# Patient Record
Sex: MALE | Race: WHITE | NOT HISPANIC OR LATINO | Employment: OTHER | URBAN - METROPOLITAN AREA
[De-identification: names, ages, dates, MRNs, and addresses within clinical notes are randomized per-mention and may not be internally consistent; named-entity substitution may affect disease eponyms.]

---

## 2019-01-15 ENCOUNTER — HISTORICAL (OUTPATIENT)
Dept: ADMINISTRATIVE | Facility: HOSPITAL | Age: 59
End: 2019-01-15

## 2019-01-15 LAB — AMYLASE SERPL-CCNC: 115 U/L (ref 28–100)

## 2019-01-19 ENCOUNTER — OUTSIDE PLACE OF SERVICE (OUTPATIENT)
Dept: PULMONOLOGY | Facility: CLINIC | Age: 59
End: 2019-01-19
Payer: MEDICARE

## 2019-01-19 LAB
ALBUMIN SERPL-MCNC: 3.2 G/DL (ref 3.1–4.7)
ALP SERPL-CCNC: 39 IU/L (ref 40–104)
ALT (SGPT): 15 IU/L (ref 3–33)
AST SERPL-CCNC: 23 IU/L (ref 10–40)
BILIRUB SERPL-MCNC: 2.5 MG/DL (ref 0.3–1)
BNP SERPL-MCNC: 1273 PG/ML (ref 0–100)
BUN SERPL-MCNC: 39 MG/DL (ref 8–20)
CALCIUM SERPL-MCNC: 8.7 MG/DL (ref 7.7–10.4)
CHLORIDE: 101 MMOL/L (ref 98–110)
CO2 SERPL-SCNC: 26.2 MMOL/L (ref 22.8–31.6)
CREATININE: 1.13 MG/DL (ref 0.6–1.4)
GLUCOSE: 177 MG/DL (ref 70–99)
HCT VFR BLD AUTO: 27.9 % (ref 39–55)
HGB BLD-MCNC: 8.6 G/DL (ref 14–16)
INR PPP: 1.3
MAGNESIUM SERPL-MCNC: 2.1 MG/DL (ref 1.5–2.6)
MCH RBC QN AUTO: 27.5 PG (ref 25–35)
MCHC RBC AUTO-ENTMCNC: 30.8 G/DL (ref 31–36)
MCV RBC AUTO: 89.1 FL (ref 80–100)
NUCLEATED RBCS: 0 %
PLATELET # BLD AUTO: 69 K/UL (ref 140–440)
POTASSIUM SERPL-SCNC: 4 MMOL/L (ref 3.5–5)
PROT SERPL-MCNC: 7.8 G/DL (ref 6–8.2)
PROTHROMBIN TIME: 15.8 SEC (ref 11.7–14)
RBC # BLD AUTO: 3.13 M/UL (ref 4.3–5.9)
SODIUM: 136 MMOL/L (ref 134–144)
WBC # BLD AUTO: 5.4 K/UL (ref 5–10)

## 2019-01-19 PROCEDURE — 99232 SBSQ HOSP IP/OBS MODERATE 35: CPT | Mod: ,,, | Performed by: INTERNAL MEDICINE

## 2019-01-19 PROCEDURE — 99232 PR SUBSEQUENT HOSPITAL CARE,LEVL II: ICD-10-PCS | Mod: ,,, | Performed by: INTERNAL MEDICINE

## 2019-01-21 LAB
ALBUMIN SERPL-MCNC: 3.1 G/DL (ref 3.1–4.7)
ALP SERPL-CCNC: 42 IU/L (ref 40–104)
ALT (SGPT): 28 IU/L (ref 3–33)
AST SERPL-CCNC: 28 IU/L (ref 10–40)
BILIRUB SERPL-MCNC: 1.6 MG/DL (ref 0.3–1)
BNP SERPL-MCNC: 622 PG/ML (ref 0–100)
BUN SERPL-MCNC: 42 MG/DL (ref 8–20)
CALCIUM SERPL-MCNC: 8.6 MG/DL (ref 7.7–10.4)
CHLORIDE: 104 MMOL/L (ref 98–110)
CO2 SERPL-SCNC: 27 MMOL/L (ref 22.8–31.6)
CREATININE: 1.12 MG/DL (ref 0.6–1.4)
GLUCOSE: 126 MG/DL (ref 70–99)
HCT VFR BLD AUTO: 29.6 % (ref 39–55)
HGB BLD-MCNC: 8.9 G/DL (ref 14–16)
INR PPP: 1.3
MAGNESIUM SERPL-MCNC: 2 MG/DL (ref 1.5–2.6)
MCH RBC QN AUTO: 27.1 PG (ref 25–35)
MCHC RBC AUTO-ENTMCNC: 30.1 G/DL (ref 31–36)
MCV RBC AUTO: 90 FL (ref 80–100)
MISCELLANEOUS LAB TEST ORDER: NORMAL
NUCLEATED RBCS: 0 %
PLATELET # BLD AUTO: 86 K/UL (ref 140–440)
POTASSIUM SERPL-SCNC: 3.9 MMOL/L (ref 3.5–5)
PROT SERPL-MCNC: 7.1 G/DL (ref 6–8.2)
PROTHROMBIN TIME: 16 SEC (ref 11.7–14)
RBC # BLD AUTO: 3.29 M/UL (ref 4.3–5.9)
SODIUM: 140 MMOL/L (ref 134–144)
WBC # BLD AUTO: 6.7 K/UL (ref 5–10)

## 2019-01-22 LAB
ALBUMIN SERPL-MCNC: 3.2 G/DL (ref 3.1–4.7)
ALP SERPL-CCNC: 44 IU/L (ref 40–104)
ALT (SGPT): 28 IU/L (ref 3–33)
AST SERPL-CCNC: 26 IU/L (ref 10–40)
BILIRUB SERPL-MCNC: 1.6 MG/DL (ref 0.3–1)
BNP SERPL-MCNC: 917 PG/ML (ref 0–100)
BUN SERPL-MCNC: 35 MG/DL (ref 8–20)
CALCIUM SERPL-MCNC: 8.4 MG/DL (ref 7.7–10.4)
CHLORIDE: 105 MMOL/L (ref 98–110)
CO2 SERPL-SCNC: 26.2 MMOL/L (ref 22.8–31.6)
CREATININE: 1.01 MG/DL (ref 0.6–1.4)
GLUCOSE: 99 MG/DL (ref 70–99)
HCT VFR BLD AUTO: 27.9 % (ref 39–55)
HGB BLD-MCNC: 8.5 G/DL (ref 14–16)
INR PPP: 1.4
MAGNESIUM SERPL-MCNC: 2 MG/DL (ref 1.5–2.6)
MCH RBC QN AUTO: 27.2 PG (ref 25–35)
MCHC RBC AUTO-ENTMCNC: 30.5 G/DL (ref 31–36)
MCV RBC AUTO: 89.4 FL (ref 80–100)
NUCLEATED RBCS: 0 %
PLATELET # BLD AUTO: 81 K/UL (ref 140–440)
POTASSIUM SERPL-SCNC: 3.8 MMOL/L (ref 3.5–5)
PROT SERPL-MCNC: 7.2 G/DL (ref 6–8.2)
PROTHROMBIN TIME: 16.2 SEC (ref 11.7–14)
RBC # BLD AUTO: 3.12 M/UL (ref 4.3–5.9)
SODIUM: 137 MMOL/L (ref 134–144)
WBC # BLD AUTO: 5.6 K/UL (ref 5–10)

## 2019-01-23 ENCOUNTER — TELEPHONE (OUTPATIENT)
Dept: PODIATRY | Facility: CLINIC | Age: 59
End: 2019-01-23

## 2019-01-23 LAB
ALBUMIN SERPL-MCNC: 3 G/DL (ref 3.1–4.7)
ALP SERPL-CCNC: 45 IU/L (ref 40–104)
ALT (SGPT): 28 IU/L (ref 3–33)
AST SERPL-CCNC: 25 IU/L (ref 10–40)
BILIRUB SERPL-MCNC: 1.6 MG/DL (ref 0.3–1)
BNP SERPL-MCNC: 772 PG/ML (ref 0–100)
BUN SERPL-MCNC: 29 MG/DL (ref 8–20)
CALCIUM SERPL-MCNC: 8.6 MG/DL (ref 7.7–10.4)
CHLORIDE: 102 MMOL/L (ref 98–110)
CO2 SERPL-SCNC: 26.5 MMOL/L (ref 22.8–31.6)
CREATININE: 0.98 MG/DL (ref 0.6–1.4)
GLUCOSE: 99 MG/DL (ref 70–99)
HCT VFR BLD AUTO: 27 % (ref 39–55)
HGB BLD-MCNC: 8.5 G/DL (ref 14–16)
INR PPP: 1.4
MAGNESIUM SERPL-MCNC: 2 MG/DL (ref 1.5–2.6)
MCH RBC QN AUTO: 28.5 PG (ref 25–35)
MCHC RBC AUTO-ENTMCNC: 31.5 G/DL (ref 31–36)
MCV RBC AUTO: 90.6 FL (ref 80–100)
NUCLEATED RBCS: 0 %
PLATELET # BLD AUTO: 84 K/UL (ref 140–440)
POTASSIUM SERPL-SCNC: 3.8 MMOL/L (ref 3.5–5)
PROT SERPL-MCNC: 7.1 G/DL (ref 6–8.2)
PROTHROMBIN TIME: 16.3 SEC (ref 11.7–14)
RBC # BLD AUTO: 2.98 M/UL (ref 4.3–5.9)
SODIUM: 136 MMOL/L (ref 134–144)
WBC # BLD AUTO: 6.2 K/UL (ref 5–10)

## 2019-01-23 RX ORDER — DULOXETIN HYDROCHLORIDE 60 MG/1
1 CAPSULE, DELAYED RELEASE ORAL 2 TIMES DAILY
Refills: 5 | COMMUNITY
Start: 2018-12-12 | End: 2019-02-28

## 2019-01-23 RX ORDER — PANTOPRAZOLE SODIUM 40 MG/1
1 TABLET, DELAYED RELEASE ORAL DAILY
Refills: 0 | COMMUNITY
Start: 2018-11-02

## 2019-01-23 RX ORDER — NAPROXEN SODIUM 220 MG/1
81 TABLET, FILM COATED ORAL DAILY
Status: ON HOLD | COMMUNITY
End: 2023-10-19 | Stop reason: SDUPTHER

## 2019-01-23 RX ORDER — ATORVASTATIN CALCIUM 10 MG/1
1 TABLET, FILM COATED ORAL DAILY
Refills: 1 | COMMUNITY
Start: 2018-12-12 | End: 2023-10-08 | Stop reason: DRUGHIGH

## 2019-01-23 RX ORDER — TRAMADOL HYDROCHLORIDE 50 MG/1
1 TABLET ORAL EVERY 8 HOURS PRN
Refills: 1 | COMMUNITY
Start: 2019-01-09 | End: 2019-04-16

## 2019-01-23 RX ORDER — METFORMIN HYDROCHLORIDE 500 MG/1
2 TABLET ORAL 2 TIMES DAILY
Refills: 2 | COMMUNITY
Start: 2018-12-03 | End: 2020-09-14 | Stop reason: ALTCHOICE

## 2019-01-23 RX ORDER — INSULIN GLARGINE 100 [IU]/ML
INJECTION, SOLUTION SUBCUTANEOUS
Refills: 1 | COMMUNITY
Start: 2019-01-02 | End: 2019-08-01

## 2019-01-23 RX ORDER — SPIRONOLACTONE 25 MG/1
1 TABLET ORAL DAILY
Refills: 1 | Status: ON HOLD | COMMUNITY
Start: 2018-12-03 | End: 2022-09-08 | Stop reason: HOSPADM

## 2019-01-23 RX ORDER — CARVEDILOL 25 MG/1
1 TABLET ORAL 2 TIMES DAILY
Refills: 1 | Status: ON HOLD | COMMUNITY
Start: 2018-11-29 | End: 2020-01-22 | Stop reason: SDUPTHER

## 2019-01-23 RX ORDER — GABAPENTIN 600 MG/1
1 TABLET ORAL 3 TIMES DAILY
Refills: 0 | Status: ON HOLD | COMMUNITY
Start: 2018-10-29 | End: 2023-06-18 | Stop reason: HOSPADM

## 2019-01-23 RX ORDER — FUROSEMIDE 40 MG/1
1 TABLET ORAL 2 TIMES DAILY
Refills: 1 | Status: ON HOLD | COMMUNITY
Start: 2018-12-03 | End: 2020-01-22 | Stop reason: SDUPTHER

## 2019-01-23 NOTE — TELEPHONE ENCOUNTER
FYI Patient had us done 1-21-19 in hospital.Sending to you wasn't sure if you saw this.  You were consulted on this patient.

## 2019-01-24 ENCOUNTER — TELEPHONE (OUTPATIENT)
Dept: INFECTIOUS DISEASES | Facility: CLINIC | Age: 59
End: 2019-01-24

## 2019-01-24 RX ORDER — NYSTATIN 100000 [USP'U]/ML
4 SUSPENSION ORAL 4 TIMES DAILY
Qty: 160 ML | Refills: 3 | Status: SHIPPED | OUTPATIENT
Start: 2019-01-24 | End: 2019-02-03

## 2019-01-24 NOTE — TELEPHONE ENCOUNTER
----- Message from Gayla Bridges sent at 1/24/2019  9:53 AM CST -----  Contact: Wife, Katelynn [phone]   The patients wife called stating the patient was seen in the [facility], and was given medication for Thrush.  She states the patient still has thrush and would like a RX for medication.  Please assist and thanks in advance.      Cha

## 2019-01-25 RX ORDER — CLOTRIMAZOLE 10 MG/1
10 LOZENGE ORAL; TOPICAL 3 TIMES DAILY
Qty: 90 TABLET | Refills: 1 | Status: SHIPPED | OUTPATIENT
Start: 2019-01-25 | End: 2019-02-24

## 2019-01-25 NOTE — TELEPHONE ENCOUNTER
Sent in nystatin suspension yesterday  Fax from pharmacy indicates they do not have the drug, therefore sending in mycelex leta , dissolve one in mouth TID as needed for thrush. I spoke with pharmacist who did have some on the shelf and she will dispense and I did send it electronically as well.

## 2019-01-29 ENCOUNTER — TELEPHONE (OUTPATIENT)
Dept: INFECTIOUS DISEASES | Facility: CLINIC | Age: 59
End: 2019-01-29

## 2019-01-29 NOTE — TELEPHONE ENCOUNTER
Says patient is on Vancomycin 2 grams q36hrs and they have lab orders for every Monday or Tuesday and Thursday or Friday, but this does not fall in line with when he is dosing.  So she needs clarification orders.  So that labs can be done on the proper days.

## 2019-01-30 ENCOUNTER — TELEPHONE (OUTPATIENT)
Dept: INFECTIOUS DISEASES | Facility: CLINIC | Age: 59
End: 2019-01-30

## 2019-01-30 NOTE — TELEPHONE ENCOUNTER
Says patient has an ongoing rash on his thighs, both hips and back.  Says the rash started while he was in the hospital and has worsened.  Says when she administered his vancomycin he started itching really bad.  What would you like for them to do?

## 2019-01-31 ENCOUNTER — OFFICE VISIT (OUTPATIENT)
Dept: INFECTIOUS DISEASES | Facility: CLINIC | Age: 59
End: 2019-01-31
Payer: MEDICARE

## 2019-01-31 VITALS
DIASTOLIC BLOOD PRESSURE: 76 MMHG | SYSTOLIC BLOOD PRESSURE: 126 MMHG | OXYGEN SATURATION: 97 % | WEIGHT: 285 LBS | TEMPERATURE: 98 F | BODY MASS INDEX: 38.6 KG/M2 | HEART RATE: 79 BPM | HEIGHT: 72 IN

## 2019-01-31 DIAGNOSIS — D69.1 THROMBOCYTOPATHY: ICD-10-CM

## 2019-01-31 DIAGNOSIS — M86.171 OTHER ACUTE OSTEOMYELITIS OF RIGHT FOOT: Primary | ICD-10-CM

## 2019-01-31 DIAGNOSIS — A40.0 SEPSIS DUE TO GROUP A STREPTOCOCCUS: ICD-10-CM

## 2019-01-31 DIAGNOSIS — A49.02 MRSA (METHICILLIN RESISTANT STAPHYLOCOCCUS AUREUS) INFECTION: ICD-10-CM

## 2019-01-31 DIAGNOSIS — R23.3 PETECHIAL RASH: ICD-10-CM

## 2019-01-31 DIAGNOSIS — Z79.2 ENCOUNTER FOR LONG-TERM (CURRENT) USE OF ANTIBIOTICS: ICD-10-CM

## 2019-01-31 DIAGNOSIS — I87.2 VENOUS STASIS DERMATITIS OF BOTH LOWER EXTREMITIES: ICD-10-CM

## 2019-01-31 PROBLEM — A41.9 SEPSIS: Status: ACTIVE | Noted: 2019-01-01

## 2019-01-31 PROBLEM — M86.9 OSTEOMYELITIS OF RIGHT FOOT: Status: ACTIVE | Noted: 2019-01-01

## 2019-01-31 PROCEDURE — 99214 OFFICE O/P EST MOD 30 MIN: CPT | Mod: ,,, | Performed by: INTERNAL MEDICINE

## 2019-01-31 PROCEDURE — 99214 PR OFFICE/OUTPT VISIT, EST, LEVL IV, 30-39 MIN: ICD-10-PCS | Mod: ,,, | Performed by: INTERNAL MEDICINE

## 2019-01-31 RX ORDER — DOXYCYCLINE 100 MG/1
100 CAPSULE ORAL 2 TIMES DAILY
Qty: 60 CAPSULE | Refills: 2 | Status: SHIPPED | OUTPATIENT
Start: 2019-01-31 | End: 2019-06-24

## 2019-01-31 NOTE — PROGRESS NOTES
"Subjective:       Patient ID: Ana Bullard is a 57 y.o. male.    Chief Complaint:: Follow-up (Possible allergic reaction)    HPI seen in [facility] for osteomyelitis of right 5th metatarsal head (MRSA and GAS and Proteus) with intense cellulitis of the right leg with sepsis, septic shock, respiratory failure, acute kidney injury. At the same time he was also found have otitis media on the left. Dr. izquierdo debrided the foot wound and obtained a bone biopsy. He was discharged on IV vancomycin and oral ciprofloxacin. First set of laboratory studies were not performed by home health. Patient and wife called yesterday about a rash that began in the hospital on his left hip and thigh which has progressed. Yesterday after infusing vancomycin he had itching. I asked them to stop vancomycin and come in this morning. He has had no fever or chills or sweats. No eye or mouth involvement. Pertinent from laboratory studies drawn yesterday is an elevation of his creatinine from 1-1.71. He is taking his usual amount of diuretics, furosemide 40 twice a day and spironolactone. He feels that he is drinking plenty. His urine is bright yellow which he blames on his vitamins. His wound care has been provided by home health and the ulceration is improving.    Review of patient's allergies indicates:   Allergen Reactions    Heparin analogues Other (See Comments)     Depleted WBC count    Morphine Hallucinations    Vasopressin analogues Other (See Comments)     "felt like eyes going to pop out of my head"     Past Medical History:   Diagnosis Date    AICD (automatic cardioverter/defibrillator) present     Anxiety and depression 2012    CAD (coronary artery disease) 2012    Cardiomegaly 2016    CHF (congestive heart failure) 2012    Cholecystitis 2017    Complete heart block 2012    Diabetic foot ulcer     DVT (deep venous thrombosis) 2012    And pulmonary embolus    GERD (gastroesophageal reflux disease) 2010    Heparin " induced thrombocytopenia     Hyperlipemia     IDDM (insulin dependent diabetes mellitus) 1983    With neuropathy    Ischemic cardiomyopathy     MI (myocardial infarction)     Morbid obesity     MRSA (methicillin resistant Staphylococcus aureus) infection 2019    Noncompliance with therapeutic plan     Osteomyelitis of right foot 2019    Pulmonary edema 2019    Respiratory failure 2019    Sepsis 2019    Due to cellulitis right leg    Sleep apnea 2013    Thrombocytopathy     Venous stasis dermatitis of both lower extremities      Past Surgical History:   Procedure Laterality Date    CARDIAC PACEMAKER PLACEMENT  2012    CARDIAC PACEMAKER PLACEMENT  10/04/2018    battery replacement    CORONARY ARTERY BYPASS GRAFT  2012    ESOPHAGOGASTRODUODENOSCOPY  2017    SAMARA FILTER PLACEMENT  2012    vena cava     Social History     Tobacco Use    Smoking status: Former Smoker     Packs/day: 2.00     Years: 38.00     Pack years: 76.00     Types: Cigarettes     Last attempt to quit:      Years since quittin.0    Smokeless tobacco: Never Used   Substance Use Topics    Alcohol use: No     Frequency: Never     Social History     Occupational History    Not on file     History reviewed. No pertinent family history.      Review of Systems    Constitutional: No fever, chills, sweats, fatigue, weakness, weight loss    Eyes: No change in vision,    ENT: No sore throat, mouth pains, or lesions    Cardiovascular: No chest pain, BAPTISTE and, has chronic pedal edema    Respiratory: No shortness of breath, cough,   Gastrointestinal: No abdominal pain, nausea, vomiting, diarrhea, or focal abdominal pain    Genitourinary: No dysuria    Musculoskeletal: No new pain, joint swelling, or injuries    Integumentary: See history present illness   Neurological: No dizziness, vertigo, unusual headaches, falls    Psychiatric: No anxiety, depression    Endocrine: Blood sugars are  well controlled and he is paying much more attention to his diabetes now    Lymphatic: No lymphadenopathy, blood loss,     VAD: Right arm PICC line has no redness, tenderness, phlebitis, drainage     Objective:      Blood pressure 126/76, pulse 79, temperature 97.5 °F (36.4 °C), temperature source Temporal, height 6' (1.829 m), weight 129.3 kg (285 lb), SpO2 97 %. Body mass index is 38.65 kg/m².  Physical Exam      General: Alert and attentive, cooperative and in no distress    Eyes: Pupils equal, round, reactive to light, anicteric, EOMI    Neck: Supple, non-tender, no thyromegaly or masses    ENT: EAC patent, TM normal and otitis is resolved, nares patent, no oral lesions, , no thrush    Cardiovascular: Regular rate and rhythm,     Respiratory: Lungs clear without wheezes, rales, rubs or rhonci    Gastrointestinal:  Soft, bowel sounds normal,  no mass or organomegaly, no tenderness or distention    Genitourinary:    Integumentary:#1 he has venous stasis erythema on his abdominal pannus when it is dependent  #2 he has severe venous stasis congestion of his lower extremities right greater than left without ulcerations or breakdown, and has generalized dry skin  #3 he has a petechial rash that begins in his lower back, over the left buttock the lateral left thigh and the right anterior thigh. Some of these have been excoriated but this does not look like a drug rash, it looks like trauma with a platelet count of 69,000.  #4 severely dystrophic nails with mild tinea pedis of the right foot     Vascular:Severe peripheral edema, venous stasis right greater than left,  warm and well perfused    Musculoskeletal: Ambulates without difficulty, no acute arthritis, synovitis or myositis. Normal muscle bulk and strength    Lymphatic: No cervical, axillary, or inguinal LAD    Neurological: Normal LOC, cranial nerves grossly intact, speech, gait    Psychiatric: Normal mood, speech,  demeanor     Wound: Right foot fifth  metatarsal head plantar surface, the ulceration is clean and pink with granulation and is approximately 1 cm in diameter. when it is probed I can still get up to the bone I cannot see the bone. There is minimal drainage on the bandage and no cellulitis. The bandages dirty and he was counseled about walking without the boot    VAD: PICC line has no redness, tenderness, drainage, phlebitis       Recent Diagnostics:Labs from [place] 1/30: Bielen 37 creatinine 1.71. Hemoglobin is low but stable. He has 5% eosinophils          Assessment and Plan:           Other acute osteomyelitis of right foot  -     SUBSEQUENT HOME HEALTH ORDERS    MRSA (methicillin resistant Staphylococcus aureus) infection    Sepsis due to group A Streptococcus    Encounter for long-term (current) use of antibiotics  -     SUBSEQUENT HOME HEALTH ORDERS    Thrombocytopathy    IDDM (insulin dependent diabetes mellitus)    Venous stasis dermatitis of both lower extremities    Petechial rash    Other orders  -     doxycycline (VIBRAMYCIN) 100 MG Cap; Take 1 capsule (100 mg total) by mouth 2 (two) times daily.  Dispense: 60 capsule; Refill: 2    Hold vancomycin. I believe this is more than likely the reason his creatinine is higher but I do not believe the rash is from vancomycin. I do not believe this is a drug rash.  Continue to flush the picc line twice a day. No heparin.    Starting doxycycline 100 mg twice a day to take the place of vancomycin.  Continue the ciprofloxacin 500 mg twice a day  Please take a probiotic (good bacteria pill) once a day    I have changed your  Home health lab orders  I would like for you to skip the evening furosemide for 3 days.     For the athlete's foot, clean and dry between the toes and apply over the counter Lotrimin.      This note was created using Dragon voice recognition software that occasionally misinterpreted phrases or words.

## 2019-01-31 NOTE — PATIENT INSTRUCTIONS
Hold vancomycin  Continue to flush the picc line twice a day. No heparin.    Starting doxycycline 100 mg twice a day  Continue the ciprofloxacin 500 mg twice a day  Please take a probiotic (good bacteria pill) once a day    I have changed your  Home health lab orders  I would like for you to skip the evening furosemide for 3 days.     For the athlete's foot, clean and dry between the toes and apply over the counter Lotrimin.

## 2019-02-06 ENCOUNTER — TELEPHONE (OUTPATIENT)
Dept: INFECTIOUS DISEASES | Facility: CLINIC | Age: 59
End: 2019-02-06

## 2019-02-06 NOTE — TELEPHONE ENCOUNTER
----- Message from Otilia Pineda MD sent at 2/5/2019  4:46 PM CST -----  Please find out if his rash is better. If it is, good. If it is not, tell him to stop the cipro  ----- Message -----  From: Sandra Rodriguez MA  Sent: 2/5/2019   1:43 PM  To: Otilia Pineda MD    [facility] 2/5/19

## 2019-02-06 NOTE — TELEPHONE ENCOUNTER
Said the rash is better, but has not gone away completely and does not itch as much.  Do you still want him to stop the cipro?

## 2019-02-12 ENCOUNTER — OFFICE VISIT (OUTPATIENT)
Dept: PODIATRY | Facility: CLINIC | Age: 59
End: 2019-02-12
Payer: MEDICARE

## 2019-02-12 VITALS
HEIGHT: 72 IN | HEART RATE: 60 BPM | SYSTOLIC BLOOD PRESSURE: 104 MMHG | TEMPERATURE: 98 F | WEIGHT: 256 LBS | DIASTOLIC BLOOD PRESSURE: 66 MMHG | BODY MASS INDEX: 34.67 KG/M2

## 2019-02-12 DIAGNOSIS — L97.511 SKIN ULCER OF RIGHT FOOT, LIMITED TO BREAKDOWN OF SKIN: ICD-10-CM

## 2019-02-12 DIAGNOSIS — M86.271 SUBACUTE OSTEOMYELITIS OF RIGHT FOOT: Primary | ICD-10-CM

## 2019-02-12 DIAGNOSIS — E11.49 TYPE II DIABETES MELLITUS WITH NEUROLOGICAL MANIFESTATIONS: ICD-10-CM

## 2019-02-12 PROCEDURE — 99213 PR OFFICE/OUTPT VISIT, EST, LEVL III, 20-29 MIN: ICD-10-PCS | Mod: ,,, | Performed by: PODIATRIST

## 2019-02-12 PROCEDURE — 99213 OFFICE O/P EST LOW 20 MIN: CPT | Mod: ,,, | Performed by: PODIATRIST

## 2019-02-12 NOTE — PROGRESS NOTES
[address] [address]  [place] [address]  Phone: [phone]   [phone]    Patient's PCP:YRN Landry  Referring Provider: No ref. provider found    Subjective:      Chief Complaint:: Follow-up (I&D right 5th mpj and bone biopsy)    CAROLIN Bullard is a 57 y.o. male who presents with a complaint of  Ulcer right fifth mpj lasting for one month. Had bone biopsy done and was on IV antibiotics.Onset of the symptoms was none.  Current symptoms include pain.  Symptoms have improved.Treatment to date have included bone biopsy, home health . Patients rates pain 4/10 on pain scale.    Systemic Doctor: YRN Cardona  Date Last Seen: 2/2019  Blood Sugar: 150  Hemoglobin A1c: 6    Vitals:    02/12/19 1445   BP: 104/66   Pulse: 60   Temp: 98.4 °F (36.9 °C)     Shoe Size: 12    Past Surgical History:   Procedure Laterality Date    CARDIAC PACEMAKER PLACEMENT  12/2012    CARDIAC PACEMAKER PLACEMENT  10/04/2018    battery replacement    CORONARY ARTERY BYPASS GRAFT  06/2012    ESOPHAGOGASTRODUODENOSCOPY  01/31/2017    SAMARA FILTER PLACEMENT  2012    vena cava     Past Medical History:   Diagnosis Date    AICD (automatic cardioverter/defibrillator) present     Anxiety and depression 2012    CAD (coronary artery disease) 2012    Cardiomegaly 2016    CHF (congestive heart failure) 2012    Cholecystitis 2017    Complete heart block 2012    Diabetic foot ulcer     DVT (deep venous thrombosis) 2012    And pulmonary embolus    GERD (gastroesophageal reflux disease) 2010    Heparin induced thrombocytopenia     Hyperlipemia 2011    IDDM (insulin dependent diabetes mellitus) 1983    With neuropathy    Ischemic cardiomyopathy 2012    MI (myocardial infarction) 2012    Morbid obesity     MRSA (methicillin resistant Staphylococcus aureus) infection 01/19/2019    Noncompliance with therapeutic plan 2019    Osteomyelitis of right foot 01/2019    Pulmonary edema 2019    Respiratory  "failure 2019    Sepsis 01/2019    Due to cellulitis right leg    Sleep apnea 2013    Thrombocytopathy 2012    Venous stasis dermatitis of both lower extremities      History reviewed. No pertinent family history.     Social History:   Marital Status:   Alcohol History:  reports that he does not drink alcohol.  Tobacco History:  reports that he quit smoking about 7 years ago. His smoking use included cigarettes. He has a 76.00 pack-year smoking history. he has never used smokeless tobacco.  Drug History:  has no drug history on file.    Review of patient's allergies indicates:   Allergen Reactions    Heparin analogues Other (See Comments)     Depleted WBC count    Morphine Hallucinations    Vasopressin analogues Other (See Comments)     "felt like eyes going to pop out of my head"       Current Outpatient Medications   Medication Sig Dispense Refill    aspirin 81 MG Chew Take 81 mg by mouth once daily.      atorvastatin (LIPITOR) 10 MG tablet Take 1 tablet by mouth once daily.  1    carvedilol (COREG) 25 MG tablet Take 1 tablet by mouth 2 (two) times daily.  1    clotrimazole (MYCELEX) 10 mg leta Take 1 tablet (10 mg total) by mouth 3 (three) times daily. As needed for thrush 90 tablet 1    doxycycline (VIBRAMYCIN) 100 MG Cap Take 1 capsule (100 mg total) by mouth 2 (two) times daily. 60 capsule 2    DULoxetine (CYMBALTA) 60 MG capsule Take 1 capsule by mouth 2 (two) times daily.  5    furosemide (LASIX) 40 MG tablet Take 1 tablet by mouth 2 (two) times daily.  1    gabapentin (NEURONTIN) 600 MG tablet Take 1 tablet by mouth 3 (three) times daily.  0    LANTUS U-100 INSULIN 100 unit/mL injection   1    metFORMIN (GLUCOPHAGE) 500 MG tablet Take 2 tablets by mouth 2 (two) times daily.  2    pantoprazole (PROTONIX) 40 MG tablet Take 1 tablet by mouth once daily.  0    spironolactone (ALDACTONE) 25 MG tablet Take 1 tablet by mouth once daily.  1    traMADol (ULTRAM) 50 mg tablet Take 1 tablet " by mouth every 8 (eight) hours as needed for Pain.  1     No current facility-administered medications for this visit.        Review of Systems   Constitutional: Positive for chills. Negative for fatigue, fever and unexpected weight change.   HENT: Negative for hearing loss and trouble swallowing.    Eyes: Negative for photophobia and visual disturbance.   Respiratory: Negative for cough, shortness of breath and wheezing.    Cardiovascular: Positive for leg swelling. Negative for chest pain and palpitations.   Gastrointestinal: Negative for abdominal pain and nausea.   Genitourinary: Negative for dysuria and frequency.   Musculoskeletal: Negative for arthralgias, back pain and joint swelling.   Skin: Negative for rash.   Neurological: Positive for numbness. Negative for tremors, seizures, weakness and headaches.   Hematological: Does not bruise/bleed easily.         Objective:        Physical Exam:   Foot Exam    General  General Appearance: appears stated age and healthy   Orientation: alert and oriented to person, place, and time   Affect: appropriate       Right Foot/Ankle     Inspection and Palpation  Ecchymosis: none  Tenderness: none   Swelling: none   Arch: normal  Hammertoes: absent  Claw Toes: absent  Hallux valgus: no  Hallux limitus: no  Skin Exam: skin intact;     Neurovascular  Dorsalis pedis: 2+  Posterior tibial: 2+  Saphenous nerve sensation: diminished  Tibial nerve sensation: diminished  Superficial peroneal nerve sensation: diminished  Deep peroneal nerve sensation: diminished  Sural nerve sensation: diminished      Left Foot/Ankle      Inspection and Palpation  Ecchymosis: none  Tenderness: none   Swelling: none   Arch: normal  Hammertoes: absent  Claw toes: absent  Hallux valgus: no  Hallux limitus: no  Skin Exam: ulcer; (1 cm x 1.1 cm x 1.5 mm deep grade 2 ulcer plantar fifth MPJ right foot. No purulent drainage perimeter is clear of any infection pink granulation  base.)    Neurovascular  Dorsalis pedis: 2+  Posterior tibial: 2+  Saphenous nerve sensation: diminished  Tibial nerve sensation: diminished  Superficial peroneal nerve sensation: diminished  Deep peroneal nerve sensation: diminished  Sural nerve sensation: diminished          Physical Exam   Cardiovascular:   Pulses:       Dorsalis pedis pulses are 2+ on the right side, and 2+ on the left side.        Posterior tibial pulses are 2+ on the right side, and 2+ on the left side.   Feet:   Left Foot:   Skin Integrity: Positive for ulcer.       Imaging:            Assessment:       1. Subacute osteomyelitis of right foot    2. Type II diabetes mellitus with neurological manifestations    3. Skin ulcer of right foot, limited to breakdown of skin      Plan:   Subacute osteomyelitis of right foot    Type II diabetes mellitus with neurological manifestations    Skin ulcer of right foot, limited to breakdown of skin    Continue local wound care at home with cleaning and applying Bactroban and redressing. Today we were providing him with a surgical shoe a Spenco insert was felt with a cut out to reduce pressure on the ulcer on the right foot. There return to see me in 4 weeks unless there is a problem.  Follow-up in about 4 weeks (around 3/12/2019).    Procedures - None    Counseling:     I provided patient education verbally regarding:   Patient diagnosis, treatment options, as well as alternatives, risks, and benefits.   proper ulcer care and the possible need for serial debridements, topical medications, specific dressings and biological engineered skin substitutes if indicated.  This note was created using Dragon voice recognition software that occasionally misinterpreted phrases or words.

## 2019-02-28 ENCOUNTER — OFFICE VISIT (OUTPATIENT)
Dept: INFECTIOUS DISEASES | Facility: CLINIC | Age: 59
End: 2019-02-28
Payer: MEDICARE

## 2019-02-28 VITALS
DIASTOLIC BLOOD PRESSURE: 72 MMHG | SYSTOLIC BLOOD PRESSURE: 108 MMHG | HEART RATE: 74 BPM | BODY MASS INDEX: 33.32 KG/M2 | WEIGHT: 246 LBS | OXYGEN SATURATION: 96 % | HEIGHT: 72 IN

## 2019-02-28 DIAGNOSIS — A49.02 MRSA (METHICILLIN RESISTANT STAPHYLOCOCCUS AUREUS) INFECTION: ICD-10-CM

## 2019-02-28 DIAGNOSIS — D69.1 THROMBOCYTOPATHY: ICD-10-CM

## 2019-02-28 DIAGNOSIS — I87.2 VENOUS STASIS DERMATITIS OF BOTH LOWER EXTREMITIES: ICD-10-CM

## 2019-02-28 DIAGNOSIS — Z79.2 ENCOUNTER FOR LONG-TERM (CURRENT) USE OF ANTIBIOTICS: ICD-10-CM

## 2019-02-28 DIAGNOSIS — M86.171 OTHER ACUTE OSTEOMYELITIS OF RIGHT FOOT: Primary | ICD-10-CM

## 2019-02-28 PROCEDURE — 99213 OFFICE O/P EST LOW 20 MIN: CPT | Mod: ,,, | Performed by: INTERNAL MEDICINE

## 2019-02-28 PROCEDURE — 99213 PR OFFICE/OUTPT VISIT, EST, LEVL III, 20-29 MIN: ICD-10-PCS | Mod: ,,, | Performed by: INTERNAL MEDICINE

## 2019-02-28 NOTE — PROGRESS NOTES
Subjective:       Patient ID: Ana Bullard is a 57 y.o. male.    Chief Complaint:: Follow-up (Osteomyelitis right foot)    HPI seen in [facility] for osteomyelitis of right 5th metatarsal head (MRSA and GAS and Proteus) with intense cellulitis of the right leg with sepsis, septic shock, respiratory failure, acute kidney injury. At the same time he was also found have otitis media on the left. Dr. izquierdo debrided the foot wound and obtained a bone biopsy. He was discharged on IV vancomycin and oral ciprofloxacin. First set of laboratory studies were not performed by home health. Patient and wife called yesterday about a rash that began in the hospital on his left hip and thigh which has progressed. Yesterday after infusing vancomycin he had itching. I asked them to stop vancomycin and come in this morning. He has had no fever or chills or sweats. No eye or mouth involvement. Pertinent from laboratory studies drawn yesterday is an elevation of his creatinine from 1-1.71. He is taking his usual amount of diuretics, furosemide 40 twice a day and spironolactone. He feels that he is drinking plenty. His urine is bright yellow which he blames on his vitamins. His wound care has been provided by home health and the ulceration is improving.    2/28/19: lost 40#. He believes he is following his diet more carefully, he is taking his diuretics regularly and give some of the credit to the probiotic he is on. Still on cipro and doxycycline without any side effects. Last lab work was obtained February 14 in his view and was 48 creatinine 1.8. His baseline is normal. His right foot ulceration has been improving and he has to remind himself that he has a sore on the foot at times because he is more and more active. The rash resolved quickly. I explained that I felt it was more likely that his rash was due to trauma and thrombocytopenia than due to vancomycin. I explained the difference between an allergy and a red man reaction. Last  "platelets were 10 8000    Review of patient's allergies indicates:   Allergen Reactions    Heparin analogues Other (See Comments)     Depleted WBC count    Morphine Hallucinations    Vasopressin analogues Other (See Comments)     "felt like eyes going to pop out of my head"     Past Medical History:   Diagnosis Date    AICD (automatic cardioverter/defibrillator) present     Anxiety and depression     CAD (coronary artery disease)     Cardiomegaly 2016    CHF (congestive heart failure)     Cholecystitis 2017    Complete heart block     Diabetic foot ulcer     DVT (deep venous thrombosis)     And pulmonary embolus    GERD (gastroesophageal reflux disease)     Heparin induced thrombocytopenia     Hyperlipemia     IDDM (insulin dependent diabetes mellitus) 1983    With neuropathy    Ischemic cardiomyopathy     MI (myocardial infarction)     Morbid obesity     MRSA (methicillin resistant Staphylococcus aureus) infection 2019    Noncompliance with therapeutic plan     Osteomyelitis of right foot 2019    Pulmonary edema     Respiratory failure     Sepsis 2019    Due to cellulitis right leg    Sleep apnea 2013    Thrombocytopathy 2012    Venous stasis dermatitis of both lower extremities      Past Surgical History:   Procedure Laterality Date    CARDIAC PACEMAKER PLACEMENT  2012    CARDIAC PACEMAKER PLACEMENT  10/04/2018    battery replacement    CORONARY ARTERY BYPASS GRAFT  2012    ESOPHAGOGASTRODUODENOSCOPY  2017    SAMARA FILTER PLACEMENT  2012    vena cava     Social History     Tobacco Use    Smoking status: Former Smoker     Packs/day: 2.00     Years: 38.00     Pack years: 76.00     Types: Cigarettes     Last attempt to quit: 2012     Years since quittin.1    Smokeless tobacco: Never Used   Substance Use Topics    Alcohol use: No     Frequency: Never     Social History     Occupational History    Not " "on file     No family history on file.      Review of Systems    Constitutional: No fever, chills, sweats, fatigue, weakness, and has had a 40 pound weight loss  Eyes: No change in vision,  ENT: No sore throat, mouth pains, or lesions  Cardiovascular: No chest pain, BAPTISTE and, has improved pedal edema  Respiratory: No shortness of breath, ,   Gastrointestinal: No abdominal pain, nausea, vomiting, diarrhea,   Genitourinary:   Musculoskeletal: No new pain, joint swelling, or injuries  Integumentary: See history present illness   Neurological: No dizziness, vertigo, unusual headaches, falls  Psychiatric: No anxiety, depression, but did "pitch a fit "one day when he was frustrated with his glucometer and accidentally took 5 Coreg tablets at one time. He was observed in the [place] emergency room for 5 hours without adverse event  Endocrine: Blood sugars are well controlled and he is paying much more attention to his diabetes now  Lymphatic: No lymphadenopathy, blood loss,   VAD:     Objective:      Blood pressure 108/72, pulse 74, height 6' (1.829 m), weight 111.6 kg (246 lb), SpO2 96 %. Body mass index is 33.36 kg/m².  Physical Exam      General: Alert and attentive, cooperative and in no distress  Eyes: Pupils equal, round, reactive to light, anicteric, EOMI  Neck: Supple,   ENT: EAC patent,nares patent, no oral lesions, , no thrush  Cardiovascular:   Respiratory:   Gastrointestinal:    Genitourinary:    Integumentary:#1  venous stasis erythema on his abdominal pannusis resolved  #2 he has severe venous stasis congestion of his lower extremities right greater than left without ulcerations or breakdown, and has generalized dry skin, less edematous  #3 the petechial rash that he had is resolved  #4 severely dystrophic nails with mild tinea pedis of the right foot     Vascular:improved but chronic peripheral edema, venous stasis right greater than left,  warm and well perfused  Musculoskeletal: Ambulates without " difficulty, no acute arthritis, synovitis or myositis. Normal muscle bulk and strength  Lymphatic:   Neurological: Normal LOC, cranial nerves grossly intact, speech, gait  Psychiatric: Normal mood, speech,  demeanor  Wound: Right foot fifth metatarsal head plantar surface, the ulceration is clean and pink with granulation and is approximately 1 cm x 0.8 cm in diameter. It no longer probes to bone and is very shallow. No surrounding cellulitis.     Recent Diagnostics:Labs from [place] 2/14 :BUN 48 and C r 1.8, platelets 108K, 10% eosinophils remain       Assessment and Plan:           Other acute osteomyelitis of right foot    MRSA (methicillin resistant Staphylococcus aureus) infection    Encounter for long-term (current) use of antibiotics    Thrombocytopathy    IDDM (insulin dependent diabetes mellitus)    Venous stasis dermatitis of both lower extremities    Finish the doxycycline that you have  Take 2 more weeks of cipro then stop    I will send the lab report to Dr. Roth. Please discuss with him about reducing fluid medication at your next visit.     Continue the probiotic    Please call me if you need me    For the athlete's foot, clean and dry between the toes and apply over the counter Lotrimin.      This note was created using Dragon voice recognition software that occasionally misinterpreted phrases or words.

## 2019-02-28 NOTE — PATIENT INSTRUCTIONS
Finish the doxycycline that you have  Take 2 more weeks of cipro then stop    I will send the lab report to Dr. Roth. Please discuss with him about reducing fluid medication at your next visit.     Continue the probiotic    Please call me if you need me

## 2019-03-12 ENCOUNTER — OFFICE VISIT (OUTPATIENT)
Dept: PODIATRY | Facility: CLINIC | Age: 59
End: 2019-03-12
Payer: MEDICARE

## 2019-03-12 VITALS
HEIGHT: 72 IN | SYSTOLIC BLOOD PRESSURE: 97 MMHG | WEIGHT: 244.63 LBS | HEART RATE: 66 BPM | BODY MASS INDEX: 33.13 KG/M2 | DIASTOLIC BLOOD PRESSURE: 66 MMHG

## 2019-03-12 DIAGNOSIS — E11.49 TYPE II DIABETES MELLITUS WITH NEUROLOGICAL MANIFESTATIONS: ICD-10-CM

## 2019-03-12 DIAGNOSIS — L97.511 SKIN ULCER OF RIGHT FOOT, LIMITED TO BREAKDOWN OF SKIN: Primary | ICD-10-CM

## 2019-03-12 PROCEDURE — 11042 DBRDMT SUBQ TIS 1ST 20SQCM/<: CPT | Mod: ,,, | Performed by: PODIATRIST

## 2019-03-12 PROCEDURE — 99213 PR OFFICE/OUTPT VISIT, EST, LEVL III, 20-29 MIN: ICD-10-PCS | Mod: 25,,, | Performed by: PODIATRIST

## 2019-03-12 PROCEDURE — 11042 WOUND DEBRIDEMENT: ICD-10-PCS | Mod: ,,, | Performed by: PODIATRIST

## 2019-03-12 PROCEDURE — 99213 OFFICE O/P EST LOW 20 MIN: CPT | Mod: 25,,, | Performed by: PODIATRIST

## 2019-03-12 NOTE — PROGRESS NOTES
[address]  [place] [address]  Phone: [phone]   [phone]    Patient's PCP:YRN Landry  Referring Provider: No ref. provider found    Subjective:      Chief Complaint:: Follow-up (ulcer right fifth mpj/ bone biopsy)    CAROLIN Bullard is a 57 y.o. male who presents with a complaint of  Ulcer right fifth mpj/bone biopsy . Current symptoms include none.  Aggravating factors are none. Symptoms have improved.Treatment to date have included home health and debridement. Patients rates pain 0/10 on pain scale.    Systemic Doctor: Renita Duron NP  Date Last Seen: 2/19  Blood Sugar: 72  Hemoglobin A1c:     Vitals:    03/12/19 1504   BP: 97/66   Pulse: 66     Shoe Size:     Past Surgical History:   Procedure Laterality Date    CARDIAC PACEMAKER PLACEMENT  12/2012    CARDIAC PACEMAKER PLACEMENT  10/04/2018    battery replacement    CORONARY ARTERY BYPASS GRAFT  06/2012    ESOPHAGOGASTRODUODENOSCOPY  01/31/2017    SAMARA FILTER PLACEMENT  2012    vena cava     Past Medical History:   Diagnosis Date    AICD (automatic cardioverter/defibrillator) present     Anxiety and depression 2012    CAD (coronary artery disease) 2012    Cardiomegaly 2016    CHF (congestive heart failure) 2012    Cholecystitis 2017    Complete heart block 2012    Diabetic foot ulcer     DVT (deep venous thrombosis) 2012    And pulmonary embolus    GERD (gastroesophageal reflux disease) 2010    Heparin induced thrombocytopenia     Hyperlipemia 2011    IDDM (insulin dependent diabetes mellitus) 1983    With neuropathy    Ischemic cardiomyopathy 2012    MI (myocardial infarction) 2012    Morbid obesity     MRSA (methicillin resistant Staphylococcus aureus) infection 01/19/2019    Noncompliance with therapeutic plan 2019    Osteomyelitis of right foot 01/2019    Pulmonary edema 2019    Respiratory failure 2019    Sepsis 01/2019    Due to cellulitis right leg    Sleep apnea 2013     "Thrombocytopathy 2012    Venous stasis dermatitis of both lower extremities      History reviewed. No pertinent family history.     Social History:   Marital Status:   Alcohol History:  reports that he does not drink alcohol.  Tobacco History:  reports that he quit smoking about 7 years ago. His smoking use included cigarettes. He has a 76.00 pack-year smoking history. he has never used smokeless tobacco.  Drug History:  has no drug history on file.    Review of patient's allergies indicates:   Allergen Reactions    Heparin analogues Other (See Comments)     Depleted WBC count    Morphine Hallucinations    Vasopressin analogues Other (See Comments)     "felt like eyes going to pop out of my head"       Current Outpatient Medications   Medication Sig Dispense Refill    aspirin 81 MG Chew Take 81 mg by mouth once daily.      atorvastatin (LIPITOR) 10 MG tablet Take 1 tablet by mouth once daily.  1    carvedilol (COREG) 25 MG tablet Take 1 tablet by mouth 2 (two) times daily.  1    doxycycline (VIBRAMYCIN) 100 MG Cap Take 1 capsule (100 mg total) by mouth 2 (two) times daily. 60 capsule 2    furosemide (LASIX) 40 MG tablet Take 1 tablet by mouth 2 (two) times daily.  1    gabapentin (NEURONTIN) 600 MG tablet Take 1 tablet by mouth 3 (three) times daily.  0    LANTUS U-100 INSULIN 100 unit/mL injection   1    metFORMIN (GLUCOPHAGE) 500 MG tablet Take 2 tablets by mouth 2 (two) times daily.  2    pantoprazole (PROTONIX) 40 MG tablet Take 1 tablet by mouth once daily.  0    spironolactone (ALDACTONE) 25 MG tablet Take 1 tablet by mouth once daily.  1    traMADol (ULTRAM) 50 mg tablet Take 1 tablet by mouth every 8 (eight) hours as needed for Pain.  1     No current facility-administered medications for this visit.        Review of Systems   Constitutional: Negative for chills, fatigue, fever and unexpected weight change.   HENT: Negative for hearing loss and trouble swallowing.    Eyes: Negative for " "photophobia and visual disturbance.   Respiratory: Negative for cough, shortness of breath and wheezing.    Cardiovascular: Negative for chest pain, palpitations and leg swelling.   Gastrointestinal: Negative for abdominal pain and nausea.   Genitourinary: Negative for dysuria and frequency.   Musculoskeletal: Negative for arthralgias, back pain and joint swelling.   Skin: Negative for rash.   Neurological: Negative for tremors, seizures, weakness, numbness and headaches.   Hematological: Does not bruise/bleed easily.         Objective:        Physical Exam:   Foot Exam  Physical Exam   Musculoskeletal:        Feet:        Imaging:            Assessment:       1. Skin ulcer of right foot, limited to breakdown of skin    2. Type II diabetes mellitus with neurological manifestations      Plan:   Skin ulcer of right foot, limited to breakdown of skin    Type II diabetes mellitus with neurological manifestations      No Follow-up on file.    Wound Debridement  Date/Time: 3/12/2019 3:37 PM  Performed by: Tong Heaton DPM  Authorized by: Tong Heaton DPM     Time out: Immediately prior to procedure a "time out" was called to verify the correct patient, procedure, equipment, support staff and site/side marked as required.    Consent Done?:  Yes (Verbal)    Preparation: Patient was prepped and draped in usual sterile fashion    Local anesthesia used?: No      Wound Details:    Location:  Left foot    Location:  Left 1st Metatarsal Head    Type of Debridement:  Excisional       Length (cm):  2       Area (sq cm):  3       Width (cm):  1.5       Percent Debrided (%):  50       Depth (cm):  0.2       Total Area Debrided (sq cm):  1.5    Depth of debridement:  Subcutaneous tissue    Tissue debrided:  Epidermis, Dermis and Subcutaneous    Instruments:  Nippers    Bleeding:  Minimal  Method Used:  Pressure  Patient tolerance:  Patient tolerated the procedure well with no immediate complications     - " None    Counseling:     I provided patient education verbally regarding:   Patient diagnosis, treatment options, as well as alternatives, risks, and benefits.     This note was created using Dragon voice recognition software that occasionally misinterpreted phrases or words.

## 2019-04-08 ENCOUNTER — TELEPHONE (OUTPATIENT)
Dept: ADMINISTRATIVE | Facility: CLINIC | Age: 59
End: 2019-04-08

## 2019-04-08 NOTE — TELEPHONE ENCOUNTER
Home Health SOC 02/05/2019 - 04/05/2019 with [facility] - Dr. Otilia Pineda.  services.

## 2019-04-09 ENCOUNTER — OFFICE VISIT (OUTPATIENT)
Dept: PODIATRY | Facility: CLINIC | Age: 59
End: 2019-04-09
Payer: MEDICARE

## 2019-04-09 VITALS
SYSTOLIC BLOOD PRESSURE: 105 MMHG | HEART RATE: 63 BPM | DIASTOLIC BLOOD PRESSURE: 63 MMHG | WEIGHT: 244 LBS | HEIGHT: 72 IN | BODY MASS INDEX: 33.05 KG/M2 | OXYGEN SATURATION: 99 % | RESPIRATION RATE: 19 BRPM

## 2019-04-09 DIAGNOSIS — L97.511 SKIN ULCER OF RIGHT FOOT, LIMITED TO BREAKDOWN OF SKIN: Primary | ICD-10-CM

## 2019-04-09 DIAGNOSIS — E11.49 TYPE II DIABETES MELLITUS WITH NEUROLOGICAL MANIFESTATIONS: ICD-10-CM

## 2019-04-09 PROCEDURE — 29581 APPL MULTLAYER CMPRN SYS LEG: CPT | Mod: 59,LT,, | Performed by: PODIATRIST

## 2019-04-09 PROCEDURE — 99213 PR OFFICE/OUTPT VISIT, EST, LEVL III, 20-29 MIN: ICD-10-PCS | Mod: 25,,, | Performed by: PODIATRIST

## 2019-04-09 PROCEDURE — 97597 WOUND DEBRIDEMENT: ICD-10-PCS | Mod: ,,, | Performed by: PODIATRIST

## 2019-04-09 PROCEDURE — 29581 PR APPL MLT-LAYER VENOUS WOUND COMPRESS BELOW KNEE: ICD-10-PCS | Mod: 59,LT,, | Performed by: PODIATRIST

## 2019-04-09 PROCEDURE — 97597 DBRDMT OPN WND 1ST 20 CM/<: CPT | Mod: ,,, | Performed by: PODIATRIST

## 2019-04-09 PROCEDURE — 99213 OFFICE O/P EST LOW 20 MIN: CPT | Mod: 25,,, | Performed by: PODIATRIST

## 2019-04-09 NOTE — PROGRESS NOTES
[address] [facility] [facility] [address]  [place] [address]  Phone: [phone]   [phone]    Patient's PCP:YRN Landry  Referring Provider: No ref. provider found    Subjective:      Chief Complaint:: Follow-up (ulcer, right foot)    CAROLIN Bullard is a 59 y.o. male who presents for a follow up of ulcers on right foot. Current symptoms include pain and drainage.  Aggravating factors are prolonged standing or walking. Symptoms have stayed the same since last visit.Treatment to date have included dressing changes every other day with bactroban. Patients rates pain 9/10 on pain scale.    Systemic Doctor: Renita Messina NP  Date Last Seen: 04/2019  Blood Sugar: 140  Hemoglobin A1c: 6    Vitals:    04/09/19 1341   BP: 105/63   Pulse: 63   Resp: 19     Shoe Size: 12    Past Surgical History:   Procedure Laterality Date    CARDIAC PACEMAKER PLACEMENT  12/2012    CARDIAC PACEMAKER PLACEMENT  10/04/2018    battery replacement    CORONARY ARTERY BYPASS GRAFT  06/2012    ESOPHAGOGASTRODUODENOSCOPY  01/31/2017    SAMARA FILTER PLACEMENT  2012    vena cava     Past Medical History:   Diagnosis Date    AICD (automatic cardioverter/defibrillator) present     Anxiety and depression 2012    CAD (coronary artery disease) 2012    Cardiomegaly 2016    CHF (congestive heart failure) 2012    Cholecystitis 2017    Complete heart block 2012    Diabetic foot ulcer     DVT (deep venous thrombosis) 2012    And pulmonary embolus    GERD (gastroesophageal reflux disease) 2010    Heparin induced thrombocytopenia     Hyperlipemia 2011    IDDM (insulin dependent diabetes mellitus) 1983    With neuropathy    Ischemic cardiomyopathy 2012    MI (myocardial infarction) 2012    Morbid obesity     MRSA (methicillin resistant Staphylococcus aureus) infection 01/19/2019    Noncompliance with therapeutic plan 2019    Osteomyelitis of right foot 01/2019    Pulmonary edema 2019    Respiratory failure 2019     "Sepsis 01/2019    Due to cellulitis right leg    Sleep apnea 2013    Thrombocytopathy 2012    Venous stasis dermatitis of both lower extremities      History reviewed. No pertinent family history.     Social History:   Marital Status:   Alcohol History:  reports that he does not drink alcohol.  Tobacco History:  reports that he quit smoking about 7 years ago. His smoking use included cigarettes. He has a 76.00 pack-year smoking history. He has never used smokeless tobacco.  Drug History:  has no drug history on file.    Review of patient's allergies indicates:   Allergen Reactions    Vasopressin Anaphylaxis    Heparin Other (See Comments)     Other reaction(s): "depleted my blood platets"      Heparin analogues Other (See Comments)     Depleted WBC count    Morphine Hallucinations and Other (See Comments)     Other reaction(s): Hallucinations  Paranoid, hallucinations      Vasopressin analogues Other (See Comments)     "felt like eyes going to pop out of my head"       Current Outpatient Medications   Medication Sig Dispense Refill    aspirin 81 MG Chew Take 81 mg by mouth once daily.      atorvastatin (LIPITOR) 10 MG tablet Take 1 tablet by mouth once daily.  1    carvedilol (COREG) 25 MG tablet Take 1 tablet by mouth 2 (two) times daily.  1    doxycycline (VIBRAMYCIN) 100 MG Cap Take 1 capsule (100 mg total) by mouth 2 (two) times daily. 60 capsule 2    furosemide (LASIX) 40 MG tablet Take 1 tablet by mouth 2 (two) times daily.  1    gabapentin (NEURONTIN) 600 MG tablet Take 1 tablet by mouth 3 (three) times daily.  0    LANTUS U-100 INSULIN 100 unit/mL injection   1    metFORMIN (GLUCOPHAGE) 500 MG tablet Take 2 tablets by mouth 2 (two) times daily.  2    pantoprazole (PROTONIX) 40 MG tablet Take 1 tablet by mouth once daily.  0    spironolactone (ALDACTONE) 25 MG tablet Take 1 tablet by mouth once daily.  1    traMADol (ULTRAM) 50 mg tablet Take 1 tablet by mouth every 8 (eight) hours " "as needed for Pain.  1     No current facility-administered medications for this visit.        Review of Systems   Constitutional: Negative for chills, fatigue, fever and unexpected weight change.   HENT: Negative for hearing loss and trouble swallowing.    Eyes: Negative for photophobia and visual disturbance.   Respiratory: Negative for cough, shortness of breath and wheezing.    Cardiovascular: Negative for chest pain, palpitations and leg swelling.   Gastrointestinal: Positive for abdominal pain. Negative for nausea.   Genitourinary: Negative for dysuria and frequency.   Musculoskeletal: Positive for arthralgias and back pain. Negative for joint swelling.   Skin: Negative for rash.   Neurological: Positive for numbness. Negative for tremors, seizures, weakness and headaches.   Hematological: Bruises/bleeds easily.         Objective:        Physical Exam:   Foot Exam  Physical Exam   Musculoskeletal:        Feet:        Imaging:            Assessment:       1. Type II diabetes mellitus with neurological manifestations    2. Skin ulcer of right foot, limited to breakdown of skin      Plan:   Type II diabetes mellitus with neurological manifestations    Skin ulcer of right foot, limited to breakdown of skin      No follow-ups on file.    Wound Debridement  Date/Time: 4/9/2019 2:09 PM  Performed by: Tong Heaton DPM  Authorized by: Tong Heaton DPM     Time out: Immediately prior to procedure a "time out" was called to verify the correct patient, procedure, equipment, support staff and site/side marked as required.    Consent Done?:  Yes (Verbal)    Preparation: Patient was prepped and draped in usual sterile fashion    Local anesthesia used?: No      Wound Details:    Location:  Right foot    Location:  Right 5th Metatarsal Head    Type of Debridement:  Excisional       Length (cm):  2.5       Area (sq cm):  2.5       Width (cm):  1       Percent Debrided (%):  100       Depth (cm):  0.2       Total " Area Debrided (sq cm):  2.5    Depth of debridement:  Epidermis/Dermis    Tissue debrided:  Epidermis and Dermis    Devitalized tissue debrided:  Biofilm and Necrotic/Eschar    Instruments:  Forceps and Blade    Additional wounds:  1    2nd Wound Details:     Location:  Right foot    Location:  Right 1st Toe    Location:  Right 1st Toe    Type of Debridement:  Excisional       Length (cm):  0.5       Area (sq cm):  0.15       Width (cm):  0.3       Percent Debrided (%):  100       Depth (cm):  0.1       Total Area Debrided (sq cm):  0.15    Depth of debridement:  Epidermis/Dermis    Tissue debrided:  Epidermis and Dermis    Devitalized tissue debrided:  Necrotic/Eschar    Instruments:  Blade    Bleeding:  Minimal  Hemostasis Achieved: Yes    Method Used:  Pressure  Patient tolerance:  Patient tolerated the procedure well with no immediate complications     Football wrap applied to right forefoot.     - None  Continue daily dressing changes and football wrap at home prescription given for diabetic shoes today. Return to see me in 4 weeks.  Counseling:   proper ulcer care and the possible need for serial debridements, topical medications, specific dressings and biological engineered skin substitutes if indicated.  I provided patient education verbally regarding:   Patient diagnosis, treatment options, as well as alternatives, risks, and benefits.     This note was created using Dragon voice recognition software that occasionally misinterpreted phrases or words.

## 2019-04-16 ENCOUNTER — OFFICE VISIT (OUTPATIENT)
Dept: PAIN MEDICINE | Facility: CLINIC | Age: 59
End: 2019-04-16
Payer: MEDICARE

## 2019-04-16 VITALS
HEIGHT: 72 IN | SYSTOLIC BLOOD PRESSURE: 106 MMHG | BODY MASS INDEX: 33.05 KG/M2 | HEART RATE: 70 BPM | WEIGHT: 244 LBS | DIASTOLIC BLOOD PRESSURE: 62 MMHG

## 2019-04-16 DIAGNOSIS — E11.49 TYPE II DIABETES MELLITUS WITH NEUROLOGICAL MANIFESTATIONS: ICD-10-CM

## 2019-04-16 DIAGNOSIS — E11.42 DIABETIC POLYNEUROPATHY ASSOCIATED WITH TYPE 2 DIABETES MELLITUS: Primary | ICD-10-CM

## 2019-04-16 PROCEDURE — 99204 OFFICE O/P NEW MOD 45 MIN: CPT | Mod: S$GLB,,, | Performed by: ANESTHESIOLOGY

## 2019-04-16 PROCEDURE — 99999 PR PBB SHADOW E&M-EST. PATIENT-LVL III: ICD-10-PCS | Mod: PBBFAC,,, | Performed by: ANESTHESIOLOGY

## 2019-04-16 PROCEDURE — 99999 PR PBB SHADOW E&M-EST. PATIENT-LVL III: CPT | Mod: PBBFAC,,, | Performed by: ANESTHESIOLOGY

## 2019-04-16 PROCEDURE — 99204 PR OFFICE/OUTPT VISIT, NEW, LEVL IV, 45-59 MIN: ICD-10-PCS | Mod: S$GLB,,, | Performed by: ANESTHESIOLOGY

## 2019-04-16 RX ORDER — TRAMADOL HYDROCHLORIDE 50 MG/1
50 TABLET ORAL EVERY 8 HOURS PRN
Qty: 90 TABLET | Refills: 0 | Status: SHIPPED | OUTPATIENT
Start: 2019-04-16 | End: 2019-05-14 | Stop reason: SDUPTHER

## 2019-04-16 NOTE — PROGRESS NOTES
This note was completed with dictation software and grammatical errors may exist.    Referring Physician: Manan Campos    PCP: YRN Landry      CC:  Bilateral foot pain    HPI:   Ana Bullard is a 59 y.o. male referred to us for bilateral foot pain. Patient with long history of diabetes.  He states neuropathy worsen after his CABG.  Bilateral foot pain has worsened over past 7 years.  He presents to us constant burning, sharp pain in his bilateral feet.  He also has some similar issues in his bilateral hands, but foot pain is worse.  Pain worsens prolonged sitting and standing.  He currently takes gabapentin 600 mg t.i.d. with mild benefits.  Increasing doses causes sedation.  He also takes tramadol q.8 hours as needed with mild-to-moderate benefit.  He denies any worsening weakness.  No bowel bladder changes.    ROS:  CONSTITUTIONAL: No fevers, chills, night sweats, wt. loss, appetite changes  SKIN: no rashes or itching  ENT: No headaches, head trauma, vision changes, or eye pain  LYMPH NODES: None noticed   CV: No chest pain, palpitations.   RESP: No shortness of breath, dyspnea on exertion, cough, wheezing, or hemoptysis  GI: No nausea, emesis, diarrhea, constipation, melena, hematochezia, pain.    : No dysuria, hematuria, urgency, or frequency   HEME: No easy bruising, bleeding problems  PSYCHIATRIC: No depression, anxiety, psychosis, hallucinations.  NEURO: No seizures, memory loss, dizziness or difficulty sleeping  MSK:  Positive HPI      Past Medical History:   Diagnosis Date    AICD (automatic cardioverter/defibrillator) present     Anxiety and depression 2012    CAD (coronary artery disease) 2012    Cardiomegaly 2016    CHF (congestive heart failure) 2012    Cholecystitis 2017    Complete heart block 2012    Diabetic foot ulcer     DVT (deep venous thrombosis) 2012    And pulmonary embolus    GERD (gastroesophageal reflux disease) 2010    Heparin induced thrombocytopenia      Hyperlipemia     IDDM (insulin dependent diabetes mellitus) 1983    With neuropathy    Ischemic cardiomyopathy     MI (myocardial infarction)     Morbid obesity     MRSA (methicillin resistant Staphylococcus aureus) infection 2019    Noncompliance with therapeutic plan     Osteomyelitis of right foot 2019    Pulmonary edema 2019    Respiratory failure 2019    Sepsis 2019    Due to cellulitis right leg    Sleep apnea 2013    Thrombocytopathy     Venous stasis dermatitis of both lower extremities      Past Surgical History:   Procedure Laterality Date    CARDIAC PACEMAKER PLACEMENT  2012    CARDIAC PACEMAKER PLACEMENT  10/04/2018    battery replacement    CORONARY ARTERY BYPASS GRAFT  2012    ESOPHAGOGASTRODUODENOSCOPY  2017    SAMARA FILTER PLACEMENT  2012    vena cava     History reviewed. No pertinent family history.  Social History     Socioeconomic History    Marital status:      Spouse name: Not on file    Number of children: Not on file    Years of education: Not on file    Highest education level: Not on file   Occupational History    Not on file   Social Needs    Financial resource strain: Not on file    Food insecurity:     Worry: Not on file     Inability: Not on file    Transportation needs:     Medical: Not on file     Non-medical: Not on file   Tobacco Use    Smoking status: Former Smoker     Packs/day: 2.00     Years: 38.00     Pack years: 76.00     Types: Cigarettes     Last attempt to quit: 2012     Years since quittin.2    Smokeless tobacco: Never Used   Substance and Sexual Activity    Alcohol use: No     Frequency: Never    Drug use: Not on file    Sexual activity: Never   Lifestyle    Physical activity:     Days per week: Not on file     Minutes per session: Not on file    Stress: Not on file   Relationships    Social connections:     Talks on phone: Not on file     Gets together: Not on file     Attends  Anabaptist service: Not on file     Active member of club or organization: Not on file     Attends meetings of clubs or organizations: Not on file     Relationship status: Not on file   Other Topics Concern    Not on file   Social History Narrative    Not on file         Medications/Allergies: See med card    Vitals:    04/16/19 0821   BP: 106/62   Pulse: 70   Weight: 110.7 kg (244 lb)   Height: 6' (1.829 m)   PainSc: 10-Worst pain ever   PainLoc: Back         Physical exam:    GENERAL: A and O x3, the patient appears well groomed and is in no acute distress.  Skin: No rashes or obvious lesions  HEENT: normocephalic, atraumatic  CARDIOVASCULAR:  Palpable peripheral pulses  LUNGS: easy work of breathing  ABDOMEN: soft, nontender   UPPER EXTREMITIES: Normal alignment, normal range of motion, no atrophy, no skin changes,  hair growth and nail growth normal and equal bilaterally. No swelling, no tenderness.    LOWER EXTREMITIES:  Normal alignment, normal range of motion, no atrophy, no skin changes,  hair growth and nail growth normal and equal bilaterally. No swelling, no tenderness.  LUMBAR SPINE  Lumbar spine: ROM is limited with flexion extension and oblique extension with moderate increased pain.    Samuel's test causes no increased pain on either side.    Supine straight leg raise is negative bilaterally.    Internal and external rotation of the hip causes no increased pain on either side.  Myofascial exam: No tenderness to palpation across lumbar paraspinous muscles.      MENTAL STATUS: normal orientation, speech, language, and fund of knowledge for social situation.  Emotional state appropriate.    CRANIAL NERVES:  II:  PERRL bilaterally,   III,IV,VI: EOMI.    V:  Facial sensation equal bilaterally  VII:  Facial motor function normal.  VIII:  Hearing equal to finger rub bilaterally  IX/X: Gag normal, palate symmetric  XI:  Shoulder shrug equal, head turn equal  XII:  Tongue midline without  fasciculations      MOTOR: Tone and bulk: normal bilateral upper and lower Strength: normal   Delt Bi Tri WE WF     R 5 5 5 5 5 5   L 5 5 5 5 5 5     IP ADD ABD Quad TA Gas HAM  R 5 5 5 5 5 5 5  L 5 5 5 5 5 5 5    SENSATION:  Decreased globally of bilateral feet  REFLEXES: normal, symmetric, nonbrisk.  Toes down, no clonus. No hoffmans.  GAIT: normal rise, base, steps, and arm swing.        Imaging:  None    Assessment:  Patient referred for bilateral foot pain  1. Diabetic polyneuropathy associated with type 2 diabetes mellitus    2. Type II diabetes mellitus with neurological manifestations          Plan:  1. I have stressed the importance of physical activity and exercise to improve overall health  2.  Discussed disease progression of peripheral neuropathy.  Continue gabapentin prescribed.  3.  He does request continue tramadol with us.  I believe this is very reasonable.   reviewed.  He takes tramadol 50 mg q.8 hours as needed.  UDS next visit.  4.  Discussed performing a lumbar sympathetic nerve block to see if his pain is sympathetically maintained.  He will consider this option in the future.  5.  Follow-up in 1 month      Thank you for referring this interesting patient, and I look forward to continuing to collaborate in his care.

## 2019-05-07 ENCOUNTER — OFFICE VISIT (OUTPATIENT)
Dept: PODIATRY | Facility: CLINIC | Age: 59
End: 2019-05-07
Payer: MEDICARE

## 2019-05-07 VITALS
RESPIRATION RATE: 19 BRPM | HEART RATE: 60 BPM | SYSTOLIC BLOOD PRESSURE: 105 MMHG | DIASTOLIC BLOOD PRESSURE: 69 MMHG | HEIGHT: 72 IN | BODY MASS INDEX: 32.64 KG/M2 | WEIGHT: 241 LBS

## 2019-05-07 DIAGNOSIS — E11.49 TYPE II DIABETES MELLITUS WITH NEUROLOGICAL MANIFESTATIONS: ICD-10-CM

## 2019-05-07 DIAGNOSIS — L97.511 SKIN ULCER OF RIGHT FOOT, LIMITED TO BREAKDOWN OF SKIN: Primary | ICD-10-CM

## 2019-05-07 PROCEDURE — 11042 WOUND DEBRIDEMENT: ICD-10-PCS | Mod: ,,, | Performed by: PODIATRIST

## 2019-05-07 PROCEDURE — 99213 OFFICE O/P EST LOW 20 MIN: CPT | Mod: 25,,, | Performed by: PODIATRIST

## 2019-05-07 PROCEDURE — 11042 DBRDMT SUBQ TIS 1ST 20SQCM/<: CPT | Mod: ,,, | Performed by: PODIATRIST

## 2019-05-07 PROCEDURE — 99213 PR OFFICE/OUTPT VISIT, EST, LEVL III, 20-29 MIN: ICD-10-PCS | Mod: 25,,, | Performed by: PODIATRIST

## 2019-05-07 NOTE — PROGRESS NOTES
[address] [facility] [address]  [place] [address]  Phone: [phone]   [phone]    Patient's PCP:YRN Lnadry  Referring Provider: No ref. provider found    Subjective:      Chief Complaint:: Follow-up (ulcer right foot)    CAROLIN Bullard is a 59 y.o. male who presents with a complaint of  Ulcer right foot plantar surface lasting for about three years. Onset of the symptoms was a cut on the foot from glass bottle.  Current symptoms include pain and drainage.  Aggravating factors are pressure in the area. Symptoms have improved since last visit decreased drainage.Treatment to date have included periodic debridement and changing dressing every other day with bactroban and gauze. Patients rates pain 6/10 on pain scale.    Systemic Doctor: Renita Messina NP  Date Last Seen: 04/2019  Blood Sugar: 130  Hemoglobin A1c: 6    Vitals:    05/07/19 1334   BP: 105/69   Pulse: 60   Resp: 19       Past Surgical History:   Procedure Laterality Date    CARDIAC PACEMAKER PLACEMENT  12/2012    CARDIAC PACEMAKER PLACEMENT  10/04/2018    battery replacement    CORONARY ARTERY BYPASS GRAFT  06/2012    ESOPHAGOGASTRODUODENOSCOPY  01/31/2017    SAMARA FILTER PLACEMENT  2012    vena cava     Past Medical History:   Diagnosis Date    AICD (automatic cardioverter/defibrillator) present     Anxiety and depression 2012    CAD (coronary artery disease) 2012    Cardiomegaly 2016    CHF (congestive heart failure) 2012    Cholecystitis 2017    Complete heart block 2012    Diabetic foot ulcer     DVT (deep venous thrombosis) 2012    And pulmonary embolus    GERD (gastroesophageal reflux disease) 2010    Heparin induced thrombocytopenia     Hyperlipemia 2011    IDDM (insulin dependent diabetes mellitus) 1983    With neuropathy    Ischemic cardiomyopathy 2012    MI (myocardial infarction) 2012    Morbid obesity     MRSA (methicillin resistant Staphylococcus aureus) infection 01/19/2019     "Noncompliance with therapeutic plan 2019    Osteomyelitis of right foot 01/2019    Pulmonary edema 2019    Respiratory failure 2019    Sepsis 01/2019    Due to cellulitis right leg    Sleep apnea 2013    Thrombocytopathy 2012    Venous stasis dermatitis of both lower extremities      History reviewed. No pertinent family history.     Social History:   Marital Status:   Alcohol History:  reports that he does not drink alcohol.  Tobacco History:  reports that he quit smoking about 7 years ago. His smoking use included cigarettes. He has a 76.00 pack-year smoking history. He has never used smokeless tobacco.  Drug History:  has no drug history on file.    Review of patient's allergies indicates:   Allergen Reactions    Vasopressin Anaphylaxis    Heparin Other (See Comments)     Other reaction(s): "depleted my blood platets"      Heparin analogues Other (See Comments)     Depleted WBC count    Morphine Hallucinations and Other (See Comments)     Other reaction(s): Hallucinations  Paranoid, hallucinations      Vasopressin analogues Other (See Comments)     "felt like eyes going to pop out of my head"       Current Outpatient Medications   Medication Sig Dispense Refill    aspirin 81 MG Chew Take 81 mg by mouth once daily.      atorvastatin (LIPITOR) 10 MG tablet Take 1 tablet by mouth once daily.  1    carvedilol (COREG) 25 MG tablet Take 1 tablet by mouth 2 (two) times daily.  1    doxycycline (VIBRAMYCIN) 100 MG Cap Take 1 capsule (100 mg total) by mouth 2 (two) times daily. 60 capsule 2    furosemide (LASIX) 40 MG tablet Take 1 tablet by mouth 2 (two) times daily.  1    gabapentin (NEURONTIN) 600 MG tablet Take 1 tablet by mouth 3 (three) times daily.  0    LANTUS U-100 INSULIN 100 unit/mL injection   1    metFORMIN (GLUCOPHAGE) 500 MG tablet Take 2 tablets by mouth 2 (two) times daily.  2    pantoprazole (PROTONIX) 40 MG tablet Take 1 tablet by mouth once daily.  0    spironolactone " "(ALDACTONE) 25 MG tablet Take 1 tablet by mouth once daily.  1    traMADol (ULTRAM) 50 mg tablet Take 1 tablet (50 mg total) by mouth every 8 (eight) hours as needed for Pain. 90 tablet 0     No current facility-administered medications for this visit.        Review of Systems   Constitutional: Negative for chills, fatigue, fever and unexpected weight change.   HENT: Negative for hearing loss and trouble swallowing.    Eyes: Negative for photophobia and visual disturbance.   Respiratory: Positive for shortness of breath. Negative for cough and wheezing.    Cardiovascular: Negative for chest pain, palpitations and leg swelling.   Gastrointestinal: Negative for abdominal pain and nausea.   Genitourinary: Negative for dysuria and frequency.   Musculoskeletal: Positive for arthralgias and back pain. Negative for joint swelling.   Skin: Positive for wound. Negative for rash.   Neurological: Positive for weakness and numbness. Negative for tremors, seizures and headaches.   Hematological: Bruises/bleeds easily.         Objective:        Physical Exam:   Foot Exam  Physical Exam   Musculoskeletal:        Feet:        Imaging:            Assessment:       1. Skin ulcer of right foot, limited to breakdown of skin    2. Type II diabetes mellitus with neurological manifestations      Plan:   Skin ulcer of right foot, limited to breakdown of skin  -     Wound Debridement    Type II diabetes mellitus with neurological manifestations  -     Wound Debridement      No follow-ups on file.    Wound Debridement  Date/Time: 5/7/2019 1:49 PM  Performed by: Tong Heaton DPM  Authorized by: Tong Heaton DPM     Time out: Immediately prior to procedure a "time out" was called to verify the correct patient, procedure, equipment, support staff and site/side marked as required.    Consent Done?:  Yes (Verbal)    Preparation: Patient was prepped and draped in usual sterile fashion    Local anesthesia used?: No      Wound " Details:    Location:  Right foot    Location:  Right 5th Metatarsal Head    Type of Debridement:  Excisional       Length (cm):  1.8       Area (sq cm):  3.42       Width (cm):  1.9       Percent Debrided (%):  75       Depth (cm):  0.4       Total Area Debrided (sq cm):  2.57    Depth of debridement:  Subcutaneous tissue    Tissue debrided:  Epidermis, Dermis and Subcutaneous    Devitalized tissue debrided:  Biofilm, Callus, Necrotic/Eschar and Slough    Instruments:  Forceps and Blade    Bleeding:  Minimal  Hemostasis Achieved: Yes    Method Used:  Pressure  Patient tolerance:  Patient tolerated the procedure well with no immediate complications     - None    Counseling:     I provided patient education verbally regarding:   Patient diagnosis, treatment options, as well as alternatives, risks, and benefits.     This note was created using Dragon voice recognition software that occasionally misinterpreted phrases or words.

## 2019-05-14 ENCOUNTER — OFFICE VISIT (OUTPATIENT)
Dept: PAIN MEDICINE | Facility: CLINIC | Age: 59
End: 2019-05-14
Payer: MEDICARE

## 2019-05-14 VITALS
DIASTOLIC BLOOD PRESSURE: 66 MMHG | WEIGHT: 241 LBS | BODY MASS INDEX: 31.94 KG/M2 | SYSTOLIC BLOOD PRESSURE: 108 MMHG | HEIGHT: 73 IN | HEART RATE: 84 BPM

## 2019-05-14 DIAGNOSIS — M79.671 BILATERAL FOOT PAIN: ICD-10-CM

## 2019-05-14 DIAGNOSIS — E11.49 TYPE II DIABETES MELLITUS WITH NEUROLOGICAL MANIFESTATIONS: ICD-10-CM

## 2019-05-14 DIAGNOSIS — M79.672 BILATERAL FOOT PAIN: ICD-10-CM

## 2019-05-14 DIAGNOSIS — E11.42 DIABETIC POLYNEUROPATHY ASSOCIATED WITH TYPE 2 DIABETES MELLITUS: ICD-10-CM

## 2019-05-14 DIAGNOSIS — Z79.891 CHRONIC USE OF OPIATE FOR THERAPEUTIC PURPOSE: Primary | ICD-10-CM

## 2019-05-14 PROCEDURE — 99999 PR PBB SHADOW E&M-EST. PATIENT-LVL IV: ICD-10-PCS | Mod: PBBFAC,,, | Performed by: ANESTHESIOLOGY

## 2019-05-14 PROCEDURE — 99213 PR OFFICE/OUTPT VISIT, EST, LEVL III, 20-29 MIN: ICD-10-PCS | Mod: S$GLB,,, | Performed by: ANESTHESIOLOGY

## 2019-05-14 PROCEDURE — 99213 OFFICE O/P EST LOW 20 MIN: CPT | Mod: S$GLB,,, | Performed by: ANESTHESIOLOGY

## 2019-05-14 PROCEDURE — 99999 PR PBB SHADOW E&M-EST. PATIENT-LVL IV: CPT | Mod: PBBFAC,,, | Performed by: ANESTHESIOLOGY

## 2019-05-14 PROCEDURE — 80307 DRUG TEST PRSMV CHEM ANLYZR: CPT

## 2019-05-14 RX ORDER — TRAMADOL HYDROCHLORIDE 50 MG/1
50 TABLET ORAL EVERY 8 HOURS PRN
Qty: 90 TABLET | Refills: 2 | Status: SHIPPED | OUTPATIENT
Start: 2019-05-14 | End: 2019-08-12 | Stop reason: SDUPTHER

## 2019-05-14 NOTE — PROGRESS NOTES
This note was completed with dictation software and grammatical errors may exist.    Referring Physician: No ref. provider found    PCP: YRN Landry      CC:  Bilateral foot pain    Interval history:  Patient returns to our clinic.  He has history of bilateral foot pain due to peripheral neuropathy diabetic neuropathy.  He currently has an ulcer being followed by Podiatry.  He continues to take 600 mg t.i.d. with mild benefits.  He has increased his nighttime dose is states that has provided additional benefit.  He continues to take tramadol 50 mg q.8 hours as needed with mild-to-moderate benefit.  Denies any side effects with the medication.  Able perform his ADLs with the medication. He denies any worsening weakness.  No bowel bladder changes.  Prior HPI:   Ana Bullard is a 59 y.o. male referred to us for bilateral foot pain. Patient with long history of diabetes.  He states neuropathy worsen after his CABG.  Bilateral foot pain has worsened over past 7 years.  He presents to us constant burning, sharp pain in his bilateral feet.  He also has some similar issues in his bilateral hands, but foot pain is worse.  Pain worsens prolonged sitting and standing.  He currently takes gabapentin 600 mg t.i.d. with mild benefits.  Increasing doses causes sedation.  He also takes tramadol q.8 hours as needed with mild-to-moderate benefit.  He denies any worsening weakness.  No bowel bladder changes.    ROS:  CONSTITUTIONAL: No fevers, chills, night sweats, wt. loss, appetite changes  SKIN: no rashes or itching  ENT: No headaches, head trauma, vision changes, or eye pain  LYMPH NODES: None noticed   CV: No chest pain, palpitations.   RESP: No shortness of breath, dyspnea on exertion, cough, wheezing, or hemoptysis  GI: No nausea, emesis, diarrhea, constipation, melena, hematochezia, pain.    : No dysuria, hematuria, urgency, or frequency   HEME: No easy bruising, bleeding problems  PSYCHIATRIC: No depression,  anxiety, psychosis, hallucinations.  NEURO: No seizures, memory loss, dizziness or difficulty sleeping  MSK:  Positive HPI      Past Medical History:   Diagnosis Date    AICD (automatic cardioverter/defibrillator) present     Anxiety and depression 2012    CAD (coronary artery disease) 2012    Cardiomegaly 2016    CHF (congestive heart failure) 2012    Cholecystitis 2017    Complete heart block 2012    Diabetic foot ulcer     DVT (deep venous thrombosis) 2012    And pulmonary embolus    GERD (gastroesophageal reflux disease) 2010    Heparin induced thrombocytopenia     Hyperlipemia 2011    IDDM (insulin dependent diabetes mellitus) 1983    With neuropathy    Ischemic cardiomyopathy 2012    MI (myocardial infarction) 2012    Morbid obesity     MRSA (methicillin resistant Staphylococcus aureus) infection 01/19/2019    Noncompliance with therapeutic plan 2019    Osteomyelitis of right foot 01/2019    Pulmonary edema 2019    Respiratory failure 2019    Sepsis 01/2019    Due to cellulitis right leg    Sleep apnea 2013    Thrombocytopathy 2012    Venous stasis dermatitis of both lower extremities      Past Surgical History:   Procedure Laterality Date    CARDIAC PACEMAKER PLACEMENT  12/2012    CARDIAC PACEMAKER PLACEMENT  10/04/2018    battery replacement    CORONARY ARTERY BYPASS GRAFT  06/2012    ESOPHAGOGASTRODUODENOSCOPY  01/31/2017    SAMARA FILTER PLACEMENT  2012    vena cava     History reviewed. No pertinent family history.  Social History     Socioeconomic History    Marital status:      Spouse name: Not on file    Number of children: Not on file    Years of education: Not on file    Highest education level: Not on file   Occupational History    Not on file   Social Needs    Financial resource strain: Not on file    Food insecurity:     Worry: Not on file     Inability: Not on file    Transportation needs:     Medical: Not on file     Non-medical: Not on file  "  Tobacco Use    Smoking status: Former Smoker     Packs/day: 2.00     Years: 38.00     Pack years: 76.00     Types: Cigarettes     Last attempt to quit: 2012     Years since quittin.3    Smokeless tobacco: Never Used   Substance and Sexual Activity    Alcohol use: No     Frequency: Never    Drug use: Not on file    Sexual activity: Never   Lifestyle    Physical activity:     Days per week: Not on file     Minutes per session: Not on file    Stress: Not on file   Relationships    Social connections:     Talks on phone: Not on file     Gets together: Not on file     Attends [employer] service: Not on file     Active member of club or organization: Not on file     Attends meetings of clubs or organizations: Not on file     Relationship status: Not on file   Other Topics Concern    Not on file   Social History Narrative    Not on file         Medications/Allergies: See med card    Vitals:    19 1046   BP: 108/66   Pulse: 84   Weight: 109.3 kg (241 lb)   Height: 6' 1" (1.854 m)   PainSc:   5   PainLoc: Foot         Physical exam:    GENERAL: A and O x3, the patient appears well groomed and is in no acute distress.  Skin: No rashes or obvious lesions  HEENT: normocephalic, atraumatic  CARDIOVASCULAR:  Palpable peripheral pulses  LUNGS: easy work of breathing  ABDOMEN: soft, nontender   UPPER EXTREMITIES: Normal alignment, normal range of motion, no atrophy, no skin changes,  hair growth and nail growth normal and equal bilaterally. No swelling, no tenderness.    LOWER EXTREMITIES:  Normal alignment, normal range of motion, no atrophy, no skin changes,  hair growth and nail growth normal and equal bilaterally. No swelling, no tenderness.  LUMBAR SPINE  Lumbar spine: ROM is limited with flexion extension and oblique extension with moderate increased pain.    Samuel's test causes no increased pain on either side.    Supine straight leg raise is negative bilaterally.    Internal and external rotation of " the hip causes no increased pain on either side.  Myofascial exam: No tenderness to palpation across lumbar paraspinous muscles.      MENTAL STATUS: normal orientation, speech, language, and fund of knowledge for social situation.  Emotional state appropriate.    CRANIAL NERVES:  II:  PERRL bilaterally,   III,IV,VI: EOMI.    V:  Facial sensation equal bilaterally  VII:  Facial motor function normal.  VIII:  Hearing equal to finger rub bilaterally  IX/X: Gag normal, palate symmetric  XI:  Shoulder shrug equal, head turn equal  XII:  Tongue midline without fasciculations      MOTOR: Tone and bulk: normal bilateral upper and lower Strength: normal   Delt Bi Tri WE WF     R 5 5 5 5 5 5   L 5 5 5 5 5 5     IP ADD ABD Quad TA Gas HAM  R 5 5 5 5 5 5 5  L 5 5 5 5 5 5 5    SENSATION:  Decreased globally of bilateral feet  REFLEXES: normal, symmetric, nonbrisk.  Toes down, no clonus. No hoffmans.  GAIT: normal rise, base, steps, and arm swing.        Imaging:  None    Assessment:  Patient referred for bilateral foot pain  1. Chronic use of opiate for therapeutic purpose    2. Diabetic polyneuropathy associated with type 2 diabetes mellitus    3. Type II diabetes mellitus with neurological manifestations    4. Bilateral foot pain          Plan:  1. I have stressed the importance of physical activity and exercise to improve overall health  2.  Discussed disease progression of peripheral neuropathy.  Continue gabapentin prescribed.  3.  He does request continue tramadol with us.  I believe this is very reasonable.   reviewed.  He takes tramadol 50 mg q.8 hours as needed.  UDS today.  Script given for 3 months.  4.  Discussed performing a lumbar sympathetic nerve block to see if his pain is sympathetically maintained.  He will consider this option in the future.  He will contact if he wishes to proceed  5.  Follow-up in 3 months

## 2019-05-18 LAB
6MAM UR QL: NOT DETECTED
7AMINOCLONAZEPAM UR QL: NOT DETECTED
A-OH ALPRAZ UR QL: NOT DETECTED
ALPRAZ UR QL: NOT DETECTED
AMPHET UR QL SCN: NOT DETECTED
ANNOTATION COMMENT IMP: NORMAL
ANNOTATION COMMENT IMP: NORMAL
BARBITURATES UR QL: NOT DETECTED
BUPRENORPHINE UR QL: NOT DETECTED
BZE UR QL: NOT DETECTED
CARBOXYTHC UR QL: NOT DETECTED
CARISOPRODOL UR QL: NOT DETECTED
CLONAZEPAM UR QL: NOT DETECTED
CODEINE UR QL: NOT DETECTED
CREAT UR-MCNC: 83.3 MG/DL (ref 20–400)
DIAZEPAM UR QL: NOT DETECTED
ETHYL GLUCURONIDE UR QL: NOT DETECTED
FENTANYL UR QL: NOT DETECTED
HYDROCODONE UR QL: NOT DETECTED
HYDROMORPHONE UR QL: NOT DETECTED
LORAZEPAM UR QL: NOT DETECTED
MDA UR QL: NOT DETECTED
MDEA UR QL: NOT DETECTED
MDMA UR QL: NOT DETECTED
ME-PHENIDATE UR QL: NOT DETECTED
MEPERIDINE UR QL: NOT DETECTED
METHADONE UR QL: NOT DETECTED
METHAMPHET UR QL: NOT DETECTED
MIDAZOLAM UR QL SCN: NOT DETECTED
MORPHINE UR QL: NOT DETECTED
NORBUPRENORPHINE UR QL CFM: NOT DETECTED
NORDIAZEPAM UR QL: NOT DETECTED
NORFENTANYL UR QL: NOT DETECTED
NORHYDROCODONE UR QL CFM: NOT DETECTED
NOROXYCODONE UR QL CFM: NOT DETECTED
NOROXYMORPHONE: NOT DETECTED
OXAZEPAM UR QL: NOT DETECTED
OXYCODONE UR QL: NOT DETECTED
OXYMORPHONE UR QL: NOT DETECTED
PATHOLOGY STUDY: NORMAL
PCP UR QL: NOT DETECTED
PHENTERMINE UR QL: NOT DETECTED
PROPOXYPH UR QL: NOT DETECTED
SERVICE CMNT-IMP: NORMAL
TAPENTADOL UR QL SCN: NOT DETECTED
TAPENTADOL-O-SULF: NOT DETECTED
TEMAZEPAM UR QL: NOT DETECTED
TRAMADOL UR QL: PRESENT
ZOLPIDEM UR QL: NOT DETECTED

## 2019-06-24 ENCOUNTER — OFFICE VISIT (OUTPATIENT)
Dept: PODIATRY | Facility: CLINIC | Age: 59
End: 2019-06-24
Payer: MEDICARE

## 2019-06-24 VITALS
HEART RATE: 80 BPM | SYSTOLIC BLOOD PRESSURE: 114 MMHG | HEIGHT: 73 IN | WEIGHT: 241 LBS | BODY MASS INDEX: 31.94 KG/M2 | DIASTOLIC BLOOD PRESSURE: 73 MMHG

## 2019-06-24 DIAGNOSIS — E11.49 TYPE II DIABETES MELLITUS WITH NEUROLOGICAL MANIFESTATIONS: ICD-10-CM

## 2019-06-24 DIAGNOSIS — E11.42 DIABETIC POLYNEUROPATHY ASSOCIATED WITH TYPE 2 DIABETES MELLITUS: ICD-10-CM

## 2019-06-24 DIAGNOSIS — L97.512 SKIN ULCER OF RIGHT FOOT WITH FAT LAYER EXPOSED: Primary | ICD-10-CM

## 2019-06-24 DIAGNOSIS — I73.9 PERIPHERAL VASCULAR DISEASE: ICD-10-CM

## 2019-06-24 PROCEDURE — 11042 WOUND DEBRIDEMENT: ICD-10-PCS | Mod: ,,, | Performed by: PODIATRIST

## 2019-06-24 PROCEDURE — 11042 DBRDMT SUBQ TIS 1ST 20SQCM/<: CPT | Mod: ,,, | Performed by: PODIATRIST

## 2019-06-24 PROCEDURE — 99213 OFFICE O/P EST LOW 20 MIN: CPT | Mod: 25,,, | Performed by: PODIATRIST

## 2019-06-24 PROCEDURE — 99213 PR OFFICE/OUTPT VISIT, EST, LEVL III, 20-29 MIN: ICD-10-PCS | Mod: 25,,, | Performed by: PODIATRIST

## 2019-06-24 NOTE — PROGRESS NOTES
[address] [facility] [address] [place] [address]  Phone: [phone]   [phone]    Patient's PCP:YRN Landry  Referring Provider: No ref. provider found    Subjective:      Chief Complaint:: Follow-up (grade o ulcer right 5th mpj)    CAROLIN Bullard is a 59 y.o. male who presents with a follow up ulcer right fifth metatarsal phalangeal joint.Daily dressing.    Systemic Doctor: Renita Duron MD  Date Last Seen: 5-19  Blood Sugar: 130  Hemoglobin A1c: 6    Vitals:    06/24/19 1524   BP: 114/73   Pulse: 80     Shoe Size:     Past Surgical History:   Procedure Laterality Date    CARDIAC PACEMAKER PLACEMENT  12/2012    CARDIAC PACEMAKER PLACEMENT  10/04/2018    battery replacement    CORONARY ARTERY BYPASS GRAFT  06/2012    ESOPHAGOGASTRODUODENOSCOPY  01/31/2017    SAMARA FILTER PLACEMENT  2012    vena cava     Past Medical History:   Diagnosis Date    AICD (automatic cardioverter/defibrillator) present     Anxiety and depression 2012    CAD (coronary artery disease) 2012    Cardiomegaly 2016    CHF (congestive heart failure) 2012    Cholecystitis 2017    Complete heart block 2012    Diabetic foot ulcer     DVT (deep venous thrombosis) 2012    And pulmonary embolus    GERD (gastroesophageal reflux disease) 2010    Heparin induced thrombocytopenia     Hyperlipemia 2011    IDDM (insulin dependent diabetes mellitus) 1983    With neuropathy    Ischemic cardiomyopathy 2012    MI (myocardial infarction) 2012    Morbid obesity     MRSA (methicillin resistant Staphylococcus aureus) infection 01/19/2019    Noncompliance with therapeutic plan 2019    Osteomyelitis of right foot 01/2019    Pulmonary edema 2019    Respiratory failure 2019    Sepsis 01/2019    Due to cellulitis right leg    Sleep apnea 2013    Thrombocytopathy 2012    Venous stasis dermatitis of both lower extremities      History reviewed. No pertinent family history.     Social History:   Marital Status:  "  Alcohol History:  reports that he does not drink alcohol.  Tobacco History:  reports that he quit smoking about 7 years ago. His smoking use included cigarettes. He has a 76.00 pack-year smoking history. He has never used smokeless tobacco.  Drug History:  has no drug history on file.    Review of patient's allergies indicates:   Allergen Reactions    Vasopressin Anaphylaxis    Heparin Other (See Comments)     Other reaction(s): "depleted my blood platets"      Heparin analogues Other (See Comments)     Depleted WBC count    Morphine Hallucinations and Other (See Comments)     Other reaction(s): Hallucinations  Paranoid, hallucinations      Vasopressin analogues Other (See Comments)     "felt like eyes going to pop out of my head"       Current Outpatient Medications   Medication Sig Dispense Refill    aspirin 81 MG Chew Take 81 mg by mouth once daily.      atorvastatin (LIPITOR) 10 MG tablet Take 1 tablet by mouth once daily.  1    carvedilol (COREG) 25 MG tablet Take 1 tablet by mouth 2 (two) times daily.  1    furosemide (LASIX) 40 MG tablet Take 1 tablet by mouth 2 (two) times daily.  1    gabapentin (NEURONTIN) 600 MG tablet Take 1 tablet by mouth 3 (three) times daily.  0    LANTUS U-100 INSULIN 100 unit/mL injection   1    metFORMIN (GLUCOPHAGE) 500 MG tablet Take 2 tablets by mouth 2 (two) times daily.  2    pantoprazole (PROTONIX) 40 MG tablet Take 1 tablet by mouth once daily.  0    spironolactone (ALDACTONE) 25 MG tablet Take 1 tablet by mouth once daily.  1    traMADol (ULTRAM) 50 mg tablet Take 1 tablet (50 mg total) by mouth every 8 (eight) hours as needed for Pain. 90 tablet 2     No current facility-administered medications for this visit.        Review of Systems      Objective:        Physical Exam:   Foot Exam  Physical Exam   Musculoskeletal:        Feet:        Imaging:            Assessment:       1. Skin ulcer of right foot with fat layer exposed    2. Type II diabetes " "mellitus with neurological manifestations      Plan:   Skin ulcer of right foot with fat layer exposed  -     Wound Debridement    Type II diabetes mellitus with neurological manifestations      Follow up in about 2 months (around 8/24/2019) for f/u ulcer right foot.    Wound Debridement  Date/Time: 6/24/2019 3:40 PM  Performed by: Tong Heaton DPM  Authorized by: Tong Heaton DPM     Time out: Immediately prior to procedure a "time out" was called to verify the correct patient, procedure, equipment, support staff and site/side marked as required.    Consent Done?:  Yes (Verbal)    Preparation: Patient was prepped and draped in usual sterile fashion    Local anesthesia used?: No      Wound Details:    Location:  Right foot    Location:  Right 5th Metatarsal Head    Type of Debridement:  Excisional       Length (cm):  1       Area (sq cm):  0.8       Width (cm):  0.8       Percent Debrided (%):  100       Depth (cm):  0.3       Total Area Debrided (sq cm):  0.8    Depth of debridement:  Subcutaneous tissue    Tissue debrided:  Dermis, Epidermis and Subcutaneous    Devitalized tissue debrided:  Biofilm, Callus, Necrotic/Eschar and Slough    Instruments:  Blade, Curette and Nippers    Bleeding:  Minimal  Hemostasis Achieved: Yes    Method Used:  Pressure and Alginate  Patient tolerance:  Patient tolerated the procedure well with no immediate complications     - None  Continue local wound care and he will go get fitted for his diabetic shoes with accommodative inserts. Apply more felt to offload the ulcer into his normal shoe. Return to see me in 2 months.  Counseling:     I provided patient education verbally regarding:   Patient diagnosis, treatment options, as well as alternatives, risks, and benefits.     This note was created using Dragon voice recognition software that occasionally misinterpreted phrases or words.               "

## 2019-07-15 RX ORDER — TRAMADOL HYDROCHLORIDE 50 MG/1
TABLET ORAL
Qty: 90 TABLET | Refills: 2 | OUTPATIENT
Start: 2019-07-15

## 2019-07-16 ENCOUNTER — TELEPHONE (OUTPATIENT)
Dept: PODIATRY | Facility: CLINIC | Age: 59
End: 2019-07-16

## 2019-07-16 NOTE — TELEPHONE ENCOUNTER
----- Message from Farzaneh Mata sent at 7/15/2019 10:21 AM CDT -----  Contact: self  Please call patient regarding diabetic shoes.  Thank you,  Farzaneh

## 2019-07-31 PROBLEM — I73.9 PERIPHERAL VASCULAR DISEASE: Status: ACTIVE | Noted: 2019-07-31

## 2019-07-31 PROBLEM — E11.42 DIABETIC POLYNEUROPATHY ASSOCIATED WITH TYPE 2 DIABETES MELLITUS: Status: ACTIVE | Noted: 2019-07-31

## 2019-08-01 ENCOUNTER — OFFICE VISIT (OUTPATIENT)
Dept: GASTROENTEROLOGY | Facility: CLINIC | Age: 59
End: 2019-08-01
Payer: MEDICARE

## 2019-08-01 VITALS
WEIGHT: 257.19 LBS | SYSTOLIC BLOOD PRESSURE: 118 MMHG | HEART RATE: 86 BPM | DIASTOLIC BLOOD PRESSURE: 66 MMHG | RESPIRATION RATE: 18 BRPM | HEIGHT: 73 IN | TEMPERATURE: 97 F | BODY MASS INDEX: 34.09 KG/M2

## 2019-08-01 DIAGNOSIS — E11.42 DIABETIC POLYNEUROPATHY ASSOCIATED WITH TYPE 2 DIABETES MELLITUS: ICD-10-CM

## 2019-08-01 DIAGNOSIS — R16.2 HEPATOSPLENOMEGALY: Primary | ICD-10-CM

## 2019-08-01 DIAGNOSIS — E11.49 TYPE II DIABETES MELLITUS WITH NEUROLOGICAL MANIFESTATIONS: ICD-10-CM

## 2019-08-01 DIAGNOSIS — A49.02 MRSA (METHICILLIN RESISTANT STAPHYLOCOCCUS AUREUS) INFECTION: ICD-10-CM

## 2019-08-01 DIAGNOSIS — K21.9 GASTROESOPHAGEAL REFLUX DISEASE, ESOPHAGITIS PRESENCE NOT SPECIFIED: ICD-10-CM

## 2019-08-01 DIAGNOSIS — M86.271 SUBACUTE OSTEOMYELITIS OF RIGHT FOOT: ICD-10-CM

## 2019-08-01 DIAGNOSIS — D69.1 THROMBOCYTOPATHY: ICD-10-CM

## 2019-08-01 DIAGNOSIS — K81.9 CHOLECYSTITIS: ICD-10-CM

## 2019-08-01 PROCEDURE — 99205 PR OFFICE/OUTPT VISIT, NEW, LEVL V, 60-74 MIN: ICD-10-PCS | Mod: S$GLB,,, | Performed by: INTERNAL MEDICINE

## 2019-08-01 PROCEDURE — 99205 OFFICE O/P NEW HI 60 MIN: CPT | Mod: S$GLB,,, | Performed by: INTERNAL MEDICINE

## 2019-08-01 RX ORDER — INSULIN GLARGINE 100 [IU]/ML
25 INJECTION, SOLUTION SUBCUTANEOUS DAILY
Refills: 3 | COMMUNITY
Start: 2019-07-11

## 2019-08-01 NOTE — PROGRESS NOTES
[facility] New Patient Visit    Subjective:           PCP: Ailin Sommer    Referring Provider: No ref. provider found    Chief Complaint: Colonoscopy       HPI:  Ana Bullard is a 59 y.o. male here for probable colonoscopy. He denies any history of rectal bleeding or change in color of stools or any other abnormality. He has no definite family history of colon cancer. There is significant family history of coronary artery disease and cardiac problems. Reviewed old lab work in detail and will consider benefits and risks for colonoscopy. Old endoscopy reports were reviewed in detail. Patient's weight fluctuates quite a bit and will consult with Dr. Roth regarding suitability for colonoscopy.     ROS:   Constitutional: No fevers, chills, weight loss  ENT: No allergies, sore throat, rhinorrhea  CV: No chest pain, palpitations, edema  Pulm: No cough, shortness of breath, wheezing  Ophtho: No vision changes  GI: No blood in stools, change in bowel habits, nausea/vomiting  Denies alternating diarrhea/constipation.   Derm: No rash  Heme: No easy bruising or lymphadenopathy  MSK: No arthralgias or myalgias  : No dysuria, hematuria, frequency, polyuria, or flank tenderness  Endo: No hot or cold intolerance  Neuro: No syncope or seizure, or dizziness  Psych: No hallucination, depression or suicidal ideation      Medical History:  has a past medical history of AICD (automatic cardioverter/defibrillator) present, Anxiety and depression (2012), CAD (coronary artery disease) (2012), Cardiomegaly (2016), CHF (congestive heart failure) (2012), Cholecystitis (2017), Complete heart block (2012), Diabetic foot ulcer, DVT (deep venous thrombosis) (2012), GERD (gastroesophageal reflux disease) (2010), Heparin induced thrombocytopenia, Hyperlipemia (2011), IDDM (insulin dependent diabetes mellitus) (1983), Ischemic cardiomyopathy (2012), MI (myocardial infarction) (2012), Morbid obesity, MRSA (methicillin resistant  "Staphylococcus aureus) infection (2019), Noncompliance with therapeutic plan (), Osteomyelitis of right foot (2019), Pulmonary edema (), Respiratory failure (), Sepsis (2019), Sleep apnea (), Thrombocytopathy (), and Venous stasis dermatitis of both lower extremities.      Surgical History:  has a past surgical history that includes Esophagogastroduodenoscopy (2017); paul filter placement (); Coronary artery bypass graft (2012); Cardiac pacemaker placement (2012); and Cardiac pacemaker placement (10/04/2018).    Family History:   Family History   Problem Relation Age of Onset    Arthritis Mother     Diabetes Mother     Cancer Father     Heart disease Father     Stroke Father        Social History:   Social History     Tobacco Use    Smoking status: Former Smoker     Packs/day: 2.00     Years: 38.00     Pack years: 76.00     Types: Cigarettes     Last attempt to quit:      Years since quittin.5    Smokeless tobacco: Never Used   Substance Use Topics    Alcohol use: No     Frequency: Never    Drug use: Not on file       The patient's social and family histories were reviewed by me and updated in the appropriate section of the electronic medical record.    Allergies:   Review of patient's allergies indicates:   Allergen Reactions    Vasopressin Anaphylaxis    Heparin Other (See Comments)     Other reaction(s): "depleted my blood platets"      Heparin analogues Other (See Comments)     Depleted WBC count    Morphine Hallucinations and Other (See Comments)     Other reaction(s): Hallucinations  Paranoid, hallucinations      Vasopressin analogues Other (See Comments)     "felt like eyes going to pop out of my head"       Medications:   Current Outpatient Medications   Medication Sig Dispense Refill    aspirin 81 MG Chew Take 81 mg by mouth once daily.      atorvastatin (LIPITOR) 10 MG tablet Take 1 tablet by mouth once daily.  1    BASAGLAR " "KWIKPEN U-100 INSULIN glargine 100 units/mL (3mL) SubQ pen 25 Units once daily.   3    carvedilol (COREG) 25 MG tablet Take 1 tablet by mouth 2 (two) times daily.  1    furosemide (LASIX) 40 MG tablet Take 1 tablet by mouth 2 (two) times daily.  1    gabapentin (NEURONTIN) 600 MG tablet Take 1 tablet by mouth 3 (three) times daily.  0    metFORMIN (GLUCOPHAGE) 500 MG tablet Take 2 tablets by mouth 2 (two) times daily.  2    pantoprazole (PROTONIX) 40 MG tablet Take 1 tablet by mouth once daily.  0    spironolactone (ALDACTONE) 25 MG tablet Take 1 tablet by mouth once daily.  1    traMADol (ULTRAM) 50 mg tablet Take 1 tablet (50 mg total) by mouth every 8 (eight) hours as needed for Pain. 90 tablet 2     No current facility-administered medications for this visit.      All medications and past history have been reviewed.        Objective:        Vital Signs:  Blood pressure 118/66, pulse 86, temperature 97 °F (36.1 °C), resp. rate 18, height 6' 1" (1.854 m), weight 116.7 kg (257 lb 3.2 oz). Body mass index is 33.93 kg/m².    Physical Exam:   General Appearance: Well appearing in no acute distress, well developed, well                 nourished  Head: Normocephalic, without obvious abnormality  Eyes:  Pupils equal, round and reactive to light  Throat: Lips, mucosa, and tongue normal; teeth and gums normal  Lungs: CTA bilaterally in anterior and posterior fields, no wheezes, no crackles  Heart:  Regular rate and rhythm, no murmurs heard  Abdomen: Soft, non tender, non distended with positive bowel sounds in all four quadrants. No hepatosplenomegaly, ascites, or mass. Negative for succusion splash  : male   Extremities: No cyanosis, edema or deformity  Skin: No rash  Neurologic: Normal exam      Labs/Imaging:  All lab results and imaging results have been reviewed and discussed with the patient.    Assessment/Plan:    Assessment:       1. Hepatosplenomegaly    2. Cholecystitis    3. Gastroesophageal reflux " disease, esophagitis presence not specified    4. Type II diabetes mellitus with neurological manifestations    5. Diabetic polyneuropathy associated with type 2 diabetes mellitus    6. MRSA (methicillin resistant Staphylococcus aureus) infection    7. Subacute osteomyelitis of right foot    8. Thrombocytopathy        Plan:       Ana was seen today for colonoscopy.    Diagnoses and all orders for this visit:    Hepatosplenomegaly  -     US Abdomen Complete; Future    Cholecystitis    Gastroesophageal reflux disease, esophagitis presence not specified    Type II diabetes mellitus with neurological manifestations    Diabetic polyneuropathy associated with type 2 diabetes mellitus    MRSA (methicillin resistant Staphylococcus aureus) infection    Subacute osteomyelitis of right foot    Thrombocytopathy        See HPI    No follow-ups on file.    The plan was discussed with the patient and all questions/concerns have been answered to the patient's satisfaction.    CC: No ref. provider found    Electronically signed by Debby Lanza MD    This note was dictated using voice recognition software and may contain grammatical errors.

## 2019-08-05 ENCOUNTER — HOSPITAL ENCOUNTER (OUTPATIENT)
Dept: RADIOLOGY | Facility: CLINIC | Age: 59
Discharge: HOME | End: 2019-08-05
Attending: PODIATRIST
Payer: MEDICARE

## 2019-08-05 ENCOUNTER — OFFICE VISIT (OUTPATIENT)
Dept: PODIATRY | Facility: CLINIC | Age: 59
End: 2019-08-05
Payer: MEDICARE

## 2019-08-05 VITALS
HEART RATE: 83 BPM | WEIGHT: 257 LBS | HEIGHT: 73 IN | DIASTOLIC BLOOD PRESSURE: 68 MMHG | BODY MASS INDEX: 34.06 KG/M2 | SYSTOLIC BLOOD PRESSURE: 101 MMHG

## 2019-08-05 DIAGNOSIS — E11.42 DIABETIC POLYNEUROPATHY ASSOCIATED WITH TYPE 2 DIABETES MELLITUS: ICD-10-CM

## 2019-08-05 DIAGNOSIS — E11.49 TYPE II DIABETES MELLITUS WITH NEUROLOGICAL MANIFESTATIONS: ICD-10-CM

## 2019-08-05 DIAGNOSIS — M89.8X7 EXOSTOSIS OF RIGHT FOOT: ICD-10-CM

## 2019-08-05 DIAGNOSIS — M79.671 FOOT PAIN, RIGHT: ICD-10-CM

## 2019-08-05 DIAGNOSIS — L97.512 SKIN ULCER OF RIGHT FOOT WITH FAT LAYER EXPOSED: Primary | ICD-10-CM

## 2019-08-05 DIAGNOSIS — I73.9 PERIPHERAL VASCULAR DISEASE: ICD-10-CM

## 2019-08-05 PROCEDURE — 99999 PR PBB SHADOW E&M-EST. PATIENT-LVL III: ICD-10-PCS | Mod: PBBFAC,,, | Performed by: PODIATRIST

## 2019-08-05 PROCEDURE — 99999 PR PBB SHADOW E&M-EST. PATIENT-LVL III: CPT | Mod: PBBFAC,,, | Performed by: PODIATRIST

## 2019-08-05 PROCEDURE — 97597 WOUND DEBRIDEMENT: ICD-10-PCS | Mod: S$GLB,,, | Performed by: PODIATRIST

## 2019-08-05 PROCEDURE — 97597 DBRDMT OPN WND 1ST 20 CM/<: CPT | Mod: S$GLB,,, | Performed by: PODIATRIST

## 2019-08-05 PROCEDURE — 73630 X-RAY EXAM OF FOOT: CPT | Mod: RT,S$GLB,, | Performed by: PODIATRIST

## 2019-08-05 PROCEDURE — 99213 PR OFFICE/OUTPT VISIT, EST, LEVL III, 20-29 MIN: ICD-10-PCS | Mod: 25,S$GLB,, | Performed by: PODIATRIST

## 2019-08-05 PROCEDURE — 73630 PR  X-RAY FOOT 3+ VW: ICD-10-PCS | Mod: RT,S$GLB,, | Performed by: PODIATRIST

## 2019-08-05 PROCEDURE — 99213 OFFICE O/P EST LOW 20 MIN: CPT | Mod: 25,S$GLB,, | Performed by: PODIATRIST

## 2019-08-05 NOTE — PROGRESS NOTES
[address] [facility] [address]  [place] [address]  Phone: [phone]   [phone]    Patient's PCP:YRN Landry  Referring Provider: No ref. provider found    Subjective:      Chief Complaint:: Follow-up (ulcer right fifth metatarsal) and Foot Pain (top of foot)    CAROLIN Bullard is a 59 y.o. male who presents with a follow up ulcer right fifth metatarsal. Doing daily dressing.I have been having some pain top of my right foot for about three weeks,no trauma.Stabbing pain for about a minute.    Systemic Doctor:Renita Duron MD  Date Last Seen: 6-19  Blood Sugar: 130  Hemoglobin A1c:  5    Vitals:    08/05/19 1351   BP: 101/68   Pulse: 83     Shoe Size:     Past Surgical History:   Procedure Laterality Date    CARDIAC PACEMAKER PLACEMENT  12/2012    CARDIAC PACEMAKER PLACEMENT  10/04/2018    battery replacement    CORONARY ARTERY BYPASS GRAFT  06/2012    ESOPHAGOGASTRODUODENOSCOPY  01/31/2017    SAMARA FILTER PLACEMENT  2012    vena cava     Past Medical History:   Diagnosis Date    AICD (automatic cardioverter/defibrillator) present     Anxiety and depression 2012    CAD (coronary artery disease) 2012    Cardiomegaly 2016    CHF (congestive heart failure) 2012    Cholecystitis 2017    Complete heart block 2012    Diabetic foot ulcer     DVT (deep venous thrombosis) 2012    And pulmonary embolus    GERD (gastroesophageal reflux disease) 2010    Heparin induced thrombocytopenia     Hyperlipemia 2011    IDDM (insulin dependent diabetes mellitus) 1983    With neuropathy    Ischemic cardiomyopathy 2012    MI (myocardial infarction) 2012    Morbid obesity     MRSA (methicillin resistant Staphylococcus aureus) infection 01/19/2019    Noncompliance with therapeutic plan 2019    Osteomyelitis of right foot 01/2019    Pulmonary edema 2019    Respiratory failure 2019    Sepsis 01/2019    Due to cellulitis right leg    Sleep apnea 2013    Thrombocytopathy 2012    Venous  "stasis dermatitis of both lower extremities      Family History   Problem Relation Age of Onset    Arthritis Mother     Diabetes Mother     Cancer Father     Heart disease Father     Stroke Father         Social History:   Marital Status:   Alcohol History:  reports that he does not drink alcohol.  Tobacco History:  reports that he quit smoking about 7 years ago. His smoking use included cigarettes. He has a 76.00 pack-year smoking history. He has never used smokeless tobacco.  Drug History:  has no drug history on file.    Review of patient's allergies indicates:   Allergen Reactions    Vasopressin Anaphylaxis    Heparin Other (See Comments)     Other reaction(s): "depleted my blood platets"      Heparin analogues Other (See Comments)     Depleted WBC count    Morphine Hallucinations and Other (See Comments)     Other reaction(s): Hallucinations  Paranoid, hallucinations      Vasopressin analogues Other (See Comments)     "felt like eyes going to pop out of my head"       Current Outpatient Medications   Medication Sig Dispense Refill    aspirin 81 MG Chew Take 81 mg by mouth once daily.      atorvastatin (LIPITOR) 10 MG tablet Take 1 tablet by mouth once daily.  1    BASAGLAR KWIKPEN U-100 INSULIN glargine 100 units/mL (3mL) SubQ pen 25 Units once daily.   3    carvedilol (COREG) 25 MG tablet Take 1 tablet by mouth 2 (two) times daily.  1    furosemide (LASIX) 40 MG tablet Take 1 tablet by mouth 2 (two) times daily.  1    gabapentin (NEURONTIN) 600 MG tablet Take 1 tablet by mouth 3 (three) times daily.  0    metFORMIN (GLUCOPHAGE) 500 MG tablet Take 2 tablets by mouth 2 (two) times daily.  2    pantoprazole (PROTONIX) 40 MG tablet Take 1 tablet by mouth once daily.  0    spironolactone (ALDACTONE) 25 MG tablet Take 1 tablet by mouth once daily.  1    traMADol (ULTRAM) 50 mg tablet Take 1 tablet (50 mg total) by mouth every 8 (eight) hours as needed for Pain. 90 tablet 2     No current " facility-administered medications for this visit.        Review of Systems      Objective:        Physical Exam:   Foot Exam    General  General Appearance: appears stated age and healthy   Orientation: alert and oriented to person, place, and time   Affect: appropriate   Gait: unimpaired       Right Foot/Ankle     Neurovascular  Dorsalis pedis: 2+  Posterior tibial: 2+  Saphenous nerve sensation: diminished  Tibial nerve sensation: diminished  Superficial peroneal nerve sensation: diminished  Deep peroneal nerve sensation: diminished  Sural nerve sensation: diminished      Left Foot/Ankle      Neurovascular  Dorsalis pedis: 2+  Posterior tibial: 2+  Saphenous nerve sensation: diminished  Tibial nerve sensation: diminished  Superficial peroneal nerve sensation: diminished  Deep peroneal nerve sensation: diminished  Sural nerve sensation: diminished          Physical Exam   Cardiovascular:   Pulses:       Dorsalis pedis pulses are 2+ on the right side, and 2+ on the left side.        Posterior tibial pulses are 2+ on the right side, and 2+ on the left side.   Musculoskeletal:        Feet:        Imaging:   AP, lateral, lateral oblique weight-bearing x-rays right foot:  No acute fracture, no bone tumors, no soft tissue masses. Dorsal bossing of 1st metatarsal cuneiform joint with mild exostosis.         Assessment:       1. Skin ulcer of right foot with fat layer exposed    2. Type II diabetes mellitus with neurological manifestations    3. Peripheral vascular disease    4. Diabetic polyneuropathy associated with type 2 diabetes mellitus    5. Foot pain, right    6. Exostosis of right foot      Plan:   Skin ulcer of right foot with fat layer exposed    Type II diabetes mellitus with neurological manifestations    Peripheral vascular disease    Diabetic polyneuropathy associated with type 2 diabetes mellitus    Foot pain, right  -     X-Ray Foot Complete Right    Exostosis of right foot      No follow-ups on  "file.    Wound Debridement  Date/Time: 8/5/2019 3:56 PM  Performed by: Tong Heaton DPM  Authorized by: Tong Heaton DPM     Time out: Immediately prior to procedure a "time out" was called to verify the correct patient, procedure, equipment, support staff and site/side marked as required.    Consent Done?:  Yes (Verbal)    Preparation: Patient was prepped and draped in usual sterile fashion    Local anesthesia used?: No      Wound Details:    Location:  Right foot    Location:  Right 5th Metatarsal Head    Type of Debridement:  Excisional       Length (cm):  1       Area (sq cm):  1       Width (cm):  1       Percent Debrided (%):  100       Depth (cm):  0.1       Total Area Debrided (sq cm):  1    Depth of debridement:  Epidermis/Dermis    Tissue debrided:  Epidermis and Dermis    Devitalized tissue debrided:  Callus, Fibrin, Necrotic/Eschar and Slough    Instruments:  Forceps, Blade and Nippers    Bleeding:  None  Patient tolerance:  Patient tolerated the procedure well with no immediate complications     Patient will continue offloading the ulcer with felt accommodation of the inserts in his running shoes and accommodative padding around the ulcer until he gets his diabetic shoes made.  He is to monitor the area for any in increased size of ulceration or infection.  He is return to see me in 2 months or as needed.     - None    Counseling:   I discussed exostosis of bone and conservative treatment of padding, NSAID, shoe modification, cortisone shot if part of DJDof a joint, topical NSAIDs.    I provided patient education verbally regarding:   Patient diagnosis, treatment options, as well as alternatives, risks, and benefits.     This note was created using Dragon voice recognition software that occasionally misinterpreted phrases or words.               "

## 2019-08-06 ENCOUNTER — OFFICE VISIT (OUTPATIENT)
Dept: PAIN MEDICINE | Facility: CLINIC | Age: 59
End: 2019-08-06
Payer: MEDICARE

## 2019-08-06 VITALS
HEIGHT: 73 IN | WEIGHT: 257 LBS | SYSTOLIC BLOOD PRESSURE: 121 MMHG | DIASTOLIC BLOOD PRESSURE: 70 MMHG | BODY MASS INDEX: 34.06 KG/M2 | HEART RATE: 85 BPM

## 2019-08-06 DIAGNOSIS — M79.672 BILATERAL FOOT PAIN: ICD-10-CM

## 2019-08-06 DIAGNOSIS — M79.671 BILATERAL FOOT PAIN: ICD-10-CM

## 2019-08-06 DIAGNOSIS — E11.49 TYPE II DIABETES MELLITUS WITH NEUROLOGICAL MANIFESTATIONS: ICD-10-CM

## 2019-08-06 DIAGNOSIS — E11.42 DIABETIC POLYNEUROPATHY ASSOCIATED WITH TYPE 2 DIABETES MELLITUS: Primary | ICD-10-CM

## 2019-08-06 PROCEDURE — 99214 PR OFFICE/OUTPT VISIT, EST, LEVL IV, 30-39 MIN: ICD-10-PCS | Mod: S$GLB,,, | Performed by: ANESTHESIOLOGY

## 2019-08-06 PROCEDURE — 99999 PR PBB SHADOW E&M-EST. PATIENT-LVL III: CPT | Mod: PBBFAC,,, | Performed by: ANESTHESIOLOGY

## 2019-08-06 PROCEDURE — 99999 PR PBB SHADOW E&M-EST. PATIENT-LVL III: ICD-10-PCS | Mod: PBBFAC,,, | Performed by: ANESTHESIOLOGY

## 2019-08-06 PROCEDURE — 99214 OFFICE O/P EST MOD 30 MIN: CPT | Mod: S$GLB,,, | Performed by: ANESTHESIOLOGY

## 2019-08-06 NOTE — H&P (VIEW-ONLY)
This note was completed with dictation software and grammatical errors may exist.    Referring Physician: No ref. provider found    PCP: YRN Landry      CC:  Bilateral foot pain    Interval history:  Patient returns to our clinic.  He has history of bilateral foot pain due to peripheral neuropathy diabetic neuropathy.  He currently has an ulcer being followed by Podiatry.  He continues to take 600 mg t.i.d. with mild benefits.  He has increased his nighttime dose is states that has provided additional benefit.  He continues to take tramadol 50 mg q.8 hours as needed with mild-to-moderate benefit.  Denies any side effects with the medication.  Able perform his ADLs with the medication.  He is interested in trying a lumbar sympathetic nerve block to help his worsening right foot pain. He denies any worsening weakness.  No bowel bladder changes.  Prior HPI:   Ana Bullard is a 59 y.o. male referred to us for bilateral foot pain. Patient with long history of diabetes.  He states neuropathy worsen after his CABG.  Bilateral foot pain has worsened over past 7 years.  He presents to us constant burning, sharp pain in his bilateral feet.  He also has some similar issues in his bilateral hands, but foot pain is worse.  Pain worsens prolonged sitting and standing.  He currently takes gabapentin 600 mg t.i.d. with mild benefits.  Increasing doses causes sedation.  He also takes tramadol q.8 hours as needed with mild-to-moderate benefit.  He denies any worsening weakness.  No bowel bladder changes.    ROS:  CONSTITUTIONAL: No fevers, chills, night sweats, wt. loss, appetite changes  SKIN: no rashes or itching  ENT: No headaches, head trauma, vision changes, or eye pain  LYMPH NODES: None noticed   CV: No chest pain, palpitations.   RESP: No shortness of breath, dyspnea on exertion, cough, wheezing, or hemoptysis  GI: No nausea, emesis, diarrhea, constipation, melena, hematochezia, pain.    : No dysuria,  hematuria, urgency, or frequency   HEME: No easy bruising, bleeding problems  PSYCHIATRIC: No depression, anxiety, psychosis, hallucinations.  NEURO: No seizures, memory loss, dizziness or difficulty sleeping  MSK:  Positive HPI      Past Medical History:   Diagnosis Date    AICD (automatic cardioverter/defibrillator) present     Anxiety and depression 2012    CAD (coronary artery disease) 2012    Cardiomegaly 2016    CHF (congestive heart failure) 2012    Cholecystitis 2017    Complete heart block 2012    Diabetic foot ulcer     DVT (deep venous thrombosis) 2012    And pulmonary embolus    GERD (gastroesophageal reflux disease) 2010    Heparin induced thrombocytopenia     Hyperlipemia 2011    IDDM (insulin dependent diabetes mellitus) 1983    With neuropathy    Ischemic cardiomyopathy 2012    MI (myocardial infarction) 2012    Morbid obesity     MRSA (methicillin resistant Staphylococcus aureus) infection 01/19/2019    Noncompliance with therapeutic plan 2019    Osteomyelitis of right foot 01/2019    Pulmonary edema 2019    Respiratory failure 2019    Sepsis 01/2019    Due to cellulitis right leg    Sleep apnea 2013    Thrombocytopathy 2012    Venous stasis dermatitis of both lower extremities      Past Surgical History:   Procedure Laterality Date    CARDIAC PACEMAKER PLACEMENT  12/2012    CARDIAC PACEMAKER PLACEMENT  10/04/2018    battery replacement    CORONARY ARTERY BYPASS GRAFT  06/2012    ESOPHAGOGASTRODUODENOSCOPY  01/31/2017    SAMARA FILTER PLACEMENT  2012    vena cava     Family History   Problem Relation Age of Onset    Arthritis Mother     Diabetes Mother     Cancer Father     Heart disease Father     Stroke Father      Social History     Socioeconomic History    Marital status:      Spouse name: Not on file    Number of children: Not on file    Years of education: Not on file    Highest education level: Not on file   Occupational History    Not on  "file   Social Needs    Financial resource strain: Not on file    Food insecurity:     Worry: Not on file     Inability: Not on file    Transportation needs:     Medical: Not on file     Non-medical: Not on file   Tobacco Use    Smoking status: Former Smoker     Packs/day: 2.00     Years: 38.00     Pack years: 76.00     Types: Cigarettes     Last attempt to quit:      Years since quittin.6    Smokeless tobacco: Never Used   Substance and Sexual Activity    Alcohol use: No     Frequency: Never    Drug use: Not on file    Sexual activity: Never   Lifestyle    Physical activity:     Days per week: Not on file     Minutes per session: Not on file    Stress: Not on file   Relationships    Social connections:     Talks on phone: Not on file     Gets together: Not on file     Attends Taoist service: Not on file     Active member of club or organization: Not on file     Attends meetings of clubs or organizations: Not on file     Relationship status: Not on file   Other Topics Concern    Not on file   Social History Narrative    Not on file         Medications/Allergies: See med card    Vitals:    19 1027   BP: 121/70   Pulse: 85   Weight: 116.6 kg (257 lb)   Height: 6' 1" (1.854 m)   PainSc:   6   PainLoc: Foot         Physical exam:    GENERAL: A and O x3, the patient appears well groomed and is in no acute distress.  Skin: No rashes or obvious lesions  HEENT: normocephalic, atraumatic  CARDIOVASCULAR:  Palpable peripheral pulses  LUNGS: easy work of breathing  ABDOMEN: soft, nontender   UPPER EXTREMITIES: Normal alignment, normal range of motion, no atrophy, no skin changes,  hair growth and nail growth normal and equal bilaterally. No swelling, no tenderness.    LOWER EXTREMITIES:  Normal alignment, normal range of motion, no atrophy, no skin changes,  hair growth and nail growth normal and equal bilaterally. No swelling, no tenderness.  LUMBAR SPINE  Lumbar spine: ROM is limited with flexion " extension and oblique extension with moderate increased pain.    Samuel's test causes no increased pain on either side.    Supine straight leg raise is negative bilaterally.    Internal and external rotation of the hip causes no increased pain on either side.  Myofascial exam: No tenderness to palpation across lumbar paraspinous muscles.      MENTAL STATUS: normal orientation, speech, language, and fund of knowledge for social situation.  Emotional state appropriate.    CRANIAL NERVES:  II:  PERRL bilaterally,   III,IV,VI: EOMI.    V:  Facial sensation equal bilaterally  VII:  Facial motor function normal.  VIII:  Hearing equal to finger rub bilaterally  IX/X: Gag normal, palate symmetric  XI:  Shoulder shrug equal, head turn equal  XII:  Tongue midline without fasciculations      MOTOR: Tone and bulk: normal bilateral upper and lower Strength: normal   Delt Bi Tri WE WF     R 5 5 5 5 5 5   L 5 5 5 5 5 5     IP ADD ABD Quad TA Gas HAM  R 5 5 5 5 5 5 5  L 5 5 5 5 5 5 5    SENSATION:  Decreased globally of bilateral feet  REFLEXES: normal, symmetric, nonbrisk.  Toes down, no clonus. No hoffmans.  GAIT: normal rise, base, steps, and arm swing.        Imaging:  None    Assessment:  Patient referred for bilateral foot pain  1. Diabetic polyneuropathy associated with type 2 diabetes mellitus    2. Type II diabetes mellitus with neurological manifestations    3. Bilateral foot pain          Plan:  1. I have stressed the importance of physical activity and exercise to improve overall health  2.  Discussed disease progression of peripheral neuropathy.  Continue gabapentin prescribed.  3.  He does request continue tramadol with us.  I believe this is very reasonable.   reviewed.  He takes tramadol 50 mg q.8 hours as needed.  UDS last visit consistent with use.  Script given for 3 months.  4.  Discussed performing a lumbar sympathetic nerve block to see if his pain is sympathetically maintained. He wishes to proceed  with this procedure. We will schedule a right-sided lumbar sympathetic nerve  5.  Follow-up after the procedure

## 2019-08-06 NOTE — PROGRESS NOTES
This note was completed with dictation software and grammatical errors may exist.    Referring Physician: No ref. provider found    PCP: YRN Landry      CC:  Bilateral foot pain    Interval history:  Patient returns to our clinic.  He has history of bilateral foot pain due to peripheral neuropathy diabetic neuropathy.  He currently has an ulcer being followed by Podiatry.  He continues to take 600 mg t.i.d. with mild benefits.  He has increased his nighttime dose is states that has provided additional benefit.  He continues to take tramadol 50 mg q.8 hours as needed with mild-to-moderate benefit.  Denies any side effects with the medication.  Able perform his ADLs with the medication.  He is interested in trying a lumbar sympathetic nerve block to help his worsening right foot pain. He denies any worsening weakness.  No bowel bladder changes.  Prior HPI:   Ana Bullard is a 59 y.o. male referred to us for bilateral foot pain. Patient with long history of diabetes.  He states neuropathy worsen after his CABG.  Bilateral foot pain has worsened over past 7 years.  He presents to us constant burning, sharp pain in his bilateral feet.  He also has some similar issues in his bilateral hands, but foot pain is worse.  Pain worsens prolonged sitting and standing.  He currently takes gabapentin 600 mg t.i.d. with mild benefits.  Increasing doses causes sedation.  He also takes tramadol q.8 hours as needed with mild-to-moderate benefit.  He denies any worsening weakness.  No bowel bladder changes.    ROS:  CONSTITUTIONAL: No fevers, chills, night sweats, wt. loss, appetite changes  SKIN: no rashes or itching  ENT: No headaches, head trauma, vision changes, or eye pain  LYMPH NODES: None noticed   CV: No chest pain, palpitations.   RESP: No shortness of breath, dyspnea on exertion, cough, wheezing, or hemoptysis  GI: No nausea, emesis, diarrhea, constipation, melena, hematochezia, pain.    : No dysuria,  hematuria, urgency, or frequency   HEME: No easy bruising, bleeding problems  PSYCHIATRIC: No depression, anxiety, psychosis, hallucinations.  NEURO: No seizures, memory loss, dizziness or difficulty sleeping  MSK:  Positive HPI      Past Medical History:   Diagnosis Date    AICD (automatic cardioverter/defibrillator) present     Anxiety and depression 2012    CAD (coronary artery disease) 2012    Cardiomegaly 2016    CHF (congestive heart failure) 2012    Cholecystitis 2017    Complete heart block 2012    Diabetic foot ulcer     DVT (deep venous thrombosis) 2012    And pulmonary embolus    GERD (gastroesophageal reflux disease) 2010    Heparin induced thrombocytopenia     Hyperlipemia 2011    IDDM (insulin dependent diabetes mellitus) 1983    With neuropathy    Ischemic cardiomyopathy 2012    MI (myocardial infarction) 2012    Morbid obesity     MRSA (methicillin resistant Staphylococcus aureus) infection 01/19/2019    Noncompliance with therapeutic plan 2019    Osteomyelitis of right foot 01/2019    Pulmonary edema 2019    Respiratory failure 2019    Sepsis 01/2019    Due to cellulitis right leg    Sleep apnea 2013    Thrombocytopathy 2012    Venous stasis dermatitis of both lower extremities      Past Surgical History:   Procedure Laterality Date    CARDIAC PACEMAKER PLACEMENT  12/2012    CARDIAC PACEMAKER PLACEMENT  10/04/2018    battery replacement    CORONARY ARTERY BYPASS GRAFT  06/2012    ESOPHAGOGASTRODUODENOSCOPY  01/31/2017    SAMARA FILTER PLACEMENT  2012    vena cava     Family History   Problem Relation Age of Onset    Arthritis Mother     Diabetes Mother     Cancer Father     Heart disease Father     Stroke Father      Social History     Socioeconomic History    Marital status:      Spouse name: Not on file    Number of children: Not on file    Years of education: Not on file    Highest education level: Not on file   Occupational History    Not on  "file   Social Needs    Financial resource strain: Not on file    Food insecurity:     Worry: Not on file     Inability: Not on file    Transportation needs:     Medical: Not on file     Non-medical: Not on file   Tobacco Use    Smoking status: Former Smoker     Packs/day: 2.00     Years: 38.00     Pack years: 76.00     Types: Cigarettes     Last attempt to quit:      Years since quittin.6    Smokeless tobacco: Never Used   Substance and Sexual Activity    Alcohol use: No     Frequency: Never    Drug use: Not on file    Sexual activity: Never   Lifestyle    Physical activity:     Days per week: Not on file     Minutes per session: Not on file    Stress: Not on file   Relationships    Social connections:     Talks on phone: Not on file     Gets together: Not on file     Attends Mu-ism service: Not on file     Active member of club or organization: Not on file     Attends meetings of clubs or organizations: Not on file     Relationship status: Not on file   Other Topics Concern    Not on file   Social History Narrative    Not on file         Medications/Allergies: See med card    Vitals:    19 1027   BP: 121/70   Pulse: 85   Weight: 116.6 kg (257 lb)   Height: 6' 1" (1.854 m)   PainSc:   6   PainLoc: Foot         Physical exam:    GENERAL: A and O x3, the patient appears well groomed and is in no acute distress.  Skin: No rashes or obvious lesions  HEENT: normocephalic, atraumatic  CARDIOVASCULAR:  Palpable peripheral pulses  LUNGS: easy work of breathing  ABDOMEN: soft, nontender   UPPER EXTREMITIES: Normal alignment, normal range of motion, no atrophy, no skin changes,  hair growth and nail growth normal and equal bilaterally. No swelling, no tenderness.    LOWER EXTREMITIES:  Normal alignment, normal range of motion, no atrophy, no skin changes,  hair growth and nail growth normal and equal bilaterally. No swelling, no tenderness.  LUMBAR SPINE  Lumbar spine: ROM is limited with flexion " extension and oblique extension with moderate increased pain.    Samuel's test causes no increased pain on either side.    Supine straight leg raise is negative bilaterally.    Internal and external rotation of the hip causes no increased pain on either side.  Myofascial exam: No tenderness to palpation across lumbar paraspinous muscles.      MENTAL STATUS: normal orientation, speech, language, and fund of knowledge for social situation.  Emotional state appropriate.    CRANIAL NERVES:  II:  PERRL bilaterally,   III,IV,VI: EOMI.    V:  Facial sensation equal bilaterally  VII:  Facial motor function normal.  VIII:  Hearing equal to finger rub bilaterally  IX/X: Gag normal, palate symmetric  XI:  Shoulder shrug equal, head turn equal  XII:  Tongue midline without fasciculations      MOTOR: Tone and bulk: normal bilateral upper and lower Strength: normal   Delt Bi Tri WE WF     R 5 5 5 5 5 5   L 5 5 5 5 5 5     IP ADD ABD Quad TA Gas HAM  R 5 5 5 5 5 5 5  L 5 5 5 5 5 5 5    SENSATION:  Decreased globally of bilateral feet  REFLEXES: normal, symmetric, nonbrisk.  Toes down, no clonus. No hoffmans.  GAIT: normal rise, base, steps, and arm swing.        Imaging:  None    Assessment:  Patient referred for bilateral foot pain  1. Diabetic polyneuropathy associated with type 2 diabetes mellitus    2. Type II diabetes mellitus with neurological manifestations    3. Bilateral foot pain          Plan:  1. I have stressed the importance of physical activity and exercise to improve overall health  2.  Discussed disease progression of peripheral neuropathy.  Continue gabapentin prescribed.  3.  He does request continue tramadol with us.  I believe this is very reasonable.   reviewed.  He takes tramadol 50 mg q.8 hours as needed.  UDS last visit consistent with use.  Script given for 3 months.  4.  Discussed performing a lumbar sympathetic nerve block to see if his pain is sympathetically maintained. He wishes to proceed  with this procedure. We will schedule a right-sided lumbar sympathetic nerve  5.  Follow-up after the procedure

## 2019-08-07 DIAGNOSIS — M79.604 RIGHT LEG PAIN: Primary | ICD-10-CM

## 2019-08-12 RX ORDER — TRAMADOL HYDROCHLORIDE 50 MG/1
50 TABLET ORAL EVERY 8 HOURS PRN
Qty: 90 TABLET | Refills: 0 | Status: SHIPPED | OUTPATIENT
Start: 2019-08-12 | End: 2019-09-11 | Stop reason: SDUPTHER

## 2019-08-12 NOTE — TELEPHONE ENCOUNTER
----- Message from Vladimir Blandon sent at 8/12/2019 12:40 PM CDT -----  Contact: Patient  Type:  RX Refill Request    Who Called:  Patient  Refill or New Rx:  Refill  RX Name and Strength:  traMADol (ULTRAM) 50 mg tablet  How is the patient currently taking it? (ex. 1XDay):  x3 daily  Is this a 30 day or 90 day RX:  30  Preferred Pharmacy with phone number:    [facility] - [place], [place] - [address]  [address] [address]  Phone: [phone] Fax: [phone]  Local or Mail Order:  Local  Ordering Provider:  Florentino Head Call Back Number:  [phone]  Additional Information:  Patient was seen last on 8/6/19 and requested refill but pharmacy has not received medication. Please advise when refill is complete because patient is out of medication

## 2019-08-22 ENCOUNTER — HOSPITAL ENCOUNTER (OUTPATIENT)
Facility: AMBULARY SURGERY CENTER | Age: 59
Discharge: HOME | End: 2019-08-22
Attending: ANESTHESIOLOGY | Admitting: ANESTHESIOLOGY
Payer: MEDICARE

## 2019-08-22 DIAGNOSIS — M79.606 LEG PAIN: ICD-10-CM

## 2019-08-22 DIAGNOSIS — M79.606 PAIN OF LOWER EXTREMITY, UNSPECIFIED LATERALITY: Primary | ICD-10-CM

## 2019-08-22 LAB — POCT GLUCOSE: 147 MG/DL (ref 70–110)

## 2019-08-22 PROCEDURE — 64520 N BLOCK LUMBAR/THORACIC: CPT | Mod: LT,,, | Performed by: ANESTHESIOLOGY

## 2019-08-22 PROCEDURE — 64520 PR INJECT NERV BLCK,PARAVERT SYMPATH: ICD-10-PCS | Mod: LT,,, | Performed by: ANESTHESIOLOGY

## 2019-08-22 PROCEDURE — 99152 PR MOD CONSCIOUS SEDATION, SAME PHYS, 5+ YRS, FIRST 15 MIN: ICD-10-PCS | Mod: ,,, | Performed by: ANESTHESIOLOGY

## 2019-08-22 PROCEDURE — 99152 MOD SED SAME PHYS/QHP 5/>YRS: CPT | Mod: ,,, | Performed by: ANESTHESIOLOGY

## 2019-08-22 PROCEDURE — 77003 FLUOROGUIDE FOR SPINE INJECT: CPT | Performed by: ANESTHESIOLOGY

## 2019-08-22 PROCEDURE — 64520 N BLOCK LUMBAR/THORACIC: CPT | Performed by: ANESTHESIOLOGY

## 2019-08-22 RX ORDER — SODIUM CHLORIDE, SODIUM LACTATE, POTASSIUM CHLORIDE, CALCIUM CHLORIDE 600; 310; 30; 20 MG/100ML; MG/100ML; MG/100ML; MG/100ML
INJECTION, SOLUTION INTRAVENOUS ONCE AS NEEDED
Status: COMPLETED | OUTPATIENT
Start: 2019-08-22 | End: 2019-08-22

## 2019-08-22 RX ORDER — DEXAMETHASONE SODIUM PHOSPHATE 10 MG/ML
INJECTION INTRAMUSCULAR; INTRAVENOUS
Status: DISCONTINUED | OUTPATIENT
Start: 2019-08-22 | End: 2019-08-22 | Stop reason: HOSPADM

## 2019-08-22 RX ORDER — MIDAZOLAM HYDROCHLORIDE 1 MG/ML
INJECTION, SOLUTION INTRAMUSCULAR; INTRAVENOUS
Status: DISCONTINUED | OUTPATIENT
Start: 2019-08-22 | End: 2019-08-22 | Stop reason: HOSPADM

## 2019-08-22 RX ORDER — LIDOCAINE HYDROCHLORIDE 10 MG/ML
INJECTION, SOLUTION EPIDURAL; INFILTRATION; INTRACAUDAL; PERINEURAL
Status: DISCONTINUED | OUTPATIENT
Start: 2019-08-22 | End: 2019-08-22 | Stop reason: HOSPADM

## 2019-08-22 RX ORDER — BUPIVACAINE HYDROCHLORIDE AND EPINEPHRINE 2.5; 5 MG/ML; UG/ML
INJECTION, SOLUTION EPIDURAL; INFILTRATION; INTRACAUDAL; PERINEURAL
Status: DISCONTINUED | OUTPATIENT
Start: 2019-08-22 | End: 2019-08-22 | Stop reason: HOSPADM

## 2019-08-22 RX ORDER — FENTANYL CITRATE 50 UG/ML
INJECTION, SOLUTION INTRAMUSCULAR; INTRAVENOUS
Status: DISCONTINUED | OUTPATIENT
Start: 2019-08-22 | End: 2019-08-22 | Stop reason: HOSPADM

## 2019-08-22 RX ADMIN — SODIUM CHLORIDE, SODIUM LACTATE, POTASSIUM CHLORIDE, CALCIUM CHLORIDE: 600; 310; 30; 20 INJECTION, SOLUTION INTRAVENOUS at 02:08

## 2019-08-22 NOTE — PLAN OF CARE
Patient standing up at bedside and able to ambulate; he denies dizziness, weakness or nausea. He denies pain at this time. Vital signs are stable and injection site is without any s/s of infection , drainage or swelling. Patient states that he is ready to go home. All belongings listed on pre op check list have been returned to patient. Patient's spouse present and statesshe is ready to take patient home and that she is driving.

## 2019-08-22 NOTE — DISCHARGE SUMMARY
[facility]  Discharge Note  Short Stay    Admit Date: 8/22/2019    Discharge Date and Time: 8/22/2019    Attending Physician: Tashi Good MD     Discharge Provider: Tashi Good    Diagnoses:  Active Hospital Problems    Diagnosis  POA    *Leg pain [M79.606]  Yes      Resolved Hospital Problems   No resolved problems to display.       Hospital Course: LSB  Discharged Condition: Good    Final Diagnoses:   Active Hospital Problems    Diagnosis  POA    *Leg pain [M79.606]  Yes      Resolved Hospital Problems   No resolved problems to display.       Disposition: Home or Self Care    Follow up/Patient Instructions:    Medications:  Reconciled Home Medications:      Medication List      CONTINUE taking these medications    aspirin 81 MG Chew  Take 81 mg by mouth once daily.     atorvastatin 10 MG tablet  Commonly known as:  LIPITOR  Take 1 tablet by mouth once daily.     BASAGLAR KWIKPEN U-100 INSULIN glargine 100 units/mL (3mL) SubQ pen  Generic drug:  insulin  25 Units once daily.     carvedilol 25 MG tablet  Commonly known as:  COREG  Take 1 tablet by mouth 2 (two) times daily.     furosemide 40 MG tablet  Commonly known as:  LASIX  Take 1 tablet by mouth 2 (two) times daily.     gabapentin 600 MG tablet  Commonly known as:  NEURONTIN  Take 1 tablet by mouth 3 (three) times daily.     metFORMIN 500 MG tablet  Commonly known as:  GLUCOPHAGE  Take 2 tablets by mouth 2 (two) times daily.     pantoprazole 40 MG tablet  Commonly known as:  PROTONIX  Take 1 tablet by mouth once daily.     spironolactone 25 MG tablet  Commonly known as:  ALDACTONE  Take 1 tablet by mouth once daily.     traMADol 50 mg tablet  Commonly known as:  ULTRAM  Take 1 tablet (50 mg total) by mouth every 8 (eight) hours as needed for Pain.          Discharge Procedure Orders   Call MD for:  temperature >100.4     Call MD for:  persistent nausea and vomiting or diarrhea     Call MD for:  severe uncontrolled pain     Call MD for:  redness,  tenderness, or signs of infection (pain, swelling, redness, odor or green/yellow discharge around incision site)     Call MD for:  difficulty breathing or increased cough     Call MD for:  severe persistent headache        Follow up with MD in 2-3 weeks    Discharge Procedure Orders (must include Diet, Follow-up, Activity):   Discharge Procedure Orders (must include Diet, Follow-up, Activity)   Call MD for:  temperature >100.4     Call MD for:  persistent nausea and vomiting or diarrhea     Call MD for:  severe uncontrolled pain     Call MD for:  redness, tenderness, or signs of infection (pain, swelling, redness, odor or green/yellow discharge around incision site)     Call MD for:  difficulty breathing or increased cough     Call MD for:  severe persistent headache

## 2019-08-22 NOTE — DISCHARGE INSTRUCTIONS
Before leaving, please make sure you have all your personal belongings such as glasses, purses, wallets, keys, cell phones, jewelry, jackets etc      Anesthesia information    Anesthesia Safety      You have been given medicine  to sedate you during your procedure today. This may have included both a pain medicine and sleeping medicine. Most of the effects have worn off; however, you may continue to have some drowsiness for the next  24 hours. Anesthesia and pain medicines can cause nausea, sleepiness, dizziness and  constipation.    HOME CARE:  1) For the next EIGHT HOURS, you should be watched by a responsible adult to look for any worsening of your condition.  2) DO NOT DRINK any ALCOHOL for the next 24 HOURS.  3) DO NOT DRIVE or operate dangerous machinery during the next 24 HOURS.  FOLLOW UP with your doctor or this facility if you are not alert and back to your usual level of activity within 24 hrs.  GET PROMPT MEDICAL ATTENTION if any of the following occur:  -- Increased drowsiness  -- Increased weakness or dizziness  -- Repeated vomiting  -- If you cannot be awakened    Pain injection instructions:     This procedure may take a couple weeks to relieve pain  You may get some pain relief from the local anesthetic initally.    No driving for 24 hrs.   Activity as tolerated- gradually increase activities.  Dont lift over 10 lbs for 24 hrs   No heat at injection sites x 2 days. No heating pads, hot tubs, saunas, or swimming in any body of water or pool for 2 days.  Use ice pack for mild swelling and for comfort , apply for 20 minutes, remove for 20 minute intervals. No direct contact of ice itself  to skin.  May shower today. Do not allow shower water to beat on injection site for 2 days. No tub baths for two days.      Resume Aspirin, Plavix, or Coumadin the day after the procedure unless otherwise instructed.   If diabetic,monitor your glucose carefully as steroids can increase your glucose level    Seek  immediate medical help for:   Severe increase in your usual pain or appearance of new pain.  Prolonged (more than 8 hours) or increasing weakness or numbness in the legs or arms. Numbing medicine was injected and can affect the messages to and from the brain and legs or arms.  .    Fever above 100.4F ,Drainage,redness,active bleeding, or increased swelling at the injection site.  Headache, shortness of breath, chest pain, or breathing problems.

## 2019-08-22 NOTE — OP NOTE
PROCEDURE DATE: 8/22/2019    PROCEDURE: Right side L3 lumbar sympathetic block utilizing fluoroscopy    DIAGNOSIS: Right leg pain  Post Op diagnosis: Same    PHYSICIAN: Tashi Good MD    MEDICATIONS INJECTED:      Bupivicaine with 1:200,000 epinephrine, 19 ml total with 10mg of dexamethasone at each level    LOCAL ANESTHETIC INJECTED:  Lidocaine 1%. 3ml per site.    SEDATION MEDICATIONS: RN IV sedation    ESTIMATED BLOOD LOSS:  None    COMPLICATIONS:  None    TECHNIQUE:   A time-out taken to identify patient and procedure side prior to starting the procedure. The patient was placed in a prone position, prepped and draped in the usual sterile fashion using ChloraPrep and sterile towels.  The area to be injected was determined under fluoroscopic guidance in AP and oblique view. The fluoroscope was rotated to a right-side oblique view at which point the tip of the L3 transverse process was noted to be parallel to the L3 vertebral body.  Local anesthetic was given by raising a wheel and going down to the hub of a 25-gauge 1.5 inch needle.  In oblique view, a 5 inch 22-gauge bent-tip spinal needle was introduced and followed just anterior to the transverse process until it made contact with the vertebral body. After negative aspiration for blood or air, 2ml contrast dye was injected to confirm appropriate placement and that there was no vascular uptake, and this was also performed under live digital subtraction. The test dose as noted above was given over 4 minutes and there were no signs of HR or BP changes, no tinnitus or circumoral numbness. Again, aspiration was negative for blood or air and the treatment medication was then given slowly over 5 minutes. The patient tolerated the procedure well.    The patient was monitored after the procedure. The patient was given post procedure and discharge instructions to follow at home. The patient was discharged in a stable condition.

## 2019-08-27 VITALS
BODY MASS INDEX: 34.06 KG/M2 | HEIGHT: 73 IN | SYSTOLIC BLOOD PRESSURE: 105 MMHG | DIASTOLIC BLOOD PRESSURE: 58 MMHG | OXYGEN SATURATION: 95 % | HEART RATE: 74 BPM | RESPIRATION RATE: 18 BRPM | WEIGHT: 257 LBS | TEMPERATURE: 98 F

## 2019-09-11 RX ORDER — TRAMADOL HYDROCHLORIDE 50 MG/1
50 TABLET ORAL EVERY 8 HOURS PRN
Qty: 90 TABLET | Refills: 0 | Status: SHIPPED | OUTPATIENT
Start: 2019-09-11 | End: 2019-10-11 | Stop reason: SDUPTHER

## 2019-10-04 RX ORDER — CIPROFLOXACIN 500 MG/1
TABLET ORAL
Refills: 6 | COMMUNITY
Start: 2019-08-27 | End: 2019-10-07

## 2019-10-07 ENCOUNTER — OFFICE VISIT (OUTPATIENT)
Dept: PODIATRY | Facility: CLINIC | Age: 59
End: 2019-10-07
Payer: MEDICARE

## 2019-10-07 VITALS
SYSTOLIC BLOOD PRESSURE: 101 MMHG | WEIGHT: 257 LBS | HEIGHT: 73 IN | DIASTOLIC BLOOD PRESSURE: 68 MMHG | BODY MASS INDEX: 34.06 KG/M2 | HEART RATE: 83 BPM

## 2019-10-07 DIAGNOSIS — I73.9 PERIPHERAL VASCULAR DISEASE: ICD-10-CM

## 2019-10-07 DIAGNOSIS — E11.49 TYPE II DIABETES MELLITUS WITH NEUROLOGICAL MANIFESTATIONS: ICD-10-CM

## 2019-10-07 DIAGNOSIS — L97.512 SKIN ULCER OF RIGHT FOOT WITH FAT LAYER EXPOSED: Primary | ICD-10-CM

## 2019-10-07 PROCEDURE — 99213 OFFICE O/P EST LOW 20 MIN: CPT | Mod: 25,S$GLB,, | Performed by: PODIATRIST

## 2019-10-07 PROCEDURE — 97597 DBRDMT OPN WND 1ST 20 CM/<: CPT | Mod: S$GLB,,, | Performed by: PODIATRIST

## 2019-10-07 PROCEDURE — 97597 WOUND DEBRIDEMENT: ICD-10-PCS | Mod: S$GLB,,, | Performed by: PODIATRIST

## 2019-10-07 PROCEDURE — 99213 PR OFFICE/OUTPT VISIT, EST, LEVL III, 20-29 MIN: ICD-10-PCS | Mod: 25,S$GLB,, | Performed by: PODIATRIST

## 2019-10-07 RX ORDER — CEFDINIR 300 MG/1
CAPSULE ORAL
Status: ON HOLD | COMMUNITY
End: 2020-01-22 | Stop reason: HOSPADM

## 2019-10-07 RX ORDER — IPRATROPIUM BROMIDE AND ALBUTEROL SULFATE 2.5; .5 MG/3ML; MG/3ML
SOLUTION RESPIRATORY (INHALATION)
COMMUNITY
Start: 2018-01-04 | End: 2020-12-28

## 2019-10-07 RX ORDER — DULOXETIN HYDROCHLORIDE 60 MG/1
CAPSULE, DELAYED RELEASE ORAL
COMMUNITY
Start: 2018-08-13 | End: 2019-10-07

## 2019-10-07 NOTE — PROGRESS NOTES
[address] [facility] [address]  [place] [address]  Phone: [phone]   [phone]    Patient's PCP:YRN Landry  Referring Provider: No ref. provider found    Subjective:      Chief Complaint:: Follow-up (ulcer right foot)    CAROLIN Bullard is a 59 y.o. male who presents with a follow up ulcer right fifth metatarsal. Doing daily dressing. I have been having some pain top of my right foot for about three weeks, no trauma. Stabbing pain for about a minute.   Pt states knot on top of foot is not doing any better.     Systemic Doctor: Renita Duron MD  Date Last Seen: 6-19  Blood Sugar: 130-140  Hemoglobin A1c:  5    Vitals:    10/07/19 1414   BP: 101/68   Pulse: 83     Shoe Size: 12    Past Surgical History:   Procedure Laterality Date    CARDIAC PACEMAKER PLACEMENT  12/2012    CARDIAC PACEMAKER PLACEMENT  10/04/2018    battery replacement    CORONARY ARTERY BYPASS GRAFT  06/2012    ESOPHAGOGASTRODUODENOSCOPY  01/31/2017    SAMARA FILTER PLACEMENT  2012    vena cava    LUMBAR SYMPATHETIC NERVE BLOCK Right 8/22/2019    Procedure: BLOCK, NERVE, SYMPATHETIC, LUMBAR;  Surgeon: Tashi Good MD;  Location: [facility] OR;  Service: Pain Management;  Laterality: Right;  RIGHT SYMPATHETIC NERVE BLOCK     Past Medical History:   Diagnosis Date    AICD (automatic cardioverter/defibrillator) present     Anxiety and depression 2012    CAD (coronary artery disease) 2012    Cardiomegaly 2016    CHF (congestive heart failure) 2012    Cholecystitis 2017    Complete heart block 2012    Diabetic foot ulcer     DVT (deep venous thrombosis) 2012    And pulmonary embolus    GERD (gastroesophageal reflux disease) 2010    Heparin induced thrombocytopenia     Hyperlipemia 2011    IDDM (insulin dependent diabetes mellitus) 1983    With neuropathy    Ischemic cardiomyopathy 2012    MI (myocardial infarction) 2012    Morbid obesity     MRSA (methicillin resistant Staphylococcus aureus) infection  "01/19/2019    Noncompliance with therapeutic plan 2019    Osteomyelitis of right foot 01/2019    Pulmonary edema 2019    Respiratory failure 2019    Sepsis 01/2019    Due to cellulitis right leg    Sleep apnea 2013    Thrombocytopathy 2012    Venous stasis dermatitis of both lower extremities      Family History   Problem Relation Age of Onset    Arthritis Mother     Diabetes Mother     Cancer Father     Heart disease Father     Stroke Father         Social History:   Marital Status:   Alcohol History:  reports that he does not drink alcohol.  Tobacco History:  reports that he quit smoking about 7 years ago. His smoking use included cigarettes. He has a 76.00 pack-year smoking history. He has never used smokeless tobacco.  Drug History:  has no drug history on file.    Review of patient's allergies indicates:   Allergen Reactions    Vasopressin Anaphylaxis    Heparin Other (See Comments)     Other reaction(s): "depleted my blood platets"      Heparin analogues Other (See Comments)     Depleted WBC count    Morphine Hallucinations and Other (See Comments)     Other reaction(s): Hallucinations  Paranoid, hallucinations      Vasopressin analogues Other (See Comments)     "felt like eyes going to pop out of my head"       Current Outpatient Medications   Medication Sig Dispense Refill    albuterol-ipratropium (DUO-NEB) 2.5 mg-0.5 mg/3 mL nebulizer solution ipratropium-albuterol 0.5 mg-3 mg(2.5 mg base)/3 mL nebulization soln      aspirin 81 MG Chew Take 81 mg by mouth once daily.      atorvastatin (LIPITOR) 10 MG tablet Take 1 tablet by mouth once daily.  1    BASAGLAR KWIKPEN U-100 INSULIN glargine 100 units/mL (3mL) SubQ pen 25 Units once daily.   3    carvedilol (COREG) 25 MG tablet Take 1 tablet by mouth 2 (two) times daily.  1    cefdinir (OMNICEF) 300 MG capsule cefdinir 300 mg capsule      furosemide (LASIX) 40 MG tablet Take 1 tablet by mouth 2 (two) times daily.  1    gabapentin " (NEURONTIN) 600 MG tablet Take 1 tablet by mouth 3 (three) times daily.  0    metFORMIN (GLUCOPHAGE) 500 MG tablet Take 2 tablets by mouth 2 (two) times daily.  2    pantoprazole (PROTONIX) 40 MG tablet Take 1 tablet by mouth once daily.  0    spironolactone (ALDACTONE) 25 MG tablet Take 1 tablet by mouth once daily.  1    traMADol (ULTRAM) 50 mg tablet TAKE 1 TABLET (50 MG TOTAL) BY MOUTH EVERY 8 (EIGHT) HOURS AS NEEDED FOR PAIN. 90 tablet 0     No current facility-administered medications for this visit.        Review of Systems      Objective:                Physical Exam:   Foot Exam    General  General Appearance: appears stated age and healthy   Orientation: alert and oriented to person, place, and time   Affect: appropriate       Right Foot/Ankle     Neurovascular  Dorsalis pedis: 2+  Posterior tibial: 2+  Saphenous nerve sensation: diminished  Tibial nerve sensation: diminished  Superficial peroneal nerve sensation: diminished  Deep peroneal nerve sensation: diminished  Sural nerve sensation: diminished      Left Foot/Ankle      Neurovascular  Dorsalis pedis: 2+  Posterior tibial: 2+  Saphenous nerve sensation: diminished  Tibial nerve sensation: diminished  Superficial peroneal nerve sensation: diminished  Deep peroneal nerve sensation: diminished  Sural nerve sensation: diminished          Physical Exam   Cardiovascular:   Pulses:       Dorsalis pedis pulses are 2+ on the right side, and 2+ on the left side.        Posterior tibial pulses are 2+ on the right side, and 2+ on the left side.   Musculoskeletal:        Feet:        Imaging:            Assessment:       1. Skin ulcer of right foot with fat layer exposed    2. Type II diabetes mellitus with neurological manifestations    3. Peripheral vascular disease      Plan:   Skin ulcer of right foot with fat layer exposed    Type II diabetes mellitus with neurological manifestations    Peripheral vascular disease      No follow-ups on file.    Wound  "Debridement  Date/Time: 10/7/2019 1:40 PM  Performed by: Tong Heaton DPM  Authorized by: Tong Heaton DPM     Time out: Immediately prior to procedure a "time out" was called to verify the correct patient, procedure, equipment, support staff and site/side marked as required.    Consent Done?:  Yes (Verbal)    Preparation: Patient was prepped and draped in usual sterile fashion    Local anesthesia used?: No      Wound Details:    Location:  Right foot    Location:  Right 5th Metatarsal Head    Type of Debridement:  Excisional       Length (cm):  1       Area (sq cm):  1       Width (cm):  1       Percent Debrided (%):  100       Depth (cm):  0.1       Total Area Debrided (sq cm):  1    Depth of debridement:  Epidermis/Dermis    Tissue debrided:  Dermis and Epidermis    Instruments:  Blade    Bleeding:  None  Patient tolerance:  Patient tolerated the procedure well with no immediate complications     Continue diabetic shoes with accommodative inserts.  Monitor for any increased depth or signs of ulceration.  Return to see me in 4 months or as needed.     - None    Counseling:     I provided patient education verbally regarding:   Patient diagnosis, treatment options, as well as alternatives, risks, and benefits.     This note was created using Dragon voice recognition software that occasionally misinterpreted phrases or words.               "

## 2019-10-11 RX ORDER — TRAMADOL HYDROCHLORIDE 50 MG/1
50 TABLET ORAL EVERY 8 HOURS PRN
Qty: 90 TABLET | Refills: 0 | Status: SHIPPED | OUTPATIENT
Start: 2019-10-11 | End: 2019-11-09 | Stop reason: SDUPTHER

## 2019-10-14 ENCOUNTER — OFFICE VISIT (OUTPATIENT)
Dept: PAIN MEDICINE | Facility: CLINIC | Age: 59
End: 2019-10-14
Payer: MEDICARE

## 2019-10-14 VITALS
HEART RATE: 94 BPM | BODY MASS INDEX: 34.81 KG/M2 | HEIGHT: 72 IN | SYSTOLIC BLOOD PRESSURE: 128 MMHG | DIASTOLIC BLOOD PRESSURE: 72 MMHG | WEIGHT: 257 LBS

## 2019-10-14 DIAGNOSIS — M79.672 BILATERAL FOOT PAIN: ICD-10-CM

## 2019-10-14 DIAGNOSIS — G89.4 CHRONIC PAIN DISORDER: ICD-10-CM

## 2019-10-14 DIAGNOSIS — E11.42 DIABETIC POLYNEUROPATHY ASSOCIATED WITH TYPE 2 DIABETES MELLITUS: Primary | ICD-10-CM

## 2019-10-14 DIAGNOSIS — M79.671 BILATERAL FOOT PAIN: ICD-10-CM

## 2019-10-14 PROCEDURE — 99214 PR OFFICE/OUTPT VISIT, EST, LEVL IV, 30-39 MIN: ICD-10-PCS | Mod: S$GLB,,, | Performed by: ANESTHESIOLOGY

## 2019-10-14 PROCEDURE — 99999 PR PBB SHADOW E&M-EST. PATIENT-LVL III: CPT | Mod: PBBFAC,,, | Performed by: ANESTHESIOLOGY

## 2019-10-14 PROCEDURE — 99214 OFFICE O/P EST MOD 30 MIN: CPT | Mod: S$GLB,,, | Performed by: ANESTHESIOLOGY

## 2019-10-14 PROCEDURE — 99999 PR PBB SHADOW E&M-EST. PATIENT-LVL III: ICD-10-PCS | Mod: PBBFAC,,, | Performed by: ANESTHESIOLOGY

## 2019-10-14 RX ORDER — NORTRIPTYLINE HYDROCHLORIDE 25 MG/1
25 CAPSULE ORAL NIGHTLY
Qty: 30 CAPSULE | Refills: 2 | Status: SHIPPED | OUTPATIENT
Start: 2019-10-14 | End: 2020-09-14 | Stop reason: ALTCHOICE

## 2019-10-14 NOTE — PROGRESS NOTES
This note was completed with dictation software and grammatical errors may exist.    Referring Physician: No ref. provider found    PCP: YRN Landry      CC:  Bilateral foot pain    Interval history:  Patient returns to our clinic.  He has history of bilateral foot pain due to peripheral neuropathy diabetic neuropathy.  He currently has an ulcer being followed by Podiatry.  He continues to take 600 mg t.i.d. with mild benefits.   He continues to take tramadol 50 mg q.8 hours as needed with mild-to-moderate benefit.  Denies any side effects with the medication.  Able perform his ADLs with the medication.  He underwent a right-sided lumbar sympathetic nerve block which provided moderate benefit for 2 weeks.  Pain has since recurred.  He denies any worsening weakness.  No bowel bladder changes.  Prior HPI:   Ana Bullard is a 58 y.o. male referred to us for bilateral foot pain. Patient with long history of diabetes.  He states neuropathy worsen after his CABG.  Bilateral foot pain has worsened over past 7 years.  He presents to us constant burning, sharp pain in his bilateral feet.  He also has some similar issues in his bilateral hands, but foot pain is worse.  Pain worsens prolonged sitting and standing.  He currently takes gabapentin 600 mg t.i.d. with mild benefits.  Increasing doses causes sedation.  He also takes tramadol q.8 hours as needed with mild-to-moderate benefit.  He denies any worsening weakness.  No bowel bladder changes.    Interventional history:  Right LSB on 09/2019 with 50% relief for 2 weeks.    ROS:  CONSTITUTIONAL: No fevers, chills, night sweats, wt. loss, appetite changes  SKIN: no rashes or itching  ENT: No headaches, head trauma, vision changes, or eye pain  LYMPH NODES: None noticed   CV: No chest pain, palpitations.   RESP: No shortness of breath, dyspnea on exertion, cough, wheezing, or hemoptysis  GI: No nausea, emesis, diarrhea, constipation, melena, hematochezia, pain.     : No dysuria, hematuria, urgency, or frequency   HEME: No easy bruising, bleeding problems  PSYCHIATRIC: No depression, anxiety, psychosis, hallucinations.  NEURO: No seizures, memory loss, dizziness or difficulty sleeping  MSK:  Positive HPI      Past Medical History:   Diagnosis Date    AICD (automatic cardioverter/defibrillator) present     Anxiety and depression 2012    CAD (coronary artery disease) 2012    Cardiomegaly 2016    CHF (congestive heart failure) 2012    Cholecystitis 2017    Complete heart block 2012    Diabetic foot ulcer     DVT (deep venous thrombosis) 2012    And pulmonary embolus    GERD (gastroesophageal reflux disease) 2010    Heparin induced thrombocytopenia     Hyperlipemia 2011    IDDM (insulin dependent diabetes mellitus) 1983    With neuropathy    Ischemic cardiomyopathy 2012    MI (myocardial infarction) 2012    Morbid obesity     MRSA (methicillin resistant Staphylococcus aureus) infection 01/19/2019    Noncompliance with therapeutic plan 2019    Osteomyelitis of right foot 01/2019    Pulmonary edema 2019    Respiratory failure 2019    Sepsis 01/2019    Due to cellulitis right leg    Sleep apnea 2013    Thrombocytopathy 2012    Venous stasis dermatitis of both lower extremities      Past Surgical History:   Procedure Laterality Date    CARDIAC PACEMAKER PLACEMENT  12/2012    CARDIAC PACEMAKER PLACEMENT  10/04/2018    battery replacement    CORONARY ARTERY BYPASS GRAFT  06/2012    ESOPHAGOGASTRODUODENOSCOPY  01/31/2017    SAMARA FILTER PLACEMENT  2012    vena cava    LUMBAR SYMPATHETIC NERVE BLOCK Right 8/22/2019    Procedure: BLOCK, NERVE, SYMPATHETIC, LUMBAR;  Surgeon: Tashi Good MD;  Location: [facility];  Service: Pain Management;  Laterality: Right;  RIGHT SYMPATHETIC NERVE BLOCK     Family History   Problem Relation Age of Onset    Arthritis Mother     Diabetes Mother     Cancer Father     Heart disease Father     Stroke Father      Social  History     Socioeconomic History    Marital status:      Spouse name: Not on file    Number of children: Not on file    Years of education: Not on file    Highest education level: Not on file   Occupational History    Not on file   Social Needs    Financial resource strain: Not on file    Food insecurity:     Worry: Not on file     Inability: Not on file    Transportation needs:     Medical: Not on file     Non-medical: Not on file   Tobacco Use    Smoking status: Former Smoker     Packs/day: 2.00     Years: 38.00     Pack years: 76.00     Types: Cigarettes     Last attempt to quit:      Years since quittin.7    Smokeless tobacco: Never Used   Substance and Sexual Activity    Alcohol use: No     Frequency: Never    Drug use: Not on file    Sexual activity: Never   Lifestyle    Physical activity:     Days per week: Not on file     Minutes per session: Not on file    Stress: Not on file   Relationships    Social connections:     Talks on phone: Not on file     Gets together: Not on file     Attends Mandaeism service: Not on file     Active member of club or organization: Not on file     Attends meetings of clubs or organizations: Not on file     Relationship status: Not on file   Other Topics Concern    Not on file   Social History Narrative    Not on file         Medications/Allergies: See med card    Vitals:    10/14/19 1449   BP: 128/72   Pulse: 94   Weight: 116.6 kg (257 lb)   Height: 6' (1.829 m)   PainSc: 10-Worst pain ever   PainLoc: Back         Physical exam:    GENERAL: A and O x3, the patient appears well groomed and is in no acute distress.  Skin: No rashes or obvious lesions  HEENT: normocephalic, atraumatic  CARDIOVASCULAR:  Palpable peripheral pulses  LUNGS: easy work of breathing  ABDOMEN: soft, nontender   UPPER EXTREMITIES: Normal alignment, normal range of motion, no atrophy, no skin changes,  hair growth and nail growth normal and equal bilaterally. No swelling, no  tenderness.    LOWER EXTREMITIES:  Normal alignment, normal range of motion, no atrophy, no skin changes,  hair growth and nail growth normal and equal bilaterally. No swelling, no tenderness.  LUMBAR SPINE  Lumbar spine: ROM is limited with flexion extension and oblique extension with moderate increased pain.    Samuel's test causes no increased pain on either side.    Supine straight leg raise is negative bilaterally.    Internal and external rotation of the hip causes no increased pain on either side.  Myofascial exam: No tenderness to palpation across lumbar paraspinous muscles.      MENTAL STATUS: normal orientation, speech, language, and fund of knowledge for social situation.  Emotional state appropriate.    CRANIAL NERVES:  II:  PERRL bilaterally,   III,IV,VI: EOMI.    V:  Facial sensation equal bilaterally  VII:  Facial motor function normal.  VIII:  Hearing equal to finger rub bilaterally  IX/X: Gag normal, palate symmetric  XI:  Shoulder shrug equal, head turn equal  XII:  Tongue midline without fasciculations      MOTOR: Tone and bulk: normal bilateral upper and lower Strength: normal   Delt Bi Tri WE WF     R 5 5 5 5 5 5   L 5 5 5 5 5 5     IP ADD ABD Quad TA Gas HAM  R 5 5 5 5 5 5 5  L 5 5 5 5 5 5 5    SENSATION:  Decreased globally of bilateral feet  REFLEXES: normal, symmetric, nonbrisk.  Toes down, no clonus. No hoffmans.  GAIT: normal rise, base, steps, and arm swing.        Imaging:  None    Assessment:  Patient referred for bilateral foot pain  1. Diabetic polyneuropathy associated with type 2 diabetes mellitus    2. Bilateral foot pain    3. Chronic pain disorder          Plan:  1. I have stressed the importance of physical activity and exercise to improve overall health  2.  Discussed disease progression of peripheral neuropathy.  Continue gabapentin prescribed.  Start nortriptyline 25 mg q.h.s. for further anti neuropathic adjunct.  3.  He does request continue tramadol with us.  I  believe this is very reasonable.   reviewed.  He takes tramadol 50 mg q.8 hours as needed.  UDS last visit consistent with use.  No refills needed today.  He will contact us for refills  4.  May consider repeat lumbar sympathetic block if pain is exacerbated.  Briefly discussed spinal cord stimulation as well.  5.  Follow-up in 3 months

## 2019-11-10 RX ORDER — TRAMADOL HYDROCHLORIDE 50 MG/1
50 TABLET ORAL EVERY 8 HOURS PRN
Qty: 90 TABLET | Refills: 0 | Status: SHIPPED | OUTPATIENT
Start: 2019-11-10 | End: 2019-12-10 | Stop reason: SDUPTHER

## 2019-12-10 RX ORDER — TRAMADOL HYDROCHLORIDE 50 MG/1
50 TABLET ORAL EVERY 8 HOURS PRN
Qty: 90 TABLET | Refills: 0 | Status: SHIPPED | OUTPATIENT
Start: 2019-12-10 | End: 2020-01-08 | Stop reason: SDUPTHER

## 2020-01-08 ENCOUNTER — OFFICE VISIT (OUTPATIENT)
Dept: PAIN MEDICINE | Facility: CLINIC | Age: 60
End: 2020-01-08
Payer: MEDICARE

## 2020-01-08 VITALS
BODY MASS INDEX: 34.06 KG/M2 | DIASTOLIC BLOOD PRESSURE: 75 MMHG | WEIGHT: 257 LBS | HEART RATE: 75 BPM | HEIGHT: 73 IN | SYSTOLIC BLOOD PRESSURE: 118 MMHG

## 2020-01-08 DIAGNOSIS — G89.4 CHRONIC PAIN DISORDER: Primary | ICD-10-CM

## 2020-01-08 DIAGNOSIS — E11.42 DIABETIC POLYNEUROPATHY ASSOCIATED WITH TYPE 2 DIABETES MELLITUS: ICD-10-CM

## 2020-01-08 DIAGNOSIS — M79.604 RIGHT LEG PAIN: ICD-10-CM

## 2020-01-08 DIAGNOSIS — M79.671 BILATERAL FOOT PAIN: ICD-10-CM

## 2020-01-08 DIAGNOSIS — M79.672 BILATERAL FOOT PAIN: ICD-10-CM

## 2020-01-08 PROCEDURE — 99214 PR OFFICE/OUTPT VISIT, EST, LEVL IV, 30-39 MIN: ICD-10-PCS | Mod: S$GLB,,, | Performed by: PHYSICIAN ASSISTANT

## 2020-01-08 PROCEDURE — 99214 OFFICE O/P EST MOD 30 MIN: CPT | Mod: S$GLB,,, | Performed by: PHYSICIAN ASSISTANT

## 2020-01-08 PROCEDURE — 99999 PR PBB SHADOW E&M-EST. PATIENT-LVL V: ICD-10-PCS | Mod: PBBFAC,,, | Performed by: PHYSICIAN ASSISTANT

## 2020-01-08 PROCEDURE — 99999 PR PBB SHADOW E&M-EST. PATIENT-LVL V: CPT | Mod: PBBFAC,,, | Performed by: PHYSICIAN ASSISTANT

## 2020-01-08 RX ORDER — MULTIVITAMIN/IRON/FOLIC ACID 18MG-0.4MG
1 TABLET ORAL DAILY
COMMUNITY

## 2020-01-08 RX ORDER — DOXYCYCLINE HYCLATE 100 MG
100 TABLET ORAL
COMMUNITY
Start: 2020-01-07 | End: 2020-01-12

## 2020-01-08 RX ORDER — TRAMADOL HYDROCHLORIDE 50 MG/1
50 TABLET ORAL EVERY 8 HOURS PRN
Qty: 90 TABLET | Refills: 2 | Status: SHIPPED | OUTPATIENT
Start: 2020-01-09 | End: 2020-04-07 | Stop reason: SDUPTHER

## 2020-01-08 RX ORDER — CEFUROXIME AXETIL 500 MG/1
500 TABLET ORAL
COMMUNITY
Start: 2020-01-07 | End: 2020-01-14

## 2020-01-08 RX ORDER — PREDNISONE 20 MG/1
20 TABLET ORAL
COMMUNITY
Start: 2020-01-07 | End: 2020-01-12

## 2020-01-08 NOTE — PROGRESS NOTES
Referring Physician: No ref. provider found    PCP: YRN Landry      CC:  Bilateral foot pain    Interval history:  Mr. Bullard is a 58 y.o. male with bilateral foot pain due to peripheral neuropathy diabetic neuropathy.  He was recently hospitalized for CHF.  He continues to take Gabapentin 600 mg t.i.d. with mild benefits. Nortriptyline 25 mg made him to drowsy so he discontinued it.  He continues to take tramadol 50 mg q.8 hours as needed with mild-to-moderate benefit.  Denies any side effects with the medication.  Able perform his ADLs with the medication.  He underwent a right-sided lumbar sympathetic nerve block which provided moderate benefit for 2 weeks.  Pain has since recurred.  He denies any worsening weakness.  No bowel bladder changes. Pain today is rated 5/10.    Prior HPI:   Ana Bullard is a 58 y.o. male referred to us for bilateral foot pain. Patient with long history of diabetes.  He states neuropathy worsen after his CABG.  Bilateral foot pain has worsened over past 7 years.  He presents to us constant burning, sharp pain in his bilateral feet.  He also has some similar issues in his bilateral hands, but foot pain is worse.  Pain worsens prolonged sitting and standing.  He currently takes gabapentin 600 mg t.i.d. with mild benefits.  Increasing doses causes sedation.  He also takes tramadol q.8 hours as needed with mild-to-moderate benefit.  He denies any worsening weakness.  No bowel bladder changes.    Interventional history:  Right LSB on 09/2019 with 50% relief for 2 weeks.    ROS:  CONSTITUTIONAL: No fevers, chills, night sweats, wt. loss, appetite changes  SKIN: no rashes or itching  ENT: No headaches, head trauma, vision changes, or eye pain  LYMPH NODES: None noticed   CV: No chest pain, palpitations.   RESP: No shortness of breath, dyspnea on exertion, cough, wheezing, or hemoptysis  GI: No nausea, emesis, diarrhea, constipation, melena, hematochezia, pain.    : No dysuria,  hematuria, urgency, or frequency   HEME: No easy bruising, bleeding problems  PSYCHIATRIC: No depression, anxiety, psychosis, hallucinations.  NEURO: No seizures, memory loss, dizziness or difficulty sleeping  MSK:  Positive HPI      Past Medical History:   Diagnosis Date    AICD (automatic cardioverter/defibrillator) present     Anxiety and depression 2012    CAD (coronary artery disease) 2012    Cardiomegaly 2016    CHF (congestive heart failure) 2012    Cholecystitis 2017    Complete heart block 2012    Diabetic foot ulcer     DVT (deep venous thrombosis) 2012    And pulmonary embolus    GERD (gastroesophageal reflux disease) 2010    Heparin induced thrombocytopenia     Hyperlipemia 2011    IDDM (insulin dependent diabetes mellitus) 1983    With neuropathy    Ischemic cardiomyopathy 2012    MI (myocardial infarction) 2012    Morbid obesity     MRSA (methicillin resistant Staphylococcus aureus) infection 01/19/2019    Noncompliance with therapeutic plan 2019    Osteomyelitis of right foot 01/2019    Pulmonary edema 2019    Respiratory failure 2019    Sepsis 01/2019    Due to cellulitis right leg    Sleep apnea 2013    Thrombocytopathy 2012    Venous stasis dermatitis of both lower extremities      Past Surgical History:   Procedure Laterality Date    CARDIAC PACEMAKER PLACEMENT  12/2012    CARDIAC PACEMAKER PLACEMENT  10/04/2018    battery replacement    CORONARY ARTERY BYPASS GRAFT  06/2012    ESOPHAGOGASTRODUODENOSCOPY  01/31/2017    SAMARA FILTER PLACEMENT  2012    vena cava    LUMBAR SYMPATHETIC NERVE BLOCK Right 8/22/2019    Procedure: BLOCK, NERVE, SYMPATHETIC, LUMBAR;  Surgeon: Tashi Good MD;  Location: [facility] OR;  Service: Pain Management;  Laterality: Right;  RIGHT SYMPATHETIC NERVE BLOCK     Family History   Problem Relation Age of Onset    Arthritis Mother     Diabetes Mother     Cancer Father     Heart disease Father     Stroke Father      Social History  "    Socioeconomic History    Marital status:      Spouse name: Not on file    Number of children: Not on file    Years of education: Not on file    Highest education level: Not on file   Occupational History    Not on file   Social Needs    Financial resource strain: Not on file    Food insecurity:     Worry: Not on file     Inability: Not on file    Transportation needs:     Medical: Not on file     Non-medical: Not on file   Tobacco Use    Smoking status: Former Smoker     Packs/day: 2.00     Years: 38.00     Pack years: 76.00     Types: Cigarettes     Last attempt to quit:      Years since quittin.0    Smokeless tobacco: Never Used   Substance and Sexual Activity    Alcohol use: No     Frequency: Never    Drug use: Not on file    Sexual activity: Never   Lifestyle    Physical activity:     Days per week: Not on file     Minutes per session: Not on file    Stress: Not on file   Relationships    Social connections:     Talks on phone: Not on file     Gets together: Not on file     Attends Nondenominational service: Not on file     Active member of club or organization: Not on file     Attends meetings of clubs or organizations: Not on file     Relationship status: Not on file   Other Topics Concern    Not on file   Social History Narrative    Not on file         Medications/Allergies: See med card    Vitals:    20 0906   BP: 118/75   Pulse: 75   Weight: 116.6 kg (257 lb)   Height: 6' 1" (1.854 m)   PainSc:   5   PainLoc: Foot         Physical exam:    GENERAL: A and O x3, the patient appears well groomed and is in no acute distress.  Skin: No rashes or obvious lesions  HEENT: normocephalic, atraumatic  CARDIOVASCULAR:  RRR  LUNGS: non labored breathing  ABDOMEN: soft, nontender   UPPER EXTREMITIES: Normal alignment, normal range of motion, no atrophy, no skin changes,  hair growth and nail growth normal and equal bilaterally. No swelling, no tenderness.    LOWER EXTREMITIES:  Normal " alignment, normal range of motion, no atrophy, no skin changes,  hair growth and nail growth normal and equal bilaterally. No swelling, no tenderness.  LUMBAR SPINE  Lumbar spine: ROM is limited with flexion extension and oblique extension with moderate increased pain.    Samuel's test causes no increased pain on either side.    Supine straight leg raise is negative bilaterally.    Internal and external rotation of the hip causes no increased pain on either side.  Myofascial exam: No tenderness to palpation across lumbar paraspinous muscles.      MENTAL STATUS: normal orientation, speech, language, and fund of knowledge for social situation.  Emotional state appropriate.    CRANIAL NERVES:  II:  PERRL bilaterally,   III,IV,VI: EOMI.    V:  Facial sensation equal bilaterally  VII:  Facial motor function normal.  VIII:  Hearing equal to finger rub bilaterally  IX/X: Gag normal, palate symmetric  XI:  Shoulder shrug equal, head turn equal  XII:  Tongue midline without fasciculations      MOTOR: Tone and bulk: normal bilateral upper and lower Strength: normal   Delt Bi Tri WE WF     R 5 5 5 5 5 5   L 5 5 5 5 5 5     IP ADD ABD Quad TA Gas HAM  R 5 5 5 5 5 5 5  L 5 5 5 5 5 5 5    SENSATION:  Decreased globally of bilateral feet  REFLEXES: normal, symmetric, nonbrisk.  Toes down, no clonus. No hoffmans.  GAIT: normal rise, base, steps, and arm swing.        Imaging:  None    Assessment:  Ana Bullard is a 58 y.o. male with bilateral foot pain  1. Chronic pain disorder    2. Diabetic polyneuropathy associated with type 2 diabetes mellitus    3. Bilateral foot pain    4. Right leg pain        Plan:  1. I have stressed the importance of physical activity and exercise to improve overall health  2.  Discussed disease progression of peripheral neuropathy.  Continue gabapentin prescribed.  Consider nortriptyline 10 mg in the future  3.  He does request continue tramadol with us.  I believe this is very reasonable.    reviewed.  He takes tramadol 50 mg q.8 hours as needed.  UDS last visit consistent with use.   4.  May consider repeat lumbar sympathetic block if pain is exacerbated.  Briefly discussed spinal cord stimulation as well.  5.  Follow-up in 3 months

## 2020-01-09 ENCOUNTER — OFFICE VISIT (OUTPATIENT)
Dept: PODIATRY | Facility: CLINIC | Age: 60
End: 2020-01-09
Payer: MEDICARE

## 2020-01-09 VITALS — HEIGHT: 73 IN | WEIGHT: 252 LBS | RESPIRATION RATE: 16 BRPM | BODY MASS INDEX: 33.4 KG/M2

## 2020-01-09 DIAGNOSIS — M20.41 HAMMER TOES, BILATERAL: ICD-10-CM

## 2020-01-09 DIAGNOSIS — L97.512 SKIN ULCER OF RIGHT FOOT WITH FAT LAYER EXPOSED: Primary | ICD-10-CM

## 2020-01-09 DIAGNOSIS — I87.2 VENOUS STASIS DERMATITIS OF BOTH LOWER EXTREMITIES: ICD-10-CM

## 2020-01-09 DIAGNOSIS — E11.49 TYPE II DIABETES MELLITUS WITH NEUROLOGICAL MANIFESTATIONS: ICD-10-CM

## 2020-01-09 DIAGNOSIS — M20.42 HAMMER TOES, BILATERAL: ICD-10-CM

## 2020-01-09 DIAGNOSIS — I73.9 PERIPHERAL VASCULAR DISEASE: ICD-10-CM

## 2020-01-09 PROCEDURE — 99213 OFFICE O/P EST LOW 20 MIN: CPT | Mod: 25,S$GLB,, | Performed by: PODIATRIST

## 2020-01-09 PROCEDURE — 11042 WOUND DEBRIDEMENT: ICD-10-PCS | Mod: S$GLB,,, | Performed by: PODIATRIST

## 2020-01-09 PROCEDURE — 11042 DBRDMT SUBQ TIS 1ST 20SQCM/<: CPT | Mod: S$GLB,,, | Performed by: PODIATRIST

## 2020-01-09 PROCEDURE — 99213 PR OFFICE/OUTPT VISIT, EST, LEVL III, 20-29 MIN: ICD-10-PCS | Mod: 25,S$GLB,, | Performed by: PODIATRIST

## 2020-01-09 NOTE — PROGRESS NOTES
[address]  [place] [address]  Phone: [phone]   [phone]    Patient's PCP:YRN Landry  Referring Provider: No ref. provider found    Subjective:      Chief Complaint:: Foot Ulcer (right 5th metatarsal)    CAROLIN Bullard is a 59 y.o. male who presents with a follow up ulcer right fifth metatarsal. In the last week or so, ulcer started bleeding.  No trauma.  Dressing with band-aid and antibiotic ointment.     Systemic Doctor: Renita Duron MD  Date Last Seen: 6-19 (has appt 01/14/20)  Blood Sugar: 130-140  Hemoglobin A1c:  5    Vitals:    01/09/20 0908   Resp: 16     Shoe Size:     Past Surgical History:   Procedure Laterality Date    CARDIAC PACEMAKER PLACEMENT  12/2012    CARDIAC PACEMAKER PLACEMENT  10/04/2018    battery replacement    CORONARY ARTERY BYPASS GRAFT  06/2012    ESOPHAGOGASTRODUODENOSCOPY  01/31/2017    SAMARA FILTER PLACEMENT  2012    vena cava    LUMBAR SYMPATHETIC NERVE BLOCK Right 8/22/2019    Procedure: BLOCK, NERVE, SYMPATHETIC, LUMBAR;  Surgeon: Tashi Good MD;  Location: [facility] OR;  Service: Pain Management;  Laterality: Right;  RIGHT SYMPATHETIC NERVE BLOCK     Past Medical History:   Diagnosis Date    AICD (automatic cardioverter/defibrillator) present     Anxiety and depression 2012    CAD (coronary artery disease) 2012    Cardiomegaly 2016    CHF (congestive heart failure) 2012    Cholecystitis 2017    Complete heart block 2012    Diabetic foot ulcer     DVT (deep venous thrombosis) 2012    And pulmonary embolus    GERD (gastroesophageal reflux disease) 2010    Heparin induced thrombocytopenia     Hyperlipemia 2011    IDDM (insulin dependent diabetes mellitus) 1983    With neuropathy    Ischemic cardiomyopathy 2012    MI (myocardial infarction) 2012    Morbid obesity     MRSA (methicillin resistant Staphylococcus aureus) infection 01/19/2019    Noncompliance with therapeutic plan 2019    Osteomyelitis of right  "foot 01/2019    Pulmonary edema 2019    Respiratory failure 2019    Sepsis 01/2019    Due to cellulitis right leg    Sleep apnea 2013    Thrombocytopathy 2012    Venous stasis dermatitis of both lower extremities      Family History   Problem Relation Age of Onset    Arthritis Mother     Diabetes Mother     Cancer Father     Heart disease Father     Stroke Father         Social History:   Marital Status:   Alcohol History:  reports that he does not drink alcohol.  Tobacco History:  reports that he quit smoking about 8 years ago. His smoking use included cigarettes. He has a 76.00 pack-year smoking history. He has never used smokeless tobacco.  Drug History:  has no drug history on file.    Review of patient's allergies indicates:   Allergen Reactions    Vasopressin Anaphylaxis    Heparin Other (See Comments)     Other reaction(s): "depleted my blood platets"      Heparin analogues Other (See Comments)     Depleted WBC count    Morphine Hallucinations and Other (See Comments)     Other reaction(s): Hallucinations  Paranoid, hallucinations      Vasopressin analogues Other (See Comments)     "felt like eyes going to pop out of my head"       Current Outpatient Medications   Medication Sig Dispense Refill    albuterol-ipratropium (DUO-NEB) 2.5 mg-0.5 mg/3 mL nebulizer solution ipratropium-albuterol 0.5 mg-3 mg(2.5 mg base)/3 mL nebulization soln      aspirin 81 MG Chew Take 81 mg by mouth once daily.      atorvastatin (LIPITOR) 10 MG tablet Take 1 tablet by mouth once daily.  1    BASAGLAR KWIKPEN U-100 INSULIN glargine 100 units/mL (3mL) SubQ pen 25 Units once daily.   3    carvedilol (COREG) 25 MG tablet Take 1 tablet by mouth 2 (two) times daily.  1    cefdinir (OMNICEF) 300 MG capsule cefdinir 300 mg capsule      cefUROXime (CEFTIN) 500 MG tablet Take 500 mg by mouth.      doxycycline (VIBRA-TABS) 100 MG tablet Take 100 mg by mouth.      furosemide (LASIX) 40 MG tablet Take 1 tablet " by mouth 2 (two) times daily.  1    gabapentin (NEURONTIN) 600 MG tablet Take 1 tablet by mouth 3 (three) times daily.  0    metFORMIN (GLUCOPHAGE) 500 MG tablet Take 2 tablets by mouth 2 (two) times daily.  2    pantoprazole (PROTONIX) 40 MG tablet Take 1 tablet by mouth once daily.  0    predniSONE (DELTASONE) 20 MG tablet Take 20 mg by mouth.      spironolactone (ALDACTONE) 25 MG tablet Take 1 tablet by mouth once daily.  1    traMADol (ULTRAM) 50 mg tablet Take 1 tablet (50 mg total) by mouth every 8 (eight) hours as needed for Pain (Medically necessary for >7 days for chronic pain). 90 tablet 2    vitamin B complex-folic acid 0.4 mg Tab Take 1 tablet by mouth.      nortriptyline (PAMELOR) 25 MG capsule Take 1 capsule (25 mg total) by mouth every evening. 30 capsule 2     No current facility-administered medications for this visit.        Review of Systems      Objective:        Physical Exam:   Foot Exam    General  General Appearance: appears stated age and healthy   Orientation: alert and oriented to person, place, and time   Affect: appropriate       Right Foot/Ankle     Inspection and Palpation  Skin Exam: ulcer (Grade 2 ulcer with subdermal dry hemorrhage perimeter of necrotic callus tissue approximately 2.5 cm ulcer by 0.3 cm deep with no purulent drainage or fluctuance);     Neurovascular  Dorsalis pedis: 2+  Posterior tibial: 2+  Saphenous nerve sensation: diminished  Tibial nerve sensation: diminished  Superficial peroneal nerve sensation: diminished  Deep peroneal nerve sensation: diminished  Sural nerve sensation: diminished          Physical Exam   Cardiovascular:   Pulses:       Dorsalis pedis pulses are 2+ on the right side.        Posterior tibial pulses are 2+ on the right side.   Musculoskeletal:        Feet:    Feet:   Right Foot:   Skin Integrity: Positive for ulcer (Grade 2 ulcer with subdermal dry hemorrhage perimeter of necrotic callus tissue approximately 2.5 cm ulcer by 0.3 cm  "deep with no purulent drainage or fluctuance).               Imaging:            Assessment:       1. Skin ulcer of right foot with fat layer exposed    2. Type II diabetes mellitus with neurological manifestations    3. Peripheral vascular disease    4. Venous stasis dermatitis of both lower extremities      Plan:   Skin ulcer of right foot with fat layer exposed    Type II diabetes mellitus with neurological manifestations    Peripheral vascular disease    Venous stasis dermatitis of both lower extremities     Today we updated the records and I repeated the physical exam of the low right lower extremity since I have not seen him in over 3 months.  No follow-ups on file.    Wound Debridement  Date/Time: 1/9/2020 8:50 AM  Performed by: Tong Heaton DPM  Authorized by: Tong Heaton DPM     Time out: Immediately prior to procedure a "time out" was called to verify the correct patient, procedure, equipment, support staff and site/side marked as required.    Consent Done?:  Yes (Verbal)    Preparation: Patient was prepped and draped in usual sterile fashion    Local anesthesia used?: No      Wound Details:    Location:  Right foot    Location:  Right 5th Metatarsal Head    Type of Debridement:  Excisional       Length (cm):  2.5       Area (sq cm):  6.25       Width (cm):  2.5       Percent Debrided (%):  100       Depth (cm):  0.2       Total Area Debrided (sq cm):  6.25    Depth of debridement:  Subcutaneous tissue    Tissue debrided:  Subcutaneous, Epidermis and Dermis    Devitalized tissue debrided:  Callus, Fibrin, Slough and Necrotic/Eschar    Instruments:  Forceps, Blade and Nippers    Bleeding:  Minimal  Hemostasis Achieved: Yes    Method Used:  Pressure  Patient tolerance:  Patient tolerated the procedure well with no immediate complications     Daily dressing changes with Neosporin cream.  At accommodative felt to his diabetic insert early pressure on 5th metatarsal head right foot.  Prescription " today for new diabetic shoes and inserts with accommodation for the ulcer on the right foot.  Return to see me in 4 weeks.     -     Counseling:   proper ulcer care and the possible need for serial debridements, topical medications, specific dressings and biological engineered skin substitutes if indicated.  I provided patient education verbally regarding:   Patient diagnosis, treatment options, as well as alternatives, risks, and benefits.     This note was created using Dragon voice recognition software that occasionally misinterpreted phrases or words.

## 2020-01-18 ENCOUNTER — HOSPITAL ENCOUNTER (INPATIENT)
Facility: HOSPITAL | Age: 60
LOS: 3 days | Discharge: HOME | DRG: 871 | End: 2020-01-22
Attending: EMERGENCY MEDICINE | Admitting: INTERNAL MEDICINE
Payer: MEDICARE

## 2020-01-18 DIAGNOSIS — A41.9 SEPTIC SHOCK: ICD-10-CM

## 2020-01-18 DIAGNOSIS — A41.9 SEPSIS: Primary | ICD-10-CM

## 2020-01-18 DIAGNOSIS — R65.21 SEPTIC SHOCK: ICD-10-CM

## 2020-01-18 DIAGNOSIS — R52 PAIN: ICD-10-CM

## 2020-01-18 DIAGNOSIS — I50.9 CHF (CONGESTIVE HEART FAILURE): ICD-10-CM

## 2020-01-18 DIAGNOSIS — R07.9 CHEST PAIN: ICD-10-CM

## 2020-01-18 PROCEDURE — 83735 ASSAY OF MAGNESIUM: CPT

## 2020-01-18 PROCEDURE — 84145 PROCALCITONIN (PCT): CPT

## 2020-01-18 PROCEDURE — 85025 COMPLETE CBC W/AUTO DIFF WBC: CPT

## 2020-01-18 PROCEDURE — 85610 PROTHROMBIN TIME: CPT

## 2020-01-18 PROCEDURE — 84484 ASSAY OF TROPONIN QUANT: CPT

## 2020-01-18 PROCEDURE — 87040 BLOOD CULTURE FOR BACTERIA: CPT | Mod: 59

## 2020-01-18 PROCEDURE — 83880 ASSAY OF NATRIURETIC PEPTIDE: CPT

## 2020-01-18 PROCEDURE — 93005 ELECTROCARDIOGRAM TRACING: CPT

## 2020-01-18 PROCEDURE — 99291 CRITICAL CARE FIRST HOUR: CPT

## 2020-01-18 PROCEDURE — 80053 COMPREHEN METABOLIC PANEL: CPT

## 2020-01-18 PROCEDURE — 83605 ASSAY OF LACTIC ACID: CPT

## 2020-01-18 PROCEDURE — 96365 THER/PROPH/DIAG IV INF INIT: CPT

## 2020-01-18 PROCEDURE — 87186 SC STD MICRODIL/AGAR DIL: CPT

## 2020-01-18 PROCEDURE — 96375 TX/PRO/DX INJ NEW DRUG ADDON: CPT

## 2020-01-18 RX ORDER — ACETAMINOPHEN 500 MG
1000 TABLET ORAL
Status: COMPLETED | OUTPATIENT
Start: 2020-01-19 | End: 2020-01-19

## 2020-01-18 RX ORDER — VANCOMYCIN HCL IN 5 % DEXTROSE 1G/250ML
2000 PLASTIC BAG, INJECTION (ML) INTRAVENOUS
Status: COMPLETED | OUTPATIENT
Start: 2020-01-19 | End: 2020-01-19

## 2020-01-19 ENCOUNTER — ANESTHESIA (OUTPATIENT)
Dept: INTENSIVE CARE | Facility: HOSPITAL | Age: 60
DRG: 871 | End: 2020-01-19
Payer: MEDICARE

## 2020-01-19 ENCOUNTER — ANESTHESIA EVENT (OUTPATIENT)
Dept: INTENSIVE CARE | Facility: HOSPITAL | Age: 60
DRG: 871 | End: 2020-01-19
Payer: MEDICARE

## 2020-01-19 PROBLEM — J18.9 PNEUMONIA: Status: ACTIVE | Noted: 2020-01-19

## 2020-01-19 PROBLEM — E87.5 HYPERKALEMIA: Status: ACTIVE | Noted: 2020-01-19

## 2020-01-19 PROBLEM — R65.21 SEPTIC SHOCK: Status: ACTIVE | Noted: 2019-01-01

## 2020-01-19 PROBLEM — N17.9 AKI (ACUTE KIDNEY INJURY): Status: ACTIVE | Noted: 2020-01-19

## 2020-01-19 PROBLEM — E87.1 HYPONATREMIA: Status: ACTIVE | Noted: 2020-01-19

## 2020-01-19 PROBLEM — I50.22 CHRONIC SYSTOLIC HEART FAILURE: Status: ACTIVE | Noted: 2020-01-19

## 2020-01-19 PROBLEM — G89.29 CHRONIC PAIN: Status: ACTIVE | Noted: 2020-01-19

## 2020-01-19 PROBLEM — Z95.810 AICD (AUTOMATIC CARDIOVERTER/DEFIBRILLATOR) PRESENT: Status: ACTIVE | Noted: 2020-01-19

## 2020-01-19 PROBLEM — E83.42 HYPOMAGNESEMIA: Status: ACTIVE | Noted: 2020-01-19

## 2020-01-19 PROBLEM — D75.829 HEPARIN INDUCED THROMBOCYTOPENIA: Status: ACTIVE | Noted: 2020-01-19

## 2020-01-19 PROBLEM — E66.9 OBESITY: Status: ACTIVE | Noted: 2020-01-19

## 2020-01-19 LAB
ALBUMIN SERPL BCP-MCNC: 3 G/DL (ref 3.5–5.2)
ALBUMIN SERPL BCP-MCNC: 3.5 G/DL (ref 3.5–5.2)
ALLENS TEST: ABNORMAL
ALP SERPL-CCNC: 43 U/L (ref 55–135)
ALP SERPL-CCNC: 54 U/L (ref 55–135)
ALT SERPL W/O P-5'-P-CCNC: 26 U/L (ref 10–44)
ALT SERPL W/O P-5'-P-CCNC: 29 U/L (ref 10–44)
ANION GAP SERPL CALC-SCNC: 12 MMOL/L (ref 8–16)
ANION GAP SERPL CALC-SCNC: 15 MMOL/L (ref 8–16)
AST SERPL-CCNC: 27 U/L (ref 10–40)
AST SERPL-CCNC: 31 U/L (ref 10–40)
BACTERIA #/AREA URNS HPF: NEGATIVE /HPF
BASOPHILS # BLD AUTO: 0.07 K/UL (ref 0–0.2)
BASOPHILS NFR BLD: 0 % (ref 0–1.9)
BASOPHILS NFR BLD: 0.2 % (ref 0–1.9)
BILIRUB SERPL-MCNC: 1.7 MG/DL (ref 0.1–1)
BILIRUB SERPL-MCNC: 2.3 MG/DL (ref 0.1–1)
BILIRUB UR QL STRIP: NEGATIVE
BNP SERPL-MCNC: 272 PG/ML (ref 0–99)
BUN SERPL-MCNC: 46 MG/DL (ref 6–20)
BUN SERPL-MCNC: 48 MG/DL (ref 6–20)
CALCIUM SERPL-MCNC: 8 MG/DL (ref 8.7–10.5)
CALCIUM SERPL-MCNC: 9 MG/DL (ref 8.7–10.5)
CHLORIDE SERPL-SCNC: 95 MMOL/L (ref 95–110)
CHLORIDE SERPL-SCNC: 98 MMOL/L (ref 95–110)
CLARITY UR: ABNORMAL
CO2 SERPL-SCNC: 22 MMOL/L (ref 23–29)
CO2 SERPL-SCNC: 23 MMOL/L (ref 23–29)
COLOR UR: YELLOW
CREAT SERPL-MCNC: 1.8 MG/DL (ref 0.5–1.4)
CREAT SERPL-MCNC: 2.2 MG/DL (ref 0.5–1.4)
DELSYS: ABNORMAL
DIFFERENTIAL METHOD: ABNORMAL
DIFFERENTIAL METHOD: ABNORMAL
EOSINOPHIL # BLD AUTO: 0.2 K/UL (ref 0–0.5)
EOSINOPHIL NFR BLD: 0 % (ref 0–8)
EOSINOPHIL NFR BLD: 0.8 % (ref 0–8)
ERYTHROCYTE [DISTWIDTH] IN BLOOD BY AUTOMATED COUNT: 14.5 % (ref 11.5–14.5)
ERYTHROCYTE [DISTWIDTH] IN BLOOD BY AUTOMATED COUNT: 14.7 % (ref 11.5–14.5)
EST. GFR  (AFRICAN AMERICAN): 37.1 ML/MIN/1.73 M^2
EST. GFR  (AFRICAN AMERICAN): 47.2 ML/MIN/1.73 M^2
EST. GFR  (NON AFRICAN AMERICAN): 32.1 ML/MIN/1.73 M^2
EST. GFR  (NON AFRICAN AMERICAN): 40.9 ML/MIN/1.73 M^2
GLUCOSE SERPL-MCNC: 192 MG/DL (ref 70–110)
GLUCOSE SERPL-MCNC: 231 MG/DL (ref 70–110)
GLUCOSE SERPL-MCNC: 249 MG/DL (ref 70–110)
GLUCOSE SERPL-MCNC: 277 MG/DL (ref 70–110)
GLUCOSE SERPL-MCNC: 289 MG/DL (ref 70–110)
GLUCOSE SERPL-MCNC: 385 MG/DL (ref 70–110)
GLUCOSE UR QL STRIP: ABNORMAL
HCO3 UR-SCNC: 20.1 MMOL/L (ref 24–28)
HCT VFR BLD AUTO: 37.4 % (ref 40–54)
HCT VFR BLD AUTO: 43.3 % (ref 40–54)
HCT VFR BLD CALC: 32 %PCV (ref 36–54)
HGB BLD-MCNC: 12.4 G/DL (ref 14–18)
HGB BLD-MCNC: 14.4 G/DL (ref 14–18)
HGB UR QL STRIP: ABNORMAL
HYALINE CASTS #/AREA URNS LPF: 8 /LPF
IMM GRANULOCYTES # BLD AUTO: 0.36 K/UL (ref 0–0.04)
IMM GRANULOCYTES # BLD AUTO: ABNORMAL K/UL (ref 0–0.04)
IMM GRANULOCYTES NFR BLD AUTO: 1.2 % (ref 0–0.5)
IMM GRANULOCYTES NFR BLD AUTO: ABNORMAL % (ref 0–0.5)
INFLUENZA A, MOLECULAR: NEGATIVE
INFLUENZA B, MOLECULAR: NEGATIVE
INR PPP: 1.2
KETONES UR QL STRIP: NEGATIVE
LACTATE SERPL-SCNC: 1.4 MMOL/L (ref 0.5–1.9)
LACTATE SERPL-SCNC: 2 MMOL/L (ref 0.5–1.9)
LACTATE SERPL-SCNC: 3.1 MMOL/L (ref 0.5–1.9)
LACTATE SERPL-SCNC: 3.2 MMOL/L (ref 0.5–1.9)
LACTATE SERPL-SCNC: 3.4 MMOL/L (ref 0.5–1.9)
LACTATE SERPL-SCNC: 3.5 MMOL/L (ref 0.5–1.9)
LEUKOCYTE ESTERASE UR QL STRIP: NEGATIVE
LYMPHOCYTES # BLD AUTO: 0.5 K/UL (ref 1–4.8)
LYMPHOCYTES NFR BLD: 1.7 % (ref 18–48)
LYMPHOCYTES NFR BLD: 6 % (ref 18–48)
MAGNESIUM SERPL-MCNC: 1.1 MG/DL (ref 1.6–2.6)
MAGNESIUM SERPL-MCNC: 1.3 MG/DL (ref 1.6–2.6)
MAGNESIUM SERPL-MCNC: 1.9 MG/DL (ref 1.6–2.6)
MCH RBC QN AUTO: 31.2 PG (ref 27–31)
MCH RBC QN AUTO: 31.2 PG (ref 27–31)
MCHC RBC AUTO-ENTMCNC: 33.2 G/DL (ref 32–36)
MCHC RBC AUTO-ENTMCNC: 33.3 G/DL (ref 32–36)
MCV RBC AUTO: 94 FL (ref 82–98)
MCV RBC AUTO: 94 FL (ref 82–98)
MICROSCOPIC COMMENT: ABNORMAL
MONOCYTES # BLD AUTO: 1.7 K/UL (ref 0.3–1)
MONOCYTES NFR BLD: 5 % (ref 4–15)
MONOCYTES NFR BLD: 5.7 % (ref 4–15)
NEUTROPHILS # BLD AUTO: 26.2 K/UL (ref 1.8–7.7)
NEUTROPHILS NFR BLD: 68 % (ref 38–73)
NEUTROPHILS NFR BLD: 90.4 % (ref 38–73)
NEUTS BAND NFR BLD MANUAL: 21 %
NITRITE UR QL STRIP: NEGATIVE
NRBC BLD-RTO: 0 /100 WBC
NRBC BLD-RTO: 0 /100 WBC
PCO2 BLDA: 33 MMHG (ref 35–45)
PH SMN: 7.39 [PH] (ref 7.35–7.45)
PH UR STRIP: 6 [PH] (ref 5–8)
PLATELET # BLD AUTO: 100 K/UL (ref 150–350)
PLATELET # BLD AUTO: 101 K/UL (ref 150–350)
PMV BLD AUTO: 12 FL (ref 9.2–12.9)
PMV BLD AUTO: 12 FL (ref 9.2–12.9)
PO2 BLDA: 82 MMHG (ref 80–100)
POC BE: -5 MMOL/L
POC IONIZED CALCIUM: 1.03 MMOL/L (ref 1.06–1.42)
POC SATURATED O2: 96 % (ref 95–100)
POC TCO2: 21 MMOL/L (ref 23–27)
POTASSIUM BLD-SCNC: 3.9 MMOL/L (ref 3.5–5.1)
POTASSIUM SERPL-SCNC: 4.6 MMOL/L (ref 3.5–5.1)
POTASSIUM SERPL-SCNC: 5.3 MMOL/L (ref 3.5–5.1)
PROCALCITONIN SERPL IA-MCNC: 5.03 NG/ML (ref 0–0.5)
PROT SERPL-MCNC: 6 G/DL (ref 6–8.4)
PROT SERPL-MCNC: 7.3 G/DL (ref 6–8.4)
PROT UR QL STRIP: ABNORMAL
PROTHROMBIN TIME: 14.3 SEC (ref 10.6–14.8)
RBC # BLD AUTO: 3.97 M/UL (ref 4.6–6.2)
RBC # BLD AUTO: 4.61 M/UL (ref 4.6–6.2)
RBC #/AREA URNS HPF: 2 /HPF (ref 0–4)
SAMPLE: ABNORMAL
SITE: ABNORMAL
SODIUM BLD-SCNC: 133 MMOL/L (ref 136–145)
SODIUM SERPL-SCNC: 132 MMOL/L (ref 136–145)
SODIUM SERPL-SCNC: 133 MMOL/L (ref 136–145)
SP GR UR STRIP: 1.01 (ref 1–1.03)
SPECIMEN SOURCE: NORMAL
SQUAMOUS #/AREA URNS HPF: 3 /HPF
TROPONIN I SERPL DL<=0.01 NG/ML-MCNC: 0.07 NG/ML
URN SPEC COLLECT METH UR: ABNORMAL
UROBILINOGEN UR STRIP-ACNC: NEGATIVE EU/DL
WBC # BLD AUTO: 28.95 K/UL (ref 3.9–12.7)
WBC # BLD AUTO: 39.79 K/UL (ref 3.9–12.7)
WBC #/AREA URNS HPF: 12 /HPF (ref 0–5)
YEAST URNS QL MICRO: ABNORMAL

## 2020-01-19 PROCEDURE — 87070 CULTURE OTHR SPECIMN AEROBIC: CPT

## 2020-01-19 PROCEDURE — 25000003 PHARM REV CODE 250: Performed by: INTERNAL MEDICINE

## 2020-01-19 PROCEDURE — C9399 UNCLASSIFIED DRUGS OR BIOLOG: HCPCS | Performed by: INTERNAL MEDICINE

## 2020-01-19 PROCEDURE — 63600175 PHARM REV CODE 636 W HCPCS: Performed by: EMERGENCY MEDICINE

## 2020-01-19 PROCEDURE — 25000242 PHARM REV CODE 250 ALT 637 W/ HCPCS: Performed by: EMERGENCY MEDICINE

## 2020-01-19 PROCEDURE — 21400001 HC TELEMETRY ROOM

## 2020-01-19 PROCEDURE — 85014 HEMATOCRIT: CPT

## 2020-01-19 PROCEDURE — 63600175 PHARM REV CODE 636 W HCPCS: Performed by: INTERNAL MEDICINE

## 2020-01-19 PROCEDURE — 36556 INSERT NON-TUNNEL CV CATH: CPT

## 2020-01-19 PROCEDURE — 80053 COMPREHEN METABOLIC PANEL: CPT

## 2020-01-19 PROCEDURE — 87502 INFLUENZA DNA AMP PROBE: CPT

## 2020-01-19 PROCEDURE — 94644 CONT INHLJ TX 1ST HOUR: CPT

## 2020-01-19 PROCEDURE — 25000003 PHARM REV CODE 250

## 2020-01-19 PROCEDURE — 99900035 HC TECH TIME PER 15 MIN (STAT)

## 2020-01-19 PROCEDURE — 25000003 PHARM REV CODE 250: Performed by: EMERGENCY MEDICINE

## 2020-01-19 PROCEDURE — 94761 N-INVAS EAR/PLS OXIMETRY MLT: CPT

## 2020-01-19 PROCEDURE — 84295 ASSAY OF SERUM SODIUM: CPT

## 2020-01-19 PROCEDURE — 93005 ELECTROCARDIOGRAM TRACING: CPT

## 2020-01-19 PROCEDURE — 83605 ASSAY OF LACTIC ACID: CPT | Mod: 91

## 2020-01-19 PROCEDURE — 36415 COLL VENOUS BLD VENIPUNCTURE: CPT

## 2020-01-19 PROCEDURE — 27000221 HC OXYGEN, UP TO 24 HOURS

## 2020-01-19 PROCEDURE — 36620 INSERTION CATHETER ARTERY: CPT

## 2020-01-19 PROCEDURE — 87086 URINE CULTURE/COLONY COUNT: CPT

## 2020-01-19 PROCEDURE — 20000000 HC ICU ROOM

## 2020-01-19 PROCEDURE — 83735 ASSAY OF MAGNESIUM: CPT | Mod: 91

## 2020-01-19 PROCEDURE — 85007 BL SMEAR W/DIFF WBC COUNT: CPT

## 2020-01-19 PROCEDURE — 82803 BLOOD GASES ANY COMBINATION: CPT

## 2020-01-19 PROCEDURE — 36620 INSERTION CATHETER ARTERY: CPT | Performed by: ANESTHESIOLOGY

## 2020-01-19 PROCEDURE — 36556 INSERT NON-TUNNEL CV CATH: CPT | Performed by: ANESTHESIOLOGY

## 2020-01-19 PROCEDURE — 87205 SMEAR GRAM STAIN: CPT

## 2020-01-19 PROCEDURE — 84132 ASSAY OF SERUM POTASSIUM: CPT

## 2020-01-19 PROCEDURE — 85027 COMPLETE CBC AUTOMATED: CPT

## 2020-01-19 PROCEDURE — 82962 GLUCOSE BLOOD TEST: CPT

## 2020-01-19 PROCEDURE — 82330 ASSAY OF CALCIUM: CPT

## 2020-01-19 PROCEDURE — 81001 URINALYSIS AUTO W/SCOPE: CPT

## 2020-01-19 RX ORDER — LANOLIN ALCOHOL/MO/W.PET/CERES
800 CREAM (GRAM) TOPICAL
Status: DISCONTINUED | OUTPATIENT
Start: 2020-01-19 | End: 2020-01-22 | Stop reason: HOSPADM

## 2020-01-19 RX ORDER — MUPIROCIN 20 MG/G
OINTMENT TOPICAL 2 TIMES DAILY
Status: DISCONTINUED | OUTPATIENT
Start: 2020-01-19 | End: 2020-01-22 | Stop reason: HOSPADM

## 2020-01-19 RX ORDER — LANOLIN ALCOHOL/MO/W.PET/CERES
400 CREAM (GRAM) TOPICAL ONCE
Status: COMPLETED | OUTPATIENT
Start: 2020-01-19 | End: 2020-01-19

## 2020-01-19 RX ORDER — OSELTAMIVIR PHOSPHATE 30 MG/1
30 CAPSULE ORAL 2 TIMES DAILY
Status: DISCONTINUED | OUTPATIENT
Start: 2020-01-19 | End: 2020-01-20

## 2020-01-19 RX ORDER — NAPROXEN SODIUM 220 MG/1
81 TABLET, FILM COATED ORAL DAILY
Status: DISCONTINUED | OUTPATIENT
Start: 2020-01-19 | End: 2020-01-22 | Stop reason: HOSPADM

## 2020-01-19 RX ORDER — POTASSIUM CHLORIDE 20 MEQ/1
40 TABLET, EXTENDED RELEASE ORAL
Status: DISCONTINUED | OUTPATIENT
Start: 2020-01-19 | End: 2020-01-22 | Stop reason: HOSPADM

## 2020-01-19 RX ORDER — POTASSIUM CHLORIDE 7.45 MG/ML
20 INJECTION INTRAVENOUS
Status: DISCONTINUED | OUTPATIENT
Start: 2020-01-19 | End: 2020-01-22 | Stop reason: HOSPADM

## 2020-01-19 RX ORDER — OSELTAMIVIR PHOSPHATE 75 MG/1
75 CAPSULE ORAL 2 TIMES DAILY
Status: DISCONTINUED | OUTPATIENT
Start: 2020-01-19 | End: 2020-01-19

## 2020-01-19 RX ORDER — CALCIUM CHLORIDE IN 0.9 % NACL 1 G/100 ML
1 INTRAVENOUS SOLUTION, PIGGYBACK (ML) INTRAVENOUS
Status: DISCONTINUED | OUTPATIENT
Start: 2020-01-19 | End: 2020-01-22 | Stop reason: HOSPADM

## 2020-01-19 RX ORDER — IBUPROFEN 200 MG
16 TABLET ORAL
Status: DISCONTINUED | OUTPATIENT
Start: 2020-01-19 | End: 2020-01-22 | Stop reason: HOSPADM

## 2020-01-19 RX ORDER — IBUPROFEN 200 MG
24 TABLET ORAL
Status: DISCONTINUED | OUTPATIENT
Start: 2020-01-19 | End: 2020-01-22 | Stop reason: HOSPADM

## 2020-01-19 RX ORDER — ALBUTEROL SULFATE 0.83 MG/ML
10 SOLUTION RESPIRATORY (INHALATION)
Status: COMPLETED | OUTPATIENT
Start: 2020-01-19 | End: 2020-01-19

## 2020-01-19 RX ORDER — LEVOFLOXACIN 5 MG/ML
500 INJECTION, SOLUTION INTRAVENOUS
Status: DISCONTINUED | OUTPATIENT
Start: 2020-01-19 | End: 2020-01-20

## 2020-01-19 RX ORDER — PANTOPRAZOLE SODIUM 40 MG/1
40 TABLET, DELAYED RELEASE ORAL DAILY
Status: DISCONTINUED | OUTPATIENT
Start: 2020-01-19 | End: 2020-01-19

## 2020-01-19 RX ORDER — TRAMADOL HYDROCHLORIDE 50 MG/1
50 TABLET ORAL EVERY 8 HOURS PRN
Status: DISCONTINUED | OUTPATIENT
Start: 2020-01-19 | End: 2020-01-22 | Stop reason: HOSPADM

## 2020-01-19 RX ORDER — ONDANSETRON 2 MG/ML
4 INJECTION INTRAMUSCULAR; INTRAVENOUS EVERY 6 HOURS PRN
Status: DISCONTINUED | OUTPATIENT
Start: 2020-01-19 | End: 2020-01-22 | Stop reason: HOSPADM

## 2020-01-19 RX ORDER — POTASSIUM CHLORIDE 20 MEQ/1
20 TABLET, EXTENDED RELEASE ORAL
Status: DISCONTINUED | OUTPATIENT
Start: 2020-01-19 | End: 2020-01-22 | Stop reason: HOSPADM

## 2020-01-19 RX ORDER — SODIUM CHLORIDE 9 MG/ML
INJECTION, SOLUTION INTRAVENOUS CONTINUOUS
Status: DISCONTINUED | OUTPATIENT
Start: 2020-01-19 | End: 2020-01-20

## 2020-01-19 RX ORDER — INSULIN ASPART 100 [IU]/ML
1-10 INJECTION, SOLUTION INTRAVENOUS; SUBCUTANEOUS EVERY 6 HOURS PRN
Status: DISCONTINUED | OUTPATIENT
Start: 2020-01-19 | End: 2020-01-22 | Stop reason: HOSPADM

## 2020-01-19 RX ORDER — IPRATROPIUM BROMIDE AND ALBUTEROL SULFATE 2.5; .5 MG/3ML; MG/3ML
3 SOLUTION RESPIRATORY (INHALATION) EVERY 6 HOURS PRN
Status: DISCONTINUED | OUTPATIENT
Start: 2020-01-19 | End: 2020-01-22 | Stop reason: HOSPADM

## 2020-01-19 RX ORDER — NOREPINEPHRINE BITARTRATE 1 MG/ML
INJECTION, SOLUTION INTRAVENOUS
Status: DISPENSED
Start: 2020-01-19 | End: 2020-01-19

## 2020-01-19 RX ORDER — GLUCAGON 1 MG
1 KIT INJECTION
Status: DISCONTINUED | OUTPATIENT
Start: 2020-01-19 | End: 2020-01-22 | Stop reason: HOSPADM

## 2020-01-19 RX ORDER — POTASSIUM CHLORIDE 20 MEQ/15ML
40 SOLUTION ORAL
Status: DISCONTINUED | OUTPATIENT
Start: 2020-01-19 | End: 2020-01-22 | Stop reason: HOSPADM

## 2020-01-19 RX ORDER — GABAPENTIN 300 MG/1
600 CAPSULE ORAL 3 TIMES DAILY
Status: DISCONTINUED | OUTPATIENT
Start: 2020-01-19 | End: 2020-01-22 | Stop reason: HOSPADM

## 2020-01-19 RX ORDER — ACETAMINOPHEN 325 MG/1
650 TABLET ORAL EVERY 4 HOURS PRN
Status: DISCONTINUED | OUTPATIENT
Start: 2020-01-19 | End: 2020-01-22 | Stop reason: HOSPADM

## 2020-01-19 RX ORDER — POTASSIUM CHLORIDE 7.45 MG/ML
40 INJECTION INTRAVENOUS
Status: DISCONTINUED | OUTPATIENT
Start: 2020-01-19 | End: 2020-01-22 | Stop reason: HOSPADM

## 2020-01-19 RX ORDER — MAGNESIUM SULFATE HEPTAHYDRATE 40 MG/ML
4 INJECTION, SOLUTION INTRAVENOUS
Status: DISCONTINUED | OUTPATIENT
Start: 2020-01-19 | End: 2020-01-22 | Stop reason: HOSPADM

## 2020-01-19 RX ORDER — MAGNESIUM SULFATE 1 G/100ML
1 INJECTION INTRAVENOUS
Status: DISCONTINUED | OUTPATIENT
Start: 2020-01-19 | End: 2020-01-22 | Stop reason: HOSPADM

## 2020-01-19 RX ORDER — MAGNESIUM SULFATE HEPTAHYDRATE 40 MG/ML
2 INJECTION, SOLUTION INTRAVENOUS
Status: DISCONTINUED | OUTPATIENT
Start: 2020-01-19 | End: 2020-01-22 | Stop reason: HOSPADM

## 2020-01-19 RX ORDER — CHLORHEXIDINE GLUCONATE ORAL RINSE 1.2 MG/ML
15 SOLUTION DENTAL 2 TIMES DAILY
Status: DISCONTINUED | OUTPATIENT
Start: 2020-01-19 | End: 2020-01-22 | Stop reason: HOSPADM

## 2020-01-19 RX ADMIN — SODIUM POLYSTYRENE SULFONATE 15 G: 15 SUSPENSION ORAL; RECTAL at 03:01

## 2020-01-19 RX ADMIN — SODIUM CHLORIDE: 0.9 INJECTION, SOLUTION INTRAVENOUS at 04:01

## 2020-01-19 RX ADMIN — CHLORHEXIDINE GLUCONATE 15 ML: 1.2 RINSE ORAL at 09:01

## 2020-01-19 RX ADMIN — DEXTROSE 0.2 MCG/KG/MIN: 5 SOLUTION INTRAVENOUS at 07:01

## 2020-01-19 RX ADMIN — PIPERACILLIN AND TAZOBACTAM 4.5 G: 4; .5 INJECTION, POWDER, LYOPHILIZED, FOR SOLUTION INTRAVENOUS; PARENTERAL at 07:01

## 2020-01-19 RX ADMIN — INSULIN ASPART 10 UNITS: 100 INJECTION, SOLUTION INTRAVENOUS; SUBCUTANEOUS at 10:01

## 2020-01-19 RX ADMIN — TRAMADOL HYDROCHLORIDE 50 MG: 50 TABLET, FILM COATED ORAL at 10:01

## 2020-01-19 RX ADMIN — MUPIROCIN: 20 OINTMENT TOPICAL at 09:01

## 2020-01-19 RX ADMIN — GABAPENTIN 600 MG: 300 CAPSULE ORAL at 02:01

## 2020-01-19 RX ADMIN — SODIUM CHLORIDE: 0.9 INJECTION, SOLUTION INTRAVENOUS at 03:01

## 2020-01-19 RX ADMIN — DEXTROSE 0.04 MCG/KG/MIN: 5 SOLUTION INTRAVENOUS at 02:01

## 2020-01-19 RX ADMIN — MAGNESIUM OXIDE 400 MG: 400 TABLET ORAL at 08:01

## 2020-01-19 RX ADMIN — INSULIN ASPART 3 UNITS: 100 INJECTION, SOLUTION INTRAVENOUS; SUBCUTANEOUS at 10:01

## 2020-01-19 RX ADMIN — PIPERACILLIN AND TAZOBACTAM 4.5 G: 4; .5 INJECTION, POWDER, LYOPHILIZED, FOR SOLUTION INTRAVENOUS; PARENTERAL at 12:01

## 2020-01-19 RX ADMIN — ACETAMINOPHEN 1000 MG: 500 TABLET, FILM COATED ORAL at 12:01

## 2020-01-19 RX ADMIN — VANCOMYCIN HYDROCHLORIDE 1750 MG: 1 INJECTION, POWDER, LYOPHILIZED, FOR SOLUTION INTRAVENOUS at 11:01

## 2020-01-19 RX ADMIN — GABAPENTIN 600 MG: 300 CAPSULE ORAL at 09:01

## 2020-01-19 RX ADMIN — SODIUM CHLORIDE 3360 ML: 0.9 INJECTION, SOLUTION INTRAVENOUS at 12:01

## 2020-01-19 RX ADMIN — OSELTAMIVIR PHOSPHATE 30 MG: 30 CAPSULE ORAL at 09:01

## 2020-01-19 RX ADMIN — ASPIRIN 81 MG 81 MG: 81 TABLET ORAL at 08:01

## 2020-01-19 RX ADMIN — OSELTAMIVIR PHOSPHATE 30 MG: 30 CAPSULE ORAL at 08:01

## 2020-01-19 RX ADMIN — PANTOPRAZOLE SODIUM 40 MG: 40 TABLET, DELAYED RELEASE ORAL at 06:01

## 2020-01-19 RX ADMIN — LEVOFLOXACIN 500 MG: 500 INJECTION, SOLUTION INTRAVENOUS at 05:01

## 2020-01-19 RX ADMIN — PIPERACILLIN AND TAZOBACTAM 4.5 G: 4; .5 INJECTION, POWDER, LYOPHILIZED, FOR SOLUTION INTRAVENOUS; PARENTERAL at 09:01

## 2020-01-19 RX ADMIN — TRAMADOL HYDROCHLORIDE 50 MG: 50 TABLET, FILM COATED ORAL at 11:01

## 2020-01-19 RX ADMIN — INSULIN DETEMIR 25 UNITS: 100 INJECTION, SOLUTION SUBCUTANEOUS at 08:01

## 2020-01-19 RX ADMIN — INSULIN ASPART 4 UNITS: 100 INJECTION, SOLUTION INTRAVENOUS; SUBCUTANEOUS at 08:01

## 2020-01-19 RX ADMIN — INSULIN ASPART 6 UNITS: 100 INJECTION, SOLUTION INTRAVENOUS; SUBCUTANEOUS at 05:01

## 2020-01-19 RX ADMIN — MAGNESIUM SULFATE 2 G: 2 INJECTION INTRAVENOUS at 10:01

## 2020-01-19 RX ADMIN — ALBUTEROL SULFATE 10 MG: 2.5 SOLUTION RESPIRATORY (INHALATION) at 01:01

## 2020-01-19 RX ADMIN — VANCOMYCIN HYDROCHLORIDE 2000 MG: 1 INJECTION, POWDER, LYOPHILIZED, FOR SOLUTION INTRAVENOUS at 12:01

## 2020-01-19 NOTE — HPI
59-year-old patient getting admitted with severe sepsis  Patient from January 4 to January 7 was in a hospital in [facility]  and was treated for acute pneumonia and acute congestive heart failure  Eventually he was discharged home with p.o. Cefdinir  There after he was seen by his podiatrist for his chronic right foot ulcers and also by the pain medicine MD (Last week)  Patient did not had a flu shot this year and he does not want flu shot  Today all of sudden he started having fever and temperature went up to 103° associated with chills rigors and diaphoretic episodes  Also he he has been suffering from cough for the past few weeks and today he was coughing up a lot with some induction of sputum  Later he started having shortness of breath and he had episodes of confusion  This prompted the family bring the patient to the hospital and in ER he was found to be tachycardic, hypotensive and his WBC levels were very high  He was given 1 L of bolus and his mental condition improved.  Temperature stayed in the range of 1 not pre  Patient will be admitted in ICU for severe sepsis

## 2020-01-19 NOTE — PLAN OF CARE
Continue to educate patient on plan of care. Pt verbalized understanding. Continue to monitor and treat pain with PRN meds. Goal 4> (1-10). Continue to wean levophed as tolerated. Continue to promote moving independently in bed to avoid pressure ulcers.

## 2020-01-19 NOTE — ASSESSMENT & PLAN NOTE
Acute kidney injury in the background of sepsis  His medications will be renally dosed  Talked with pharmacist regarding this

## 2020-01-19 NOTE — PROGRESS NOTES
VANCOMYCIN PHARMACOKINETIC NOTE:  Vancomycin Day # 1    Objective/Assessment:    Diagnosis/Indication for Vancomycin: Bacteremia     59 y.o., male; Actual Body Weight = 112 kg (247 lb).    The patient has the following labs:     1/19/2020 Estimated Creatinine Clearance: 58.5 mL/min (A) (based on SCr of 1.8 mg/dL (H)). Lab Results   Component Value Date    BUN 46 (H) 01/18/2020       Lab Results   Component Value Date    WBC 28.95 (H) 01/18/2020              Plan:  Adjust vancomycin dose and/or frequency based on the patient's actual weight and renal function:  Initiate Vancomycin 1750 mg IV every 24 hours, following a loading dose of 2000 mg.  Orders have been entered into patient's chart.    Vancomycin dose = 15.6 mg/kg actual body weight    Vancomycin trough level has been ordered for 1/21 @23:30, prior to 4th dose.    Pharmacy will manage vancomycin therapy, monitor serum vancomycin levels, monitor renal function and adjust regimen as necessary.      Thank you for allowing us to participate in this patient's care.     Flo Garcia 1/19/2020 3:28 AM  Department of Pharmacy  Ext [phone]

## 2020-01-19 NOTE — NURSING
" Pt's pain medication that he takes regularly from home was requested to MD this morning and Pt was notified. MD Montalvo states he is a new patient and she would like to review his chart before adding medications. Pt pressed the call light. RN answered immediately in person. Pt began cursing and yelling stating that he has had enough and wishes to have his medication "NOW". Pt yelled loud enough for  and 2 physical therapists near the exit to hear him. MD Montalvo notified of hostile outburst. MD headed to ICU at this time. Pt resting in bed.   "

## 2020-01-19 NOTE — ASSESSMENT & PLAN NOTE
Patient suffers from chronic pain and is on medication  He has a history of lumbar sympathetic nerve block

## 2020-01-19 NOTE — ED PROVIDER NOTES
"Encounter Date: 1/18/2020       History     Chief Complaint   Patient presents with    Fever     Got out of hospital Monday with chf, and pnuemonia    Chills     59-year-old male presents with fever, hypotension, tachycardia patient recently recovering from pneumonia with sepsis, released from hospital at [facility] last week.  Patient has a history of AICD placement coronary artery disease CHF diabetic foot ulcer coronary artery disease.  Patient has been feeling well until today when he developed altered mental status and fever.        Review of patient's allergies indicates:   Allergen Reactions    Vasopressin Anaphylaxis    Heparin Other (See Comments)     Other reaction(s): "depleted my blood platets"      Heparin analogues Other (See Comments)     Depleted WBC count    Morphine Hallucinations and Other (See Comments)     Other reaction(s): Hallucinations  Paranoid, hallucinations      Vasopressin analogues Other (See Comments)     "felt like eyes going to pop out of my head"     Past Medical History:   Diagnosis Date    AICD (automatic cardioverter/defibrillator) present     Anxiety and depression 2012    CAD (coronary artery disease) 2012    Cardiomegaly 2016    CHF (congestive heart failure) 2012    Cholecystitis 2017    Complete heart block 2012    Diabetic foot ulcer     DVT (deep venous thrombosis) 2012    And pulmonary embolus    GERD (gastroesophageal reflux disease) 2010    Heparin induced thrombocytopenia     Hyperlipemia 2011    IDDM (insulin dependent diabetes mellitus) 1983    With neuropathy    Ischemic cardiomyopathy 2012    MI (myocardial infarction) 2012    Morbid obesity     MRSA (methicillin resistant Staphylococcus aureus) infection 01/19/2019    Noncompliance with therapeutic plan 2019    Osteomyelitis of right foot 01/2019    Pulmonary edema 2019    Respiratory failure 2019    Sepsis 01/2019    Due to cellulitis right leg    Sleep apnea 2013    " Thrombocytopathy     Venous stasis dermatitis of both lower extremities      Past Surgical History:   Procedure Laterality Date    CARDIAC PACEMAKER PLACEMENT  2012    CARDIAC PACEMAKER PLACEMENT  10/04/2018    battery replacement    CORONARY ARTERY BYPASS GRAFT  2012    ESOPHAGOGASTRODUODENOSCOPY  2017    SAMARA FILTER PLACEMENT  2012    vena cava    LUMBAR SYMPATHETIC NERVE BLOCK Right 2019    Procedure: BLOCK, NERVE, SYMPATHETIC, LUMBAR;  Surgeon: Tashi Good MD;  Location: [facility] OR;  Service: Pain Management;  Laterality: Right;  RIGHT SYMPATHETIC NERVE BLOCK     Family History   Problem Relation Age of Onset    Arthritis Mother     Diabetes Mother     Cancer Father     Heart disease Father     Stroke Father      Social History     Tobacco Use    Smoking status: Former Smoker     Packs/day: 2.00     Years: 38.00     Pack years: 76.00     Types: Cigarettes     Last attempt to quit:      Years since quittin.0    Smokeless tobacco: Never Used   Substance Use Topics    Alcohol use: No     Frequency: Never    Drug use: Not on file     Review of Systems   Unable to perform ROS: Mental status change       Physical Exam     Initial Vitals   BP Pulse Resp Temp SpO2   20 2325 20 2331 20 2331 20 2331 20 2331   (!) 79/49 (!) 127 (!) 34 (!) 103.4 °F (39.7 °C) (!) 92 %      MAP       --                Physical Exam    Nursing note and vitals reviewed.  Constitutional: He appears well-developed and well-nourished. He is not diaphoretic. No distress.   HENT:   Head: Normocephalic and atraumatic.   Right Ear: External ear normal.   Left Ear: External ear normal.   Nose: Nose normal.   Mouth/Throat: Oropharynx is clear and moist. No oropharyngeal exudate.   Eyes: Conjunctivae and EOM are normal. Pupils are equal, round, and reactive to light. Right eye exhibits no discharge. Left eye exhibits no discharge. No scleral icterus.   Neck: Normal range of  motion. Neck supple. No thyromegaly present. No tracheal deviation present. No JVD present.   Cardiovascular: Regular rhythm, normal heart sounds and intact distal pulses. Exam reveals no gallop and no friction rub.    No murmur heard.  Tachycardia   Pulmonary/Chest: No stridor. No respiratory distress. He has no wheezes. He has no rhonchi. He has rales. He exhibits no tenderness.   Coarse breath sounds noted bilaterally   Abdominal: Soft. Bowel sounds are normal. He exhibits no distension and no mass. There is no tenderness. There is no rebound and no guarding.   Musculoskeletal: Normal range of motion. He exhibits no edema or tenderness.   Small 1 cm diameter foot wound to the right lateral foot, it appears to be well cared for and is being monitored by Podiatry   Lymphadenopathy:     He has no cervical adenopathy.   Neurological: He is alert. He has normal strength and normal reflexes. He displays normal reflexes. No cranial nerve deficit or sensory deficit.   Oriented to person and place   Skin: Skin is warm and dry. No rash noted. No erythema.         ED Course   Procedures  Labs Reviewed   CBC W/ AUTO DIFFERENTIAL - Abnormal; Notable for the following components:       Result Value    WBC 28.95 (*)     Mean Corpuscular Hemoglobin 31.2 (*)     RDW 14.7 (*)     Platelets 100 (*)     Immature Granulocytes 1.2 (*)     Gran # (ANC) 26.2 (*)     Immature Grans (Abs) 0.36 (*)     Lymph # 0.5 (*)     Mono # 1.7 (*)     Gran% 90.4 (*)     Lymph% 1.7 (*)     All other components within normal limits   COMPREHENSIVE METABOLIC PANEL - Abnormal; Notable for the following components:    Sodium 133 (*)     Potassium 5.3 (*)     Glucose 231 (*)     BUN, Bld 46 (*)     Creatinine 1.8 (*)     Total Bilirubin 1.7 (*)     Alkaline Phosphatase 54 (*)     eGFR if  47.2 (*)     eGFR if non  40.9 (*)     All other components within normal limits   MAGNESIUM - Abnormal; Notable for the following  components:    Magnesium 1.3 (*)     All other components within normal limits   B-TYPE NATRIURETIC PEPTIDE - Abnormal; Notable for the following components:     (*)     All other components within normal limits   TROPONIN I - Abnormal; Notable for the following components:    Troponin I 0.068 (*)     All other components within normal limits    Narrative:     Troponin critical result(s) called and verbal readback obtained from   Emily Hancock RN ER by MS1 01/19/2020 00:54   PROCALCITONIN - Abnormal; Notable for the following components:    Procalcitonin 5.03 (*)     All other components within normal limits   LACTIC ACID, PLASMA - Abnormal; Notable for the following components:    Lactate (Lactic Acid) 3.1 (*)     All other components within normal limits    Narrative:        Lactid acid critical result(s) called and verbal readback obtained   from Emily Hancock RN ER  by MS1 01/19/2020 00:54   ISTAT PROCEDURE - Abnormal; Notable for the following components:    POC PCO2 33.0 (*)     POC HCO3 20.1 (*)     POC Glucose 192 (*)     POC Sodium 133 (*)     POC TCO2 21 (*)     POC Ionized Calcium 1.03 (*)     POC Hematocrit 32 (*)     All other components within normal limits   CULTURE, BLOOD   CULTURE, BLOOD   CULTURE, RESPIRATORY   CULTURE, URINE   INFLUENZA A AND B ANTIGEN    Narrative:     Specimen Source->Nasopharyngeal Swab   PROTIME-INR   URINALYSIS, REFLEX TO URINE CULTURE   LACTIC ACID, PLASMA   COMPREHENSIVE METABOLIC PANEL   MAGNESIUM   CBC W/ AUTO DIFFERENTIAL   ISTAT CG8+          Imaging Results          X-Ray Chest AP Portable (In process)                X-Ray Foot Complete Right (In process)                X-Ray Chest AP Portable (In process)                  Medical Decision Making:   History:   Old Medical Records: I decided to obtain old medical records.  Initial Assessment:   Emergent evaluation of a critically ill 59-year-old male presenting with hypotension tachycardia I suspect sepsis  patient will be administered fluid bolus will give Tylenol for fever and will monitor closely patient will likely require admission to ICU              Attending Attestation:         Attending Critical Care:   Critical Care Times:   Direct Patient Care (initial evaluation, reassessments, and time considering the case)................................................................35 minutes.   Additional History from reviewing old medical records or taking additional history from the family, EMS, PCP, etc.......................5 minutes.   Ordering, Reviewing, and Interpreting Diagnostic Studies...............................................................................................................5 minutes.   Documentation..................................................................................................................................................................................10 minutes.   Consultation with other Physicians. .................................................................................................................................................10 minutes.   Consultation with the patient's family directly relating to the patient's condition, care, and DNR status (when patient unable)......5 minutes.   ==============================================================  · Total Critical Care Time - exclusive of procedural time: 70 minutes.  ==============================================================  Critical care was necessary to treat or prevent imminent or life-threatening deterioration of the following conditions: sepsis.   Critical care was time spent personally by me on the following activities: obtaining history from patient or relative, examination of patient, review of old charts, ordering lab, x-rays, and/or EKG, development of treatment plan with patient or relative, ordering and performing treatments and interventions, evaluation of patient's response to  treatment, discussion with consultants and re-evaluation of patient's conition.   Critical Care Condition: life-threatening       Attending ED Notes:   Patient has findings concerning for sepsis likely secondary to pneumonia given previous history and the fact that he has a cough, patient started on broad-spectrum antibiotics, currently blood pressure is improved to 1 0 2/58 after IV hydration patient will be admitted to ICU for further evaluation and management by Internal Medicine    Patient's blood pressure noted to drop after administration of antibiotics, given hypotension after IV fluid infusion patient consulted to anesthesia for central line placement.  Patient will be started on Levophed drip                        Clinical Impression:       ICD-10-CM ICD-9-CM   1. Sepsis A41.9 038.9     995.91   2. Pain R52 780.96                             Sanford Morales MD  01/19/20 0407

## 2020-01-19 NOTE — PROGRESS NOTES
[facility] Medicine  Progress Note    Patient Name: Ana Bullard  MRN: [id number]  Patient Class: IP- Inpatient   Admission Date: 1/18/2020  Length of Stay: 0 days  Attending Physician: Cong Montalvo MD  Primary Care Provider: YRN Landry        Subjective:     Principal Problem:Septic shock        HPI:  59-year-old patient getting admitted with severe sepsis  Patient from January 4 to January 7 was in a hospital in [facility]  and was treated for acute pneumonia and acute congestive heart failure  Eventually he was discharged home with p.o. Cefdinir  There after he was seen by his podiatrist for his chronic right foot ulcers and also by the pain medicine MD (Last week)  Patient did not had a flu shot this year and he does not want flu shot  Today all of sudden he started having fever and temperature went up to 103° associated with chills rigors and diaphoretic episodes  Also he he has been suffering from cough for the past few weeks and today he was coughing up a lot with some induction of sputum  Later he started having shortness of breath and he had episodes of confusion  This prompted the family bring the patient to the hospital and in ER he was found to be tachycardic, hypotensive and his WBC levels were very high  He was given 1 L of bolus and his mental condition improved.  Temperature stayed in the range of 1 not pre  Patient will be admitted in ICU for severe sepsis        Overview/Hospital Course:  Patient admitted with severe sepsis and then septic shock suspected to be due to Pneumonia.  Admitted to the ICU on levophed with broad IV abx and empiric Tamiflu. Flu screen negative. BCX in progress.  Sputum Cx ordered.  He has not been able to urinate but UA ordered. Recently treated for pneumonia at hospital in [place] 1/4-1/7.  Also following with Podiatry for right foot ulcer. Current green-yellow sputum. Encephalopathic (infectious) on presentation but this has  improved with treatment. Did not receive IVF bolus due to chronic SHF.  Lactic acid with current persistent elevation and Procalcitonin of 5. ID consulted. CXR with left infiltrate. Evidence of AD. Hyperkalemia on presentation has improved.    Interval History: Shortness of breath POA- location is chest, persistent, moderately severe, associated with cough and purulent sputum. Currently comfortable on room air.  Requiring levophed.  Reports a great deal of neuropathic pain in his hands and feet.     Review of Systems   Constitutional: Negative.    HENT: Negative.    Eyes: Negative.    Respiratory: Positive for cough and shortness of breath.    Cardiovascular: Negative.    Gastrointestinal: Negative.    Endocrine: Negative.    Genitourinary: Negative.    Musculoskeletal: Negative.         Chronic neuropathy in hands and feet   Skin: Negative.    Allergic/Immunologic: Negative.    Neurological: Negative.    Hematological: Negative.    Psychiatric/Behavioral: Negative.      Objective:     Vital Signs (Most Recent):  Temp: 99 °F (37.2 °C) (01/19/20 0700)  Pulse: 76 (01/19/20 1000)  Resp: (!) 72 (01/19/20 1000)  BP: (!) 88/50 (01/19/20 1000)  SpO2: 95 % (01/19/20 1000) Vital Signs (24h Range):  Temp:  [99 °F (37.2 °C)-103.4 °F (39.7 °C)] 99 °F (37.2 °C)  Pulse:  [] 76  Resp:  [18-86] 72  SpO2:  [92 %-99 %] 95 %  BP: ()/(42-59) 88/50  Arterial Line BP: ()/(45-63) 99/48     Weight: 111.1 kg (245 lb)  Body mass index is 33.23 kg/m².    Intake/Output Summary (Last 24 hours) at 1/19/2020 1100  Last data filed at 1/19/2020 1000  Gross per 24 hour   Intake 1029.55 ml   Output --   Net 1029.55 ml      Physical Exam   Constitutional: He is oriented to person, place, and time. He appears well-developed. No distress.   Pale and fatigued appearing   HENT:   Head: Normocephalic and atraumatic.   Mouth/Throat: Oropharynx is clear and moist.   Eyes: Pupils are equal, round, and reactive to light. EOM are normal.    Neck: Normal range of motion.   Cardiovascular: Regular rhythm.   No murmur heard.  Pulmonary/Chest: Effort normal and breath sounds normal. He has no wheezes.   Abdominal: Soft. He exhibits no distension. There is no tenderness. There is no rebound and no guarding.   Musculoskeletal: Normal range of motion.   Neurological: He is alert and oriented to person, place, and time. No cranial nerve deficit or sensory deficit.   Skin: No rash noted.   Psychiatric: He has a normal mood and affect.   Nursing note and vitals reviewed.      Significant Labs:   ABGs:   Recent Labs   Lab 01/19/20  0309   PH 7.393   PCO2 33.0*   HCO3 20.1*   POCSATURATED 96   BE -5     Blood Culture:   Recent Labs   Lab 01/18/20  2334 01/18/20 2351   LABBLOO No Growth to date No Growth to date     CBC:   Recent Labs   Lab 01/18/20 2351 01/19/20  0309 01/19/20  0413   WBC 28.95*  --  39.79*   HGB 14.4  --  12.4*   HCT 43.3 32* 37.4*   *  --  101*     CMP:   Recent Labs   Lab 01/18/20 2351 01/19/20  0413   * 132*   K 5.3* 4.6   CL 95 98   CO2 23 22*   * 249*   BUN 46* 48*   CREATININE 1.8* 2.2*   CALCIUM 9.0 8.0*   PROT 7.3 6.0   ALBUMIN 3.5 3.0*   BILITOT 1.7* 2.3*   ALKPHOS 54* 43*   AST 31 27   ALT 29 26   ANIONGAP 15 12   EGFRNONAA 40.9* 32.1*     Troponin:   Recent Labs   Lab 01/18/20 2351   TROPONINI 0.068*       Significant Imaging: CXR: I have reviewed all pertinent results/findings within the past 24 hours and my personal findings are:  Left infiltrate       Assessment/Plan:      * Septic shock  Source most likely from pneumonia  Right foot ulcer source has to be ruled out.  Recent visit with podiatry.  Broad IV abx with empiric tx with tamiflu   Cultures in progress.  UA ordered with urine sample pending.  Did not receive IVF bolus due to HF.  Cautious IVFs for resuscitation.   Support with levophed      Hypomagnesemia  Replacing. Monitoring.  AM labs ordered      Hyponatremia  Replacing.  Monitoring.  AM labs  ordered      AD (acute kidney injury)  Acute kidney injury in the background of sepsis  His medications will be renally dosed  If worsens, consideration for renal consult  AM labs ordered      Hyperkalemia  Hyperkalemia upon admission  Kayexalate already given to the patient from ER  Improved.  Monitoring.  Repeat AM labs ordered.      Heparin induced thrombocytopenia  History of HIT  Because of this heparin/Lovenox will not be prescribed      Chronic systolic heart failure  Patient suffers from chronic systolic heart failure and is on diuretics at home  This will be put on hold and patient will be started on gentle IV hydration in view with shock      CAD (coronary artery disease)  History of coronary artery disease/CABG      Chronic pain  Patient suffers from chronic pain and is on medication  He has a history of lumbar sympathetic nerve block   Ultram, Gabapentin restarted      Obesity  Need therapeutic lifestyle changes      AICD (automatic cardioverter/defibrillator) present  AICD insitu      Pneumonia  Patient will get admitted for possible pneumonia as a source of sepsis  He was treated in a different hospital for acute pneumonia this month  Broad IV abx  Cultures in progress  Repeating CXR in the AM  Supplemental O2 as needed  Nebs prn      Peripheral vascular disease  Chronic stable issue      Skin ulcer of right foot with fat layer exposed  Chronic skins ulcer of the right foot  Patient was assessed by his podiatrist 10 days ago  He has a history of right foot osteomyelitis  Wound care consult      Type II diabetes mellitus with neurological manifestations  Patient suffers from type 2 diabetes mellitus and neuropathy  He is on insulin regimen at home  ISS  Accuchecks  Gabapentin and ultram restarted for neuropathy         VTE Risk Mitigation (From admission, onward)         Ordered     IP VTE HIGH RISK PATIENT  Once      01/19/20 0046                      Cong Montalvo MD  Department of Hospital  Medicine   [facility]

## 2020-01-19 NOTE — RESPIRATORY THERAPY
01/19/20 0107   Patient Assessment/Suction   Level of Consciousness (AVPU) alert   Respiratory Effort Normal;Unlabored   Expansion/Accessory Muscles/Retractions expansion symmetric;no retractions;no use of accessory muscles   All Lung Fields Breath Sounds diminished   Rhythm/Pattern, Respiratory no shortness of breath reported;pattern regular;unlabored   PRE-TX-O2   O2 Device (Oxygen Therapy) nasal cannula   $ Is the patient on Low Flow Oxygen? Yes   SpO2 95 %   Pulse Oximetry Type Continuous   $ Pulse Oximetry - Multiple Charge Pulse Oximetry - Multiple   Pulse (!) 116   Resp 18   Aerosol Therapy   $ Aerosol Therapy Charges Initial Continuous   Daily Review of Necessity (SVN) completed   Respiratory Treatment Status (SVN) continuous   Treatment Route (SVN) air;mask   Patient Position (SVN) Ruiz's   Post Treatment Assessment (SVN) increased aeration   Signs of Intolerance (SVN) none   Breath Sounds Post-Respiratory Treatment   Throughout All Fields Post-Treatment All Fields   Throughout All Fields Post-Treatment aeration increased   Post-treatment Heart Rate (beats/min) 116   Post-treatment Resp Rate (breaths/min) 20   Respiratory Interventions   Cough And Deep Breathing done with encouragement   Breathing Techniques/Airway Clearance deep/controlled cough encouraged;diaphragmatic breathing promoted   Respiratory Evaluation   $ Care Plan Tech Time 15 min   Evaluation For New Orders   Admitting Diagnosis Sepsis   Cardiac Diagnosis CAD,CHF   Pulmonary Diagnosis FARHAN   Home Oxygen   Has Home Oxygen? No   Home Aerosol, MDI, DPI, and Other Treatments/Therapies   Home Respiratory Therapy Per Patient/Review of Chart Yes   Aerosol Home Meds/Freq Duoneb/prn        01/19/20 0107   Patient Assessment/Suction   Level of Consciousness (AVPU) alert   Respiratory Effort Normal;Unlabored   Expansion/Accessory Muscles/Retractions expansion symmetric;no retractions;no use of accessory muscles   All Lung Fields Breath Sounds  diminished   Rhythm/Pattern, Respiratory no shortness of breath reported;pattern regular;unlabored   PRE-TX-O2   O2 Device (Oxygen Therapy) nasal cannula   $ Is the patient on Low Flow Oxygen? Yes   SpO2 95 %   Pulse Oximetry Type Continuous   $ Pulse Oximetry - Multiple Charge Pulse Oximetry - Multiple   Pulse (!) 116   Resp 18   Aerosol Therapy   $ Aerosol Therapy Charges Initial Continuous   Daily Review of Necessity (SVN) completed   Respiratory Treatment Status (SVN) continuous   Treatment Route (SVN) air;mask   Patient Position (SVN) Ruiz's   Post Treatment Assessment (SVN) increased aeration   Signs of Intolerance (SVN) none   Breath Sounds Post-Respiratory Treatment   Throughout All Fields Post-Treatment All Fields   Throughout All Fields Post-Treatment aeration increased   Post-treatment Heart Rate (beats/min) 116   Post-treatment Resp Rate (breaths/min) 20   Respiratory Interventions   Cough And Deep Breathing done with encouragement   Breathing Techniques/Airway Clearance deep/controlled cough encouraged;diaphragmatic breathing promoted   Respiratory Evaluation   $ Care Plan Tech Time 15 min   Evaluation For New Orders   Admitting Diagnosis Sepsis   Cardiac Diagnosis CAD,CHF   Pulmonary Diagnosis FARHAN   Home Oxygen   Has Home Oxygen? No   Home Aerosol, MDI, DPI, and Other Treatments/Therapies   Home Respiratory Therapy Per Patient/Review of Chart Yes   Aerosol Home Meds/Freq Duoneb/prn        01/19/20 0107   Patient Assessment/Suction   Level of Consciousness (AVPU) alert   Respiratory Effort Normal;Unlabored   Expansion/Accessory Muscles/Retractions expansion symmetric;no retractions;no use of accessory muscles   All Lung Fields Breath Sounds diminished   Rhythm/Pattern, Respiratory no shortness of breath reported;pattern regular;unlabored   PRE-TX-O2   O2 Device (Oxygen Therapy) nasal cannula   $ Is the patient on Low Flow Oxygen? Yes   SpO2 95 %   Pulse Oximetry Type Continuous   $ Pulse Oximetry -  Multiple Charge Pulse Oximetry - Multiple   Pulse (!) 116   Resp 18   Aerosol Therapy   $ Aerosol Therapy Charges Initial Continuous   Daily Review of Necessity (SVN) completed   Respiratory Treatment Status (SVN) continuous   Treatment Route (SVN) air;mask   Patient Position (SVN) Ruiz's   Post Treatment Assessment (SVN) increased aeration   Signs of Intolerance (SVN) none   Breath Sounds Post-Respiratory Treatment   Throughout All Fields Post-Treatment All Fields   Throughout All Fields Post-Treatment aeration increased   Post-treatment Heart Rate (beats/min) 116   Post-treatment Resp Rate (breaths/min) 20   Respiratory Interventions   Cough And Deep Breathing done with encouragement   Breathing Techniques/Airway Clearance deep/controlled cough encouraged;diaphragmatic breathing promoted   Respiratory Evaluation   $ Care Plan Tech Time 15 min   Evaluation For New Orders   Admitting Diagnosis Sepsis   Cardiac Diagnosis CAD,CHF   Pulmonary Diagnosis FARHAN   Home Oxygen   Has Home Oxygen? No   Home Aerosol, MDI, DPI, and Other Treatments/Therapies   Home Respiratory Therapy Per Patient/Review of Chart Yes   Aerosol Home Meds/Freq Duoneb/prn        01/19/20 0107   Patient Assessment/Suction   Level of Consciousness (AVPU) alert   Respiratory Effort Normal;Unlabored   Expansion/Accessory Muscles/Retractions expansion symmetric;no retractions;no use of accessory muscles   All Lung Fields Breath Sounds diminished   Rhythm/Pattern, Respiratory no shortness of breath reported;pattern regular;unlabored   PRE-TX-O2   O2 Device (Oxygen Therapy) nasal cannula   $ Is the patient on Low Flow Oxygen? Yes   SpO2 95 %   Pulse Oximetry Type Continuous   $ Pulse Oximetry - Multiple Charge Pulse Oximetry - Multiple   Pulse (!) 116   Resp 18   Aerosol Therapy   $ Aerosol Therapy Charges Initial Continuous   Daily Review of Necessity (SVN) completed   Respiratory Treatment Status (SVN) continuous   Treatment Route (SVN) air;mask    Patient Position (SVN) Ruiz's   Post Treatment Assessment (SVN) increased aeration   Signs of Intolerance (SVN) none   Breath Sounds Post-Respiratory Treatment   Throughout All Fields Post-Treatment All Fields   Throughout All Fields Post-Treatment aeration increased   Post-treatment Heart Rate (beats/min) 116   Post-treatment Resp Rate (breaths/min) 20   Respiratory Interventions   Cough And Deep Breathing done with encouragement   Breathing Techniques/Airway Clearance deep/controlled cough encouraged;diaphragmatic breathing promoted   Respiratory Evaluation   $ Care Plan Tech Time 15 min   Evaluation For New Orders   Admitting Diagnosis Sepsis   Cardiac Diagnosis CAD,CHF   Pulmonary Diagnosis FARHAN   Home Oxygen   Has Home Oxygen? No   Home Aerosol, MDI, DPI, and Other Treatments/Therapies   Home Respiratory Therapy Per Patient/Review of Chart Yes   Aerosol Home Meds/Freq Duoneb/prn        01/19/20 0107   Patient Assessment/Suction   Level of Consciousness (AVPU) alert   Respiratory Effort Normal;Unlabored   Expansion/Accessory Muscles/Retractions expansion symmetric;no retractions;no use of accessory muscles   All Lung Fields Breath Sounds diminished   Rhythm/Pattern, Respiratory no shortness of breath reported;pattern regular;unlabored   PRE-TX-O2   O2 Device (Oxygen Therapy) nasal cannula   $ Is the patient on Low Flow Oxygen? Yes   SpO2 95 %   Pulse Oximetry Type Continuous   $ Pulse Oximetry - Multiple Charge Pulse Oximetry - Multiple   Pulse (!) 116   Resp 18   Aerosol Therapy   $ Aerosol Therapy Charges Initial Continuous   Daily Review of Necessity (SVN) completed   Respiratory Treatment Status (SVN) continuous   Treatment Route (SVN) air;mask   Patient Position (SVN) Ruiz's   Post Treatment Assessment (SVN) increased aeration   Signs of Intolerance (SVN) none   Breath Sounds Post-Respiratory Treatment   Throughout All Fields Post-Treatment All Fields   Throughout All Fields Post-Treatment aeration  increased   Post-treatment Heart Rate (beats/min) 116   Post-treatment Resp Rate (breaths/min) 20   Respiratory Interventions   Cough And Deep Breathing done with encouragement   Breathing Techniques/Airway Clearance deep/controlled cough encouraged;diaphragmatic breathing promoted   Respiratory Evaluation   $ Care Plan Tech Time 15 min   Evaluation For New Orders   Admitting Diagnosis Sepsis   Cardiac Diagnosis CAD,CHF   Pulmonary Diagnosis FARHAN   Home Oxygen   Has Home Oxygen? No   Home Aerosol, MDI, DPI, and Other Treatments/Therapies   Home Respiratory Therapy Per Patient/Review of Chart Yes   Aerosol Home Meds/Freq Duoneb/prn        01/19/20 0107   Patient Assessment/Suction   Level of Consciousness (AVPU) alert   Respiratory Effort Normal;Unlabored   Expansion/Accessory Muscles/Retractions expansion symmetric;no retractions;no use of accessory muscles   All Lung Fields Breath Sounds diminished   Rhythm/Pattern, Respiratory no shortness of breath reported;pattern regular;unlabored   PRE-TX-O2   O2 Device (Oxygen Therapy) nasal cannula   $ Is the patient on Low Flow Oxygen? Yes   SpO2 95 %   Pulse Oximetry Type Continuous   $ Pulse Oximetry - Multiple Charge Pulse Oximetry - Multiple   Pulse (!) 116   Resp 18   Aerosol Therapy   $ Aerosol Therapy Charges Initial Continuous   Daily Review of Necessity (SVN) completed   Respiratory Treatment Status (SVN) continuous   Treatment Route (SVN) air;mask   Patient Position (SVN) Ruiz's   Post Treatment Assessment (SVN) increased aeration   Signs of Intolerance (SVN) none   Breath Sounds Post-Respiratory Treatment   Throughout All Fields Post-Treatment All Fields   Throughout All Fields Post-Treatment aeration increased   Post-treatment Heart Rate (beats/min) 116   Post-treatment Resp Rate (breaths/min) 20   Respiratory Interventions   Cough And Deep Breathing done with encouragement   Breathing Techniques/Airway Clearance deep/controlled cough encouraged;diaphragmatic  breathing promoted   Respiratory Evaluation   $ Care Plan Tech Time 15 min   Evaluation For New Orders   Admitting Diagnosis Sepsis   Cardiac Diagnosis CAD,CHF   Pulmonary Diagnosis FARHAN   Home Oxygen   Has Home Oxygen? No   Home Aerosol, MDI, DPI, and Other Treatments/Therapies   Home Respiratory Therapy Per Patient/Review of Chart Yes   Aerosol Home Meds/Freq Duoneb/prn        01/19/20 0107   Patient Assessment/Suction   Level of Consciousness (AVPU) alert   Respiratory Effort Normal;Unlabored   Expansion/Accessory Muscles/Retractions expansion symmetric;no retractions;no use of accessory muscles   All Lung Fields Breath Sounds diminished   Rhythm/Pattern, Respiratory no shortness of breath reported;pattern regular;unlabored   PRE-TX-O2   O2 Device (Oxygen Therapy) nasal cannula   $ Is the patient on Low Flow Oxygen? Yes   SpO2 95 %   Pulse Oximetry Type Continuous   $ Pulse Oximetry - Multiple Charge Pulse Oximetry - Multiple   Pulse (!) 116   Resp 18   Aerosol Therapy   $ Aerosol Therapy Charges Initial Continuous   Daily Review of Necessity (SVN) completed   Respiratory Treatment Status (SVN) continuous   Treatment Route (SVN) air;mask   Patient Position (SVN) Ruiz's   Post Treatment Assessment (SVN) increased aeration   Signs of Intolerance (SVN) none   Breath Sounds Post-Respiratory Treatment   Throughout All Fields Post-Treatment All Fields   Throughout All Fields Post-Treatment aeration increased   Post-treatment Heart Rate (beats/min) 116   Post-treatment Resp Rate (breaths/min) 20   Respiratory Interventions   Cough And Deep Breathing done with encouragement   Breathing Techniques/Airway Clearance deep/controlled cough encouraged;diaphragmatic breathing promoted   Respiratory Evaluation   $ Care Plan Tech Time 15 min   Evaluation For New Orders   Admitting Diagnosis Sepsis   Cardiac Diagnosis CAD,CHF   Pulmonary Diagnosis FARHAN   Home Oxygen   Has Home Oxygen? No   Home Aerosol, MDI, DPI, and Other  Treatments/Therapies   Home Respiratory Therapy Per Patient/Review of Chart Yes   Aerosol Home Meds/Freq Duoneb/prn        01/19/20 0107   Patient Assessment/Suction   Level of Consciousness (AVPU) alert   Respiratory Effort Normal;Unlabored   Expansion/Accessory Muscles/Retractions expansion symmetric;no retractions;no use of accessory muscles   All Lung Fields Breath Sounds diminished   Rhythm/Pattern, Respiratory no shortness of breath reported;pattern regular;unlabored   PRE-TX-O2   O2 Device (Oxygen Therapy) nasal cannula   $ Is the patient on Low Flow Oxygen? Yes   SpO2 95 %   Pulse Oximetry Type Continuous   $ Pulse Oximetry - Multiple Charge Pulse Oximetry - Multiple   Pulse (!) 116   Resp 18   Aerosol Therapy   $ Aerosol Therapy Charges Initial Continuous   Daily Review of Necessity (SVN) completed   Respiratory Treatment Status (SVN) continuous   Treatment Route (SVN) air;mask   Patient Position (SVN) Ruiz's   Post Treatment Assessment (SVN) increased aeration   Signs of Intolerance (SVN) none   Breath Sounds Post-Respiratory Treatment   Throughout All Fields Post-Treatment All Fields   Throughout All Fields Post-Treatment aeration increased   Post-treatment Heart Rate (beats/min) 116   Post-treatment Resp Rate (breaths/min) 20   Respiratory Interventions   Cough And Deep Breathing done with encouragement   Breathing Techniques/Airway Clearance deep/controlled cough encouraged;diaphragmatic breathing promoted   Respiratory Evaluation   $ Care Plan Tech Time 15 min   Evaluation For New Orders   Admitting Diagnosis Sepsis   Cardiac Diagnosis CAD,CHF   Pulmonary Diagnosis FARHAN   Home Oxygen   Has Home Oxygen? No   Home Aerosol, MDI, DPI, and Other Treatments/Therapies   Home Respiratory Therapy Per Patient/Review of Chart Yes   Aerosol Home Meds/Freq Duoneb/prn        01/19/20 0107   Patient Assessment/Suction   Level of Consciousness (AVPU) alert   Respiratory Effort Normal;Unlabored   Expansion/Accessory  Muscles/Retractions expansion symmetric;no retractions;no use of accessory muscles   All Lung Fields Breath Sounds diminished   Rhythm/Pattern, Respiratory no shortness of breath reported;pattern regular;unlabored   PRE-TX-O2   O2 Device (Oxygen Therapy) nasal cannula   $ Is the patient on Low Flow Oxygen? Yes   SpO2 95 %   Pulse Oximetry Type Continuous   $ Pulse Oximetry - Multiple Charge Pulse Oximetry - Multiple   Pulse (!) 116   Resp 18   Aerosol Therapy   $ Aerosol Therapy Charges Initial Continuous   Daily Review of Necessity (SVN) completed   Respiratory Treatment Status (SVN) continuous   Treatment Route (SVN) air;mask   Patient Position (SVN) Ruiz's   Post Treatment Assessment (SVN) increased aeration   Signs of Intolerance (SVN) none   Breath Sounds Post-Respiratory Treatment   Throughout All Fields Post-Treatment All Fields   Throughout All Fields Post-Treatment aeration increased   Post-treatment Heart Rate (beats/min) 116   Post-treatment Resp Rate (breaths/min) 20   Respiratory Interventions   Cough And Deep Breathing done with encouragement   Breathing Techniques/Airway Clearance deep/controlled cough encouraged;diaphragmatic breathing promoted   Respiratory Evaluation   $ Care Plan Tech Time 15 min   Evaluation For New Orders   Admitting Diagnosis Sepsis   Cardiac Diagnosis CAD,CHF   Pulmonary Diagnosis FARHAN   Home Oxygen   Has Home Oxygen? No   Home Aerosol, MDI, DPI, and Other Treatments/Therapies   Home Respiratory Therapy Per Patient/Review of Chart Yes   Aerosol Home Meds/Freq Duoneb/prn        01/19/20 0107   Patient Assessment/Suction   Level of Consciousness (AVPU) alert   Respiratory Effort Normal;Unlabored   Expansion/Accessory Muscles/Retractions expansion symmetric;no retractions;no use of accessory muscles   All Lung Fields Breath Sounds diminished   Rhythm/Pattern, Respiratory no shortness of breath reported;pattern regular;unlabored   PRE-TX-O2   O2 Device (Oxygen Therapy) nasal  cannula   $ Is the patient on Low Flow Oxygen? Yes   SpO2 95 %   Pulse Oximetry Type Continuous   $ Pulse Oximetry - Multiple Charge Pulse Oximetry - Multiple   Pulse (!) 116   Resp 18   Aerosol Therapy   $ Aerosol Therapy Charges Initial Continuous   Daily Review of Necessity (SVN) completed   Respiratory Treatment Status (SVN) continuous   Treatment Route (SVN) air;mask   Patient Position (SVN) Ruiz's   Post Treatment Assessment (SVN) increased aeration   Signs of Intolerance (SVN) none   Breath Sounds Post-Respiratory Treatment   Throughout All Fields Post-Treatment All Fields   Throughout All Fields Post-Treatment aeration increased   Post-treatment Heart Rate (beats/min) 116   Post-treatment Resp Rate (breaths/min) 20   Respiratory Interventions   Cough And Deep Breathing done with encouragement   Breathing Techniques/Airway Clearance deep/controlled cough encouraged;diaphragmatic breathing promoted   Respiratory Evaluation   $ Care Plan Tech Time 15 min   Evaluation For New Orders   Admitting Diagnosis Sepsis   Cardiac Diagnosis CAD,CHF   Pulmonary Diagnosis FARHAN   Home Oxygen   Has Home Oxygen? No   Home Aerosol, MDI, DPI, and Other Treatments/Therapies   Home Respiratory Therapy Per Patient/Review of Chart Yes   Aerosol Home Meds/Freq Duoneb/prn        01/19/20 0107   Patient Assessment/Suction   Level of Consciousness (AVPU) alert   Respiratory Effort Normal;Unlabored   Expansion/Accessory Muscles/Retractions expansion symmetric;no retractions;no use of accessory muscles   All Lung Fields Breath Sounds diminished   Rhythm/Pattern, Respiratory no shortness of breath reported;pattern regular;unlabored   PRE-TX-O2   O2 Device (Oxygen Therapy) nasal cannula   $ Is the patient on Low Flow Oxygen? Yes   SpO2 95 %   Pulse Oximetry Type Continuous   $ Pulse Oximetry - Multiple Charge Pulse Oximetry - Multiple   Pulse (!) 116   Resp 18   Aerosol Therapy   $ Aerosol Therapy Charges Initial Continuous   Daily Review  of Necessity (SVN) completed   Respiratory Treatment Status (SVN) continuous   Treatment Route (SVN) air;mask   Patient Position (SVN) Ruiz's   Post Treatment Assessment (SVN) increased aeration   Signs of Intolerance (SVN) none   Breath Sounds Post-Respiratory Treatment   Throughout All Fields Post-Treatment All Fields   Throughout All Fields Post-Treatment aeration increased   Post-treatment Heart Rate (beats/min) 116   Post-treatment Resp Rate (breaths/min) 20   Respiratory Interventions   Cough And Deep Breathing done with encouragement   Breathing Techniques/Airway Clearance deep/controlled cough encouraged;diaphragmatic breathing promoted   Respiratory Evaluation   $ Care Plan Tech Time 15 min   Evaluation For New Orders   Admitting Diagnosis Sepsis   Cardiac Diagnosis CAD,CHF   Pulmonary Diagnosis FARHAN   Home Oxygen   Has Home Oxygen? No   Home Aerosol, MDI, DPI, and Other Treatments/Therapies   Home Respiratory Therapy Per Patient/Review of Chart Yes   Aerosol Home Meds/Freq Duoneb/prn        01/19/20 0107   Patient Assessment/Suction   Level of Consciousness (AVPU) alert   Respiratory Effort Normal;Unlabored   Expansion/Accessory Muscles/Retractions expansion symmetric;no retractions;no use of accessory muscles   All Lung Fields Breath Sounds diminished   Rhythm/Pattern, Respiratory no shortness of breath reported;pattern regular;unlabored   PRE-TX-O2   O2 Device (Oxygen Therapy) nasal cannula   $ Is the patient on Low Flow Oxygen? Yes   SpO2 95 %   Pulse Oximetry Type Continuous   $ Pulse Oximetry - Multiple Charge Pulse Oximetry - Multiple   Pulse (!) 116   Resp 18   Aerosol Therapy   $ Aerosol Therapy Charges Initial Continuous   Daily Review of Necessity (SVN) completed   Respiratory Treatment Status (SVN) continuous   Treatment Route (SVN) air;mask   Patient Position (SVN) Ruiz's   Post Treatment Assessment (SVN) increased aeration   Signs of Intolerance (SVN) none   Breath Sounds Post-Respiratory  Treatment   Throughout All Fields Post-Treatment All Fields   Throughout All Fields Post-Treatment aeration increased   Post-treatment Heart Rate (beats/min) 116   Post-treatment Resp Rate (breaths/min) 20   Respiratory Interventions   Cough And Deep Breathing done with encouragement   Breathing Techniques/Airway Clearance deep/controlled cough encouraged;diaphragmatic breathing promoted   Respiratory Evaluation   $ Care Plan Tech Time 15 min   Evaluation For New Orders   Admitting Diagnosis Sepsis   Cardiac Diagnosis CAD,CHF   Pulmonary Diagnosis FARHAN   Home Oxygen   Has Home Oxygen? No   Home Aerosol, MDI, DPI, and Other Treatments/Therapies   Home Respiratory Therapy Per Patient/Review of Chart Yes   Aerosol Home Meds/Freq Duoneb/prn        01/19/20 0107   Patient Assessment/Suction   Level of Consciousness (AVPU) alert   Respiratory Effort Normal;Unlabored   Expansion/Accessory Muscles/Retractions expansion symmetric;no retractions;no use of accessory muscles   All Lung Fields Breath Sounds diminished   Rhythm/Pattern, Respiratory no shortness of breath reported;pattern regular;unlabored   PRE-TX-O2   O2 Device (Oxygen Therapy) nasal cannula   $ Is the patient on Low Flow Oxygen? Yes   SpO2 95 %   Pulse Oximetry Type Continuous   $ Pulse Oximetry - Multiple Charge Pulse Oximetry - Multiple   Pulse (!) 116   Resp 18   Aerosol Therapy   $ Aerosol Therapy Charges Initial Continuous   Daily Review of Necessity (SVN) completed   Respiratory Treatment Status (SVN) continuous   Treatment Route (SVN) air;mask   Patient Position (SVN) Ruiz's   Post Treatment Assessment (SVN) increased aeration   Signs of Intolerance (SVN) none   Breath Sounds Post-Respiratory Treatment   Throughout All Fields Post-Treatment All Fields   Throughout All Fields Post-Treatment aeration increased   Post-treatment Heart Rate (beats/min) 116   Post-treatment Resp Rate (breaths/min) 20   Respiratory Interventions   Cough And Deep Breathing done  with encouragement   Breathing Techniques/Airway Clearance deep/controlled cough encouraged;diaphragmatic breathing promoted   Respiratory Evaluation   $ Care Plan Tech Time 15 min   Evaluation For New Orders   Admitting Diagnosis Sepsis   Cardiac Diagnosis CAD,CHF   Pulmonary Diagnosis FARHAN   Home Oxygen   Has Home Oxygen? No   Home Aerosol, MDI, DPI, and Other Treatments/Therapies   Home Respiratory Therapy Per Patient/Review of Chart Yes   Aerosol Home Meds/Freq Duoneb/prn

## 2020-01-19 NOTE — ASSESSMENT & PLAN NOTE
Patient will get admitted for possible pneumonia as a source of sepsis  He was treated in a different hospital for acute pneumonia this month  Broad IV abx  Cultures in progress  Repeating CXR in the AM  Supplemental O2 as needed  Nebs prn

## 2020-01-19 NOTE — H&P
[facility] Medicine  History & Physical    Patient Name: Ana Bullard  MRN: [id number]  Admission Date: 1/18/2020  Attending Physician: Sanford Morales MD   Primary Care Provider: YRN Landry         Patient information was obtained from patient and ER records.     Subjective:     Principal Problem:Sepsis    Chief Complaint:   Chief Complaint   Patient presents with    Fever     Got out of hospital Monday with chf, and pnuemonia    Chills        HPI: 59-year-old patient getting admitted with severe sepsis  Patient from January 4 to January 7 was in a hospital in [facility]  and was treated for acute pneumonia and acute congestive heart failure  Eventually he was discharged home with p.o. Cefdinir  There after he was seen by his podiatrist for his chronic right foot ulcers and also by the pain medicine MD (Last week)  Patient did not had a flu shot this year and he does not want flu shot  Today all of sudden he started having fever and temperature went up to 103° associated with chills rigors and diaphoretic episodes  Also he he has been suffering from cough for the past few weeks and today he was coughing up a lot with some induction of sputum  Later he started having shortness of breath and he had episodes of confusion  This prompted the family bring the patient to the hospital and in ER he was found to be tachycardic, hypotensive and his WBC levels were very high  He was given 1 L of bolus and his mental condition improved.  Temperature stayed in the range of 1 not pre  Patient will be admitted in ICU for severe sepsis        Past Medical History:   Diagnosis Date    AICD (automatic cardioverter/defibrillator) present     Anxiety and depression 2012    CAD (coronary artery disease) 2012    Cardiomegaly 2016    CHF (congestive heart failure) 2012    Cholecystitis 2017    Complete heart block 2012    Diabetic foot ulcer     DVT (deep venous thrombosis) 2012    And  "pulmonary embolus    GERD (gastroesophageal reflux disease) 2010    Heparin induced thrombocytopenia     Hyperlipemia 2011    IDDM (insulin dependent diabetes mellitus) 1983    With neuropathy    Ischemic cardiomyopathy 2012    MI (myocardial infarction) 2012    Morbid obesity     MRSA (methicillin resistant Staphylococcus aureus) infection 01/19/2019    Noncompliance with therapeutic plan 2019    Osteomyelitis of right foot 01/2019    Pulmonary edema 2019    Respiratory failure 2019    Sepsis 01/2019    Due to cellulitis right leg    Sleep apnea 2013    Thrombocytopathy 2012    Venous stasis dermatitis of both lower extremities        Past Surgical History:   Procedure Laterality Date    CARDIAC PACEMAKER PLACEMENT  12/2012    CARDIAC PACEMAKER PLACEMENT  10/04/2018    battery replacement    CORONARY ARTERY BYPASS GRAFT  06/2012    ESOPHAGOGASTRODUODENOSCOPY  01/31/2017    SAMARA FILTER PLACEMENT  2012    vena cava    LUMBAR SYMPATHETIC NERVE BLOCK Right 8/22/2019    Procedure: BLOCK, NERVE, SYMPATHETIC, LUMBAR;  Surgeon: Tashi Good MD;  Location[facility];  Service: Pain Management;  Laterality: Right;  RIGHT SYMPATHETIC NERVE BLOCK       Review of patient's allergies indicates:   Allergen Reactions    Vasopressin Anaphylaxis    Heparin Other (See Comments)     Other reaction(s): "depleted my blood platets"      Heparin analogues Other (See Comments)     Depleted WBC count    Morphine Hallucinations and Other (See Comments)     Other reaction(s): Hallucinations  Paranoid, hallucinations      Vasopressin analogues Other (See Comments)     "felt like eyes going to pop out of my head"       No current facility-administered medications on file prior to encounter.      Current Outpatient Medications on File Prior to Encounter   Medication Sig    albuterol-ipratropium (DUO-NEB) 2.5 mg-0.5 mg/3 mL nebulizer solution ipratropium-albuterol 0.5 mg-3 mg(2.5 mg base)/3 mL nebulization soln    " aspirin 81 MG Chew Take 81 mg by mouth once daily.    atorvastatin (LIPITOR) 10 MG tablet Take 1 tablet by mouth once daily.    BASAGLAR KWIKPEN U-100 INSULIN glargine 100 units/mL (3mL) SubQ pen 25 Units once daily.     carvedilol (COREG) 25 MG tablet Take 1 tablet by mouth 2 (two) times daily.    cefdinir (OMNICEF) 300 MG capsule cefdinir 300 mg capsule    furosemide (LASIX) 40 MG tablet Take 1 tablet by mouth 2 (two) times daily.    gabapentin (NEURONTIN) 600 MG tablet Take 1 tablet by mouth 3 (three) times daily.    metFORMIN (GLUCOPHAGE) 500 MG tablet Take 2 tablets by mouth 2 (two) times daily.    nortriptyline (PAMELOR) 25 MG capsule Take 1 capsule (25 mg total) by mouth every evening.    pantoprazole (PROTONIX) 40 MG tablet Take 1 tablet by mouth once daily.    spironolactone (ALDACTONE) 25 MG tablet Take 1 tablet by mouth once daily.    traMADol (ULTRAM) 50 mg tablet Take 1 tablet (50 mg total) by mouth every 8 (eight) hours as needed for Pain (Medically necessary for >7 days for chronic pain).    vitamin B complex-folic acid 0.4 mg Tab Take 1 tablet by mouth.     Family History     Problem Relation (Age of Onset)    Arthritis Mother    Cancer Father    Diabetes Mother    Heart disease Father    Stroke Father        Tobacco Use    Smoking status: Former Smoker     Packs/day: 2.00     Years: 38.00     Pack years: 76.00     Types: Cigarettes     Last attempt to quit: 2012     Years since quittin.0    Smokeless tobacco: Never Used   Substance and Sexual Activity    Alcohol use: No     Frequency: Never    Drug use: Not on file    Sexual activity: Never     Review of Systems   Constitutional: Positive for chills, diaphoresis, fatigue and fever. Negative for activity change and appetite change.   HENT: Negative for congestion and dental problem.    Eyes: Negative for discharge and itching.   Respiratory: Positive for cough and shortness of breath.    Cardiovascular: Negative for chest pain.    Gastrointestinal: Negative for abdominal distention and abdominal pain.   Endocrine: Negative for cold intolerance.   Genitourinary: Negative for difficulty urinating and dysuria.   Musculoskeletal: Negative for arthralgias and back pain.   Skin: Negative for color change.   Neurological: Negative for dizziness and facial asymmetry.   Hematological: Negative for adenopathy.   Psychiatric/Behavioral: Negative for agitation and behavioral problems.     Objective:     Vital Signs (Most Recent):  Temp: (!) 103.4 °F (39.7 °C) (01/19/20 0013)  Pulse: (!) 120 (01/19/20 0021)  Resp: (!) 25 (01/18/20 2340)  BP: (!) 102/58 (01/19/20 0021)  SpO2: 98 % (01/19/20 0021) Vital Signs (24h Range):  Temp:  [103.4 °F (39.7 °C)] 103.4 °F (39.7 °C)  Pulse:  [120-127] 120  Resp:  [25-34] 25  SpO2:  [92 %-98 %] 98 %  BP: ()/(49-58) 102/58     Weight: 112 kg (247 lb)  Body mass index is 33.5 kg/m².    Physical Exam   Constitutional: He is oriented to person, place, and time. He appears well-developed.   HENT:   Head: Normocephalic and atraumatic.   Eyes: Pupils are equal, round, and reactive to light.   Neck: Normal range of motion and full passive range of motion without pain.   Cardiovascular: Normal rate and regular rhythm.   Tachycardic   Pulmonary/Chest: Effort normal and breath sounds normal.   Reduced air entry to bases   Abdominal: Soft. Bowel sounds are normal.   Musculoskeletal: Normal range of motion.   Neurological: He is alert and oriented to person, place, and time.   Skin: Skin is warm.   Psychiatric: He has a normal mood and affect.   Nursing note and vitals reviewed.        CRANIAL NERVES     CN III, IV, VI   Pupils are equal, round, and reactive to light.       Significant Labs:   CBC:   Recent Labs   Lab 01/18/20 2351   WBC 28.95*   HGB 14.4   HCT 43.3   *     CMP:   Recent Labs   Lab 01/18/20 2351   *   K 5.3*   CL 95   CO2 23   *   CALCIUM 9.0   ALKPHOS 54*   AST 31   ANIONGAP 15        Significant Imaging: I have reviewed all pertinent imaging results/findings within the past 24 hours.    Assessment/Plan:     * Sepsis  Patient getting admitted with sepsis.  Source most likely from pneumonia  Right foot ulcer source has to be ruled out  Another possibility is acute influenza   Pancultures will be obtained, IV fluid and IV antibiotics will be administered  Patient will be admitted to ICU      AD (acute kidney injury)  Acute kidney injury in the background of sepsis  His medications will be renally dosed  Talked with pharmacist regarding this      Hyperkalemia  Hyperkalemia upon admission  Kayexalate already given to the patient from ER      Heparin induced thrombocytopenia  History of HIT  Because of this heparin/Lovenox will not be prescribed      Chronic systolic heart failure  Patient suffers from chronic systolic heart failure and is on diuretics at home  This will be put on hold and patient will be started on gentle IV hydration in view with severe sepsis      CAD (coronary artery disease)  History of coronary artery disease/CABG      Chronic pain  Patient suffers from chronic pain and is on medication  He has a history of lumbar sympathetic nerve block       Obesity  Need therapeutic lifestyle changes      AICD (automatic cardioverter/defibrillator) present  AICD insitu      Pneumonia  Patient will get admitted for possible pneumonia as a source of sepsis  He was treated in a different hospital for acute pneumonia this month      Peripheral vascular disease  Chronic stable issue      Skin ulcer of right foot with fat layer exposed  Chronic skins ulcer of the right foot  Patient was assessed by his podiatrist 10 days ago  He has a history of right foot osteomyelitis      Type II diabetes mellitus with neurological manifestations  Patient suffers from type 2 diabetes mellitus and neuropathy  He is on insulin regimen at home  This will be restarted along with SSI        VTE Risk Mitigation  (From admission, onward)         Ordered     IP VTE HIGH RISK PATIENT  Once      01/19/20 0046                   Ad Arana MD  Department of Hospital Medicine   [facility]

## 2020-01-19 NOTE — ED TRIAGE NOTES
"Ana Bullard, a 59 y.o. male presents to the ED w/ complaint of fever, cough, and chills today. Was discharged from [facility] on Monday for Pneumonia. Hx of CHF.    Triage note:  Chief Complaint   Patient presents with    Fever     Got out of hospital Monday with chf, and pnuemonia    Chills     Review of patient's allergies indicates:   Allergen Reactions    Vasopressin Anaphylaxis    Heparin Other (See Comments)     Other reaction(s): "depleted my blood platets"      Heparin analogues Other (See Comments)     Depleted WBC count    Morphine Hallucinations and Other (See Comments)     Other reaction(s): Hallucinations  Paranoid, hallucinations      Vasopressin analogues Other (See Comments)     "felt like eyes going to pop out of my head"     Past Medical History:   Diagnosis Date    AICD (automatic cardioverter/defibrillator) present     Anxiety and depression 2012    CAD (coronary artery disease) 2012    Cardiomegaly 2016    CHF (congestive heart failure) 2012    Cholecystitis 2017    Complete heart block 2012    Diabetic foot ulcer     DVT (deep venous thrombosis) 2012    And pulmonary embolus    GERD (gastroesophageal reflux disease) 2010    Heparin induced thrombocytopenia     Hyperlipemia 2011    IDDM (insulin dependent diabetes mellitus) 1983    With neuropathy    Ischemic cardiomyopathy 2012    MI (myocardial infarction) 2012    Morbid obesity     MRSA (methicillin resistant Staphylococcus aureus) infection 01/19/2019    Noncompliance with therapeutic plan 2019    Osteomyelitis of right foot 01/2019    Pulmonary edema 2019    Respiratory failure 2019    Sepsis 01/2019    Due to cellulitis right leg    Sleep apnea 2013    Thrombocytopathy 2012    Venous stasis dermatitis of both lower extremities        "

## 2020-01-19 NOTE — ANESTHESIA PROCEDURE NOTES
Arterial line    Diagnosis: sepsis    Patient location during procedure: ED  Procedure start time: 1/19/2020 2:25 AM  Timeout: 1/19/2020 2:25 AM  Procedure end time: 1/19/2020 2:35 AM    Staffing  Authorizing Provider: Homero Salcido MD  Performing Provider: Homero Salcido MD    Anesthesiologist was present at the time of the procedure.    Preanesthetic Checklist  Completed: patient identified, timeout performed, risks and benefits discussed and anesthesia consent givenArterial line  Skin Prep: chlorhexidine gluconate  Local Infiltration: lidocaine  Orientation: left  Location: radial  Catheter Size: 20 G  Catheter placement by Ultrasound guidance. Heme positive aspiration all ports.  Vessel Caliber: medium, patent, compressibility normal  Vascular Doppler:  not done  Needle advanced into vessel with real time Ultrasound guidance.  Sterile sheath used.Insertion Attempts: 1  Assessment  Dressing: sutured in place and taped and tegaderm  Patient: Tolerated well

## 2020-01-19 NOTE — ASSESSMENT & PLAN NOTE
Patient suffers from type 2 diabetes mellitus and neuropathy  He is on insulin regimen at home  This will be restarted along with SSI

## 2020-01-19 NOTE — SUBJECTIVE & OBJECTIVE
Interval History: Shortness of breath POA- location is chest, persistent, moderately severe, associated with cough and purulent sputum. Currently comfortable on room air.  Requiring levophed.  Reports a great deal of neuropathic pain in his hands and feet.     Review of Systems   Constitutional: Negative.    HENT: Negative.    Eyes: Negative.    Respiratory: Positive for cough and shortness of breath.    Cardiovascular: Negative.    Gastrointestinal: Negative.    Endocrine: Negative.    Genitourinary: Negative.    Musculoskeletal: Negative.         Chronic neuropathy in hands and feet   Skin: Negative.    Allergic/Immunologic: Negative.    Neurological: Negative.    Hematological: Negative.    Psychiatric/Behavioral: Negative.      Objective:     Vital Signs (Most Recent):  Temp: 99 °F (37.2 °C) (01/19/20 0700)  Pulse: 76 (01/19/20 1000)  Resp: (!) 72 (01/19/20 1000)  BP: (!) 88/50 (01/19/20 1000)  SpO2: 95 % (01/19/20 1000) Vital Signs (24h Range):  Temp:  [99 °F (37.2 °C)-103.4 °F (39.7 °C)] 99 °F (37.2 °C)  Pulse:  [] 76  Resp:  [18-86] 72  SpO2:  [92 %-99 %] 95 %  BP: ()/(42-59) 88/50  Arterial Line BP: ()/(45-63) 99/48     Weight: 111.1 kg (245 lb)  Body mass index is 33.23 kg/m².    Intake/Output Summary (Last 24 hours) at 1/19/2020 1100  Last data filed at 1/19/2020 1000  Gross per 24 hour   Intake 1029.55 ml   Output --   Net 1029.55 ml      Physical Exam   Constitutional: He is oriented to person, place, and time. He appears well-developed. No distress.   Pale and fatigued appearing   HENT:   Head: Normocephalic and atraumatic.   Mouth/Throat: Oropharynx is clear and moist.   Eyes: Pupils are equal, round, and reactive to light. EOM are normal.   Neck: Normal range of motion.   Cardiovascular: Regular rhythm.   No murmur heard.  Pulmonary/Chest: Effort normal and breath sounds normal. He has no wheezes.   Abdominal: Soft. He exhibits no distension. There is no tenderness. There is no  rebound and no guarding.   Musculoskeletal: Normal range of motion.   Neurological: He is alert and oriented to person, place, and time. No cranial nerve deficit or sensory deficit.   Skin: No rash noted.   Psychiatric: He has a normal mood and affect.   Nursing note and vitals reviewed.      Significant Labs:   ABGs:   Recent Labs   Lab 01/19/20  0309   PH 7.393   PCO2 33.0*   HCO3 20.1*   POCSATURATED 96   BE -5     Blood Culture:   Recent Labs   Lab 01/18/20  2334 01/18/20 2351   LABBLOO No Growth to date No Growth to date     CBC:   Recent Labs   Lab 01/18/20 2351 01/19/20 0309 01/19/20  0413   WBC 28.95*  --  39.79*   HGB 14.4  --  12.4*   HCT 43.3 32* 37.4*   *  --  101*     CMP:   Recent Labs   Lab 01/18/20 2351 01/19/20 0413   * 132*   K 5.3* 4.6   CL 95 98   CO2 23 22*   * 249*   BUN 46* 48*   CREATININE 1.8* 2.2*   CALCIUM 9.0 8.0*   PROT 7.3 6.0   ALBUMIN 3.5 3.0*   BILITOT 1.7* 2.3*   ALKPHOS 54* 43*   AST 31 27   ALT 29 26   ANIONGAP 15 12   EGFRNONAA 40.9* 32.1*     Troponin:   Recent Labs   Lab 01/18/20 2351   TROPONINI 0.068*       Significant Imaging: CXR: I have reviewed all pertinent results/findings within the past 24 hours and my personal findings are:  Left infiltrate

## 2020-01-19 NOTE — ASSESSMENT & PLAN NOTE
Chronic skins ulcer of the right foot  Patient was assessed by his podiatrist 10 days ago  He has a history of right foot osteomyelitis  Wound care consult

## 2020-01-19 NOTE — ASSESSMENT & PLAN NOTE
Patient suffers from chronic systolic heart failure and is on diuretics at home  This will be put on hold and patient will be started on gentle IV hydration in view with severe sepsis

## 2020-01-19 NOTE — ASSESSMENT & PLAN NOTE
Patient suffers from chronic systolic heart failure and is on diuretics at home  This will be put on hold and patient will be started on gentle IV hydration in view with shock

## 2020-01-19 NOTE — ASSESSMENT & PLAN NOTE
Hyperkalemia upon admission  Kayexalate already given to the patient from ER  Improved.  Monitoring.  Repeat AM labs ordered.

## 2020-01-19 NOTE — ASSESSMENT & PLAN NOTE
Patient suffers from chronic pain and is on medication  He has a history of lumbar sympathetic nerve block   Ultram, Gabapentin restarted

## 2020-01-19 NOTE — ASSESSMENT & PLAN NOTE
Patient suffers from type 2 diabetes mellitus and neuropathy  He is on insulin regimen at home  ISS  Accuchecks  Gabapentin and ultram restarted for neuropathy

## 2020-01-19 NOTE — CONSULTS
Consult Note  Infectious Disease    Reason for Consult:      HPI: Ana Bullard is a  59 y.o. male with past medical history of CAD, CABG, AICD, CHF, HTN, D LP, PVD, CKD, FARHAN on BiPAP, obesity, N IDDM, neuropathy, GERD, history of diabetic foot infection due to Proteus vulgaris, group a strep and MRSA in January 2019, chronic foot ulcer for which he follows with podiatrist..      Patient was  recently hospitalized on 01/04/2020-- 01/07/2020, in [facility], for pneumonia and CHF acute exacerbation.  He was discharged home on cefdinir.  He completed it on 01/14  On 01/09/2020 he had an appointment with podiatrist who shaved some of his callus on the right lateral side.  It was not infected.    Patient came in complaining of fever 103, shaking chills, short of breath, cough, yellow phlegm, associated by confusion.  No sore throat.  He felt his ears popping.  The popping on his ears resolved since he has been receiving antibiotics.  Had some runny nose over the past 2 days.  He was brought in ER, he was found to be hypotensive, tachycardic and had leukocytosis.  Chest x-ray showed a left sided infiltrate.   Influenza screen is negative. Urinalysis shows a WBC of 12 negative nitrates negative leukocyte esterase. Blood cultures are negative so far.  Procalcitonin was 5    Patient was placed on broad antimicrobial coverage including g negatives g positives and influenza  Tx;   Patient needed pressors, Levophed small dose.    Antibiotics (From admission, onward)    Start     Stop Route Frequency Ordered    01/20/20 0030  vancomycin (VANCOCIN) 1,750 mg in dextrose 5 % 500 mL IVPB      -- IV Every 24 hours (non-standard times) 01/19/20 0327    01/19/20 1030  mupirocin 2 % ointment  (MRSA Decolonization Orders [facility])      01/24 0859 Nasl 2 times daily 01/19/20 0919    01/19/20 1000  piperacillin-tazobactam 4.5 g in dextrose 5 % 100 mL IVPB (ready to mix system)      -- IV Every 8 hours (non-standard times)  "01/19/20 0106    01/19/20 0145  levoFLOXacin 500 mg/100 mL IVPB 500 mg      -- IV Every 24 hours (non-standard times) 01/19/20 0042    01/19/20 0140  vancomycin - pharmacy to dose  (vancomycin IVPB)      -- IV pharmacy to manage frequency 01/19/20 0042        Antifungals (From admission, onward)    None        Antivirals (From admission, onward)        Stop Route Frequency     oseltamivir      01/23 2059 Oral 2 times daily            Review of patient's allergies indicates:   Allergen Reactions    Vasopressin Anaphylaxis    Heparin Other (See Comments)     Other reaction(s): "depleted my blood platets"      Heparin analogues Other (See Comments)     Depleted WBC count    Morphine Hallucinations and Other (See Comments)     Other reaction(s): Hallucinations  Paranoid, hallucinations      Vasopressin analogues Other (See Comments)     "felt like eyes going to pop out of my head"     Past Medical History:   Diagnosis Date    AICD (automatic cardioverter/defibrillator) present     Anxiety and depression 2012    CAD (coronary artery disease) 2012    Cardiomegaly 2016    CHF (congestive heart failure) 2012    Cholecystitis 2017    Complete heart block 2012    Diabetic foot ulcer     DVT (deep venous thrombosis) 2012    And pulmonary embolus    GERD (gastroesophageal reflux disease) 2010    Heparin induced thrombocytopenia     Hyperlipemia 2011    IDDM (insulin dependent diabetes mellitus) 1983    With neuropathy    Ischemic cardiomyopathy 2012    MI (myocardial infarction) 2012    Morbid obesity     MRSA (methicillin resistant Staphylococcus aureus) infection 01/19/2019    Noncompliance with therapeutic plan 2019    Osteomyelitis of right foot 01/2019    Pulmonary edema 2019    Respiratory failure 2019    Sepsis 01/2019    Due to cellulitis right leg    Sleep apnea 2013    Thrombocytopathy 2012    Venous stasis dermatitis of both lower extremities      Past Surgical History:   Procedure " Laterality Date    CARDIAC PACEMAKER PLACEMENT  2012    CARDIAC PACEMAKER PLACEMENT  10/04/2018    battery replacement    CORONARY ARTERY BYPASS GRAFT  2012    ESOPHAGOGASTRODUODENOSCOPY  2017    SAMARA FILTER PLACEMENT  2012    vena cava    LUMBAR SYMPATHETIC NERVE BLOCK Right 2019    Procedure: BLOCK, NERVE, SYMPATHETIC, LUMBAR;  Surgeon: Tashi Good MD;  Location: [facility] OR;  Service: Pain Management;  Laterality: Right;  RIGHT SYMPATHETIC NERVE BLOCK     Social History     Socioeconomic History    Marital status:      Spouse name: Not on file    Number of children: Not on file    Years of education: Not on file    Highest education level: Not on file   Occupational History    Not on file   Social Needs    Financial resource strain: Not on file    Food insecurity:     Worry: Not on file     Inability: Not on file    Transportation needs:     Medical: Not on file     Non-medical: Not on file   Tobacco Use    Smoking status: Former Smoker     Packs/day: 2.00     Years: 38.00     Pack years: 76.00     Types: Cigarettes     Last attempt to quit:      Years since quittin.0    Smokeless tobacco: Never Used   Substance and Sexual Activity    Alcohol use: No     Frequency: Never    Drug use: Not on file    Sexual activity: Never   Lifestyle    Physical activity:     Days per week: Not on file     Minutes per session: Not on file    Stress: Not on file   Relationships    Social connections:     Talks on phone: Not on file     Gets together: Not on file     Attends [employer] service: Not on file     Active member of club or organization: Not on file     Attends meetings of clubs or organizations: Not on file     Relationship status: Not on file   Other Topics Concern    Not on file   Social History Narrative    Not on file     Family History   Problem Relation Age of Onset    Arthritis Mother     Diabetes Mother     Cancer Father     Heart disease Father      Stroke Father        Pertinent medications noted:     Review of Systems:   No chills, fever, sweats, weight loss  No change in vision, loss of vision or diplopia  No sinus congestion, purulent nasal discharge, post nasal drip or facial pain  No pain in mouth or throat. No problems with teeth, gums.  No chest pain, palpitations, syncope  Positive for cough, sputum production, shortness of breath, dyspnea on exertion,hemoptysis  No nausea, vomiting, diarrhea, constipation, blood in stool, or focal abd pain,  No dysphagia, odynophagia  No dysuria, hematuria, strangury, retention, incontinence, nocturia, prostatism,   No vaginal/penile discharge, genital ulcers, risk for STD  No swelling of joints, redness of joints, injuries, or new focal pain  No unusual headaches, dizziness, vertigo, numbness, paresthesias, neuropathy, falls  No anxiety, depression, substance abuse, sleep disturbance  Positive for diabetes,   No thyroid, hypogonadal conditions  No bleeding, lymphadenopathy, anemia, malignancy, unusual bruising  No new rashes, lesions, or wounds  No TB exposure  Positive recent/prior steroids in the beginning of January  Outdoor activities:  No  Travel:  No  Implants:  AICD  Antibiotic History:  Cefdinir from the 7th till the 14    EXAM & DIAGNOSTICS REVIEWED:   Vitals:     Temp:  [99 °F (37.2 °C)-103.4 °F (39.7 °C)]   Temp: 99 °F (37.2 °C) (01/19/20 0700)  Pulse: 76 (01/19/20 1000)  Resp: (!) 72 (01/19/20 1000)  BP: (!) 88/50 (01/19/20 1000)  SpO2: 95 % (01/19/20 1000)    Intake/Output Summary (Last 24 hours) at 1/19/2020 1038  Last data filed at 1/19/2020 1000  Gross per 24 hour   Intake 1029.55 ml   Output --   Net 1029.55 ml       General:  In NAD. Alert and attentive, cooperative, comfortable  Eyes:  Anicteric, PERRL, EOMI  ENT:  No ulcers, exudates, thrush, nares patent, dentition is poor  Neck:  supple, no masses or adenopathy appreciated  Lungs: Diminished due to body habitus  Heart:  RRR, no gallop/murmur/rub  noted  Abd:  Soft, NT, ND, normal BS, no masses or organomegaly appreciated.  :   urine clear, no flank tenderness  Musc:  Joints without effusion, swelling, erythema, synovitis, muscle wasting.   Skin:  Chronic changes on lower extremities No palmar or plantar lesions. No subungual petechiae  Wound:   This was taken from Dr. Heaton office.  It looks much better today than prior.    Neuro: Alert, attentive, speech fluent, face symmetric, moves all extremities, no focal weakness.  Decreased sensation in bilateral lower extremities from the toes all the way up to the ankles  Psych:  Calm, cooperative  Lymphatic:     No cervical, supraclavicular, axillary, or inguinal nodes  Extrem: No edema, erythema, phlebitis, cellulitis, warm and well perfused.  Chronic lower extremity hyperpigmentation.  VAD:       Isolation:      Lines/Tubes/Drains:    General Labs reviewed:  Recent Labs   Lab 01/18/20 2351 01/19/20  0309 01/19/20  0413   WBC 28.95*  --  39.79*   HGB 14.4  --  12.4*   HCT 43.3 32* 37.4*   *  --  101*       Recent Labs   Lab 01/18/20 2351 01/19/20  0413   * 132*   K 5.3* 4.6   CL 95 98   CO2 23 22*   BUN 46* 48*   CREATININE 1.8* 2.2*   CALCIUM 9.0 8.0*   PROT 7.3 6.0   BILITOT 1.7* 2.3*   ALKPHOS 54* 43*   ALT 29 26   AST 31 27           Micro:  Microbiology Results (last 7 days)     Procedure Component Value Units Date/Time    Culture, Respiratory with Gram Stain [[id number]] Collected:  01/19/20 0725    Order Status:  Sent Specimen:  Respiratory from Sputum, Expectorated Updated:  01/19/20 0742    Blood culture x two cultures. Draw prior to antibiotics. [[id number]] Collected:  01/18/20 2334    Order Status:  Completed Specimen:  Blood from Peripheral, Hand, Right Updated:  01/19/20 0717     Blood Culture, Routine No Growth to date    Narrative:       Aerobic and anaerobic    Blood culture x two cultures. Draw prior to antibiotics. [[id number]] Collected:  01/18/20 2351    Order Status:   Completed Specimen:  Blood from Peripheral, Forearm, Left Updated:  01/19/20 0717     Blood Culture, Routine No Growth to date    Narrative:       Aerobic and anaerobic    Urine Culture High Risk [[id number]]     Order Status:  No result Specimen:  Urine, Clean Catch         Imaging Reviewed:   Ft x-raySoft tissue swelling over the lateral aspect of the foot with no acute displaced fracture identified.    CXR on admission  1.  Interval placement of a right-sided IJ central venous catheter with tip at the level of the SVC.    2.  Focal rounded airspace opacity in the juxtapleural left mid lung zone consistent with pneumonia.  Consider radiographic follow-up in 6-8 weeks after treatment to document resolution (as radiographic findings may persist beyond clinical symptoms and underlying malignancy is not excluded).   CT last year on 01/15/2019  1. Small-to-moderate volume free fluid within the abdomen, most pronounced along the left paracolic gutter.  2. Cholelithiasis.  3. Small bilateral pleural fluid  and deep and lower lobe airspace disease which could reflect atelectasis or pneumonia. Please correlate with dedicated chest  imaging.  4. Nonobstructing right nephrolithiasis.  5. Atherosclerosis, colonic diverticulosis, and other incidental findings as  above.  Cardiology:    IMPRESSION & PLAN   Sepsis, septic shock  Left lung infiltrate/pneumonia  Elevated lactic acid.  AD on CKD     history of CAD, CABG, AICD, CHF, HTN, D LP, PVD, obesity, CKD, FARHAN on BiPAP,N IDDM, neuropathy, GERD, history of diabetic foot infection due to Proteus vulgaris, group a strep and MRSA in January 2019, chronic foot ulcer for which he follows with podiatrist..    This patient is high risk for life-threatening deterioration and death secondary to above comorbidities and need for IV treatment    Recommendations:  If not improving  will consider imaging of sinuses, chest and foot.  Follow blood cultures.  Follow sputum culture  Repeat  procalcitonin.  Taper down antibiotics,  if procalcitonin has dropped by 50%.  Discontinue Tamiflu

## 2020-01-19 NOTE — ASSESSMENT & PLAN NOTE
Source most likely from pneumonia  Right foot ulcer source has to be ruled out.  Recent visit with podiatry.  Broad IV abx with empiric tx with tamiflu   Cultures in progress.  UA ordered with urine sample pending.  Did not receive IVF bolus due to HF.  Cautious IVFs for resuscitation.   Support with levophed

## 2020-01-19 NOTE — ASSESSMENT & PLAN NOTE
Acute kidney injury in the background of sepsis  His medications will be renally dosed  If worsens, consideration for renal consult  AM labs ordered

## 2020-01-19 NOTE — SUBJECTIVE & OBJECTIVE
"Past Medical History:   Diagnosis Date    AICD (automatic cardioverter/defibrillator) present     Anxiety and depression 2012    CAD (coronary artery disease) 2012    Cardiomegaly 2016    CHF (congestive heart failure) 2012    Cholecystitis 2017    Complete heart block 2012    Diabetic foot ulcer     DVT (deep venous thrombosis) 2012    And pulmonary embolus    GERD (gastroesophageal reflux disease) 2010    Heparin induced thrombocytopenia     Hyperlipemia 2011    IDDM (insulin dependent diabetes mellitus) 1983    With neuropathy    Ischemic cardiomyopathy 2012    MI (myocardial infarction) 2012    Morbid obesity     MRSA (methicillin resistant Staphylococcus aureus) infection 01/19/2019    Noncompliance with therapeutic plan 2019    Osteomyelitis of right foot 01/2019    Pulmonary edema 2019    Respiratory failure 2019    Sepsis 01/2019    Due to cellulitis right leg    Sleep apnea 2013    Thrombocytopathy 2012    Venous stasis dermatitis of both lower extremities        Past Surgical History:   Procedure Laterality Date    CARDIAC PACEMAKER PLACEMENT  12/2012    CARDIAC PACEMAKER PLACEMENT  10/04/2018    battery replacement    CORONARY ARTERY BYPASS GRAFT  06/2012    ESOPHAGOGASTRODUODENOSCOPY  01/31/2017    SAMARA FILTER PLACEMENT  2012    vena cava    LUMBAR SYMPATHETIC NERVE BLOCK Right 8/22/2019    Procedure: BLOCK, NERVE, SYMPATHETIC, LUMBAR;  Surgeon: Tashi Good MD;  Location: [facility];  Service: Pain Management;  Laterality: Right;  RIGHT SYMPATHETIC NERVE BLOCK       Review of patient's allergies indicates:   Allergen Reactions    Vasopressin Anaphylaxis    Heparin Other (See Comments)     Other reaction(s): "depleted my blood platets"      Heparin analogues Other (See Comments)     Depleted WBC count    Morphine Hallucinations and Other (See Comments)     Other reaction(s): Hallucinations  Paranoid, hallucinations      Vasopressin analogues Other (See Comments)     " ""felt like eyes going to pop out of my head"       No current facility-administered medications on file prior to encounter.      Current Outpatient Medications on File Prior to Encounter   Medication Sig    albuterol-ipratropium (DUO-NEB) 2.5 mg-0.5 mg/3 mL nebulizer solution ipratropium-albuterol 0.5 mg-3 mg(2.5 mg base)/3 mL nebulization soln    aspirin 81 MG Chew Take 81 mg by mouth once daily.    atorvastatin (LIPITOR) 10 MG tablet Take 1 tablet by mouth once daily.    BASAGLAR KWIKPEN U-100 INSULIN glargine 100 units/mL (3mL) SubQ pen 25 Units once daily.     carvedilol (COREG) 25 MG tablet Take 1 tablet by mouth 2 (two) times daily.    cefdinir (OMNICEF) 300 MG capsule cefdinir 300 mg capsule    furosemide (LASIX) 40 MG tablet Take 1 tablet by mouth 2 (two) times daily.    gabapentin (NEURONTIN) 600 MG tablet Take 1 tablet by mouth 3 (three) times daily.    metFORMIN (GLUCOPHAGE) 500 MG tablet Take 2 tablets by mouth 2 (two) times daily.    nortriptyline (PAMELOR) 25 MG capsule Take 1 capsule (25 mg total) by mouth every evening.    pantoprazole (PROTONIX) 40 MG tablet Take 1 tablet by mouth once daily.    spironolactone (ALDACTONE) 25 MG tablet Take 1 tablet by mouth once daily.    traMADol (ULTRAM) 50 mg tablet Take 1 tablet (50 mg total) by mouth every 8 (eight) hours as needed for Pain (Medically necessary for >7 days for chronic pain).    vitamin B complex-folic acid 0.4 mg Tab Take 1 tablet by mouth.     Family History     Problem Relation (Age of Onset)    Arthritis Mother    Cancer Father    Diabetes Mother    Heart disease Father    Stroke Father        Tobacco Use    Smoking status: Former Smoker     Packs/day: 2.00     Years: 38.00     Pack years: 76.00     Types: Cigarettes     Last attempt to quit:      Years since quittin.0    Smokeless tobacco: Never Used   Substance and Sexual Activity    Alcohol use: No     Frequency: Never    Drug use: Not on file    Sexual " activity: Never     Review of Systems   Constitutional: Positive for chills, diaphoresis, fatigue and fever. Negative for activity change and appetite change.   HENT: Negative for congestion and dental problem.    Eyes: Negative for discharge and itching.   Respiratory: Positive for cough and shortness of breath.    Cardiovascular: Negative for chest pain.   Gastrointestinal: Negative for abdominal distention and abdominal pain.   Endocrine: Negative for cold intolerance.   Genitourinary: Negative for difficulty urinating and dysuria.   Musculoskeletal: Negative for arthralgias and back pain.   Skin: Negative for color change.   Neurological: Negative for dizziness and facial asymmetry.   Hematological: Negative for adenopathy.   Psychiatric/Behavioral: Negative for agitation and behavioral problems.     Objective:     Vital Signs (Most Recent):  Temp: (!) 103.4 °F (39.7 °C) (01/19/20 0013)  Pulse: (!) 120 (01/19/20 0021)  Resp: (!) 25 (01/18/20 2340)  BP: (!) 102/58 (01/19/20 0021)  SpO2: 98 % (01/19/20 0021) Vital Signs (24h Range):  Temp:  [103.4 °F (39.7 °C)] 103.4 °F (39.7 °C)  Pulse:  [120-127] 120  Resp:  [25-34] 25  SpO2:  [92 %-98 %] 98 %  BP: ()/(49-58) 102/58     Weight: 112 kg (247 lb)  Body mass index is 33.5 kg/m².    Physical Exam   Constitutional: He is oriented to person, place, and time. He appears well-developed.   HENT:   Head: Normocephalic and atraumatic.   Eyes: Pupils are equal, round, and reactive to light.   Neck: Normal range of motion and full passive range of motion without pain.   Cardiovascular: Normal rate and regular rhythm.   Tachycardic   Pulmonary/Chest: Effort normal and breath sounds normal.   Reduced air entry to bases   Abdominal: Soft. Bowel sounds are normal.   Musculoskeletal: Normal range of motion.   Neurological: He is alert and oriented to person, place, and time.   Skin: Skin is warm.   Psychiatric: He has a normal mood and affect.   Nursing note and vitals  reviewed.        CRANIAL NERVES     CN III, IV, VI   Pupils are equal, round, and reactive to light.       Significant Labs:   CBC:   Recent Labs   Lab 01/18/20  2351   WBC 28.95*   HGB 14.4   HCT 43.3   *     CMP:   Recent Labs   Lab 01/18/20  2351   *   K 5.3*   CL 95   CO2 23   *   CALCIUM 9.0   ALKPHOS 54*   AST 31   ANIONGAP 15       Significant Imaging: I have reviewed all pertinent imaging results/findings within the past 24 hours.

## 2020-01-19 NOTE — ASSESSMENT & PLAN NOTE
Patient getting admitted with sepsis.  Source most likely from pneumonia  Right foot ulcer source has to be ruled out  Another possibility is acute influenza   Pancultures will be obtained, IV fluid and IV antibiotics will be administered  Patient will be admitted to ICU

## 2020-01-19 NOTE — ASSESSMENT & PLAN NOTE
Patient will get admitted for possible pneumonia as a source of sepsis  He was treated in a different hospital for acute pneumonia this month

## 2020-01-19 NOTE — HOSPITAL COURSE
Patient admitted with severe sepsis and then septic shock suspected to be due to Pneumonia.  Admitted to the ICU on levophed with broad IV abx and empiric Tamiflu. Flu screen negative. BCX - one set grew Strep pneumoniae. Repeat Cultures drawn 1/20. Sputum Cx ordered and is in progress.  UA obtained and not impressive for infection. Recently treated for pneumonia at [facility] in [place] 1/4-1/7.  Also following with Podiatry for right foot ulcer. Current green-yellow sputum. Encephalopathic (infectious) on presentation but this has improved with treatment. Did not receive IVF bolus due to chronic SHF- instead gentle maintenance fluid.  Lactic acid hs improved. Procalcitonin increased from 5 to 24 but patient clinically looks better 1/20.   ID following. CXR with left infiltrate with repeat CXR without significant improvement 1/20.  Levophed weaned off 1/19 evening. Evidence of AD on presentation that is improving. Hyperkalemia on presentation has improved. Has Hx of HIT- no heparin products but platelet decreased from 101 to 52.

## 2020-01-19 NOTE — ANESTHESIA PROCEDURE NOTES
Central Line    Diagnosis: sepsis  Doctor requesting consult: Andrew  Patient location during procedure: ED  Procedure start time: 1/19/2020 2:00 AM  Timeout: 1/19/2020 2:00 AM  Procedure end time: 1/19/2020 2:20 AM    Staffing  Authorizing Provider: Homero Salcido MD  Performing Provider: Homero Salcido MD    Staffing  Anesthesiologist: Homero Salcido MD  Performed: anesthesiologist   Anesthesiologist was present at the time of the procedure.  Preanesthetic Checklist  Completed: patient identified, site marked, timeout performed, risks and benefits discussed, monitors and equipment checked and anesthesia consent given  Indication   Indication: vascular access     Anesthesia   local infiltration    Central Line   Skin Prep: skin prepped with ChloraPrep, skin prep agent completely dried prior to procedure  maximum sterile barriers used during central venous catheter insertion  hand hygiene performed prior to central venous catheter insertion  Location: right, internal jugular.   Catheter type: triple lumen  Catheter Size: 7 Fr  Inserted Catheter Length: 15 cm  Ultrasound: vascular probe with ultrasound  Vessel Caliber: medium, patent, compressibility normal  Needle advanced into vessel with real time Ultrasound guidance.  Guidewire confirmed in vessel.  Manometry: none  Insertion Attempts: 1   Securement:line sutured, chlorhexidine patch, sterile dressing applied and blood return through all ports    Post-Procedure   X-Ray: no pneumothorax on x-ray, placement verified by x-ray, tip termination and successful placement  Tip termination comments: SVC   Adverse Events:none    Guidewire Guidewire removed intact.   Additional Notes  Patient did not want to hear possible complications during the informed consent process. However, I did inform his wife of all possible complications, including AICD lead displacement, although unlikely since the AICD was placed years ago.  During the procedure, I inserted the guidewire no  more than 10-15 cm to minimize the risk of this complication. Guidewire was withdrawn easily.  Postprocedure chest x-ray shows no apparent change in the 3 AICD leads.

## 2020-01-19 NOTE — ASSESSMENT & PLAN NOTE
Chronic skins ulcer of the right foot  Patient was assessed by his podiatrist 10 days ago  He has a history of right foot osteomyelitis

## 2020-01-20 ENCOUNTER — CLINICAL SUPPORT (OUTPATIENT)
Dept: CARDIOLOGY | Facility: HOSPITAL | Age: 60
DRG: 871 | End: 2020-01-20
Attending: INTERNAL MEDICINE
Payer: MEDICARE

## 2020-01-20 VITALS — WEIGHT: 244.94 LBS | BODY MASS INDEX: 33.18 KG/M2 | HEIGHT: 72 IN

## 2020-01-20 PROBLEM — R65.21 SEPTIC SHOCK: Status: RESOLVED | Noted: 2019-01-01 | Resolved: 2020-01-20

## 2020-01-20 PROBLEM — R65.21 SEPTIC SHOCK: Status: ACTIVE | Noted: 2020-01-20

## 2020-01-20 PROBLEM — A41.9 SEPTIC SHOCK: Status: RESOLVED | Noted: 2019-01-01 | Resolved: 2020-01-20

## 2020-01-20 PROBLEM — R78.81 BACTEREMIA: Status: ACTIVE | Noted: 2020-01-20

## 2020-01-20 PROBLEM — A41.9 SEPTIC SHOCK: Status: ACTIVE | Noted: 2020-01-20

## 2020-01-20 LAB
ALBUMIN SERPL BCP-MCNC: 2.7 G/DL (ref 3.5–5.2)
ALP SERPL-CCNC: 40 U/L (ref 55–135)
ALT SERPL W/O P-5'-P-CCNC: 21 U/L (ref 10–44)
ANION GAP SERPL CALC-SCNC: 12 MMOL/L (ref 8–16)
AST SERPL-CCNC: 15 U/L (ref 10–40)
BASOPHILS # BLD AUTO: 0.03 K/UL (ref 0–0.2)
BASOPHILS NFR BLD: 0.2 % (ref 0–1.9)
BILIRUB SERPL-MCNC: 1.1 MG/DL (ref 0.1–1)
BUN SERPL-MCNC: 44 MG/DL (ref 6–20)
CALCIUM SERPL-MCNC: 7.7 MG/DL (ref 8.7–10.5)
CHLORIDE SERPL-SCNC: 98 MMOL/L (ref 95–110)
CO2 SERPL-SCNC: 25 MMOL/L (ref 23–29)
CREAT SERPL-MCNC: 1.7 MG/DL (ref 0.5–1.4)
DIFFERENTIAL METHOD: ABNORMAL
EOSINOPHIL # BLD AUTO: 0.2 K/UL (ref 0–0.5)
EOSINOPHIL NFR BLD: 1.2 % (ref 0–8)
ERYTHROCYTE [DISTWIDTH] IN BLOOD BY AUTOMATED COUNT: 14.5 % (ref 11.5–14.5)
EST. GFR  (AFRICAN AMERICAN): 50.6 ML/MIN/1.73 M^2
EST. GFR  (NON AFRICAN AMERICAN): 43.8 ML/MIN/1.73 M^2
GLUCOSE SERPL-MCNC: 190 MG/DL (ref 70–110)
GLUCOSE SERPL-MCNC: 224 MG/DL (ref 70–110)
GLUCOSE SERPL-MCNC: 252 MG/DL (ref 70–110)
GLUCOSE SERPL-MCNC: 281 MG/DL (ref 70–110)
GLUCOSE SERPL-MCNC: 287 MG/DL (ref 70–110)
HCT VFR BLD AUTO: 33.6 % (ref 40–54)
HGB BLD-MCNC: 11.2 G/DL (ref 14–18)
IMM GRANULOCYTES # BLD AUTO: 0.14 K/UL (ref 0–0.04)
IMM GRANULOCYTES NFR BLD AUTO: 0.8 % (ref 0–0.5)
LACTATE SERPL-SCNC: 1.1 MMOL/L (ref 0.5–1.9)
LYMPHOCYTES # BLD AUTO: 0.6 K/UL (ref 1–4.8)
LYMPHOCYTES NFR BLD: 3.4 % (ref 18–48)
MAGNESIUM SERPL-MCNC: 2.1 MG/DL (ref 1.6–2.6)
MCH RBC QN AUTO: 31.4 PG (ref 27–31)
MCHC RBC AUTO-ENTMCNC: 33.3 G/DL (ref 32–36)
MCV RBC AUTO: 94 FL (ref 82–98)
MONOCYTES # BLD AUTO: 0.8 K/UL (ref 0.3–1)
MONOCYTES NFR BLD: 4.7 % (ref 4–15)
NEUTROPHILS # BLD AUTO: 15.2 K/UL (ref 1.8–7.7)
NEUTROPHILS NFR BLD: 89.7 % (ref 38–73)
NRBC BLD-RTO: 0 /100 WBC
PLATELET # BLD AUTO: 52 K/UL (ref 150–350)
PMV BLD AUTO: 11.7 FL (ref 9.2–12.9)
POTASSIUM SERPL-SCNC: 4.2 MMOL/L (ref 3.5–5.1)
PROCALCITONIN SERPL IA-MCNC: 24.82 NG/ML (ref 0–0.5)
PROT SERPL-MCNC: 6.3 G/DL (ref 6–8.4)
RBC # BLD AUTO: 3.57 M/UL (ref 4.6–6.2)
SODIUM SERPL-SCNC: 135 MMOL/L (ref 136–145)
WBC # BLD AUTO: 16.92 K/UL (ref 3.9–12.7)

## 2020-01-20 PROCEDURE — 63600175 PHARM REV CODE 636 W HCPCS: Performed by: INTERNAL MEDICINE

## 2020-01-20 PROCEDURE — 84145 PROCALCITONIN (PCT): CPT

## 2020-01-20 PROCEDURE — C9399 UNCLASSIFIED DRUGS OR BIOLOG: HCPCS | Performed by: INTERNAL MEDICINE

## 2020-01-20 PROCEDURE — 87040 BLOOD CULTURE FOR BACTERIA: CPT

## 2020-01-20 PROCEDURE — 25000003 PHARM REV CODE 250: Performed by: INTERNAL MEDICINE

## 2020-01-20 PROCEDURE — 82962 GLUCOSE BLOOD TEST: CPT

## 2020-01-20 PROCEDURE — 80053 COMPREHEN METABOLIC PANEL: CPT

## 2020-01-20 PROCEDURE — 85025 COMPLETE CBC W/AUTO DIFF WBC: CPT

## 2020-01-20 PROCEDURE — 36415 COLL VENOUS BLD VENIPUNCTURE: CPT

## 2020-01-20 PROCEDURE — 93306 TTE W/DOPPLER COMPLETE: CPT

## 2020-01-20 PROCEDURE — 83735 ASSAY OF MAGNESIUM: CPT

## 2020-01-20 PROCEDURE — 83605 ASSAY OF LACTIC ACID: CPT

## 2020-01-20 PROCEDURE — 25000003 PHARM REV CODE 250

## 2020-01-20 PROCEDURE — 21400001 HC TELEMETRY ROOM

## 2020-01-20 RX ORDER — LEVOFLOXACIN 500 MG/1
500 TABLET, FILM COATED ORAL
Status: DISCONTINUED | OUTPATIENT
Start: 2020-01-21 | End: 2020-01-22

## 2020-01-20 RX ADMIN — TRAMADOL HYDROCHLORIDE 50 MG: 50 TABLET, FILM COATED ORAL at 06:01

## 2020-01-20 RX ADMIN — GABAPENTIN 600 MG: 300 CAPSULE ORAL at 03:01

## 2020-01-20 RX ADMIN — SODIUM CHLORIDE: 0.9 INJECTION, SOLUTION INTRAVENOUS at 03:01

## 2020-01-20 RX ADMIN — PIPERACILLIN AND TAZOBACTAM 4.5 G: 4; .5 INJECTION, POWDER, LYOPHILIZED, FOR SOLUTION INTRAVENOUS; PARENTERAL at 02:01

## 2020-01-20 RX ADMIN — MUPIROCIN: 20 OINTMENT TOPICAL at 09:01

## 2020-01-20 RX ADMIN — INSULIN ASPART 6 UNITS: 100 INJECTION, SOLUTION INTRAVENOUS; SUBCUTANEOUS at 12:01

## 2020-01-20 RX ADMIN — ASPIRIN 81 MG 81 MG: 81 TABLET ORAL at 08:01

## 2020-01-20 RX ADMIN — INSULIN ASPART 6 UNITS: 100 INJECTION, SOLUTION INTRAVENOUS; SUBCUTANEOUS at 04:01

## 2020-01-20 RX ADMIN — GABAPENTIN 600 MG: 300 CAPSULE ORAL at 09:01

## 2020-01-20 RX ADMIN — TRAMADOL HYDROCHLORIDE 50 MG: 50 TABLET, FILM COATED ORAL at 08:01

## 2020-01-20 RX ADMIN — INSULIN ASPART 3 UNITS: 100 INJECTION, SOLUTION INTRAVENOUS; SUBCUTANEOUS at 09:01

## 2020-01-20 RX ADMIN — CHLORHEXIDINE GLUCONATE 15 ML: 1.2 RINSE ORAL at 08:01

## 2020-01-20 RX ADMIN — MUPIROCIN: 20 OINTMENT TOPICAL at 08:01

## 2020-01-20 RX ADMIN — CHLORHEXIDINE GLUCONATE 15 ML: 1.2 RINSE ORAL at 09:01

## 2020-01-20 RX ADMIN — GABAPENTIN 600 MG: 300 CAPSULE ORAL at 08:01

## 2020-01-20 RX ADMIN — SODIUM CHLORIDE: 0.9 INJECTION, SOLUTION INTRAVENOUS at 05:01

## 2020-01-20 RX ADMIN — INSULIN DETEMIR 25 UNITS: 100 INJECTION, SOLUTION SUBCUTANEOUS at 08:01

## 2020-01-20 RX ADMIN — LEVOFLOXACIN 500 MG: 500 INJECTION, SOLUTION INTRAVENOUS at 01:01

## 2020-01-20 RX ADMIN — INSULIN ASPART 2 UNITS: 100 INJECTION, SOLUTION INTRAVENOUS; SUBCUTANEOUS at 08:01

## 2020-01-20 NOTE — ASSESSMENT & PLAN NOTE
Levophed weaned off and shock resolved  Able to move out of MICU  Pneumonia and bacteremia felt to be source

## 2020-01-20 NOTE — ASSESSMENT & PLAN NOTE
History of HIT  Because of this heparin/Lovenox will not be prescribed  Platelets dropped from 101 to 52. Monitoring closely. If continues to drop, will need Heme onc consult

## 2020-01-20 NOTE — ASSESSMENT & PLAN NOTE
Broad IV abx. ID following.  One set of blood cultures grew s. Pneumoniae.  Repeat BCx drawn today.  Repeating CXR in the AM.  No significant improvement thus far.  Supplemental O2 as needed- currently on room air  Nebs prn  Levophed weaned off 1/19

## 2020-01-20 NOTE — PLAN OF CARE
Vital signs, cardiac and lab monitoring. IV hydration  Possible discharge in am . Increase activity as tolerated. Bed alarm on. Watch for falls. Watch for sign and symptoms of bleeding.  Accuchecks per order.Breathing treatments and adjust oxygen as needed. Strict I & O. Daily weights.

## 2020-01-20 NOTE — ASSESSMENT & PLAN NOTE
Acute kidney injury in the background of sepsis  His medications will be renally dosed  Improving likely due to improved BP and Blood flow to kidneys  AM labs ordered to monitor

## 2020-01-20 NOTE — SUBJECTIVE & OBJECTIVE
Interval History: Shortness of breath POA- location is chest, persistent, moderately severe, associated with cough.  Overall, feels better today compared to yesterday.  Levophed weaned off 1/19 evening.     Review of Systems   Constitutional: Negative.    HENT: Negative.    Eyes: Negative.    Respiratory: Positive for shortness of breath.    Cardiovascular: Negative.    Gastrointestinal: Negative.    Endocrine: Negative.    Genitourinary: Negative.    Musculoskeletal: Negative.         Neuropathy in hands and feet   Skin: Negative.    Allergic/Immunologic: Negative.    Neurological: Positive for weakness.   Hematological: Negative.    Psychiatric/Behavioral: Negative.      Objective:     Vital Signs (Most Recent):  Temp: 98.7 °F (37.1 °C) (01/20/20 0800)  Pulse: 92 (01/20/20 1020)  Resp: 19 (01/20/20 1100)  BP: 108/63 (01/20/20 1020)  SpO2: 95 % (01/20/20 1100) Vital Signs (24h Range):  Temp:  [98.4 °F (36.9 °C)-98.8 °F (37.1 °C)] 98.7 °F (37.1 °C)  Pulse:  [] 92  Resp:  [18-71] 19  SpO2:  [89 %-100 %] 95 %  BP: ()/(11-68) 108/63  Arterial Line BP: ()/(30-65) 97/55     Weight: 111.1 kg (245 lb)  Body mass index is 33.23 kg/m².    Intake/Output Summary (Last 24 hours) at 1/20/2020 1214  Last data filed at 1/20/2020 0517  Gross per 24 hour   Intake 1250 ml   Output 3300 ml   Net -2050 ml      Physical Exam   Constitutional: He is oriented to person, place, and time. He appears well-developed. No distress.   HENT:   Head: Normocephalic and atraumatic.   Mouth/Throat: Oropharynx is clear and moist.   Eyes: Pupils are equal, round, and reactive to light. EOM are normal.   Neck: Normal range of motion.   Cardiovascular: Regular rhythm.   No murmur heard.  Pulmonary/Chest: Effort normal and breath sounds normal. He has no wheezes.   Abdominal: Soft. He exhibits no distension. There is no tenderness. There is no rebound and no guarding.   Musculoskeletal: Normal range of motion.   Neurological: He is alert  and oriented to person, place, and time. No cranial nerve deficit or sensory deficit.   Skin: No rash noted.   Psychiatric: He has a normal mood and affect.   Nursing note and vitals reviewed.      Significant Labs:   CBC:   Recent Labs   Lab 01/18/20  2351 01/19/20  0309 01/19/20 0413 01/20/20  0350   WBC 28.95*  --  39.79* 16.92*   HGB 14.4  --  12.4* 11.2*   HCT 43.3 32* 37.4* 33.6*   *  --  101* 52*     CMP:   Recent Labs   Lab 01/18/20  2351 01/19/20  0413 01/20/20  0350   * 132* 135*   K 5.3* 4.6 4.2   CL 95 98 98   CO2 23 22* 25   * 249* 224*   BUN 46* 48* 44*   CREATININE 1.8* 2.2* 1.7*   CALCIUM 9.0 8.0* 7.7*   PROT 7.3 6.0 6.3   ALBUMIN 3.5 3.0* 2.7*   BILITOT 1.7* 2.3* 1.1*   ALKPHOS 54* 43* 40*   AST 31 27 15   ALT 29 26 21   ANIONGAP 15 12 12   EGFRNONAA 40.9* 32.1* 43.8*     Lactic Acid:   Recent Labs   Lab 01/19/20  1600 01/19/20 2003 01/20/20  0033   LACTATE 2.0* 1.4 1.1       Significant Imaging: CXR: I have reviewed all pertinent results/findings within the past 24 hours and my personal findings are:  Unchanged infiltrates

## 2020-01-20 NOTE — PLAN OF CARE
01/19/20 1942   Patient Assessment/Suction   Level of Consciousness (AVPU) alert   Respiratory Effort Unlabored;Normal   Expansion/Accessory Muscles/Retractions expansion symmetric;no retractions;no use of accessory muscles   All Lung Fields Breath Sounds diminished;equal bilaterally   PRE-TX-O2   O2 Device (Oxygen Therapy) room air   SpO2 95 %   Pulse Oximetry Type Continuous   $ Pulse Oximetry - Multiple Charge Pulse Oximetry - Multiple   Pulse 82   Resp (!) 33   Aerosol Therapy   $ Aerosol Therapy Charges PRN treatment not required

## 2020-01-20 NOTE — PROGRESS NOTES
[facility] Medicine  Progress Note    Patient Name: Ana Bullard  MRN: [id number]  Patient Class: IP- Inpatient   Admission Date: 1/18/2020  Length of Stay: 1 days  Attending Physician: Cong Montalvo MD  Primary Care Provider: YRN Landry        Subjective:     Principal Problem:Septic shock        HPI:  59-year-old patient getting admitted with severe sepsis  Patient from January 4 to January 7 was in a hospital in [place] [facility]  and was treated for acute pneumonia and acute congestive heart failure  Eventually he was discharged home with p.o. Cefdinir  There after he was seen by his podiatrist for his chronic right foot ulcers and also by the pain medicine MD (Last week)  Patient did not had a flu shot this year and he does not want flu shot  Today all of sudden he started having fever and temperature went up to 103° associated with chills rigors and diaphoretic episodes  Also he he has been suffering from cough for the past few weeks and today he was coughing up a lot with some induction of sputum  Later he started having shortness of breath and he had episodes of confusion  This prompted the family bring the patient to the hospital and in ER he was found to be tachycardic, hypotensive and his WBC levels were very high  He was given 1 L of bolus and his mental condition improved.  Temperature stayed in the range of 1 not pre  Patient will be admitted in ICU for severe sepsis        Overview/Hospital Course:  Patient admitted with severe sepsis and then septic shock suspected to be due to Pneumonia.  Admitted to the ICU on levophed with broad IV abx and empiric Tamiflu. Flu screen negative. BCX - one set grew Strep pneumoniae. Repeat Cultures drawn 1/20. Sputum Cx ordered and is in progress.  UA obtained and not impressive for infection. Recently treated for pneumonia at hospital in [place] 1/4-1/7.  Also following with Podiatry for right foot ulcer. Current green-yellow  sputum. Encephalopathic (infectious) on presentation but this has improved with treatment. Did not receive IVF bolus due to chronic SHF- instead gentle maintenance fluid.  Lactic acid hs improved. Procalcitonin increased from 5 to 24 but patient clinically looks better 1/20.   ID following. CXR with left infiltrate with repeat CXR without significant improvement 1/20.  Levophed weaned off 1/19 evening. Evidence of AD on presentation that is improving. Hyperkalemia on presentation has improved. Has Hx of HIT- no heparin products but platelet decreased from 101 to 52.     Interval History: Shortness of breath POA- location is chest, persistent, moderately severe, associated with cough.  Overall, feels better today compared to yesterday.  Levophed weaned off 1/19 evening.     Review of Systems   Constitutional: Negative.    HENT: Negative.    Eyes: Negative.    Respiratory: Positive for shortness of breath.    Cardiovascular: Negative.    Gastrointestinal: Negative.    Endocrine: Negative.    Genitourinary: Negative.    Musculoskeletal: Negative.         Neuropathy in hands and feet   Skin: Negative.    Allergic/Immunologic: Negative.    Neurological: Positive for weakness.   Hematological: Negative.    Psychiatric/Behavioral: Negative.      Objective:     Vital Signs (Most Recent):  Temp: 98.7 °F (37.1 °C) (01/20/20 0800)  Pulse: 92 (01/20/20 1020)  Resp: 19 (01/20/20 1100)  BP: 108/63 (01/20/20 1020)  SpO2: 95 % (01/20/20 1100) Vital Signs (24h Range):  Temp:  [98.4 °F (36.9 °C)-98.8 °F (37.1 °C)] 98.7 °F (37.1 °C)  Pulse:  [] 92  Resp:  [18-71] 19  SpO2:  [89 %-100 %] 95 %  BP: ()/(11-68) 108/63  Arterial Line BP: ()/(30-65) 97/55     Weight: 111.1 kg (245 lb)  Body mass index is 33.23 kg/m².    Intake/Output Summary (Last 24 hours) at 1/20/2020 1214  Last data filed at 1/20/2020 0517  Gross per 24 hour   Intake 1250 ml   Output 3300 ml   Net -2050 ml      Physical Exam   Constitutional: He is  oriented to person, place, and time. He appears well-developed. No distress.   HENT:   Head: Normocephalic and atraumatic.   Mouth/Throat: Oropharynx is clear and moist.   Eyes: Pupils are equal, round, and reactive to light. EOM are normal.   Neck: Normal range of motion.   Cardiovascular: Regular rhythm.   No murmur heard.  Pulmonary/Chest: Effort normal and breath sounds normal. He has no wheezes.   Abdominal: Soft. He exhibits no distension. There is no tenderness. There is no rebound and no guarding.   Musculoskeletal: Normal range of motion.   Neurological: He is alert and oriented to person, place, and time. No cranial nerve deficit or sensory deficit.   Skin: No rash noted.   Psychiatric: He has a normal mood and affect.   Nursing note and vitals reviewed.      Significant Labs:   CBC:   Recent Labs   Lab 01/18/20  2351 01/19/20  0309 01/19/20  0413 01/20/20  0350   WBC 28.95*  --  39.79* 16.92*   HGB 14.4  --  12.4* 11.2*   HCT 43.3 32* 37.4* 33.6*   *  --  101* 52*     CMP:   Recent Labs   Lab 01/18/20  2351 01/19/20  0413 01/20/20  0350   * 132* 135*   K 5.3* 4.6 4.2   CL 95 98 98   CO2 23 22* 25   * 249* 224*   BUN 46* 48* 44*   CREATININE 1.8* 2.2* 1.7*   CALCIUM 9.0 8.0* 7.7*   PROT 7.3 6.0 6.3   ALBUMIN 3.5 3.0* 2.7*   BILITOT 1.7* 2.3* 1.1*   ALKPHOS 54* 43* 40*   AST 31 27 15   ALT 29 26 21   ANIONGAP 15 12 12   EGFRNONAA 40.9* 32.1* 43.8*     Lactic Acid:   Recent Labs   Lab 01/19/20  1600 01/19/20 2003 01/20/20  0033   LACTATE 2.0* 1.4 1.1       Significant Imaging: CXR: I have reviewed all pertinent results/findings within the past 24 hours and my personal findings are:  Unchanged infiltrates      Assessment/Plan:      Septic shock  Levophed weaned off and shock resolved  Able to move out of MICU  Pneumonia and bacteremia felt to be source      Hypomagnesemia  Replacing. Monitoring.  AM labs ordered      Hyponatremia  Replacing.  Monitoring.  AM labs ordered      AD (acute  kidney injury)  Acute kidney injury in the background of sepsis  His medications will be renally dosed  Improving likely due to improved BP and Blood flow to kidneys  AM labs ordered to monitor       Hyperkalemia  Hyperkalemia upon admission  Kayexalate already given to the patient from ER  Improved.  Monitoring.  Repeat AM labs ordered.      Heparin induced thrombocytopenia  History of HIT  Because of this heparin/Lovenox will not be prescribed  Platelets dropped from 101 to 52. Monitoring closely. If continues to drop, will need Heme onc consult      Chronic systolic heart failure  Patient suffers from chronic systolic heart failure and is on diuretics at home  This will be put on hold and patient will be started on gentle IV hydration in view with shock.  Now that out of shock, I have stopped IVFs but will wait another day to consider restarting diuretics.  Will need to watch volume status closely.      CAD (coronary artery disease)  History of coronary artery disease/CABG      Chronic pain  Patient suffers from chronic pain and is on medication  He has a history of lumbar sympathetic nerve block   Ultram, Gabapentin restarted      Obesity  Need therapeutic lifestyle changes      AICD (automatic cardioverter/defibrillator) present  AICD insitu      Pneumonia  Broad IV abx. ID following.  One set of blood cultures grew s. Pneumoniae.  Repeat BCx drawn today.  Repeating CXR in the AM.  No significant improvement thus far.  Supplemental O2 as needed- currently on room air  Nebs prn  Levophed weaned off 1/19      Peripheral vascular disease  Chronic stable issue      Skin ulcer of right foot with fat layer exposed  Chronic skins ulcer of the right foot  Patient was assessed by his podiatrist 10 days ago  He has a history of right foot osteomyelitis  Wound care consult      Type II diabetes mellitus with neurological manifestations  Patient suffers from type 2 diabetes mellitus and neuropathy  He is on insulin regimen  at home  ISS  Accuchecks  Gabapentin and ultram restarted for neuropathy         VTE Risk Mitigation (From admission, onward)         Ordered     IP VTE HIGH RISK PATIENT  Once      01/19/20 0046                      Cong Montalvo MD  Department of Hospital Medicine   [facility]

## 2020-01-20 NOTE — ASSESSMENT & PLAN NOTE
Patient suffers from chronic systolic heart failure and is on diuretics at home  This will be put on hold and patient will be started on gentle IV hydration in view with shock.  Now that out of shock, I have stopped IVFs but will wait another day to consider restarting diuretics.  Will need to watch volume status closely.

## 2020-01-20 NOTE — PROGRESS NOTES
[facility]  Adult Nutrition   Progress Note (Initial Assessment)     SUMMARY     Recommendations  Recommendation/Intervention: 1.) RD to add ADA diet restriction to current diet order 2.)  to assist with meal selections daily   Goals: 1.) Pt to meet at least 75% estimated needs through oral diet   Nutrition Goal Status: progressing towards goal    Reason for Assessment    Reason For Assessment: other (see comments)(ICU status)  Diagnosis: infection/sepsis(septic shock)  Relevant Medical History: CHF, CAD, MI, DM, hyperlipidemia     Nutrition Risk Screen    Nutrition Risk Screen: no indicators present             Nutrition/Diet History    Food Allergies: NKFA  Factors Affecting Nutritional Intake: None identified at this time    Anthropometrics    Temp: 98.7 °F (37.1 °C)  Height Method: Stated  Height: 6' (182.9 cm)  Height (inches): 72 in  Weight Method: Bed Scale  Weight: 111.1 kg (245 lb)  Weight (lb): 245 lb  Ideal Body Weight (IBW), Male: 178 lb  % Ideal Body Weight, Male (lb): 137.64 %  BMI (Calculated): 33.2  BMI Grade: 30 - 34.9- obesity - grade I       Weight History:  Wt Readings from Last 10 Encounters:   01/20/20 111.1 kg (245 lb)   01/09/20 114.3 kg (252 lb)   01/08/20 116.6 kg (257 lb)   10/14/19 116.6 kg (257 lb)   10/07/19 116.6 kg (257 lb)   08/21/19 116.6 kg (257 lb)   08/06/19 116.6 kg (257 lb)   08/05/19 116.6 kg (257 lb)   08/01/19 116.7 kg (257 lb 3.2 oz)   06/24/19 109.3 kg (241 lb)       Lab/Procedures/Meds: Pertinent Labs Reviewed  Clinical Chemistry:  Recent Labs   Lab 01/20/20  0350   *   K 4.2   CL 98   CO2 25   *   BUN 44*   CREATININE 1.7*   CALCIUM 7.7*   PROT 6.3   ALBUMIN 2.7*   BILITOT 1.1*   ALKPHOS 40*   AST 15   ALT 21   ANIONGAP 12   ESTGFRAFRICA 50.6*   EGFRNONAA 43.8*   MG 2.1     CBC:   Recent Labs   Lab 01/20/20  0350   WBC 16.92*   RBC 3.57*   HGB 11.2*   HCT 33.6*   PLT 52*   MCV 94   MCH 31.4*   MCHC 33.3     Cardiac Profile:  Recent Labs    Lab 01/18/20  2351   *   TROPONINI 0.068*       Medications: Pertinent Medications reviewed  Scheduled Meds:   aspirin  81 mg Oral Daily    chlorhexidine  15 mL Mouth/Throat BID    gabapentin  600 mg Oral TID    insulin detemir U-100  25 Units Subcutaneous Daily    [START ON 1/21/2020] levoFLOXacin  500 mg Oral Before breakfast    mupirocin   Nasal BID    vancomycin (VANCOCIN) IVPB  1,750 mg Intravenous Q24H     Continuous Infusions:   sodium chloride 0.9% 75 mL/hr at 01/20/20 0517    norepinephrine bitartrate-D5W Stopped (01/19/20 1609)     PRN Meds:.acetaminophen, albuterol-ipratropium, calcium chloride IVPB, calcium chloride IVPB, calcium chloride IVPB, dextrose 50%, dextrose 50%, glucagon (human recombinant), glucose, glucose, insulin aspart U-100, magnesium oxide, magnesium oxide, magnesium oxide, magnesium sulfate IVPB, magnesium sulfate IVPB, magnesium sulfate IVPB, magnesium sulfate IVPB, ondansetron, potassium chloride in water, potassium chloride in water, potassium chloride in water, potassium chloride in water, potassium chloride 10%, potassium chloride 10%, potassium chloride, potassium chloride, potassium chloride, potassium chloride, sodium phosphate IVPB, sodium phosphate IVPB, sodium phosphate IVPB, sodium phosphate IVPB, sodium phosphate IVPB, traMADol, Pharmacy to dose Vancomycin consult **AND** vancomycin - pharmacy to dose    Estimated/Assessed Needs    Weight Used For Calorie Calculations: 111.1 kg (244 lb 14.9 oz)  Energy Calorie Requirements (kcal): 2222 (20 kcal/kg)  Energy Need Method: Kcal/kg  Protein Requirements: 121 g (1.5 g/kg) per IBW  Weight Used For Protein Calculations: 81 kg (178 lb 9.2 oz)(IBW)     Estimated Fluid Requirement Method: RDA Method  RDA Method (mL): 2222       Nutrition Prescription Ordered    Current Diet Order: cardiac     Evaluation of Received Nutrient/Fluid Intake    IV Fluid (mL): 1800  Energy Calories Required: meeting needs  Protein  Required: meeting needs  Fluid Required: meeting needs  Tolerance: tolerating     Intake/Output Summary (Last 24 hours) at 1/20/2020 1202  Last data filed at 1/20/2020 0517  Gross per 24 hour   Intake 1250 ml   Output 3300 ml   Net -2050 ml        % Meal Intake: 75 - 100 %    Dietitian Rounds Brief    Pt assessed 2' ICU status. Intake good. Tolerating diet; no c/o voiced. LBM 1/19. Denies any N/V. RD obtained prefs--will communicate to kitchen. Denies any wt changes PTA.    Nutrition Risk    Level of Risk/Frequency of Follow-up: moderate       Monitor and Evaluation    Food and Nutrient Intake: energy intake, food and beverage intake  Food and Nutrient Adminstration: diet order  Physical Activity and Function: nutrition-related ADLs and IADLs  Anthropometric Measurements: weight, weight change  Biochemical Data, Medical Tests and Procedures: electrolyte and renal panel, gastrointestinal profile, glucose/endocrine profile  Nutrition-Focused Physical Findings: overall appearance     Nutrition Follow-Up    RD Follow-up?: Yes    Nathalie Eckert RD 01/20/2020 12:02 PM

## 2020-01-21 LAB
ALBUMIN SERPL BCP-MCNC: 2.8 G/DL (ref 3.5–5.2)
ALP SERPL-CCNC: 42 U/L (ref 55–135)
ALT SERPL W/O P-5'-P-CCNC: 19 U/L (ref 10–44)
ANION GAP SERPL CALC-SCNC: 12 MMOL/L (ref 8–16)
AST SERPL-CCNC: 12 U/L (ref 10–40)
BACTERIA BLD CULT: ABNORMAL
BACTERIA SPEC AEROBE CULT: NORMAL
BASOPHILS # BLD AUTO: 0.02 K/UL (ref 0–0.2)
BASOPHILS NFR BLD: 0.2 % (ref 0–1.9)
BILIRUB SERPL-MCNC: 1.2 MG/DL (ref 0.1–1)
BUN SERPL-MCNC: 36 MG/DL (ref 6–20)
CALCIUM SERPL-MCNC: 8.4 MG/DL (ref 8.7–10.5)
CHLORIDE SERPL-SCNC: 100 MMOL/L (ref 95–110)
CO2 SERPL-SCNC: 26 MMOL/L (ref 23–29)
CREAT SERPL-MCNC: 1.5 MG/DL (ref 0.5–1.4)
DIFFERENTIAL METHOD: ABNORMAL
EOSINOPHIL # BLD AUTO: 0.3 K/UL (ref 0–0.5)
EOSINOPHIL NFR BLD: 3.2 % (ref 0–8)
ERYTHROCYTE [DISTWIDTH] IN BLOOD BY AUTOMATED COUNT: 14.7 % (ref 11.5–14.5)
EST. GFR  (AFRICAN AMERICAN): 58.9 ML/MIN/1.73 M^2
EST. GFR  (NON AFRICAN AMERICAN): 50.9 ML/MIN/1.73 M^2
GLUCOSE SERPL-MCNC: 199 MG/DL (ref 70–110)
GLUCOSE SERPL-MCNC: 207 MG/DL (ref 70–110)
GLUCOSE SERPL-MCNC: 214 MG/DL (ref 70–110)
GLUCOSE SERPL-MCNC: 222 MG/DL (ref 70–110)
GLUCOSE SERPL-MCNC: 334 MG/DL (ref 70–110)
GRAM STN SPEC: NORMAL
HCT VFR BLD AUTO: 32.9 % (ref 40–54)
HGB BLD-MCNC: 10.7 G/DL (ref 14–18)
IMM GRANULOCYTES # BLD AUTO: 0.07 K/UL (ref 0–0.04)
IMM GRANULOCYTES NFR BLD AUTO: 0.7 % (ref 0–0.5)
LYMPHOCYTES # BLD AUTO: 0.6 K/UL (ref 1–4.8)
LYMPHOCYTES NFR BLD: 6.1 % (ref 18–48)
MAGNESIUM SERPL-MCNC: 2.1 MG/DL (ref 1.6–2.6)
MCH RBC QN AUTO: 31.5 PG (ref 27–31)
MCHC RBC AUTO-ENTMCNC: 32.5 G/DL (ref 32–36)
MCV RBC AUTO: 97 FL (ref 82–98)
MONOCYTES # BLD AUTO: 0.6 K/UL (ref 0.3–1)
MONOCYTES NFR BLD: 5.8 % (ref 4–15)
NEUTROPHILS # BLD AUTO: 8.5 K/UL (ref 1.8–7.7)
NEUTROPHILS NFR BLD: 84 % (ref 38–73)
NRBC BLD-RTO: 0 /100 WBC
PLATELET # BLD AUTO: 47 K/UL (ref 150–350)
PMV BLD AUTO: 12.5 FL (ref 9.2–12.9)
POTASSIUM SERPL-SCNC: 4.5 MMOL/L (ref 3.5–5.1)
PROT SERPL-MCNC: 6.4 G/DL (ref 6–8.4)
RBC # BLD AUTO: 3.4 M/UL (ref 4.6–6.2)
SODIUM SERPL-SCNC: 138 MMOL/L (ref 136–145)
WBC # BLD AUTO: 10.08 K/UL (ref 3.9–12.7)

## 2020-01-21 PROCEDURE — 85025 COMPLETE CBC W/AUTO DIFF WBC: CPT

## 2020-01-21 PROCEDURE — 63600175 PHARM REV CODE 636 W HCPCS: Performed by: INTERNAL MEDICINE

## 2020-01-21 PROCEDURE — 27000221 HC OXYGEN, UP TO 24 HOURS

## 2020-01-21 PROCEDURE — 99900035 HC TECH TIME PER 15 MIN (STAT)

## 2020-01-21 PROCEDURE — 25000003 PHARM REV CODE 250: Performed by: INTERNAL MEDICINE

## 2020-01-21 PROCEDURE — 80202 ASSAY OF VANCOMYCIN: CPT

## 2020-01-21 PROCEDURE — 94761 N-INVAS EAR/PLS OXIMETRY MLT: CPT

## 2020-01-21 PROCEDURE — 25000242 PHARM REV CODE 250 ALT 637 W/ HCPCS: Performed by: INTERNAL MEDICINE

## 2020-01-21 PROCEDURE — 80053 COMPREHEN METABOLIC PANEL: CPT

## 2020-01-21 PROCEDURE — 82962 GLUCOSE BLOOD TEST: CPT

## 2020-01-21 PROCEDURE — 21400001 HC TELEMETRY ROOM

## 2020-01-21 PROCEDURE — 83735 ASSAY OF MAGNESIUM: CPT

## 2020-01-21 PROCEDURE — 94640 AIRWAY INHALATION TREATMENT: CPT

## 2020-01-21 RX ADMIN — VANCOMYCIN HYDROCHLORIDE 1750 MG: 1 INJECTION, POWDER, LYOPHILIZED, FOR SOLUTION INTRAVENOUS at 12:01

## 2020-01-21 RX ADMIN — INSULIN ASPART 4 UNITS: 100 INJECTION, SOLUTION INTRAVENOUS; SUBCUTANEOUS at 12:01

## 2020-01-21 RX ADMIN — VANCOMYCIN HYDROCHLORIDE 1750 MG: 1 INJECTION, POWDER, LYOPHILIZED, FOR SOLUTION INTRAVENOUS at 11:01

## 2020-01-21 RX ADMIN — INSULIN DETEMIR 25 UNITS: 100 INJECTION, SOLUTION SUBCUTANEOUS at 08:01

## 2020-01-21 RX ADMIN — MUPIROCIN: 20 OINTMENT TOPICAL at 08:01

## 2020-01-21 RX ADMIN — GABAPENTIN 600 MG: 300 CAPSULE ORAL at 08:01

## 2020-01-21 RX ADMIN — INSULIN ASPART 4 UNITS: 100 INJECTION, SOLUTION INTRAVENOUS; SUBCUTANEOUS at 09:01

## 2020-01-21 RX ADMIN — GABAPENTIN 600 MG: 300 CAPSULE ORAL at 02:01

## 2020-01-21 RX ADMIN — INSULIN ASPART 4 UNITS: 100 INJECTION, SOLUTION INTRAVENOUS; SUBCUTANEOUS at 05:01

## 2020-01-21 RX ADMIN — TRAMADOL HYDROCHLORIDE 50 MG: 50 TABLET, FILM COATED ORAL at 08:01

## 2020-01-21 RX ADMIN — IPRATROPIUM BROMIDE AND ALBUTEROL SULFATE 3 ML: .5; 3 SOLUTION RESPIRATORY (INHALATION) at 09:01

## 2020-01-21 RX ADMIN — CHLORHEXIDINE GLUCONATE 15 ML: 1.2 RINSE ORAL at 08:01

## 2020-01-21 RX ADMIN — IPRATROPIUM BROMIDE AND ALBUTEROL SULFATE 3 ML: .5; 3 SOLUTION RESPIRATORY (INHALATION) at 08:01

## 2020-01-21 RX ADMIN — ASPIRIN 81 MG 81 MG: 81 TABLET ORAL at 08:01

## 2020-01-21 RX ADMIN — LEVOFLOXACIN 500 MG: 500 TABLET, FILM COATED ORAL at 06:01

## 2020-01-21 NOTE — PLAN OF CARE
01/21/20 0824   Patient Assessment/Suction   Level of Consciousness (AVPU) alert   Respiratory Effort Normal;Unlabored   Expansion/Accessory Muscles/Retractions no use of accessory muscles   All Lung Fields Breath Sounds diminished;crackles   Cough Frequency infrequent   Cough Type productive;good   PRE-TX-O2   O2 Device (Oxygen Therapy) room air   $ Is the patient on Low Flow Oxygen? Yes  (O2 on standby)   SpO2 95 %   Pulse Oximetry Type Intermittent   $ Pulse Oximetry - Multiple Charge Pulse Oximetry - Multiple   Pulse 92   Resp 18   Aerosol Therapy   $ Aerosol Therapy Charges Aerosol Treatment   Daily Review of Necessity (SVN) completed   Respiratory Treatment Status (SVN) given   Treatment Route (SVN) mask   Patient Position (SVN) semi-Ruiz's   Post Treatment Assessment (SVN) increased aeration   Signs of Intolerance (SVN) none   Breath Sounds Post-Respiratory Treatment   Throughout All Fields Post-Treatment All Fields   Throughout All Fields Post-Treatment aeration increased   Post-treatment Heart Rate (beats/min) 96   Post-treatment Resp Rate (breaths/min) 18

## 2020-01-21 NOTE — PROGRESS NOTES
Progress Note  Infectious Disease    Follow-up For:  Fever, pna    SUBJECTIVE:   01/19/2020Ana Bullard is a  59 y.o. male with past medical history of CAD, CABG, AICD, CHF, HTN, D LP, PVD, CKD, FARHAN on BiPAP, obesity, N IDDM, neuropathy, GERD, history of diabetic foot infection due to Proteus vulgaris, group a strep and MRSA in January 2019, chronic foot ulcer for which he follows with podiatrist..       Patient was  recently hospitalized on 01/04/2020-- 01/07/2020, in [facility] [place], for pneumonia and CHF acute exacerbation.  He was discharged home on cefdinir.  He completed it on 01/14  On 01/09/2020 he had an appointment with podiatrist who shaved some of his callus on the right lateral side.  It was not infected.     Patient came in complaining of fever 103, shaking chills, short of breath, cough, yellow phlegm, associated by confusion.  No sore throat.  He felt his ears popping.  The popping on his ears resolved since he has been receiving antibiotics.  Had some runny nose over the past 2 days.  He was brought in ER, he was found to be hypotensive, tachycardic and had leukocytosis.  Chest x-ray showed a left sided infiltrate.   Influenza screen is negative. Urinalysis shows a WBC of 12 negative nitrates negative leukocyte esterase. Blood cultures are negative so far.  Procalcitonin was 5     Patient was placed on broad antimicrobial coverage including g negatives g positives and influenza  Tx;   Patient needed pressors, Levophed small dose.  01/20/20. Cough. Phlegm  is less dark and  Not as thick. He feels better in general. afebrile     Antibiotics (From admission, onward)    Start     Stop Route Frequency Ordered    01/21/20 0600  levoFLOXacin tablet 500 mg     Note to Pharmacy:  Ht: 6' (1.829 m)  Wt: 111.1 kg (245 lb)  Estimated Creatinine Clearance: 61.7 mL/min (A) (based on SCr of 1.7 mg/dL (H)).   Body mass index is 33.23 kg/m².    -- Oral Before breakfast 01/20/20 0916    01/20/20 0030   vancomycin (VANCOCIN) 1,750 mg in dextrose 5 % 500 mL IVPB      -- IV Every 24 hours (non-standard times) 01/19/20 0327    01/19/20 1030  mupirocin 2 % ointment  (MRSA Decolonization Orders [facility])      01/24 0859 Nasl 2 times daily 01/19/20 0919    01/19/20 0140  vancomycin - pharmacy to dose  (vancomycin IVPB)      -- IV pharmacy to manage frequency 01/19/20 0042        Antifungals (From admission, onward)    None        Antivirals (From admission, onward)    None            EXAM & DIAGNOSTICS REVIEWED:   Vitals:     Temp:  [98.3 °F (36.8 °C)-98.9 °F (37.2 °C)]   Temp: 98.3 °F (36.8 °C) (01/20/20 2300)  Pulse: 100 (01/20/20 2300)  Resp: 20 (01/20/20 2300)  BP: 118/67 (01/20/20 2300)  SpO2: (!) 92 % (01/20/20 2300)    Intake/Output Summary (Last 24 hours) at 1/21/2020 0027  Last data filed at 1/20/2020 1851  Gross per 24 hour   Intake 390 ml   Output 1450 ml   Net -1060 ml       General:  In NAD. Alert and attentive, cooperative, comfortable  Eyes:  Anicteric, PERRL, EOMI  ENT:  No ulcers, exudates, thrush, nares patent, dentition is ok   Neck:  supple, no masses or adenopathy appreciated  Lungs:  Clear, no consolidation, rales, wheezes, rub  Heart:  RRR, no gallop/murmur/rub noted  Abd:  Soft, NT, ND, normal BS, no masses or organomegaly appreciated.  :  Voids urine clear, no flank tenderness  Musc:  Joints without effusion, swelling,  erythema, synovitis,muscle wasting.   Skin:  No rashes. No palmar or plantar lesions. No subungual petechiae. Chronic lower extremity hyperpigmentation.  Wound:   Neuro:  Alert, attentive, speech fluent, face symmetric, moves all extremities, no focal weakness, ambulatoryDecreased sensation in bilateral lower extremities from the toes all the way up to the ankles  Psych:  Calm, cooperative  Extrem: No edema, erythema, phlebitis, cellulitis, warm and well perfused  VAD:  Isolation:     Wound looke same as above, no drainage, no infection, no odor  Lines/Tubes/Drains:    General Labs  reviewed:  Recent Labs   Lab 01/18/20  2351 01/19/20  0309 01/19/20  0413 01/20/20  0350   WBC 28.95*  --  39.79* 16.92*   HGB 14.4  --  12.4* 11.2*   HCT 43.3 32* 37.4* 33.6*   *  --  101* 52*       Recent Labs   Lab 01/18/20  2351 01/19/20  0413 01/20/20  0350   * 132* 135*   K 5.3* 4.6 4.2   CL 95 98 98   CO2 23 22* 25   BUN 46* 48* 44*   CREATININE 1.8* 2.2* 1.7*   CALCIUM 9.0 8.0* 7.7*   PROT 7.3 6.0 6.3   BILITOT 1.7* 2.3* 1.1*   ALKPHOS 54* 43* 40*   ALT 29 26 21   AST 31 27 15     No results for input(s): CRP in the last 168 hours.    Micro:   Microbiology Results (last 7 days)     Procedure Component Value Units Date/Time    Blood culture [[id number]] Collected:  01/20/20 0700    Order Status:  Completed Specimen:  Blood from Antecubital, Right Updated:  01/20/20 1717     Blood Culture, Routine No Growth to date    Blood culture x two cultures. Draw prior to antibiotics. [[id number]]  (Abnormal) Collected:  01/18/20 2334    Order Status:  Completed Specimen:  Blood from Peripheral, Hand, Right Updated:  01/20/20 0751     Blood Culture, Routine Gram stain mayte bottle: Gram positive cocci      Results called to and read back by:Tashi Davis RN-[id number];  01/20/2020        06:20 CJD      STREPTOCOCCUS PNEUMONIAE  Susceptibility pending      Narrative:       Aerobic and anaerobic    Culture, Respiratory with Gram Stain [[id number]] Collected:  01/19/20 0725    Order Status:  Completed Specimen:  Respiratory from Sputum, Expectorated Updated:  01/20/20 0706     Respiratory Culture Normal respiratory [place]ol[place]    Urine Culture High Risk [[id number]] Collected:  01/19/20 1130    Order Status:  Completed Specimen:  Urine, Clean Catch Updated:  01/20/20 0642     Urine Culture, Routine No growth to date    Narrative:       Indicated criteria for high risk culture:->Other  Other (specify):->sepsis id    Blood culture x two cultures. Draw prior to antibiotics. [[id number]] Collected:  01/18/20 0998    Order Status:   Completed Specimen:  Blood from Peripheral, Forearm, Left Updated:  01/20/20 0232     Blood Culture, Routine No Growth to date      No Growth to date    Narrative:       Aerobic and anaerobic    Urine culture [[id number]] Collected:  01/19/20 1130    Order Status:  Canceled Specimen:  Urine           Imaging Reviewed:     Foot x-raySoft tissue swelling over the lateral aspect of the foot with no acute displaced fracture identified.     CXR on admission  1.  Interval placement of a right-sided IJ central venous catheter with tip at the level of the SVC.  2.  Focal rounded airspace opacity in the juxtapleural left mid lung zone consistent with pneumonia.  Consider radiographic follow-up in 6-8 weeks after treatment to document resolution (as radiographic findings may persist beyond clinical symptoms and underlying malignancy is not excluded).              CT last year on 01/15/2019  1. Small-to-moderate volume free fluid within the abdomen, most pronounced along the left paracolic gutter.  2. Cholelithiasis.  3. Small bilateral pleural fluid  and deep and lower lobe airspace disease which could reflect atelectasis or pneumonia. Please correlate with dedicated chest  imaging.  4. Nonobstructing right nephrolithiasis.  5. Atherosclerosis, colonic diverticulosis, and other incidental findings as  above.    Cardiology:    ASSESSMENT & PLAN       Sepsis, septic shock- resolved  Left lung infiltrate/pneumonia  Strep Pneumoniae bacteremia  Fever, Leukocytosis (28--39--16) improving  AD on CKD, improved      history of CAD, CABG, AICD, CHF, HTN, DLP, PVD, obesity, CKD, FARHAN on BiPAP,N IDDM, neuropathy, GERD, history of diabetic foot infection due to Proteus vulgaris, group a strep and MRSA in January 2019, chronic foot ulcer for which he follows with podiatrist..    This patient is high risk for life-threatening deterioration and death secondary to above comorbidities and need for IV treatment     Recommendations:  Change  tx  to Vanc Iv and levofloxacin Po   taper down further tomorrow

## 2020-01-21 NOTE — RESPIRATORY THERAPY
01/21/20 0006   Patient Assessment/Suction   Level of Consciousness (AVPU)   (SLEEPING)   Respiratory Effort Unlabored;Mouth breathing   LLL Breath Sounds diminished   RLL Breath Sounds diminished   Rhythm/Pattern, Respiratory snoring   PRE-TX-O2   O2 Device (Oxygen Therapy) nasal cannula   $ Is the patient on Low Flow Oxygen? Yes   Flow (L/min) 2   SpO2 96 %   Pulse Oximetry Type Intermittent   $ Pulse Oximetry - Multiple Charge Pulse Oximetry - Multiple   Pulse 91   Resp 18   Aerosol Therapy   $ Aerosol Therapy Charges PRN treatment not required   Daily Review of Necessity (SVN) completed   Respiratory Interventions   Cough And Deep Breathing done with encouragement   Breathing Techniques/Airway Clearance deep/controlled cough encouraged;diaphragmatic breathing promoted   Respiratory Evaluation   $ Care Plan Tech Time 15 min   Pt sleeping Sats 87%, awaken Pt placed 2lpm nc on Pt. Pt has home Bipap that he was not using. Encouraged pt to wear his bipap.

## 2020-01-21 NOTE — PROGRESS NOTES
[facility] Medicine  Progress Note    Patient Name: Ana Bullard  MRN: [id number]  Patient Class: IP- Inpatient   Admission Date: 1/18/2020  Length of Stay: 2 days  Attending Physician: Cong Montalvo MD  Primary Care Provider: YRN Landry        Subjective:     Principal Problem:Septic shock        HPI:  59-year-old patient getting admitted with severe sepsis  Patient from January 4 to January 7 was in a hospital in [facility]  and was treated for acute pneumonia and acute congestive heart failure  Eventually he was discharged home with p.o. Cefdinir  There after he was seen by his podiatrist for his chronic right foot ulcers and also by the pain medicine MD (Last week)  Patient did not had a flu shot this year and he does not want flu shot  Today all of sudden he started having fever and temperature went up to 103° associated with chills rigors and diaphoretic episodes  Also he he has been suffering from cough for the past few weeks and today he was coughing up a lot with some induction of sputum  Later he started having shortness of breath and he had episodes of confusion  This prompted the family bring the patient to the hospital and in ER he was found to be tachycardic, hypotensive and his WBC levels were very high  He was given 1 L of bolus and his mental condition improved.  Temperature stayed in the range of 1 not pre  Patient will be admitted in ICU for severe sepsis        Overview/Hospital Course:  Patient admitted with severe sepsis and then septic shock suspected to be due to Pneumonia.  Admitted to the ICU on levophed with broad IV abx and empiric Tamiflu. Flu screen negative. BCX - one set grew Strep pneumoniae. Repeat Cultures drawn 1/20. Sputum Cx ordered and is in progress.  UA obtained and not impressive for infection. Recently treated for pneumonia at hospital in [place] 1/4-1/7.  Also following with Podiatry for right foot ulcer. Current green-yellow  "sputum. Encephalopathic (infectious) on presentation but this has improved with treatment. Did not receive IVF bolus due to chronic SHF- instead gentle maintenance fluid.  Lactic acid hs improved. Procalcitonin increased from 5 to 24 but patient clinically looks better 1/20.   ID following. CXR with left infiltrate with repeat CXR without significant improvement 1/20.  Levophed weaned off 1/19 evening. Evidence of AD on presentation that is improving. Hyperkalemia on presentation has improved. Has Hx of HIT- no heparin products but platelet decreased from 101 to 52.     Interval History: Persistent cough but it is improving.  Feels better today than he did yesterday. Coughing up some "ricardo" sputum, no bright red blood.  Afebrile.     Review of Systems   Constitutional: Negative.    HENT: Negative.    Eyes: Negative.    Respiratory: Positive for shortness of breath.    Cardiovascular: Negative.    Gastrointestinal: Negative.    Endocrine: Negative.    Genitourinary: Negative.    Musculoskeletal: Negative.         Neuropathy in hands and feet   Skin: Negative.    Allergic/Immunologic: Negative.    Neurological: Positive for weakness.   Hematological: Negative.    Psychiatric/Behavioral: Negative.      Objective:     Vital Signs (Most Recent):  Temp: 97.9 °F (36.6 °C) (01/21/20 1457)  Pulse: 89 (01/21/20 1457)  Resp: 20 (01/21/20 1457)  BP: 110/63 (01/21/20 1457)  SpO2: 98 % (01/21/20 1457) Vital Signs (24h Range):  Temp:  [97.6 °F (36.4 °C)-98.9 °F (37.2 °C)] 97.9 °F (36.6 °C)  Pulse:  [] 89  Resp:  [16-20] 20  SpO2:  [92 %-99 %] 98 %  BP: (100-128)/(47-67) 110/63     Weight: 117 kg (257 lb 15 oz)  Body mass index is 34.98 kg/m².    Intake/Output Summary (Last 24 hours) at 1/21/2020 1713  Last data filed at 1/21/2020 1245  Gross per 24 hour   Intake 870 ml   Output 2150 ml   Net -1280 ml      Physical Exam   Constitutional: He is oriented to person, place, and time. He appears well-developed. No distress. "   HENT:   Head: Normocephalic and atraumatic.   Mouth/Throat: Oropharynx is clear and moist.   Eyes: Pupils are equal, round, and reactive to light. EOM are normal.   Neck: Normal range of motion.   Cardiovascular: Regular rhythm.   No murmur heard.  Pulmonary/Chest: Effort normal and breath sounds normal. He has no wheezes.   Abdominal: Soft. He exhibits no distension. There is no tenderness. There is no rebound and no guarding.   Musculoskeletal: Normal range of motion.   Neurological: He is alert and oriented to person, place, and time. No cranial nerve deficit or sensory deficit.   Skin: No rash noted.   Psychiatric: He has a normal mood and affect.   Nursing note and vitals reviewed.      Significant Labs:   CBC:   Recent Labs   Lab 01/20/20 0350 01/21/20  0325   WBC 16.92* 10.08   HGB 11.2* 10.7*   HCT 33.6* 32.9*   PLT 52* 47*     CMP:   Recent Labs   Lab 01/20/20 0350 01/21/20  0325   * 138   K 4.2 4.5   CL 98 100   CO2 25 26   * 214*   BUN 44* 36*   CREATININE 1.7* 1.5*   CALCIUM 7.7* 8.4*   PROT 6.3 6.4   ALBUMIN 2.7* 2.8*   BILITOT 1.1* 1.2*   ALKPHOS 40* 42*   AST 15 12   ALT 21 19   ANIONGAP 12 12   EGFRNONAA 43.8* 50.9*     Lactic Acid:   Recent Labs   Lab 01/19/20 2003 01/20/20  0033   LACTATE 1.4 1.1       Significant Imaging: CXR: I have reviewed all pertinent results/findings within the past 24 hours and my personal findings are:  Unchanged infiltrates      Assessment/Plan:      Septic shock  Levophed weaned off and shock resolved  Pneumonia and bacteremia felt to be source      Hypomagnesemia  Replacing. Monitoring.  AM labs ordered      Hyponatremia  Improved off lasix     AD (acute kidney injury)  Acute kidney injury in the background of sepsis  His medications will be renally dosed  AM labs ordered to monitor   Continue to hold diuretics for today, will probably restart tmrw       Hyperkalemia  Hyperkalemia upon admission, resolved       Heparin induced  thrombocytopenia  History of HIT  Because of this heparin/Lovenox will not be prescribed  Platelets dropped from 101 to 52.   Repeat cbc in am       Chronic systolic heart failure  Patient suffers from chronic systolic heart failure and is on diuretics at home  Now that out of shock, have stopped IVFs but will wait another day to consider restarting diuretics.        CAD (coronary artery disease)  History of coronary artery disease/CABG      Chronic pain  Patient suffers from chronic pain and is on medication  He has a history of lumbar sympathetic nerve block   Ultram, Gabapentin restarted      Obesity  Need therapeutic lifestyle changes      AICD (automatic cardioverter/defibrillator) present  AICD insitu      Pneumonia  Abx as per ID  One set of blood cultures grew s. Pneumoniae.  F/u repeat cultures   Supplemental O2 as needed- currently on room air  Nebs prn  Levophed weaned off 1/19      Peripheral vascular disease  Chronic stable issue      Skin ulcer of right foot with fat layer exposed  Chronic skins ulcer of the right foot  Patient was assessed by his podiatrist 10 days ago  He has a history of right foot osteomyelitis  Wound care consult      Type II diabetes mellitus with neurological manifestations  Patient suffers from type 2 diabetes mellitus and neuropathy  He is on insulin regimen at home  ISS  Accuchecks  Gabapentin and ultram restarted for neuropathy         VTE Risk Mitigation (From admission, onward)         Ordered     IP VTE HIGH RISK PATIENT  Once      01/19/20 0046                      Rob Murphy MD  Department of Hospital Medicine   [facility]

## 2020-01-21 NOTE — PROGRESS NOTES
Progress Note  Infectious Disease    Follow-up For:  Fever, pna    SUBJECTIVE:   01/19/2020Ana Bullard is a  59 y.o. male with past medical history of CAD, CABG, AICD, CHF, HTN, D LP, PVD, CKD, FARHAN on BiPAP, obesity, N IDDM, neuropathy, GERD, history of diabetic foot infection due to Proteus vulgaris, group a strep and MRSA in January 2019, chronic foot ulcer for which he follows with podiatrist..       Patient was  recently hospitalized on 01/04/2020-- 01/07/2020, in [facility], for pneumonia and CHF acute exacerbation.  He was discharged home on cefdinir.  He completed it on 01/14  On 01/09/2020 he had an appointment with podiatrist who shaved some of his callus on the right lateral side.  It was not infected.     Patient came in complaining of fever 103, shaking chills, short of breath, cough, yellow phlegm, associated by confusion.  No sore throat.  He felt his ears popping.  The popping on his ears resolved since he has been receiving antibiotics.  Had some runny nose over the past 2 days.  He was brought in ER, he was found to be hypotensive, tachycardic and had leukocytosis.  Chest x-ray showed a left sided infiltrate.   Influenza screen is negative. Urinalysis shows a WBC of 12 negative nitrates negative leukocyte esterase. Blood cultures are negative so far.  Procalcitonin was 5     Patient was placed on broad antimicrobial coverage including g negatives g positives and influenza  Tx;   Patient needed pressors, Levophed small dose.  01/20/20. Cough. Phlegm  is less dark and  Not as thick. He feels better in general. afebrile     1/21: chart reviewed and d/w Dr. Arthur. Feels better today, not requiring oxygen. Sputum is gray to bloody. No sinus purulence or pain. Ambulating without difficulty. No antibiotic intolerance. No chest pain.     Antibiotics (From admission, onward)    Start     Stop Route Frequency Ordered    01/21/20 0600  levoFLOXacin tablet 500 mg     Note to Pharmacy:  Ht: 6'  (1.829 m)  Wt: 111.1 kg (245 lb)  Estimated Creatinine Clearance: 61.7 mL/min (A) (based on SCr of 1.7 mg/dL (H)).   Body mass index is 33.23 kg/m².    -- Oral Before breakfast 01/20/20 0916    01/20/20 0030  vancomycin (VANCOCIN) 1,750 mg in dextrose 5 % 500 mL IVPB      -- IV Every 24 hours (non-standard times) 01/19/20 0327    01/19/20 1030  mupirocin 2 % ointment  (MRSA Decolonization Orders [facility])      01/24 0859 Nasl 2 times daily 01/19/20 0919    01/19/20 0140  vancomycin - pharmacy to dose  (vancomycin IVPB)      -- IV pharmacy to manage frequency 01/19/20 0042        Antifungals (From admission, onward)    None        Antivirals (From admission, onward)    None            EXAM & DIAGNOSTICS REVIEWED:   Vitals:     Temp:  [97.6 °F (36.4 °C)-98.9 °F (37.2 °C)]   Temp: 97.6 °F (36.4 °C) (01/21/20 1150)  Pulse: 90 (01/21/20 1150)  Resp: 16 (01/21/20 1150)  BP: (!) 128/58 (01/21/20 1150)  SpO2: 98 % (01/21/20 1150)    Intake/Output Summary (Last 24 hours) at 1/21/2020 1356  Last data filed at 1/21/2020 1245  Gross per 24 hour   Intake 870 ml   Output 2150 ml   Net -1280 ml       General:  In NAD. Alert and attentive, cooperative, comfortable  Eyes:  Anicteric,  EOMI  ENT:  No ulcers, exudates, thrush, nares patent, dentition is ok   Neck:  Supple,   Lungs:  Clear, no consolidation, rales, wheezes, rub  Heart:  RRR, no gallop/murmur/rub noted  Abd:  Soft, NT, ND, normal BS, no masses or organomegaly appreciated.  :  Voids urine clear,   Musc:  Joints without effusion, swelling,  erythema, synovitis,muscle wasting.   Skin:  No rashes. No palmar or plantar lesions. No subungual petechiae. Chronic lower extremity hyperpigmentation.  Wound:   Neuro:  Alert, attentive, speech fluent, face symmetric, moves all extremities, no focal weakness, ambulatory   Psych:  Calm, cooperative  Extrem: No edema, erythema, phlebitis, cellulitis, warm and well perfused. Severe chronic venous stasis hyperpigmentation  BLE  VAD:  Isolation:     1/21  :  looke same as above, no drainage, no infection, no odor  Lines/Tubes/Drains:    General Labs reviewed:  Recent Labs   Lab 01/19/20 0413 01/20/20  0350 01/21/20  0325   WBC 39.79* 16.92* 10.08   HGB 12.4* 11.2* 10.7*   HCT 37.4* 33.6* 32.9*   * 52* 47*       Recent Labs   Lab 01/19/20 0413 01/20/20  0350 01/21/20  0325   * 135* 138   K 4.6 4.2 4.5   CL 98 98 100   CO2 22* 25 26   BUN 48* 44* 36*   CREATININE 2.2* 1.7* 1.5*   CALCIUM 8.0* 7.7* 8.4*   PROT 6.0 6.3 6.4   BILITOT 2.3* 1.1* 1.2*   ALKPHOS 43* 40* 42*   ALT 26 21 19   AST 27 15 12     No results for input(s): CRP in the last 168 hours.    Micro:   Microbiology Results (last 7 days)     Procedure Component Value Units Date/Time    Blood culture x two cultures. Draw prior to antibiotics. [[id number]]  (Abnormal)  (Susceptibility) Collected:  01/18/20 2334    Order Status:  Completed Specimen:  Blood from Peripheral, Hand, Right Updated:  01/21/20 1343     Blood Culture, Routine Gram stain mayte bottle: Gram positive cocci      Results called to and read back by:Tashi Davis RN-3ICU;  01/20/2020        06:20 CJD      STREPTOCOCCUS PNEUMONIAE    Narrative:       Aerobic and anaerobic    Culture, Respiratory with Gram Stain [[id number]] Collected:  01/19/20 0725    Order Status:  Completed Specimen:  Respiratory from Sputum, Expectorated Updated:  01/21/20 1310     Respiratory Culture Normal respiratory [place]     Gram Stain (Respiratory) <10 epithelial cells per low power field.     Gram Stain (Respiratory) Few WBC's     Gram Stain (Respiratory) Rare Gram positive cocci    Blood culture [[id number]] Collected:  01/20/20 0700    Order Status:  Completed Specimen:  Blood from Antecubital, Right Updated:  01/21/20 1232     Blood Culture, Routine No Growth to date      No Growth to date    Urine Culture High Risk [[id number]] Collected:  01/19/20 1130    Order Status:  Completed Specimen:  Urine, Clean Catch Updated:   01/21/20 0706     Urine Culture, Routine No growth to date    Narrative:       Indicated criteria for high risk culture:->Other  Other (specify):->sepsis id    Blood culture x two cultures. Draw prior to antibiotics. [[id number]] Collected:  01/18/20 2351    Order Status:  Completed Specimen:  Blood from Peripheral, Forearm, Left Updated:  01/21/20 0232     Blood Culture, Routine No Growth to date      No Growth to date      No Growth to date    Narrative:       Aerobic and anaerobic    Urine culture [[id number]] Collected:  01/19/20 1130    Order Status:  Canceled Specimen:  Urine           Imaging Reviewed:     Foot x-raySoft tissue swelling over the lateral aspect of the foot with no acute displaced fracture identified.     CXR on admission  1.  Interval placement of a right-sided IJ central venous catheter with tip at the level of the SVC.  2.  Focal rounded airspace opacity in the juxtapleural left mid lung zone consistent with pneumonia.  Consider radiographic follow-up in 6-8 weeks after treatment to document resolution (as radiographic findings may persist beyond clinical symptoms and underlying malignancy is not excluded).              CT last year on 01/15/2019  1. Small-to-moderate volume free fluid within the abdomen, most pronounced along the left paracolic gutter.  2. Cholelithiasis.  3. Small bilateral pleural fluid  and deep and lower lobe airspace disease which could reflect atelectasis or pneumonia. Please correlate with dedicated chest  imaging.  4. Nonobstructing right nephrolithiasis.  5. Atherosclerosis, colonic diverticulosis, and other incidental findings as  above.    Cardiology:    ASSESSMENT & PLAN       Sepsis, septic shock- resolved  Left lung infiltrate/pneumonia  Strep Pneumoniae bacteremia  Fever, Leukocytosis (28--39--16--10)resolved  AD on CKD, improved  Pacemaker in situ.        This patient is high risk for life-threatening deterioration and death secondary to above comorbidities  and need for IV treatment     Recommendations:    Vanc Iv and levofloxacin Po    recommend stop vanc 1/22 and follow with 10d po levofloxacin, to cover the bacteremia and sinusitis?  CXR 6 weeks

## 2020-01-21 NOTE — SUBJECTIVE & OBJECTIVE
"Interval History: Persistent cough but it is improving.  Feels better today than he did yesterday. Coughing up some "ricardo" sputum, no bright red blood.  Afebrile.     Review of Systems   Constitutional: Negative.    HENT: Negative.    Eyes: Negative.    Respiratory: Positive for shortness of breath.    Cardiovascular: Negative.    Gastrointestinal: Negative.    Endocrine: Negative.    Genitourinary: Negative.    Musculoskeletal: Negative.         Neuropathy in hands and feet   Skin: Negative.    Allergic/Immunologic: Negative.    Neurological: Positive for weakness.   Hematological: Negative.    Psychiatric/Behavioral: Negative.      Objective:     Vital Signs (Most Recent):  Temp: 97.9 °F (36.6 °C) (01/21/20 1457)  Pulse: 89 (01/21/20 1457)  Resp: 20 (01/21/20 1457)  BP: 110/63 (01/21/20 1457)  SpO2: 98 % (01/21/20 1457) Vital Signs (24h Range):  Temp:  [97.6 °F (36.4 °C)-98.9 °F (37.2 °C)] 97.9 °F (36.6 °C)  Pulse:  [] 89  Resp:  [16-20] 20  SpO2:  [92 %-99 %] 98 %  BP: (100-128)/(47-67) 110/63     Weight: 117 kg (257 lb 15 oz)  Body mass index is 34.98 kg/m².    Intake/Output Summary (Last 24 hours) at 1/21/2020 1713  Last data filed at 1/21/2020 1245  Gross per 24 hour   Intake 870 ml   Output 2150 ml   Net -1280 ml      Physical Exam   Constitutional: He is oriented to person, place, and time. He appears well-developed. No distress.   HENT:   Head: Normocephalic and atraumatic.   Mouth/Throat: Oropharynx is clear and moist.   Eyes: Pupils are equal, round, and reactive to light. EOM are normal.   Neck: Normal range of motion.   Cardiovascular: Regular rhythm.   No murmur heard.  Pulmonary/Chest: Effort normal and breath sounds normal. He has no wheezes.   Abdominal: Soft. He exhibits no distension. There is no tenderness. There is no rebound and no guarding.   Musculoskeletal: Normal range of motion.   Neurological: He is alert and oriented to person, place, and time. No cranial nerve deficit or sensory " deficit.   Skin: No rash noted.   Psychiatric: He has a normal mood and affect.   Nursing note and vitals reviewed.      Significant Labs:   CBC:   Recent Labs   Lab 01/20/20 0350 01/21/20  0325   WBC 16.92* 10.08   HGB 11.2* 10.7*   HCT 33.6* 32.9*   PLT 52* 47*     CMP:   Recent Labs   Lab 01/20/20 0350 01/21/20  0325   * 138   K 4.2 4.5   CL 98 100   CO2 25 26   * 214*   BUN 44* 36*   CREATININE 1.7* 1.5*   CALCIUM 7.7* 8.4*   PROT 6.3 6.4   ALBUMIN 2.7* 2.8*   BILITOT 1.1* 1.2*   ALKPHOS 40* 42*   AST 15 12   ALT 21 19   ANIONGAP 12 12   EGFRNONAA 43.8* 50.9*     Lactic Acid:   Recent Labs   Lab 01/19/20 2003 01/20/20  0033   LACTATE 1.4 1.1       Significant Imaging: CXR: I have reviewed all pertinent results/findings within the past 24 hours and my personal findings are:  Unchanged infiltrates

## 2020-01-22 VITALS
WEIGHT: 255.06 LBS | DIASTOLIC BLOOD PRESSURE: 63 MMHG | BODY MASS INDEX: 34.55 KG/M2 | SYSTOLIC BLOOD PRESSURE: 132 MMHG | TEMPERATURE: 99 F | HEART RATE: 94 BPM | OXYGEN SATURATION: 97 % | RESPIRATION RATE: 20 BRPM | HEIGHT: 72 IN

## 2020-01-22 PROBLEM — A41.9 SEPTIC SHOCK: Status: RESOLVED | Noted: 2020-01-20 | Resolved: 2020-01-22

## 2020-01-22 PROBLEM — R65.21 SEPTIC SHOCK: Status: RESOLVED | Noted: 2020-01-20 | Resolved: 2020-01-22

## 2020-01-22 LAB
ALBUMIN SERPL BCP-MCNC: 2.9 G/DL (ref 3.5–5.2)
ALP SERPL-CCNC: 43 U/L (ref 55–135)
ALT SERPL W/O P-5'-P-CCNC: 17 U/L (ref 10–44)
ANION GAP SERPL CALC-SCNC: 7 MMOL/L (ref 8–16)
AORTIC ROOT ANNULUS: 3.45 CM
AORTIC VALVE CUSP SEPERATION: 1.26 CM
AST SERPL-CCNC: 11 U/L (ref 10–40)
AV INDEX (PROSTH): 0.68
AV MEAN GRADIENT: 4 MMHG
AV PEAK GRADIENT: 7 MMHG
AV VALVE AREA: 3.05 CM2
AV VELOCITY RATIO: 59.26
BACTERIA UR CULT: NO GROWTH
BASOPHILS # BLD AUTO: 0.01 K/UL (ref 0–0.2)
BASOPHILS NFR BLD: 0.2 % (ref 0–1.9)
BILIRUB SERPL-MCNC: 0.9 MG/DL (ref 0.1–1)
BSA FOR ECHO PROCEDURE: 2.38 M2
BUN SERPL-MCNC: 29 MG/DL (ref 6–20)
CALCIUM SERPL-MCNC: 8.4 MG/DL (ref 8.7–10.5)
CHLORIDE SERPL-SCNC: 103 MMOL/L (ref 95–110)
CO2 SERPL-SCNC: 27 MMOL/L (ref 23–29)
CREAT SERPL-MCNC: 1.3 MG/DL (ref 0.5–1.4)
CV ECHO LV RWT: 0.41 CM
DIFFERENTIAL METHOD: ABNORMAL
DOP CALC AO PEAK VEL: 1.32 M/S
DOP CALC AO VTI: 18.92 CM
DOP CALC LVOT AREA: 4.4 CM2
DOP CALC LVOT DIAMETER: 2.38 CM
DOP CALC LVOT PEAK VEL: 78.22 M/S
DOP CALC LVOT STROKE VOLUME: 57.63 CM3
DOP CALCLVOT PEAK VEL VTI: 12.96 CM
E WAVE DECELERATION TIME: 149.71 MSEC
E/A RATIO: 2.56
E/E' RATIO: 22 M/S
ECHO LV POSTERIOR WALL: 1.27 CM (ref 0.6–1.1)
EOSINOPHIL # BLD AUTO: 0.4 K/UL (ref 0–0.5)
EOSINOPHIL NFR BLD: 6.7 % (ref 0–8)
ERYTHROCYTE [DISTWIDTH] IN BLOOD BY AUTOMATED COUNT: 14.7 % (ref 11.5–14.5)
EST. GFR  (AFRICAN AMERICAN): >60 ML/MIN/1.73 M^2
EST. GFR  (NON AFRICAN AMERICAN): >60 ML/MIN/1.73 M^2
FRACTIONAL SHORTENING: 12 % (ref 28–44)
GLUCOSE SERPL-MCNC: 216 MG/DL (ref 70–110)
GLUCOSE SERPL-MCNC: 233 MG/DL (ref 70–110)
GLUCOSE SERPL-MCNC: 273 MG/DL (ref 70–110)
HCT VFR BLD AUTO: 33.5 % (ref 40–54)
HGB BLD-MCNC: 10.6 G/DL (ref 14–18)
IMM GRANULOCYTES # BLD AUTO: 0.04 K/UL (ref 0–0.04)
IMM GRANULOCYTES NFR BLD AUTO: 0.7 % (ref 0–0.5)
INTERVENTRICULAR SEPTUM: 1.28 CM (ref 0.6–1.1)
IVRT: 49.73 MSEC
LEFT ATRIUM SIZE: 4.85 CM
LEFT INTERNAL DIMENSION IN SYSTOLE: 5.44 CM (ref 2.1–4)
LEFT VENTRICLE MASS INDEX: 155 G/M2
LEFT VENTRICULAR INTERNAL DIMENSION IN DIASTOLE: 6.21 CM (ref 3.5–6)
LEFT VENTRICULAR MASS: 360.64 G
LV LATERAL E/E' RATIO: 18.33 M/S
LV SEPTAL E/E' RATIO: 27.5 M/S
LYMPHOCYTES # BLD AUTO: 0.6 K/UL (ref 1–4.8)
LYMPHOCYTES NFR BLD: 9.9 % (ref 18–48)
MAGNESIUM SERPL-MCNC: 2 MG/DL (ref 1.6–2.6)
MCH RBC QN AUTO: 30.6 PG (ref 27–31)
MCHC RBC AUTO-ENTMCNC: 31.6 G/DL (ref 32–36)
MCV RBC AUTO: 97 FL (ref 82–98)
MONOCYTES # BLD AUTO: 0.3 K/UL (ref 0.3–1)
MONOCYTES NFR BLD: 5.8 % (ref 4–15)
MV PEAK A VEL: 0.43 M/S
MV PEAK E VEL: 1.1 M/S
NEUTROPHILS # BLD AUTO: 4.5 K/UL (ref 1.8–7.7)
NEUTROPHILS NFR BLD: 76.7 % (ref 38–73)
NRBC BLD-RTO: 0 /100 WBC
PISA TR MAX VEL: 3.34 M/S
PLATELET # BLD AUTO: 47 K/UL (ref 150–350)
PMV BLD AUTO: 12.3 FL (ref 9.2–12.9)
POTASSIUM SERPL-SCNC: 4.7 MMOL/L (ref 3.5–5.1)
PROT SERPL-MCNC: 6.4 G/DL (ref 6–8.4)
PV PEAK VELOCITY: 95.82 CM/S
RA PRESSURE: 8 MMHG
RBC # BLD AUTO: 3.46 M/UL (ref 4.6–6.2)
RIGHT VENTRICULAR END-DIASTOLIC DIMENSION: 252 CM
SODIUM SERPL-SCNC: 137 MMOL/L (ref 136–145)
TDI LATERAL: 0.06 M/S
TDI SEPTAL: 0.04 M/S
TDI: 0.05 M/S
TR MAX PG: 45 MMHG
TV REST PULMONARY ARTERY PRESSURE: 53 MMHG
VANCOMYCIN TROUGH SERPL-MCNC: 17.1 UG/ML (ref 10–22)
WBC # BLD AUTO: 5.85 K/UL (ref 3.9–12.7)

## 2020-01-22 PROCEDURE — 80053 COMPREHEN METABOLIC PANEL: CPT

## 2020-01-22 PROCEDURE — 25000003 PHARM REV CODE 250: Performed by: INTERNAL MEDICINE

## 2020-01-22 PROCEDURE — 83735 ASSAY OF MAGNESIUM: CPT

## 2020-01-22 PROCEDURE — 85025 COMPLETE CBC W/AUTO DIFF WBC: CPT

## 2020-01-22 RX ORDER — LEVOFLOXACIN 750 MG/1
750 TABLET ORAL
Qty: 10 TABLET | Refills: 0 | Status: ON HOLD | OUTPATIENT
Start: 2020-01-23 | End: 2020-06-12 | Stop reason: HOSPADM

## 2020-01-22 RX ORDER — FUROSEMIDE 40 MG/1
40 TABLET ORAL NIGHTLY PRN
Qty: 30 TABLET | Refills: 11 | Status: SHIPPED | OUTPATIENT
Start: 2020-01-22 | End: 2020-06-10

## 2020-01-22 RX ORDER — LEVOFLOXACIN 750 MG/1
750 TABLET ORAL
Status: DISCONTINUED | OUTPATIENT
Start: 2020-01-23 | End: 2020-01-22 | Stop reason: HOSPADM

## 2020-01-22 RX ORDER — FUROSEMIDE 40 MG/1
40 TABLET ORAL DAILY
Qty: 30 TABLET | Refills: 1 | Status: ON HOLD | OUTPATIENT
Start: 2020-01-22 | End: 2023-06-18 | Stop reason: HOSPADM

## 2020-01-22 RX ORDER — CARVEDILOL 3.12 MG/1
3.12 TABLET ORAL 2 TIMES DAILY
Qty: 60 TABLET | Refills: 0 | Status: SHIPPED | OUTPATIENT
Start: 2020-01-22 | End: 2020-09-14 | Stop reason: ALTCHOICE

## 2020-01-22 RX ORDER — NYSTATIN 100000 [USP'U]/ML
6 SUSPENSION ORAL
Qty: 180 ML | Refills: 0 | Status: SHIPPED | OUTPATIENT
Start: 2020-01-22 | End: 2020-02-01

## 2020-01-22 RX ORDER — CARVEDILOL 3.12 MG/1
25 TABLET ORAL 2 TIMES DAILY
Qty: 60 TABLET | Refills: 0 | Status: SHIPPED | OUTPATIENT
Start: 2020-01-22 | End: 2020-01-22 | Stop reason: SDUPTHER

## 2020-01-22 RX ADMIN — ASPIRIN 81 MG 81 MG: 81 TABLET ORAL at 09:01

## 2020-01-22 RX ADMIN — TRAMADOL HYDROCHLORIDE 50 MG: 50 TABLET, FILM COATED ORAL at 09:01

## 2020-01-22 RX ADMIN — GABAPENTIN 600 MG: 300 CAPSULE ORAL at 09:01

## 2020-01-22 RX ADMIN — INSULIN DETEMIR 25 UNITS: 100 INJECTION, SOLUTION SUBCUTANEOUS at 09:01

## 2020-01-22 RX ADMIN — INSULIN ASPART 6 UNITS: 100 INJECTION, SOLUTION INTRAVENOUS; SUBCUTANEOUS at 11:01

## 2020-01-22 RX ADMIN — MUPIROCIN: 20 OINTMENT TOPICAL at 09:01

## 2020-01-22 RX ADMIN — LEVOFLOXACIN 500 MG: 500 TABLET, FILM COATED ORAL at 05:01

## 2020-01-22 RX ADMIN — INSULIN ASPART 4 UNITS: 100 INJECTION, SOLUTION INTRAVENOUS; SUBCUTANEOUS at 09:01

## 2020-01-22 NOTE — PROGRESS NOTES
Cardiac Rehab     Ana Bullard   [id number]   1/22/2020         Cardiac Rehab Phase Taught: Phase 1    Teaching Method: Verbal    Handouts: CHF Zone and Diet Packet    Educational Videos: None    Understanding:  Verbalize understanding    Comments: Education on chf and cardiac rehab provided. Pt states he has attended the program once before. Contact information given.    Total Time Spent:15 mins            Francisca Neri RN

## 2020-01-22 NOTE — PLAN OF CARE
01/22/20 1007   Discharge Assessment   Assessment Type Discharge Planning Assessment   Confirmed/corrected address and phone number on facesheet? Yes   Assessment information obtained from? Patient   Prior to hospitilization cognitive status: Alert/Oriented   Prior to hospitalization functional status: Independent   Current cognitive status: Alert/Oriented   Current Functional Status: Independent   Lives With spouse  (Pt resides with his wife, Katelynn [phone])   Able to Return to Prior Arrangements yes   Is patient able to care for self after discharge? Yes   Who are your caregiver(s) and their phone number(s)? Pt's emergency contact is his wife, Katelynn [phone]. Pt has not addressed POA or advanced directives.   Patient's perception of discharge disposition home or selfcare   Readmission Within the Last 30 Days current reason for admission unrelated to previous admission   If yes, most recent facility name: [facility]   Patient currently being followed by outpatient case management? No   Patient currently receives any other outside agency services? No   Equipment Currently Used at Home none   Part D Coverage [employer] Managed Medicare   Do you have any problems affording any of your prescribed medications? No   Is the patient taking medications as prescribed? yes   Does the patient have transportation home? Yes   Transportation Anticipated family or friend will provide   Discharge Plan A Home;Home with family   DME Needed Upon Discharge  none   Patient/Family in Agreement with Plan yes   Readmission Questionnaire   At the time of your discharge, did someone talk to you about what your health problems were? No   At the time of discharge, did someone talk to you about what to watch out for regarding worsening of your health problem? Yes   At the time of discharge, did someone talk to you about what to do if you experienced worsening of your health problem? Yes   At the time of discharge, did  "someone talk to you about which medication to take when you left the hospital and which ones to stop taking? Yes   At the time of discharge, did someone talk to you about when and where to follow up with a doctor after you left the hospital? Yes   What do you believe caused you to be sick enough to be re-admitted? Fever; "Didn't get cured the first time"   How often do you need to have someone help you when you read instructions, pamphlets, or other written material from your doctor or pharmacy? Never   Do you have problems taking your medications as prescribed? No   Do you have any problems affording any of  your prescribed medications? No   Do you have problems obtaining/receiving your medications? No   Does the patient have transportation to healthcare appointments? Yes   Living Arrangements house   Does the patient have family/friends to help with healtcare needs after discharge? yes   Does your caregiver provide all the help you need? Yes   Are you currently feeling confused? No   Are you currently having problems thinking? No   Are you currently having memory problems? No   Have you felt down, depressed, or hopeless? 0   Have you felt little interest or pleasure in doing things? 0   In the last 7 days, my sleep quality was: very poor     "

## 2020-01-22 NOTE — DISCHARGE SUMMARY
[facility] Medicine  Discharge Summary      Patient Name: Ana Bullard  MRN: [id number]  Admission Date: 1/18/2020  Hospital Length of Stay: 3 days  Discharge Date and Time: 1/22/2020  1:42 PM  Attending Physician: No att. providers found   Discharging Provider: Rob Murphy MD  Primary Care Provider: YRN Landry      HPI:   59-year-old patient getting admitted with severe sepsis  Patient from January 4 to January 7 was in a hospital in [facility]  and was treated for acute pneumonia and acute congestive heart failure  Eventually he was discharged home with p.o. Cefdinir  There after he was seen by his podiatrist for his chronic right foot ulcers and also by the pain medicine MD (Last week)  Patient did not had a flu shot this year and he does not want flu shot  Today all of sudden he started having fever and temperature went up to 103° associated with chills rigors and diaphoretic episodes  Also he he has been suffering from cough for the past few weeks and today he was coughing up a lot with some induction of sputum  Later he started having shortness of breath and he had episodes of confusion  This prompted the family bring the patient to the hospital and in ER he was found to be tachycardic, hypotensive and his WBC levels were very high  He was given 1 L of bolus and his mental condition improved.  Temperature stayed in the range of 1 not pre  Patient will be admitted in ICU for severe sepsis        * No surgery found *      Hospital Course:        59-year-old male admitted with septic shock due to Streptococcus pneumoniae bacteremia/pneumonia, a KI, acute metabolic encephalopathy.   Was seen in conjunction with Infectious Disease and antibiotics tailored to vancomycin and Levaquin.  Vancomycin was stopped today.  Patient essentially feels back to baseline and is asymptomatic today.  Will discharge with p.o. Levaquin to complete course of antibiotics as recommended per  Infectious Disease.  Advised him that he will need follow-up imaging of his chest in 6 weeks and that he should follow up closely with his primary care provider as well as return to the ED with any new, worsening, or recurrent symptoms.    Consults:   Consults (From admission, onward)        Status Ordering Provider     Inpatient consult to Infectious Diseases  Once     Provider:  Otilia Pineda MD    Completed NGUYỄN YODER          No new Assessment & Plan notes have been filed under this hospital service since the last note was generated.  Service: Hospital Medicine    Final Active Diagnoses:    Diagnosis Date Noted POA    Bacteremia, S.  Pneumoniae [R78.81] 01/20/2020 Yes    Pneumonia [J18.9] 01/19/2020 Yes    AICD (automatic cardioverter/defibrillator) present [Z95.810] 01/19/2020 Yes    Obesity [E66.9] 01/19/2020 Yes    Chronic pain [G89.29] 01/19/2020 Yes    Chronic systolic heart failure [I50.22] 01/19/2020 Yes    Heparin induced thrombocytopenia [D75.82] 01/19/2020 Yes    Hyperkalemia [E87.5] 01/19/2020 Yes    AD (acute kidney injury) [N17.9] 01/19/2020 Yes    Hyponatremia [E87.1] 01/19/2020 Yes    Hypomagnesemia [E83.42] 01/19/2020 Yes    Peripheral vascular disease [I73.9] 07/31/2019 Yes    Skin ulcer of right foot with fat layer exposed [L97.512] 02/12/2019 Yes    CAD (coronary artery disease) [I25.10] 01/01/2012 Yes    Type II diabetes mellitus with neurological manifestations [E11.49] 01/01/1983 Yes      Problems Resolved During this Admission:    Diagnosis Date Noted Date Resolved POA    PRINCIPAL PROBLEM:  Septic shock [A41.9, R65.21] 01/01/2019 01/20/2020 Yes    Septic shock [A41.9, R65.21] 01/20/2020 01/22/2020 Yes       Discharged Condition: good     A&ox4, NAD  rrr  Minimal L sided rhonchi, good air movement  Trace pedal edema  abd soft, nt,nd      Disposition: Home or Self Care    Follow Up:  Follow-up Information     Repeat CXR in 6 weeks.           YRN Landry In 2  weeks.    Specialty:  Family Medicine  Why:  with repeat BMP and CBC  Contact information:  [address]  [place] [address] [phone]                 Patient Instructions:      Diet diabetic     Activity as tolerated           Pending Diagnostic Studies:     Procedure Component Value Units Date/Time    Lactic acid, plasma [[id number]] Collected:  01/19/20 0833    Order Status:  Sent Lab Status:  In process Updated:  01/19/20 0834    Specimen:  Blood          Medications:  Reconciled Home Medications:      Medication List      START taking these medications    carvedilol 3.125 MG tablet  Commonly known as:  COREG  Take 1 tablet (3.125 mg total) by mouth 2 (two) times daily.     levoFLOXacin 750 MG tablet  Commonly known as:  LEVAQUIN  Take 1 tablet (750 mg total) by mouth before breakfast.  Start taking on:  January 23, 2020     nystatin 100,000 unit/mL suspension  Commonly known as:  MYCOSTATIN  Take 6 mLs (600,000 Units total) by mouth after meals as needed.        CHANGE how you take these medications    * furosemide 40 MG tablet  Commonly known as:  LASIX  Take 1 tablet (40 mg total) by mouth once daily.  What changed:  when to take this     * furosemide 40 MG tablet  Commonly known as:  LASIX  Take 1 tablet (40 mg total) by mouth nightly as needed.  What changed:  You were already taking a medication with the same name, and this prescription was added. Make sure you understand how and when to take each.         * This list has 2 medication(s) that are the same as other medications prescribed for you. Read the directions carefully, and ask your doctor or other care provider to review them with you.            CONTINUE taking these medications    albuterol-ipratropium 2.5 mg-0.5 mg/3 mL nebulizer solution  Commonly known as:  DUO-NEB  ipratropium-albuterol 0.5 mg-3 mg(2.5 mg base)/3 mL nebulization soln     aspirin 81 MG Chew  Take 81 mg by mouth once daily.     atorvastatin 10 MG  tablet  Commonly known as:  LIPITOR  Take 1 tablet by mouth once daily.     Basaglar KwikPen U-100 Insulin glargine 100 units/mL (3mL) SubQ pen  Generic drug:  insulin  25 Units once daily.     gabapentin 600 MG tablet  Commonly known as:  NEURONTIN  Take 1 tablet by mouth 3 (three) times daily.     metFORMIN 500 MG tablet  Commonly known as:  GLUCOPHAGE  Take 2 tablets by mouth 2 (two) times daily.     nortriptyline 25 MG capsule  Commonly known as:  PAMELOR  Take 1 capsule (25 mg total) by mouth every evening.     pantoprazole 40 MG tablet  Commonly known as:  PROTONIX  Take 1 tablet by mouth once daily.     spironolactone 25 MG tablet  Commonly known as:  ALDACTONE  Take 1 tablet by mouth once daily.     traMADol 50 mg tablet  Commonly known as:  ULTRAM  Take 1 tablet (50 mg total) by mouth every 8 (eight) hours as needed for Pain (Medically necessary for >7 days for chronic pain).     vitamin B complex-folic acid 0.4 mg Tab  Take 1 tablet by mouth.        STOP taking these medications    cefdinir 300 MG capsule  Commonly known as:  OMNICEF            Indwelling Lines/Drains at time of discharge:   Lines/Drains/Airways     None                 Time spent on the discharge of patient: 46 minutes  Patient was seen and examined on the date of discharge and determined to be suitable for discharge.         Rob Murphy MD  Department of Hospital Medicine  [facility]

## 2020-01-22 NOTE — PLAN OF CARE
01/21/20 2101   Patient Assessment/Suction   Respiratory Effort Unlabored   All Lung Fields Breath Sounds diminished   Rhythm/Pattern, Respiratory pattern regular   PRE-TX-O2   O2 Device (Oxygen Therapy) nasal cannula   Flow (L/min) 1   SpO2 (!) 92 %   Pulse 99   Resp 16   Aerosol Therapy   $ Aerosol Therapy Charges Aerosol Treatment   Respiratory Treatment Status (SVN) given   Treatment Route (SVN) blow by   Patient Position (SVN) semi-Ruiz's   Post Treatment Assessment (SVN) breath sounds improved   Signs of Intolerance (SVN) none   Breath Sounds Post-Respiratory Treatment   Throughout All Fields Post-Treatment All Fields   Throughout All Fields Post-Treatment clear   Post-treatment Heart Rate (beats/min) 99   Post-treatment Resp Rate (breaths/min) 16   Respiratory Evaluation   $ Care Plan Tech Time 15 min   Admitting Diagnosis sepsis

## 2020-01-22 NOTE — PLAN OF CARE
Pt remains free from injury. Pt is afebrile. IV abx as ordered. Pt ambulating frequently. No sob. Pt educated on plan of care and safety.

## 2020-01-22 NOTE — PROGRESS NOTES
VANCOMYCIN PHARMACOKINETIC NOTE:  Vancomycin Day #3    Objective:    59 y.o., male, Actual Body Weight = 117 kg (257 lb 15 oz)        Diagnosis/Indication for Vancomycin:  Bacteremia   Desired Vancomycin Peak:  35-40 mcg/ml; Desired Trough: 15-20 mcg/ml    Current Vancomycin Regimen:  1750 IV every 24 hours        Receiving other antibiotics:    Antibiotics (From admission, onward)      Start     Stop Route Frequency Ordered    01/22/20 1830  vancomycin (VANCOCIN) 1,750 mg in dextrose 5 % 500 mL IVPB      -- IV Every 18 hours 01/22/20 0257    01/21/20 0600  levoFLOXacin tablet 500 mg     Note to Pharmacy:  Ht: 6' (1.829 m)  Wt: 111.1 kg (245 lb)  Estimated Creatinine Clearance: 61.7 mL/min (A) (based on SCr of 1.7 mg/dL (H)).   Body mass index is 33.23 kg/m².    -- Oral Before breakfast 01/20/20 0916    01/19/20 1030  mupirocin 2 % ointment  (MRSA Decolonization Orders ST)      01/24 0859 Nasl 2 times daily 01/19/20 0919    01/19/20 0140  vancomycin - pharmacy to dose  (vancomycin IVPB)      -- IV pharmacy to manage frequency 01/19/20 0042             The patient has the following labs:     1/22/2020 Estimated Creatinine Clearance: 71.8 mL/min (A) (based on SCr of 1.5 mg/dL (H)). Lab Results   Component Value Date    BUN 36 (H) 01/21/2020       Lab Results   Component Value Date    WBC 10.08 01/21/2020              Vancomycin Trough:  17.1 mcg/mL collected 23 hours after Dose #3    Assessment:    Renal function is: improving    Vancomycin trough is within goal range.    Plan:    Will change vancomycin to 1750 mg IV every 18 hours due to CrCl improving from 47.7 ml/min to 71.8 ml/min    Will schedule next dose for 1/22 at 18:30    Will obtain vancomycin trough after 3 more dose(s), prior to dose due 1/24 at 06:30    Pharmacy will continue to manage vancomycin therapy and adjust regimen as necessary.    Thank you for allowing us to participate in this patient's care.     Flo Garcia 1/22/2020 2:58  AM  Department of Pharmacy  Ext [phone]

## 2020-01-24 LAB — BACTERIA BLD CULT: NORMAL

## 2020-01-25 LAB — BACTERIA BLD CULT: NORMAL

## 2020-02-13 ENCOUNTER — OFFICE VISIT (OUTPATIENT)
Dept: PODIATRY | Facility: CLINIC | Age: 60
End: 2020-02-13
Payer: MEDICARE

## 2020-02-13 DIAGNOSIS — E11.49 TYPE II DIABETES MELLITUS WITH NEUROLOGICAL MANIFESTATIONS: ICD-10-CM

## 2020-02-13 DIAGNOSIS — I87.2 VENOUS STASIS DERMATITIS OF BOTH LOWER EXTREMITIES: ICD-10-CM

## 2020-02-13 DIAGNOSIS — I73.9 PERIPHERAL VASCULAR DISEASE: ICD-10-CM

## 2020-02-13 DIAGNOSIS — L97.512 SKIN ULCER OF RIGHT FOOT WITH FAT LAYER EXPOSED: Primary | ICD-10-CM

## 2020-02-13 DIAGNOSIS — M20.42 HAMMER TOES, BILATERAL: ICD-10-CM

## 2020-02-13 DIAGNOSIS — M20.41 HAMMER TOES, BILATERAL: ICD-10-CM

## 2020-02-13 PROCEDURE — 11042 WOUND DEBRIDEMENT: ICD-10-PCS | Mod: S$GLB,,, | Performed by: PODIATRIST

## 2020-02-13 PROCEDURE — 11042 DBRDMT SUBQ TIS 1ST 20SQCM/<: CPT | Mod: S$GLB,,, | Performed by: PODIATRIST

## 2020-02-13 NOTE — PROGRESS NOTES
"  [address] [facility] [address]  [place] [address]  Phone: [phone]   [phone]    Patient's PCP:YRN Landry  Referring Provider: No ref. provider found    Subjective:      Chief Complaint:: Follow-up (Skin ulcer of right foot with fat layer exposed)    CAROLIN Bullard is a 59 y.o. male who presents for follow up of ulcer of the right foot. Patient denies pain. Patent continues to use bunion pads on a daily basis. Patient states "I feel like it is so much better".    Systemic Doctor: Dr. Renita Messina  Date Last Seen: this past month  Blood Sugar: 140  Hemoglobin A1c: 5.9    There were no vitals filed for this visit.  Shoe Size: 12    Past Surgical History:   Procedure Laterality Date    CARDIAC PACEMAKER PLACEMENT  12/2012    CARDIAC PACEMAKER PLACEMENT  10/04/2018    battery replacement    CORONARY ARTERY BYPASS GRAFT  06/2012    ESOPHAGOGASTRODUODENOSCOPY  01/31/2017    SAMARA FILTER PLACEMENT  2012    vena cava    LUMBAR SYMPATHETIC NERVE BLOCK Right 8/22/2019    Procedure: BLOCK, NERVE, SYMPATHETIC, LUMBAR;  Surgeon: Tashi Good MD;  Location: [facility] OR;  Service: Pain Management;  Laterality: Right;  RIGHT SYMPATHETIC NERVE BLOCK     Past Medical History:   Diagnosis Date    AICD (automatic cardioverter/defibrillator) present     Anxiety and depression 2012    CAD (coronary artery disease) 2012    Cardiomegaly 2016    CHF (congestive heart failure) 2012    Cholecystitis 2017    Complete heart block 2012    Diabetic foot ulcer     DVT (deep venous thrombosis) 2012    And pulmonary embolus    GERD (gastroesophageal reflux disease) 2010    Heparin induced thrombocytopenia     Hyperlipemia 2011    IDDM (insulin dependent diabetes mellitus) 1983    With neuropathy    Ischemic cardiomyopathy 2012    MI (myocardial infarction) 2012    Morbid obesity     MRSA (methicillin resistant Staphylococcus aureus) infection 01/19/2019    Noncompliance with therapeutic plan 2019 " "   Osteomyelitis of right foot 01/2019    Pulmonary edema 2019    Respiratory failure 2019    Sepsis 01/2019    Due to cellulitis right leg    Sleep apnea 2013    Thrombocytopathy 2012    Venous stasis dermatitis of both lower extremities      Family History   Problem Relation Age of Onset    Arthritis Mother     Diabetes Mother     Cancer Father     Heart disease Father     Stroke Father         Social History:   Marital Status:   Alcohol History:  reports that he does not drink alcohol.  Tobacco History:  reports that he quit smoking about 8 years ago. His smoking use included cigarettes. He has a 76.00 pack-year smoking history. He has never used smokeless tobacco.  Drug History:  has no drug history on file.    Review of patient's allergies indicates:   Allergen Reactions    Vasopressin Anaphylaxis and Other (See Comments)     Increased pressure in his head; felt like his eyeballs and veins were going to pop out of his head.     Heparin Other (See Comments)     Other reaction(s): "depleted my blood platets"    Decreased platelet count    Heparin analogues Other (See Comments)     Depleted WBC count    Vasopressin analogues Other (See Comments)     "felt like eyes going to pop out of my head"    Morphine Hallucinations, Other (See Comments) and Anxiety     Other reaction(s): Hallucinations  Paranoid, hallucinations         Current Outpatient Medications   Medication Sig Dispense Refill    albuterol-ipratropium (DUO-NEB) 2.5 mg-0.5 mg/3 mL nebulizer solution ipratropium-albuterol 0.5 mg-3 mg(2.5 mg base)/3 mL nebulization soln      aspirin 81 MG Chew Take 81 mg by mouth once daily.      atorvastatin (LIPITOR) 10 MG tablet Take 1 tablet by mouth once daily.  1    BASAGLAR KWIKPEN U-100 INSULIN glargine 100 units/mL (3mL) SubQ pen 25 Units once daily.   3    carvedilol (COREG) 3.125 MG tablet Take 1 tablet (3.125 mg total) by mouth 2 (two) times daily. 60 tablet 0    furosemide (LASIX) " 40 MG tablet Take 1 tablet (40 mg total) by mouth once daily. 30 tablet 1    furosemide (LASIX) 40 MG tablet Take 1 tablet (40 mg total) by mouth nightly as needed. 30 tablet 11    gabapentin (NEURONTIN) 600 MG tablet Take 1 tablet by mouth 3 (three) times daily.  0    levoFLOXacin (LEVAQUIN) 750 MG tablet Take 1 tablet (750 mg total) by mouth before breakfast. 10 tablet 0    metFORMIN (GLUCOPHAGE) 500 MG tablet Take 2 tablets by mouth 2 (two) times daily.  2    pantoprazole (PROTONIX) 40 MG tablet Take 1 tablet by mouth once daily.  0    spironolactone (ALDACTONE) 25 MG tablet Take 1 tablet by mouth once daily.  1    traMADol (ULTRAM) 50 mg tablet Take 1 tablet (50 mg total) by mouth every 8 (eight) hours as needed for Pain (Medically necessary for >7 days for chronic pain). 90 tablet 2    vitamin B complex-folic acid 0.4 mg Tab Take 1 tablet by mouth.      nortriptyline (PAMELOR) 25 MG capsule Take 1 capsule (25 mg total) by mouth every evening. 30 capsule 2     No current facility-administered medications for this visit.        Review of Systems      Objective:        Physical Exam:   Foot Exam    General  General Appearance: appears stated age and healthy   Orientation: alert and oriented to person, place, and time   Affect: appropriate   Gait: unimpaired       Right Foot/Ankle     Inspection and Palpation  Skin Exam: ulcer (Grade 2 ulceration plantar 5th MPJ 1 cm diameter by 0.4 cm deep with exposed fat no exposed bone no fluctuance no purulent drainage no erythema.  Perimeter of keratotic tissue.);           Physical Exam   Musculoskeletal:        Feet:    Feet:   Right Foot:   Skin Integrity: Positive for ulcer (Grade 2 ulceration plantar 5th MPJ 1 cm diameter by 0.4 cm deep with exposed fat no exposed bone no fluctuance no purulent drainage no erythema.  Perimeter of keratotic tissue.).               Imaging:            Assessment:       1. Skin ulcer of right foot with fat layer exposed    2. 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 Peripheral vascular disease    4. Venous stasis dermatitis of both lower extremities    5. Hammer toes, bilateral      Plan:   Skin ulcer of right foot with fat layer exposed    Type II diabetes mellitus with neurological manifestations    Peripheral vascular disease    Venous stasis dermatitis of both lower extremities    Hammer toes, bilateral      No follow-ups on file.    Wound Debridement  Date/Time: 2/13/2020 8:50 AM  Performed by: Tong Heaton DPM  Authorized by: Tong Heaton DPM     Time out: Immediately prior to procedure a "time out" was called to verify the correct patient, procedure, equipment, support staff and site/side marked as required.    Consent Done?:  Yes (Verbal)    Preparation: Patient was prepped and draped in usual sterile fashion    Local anesthesia used?: No      Wound Details:    Location:  Right foot    Location:  Right 5th Metatarsal Head    Type of Debridement:  Excisional       Length (cm):  1       Area (sq cm):  1       Width (cm):  1       Percent Debrided (%):  100       Depth (cm):  0.4       Total Area Debrided (sq cm):  1    Depth of debridement:  Subcutaneous tissue    Tissue debrided:  Dermis, Epidermis and Subcutaneous    Devitalized tissue debrided:  Biofilm, Callus, Necrotic/Eschar and Slough    Instruments:  Forceps, Blade and Nippers    Bleeding:  Minimal  Hemostasis Achieved: Yes    Method Used:  Pressure  Patient tolerance:  Patient tolerated the procedure well with no immediate complications     Patient will continue daily dressing changes with Bactroban use accommodative insert early pressure on 5th metatarsal head ulcer.  He is return to see me in 4 weeks or as needed.     -     Counseling:     I provided patient education verbally regarding:   Patient diagnosis, treatment options, as well as alternatives, risks, and benefits.     This note was created using Dragon voice recognition software " that occasionally misinterpreted phrases or words.

## 2020-03-12 ENCOUNTER — OFFICE VISIT (OUTPATIENT)
Dept: PODIATRY | Facility: CLINIC | Age: 60
End: 2020-03-12
Payer: MEDICARE

## 2020-03-12 DIAGNOSIS — I73.9 PERIPHERAL VASCULAR DISEASE: ICD-10-CM

## 2020-03-12 DIAGNOSIS — L97.511 SKIN ULCER OF RIGHT FOOT, LIMITED TO BREAKDOWN OF SKIN: Primary | ICD-10-CM

## 2020-03-12 DIAGNOSIS — E11.49 TYPE II DIABETES MELLITUS WITH NEUROLOGICAL MANIFESTATIONS: ICD-10-CM

## 2020-03-12 PROCEDURE — 11042 DBRDMT SUBQ TIS 1ST 20SQCM/<: CPT | Mod: S$GLB,,, | Performed by: PODIATRIST

## 2020-03-12 PROCEDURE — 11042 WOUND DEBRIDEMENT: ICD-10-PCS | Mod: S$GLB,,, | Performed by: PODIATRIST

## 2020-03-12 NOTE — PROGRESS NOTES
[address] [facility] [address]  [place] [address]  Phone: [phone]   [phone]    Patient's PCP:YRN Landry  Referring Provider: No ref. provider found    Subjective:      Chief Complaint:: Foot Ulcer (right foot)    CAROLIN Bullard is a 59 y.o. male who presents for follow up of right foot ulcer. Patient states ulcer is doing better.  Patient has not been out of the country but his wife was recently on a cruise in [place] and returned on March to 5th.  He has no complaints of any cough for upper respiratory symptoms and neither does his wife.       Systemic Doctor: Dr. Renita Messina  Date Last Seen: this past month  Blood Sugar: 140  Hemoglobin A1c: 5.9    There were no vitals filed for this visit.  Shoe Size:     Past Surgical History:   Procedure Laterality Date    CARDIAC PACEMAKER PLACEMENT  12/2012    CARDIAC PACEMAKER PLACEMENT  10/04/2018    battery replacement    CORONARY ARTERY BYPASS GRAFT  06/2012    ESOPHAGOGASTRODUODENOSCOPY  01/31/2017    SAMARA FILTER PLACEMENT  2012    vena cava    LUMBAR SYMPATHETIC NERVE BLOCK Right 8/22/2019    Procedure: BLOCK, NERVE, SYMPATHETIC, LUMBAR;  Surgeon: Tashi Good MD;  Location: [facility];  Service: Pain Management;  Laterality: Right;  RIGHT SYMPATHETIC NERVE BLOCK     Past Medical History:   Diagnosis Date    AICD (automatic cardioverter/defibrillator) present     Anxiety and depression 2012    CAD (coronary artery disease) 2012    Cardiomegaly 2016    CHF (congestive heart failure) 2012    Cholecystitis 2017    Complete heart block 2012    Diabetic foot ulcer     DVT (deep venous thrombosis) 2012    And pulmonary embolus    GERD (gastroesophageal reflux disease) 2010    Heparin induced thrombocytopenia     Hyperlipemia 2011    IDDM (insulin dependent diabetes mellitus) 1983    With neuropathy    Ischemic cardiomyopathy 2012    MI (myocardial infarction) 2012    Morbid obesity     MRSA (methicillin resistant  "Staphylococcus aureus) infection 01/19/2019    Noncompliance with therapeutic plan 2019    Osteomyelitis of right foot 01/2019    Pulmonary edema 2019    Respiratory failure 2019    Sepsis 01/2019    Due to cellulitis right leg    Sleep apnea 2013    Thrombocytopathy 2012    Venous stasis dermatitis of both lower extremities      Family History   Problem Relation Age of Onset    Arthritis Mother     Diabetes Mother     Cancer Father     Heart disease Father     Stroke Father         Social History:   Marital Status:   Alcohol History:  reports that he does not drink alcohol.  Tobacco History:  reports that he quit smoking about 8 years ago. His smoking use included cigarettes. He has a 76.00 pack-year smoking history. He has never used smokeless tobacco.  Drug History:  has no drug history on file.    Review of patient's allergies indicates:   Allergen Reactions    Vasopressin Anaphylaxis and Other (See Comments)     Increased pressure in his head; felt like his eyeballs and veins were going to pop out of his head.     Heparin Other (See Comments)     Other reaction(s): "depleted my blood platets"    Decreased platelet count    Heparin analogues Other (See Comments)     Depleted WBC count    Vasopressin analogues Other (See Comments)     "felt like eyes going to pop out of my head"    Morphine Hallucinations, Other (See Comments) and Anxiety     Other reaction(s): Hallucinations  Paranoid, hallucinations         Current Outpatient Medications   Medication Sig Dispense Refill    albuterol-ipratropium (DUO-NEB) 2.5 mg-0.5 mg/3 mL nebulizer solution ipratropium-albuterol 0.5 mg-3 mg(2.5 mg base)/3 mL nebulization soln      aspirin 81 MG Chew Take 81 mg by mouth once daily.      atorvastatin (LIPITOR) 10 MG tablet Take 1 tablet by mouth once daily.  1    BASAGLAR KWIKPEN U-100 INSULIN glargine 100 units/mL (3mL) SubQ pen 25 Units once daily.   3    carvedilol (COREG) 3.125 MG tablet Take 1 " tablet (3.125 mg total) by mouth 2 (two) times daily. 60 tablet 0    furosemide (LASIX) 40 MG tablet Take 1 tablet (40 mg total) by mouth once daily. 30 tablet 1    furosemide (LASIX) 40 MG tablet Take 1 tablet (40 mg total) by mouth nightly as needed. 30 tablet 11    gabapentin (NEURONTIN) 600 MG tablet Take 1 tablet by mouth 3 (three) times daily.  0    levoFLOXacin (LEVAQUIN) 750 MG tablet Take 1 tablet (750 mg total) by mouth before breakfast. 10 tablet 0    metFORMIN (GLUCOPHAGE) 500 MG tablet Take 2 tablets by mouth 2 (two) times daily.  2    nortriptyline (PAMELOR) 25 MG capsule Take 1 capsule (25 mg total) by mouth every evening. 30 capsule 2    pantoprazole (PROTONIX) 40 MG tablet Take 1 tablet by mouth once daily.  0    spironolactone (ALDACTONE) 25 MG tablet Take 1 tablet by mouth once daily.  1    traMADol (ULTRAM) 50 mg tablet Take 1 tablet (50 mg total) by mouth every 8 (eight) hours as needed for Pain (Medically necessary for >7 days for chronic pain). 90 tablet 2    vitamin B complex-folic acid 0.4 mg Tab Take 1 tablet by mouth.       No current facility-administered medications for this visit.        Review of Systems      Objective:        Physical Exam:   Foot Exam    General  General Appearance: appears stated age and healthy   Orientation: alert and oriented to person, place, and time   Affect: appropriate   Gait: unimpaired       Right Foot/Ankle     Inspection and Palpation  Skin Exam: ulcer (Small grade 2 ulcer plantar 5th MPJ.  Approximately 5 mm in diameter by 2 mm deep with no signs of infection with perimeter of callus tissue.  Ulcers diminished since last visit.);     Neurovascular  Dorsalis pedis: 1+  Posterior tibial: 1+  Saphenous nerve sensation: diminished  Tibial nerve sensation: diminished  Superficial peroneal nerve sensation: diminished  Deep peroneal nerve sensation: diminished  Sural nerve sensation: diminished          Physical Exam   Cardiovascular:   Pulses:        "Dorsalis pedis pulses are 1+ on the right side.        Posterior tibial pulses are 1+ on the right side.   Feet:   Right Foot:   Skin Integrity: Positive for ulcer (Small grade 2 ulcer plantar 5th MPJ.  Approximately 5 mm in diameter by 2 mm deep with no signs of infection with perimeter of callus tissue.  Ulcers diminished since last visit.).       Imaging:            Assessment:       1. Skin ulcer of right foot with fat layer exposed    2. Type II diabetes mellitus with neurological manifestations    3. Peripheral vascular disease      Plan:   Skin ulcer of right foot with fat layer exposed    Type II diabetes mellitus with neurological manifestations    Peripheral vascular disease      No follow-ups on file.    Wound Debridement  Date/Time: 3/12/2020 8:50 AM  Performed by: Tong Heaton DPM  Authorized by: Tong Heaton DPM     Time out: Immediately prior to procedure a "time out" was called to verify the correct patient, procedure, equipment, support staff and site/side marked as required.    Consent Done?:  Yes (Verbal)    Preparation: Patient was prepped and draped in usual sterile fashion    Local anesthesia used?: No      Wound Details:    Location:  Right foot    Location:  Right 5th Metatarsal Head    Type of Debridement:  Excisional       Length (cm):  0.5       Area (sq cm):  0.25       Width (cm):  0.5       Percent Debrided (%):  100       Depth (cm):  0.2       Total Area Debrided (sq cm):  0.25    Depth of debridement:  Subcutaneous tissue    Tissue debrided:  Dermis, Epidermis and Subcutaneous    Devitalized tissue debrided:  Biofilm, Callus and Slough    Instruments:  Forceps, Blade and Nippers    Bleeding:  Minimal  Hemostasis Achieved: Yes    Method Used:  Pressure  Patient tolerance:  Patient tolerated the procedure well with no immediate complications     Patient do daily dressing changes with topical antibiotics.  Continue wearing diabetic shoes with offloading inserts.  Return in " 6 weeks or as needed.     -     Counseling:     I provided patient education verbally regarding:   Patient diagnosis, treatment options, as well as alternatives, risks, and benefits.     This note was created using Dragon voice recognition software that occasionally misinterpreted phrases or words.

## 2020-04-07 ENCOUNTER — PATIENT MESSAGE (OUTPATIENT)
Dept: PAIN MEDICINE | Facility: CLINIC | Age: 60
End: 2020-04-07

## 2020-04-07 ENCOUNTER — OFFICE VISIT (OUTPATIENT)
Dept: PAIN MEDICINE | Facility: CLINIC | Age: 60
End: 2020-04-07
Payer: MEDICARE

## 2020-04-07 DIAGNOSIS — M79.671 BILATERAL FOOT PAIN: ICD-10-CM

## 2020-04-07 DIAGNOSIS — M79.672 BILATERAL FOOT PAIN: ICD-10-CM

## 2020-04-07 DIAGNOSIS — E11.42 DIABETIC POLYNEUROPATHY ASSOCIATED WITH TYPE 2 DIABETES MELLITUS: ICD-10-CM

## 2020-04-07 DIAGNOSIS — G89.4 CHRONIC PAIN DISORDER: Primary | ICD-10-CM

## 2020-04-07 DIAGNOSIS — G89.4 CHRONIC PAIN DISORDER: ICD-10-CM

## 2020-04-07 PROCEDURE — 99214 PR OFFICE/OUTPT VISIT, EST, LEVL IV, 30-39 MIN: ICD-10-PCS | Mod: 95,,, | Performed by: PHYSICIAN ASSISTANT

## 2020-04-07 PROCEDURE — 99214 OFFICE O/P EST MOD 30 MIN: CPT | Mod: 95,,, | Performed by: PHYSICIAN ASSISTANT

## 2020-04-07 RX ORDER — TRAMADOL HYDROCHLORIDE 50 MG/1
50 TABLET ORAL EVERY 8 HOURS PRN
Qty: 90 TABLET | Refills: 0 | Status: SHIPPED | OUTPATIENT
Start: 2020-04-09 | End: 2020-05-06 | Stop reason: SDUPTHER

## 2020-04-07 NOTE — PROGRESS NOTES
Referring Physician: No ref. provider found    PCP: YRN Landry      CC:  Bilateral foot pain    Visit type: Virtual visit with synchronous audio and video  Total time spent with patient: 25 minutes  Each patient to whom he or she provides medical services by telemedicine is:  (1) informed of the relationship between the physician and patient and the respective role of any other health care provider with respect to management of the patient; and (2) notified that he or she may decline to receive medical services by telemedicine and may withdraw from such care at any time.  Patient is safe at home in [place]  Interval history:  Mr. Bullard is a 60 y.o. male with bilateral foot pain due to peripheral neuropathy diabetic neuropathy.  He was recently hospitalized for CHF.  He continues to take Gabapentin 600 mg t.i.d. with mild benefits. Nortriptyline 25 mg made him to drowsy so he discontinued it.  He continues to take tramadol 50 mg q.8 hours as needed with mild-to-moderate benefit.  Denies any side effects with the medication.  Able perform his ADLs with the medication.  He underwent a right-sided lumbar sympathetic nerve block which provided moderate benefit for 2 weeks.  Pain has since recurred.  He denies any worsening weakness.  No bowel bladder changes. Pain today is rated 5/10.    Prior HPI:   Ana Bullard is a 60 y.o. male referred to us for bilateral foot pain. Patient with long history of diabetes.  He states neuropathy worsen after his CABG.  Bilateral foot pain has worsened over past 7 years.  He presents to us constant burning, sharp pain in his bilateral feet.  He also has some similar issues in his bilateral hands, but foot pain is worse.  Pain worsens prolonged sitting and standing.  He currently takes gabapentin 600 mg t.i.d. with mild benefits.  Increasing doses causes sedation.  He also takes tramadol q.8 hours as needed with mild-to-moderate benefit.  He denies any worsening  weakness.  No bowel bladder changes.    Interventional history:  Right LSB on 09/2019 with 50% relief for 2 weeks.    ROS:  CONSTITUTIONAL: No fevers, chills, night sweats, wt. loss, appetite changes  SKIN: no rashes or itching  ENT: No headaches, head trauma, vision changes, or eye pain  LYMPH NODES: None noticed   CV: No chest pain, palpitations.   RESP: No shortness of breath, dyspnea on exertion, cough, wheezing, or hemoptysis  GI: No nausea, emesis, diarrhea, constipation, melena, hematochezia, pain.    : No dysuria, hematuria, urgency, or frequency   HEME: No easy bruising, bleeding problems  PSYCHIATRIC: No depression, anxiety, psychosis, hallucinations.  NEURO: No seizures, memory loss, dizziness or difficulty sleeping  MSK:  Positive HPI      Past Medical History:   Diagnosis Date    AICD (automatic cardioverter/defibrillator) present     Anxiety and depression 2012    CAD (coronary artery disease) 2012    Cardiomegaly 2016    CHF (congestive heart failure) 2012    Cholecystitis 2017    Complete heart block 2012    Diabetic foot ulcer     DVT (deep venous thrombosis) 2012    And pulmonary embolus    GERD (gastroesophageal reflux disease) 2010    Heparin induced thrombocytopenia     Hyperlipemia 2011    IDDM (insulin dependent diabetes mellitus) 1983    With neuropathy    Ischemic cardiomyopathy 2012    MI (myocardial infarction) 2012    Morbid obesity     MRSA (methicillin resistant Staphylococcus aureus) infection 01/19/2019    Noncompliance with therapeutic plan 2019    Osteomyelitis of right foot 01/2019    Pulmonary edema 2019    Respiratory failure 2019    Sepsis 01/2019    Due to cellulitis right leg    Sleep apnea 2013    Thrombocytopathy 2012    Venous stasis dermatitis of both lower extremities      Past Surgical History:   Procedure Laterality Date    CARDIAC PACEMAKER PLACEMENT  12/2012    CARDIAC PACEMAKER PLACEMENT  10/04/2018    battery replacement    CORONARY  ARTERY BYPASS GRAFT  2012    ESOPHAGOGASTRODUODENOSCOPY  2017    SAMARA FILTER PLACEMENT  2012    vena cava    LUMBAR SYMPATHETIC NERVE BLOCK Right 2019    Procedure: BLOCK, NERVE, SYMPATHETIC, LUMBAR;  Surgeon: Tashi Good MD;  Location: [facility];  Service: Pain Management;  Laterality: Right;  RIGHT SYMPATHETIC NERVE BLOCK     Family History   Problem Relation Age of Onset    Arthritis Mother     Diabetes Mother     Cancer Father     Heart disease Father     Stroke Father      Social History     Socioeconomic History    Marital status:      Spouse name: Not on file    Number of children: Not on file    Years of education: Not on file    Highest education level: Not on file   Occupational History    Not on file   Social Needs    Financial resource strain: Not on file    Food insecurity:     Worry: Not on file     Inability: Not on file    Transportation needs:     Medical: Not on file     Non-medical: Not on file   Tobacco Use    Smoking status: Former Smoker     Packs/day: 2.00     Years: 38.00     Pack years: 76.00     Types: Cigarettes     Last attempt to quit:      Years since quittin.2    Smokeless tobacco: Never Used   Substance and Sexual Activity    Alcohol use: No     Frequency: Never    Drug use: Not on file    Sexual activity: Never   Lifestyle    Physical activity:     Days per week: Not on file     Minutes per session: Not on file    Stress: Not on file   Relationships    Social connections:     Talks on phone: Not on file     Gets together: Not on file     Attends Sikhism service: Not on file     Active member of club or organization: Not on file     Attends meetings of clubs or organizations: Not on file     Relationship status: Not on file   Other Topics Concern    Not on file   Social History Narrative    Not on file         Medications/Allergies: See med card    Pain score today is 5/10    GENERAL: A and O x3, the patient appears well  groomed and is in no acute distress.  Skin: No rashes or obvious lesions  HEENT: normocephalic, atraumatic  LUNGS: non labored breathing  ABDOMEN: non distended  UPPER EXTREMITIES and lower extremities. Normal ROM  CERVICAL SPINE:  Cervical spine: ROM is full in flexion, extension and lateral rotation without increased pain.    LUMBAR SPINE  Lumbar spine: ROM is limited with flexion extension and oblique extension with mild-to-moderate increased pain.      MENTAL STATUS: normal orientation, speech, language, and fund of knowledge for social situation.  Emotional state appropriate.    GAIT: normal rise, base, steps, and arm swing.        Imaging:  None    Assessment:  Ana Bullard is a 60 y.o. male with bilateral foot pain  1. Chronic pain disorder    2. Diabetic polyneuropathy associated with type 2 diabetes mellitus    3. Bilateral foot pain        Plan:  1. I have stressed the importance of physical activity and exercise to improve overall health  2.  Discussed disease progression of peripheral neuropathy.  Continue gabapentin prescribed.  Consider nortriptyline 10 mg in the future  3.  He does request continue tramadol with us.  I believe this is very reasonable.   reviewed.  He takes tramadol 50 mg q.8 hours as needed.  Previous UDS last visit consistent with use.   4.  May consider repeat lumbar sympathetic block if pain is exacerbated.  Briefly discussed spinal cord stimulation as well.  5.  Follow-up in 1 month for telemed visit

## 2020-04-23 ENCOUNTER — OFFICE VISIT (OUTPATIENT)
Dept: PODIATRY | Facility: CLINIC | Age: 60
End: 2020-04-23
Payer: MEDICARE

## 2020-04-23 VITALS
TEMPERATURE: 99 F | SYSTOLIC BLOOD PRESSURE: 106 MMHG | BODY MASS INDEX: 34.81 KG/M2 | HEIGHT: 72 IN | RESPIRATION RATE: 14 BRPM | WEIGHT: 257 LBS | DIASTOLIC BLOOD PRESSURE: 69 MMHG | HEART RATE: 88 BPM

## 2020-04-23 DIAGNOSIS — L97.511 SKIN ULCER OF RIGHT FOOT, LIMITED TO BREAKDOWN OF SKIN: Primary | ICD-10-CM

## 2020-04-23 DIAGNOSIS — E11.49 TYPE II DIABETES MELLITUS WITH NEUROLOGICAL MANIFESTATIONS: ICD-10-CM

## 2020-04-23 DIAGNOSIS — I73.9 PERIPHERAL VASCULAR DISEASE: ICD-10-CM

## 2020-04-23 PROCEDURE — 97597 WOUND DEBRIDEMENT: ICD-10-PCS | Mod: S$GLB,,, | Performed by: PODIATRIST

## 2020-04-23 PROCEDURE — 97597 DBRDMT OPN WND 1ST 20 CM/<: CPT | Mod: S$GLB,,, | Performed by: PODIATRIST

## 2020-04-23 NOTE — PROGRESS NOTES
[address] [facility] [facility] [address]  [place] [address]  Phone: [phone]   [phone]    Patient's PCP:YRN Landry  Referring Provider: No ref. provider found    Subjective:      Chief Complaint:: Follow-up (ulcer right 5th toe)    CAROLIN Bullard is a 60 y.o. male who returns to clinic today for follow up on right fifth metatarsal ulcer.Doing well patient states he has noticed a bit of a blood forming. Treatment to date have included previous debridement.  Patient has no complaints of pain.    Systemic Doctor: Dr. Renita Messina  Date Last Seen: February 2020  Blood Sugar: 135  Hemoglobin A1c: 5.9    Vitals:    04/23/20 0841   BP: 106/69   Pulse: 88   Resp: 14   Temp: 98.6 °F (37 °C)     Shoe Size: 12    Past Surgical History:   Procedure Laterality Date    CARDIAC PACEMAKER PLACEMENT  12/2012    CARDIAC PACEMAKER PLACEMENT  10/04/2018    battery replacement    CORONARY ARTERY BYPASS GRAFT  06/2012    ESOPHAGOGASTRODUODENOSCOPY  01/31/2017    SAMARA FILTER PLACEMENT  2012    vena cava    LUMBAR SYMPATHETIC NERVE BLOCK Right 8/22/2019    Procedure: BLOCK, NERVE, SYMPATHETIC, LUMBAR;  Surgeon: Tashi Good MD;  [facility];  Service: Pain Management;  Laterality: Right;  RIGHT SYMPATHETIC NERVE BLOCK     Past Medical History:   Diagnosis Date    AICD (automatic cardioverter/defibrillator) present     Anxiety and depression 2012    CAD (coronary artery disease) 2012    Cardiomegaly 2016    CHF (congestive heart failure) 2012    Cholecystitis 2017    Complete heart block 2012    Diabetic foot ulcer     DVT (deep venous thrombosis) 2012    And pulmonary embolus    GERD (gastroesophageal reflux disease) 2010    Heparin induced thrombocytopenia     Hyperlipemia 2011    IDDM (insulin dependent diabetes mellitus) 1983    With neuropathy    Ischemic cardiomyopathy 2012    MI (myocardial infarction) 2012    Morbid obesity     MRSA (methicillin resistant Staphylococcus aureus)  "infection 01/19/2019    Noncompliance with therapeutic plan 2019    Osteomyelitis of right foot 01/2019    Pulmonary edema 2019    Respiratory failure 2019    Sepsis 01/2019    Due to cellulitis right leg    Sleep apnea 2013    Thrombocytopathy 2012    Venous stasis dermatitis of both lower extremities      Family History   Problem Relation Age of Onset    Arthritis Mother     Diabetes Mother     Cancer Father     Heart disease Father     Stroke Father         Social History:   Marital Status:   Alcohol History:  reports that he does not drink alcohol.  Tobacco History:  reports that he quit smoking about 8 years ago. His smoking use included cigarettes. He has a 76.00 pack-year smoking history. He has never used smokeless tobacco.  Drug History:  has no drug history on file.    Review of patient's allergies indicates:   Allergen Reactions    Vasopressin Anaphylaxis and Other (See Comments)     Increased pressure in his head; felt like his eyeballs and veins were going to pop out of his head.     Heparin Other (See Comments)     Other reaction(s): "depleted my blood platets"    Decreased platelet count    Heparin analogues Other (See Comments)     Depleted WBC count    Vasopressin analogues Other (See Comments)     "felt like eyes going to pop out of my head"    Morphine Hallucinations, Other (See Comments) and Anxiety     Other reaction(s): Hallucinations  Paranoid, hallucinations         Current Outpatient Medications   Medication Sig Dispense Refill    albuterol-ipratropium (DUO-NEB) 2.5 mg-0.5 mg/3 mL nebulizer solution ipratropium-albuterol 0.5 mg-3 mg(2.5 mg base)/3 mL nebulization soln      aspirin 81 MG Chew Take 81 mg by mouth once daily.      atorvastatin (LIPITOR) 10 MG tablet Take 1 tablet by mouth once daily.  1    BASAGLAR KWIKPEN U-100 INSULIN glargine 100 units/mL (3mL) SubQ pen 25 Units once daily.   3    carvedilol (COREG) 3.125 MG tablet Take 1 tablet (3.125 mg total) " by mouth 2 (two) times daily. 60 tablet 0    furosemide (LASIX) 40 MG tablet Take 1 tablet (40 mg total) by mouth once daily. 30 tablet 1    furosemide (LASIX) 40 MG tablet Take 1 tablet (40 mg total) by mouth nightly as needed. 30 tablet 11    gabapentin (NEURONTIN) 600 MG tablet Take 1 tablet by mouth 3 (three) times daily.  0    levoFLOXacin (LEVAQUIN) 750 MG tablet Take 1 tablet (750 mg total) by mouth before breakfast. 10 tablet 0    metFORMIN (GLUCOPHAGE) 500 MG tablet Take 2 tablets by mouth 2 (two) times daily.  2    pantoprazole (PROTONIX) 40 MG tablet Take 1 tablet by mouth once daily.  0    spironolactone (ALDACTONE) 25 MG tablet Take 1 tablet by mouth once daily.  1    traMADoL (ULTRAM) 50 mg tablet Take 1 tablet (50 mg total) by mouth every 8 (eight) hours as needed for Pain (Medically necessary for >7 days for chronic pain). 90 tablet 0    vitamin B complex-folic acid 0.4 mg Tab Take 1 tablet by mouth.      nortriptyline (PAMELOR) 25 MG capsule Take 1 capsule (25 mg total) by mouth every evening. 30 capsule 2     No current facility-administered medications for this visit.        Review of Systems      Objective:        Physical Exam:   Foot Exam    General  General Appearance: appears stated age and healthy   Orientation: alert and oriented to person, place, and time   Affect: appropriate   Gait: unimpaired       Right Foot/Ankle     Inspection and Palpation  Skin Exam: ulcer (Superficial grade 2 ulcer plantar 5th MPJ approximately 1 cm diameter with subdermal dry hemorrhage in no signs of infection.  Depth 0.1 cm);     Neurovascular  Dorsalis pedis: 2+  Posterior tibial: 2+  Saphenous nerve sensation: diminished  Tibial nerve sensation: diminished  Superficial peroneal nerve sensation: diminished  Deep peroneal nerve sensation: diminished  Sural nerve sensation: diminished          Physical Exam   Cardiovascular:   Pulses:       Dorsalis pedis pulses are 2+ on the right side.         "Posterior tibial pulses are 2+ on the right side.   Musculoskeletal:        Feet:    Feet:   Right Foot:   Skin Integrity: Positive for ulcer (Superficial grade 2 ulcer plantar 5th MPJ approximately 1 cm diameter with subdermal dry hemorrhage in no signs of infection.  Depth 0.1 cm).               Imaging:            Assessment:       1. Skin ulcer of right foot, limited to breakdown of skin    2. Type II diabetes mellitus with neurological manifestations    3. Peripheral vascular disease      Plan:   Skin ulcer of right foot, limited to breakdown of skin    Type II diabetes mellitus with neurological manifestations    Peripheral vascular disease      No follow-ups on file.    Wound Debridement  Date/Time: 4/23/2020 8:40 AM  Performed by: Tong Heaton DPM  Authorized by: Tong Heaton DPM     Time out: Immediately prior to procedure a "time out" was called to verify the correct patient, procedure, equipment, support staff and site/side marked as required.    Consent Done?:  Yes (Verbal)    Preparation: Patient was prepped and draped in usual sterile fashion    Local anesthesia used?: No      Wound Details:    Location:  Right foot    Location:  Right 5th Metatarsal Head    Type of Debridement:  Excisional       Length (cm):  2       Area (sq cm):  4       Width (cm):  2       Percent Debrided (%):  100       Depth (cm):  0.1       Total Area Debrided (sq cm):  4    Depth of debridement:  Epidermis/Dermis    Tissue debrided:  Dermis and Epidermis    Devitalized tissue debrided:  Biofilm, Callus, Slough and Necrotic/Eschar    Instruments:  Blade    Bleeding:  None  Patient tolerance:  Patient tolerated the procedure well with no immediate complications     Accommodative pad was placed on ends insert with quarter-inch felt to offload the ulceration.  Once the COVID pandemic ends he will get is inserts modified to relieve pressure on the ulcer.  He is return in 6 weeks or as needed.     - " None    Counseling:     I provided patient education verbally regarding:   Patient diagnosis, treatment options, as well as alternatives, risks, and benefits.     This note was created using Dragon voice recognition software that occasionally misinterpreted phrases or words.

## 2020-05-06 ENCOUNTER — OFFICE VISIT (OUTPATIENT)
Dept: PAIN MEDICINE | Facility: CLINIC | Age: 60
End: 2020-05-06
Payer: MEDICARE

## 2020-05-06 DIAGNOSIS — E11.42 DIABETIC POLYNEUROPATHY ASSOCIATED WITH TYPE 2 DIABETES MELLITUS: ICD-10-CM

## 2020-05-06 DIAGNOSIS — M79.671 BILATERAL FOOT PAIN: ICD-10-CM

## 2020-05-06 DIAGNOSIS — G89.4 CHRONIC PAIN DISORDER: Primary | ICD-10-CM

## 2020-05-06 DIAGNOSIS — E11.49 TYPE II DIABETES MELLITUS WITH NEUROLOGICAL MANIFESTATIONS: ICD-10-CM

## 2020-05-06 DIAGNOSIS — M79.672 BILATERAL FOOT PAIN: ICD-10-CM

## 2020-05-06 DIAGNOSIS — G89.4 CHRONIC PAIN DISORDER: ICD-10-CM

## 2020-05-06 PROCEDURE — 99214 PR OFFICE/OUTPT VISIT, EST, LEVL IV, 30-39 MIN: ICD-10-PCS | Mod: 95,,, | Performed by: PHYSICIAN ASSISTANT

## 2020-05-06 PROCEDURE — 99214 OFFICE O/P EST MOD 30 MIN: CPT | Mod: 95,,, | Performed by: PHYSICIAN ASSISTANT

## 2020-05-06 RX ORDER — TRAMADOL HYDROCHLORIDE 50 MG/1
50 TABLET ORAL EVERY 8 HOURS PRN
Qty: 90 TABLET | Refills: 0 | Status: SHIPPED | OUTPATIENT
Start: 2020-05-06 | End: 2020-06-08

## 2020-05-06 NOTE — PROGRESS NOTES
Referring Physician: No ref. provider found    PCP: YRN Landry      CC:  Bilateral foot pain    Visit type: Virtual visit with synchronous audio and video  Total time spent with patient: 25 minutes  Each patient to whom he or she provides medical services by telemedicine is:  (1) informed of the relationship between the physician and patient and the respective role of any other health care provider with respect to management of the patient; and (2) notified that he or she may decline to receive medical services by telemedicine and may withdraw from such care at any time.  Patient is safe at home in [place]    Interval history:  Mr. Bullard is a 60 y.o. male with bilateral foot pain due to peripheral neuropathy diabetic neuropathy.  He was recently hospitalized for CHF.  He continues to take Gabapentin 600 mg t.i.d. with mild benefits. Nortriptyline 25 mg made him to drowsy so he discontinued it.  He continues to take tramadol 50 mg q.8 hours as needed with mild-to-moderate benefit.  Denies any side effects with the medication.  Able perform his ADLs with the medication.  He underwent a right-sided lumbar sympathetic nerve block which provided moderate benefit for 2 weeks.  Pain has since recurred.  He denies any worsening weakness.  No bowel bladder changes. Pain today is rated 7/10.    Prior HPI:   Ana Bullard is a 60 y.o. male referred to us for bilateral foot pain. Patient with long history of diabetes.  He states neuropathy worsen after his CABG.  Bilateral foot pain has worsened over past 7 years.  He presents to us constant burning, sharp pain in his bilateral feet.  He also has some similar issues in his bilateral hands, but foot pain is worse.  Pain worsens prolonged sitting and standing.  He currently takes gabapentin 600 mg t.i.d. with mild benefits.  Increasing doses causes sedation.  He also takes tramadol q.8 hours as needed with mild-to-moderate benefit.  He denies any worsening  weakness.  No bowel bladder changes.    Interventional history:  Right LSB on 09/2019 with 50% relief for 2 weeks.    ROS:  CONSTITUTIONAL: No fevers, chills, night sweats, wt. loss, appetite changes  SKIN: no rashes or itching  ENT: No headaches, head trauma, vision changes, or eye pain  LYMPH NODES: None noticed   CV: No chest pain, palpitations.   RESP: No shortness of breath, dyspnea on exertion, cough, wheezing, or hemoptysis  GI: No nausea, emesis, diarrhea, constipation, melena, hematochezia, pain.    : No dysuria, hematuria, urgency, or frequency   HEME: No easy bruising, bleeding problems  PSYCHIATRIC: No depression, anxiety, psychosis, hallucinations.  NEURO: No seizures, memory loss, dizziness or difficulty sleeping  MSK:  Positive HPI      Past Medical History:   Diagnosis Date    AICD (automatic cardioverter/defibrillator) present     Anxiety and depression 2012    CAD (coronary artery disease) 2012    Cardiomegaly 2016    CHF (congestive heart failure) 2012    Cholecystitis 2017    Complete heart block 2012    Diabetic foot ulcer     DVT (deep venous thrombosis) 2012    And pulmonary embolus    GERD (gastroesophageal reflux disease) 2010    Heparin induced thrombocytopenia     Hyperlipemia 2011    IDDM (insulin dependent diabetes mellitus) 1983    With neuropathy    Ischemic cardiomyopathy 2012    MI (myocardial infarction) 2012    Morbid obesity     MRSA (methicillin resistant Staphylococcus aureus) infection 01/19/2019    Noncompliance with therapeutic plan 2019    Osteomyelitis of right foot 01/2019    Pulmonary edema 2019    Respiratory failure 2019    Sepsis 01/2019    Due to cellulitis right leg    Sleep apnea 2013    Thrombocytopathy 2012    Venous stasis dermatitis of both lower extremities      Past Surgical History:   Procedure Laterality Date    CARDIAC PACEMAKER PLACEMENT  12/2012    CARDIAC PACEMAKER PLACEMENT  10/04/2018    battery replacement    CORONARY  ARTERY BYPASS GRAFT  2012    ESOPHAGOGASTRODUODENOSCOPY  2017    SAMARA FILTER PLACEMENT  2012    vena cava    LUMBAR SYMPATHETIC NERVE BLOCK Right 2019    Procedure: BLOCK, NERVE, SYMPATHETIC, LUMBAR;  Surgeon: Tashi Good MD;  [facility];  Service: Pain Management;  Laterality: Right;  RIGHT SYMPATHETIC NERVE BLOCK     Family History   Problem Relation Age of Onset    Arthritis Mother     Diabetes Mother     Cancer Father     Heart disease Father     Stroke Father      Social History     Socioeconomic History    Marital status:      Spouse name: Not on file    Number of children: Not on file    Years of education: Not on file    Highest education level: Not on file   Occupational History    Not on file   Social Needs    Financial resource strain: Not on file    Food insecurity:     Worry: Not on file     Inability: Not on file    Transportation needs:     Medical: Not on file     Non-medical: Not on file   Tobacco Use    Smoking status: Former Smoker     Packs/day: 2.00     Years: 38.00     Pack years: 76.00     Types: Cigarettes     Last attempt to quit:      Years since quittin.3    Smokeless tobacco: Never Used   Substance and Sexual Activity    Alcohol use: No     Frequency: Never    Drug use: Not on file    Sexual activity: Never   Lifestyle    Physical activity:     Days per week: Not on file     Minutes per session: Not on file    Stress: Not on file   Relationships    Social connections:     Talks on phone: Not on file     Gets together: Not on file     Attends Mu-ism service: Not on file     Active member of club or organization: Not on file     Attends meetings of clubs or organizations: Not on file     Relationship status: Not on file   Other Topics Concern    Not on file   Social History Narrative    Not on file         Medications/Allergies: See med card    Pain score today is 7/10    GENERAL: A and O x3, the patient appears well  groomed and is in no acute distress.  Skin: No rashes or obvious lesions  HEENT: normocephalic, atraumatic  LUNGS: non labored breathing  ABDOMEN: non distended  UPPER EXTREMITIES and lower extremities. Normal ROM  CERVICAL SPINE:  Cervical spine: ROM is full in flexion, extension and lateral rotation without increased pain.    LUMBAR SPINE  Lumbar spine: ROM is limited with flexion extension and oblique extension with mild-to-moderate increased pain.      MENTAL STATUS: normal orientation, speech, language, and fund of knowledge for social situation.  Emotional state appropriate.    GAIT: normal rise, base, steps, and arm swing.      Imaging:  None    Assessment:  Ana Bullard is a 60 y.o. male with bilateral foot pain  1. Chronic pain disorder    2. Diabetic polyneuropathy associated with type 2 diabetes mellitus    3. Bilateral foot pain    4. Type II diabetes mellitus with neurological manifestations        Plan:  1. I have stressed the importance of physical activity and exercise to improve overall health  2.  Discussed disease progression of peripheral neuropathy.  Continue gabapentin prescribed.  Consider nortriptyline 10 mg in the future  3.  He does request continue tramadol with us.  I believe this is very reasonable.   reviewed.  He takes tramadol 50 mg q.8 hours as needed.  Previous UDS last visit consistent with use.   4.  May consider repeat lumbar sympathetic block if pain is exacerbated.  Briefly discussed spinal cord stimulation as well.  5.  Follow-up in 1 month for telemed visit  All medication management was performed by Dr. Tasih Good

## 2020-06-09 ENCOUNTER — HOSPITAL ENCOUNTER (INPATIENT)
Facility: HOSPITAL | Age: 60
LOS: 2 days | Discharge: HOME | DRG: 872 | End: 2020-06-12
Attending: EMERGENCY MEDICINE | Admitting: INTERNAL MEDICINE
Payer: MEDICARE

## 2020-06-09 DIAGNOSIS — E11.49 TYPE II DIABETES MELLITUS WITH NEUROLOGICAL MANIFESTATIONS: ICD-10-CM

## 2020-06-09 DIAGNOSIS — L03.314 CELLULITIS OF GROIN: ICD-10-CM

## 2020-06-09 DIAGNOSIS — R53.1 WEAKNESS: ICD-10-CM

## 2020-06-09 DIAGNOSIS — L97.511 SKIN ULCER OF RIGHT FOOT, LIMITED TO BREAKDOWN OF SKIN: Primary | ICD-10-CM

## 2020-06-09 DIAGNOSIS — A41.9 SEPSIS: ICD-10-CM

## 2020-06-09 LAB
ALBUMIN SERPL BCP-MCNC: 4 G/DL (ref 3.5–5.2)
ALP SERPL-CCNC: 63 U/L (ref 55–135)
ALT SERPL W/O P-5'-P-CCNC: 15 U/L (ref 10–44)
ANION GAP SERPL CALC-SCNC: 14 MMOL/L (ref 8–16)
AST SERPL-CCNC: 17 U/L (ref 10–40)
BACTERIA #/AREA URNS HPF: NEGATIVE /HPF
BASOPHILS # BLD AUTO: 0.02 K/UL (ref 0–0.2)
BASOPHILS NFR BLD: 0.1 % (ref 0–1.9)
BILIRUB SERPL-MCNC: 2 MG/DL (ref 0.1–1)
BILIRUB UR QL STRIP: NEGATIVE
BUN SERPL-MCNC: 35 MG/DL (ref 6–20)
CALCIUM SERPL-MCNC: 9.2 MG/DL (ref 8.7–10.5)
CHLORIDE SERPL-SCNC: 95 MMOL/L (ref 95–110)
CLARITY UR: ABNORMAL
CO2 SERPL-SCNC: 25 MMOL/L (ref 23–29)
COLOR UR: YELLOW
CREAT SERPL-MCNC: 1.7 MG/DL (ref 0.5–1.4)
DEPRECATED S PYO AG THROAT QL EIA: NEGATIVE
DIFFERENTIAL METHOD: ABNORMAL
EOSINOPHIL # BLD AUTO: 0 K/UL (ref 0–0.5)
EOSINOPHIL NFR BLD: 0.2 % (ref 0–8)
ERYTHROCYTE [DISTWIDTH] IN BLOOD BY AUTOMATED COUNT: 14.8 % (ref 11.5–14.5)
EST. GFR  (AFRICAN AMERICAN): 50.3 ML/MIN/1.73 M^2
EST. GFR  (NON AFRICAN AMERICAN): 43.5 ML/MIN/1.73 M^2
GLUCOSE SERPL-MCNC: 186 MG/DL (ref 70–110)
GLUCOSE UR QL STRIP: NEGATIVE
HCT VFR BLD AUTO: 35 % (ref 40–54)
HGB BLD-MCNC: 11.6 G/DL (ref 14–18)
HGB UR QL STRIP: ABNORMAL
HYALINE CASTS #/AREA URNS LPF: 8 /LPF
IMM GRANULOCYTES # BLD AUTO: 0.1 K/UL (ref 0–0.04)
IMM GRANULOCYTES NFR BLD AUTO: 0.6 % (ref 0–0.5)
INFLUENZA A, MOLECULAR: NEGATIVE
INFLUENZA B, MOLECULAR: NEGATIVE
KETONES UR QL STRIP: NEGATIVE
LACTATE SERPL-SCNC: 1.5 MMOL/L (ref 0.5–1.9)
LEUKOCYTE ESTERASE UR QL STRIP: NEGATIVE
LYMPHOCYTES # BLD AUTO: 0.3 K/UL (ref 1–4.8)
LYMPHOCYTES NFR BLD: 2.1 % (ref 18–48)
MCH RBC QN AUTO: 32 PG (ref 27–31)
MCHC RBC AUTO-ENTMCNC: 33.1 G/DL (ref 32–36)
MCV RBC AUTO: 97 FL (ref 82–98)
MICROSCOPIC COMMENT: ABNORMAL
MONOCYTES # BLD AUTO: 1.2 K/UL (ref 0.3–1)
MONOCYTES NFR BLD: 7.4 % (ref 4–15)
NEUTROPHILS # BLD AUTO: 14.8 K/UL (ref 1.8–7.7)
NEUTROPHILS NFR BLD: 89.6 % (ref 38–73)
NITRITE UR QL STRIP: NEGATIVE
NRBC BLD-RTO: 0 /100 WBC
PH UR STRIP: 6 [PH] (ref 5–8)
PLATELET # BLD AUTO: 103 K/UL (ref 150–350)
PMV BLD AUTO: 11.2 FL (ref 9.2–12.9)
POTASSIUM SERPL-SCNC: 4.2 MMOL/L (ref 3.5–5.1)
PROT SERPL-MCNC: 8.4 G/DL (ref 6–8.4)
PROT UR QL STRIP: ABNORMAL
RBC # BLD AUTO: 3.62 M/UL (ref 4.6–6.2)
RBC #/AREA URNS HPF: 6 /HPF (ref 0–4)
SARS-COV-2 RDRP RESP QL NAA+PROBE: NEGATIVE
SODIUM SERPL-SCNC: 134 MMOL/L (ref 136–145)
SP GR UR STRIP: 1.02 (ref 1–1.03)
SPECIMEN SOURCE: NORMAL
SQUAMOUS #/AREA URNS HPF: 4 /HPF
URN SPEC COLLECT METH UR: ABNORMAL
UROBILINOGEN UR STRIP-ACNC: NEGATIVE EU/DL
WBC # BLD AUTO: 16.55 K/UL (ref 3.9–12.7)
WBC #/AREA URNS HPF: 3 /HPF (ref 0–5)

## 2020-06-09 PROCEDURE — 96365 THER/PROPH/DIAG IV INF INIT: CPT

## 2020-06-09 PROCEDURE — 99285 EMERGENCY DEPT VISIT HI MDM: CPT | Mod: 25

## 2020-06-09 PROCEDURE — 87147 CULTURE TYPE IMMUNOLOGIC: CPT | Mod: 59

## 2020-06-09 PROCEDURE — 87880 STREP A ASSAY W/OPTIC: CPT

## 2020-06-09 PROCEDURE — 87081 CULTURE SCREEN ONLY: CPT

## 2020-06-09 PROCEDURE — 25000003 PHARM REV CODE 250: Performed by: EMERGENCY MEDICINE

## 2020-06-09 PROCEDURE — 80053 COMPREHEN METABOLIC PANEL: CPT

## 2020-06-09 PROCEDURE — 83605 ASSAY OF LACTIC ACID: CPT

## 2020-06-09 PROCEDURE — 85025 COMPLETE CBC W/AUTO DIFF WBC: CPT

## 2020-06-09 PROCEDURE — 81001 URINALYSIS AUTO W/SCOPE: CPT

## 2020-06-09 PROCEDURE — 36415 COLL VENOUS BLD VENIPUNCTURE: CPT

## 2020-06-09 PROCEDURE — 25000003 PHARM REV CODE 250: Performed by: PHYSICIAN ASSISTANT

## 2020-06-09 PROCEDURE — 96367 TX/PROPH/DG ADDL SEQ IV INF: CPT

## 2020-06-09 PROCEDURE — 87040 BLOOD CULTURE FOR BACTERIA: CPT | Mod: 59

## 2020-06-09 PROCEDURE — 63600175 PHARM REV CODE 636 W HCPCS: Performed by: EMERGENCY MEDICINE

## 2020-06-09 PROCEDURE — U0002 COVID-19 LAB TEST NON-CDC: HCPCS

## 2020-06-09 PROCEDURE — 87502 INFLUENZA DNA AMP PROBE: CPT

## 2020-06-09 PROCEDURE — 93005 ELECTROCARDIOGRAM TRACING: CPT | Performed by: INTERNAL MEDICINE

## 2020-06-09 PROCEDURE — 51701 INSERT BLADDER CATHETER: CPT

## 2020-06-09 RX ORDER — SODIUM CHLORIDE 9 MG/ML
1000 INJECTION, SOLUTION INTRAVENOUS
Status: COMPLETED | OUTPATIENT
Start: 2020-06-09 | End: 2020-06-09

## 2020-06-09 RX ORDER — VANCOMYCIN HCL IN 5 % DEXTROSE 1G/250ML
1000 PLASTIC BAG, INJECTION (ML) INTRAVENOUS
Status: COMPLETED | OUTPATIENT
Start: 2020-06-09 | End: 2020-06-10

## 2020-06-09 RX ORDER — ACETAMINOPHEN 500 MG
1000 TABLET ORAL
Status: COMPLETED | OUTPATIENT
Start: 2020-06-09 | End: 2020-06-09

## 2020-06-09 RX ADMIN — PIPERACILLIN AND TAZOBACTAM 4.5 G: 4; .5 INJECTION, POWDER, LYOPHILIZED, FOR SOLUTION INTRAVENOUS; PARENTERAL at 09:06

## 2020-06-09 RX ADMIN — VANCOMYCIN HYDROCHLORIDE 1000 MG: 1 INJECTION, POWDER, LYOPHILIZED, FOR SOLUTION INTRAVENOUS at 11:06

## 2020-06-09 RX ADMIN — ACETAMINOPHEN 1000 MG: 500 TABLET, FILM COATED ORAL at 08:06

## 2020-06-09 RX ADMIN — SODIUM CHLORIDE 1000 ML: 0.9 INJECTION, SOLUTION INTRAVENOUS at 09:06

## 2020-06-10 PROBLEM — R53.1 WEAKNESS: Status: ACTIVE | Noted: 2020-06-10

## 2020-06-10 PROBLEM — A41.9 SEPSIS: Status: ACTIVE | Noted: 2020-06-10

## 2020-06-10 PROBLEM — L03.314 CELLULITIS OF GROIN: Status: ACTIVE | Noted: 2020-06-10

## 2020-06-10 LAB
ALBUMIN SERPL BCP-MCNC: 3.6 G/DL (ref 3.5–5.2)
ALP SERPL-CCNC: 54 U/L (ref 55–135)
ALT SERPL W/O P-5'-P-CCNC: 13 U/L (ref 10–44)
ANION GAP SERPL CALC-SCNC: 11 MMOL/L (ref 8–16)
AST SERPL-CCNC: 14 U/L (ref 10–40)
BASOPHILS # BLD AUTO: 0.02 K/UL (ref 0–0.2)
BASOPHILS NFR BLD: 0.1 % (ref 0–1.9)
BILIRUB SERPL-MCNC: 2.3 MG/DL (ref 0.1–1)
BUN SERPL-MCNC: 41 MG/DL (ref 6–20)
CALCIUM SERPL-MCNC: 8.6 MG/DL (ref 8.7–10.5)
CHLORIDE SERPL-SCNC: 95 MMOL/L (ref 95–110)
CO2 SERPL-SCNC: 27 MMOL/L (ref 23–29)
CREAT SERPL-MCNC: 2.3 MG/DL (ref 0.5–1.4)
DIFFERENTIAL METHOD: ABNORMAL
EOSINOPHIL # BLD AUTO: 0 K/UL (ref 0–0.5)
EOSINOPHIL NFR BLD: 0 % (ref 0–8)
ERYTHROCYTE [DISTWIDTH] IN BLOOD BY AUTOMATED COUNT: 14.8 % (ref 11.5–14.5)
EST. GFR  (AFRICAN AMERICAN): 34.9 ML/MIN/1.73 M^2
EST. GFR  (NON AFRICAN AMERICAN): 30.2 ML/MIN/1.73 M^2
GLUCOSE SERPL-MCNC: 188 MG/DL (ref 70–110)
GLUCOSE SERPL-MCNC: 208 MG/DL (ref 70–110)
GLUCOSE SERPL-MCNC: 214 MG/DL (ref 70–110)
GLUCOSE SERPL-MCNC: 219 MG/DL (ref 70–110)
GLUCOSE SERPL-MCNC: 245 MG/DL (ref 70–110)
GLUCOSE SERPL-MCNC: 269 MG/DL (ref 70–110)
HCT VFR BLD AUTO: 32.8 % (ref 40–54)
HGB BLD-MCNC: 10.6 G/DL (ref 14–18)
IMM GRANULOCYTES # BLD AUTO: 0.17 K/UL (ref 0–0.04)
IMM GRANULOCYTES NFR BLD AUTO: 1.1 % (ref 0–0.5)
LACTATE SERPL-SCNC: 1.5 MMOL/L (ref 0.5–1.9)
LYMPHOCYTES # BLD AUTO: 0.4 K/UL (ref 1–4.8)
LYMPHOCYTES NFR BLD: 2.3 % (ref 18–48)
MAGNESIUM SERPL-MCNC: 1.2 MG/DL (ref 1.6–2.6)
MCH RBC QN AUTO: 31.5 PG (ref 27–31)
MCHC RBC AUTO-ENTMCNC: 32.3 G/DL (ref 32–36)
MCV RBC AUTO: 98 FL (ref 82–98)
MONOCYTES # BLD AUTO: 0.8 K/UL (ref 0.3–1)
MONOCYTES NFR BLD: 5.1 % (ref 4–15)
NEUTROPHILS # BLD AUTO: 14 K/UL (ref 1.8–7.7)
NEUTROPHILS NFR BLD: 91.4 % (ref 38–73)
NRBC BLD-RTO: 0 /100 WBC
PLATELET # BLD AUTO: 76 K/UL (ref 150–350)
PMV BLD AUTO: 10.6 FL (ref 9.2–12.9)
POTASSIUM SERPL-SCNC: 4.5 MMOL/L (ref 3.5–5.1)
PROCALCITONIN SERPL IA-MCNC: 3.96 NG/ML (ref 0–0.5)
PROT SERPL-MCNC: 7.5 G/DL (ref 6–8.4)
RBC # BLD AUTO: 3.36 M/UL (ref 4.6–6.2)
SODIUM SERPL-SCNC: 133 MMOL/L (ref 136–145)
WBC # BLD AUTO: 15.35 K/UL (ref 3.9–12.7)

## 2020-06-10 PROCEDURE — 87633 RESP VIRUS 12-25 TARGETS: CPT

## 2020-06-10 PROCEDURE — 36415 COLL VENOUS BLD VENIPUNCTURE: CPT

## 2020-06-10 PROCEDURE — 96366 THER/PROPH/DIAG IV INF ADDON: CPT

## 2020-06-10 PROCEDURE — 99900035 HC TECH TIME PER 15 MIN (STAT)

## 2020-06-10 PROCEDURE — 87798 DETECT AGENT NOS DNA AMP: CPT

## 2020-06-10 PROCEDURE — 94640 AIRWAY INHALATION TREATMENT: CPT

## 2020-06-10 PROCEDURE — 82962 GLUCOSE BLOOD TEST: CPT

## 2020-06-10 PROCEDURE — 25000003 PHARM REV CODE 250: Performed by: INTERNAL MEDICINE

## 2020-06-10 PROCEDURE — 83605 ASSAY OF LACTIC ACID: CPT

## 2020-06-10 PROCEDURE — 85025 COMPLETE CBC W/AUTO DIFF WBC: CPT

## 2020-06-10 PROCEDURE — 94761 N-INVAS EAR/PLS OXIMETRY MLT: CPT

## 2020-06-10 PROCEDURE — 25000242 PHARM REV CODE 250 ALT 637 W/ HCPCS: Performed by: INTERNAL MEDICINE

## 2020-06-10 PROCEDURE — 27000221 HC OXYGEN, UP TO 24 HOURS

## 2020-06-10 PROCEDURE — 84145 PROCALCITONIN (PCT): CPT

## 2020-06-10 PROCEDURE — 80053 COMPREHEN METABOLIC PANEL: CPT

## 2020-06-10 PROCEDURE — 87486 CHLMYD PNEUM DNA AMP PROBE: CPT

## 2020-06-10 PROCEDURE — C9399 UNCLASSIFIED DRUGS OR BIOLOG: HCPCS | Performed by: INTERNAL MEDICINE

## 2020-06-10 PROCEDURE — 83735 ASSAY OF MAGNESIUM: CPT

## 2020-06-10 PROCEDURE — 63600175 PHARM REV CODE 636 W HCPCS: Performed by: INTERNAL MEDICINE

## 2020-06-10 PROCEDURE — 94760 N-INVAS EAR/PLS OXIMETRY 1: CPT

## 2020-06-10 PROCEDURE — 12000002 HC ACUTE/MED SURGE SEMI-PRIVATE ROOM

## 2020-06-10 RX ORDER — POTASSIUM CHLORIDE 20 MEQ/15ML
40 SOLUTION ORAL
Status: DISCONTINUED | OUTPATIENT
Start: 2020-06-10 | End: 2020-06-12 | Stop reason: HOSPADM

## 2020-06-10 RX ORDER — IBUPROFEN 200 MG
24 TABLET ORAL
Status: DISCONTINUED | OUTPATIENT
Start: 2020-06-10 | End: 2020-06-12 | Stop reason: HOSPADM

## 2020-06-10 RX ORDER — TALC
6 POWDER (GRAM) TOPICAL NIGHTLY PRN
Status: DISCONTINUED | OUTPATIENT
Start: 2020-06-10 | End: 2020-06-12 | Stop reason: HOSPADM

## 2020-06-10 RX ORDER — MAGNESIUM SULFATE 1 G/100ML
1 INJECTION INTRAVENOUS ONCE
Status: COMPLETED | OUTPATIENT
Start: 2020-06-10 | End: 2020-06-10

## 2020-06-10 RX ORDER — ACETAMINOPHEN 325 MG/1
650 TABLET ORAL EVERY 4 HOURS PRN
Status: DISCONTINUED | OUTPATIENT
Start: 2020-06-10 | End: 2020-06-12 | Stop reason: HOSPADM

## 2020-06-10 RX ORDER — GABAPENTIN 300 MG/1
600 CAPSULE ORAL 3 TIMES DAILY
Status: DISCONTINUED | OUTPATIENT
Start: 2020-06-10 | End: 2020-06-12 | Stop reason: HOSPADM

## 2020-06-10 RX ORDER — IPRATROPIUM BROMIDE AND ALBUTEROL SULFATE 2.5; .5 MG/3ML; MG/3ML
3 SOLUTION RESPIRATORY (INHALATION) EVERY 6 HOURS PRN
Status: DISCONTINUED | OUTPATIENT
Start: 2020-06-10 | End: 2020-06-12 | Stop reason: HOSPADM

## 2020-06-10 RX ORDER — SODIUM CHLORIDE 0.9 % (FLUSH) 0.9 %
10 SYRINGE (ML) INJECTION
Status: DISCONTINUED | OUTPATIENT
Start: 2020-06-10 | End: 2020-06-12 | Stop reason: HOSPADM

## 2020-06-10 RX ORDER — NORTRIPTYLINE HYDROCHLORIDE 25 MG/1
25 CAPSULE ORAL NIGHTLY
Status: DISCONTINUED | OUTPATIENT
Start: 2020-06-10 | End: 2020-06-12 | Stop reason: HOSPADM

## 2020-06-10 RX ORDER — NAPROXEN SODIUM 220 MG/1
81 TABLET, FILM COATED ORAL DAILY
Status: DISCONTINUED | OUTPATIENT
Start: 2020-06-10 | End: 2020-06-12 | Stop reason: HOSPADM

## 2020-06-10 RX ORDER — INSULIN ASPART 100 [IU]/ML
0-5 INJECTION, SOLUTION INTRAVENOUS; SUBCUTANEOUS
Status: DISCONTINUED | OUTPATIENT
Start: 2020-06-10 | End: 2020-06-11

## 2020-06-10 RX ORDER — IBUPROFEN 200 MG
16 TABLET ORAL
Status: DISCONTINUED | OUTPATIENT
Start: 2020-06-10 | End: 2020-06-12 | Stop reason: HOSPADM

## 2020-06-10 RX ORDER — MAGNESIUM SULFATE HEPTAHYDRATE 40 MG/ML
2 INJECTION, SOLUTION INTRAVENOUS ONCE
Status: COMPLETED | OUTPATIENT
Start: 2020-06-10 | End: 2020-06-10

## 2020-06-10 RX ORDER — ATORVASTATIN CALCIUM 10 MG/1
10 TABLET, FILM COATED ORAL DAILY
Status: DISCONTINUED | OUTPATIENT
Start: 2020-06-10 | End: 2020-06-12 | Stop reason: HOSPADM

## 2020-06-10 RX ORDER — GLUCAGON 1 MG
1 KIT INJECTION
Status: DISCONTINUED | OUTPATIENT
Start: 2020-06-10 | End: 2020-06-12 | Stop reason: HOSPADM

## 2020-06-10 RX ORDER — IPRATROPIUM BROMIDE AND ALBUTEROL SULFATE 2.5; .5 MG/3ML; MG/3ML
3 SOLUTION RESPIRATORY (INHALATION)
Status: DISCONTINUED | OUTPATIENT
Start: 2020-06-10 | End: 2020-06-10

## 2020-06-10 RX ORDER — TRAMADOL HYDROCHLORIDE 50 MG/1
50 TABLET ORAL EVERY 4 HOURS PRN
Status: DISCONTINUED | OUTPATIENT
Start: 2020-06-10 | End: 2020-06-12 | Stop reason: HOSPADM

## 2020-06-10 RX ORDER — ONDANSETRON 2 MG/ML
4 INJECTION INTRAMUSCULAR; INTRAVENOUS EVERY 8 HOURS PRN
Status: DISCONTINUED | OUTPATIENT
Start: 2020-06-10 | End: 2020-06-12 | Stop reason: HOSPADM

## 2020-06-10 RX ORDER — POLYETHYLENE GLYCOL 3350 17 G/17G
17 POWDER, FOR SOLUTION ORAL 2 TIMES DAILY PRN
Status: DISCONTINUED | OUTPATIENT
Start: 2020-06-10 | End: 2020-06-12 | Stop reason: HOSPADM

## 2020-06-10 RX ORDER — LANOLIN ALCOHOL/MO/W.PET/CERES
800 CREAM (GRAM) TOPICAL
Status: DISCONTINUED | OUTPATIENT
Start: 2020-06-10 | End: 2020-06-12 | Stop reason: HOSPADM

## 2020-06-10 RX ORDER — PANTOPRAZOLE SODIUM 40 MG/1
40 TABLET, DELAYED RELEASE ORAL DAILY
Status: DISCONTINUED | OUTPATIENT
Start: 2020-06-10 | End: 2020-06-12 | Stop reason: HOSPADM

## 2020-06-10 RX ADMIN — ACETAMINOPHEN 650 MG: 325 TABLET ORAL at 05:06

## 2020-06-10 RX ADMIN — IPRATROPIUM BROMIDE AND ALBUTEROL SULFATE 3 ML: .5; 3 SOLUTION RESPIRATORY (INHALATION) at 07:06

## 2020-06-10 RX ADMIN — ASPIRIN 81 MG 81 MG: 81 TABLET ORAL at 09:06

## 2020-06-10 RX ADMIN — PIPERACILLIN AND TAZOBACTAM 3.38 G: 3; .375 INJECTION, POWDER, FOR SOLUTION INTRAVENOUS at 03:06

## 2020-06-10 RX ADMIN — INSULIN DETEMIR 25 UNITS: 100 INJECTION, SOLUTION SUBCUTANEOUS at 10:06

## 2020-06-10 RX ADMIN — MAGNESIUM SULFATE IN WATER 2 G: 40 INJECTION, SOLUTION INTRAVENOUS at 07:06

## 2020-06-10 RX ADMIN — ACETAMINOPHEN 650 MG: 325 TABLET ORAL at 01:06

## 2020-06-10 RX ADMIN — PIPERACILLIN AND TAZOBACTAM 3.38 G: 3; .375 INJECTION, POWDER, FOR SOLUTION INTRAVENOUS at 05:06

## 2020-06-10 RX ADMIN — PANTOPRAZOLE SODIUM 40 MG: 40 TABLET, DELAYED RELEASE ORAL at 05:06

## 2020-06-10 RX ADMIN — MAGNESIUM SULFATE HEPTAHYDRATE 1 G: 1 INJECTION, SOLUTION INTRAVENOUS at 10:06

## 2020-06-10 RX ADMIN — INSULIN ASPART 1 UNITS: 100 INJECTION, SOLUTION INTRAVENOUS; SUBCUTANEOUS at 10:06

## 2020-06-10 RX ADMIN — TRAMADOL HYDROCHLORIDE 50 MG: 50 TABLET, FILM COATED ORAL at 11:06

## 2020-06-10 RX ADMIN — VANCOMYCIN HYDROCHLORIDE 1750 MG: 1 INJECTION, POWDER, LYOPHILIZED, FOR SOLUTION INTRAVENOUS at 11:06

## 2020-06-10 RX ADMIN — ATORVASTATIN CALCIUM 10 MG: 10 TABLET, FILM COATED ORAL at 10:06

## 2020-06-10 RX ADMIN — NORTRIPTYLINE HYDROCHLORIDE 25 MG: 25 CAPSULE ORAL at 10:06

## 2020-06-10 RX ADMIN — GABAPENTIN 600 MG: 300 CAPSULE ORAL at 09:06

## 2020-06-10 RX ADMIN — TRAMADOL HYDROCHLORIDE 50 MG: 50 TABLET, FILM COATED ORAL at 06:06

## 2020-06-10 RX ADMIN — INSULIN ASPART 2 UNITS: 100 INJECTION, SOLUTION INTRAVENOUS; SUBCUTANEOUS at 05:06

## 2020-06-10 RX ADMIN — GABAPENTIN 600 MG: 300 CAPSULE ORAL at 03:06

## 2020-06-10 RX ADMIN — ACETAMINOPHEN 650 MG: 325 TABLET ORAL at 04:06

## 2020-06-10 RX ADMIN — GABAPENTIN 600 MG: 300 CAPSULE ORAL at 10:06

## 2020-06-10 RX ADMIN — TRAMADOL HYDROCHLORIDE 50 MG: 50 TABLET, FILM COATED ORAL at 09:06

## 2020-06-10 RX ADMIN — Medication: at 11:06

## 2020-06-10 RX ADMIN — INSULIN ASPART 2 UNITS: 100 INJECTION, SOLUTION INTRAVENOUS; SUBCUTANEOUS at 12:06

## 2020-06-10 NOTE — SUBJECTIVE & OBJECTIVE
"Past Medical History:   Diagnosis Date    AICD (automatic cardioverter/defibrillator) present     Anxiety and depression 2012    CAD (coronary artery disease) 2012    Cardiomegaly 2016    CHF (congestive heart failure) 2012    Cholecystitis 2017    Complete heart block 2012    Diabetic foot ulcer     DVT (deep venous thrombosis) 2012    And pulmonary embolus    GERD (gastroesophageal reflux disease) 2010    Heparin induced thrombocytopenia     Hyperlipemia 2011    IDDM (insulin dependent diabetes mellitus) 1983    With neuropathy    Ischemic cardiomyopathy 2012    MI (myocardial infarction) 2012    Morbid obesity     MRSA (methicillin resistant Staphylococcus aureus) infection 01/19/2019    Noncompliance with therapeutic plan 2019    Osteomyelitis of right foot 01/2019    Pulmonary edema 2019    Respiratory failure 2019    Sepsis 01/2019    Due to cellulitis right leg    Sleep apnea 2013    Thrombocytopathy 2012    Venous stasis dermatitis of both lower extremities        Past Surgical History:   Procedure Laterality Date    CARDIAC PACEMAKER PLACEMENT  12/2012    CARDIAC PACEMAKER PLACEMENT  10/04/2018    battery replacement    CORONARY ARTERY BYPASS GRAFT  06/2012    ESOPHAGOGASTRODUODENOSCOPY  01/31/2017    SAMARA FILTER PLACEMENT  2012    vena cava    LUMBAR SYMPATHETIC NERVE BLOCK Right 8/22/2019    Procedure: BLOCK, NERVE, SYMPATHETIC, LUMBAR;  Surgeon: Tashi Good MD;  Location: [facility];  Service: Pain Management;  Laterality: Right;  RIGHT SYMPATHETIC NERVE BLOCK       Review of patient's allergies indicates:   Allergen Reactions    Vasopressin Anaphylaxis and Other (See Comments)     Increased pressure in his head; felt like his eyeballs and veins were going to pop out of his head.     Heparin Other (See Comments)     Other reaction(s): "depleted my blood platets"    Decreased platelet count    Heparin analogues Other (See Comments)     Depleted WBC count    " "Vasopressin analogues Other (See Comments)     "felt like eyes going to pop out of my head"    Morphine Hallucinations, Other (See Comments) and Anxiety     Other reaction(s): Hallucinations  Paranoid, hallucinations         No current facility-administered medications on file prior to encounter.      Current Outpatient Medications on File Prior to Encounter   Medication Sig    albuterol-ipratropium (DUO-NEB) 2.5 mg-0.5 mg/3 mL nebulizer solution ipratropium-albuterol 0.5 mg-3 mg(2.5 mg base)/3 mL nebulization soln    aspirin 81 MG Chew Take 81 mg by mouth once daily.    atorvastatin (LIPITOR) 10 MG tablet Take 1 tablet by mouth once daily.    BASAGLAR KWIKPEN U-100 INSULIN glargine 100 units/mL (3mL) SubQ pen 25 Units once daily.     carvedilol (COREG) 3.125 MG tablet Take 1 tablet (3.125 mg total) by mouth 2 (two) times daily.    furosemide (LASIX) 40 MG tablet Take 1 tablet (40 mg total) by mouth once daily.    gabapentin (NEURONTIN) 600 MG tablet Take 1 tablet by mouth 3 (three) times daily.    levoFLOXacin (LEVAQUIN) 750 MG tablet Take 1 tablet (750 mg total) by mouth before breakfast.    metFORMIN (GLUCOPHAGE) 500 MG tablet Take 2 tablets by mouth 2 (two) times daily.    nortriptyline (PAMELOR) 25 MG capsule Take 1 capsule (25 mg total) by mouth every evening.    pantoprazole (PROTONIX) 40 MG tablet Take 1 tablet by mouth once daily.    spironolactone (ALDACTONE) 25 MG tablet Take 1 tablet by mouth once daily.    traMADoL (ULTRAM) 50 mg tablet TAKE 1 TABLET (50 MG TOTAL) BY MOUTH EVERY 8 (EIGHT) HOURS AS NEEDED FOR PAIN    vitamin B complex-folic acid 0.4 mg Tab Take 1 tablet by mouth.    [DISCONTINUED] furosemide (LASIX) 40 MG tablet Take 1 tablet (40 mg total) by mouth nightly as needed.     Family History     Problem Relation (Age of Onset)    Arthritis Mother    Cancer Father    Diabetes Mother    Heart disease Father    Stroke Father        Tobacco Use    Smoking status: Former Smoker     " Packs/day: 2.00     Years: 38.00     Pack years: 76.00     Types: Cigarettes     Last attempt to quit: 2012     Years since quittin.4    Smokeless tobacco: Never Used   Substance and Sexual Activity    Alcohol use: No     Frequency: Never    Drug use: Not on file    Sexual activity: Never     Review of Systems complete 10 point review of systems negative except as per HPI  Objective:     Vital Signs (Most Recent):  Temp: (!) 101.6 °F (38.7 °C) (06/10/20 0526)  Pulse: 97 (06/10/20 0243)  Resp: 20 (06/10/20 0243)  BP: 90/60 (06/10/20 0249)  SpO2: 95 % (06/10/20 0243) Vital Signs (24h Range):  Temp:  [98.7 °F (37.1 °C)-102.9 °F (39.4 °C)] 101.6 °F (38.7 °C)  Pulse:  [] 97  Resp:  [19-23] 20  SpO2:  [92 %-100 %] 95 %  BP: ()/(51-61) 90/60     Weight: 113 kg (249 lb 1.9 oz)  Body mass index is 33.79 kg/m².    Physical Exam  General:  Nontoxic, nondiaphoretic, comfortable appearing  Head and eyes:  Anicteric sclerae, no conjunctival discharge  ENT:  Moist mucous membranes, no thrush  Cardiovascular:  2+ radial pulses, regular rate and rhythm, systolic murmur, extremities are warm and well perfused  Pulmonary:  Comfortable work of breathing, lungs are clear to auscultation bilaterally  GI:  Abdomen soft and nontender, nondistended, obese  Skin:  Dry and warm with no jaundice, normal skin turgor, bilateral lower extremity with chronic venous stasis dermatitic changes with hyperpigmentation  Psych:  Mood is calm, affect normal, insight fair  Neuro:  Nonfocal motor exam, fluent speech, alert and oriented     Significant Labs:   Creatinine 1.7  WBC 16.5  Lactate 1.5  Hemoglobin 11  Hematocrit 35  Platelets 103  Rapid strep negative  Rapid flu negative  COVID rapid negative  Urinalysis unremarkable    Chest x-ray personally reviewed:  Cardiomegaly, left-sided cardiac device in place, no focal consolidation

## 2020-06-10 NOTE — CARE UPDATE
06/10/20 1304   Patient Assessment/Suction   Level of Consciousness (AVPU) alert   Respiratory Effort Normal;Unlabored   Expansion/Accessory Muscles/Retractions no use of accessory muscles   All Lung Fields Breath Sounds clear   PRE-TX-O2   O2 Device (Oxygen Therapy) room air  (D/C O2, pt was off NC)   SpO2 97 %   Pulse 95   Resp 18   Aerosol Therapy   $ Aerosol Therapy Charges PRN treatment not required   D/C O2, changed tx to PRN

## 2020-06-10 NOTE — H&P
"[facility]  History & Physical    Patient Name: Ana Bullard  MRN: [id number]  Admission Date: 6/9/2020  Attending Physician: Wolf Ortega MD   Primary Care Provider: Primary Doctor No  Date of service:  06/09/2020 - the patient was personally seen and examined in the emergency department approximately 11:50PM    Patient information was obtained from patient and ER records.     Subjective:     Chief Complaint:   Chief Complaint   Patient presents with    Fever     FEVER SINCE 1400 TODAY, CONFUSION STARTING TODAY AFTER FEVER        HPI: 60-year-old gentleman history of diabetes mellitus type 2, chronic venous stasis with dermatitis, peripheral arterial disease, diabetic peripheral neuropathy, chronic systolic CHF, CAD, chronic kidney disease stage 2, chronic thrombocytopenia, and chronic wounds of feet with history of osteomyelitis of right foot who presents the hospital today with worsening rash associated with fever and confusion.  The patient tells me that he has a rash of his groin that started over the weekend.  The rash is constant in timing.  The rash has slowly progressed to severe in intensity.  He describes the rash as a redness with some drainage.  He reports some pruritus as well as pain associated with rash.  Today the patient had sudden onset of fever around 2:00 p.m..  He had associated shaking chills.  Subsequently the patient began to feel confused and drowsy.  He reported mild sore throat but no cough or sinus congestion.  No headache or stiff neck.  No dysuria or other lower urinary tract symptoms.  He reported 1 episode of loose stool non bloody.  He denied abdominal pain, nausea, or vomiting.  He denies any worsening of his foot wounds, drainage from wounds, or other change to chronic wounds.     The patient reports that his symptoms are the same as last time when he "was septic."    Past Medical History:   Diagnosis Date    AICD (automatic " "cardioverter/defibrillator) present     Anxiety and depression 2012    CAD (coronary artery disease) 2012    Cardiomegaly 2016    CHF (congestive heart failure) 2012    Cholecystitis 2017    Complete heart block 2012    Diabetic foot ulcer     DVT (deep venous thrombosis) 2012    And pulmonary embolus    GERD (gastroesophageal reflux disease) 2010    Heparin induced thrombocytopenia     Hyperlipemia 2011    IDDM (insulin dependent diabetes mellitus) 1983    With neuropathy    Ischemic cardiomyopathy 2012    MI (myocardial infarction) 2012    Morbid obesity     MRSA (methicillin resistant Staphylococcus aureus) infection 01/19/2019    Noncompliance with therapeutic plan 2019    Osteomyelitis of right foot 01/2019    Pulmonary edema 2019    Respiratory failure 2019    Sepsis 01/2019    Due to cellulitis right leg    Sleep apnea 2013    Thrombocytopathy 2012    Venous stasis dermatitis of both lower extremities        Past Surgical History:   Procedure Laterality Date    CARDIAC PACEMAKER PLACEMENT  12/2012    CARDIAC PACEMAKER PLACEMENT  10/04/2018    battery replacement    CORONARY ARTERY BYPASS GRAFT  06/2012    ESOPHAGOGASTRODUODENOSCOPY  01/31/2017    SAMARA FILTER PLACEMENT  2012    vena cava    LUMBAR SYMPATHETIC NERVE BLOCK Right 8/22/2019    Procedure: BLOCK, NERVE, SYMPATHETIC, LUMBAR;  Surgeon: Tashi Good MD;  Location: [facility];  Service: Pain Management;  Laterality: Right;  RIGHT SYMPATHETIC NERVE BLOCK       Review of patient's allergies indicates:   Allergen Reactions    Vasopressin Anaphylaxis and Other (See Comments)     Increased pressure in his head; felt like his eyeballs and veins were going to pop out of his head.     Heparin Other (See Comments)     Other reaction(s): "depleted my blood platets"    Decreased platelet count    Heparin analogues Other (See Comments)     Depleted WBC count    Vasopressin analogues Other (See Comments)     "felt like eyes going " "to pop out of my head"    Morphine Hallucinations, Other (See Comments) and Anxiety     Other reaction(s): Hallucinations  Paranoid, hallucinations         No current facility-administered medications on file prior to encounter.      Current Outpatient Medications on File Prior to Encounter   Medication Sig    albuterol-ipratropium (DUO-NEB) 2.5 mg-0.5 mg/3 mL nebulizer solution ipratropium-albuterol 0.5 mg-3 mg(2.5 mg base)/3 mL nebulization soln    aspirin 81 MG Chew Take 81 mg by mouth once daily.    atorvastatin (LIPITOR) 10 MG tablet Take 1 tablet by mouth once daily.    BASAGLAR KWIKPEN U-100 INSULIN glargine 100 units/mL (3mL) SubQ pen 25 Units once daily.     carvedilol (COREG) 3.125 MG tablet Take 1 tablet (3.125 mg total) by mouth 2 (two) times daily.    furosemide (LASIX) 40 MG tablet Take 1 tablet (40 mg total) by mouth once daily.    gabapentin (NEURONTIN) 600 MG tablet Take 1 tablet by mouth 3 (three) times daily.    levoFLOXacin (LEVAQUIN) 750 MG tablet Take 1 tablet (750 mg total) by mouth before breakfast.    metFORMIN (GLUCOPHAGE) 500 MG tablet Take 2 tablets by mouth 2 (two) times daily.    nortriptyline (PAMELOR) 25 MG capsule Take 1 capsule (25 mg total) by mouth every evening.    pantoprazole (PROTONIX) 40 MG tablet Take 1 tablet by mouth once daily.    spironolactone (ALDACTONE) 25 MG tablet Take 1 tablet by mouth once daily.    traMADoL (ULTRAM) 50 mg tablet TAKE 1 TABLET (50 MG TOTAL) BY MOUTH EVERY 8 (EIGHT) HOURS AS NEEDED FOR PAIN    vitamin B complex-folic acid 0.4 mg Tab Take 1 tablet by mouth.    [DISCONTINUED] furosemide (LASIX) 40 MG tablet Take 1 tablet (40 mg total) by mouth nightly as needed.     Family History     Problem Relation (Age of Onset)    Arthritis Mother    Cancer Father    Diabetes Mother    Heart disease Father    Stroke Father        Tobacco Use    Smoking status: Former Smoker     Packs/day: 2.00     Years: 38.00     Pack years: 76.00     Types: " Cigarettes     Last attempt to quit: 2012     Years since quittin.4    Smokeless tobacco: Never Used   Substance and Sexual Activity    Alcohol use: No     Frequency: Never    Drug use: Not on file    Sexual activity: Never     Review of Systems complete 10 point review of systems negative except as per HPI  Objective:     Vital signs reviewed  Physical Exam  General:  Nontoxic, nondiaphoretic, comfortable appearing  Head and eyes:  Anicteric sclerae, no conjunctival discharge  ENT:  Moist mucous membranes, no thrush  Cardiovascular:  2+ radial pulses, regular rate and rhythm, systolic murmur, extremities are warm and well perfused  Pulmonary:  Comfortable work of breathing, lungs are clear to auscultation bilaterally  GI:  Abdomen soft and nontender, nondistended, obese  Skin:  Dry and warm with no jaundice, normal skin turgor, bilateral lower extremity with chronic venous stasis dermatitic changes with hyperpigmentation  Psych:  Mood is calm, affect normal, insight fair  Neuro:  Nonfocal motor exam, fluent speech, alert and oriented     Significant Labs:   Creatinine 1.7  WBC 16.5  Lactate 1.5  Hemoglobin 11  Hematocrit 35  Platelets 103  Rapid strep negative  Rapid flu negative  COVID rapid negative  Urinalysis unremarkable    Chest x-ray personally reviewed:  Cardiomegaly, left-sided cardiac device in place, no focal consolidation    Assessment/Plan:     Assessment:  Sepsis likely secondary to severe cellulitis of groin  Possible viral syndrome?  Acute kidney injury on chronic kidney disease stage 2  Peripheral arterial disease  Chronic systolic CHF  CAD/CABG  Insulin-dependent diabetes mellitus type 2  Diabetic peripheral neuropathy  Chronic venous stasis with dermatitis  Chronic right foot diabetic ulcer POA  Chronic left heel ulcer POA  Chronic thrombocytopenia  Anemia of chronic disease  Obstructive sleep apnea  AICD  Chronic mood disorder  Complete heart block  GERD  Morbid obesity  History of  heparin-induced thrombocytopenia  History of osteomyelitis right foot    Plan:  Observation on med tele  Start empiric broad-spectrum IV antibiotics - continue vancomycin and Zosyn administered in emergency department  Follow up all culture data including cultures of blood.  Check procalcitonin.  Check respiratory viral panel.  Wound care consultation. Podiatry consultation.  Consider Infectious Disease consultation if the patient does not improve rapidly.   Avoid nephrotoxins.  Hold diuretics.  Gentle IV fluids.  Monitor urine output and fluid status closely.  Repeat a.m. labs.  Replace electrolytes as needed.  Sliding scale insulin for euglycemia  Continue home medications for chronic issues as able  VTE prophylaxis with SCDs  High risk secondary to acute illness with risk to life (sepsis); administration of medication requiring close monitoring of levels to prevent toxicity (vancomycin)    VTE Risk Mitigation (From admission, onward)         Ordered     Reason for No Pharmacological VTE Prophylaxis  Once     Question:  Reasons:  Answer:  History of Heparin Induced Thrombocytopenia    06/10/20 0054     IP VTE HIGH RISK PATIENT  Once      06/10/20 0054     Place sequential compression device  Until discontinued      06/10/20 0054                Wolf Ortega MD  Department of Hospital Medicine   [facility]

## 2020-06-10 NOTE — PROGRESS NOTES
06/10/20 0922 06/10/20 0925   [REMOVED]      Wound 01/19/20 0705 Diabetic Ulcer Right lateral Plantar #1   Final Assessment Date: (c) 06/10/20  Date First Assessed/Time First Assessed: 01/19/20 0705   Pre-existing: Yes  Primary Wound Type: Diabetic Ulcer  Side: Right  Orientation: lateral  Location: Plantar  Wound/PI Number (optional): #1   Wound Image   --    Dressing Appearance Open to air  --    Drainage Amount None  --    Appearance Dry  (ulcer has heel dry callus)  --    Wound Edges Callused  --    Care   (keep clean and dry)  --         Wound 06/10/20 0924  Right Groin   Date First Assessed/Time First Assessed: 06/10/20 0924   Primary Wound Type: (c)   Side: Right  Location: Groin   Wound Image  --     Dressing Appearance  --  Open to air   Drainage Amount  --  None   Appearance  --  Red;Moist   Periwound Area  --  Denuded;Redness   Care  --  Cleansed with:;Soap and water  (antifungal powder)   left heel dry callus.  States he sees Dr Heaton and saw him last month.

## 2020-06-10 NOTE — CARE UPDATE
06/10/20 0725   Patient Assessment/Suction   Level of Consciousness (AVPU) alert   Respiratory Effort Normal;Unlabored   Expansion/Accessory Muscles/Retractions no use of accessory muscles   SAMMIE Breath Sounds wheezes, expiratory   LLL Breath Sounds wheezes, expiratory   RUL Breath Sounds clear   RML Breath Sounds clear   RLL Breath Sounds clear   Rhythm/Pattern, Respiratory unlabored   PRE-TX-O2   O2 Device (Oxygen Therapy) nasal cannula   $ Is the patient on Low Flow Oxygen? Yes   Flow (L/min) 2   SpO2 (!) 92 %   Pulse Oximetry Type Intermittent   $ Pulse Oximetry - Single Charge Pulse Oximetry - Single   $ Pulse Oximetry - Multiple Charge Pulse Oximetry - Multiple   Pulse 96   Resp 14   Positioning HOB elevated 30 degrees   Aerosol Therapy   $ Aerosol Therapy Charges Aerosol Treatment   Daily Review of Necessity (SVN) completed   Respiratory Treatment Status (SVN) given   Treatment Route (SVN) mask;oxygen   Patient Position (SVN) semi-Ruiz's   Post Treatment Assessment (SVN) breath sounds unchanged   Signs of Intolerance (SVN) none   Breath Sounds Post-Respiratory Treatment   Post-treatment Heart Rate (beats/min) 96   Post-treatment Resp Rate (breaths/min) 20   Respiratory Evaluation   $ Care Plan Tech Time 15 min

## 2020-06-10 NOTE — PROGRESS NOTES
VANCOMYCIN PHARMACOKINETIC NOTE:  Vancomycin Day # 1    Objective/Assessment:    Diagnosis/Indication for Vancomycin: Skin & Soft Tissue infection     60 y.o., male; Actual Body Weight = 113 kg (249 lb 1.9 oz).    The patient has the following labs:  6/10/2020 Estimated Creatinine Clearance: 61.5 mL/min (A) (based on SCr of 1.7 mg/dL (H)). Lab Results   Component Value Date    BUN 35 (H) 06/09/2020       Lab Results   Component Value Date    WBC 16.55 (H) 06/09/2020              Plan:  Adjust vancomycin dose and/or frequency based on the patient's actual weight and renal function:  Initiate Vancomycin 1750 mg IV every 18 hours following a loading dose of 2000 mg in ED.  Orders have been entered into patient's chart.        Vancomycin trough level has been ordered for 6/12 @04:00, prior to 4th dose.    Pharmacy will manage vancomycin therapy, monitor serum vancomycin levels, monitor renal function and adjust regimen as necessary.      Thank you for allowing us to participate in this patient's care.     Flo Garcia 6/10/2020 3:46 AM  Department of Pharmacy  Ext [phone]

## 2020-06-10 NOTE — NURSING
"Pt refused bedalarm even after education given. "I ain't no dummy. I'm not gonna get up without calling you first." Attempted to reeducate pt, pt continued to refuse.  "

## 2020-06-10 NOTE — PLAN OF CARE
Problem: Wound  Goal: Optimal Wound Healing  Outcome: Ongoing, Progressing     Problem: Fall Injury Risk  Goal: Absence of Fall and Fall-Related Injury  Outcome: Ongoing, Progressing     Problem: Adult Inpatient Plan of Care  Goal: Plan of Care Review  Outcome: Ongoing, Progressing  Goal: Patient-Specific Goal (Individualization)  Outcome: Ongoing, Progressing  Goal: Absence of Hospital-Acquired Illness or Injury  Outcome: Ongoing, Progressing  Goal: Optimal Comfort and Wellbeing  Outcome: Ongoing, Progressing  Goal: Readiness for Transition of Care  Outcome: Ongoing, Progressing  Goal: Rounds/Family Conference  Outcome: Ongoing, Progressing     Problem: Infection  Goal: Infection Symptom Resolution  Outcome: Ongoing, Progressing     Problem: Diabetes Comorbidity  Goal: Blood Glucose Level Within Desired Range  Outcome: Ongoing, Progressing     Problem: Adjustment to Illness (Sepsis/Septic Shock)  Goal: Optimal Coping  Outcome: Ongoing, Progressing     Problem: Bleeding (Sepsis/Septic Shock)  Goal: Absence of Bleeding  Outcome: Ongoing, Progressing     Problem: Glycemic Control Impaired (Sepsis/Septic Shock)  Goal: Blood Glucose Level Within Desired Range  Outcome: Ongoing, Progressing     Problem: Hemodynamic Instability (Sepsis/Septic Shock)  Goal: Effective Tissue Perfusion  Outcome: Ongoing, Progressing     Problem: Infection (Sepsis/Septic Shock)  Goal: Absence of Infection Signs/Symptoms  Outcome: Ongoing, Progressing     Problem: Nutrition Impaired (Sepsis/Septic Shock)  Goal: Optimal Nutrition Intake  Outcome: Ongoing, Progressing     Problem: Respiratory Compromise (Sepsis/Septic Shock)  Goal: Effective Oxygenation and Ventilation  Outcome: Ongoing, Progressing     Problem: Electrolyte Imbalance (Acute Kidney Injury/Impairment)  Goal: Serum Electrolyte Balance  Outcome: Ongoing, Progressing     Problem: Fluid Imbalance (Acute Kidney Injury/Impairment)  Goal: Optimal Fluid Balance  Outcome: Ongoing,  Progressing     Problem: Hematologic Alteration (Acute Kidney Injury/Impairment)  Goal: Hemoglobin, Hematocrit and Platelets Within Normal Range  Outcome: Ongoing, Progressing     Problem: Oral Intake Inadequate (Acute Kidney Injury/Impairment)  Goal: Optimal Nutrition Intake  Outcome: Ongoing, Progressing     Problem: Renal Function Impairment (Acute Kidney Injury/Impairment)  Goal: Effective Renal Function  Outcome: Ongoing, Progressing     Problem: Fluid Imbalance (Pneumonia)  Goal: Fluid Balance  Outcome: Ongoing, Progressing     Problem: Infection (Pneumonia)  Goal: Resolution of Infection Signs/Symptoms  Outcome: Ongoing, Progressing     Problem: Respiratory Compromise (Pneumonia)  Goal: Effective Oxygenation and Ventilation  Outcome: Ongoing, Progressing

## 2020-06-10 NOTE — HPI
60-year-old gentleman history of diabetes mellitus type 2, chronic venous stasis with dermatitis, peripheral arterial disease, diabetic peripheral neuropathy, chronic systolic CHF, CAD, chronic kidney disease stage 2, chronic thrombocytopenia, and chronic wounds of feet with history of osteomyelitis of right foot who presents the hospital today with worsening rash associated with fever and confusion.  The patient tells me that he has a rash of his groin that started over the weekend.  The rash is constant in timing.  The rash has slowly progressed to severe in intensity.  He describes the rash as a redness with some drainage.  He reports some pruritus as well as pain associated with rash.  Today the patient had sudden onset of fever around 2:00 p.m..  He had associated shaking chills.  Subsequently the patient began to feel confused and drowsy.  He reported mild sore throat but no cough or sinus congestion.  No headache or stiff neck.  No dysuria or other lower urinary tract symptoms.  He reported 1 episode of loose stool non bloody.  He denied abdominal pain, nausea, or vomiting.  He denies any worsening of his foot wounds, drainage from wounds, or other change to chronic wounds.

## 2020-06-11 LAB
ALBUMIN SERPL BCP-MCNC: 3.4 G/DL (ref 3.5–5.2)
ALP SERPL-CCNC: 49 U/L (ref 55–135)
ALT SERPL W/O P-5'-P-CCNC: 14 U/L (ref 10–44)
ANION GAP SERPL CALC-SCNC: 12 MMOL/L (ref 8–16)
AST SERPL-CCNC: 16 U/L (ref 10–40)
BACTERIA THROAT CULT: NORMAL
BASOPHILS # BLD AUTO: 0.02 K/UL (ref 0–0.2)
BASOPHILS NFR BLD: 0.2 % (ref 0–1.9)
BILIRUB SERPL-MCNC: 1.6 MG/DL (ref 0.1–1)
BUN SERPL-MCNC: 45 MG/DL (ref 6–20)
CALCIUM SERPL-MCNC: 8.2 MG/DL (ref 8.7–10.5)
CHLORIDE SERPL-SCNC: 95 MMOL/L (ref 95–110)
CO2 SERPL-SCNC: 25 MMOL/L (ref 23–29)
CREAT SERPL-MCNC: 2.3 MG/DL (ref 0.5–1.4)
DIFFERENTIAL METHOD: ABNORMAL
EOSINOPHIL # BLD AUTO: 0 K/UL (ref 0–0.5)
EOSINOPHIL NFR BLD: 0.2 % (ref 0–8)
ERYTHROCYTE [DISTWIDTH] IN BLOOD BY AUTOMATED COUNT: 14.6 % (ref 11.5–14.5)
EST. GFR  (AFRICAN AMERICAN): 34.9 ML/MIN/1.73 M^2
EST. GFR  (NON AFRICAN AMERICAN): 30.2 ML/MIN/1.73 M^2
GLUCOSE SERPL-MCNC: 188 MG/DL (ref 70–110)
GLUCOSE SERPL-MCNC: 205 MG/DL (ref 70–110)
GLUCOSE SERPL-MCNC: 206 MG/DL (ref 70–110)
GLUCOSE SERPL-MCNC: 209 MG/DL (ref 70–110)
GLUCOSE SERPL-MCNC: 231 MG/DL (ref 70–110)
HCT VFR BLD AUTO: 30.3 % (ref 40–54)
HGB BLD-MCNC: 9.8 G/DL (ref 14–18)
IMM GRANULOCYTES # BLD AUTO: 0.04 K/UL (ref 0–0.04)
IMM GRANULOCYTES NFR BLD AUTO: 0.4 % (ref 0–0.5)
LYMPHOCYTES # BLD AUTO: 0.6 K/UL (ref 1–4.8)
LYMPHOCYTES NFR BLD: 6.3 % (ref 18–48)
MAGNESIUM SERPL-MCNC: 2 MG/DL (ref 1.6–2.6)
MCH RBC QN AUTO: 31.2 PG (ref 27–31)
MCHC RBC AUTO-ENTMCNC: 32.3 G/DL (ref 32–36)
MCV RBC AUTO: 97 FL (ref 82–98)
MONOCYTES # BLD AUTO: 0.7 K/UL (ref 0.3–1)
MONOCYTES NFR BLD: 7.3 % (ref 4–15)
NEUTROPHILS # BLD AUTO: 7.9 K/UL (ref 1.8–7.7)
NEUTROPHILS NFR BLD: 85.6 % (ref 38–73)
NRBC BLD-RTO: 0 /100 WBC
PLATELET # BLD AUTO: 62 K/UL (ref 150–350)
PMV BLD AUTO: 11 FL (ref 9.2–12.9)
POTASSIUM SERPL-SCNC: 4.2 MMOL/L (ref 3.5–5.1)
PROT SERPL-MCNC: 7.3 G/DL (ref 6–8.4)
RBC # BLD AUTO: 3.14 M/UL (ref 4.6–6.2)
SODIUM SERPL-SCNC: 132 MMOL/L (ref 136–145)
WBC # BLD AUTO: 9.2 K/UL (ref 3.9–12.7)

## 2020-06-11 PROCEDURE — 12000002 HC ACUTE/MED SURGE SEMI-PRIVATE ROOM

## 2020-06-11 PROCEDURE — 80053 COMPREHEN METABOLIC PANEL: CPT

## 2020-06-11 PROCEDURE — 87040 BLOOD CULTURE FOR BACTERIA: CPT | Mod: 59

## 2020-06-11 PROCEDURE — 36415 COLL VENOUS BLD VENIPUNCTURE: CPT

## 2020-06-11 PROCEDURE — 25000003 PHARM REV CODE 250: Performed by: INTERNAL MEDICINE

## 2020-06-11 PROCEDURE — 94761 N-INVAS EAR/PLS OXIMETRY MLT: CPT

## 2020-06-11 PROCEDURE — 99222 1ST HOSP IP/OBS MODERATE 55: CPT | Mod: ,,, | Performed by: PODIATRIST

## 2020-06-11 PROCEDURE — 99900035 HC TECH TIME PER 15 MIN (STAT)

## 2020-06-11 PROCEDURE — 99222 PR INITIAL HOSPITAL CARE,LEVL II: ICD-10-PCS | Mod: ,,, | Performed by: PODIATRIST

## 2020-06-11 PROCEDURE — 63600175 PHARM REV CODE 636 W HCPCS: Performed by: INTERNAL MEDICINE

## 2020-06-11 PROCEDURE — 83735 ASSAY OF MAGNESIUM: CPT

## 2020-06-11 PROCEDURE — 85025 COMPLETE CBC W/AUTO DIFF WBC: CPT

## 2020-06-11 RX ORDER — MUPIROCIN 20 MG/G
OINTMENT TOPICAL DAILY
Status: DISCONTINUED | OUTPATIENT
Start: 2020-06-11 | End: 2020-06-12 | Stop reason: HOSPADM

## 2020-06-11 RX ADMIN — GABAPENTIN 600 MG: 300 CAPSULE ORAL at 08:06

## 2020-06-11 RX ADMIN — INSULIN DETEMIR 25 UNITS: 100 INJECTION, SOLUTION SUBCUTANEOUS at 08:06

## 2020-06-11 RX ADMIN — PIPERACILLIN AND TAZOBACTAM 3.38 G: 3; .375 INJECTION, POWDER, FOR SOLUTION INTRAVENOUS at 03:06

## 2020-06-11 RX ADMIN — Medication: at 09:06

## 2020-06-11 RX ADMIN — HUMAN INSULIN 6 UNITS: 100 INJECTION, SOLUTION SUBCUTANEOUS at 09:06

## 2020-06-11 RX ADMIN — HUMAN INSULIN 6 UNITS: 100 INJECTION, SOLUTION SUBCUTANEOUS at 04:06

## 2020-06-11 RX ADMIN — ASPIRIN 81 MG 81 MG: 81 TABLET ORAL at 09:06

## 2020-06-11 RX ADMIN — HUMAN INSULIN 3 UNITS: 100 INJECTION, SOLUTION SUBCUTANEOUS at 09:06

## 2020-06-11 RX ADMIN — GABAPENTIN 600 MG: 300 CAPSULE ORAL at 09:06

## 2020-06-11 RX ADMIN — MUPIROCIN: 20 OINTMENT TOPICAL at 02:06

## 2020-06-11 RX ADMIN — NORTRIPTYLINE HYDROCHLORIDE 25 MG: 25 CAPSULE ORAL at 08:06

## 2020-06-11 RX ADMIN — PIPERACILLIN AND TAZOBACTAM 3.38 G: 3; .375 INJECTION, POWDER, FOR SOLUTION INTRAVENOUS at 06:06

## 2020-06-11 RX ADMIN — HUMAN INSULIN 5 UNITS: 100 INJECTION, SOLUTION SUBCUTANEOUS at 12:06

## 2020-06-11 RX ADMIN — PANTOPRAZOLE SODIUM 40 MG: 40 TABLET, DELAYED RELEASE ORAL at 05:06

## 2020-06-11 RX ADMIN — TRAMADOL HYDROCHLORIDE 50 MG: 50 TABLET, FILM COATED ORAL at 05:06

## 2020-06-11 RX ADMIN — GABAPENTIN 600 MG: 300 CAPSULE ORAL at 02:06

## 2020-06-11 RX ADMIN — TRAMADOL HYDROCHLORIDE 50 MG: 50 TABLET, FILM COATED ORAL at 09:06

## 2020-06-11 RX ADMIN — ATORVASTATIN CALCIUM 10 MG: 10 TABLET, FILM COATED ORAL at 08:06

## 2020-06-11 RX ADMIN — TRAMADOL HYDROCHLORIDE 50 MG: 50 TABLET, FILM COATED ORAL at 10:06

## 2020-06-11 RX ADMIN — SODIUM CHLORIDE, SODIUM LACTATE, POTASSIUM CHLORIDE, AND CALCIUM CHLORIDE 1000 ML: .6; .31; .03; .02 INJECTION, SOLUTION INTRAVENOUS at 02:06

## 2020-06-11 RX ADMIN — PIPERACILLIN AND TAZOBACTAM 3.38 G: 3; .375 INJECTION, POWDER, FOR SOLUTION INTRAVENOUS at 10:06

## 2020-06-11 NOTE — PLAN OF CARE
06/10/20 2100   Patient Assessment/Suction   Level of Consciousness (AVPU) alert   Respiratory Effort Unlabored   Expansion/Accessory Muscles/Retractions no use of accessory muscles   All Lung Fields Breath Sounds clear   Rhythm/Pattern, Respiratory unlabored   Cough Frequency no cough   PRE-TX-O2   O2 Device (Oxygen Therapy) CPAP  (HOME CPAP)   SpO2 96 %   Pulse 91   Resp 18   Aerosol Therapy   $ Aerosol Therapy Charges PRN treatment not required   Respiratory Treatment Status (SVN) PRN treatment not required   Respiratory Evaluation   $ Care Plan Tech Time 15 min   Evaluation For   (CARE PLAN)

## 2020-06-11 NOTE — PROGRESS NOTES
VANCOMYCIN PHARMACOKINETIC NOTE:  Vancomycin Day #3    Objective:    60 y.o., male, Actual Body Weight = 113 kg (249 lb 1.9 oz)    Diagnosis/Indication for Vancomycin:  Sepsis   Desired Vancomycin Peak:  30-35 mcg/ml; Desired Trough: 10-15 mcg/ml      Current Vancomycin Regimen:  1750 IV every 18 hours      The patient has the following labs:     6/11/2020 Estimated Creatinine Clearance: 45.5 mL/min (A) (based on SCr of 2.3 mg/dL (H)). Lab Results   Component Value Date    BUN 41 (H) 06/10/2020       Lab Results   Component Value Date    WBC 15.35 (H) 06/10/2020            Assessment:    Renal function is: worsening      Plan:  Do to CrCl decreasing from 61.5 ml/min to 45.5 ml/min, will change vancomycin to 1750 mg IV every 24 hours per protocol dosing.     Will obtain vancomycin 6/12 @22:30, prior to 4th dose      Pharmacy will continue to manage vancomycin therapy and adjust regimen as necessary.    Thank you for allowing us to participate in this patient's care.     Flo Garcia 6/11/2020 2:00 AM  Department of Pharmacy  Ext [phone]

## 2020-06-11 NOTE — PLAN OF CARE
06/11/20 0728   Patient Assessment/Suction   Level of Consciousness (AVPU) alert   Respiratory Effort Unlabored   Expansion/Accessory Muscles/Retractions no use of accessory muscles   All Lung Fields Breath Sounds clear   SAMMIE Breath Sounds wheezes, inspiratory   PRE-TX-O2   SpO2 (!) 91 %   Pulse Oximetry Type Intermittent   $ Pulse Oximetry - Multiple Charge Pulse Oximetry - Multiple   Pulse (!) 112   Resp 20   Temp 99.3 °F (37.4 °C)   BP (!) 100/56   Aerosol Therapy   $ Aerosol Therapy Charges PRN treatment not required   Preset CPAP/BiPAP Settings   $ CPAP/BiPAP Daily Charge   (no bipap in room)   Respiratory Evaluation   $ Care Plan Tech Time 15 min

## 2020-06-11 NOTE — PROGRESS NOTES
[facility] Note  Patient Name: Ana Bullard MRN: [id number]   Patient Class: IP- Inpatient  Length of Stay: 1   Admission Date: 6/9/2020  8:59 PM Attending Physician: Winter Plaza MD   Primary Care Provider: Primary Doctor No Face-to-Face encounter date: 06/11/2020   Chief Complaint: Fever (FEVER SINCE 1400 TODAY, CONFUSION STARTING TODAY AFTER FEVER)    Assessment & Plan:   Ana Bullard is a 60 y.o. male admitted for    Sepsis likely secondary to severe cellulitis of groin  Possible viral syndrome?  Acute kidney injury on chronic kidney disease stage 2  Peripheral arterial disease  Chronic systolic CHF  CAD/CABG  Insulin-dependent diabetes mellitus type 2  Diabetic peripheral neuropathy  Chronic venous stasis with dermatitis  Chronic right foot diabetic ulcer POA  Chronic left heel ulcer POA  Chronic thrombocytopenia  Anemia of chronic disease  Obstructive sleep apnea  AICD  Chronic mood disorder  Complete heart block  GERD  Morbid obesity  History of heparin-induced thrombocytopenia  History of osteomyelitis right foot      Plan  Continue broad-spectrum antibiotics including IV vancomycin and IV Zosyn.  Blood cultures growing Streptococcus pyogenes.  Repeated blood cultures.  Podiatrist has seen the patient and recommended no treatment.  Creatinine appears to be same as yesterday.  I have given him Ringer's lactate 1 L.  I anticipate him being discharged tomorrow.  Diabetes very uncontrolled.  Added insulin 5 units t.i.d..        Hospital Course     06/11/2020: admitted yesterday for cellulitis    Discharge Planning:   No mobility needs.     Subjective:    Interval History: Patient is doing fairly well.  He thinks his cellulitis is getting better.  Less red.  Less painful.  No  Family present at bedside.No concerns/issues overnight reported by the patient or the nursing staff.    Review of Systems All other Review of Systems were found to be negative  expect for that mentioned already in HPI.   Objective:   Physical Exam  BP 99/65   Pulse 92   Temp 98.4 °F (36.9 °C) (Axillary)   Resp 19   Ht 6' (1.829 m)   Wt 113 kg (249 lb 1.9 oz)   SpO2 95%   BMI 33.79 kg/m²   Vitals reviewed.    Constitutional: No distress.   HENT: Atraumatic.   Cardiovascular: Normal rate, regular rhythm and normal heart sounds.   Pulmonary/Chest: Effort normal. Clear to auscultation bilaterally. No wheezes.   Abdominal: Soft. Bowel sounds are normal. Exhibits no distension and no mass. No tenderness  Neurological: Alert.   Skin:  Groin cellulitis looks less edematous, less swollen and less tender.    Following labs were Reviewed   Recent Labs   Lab 06/11/20  0509   WBC 9.20   HGB 9.8*   HCT 30.3*   PLT 62*   CALCIUM 8.2*   ALBUMIN 3.4*   PROT 7.3   *   K 4.2   CO2 25   CL 95   BUN 45*   CREATININE 2.3*   ALKPHOS 49*   ALT 14   AST 16   BILITOT 1.6*     No results found for: POCTGLUCOSE     All labs within the past 24 hours have been reviewed  Microbiology Results (last 7 days)     Procedure Component Value Units Date/Time    Blood culture [[id number]] Collected:  06/11/20 0911    Order Status:  Sent Specimen:  Blood from Peripheral, Right Hand Updated:  06/11/20 0927    Blood culture [[id number]] Collected:  06/11/20 0923    Order Status:  Sent Specimen:  Blood from Antecubital, Left Arm Updated:  06/11/20 0927    Strep A culture, throat [[id number]] Collected:  06/09/20 2134    Order Status:  Completed Specimen:  Throat Updated:  06/11/20 0826     Strep A Culture No  Group A  Streptococcus isolated    Blood culture x two cultures. Draw prior to antibiotics. [[id number]]  (Abnormal) Collected:  06/09/20 2121    Order Status:  Completed Specimen:  Blood from Peripheral, Lower Arm, Right Updated:  06/11/20 0816     Blood Culture, Routine Gram stain mayte bottle: Gram positive cocci in chains resembling Strep      Results called to and read back by:Bethany Nails RN [facility]  06/10/2020        10:25 JBM      Gram stain aer bottle: Gram positive cocci in chains resembling Strep      STREPTOCOCCUS PYOGENES (GROUP A)  Beta-hemolytic streptococci are routinely susceptible to   penicillins,cephalosporins and carbapenems.      Narrative:       Aerobic and anaerobic    Blood culture x two cultures. Draw prior to antibiotics. [[id number]]  (Abnormal) Collected:  06/09/20 2134    Order Status:  Completed Specimen:  Blood from Peripheral, Upper Arm, Right Updated:  06/11/20 0815     Blood Culture, Routine Gram stain mayte bottle: Gram positive cocci in chains resembling Strep      Results called to and read back by:Bethany Nails RN 11[facility]  06/10/2020       10:25 JBM      Gram stain aer bottle: Gram positive cocci in chains resembling Strep      STREPTOCOCCUS PYOGENES (GROUP A)  Beta-hemolytic streptococci are routinely susceptible to   penicillins,cephalosporins and carbapenems.      Narrative:       Aerobic and anaerobic    Respiratory Viral Panel by PCR [place]; Nasopharyngeal Swab [[id number]] Collected:  06/10/20 1925    Order Status:  Sent Specimen:  Respiratory Updated:  06/10/20 1940    Rapid strep screen [[id number]] Collected:  06/09/20 2134    Order Status:  Completed Specimen:  Throat Updated:  06/09/20 2230     Rapid Strep A Screen Negative     Comment: See Micro for reflexed Strep culture.           X-Ray Chest AP Portable   Final Result          Inpatient medications  Scheduled Meds:   aspirin  81 mg Oral Daily    atorvastatin  10 mg Oral Daily    gabapentin  600 mg Oral TID    insulin detemir U-100  25 Units Subcutaneous Daily    insulin regular  5 Units Subcutaneous TIDWM    lactated ringers  1,000 mL Intravenous Once    mupirocin   Topical (Top) Daily    nortriptyline  25 mg Oral QHS    pantoprazole  40 mg Oral Daily    piperacillin-tazobactam (ZOSYN) IVPB  3.375 g Intravenous Q8H    vancomycin (VANCOCIN) IVPB  1,750 mg Intravenous Q24H    white petrolatum   Topical (Top) Daily      Continuous Infusions:  PRN Meds:.acetaminophen, albuterol-ipratropium, dextrose 50%, dextrose 50%, dextrose 50%, dextrose 50%, glucagon (human recombinant), glucose, glucose, insulin regular, magnesium oxide, magnesium oxide, melatonin, ondansetron, polyethylene glycol, potassium chloride 10%, potassium chloride 10%, promethazine (PHENERGAN) IVPB, sodium chloride 0.9%, traMADoL, Pharmacy to dose Vancomycin consult **AND** vancomycin - pharmacy to dose    Above encounter included review of the medical records, interviewing and examining the patient face-to-face, discussion with family and other health care providers, ordering and interpreting lab/test results and formulating a plan of care.     Medical Decision Making:    [] Low Complexity  [x] Moderate Complexity  [] High Complexity    Winter Plaza  [facility] Hospitalist  06/11/2020

## 2020-06-11 NOTE — PROGRESS NOTES
[facility] Medicine Progress Note  Patient Name: Ana Bullard MRN: [id number]   Patient Class: IP- Inpatient  Length of Stay: 0   Admission Date: 6/9/2020  8:59 PM Attending Physician: Winter Plaza MD   Primary Care Provider: Primary Doctor No Face-to-Face encounter date: 06/10/2020   Chief Complaint: Fever (FEVER SINCE 1400 TODAY, CONFUSION STARTING TODAY AFTER FEVER)    Assessment & Plan:   Ana Bullard is a 60 y.o. male admitted for    Sepsis likely secondary to severe cellulitis of groin  Possible viral syndrome?  Acute kidney injury on chronic kidney disease stage 2  Peripheral arterial disease  Chronic systolic CHF  CAD/CABG  Insulin-dependent diabetes mellitus type 2  Diabetic peripheral neuropathy  Chronic venous stasis with dermatitis  Chronic right foot diabetic ulcer POA  Chronic left heel ulcer POA  Chronic thrombocytopenia  Anemia of chronic disease  Obstructive sleep apnea  AICD  Chronic mood disorder  Complete heart block  GERD  Morbid obesity  History of heparin-induced thrombocytopenia  History of osteomyelitis right foot      Plan  Continue antibiotics  Follow cultures  Monitor kidney function  Strict I&O  Deescalate  based on cultures        Hospital Course     06/10/2020: admitted yesterday for cellulitis    Discharge Planning:   No mobility needs.     Subjective:    Interval History: Patient is doing well. Family present at bedside.No concerns/issues overnight reported by the patient or the nursing staff.    Review of Systems All other Review of Systems were found to be negative expect for that mentioned already in HPI.   Objective:   Physical Exam  BP (!) 92/59   Pulse 93   Temp 100.2 °F (37.9 °C)   Resp 18   Ht 6' (1.829 m)   Wt 113 kg (249 lb 1.9 oz)   SpO2 95%   BMI 33.79 kg/m²   Vitals reviewed.    Constitutional: No distress.   HENT: Atraumatic.   Cardiovascular: Normal rate, regular rhythm and normal heart sounds.   Pulmonary/Chest: Effort  normal. Clear to auscultation bilaterally. No wheezes.   Abdominal: Soft. Bowel sounds are normal. Exhibits no distension and no mass. No tenderness  Neurological: Alert.   Skin: Skin is warm and dry.     Following labs were Reviewed   Recent Labs   Lab 06/10/20  0537   WBC 15.35*   HGB 10.6*   HCT 32.8*   PLT 76*   CALCIUM 8.6*   ALBUMIN 3.6   PROT 7.5   *   K 4.5   CO2 27   CL 95   BUN 41*   CREATININE 2.3*   ALKPHOS 54*   ALT 13   AST 14   BILITOT 2.3*     No results found for: POCTGLUCOSE     All labs within the past 24 hours have been reviewed  Microbiology Results (last 7 days)     Procedure Component Value Units Date/Time    Respiratory Viral Panel by PCR [place]; Nasopharyngeal Swab [[id number]] Collected:  06/10/20 1925    Order Status:  Sent Specimen:  Respiratory Updated:  06/10/20 1940    Blood culture x two cultures. Draw prior to antibiotics. [[id number]] Collected:  06/09/20 2121    Order Status:  Completed Specimen:  Blood from Peripheral, Lower Arm, Right Updated:  06/10/20 1259     Blood Culture, Routine Gram stain matye bottle: Gram positive cocci in chains resembling Strep      Results called to and read back by:Bethany Nails RN [address] [place] [address]  06/10/2020       10:25 JBM      Gram stain aer bottle: Gram positive cocci in chains resembling Strep    Narrative:       Aerobic and anaerobic    Blood culture x two cultures. Draw prior to antibiotics. [[id number]] Collected:  06/09/20 2134    Order Status:  Completed Specimen:  Blood from Peripheral, Upper Arm, Right Updated:  06/10/20 1259     Blood Culture, Routine Gram stain mayte bottle: Gram positive cocci in chains resembling Strep      Results called to and read back by:Bethany Nails RN [address] [place] [address]  06/10/2020       10:25 JBM      Gram stain aer bottle: Gram positive cocci in chains resembling Strep    Narrative:       Aerobic and anaerobic    Strep A culture, throat [[id number]] Collected:  06/09/20 2134    Order Status:  Completed Specimen:  Throat  Updated:  06/10/20 0656     Strep A Culture No significant growth    Rapid strep screen [[id number]] Collected:  06/09/20 2134    Order Status:  Completed Specimen:  Throat Updated:  06/09/20 2230     Rapid Strep A Screen Negative     Comment: See Micro for reflexed Strep culture.           X-Ray Chest AP Portable   Final Result          Inpatient medications  Scheduled Meds:   aspirin  81 mg Oral Daily    atorvastatin  10 mg Oral Daily    gabapentin  600 mg Oral TID    insulin detemir U-100  25 Units Subcutaneous Daily    magnesium sulfate IVPB  2 g Intravenous Once    Followed by    magnesium sulfate IVPB  1 g Intravenous Once    nortriptyline  25 mg Oral QHS    pantoprazole  40 mg Oral Daily    piperacillin-tazobactam (ZOSYN) IVPB  3.375 g Intravenous Q8H    vancomycin (VANCOCIN) IVPB  1,750 mg Intravenous Q18H    white petrolatum   Topical (Top) Daily     Continuous Infusions:  PRN Meds:.acetaminophen, albuterol-ipratropium, dextrose 50%, dextrose 50%, glucagon (human recombinant), glucose, glucose, insulin aspart U-100, magnesium oxide, magnesium oxide, melatonin, ondansetron, polyethylene glycol, potassium chloride 10%, potassium chloride 10%, promethazine (PHENERGAN) IVPB, sodium chloride 0.9%, traMADoL, Pharmacy to dose Vancomycin consult **AND** vancomycin - pharmacy to dose    Above encounter included review of the medical records, interviewing and examining the patient face-to-face, discussion with family and other health care providers, ordering and interpreting lab/test results and formulating a plan of care.     Medical Decision Making:    [] Low Complexity  [x] Moderate Complexity  [] High Complexity    Winter Plaza  [facility] Hospitalist  06/10/2020

## 2020-06-11 NOTE — PLAN OF CARE
06/11/20 1543   Discharge Assessment   Assessment Type Discharge Planning Assessment   Confirmed/corrected address and phone number on facesheet? Yes   Assessment information obtained from? Patient   Communicated expected length of stay with patient/caregiver no   Prior to hospitilization cognitive status: Alert/Oriented   Prior to hospitalization functional status: Independent   Current cognitive status: Alert/Oriented   Current Functional Status: Independent   Lives With spouse   Able to Return to Prior Arrangements yes   Is patient able to care for self after discharge? Yes   Who are your caregiver(s) and their phone number(s)? eric elissa [phone]   Patient's perception of discharge disposition home or selfcare   Readmission Within the Last 30 Days no previous admission in last 30 days   Patient currently being followed by outpatient case management? Yes   If yes, name of outpatient case management following: insurance company assigned oupatient case management   Patient currently receives any other outside agency services? No   Equipment Currently Used at Home BIPAP   Do you have any problems affording any of your prescribed medications? No   Is the patient taking medications as prescribed? yes   Does the patient have transportation home? Yes   Transportation Anticipated car, drives self;family or friend will provide   Dialysis Name and Scheduled days n/a   Does the patient receive services at the [facility]? No   Discharge Plan A Home   Discharge Plan B Home with family   DME Needed Upon Discharge  none   Patient/Family in Agreement with Plan yes   Introduced self to patient asked if ok to take a few min of their time for short interview for D/C planning and ok with patient

## 2020-06-11 NOTE — CONSULTS
Inpatient consult to Podiatry  Consult performed by: Tong Heaton DPM  Consult ordered by: Wolf Ortega MD       Identifying data a 60-year-old male    Chief complaint infection of unknown cause    Past medical history:  Patient has seen me numerous times for grade 1 ulcer this treated by palliation offloading and has done well no signs of infection.    Objective:  I examined his right foot today is vascular status intact he has got decreased sensation in shows a small grade 1 ulcer plantar 5th MPJ with some keratotic perimeter and no signs of infection.  I do not believe this is the source of his sepsis.    Assessment:  Type 2 diabetes with peripheral neuropathy, small grade 1 ulcer plantar 5th MPJ right foot with no signs of infection    Plan:  No treatment needed by me if this hospitalization I will order Bactroban to be applied to the small ulcerated area continues accommodative inserts return to see me in the office when he is discharged.

## 2020-06-12 VITALS
SYSTOLIC BLOOD PRESSURE: 95 MMHG | WEIGHT: 249.13 LBS | BODY MASS INDEX: 33.74 KG/M2 | DIASTOLIC BLOOD PRESSURE: 62 MMHG | HEIGHT: 72 IN | HEART RATE: 105 BPM | OXYGEN SATURATION: 91 % | RESPIRATION RATE: 20 BRPM | TEMPERATURE: 100 F

## 2020-06-12 LAB
ALBUMIN SERPL BCP-MCNC: 3.2 G/DL (ref 3.5–5.2)
ALP SERPL-CCNC: 49 U/L (ref 55–135)
ALT SERPL W/O P-5'-P-CCNC: 15 U/L (ref 10–44)
ANION GAP SERPL CALC-SCNC: 10 MMOL/L (ref 8–16)
AST SERPL-CCNC: 17 U/L (ref 10–40)
B PERT.PT PRMT NPH QL NAA+NON-PROBE: NOT DETECTED
BACTERIA BLD CULT: ABNORMAL
BASOPHILS # BLD AUTO: 0.02 K/UL (ref 0–0.2)
BASOPHILS NFR BLD: 0.3 % (ref 0–1.9)
BILIRUB SERPL-MCNC: 1.6 MG/DL (ref 0.1–1)
BUN SERPL-MCNC: 47 MG/DL (ref 6–20)
C PNEUM DNA NPH QL NAA+NON-PROBE: NOT DETECTED
CALCIUM SERPL-MCNC: 8.3 MG/DL (ref 8.7–10.5)
CHLORIDE SERPL-SCNC: 95 MMOL/L (ref 95–110)
CO2 SERPL-SCNC: 26 MMOL/L (ref 23–29)
CREAT SERPL-MCNC: 2.1 MG/DL (ref 0.5–1.4)
DIFFERENTIAL METHOD: ABNORMAL
EOSINOPHIL # BLD AUTO: 0.1 K/UL (ref 0–0.5)
EOSINOPHIL NFR BLD: 1.4 % (ref 0–8)
ERYTHROCYTE [DISTWIDTH] IN BLOOD BY AUTOMATED COUNT: 14.8 % (ref 11.5–14.5)
EST. GFR  (AFRICAN AMERICAN): 38.9 ML/MIN/1.73 M^2
EST. GFR  (NON AFRICAN AMERICAN): 33.7 ML/MIN/1.73 M^2
FLUAV H1 2009 PAN RNA NPH NAA+NON-PROBE: NOT DETECTED
FLUAV H1 RNA NPH QL NAA+NON-PROBE: NOT DETECTED
FLUAV H3 RNA NPH QL NAA+NON-PROBE: NOT DETECTED
FLUAV RNA NPH QL NAA+NON-PROBE: NOT DETECTED
FLUBV RNA NPH QL NAA+NON-PROBE: NOT DETECTED
GLUCOSE SERPL-MCNC: 202 MG/DL (ref 70–110)
GLUCOSE SERPL-MCNC: 231 MG/DL (ref 70–110)
HADV DNA NPH QL NAA+NON-PROBE: NOT DETECTED
HCOV 229E RNA NPH QL NAA+NON-PROBE: NOT DETECTED
HCOV HKU1 RNA NPH QL NAA+NON-PROBE: NOT DETECTED
HCOV NL63 RNA NPH QL NAA+NON-PROBE: NOT DETECTED
HCOV OC43 RNA NPH QL NAA+NON-PROBE: NOT DETECTED
HCT VFR BLD AUTO: 29 % (ref 40–54)
HGB BLD-MCNC: 9.4 G/DL (ref 14–18)
HMPV RNA NPH QL NAA+NON-PROBE: NOT DETECTED
HPIV1 RNA NPH QL NAA+NON-PROBE: NOT DETECTED
HPIV2 RNA NPH QL NAA+NON-PROBE: NOT DETECTED
HPIV3 RNA NPH QL NAA+NON-PROBE: NOT DETECTED
HPIV4 RNA NPH QL NAA+NON-PROBE: NOT DETECTED
IMM GRANULOCYTES # BLD AUTO: 0.03 K/UL (ref 0–0.04)
IMM GRANULOCYTES NFR BLD AUTO: 0.5 % (ref 0–0.5)
LYMPHOCYTES # BLD AUTO: 0.6 K/UL (ref 1–4.8)
LYMPHOCYTES NFR BLD: 8.8 % (ref 18–48)
M PNEUMO DNA NPH QL NAA+NON-PROBE: NOT DETECTED
MAGNESIUM SERPL-MCNC: 2.1 MG/DL (ref 1.6–2.6)
MCH RBC QN AUTO: 30.9 PG (ref 27–31)
MCHC RBC AUTO-ENTMCNC: 32.4 G/DL (ref 32–36)
MCV RBC AUTO: 95 FL (ref 82–98)
MONOCYTES # BLD AUTO: 0.7 K/UL (ref 0.3–1)
MONOCYTES NFR BLD: 10 % (ref 4–15)
NEUTROPHILS # BLD AUTO: 5.1 K/UL (ref 1.8–7.7)
NEUTROPHILS NFR BLD: 79 % (ref 38–73)
NRBC BLD-RTO: 0 /100 WBC
PLATELET # BLD AUTO: 67 K/UL (ref 150–350)
PMV BLD AUTO: 11.7 FL (ref 9.2–12.9)
POTASSIUM SERPL-SCNC: 3.9 MMOL/L (ref 3.5–5.1)
PROT SERPL-MCNC: 7.3 G/DL (ref 6–8.4)
RBC # BLD AUTO: 3.04 M/UL (ref 4.6–6.2)
RSV RNA NPH QL NAA+NON-PROBE: NOT DETECTED
RV+EV RNA NPH QL NAA+NON-PROBE: NOT DETECTED
SODIUM SERPL-SCNC: 131 MMOL/L (ref 136–145)
WBC # BLD AUTO: 6.5 K/UL (ref 3.9–12.7)

## 2020-06-12 PROCEDURE — 82962 GLUCOSE BLOOD TEST: CPT

## 2020-06-12 PROCEDURE — 85025 COMPLETE CBC W/AUTO DIFF WBC: CPT

## 2020-06-12 PROCEDURE — 36415 COLL VENOUS BLD VENIPUNCTURE: CPT

## 2020-06-12 PROCEDURE — 63600175 PHARM REV CODE 636 W HCPCS: Performed by: INTERNAL MEDICINE

## 2020-06-12 PROCEDURE — 25000003 PHARM REV CODE 250: Performed by: INTERNAL MEDICINE

## 2020-06-12 PROCEDURE — 80053 COMPREHEN METABOLIC PANEL: CPT

## 2020-06-12 PROCEDURE — 83735 ASSAY OF MAGNESIUM: CPT

## 2020-06-12 RX ORDER — CEFADROXIL 500 MG/1
500 CAPSULE ORAL EVERY 12 HOURS
Qty: 14 CAPSULE | Refills: 0 | Status: SHIPPED | OUTPATIENT
Start: 2020-06-12 | End: 2020-09-14 | Stop reason: ALTCHOICE

## 2020-06-12 RX ADMIN — PIPERACILLIN AND TAZOBACTAM 3.38 G: 3; .375 INJECTION, POWDER, FOR SOLUTION INTRAVENOUS at 04:06

## 2020-06-12 RX ADMIN — VANCOMYCIN HYDROCHLORIDE 1750 MG: 1 INJECTION, POWDER, LYOPHILIZED, FOR SOLUTION INTRAVENOUS at 12:06

## 2020-06-12 RX ADMIN — GABAPENTIN 600 MG: 300 CAPSULE ORAL at 08:06

## 2020-06-12 RX ADMIN — TRAMADOL HYDROCHLORIDE 50 MG: 50 TABLET, FILM COATED ORAL at 08:06

## 2020-06-12 RX ADMIN — Medication: at 08:06

## 2020-06-12 RX ADMIN — ASPIRIN 81 MG 81 MG: 81 TABLET ORAL at 08:06

## 2020-06-12 RX ADMIN — PANTOPRAZOLE SODIUM 40 MG: 40 TABLET, DELAYED RELEASE ORAL at 05:06

## 2020-06-12 RX ADMIN — HUMAN INSULIN 5 UNITS: 100 INJECTION, SOLUTION SUBCUTANEOUS at 08:06

## 2020-06-12 NOTE — DISCHARGE SUMMARY
[facility]  Discharge Summary  Patient Name: Ana Bullard MRN: [id number]   Patient Class: IP- Inpatient  Length of Stay: 2   Admission Date: 6/9/2020  8:59 PM Attending Physician: Winter Plaza MD   Primary Care Provider: Primary Doctor No Face-to-Face encounter date: 06/12/2020   Chief Complaint: Fever (FEVER SINCE 1400 TODAY, CONFUSION STARTING TODAY AFTER FEVER)    Date of Discharge: 6/12/2020  Discharge Disposition: Home  Condition: Stable    Reason for Hospitalization   Primary Diagnosis: cellulitis of groin    Secondary Diagnosis: AD, Peripheral arterial disease  Chronic systolic CHF  CAD/CABG  Insulin-dependent diabetes mellitus type 2  Diabetic peripheral neuropathy  Chronic venous stasis with dermatitis  Chronic right foot diabetic ulcer POA  Chronic left heel ulcer POA  Chronic thrombocytopenia  Anemia of chronic disease  Obstructive sleep apnea  AICD  Chronic mood disorder  H/o Complete heart block  GERD  Morbid obesity  History of heparin-induced thrombocytopenia  History of osteomyelitis right foot      Patient Active Problem List   Diagnosis    Thrombocytopathy    MRSA (methicillin resistant Staphylococcus aureus) infection    Osteomyelitis of right foot    Type II diabetes mellitus with neurological manifestations    Venous stasis dermatitis of both lower extremities    Skin ulcer of right foot, limited to breakdown of skin    Peripheral vascular disease    Diabetic polyneuropathy associated with type 2 diabetes mellitus    Foot pain, right    Exostosis of right foot    Leg pain    Pneumonia    AICD (automatic cardioverter/defibrillator) present    Obesity    Chronic pain    CAD (coronary artery disease)    Chronic systolic heart failure    Heparin induced thrombocytopenia    Hyperkalemia    AD (acute kidney injury)    Hyponatremia    Hypomagnesemia    Bacteremia, S.  Pneumoniae    Sepsis    Cellulitis of groin    Weakness       Brief History of  Present Illness    Ana Bullard is a 60 y.o.  male who  has a past medical history of AICD (automatic cardioverter/defibrillator) present, Anxiety and depression (2012), CAD (coronary artery disease) (2012), Cardiomegaly (2016), CHF (congestive heart failure) (2012), Cholecystitis (2017), Complete heart block (2012), Diabetic foot ulcer, DVT (deep venous thrombosis) (2012), GERD (gastroesophageal reflux disease) (2010), Heparin induced thrombocytopenia, Hyperlipemia (2011), IDDM (insulin dependent diabetes mellitus) (1983), Ischemic cardiomyopathy (2012), MI (myocardial infarction) (2012), Morbid obesity, MRSA (methicillin resistant Staphylococcus aureus) infection (01/19/2019), Noncompliance with therapeutic plan (2019), Osteomyelitis of right foot (01/2019), Pulmonary edema (2019), Respiratory failure (2019), Sepsis (01/2019), Sleep apnea (2013), Thrombocytopathy (2012), and Venous stasis dermatitis of both lower extremities.. The patient presented to [facility] on [facility]/2020 with a primary complaint of Fever (FEVER SINCE 1400 TODAY, CONFUSION STARTING TODAY AFTER FEVER)      For the full HPI please refer to the History & Physical from this admission.    Hospital Course By Problem with Pertinent Findings     This is a 60-year-old male admitted for sepsis due to cellulitis of the right groin. He was treated with broad spectrum antibiotics including Vancomycin and Zosyn. His blood cultures grew STREPTOCOCCUS PYOGENES (GROUP A), repeat were negative. He improved significantly. For his grade 1 ulcer plantar 5th MPJ right foot he was seen by podiatry and cleared him for discharge. He will be discharged home on Cefadroxil 500mg BID and follow up with PCP in 1 week time.       Physical Exam  BP 95/62   Pulse 105   Temp 99.6 °F (37.6 °C) (Oral)   Resp 20   Ht 6' (1.829 m)   Wt 113 kg (249 lb 1.9 oz)   SpO2 (!) 91%   BMI 33.79 kg/m²   Vitals reviewed.    Constitutional: No distress.    HENT: Atraumatic.   Cardiovascular: Normal rate, regular rhythm and normal heart sounds.   Pulmonary/Chest: Effort normal. Clear to auscultation bilaterally. No wheezes.   Abdominal: Soft. Bowel sounds are normal. Exhibits no distension and no mass. No tenderness  Neurological: Alert.   Skin: Skin is warm and dry.     Following labs were Reviewed   Recent Labs   Lab 06/12/20  0429   WBC 6.50   HGB 9.4*   HCT 29.0*   PLT 67*   CALCIUM 8.3*   ALBUMIN 3.2*   PROT 7.3   *   K 3.9   CO2 26   CL 95   BUN 47*   CREATININE 2.1*   ALKPHOS 49*   ALT 15   AST 17   BILITOT 1.6*     No results found for: POCTGLUCOSE     All labs within the past 24 hours have been reviewed    Microbiology Results (last 7 days)     Procedure Component Value Units Date/Time    Blood culture [[id number]] Collected:  06/11/20 0923    Order Status:  Completed Specimen:  Blood from Antecubital, Left Arm Updated:  06/11/20 1558     Blood Culture, Routine No Growth to date    Blood culture [[id number]] Collected:  06/11/20 0911    Order Status:  Completed Specimen:  Blood from Peripheral, Right Hand Updated:  06/11/20 1558     Blood Culture, Routine No Growth to date    Respiratory Infection Panel (PCR), Nasopharyngeal [[id number]] Collected:  06/10/20 1925    Order Status:  No result Updated:  06/11/20 1412    Strep A culture, throat [[id number]] Collected:  06/09/20 2134    Order Status:  Completed Specimen:  Throat Updated:  06/11/20 0826     Strep A Culture No  Group A  Streptococcus isolated    Blood culture x two cultures. Draw prior to antibiotics. [[id number]]  (Abnormal) Collected:  06/09/20 2121    Order Status:  Completed Specimen:  Blood from Peripheral, Lower Arm, Right Updated:  06/11/20 0816     Blood Culture, Routine Gram stain mayte bottle: Gram positive cocci in chains resembling Strep      Results called to and read back by:Bethany Nails RN [id number]  06/10/2020       10:25 JBM      Gram stain aer bottle: Gram positive cocci in chains  resembling Strep      STREPTOCOCCUS PYOGENES (GROUP A)  Beta-hemolytic streptococci are routinely susceptible to   penicillins,cephalosporins and carbapenems.      Narrative:       Aerobic and anaerobic    Blood culture x two cultures. Draw prior to antibiotics. [[id number]]  (Abnormal) Collected:  06/09/20 2134    Order Status:  Completed Specimen:  Blood from Peripheral, Upper Arm, Right Updated:  06/11/20 0815     Blood Culture, Routine Gram stain mayte bottle: Gram positive cocci in chains resembling Strep      Results called to and read back by:Bethany Nails RN [address] [place] [address]  06/10/2020       10:25 JBM      Gram stain aer bottle: Gram positive cocci in chains resembling Strep      STREPTOCOCCUS PYOGENES (GROUP A)  Beta-hemolytic streptococci are routinely susceptible to   penicillins,cephalosporins and carbapenems.      Narrative:       Aerobic and anaerobic    Respiratory Viral Panel by PCR [place]; Nasopharyngeal Swab [[id number]] Collected:  06/10/20 1925    Order Status:  Canceled Specimen:  Respiratory     Rapid strep screen [[id number]] Collected:  06/09/20 2134    Order Status:  Completed Specimen:  Throat Updated:  06/09/20 2230     Rapid Strep A Screen Negative     Comment: See Micro for reflexed Strep culture.           X-Ray Chest AP Portable   Final Result          No results found for this or any previous visit.      Consultants and Procedures   Consultants:  Podiatry    Procedures:   none    Discharge Information:   Diet:  Resume regular diet    Physical Activity:  Activity as tolerated    Instructions:  1. Take all medications as prescribed  2. Keep all follow-up appointments  3. Return to the hospital or call your primary care physicians if any worsening symptoms such as cellulitis occur.      Follow-Up Appointments:  1. Please call your primary care physician to schedule an appointment in 1 week time.      Pending laboratory work/Tests to be performed/followed by the Primary care Physician: blood  cultures     The patient was discharged in the care of her parents//wife/family/caregiver, with discharge instructions were reviewed in written and verbal form. All pertinent questions were discussed and prescriptions were provided. The importance of making follow up appointments and compliance of medications has been stressed repeatedly. The patient will follow up in 1 week or sooner as needed with the PCP, and the patient is on board with the plan. Upon discharge, patient needs to be on following medications.    Discharge Medications:     Medication List      START taking these medications    cefadroxil 500 MG Cap  Commonly known as:  DURICEF  Take 1 capsule (500 mg total) by mouth every 12 (twelve) hours.        CHANGE how you take these medications    furosemide 40 MG tablet  Commonly known as:  LASIX  Take 1 tablet (40 mg total) by mouth once daily.  What changed:  Another medication with the same name was removed. Continue taking this medication, and follow the directions you see here.        CONTINUE taking these medications    albuterol-ipratropium 2.5 mg-0.5 mg/3 mL nebulizer solution  Commonly known as:  DUO-NEB     aspirin 81 MG Chew     atorvastatin 10 MG tablet  Commonly known as:  LIPITOR     BASAGLAR KWIKPEN U-100 INSULIN glargine 100 units/mL (3mL) SubQ pen  Generic drug:  insulin     carvediloL 3.125 MG tablet  Commonly known as:  COREG  Take 1 tablet (3.125 mg total) by mouth 2 (two) times daily.     gabapentin 600 MG tablet  Commonly known as:  NEURONTIN     metFORMIN 500 MG tablet  Commonly known as:  GLUCOPHAGE     nortriptyline 25 MG capsule  Commonly known as:  PAMELOR  Take 1 capsule (25 mg total) by mouth every evening.     pantoprazole 40 MG tablet  Commonly known as:  PROTONIX     spironolactone 25 MG tablet  Commonly known as:  ALDACTONE     traMADoL 50 mg tablet  Commonly known as:  ULTRAM  TAKE 1 TABLET (50 MG TOTAL) BY MOUTH EVERY 8 (EIGHT) HOURS AS NEEDED FOR PAIN     vitamin B  complex-folic acid 0.4 mg Tab        STOP taking these medications    levoFLOXacin 750 MG tablet  Commonly known as:  LEVAQUIN           Where to Get Your Medications      These medications were sent to [facility] - [place], [place] - [address]  [address], [place] [address]    Phone:  [phone]   · cefadroxil 500 MG Cap           I spent 34 minutes preparing the discharge including reviewing records from previous encounters, preparation of discharge summary, assessing and final examination of the patient, discharge medicine reconciliation, discussing plan of care, follow up and education and prescriptions.       Winter Plaza  [facility] Hospitalist  06/12/2020

## 2020-06-12 NOTE — PLAN OF CARE
06/12/20 0857   Final Note   Assessment Type Final Discharge Note   Anticipated Discharge Disposition Home

## 2020-06-12 NOTE — RESPIRATORY THERAPY
06/11/20 2233   Patient Assessment/Suction   Level of Consciousness (AVPU) alert   Respiratory Effort Normal;Unlabored   Expansion/Accessory Muscles/Retractions expansion symmetric;no retractions;no use of accessory muscles   All Lung Fields Breath Sounds clear   Rhythm/Pattern, Respiratory no shortness of breath reported;pattern regular;unlabored   PRE-TX-O2   O2 Device (Oxygen Therapy) room air   SpO2 (!) 93 %   Pulse Oximetry Type Intermittent   $ Pulse Oximetry - Multiple Charge Pulse Oximetry - Multiple   Pulse 96   Resp 16   Aerosol Therapy   $ Aerosol Therapy Charges PRN treatment not required   Respiratory Interventions   Cough And Deep Breathing done with encouragement   Breathing Techniques/Airway Clearance deep/controlled cough encouraged;diaphragmatic breathing promoted   Respiratory Evaluation   $ Care Plan Tech Time 15 min   Evaluation For New Orders   Admitting Diagnosis Sepsis   Cardiac Diagnosis CAD,CHF   Home Oxygen   Has Home Oxygen? No   Home Aerosol, MDI, DPI, and Other Treatments/Therapies   Home Respiratory Therapy Per Patient/Review of Chart No  (According to the pt. He does not use Respiratory Medication.)   Bronchodilator Care Plan   Bronchodilator Care Plan Aerosol   Aerosol Meds w/ frequency Albuterol - Ipratropium (DUO-NEB) 0.5mg-3mg(2.5ml) 3ml Nebulizer Solution2.5mg PRN   Rationale Shortness of breath (increased WOB)

## 2020-06-12 NOTE — PROGRESS NOTES
[facility]  Adult Nutrition   Progress Note (Initial Assessment)     SUMMARY     Recommendations/Interventions:    Recommendation/Intervention: 1. Recommend a Brent be added to diet order twice a day to assist in healing and meet calorie/protein needs  Goals: 1. Pt to meet at least 75% of estimated nutritonal needs via po intake. 2.  to assist in menu selections  Nutrition Goal Status: new  Communication of RD Recs: discussed on rounds    Dietitian Rounds Brief:    ***    Malnutrition Assessment                                       Reason for Assessment  Reason For Assessment: length of stay  Relevant Medical History: CKD 2, CHF, DM 2, Sleep apnea, GERD, CAD/CABG, hx osteomylitis of right foot, chronic left heel ulcer POA, chronic right foot diabetic ulcer POA  Interdisciplinary Rounds: attended    Nutrition Risk Screen  Nutrition Risk Screen: other (see comments)(ulcers present upon arrival, cellulitis of grion possible sepsis. )  [REMOVED]      Wound 01/19/20 0705 Diabetic Ulcer Right lateral Plantar #1-Wound Image: Images linked       Wound 06/10/20 0924  Right Groin-Wound Image: Images linked          Nutrition/Diet History  Food Allergies: NKFA    Anthropometrics  Temp: 99.6 °F (37.6 °C)  Height Method: Stated  Height: 6' (182.9 cm)  Height (inches): 72 in  Weight Method: Bed Scale  Weight: 113 kg (249 lb 1.9 oz)  Weight (lb): 249.12 lb  Ideal Body Weight (IBW), Male: 178 lb  % Ideal Body Weight, Male (lb): 139.96 %  BMI (Calculated): 33.8  BMI Grade: 30 - 34.9- obesity - grade I       Weight History:  Wt Readings from Last 10 Encounters:   06/12/20 113 kg (249 lb 1.9 oz)   04/23/20 116.6 kg (257 lb)   01/22/20 115.7 kg (255 lb 1.2 oz)   01/20/20 111.1 kg (244 lb 14.9 oz)   01/09/20 114.3 kg (252 lb)   01/08/20 116.6 kg (257 lb)   10/14/19 116.6 kg (257 lb)   10/07/19 116.6 kg (257 lb)   08/21/19 116.6 kg (257 lb)   08/06/19 116.6 kg (257 lb)   }  Lab/Procedures/Meds: Pertinent Labs  Reviewed  Clinical Chemistry:  Recent Labs   Lab 06/12/20  0429   *   K 3.9   CL 95   CO2 26   *   BUN 47*   CREATININE 2.1*   CALCIUM 8.3*   PROT 7.3   ALBUMIN 3.2*   BILITOT 1.6*   ALKPHOS 49*   AST 17   ALT 15   ANIONGAP 10   ESTGFRAFRICA 38.9*   EGFRNONAA 33.7*   MG 2.1     CBC:   Recent Labs   Lab 06/12/20  0429   WBC 6.50   RBC 3.04*   HGB 9.4*   HCT 29.0*   PLT 67*   MCV 95   MCH 30.9   MCHC 32.4     Medications: Pertinent Medications reviewed  Scheduled Meds:   aspirin  81 mg Oral Daily    atorvastatin  10 mg Oral Daily    gabapentin  600 mg Oral TID    insulin detemir U-100  25 Units Subcutaneous Daily    insulin regular  5 Units Subcutaneous TIDWM    mupirocin   Topical (Top) Daily    nortriptyline  25 mg Oral QHS    pantoprazole  40 mg Oral Daily    piperacillin-tazobactam (ZOSYN) IVPB  3.375 g Intravenous Q8H    vancomycin (VANCOCIN) IVPB  1,750 mg Intravenous Q24H    white petrolatum   Topical (Top) Daily     Continuous Infusions:  PRN Meds:.acetaminophen, albuterol-ipratropium, dextrose 50%, dextrose 50%, dextrose 50%, dextrose 50%, glucagon (human recombinant), glucose, glucose, insulin regular, magnesium oxide, magnesium oxide, melatonin, ondansetron, polyethylene glycol, potassium chloride 10%, potassium chloride 10%, promethazine (PHENERGAN) IVPB, sodium chloride 0.9%, traMADoL, Pharmacy to dose Vancomycin consult **AND** vancomycin - pharmacy to dose    Estimated/Assessed Needs    Weight Used For Calorie Calculations: 113 kg (249 lb 1.9 oz)  Energy Calorie Requirements (kcal): 1988 x 1.3-1.5 = 2778-0328 calories for healing   Energy Need Method: Schleicher- Pacoor  Protein Requirements: 113 g (1 g/kg BW for healing but also stage of kidney disease)   Weight Used For Protein Calculations: 113 kg (249 lb 1.9 oz)  Fluid Requirements (mL): 1988 ml   Estimated Fluid Requirement Method: RDA Method    Nutrition Prescription Ordered    Current Diet Order: 1800 calories; Low sodium  cholesterol controlled 1500 ml fluid restriction     Evaluation of Received Nutrient/Fluid Intake    Energy Calories Required: not meeting needs  Protein Required: not meeting needs  Fluid Required: meeting needs  Tolerance: tolerating  % Intake of Estimated Energy Needs: {LHIP NUT %:33987}  % Meal Intake: {IP NUTRITION % MEAL INTAKE:968724582}    Intake/Output Summary (Last 24 hours) at 6/12/2020 0756  Last data filed at 6/12/2020 0352  Gross per 24 hour   Intake --   Output 900 ml   Net -900 ml      Nutrition Risk    Level of Risk/Frequency of Follow-up: high   Monitor and Evaluation    Food and Nutrient Intake: energy intake, food and beverage intake  Food and Nutrient Adminstration: diet order  Knowledge/Beliefs/Attitudes: food and nutrition knowledge/skill  Physical Activity and Function: nutrition-related ADLs and IADLs  Anthropometric Measurements: weight, weight change  Biochemical Data, Medical Tests and Procedures: lipid profile, electrolyte and renal panel, gastrointestinal profile, glucose/endocrine profile, inflammatory profile  Nutrition-Focused Physical Findings: overall appearance     Nutrition Follow-Up    RD Follow-up?: Yes  Lynn Matta MS, RD, LDN

## 2020-06-12 NOTE — NURSING
Pt c/o left leg redness, swelling, warmth, and tenderness noted after taking a shower. +2 pedal pulse noted. Notified Dr Ortega with no further orders. Pt resting in bed. No distress noted. Will continue to monitor.

## 2020-06-16 LAB
BACTERIA BLD CULT: NORMAL
BACTERIA BLD CULT: NORMAL

## 2020-09-10 ENCOUNTER — TELEPHONE (OUTPATIENT)
Dept: PAIN MEDICINE | Facility: CLINIC | Age: 60
End: 2020-09-10

## 2020-09-10 DIAGNOSIS — G89.4 CHRONIC PAIN DISORDER: ICD-10-CM

## 2020-09-10 RX ORDER — TRAMADOL HYDROCHLORIDE 50 MG/1
TABLET ORAL
Qty: 90 TABLET | Refills: 0 | Status: SHIPPED | OUTPATIENT
Start: 2020-09-10 | End: 2020-09-14 | Stop reason: SDUPTHER

## 2020-09-10 NOTE — TELEPHONE ENCOUNTER
----- Message from Shauna Hand PA-C sent at 9/10/2020 11:20 AM CDT -----  Regarding: FW: refill  Contact: patient    ----- Message -----  From: Sandra Tomlin LPN  Sent: 9/10/2020  11:09 AM CDT  To: Shauna Hand PA-C  Subject: FW: refill                                         ----- Message -----  From: Tina Miranda  Sent: 9/10/2020  11:05 AM CDT  To: Peace Villatoro Staff  Subject: refill                                           Type: Needs Medical Advice    Who Called: Patient    [facility] [place] [facility] [address] [place] [address] [phone] (Phone)     Medication: traMADoL (ULTRAM) 50 mg tablet    Best Call Back Number: [phone]    Additional Information:  Pt states he was told he had to make appt before his next refill on meds. Patient scheduled for 9/14 & requesting refill be sent in. States he will be out after today.

## 2020-09-14 ENCOUNTER — OFFICE VISIT (OUTPATIENT)
Dept: PAIN MEDICINE | Facility: CLINIC | Age: 60
End: 2020-09-14
Payer: MEDICARE

## 2020-09-14 VITALS
SYSTOLIC BLOOD PRESSURE: 111 MMHG | HEIGHT: 72 IN | HEART RATE: 84 BPM | WEIGHT: 255 LBS | BODY MASS INDEX: 34.54 KG/M2 | DIASTOLIC BLOOD PRESSURE: 73 MMHG

## 2020-09-14 DIAGNOSIS — G89.4 CHRONIC PAIN DISORDER: ICD-10-CM

## 2020-09-14 DIAGNOSIS — M79.672 BILATERAL FOOT PAIN: ICD-10-CM

## 2020-09-14 DIAGNOSIS — M79.671 BILATERAL FOOT PAIN: ICD-10-CM

## 2020-09-14 DIAGNOSIS — E11.42 DIABETIC POLYNEUROPATHY ASSOCIATED WITH TYPE 2 DIABETES MELLITUS: Primary | ICD-10-CM

## 2020-09-14 PROCEDURE — 99999 PR PBB SHADOW E&M-EST. PATIENT-LVL III: CPT | Mod: PBBFAC,,, | Performed by: PHYSICIAN ASSISTANT

## 2020-09-14 PROCEDURE — 99214 PR OFFICE/OUTPT VISIT, EST, LEVL IV, 30-39 MIN: ICD-10-PCS | Mod: S$GLB,,, | Performed by: PHYSICIAN ASSISTANT

## 2020-09-14 PROCEDURE — 99214 OFFICE O/P EST MOD 30 MIN: CPT | Mod: S$GLB,,, | Performed by: PHYSICIAN ASSISTANT

## 2020-09-14 PROCEDURE — 99999 PR PBB SHADOW E&M-EST. PATIENT-LVL III: ICD-10-PCS | Mod: PBBFAC,,, | Performed by: PHYSICIAN ASSISTANT

## 2020-09-14 RX ORDER — TRAMADOL HYDROCHLORIDE 50 MG/1
50 TABLET ORAL EVERY 8 HOURS PRN
Qty: 90 TABLET | Refills: 1 | Status: SHIPPED | OUTPATIENT
Start: 2020-10-09 | End: 2020-11-07

## 2020-09-14 RX ORDER — SITAGLIPTIN 50 MG/1
TABLET, FILM COATED ORAL
Status: ON HOLD | COMMUNITY
Start: 2020-08-17 | End: 2022-09-08 | Stop reason: HOSPADM

## 2020-09-14 RX ORDER — CARVEDILOL 25 MG/1
TABLET ORAL
COMMUNITY
Start: 2020-08-31 | End: 2020-09-30

## 2020-09-14 NOTE — PROGRESS NOTES
Referring Physician: No ref. provider found    PCP: Primary Doctor No      CC:  Bilateral foot pain      Interval history:  Mr. Bullard is a 59 y.o. male with bilateral foot pain due to peripheral neuropathy diabetic neuropathy.  He was recently hospitalized for CHF.  He continues to take Gabapentin 600 mg t.i.d. with mild benefits. Nortriptyline 25 mg made him to drowsy so he discontinued it.  He continues to take tramadol 50 mg q.8 hours as needed with mild-to-moderate benefit.  Denies any side effects with the medication.  Able perform his ADLs with the medication.  He underwent a right-sided lumbar sympathetic nerve block which provided moderate benefit for 2 weeks.  Pain has since recurred.  He denies any worsening weakness.  No bowel bladder changes. Pain today is rated 7/10.    Prior HPI:   Ana Bullard is a 59 y.o. male referred to us for bilateral foot pain. Patient with long history of diabetes.  He states neuropathy worsen after his CABG.  Bilateral foot pain has worsened over past 7 years.  He presents to us constant burning, sharp pain in his bilateral feet.  He also has some similar issues in his bilateral hands, but foot pain is worse.  Pain worsens prolonged sitting and standing.  He currently takes gabapentin 600 mg t.i.d. with mild benefits.  Increasing doses causes sedation.  He also takes tramadol q.8 hours as needed with mild-to-moderate benefit.  He denies any worsening weakness.  No bowel bladder changes.    Interventional history:  Right LSB on 09/2019 with 50% relief for 2 weeks.    ROS:  CONSTITUTIONAL: No fevers, chills, night sweats, wt. loss, appetite changes  SKIN: no rashes or itching  ENT: No headaches, head trauma, vision changes, or eye pain  LYMPH NODES: None noticed   CV: No chest pain, palpitations.   RESP: No shortness of breath, dyspnea on exertion, cough, wheezing, or hemoptysis  GI: No nausea, emesis, diarrhea, constipation, melena, hematochezia, pain.    : No  dysuria, hematuria, urgency, or frequency   HEME: No easy bruising, bleeding problems  PSYCHIATRIC: No depression, anxiety, psychosis, hallucinations.  NEURO: No seizures, memory loss, dizziness or difficulty sleeping  MSK:  Positive HPI      Past Medical History:   Diagnosis Date    AICD (automatic cardioverter/defibrillator) present     Anxiety and depression 2012    CAD (coronary artery disease) 2012    Cardiomegaly 2016    CHF (congestive heart failure) 2012    Cholecystitis 2017    Complete heart block 2012    Diabetic foot ulcer     DVT (deep venous thrombosis) 2012    And pulmonary embolus    GERD (gastroesophageal reflux disease) 2010    Heparin induced thrombocytopenia     Hyperlipemia 2011    IDDM (insulin dependent diabetes mellitus) 1983    With neuropathy    Ischemic cardiomyopathy 2012    MI (myocardial infarction) 2012    Morbid obesity     MRSA (methicillin resistant Staphylococcus aureus) infection 01/19/2019    Noncompliance with therapeutic plan 2019    Osteomyelitis of right foot 01/2019    Pulmonary edema 2019    Respiratory failure 2019    Sepsis 01/2019    Due to cellulitis right leg    Sleep apnea 2013    Thrombocytopathy 2012    Venous stasis dermatitis of both lower extremities      Past Surgical History:   Procedure Laterality Date    CARDIAC PACEMAKER PLACEMENT  12/2012    CARDIAC PACEMAKER PLACEMENT  10/04/2018    battery replacement    CORONARY ARTERY BYPASS GRAFT  06/2012    ESOPHAGOGASTRODUODENOSCOPY  01/31/2017    SAMARA FILTER PLACEMENT  2012    vena cava    LUMBAR SYMPATHETIC NERVE BLOCK Right 8/22/2019    Procedure: BLOCK, NERVE, SYMPATHETIC, LUMBAR;  Surgeon: Tashi Good MD;  Location: [facility];  Service: Pain Management;  Laterality: Right;  RIGHT SYMPATHETIC NERVE BLOCK     Family History   Problem Relation Age of Onset    Arthritis Mother     Diabetes Mother     Cancer Father     Heart disease Father     Stroke Father      Social History      Socioeconomic History    Marital status:      Spouse name: Not on file    Number of children: Not on file    Years of education: Not on file    Highest education level: Not on file   Occupational History    Not on file   Social Needs    Financial resource strain: Not on file    Food insecurity     Worry: Not on file     Inability: Not on file    Transportation needs     Medical: Not on file     Non-medical: Not on file   Tobacco Use    Smoking status: Former Smoker     Packs/day: 2.00     Years: 38.00     Pack years: 76.00     Types: Cigarettes     Quit date:      Years since quittin.7    Smokeless tobacco: Never Used   Substance and Sexual Activity    Alcohol use: No     Frequency: Never    Drug use: Not on file    Sexual activity: Never   Lifestyle    Physical activity     Days per week: Not on file     Minutes per session: Not on file    Stress: Not on file   Relationships    Social connections     Talks on phone: Not on file     Gets together: Not on file     Attends [employer] service: Not on file     Active member of club or organization: Not on file     Attends meetings of clubs or organizations: Not on file     Relationship status: Not on file   Other Topics Concern    Not on file   Social History Narrative    Not on file         Medications/Allergies: See med card  Vitals:    20 1046 20 1048   BP: 111/73    Pulse: 84    Weight: 115.7 kg (255 lb)    Height: 6' (1.829 m)    PainSc:   6   6   PainLoc: Hand        GENERAL: A and O x3, the patient appears well groomed and is in no acute distress.  Skin: No rashes or obvious lesions  HEENT: normocephalic, atraumatic  CARDIOVASCULAR:  RRR  LUNGS: non labored breathing  ABDOMEN: soft, nontender   UPPER EXTREMITIES: Normal alignment, normal range of motion, no atrophy, no skin changes,  hair growth and nail growth normal and equal bilaterally. No swelling, no tenderness.    LOWER EXTREMITIES:  Normal alignment, normal  range of motion, no atrophy, no skin changes,  hair growth and nail growth normal and equal bilaterally. No swelling, no tenderness.  LUMBAR SPINE  Lumbar spine: ROM is limited with flexion extension and oblique extension with moderate increased pain.    Samuel's test causes no increased pain on either side.    Supine straight leg raise is negative bilaterally.    Internal and external rotation of the hip causes no increased pain on either side.  Myofascial exam: No tenderness to palpation across lumbar paraspinous muscles.        MENTAL STATUS: normal orientation, speech, language, and fund of knowledge for social situation.  Emotional state appropriate.     CRANIAL NERVES:  II:         PERRL bilaterally,   III,IV,VI: EOMI.    V:         Facial sensation equal bilaterally  VII:       Facial motor function normal.  VIII:      Hearing equal to finger rub bilaterally  IX/X:    Gag normal, palate symmetric  XI:        Shoulder shrug equal, head turn equal  XII:       Tongue midline without fasciculations        MOTOR: Tone and bulk: normal bilateral upper and lower Strength: normal   Delt      Bi         Tri        WE      WF                        R          5          5          5          5          5          5            L          5          5          5          5          5          5               IP         ADD     ABD     Quad   TA        Gas      HAM  R          5          5          5          5          5          5          5  L          5          5          5          5          5          5          5     SENSATION:  Decreased globally of bilateral feet  REFLEXES: normal, symmetric, nonbrisk.  Toes down, no clonus. No hoffmans.  GAIT: normal rise, base, steps, and arm swing.      Imaging:  None    Assessment:  Ana Bullard is a 59 y.o. male with bilateral foot pain  1. Diabetic polyneuropathy associated with type 2 diabetes mellitus    2. Bilateral foot pain    3. Chronic pain disorder         Plan:  1. I have stressed the importance of physical activity and exercise to improve overall health  2.  Discussed disease progression of peripheral neuropathy.  Continue gabapentin prescribed.  Consider nortriptyline 10 mg in the future  3.  He does request continue tramadol with us.  I believe this is very reasonable.   reviewed.  He takes tramadol 50 mg q.8 hours as needed.  Previous UDS last visit consistent with use.   4.  May consider repeat lumbar sympathetic block if pain is exacerbated.  Briefly discussed spinal cord stimulation as well.  5.  Follow-up in 3 months  All medication management was performed by Dr. Tashi Good

## 2020-09-23 ENCOUNTER — PATIENT MESSAGE (OUTPATIENT)
Dept: RESEARCH | Facility: OTHER | Age: 60
End: 2020-09-23

## 2020-12-07 RX ORDER — TRAMADOL HYDROCHLORIDE 50 MG/1
TABLET ORAL
Qty: 90 TABLET | Refills: 0 | Status: SHIPPED | OUTPATIENT
Start: 2020-12-07 | End: 2021-01-04

## 2020-12-28 ENCOUNTER — OFFICE VISIT (OUTPATIENT)
Dept: PODIATRY | Facility: CLINIC | Age: 60
End: 2020-12-28
Payer: MEDICARE

## 2020-12-28 VITALS — RESPIRATION RATE: 18 BRPM | WEIGHT: 265.5 LBS | HEIGHT: 72 IN | HEART RATE: 90 BPM | BODY MASS INDEX: 35.96 KG/M2

## 2020-12-28 DIAGNOSIS — I73.9 PERIPHERAL VASCULAR DISEASE: ICD-10-CM

## 2020-12-28 DIAGNOSIS — L97.512 SKIN ULCER OF RIGHT FOOT WITH FAT LAYER EXPOSED: Primary | ICD-10-CM

## 2020-12-28 DIAGNOSIS — E11.49 TYPE II DIABETES MELLITUS WITH NEUROLOGICAL MANIFESTATIONS: ICD-10-CM

## 2020-12-28 PROCEDURE — 99213 PR OFFICE/OUTPT VISIT, EST, LEVL III, 20-29 MIN: ICD-10-PCS | Mod: 25,S$GLB,, | Performed by: PODIATRIST

## 2020-12-28 PROCEDURE — 97597 DBRDMT OPN WND 1ST 20 CM/<: CPT | Mod: S$GLB,,, | Performed by: PODIATRIST

## 2020-12-28 PROCEDURE — 99213 OFFICE O/P EST LOW 20 MIN: CPT | Mod: 25,S$GLB,, | Performed by: PODIATRIST

## 2020-12-28 PROCEDURE — 97597 WOUND DEBRIDEMENT: ICD-10-PCS | Mod: S$GLB,,, | Performed by: PODIATRIST

## 2020-12-28 RX ORDER — IPRATROPIUM BROMIDE AND ALBUTEROL SULFATE 2.5; .5 MG/3ML; MG/3ML
3 SOLUTION RESPIRATORY (INHALATION) EVERY 6 HOURS PRN
COMMUNITY
Start: 2020-11-13

## 2020-12-28 NOTE — PROGRESS NOTES
[address] [facility] [address]  [place] [address]  Phone: [phone]   [phone]    Patient's PCP:Primary Doctor No  Referring Provider: No ref. provider found    Subjective:      Chief Complaint:: Foot Ulcer    CAROLIN Bullard is a 59 y.o. male who presents with a complaint of reoccurring right fifth MPJ ulcer lasting for several weeks. Onset of symptoms callus and reports no trauma.  Current symptoms include open ulcer with drainage. Aggravating factors are applied pressure, walking and weightbearing. Symptoms have progressed. Treatment to date have included Dressing with gauze, triple antibiotic and paper tape every other day..  I have been concerned about coming in because of COVID-19 and have not been seen in a while.    Systemic Doctor: Dr. Renita Messina  Date Last Seen: October 2020  Blood Sugar: 130  Hemoglobin A1c: 7.5    Vitals:    12/28/20 1534   Pulse: 90   Resp: 18   Weight: 120.4 kg (265 lb 8 oz)   Height: 6' (1.829 m)   PainSc:   2      Shoe Size: 12    Past Surgical History:   Procedure Laterality Date    CARDIAC PACEMAKER PLACEMENT  12/2012    CARDIAC PACEMAKER PLACEMENT  10/04/2018    battery replacement    CORONARY ARTERY BYPASS GRAFT  06/2012    ESOPHAGOGASTRODUODENOSCOPY  01/31/2017    SAMARA FILTER PLACEMENT  2012    vena cava    LUMBAR SYMPATHETIC NERVE BLOCK Right 8/22/2019    Procedure: BLOCK, NERVE, SYMPATHETIC, LUMBAR;  Surgeon: Tashi Good MD;  [facility]: [facility];  Service: Pain Management;  Laterality: Right;  RIGHT SYMPATHETIC NERVE BLOCK     Past Medical History:   Diagnosis Date    AICD (automatic cardioverter/defibrillator) present     Anxiety and depression 2012    CAD (coronary artery disease) 2012    Cardiomegaly 2016    CHF (congestive heart failure) 2012    Cholecystitis 2017    Complete heart block 2012    Diabetic foot ulcer     DVT (deep venous thrombosis) 2012    And pulmonary embolus    GERD (gastroesophageal reflux disease) 2010     "Heparin induced thrombocytopenia     Hyperlipemia 2011    IDDM (insulin dependent diabetes mellitus) 1983    With neuropathy    Ischemic cardiomyopathy 2012    MI (myocardial infarction) 2012    Morbid obesity     MRSA (methicillin resistant Staphylococcus aureus) infection 01/19/2019    Noncompliance with therapeutic plan 2019    Osteomyelitis of right foot 01/2019    Pulmonary edema 2019    Respiratory failure 2019    Sepsis 01/2019    Due to cellulitis right leg    Sleep apnea 2013    Thrombocytopathy 2012    Venous stasis dermatitis of both lower extremities      Family History   Problem Relation Age of Onset    Arthritis Mother     Diabetes Mother     Cancer Father     Heart disease Father     Stroke Father         Social History:   Marital Status:   Alcohol History:  reports no history of alcohol use.  Tobacco History:  reports that he quit smoking about 8 years ago. His smoking use included cigarettes. He has a 76.00 pack-year smoking history. He has never used smokeless tobacco.  Drug History:  has no history on file for drug.    Review of patient's allergies indicates:   Allergen Reactions    Vasopressin Anaphylaxis and Other (See Comments)     Increased pressure in his head; felt like his eyeballs and veins were going to pop out of his head.     Heparin Other (See Comments)     Other reaction(s): "depleted my blood platets"    Decreased platelet count    Heparin analogues Other (See Comments)     Depleted WBC count    Vasopressin analogues Other (See Comments)     "felt like eyes going to pop out of my head"    Morphine Hallucinations, Other (See Comments) and Anxiety     Other reaction(s): Hallucinations  Paranoid, hallucinations         Current Outpatient Medications   Medication Sig Dispense Refill    albuterol-ipratropium (DUO-NEB) 2.5 mg-0.5 mg/3 mL nebulizer solution Inhale 3 mLs into the lungs every 6 (six) hours as needed.      aspirin 81 MG Chew Take 81 mg by mouth " once daily.      atorvastatin (LIPITOR) 10 MG tablet Take 1 tablet by mouth once daily.  1    BASAGLAR KWIKPEN U-100 INSULIN glargine 100 units/mL (3mL) SubQ pen 25 Units once daily.   3    furosemide (LASIX) 40 MG tablet Take 1 tablet (40 mg total) by mouth once daily. 30 tablet 1    gabapentin (NEURONTIN) 600 MG tablet Take 1 tablet by mouth 3 (three) times daily.  0    JANUVIA 50 mg Tab       pantoprazole (PROTONIX) 40 MG tablet Take 1 tablet by mouth once daily.  0    spironolactone (ALDACTONE) 25 MG tablet Take 1 tablet by mouth once daily.  1    traMADoL (ULTRAM) 50 mg tablet TAKE 1 TABLET BY MOUTH EVERY 8 HOURS AS NEEDED FOR PAIN 90 tablet 0    vitamin B complex-folic acid 0.4 mg Tab Take 1 tablet by mouth.      carvediloL (COREG) 25 MG tablet TAKE 1 TABLET EVERY DAY       No current facility-administered medications for this visit.        Review of Systems      Objective:        Physical Exam:   Foot Exam    General  General Appearance: appears stated age and healthy   Orientation: alert and oriented to person, place, and time   Affect: appropriate       Right Foot/Ankle     Inspection and Palpation  Skin Exam: ulcer (Grade 2 ulcer plantar 5th MPJ with superficial bulla formation lateral 5th MPJ no signs of infection.  Ulcer is approximately 2.5 cm in diameter by point 2 cm deep);     Neurovascular  Dorsalis pedis: 1+  Posterior tibial: 1+  Saphenous nerve sensation: diminished  Tibial nerve sensation: diminished  Superficial peroneal nerve sensation: diminished  Deep peroneal nerve sensation: diminished  Sural nerve sensation: diminished          Physical Exam   Cardiovascular:   Pulses:       Dorsalis pedis pulses are 1+ on the right side.        Posterior tibial pulses are 1+ on the right side.   Musculoskeletal:        Feet:    Feet:   Right Foot:   Skin Integrity: Positive for ulcer (Grade 2 ulcer plantar 5th MPJ with superficial bulla formation lateral 5th MPJ no signs of infection.  Ulcer is  "approximately 2.5 cm in diameter by point 2 cm deep).           Imaging:            Assessment:       1. Type II diabetes mellitus with neurological manifestations    2. Peripheral vascular disease    3. Skin ulcer of right foot with fat layer exposed      Plan:   Type II diabetes mellitus with neurological manifestations    Peripheral vascular disease    Skin ulcer of right foot with fat layer exposed     1.  Today evaluate the patient describes the clinical findings of the grade 2 ulceration plantar 5th MPJ with slight bulla formation lateral 5th MPJ right foot. I also updated by records by doing a clinical exam of his feet have find him to be high risk of potential loss of limb because of peripheral vascular disease, neurological deficit and ulceration.  2.  He will continue local wound care inserts with padding to offload the ulcer plantar surface of the right 5th MPJ.  3.  Return in 2 months or as needed.  No follow-ups on file.    Wound Debridement    Date/Time: 12/28/2020 3:20 PM  Performed by: Tong Heaton DPM  Authorized by: Tong Heaton DPM     Time out: Immediately prior to procedure a "time out" was called to verify the correct patient, procedure, equipment, support staff and site/side marked as required.    Consent Done?:  Yes (Verbal)    Preparation: Patient was prepped and draped in usual sterile fashion    Local anesthesia used?: No      Wound Details:    Location:  Right foot    Location:  Right 5th Metatarsal Head    Type of Debridement:  Excisional       Length (cm):  2.5       Area (sq cm):  6.25       Width (cm):  2.5       Percent Debrided (%):  100       Depth (cm):  0.2       Total Area Debrided (sq cm):  6.25    Depth of debridement:  Epidermis/Dermis    Tissue debrided:  Dermis and Epidermis    Devitalized tissue debrided:  Callus, Necrotic/Eschar, Slough and Fibrin    Instruments:  Forceps, Blade and Nippers    Bleeding:  Minimal  Hemostasis Achieved: Yes    Method Used:  " Pressure and Alginate  Patient tolerance:  Patient tolerated the procedure well with no immediate complications     Patient do daily dressing changes with topical antibiotic and gauze.  Continue use accommodative insert upper placing and shoe to relieve pressure on the ulcer.  Return to see me in 2 months or as needed.      No notes on file       Counseling:     I provided patient education verbally regarding:   Patient diagnosis, treatment options, as well as alternatives, risks, and benefits.     This note was created using Dragon voice recognition software that occasionally misinterpreted phrases or words.

## 2021-01-01 NOTE — ASSESSMENT & PLAN NOTE
Patient suffers from type 2 diabetes mellitus and neuropathy  He is on insulin regimen at home  ISS  Accuchecks  Gabapentin and ultram restarted for neuropathy      Claire Landa)  2021 00:26:02 John Grier)  2021 14:43:31

## 2021-01-04 ENCOUNTER — OFFICE VISIT (OUTPATIENT)
Dept: PAIN MEDICINE | Facility: CLINIC | Age: 61
End: 2021-01-04
Payer: MEDICARE

## 2021-01-04 VITALS
WEIGHT: 265 LBS | HEIGHT: 73 IN | DIASTOLIC BLOOD PRESSURE: 73 MMHG | HEART RATE: 100 BPM | BODY MASS INDEX: 35.12 KG/M2 | SYSTOLIC BLOOD PRESSURE: 124 MMHG

## 2021-01-04 DIAGNOSIS — G89.4 CHRONIC PAIN DISORDER: ICD-10-CM

## 2021-01-04 DIAGNOSIS — E11.42 DIABETIC POLYNEUROPATHY ASSOCIATED WITH TYPE 2 DIABETES MELLITUS: Primary | ICD-10-CM

## 2021-01-04 DIAGNOSIS — M79.672 BILATERAL FOOT PAIN: ICD-10-CM

## 2021-01-04 DIAGNOSIS — M79.671 BILATERAL FOOT PAIN: ICD-10-CM

## 2021-01-04 PROCEDURE — 99214 PR OFFICE/OUTPT VISIT, EST, LEVL IV, 30-39 MIN: ICD-10-PCS | Mod: S$GLB,,, | Performed by: ANESTHESIOLOGY

## 2021-01-04 PROCEDURE — 99214 OFFICE O/P EST MOD 30 MIN: CPT | Mod: S$GLB,,, | Performed by: ANESTHESIOLOGY

## 2021-01-04 PROCEDURE — 99999 PR PBB SHADOW E&M-EST. PATIENT-LVL IV: CPT | Mod: PBBFAC,,, | Performed by: ANESTHESIOLOGY

## 2021-01-04 PROCEDURE — 99999 PR PBB SHADOW E&M-EST. PATIENT-LVL IV: ICD-10-PCS | Mod: PBBFAC,,, | Performed by: ANESTHESIOLOGY

## 2021-01-04 RX ORDER — TRAMADOL HYDROCHLORIDE 50 MG/1
50 TABLET ORAL EVERY 8 HOURS PRN
Qty: 90 TABLET | Refills: 2 | Status: SHIPPED | OUTPATIENT
Start: 2021-01-04 | End: 2021-03-31 | Stop reason: SDUPTHER

## 2021-03-01 ENCOUNTER — OFFICE VISIT (OUTPATIENT)
Dept: PODIATRY | Facility: CLINIC | Age: 61
End: 2021-03-01
Payer: MEDICARE

## 2021-03-01 DIAGNOSIS — L97.512 SKIN ULCER OF RIGHT FOOT WITH FAT LAYER EXPOSED: Primary | ICD-10-CM

## 2021-03-01 DIAGNOSIS — E11.49 TYPE II DIABETES MELLITUS WITH NEUROLOGICAL MANIFESTATIONS: ICD-10-CM

## 2021-03-01 DIAGNOSIS — M20.41 HAMMER TOES, BILATERAL: ICD-10-CM

## 2021-03-01 DIAGNOSIS — I87.2 VENOUS STASIS DERMATITIS OF BOTH LOWER EXTREMITIES: ICD-10-CM

## 2021-03-01 DIAGNOSIS — M20.42 HAMMER TOES, BILATERAL: ICD-10-CM

## 2021-03-01 PROCEDURE — 99499 UNLISTED E&M SERVICE: CPT | Mod: S$GLB,,, | Performed by: PODIATRIST

## 2021-03-01 PROCEDURE — 97597 DBRDMT OPN WND 1ST 20 CM/<: CPT | Mod: S$GLB,,, | Performed by: PODIATRIST

## 2021-03-01 PROCEDURE — 99499 NO LOS: ICD-10-PCS | Mod: S$GLB,,, | Performed by: PODIATRIST

## 2021-03-01 PROCEDURE — 97597 WOUND DEBRIDEMENT: ICD-10-PCS | Mod: S$GLB,,, | Performed by: PODIATRIST

## 2021-03-01 RX ORDER — MUPIROCIN 20 MG/G
OINTMENT TOPICAL DAILY
Qty: 30 G | Refills: 5 | Status: ON HOLD | OUTPATIENT
Start: 2021-03-01 | End: 2022-09-08 | Stop reason: HOSPADM

## 2021-03-31 DIAGNOSIS — G89.4 CHRONIC PAIN DISORDER: Primary | ICD-10-CM

## 2021-03-31 DIAGNOSIS — E11.42 DIABETIC POLYNEUROPATHY ASSOCIATED WITH TYPE 2 DIABETES MELLITUS: ICD-10-CM

## 2021-03-31 RX ORDER — TRAMADOL HYDROCHLORIDE 50 MG/1
50 TABLET ORAL EVERY 8 HOURS PRN
Qty: 90 TABLET | Refills: 2 | Status: SHIPPED | OUTPATIENT
Start: 2021-04-05 | End: 2021-07-06 | Stop reason: SDUPTHER

## 2021-04-05 ENCOUNTER — OFFICE VISIT (OUTPATIENT)
Dept: PAIN MEDICINE | Facility: CLINIC | Age: 61
End: 2021-04-05
Payer: MEDICARE

## 2021-04-05 VITALS
BODY MASS INDEX: 35.12 KG/M2 | SYSTOLIC BLOOD PRESSURE: 111 MMHG | HEART RATE: 93 BPM | WEIGHT: 265 LBS | DIASTOLIC BLOOD PRESSURE: 69 MMHG | HEIGHT: 73 IN

## 2021-04-05 DIAGNOSIS — Z79.891 CHRONIC USE OF OPIATE FOR THERAPEUTIC PURPOSE: Primary | ICD-10-CM

## 2021-04-05 DIAGNOSIS — E11.42 DIABETIC POLYNEUROPATHY ASSOCIATED WITH TYPE 2 DIABETES MELLITUS: ICD-10-CM

## 2021-04-05 DIAGNOSIS — M79.672 BILATERAL FOOT PAIN: ICD-10-CM

## 2021-04-05 DIAGNOSIS — M79.671 BILATERAL FOOT PAIN: ICD-10-CM

## 2021-04-05 PROCEDURE — 99214 PR OFFICE/OUTPT VISIT, EST, LEVL IV, 30-39 MIN: ICD-10-PCS | Mod: S$GLB,,, | Performed by: PHYSICIAN ASSISTANT

## 2021-04-05 PROCEDURE — 99999 PR PBB SHADOW E&M-EST. PATIENT-LVL III: ICD-10-PCS | Mod: PBBFAC,,, | Performed by: PHYSICIAN ASSISTANT

## 2021-04-05 PROCEDURE — 80307 DRUG TEST PRSMV CHEM ANLYZR: CPT | Performed by: PHYSICIAN ASSISTANT

## 2021-04-05 PROCEDURE — 99999 PR PBB SHADOW E&M-EST. PATIENT-LVL III: CPT | Mod: PBBFAC,,, | Performed by: PHYSICIAN ASSISTANT

## 2021-04-05 PROCEDURE — 99214 OFFICE O/P EST MOD 30 MIN: CPT | Mod: S$GLB,,, | Performed by: PHYSICIAN ASSISTANT

## 2021-04-09 LAB
6MAM UR QL: NOT DETECTED
7AMINOCLONAZEPAM UR QL: NOT DETECTED
A-OH ALPRAZ UR QL: NOT DETECTED
ALPHA-OH-MIDAZOLAM: NOT DETECTED
ALPRAZ UR QL: NOT DETECTED
AMPHET UR QL SCN: NOT DETECTED
ANNOTATION COMMENT IMP: NORMAL
ANNOTATION COMMENT IMP: NORMAL
BARBITURATES UR QL: NOT DETECTED
BUPRENORPHINE UR QL: NOT DETECTED
BZE UR QL: NOT DETECTED
CARBOXYTHC UR QL: NOT DETECTED
CARISOPRODOL UR QL: NOT DETECTED
CLONAZEPAM UR QL: NOT DETECTED
CODEINE UR QL: NOT DETECTED
CREAT UR-MCNC: 53.6 MG/DL (ref 20–400)
DIAZEPAM UR QL: NOT DETECTED
ETHYL GLUCURONIDE UR QL: NOT DETECTED
FENTANYL UR QL: NOT DETECTED
GABAPENTIN: PRESENT
HYDROCODONE UR QL: NOT DETECTED
HYDROMORPHONE UR QL: NOT DETECTED
LORAZEPAM UR QL: NOT DETECTED
MDA UR QL: NOT DETECTED
MDEA UR QL: NOT DETECTED
MDMA UR QL: NOT DETECTED
ME-PHENIDATE UR QL: NOT DETECTED
MEPERIDINE UR QL: NOT DETECTED
METHADONE UR QL: NOT DETECTED
METHAMPHET UR QL: NOT DETECTED
MIDAZOLAM UR QL SCN: NOT DETECTED
MORPHINE UR QL: NOT DETECTED
NALOXONE: NOT DETECTED
NORBUPRENORPHINE UR QL CFM: NOT DETECTED
NORDIAZEPAM UR QL: NOT DETECTED
NORFENTANYL UR QL: NOT DETECTED
NORHYDROCODONE UR QL CFM: NOT DETECTED
NOROXYCODONE UR QL CFM: NOT DETECTED
NOROXYMORPHONE UR QL SCN: NOT DETECTED
OXAZEPAM UR QL: NOT DETECTED
OXYCODONE UR QL: NOT DETECTED
OXYMORPHONE UR QL: NOT DETECTED
PATHOLOGY STUDY: NORMAL
PCP UR QL: NOT DETECTED
PHENTERMINE UR QL: NOT DETECTED
PREGABALIN: NOT DETECTED
SERVICE CMNT-IMP: NORMAL
TAPENTADOL UR QL SCN: NOT DETECTED
TAPENTADOL-O-SULF: NOT DETECTED
TEMAZEPAM UR QL: NOT DETECTED
TRAMADOL UR QL: PRESENT
ZOLPIDEM METABOLITE: NOT DETECTED
ZOLPIDEM UR QL: NOT DETECTED

## 2021-05-10 ENCOUNTER — TELEPHONE (OUTPATIENT)
Dept: HEMATOLOGY/ONCOLOGY | Facility: CLINIC | Age: 61
End: 2021-05-10

## 2021-05-10 ENCOUNTER — OFFICE VISIT (OUTPATIENT)
Dept: PODIATRY | Facility: CLINIC | Age: 61
End: 2021-05-10
Payer: MEDICARE

## 2021-05-10 VITALS
SYSTOLIC BLOOD PRESSURE: 114 MMHG | BODY MASS INDEX: 34.96 KG/M2 | HEIGHT: 73 IN | DIASTOLIC BLOOD PRESSURE: 74 MMHG | HEART RATE: 91 BPM

## 2021-05-10 DIAGNOSIS — L97.512 SKIN ULCER OF RIGHT FOOT WITH FAT LAYER EXPOSED: Primary | ICD-10-CM

## 2021-05-10 DIAGNOSIS — I73.9 PERIPHERAL VASCULAR DISEASE: ICD-10-CM

## 2021-05-10 DIAGNOSIS — M20.41 HAMMER TOES, BILATERAL: ICD-10-CM

## 2021-05-10 DIAGNOSIS — E11.49 TYPE II DIABETES MELLITUS WITH NEUROLOGICAL MANIFESTATIONS: ICD-10-CM

## 2021-05-10 DIAGNOSIS — M20.42 HAMMER TOES, BILATERAL: ICD-10-CM

## 2021-05-10 DIAGNOSIS — I87.2 VENOUS STASIS DERMATITIS OF BOTH LOWER EXTREMITIES: ICD-10-CM

## 2021-05-10 PROCEDURE — 99499 UNLISTED E&M SERVICE: CPT | Mod: S$GLB,,, | Performed by: PODIATRIST

## 2021-05-10 PROCEDURE — 99499 NO LOS: ICD-10-PCS | Mod: S$GLB,,, | Performed by: PODIATRIST

## 2021-05-10 PROCEDURE — 11042 WOUND DEBRIDEMENT: ICD-10-PCS | Mod: S$GLB,,, | Performed by: PODIATRIST

## 2021-05-10 PROCEDURE — 11042 DBRDMT SUBQ TIS 1ST 20SQCM/<: CPT | Mod: S$GLB,,, | Performed by: PODIATRIST

## 2021-05-10 RX ORDER — SACUBITRIL AND VALSARTAN 24; 26 MG/1; MG/1
1 TABLET, FILM COATED ORAL 2 TIMES DAILY
Status: ON HOLD | COMMUNITY
Start: 2021-05-06 | End: 2022-09-08 | Stop reason: HOSPADM

## 2021-06-15 ENCOUNTER — OFFICE VISIT (OUTPATIENT)
Dept: HEMATOLOGY/ONCOLOGY | Facility: CLINIC | Age: 61
End: 2021-06-15
Payer: MEDICARE

## 2021-06-15 VITALS
DIASTOLIC BLOOD PRESSURE: 62 MMHG | TEMPERATURE: 99 F | SYSTOLIC BLOOD PRESSURE: 97 MMHG | WEIGHT: 262.88 LBS | HEIGHT: 72 IN | HEART RATE: 108 BPM | BODY MASS INDEX: 35.61 KG/M2 | RESPIRATION RATE: 18 BRPM

## 2021-06-15 DIAGNOSIS — D75.829 HEPARIN INDUCED THROMBOCYTOPENIA: ICD-10-CM

## 2021-06-15 DIAGNOSIS — D53.9 NUTRITIONAL ANEMIA, UNSPECIFIED: ICD-10-CM

## 2021-06-15 DIAGNOSIS — D63.8 ANEMIA, CHRONIC DISEASE: ICD-10-CM

## 2021-06-15 DIAGNOSIS — R53.83 OTHER FATIGUE: ICD-10-CM

## 2021-06-15 DIAGNOSIS — D64.9 ANEMIA, UNSPECIFIED TYPE: ICD-10-CM

## 2021-06-15 DIAGNOSIS — D69.1 THROMBOCYTOPATHY: Primary | ICD-10-CM

## 2021-06-15 PROCEDURE — 99203 OFFICE O/P NEW LOW 30 MIN: CPT | Mod: S$GLB,,, | Performed by: INTERNAL MEDICINE

## 2021-06-15 PROCEDURE — 99203 PR OFFICE/OUTPT VISIT, NEW, LEVL III, 30-44 MIN: ICD-10-PCS | Mod: S$GLB,,, | Performed by: INTERNAL MEDICINE

## 2021-07-01 ENCOUNTER — TELEPHONE (OUTPATIENT)
Dept: HEMATOLOGY/ONCOLOGY | Facility: CLINIC | Age: 61
End: 2021-07-01

## 2021-07-06 ENCOUNTER — OFFICE VISIT (OUTPATIENT)
Dept: PAIN MEDICINE | Facility: CLINIC | Age: 61
End: 2021-07-06
Payer: MEDICARE

## 2021-07-06 VITALS
WEIGHT: 262 LBS | HEIGHT: 73 IN | BODY MASS INDEX: 34.72 KG/M2 | DIASTOLIC BLOOD PRESSURE: 74 MMHG | SYSTOLIC BLOOD PRESSURE: 115 MMHG | HEART RATE: 97 BPM

## 2021-07-06 DIAGNOSIS — G89.4 CHRONIC PAIN DISORDER: ICD-10-CM

## 2021-07-06 DIAGNOSIS — E11.42 DIABETIC POLYNEUROPATHY ASSOCIATED WITH TYPE 2 DIABETES MELLITUS: ICD-10-CM

## 2021-07-06 DIAGNOSIS — E11.42 DIABETIC POLYNEUROPATHY ASSOCIATED WITH TYPE 2 DIABETES MELLITUS: Primary | ICD-10-CM

## 2021-07-06 DIAGNOSIS — M79.672 BILATERAL FOOT PAIN: ICD-10-CM

## 2021-07-06 DIAGNOSIS — M79.671 BILATERAL FOOT PAIN: ICD-10-CM

## 2021-07-06 PROCEDURE — 99999 PR PBB SHADOW E&M-EST. PATIENT-LVL III: ICD-10-PCS | Mod: PBBFAC,,, | Performed by: PHYSICIAN ASSISTANT

## 2021-07-06 PROCEDURE — 99214 PR OFFICE/OUTPT VISIT, EST, LEVL IV, 30-39 MIN: ICD-10-PCS | Mod: S$GLB,,, | Performed by: PHYSICIAN ASSISTANT

## 2021-07-06 PROCEDURE — 99214 OFFICE O/P EST MOD 30 MIN: CPT | Mod: S$GLB,,, | Performed by: PHYSICIAN ASSISTANT

## 2021-07-06 PROCEDURE — 99999 PR PBB SHADOW E&M-EST. PATIENT-LVL III: CPT | Mod: PBBFAC,,, | Performed by: PHYSICIAN ASSISTANT

## 2021-07-06 RX ORDER — TRAMADOL HYDROCHLORIDE 50 MG/1
50 TABLET ORAL EVERY 8 HOURS PRN
Qty: 90 TABLET | Refills: 2 | Status: CANCELLED | OUTPATIENT
Start: 2021-07-06 | End: 2021-08-04

## 2021-07-06 RX ORDER — TRAMADOL HYDROCHLORIDE 50 MG/1
50 TABLET ORAL EVERY 8 HOURS PRN
Qty: 90 TABLET | Refills: 2 | Status: SHIPPED | OUTPATIENT
Start: 2021-07-06 | End: 2021-07-06 | Stop reason: SDUPTHER

## 2021-07-06 RX ORDER — TRAMADOL HYDROCHLORIDE 50 MG/1
50 TABLET ORAL EVERY 8 HOURS PRN
Qty: 90 TABLET | Refills: 2 | Status: SHIPPED | OUTPATIENT
Start: 2021-07-06 | End: 2021-08-04

## 2021-07-06 RX ORDER — LIRAGLUTIDE 6 MG/ML
INJECTION SUBCUTANEOUS
Status: ON HOLD | COMMUNITY
End: 2022-09-08 | Stop reason: HOSPADM

## 2021-07-12 ENCOUNTER — OFFICE VISIT (OUTPATIENT)
Dept: PODIATRY | Facility: CLINIC | Age: 61
End: 2021-07-12
Payer: MEDICARE

## 2021-07-12 VITALS — HEIGHT: 73 IN | HEART RATE: 96 BPM | BODY MASS INDEX: 34.57 KG/M2 | OXYGEN SATURATION: 96 %

## 2021-07-12 DIAGNOSIS — I87.2 VENOUS STASIS DERMATITIS OF BOTH LOWER EXTREMITIES: ICD-10-CM

## 2021-07-12 DIAGNOSIS — E11.49 TYPE II DIABETES MELLITUS WITH NEUROLOGICAL MANIFESTATIONS: ICD-10-CM

## 2021-07-12 DIAGNOSIS — L97.512 SKIN ULCER OF RIGHT FOOT WITH FAT LAYER EXPOSED: Primary | ICD-10-CM

## 2021-07-12 DIAGNOSIS — M20.42 HAMMER TOES, BILATERAL: ICD-10-CM

## 2021-07-12 DIAGNOSIS — M20.41 HAMMER TOES, BILATERAL: ICD-10-CM

## 2021-07-12 DIAGNOSIS — I73.9 PERIPHERAL VASCULAR DISEASE: ICD-10-CM

## 2021-07-12 PROCEDURE — 11042 DBRDMT SUBQ TIS 1ST 20SQCM/<: CPT | Mod: S$GLB,,, | Performed by: PODIATRIST

## 2021-07-12 PROCEDURE — 11042 WOUND DEBRIDEMENT: ICD-10-PCS | Mod: S$GLB,,, | Performed by: PODIATRIST

## 2021-07-12 PROCEDURE — 99213 PR OFFICE/OUTPT VISIT, EST, LEVL III, 20-29 MIN: ICD-10-PCS | Mod: 25,S$GLB,, | Performed by: PODIATRIST

## 2021-07-12 PROCEDURE — 99213 OFFICE O/P EST LOW 20 MIN: CPT | Mod: 25,S$GLB,, | Performed by: PODIATRIST

## 2021-07-14 ENCOUNTER — TELEPHONE (OUTPATIENT)
Dept: PODIATRY | Facility: CLINIC | Age: 61
End: 2021-07-14

## 2021-07-27 PROBLEM — D63.8 ANEMIA, CHRONIC DISEASE: Status: ACTIVE | Noted: 2021-07-27

## 2021-07-27 PROBLEM — D64.9 ANEMIA, UNSPECIFIED: Status: ACTIVE | Noted: 2021-07-27

## 2021-07-27 PROBLEM — D64.9 NORMOCHROMIC NORMOCYTIC ANEMIA: Status: ACTIVE | Noted: 2021-07-27

## 2021-08-02 ENCOUNTER — TELEPHONE (OUTPATIENT)
Dept: PODIATRY | Facility: CLINIC | Age: 61
End: 2021-08-02

## 2021-09-14 ENCOUNTER — OFFICE VISIT (OUTPATIENT)
Dept: PODIATRY | Facility: CLINIC | Age: 61
End: 2021-09-14
Payer: MEDICARE

## 2021-09-14 ENCOUNTER — TELEPHONE (OUTPATIENT)
Dept: PODIATRY | Facility: CLINIC | Age: 61
End: 2021-09-14

## 2021-09-14 ENCOUNTER — HOSPITAL ENCOUNTER (OUTPATIENT)
Dept: RADIOLOGY | Facility: HOSPITAL | Age: 61
Discharge: HOME | End: 2021-09-14
Attending: PODIATRIST
Payer: MEDICARE

## 2021-09-14 VITALS — BODY MASS INDEX: 34.06 KG/M2 | WEIGHT: 257 LBS | RESPIRATION RATE: 16 BRPM | HEIGHT: 73 IN

## 2021-09-14 DIAGNOSIS — M20.42 HAMMER TOES, BILATERAL: ICD-10-CM

## 2021-09-14 DIAGNOSIS — I73.9 PERIPHERAL VASCULAR DISEASE: ICD-10-CM

## 2021-09-14 DIAGNOSIS — M20.41 HAMMER TOES, BILATERAL: ICD-10-CM

## 2021-09-14 DIAGNOSIS — M79.604 RIGHT LEG PAIN: ICD-10-CM

## 2021-09-14 DIAGNOSIS — E11.49 TYPE II DIABETES MELLITUS WITH NEUROLOGICAL MANIFESTATIONS: ICD-10-CM

## 2021-09-14 DIAGNOSIS — I87.2 VENOUS STASIS DERMATITIS OF BOTH LOWER EXTREMITIES: ICD-10-CM

## 2021-09-14 DIAGNOSIS — I82.401 ACUTE DEEP VEIN THROMBOSIS (DVT) OF RIGHT LOWER EXTREMITY, UNSPECIFIED VEIN: ICD-10-CM

## 2021-09-14 DIAGNOSIS — L97.512 SKIN ULCER OF RIGHT FOOT WITH FAT LAYER EXPOSED: Primary | ICD-10-CM

## 2021-09-14 PROCEDURE — 99213 PR OFFICE/OUTPT VISIT, EST, LEVL III, 20-29 MIN: ICD-10-PCS | Mod: 25,S$GLB,, | Performed by: PODIATRIST

## 2021-09-14 PROCEDURE — 11042 WOUND DEBRIDEMENT: ICD-10-PCS | Mod: S$GLB,,, | Performed by: PODIATRIST

## 2021-09-14 PROCEDURE — 93971 EXTREMITY STUDY: CPT | Mod: TC,RT

## 2021-09-14 PROCEDURE — 99213 OFFICE O/P EST LOW 20 MIN: CPT | Mod: 25,S$GLB,, | Performed by: PODIATRIST

## 2021-09-14 PROCEDURE — 11042 DBRDMT SUBQ TIS 1ST 20SQCM/<: CPT | Mod: S$GLB,,, | Performed by: PODIATRIST

## 2021-09-14 RX ORDER — CARVEDILOL 3.12 MG/1
3.12 TABLET ORAL 2 TIMES DAILY
COMMUNITY
Start: 2021-07-21 | End: 2023-06-18 | Stop reason: HOSPADM

## 2021-09-14 RX ORDER — SEMAGLUTIDE 1.34 MG/ML
0.25 INJECTION, SOLUTION SUBCUTANEOUS
Status: ON HOLD | COMMUNITY
Start: 2021-09-02 | End: 2022-09-08 | Stop reason: HOSPADM

## 2021-09-14 RX ORDER — TRAMADOL HYDROCHLORIDE 50 MG/1
50 TABLET ORAL 3 TIMES DAILY PRN
COMMUNITY
Start: 2021-09-04 | End: 2021-10-04 | Stop reason: SDUPTHER

## 2021-10-04 ENCOUNTER — OFFICE VISIT (OUTPATIENT)
Dept: PAIN MEDICINE | Facility: CLINIC | Age: 61
End: 2021-10-04
Payer: MEDICARE

## 2021-10-04 VITALS
DIASTOLIC BLOOD PRESSURE: 72 MMHG | BODY MASS INDEX: 34.06 KG/M2 | WEIGHT: 257 LBS | HEIGHT: 73 IN | HEART RATE: 96 BPM | SYSTOLIC BLOOD PRESSURE: 124 MMHG

## 2021-10-04 DIAGNOSIS — M79.672 BILATERAL FOOT PAIN: ICD-10-CM

## 2021-10-04 DIAGNOSIS — G89.4 CHRONIC PAIN DISORDER: ICD-10-CM

## 2021-10-04 DIAGNOSIS — E11.42 DIABETIC POLYNEUROPATHY ASSOCIATED WITH TYPE 2 DIABETES MELLITUS: Primary | ICD-10-CM

## 2021-10-04 DIAGNOSIS — M79.671 BILATERAL FOOT PAIN: ICD-10-CM

## 2021-10-04 PROCEDURE — 99214 PR OFFICE/OUTPT VISIT, EST, LEVL IV, 30-39 MIN: ICD-10-PCS | Mod: S$GLB,,, | Performed by: PHYSICIAN ASSISTANT

## 2021-10-04 PROCEDURE — 99214 OFFICE O/P EST MOD 30 MIN: CPT | Mod: S$GLB,,, | Performed by: PHYSICIAN ASSISTANT

## 2021-10-04 PROCEDURE — 99999 PR PBB SHADOW E&M-EST. PATIENT-LVL IV: CPT | Mod: PBBFAC,,, | Performed by: PHYSICIAN ASSISTANT

## 2021-10-04 PROCEDURE — 99999 PR PBB SHADOW E&M-EST. PATIENT-LVL IV: ICD-10-PCS | Mod: PBBFAC,,, | Performed by: PHYSICIAN ASSISTANT

## 2021-10-04 RX ORDER — TRAMADOL HYDROCHLORIDE 50 MG/1
50 TABLET ORAL EVERY 8 HOURS PRN
Qty: 90 TABLET | Refills: 2 | Status: CANCELLED | OUTPATIENT
Start: 2021-10-04 | End: 2021-11-02

## 2021-10-04 RX ORDER — TRAMADOL HYDROCHLORIDE 50 MG/1
50 TABLET ORAL EVERY 8 HOURS PRN
Qty: 90 TABLET | Refills: 2 | Status: SHIPPED | OUTPATIENT
Start: 2021-10-04 | End: 2021-11-02

## 2021-10-11 ENCOUNTER — OFFICE VISIT (OUTPATIENT)
Dept: PODIATRY | Facility: CLINIC | Age: 61
End: 2021-10-11
Payer: MEDICARE

## 2021-10-11 VITALS — WEIGHT: 257 LBS | BODY MASS INDEX: 34.06 KG/M2 | RESPIRATION RATE: 16 BRPM | HEIGHT: 73 IN

## 2021-10-11 DIAGNOSIS — M89.8X7 EXOSTOSIS OF RIGHT FOOT: ICD-10-CM

## 2021-10-11 DIAGNOSIS — M20.42 HAMMER TOES, BILATERAL: ICD-10-CM

## 2021-10-11 DIAGNOSIS — I87.2 VENOUS STASIS DERMATITIS OF BOTH LOWER EXTREMITIES: ICD-10-CM

## 2021-10-11 DIAGNOSIS — I73.9 PERIPHERAL VASCULAR DISEASE: ICD-10-CM

## 2021-10-11 DIAGNOSIS — L97.512 SKIN ULCER OF RIGHT FOOT WITH FAT LAYER EXPOSED: ICD-10-CM

## 2021-10-11 DIAGNOSIS — M20.41 HAMMER TOES, BILATERAL: ICD-10-CM

## 2021-10-11 DIAGNOSIS — E11.49 TYPE II DIABETES MELLITUS WITH NEUROLOGICAL MANIFESTATIONS: Primary | ICD-10-CM

## 2021-10-11 PROCEDURE — 99213 OFFICE O/P EST LOW 20 MIN: CPT | Mod: S$GLB,,, | Performed by: PODIATRIST

## 2021-10-11 PROCEDURE — 99213 PR OFFICE/OUTPT VISIT, EST, LEVL III, 20-29 MIN: ICD-10-PCS | Mod: S$GLB,,, | Performed by: PODIATRIST

## 2021-11-16 ENCOUNTER — OFFICE VISIT (OUTPATIENT)
Dept: PODIATRY | Facility: CLINIC | Age: 61
End: 2021-11-16
Payer: MEDICARE

## 2021-11-16 VITALS — HEIGHT: 73 IN | RESPIRATION RATE: 16 BRPM | BODY MASS INDEX: 33.91 KG/M2

## 2021-11-16 DIAGNOSIS — M20.41 HAMMER TOES, BILATERAL: ICD-10-CM

## 2021-11-16 DIAGNOSIS — M20.42 HAMMER TOES, BILATERAL: ICD-10-CM

## 2021-11-16 DIAGNOSIS — M89.8X7 EXOSTOSIS OF RIGHT FOOT: ICD-10-CM

## 2021-11-16 DIAGNOSIS — L97.511 ULCER OF RIGHT FOOT, LIMITED TO BREAKDOWN OF SKIN: Primary | ICD-10-CM

## 2021-11-16 DIAGNOSIS — I73.9 PERIPHERAL VASCULAR DISEASE: ICD-10-CM

## 2021-11-16 DIAGNOSIS — E11.49 TYPE II DIABETES MELLITUS WITH NEUROLOGICAL MANIFESTATIONS: ICD-10-CM

## 2021-11-16 DIAGNOSIS — I87.2 VENOUS STASIS DERMATITIS OF BOTH LOWER EXTREMITIES: ICD-10-CM

## 2021-11-16 PROCEDURE — 99499 UNLISTED E&M SERVICE: CPT | Mod: S$GLB,,, | Performed by: PODIATRIST

## 2021-11-16 PROCEDURE — 99499 NO LOS: ICD-10-PCS | Mod: S$GLB,,, | Performed by: PODIATRIST

## 2021-11-16 PROCEDURE — 97597 WOUND DEBRIDEMENT: ICD-10-PCS | Mod: S$GLB,,, | Performed by: PODIATRIST

## 2021-11-16 PROCEDURE — 97597 DBRDMT OPN WND 1ST 20 CM/<: CPT | Mod: S$GLB,,, | Performed by: PODIATRIST

## 2021-11-16 RX ORDER — TRAMADOL HYDROCHLORIDE 50 MG/1
50 TABLET ORAL 3 TIMES DAILY PRN
COMMUNITY
Start: 2021-11-04 | End: 2022-01-04 | Stop reason: SDUPTHER

## 2021-12-30 NOTE — PATIENT INSTRUCTIONS
Your Diabetes Foot Care Program    Every day you depend on your feet to keep you moving. But when you have diabetes, your feet need special care. Even a small foot problem can become very serious. So dont take your feet for granted. By working with your diabetes healthcare team, you can learn how to protect your feet and keep them healthy.  Evaluating your feet  An evaluation helps your healthcare provider check the condition of your feet. The evaluation includes a review of your diabetes history and overall health. It may also include a foot exam, X-rays, or other tests. These can help show problems beneath the skin that you cant see or feel.  Medical history  You will be asked about your overall health and any history of foot problems. Youll also discuss your diabetes history, such as whether your blood sugar level has changed over time. It also includes questions about sensations of pain, tingling, pins and needles, or numbness. Your healthcare provider will also want to know if you have high blood pressure and heart disease, or if you smoke. Be sure to mention any medicines (including over-the-counter), supplements, or herbal remedies you take.  Foot exam  A foot exam checks the condition of different parts of your foot. First, your skin and nails are examined for any signs of infection. Blood flow is checked by feeling for the pulses in each foot. You may also have tests to study the nerves in the foot. These include using a small filament (wire) to see how sensitive your feet are. In certain cases, you will be asked to walk a short distance to check for bone, joint, and muscle problems.  Diagnostic tests  If needed, your healthcare provider will suggest certain tests to learn more about your feet. These include:  · Doppler tests to measure blood flow in the feet and lower leg.  · X-rays, which can show bone or joint problems.  · Other imaging tests, such as an MRI (magnetic resonance imaging), bone scan,  and CT (computed tomography) scan. These can help show bone infections.  · Other tests, such as vascular tests, which study the blood flow in your feet and legs. You may also have nerve studies to learn how sensitive your feet are.  Creating a foot care program  Based on the evaluation, your healthcare provider will create a foot care program for you. Your program may be as simple as starting a daily self-care routine and changing the types of shoes your wear. It may also involve treating minor foot problems, such as a corn or blister. In some cases, surgery will be needed to treat an infection or mechanical problems, such as hammer toes.  Preventing problems  When you have diabetes, its easier to prevent problems than to treat them later on. So see your healthcare team for regular checkups and foot care. Your healthcare team can also help you learn more about caring for your feet at home. For example, you may be told to avoid walking barefoot. Or you may be told that special footwear is needed to protect your feet.  Have regular checkups  Foot problems can develop quickly. So be sure to follow your healthcare teams schedule for regular checkups. During office visits, take off your shoes and socks as soon as you get in the exam room. Ask your healthcare provider to examine your feet for problems. This will make it easier to find and treat small skin irritations before they get worse. Regular checkups can also help keep track of the blood flow and feeling in your feet. If you have neuropathy (lack of feeling in your feet), you will need to have checkups more often.  Learn about self-care  The more you know about diabetes and your feet, the easier it will be to prevent problems. Members of your healthcare team can teach you how to inspect your feet and teach you to look for warning signs. They can also give you other foot care tips. During office visits, be sure to ask any questions you have.  Date Last Reviewed:  7/1/2016  © 0839-9396 [employer]. [address] [place] [address], [place] [address]. All rights reserved. This information is not intended as a substitute for professional medical care. Always follow your healthcare professional's instructions.

## 2022-01-03 ENCOUNTER — OFFICE VISIT (OUTPATIENT)
Dept: PODIATRY | Facility: CLINIC | Age: 62
End: 2022-01-03
Payer: MEDICARE

## 2022-01-03 VITALS — WEIGHT: 257 LBS | BODY MASS INDEX: 34.06 KG/M2 | HEIGHT: 73 IN

## 2022-01-03 DIAGNOSIS — L97.511 ULCER OF RIGHT FOOT, LIMITED TO BREAKDOWN OF SKIN: Primary | ICD-10-CM

## 2022-01-03 DIAGNOSIS — E11.49 TYPE II DIABETES MELLITUS WITH NEUROLOGICAL MANIFESTATIONS: ICD-10-CM

## 2022-01-03 PROCEDURE — 11055 PR TRIM HYPERKERATOTIC SKIN LESION, ONE: ICD-10-PCS | Mod: Q8,S$GLB,, | Performed by: PODIATRIST

## 2022-01-03 PROCEDURE — 99499 NO LOS: ICD-10-PCS | Mod: S$GLB,,, | Performed by: PODIATRIST

## 2022-01-03 PROCEDURE — 99499 UNLISTED E&M SERVICE: CPT | Mod: S$GLB,,, | Performed by: PODIATRIST

## 2022-01-03 PROCEDURE — 11055 PARING/CUTG B9 HYPRKER LES 1: CPT | Mod: Q8,S$GLB,, | Performed by: PODIATRIST

## 2022-01-03 RX ORDER — PREDNISOLONE ACETATE 10 MG/ML
SUSPENSION/ DROPS OPHTHALMIC
Status: ON HOLD | COMMUNITY
Start: 2021-12-09 | End: 2022-09-08 | Stop reason: HOSPADM

## 2022-01-03 RX ORDER — TRAMADOL HYDROCHLORIDE 50 MG/1
TABLET ORAL
Qty: 90 TABLET | Refills: 2 | OUTPATIENT
Start: 2022-01-03

## 2022-01-03 NOTE — PROGRESS NOTES
"  [address] [facility] [address] [place] [address]  [place] [address]  Phone: [phone]   [phone]    Patient's PCP:Primary Doctor No  Referring Provider: No ref. provider found    Subjective:      Chief Complaint:: Follow-up (Ulcer plantar 5th metatarsal head)    CAROLIN Bullard is a 61 y.o. male who presents for follow up of ulcer plantar 5th metatarsal head right foot. Patient has no pain. Patient states ulcer is healing nicely.    Systemic Doctor: Dr. Roth   Date Last Seen: 07/16/2021  Blood Sugar: 130  Hemoglobin A1c: 7.2    Vitals:    01/03/22 1347   Weight: 116.6 kg (257 lb)   Height: 6' 1" (1.854 m)   PainSc: 0-No pain      Shoe Size: 12    Past Surgical History:   Procedure Laterality Date    CARDIAC PACEMAKER PLACEMENT  12/2012    CARDIAC PACEMAKER PLACEMENT  10/04/2018    battery replacement    CORONARY ARTERY BYPASS GRAFT  06/2012    ESOPHAGOGASTRODUODENOSCOPY  01/31/2017    SAMARA FILTER PLACEMENT  2012    vena cava    LUMBAR SYMPATHETIC NERVE BLOCK Right 8/22/2019    Procedure: BLOCK, NERVE, SYMPATHETIC, LUMBAR;  Surgeon: Tashi Good MD;  Location: [facility];  Service: Pain Management;  Laterality: Right;  RIGHT SYMPATHETIC NERVE BLOCK     Past Medical History:   Diagnosis Date    AICD (automatic cardioverter/defibrillator) present     Anemia, chronic disease 7/27/2021    Anemia, unspecified 7/27/2021    Anxiety and depression 2012    CAD (coronary artery disease) 2012    Cardiomegaly 2016    CHF (congestive heart failure) 2012    Cholecystitis 2017    Complete heart block 2012    Diabetic foot ulcer     DVT (deep venous thrombosis) 2012    And pulmonary embolus    GERD (gastroesophageal reflux disease) 2010    Heparin induced thrombocytopenia     Hyperlipemia 2011    IDDM (insulin dependent diabetes mellitus) 1983    With neuropathy    Ischemic cardiomyopathy 2012    MI (myocardial infarction) 2012    Morbid obesity     MRSA (methicillin resistant Staphylococcus aureus) " "infection 01/19/2019    Noncompliance with therapeutic plan 2019    Normochromic normocytic anemia 7/27/2021    Osteomyelitis of right foot 01/2019    Pulmonary edema 2019    Respiratory failure 2019    Sepsis 01/2019    Due to cellulitis right leg    Sleep apnea 2013    Thrombocytopathy 2012    Venous stasis dermatitis of both lower extremities      Family History   Problem Relation Age of Onset    Arthritis Mother     Diabetes Mother     Cancer Father     Heart disease Father     Stroke Father         Social History:   Marital Status:   Alcohol History:  reports no history of alcohol use.  Tobacco History:  reports that he quit smoking about 10 years ago. His smoking use included cigarettes. He has a 76.00 pack-year smoking history. He has never used smokeless tobacco.  Drug History:  has no history on file for drug use.    Review of patient's allergies indicates:   Allergen Reactions    Vasopressin Anaphylaxis and Other (See Comments)     Increased pressure in his head; felt like his eyeballs and veins were going to pop out of his head.     Heparin Other (See Comments)     Other reaction(s): "depleted my blood platets"    Decreased platelet count    Heparin analogues Other (See Comments)     Depleted WBC count    Vasopressin analogues Other (See Comments)     "felt like eyes going to pop out of my head"    Morphine Hallucinations, Other (See Comments) and Anxiety     Other reaction(s): Hallucinations  Paranoid, hallucinations         Current Outpatient Medications   Medication Sig Dispense Refill    albuterol-ipratropium (DUO-NEB) 2.5 mg-0.5 mg/3 mL nebulizer solution Inhale 3 mLs into the lungs every 6 (six) hours as needed.      aspirin 81 MG Chew Take 81 mg by mouth once daily.      atorvastatin (LIPITOR) 10 MG tablet Take 1 tablet by mouth once daily.  1    BASAGLAR KWIKPEN U-100 INSULIN glargine 100 units/mL (3mL) SubQ pen 25 Units once daily.   3    carvediloL (COREG) 3.125 MG " tablet Take 3.125 mg by mouth 2 (two) times daily.      ENTRESTO 24-26 mg per tablet Take 1 tablet by mouth 2 (two) times daily.      furosemide (LASIX) 40 MG tablet Take 1 tablet (40 mg total) by mouth once daily. 30 tablet 1    gabapentin (NEURONTIN) 600 MG tablet Take 1 tablet by mouth 3 (three) times daily.  0    JANUVIA 50 mg Tab       liraglutide 0.6 mg/0.1 mL, 18 mg/3 mL, subq PNIJ (VICTOZA 2-GRACIELA) 0.6 mg/0.1 mL (18 mg/3 mL) PnIj pen Victoza 3-Graciela 0.6 mg/0.1 mL (18 mg/3 mL) subcutaneous pen injector      mupirocin (BACTROBAN) 2 % ointment Apply topically once daily. 30 g 5    OZEMPIC 0.25 mg or 0.5 mg(2 mg/1.5 mL) pen injector Inject into the skin.      pantoprazole (PROTONIX) 40 MG tablet Take 1 tablet by mouth once daily.  0    prednisoLONE acetate (PRED FORTE) 1 % DrpS Place into both eyes.      semaglutide (OZEMPIC SUBQ) Inject into the skin.      spironolactone (ALDACTONE) 25 MG tablet Take 1 tablet by mouth once daily.  1    traMADoL (ULTRAM) 50 mg tablet Take 50 mg by mouth 3 (three) times daily as needed.      vitamin B complex-folic acid 0.4 mg Tab Take 1 tablet by mouth.       No current facility-administered medications for this visit.       Review of Systems   Constitutional: Negative.    HENT: Negative.    Eyes: Negative.    Respiratory: Negative.    Cardiovascular: Negative.    Gastrointestinal: Negative.    Endocrine: Negative.    Genitourinary: Negative.    Musculoskeletal: Negative.    Skin: Positive for wound.   Allergic/Immunologic: Negative.    Neurological: Positive for weakness.   Hematological: Negative.    Psychiatric/Behavioral: Negative.          Objective:        Physical Exam:   Foot Exam    General  General Appearance: appears stated age and healthy   Orientation: alert and oriented to person, place, and time   Affect: appropriate   Gait: unimpaired       Right Foot/Ankle     Inspection and Palpation  Skin Exam: callus (Nucleated keratotic lesion plantar 5th metatarsal  head no signs of infection or ulceration) and ulcer (Grade 0 ulcer plantar 5th metatarsal head);   Neurovascular  Dorsalis pedis: 1+  Posterior tibial: 2+  Capillary Refill: 2+  Saphenous nerve sensation: diminished  Tibial nerve sensation: diminished  Superficial peroneal nerve sensation: diminished  Deep peroneal nerve sensation: diminished          Physical Exam  Cardiovascular:      Pulses:           Dorsalis pedis pulses are 1+ on the right side.        Posterior tibial pulses are 2+ on the right side.   Feet:      Right foot:      Skin integrity: Ulcer (Grade 0 ulcer plantar 5th metatarsal head) and callus (Nucleated keratotic lesion plantar 5th metatarsal head no signs of infection or ulceration) present.         Imaging:            Assessment:       1. Ulcer of right foot, limited to breakdown of skin - Right Foot    2. Type II diabetes mellitus with neurological manifestations      Plan:   Ulcer of right foot, limited to breakdown of skin - Right Foot    Type II diabetes mellitus with neurological manifestations    Evaluated patient has no signs of any infection of the right foot the ulcers converted to a grade 0 with mainly callus tissue.  Continue inserts to relieve pressure on the 5th metatarsal head diabetic shoes return in 3 months or as needed.      Procedures With patient's verbal consent the hyperkeratotic lesions  plantar right 5th metatarsal head  were debrided to healthy appearing skin using a scalpel. Patient relates relief of pain following the procedure. Patient tolerated the procedure well without complications. No anesthesia or hemostasis required. No blood loss.          Counseling:     I provided patient education verbally regarding:   Patient diagnosis, treatment options, as well as alternatives, risks, and benefits.     This note was created using Dragon voice recognition software that occasionally misinterpreted phrases or words.

## 2022-01-03 NOTE — TELEPHONE ENCOUNTER
----- Message from Elisabeth Garrison sent at 1/3/2022 11:35 AM CST -----  Type:  RX Refill Request    Who Called:  pt  Refill or New Rx:  refill  RX Name and Strength:  traMADoL (ULTRAM) 50 mg tablet    Take 50 mg by mouth 3 (three) times daily as needed., S      Preferred Pharmacy with phone number:    [facility] - [place], [place] - [address] [address]  Phone: [phone] Fax: [phone]      Local or Mail Order:  local  Ordering Provider:  Florentino Head Call Back Number:  [phone] (home) [phone] (work)    Additional Information:         Called and spoke to pt. In regards to scheduling her a follow up appointment per Dr. Rojas's message. Got it scheduled and pt. Verbalized understanding.

## 2022-01-04 ENCOUNTER — OFFICE VISIT (OUTPATIENT)
Dept: PAIN MEDICINE | Facility: CLINIC | Age: 62
End: 2022-01-04
Payer: MEDICARE

## 2022-01-04 VITALS
HEART RATE: 110 BPM | BODY MASS INDEX: 34.06 KG/M2 | WEIGHT: 257 LBS | DIASTOLIC BLOOD PRESSURE: 82 MMHG | SYSTOLIC BLOOD PRESSURE: 136 MMHG | HEIGHT: 73 IN

## 2022-01-04 DIAGNOSIS — M79.672 BILATERAL FOOT PAIN: ICD-10-CM

## 2022-01-04 DIAGNOSIS — M79.671 BILATERAL FOOT PAIN: ICD-10-CM

## 2022-01-04 DIAGNOSIS — G89.4 CHRONIC PAIN DISORDER: ICD-10-CM

## 2022-01-04 DIAGNOSIS — E11.42 DIABETIC POLYNEUROPATHY ASSOCIATED WITH TYPE 2 DIABETES MELLITUS: Primary | ICD-10-CM

## 2022-01-04 PROCEDURE — 99214 OFFICE O/P EST MOD 30 MIN: CPT | Mod: S$GLB,,, | Performed by: PHYSICIAN ASSISTANT

## 2022-01-04 PROCEDURE — 99214 PR OFFICE/OUTPT VISIT, EST, LEVL IV, 30-39 MIN: ICD-10-PCS | Mod: S$GLB,,, | Performed by: PHYSICIAN ASSISTANT

## 2022-01-04 PROCEDURE — 99999 PR PBB SHADOW E&M-EST. PATIENT-LVL III: CPT | Mod: PBBFAC,,, | Performed by: PHYSICIAN ASSISTANT

## 2022-01-04 PROCEDURE — 99999 PR PBB SHADOW E&M-EST. PATIENT-LVL III: ICD-10-PCS | Mod: PBBFAC,,, | Performed by: PHYSICIAN ASSISTANT

## 2022-01-04 RX ORDER — TRAMADOL HYDROCHLORIDE 50 MG/1
50 TABLET ORAL EVERY 8 HOURS PRN
Qty: 90 TABLET | Refills: 2 | Status: SHIPPED | OUTPATIENT
Start: 2022-01-04 | End: 2022-04-04 | Stop reason: SDUPTHER

## 2022-01-04 RX ORDER — ACETAMINOPHEN 500 MG/1
1000 CAPSULE, LIQUID FILLED ORAL EVERY 12 HOURS PRN
COMMUNITY
Start: 2021-11-23 | End: 2022-11-23

## 2022-01-04 RX ORDER — OFLOXACIN 3 MG/ML
SOLUTION/ DROPS OPHTHALMIC
Status: ON HOLD | COMMUNITY
Start: 2021-12-09 | End: 2022-09-08 | Stop reason: HOSPADM

## 2022-01-04 NOTE — PROGRESS NOTES
Referring Physician: No ref. provider found    PCP: Primary Doctor No      CC:  Bilateral foot pain      Interval history:  Mr. Bullard is a 61 y.o. male with bilateral foot pain due to peripheral neuropathy diabetic neuropathy.   Foot pain remains tolerable.  He continues to take Gabapentin 600 mg t.i.d. with mild benefits. Nortriptyline 25 mg made him to drowsy so he discontinued it.  He continues to take tramadol 50 mg q.8 hours as needed with mild-to-moderate benefit.  Denies any side effects with the medication.  Able perform his ADLs with the medication.  He underwent a right-sided lumbar sympathetic nerve block which provided moderate benefit that was shortlived.  He had Covid in September. He developed bilateral knee pain. Right intra articular knee injection with benefit. He denies any worsening weakness.  No bowel bladder changes. Pain today is rated 5/10.    Prior HPI:   Ana Bullard is a 61 y.o. male referred to us for bilateral foot pain. Patient with long history of diabetes.  He states neuropathy worsen after his CABG.  Bilateral foot pain has worsened over past 7 years.  He presents to us constant burning, sharp pain in his bilateral feet.  He also has some similar issues in his bilateral hands, but foot pain is worse.  Pain worsens prolonged sitting and standing.  He currently takes gabapentin 600 mg t.i.d. with mild benefits.  Increasing doses causes sedation.  He also takes tramadol q.8 hours as needed with mild-to-moderate benefit.  He denies any worsening weakness.  No bowel bladder changes.    Interventional history:  Right LSB on 09/2019 with 50% relief for 2 weeks.    ROS:  CONSTITUTIONAL: No fevers, chills, night sweats, wt. loss, appetite changes  SKIN: no rashes or itching  ENT: No headaches, head trauma, vision changes, or eye pain  LYMPH NODES: None noticed   CV: No chest pain, palpitations.   RESP: No shortness of breath, dyspnea on exertion, cough, wheezing, or hemoptysis  GI: No  nausea, emesis, diarrhea, constipation, melena, hematochezia, pain.    : No dysuria, hematuria, urgency, or frequency   HEME: No easy bruising, bleeding problems  PSYCHIATRIC: No depression, anxiety, psychosis, hallucinations.  NEURO: No seizures, memory loss, dizziness or difficulty sleeping  MSK:  Positive HPI      Past Medical History:   Diagnosis Date    AICD (automatic cardioverter/defibrillator) present     Anemia, chronic disease 7/27/2021    Anemia, unspecified 7/27/2021    Anxiety and depression 2012    CAD (coronary artery disease) 2012    Cardiomegaly 2016    CHF (congestive heart failure) 2012    Cholecystitis 2017    Complete heart block 2012    Diabetic foot ulcer     DVT (deep venous thrombosis) 2012    And pulmonary embolus    GERD (gastroesophageal reflux disease) 2010    Heparin induced thrombocytopenia     Hyperlipemia 2011    IDDM (insulin dependent diabetes mellitus) 1983    With neuropathy    Ischemic cardiomyopathy 2012    MI (myocardial infarction) 2012    Morbid obesity     MRSA (methicillin resistant Staphylococcus aureus) infection 01/19/2019    Noncompliance with therapeutic plan 2019    Normochromic normocytic anemia 7/27/2021    Osteomyelitis of right foot 01/2019    Pulmonary edema 2019    Respiratory failure 2019    Sepsis 01/2019    Due to cellulitis right leg    Sleep apnea 2013    Thrombocytopathy 2012    Venous stasis dermatitis of both lower extremities      Past Surgical History:   Procedure Laterality Date    CARDIAC PACEMAKER PLACEMENT  12/2012    CARDIAC PACEMAKER PLACEMENT  10/04/2018    battery replacement    CORONARY ARTERY BYPASS GRAFT  06/2012    ESOPHAGOGASTRODUODENOSCOPY  01/31/2017    SAMARA FILTER PLACEMENT  2012    vena cava    LUMBAR SYMPATHETIC NERVE BLOCK Right 8/22/2019    Procedure: BLOCK, NERVE, SYMPATHETIC, LUMBAR;  Surgeon: Tashi Good MD;  Location: [facility] OR;  Service: Pain Management;  Laterality: Right;  RIGHT  "SYMPATHETIC NERVE BLOCK     Family History   Problem Relation Age of Onset    Arthritis Mother     Diabetes Mother     Cancer Father     Heart disease Father     Stroke Father      Social History     Socioeconomic History    Marital status:    Tobacco Use    Smoking status: Former Smoker     Packs/day: 2.00     Years: 38.00     Pack years: 76.00     Types: Cigarettes     Quit date: 2012     Years since quitting: 10.0    Smokeless tobacco: Never Used   Substance and Sexual Activity    Alcohol use: No    Sexual activity: Never         Medications/Allergies: See med card  Vitals:    01/04/22 1109   BP: 136/82   Pulse: 110   Weight: 116.6 kg (257 lb)   Height: 6' 1" (1.854 m)   PainSc:   5   PainLoc: Foot       GENERAL: A and O x3, the patient appears well groomed and is in no acute distress.  Skin: No rashes or obvious lesions  HEENT: normocephalic, atraumatic  CARDIOVASCULAR:  Tachycardia   LUNGS: non labored breathing  ABDOMEN: soft, nontender   UPPER EXTREMITIES: Normal alignment, normal range of motion, no atrophy, no skin changes,  hair growth and nail growth normal and equal bilaterally. No swelling, no tenderness.    LOWER EXTREMITIES:  Normal alignment, no atrophy, no skin changes,  hair growth and nail growth normal and equal bilaterally. No swelling. Tenderness to right lateral patella.   LUMBAR SPINE  Lumbar spine: ROM is limited with flexion extension and oblique extension with moderate increased pain.    Samuel's test causes no increased pain on either side.    Supine straight leg raise is negative bilaterally.    Internal and external rotation of the hip causes no increased pain on either side.  Myofascial exam: No tenderness to palpation across lumbar paraspinous muscles.        MENTAL STATUS: normal orientation, speech, language, and fund of knowledge for social situation.  Emotional state appropriate.     CRANIAL NERVES:  II:         PERRL bilaterally,   III,IV,VI: EOMI.    V:         " Facial sensation equal bilaterally  VII:       Facial motor function normal.  VIII:      Hearing equal to finger rub bilaterally  IX/X:    Gag normal, palate symmetric  XI:        Shoulder shrug equal, head turn equal  XII:       Tongue midline without fasciculations        MOTOR: Tone and bulk: normal bilateral upper and lower Strength: normal   Delt      Bi         Tri        WE      WF                        R          5          5          5          5          5          5            L          5          5          5          5          5          5               IP         ADD     ABD     Quad   TA        Gas      HAM  R          5          5          5          5          5          5          5  L          5          5          5          5          5          5          5     SENSATION:  Decreased globally of bilateral feet  REFLEXES: normal, symmetric, nonbrisk.  Toes down, no clonus. No hoffmans.  GAIT: normal rise, base, steps, and arm swing.      Imaging:  None    Assessment:  Ana Bullard is a 61 y.o. male with bilateral foot pain  1. Diabetic polyneuropathy associated with type 2 diabetes mellitus    2. Bilateral foot pain    3. Chronic pain disorder        Plan:  1. I have stressed the importance of physical activity and exercise to improve overall health  2.  Discussed disease progression of peripheral neuropathy.  Continue gabapentin prescribed.  Consider nortriptyline 10 mg in the future  3.  He does request continue tramadol with us.  I believe this is very reasonable.   reviewed.  He takes tramadol 50 mg q.8 hours as needed.  Previous UDS consistent. UDS next clinic visit.   4.  May consider repeat lumbar sympathetic block if pain is exacerbated.  Briefly discussed spinal cord stimulation as well.  5.  Follow-up in 3 months  Medication management provided by Dr. Jha in Dr. Good's absence

## 2022-02-23 DIAGNOSIS — D84.9 IMMUNOSUPPRESSED STATUS: ICD-10-CM

## 2022-04-01 NOTE — PATIENT INSTRUCTIONS
Your Diabetes Foot Care Program    Every day you depend on your feet to keep you moving. But when you have diabetes, your feet need special care. Even a small foot problem can become very serious. So dont take your feet for granted. By working with your diabetes healthcare team, you can learn how to protect your feet and keep them healthy.  Evaluating your feet  An evaluation helps your healthcare provider check the condition of your feet. The evaluation includes a review of your diabetes history and overall health. It may also include a foot exam, X-rays, or other tests. These can help show problems beneath the skin that you cant see or feel.  Medical history  You will be asked about your overall health and any history of foot problems. Youll also discuss your diabetes history, such as whether your blood sugar level has changed over time. It also includes questions about sensations of pain, tingling, pins and needles, or numbness. Your healthcare provider will also want to know if you have high blood pressure and heart disease, or if you smoke. Be sure to mention any medicines (including over-the-counter), supplements, or herbal remedies you take.  Foot exam  A foot exam checks the condition of different parts of your foot. First, your skin and nails are examined for any signs of infection. Blood flow is checked by feeling for the pulses in each foot. You may also have tests to study the nerves in the foot. These include using a small filament (wire) to see how sensitive your feet are. In certain cases, you will be asked to walk a short distance to check for bone, joint, and muscle problems.  Diagnostic tests  If needed, your healthcare provider will suggest certain tests to learn more about your feet. These include:  Doppler tests to measure blood flow in the feet and lower leg.  X-rays, which can show bone or joint problems.  Other imaging tests, such as an MRI (magnetic resonance imaging), bone scan, and CT  (computed tomography) scan. These can help show bone infections.  Other tests, such as vascular tests, which study the blood flow in your feet and legs. You may also have nerve studies to learn how sensitive your feet are.  Creating a foot care program  Based on the evaluation, your healthcare provider will create a foot care program for you. Your program may be as simple as starting a daily self-care routine and changing the types of shoes your wear. It may also involve treating minor foot problems, such as a corn or blister. In some cases, surgery will be needed to treat an infection or mechanical problems, such as hammer toes.  Preventing problems  When you have diabetes, its easier to prevent problems than to treat them later on. So see your healthcare team for regular checkups and foot care. Your healthcare team can also help you learn more about caring for your feet at home. For example, you may be told to avoid walking barefoot. Or you may be told that special footwear is needed to protect your feet.  Have regular checkups  Foot problems can develop quickly. So be sure to follow your healthcare teams schedule for regular checkups. During office visits, take off your shoes and socks as soon as you get in the exam room. Ask your healthcare provider to examine your feet for problems. This will make it easier to find and treat small skin irritations before they get worse. Regular checkups can also help keep track of the blood flow and feeling in your feet. If you have neuropathy (lack of feeling in your feet), you will need to have checkups more often.  Learn about self-care  The more you know about diabetes and your feet, the easier it will be to prevent problems. Members of your healthcare team can teach you how to inspect your feet and teach you to look for warning signs. They can also give you other foot care tips. During office visits, be sure to ask any questions you have.  Date Last Reviewed: 7/1/2016  ©  1005-5256 [employer]. [address] [place] [address], [place] [address]. All rights reserved. This information is not intended as a substitute for professional medical care. Always follow your healthcare professional's instructions.       Simple Skin Ulcer  A skin ulcer is a sore on the skin. Skin ulcers often form when blood circulation is impaired. Being bed- or wheelchair-bound can cause pressure that may lead to skin ulcers. Ulcers are generally round areas of red, swollen, thickened skin around a crater-like depression. They are often very slow to heal. If a skin ulcer isn't properly treated, it may become infected. If the infection spreads, it can cause serious health issues.    Symptoms of a skin ulcer include:  Reddish area on the skin  Skin color and texture changes  Swelling  Wound that isn't healing  Crater in the skin  Pain  Drainage or pus    Causes  There are many causes of skin ulcers. Some of these include:  Decreased blood flow to a part of the skin, vascular insufficiency  Trauma  Lack of movement of a part of the body for long periods of time  Infection  Poor hygiene  Varicose veins  Vitamin deficiency    Pressure ulcers  Pressure ulcers are a type of ulcer most commonly seen in people who are confined to bed or a wheelchair. They are caused by prolonged pressure to a spot on the skin. Pressure ulcers usually occur on the back, buttocks, or backs or sides of the legs, arms, or feet (especially the heels).    Home care  You may be prescribed antibiotics to prevent infection. If this is the case, be sure to take all of the medicine, even if your symptoms get better. You may also be given medicines to help relieve pain. Follow the healthcare providers instructions when using these medicines.    General care  Care for the skin ulcer as instructed. Always wash your hands with soap and warm water before and after caring for your wound.  Cover the ulcer with a clean, dry bandage. Remove and  change the bandage as instructed. If the bandage becomes wet or dirty, change it as soon as possible.  Follow the doctors instructions about washing. You can shower, but do not soak the healing ulcer until the doctor says its OK.  Do not scratch, rub, or pick at the healing skin.  Check the area every day for signs of infection, such as increasing pain, redness, warmth, red streaking, swelling, or pus draining from the ulcer.  When resting, raise the area where the ulcer is above the level of the heart.  Avoid smoking or drinking alcohol, as these can delay wound healing.  If you are able, try to walk regularly. This can help with circulation.  Avoid prolonged standing or sitting in one position.  The following tips can help prevent future skin ulcers:  Know your risks for skin ulcers.  Keep the skin clean and dry.  Reposition frequently.  Use protective devices such as pillows, foam wedges, and heel protectors for the knees, ankles, and heels.  Avoid immobilization.    Follow-up care  Follow up with your healthcare provider, or as advised.    When to seek medical advice  Call your healthcare provider right away if any of these occur:  Fever of 100.4°F (38°C) or higher, or as advised by your healthcare provider  Signs of infection. These include increasing pain, warmth, redness, or pus draining from the skin ulcer.  Bleeding from the skin ulcer  Pain in or around the ulcer that doesn't get better even with medicines  Increased swelling  Changes in skin color    Date Last Reviewed: 9/1/2016 © 2000-2017 [employer]. [address] [place] [address], [place] [address]. All rights reserved. This information is not intended as a substitute for professional medical care. Always follow your healthcare professional's instructions.       Peripheral Neuropathy  Peripheral neuropathy is a condition that affects the nerves of the arms or legs. It causes a change in physical feeling. Sometimes it causes weakness in the  muscles. You may feel tingling, numbness or shooting pains. Symptoms may be more common at night. Skin may be extra sensitive to light touch or temperature changes.  Neuropathy may be a complication of a chronic disease such as diabetes. A ruptured disk with pressure on the spinal nerve may also lead to the problem. Certain vitamin deficiencies may lead to it. It may also be caused by exposure to certain drugs or chemicals.    Home care  Tell the healthcare provider about all medicines you take. This includes prescription and over-the-counter medicines, vitamins, and herbs. Ask if any of the medicines may be causing your problems. Do not make any changes to prescription medicines without talking to your healthcare provider first.  You may be prescribed medicines to help relieve the tingling feeling or for pain. Take all medicines as directed.  A numb hand or foot may be more prone to injury. To help protect it:  Always use oven mitts.  Test water with an unaffected hand or foot.  Use caution when trimming nails. File sharp areas.  Wear shoes that fit well to avoid pressure points, blisters, and ulcers.  Inspect your hands and feet carefully (including the soles of your feet and between your toes) at least once a week. If you see red areas, sores, or other problems, tell your healthcare provider.    Follow-up care  Follow up with your doctor or as advised by our staff. You may need further testing or evaluation.    When to seek medical advice  Call your healthcare provider right away if any of the following occur:  Redness, swelling, cracking, or ulcer on any numb area, especially the feet  New symptoms of numbness or muscle weakness numbness  Loss of bowel or bladder control  Slurred speech, confusion, or trouble speaking, walking, or seeing    Date Last Reviewed: 9/26/2015  © 1709-1426 [employer]. [address] [place] [address], [place] [address]. All rights reserved. This information is not intended as a  substitute for professional medical care. Always follow your healthcare professional's instructions.

## 2022-04-04 ENCOUNTER — OFFICE VISIT (OUTPATIENT)
Dept: PAIN MEDICINE | Facility: CLINIC | Age: 62
End: 2022-04-04
Payer: MEDICARE

## 2022-04-04 ENCOUNTER — OFFICE VISIT (OUTPATIENT)
Dept: PODIATRY | Facility: CLINIC | Age: 62
End: 2022-04-04
Payer: MEDICARE

## 2022-04-04 VITALS
DIASTOLIC BLOOD PRESSURE: 76 MMHG | WEIGHT: 257 LBS | HEIGHT: 73 IN | HEART RATE: 80 BPM | BODY MASS INDEX: 34.06 KG/M2 | SYSTOLIC BLOOD PRESSURE: 129 MMHG

## 2022-04-04 VITALS
HEIGHT: 73 IN | HEART RATE: 78 BPM | WEIGHT: 257 LBS | BODY MASS INDEX: 34.06 KG/M2 | OXYGEN SATURATION: 97 % | RESPIRATION RATE: 16 BRPM

## 2022-04-04 DIAGNOSIS — I73.9 PERIPHERAL VASCULAR DISEASE: ICD-10-CM

## 2022-04-04 DIAGNOSIS — E11.42 DIABETIC POLYNEUROPATHY ASSOCIATED WITH TYPE 2 DIABETES MELLITUS: ICD-10-CM

## 2022-04-04 DIAGNOSIS — M79.671 BILATERAL FOOT PAIN: ICD-10-CM

## 2022-04-04 DIAGNOSIS — Z79.891 CHRONIC USE OF OPIATE FOR THERAPEUTIC PURPOSE: Primary | ICD-10-CM

## 2022-04-04 DIAGNOSIS — M79.672 BILATERAL FOOT PAIN: ICD-10-CM

## 2022-04-04 DIAGNOSIS — I87.2 VENOUS STASIS DERMATITIS OF BOTH LOWER EXTREMITIES: ICD-10-CM

## 2022-04-04 DIAGNOSIS — L97.512 SKIN ULCER OF RIGHT FOOT WITH FAT LAYER EXPOSED: ICD-10-CM

## 2022-04-04 DIAGNOSIS — M20.42 HAMMER TOES, BILATERAL: ICD-10-CM

## 2022-04-04 DIAGNOSIS — M20.41 HAMMER TOES, BILATERAL: ICD-10-CM

## 2022-04-04 DIAGNOSIS — E11.49 TYPE II DIABETES MELLITUS WITH NEUROLOGICAL MANIFESTATIONS: ICD-10-CM

## 2022-04-04 DIAGNOSIS — L97.511 ULCER OF RIGHT FOOT, LIMITED TO BREAKDOWN OF SKIN: Primary | ICD-10-CM

## 2022-04-04 PROCEDURE — 3074F PR MOST RECENT SYSTOLIC BLOOD PRESSURE < 130 MM HG: ICD-10-PCS | Mod: CPTII,S$GLB,, | Performed by: PHYSICIAN ASSISTANT

## 2022-04-04 PROCEDURE — 97597 WOUND DEBRIDEMENT: ICD-10-PCS | Mod: S$GLB,,, | Performed by: PODIATRIST

## 2022-04-04 PROCEDURE — 1159F PR MEDICATION LIST DOCUMENTED IN MEDICAL RECORD: ICD-10-PCS | Mod: CPTII,S$GLB,, | Performed by: PHYSICIAN ASSISTANT

## 2022-04-04 PROCEDURE — 99499 UNLISTED E&M SERVICE: CPT | Mod: S$GLB,,, | Performed by: PODIATRIST

## 2022-04-04 PROCEDURE — 99999 PR PBB SHADOW E&M-EST. PATIENT-LVL IV: ICD-10-PCS | Mod: PBBFAC,,, | Performed by: PHYSICIAN ASSISTANT

## 2022-04-04 PROCEDURE — 80307 DRUG TEST PRSMV CHEM ANLYZR: CPT | Performed by: PHYSICIAN ASSISTANT

## 2022-04-04 PROCEDURE — 3078F DIAST BP <80 MM HG: CPT | Mod: CPTII,S$GLB,, | Performed by: PHYSICIAN ASSISTANT

## 2022-04-04 PROCEDURE — 3008F BODY MASS INDEX DOCD: CPT | Mod: CPTII,S$GLB,, | Performed by: PHYSICIAN ASSISTANT

## 2022-04-04 PROCEDURE — 3008F PR BODY MASS INDEX (BMI) DOCUMENTED: ICD-10-PCS | Mod: CPTII,S$GLB,, | Performed by: PHYSICIAN ASSISTANT

## 2022-04-04 PROCEDURE — 97597 DBRDMT OPN WND 1ST 20 CM/<: CPT | Mod: S$GLB,,, | Performed by: PODIATRIST

## 2022-04-04 PROCEDURE — 3074F SYST BP LT 130 MM HG: CPT | Mod: CPTII,S$GLB,, | Performed by: PHYSICIAN ASSISTANT

## 2022-04-04 PROCEDURE — 99999 PR PBB SHADOW E&M-EST. PATIENT-LVL IV: CPT | Mod: PBBFAC,,, | Performed by: PHYSICIAN ASSISTANT

## 2022-04-04 PROCEDURE — 3078F PR MOST RECENT DIASTOLIC BLOOD PRESSURE < 80 MM HG: ICD-10-PCS | Mod: CPTII,S$GLB,, | Performed by: PHYSICIAN ASSISTANT

## 2022-04-04 PROCEDURE — 99214 PR OFFICE/OUTPT VISIT, EST, LEVL IV, 30-39 MIN: ICD-10-PCS | Mod: S$GLB,,, | Performed by: PHYSICIAN ASSISTANT

## 2022-04-04 PROCEDURE — 99499 NO LOS: ICD-10-PCS | Mod: S$GLB,,, | Performed by: PODIATRIST

## 2022-04-04 PROCEDURE — 1159F MED LIST DOCD IN RCRD: CPT | Mod: CPTII,S$GLB,, | Performed by: PHYSICIAN ASSISTANT

## 2022-04-04 PROCEDURE — 99214 OFFICE O/P EST MOD 30 MIN: CPT | Mod: S$GLB,,, | Performed by: PHYSICIAN ASSISTANT

## 2022-04-04 RX ORDER — AMIODARONE HYDROCHLORIDE 200 MG/1
200 TABLET ORAL DAILY
COMMUNITY
Start: 2022-03-21 | End: 2023-06-18 | Stop reason: SDUPTHER

## 2022-04-04 RX ORDER — IVABRADINE 5 MG/1
5 TABLET, FILM COATED ORAL NIGHTLY
Status: ON HOLD | COMMUNITY
Start: 2022-01-13 | End: 2023-10-19 | Stop reason: HOSPADM

## 2022-04-04 RX ORDER — DAPAGLIFLOZIN 10 MG/1
10 TABLET, FILM COATED ORAL DAILY
COMMUNITY
Start: 2022-03-21

## 2022-04-04 RX ORDER — RIVAROXABAN 2.5 MG/1
2.5 TABLET, FILM COATED ORAL DAILY
Status: ON HOLD | COMMUNITY
Start: 2022-02-16 | End: 2022-09-08 | Stop reason: HOSPADM

## 2022-04-04 RX ORDER — TRAMADOL HYDROCHLORIDE 50 MG/1
50 TABLET ORAL EVERY 8 HOURS PRN
Qty: 90 TABLET | Refills: 2 | Status: SHIPPED | OUTPATIENT
Start: 2022-04-04 | End: 2022-07-05 | Stop reason: SDUPTHER

## 2022-04-04 NOTE — PROGRESS NOTES
Referring Physician: No ref. provider found    PCP: Primary Doctor No      CC:  Bilateral foot pain      Interval history:  Mr. Bullard is a 62 y.o. male with bilateral foot pain due to peripheral neuropathy diabetic neuropathy.   Foot pain remains tolerable. Stabbing pains resolved. He continues to take Gabapentin 600 mg t.i.d. with mild benefits. Nortriptyline 25 mg made him to drowsy so he discontinued it.  He continues to take tramadol 50 mg q.8 hours as needed with mild-to-moderate benefit.  Denies any side effects with the medication.  Able perform his ADLs with the medication.  He underwent a right-sided lumbar sympathetic nerve block which provided moderate benefit that was shortlived.  He had Covid in September. He developed bilateral knee pain. Right intra articular knee injection with benefit. He denies any worsening weakness.  No bowel bladder changes. Pain today is rated 5/10.    Prior HPI:   Ana Bullard is a 62 y.o. male referred to us for bilateral foot pain. Patient with long history of diabetes.  He states neuropathy worsen after his CABG.  Bilateral foot pain has worsened over past 7 years.  He presents to us constant burning, sharp pain in his bilateral feet.  He also has some similar issues in his bilateral hands, but foot pain is worse.  Pain worsens prolonged sitting and standing.  He currently takes gabapentin 600 mg t.i.d. with mild benefits.  Increasing doses causes sedation.  He also takes tramadol q.8 hours as needed with mild-to-moderate benefit.  He denies any worsening weakness.  No bowel bladder changes.    Interventional history:  Right LSB on 09/2019 with 50% relief for 2 weeks.    ROS:  CONSTITUTIONAL: No fevers, chills, night sweats, wt. loss, appetite changes  SKIN: no rashes or itching  ENT: No headaches, head trauma, vision changes, or eye pain  LYMPH NODES: None noticed   CV: No chest pain, palpitations.   RESP: No shortness of breath, dyspnea on exertion, cough,  wheezing, or hemoptysis  GI: No nausea, emesis, diarrhea, constipation, melena, hematochezia, pain.    : No dysuria, hematuria, urgency, or frequency   HEME: No easy bruising, bleeding problems  PSYCHIATRIC: No depression, anxiety, psychosis, hallucinations.  NEURO: No seizures, memory loss, dizziness or difficulty sleeping  MSK:  Positive HPI      Past Medical History:   Diagnosis Date    AICD (automatic cardioverter/defibrillator) present     Anemia, chronic disease 7/27/2021    Anemia, unspecified 7/27/2021    Anxiety and depression 2012    CAD (coronary artery disease) 2012    Cardiomegaly 2016    CHF (congestive heart failure) 2012    Cholecystitis 2017    Complete heart block 2012    Diabetic foot ulcer     DVT (deep venous thrombosis) 2012    And pulmonary embolus    GERD (gastroesophageal reflux disease) 2010    Heparin induced thrombocytopenia     Hyperlipemia 2011    IDDM (insulin dependent diabetes mellitus) 1983    With neuropathy    Ischemic cardiomyopathy 2012    MI (myocardial infarction) 2012    Morbid obesity     MRSA (methicillin resistant Staphylococcus aureus) infection 01/19/2019    Noncompliance with therapeutic plan 2019    Normochromic normocytic anemia 7/27/2021    Osteomyelitis of right foot 01/2019    Pulmonary edema 2019    Respiratory failure 2019    Sepsis 01/2019    Due to cellulitis right leg    Sleep apnea 2013    Thrombocytopathy 2012    Venous stasis dermatitis of both lower extremities      Past Surgical History:   Procedure Laterality Date    CARDIAC PACEMAKER PLACEMENT  12/2012    CARDIAC PACEMAKER PLACEMENT  10/04/2018    battery replacement    CORONARY ARTERY BYPASS GRAFT  06/2012    ESOPHAGOGASTRODUODENOSCOPY  01/31/2017    SAMARA FILTER PLACEMENT  2012    vena cava    LUMBAR SYMPATHETIC NERVE BLOCK Right 8/22/2019    Procedure: BLOCK, NERVE, SYMPATHETIC, LUMBAR;  Surgeon: Tashi Good MD;  Location: [facility] OR;  Service: Pain Management;   "Laterality: Right;  RIGHT SYMPATHETIC NERVE BLOCK     Family History   Problem Relation Age of Onset    Arthritis Mother     Diabetes Mother     Cancer Father     Heart disease Father     Stroke Father      Social History     Socioeconomic History    Marital status:    Tobacco Use    Smoking status: Former Smoker     Packs/day: 2.00     Years: 38.00     Pack years: 76.00     Types: Cigarettes     Quit date: 2012     Years since quitting: 10.2    Smokeless tobacco: Never Used   Substance and Sexual Activity    Alcohol use: No    Sexual activity: Never         Medications/Allergies: See med card  Vitals:    04/04/22 0825   BP: 129/76   Pulse: 80   Weight: 116.6 kg (257 lb)   Height: 6' 1" (1.854 m)   PainSc:   5   PainLoc: Foot       GENERAL: A and O x3, the patient appears well groomed and is in no acute distress.  Skin: No rashes or obvious lesions  HEENT: normocephalic, atraumatic  CARDIOVASCULAR:  RRR  LUNGS: non labored breathing  ABDOMEN: soft, nontender   UPPER EXTREMITIES: Normal alignment, normal range of motion, no atrophy, no skin changes,  hair growth and nail growth normal and equal bilaterally. No swelling, no tenderness.    LOWER EXTREMITIES:  Normal alignment, no atrophy, no skin changes,  hair growth and nail growth normal and equal bilaterally. No swelling. Tenderness to right lateral patella.   LUMBAR SPINE  Lumbar spine: ROM is limited with flexion extension and oblique extension with moderate increased pain.    Samuel's test causes no increased pain on either side.    Supine straight leg raise is negative bilaterally.    Internal and external rotation of the hip causes no increased pain on either side.  Myofascial exam: No tenderness to palpation across lumbar paraspinous muscles.        MENTAL STATUS: normal orientation, speech, language, and fund of knowledge for social situation.  Emotional state appropriate.     CRANIAL NERVES:  II:         PERRL bilaterally,   III,IV,VI: " EOMI.    V:         Facial sensation equal bilaterally  VII:       Facial motor function normal.  VIII:      Hearing equal to finger rub bilaterally  IX/X:    Gag normal, palate symmetric  XI:        Shoulder shrug equal, head turn equal  XII:       Tongue midline without fasciculations        MOTOR: Tone and bulk: normal bilateral upper and lower Strength: normal   Delt      Bi         Tri        WE      WF                        R          5          5          5          5          5          5            L          5          5          5          5          5          5               IP         ADD     ABD     Quad   TA        Gas      HAM  R          5          5          5          5          5          5          5  L          5          5          5          5          5          5          5     SENSATION:  Decreased globally of bilateral feet  REFLEXES: normal, symmetric, nonbrisk.  Toes down, no clonus. No hoffmans.  GAIT: normal rise, base, steps, and arm swing.      Imaging:  None    Assessment:  Ana Bullard is a 62 y.o. male with bilateral foot pain  1. Chronic use of opiate for therapeutic purpose    2. Diabetic polyneuropathy associated with type 2 diabetes mellitus    3. Bilateral foot pain        Plan:  1. I have stressed the importance of physical activity and exercise to improve overall health  2.  Discussed disease progression of peripheral neuropathy.  Continue gabapentin prescribed.  Consider nortriptyline 10 mg in the future  3.  He does request continue tramadol with us.  I believe this is very reasonable.   reviewed.  He takes tramadol 50 mg q.8 hours as needed.  Previous UDS consistent. UDS today.  4.  May consider repeat lumbar sympathetic block if pain is exacerbated.  Briefly discussed spinal cord stimulation as well.  5.  Follow-up in 3 months  Medication management provided by  Dr. Good

## 2022-04-04 NOTE — PROGRESS NOTES
"  [address] [place] [address]  Phone: [phone]   [phone]    Patient's PCP:Primary Doctor No  Referring Provider: No ref. provider found    Subjective:      Chief Complaint:: Foot Ulcer (Right fifth MPJ)    CAROLIN Bullard is a 62 y.o. male who presents today for follow-up on right fifth MPJ ulcer. Current symptoms include reoccurring ulcer and callus to right fifth MPJ. Patient states bleeding started within the last two weeks  Aggravating factors are walking weightbearing, shoe gear and prolonged use. Symptoms have progressed over time. Treatment to date have included previous debridement, aquafore and covering with bandage.    Systemic Doctor: Shauna Hand PA-C  Date Last Seen: 04/04/2022  Blood Sugar: 145  Hemoglobin A1c: 7.2    Vitals:    04/04/22 1339   Pulse: 78   Resp: 16   SpO2: 97%   Weight: 116.6 kg (257 lb)   Height: 6' 1" (1.854 m)   PainSc: 0-No pain      Shoe Size:     Past Surgical History:   Procedure Laterality Date    CARDIAC PACEMAKER PLACEMENT  12/2012    CARDIAC PACEMAKER PLACEMENT  10/04/2018    battery replacement    CORONARY ARTERY BYPASS GRAFT  06/2012    ESOPHAGOGASTRODUODENOSCOPY  01/31/2017    SAMARA FILTER PLACEMENT  2012    vena cava    LUMBAR SYMPATHETIC NERVE BLOCK Right 8/22/2019    Procedure: BLOCK, NERVE, SYMPATHETIC, LUMBAR;  Surgeon: Tashi Good MD;  Location: [facility];  Service: Pain Management;  Laterality: Right;  RIGHT SYMPATHETIC NERVE BLOCK     Past Medical History:   Diagnosis Date    AICD (automatic cardioverter/defibrillator) present     Anemia, chronic disease 7/27/2021    Anemia, unspecified 7/27/2021    Anxiety and depression 2012    CAD (coronary artery disease) 2012    Cardiomegaly 2016    CHF (congestive heart failure) 2012    Cholecystitis 2017    Complete heart block 2012    Diabetic foot ulcer     DVT (deep venous thrombosis) 2012    And pulmonary embolus    GERD (gastroesophageal reflux " "disease) 2010    Heparin induced thrombocytopenia     Hyperlipemia 2011    IDDM (insulin dependent diabetes mellitus) 1983    With neuropathy    Ischemic cardiomyopathy 2012    MI (myocardial infarction) 2012    Morbid obesity     MRSA (methicillin resistant Staphylococcus aureus) infection 01/19/2019    Noncompliance with therapeutic plan 2019    Normochromic normocytic anemia 7/27/2021    Osteomyelitis of right foot 01/2019    Pulmonary edema 2019    Respiratory failure 2019    Sepsis 01/2019    Due to cellulitis right leg    Sleep apnea 2013    Thrombocytopathy 2012    Venous stasis dermatitis of both lower extremities      Family History   Problem Relation Age of Onset    Arthritis Mother     Diabetes Mother     Cancer Father     Heart disease Father     Stroke Father         Social History:   Marital Status:   Alcohol History:  reports no history of alcohol use.  Tobacco History:  reports that he quit smoking about 10 years ago. His smoking use included cigarettes. He has a 76.00 pack-year smoking history. He has never used smokeless tobacco.  Drug History:  has no history on file for drug use.    Review of patient's allergies indicates:   Allergen Reactions    Vasopressin Anaphylaxis and Other (See Comments)     Increased pressure in his head; felt like his eyeballs and veins were going to pop out of his head.     Heparin Other (See Comments)     Other reaction(s): "depleted my blood platets"    Decreased platelet count    Heparin analogues Other (See Comments)     Depleted WBC count    Vasopressin analogues Other (See Comments)     "felt like eyes going to pop out of my head"    Morphine Hallucinations, Other (See Comments) and Anxiety     Other reaction(s): Hallucinations  Paranoid, hallucinations         Current Outpatient Medications   Medication Sig Dispense Refill    acetaminophen (TYLENOL) 500 mg Cap Take 500 mg by mouth.      albuterol-ipratropium (DUO-NEB) 2.5 mg-0.5 " mg/3 mL nebulizer solution Inhale 3 mLs into the lungs every 6 (six) hours as needed.      amiodarone (PACERONE) 200 MG Tab Take 200 mg by mouth once daily.      aspirin 81 MG Chew Take 81 mg by mouth once daily.      atorvastatin (LIPITOR) 10 MG tablet Take 1 tablet by mouth once daily.  1    BASAGLAR KWIKPEN U-100 INSULIN glargine 100 units/mL (3mL) SubQ pen 25 Units once daily.   3    carvediloL (COREG) 3.125 MG tablet Take 3.125 mg by mouth 2 (two) times daily.      CORLANOR 5 mg Tab Take by mouth nightly.      ENTRESTO 24-26 mg per tablet Take 1 tablet by mouth 2 (two) times daily.      FARXIGA 10 mg tablet Take 10 mg by mouth once daily.      furosemide (LASIX) 40 MG tablet Take 1 tablet (40 mg total) by mouth once daily. 30 tablet 1    gabapentin (NEURONTIN) 600 MG tablet Take 1 tablet by mouth 3 (three) times daily.  0    JANUVIA 50 mg Tab       liraglutide 0.6 mg/0.1 mL, 18 mg/3 mL, subq PNIJ (VICTOZA 2-GRACIELA) 0.6 mg/0.1 mL (18 mg/3 mL) PnIj pen Victoza 3-Graciela 0.6 mg/0.1 mL (18 mg/3 mL) subcutaneous pen injector      mupirocin (BACTROBAN) 2 % ointment Apply topically once daily. 30 g 5    ofloxacin (OCUFLOX) 0.3 % ophthalmic solution       OZEMPIC 0.25 mg or 0.5 mg(2 mg/1.5 mL) pen injector Inject into the skin.      pantoprazole (PROTONIX) 40 MG tablet Take 1 tablet by mouth once daily.  0    prednisoLONE acetate (PRED FORTE) 1 % DrpS Place into both eyes.      semaglutide (OZEMPIC SUBQ) Inject into the skin.      spironolactone (ALDACTONE) 25 MG tablet Take 1 tablet by mouth once daily.  1    traMADoL (ULTRAM) 50 mg tablet Take 1 tablet (50 mg total) by mouth every 8 (eight) hours as needed for Pain. 90 tablet 2    vitamin B complex-folic acid 0.4 mg Tab Take 1 tablet by mouth.      XARELTO 2.5 mg Tab Take by mouth.       No current facility-administered medications for this visit.       Review of Systems   Constitutional: Negative for chills, fatigue, fever and unexpected weight  change.   HENT: Negative for hearing loss and trouble swallowing.    Eyes: Negative for photophobia and visual disturbance.   Respiratory: Negative for cough, shortness of breath and wheezing.    Cardiovascular: Negative for chest pain, palpitations and leg swelling.   Gastrointestinal: Negative for abdominal pain and nausea.   Genitourinary: Negative for dysuria and frequency.   Musculoskeletal: Positive for arthralgias and gait problem. Negative for back pain, joint swelling and myalgias.   Skin: Positive for color change and wound. Negative for rash.   Neurological: Negative for tremors, seizures, weakness, numbness and headaches.   Hematological: Does not bruise/bleed easily.         Objective:        Physical Exam:   Foot Exam    General  General Appearance: appears stated age and healthy   Orientation: alert and oriented to person, place, and time   Affect: appropriate   Gait: antalgic       Right Foot/Ankle     Inspection and Palpation  Ecchymosis: none  Tenderness: none   Swelling: (Fifth MPJ)  Skin Exam: ulcer (Grade 2 ulcer plantar 5th MPJ.  4 cm diameter with central area 2 mm depth with no signs of infection or exposed bone.  Necrotic tissue extends down to subcutaneous tissue center.);   Neurovascular  Dorsalis pedis: 1+  Posterior tibial: 1+  Capillary Refill: 2+  Saphenous nerve sensation: absent  Tibial nerve sensation: absent  Superficial peroneal nerve sensation: absent  Deep peroneal nerve sensation: absent  Sural nerve sensation: absent          Physical Exam  Cardiovascular:      Pulses:           Dorsalis pedis pulses are 1+ on the right side.        Posterior tibial pulses are 1+ on the right side.   Musculoskeletal:        Feet:    Feet:      Right foot:      Skin integrity: Ulcer (Grade 2 ulcer plantar 5th MPJ.  4 cm diameter with central area 2 mm depth with no signs of infection or exposed bone.  Necrotic tissue extends down to subcutaneous tissue center.) present.  "                  Vascular Exam     Right Pulses  Dorsalis Pedis:      1+  Posterior Tibial:      1+                 Imaging:            Assessment:       1. Ulcer of right foot, limited to breakdown of skin - Right Foot    2. Type II diabetes mellitus with neurological manifestations    3. Hammer toes, bilateral    4. Venous stasis dermatitis of both lower extremities    5. Peripheral vascular disease    6. Skin ulcer of right foot with fat layer exposed      Plan:   Ulcer of right foot, limited to breakdown of skin - Right Foot  -     Wound Debridement    Type II diabetes mellitus with neurological manifestations  -     Wound Debridement    Hammer toes, bilateral    Venous stasis dermatitis of both lower extremities    Peripheral vascular disease    Skin ulcer of right foot with fat layer exposed    Evaluated patient and debrided the ulcer today were placing a Spenco inner sole with a hole cut out to relieve more pressure on the ulcer.  He is to get new diabetic shoes when every his years is up between shoes.  Return in 2 months or as needed.      Wound Debridement    Date/Time: 4/4/2022 1:40 PM  Performed by: Tong Heaton DPM  Authorized by: Tong Heaton DPM     Time out: Immediately prior to procedure a "time out" was called to verify the correct patient, procedure, equipment, support staff and site/side marked as required.    Consent Done?:  Yes (Verbal)    Preparation: Patient was prepped and draped in usual sterile fashion    Local anesthesia used?: No      Wound Details:    Location:  Right foot    Location:  Right 5th Metatarsal Head    Type of Debridement:  Excisional       Length (cm):  4       Area (sq cm):  16       Width (cm):  4       Percent Debrided (%):  85       Depth (cm):  0.2       Total Area Debrided (sq cm):  13.6    Depth of debridement:  Epidermis/Dermis    Tissue debrided:  Dermis, Epidermis and Subcutaneous    Devitalized tissue debrided:  Biofilm, Callus, Fibrin, " Necrotic/Eschar and Slough    Instruments:  Forceps, Blade and Nippers    Bleeding:  Minimal  Hemostasis Achieved: Yes    Method Used:  Pressure  Patient tolerance:  Patient tolerated the procedure well with no immediate complications     Continue daily dressing changes applying topical antibiotic ointment.  Spenco flat inner sole hole cut out to relieve pressure on the ulcer placed in diabetic shoe today.  Patient return in 2 months or as needed              Counseling:     I provided patient education verbally regarding:   Patient diagnosis, treatment options, as well as alternatives, risks, and benefits.     This note was created using Dragon voice recognition software that occasionally misinterpreted phrases or words.

## 2022-04-09 LAB
6MAM UR QL: NOT DETECTED
7AMINOCLONAZEPAM UR QL: NOT DETECTED
A-OH ALPRAZ UR QL: NOT DETECTED
ALPHA-OH-MIDAZOLAM: NOT DETECTED
ALPRAZ UR QL: NOT DETECTED
AMPHET UR QL SCN: NOT DETECTED
ANNOTATION COMMENT IMP: NORMAL
ANNOTATION COMMENT IMP: NORMAL
BARBITURATES UR QL: NOT DETECTED
BUPRENORPHINE UR QL: NOT DETECTED
BZE UR QL: NOT DETECTED
CARBOXYTHC UR QL: NOT DETECTED
CARISOPRODOL UR QL: NOT DETECTED
CLONAZEPAM UR QL: NOT DETECTED
CODEINE UR QL: NOT DETECTED
CREAT UR-MCNC: 47.8 MG/DL (ref 20–400)
DIAZEPAM UR QL: NOT DETECTED
ETHYL GLUCURONIDE UR QL: NOT DETECTED
FENTANYL UR QL: NOT DETECTED
GABAPENTIN: PRESENT
HYDROCODONE UR QL: NOT DETECTED
HYDROMORPHONE UR QL: NOT DETECTED
LORAZEPAM UR QL: NOT DETECTED
MDA UR QL: NOT DETECTED
MDEA UR QL: NOT DETECTED
MDMA UR QL: NOT DETECTED
ME-PHENIDATE UR QL: NOT DETECTED
METHADONE UR QL: NOT DETECTED
METHAMPHET UR QL: NOT DETECTED
MIDAZOLAM UR QL SCN: NOT DETECTED
MORPHINE UR QL: NOT DETECTED
NALOXONE: NOT DETECTED
NORBUPRENORPHINE UR QL CFM: NOT DETECTED
NORDIAZEPAM UR QL: NOT DETECTED
NORFENTANYL UR QL: NOT DETECTED
NORHYDROCODONE UR QL CFM: NOT DETECTED
NORMEPERIDINE UR QL CFM: NOT DETECTED
NOROXYCODONE UR QL CFM: NOT DETECTED
NOROXYMORPHONE UR QL SCN: NOT DETECTED
OXAZEPAM UR QL: NOT DETECTED
OXYCODONE UR QL: NOT DETECTED
OXYMORPHONE UR QL: NOT DETECTED
PATHOLOGY STUDY: NORMAL
PCP UR QL: NOT DETECTED
PHENTERMINE UR QL: NOT DETECTED
PREGABALIN: NOT DETECTED
SERVICE CMNT-IMP: NORMAL
TAPENTADOL UR QL SCN: NOT DETECTED
TAPENTADOL UR QL SCN: NOT DETECTED
TEMAZEPAM UR QL: NOT DETECTED
TRAMADOL UR QL: PRESENT
ZOLPIDEM METABOLITE: NOT DETECTED
ZOLPIDEM UR QL: NOT DETECTED

## 2022-04-28 ENCOUNTER — OFFICE VISIT (OUTPATIENT)
Dept: PODIATRY | Facility: CLINIC | Age: 62
End: 2022-04-28
Payer: MEDICARE

## 2022-04-28 VITALS — HEART RATE: 80 BPM | HEIGHT: 73 IN | WEIGHT: 257 LBS | BODY MASS INDEX: 34.06 KG/M2 | OXYGEN SATURATION: 94 %

## 2022-04-28 DIAGNOSIS — L60.2 OG (ONYCHOGRYPHOSIS): ICD-10-CM

## 2022-04-28 DIAGNOSIS — L97.511 ULCER OF RIGHT FOOT, LIMITED TO BREAKDOWN OF SKIN: Primary | ICD-10-CM

## 2022-04-28 DIAGNOSIS — L97.512 SKIN ULCER OF RIGHT FOOT WITH FAT LAYER EXPOSED: ICD-10-CM

## 2022-04-28 DIAGNOSIS — I73.9 PERIPHERAL VASCULAR DISEASE: ICD-10-CM

## 2022-04-28 DIAGNOSIS — E11.49 TYPE II DIABETES MELLITUS WITH NEUROLOGICAL MANIFESTATIONS: ICD-10-CM

## 2022-04-28 DIAGNOSIS — M20.42 HAMMER TOES, BILATERAL: ICD-10-CM

## 2022-04-28 DIAGNOSIS — M20.41 HAMMER TOES, BILATERAL: ICD-10-CM

## 2022-04-28 DIAGNOSIS — I87.2 VENOUS STASIS DERMATITIS OF BOTH LOWER EXTREMITIES: ICD-10-CM

## 2022-04-28 PROCEDURE — 1160F RVW MEDS BY RX/DR IN RCRD: CPT | Mod: CPTII,S$GLB,, | Performed by: PODIATRIST

## 2022-04-28 PROCEDURE — 97597 DBRDMT OPN WND 1ST 20 CM/<: CPT | Mod: S$GLB,,, | Performed by: PODIATRIST

## 2022-04-28 PROCEDURE — 1159F MED LIST DOCD IN RCRD: CPT | Mod: CPTII,S$GLB,, | Performed by: PODIATRIST

## 2022-04-28 PROCEDURE — 1160F PR REVIEW ALL MEDS BY PRESCRIBER/CLIN PHARMACIST DOCUMENTED: ICD-10-PCS | Mod: CPTII,S$GLB,, | Performed by: PODIATRIST

## 2022-04-28 PROCEDURE — 99499 NO LOS: ICD-10-PCS | Mod: S$GLB,,, | Performed by: PODIATRIST

## 2022-04-28 PROCEDURE — 97597 WOUND DEBRIDEMENT: ICD-10-PCS | Mod: S$GLB,,, | Performed by: PODIATRIST

## 2022-04-28 PROCEDURE — 3008F PR BODY MASS INDEX (BMI) DOCUMENTED: ICD-10-PCS | Mod: CPTII,S$GLB,, | Performed by: PODIATRIST

## 2022-04-28 PROCEDURE — 3008F BODY MASS INDEX DOCD: CPT | Mod: CPTII,S$GLB,, | Performed by: PODIATRIST

## 2022-04-28 PROCEDURE — 1159F PR MEDICATION LIST DOCUMENTED IN MEDICAL RECORD: ICD-10-PCS | Mod: CPTII,S$GLB,, | Performed by: PODIATRIST

## 2022-04-28 PROCEDURE — 99499 UNLISTED E&M SERVICE: CPT | Mod: S$GLB,,, | Performed by: PODIATRIST

## 2022-04-28 RX ORDER — FENOFIBRATE 145 MG/1
145 TABLET, FILM COATED ORAL DAILY
Status: ON HOLD | COMMUNITY
Start: 2022-04-18 | End: 2022-09-08 | Stop reason: HOSPADM

## 2022-04-28 NOTE — PROGRESS NOTES
"  [address] [facility] [address] [place] [address]  Phone: [phone]   [phone]    Patient's PCP:Primary Doctor No  Referring Provider: No ref. provider found    Subjective:      Chief Complaint:: Foot Ulcer (Right 5th MPJ)    CAROLIN Bullard is a 62 y.o. male who presents today for a follow up  with a complaint of right 5th mpj ulcer. Patient states the ulcer has started bleeding again since the last visit on 4/4/2022. He presents today with the wound dressed in a band-aid with antibiotic ointment. He states he has been using the [place] flat insoles with accomodations.        Systemic Doctor: Shauna Hand PA-C  Date Last Seen: 04/04/2022  Blood Sugar: 146  Hemoglobin A1c: 7.2    Vitals:    04/28/22 1528   Pulse: 80   SpO2: (!) 94%   Weight: 116.6 kg (257 lb)   Height: 6' 1" (1.854 m)   PainSc: 0-No pain      Shoe Size:     Past Surgical History:   Procedure Laterality Date    CARDIAC PACEMAKER PLACEMENT  12/2012    CARDIAC PACEMAKER PLACEMENT  10/04/2018    battery replacement    CORONARY ARTERY BYPASS GRAFT  06/2012    ESOPHAGOGASTRODUODENOSCOPY  01/31/2017    SAMARA FILTER PLACEMENT  2012    vena cava    LUMBAR SYMPATHETIC NERVE BLOCK Right 8/22/2019    Procedure: BLOCK, NERVE, SYMPATHETIC, LUMBAR;  Surgeon: Tashi Good MD;  [facility]: [facility];  Service: Pain Management;  Laterality: Right;  RIGHT SYMPATHETIC NERVE BLOCK     Past Medical History:   Diagnosis Date    AICD (automatic cardioverter/defibrillator) present     Anemia, chronic disease 7/27/2021    Anemia, unspecified 7/27/2021    Anxiety and depression 2012    CAD (coronary artery disease) 2012    Cardiomegaly 2016    CHF (congestive heart failure) 2012    Cholecystitis 2017    Complete heart block 2012    Diabetic foot ulcer     DVT (deep venous thrombosis) 2012    And pulmonary embolus    GERD (gastroesophageal reflux disease) 2010    Heparin induced thrombocytopenia     Hyperlipemia 2011    IDDM " "(insulin dependent diabetes mellitus) 1983    With neuropathy    Ischemic cardiomyopathy 2012    MI (myocardial infarction) 2012    Morbid obesity     MRSA (methicillin resistant Staphylococcus aureus) infection 01/19/2019    Noncompliance with therapeutic plan 2019    Normochromic normocytic anemia 7/27/2021    Osteomyelitis of right foot 01/2019    Pulmonary edema 2019    Respiratory failure 2019    Sepsis 01/2019    Due to cellulitis right leg    Sleep apnea 2013    Thrombocytopathy 2012    Venous stasis dermatitis of both lower extremities      Family History   Problem Relation Age of Onset    Arthritis Mother     Diabetes Mother     Cancer Father     Heart disease Father     Stroke Father         Social History:   Marital Status:   Alcohol History:  reports no history of alcohol use.  Tobacco History:  reports that he quit smoking about 10 years ago. His smoking use included cigarettes. He has a 76.00 pack-year smoking history. He has never used smokeless tobacco.  Drug History:  has no history on file for drug use.    Review of patient's allergies indicates:   Allergen Reactions    Vasopressin Anaphylaxis and Other (See Comments)     Increased pressure in his head; felt like his eyeballs and veins were going to pop out of his head.     Heparin Other (See Comments)     Other reaction(s): "depleted my blood platets"    Decreased platelet count    Heparin analogues Other (See Comments)     Depleted WBC count    Vasopressin analogues Other (See Comments)     "felt like eyes going to pop out of my head"    Morphine Hallucinations, Other (See Comments) and Anxiety     Other reaction(s): Hallucinations  Paranoid, hallucinations         Current Outpatient Medications   Medication Sig Dispense Refill    acetaminophen (TYLENOL) 500 mg Cap Take 500 mg by mouth.      albuterol-ipratropium (DUO-NEB) 2.5 mg-0.5 mg/3 mL nebulizer solution Inhale 3 mLs into the lungs every 6 (six) hours as " needed.      amiodarone (PACERONE) 200 MG Tab Take 200 mg by mouth once daily.      aspirin 81 MG Chew Take 81 mg by mouth once daily.      atorvastatin (LIPITOR) 10 MG tablet Take 1 tablet by mouth once daily.  1    BASAGLAR KWIKPEN U-100 INSULIN glargine 100 units/mL (3mL) SubQ pen 25 Units once daily.   3    carvediloL (COREG) 3.125 MG tablet Take 3.125 mg by mouth 2 (two) times daily.      CORLANOR 5 mg Tab Take by mouth nightly.      ENTRESTO 24-26 mg per tablet Take 1 tablet by mouth 2 (two) times daily.      FARXIGA 10 mg tablet Take 10 mg by mouth once daily.      fenofibrate (TRICOR) 145 MG tablet Take 145 mg by mouth once daily.      furosemide (LASIX) 40 MG tablet Take 1 tablet (40 mg total) by mouth once daily. 30 tablet 1    gabapentin (NEURONTIN) 600 MG tablet Take 1 tablet by mouth 3 (three) times daily.  0    JANUVIA 50 mg Tab       liraglutide 0.6 mg/0.1 mL, 18 mg/3 mL, subq PNIJ (VICTOZA 2-GRACIELA) 0.6 mg/0.1 mL (18 mg/3 mL) PnIj pen Victoza 3-Graciela 0.6 mg/0.1 mL (18 mg/3 mL) subcutaneous pen injector      mupirocin (BACTROBAN) 2 % ointment Apply topically once daily. 30 g 5    ofloxacin (OCUFLOX) 0.3 % ophthalmic solution       OZEMPIC 0.25 mg or 0.5 mg(2 mg/1.5 mL) pen injector Inject into the skin.      pantoprazole (PROTONIX) 40 MG tablet Take 1 tablet by mouth once daily.  0    prednisoLONE acetate (PRED FORTE) 1 % DrpS Place into both eyes.      semaglutide (OZEMPIC SUBQ) Inject into the skin.      spironolactone (ALDACTONE) 25 MG tablet Take 1 tablet by mouth once daily.  1    traMADoL (ULTRAM) 50 mg tablet Take 1 tablet (50 mg total) by mouth every 8 (eight) hours as needed for Pain. 90 tablet 2    vitamin B complex-folic acid 0.4 mg Tab Take 1 tablet by mouth.      XARELTO 2.5 mg Tab Take by mouth.       No current facility-administered medications for this visit.       Review of Systems   Constitutional: Negative for chills, fatigue, fever and unexpected weight change.    HENT: Negative for hearing loss and trouble swallowing.    Eyes: Negative for photophobia and visual disturbance.   Respiratory: Negative for cough, shortness of breath and wheezing.    Cardiovascular: Negative for chest pain, palpitations and leg swelling.   Gastrointestinal: Negative for abdominal pain and nausea.   Genitourinary: Negative for dysuria and frequency.   Musculoskeletal: Negative for arthralgias, back pain, gait problem, joint swelling and myalgias.   Skin: Positive for color change and wound. Negative for rash.   Neurological: Negative for tremors, seizures, weakness, numbness and headaches.   Hematological: Does not bruise/bleed easily.         Objective:        Physical Exam:   Foot Exam    General  General Appearance: appears stated age and healthy   Orientation: alert and oriented to person, place, and time   Affect: appropriate   Gait: antalgic       Right Foot/Ankle     Inspection and Palpation  Right foot ecchymosis: subdermal 5th metahead by ulcer.  Tenderness: none   Swelling: (Plantar 5th metahead without fluctuance or lymphangitis or erythema)  Arch: pes planus  Skin Exam: ulcer (superficial grade 2 ulcer plantar right 5th metahead 3 cm x .3cm deep diameter without infection);   Neurovascular  Dorsalis pedis: 1+  Posterior tibial: 2+  Capillary Refill: 2+  Saphenous nerve sensation: absent  Tibial nerve sensation: absent  Superficial peroneal nerve sensation: absent  Deep peroneal nerve sensation: absent  Sural nerve sensation: absent          Physical Exam  Cardiovascular:      Pulses:           Dorsalis pedis pulses are 1+ on the right side.        Posterior tibial pulses are 2+ on the right side.   Musculoskeletal:        Feet:    Feet:      Right foot:      Skin integrity: Ulcer (superficial grade 2 ulcer plantar right 5th metahead 3 cm x .3cm deep diameter without infection) present.                   Vascular Exam     Right Pulses  Dorsalis Pedis:      1+  Posterior Tibial:       "2+             Imaging:            Assessment:       1. Type II diabetes mellitus with neurological manifestations    2. Ulcer of right foot, limited to breakdown of skin - Right Foot    3. Skin ulcer of right foot with fat layer exposed    4. Hammer toes, bilateral    5. Venous stasis dermatitis of both lower extremities    6. Peripheral vascular disease      Plan:   Type II diabetes mellitus with neurological manifestations    Ulcer of right foot, limited to breakdown of skin - Right Foot    Skin ulcer of right foot with fat layer exposed    Hammer toes, bilateral    Venous stasis dermatitis of both lower extremities    Peripheral vascular disease    I evaluated the patient and debrided the ulcer.  He will continue local wound care with neosporin and dressing and accommodative pad in DM shoe to decrease the pressure on the ulcer. He will monitor for infection and will return in 4 weeks or as needed.      Wound Debridement    Date/Time: 4/28/2022 3:00 PM  Performed by: Tong Heaton DPM  Authorized by: Tong Heaton DPM     Time out: Immediately prior to procedure a "time out" was called to verify the correct patient, procedure, equipment, support staff and site/side marked as required.    Consent Done?:  Yes (Verbal)    Preparation: Patient was prepped and draped in usual sterile fashion    Local anesthesia used?: No      Wound Details:    Location:  Right foot    Location:  Right 5th Metatarsal Head    Type of Debridement:  Excisional       Length (cm):  2       Area (sq cm):  4       Width (cm):  2       Percent Debrided (%):  100       Depth (cm):  0.2       Total Area Debrided (sq cm):  4    Depth of debridement:  Epidermis/Dermis    Tissue debrided:  Epidermis and Dermis    Devitalized tissue debrided:  Callus, Fibrin and Slough    Instruments:  Forceps, Blade and Nippers    Bleeding:  Minimal  Hemostasis Achieved: Yes    Method Used:  Pressure  Patient tolerance:  Patient tolerated the procedure " well with no immediate complications     Daily dressing changes and local wound care and accommodative insert in shoe.              Counseling:     I provided patient education verbally regarding:   Patient diagnosis, treatment options, as well as alternatives, risks, and benefits.     This note was created using Dragon voice recognition software that occasionally misinterpreted phrases or words.

## 2022-04-28 NOTE — PATIENT INSTRUCTIONS

## 2022-07-05 ENCOUNTER — OFFICE VISIT (OUTPATIENT)
Dept: PAIN MEDICINE | Facility: CLINIC | Age: 62
End: 2022-07-05
Payer: MEDICARE

## 2022-07-05 VITALS
HEIGHT: 73 IN | BODY MASS INDEX: 34.06 KG/M2 | WEIGHT: 257 LBS | HEART RATE: 76 BPM | SYSTOLIC BLOOD PRESSURE: 117 MMHG | DIASTOLIC BLOOD PRESSURE: 72 MMHG

## 2022-07-05 DIAGNOSIS — E11.42 DIABETIC POLYNEUROPATHY ASSOCIATED WITH TYPE 2 DIABETES MELLITUS: ICD-10-CM

## 2022-07-05 DIAGNOSIS — Z79.891 CHRONIC USE OF OPIATE FOR THERAPEUTIC PURPOSE: Primary | ICD-10-CM

## 2022-07-05 DIAGNOSIS — M79.672 BILATERAL FOOT PAIN: ICD-10-CM

## 2022-07-05 DIAGNOSIS — M79.671 BILATERAL FOOT PAIN: ICD-10-CM

## 2022-07-05 PROCEDURE — 3074F PR MOST RECENT SYSTOLIC BLOOD PRESSURE < 130 MM HG: ICD-10-PCS | Mod: CPTII,S$GLB,, | Performed by: PHYSICIAN ASSISTANT

## 2022-07-05 PROCEDURE — 99999 PR PBB SHADOW E&M-EST. PATIENT-LVL IV: ICD-10-PCS | Mod: PBBFAC,,, | Performed by: PHYSICIAN ASSISTANT

## 2022-07-05 PROCEDURE — 3078F DIAST BP <80 MM HG: CPT | Mod: CPTII,S$GLB,, | Performed by: PHYSICIAN ASSISTANT

## 2022-07-05 PROCEDURE — 1159F MED LIST DOCD IN RCRD: CPT | Mod: CPTII,S$GLB,, | Performed by: PHYSICIAN ASSISTANT

## 2022-07-05 PROCEDURE — 99214 OFFICE O/P EST MOD 30 MIN: CPT | Mod: S$GLB,,, | Performed by: PHYSICIAN ASSISTANT

## 2022-07-05 PROCEDURE — 99999 PR PBB SHADOW E&M-EST. PATIENT-LVL IV: CPT | Mod: PBBFAC,,, | Performed by: PHYSICIAN ASSISTANT

## 2022-07-05 PROCEDURE — 3008F BODY MASS INDEX DOCD: CPT | Mod: CPTII,S$GLB,, | Performed by: PHYSICIAN ASSISTANT

## 2022-07-05 PROCEDURE — 3074F SYST BP LT 130 MM HG: CPT | Mod: CPTII,S$GLB,, | Performed by: PHYSICIAN ASSISTANT

## 2022-07-05 PROCEDURE — 3078F PR MOST RECENT DIASTOLIC BLOOD PRESSURE < 80 MM HG: ICD-10-PCS | Mod: CPTII,S$GLB,, | Performed by: PHYSICIAN ASSISTANT

## 2022-07-05 PROCEDURE — 99214 PR OFFICE/OUTPT VISIT, EST, LEVL IV, 30-39 MIN: ICD-10-PCS | Mod: S$GLB,,, | Performed by: PHYSICIAN ASSISTANT

## 2022-07-05 PROCEDURE — 1159F PR MEDICATION LIST DOCUMENTED IN MEDICAL RECORD: ICD-10-PCS | Mod: CPTII,S$GLB,, | Performed by: PHYSICIAN ASSISTANT

## 2022-07-05 PROCEDURE — 3008F PR BODY MASS INDEX (BMI) DOCUMENTED: ICD-10-PCS | Mod: CPTII,S$GLB,, | Performed by: PHYSICIAN ASSISTANT

## 2022-07-05 RX ORDER — TRAMADOL HYDROCHLORIDE 50 MG/1
50 TABLET ORAL EVERY 8 HOURS PRN
Qty: 90 TABLET | Refills: 2 | Status: SHIPPED | OUTPATIENT
Start: 2022-07-05 | End: 2022-08-03

## 2022-07-05 NOTE — PROGRESS NOTES
Referring Physician: No ref. provider found    PCP: Primary Doctor No      CC:  Bilateral foot pain      Interval history:  Mr. Bullard is a 62 y.o. male with bilateral foot pain due to peripheral neuropathy diabetic neuropathy.   Foot pain remains tolerable. Stabbing pains resolved. He continues to take Gabapentin 600 mg t.i.d. with mild benefits. Nortriptyline 25 mg made him to drowsy so he discontinued it.  He continues to take tramadol 50 mg q.8 hours as needed with mild-to-moderate benefit.  Denies any side effects with the medication.  Able perform his ADLs with the medication.  He underwent a right-sided lumbar sympathetic nerve block which provided moderate benefit that was shortlived.  He had Covid in September. He developed bilateral knee pain. Right intra articular knee injection with benefit. He denies any worsening weakness.  No bowel bladder changes. Pain today is rated 5/10.    Prior HPI:   Ana Bullard is a 62 y.o. male referred to us for bilateral foot pain. Patient with long history of diabetes.  He states neuropathy worsen after his CABG.  Bilateral foot pain has worsened over past 7 years.  He presents to us constant burning, sharp pain in his bilateral feet.  He also has some similar issues in his bilateral hands, but foot pain is worse.  Pain worsens prolonged sitting and standing.  He currently takes gabapentin 600 mg t.i.d. with mild benefits.  Increasing doses causes sedation.  He also takes tramadol q.8 hours as needed with mild-to-moderate benefit.  He denies any worsening weakness.  No bowel bladder changes.    Interventional history:  Right LSB on 09/2019 with 50% relief for 2 weeks.    ROS:  CONSTITUTIONAL: No fevers, chills, night sweats, wt. loss, appetite changes  SKIN: no rashes or itching  ENT: No headaches, head trauma, vision changes, or eye pain  LYMPH NODES: None noticed   CV: No chest pain, palpitations.   RESP: No shortness of breath, dyspnea on exertion, cough,  wheezing, or hemoptysis  GI: No nausea, emesis, diarrhea, constipation, melena, hematochezia, pain.    : No dysuria, hematuria, urgency, or frequency   HEME: No easy bruising, bleeding problems  PSYCHIATRIC: No depression, anxiety, psychosis, hallucinations.  NEURO: No seizures, memory loss, dizziness or difficulty sleeping  MSK:  Positive HPI      Past Medical History:   Diagnosis Date    AICD (automatic cardioverter/defibrillator) present     Anemia, chronic disease 7/27/2021    Anemia, unspecified 7/27/2021    Anxiety and depression 2012    CAD (coronary artery disease) 2012    Cardiomegaly 2016    CHF (congestive heart failure) 2012    Cholecystitis 2017    Complete heart block 2012    Diabetic foot ulcer     DVT (deep venous thrombosis) 2012    And pulmonary embolus    GERD (gastroesophageal reflux disease) 2010    Heparin induced thrombocytopenia     Hyperlipemia 2011    IDDM (insulin dependent diabetes mellitus) 1983    With neuropathy    Ischemic cardiomyopathy 2012    MI (myocardial infarction) 2012    Morbid obesity     MRSA (methicillin resistant Staphylococcus aureus) infection 01/19/2019    Noncompliance with therapeutic plan 2019    Normochromic normocytic anemia 7/27/2021    Osteomyelitis of right foot 01/2019    Pulmonary edema 2019    Respiratory failure 2019    Sepsis 01/2019    Due to cellulitis right leg    Sleep apnea 2013    Thrombocytopathy 2012    Venous stasis dermatitis of both lower extremities      Past Surgical History:   Procedure Laterality Date    CARDIAC PACEMAKER PLACEMENT  12/2012    CARDIAC PACEMAKER PLACEMENT  10/04/2018    battery replacement    CORONARY ARTERY BYPASS GRAFT  06/2012    ESOPHAGOGASTRODUODENOSCOPY  01/31/2017    SAMARA FILTER PLACEMENT  2012    vena cava    LUMBAR SYMPATHETIC NERVE BLOCK Right 8/22/2019    Procedure: BLOCK, NERVE, SYMPATHETIC, LUMBAR;  Surgeon: Tashi Godo MD;  Location: [facility] OR;  Service: Pain Management;   "Laterality: Right;  RIGHT SYMPATHETIC NERVE BLOCK     Family History   Problem Relation Age of Onset    Arthritis Mother     Diabetes Mother     Cancer Father     Heart disease Father     Stroke Father      Social History     Socioeconomic History    Marital status:    Tobacco Use    Smoking status: Former Smoker     Packs/day: 2.00     Years: 38.00     Pack years: 76.00     Types: Cigarettes     Quit date: 2012     Years since quitting: 10.5    Smokeless tobacco: Never Used   Substance and Sexual Activity    Alcohol use: No    Sexual activity: Never         Medications/Allergies: See med card  Vitals:    07/05/22 0824   BP: 117/72   Pulse: 76   Weight: 116.6 kg (257 lb)   Height: 6' 1" (1.854 m)   PainSc:   5   PainLoc: Foot       GENERAL: A and O x3, the patient appears well groomed and is in no acute distress.  Skin: No rashes or obvious lesions  HEENT: normocephalic, atraumatic  CARDIOVASCULAR:  RRR  LUNGS: non labored breathing  ABDOMEN: soft, nontender   UPPER EXTREMITIES: Normal alignment, normal range of motion, no atrophy, no skin changes,  hair growth and nail growth normal and equal bilaterally. No swelling, no tenderness.    LOWER EXTREMITIES:  Normal alignment, no atrophy, no skin changes,  hair growth and nail growth normal and equal bilaterally. No swelling. Tenderness to right lateral patella.   LUMBAR SPINE  Lumbar spine: ROM is limited with flexion extension and oblique extension with moderate increased pain.    Samuel's test causes no increased pain on either side.    Supine straight leg raise is negative bilaterally.    Internal and external rotation of the hip causes no increased pain on either side.  Myofascial exam: No tenderness to palpation across lumbar paraspinous muscles.        MENTAL STATUS: normal orientation, speech, language, and fund of knowledge for social situation.  Emotional state appropriate.     CRANIAL NERVES:  II:         PERRL bilaterally,   III,IV,VI: " EOMI.    V:         Facial sensation equal bilaterally  VII:       Facial motor function normal.  VIII:      Hearing equal to finger rub bilaterally  IX/X:    Gag normal, palate symmetric  XI:        Shoulder shrug equal, head turn equal  XII:       Tongue midline without fasciculations        MOTOR: Tone and bulk: normal bilateral upper and lower Strength: normal   Delt      Bi         Tri        WE      WF                        R          5          5          5          5          5          5            L          5          5          5          5          5          5               IP         ADD     ABD     Quad   TA        Gas      HAM  R          5          5          5          5          5          5          5  L          5          5          5          5          5          5          5     SENSATION:  Decreased globally of bilateral feet  REFLEXES: normal, symmetric, nonbrisk.  Toes down, no clonus. No hoffmans.  GAIT: normal rise, base, steps, and arm swing.      Imaging:  None    Assessment:  Ana Bullard is a 62 y.o. male with bilateral foot pain  1. Chronic use of opiate for therapeutic purpose    2. Diabetic polyneuropathy associated with type 2 diabetes mellitus    3. Bilateral foot pain        Plan:  1. I have stressed the importance of physical activity and exercise to improve overall health  2.  Discussed disease progression of peripheral neuropathy.  Continue gabapentin prescribed.  Consider nortriptyline 10 mg in the future  3.  He does request continue tramadol with us.  I believe this is very reasonable.   reviewed.  He takes tramadol 50 mg q.8 hours as needed.  Previous UDS consistent. UDS LCV consistent  4.  May consider repeat lumbar sympathetic block if pain is exacerbated.  Briefly discussed spinal cord stimulation as well.  5.  Follow-up in 3 months  Medication management provided by  Dr. Good

## 2022-07-05 NOTE — PATIENT INSTRUCTIONS
Your Diabetes Foot Care Program    Every day you depend on your feet to keep you moving. But when you have diabetes, your feet need special care. Even a small foot problem can become very serious. So dont take your feet for granted. By working with your diabetes healthcare team, you can learn how to protect your feet and keep them healthy.  Evaluating your feet  An evaluation helps your healthcare provider check the condition of your feet. The evaluation includes a review of your diabetes history and overall health. It may also include a foot exam, X-rays, or other tests. These can help show problems beneath the skin that you cant see or feel.  Medical history  You will be asked about your overall health and any history of foot problems. Youll also discuss your diabetes history, such as whether your blood sugar level has changed over time. It also includes questions about sensations of pain, tingling, pins and needles, or numbness. Your healthcare provider will also want to know if you have high blood pressure and heart disease, or if you smoke. Be sure to mention any medicines (including over-the-counter), supplements, or herbal remedies you take.  Foot exam  A foot exam checks the condition of different parts of your foot. First, your skin and nails are examined for any signs of infection. Blood flow is checked by feeling for the pulses in each foot. You may also have tests to study the nerves in the foot. These include using a small filament (wire) to see how sensitive your feet are. In certain cases, you will be asked to walk a short distance to check for bone, joint, and muscle problems.  Diagnostic tests  If needed, your healthcare provider will suggest certain tests to learn more about your feet. These include:  Doppler tests to measure blood flow in the feet and lower leg.  X-rays, which can show bone or joint problems.  Other imaging tests, such as an MRI (magnetic resonance imaging), bone scan, and CT  (computed tomography) scan. These can help show bone infections.  Other tests, such as vascular tests, which study the blood flow in your feet and legs. You may also have nerve studies to learn how sensitive your feet are.  Creating a foot care program  Based on the evaluation, your healthcare provider will create a foot care program for you. Your program may be as simple as starting a daily self-care routine and changing the types of shoes your wear. It may also involve treating minor foot problems, such as a corn or blister. In some cases, surgery will be needed to treat an infection or mechanical problems, such as hammer toes.  Preventing problems  When you have diabetes, its easier to prevent problems than to treat them later on. So see your healthcare team for regular checkups and foot care. Your healthcare team can also help you learn more about caring for your feet at home. For example, you may be told to avoid walking barefoot. Or you may be told that special footwear is needed to protect your feet.  Have regular checkups  Foot problems can develop quickly. So be sure to follow your healthcare teams schedule for regular checkups. During office visits, take off your shoes and socks as soon as you get in the exam room. Ask your healthcare provider to examine your feet for problems. This will make it easier to find and treat small skin irritations before they get worse. Regular checkups can also help keep track of the blood flow and feeling in your feet. If you have neuropathy (lack of feeling in your feet), you will need to have checkups more often.  Learn about self-care  The more you know about diabetes and your feet, the easier it will be to prevent problems. Members of your healthcare team can teach you how to inspect your feet and teach you to look for warning signs. They can also give you other foot care tips. During office visits, be sure to ask any questions you have.  Date Last Reviewed: 7/1/2016  ©  3358-8923 [employer]. [address] [place] [address]. All rights reserved. This information is not intended as a substitute for professional medical care. Always follow your healthcare professional's instructions.       What Are Mallet, Hammer, and Claw Toes?  Mallet, hammer, and claw toes are most often caused by wearing shoes that are too short or heels that are too high. This jams the toes against the front of the shoe and causes one or more joints to bend. Rarely, disease can cause the joints in the toes to bend. Mallet, hammer, and claw toes are among the most common toe problems. They occur most often in the longest of the four smaller toes.      Inside your toes  There are 3 bones in each of your 4 smaller toes. Where 2 bones connect is called a joint. Normally the toes lie flat. But pressure on the toes or the front of the foot can cause one or more joints to bend. This curls the toe. Toes that stay curled are called mallet toes, hammer toes, or claw toes, depending on which joints are bent.    Symptoms  You may feel pain in the toe or in the ball of your foot. A corn (a hard growth of skin on the top of the toe) may form where the toe rubs against the top of the shoe. Or a callus (a hard growth of skin on the bottom of the foot) may form under the tip of the toe or on the ball of the foot. Corns and calluses can also be painful.    Date Last Reviewed: 10/18/2015  © 4650-1382 [employer]. [address] [place] [id number]. All rights reserved. This information is not intended as a substitute for professional medical care. Always follow your healthcare professional's instructions.

## 2022-07-07 ENCOUNTER — OFFICE VISIT (OUTPATIENT)
Dept: PODIATRY | Facility: CLINIC | Age: 62
End: 2022-07-07
Payer: MEDICARE

## 2022-07-07 VITALS — RESPIRATION RATE: 16 BRPM | BODY MASS INDEX: 35.92 KG/M2 | WEIGHT: 271 LBS | HEIGHT: 73 IN

## 2022-07-07 DIAGNOSIS — M20.41 HAMMER TOES, BILATERAL: ICD-10-CM

## 2022-07-07 DIAGNOSIS — L97.511 ULCER OF RIGHT FOOT, LIMITED TO BREAKDOWN OF SKIN: Primary | ICD-10-CM

## 2022-07-07 DIAGNOSIS — M20.42 HAMMER TOES, BILATERAL: ICD-10-CM

## 2022-07-07 DIAGNOSIS — I87.2 VENOUS STASIS DERMATITIS OF BOTH LOWER EXTREMITIES: ICD-10-CM

## 2022-07-07 DIAGNOSIS — I73.9 PERIPHERAL VASCULAR DISEASE: ICD-10-CM

## 2022-07-07 DIAGNOSIS — E11.49 TYPE II DIABETES MELLITUS WITH NEUROLOGICAL MANIFESTATIONS: ICD-10-CM

## 2022-07-07 DIAGNOSIS — L60.2 OG (ONYCHOGRYPHOSIS): ICD-10-CM

## 2022-07-07 PROCEDURE — 3008F PR BODY MASS INDEX (BMI) DOCUMENTED: ICD-10-PCS | Mod: CPTII,S$GLB,, | Performed by: PODIATRIST

## 2022-07-07 PROCEDURE — 11042 DBRDMT SUBQ TIS 1ST 20SQCM/<: CPT | Mod: S$GLB,,, | Performed by: PODIATRIST

## 2022-07-07 PROCEDURE — 99499 UNLISTED E&M SERVICE: CPT | Mod: S$GLB,,, | Performed by: PODIATRIST

## 2022-07-07 PROCEDURE — 1159F PR MEDICATION LIST DOCUMENTED IN MEDICAL RECORD: ICD-10-PCS | Mod: CPTII,S$GLB,, | Performed by: PODIATRIST

## 2022-07-07 PROCEDURE — 1159F MED LIST DOCD IN RCRD: CPT | Mod: CPTII,S$GLB,, | Performed by: PODIATRIST

## 2022-07-07 PROCEDURE — 3008F BODY MASS INDEX DOCD: CPT | Mod: CPTII,S$GLB,, | Performed by: PODIATRIST

## 2022-07-07 PROCEDURE — 11042 WOUND DEBRIDEMENT: ICD-10-PCS | Mod: S$GLB,,, | Performed by: PODIATRIST

## 2022-07-07 PROCEDURE — 1160F RVW MEDS BY RX/DR IN RCRD: CPT | Mod: CPTII,S$GLB,, | Performed by: PODIATRIST

## 2022-07-07 PROCEDURE — 99499 NO LOS: ICD-10-PCS | Mod: S$GLB,,, | Performed by: PODIATRIST

## 2022-07-07 PROCEDURE — 1160F PR REVIEW ALL MEDS BY PRESCRIBER/CLIN PHARMACIST DOCUMENTED: ICD-10-PCS | Mod: CPTII,S$GLB,, | Performed by: PODIATRIST

## 2022-07-07 NOTE — PROGRESS NOTES
"  [address] [facility] [address]  [place] [address]  Phone: [phone]   [phone]    Patient's PCP:Primary Doctor No  Referring Provider: No ref. provider found    Subjective:      Chief Complaint:: Follow-up (Ulcer right 5th metatarsal head)    CAROLIN Bullard is a 62 y.o. male who presents today for a follow up of right 5th mpj ulcer.  He presents today with the wound un-dressed. States he has been applying ointment to the wound when it drains/bleeds, otherwise, has been letting the wound air out. He states he has been using the [employer] flat insoles with accomodations.         Systemic Doctor: Shauna Hand PA-C  Date Last Seen: 07/05/2022  Blood Sugar: 160  Hemoglobin A1c: 7.2    Vitals:    07/07/22 1342   Resp: 16   Weight: 122.9 kg (271 lb)   Height: 6' 1" (1.854 m)   PainSc: 0-No pain      Shoe Size: 12    Past Surgical History:   Procedure Laterality Date    CARDIAC PACEMAKER PLACEMENT  12/2012    CARDIAC PACEMAKER PLACEMENT  10/04/2018    battery replacement    CORONARY ARTERY BYPASS GRAFT  06/2012    ESOPHAGOGASTRODUODENOSCOPY  01/31/2017    SAMARA FILTER PLACEMENT  2012    vena cava    LUMBAR SYMPATHETIC NERVE BLOCK Right 8/22/2019    Procedure: BLOCK, NERVE, SYMPATHETIC, LUMBAR;  Surgeon: Tashi Good MD;  Location: [facility];  Service: Pain Management;  Laterality: Right;  RIGHT SYMPATHETIC NERVE BLOCK     Past Medical History:   Diagnosis Date    AICD (automatic cardioverter/defibrillator) present     Anemia, chronic disease 7/27/2021    Anemia, unspecified 7/27/2021    Anxiety and depression 2012    CAD (coronary artery disease) 2012    Cardiomegaly 2016    CHF (congestive heart failure) 2012    Cholecystitis 2017    Complete heart block 2012    Diabetic foot ulcer     DVT (deep venous thrombosis) 2012    And pulmonary embolus    GERD (gastroesophageal reflux disease) 2010    Heparin induced thrombocytopenia     Hyperlipemia 2011    IDDM (insulin dependent " "diabetes mellitus) 1983    With neuropathy    Ischemic cardiomyopathy 2012    MI (myocardial infarction) 2012    Morbid obesity     MRSA (methicillin resistant Staphylococcus aureus) infection 01/19/2019    Noncompliance with therapeutic plan 2019    Normochromic normocytic anemia 7/27/2021    Osteomyelitis of right foot 01/2019    Pulmonary edema 2019    Respiratory failure 2019    Sepsis 01/2019    Due to cellulitis right leg    Sleep apnea 2013    Thrombocytopathy 2012    Venous stasis dermatitis of both lower extremities      Family History   Problem Relation Age of Onset    Arthritis Mother     Diabetes Mother     Cancer Father     Heart disease Father     Stroke Father         Social History:   Marital Status:   Alcohol History:  reports no history of alcohol use.  Tobacco History:  reports that he quit smoking about 10 years ago. His smoking use included cigarettes. He has a 76.00 pack-year smoking history. He has never used smokeless tobacco.  Drug History:  has no history on file for drug use.    Review of patient's allergies indicates:   Allergen Reactions    Vasopressin Anaphylaxis and Other (See Comments)     Increased pressure in his head; felt like his eyeballs and veins were going to pop out of his head.     Heparin Other (See Comments)     Other reaction(s): "depleted my blood platets"    Decreased platelet count    Heparin analogues Other (See Comments)     Depleted WBC count    Vasopressin analogues Other (See Comments)     "felt like eyes going to pop out of my head"    Morphine Hallucinations, Other (See Comments) and Anxiety     Other reaction(s): Hallucinations  Paranoid, hallucinations         Current Outpatient Medications   Medication Sig Dispense Refill    acetaminophen (TYLENOL) 500 mg Cap Take 500 mg by mouth.      albuterol-ipratropium (DUO-NEB) 2.5 mg-0.5 mg/3 mL nebulizer solution Inhale 3 mLs into the lungs every 6 (six) hours as needed.      " amiodarone (PACERONE) 200 MG Tab Take 200 mg by mouth once daily.      aspirin 81 MG Chew Take 81 mg by mouth once daily.      atorvastatin (LIPITOR) 10 MG tablet Take 1 tablet by mouth once daily.  1    BASAGLAR KWIKPEN U-100 INSULIN glargine 100 units/mL (3mL) SubQ pen 25 Units once daily.   3    carvediloL (COREG) 3.125 MG tablet Take 3.125 mg by mouth 2 (two) times daily.      CORLANOR 5 mg Tab Take by mouth nightly.      ENTRESTO 24-26 mg per tablet Take 1 tablet by mouth 2 (two) times daily.      FARXIGA 10 mg tablet Take 10 mg by mouth once daily.      fenofibrate (TRICOR) 145 MG tablet Take 145 mg by mouth once daily.      furosemide (LASIX) 40 MG tablet Take 1 tablet (40 mg total) by mouth once daily. 30 tablet 1    gabapentin (NEURONTIN) 600 MG tablet Take 1 tablet by mouth 3 (three) times daily.  0    JANUVIA 50 mg Tab       liraglutide 0.6 mg/0.1 mL, 18 mg/3 mL, subq PNIJ (VICTOZA 2-GRACIELA) 0.6 mg/0.1 mL (18 mg/3 mL) PnIj pen Victoza 3-Graciela 0.6 mg/0.1 mL (18 mg/3 mL) subcutaneous pen injector      mupirocin (BACTROBAN) 2 % ointment Apply topically once daily. 30 g 5    ofloxacin (OCUFLOX) 0.3 % ophthalmic solution       OZEMPIC 0.25 mg or 0.5 mg(2 mg/1.5 mL) pen injector Inject into the skin.      pantoprazole (PROTONIX) 40 MG tablet Take 1 tablet by mouth once daily.  0    prednisoLONE acetate (PRED FORTE) 1 % DrpS Place into both eyes.      semaglutide (OZEMPIC SUBQ) Inject into the skin.      spironolactone (ALDACTONE) 25 MG tablet Take 1 tablet by mouth once daily.  1    traMADoL (ULTRAM) 50 mg tablet Take 1 tablet (50 mg total) by mouth every 8 (eight) hours as needed for Pain. 90 tablet 2    vitamin B complex-folic acid 0.4 mg Tab Take 1 tablet by mouth.      XARELTO 2.5 mg Tab Take by mouth.       No current facility-administered medications for this visit.       Review of Systems   Constitutional: Negative for chills, fatigue, fever and unexpected weight change.   HENT: Negative  for hearing loss and trouble swallowing.    Eyes: Negative for photophobia and visual disturbance.   Respiratory: Negative for cough, shortness of breath and wheezing.    Cardiovascular: Negative for chest pain, palpitations and leg swelling.   Gastrointestinal: Negative for abdominal pain and nausea.   Genitourinary: Negative for dysuria and frequency.   Musculoskeletal: Negative for arthralgias, back pain, gait problem, joint swelling and myalgias.   Skin: Positive for wound. Negative for rash.   Neurological: Positive for weakness. Negative for tremors, seizures, numbness and headaches.   Hematological: Bruises/bleeds easily.         Objective:        Physical Exam:   Foot Exam    General  General Appearance: appears stated age and healthy   Orientation: alert and oriented to person, place, and time   Affect: appropriate   Gait: antalgic       Right Foot/Ankle     Inspection and Palpation  Skin Exam: ulcer (Superficial grade 2 ulcer plantar 5th metatarsal head ulcer with 8 mm in diameter 2 mm deep with perimeter of grade 1 ulcer callus slough no signs of infection);   Neurovascular  Dorsalis pedis: 1+  Posterior tibial: 1+  Saphenous nerve sensation: diminished  Tibial nerve sensation: diminished  Superficial peroneal nerve sensation: diminished  Deep peroneal nerve sensation: diminished  Sural nerve sensation: diminished          Physical Exam  Cardiovascular:      Pulses:           Dorsalis pedis pulses are 1+ on the right side.        Posterior tibial pulses are 1+ on the right side.   Musculoskeletal:        Feet:    Feet:      Right foot:      Skin integrity: Ulcer (Superficial grade 2 ulcer plantar 5th metatarsal head ulcer with 8 mm in diameter 2 mm deep with perimeter of grade 1 ulcer callus slough no signs of infection) present.                   Vascular Exam     Right Pulses  Dorsalis Pedis:      1+  Posterior Tibial:      1+             Imaging:            Assessment:       1. Type II diabetes  "mellitus with neurological manifestations    2. Ulcer of right foot, limited to breakdown of skin - Right Foot    3. Hammer toes, bilateral    4. Venous stasis dermatitis of both lower extremities    5. Peripheral vascular disease    6. OG (onychogryphosis)      Plan:   Type II diabetes mellitus with neurological manifestations    Ulcer of right foot, limited to breakdown of skin - Right Foot    Hammer toes, bilateral    Venous stasis dermatitis of both lower extremities    Peripheral vascular disease    OG (onychogryphosis)    Continue local wound care accommodative inserts in his diabetic shoes return in 3 months or as needed.      Wound Debridement    Date/Time: 7/7/2022 1:40 PM  Performed by: Tong Heaton DPM  Authorized by: Tong Heaton DPM     Time out: Immediately prior to procedure a "time out" was called to verify the correct patient, procedure, equipment, support staff and site/side marked as required.    Consent Done?:  Yes (Verbal)    Preparation: Patient was prepped and draped in usual sterile fashion    Local anesthesia used?: No      Wound Details:    Location:  Right foot    Location:  Right 5th Metatarsal Head    Type of Debridement:  Excisional       Length (cm):  1       Area (sq cm):  1       Width (cm):  1       Percent Debrided (%):  100       Depth (cm):  0.2       Total Area Debrided (sq cm):  1    Depth of debridement:  Subcutaneous tissue    Tissue debrided:  Dermis, Epidermis and Subcutaneous    Devitalized tissue debrided:  Biofilm, Callus, Fibrin, Slough and Necrotic/Eschar    Instruments:  Forceps, Blade and Nippers    Bleeding:  Minimal  Hemostasis Achieved: Yes    Method Used:  Pressure  Patient tolerance:  Patient tolerated the procedure well with no immediate complications     Continue daily dressing changes see me in 3 months.              Counseling:     I provided patient education verbally regarding:   Patient diagnosis, treatment options, as well as alternatives, " risks, and benefits.     This note was created using Dragon voice recognition software that occasionally misinterpreted phrases or words.

## 2022-09-01 ENCOUNTER — HOSPITAL ENCOUNTER (INPATIENT)
Facility: HOSPITAL | Age: 62
LOS: 7 days | Discharge: HOME | DRG: 871 | End: 2022-09-08
Attending: EMERGENCY MEDICINE | Admitting: INTERNAL MEDICINE
Payer: MEDICARE

## 2022-09-01 DIAGNOSIS — U07.1 COVID-19: ICD-10-CM

## 2022-09-01 DIAGNOSIS — A49.1 GROUP B STREPTOCOCCAL INFECTION: ICD-10-CM

## 2022-09-01 DIAGNOSIS — Z95.810 AICD (AUTOMATIC CARDIOVERTER/DEFIBRILLATOR) PRESENT: ICD-10-CM

## 2022-09-01 DIAGNOSIS — J96.02 ACUTE RESPIRATORY FAILURE WITH HYPOXIA AND HYPERCAPNIA: Primary | ICD-10-CM

## 2022-09-01 DIAGNOSIS — M86.171 ACUTE OSTEOMYELITIS OF METATARSAL BONE OF RIGHT FOOT: ICD-10-CM

## 2022-09-01 DIAGNOSIS — A41.9 SEPSIS: ICD-10-CM

## 2022-09-01 DIAGNOSIS — J96.01 ACUTE RESPIRATORY FAILURE WITH HYPOXIA AND HYPERCAPNIA: Primary | ICD-10-CM

## 2022-09-01 DIAGNOSIS — J96.01 ACUTE RESPIRATORY FAILURE WITH HYPOXIA: ICD-10-CM

## 2022-09-01 DIAGNOSIS — I50.22 CHRONIC SYSTOLIC HEART FAILURE: ICD-10-CM

## 2022-09-01 DIAGNOSIS — L03.115 CELLULITIS OF RIGHT LEG: ICD-10-CM

## 2022-09-01 DIAGNOSIS — R78.81 BACTEREMIA: ICD-10-CM

## 2022-09-01 DIAGNOSIS — J18.9 PNEUMONIA OF RIGHT LUNG DUE TO INFECTIOUS ORGANISM, UNSPECIFIED PART OF LUNG: ICD-10-CM

## 2022-09-01 LAB
ALBUMIN SERPL BCP-MCNC: 4.2 G/DL (ref 3.5–5.2)
ALLENS TEST: ABNORMAL
ALP SERPL-CCNC: 80 U/L (ref 55–135)
ALT SERPL W/O P-5'-P-CCNC: 48 U/L (ref 10–44)
ANION GAP SERPL CALC-SCNC: 12 MMOL/L (ref 8–16)
AST SERPL-CCNC: 36 U/L (ref 10–40)
BASOPHILS # BLD AUTO: 0.02 K/UL (ref 0–0.2)
BASOPHILS NFR BLD: 0.1 % (ref 0–1.9)
BILIRUB SERPL-MCNC: 1.6 MG/DL (ref 0.1–1)
BNP SERPL-MCNC: 288 PG/ML (ref 0–99)
BUN SERPL-MCNC: 46 MG/DL (ref 6–20)
CALCIUM SERPL-MCNC: 9.4 MG/DL (ref 8.7–10.5)
CHLORIDE SERPL-SCNC: 99 MMOL/L (ref 95–110)
CK SERPL-CCNC: 80 U/L (ref 20–200)
CO2 SERPL-SCNC: 27 MMOL/L (ref 23–29)
CREAT SERPL-MCNC: 2.1 MG/DL (ref 0.5–1.4)
CRP SERPL-MCNC: 3.61 MG/DL
DELSYS: ABNORMAL
DIFFERENTIAL METHOD: ABNORMAL
EOSINOPHIL # BLD AUTO: 0.1 K/UL (ref 0–0.5)
EOSINOPHIL NFR BLD: 0.3 % (ref 0–8)
ERYTHROCYTE [DISTWIDTH] IN BLOOD BY AUTOMATED COUNT: 15.6 % (ref 11.5–14.5)
EST. GFR  (NO RACE VARIABLE): 35.4 ML/MIN/1.73 M^2
FERRITIN SERPL-MCNC: 71 NG/ML (ref 20–300)
FLOW: 2
GLUCOSE SERPL-MCNC: 171 MG/DL (ref 70–110)
GLUCOSE SERPL-MCNC: 174 MG/DL (ref 70–110)
GLUCOSE SERPL-MCNC: 287 MG/DL (ref 70–110)
GLUCOSE SERPL-MCNC: 373 MG/DL (ref 70–110)
HCO3 UR-SCNC: 29.9 MMOL/L (ref 24–28)
HCT VFR BLD AUTO: 44.6 % (ref 40–54)
HCT VFR BLD CALC: 46 %PCV (ref 36–54)
HGB BLD-MCNC: 14.3 G/DL (ref 14–18)
IMM GRANULOCYTES # BLD AUTO: 0.08 K/UL (ref 0–0.04)
IMM GRANULOCYTES NFR BLD AUTO: 0.5 % (ref 0–0.5)
INFLUENZA A, MOLECULAR: NEGATIVE
INFLUENZA B, MOLECULAR: NEGATIVE
LACTATE SERPL-SCNC: 1.7 MMOL/L (ref 0.5–1.9)
LDH SERPL L TO P-CCNC: 217 U/L (ref 110–260)
LYMPHOCYTES # BLD AUTO: 0.3 K/UL (ref 1–4.8)
LYMPHOCYTES NFR BLD: 1.8 % (ref 18–48)
MCH RBC QN AUTO: 31.4 PG (ref 27–31)
MCHC RBC AUTO-ENTMCNC: 32.1 G/DL (ref 32–36)
MCV RBC AUTO: 98 FL (ref 82–98)
MODE: ABNORMAL
MONOCYTES # BLD AUTO: 0.9 K/UL (ref 0.3–1)
MONOCYTES NFR BLD: 5.6 % (ref 4–15)
NEUTROPHILS # BLD AUTO: 14.9 K/UL (ref 1.8–7.7)
NEUTROPHILS NFR BLD: 91.7 % (ref 38–73)
NRBC BLD-RTO: 0 /100 WBC
PCO2 BLDA: 54.1 MMHG (ref 35–45)
PH SMN: 7.35 [PH] (ref 7.35–7.45)
PLATELET # BLD AUTO: 101 K/UL (ref 150–450)
PMV BLD AUTO: 11.4 FL (ref 9.2–12.9)
PO2 BLDA: 18 MMHG (ref 40–60)
POC BE: 4 MMOL/L
POC IONIZED CALCIUM: 1.16 MMOL/L (ref 1.06–1.42)
POC SATURATED O2: 24 % (ref 95–100)
POC TCO2: 31 MMOL/L (ref 24–29)
POTASSIUM BLD-SCNC: 4.5 MMOL/L (ref 3.5–5.1)
POTASSIUM SERPL-SCNC: 5 MMOL/L (ref 3.5–5.1)
PROCALCITONIN SERPL IA-MCNC: 0.12 NG/ML (ref 0–0.5)
PROT SERPL-MCNC: 8.6 G/DL (ref 6–8.4)
RBC # BLD AUTO: 4.56 M/UL (ref 4.6–6.2)
SAMPLE: ABNORMAL
SARS-COV-2 RDRP RESP QL NAA+PROBE: NEGATIVE
SITE: ABNORMAL
SODIUM BLD-SCNC: 137 MMOL/L (ref 136–145)
SODIUM SERPL-SCNC: 138 MMOL/L (ref 136–145)
SP02: 95
SPECIMEN SOURCE: NORMAL
TROPONIN I SERPL DL<=0.01 NG/ML-MCNC: 0.03 NG/ML
WBC # BLD AUTO: 16.26 K/UL (ref 3.9–12.7)

## 2022-09-01 PROCEDURE — 96365 THER/PROPH/DIAG IV INF INIT: CPT

## 2022-09-01 PROCEDURE — 25000003 PHARM REV CODE 250: Performed by: INTERNAL MEDICINE

## 2022-09-01 PROCEDURE — 83605 ASSAY OF LACTIC ACID: CPT | Performed by: EMERGENCY MEDICINE

## 2022-09-01 PROCEDURE — 94761 N-INVAS EAR/PLS OXIMETRY MLT: CPT

## 2022-09-01 PROCEDURE — A4216 STERILE WATER/SALINE, 10 ML: HCPCS | Performed by: INTERNAL MEDICINE

## 2022-09-01 PROCEDURE — 25000003 PHARM REV CODE 250: Performed by: EMERGENCY MEDICINE

## 2022-09-01 PROCEDURE — 25000242 PHARM REV CODE 250 ALT 637 W/ HCPCS: Performed by: INTERNAL MEDICINE

## 2022-09-01 PROCEDURE — 94799 UNLISTED PULMONARY SVC/PX: CPT

## 2022-09-01 PROCEDURE — 96367 TX/PROPH/DG ADDL SEQ IV INF: CPT

## 2022-09-01 PROCEDURE — 96366 THER/PROPH/DIAG IV INF ADDON: CPT

## 2022-09-01 PROCEDURE — 84132 ASSAY OF SERUM POTASSIUM: CPT

## 2022-09-01 PROCEDURE — 84145 PROCALCITONIN (PCT): CPT | Performed by: EMERGENCY MEDICINE

## 2022-09-01 PROCEDURE — 99291 CRITICAL CARE FIRST HOUR: CPT

## 2022-09-01 PROCEDURE — 86140 C-REACTIVE PROTEIN: CPT | Performed by: EMERGENCY MEDICINE

## 2022-09-01 PROCEDURE — 63600175 PHARM REV CODE 636 W HCPCS: Performed by: INTERNAL MEDICINE

## 2022-09-01 PROCEDURE — 99900035 HC TECH TIME PER 15 MIN (STAT)

## 2022-09-01 PROCEDURE — 93010 ELECTROCARDIOGRAM REPORT: CPT | Mod: ,,, | Performed by: INTERNAL MEDICINE

## 2022-09-01 PROCEDURE — 84295 ASSAY OF SERUM SODIUM: CPT

## 2022-09-01 PROCEDURE — 83615 LACTATE (LD) (LDH) ENZYME: CPT | Performed by: EMERGENCY MEDICINE

## 2022-09-01 PROCEDURE — 87502 INFLUENZA DNA AMP PROBE: CPT | Performed by: EMERGENCY MEDICINE

## 2022-09-01 PROCEDURE — 84484 ASSAY OF TROPONIN QUANT: CPT | Performed by: EMERGENCY MEDICINE

## 2022-09-01 PROCEDURE — 82803 BLOOD GASES ANY COMBINATION: CPT

## 2022-09-01 PROCEDURE — 99900031 HC PATIENT EDUCATION (STAT)

## 2022-09-01 PROCEDURE — 85014 HEMATOCRIT: CPT

## 2022-09-01 PROCEDURE — 93005 ELECTROCARDIOGRAM TRACING: CPT | Performed by: INTERNAL MEDICINE

## 2022-09-01 PROCEDURE — 94640 AIRWAY INHALATION TREATMENT: CPT

## 2022-09-01 PROCEDURE — 93010 EKG 12-LEAD: ICD-10-PCS | Mod: ,,, | Performed by: INTERNAL MEDICINE

## 2022-09-01 PROCEDURE — 94660 CPAP INITIATION&MGMT: CPT

## 2022-09-01 PROCEDURE — 80053 COMPREHEN METABOLIC PANEL: CPT | Performed by: EMERGENCY MEDICINE

## 2022-09-01 PROCEDURE — 82550 ASSAY OF CK (CPK): CPT | Performed by: EMERGENCY MEDICINE

## 2022-09-01 PROCEDURE — 87040 BLOOD CULTURE FOR BACTERIA: CPT | Mod: 59 | Performed by: EMERGENCY MEDICINE

## 2022-09-01 PROCEDURE — 82728 ASSAY OF FERRITIN: CPT | Performed by: EMERGENCY MEDICINE

## 2022-09-01 PROCEDURE — 83880 ASSAY OF NATRIURETIC PEPTIDE: CPT | Performed by: EMERGENCY MEDICINE

## 2022-09-01 PROCEDURE — 25000242 PHARM REV CODE 250 ALT 637 W/ HCPCS: Performed by: EMERGENCY MEDICINE

## 2022-09-01 PROCEDURE — 85025 COMPLETE CBC W/AUTO DIFF WBC: CPT | Performed by: EMERGENCY MEDICINE

## 2022-09-01 PROCEDURE — 20000000 HC ICU ROOM

## 2022-09-01 PROCEDURE — 36600 WITHDRAWAL OF ARTERIAL BLOOD: CPT

## 2022-09-01 PROCEDURE — 87147 CULTURE TYPE IMMUNOLOGIC: CPT | Mod: 59 | Performed by: EMERGENCY MEDICINE

## 2022-09-01 PROCEDURE — 63600175 PHARM REV CODE 636 W HCPCS: Performed by: EMERGENCY MEDICINE

## 2022-09-01 PROCEDURE — 27000221 HC OXYGEN, UP TO 24 HOURS

## 2022-09-01 PROCEDURE — U0002 COVID-19 LAB TEST NON-CDC: HCPCS | Performed by: EMERGENCY MEDICINE

## 2022-09-01 PROCEDURE — 25000003 PHARM REV CODE 250

## 2022-09-01 PROCEDURE — 82330 ASSAY OF CALCIUM: CPT

## 2022-09-01 RX ORDER — MAGNESIUM SULFATE HEPTAHYDRATE 40 MG/ML
2 INJECTION, SOLUTION INTRAVENOUS
Status: DISCONTINUED | OUTPATIENT
Start: 2022-09-01 | End: 2022-09-08 | Stop reason: HOSPADM

## 2022-09-01 RX ORDER — VANCOMYCIN HCL IN 5 % DEXTROSE 1G/250ML
1000 PLASTIC BAG, INJECTION (ML) INTRAVENOUS ONCE
Status: COMPLETED | OUTPATIENT
Start: 2022-09-01 | End: 2022-09-01

## 2022-09-01 RX ORDER — CARVEDILOL 3.12 MG/1
3.12 TABLET ORAL 2 TIMES DAILY
Status: DISCONTINUED | OUTPATIENT
Start: 2022-09-01 | End: 2022-09-08 | Stop reason: HOSPADM

## 2022-09-01 RX ORDER — ACETAMINOPHEN 325 MG/1
650 TABLET ORAL EVERY 8 HOURS PRN
Status: DISCONTINUED | OUTPATIENT
Start: 2022-09-01 | End: 2022-09-08 | Stop reason: HOSPADM

## 2022-09-01 RX ORDER — ALBUTEROL SULFATE 0.83 MG/ML
2.5 SOLUTION RESPIRATORY (INHALATION)
Status: DISCONTINUED | OUTPATIENT
Start: 2022-09-01 | End: 2022-09-01

## 2022-09-01 RX ORDER — CHLORHEXIDINE GLUCONATE ORAL RINSE 1.2 MG/ML
15 SOLUTION DENTAL 2 TIMES DAILY
Status: DISPENSED | OUTPATIENT
Start: 2022-09-01 | End: 2022-09-06

## 2022-09-01 RX ORDER — CEFEPIME HYDROCHLORIDE 1 G/50ML
1 INJECTION, SOLUTION INTRAVENOUS
Status: DISCONTINUED | OUTPATIENT
Start: 2022-09-01 | End: 2022-09-01

## 2022-09-01 RX ORDER — IPRATROPIUM BROMIDE AND ALBUTEROL SULFATE 2.5; .5 MG/3ML; MG/3ML
3 SOLUTION RESPIRATORY (INHALATION) EVERY 6 HOURS
Status: DISCONTINUED | OUTPATIENT
Start: 2022-09-01 | End: 2022-09-02

## 2022-09-01 RX ORDER — SODIUM CHLORIDE 9 MG/ML
INJECTION, SOLUTION INTRAVENOUS CONTINUOUS
Status: DISCONTINUED | OUTPATIENT
Start: 2022-09-01 | End: 2022-09-02

## 2022-09-01 RX ORDER — HYDRALAZINE HYDROCHLORIDE 50 MG/1
50 TABLET, FILM COATED ORAL EVERY 12 HOURS
Status: ON HOLD | COMMUNITY
Start: 2022-08-05 | End: 2022-09-08 | Stop reason: HOSPADM

## 2022-09-01 RX ORDER — MAGNESIUM SULFATE 1 G/100ML
1 INJECTION INTRAVENOUS
Status: DISCONTINUED | OUTPATIENT
Start: 2022-09-01 | End: 2022-09-08 | Stop reason: HOSPADM

## 2022-09-01 RX ORDER — FUROSEMIDE 10 MG/ML
40 INJECTION INTRAMUSCULAR; INTRAVENOUS
Status: DISCONTINUED | OUTPATIENT
Start: 2022-09-01 | End: 2022-09-01

## 2022-09-01 RX ORDER — TRAMADOL HYDROCHLORIDE 50 MG/1
50 TABLET ORAL EVERY 8 HOURS PRN
COMMUNITY
Start: 2022-08-03 | End: 2022-09-30 | Stop reason: SDUPTHER

## 2022-09-01 RX ORDER — MUPIROCIN 20 MG/G
OINTMENT TOPICAL 2 TIMES DAILY
Status: DISPENSED | OUTPATIENT
Start: 2022-09-01 | End: 2022-09-06

## 2022-09-01 RX ORDER — FAMOTIDINE 20 MG/1
20 TABLET, FILM COATED ORAL DAILY
Status: DISCONTINUED | OUTPATIENT
Start: 2022-09-01 | End: 2022-09-08 | Stop reason: HOSPADM

## 2022-09-01 RX ORDER — IPRATROPIUM BROMIDE AND ALBUTEROL SULFATE 2.5; .5 MG/3ML; MG/3ML
3 SOLUTION RESPIRATORY (INHALATION)
Status: COMPLETED | OUTPATIENT
Start: 2022-09-01 | End: 2022-09-01

## 2022-09-01 RX ORDER — VANCOMYCIN HCL IN 5 % DEXTROSE 1G/250ML
1000 PLASTIC BAG, INJECTION (ML) INTRAVENOUS ONCE
Status: DISCONTINUED | OUTPATIENT
Start: 2022-09-01 | End: 2022-09-01

## 2022-09-01 RX ORDER — LANOLIN ALCOHOL/MO/W.PET/CERES
800 CREAM (GRAM) TOPICAL
Status: DISCONTINUED | OUTPATIENT
Start: 2022-09-01 | End: 2022-09-08 | Stop reason: HOSPADM

## 2022-09-01 RX ORDER — METHYLPREDNISOLONE SOD SUCC 125 MG
125 VIAL (EA) INJECTION ONCE
Status: COMPLETED | OUTPATIENT
Start: 2022-09-01 | End: 2022-09-01

## 2022-09-01 RX ORDER — ALBUTEROL SULFATE 0.83 MG/ML
2.5 SOLUTION RESPIRATORY (INHALATION) EVERY 4 HOURS PRN
Status: DISCONTINUED | OUTPATIENT
Start: 2022-09-01 | End: 2022-09-08 | Stop reason: HOSPADM

## 2022-09-01 RX ORDER — AMIODARONE HYDROCHLORIDE 200 MG/1
200 TABLET ORAL DAILY
Status: DISCONTINUED | OUTPATIENT
Start: 2022-09-02 | End: 2022-09-08 | Stop reason: HOSPADM

## 2022-09-01 RX ORDER — NAPROXEN SODIUM 220 MG/1
81 TABLET, FILM COATED ORAL DAILY
Status: DISCONTINUED | OUTPATIENT
Start: 2022-09-02 | End: 2022-09-08 | Stop reason: HOSPADM

## 2022-09-01 RX ORDER — SPIRONOLACTONE 25 MG/1
25 TABLET ORAL DAILY
Status: DISCONTINUED | OUTPATIENT
Start: 2022-09-02 | End: 2022-09-01

## 2022-09-01 RX ORDER — ACETAMINOPHEN 500 MG
1000 TABLET ORAL
Status: CANCELLED | OUTPATIENT
Start: 2022-09-01 | End: 2022-09-01

## 2022-09-01 RX ORDER — POTASSIUM CHLORIDE 20 MEQ/1
20 TABLET, EXTENDED RELEASE ORAL
Status: DISCONTINUED | OUTPATIENT
Start: 2022-09-01 | End: 2022-09-08 | Stop reason: HOSPADM

## 2022-09-01 RX ORDER — ATORVASTATIN CALCIUM 10 MG/1
10 TABLET, FILM COATED ORAL DAILY
Status: DISCONTINUED | OUTPATIENT
Start: 2022-09-02 | End: 2022-09-08 | Stop reason: HOSPADM

## 2022-09-01 RX ORDER — INSULIN ASPART 100 [IU]/ML
1-6 INJECTION, SOLUTION INTRAVENOUS; SUBCUTANEOUS
Status: DISCONTINUED | OUTPATIENT
Start: 2022-09-01 | End: 2022-09-08 | Stop reason: HOSPADM

## 2022-09-01 RX ORDER — MAGNESIUM SULFATE HEPTAHYDRATE 40 MG/ML
4 INJECTION, SOLUTION INTRAVENOUS
Status: DISCONTINUED | OUTPATIENT
Start: 2022-09-01 | End: 2022-09-08 | Stop reason: HOSPADM

## 2022-09-01 RX ORDER — POTASSIUM CHLORIDE 20 MEQ/1
40 TABLET, EXTENDED RELEASE ORAL
Status: DISCONTINUED | OUTPATIENT
Start: 2022-09-01 | End: 2022-09-08 | Stop reason: HOSPADM

## 2022-09-01 RX ORDER — IPRATROPIUM BROMIDE AND ALBUTEROL SULFATE 2.5; .5 MG/3ML; MG/3ML
3 SOLUTION RESPIRATORY (INHALATION) EVERY 4 HOURS
Status: DISCONTINUED | OUTPATIENT
Start: 2022-09-01 | End: 2022-09-01

## 2022-09-01 RX ORDER — SODIUM CHLORIDE 0.9 % (FLUSH) 0.9 %
10 SYRINGE (ML) INJECTION EVERY 12 HOURS
Status: DISCONTINUED | OUTPATIENT
Start: 2022-09-01 | End: 2022-09-04

## 2022-09-01 RX ORDER — GABAPENTIN 300 MG/1
600 CAPSULE ORAL 2 TIMES DAILY
Status: DISCONTINUED | OUTPATIENT
Start: 2022-09-01 | End: 2022-09-01

## 2022-09-01 RX ORDER — ACETAMINOPHEN 500 MG
1000 TABLET ORAL
Status: COMPLETED | OUTPATIENT
Start: 2022-09-01 | End: 2022-09-01

## 2022-09-01 RX ORDER — GABAPENTIN 300 MG/1
600 CAPSULE ORAL 2 TIMES DAILY
Status: DISCONTINUED | OUTPATIENT
Start: 2022-09-01 | End: 2022-09-08 | Stop reason: HOSPADM

## 2022-09-01 RX ORDER — CEFEPIME HYDROCHLORIDE 1 G/50ML
2 INJECTION, SOLUTION INTRAVENOUS
Status: COMPLETED | OUTPATIENT
Start: 2022-09-01 | End: 2022-09-01

## 2022-09-01 RX ORDER — TRAMADOL HYDROCHLORIDE 50 MG/1
50 TABLET ORAL EVERY 6 HOURS PRN
Status: DISCONTINUED | OUTPATIENT
Start: 2022-09-01 | End: 2022-09-08 | Stop reason: HOSPADM

## 2022-09-01 RX ORDER — PANTOPRAZOLE SODIUM 40 MG/1
40 TABLET, DELAYED RELEASE ORAL
Status: DISCONTINUED | OUTPATIENT
Start: 2022-09-02 | End: 2022-09-08 | Stop reason: HOSPADM

## 2022-09-01 RX ADMIN — ACETAMINOPHEN 1000 MG: 500 TABLET ORAL at 02:09

## 2022-09-01 RX ADMIN — FUROSEMIDE 40 MG: 10 INJECTION, SOLUTION INTRAVENOUS at 12:09

## 2022-09-01 RX ADMIN — MUPIROCIN 1 G: 20 OINTMENT TOPICAL at 08:09

## 2022-09-01 RX ADMIN — TRAMADOL HYDROCHLORIDE 50 MG: 50 TABLET, COATED ORAL at 04:09

## 2022-09-01 RX ADMIN — INSULIN ASPART 3 UNITS: 100 INJECTION, SOLUTION INTRAVENOUS; SUBCUTANEOUS at 11:09

## 2022-09-01 RX ADMIN — CEFEPIME HYDROCHLORIDE 2 G: 2 INJECTION, SOLUTION INTRAVENOUS at 03:09

## 2022-09-01 RX ADMIN — CEFEPIME HYDROCHLORIDE 1 G: 1 INJECTION, SOLUTION INTRAVENOUS at 12:09

## 2022-09-01 RX ADMIN — VANCOMYCIN HYDROCHLORIDE 1000 MG: 1 INJECTION, POWDER, LYOPHILIZED, FOR SOLUTION INTRAVENOUS at 04:09

## 2022-09-01 RX ADMIN — METHYLPREDNISOLONE SODIUM SUCCINATE 60 MG: 40 INJECTION, POWDER, FOR SOLUTION INTRAMUSCULAR; INTRAVENOUS at 12:09

## 2022-09-01 RX ADMIN — CEFTRIAXONE 2 G: 2 INJECTION, SOLUTION INTRAVENOUS at 04:09

## 2022-09-01 RX ADMIN — INSULIN DETEMIR 25 UNITS: 100 INJECTION, SOLUTION SUBCUTANEOUS at 08:09

## 2022-09-01 RX ADMIN — SACUBITRIL AND VALSARTAN 1 TABLET: 24; 26 TABLET, FILM COATED ORAL at 08:09

## 2022-09-01 RX ADMIN — FAMOTIDINE 20 MG: 20 TABLET ORAL at 12:09

## 2022-09-01 RX ADMIN — METHYLPREDNISOLONE SODIUM SUCCINATE 125 MG: 125 INJECTION, POWDER, FOR SOLUTION INTRAMUSCULAR; INTRAVENOUS at 07:09

## 2022-09-01 RX ADMIN — SODIUM CHLORIDE: 0.9 INJECTION, SOLUTION INTRAVENOUS at 05:09

## 2022-09-01 RX ADMIN — INSULIN ASPART 5 UNITS: 100 INJECTION, SOLUTION INTRAVENOUS; SUBCUTANEOUS at 08:09

## 2022-09-01 RX ADMIN — IPRATROPIUM BROMIDE AND ALBUTEROL SULFATE 3 ML: .5; 3 SOLUTION RESPIRATORY (INHALATION) at 07:09

## 2022-09-01 RX ADMIN — IPRATROPIUM BROMIDE AND ALBUTEROL SULFATE 3 ML: .5; 3 SOLUTION RESPIRATORY (INHALATION) at 03:09

## 2022-09-01 RX ADMIN — IPRATROPIUM BROMIDE AND ALBUTEROL SULFATE 3 ML: .5; 3 SOLUTION RESPIRATORY (INHALATION) at 01:09

## 2022-09-01 RX ADMIN — CHLORHEXIDINE GLUCONATE 15 ML: 1.2 RINSE ORAL at 08:09

## 2022-09-01 RX ADMIN — TRAMADOL HYDROCHLORIDE 50 MG: 50 TABLET, COATED ORAL at 10:09

## 2022-09-01 RX ADMIN — SODIUM CHLORIDE, PRESERVATIVE FREE 10 ML: 5 INJECTION INTRAVENOUS at 09:09

## 2022-09-01 RX ADMIN — CARVEDILOL 3.12 MG: 3.12 TABLET, FILM COATED ORAL at 08:09

## 2022-09-01 RX ADMIN — GABAPENTIN 600 MG: 300 CAPSULE ORAL at 04:09

## 2022-09-01 NOTE — CARE UPDATE
09/01/22 1343   Patient Assessment/Suction   Level of Consciousness (AVPU) responds to voice   Respiratory Effort Unlabored   Expansion/Accessory Muscles/Retractions no use of accessory muscles;no retractions   Rhythm/Pattern, Respiratory assisted mechanically   Cough Frequency infrequent   Cough Type croupy   PRE-TX-O2   O2 Device (Oxygen Therapy) BiPAP   SpO2 96 %   Pulse Oximetry Type Continuous   $ Pulse Oximetry - Multiple Charge Pulse Oximetry - Multiple   Pulse 76   Resp (!) 21   Aerosol Therapy   $ Aerosol Therapy Charges Aerosol Treatment   Daily Review of Necessity (SVN) completed   Respiratory Treatment Status (SVN) given   Treatment Route (SVN) in-line   Patient Position (SVN) HOB elevated   Post Treatment Assessment (SVN) breath sounds unchanged   Signs of Intolerance (SVN) none   Preset CPAP/BiPAP Settings   Mode Of Delivery BiPAP   $ CPAP/BiPAP Daily Charge BiPAP/CPAP Daily   $ Initial CPAP/BiPAP Setup? No   $ Is patient using? Yes   Size of Mask Medium/Large   Sized Appropriately? Yes   Equipment Type V60   Airway Device Type large full face mask   Humidifier not applicable   Ipap 16   EPAP (cm H2O) 8   Pressure Support (cm H2O) 8   Set Tidal Volume (mL) 831 mL   Set Rate (Breaths/Min) 12   ITime (sec) 1   Rise Time (sec) 3   Patient CPAP/BiPAP Settings   Timed Inspiration (Sec) 1   IPAP Rise Time (sec) 3   RR Total (Breaths/Min) 22   Tidal Volume (mL) 831   VE Minute Ventilation (L/min) 18.1 L/min   Peak Inspiratory Pressure (cm H2O) 16   TiTOT (%) 39   Total Leak (L/Min) 68   Patient Trigger - ST Mode Only (%) 88   CPAP/BiPAP Alarms   High Pressure (cm H2O) 40   Low Pressure (cm H2O) 5   Low Pressure Delay (Sec) 20   Minute Ventilation (L/Min) 3   High RR (breaths/min) 35   Low RR (breaths/min) 10   Apnea (Sec) 20   Education   $ Education 15 min;BiPAP   Respiratory Evaluation   $ Care Plan Tech Time 15 min   $ Eval/Re-eval Charges Evaluation   Evaluation For   (care plan)

## 2022-09-01 NOTE — PLAN OF CARE
[facility]  Initial Discharge Assessment       Primary Care Provider: YRN Pollard    Admission Diagnosis: Acute respiratory failure with hypoxia and hypercapnia [J96.01, J96.02]    Admission Date: 9/1/2022  Expected Discharge Date:     Discharge Barriers Identified: (P) None    Assessment completed at bedside.  Patient has advanced directives in Epic.  Patient intends to discharge home where he lives with his wife, no needs identified at this time.    Payor: [employer] MANAGED MEDICARE / Plan: [employer] MEDICARE PLAN PPO / Product Type: Medicare Advantage /     Extended Emergency Contact Information  Primary Emergency Contact: eric bowers  Home Phone: [phone]  Mobile Phone: [phone]  Relation: Spouse    Discharge Plan A: (P) Home with family  Discharge Plan B: (P) Home with family      [facility] [place], [place] - [address]  [address]  [place] [address]  Phone: [phone] Fax: [phone]      Initial Assessment (most recent)       Adult Discharge Assessment - 09/01/22 1610          Discharge Assessment    Assessment Type Discharge Planning Assessment (P)      Confirmed/corrected address, phone number and insurance Yes (P)      Confirmed Demographics Correct on Facesheet (P)      Source of Information patient (P)      When was your last doctors appointment? -- (P)    few weeeks ago    Communicated BILL with patient/caregiver Yes (P)      Reason For Admission sob (P)      Lives With spouse (P)      Facility Arrived From: home (P)      Do you expect to return to your current living situation? Yes (P)      Do you have help at home or someone to help you manage your care at home? Yes (P)      Who are your caregiver(s) and their phone number(s)? Rere Juan Miguel [phone] (P)      Prior to hospitilization cognitive status: Alert/Oriented (P)      Current cognitive status: Alert/Oriented (P)      Walking or Climbing Stairs Difficulty none (P)      Equipment  Currently Used at Home BIPAP;glucometer (P)      Readmission within 30 days? No (P)      Patient currently being followed by outpatient case management? No (P)      Do you currently have service(s) that help you manage your care at home? No (P)      Do you take prescription medications? Yes (P)      Do you have prescription coverage? Yes (P)      Coverage [employer] (P)      Do you have any problems affording any of your prescribed medications? No (P)      Is the patient taking medications as prescribed? yes (P)      Who is going to help you get home at discharge? Rere Esquivelpkin [phone] (P)      How do you get to doctors appointments? car, drives self (P)      Are you on dialysis? No (P)      Do you take coumadin? No (P)      Discharge Plan A Home with family (P)      Discharge Plan B Home with family (P)      DME Needed Upon Discharge  none (P)      Discharge Plan discussed with: Patient (P)      Discharge Barriers Identified None (P)         Relationship/Environment    Name(s) of Who Lives With Patient Rere Bullard [phone] (P)

## 2022-09-01 NOTE — ASSESSMENT & PLAN NOTE
Admit patient to ICU  Continue BiPAP wean off as tolerated   Solu-Medrol V1 25 mg now and then 60 mg q.6 hours   DuoNebs scheduled and albuterol p.r.n.   Sputum culture follow-up results of blood culture continue with vancomycin and cefepime   No Lovenox or heparin due to history of HIT  Lasix IV 40 mg b.i.d.  Consult Dr. Roth  Consult Intensivist.  Continue with home medications as apropiate

## 2022-09-01 NOTE — CARE UPDATE
09/01/22 0345   Patient Assessment/Suction   Level of Consciousness (AVPU) alert   Respiratory Effort Shallow   Expansion/Accessory Muscles/Retractions no use of accessory muscles   Rhythm/Pattern, Respiratory unlabored   Cough Frequency infrequent   PRE-TX-O2   O2 Device (Oxygen Therapy) BiPAP   $ Is the patient on Low Flow Oxygen? Yes   Oxygen Concentration (%) 35   SpO2 (!) 93 %   Pulse Oximetry Type Continuous   $ Pulse Oximetry - Multiple Charge Pulse Oximetry - Multiple   Pulse 92   Resp (!) 21   Ready to Wean/Extubation Screen   FIO2<=50 (chart decimal) 0.35   Preset CPAP/BiPAP Settings   Mode Of Delivery BiPAP   $ CPAP/BiPAP Daily Charge BiPAP/CPAP Daily   $ Initial CPAP/BiPAP Setup? Yes   $ Is patient using? Yes   Size of Mask Medium/Large   Sized Appropriately? Yes   Equipment Type V60   Airway Device Type medium full face mask   Ipap 16   EPAP (cm H2O) 8   Pressure Support (cm H2O) 8   Set Rate (Breaths/Min) 12   ITime (sec) 1   Rise Time (sec) 3   Patient CPAP/BiPAP Settings   Timed Inspiration (Sec) 1   IPAP Rise Time (sec) 3   RR Total (Breaths/Min) 15   Tidal Volume (mL) 994   VE Minute Ventilation (L/min) 13.9 L/min   Peak Inspiratory Pressure (cm H2O) 20   TiTOT (%) 26   Total Leak (L/Min) 43   Patient Trigger - ST Mode Only (%) 82   Education   $ Education DME BiPAP;15 min   Respiratory Evaluation   $ Care Plan Tech Time 15 min

## 2022-09-01 NOTE — PROGRESS NOTES
Pharmacist Renal Dose Adjustment Note    Ana Bullard is a 62 y.o. male being treated with the medication Famotidine    Patient Data:    Vital Signs (Most Recent):  Temp: 98.7 °F (37.1 °C) (09/01/22 0621)  Pulse: 89 (09/01/22 0600)  Resp: 14 (09/01/22 0600)  BP: (!) 100/58 (09/01/22 0600)  SpO2: 96 % (09/01/22 0600)   Vital Signs (72h Range):  Temp:  [98.7 °F (37.1 °C)-103.3 °F (39.6 °C)]   Pulse:  [88-96]   Resp:  [14-23]   BP: (100-130)/(56-69)   SpO2:  [93 %-96 %]      Recent Labs   Lab 09/01/22 0153   CREATININE 2.1*     Serum creatinine: 2.1 mg/dL (H) 09/01/22 0153  Estimated creatinine clearance: 49.8 mL/min (A)    Medication:Famotidine dose: 20 mg frequency bid will be changed to 20 mg frequency:daily    Pharmacist's Name: Pilar Salas  Pharmacist's Extension: [phone]

## 2022-09-01 NOTE — PROGRESS NOTES
VANCOMYCIN PHARMACOKINETIC NOTE:  Vancomycin Day # 1    Objective/Assessment:    Diagnosis/Indication for Vancomycin:Pneumonia      62 y.o., male; Actual Body Weight = 118.8 kg (262 lb).    The patient has the following labs:  9/1/2022 Estimated Creatinine Clearance: 49.8 mL/min (A) (based on SCr of 2.1 mg/dL (H)). Lab Results   Component Value Date    BUN 46 (H) 09/01/2022     Lab Results   Component Value Date    WBC 16.26 (H) 09/01/2022          Plan:  Adjust vancomycin dose and/or frequency based on the patient's actual weight and renal function:  Initiate Vancomycin 2000 mg IV every 24 hours.  Orders have been entered into patient's chart.        Vancomycin trough level has been ordered for 9/4/22 at 03:00.     Pharmacy will manage vancomycin therapy, monitor serum vancomycin levels, monitor renal function and adjust regimen as necessary.    Thank you for allowing us to participate in this patient's care.     Pilar Salas 9/1/2022   Department of Pharmacy  Ext [phone]

## 2022-09-01 NOTE — ED PROVIDER NOTES
"Encounter Date: 9/1/2022       History     Chief Complaint   Patient presents with    Shortness of Breath    Fever     62-year-old male with a history of CAD, CHF, AICD, COPD, IDDM with neuropathy presents the emergency department with fever.  The patient states that he developed fever approximately 6 hours ago with T-max of a 103° F. He has a mild nonproductive cough and shortness of breath.  He has myalgias and some back pain that he states is chronic.  He denies any headache or neck pain.  He has no abdominal pain.  He has a chronic nonhealing wound to his right foot but states that this is not changed in nature.  No other new skin lesions.    Review of patient's allergies indicates:   Allergen Reactions    Vasopressin Anaphylaxis and Other (See Comments)     Increased pressure in his head; felt like his eyeballs and veins were going to pop out of his head.     Heparin Other (See Comments)     Other reaction(s): "depleted my blood platets"    Decreased platelet count    Heparin analogues Other (See Comments)     Depleted WBC count    Vasopressin analogues Other (See Comments)     "felt like eyes going to pop out of my head"    Morphine Hallucinations, Other (See Comments) and Anxiety     Other reaction(s): Hallucinations  Paranoid, hallucinations       Past Medical History:   Diagnosis Date    AICD (automatic cardioverter/defibrillator) present     Anemia, chronic disease 7/27/2021    Anemia, unspecified 7/27/2021    Anxiety and depression 2012    CAD (coronary artery disease) 2012    Cardiomegaly 2016    CHF (congestive heart failure) 2012    Cholecystitis 2017    Complete heart block 2012    Diabetic foot ulcer     DVT (deep venous thrombosis) 2012    And pulmonary embolus    GERD (gastroesophageal reflux disease) 2010    Heparin induced thrombocytopenia     Hyperlipemia 2011    IDDM (insulin dependent diabetes mellitus) 1983    With neuropathy    Ischemic cardiomyopathy 2012    MI (myocardial infarction) 2012 "    Morbid obesity     MRSA (methicillin resistant Staphylococcus aureus) infection 01/19/2019    Noncompliance with therapeutic plan 2019    Normochromic normocytic anemia 7/27/2021    Osteomyelitis of right foot 01/2019    Pulmonary edema 2019    Respiratory failure 2019    Sepsis 01/2019    Due to cellulitis right leg    Sleep apnea 2013    Thrombocytopathy 2012    Venous stasis dermatitis of both lower extremities      Past Surgical History:   Procedure Laterality Date    CARDIAC PACEMAKER PLACEMENT  12/2012    CARDIAC PACEMAKER PLACEMENT  10/04/2018    battery replacement    CORONARY ARTERY BYPASS GRAFT  06/2012    ESOPHAGOGASTRODUODENOSCOPY  01/31/2017    SAMARA FILTER PLACEMENT  2012    vena cava    LUMBAR SYMPATHETIC NERVE BLOCK Right 8/22/2019    Procedure: BLOCK, NERVE, SYMPATHETIC, LUMBAR;  Surgeon: Tashi Good MD;  Location: [facility] OR;  Service: Pain Management;  Laterality: Right;  RIGHT SYMPATHETIC NERVE BLOCK     Family History   Problem Relation Age of Onset    Arthritis Mother     Diabetes Mother     Cancer Father     Heart disease Father     Stroke Father      Social History     Tobacco Use    Smoking status: Former     Packs/day: 2.00     Years: 38.00     Pack years: 76.00     Types: Cigarettes     Quit date: 2012     Years since quitting: 10.6    Smokeless tobacco: Never   Substance Use Topics    Alcohol use: No     Review of Systems   Constitutional:  Positive for fever.   HENT:  Negative for sore throat.    Respiratory:  Positive for cough and shortness of breath.    Cardiovascular:  Negative for chest pain.   Gastrointestinal:  Negative for nausea.   Genitourinary:  Negative for dysuria.   Musculoskeletal:  Negative for back pain.   Skin:  Positive for wound. Negative for rash.   Neurological:  Negative for weakness.   Hematological:  Does not bruise/bleed easily.   All other systems reviewed and are negative.    Physical Exam     Initial Vitals [09/01/22 0144]   BP Pulse Resp Temp SpO2    130/68 96 20 (!) 103.2 °F (39.6 °C) 95 %      MAP       --         Physical Exam    Nursing note and vitals reviewed.  Constitutional: He appears well-developed and well-nourished.   Ill-appearing   HENT:   Head: Normocephalic and atraumatic.   Eyes: EOM are normal.   Neck:   Normal range of motion.  Cardiovascular:  Normal rate, regular rhythm, normal heart sounds and intact distal pulses.           No murmur heard.  Pulmonary/Chest: He has wheezes (diffuse inspiratory and expiratory).   Mild tachypnea with mild increased work of breathing   Abdominal: Abdomen is soft. Bowel sounds are normal. He exhibits no distension. There is no abdominal tenderness. There is no rebound and no guarding.   Musculoskeletal:         General: Normal range of motion.      Cervical back: Normal range of motion.     Neurological: He is alert and oriented to person, place, and time. GCS score is 15. GCS eye subscore is 4. GCS verbal subscore is 5. GCS motor subscore is 6.   Skin: Skin is warm and dry. Capillary refill takes less than 2 seconds.   Nonhealing wound to right lateral plantar foot with some oozing blood but no significant purulence, warmth, erythema or tenderness   Psychiatric: He has a normal mood and affect.         ED Course   Critical Care    Date/Time: 9/1/2022 6:23 AM  Performed by: Rosa Fontenot MD  Authorized by: Rosa Fontenot MD   Direct patient critical care time: 15 minutes  Additional history critical care time: 5 minutes  Ordering / reviewing critical care time: 5 minutes  Documentation critical care time: 5 minutes  Consulting other physicians critical care time: 5 minutes  Total critical care time (exclusive of procedural time) : 35 minutes  Critical care time was exclusive of separately billable procedures and treating other patients and teaching time.  Critical care was necessary to treat or prevent imminent or life-threatening deterioration of the following conditions: respiratory  failure.  Critical care was time spent personally by me on the following activities: development of treatment plan with patient or surrogate, discussions with consultants, interpretation of cardiac output measurements, evaluation of patient's response to treatment, examination of patient, obtaining history from patient or surrogate, ordering and performing treatments and interventions, ordering and review of laboratory studies, ordering and review of radiographic studies, pulse oximetry and re-evaluation of patient's condition.      Labs Reviewed   CBC W/ AUTO DIFFERENTIAL - Abnormal; Notable for the following components:       Result Value    WBC 16.26 (*)     RBC 4.56 (*)     MCH 31.4 (*)     RDW 15.6 (*)     Platelets 101 (*)     Gran # (ANC) 14.9 (*)     Immature Grans (Abs) 0.08 (*)     Lymph # 0.3 (*)     Gran % 91.7 (*)     Lymph % 1.8 (*)     All other components within normal limits   COMPREHENSIVE METABOLIC PANEL - Abnormal; Notable for the following components:    Glucose 174 (*)     BUN 46 (*)     Creatinine 2.1 (*)     Total Protein 8.6 (*)     Total Bilirubin 1.6 (*)     ALT 48 (*)     eGFR 35.4 (*)     All other components within normal limits   C-REACTIVE PROTEIN - Abnormal; Notable for the following components:    CRP 3.61 (*)     All other components within normal limits   B-TYPE NATRIURETIC PEPTIDE - Abnormal; Notable for the following components:     (*)     All other components within normal limits   ISTAT PROCEDURE - Abnormal; Notable for the following components:    POC PCO2 54.1 (*)     POC PO2 18 (*)     POC HCO3 29.9 (*)     POC SATURATED O2 24 (*)     POC Glucose 171 (*)     POC TCO2 31 (*)     All other components within normal limits   CULTURE, BLOOD   CULTURE, BLOOD   FERRITIN   LACTATE DEHYDROGENASE   CK   LACTIC ACID, PLASMA   TROPONIN I   SARS-COV-2 RNA AMPLIFICATION, QUAL   PROCALCITONIN   INFLUENZA A AND B ANTIGEN    Narrative:     Specimen Source->Nasopharyngeal Swab      EKG Readings: (Independently Interpreted)   EKG is an atrial sensed ventricular paced rhythm with no ST elevation or depression, no significant change from prior   ECG Results              EKG 12-lead (In process)  Result time 09/01/22 05:18:27      In process by Interface, Lab In [facility] (09/01/22 05:18:27)                   Narrative:    Test Reason : U07.1,    Vent. Rate : 092 BPM     Atrial Rate : 092 BPM     P-R Int : 140 ms          QRS Dur : 172 ms      QT Int : 438 ms       P-R-T Axes : 086 201 038 degrees     QTc Int : 541 ms    Atrial-sensed ventricular-paced rhythm  Biventricular pacemaker detected  Abnormal ECG  When compared with ECG of 09-JUN-2020 21:28,  Vent. rate has decreased BY  17 BPM    Referred By: [email or web address]   SELF           Confirmed By:                                   Imaging Results              X-Ray Chest AP Portable (In process)                   X-Rays:   Independently Interpreted Readings:   Chest X-Ray: Right-sided pneumonia   Medications   vancomycin - pharmacy to dose (0 mLs Intravenous Hold 9/1/22 0405)   acetaminophen tablet 1,000 mg (1,000 mg Oral Given 9/1/22 0211)   albuterol-ipratropium 2.5 mg-0.5 mg/3 mL nebulizer solution 3 mL (3 mLs Nebulization Given 9/1/22 0316)   cefepime in dextrose 5 % IVPB 2 g (0 g Intravenous Stopped 9/1/22 0346)   vancomycin in dextrose 5 % 1 gram/250 mL IVPB 1,000 mg (0 mg Intravenous Stopped 9/1/22 0622)     And   vancomycin in dextrose 5 % 1 gram/250 mL IVPB 1,000 mg (0 mg Intravenous Stopped 9/1/22 0504)     Medical Decision Making:   ED Management:  62-year-old male presents emergency department with cough, fever and shortness of breath.  Found to be hypoxic on arrival with oxygen saturations in the high 80s.  Blood gas was unfortunately a venous sample but shows 7.35/54.  Patient was placed on BiPAP.  Labs remarkable for leukocytosis with left shift.  He has a stable CKD.  Troponin within normal limits, .  Pro count normal.  Chest  x-ray reveals infiltrates versus edema on the right side.  Covered empirically with broad-spectrum antibiotics.  Will admit for further management    Rosa Fontenot MD  Emergency Medicine  09/01/2022 6:25 AM                        Clinical Impression:   Final diagnoses:  [U07.1] COVID-19  [J96.01, J96.02] Acute respiratory failure with hypoxia and hypercapnia (Primary)  [J18.9] Pneumonia of right lung due to infectious organism, unspecified part of lung        ED Disposition Condition    Admit                 Rosa Fontenot MD  09/01/22 0669

## 2022-09-01 NOTE — SUBJECTIVE & OBJECTIVE
"Past Medical History:   Diagnosis Date    AICD (automatic cardioverter/defibrillator) present     Anemia, chronic disease 7/27/2021    Anemia, unspecified 7/27/2021    Anxiety and depression 2012    CAD (coronary artery disease) 2012    Cardiomegaly 2016    CHF (congestive heart failure) 2012    Cholecystitis 2017    Complete heart block 2012    Diabetic foot ulcer     DVT (deep venous thrombosis) 2012    And pulmonary embolus    GERD (gastroesophageal reflux disease) 2010    Heparin induced thrombocytopenia     Hyperlipemia 2011    IDDM (insulin dependent diabetes mellitus) 1983    With neuropathy    Ischemic cardiomyopathy 2012    MI (myocardial infarction) 2012    Morbid obesity     MRSA (methicillin resistant Staphylococcus aureus) infection 01/19/2019    Noncompliance with therapeutic plan 2019    Normochromic normocytic anemia 7/27/2021    Osteomyelitis of right foot 01/2019    Pulmonary edema 2019    Respiratory failure 2019    Sepsis 01/2019    Due to cellulitis right leg    Sleep apnea 2013    Thrombocytopathy 2012    Venous stasis dermatitis of both lower extremities        Past Surgical History:   Procedure Laterality Date    CARDIAC PACEMAKER PLACEMENT  12/2012    CARDIAC PACEMAKER PLACEMENT  10/04/2018    battery replacement    CORONARY ARTERY BYPASS GRAFT  06/2012    ESOPHAGOGASTRODUODENOSCOPY  01/31/2017    SAMARA FILTER PLACEMENT  2012    vena cava    LUMBAR SYMPATHETIC NERVE BLOCK Right 8/22/2019    Procedure: BLOCK, NERVE, SYMPATHETIC, LUMBAR;  Surgeon: Tashi Good MD;  Location: [facility];  Service: Pain Management;  Laterality: Right;  RIGHT SYMPATHETIC NERVE BLOCK       Review of patient's allergies indicates:   Allergen Reactions    Vasopressin Anaphylaxis and Other (See Comments)     Increased pressure in his head; felt like his eyeballs and veins were going to pop out of his head.     Heparin Other (See Comments)     Other reaction(s): "depleted my blood platets"    Decreased platelet count " "   Heparin analogues Other (See Comments)     Depleted WBC count    Vasopressin analogues Other (See Comments)     "felt like eyes going to pop out of my head"    Morphine Hallucinations, Other (See Comments) and Anxiety     Other reaction(s): Hallucinations  Paranoid, hallucinations         No current facility-administered medications on file prior to encounter.     Current Outpatient Medications on File Prior to Encounter   Medication Sig    acetaminophen (TYLENOL) 500 mg Cap Take 500 mg by mouth.    albuterol-ipratropium (DUO-NEB) 2.5 mg-0.5 mg/3 mL nebulizer solution Inhale 3 mLs into the lungs every 6 (six) hours as needed.    amiodarone (PACERONE) 200 MG Tab Take 200 mg by mouth once daily.    aspirin 81 MG Chew Take 81 mg by mouth once daily.    atorvastatin (LIPITOR) 10 MG tablet Take 1 tablet by mouth once daily.    BASAGLAR KWIKPEN U-100 INSULIN glargine 100 units/mL (3mL) SubQ pen 25 Units once daily.     carvediloL (COREG) 3.125 MG tablet Take 3.125 mg by mouth 2 (two) times daily.    CORLANOR 5 mg Tab Take by mouth nightly.    ENTRESTO 24-26 mg per tablet Take 1 tablet by mouth 2 (two) times daily.    FARXIGA 10 mg tablet Take 10 mg by mouth once daily.    fenofibrate (TRICOR) 145 MG tablet Take 145 mg by mouth once daily.    furosemide (LASIX) 40 MG tablet Take 1 tablet (40 mg total) by mouth once daily.    gabapentin (NEURONTIN) 600 MG tablet Take 1 tablet by mouth 3 (three) times daily.    JANUVIA 50 mg Tab     liraglutide 0.6 mg/0.1 mL, 18 mg/3 mL, subq PNIJ (VICTOZA 2-GRACIELA) 0.6 mg/0.1 mL (18 mg/3 mL) PnIj pen Victoza 3-Graciela 0.6 mg/0.1 mL (18 mg/3 mL) subcutaneous pen injector    mupirocin (BACTROBAN) 2 % ointment Apply topically once daily.    ofloxacin (OCUFLOX) 0.3 % ophthalmic solution     OZEMPIC 0.25 mg or 0.5 mg(2 mg/1.5 mL) pen injector Inject into the skin.    pantoprazole (PROTONIX) 40 MG tablet Take 1 tablet by mouth once daily.    prednisoLONE acetate (PRED FORTE) 1 % DrpS Place into both " eyes.    semaglutide (OZEMPIC SUBQ) Inject into the skin.    spironolactone (ALDACTONE) 25 MG tablet Take 1 tablet by mouth once daily.    vitamin B complex-folic acid 0.4 mg Tab Take 1 tablet by mouth.    XARELTO 2.5 mg Tab Take by mouth.     Family History       Problem Relation (Age of Onset)    Arthritis Mother    Cancer Father    Diabetes Mother    Heart disease Father    Stroke Father          Tobacco Use    Smoking status: Former     Packs/day: 2.00     Years: 38.00     Pack years: 76.00     Types: Cigarettes     Quit date: 2012     Years since quitting: 10.6    Smokeless tobacco: Never   Substance and Sexual Activity    Alcohol use: No    Drug use: Not on file    Sexual activity: Never     Review of Systems   Reason unable to perform ROS: Ten point system review pertinent positives and negatives are present HPI.   Objective:     Vital Signs (Most Recent):  Temp: 98.7 °F (37.1 °C) (09/01/22 0621)  Pulse: 89 (09/01/22 0600)  Resp: 14 (09/01/22 0600)  BP: (!) 100/58 (09/01/22 0600)  SpO2: 96 % (09/01/22 0600)   Vital Signs (24h Range):  Temp:  [98.7 °F (37.1 °C)-103.3 °F (39.6 °C)] 98.7 °F (37.1 °C)  Pulse:  [88-96] 89  Resp:  [14-23] 14  SpO2:  [93 %-96 %] 96 %  BP: (100-130)/(56-69) 100/58     Weight: 118.8 kg (262 lb)  Body mass index is 35.53 kg/m².    Physical Exam  Constitutional:       General: He is not in acute distress.     Appearance: He is obese. He is not ill-appearing or diaphoretic.   HENT:      Head: Normocephalic and atraumatic.      Nose: Nose normal.   Cardiovascular:      Rate and Rhythm: Normal rate.   Musculoskeletal:      Cervical back: Neck supple.   Neurological:      Mental Status: He is alert.   Chest:  Normal contour chest symmetrical motion no accessory muscle use decrease at entry bilaterally patient on BiPAP.  Abdomen soft nontender nondistended bowel sounds are normoactive   Skin patient has presence of wound on right foot and also bruise on left lower extremity anterior surface  of she  Neuro:  Awake alert oriented follows commands moves 4 extremities        Significant Labs: All pertinent labs within the past 24 hours have been reviewed.  A1C: No results for input(s): HGBA1C in the last 4320 hours.  Bilirubin:   Recent Labs   Lab 09/01/22 0153   BILITOT 1.6*     CBC:   Recent Labs   Lab 09/01/22 0153 09/01/22  0333   WBC 16.26*  --    HGB 14.3  --    HCT 44.6 46   *  --      Troponin:   Recent Labs   Lab 09/01/22 0153   TROPONINI 0.034     TSH: No results for input(s): TSH in the last 4320 hours.  Urine Culture: No results for input(s): LABURIN in the last 48 hours.  Urine Studies: No results for input(s): COLORU, APPEARANCEUA, PHUR, SPECGRAV, PROTEINUA, GLUCUA, KETONESU, BILIRUBINUA, OCCULTUA, NITRITE, UROBILINOGEN, LEUKOCYTESUR, RBCUA, WBCUA, BACTERIA, SQUAMEPITHEL, HYALINECASTS in the last 48 hours.    Invalid input(s): YAKOV    Significant Imaging: I have reviewed all pertinent imaging results/findings within the past 24 hours.

## 2022-09-01 NOTE — CARE UPDATE
09/01/22 0900   Patient Assessment/Suction   Level of Consciousness (AVPU) alert   Respiratory Effort Normal;Unlabored   Expansion/Accessory Muscles/Retractions no use of accessory muscles;no retractions   PRE-TX-O2   O2 Device (Oxygen Therapy) nasal cannula   $ Is the patient on Low Flow Oxygen? Yes   Flow (L/min) 3   SpO2 (!) 92 %   Pulse Oximetry Type Continuous   Pulse 86   Resp 15   Aerosol Therapy   $ Aerosol Therapy Charges Aerosol Treatment   Daily Review of Necessity (SVN) completed   Respiratory Treatment Status (SVN) on hold  (med unavailible)   Preset CPAP/BiPAP Settings   Mode Of Delivery CPAP;Standby   Education   $ Education 15 min   Respiratory Evaluation   $ Care Plan Tech Time 15 min   $ Eval/Re-eval Charges Evaluation   Evaluation For New Orders

## 2022-09-01 NOTE — PROGRESS NOTES
62-year-old  male admitted earlier today by overnight provider for fever and nonproductive cough.      Admission H&P reviewed.  Chest x-ray with diffuse interstitial prominence bilaterally.  Initiated on broad-spectrum antibiotics.  Blood cultures later today revealed Gram-positive cocci in 4/4 bottles.  Patient reports subjective improvement in symptoms.    Patrick Carvajal MD  Internal Medicine

## 2022-09-01 NOTE — HPI
62 years old male with past medical history of diabetes on insulin at home chronic venous stasis with ulcers peripheral artery disease diabetic neuropathy CHF ejection fraction 15% patient follows with Dr. Roth he is status post CABG due to coronary artery disease and also status post pacemaker placement CKD history of osteomyelitis with chronic wounds he comes to the ER today because he states this tunneling last night he started having weakness dizziness could really moved associated with chills shortness of breath he denies chest pain states that he was having nausea but no vomit once he arrived in the ER he had fever his chest x-ray shows opacity on right side he received vancomycin and cefepime id DuoNebs Tylenol 1 g and he is feeling better at this time he was started on BiPAP due to hypoxia.

## 2022-09-01 NOTE — H&P
[facility] - Emergency Dept  Hospital Medicine  History & Physical    Patient Name: Ana Bullard  MRN: [id number]  Patient Class: IP- Inpatient  Admission Date: 9/1/2022  Attending Physician: Rosa Fontenot MD   Primary Care Provider: YRN Pollard         Patient information was obtained from spouse/SO, past medical records and ER records.     Subjective:     Principal Problem:Acute respiratory failure with hypoxia and hypercarbia    Chief Complaint:   Chief Complaint   Patient presents with    Shortness of Breath    Fever        HPI: 62 years old male with past medical history of diabetes on insulin at home chronic venous stasis with ulcers peripheral artery disease diabetic neuropathy CHF ejection fraction 15% patient follows with Dr. Roth he is status post CABG due to coronary artery disease and also status post pacemaker placement CKD history of osteomyelitis with chronic wounds he comes to the ER today because he states this tunneling last night he started having weakness dizziness could really moved associated with chills shortness of breath he denies chest pain states that he was having nausea but no vomit once he arrived in the ER he had fever his chest x-ray shows opacity on right side he received vancomycin and cefepime id DuoNebs Tylenol 1 g and he is feeling better at this time he was started on BiPAP due to hypoxia.      Past Medical History:   Diagnosis Date    AICD (automatic cardioverter/defibrillator) present     Anemia, chronic disease 7/27/2021    Anemia, unspecified 7/27/2021    Anxiety and depression 2012    CAD (coronary artery disease) 2012    Cardiomegaly 2016    CHF (congestive heart failure) 2012    Cholecystitis 2017    Complete heart block 2012    Diabetic foot ulcer     DVT (deep venous thrombosis) 2012    And pulmonary embolus    GERD (gastroesophageal reflux disease) 2010    Heparin induced thrombocytopenia     Hyperlipemia 2011    IDDM  "(insulin dependent diabetes mellitus) 1983    With neuropathy    Ischemic cardiomyopathy 2012    MI (myocardial infarction) 2012    Morbid obesity     MRSA (methicillin resistant Staphylococcus aureus) infection 01/19/2019    Noncompliance with therapeutic plan 2019    Normochromic normocytic anemia 7/27/2021    Osteomyelitis of right foot 01/2019    Pulmonary edema 2019    Respiratory failure 2019    Sepsis 01/2019    Due to cellulitis right leg    Sleep apnea 2013    Thrombocytopathy 2012    Venous stasis dermatitis of both lower extremities        Past Surgical History:   Procedure Laterality Date    CARDIAC PACEMAKER PLACEMENT  12/2012    CARDIAC PACEMAKER PLACEMENT  10/04/2018    battery replacement    CORONARY ARTERY BYPASS GRAFT  06/2012    ESOPHAGOGASTRODUODENOSCOPY  01/31/2017    SAMARA FILTER PLACEMENT  2012    vena cava    LUMBAR SYMPATHETIC NERVE BLOCK Right 8/22/2019    Procedure: BLOCK, NERVE, SYMPATHETIC, LUMBAR;  Surgeon: Tashi Good MD;  Location: [facility];  Service: Pain Management;  Laterality: Right;  RIGHT SYMPATHETIC NERVE BLOCK       Review of patient's allergies indicates:   Allergen Reactions    Vasopressin Anaphylaxis and Other (See Comments)     Increased pressure in his head; felt like his eyeballs and veins were going to pop out of his head.     Heparin Other (See Comments)     Other reaction(s): "depleted my blood platets"    Decreased platelet count    Heparin analogues Other (See Comments)     Depleted WBC count    Vasopressin analogues Other (See Comments)     "felt like eyes going to pop out of my head"    Morphine Hallucinations, Other (See Comments) and Anxiety     Other reaction(s): Hallucinations  Paranoid, hallucinations         No current facility-administered medications on file prior to encounter.     Current Outpatient Medications on File Prior to Encounter   Medication Sig    acetaminophen (TYLENOL) 500 mg Cap Take 500 mg by mouth.    " albuterol-ipratropium (DUO-NEB) 2.5 mg-0.5 mg/3 mL nebulizer solution Inhale 3 mLs into the lungs every 6 (six) hours as needed.    amiodarone (PACERONE) 200 MG Tab Take 200 mg by mouth once daily.    aspirin 81 MG Chew Take 81 mg by mouth once daily.    atorvastatin (LIPITOR) 10 MG tablet Take 1 tablet by mouth once daily.    BASAGLAR KWIKPEN U-100 INSULIN glargine 100 units/mL (3mL) SubQ pen 25 Units once daily.     carvediloL (COREG) 3.125 MG tablet Take 3.125 mg by mouth 2 (two) times daily.    CORLANOR 5 mg Tab Take by mouth nightly.    ENTRESTO 24-26 mg per tablet Take 1 tablet by mouth 2 (two) times daily.    FARXIGA 10 mg tablet Take 10 mg by mouth once daily.    fenofibrate (TRICOR) 145 MG tablet Take 145 mg by mouth once daily.    furosemide (LASIX) 40 MG tablet Take 1 tablet (40 mg total) by mouth once daily.    gabapentin (NEURONTIN) 600 MG tablet Take 1 tablet by mouth 3 (three) times daily.    JANUVIA 50 mg Tab     liraglutide 0.6 mg/0.1 mL, 18 mg/3 mL, subq PNIJ (VICTOZA 2-GRACIELA) 0.6 mg/0.1 mL (18 mg/3 mL) PnIj pen Victoza 3-Graciela 0.6 mg/0.1 mL (18 mg/3 mL) subcutaneous pen injector    mupirocin (BACTROBAN) 2 % ointment Apply topically once daily.    ofloxacin (OCUFLOX) 0.3 % ophthalmic solution     OZEMPIC 0.25 mg or 0.5 mg(2 mg/1.5 mL) pen injector Inject into the skin.    pantoprazole (PROTONIX) 40 MG tablet Take 1 tablet by mouth once daily.    prednisoLONE acetate (PRED FORTE) 1 % DrpS Place into both eyes.    semaglutide (OZEMPIC SUBQ) Inject into the skin.    spironolactone (ALDACTONE) 25 MG tablet Take 1 tablet by mouth once daily.    vitamin B complex-folic acid 0.4 mg Tab Take 1 tablet by mouth.    XARELTO 2.5 mg Tab Take by mouth.     Family History       Problem Relation (Age of Onset)    Arthritis Mother    Cancer Father    Diabetes Mother    Heart disease Father    Stroke Father          Tobacco Use    Smoking status: Former     Packs/day: 2.00     Years: 38.00      Pack years: 76.00     Types: Cigarettes     Quit date: 2012     Years since quitting: 10.6    Smokeless tobacco: Never   Substance and Sexual Activity    Alcohol use: No    Drug use: Not on file    Sexual activity: Never     Review of Systems   Reason unable to perform ROS: Ten point system review pertinent positives and negatives are present HPI.   Objective:     Vital Signs (Most Recent):  Temp: 98.7 °F (37.1 °C) (09/01/22 0621)  Pulse: 89 (09/01/22 0600)  Resp: 14 (09/01/22 0600)  BP: (!) 100/58 (09/01/22 0600)  SpO2: 96 % (09/01/22 0600)   Vital Signs (24h Range):  Temp:  [98.7 °F (37.1 °C)-103.3 °F (39.6 °C)] 98.7 °F (37.1 °C)  Pulse:  [88-96] 89  Resp:  [14-23] 14  SpO2:  [93 %-96 %] 96 %  BP: (100-130)/(56-69) 100/58     Weight: 118.8 kg (262 lb)  Body mass index is 35.53 kg/m².    Physical Exam  Constitutional:       General: He is not in acute distress.     Appearance: He is obese. He is not ill-appearing or diaphoretic.   HENT:      Head: Normocephalic and atraumatic.      Nose: Nose normal.   Cardiovascular:      Rate and Rhythm: Normal rate.   Musculoskeletal:      Cervical back: Neck supple.   Neurological:      Mental Status: He is alert.   Chest:  Normal contour chest symmetrical motion no accessory muscle use decrease at entry bilaterally patient on BiPAP.  Abdomen soft nontender nondistended bowel sounds are normoactive   Skin patient has presence of wound on right foot and also bruise on left lower extremity anterior surface of she  Neuro:  Awake alert oriented follows commands moves 4 extremities        Significant Labs: All pertinent labs within the past 24 hours have been reviewed.  A1C: No results for input(s): HGBA1C in the last 4320 hours.  Bilirubin:   Recent Labs   Lab 09/01/22 0153   BILITOT 1.6*     CBC:   Recent Labs   Lab 09/01/22 0153 09/01/22  0333   WBC 16.26*  --    HGB 14.3  --    HCT 44.6 46   *  --      Troponin:   Recent Labs   Lab 09/01/22  0153   TROPONINI 0.034      TSH: No results for input(s): TSH in the last 4320 hours.  Urine Culture: No results for input(s): LABURIN in the last 48 hours.  Urine Studies: No results for input(s): COLORU, APPEARANCEUA, PHUR, SPECGRAV, PROTEINUA, GLUCUA, KETONESU, BILIRUBINUA, OCCULTUA, NITRITE, UROBILINOGEN, LEUKOCYTESUR, RBCUA, WBCUA, BACTERIA, SQUAMEPITHEL, HYALINECASTS in the last 48 hours.    Invalid input(s): WRIGHTSUR    Significant Imaging: I have reviewed all pertinent imaging results/findings within the past 24 hours.    Assessment/Plan:     * Acute respiratory failure with hypoxia and hypercarbia    Admit patient to ICU  Continue BiPAP wean off as tolerated   Solu-Medrol V1 25 mg now and then 60 mg q.6 hours   DuoNebs scheduled and albuterol p.r.n.   Sputum culture follow-up results of blood culture continue with vancomycin and cefepime   No Lovenox or heparin due to history of HIT  Lasix IV 40 mg b.i.d.  Consult Dr. Roth  Consult Intensivist.  Continue with home medications as apropiate       VTE Risk Mitigation (From admission, onward)         Ordered     Place sequential compression device  Until discontinued         09/01/22 0633     IP VTE HIGH RISK PATIENT  Once         09/01/22 0633                   Gunner Mensah MD  Department of Hospital Medicine   [facility] - Emergency Dept

## 2022-09-01 NOTE — ED NOTES
C/o SOB and fever worsening this evening after getting up to go to bed. -COVID at home. RA sats in 80s per EMS. +COPD but not O2 dependant. Slight tachypnea. Diminished wheezing to BLFs. Skin w/d. Pt tired and falls asleep during eval but easily aroused. MAEW with gen weakness. Wound to rt lateral sole of foot that is dry with dark discoloration to site. PVD noted but +CMS. VSS. NADN.

## 2022-09-02 ENCOUNTER — ANESTHESIA (OUTPATIENT)
Dept: CARDIOLOGY | Facility: HOSPITAL | Age: 62
DRG: 871 | End: 2022-09-02
Payer: MEDICARE

## 2022-09-02 ENCOUNTER — ANESTHESIA EVENT (OUTPATIENT)
Dept: CARDIOLOGY | Facility: HOSPITAL | Age: 62
DRG: 871 | End: 2022-09-02
Payer: MEDICARE

## 2022-09-02 PROBLEM — N18.32 ACUTE RENAL FAILURE SUPERIMPOSED ON STAGE 3B CHRONIC KIDNEY DISEASE: Status: ACTIVE | Noted: 2020-01-19

## 2022-09-02 PROBLEM — I27.20 PULMONARY HYPERTENSION: Status: ACTIVE | Noted: 2022-09-02

## 2022-09-02 LAB
ANION GAP SERPL CALC-SCNC: 11 MMOL/L (ref 8–16)
BACTERIA BLD CULT: ABNORMAL
BASOPHILS # BLD AUTO: 0.01 K/UL (ref 0–0.2)
BASOPHILS NFR BLD: 0.1 % (ref 0–1.9)
BUN SERPL-MCNC: 59 MG/DL (ref 6–20)
CALCIUM SERPL-MCNC: 8.8 MG/DL (ref 8.7–10.5)
CHLORIDE SERPL-SCNC: 99 MMOL/L (ref 95–110)
CO2 SERPL-SCNC: 27 MMOL/L (ref 23–29)
CREAT SERPL-MCNC: 2.5 MG/DL (ref 0.5–1.4)
DIFFERENTIAL METHOD: ABNORMAL
EOSINOPHIL # BLD AUTO: 0 K/UL (ref 0–0.5)
EOSINOPHIL NFR BLD: 0 % (ref 0–8)
ERYTHROCYTE [DISTWIDTH] IN BLOOD BY AUTOMATED COUNT: 15.1 % (ref 11.5–14.5)
EST. GFR  (NO RACE VARIABLE): 28.7 ML/MIN/1.73 M^2
ESTIMATED AVG GLUCOSE: 174 MG/DL (ref 68–131)
GLUCOSE SERPL-MCNC: 241 MG/DL (ref 70–110)
GLUCOSE SERPL-MCNC: 263 MG/DL (ref 70–110)
GLUCOSE SERPL-MCNC: 265 MG/DL (ref 70–110)
GLUCOSE SERPL-MCNC: 292 MG/DL (ref 70–110)
GLUCOSE SERPL-MCNC: 333 MG/DL (ref 70–110)
HBA1C MFR BLD: 7.7 % (ref 4.5–6.2)
HCT VFR BLD AUTO: 41.2 % (ref 40–54)
HGB BLD-MCNC: 13.2 G/DL (ref 14–18)
IMM GRANULOCYTES # BLD AUTO: 0.12 K/UL (ref 0–0.04)
IMM GRANULOCYTES NFR BLD AUTO: 0.7 % (ref 0–0.5)
LYMPHOCYTES # BLD AUTO: 0.3 K/UL (ref 1–4.8)
LYMPHOCYTES NFR BLD: 1.8 % (ref 18–48)
MAGNESIUM SERPL-MCNC: 2.3 MG/DL (ref 1.6–2.6)
MCH RBC QN AUTO: 31.7 PG (ref 27–31)
MCHC RBC AUTO-ENTMCNC: 32 G/DL (ref 32–36)
MCV RBC AUTO: 99 FL (ref 82–98)
MONOCYTES # BLD AUTO: 0.7 K/UL (ref 0.3–1)
MONOCYTES NFR BLD: 3.6 % (ref 4–15)
NEUTROPHILS # BLD AUTO: 16.8 K/UL (ref 1.8–7.7)
NEUTROPHILS NFR BLD: 93.8 % (ref 38–73)
NRBC BLD-RTO: 0 /100 WBC
PLATELET # BLD AUTO: 90 K/UL (ref 150–450)
PMV BLD AUTO: 11.6 FL (ref 9.2–12.9)
POTASSIUM SERPL-SCNC: 4.5 MMOL/L (ref 3.5–5.1)
RBC # BLD AUTO: 4.16 M/UL (ref 4.6–6.2)
SODIUM SERPL-SCNC: 137 MMOL/L (ref 136–145)
WBC # BLD AUTO: 17.91 K/UL (ref 3.9–12.7)

## 2022-09-02 PROCEDURE — 83036 HEMOGLOBIN GLYCOSYLATED A1C: CPT | Performed by: INTERNAL MEDICINE

## 2022-09-02 PROCEDURE — 94761 N-INVAS EAR/PLS OXIMETRY MLT: CPT

## 2022-09-02 PROCEDURE — 21000000 HC CCU ICU ROOM CHARGE

## 2022-09-02 PROCEDURE — 99900031 HC PATIENT EDUCATION (STAT)

## 2022-09-02 PROCEDURE — 63600175 PHARM REV CODE 636 W HCPCS: Performed by: INTERNAL MEDICINE

## 2022-09-02 PROCEDURE — 25000003 PHARM REV CODE 250: Performed by: INTERNAL MEDICINE

## 2022-09-02 PROCEDURE — 94660 CPAP INITIATION&MGMT: CPT

## 2022-09-02 PROCEDURE — 36415 COLL VENOUS BLD VENIPUNCTURE: CPT | Performed by: INTERNAL MEDICINE

## 2022-09-02 PROCEDURE — 99223 PR INITIAL HOSPITAL CARE,LEVL III: ICD-10-PCS | Mod: ,,, | Performed by: STUDENT IN AN ORGANIZED HEALTH CARE EDUCATION/TRAINING PROGRAM

## 2022-09-02 PROCEDURE — 83735 ASSAY OF MAGNESIUM: CPT | Performed by: INTERNAL MEDICINE

## 2022-09-02 PROCEDURE — 25000242 PHARM REV CODE 250 ALT 637 W/ HCPCS: Performed by: INTERNAL MEDICINE

## 2022-09-02 PROCEDURE — 99223 1ST HOSP IP/OBS HIGH 75: CPT | Mod: ,,, | Performed by: STUDENT IN AN ORGANIZED HEALTH CARE EDUCATION/TRAINING PROGRAM

## 2022-09-02 PROCEDURE — 87205 SMEAR GRAM STAIN: CPT | Performed by: INTERNAL MEDICINE

## 2022-09-02 PROCEDURE — 25000003 PHARM REV CODE 250

## 2022-09-02 PROCEDURE — 99223 PR INITIAL HOSPITAL CARE,LEVL III: ICD-10-PCS | Mod: ,,, | Performed by: INTERNAL MEDICINE

## 2022-09-02 PROCEDURE — 99900035 HC TECH TIME PER 15 MIN (STAT)

## 2022-09-02 PROCEDURE — 94799 UNLISTED PULMONARY SVC/PX: CPT

## 2022-09-02 PROCEDURE — 87040 BLOOD CULTURE FOR BACTERIA: CPT | Performed by: INTERNAL MEDICINE

## 2022-09-02 PROCEDURE — 27000221 HC OXYGEN, UP TO 24 HOURS

## 2022-09-02 PROCEDURE — 85025 COMPLETE CBC W/AUTO DIFF WBC: CPT | Performed by: INTERNAL MEDICINE

## 2022-09-02 PROCEDURE — 80048 BASIC METABOLIC PNL TOTAL CA: CPT | Performed by: INTERNAL MEDICINE

## 2022-09-02 PROCEDURE — 94640 AIRWAY INHALATION TREATMENT: CPT

## 2022-09-02 PROCEDURE — 87070 CULTURE OTHR SPECIMN AEROBIC: CPT | Performed by: INTERNAL MEDICINE

## 2022-09-02 PROCEDURE — 99223 1ST HOSP IP/OBS HIGH 75: CPT | Mod: ,,, | Performed by: INTERNAL MEDICINE

## 2022-09-02 RX ORDER — ENOXAPARIN SODIUM 100 MG/ML
40 INJECTION SUBCUTANEOUS EVERY 24 HOURS
Status: DISCONTINUED | OUTPATIENT
Start: 2022-09-02 | End: 2022-09-08 | Stop reason: HOSPADM

## 2022-09-02 RX ORDER — SODIUM CHLORIDE 0.9 % (FLUSH) 0.9 %
10 SYRINGE (ML) INJECTION
Status: DISCONTINUED | OUTPATIENT
Start: 2022-09-02 | End: 2022-09-08 | Stop reason: HOSPADM

## 2022-09-02 RX ADMIN — INSULIN ASPART 3 UNITS: 100 INJECTION, SOLUTION INTRAVENOUS; SUBCUTANEOUS at 08:09

## 2022-09-02 RX ADMIN — AMIODARONE HYDROCHLORIDE 200 MG: 200 TABLET ORAL at 09:09

## 2022-09-02 RX ADMIN — GABAPENTIN 600 MG: 300 CAPSULE ORAL at 11:09

## 2022-09-02 RX ADMIN — CARVEDILOL 3.12 MG: 3.12 TABLET, FILM COATED ORAL at 08:09

## 2022-09-02 RX ADMIN — ASPIRIN 81 MG CHEWABLE TABLET 81 MG: 81 TABLET CHEWABLE at 09:09

## 2022-09-02 RX ADMIN — INSULIN ASPART 3 UNITS: 100 INJECTION, SOLUTION INTRAVENOUS; SUBCUTANEOUS at 11:09

## 2022-09-02 RX ADMIN — ALBUTEROL SULFATE 2.5 MG: 2.5 SOLUTION RESPIRATORY (INHALATION) at 09:09

## 2022-09-02 RX ADMIN — INSULIN DETEMIR 20 UNITS: 100 INJECTION, SOLUTION SUBCUTANEOUS at 10:09

## 2022-09-02 RX ADMIN — PANTOPRAZOLE SODIUM 40 MG: 40 TABLET, DELAYED RELEASE ORAL at 05:09

## 2022-09-02 RX ADMIN — FAMOTIDINE 20 MG: 20 TABLET ORAL at 09:09

## 2022-09-02 RX ADMIN — IPRATROPIUM BROMIDE AND ALBUTEROL SULFATE 3 ML: .5; 3 SOLUTION RESPIRATORY (INHALATION) at 01:09

## 2022-09-02 RX ADMIN — IPRATROPIUM BROMIDE AND ALBUTEROL SULFATE 3 ML: .5; 3 SOLUTION RESPIRATORY (INHALATION) at 08:09

## 2022-09-02 RX ADMIN — CHLORHEXIDINE GLUCONATE 15 ML: 1.2 RINSE ORAL at 09:09

## 2022-09-02 RX ADMIN — CHLORHEXIDINE GLUCONATE 15 ML: 1.2 RINSE ORAL at 08:09

## 2022-09-02 RX ADMIN — ATORVASTATIN CALCIUM 10 MG: 10 TABLET, FILM COATED ORAL at 09:09

## 2022-09-02 RX ADMIN — CARVEDILOL 3.12 MG: 3.12 TABLET, FILM COATED ORAL at 09:09

## 2022-09-02 RX ADMIN — INSULIN ASPART 4 UNITS: 100 INJECTION, SOLUTION INTRAVENOUS; SUBCUTANEOUS at 04:09

## 2022-09-02 RX ADMIN — MUPIROCIN 1 G: 20 OINTMENT TOPICAL at 09:09

## 2022-09-02 RX ADMIN — VANCOMYCIN HYDROCHLORIDE 2000 MG: 500 INJECTION, POWDER, LYOPHILIZED, FOR SOLUTION INTRAVENOUS at 04:09

## 2022-09-02 RX ADMIN — INSULIN DETEMIR 20 UNITS: 100 INJECTION, SOLUTION SUBCUTANEOUS at 08:09

## 2022-09-02 RX ADMIN — GABAPENTIN 600 MG: 300 CAPSULE ORAL at 09:09

## 2022-09-02 RX ADMIN — INSULIN ASPART 3 UNITS: 100 INJECTION, SOLUTION INTRAVENOUS; SUBCUTANEOUS at 05:09

## 2022-09-02 RX ADMIN — TRAMADOL HYDROCHLORIDE 50 MG: 50 TABLET, COATED ORAL at 11:09

## 2022-09-02 RX ADMIN — SODIUM CHLORIDE: 0.9 INJECTION, SOLUTION INTRAVENOUS at 09:09

## 2022-09-02 RX ADMIN — CEFTRIAXONE 2 G: 2 INJECTION, SOLUTION INTRAVENOUS at 03:09

## 2022-09-02 RX ADMIN — MUPIROCIN 1 G: 20 OINTMENT TOPICAL at 08:09

## 2022-09-02 RX ADMIN — TRAMADOL HYDROCHLORIDE 50 MG: 50 TABLET, COATED ORAL at 09:09

## 2022-09-02 NOTE — ANESTHESIA PREPROCEDURE EVALUATION
09/02/2022  Ana Bullard is a 62 y.o., male.      Patient Active Problem List   Diagnosis    Chronic Thrombocytopenia    MRSA (methicillin resistant Staphylococcus aureus) infection    Osteomyelitis of right foot    Type II diabetes mellitus with neurological manifestations    Venous stasis dermatitis of both lower extremities    Skin ulcer of right foot, limited to breakdown of skin    Peripheral vascular disease    Diabetic polyneuropathy associated with type 2 diabetes mellitus    Foot pain, right    Exostosis of right foot    Leg pain    Pneumonia    AICD (automatic cardioverter/defibrillator) present    Obesity    Chronic pain    CAD (coronary artery disease)    Chronic systolic heart failure    Heparin induced thrombocytopenia    Hyperkalemia    Acute renal failure superimposed on stage 3b chronic kidney disease    Hyponatremia    Hypomagnesemia    Bacteremia due to Gram-positive bacteria    Sepsis    Cellulitis of groin    Weakness    Anemia, chronic disease    Anemia, unspecified    Normochromic normocytic anemia    Acute respiratory failure with hypoxia    Pulmonary hypertension       Past Surgical History:   Procedure Laterality Date    CARDIAC PACEMAKER PLACEMENT  12/2012    CARDIAC PACEMAKER PLACEMENT  10/04/2018    battery replacement    CORONARY ARTERY BYPASS GRAFT  06/2012    ESOPHAGOGASTRODUODENOSCOPY  01/31/2017    SAMARA FILTER PLACEMENT  2012    vena cava    LUMBAR SYMPATHETIC NERVE BLOCK Right 8/22/2019    Procedure: BLOCK, NERVE, SYMPATHETIC, LUMBAR;  Surgeon: Tashi Good MD;  [facility];  Service: Pain Management;  Laterality: Right;  RIGHT SYMPATHETIC NERVE BLOCK        Tobacco Use:  The patient  reports that he quit smoking about 10 years ago. His smoking use included cigarettes. He has a 76.00 pack-year smoking history. He has  never used smokeless tobacco.     Results for orders placed or performed during the hospital encounter of 09/01/22   EKG 12-lead    Collection Time: 09/01/22  1:48 AM    Narrative    Test Reason : U07.1,    Vent. Rate : 092 BPM     Atrial Rate : 092 BPM     P-R Int : 140 ms          QRS Dur : 172 ms      QT Int : 438 ms       P-R-T Axes : 086 201 038 degrees     QTc Int : 541 ms    Atrial-sensed ventricular-paced rhythm  Biventricular pacemaker detected  Abnormal ECG  When compared with ECG of 09-JUN-2020 21:28,  Vent. rate has decreased BY  17 BPM    Referred By: [email or web address]   SELF           Confirmed By:         Imaging Results          X-Ray Chest AP Portable (Final result)  Result time 09/01/22 07:12:09    Final result by Andrea Fuller MD (09/01/22 07:12:09)                 Narrative:    HISTORY: COVID-19  dyspnea, fever.    FINDINGS: Portable chest radiograph at 0 2020 hours compared to 06/09/2020 shows triple lead left subclavian CCD, unchanged in position, with median sternotomy wires and mediastinal surgical clips. The cardiac silhouette is enlarged, stable in size, with stable markedly enlarged pulmonary arterial trunk.    The peripheral pulmonary vasculature is normal. The lungs are normally expanded, with scattered to the nodular densities throughout both lungs, nonspecific. No consolidation, large pleural effusion, or pneumothorax.    IMPRESSION: Diffuse interstitial prominence throughout both lungs, nonspecific but suggesting viral or atypical pneumonia given clinical history.    Electronically signed by:  Andrea Fuller MD  9/1/2022 7:12 AM CDT Workstation: [id number]                               Lab Results   Component Value Date    WBC 17.91 (H) 09/02/2022    HGB 13.2 (L) 09/02/2022    HCT 41.2 09/02/2022    MCV 99 (H) 09/02/2022    PLT 90 (L) 09/02/2022     BMP  Lab Results   Component Value Date     09/02/2022    K 4.5 09/02/2022    CL 99 09/02/2022    CO2 27 09/02/2022    BUN 59 (H)  09/02/2022    CREATININE 2.5 (H) 09/02/2022    CALCIUM 8.8 09/02/2022    ANIONGAP 11 09/02/2022     (H) 09/02/2022     (H) 09/01/2022     (H) 06/12/2020       Results for orders placed during the hospital encounter of 01/18/20    Echo Color Flow Doppler? Yes    Interpretation Summary  · Eccentric left ventricular hypertrophy.  · Severely decreased left ventricular systolic function. The estimated ejection fraction is 25%.  · Grade III (severe) left ventricular diastolic dysfunction consistent with restrictive physiology.  · Moderate left atrial enlargement.  · Mild mitral regurgitation.  · Mild tricuspid regurgitation.  · Intermediate central venous pressure (8 mmHg).  · The estimated PA systolic pressure is 53 mmHg.  · Pulmonary hypertension present.              Pre-op Assessment    I have reviewed the Patient Summary Reports.     I have reviewed the Nursing Notes.    I have reviewed the Medications.     Review of Systems  Anesthesia Hx:  Denies Family Hx of Anesthesia complications.   Denies Personal Hx of Anesthesia complications.   Hematology/Oncology:         -- Anemia: Hematology Comments: Hx heparin induced thrombocytopenia   Cardiovascular:   Pacemaker Past MI (hx MI) CAD (s/p CABG)  Dysrhythmias (hx complete heart block, s/p pacemaker placement )  CHF (EF 15% noted) PVD Hx pulm HTN   Pulmonary:   Admitted with resp. compromise   Renal/:   Chronic Renal Disease (stage 3 CRI, AD this admit), CRI, ARF    Hepatic/GI:   GERD    Musculoskeletal:   R lower ext cellulitis osteomyelitis   Neurological:   Peripheral Neuropathy (diabetic polyneuropathy)    Endocrine:   Diabetes, poorly controlled, type 2, using insulin  Obesity / BMI > 30  Psych:   Psychiatric History anxiety depression          Physical Exam  General: Well nourished, Cooperative, Alert and Oriented    Airway:  Mallampati: III / II  Mouth Opening: Normal  TM Distance: Normal  Tongue: Normal  Neck ROM: Normal  ROM    Chest/Lungs:  Clear to auscultation    Heart:  Rate: Normal  Rhythm: Regular Rhythm  Sounds: Normal    Abdomen:  Normal, Soft, Nontender        Anesthesia Plan  Type of Anesthesia, risks & benefits discussed:    Anesthesia Type: Gen Natural Airway  Intra-op Monitoring Plan: Standard ASA Monitors  Post Op Pain Control Plan:   (medical reason for not using multimodal pain management)  Induction:  IV  Informed Consent: Informed consent signed with the Patient and all parties understand the risks and agree with anesthesia plan.  All questions answered.   ASA Score: 4 Emergent  Anesthesia Plan Notes:   **CHART PREOP **  Gen  Careful fluid administration - (EF 15%)  Zofran    .

## 2022-09-02 NOTE — NURSING
Patient received from ICU at 1256 via wheelchair. Patient oriented to room. Call light within patients reach, instructed to call for assistance. Patient verbalized understanding.

## 2022-09-02 NOTE — CONSULTS
"Pulmonary/Critical Care Consult      Patient name: Ana Bullard  MRN: [id number]  Date: 09/02/2022    Admit Date: 9/1/2022  Consult Requested By: Patrick Carvajal MD    Reason for Consult: respiratory failure    HPI:    9/2/2022 - 61 yo male had chills and fever and then developed SOB.  Came to ER and was on BiPAP for a period of time.  Was febrile but that has resolved.  BC are + GPC.  He feels much better.  He reports a h/o "mild COPD" and has seen Dr Palumbo in past.  Has known cardiac history (EF 25% 2020).  No respiratory comaplints this AM.    Review of Systems    Review of Systems   Constitutional:  Positive for chills, fever and malaise/fatigue. Negative for diaphoresis and weight loss.   HENT:  Negative for congestion.    Eyes:  Negative for pain.   Respiratory:  Positive for shortness of breath and wheezing. Negative for cough, hemoptysis, sputum production and stridor.    Cardiovascular:  Positive for leg swelling (chronic issue). Negative for chest pain, palpitations, orthopnea, claudication and PND.   Gastrointestinal:  Negative for abdominal pain, constipation, diarrhea, heartburn, nausea and vomiting.   Genitourinary:  Negative for dysuria, frequency and urgency.   Musculoskeletal:  Negative for falls and myalgias.        + foot wounds seen by Dr Heaton   Neurological:  Positive for weakness. Negative for sensory change and focal weakness.   Psychiatric/Behavioral:  Negative for depression, substance abuse and suicidal ideas. The patient is not nervous/anxious.      Past Medical History    Past Medical History:   Diagnosis Date    AICD (automatic cardioverter/defibrillator) present     Anemia, chronic disease 7/27/2021    Anemia, unspecified 7/27/2021    Anxiety and depression 2012    CAD (coronary artery disease) 2012    Cardiomegaly 2016    CHF (congestive heart failure) 2012    Cholecystitis 2017    Complete heart block 2012    Diabetic foot ulcer     DVT (deep venous thrombosis) 2012    And " pulmonary embolus    GERD (gastroesophageal reflux disease) 2010    Heparin induced thrombocytopenia     Hyperlipemia 2011    IDDM (insulin dependent diabetes mellitus) 1983    With neuropathy    Ischemic cardiomyopathy 2012    MI (myocardial infarction) 2012    Morbid obesity     MRSA (methicillin resistant Staphylococcus aureus) infection 01/19/2019    Noncompliance with therapeutic plan 2019    Normochromic normocytic anemia 7/27/2021    Osteomyelitis of right foot 01/2019    Pulmonary edema 2019    Respiratory failure 2019    Sepsis 01/2019    Due to cellulitis right leg    Sleep apnea 2013    Thrombocytopathy 2012    Venous stasis dermatitis of both lower extremities        Past Surgical History    Past Surgical History:   Procedure Laterality Date    CARDIAC PACEMAKER PLACEMENT  12/2012    CARDIAC PACEMAKER PLACEMENT  10/04/2018    battery replacement    CORONARY ARTERY BYPASS GRAFT  06/2012    ESOPHAGOGASTRODUODENOSCOPY  01/31/2017    SAMARA FILTER PLACEMENT  2012    vena cava    LUMBAR SYMPATHETIC NERVE BLOCK Right 8/22/2019    Procedure: BLOCK, NERVE, SYMPATHETIC, LUMBAR;  Surgeon: Tashi Good MD;  Location: [facility];  Service: Pain Management;  Laterality: Right;  RIGHT SYMPATHETIC NERVE BLOCK       Medications (scheduled):      amiodarone  200 mg Oral Daily    aspirin  81 mg Oral Daily    atorvastatin  10 mg Oral Daily    carvediloL  3.125 mg Oral BID    cefTRIAXone (ROCEPHIN) IVPB  2 g Intravenous Q24H    chlorhexidine  15 mL Mouth/Throat BID    famotidine  20 mg Oral Daily    gabapentin  600 mg Oral BID    insulin detemir U-100  20 Units Subcutaneous BID    mupirocin   Nasal BID    pantoprazole  40 mg Oral Before breakfast    sodium chloride 0.9%  10 mL Intravenous Q12H    vancomycin (VANCOCIN) IVPB  2,000 mg Intravenous Q24H       Medications (infusions):      sodium chloride 0.9% 100 mL/hr at 09/02/22 0932       Medications (prn):     acetaminophen, albuterol sulfate, calcium chloride IVPB, calcium  chloride IVPB, calcium chloride IVPB, dextrose 10%, dextrose 10%, insulin aspart U-100, magnesium oxide, magnesium sulfate IVPB, magnesium sulfate IVPB, magnesium sulfate IVPB, magnesium sulfate IVPB, potassium chloride, potassium chloride, potassium chloride, potassium chloride, sodium phosphate IVPB, sodium phosphate IVPB, sodium phosphate IVPB, sodium phosphate IVPB, sodium phosphate IVPB, traMADoL, Pharmacy to dose Vancomycin consult **AND** vancomycin - pharmacy to dose    Family History:   Family History   Problem Relation Age of Onset    Arthritis Mother     Diabetes Mother     Cancer Father     Heart disease Father     Stroke Father        Social History: Tobacco:   Social History     Tobacco Use   Smoking Status Former    Packs/day: 2.00    Years: 38.00    Pack years: 76.00    Types: Cigarettes    Quit date: 2012    Years since quitting: 10.6   Smokeless Tobacco Never                                EtOH:   Social History     Substance and Sexual Activity   Alcohol Use No                                Drugs:   Social History     Substance and Sexual Activity   Drug Use Not on file       Physical Exam    Vital signs:  Temp:  [98.2 °F (36.8 °C)-98.6 °F (37 °C)]   Pulse:  [60-84]   Resp:  [12-28]   BP: ()/(46-83)   SpO2:  [92 %-100 %]     Intake/Output:   Intake/Output Summary (Last 24 hours) at 9/2/2022 0949  Last data filed at 9/2/2022 0500  Gross per 24 hour   Intake 1356.67 ml   Output 1250 ml   Net 106.67 ml        BMI: Estimated body mass index is 36.3 kg/m² as calculated from the following:    Height as of this encounter: 6' (1.829 m).    Weight as of this encounter: 121.4 kg (267 lb 10.2 oz).    Physical Exam  Vitals and nursing note reviewed.   Constitutional:       General: He is not in acute distress.     Appearance: Normal appearance. He is obese. He is not ill-appearing, toxic-appearing or diaphoretic.      Comments: Sitting up in chair   HENT:      Head: Normocephalic and atraumatic.       Right Ear: External ear normal.      Left Ear: External ear normal.      Nose: Nose normal.      Mouth/Throat:      Mouth: Mucous membranes are moist.      Pharynx: Oropharynx is clear. No oropharyngeal exudate.   Eyes:      General: No scleral icterus.        Right eye: No discharge.         Left eye: No discharge.      Extraocular Movements: Extraocular movements intact.      Conjunctiva/sclera: Conjunctivae normal.      Pupils: Pupils are equal, round, and reactive to light.   Neck:      Vascular: No carotid bruit.   Cardiovascular:      Rate and Rhythm: Normal rate and regular rhythm.      Pulses: Normal pulses.      Heart sounds: Normal heart sounds. No murmur heard.    No friction rub. No gallop.   Pulmonary:      Effort: Pulmonary effort is normal. No respiratory distress.      Breath sounds: Normal breath sounds. No stridor. No wheezing, rhonchi or rales.      Comments: Decreased at bases bilaterally  No acc m use  Chest:      Chest wall: No tenderness.   Abdominal:      General: Bowel sounds are normal. There is no distension.      Tenderness: There is no abdominal tenderness. There is no guarding.   Musculoskeletal:         General: No swelling. Normal range of motion.      Cervical back: Normal range of motion and neck supple. No rigidity or tenderness.      Right lower leg: No edema.      Left lower leg: No edema.   Lymphadenopathy:      Cervical: No cervical adenopathy.   Skin:     General: Skin is warm and dry.      Capillary Refill: Capillary refill takes less than 2 seconds.      Coloration: Skin is not jaundiced.      Findings: No bruising.      Comments: + chronic stasis changes bilaterally   Neurological:      General: No focal deficit present.      Mental Status: He is alert and oriented to person, place, and time. Mental status is at baseline.      Cranial Nerves: No cranial nerve deficit.      Sensory: No sensory deficit.      Motor: No weakness.   Psychiatric:         Mood and Affect: Mood  normal.         Behavior: Behavior normal.         Thought Content: Thought content normal.         Judgment: Judgment normal.       Laboratory    Recent Labs   Lab 09/02/22 0439   WBC 17.91*   RBC 4.16*   HGB 13.2*   HCT 41.2   PLT 90*   MCV 99*   MCH 31.7*   MCHC 32.0       Recent Labs   Lab 09/02/22 0439   CALCIUM 8.8      K 4.5   CO2 27   CL 99   BUN 59*   CREATININE 2.5*       No results for input(s): PT, INR, APTT in the last 24 hours.    No results for input(s): CPK, CPKMB, TROPONINI, MB in the last 24 hours.    Additional labs: reviewed    Microbiology:       Microbiology Results (last 7 days)       Procedure Component Value Units Date/Time    Blood culture [[id number]]     Order Status: Sent Specimen: Blood     Blood culture [[id number]]     Order Status: Sent Specimen: Blood     Blood culture x two cultures. Draw prior to antibiotics. [[id number]] Collected: 09/01/22 0155    Order Status: Completed Specimen: Blood from Peripheral, Hand, Right Updated: 09/01/22 1301     Blood Culture, Routine Gram stain aer bottle: Gram positive cocci      Gram stain mayte bottle: Gram positive cocci      Results called to and read back by:David Berumen RN-ED;  09/01/2022  13:01      CJD    Narrative:      Aerobic and anaerobic    Blood culture x two cultures. Draw prior to antibiotics. [[id number]] Collected: 09/01/22 0152    Order Status: Completed Specimen: Blood from Peripheral, Antecubital, Right Updated: 09/01/22 1300     Blood Culture, Routine Gram stain aer bottle: Gram positive cocci      Gram stain mayte bottle: Gram positive cocci      Results called to and read back by:David Berumen RN-ED;  09/01/2022  13:00      CJD    Narrative:      Aerobic and anaerobic    Culture, Respiratory with Gram Stain [[id number]]     Order Status: No result Specimen: Respiratory     Aerobic culture [[id number]]     Order Status: No result Specimen: Wound             Radiology    X-Ray Chest AP Portable  HISTORY: COVID-19  dyspnea,  fever.    FINDINGS: Portable chest radiograph at 0 2020 hours compared to 06/09/2020 shows triple lead left subclavian CCD, unchanged in position, with median sternotomy wires and mediastinal surgical clips. The cardiac silhouette is enlarged, stable in size, with stable markedly enlarged pulmonary arterial trunk.    The peripheral pulmonary vasculature is normal. The lungs are normally expanded, with scattered to the nodular densities throughout both lungs, nonspecific. No consolidation, large pleural effusion, or pneumothorax.    IMPRESSION: Diffuse interstitial prominence throughout both lungs, nonspecific but suggesting viral or atypical pneumonia given clinical history.    Electronically signed by:  Andrea Fuller MD  9/1/2022 7:12 AM CDT Workstation: [id number]        Additional Studies    reviewed    Ventilator Information    Oxygen Concentration (%):  [35] 35         Recent Labs     09/01/22  0333   PH 7.350   PCO2 54.1*   PO2 18*   HCO3 29.9*   POCSATURATED 24*   BE 4         Impression    Active Hospital Problems    Diagnosis  POA    *Acute respiratory failure with hypoxia [J96.01]  Yes    Pulmonary hypertension [I27.20]  Unknown    Bacteremia due to Gram-positive bacteria [R78.81]  Yes    Acute renal failure superimposed on stage 3b chronic kidney disease [N17.9, N18.32]  No    Chronic systolic heart failure [I50.22]  Yes    AICD (automatic cardioverter/defibrillator) present [Z95.810]  Yes    Chronic Thrombocytopenia [D69.6]  Yes    Type II diabetes mellitus with neurological manifestations [E11.49]  Yes     With neuropathy        Resolved Hospital Problems   No resolved problems to display.       Plan    Respiratory failure is better and was likely worse due to sepsis and acute CHF  Continue antibiotics while we await culture results  OK to transfer to floor  Will start lovenox and monitor platelets  Increase activity as able  HAs pulmonary hypertension which is group 2 and possibly group 3  Has FARHAN and  using PAP at home  Watch renal function    Thank you for this consult.  I will follow with you while the patient is hospitalized.        Mauricio Gonsales MD  [facility] Pulmonary/Critical Care  09/02/2022

## 2022-09-02 NOTE — NURSING
Report given to GEOVANY Saenz. Patient transferred to [facility] via wheelchair. Wife at bedside.     Dr. Carvajal notified of patient refusal of lovenox. MD stated to hold at this time.

## 2022-09-02 NOTE — NURSING
Spoke with Nisha at [facility]. Dr. Richardson is available tomorrow at 10am to do bedside GABRIELLA. Consult called into Dr. Garcia with anesthesiology.

## 2022-09-02 NOTE — CONSULTS
Consult Note  Infectious Disease    Reason for Consult:  GPC bacteremia    HPI: Ana Bullard is a 62 y.o. male very pleasant, former heavy smoker, known to our service for admission in January 2020 for septic shock secondary to strep pneumo bacteremia, he has past medical history of CAD, CABG, AICD since 2012, battery replaced 2018, exchange in 2016, CHF (EF: 15%), HTN, HLD, PVD, CKD not on dialysis, FARHAN on BiPAP, diabetes, neuropathy, GERD, and prior history of diabetic right foot infection due to Proteus vulgaris, group a Streptococcus and MRSA in January 2019, with residual chronic foot ulcer for which he follows with Dr. Heaton/podiatry outpatient who performs debridements.    Patient came to the hospital after sudden onset of fever, chills, not feeling well since 8/31, wife at home measured temperature about 99.  He states he has been having on and off headache, currently resolved.  He has been having some productive cough, white/thin sputum, denies nausea or vomiting, no abdominal pain, no dysuria or increased urinary frequency, or change in bowel movements.    As per patient, he is scheduled to have a GABRIELLA outpatient by Dr. Roth on 09/16.    In the ER, hypotensive 100/58, T-max 103.3°,   Lab significant for leukocytosis of 16.2, left shift 91.7%, H&H 14.3/44.6, platelet count 101, thrombocytopenia   AD creatinine 2.5,   Bilirubin 1.6   AST 36/ALT 48   CRP 3.6      Lactic 1.7, procalcitonin 0.12     Patient admitted to ICU for acute respiratory failure likely secondary to COPD exacerbation in addition to acute on chronic CHF, EF 15%.  Placed on BiPAP.  Started on IV steroids.  Empirically started on cefepime and vancomycin, switched to ceftriaxone 9/1.    Hospital course complicated by 4/4 bottles from admission growing GPC, pending ID and sensitivities.    ID consult for GPC.  Micro lap contacted, looks like Strep, results will be updated and within an hour.      Review of patient's allergies  "indicates:   Allergen Reactions    Vasopressin Anaphylaxis and Other (See Comments)     Increased pressure in his head; felt like his eyeballs and veins were going to pop out of his head.     Heparin Other (See Comments)     Other reaction(s): "depleted my blood platets"    Decreased platelet count    Heparin analogues Other (See Comments)     Depleted WBC count    Vasopressin analogues Other (See Comments)     "felt like eyes going to pop out of my head"    Morphine Hallucinations, Other (See Comments) and Anxiety     Other reaction(s): Hallucinations  Paranoid, hallucinations       Past Medical History:   Diagnosis Date    AICD (automatic cardioverter/defibrillator) present     Anemia, chronic disease 7/27/2021    Anemia, unspecified 7/27/2021    Anxiety and depression 2012    CAD (coronary artery disease) 2012    Cardiomegaly 2016    CHF (congestive heart failure) 2012    Cholecystitis 2017    Complete heart block 2012    Diabetic foot ulcer     DVT (deep venous thrombosis) 2012    And pulmonary embolus    GERD (gastroesophageal reflux disease) 2010    Heparin induced thrombocytopenia     Hyperlipemia 2011    IDDM (insulin dependent diabetes mellitus) 1983    With neuropathy    Ischemic cardiomyopathy 2012    MI (myocardial infarction) 2012    Morbid obesity     MRSA (methicillin resistant Staphylococcus aureus) infection 01/19/2019    Noncompliance with therapeutic plan 2019    Normochromic normocytic anemia 7/27/2021    Osteomyelitis of right foot 01/2019    Pulmonary edema 2019    Respiratory failure 2019    Sepsis 01/2019    Due to cellulitis right leg    Sleep apnea 2013    Thrombocytopathy 2012    Venous stasis dermatitis of both lower extremities      Past Surgical History:   Procedure Laterality Date    CARDIAC PACEMAKER PLACEMENT  12/2012    CARDIAC PACEMAKER PLACEMENT  10/04/2018    battery replacement    CORONARY ARTERY BYPASS GRAFT  06/2012    " ESOPHAGOGASTRODUODENOSCOPY  01/31/2017    SAMARA FILTER PLACEMENT  2012    vena cava    LUMBAR SYMPATHETIC NERVE BLOCK Right 8/22/2019    Procedure: BLOCK, NERVE, SYMPATHETIC, LUMBAR;  Surgeon: Tashi Good MD;  Location: [facility] OR;  Service: Pain Management;  Laterality: Right;  RIGHT SYMPATHETIC NERVE BLOCK     Social History     Tobacco Use    Smoking status: Former     Packs/day: 2.00     Years: 38.00     Pack years: 76.00     Types: Cigarettes     Quit date: 2012     Years since quitting: 10.6    Smokeless tobacco: Never   Substance Use Topics    Alcohol use: No        Family History   Problem Relation Age of Onset    Arthritis Mother     Diabetes Mother     Cancer Father     Heart disease Father     Stroke Father          Review of Systems:   +chills, fever, no sweats, weight loss  No change in vision, loss of vision or diplopia  No sinus congestion, purulent nasal discharge, post nasal drip or facial pain  No pain in mouth or throat. No problems with teeth, gums.  No chest pain, palpitations, syncope  Productive cough of yellow/mucoid phlegm, dyspnea on exertion and at rest, no hemoptysis or pleurisy  No dysphagia, odynophagia  No nausea, vomiting, diarrhea, constipation, no blood in stool, or focal abd pain,    No dysuria, hesitancy, hematuria, retention, incontinence  Right foot chronic plantar ulcer at 5th metatarsal  No swelling of joints, redness of joints, injuries, or new focal pain  No unusual headaches, dizziness, vertigo, numbness, paresthesias, neuropathy, falls  No anxiety, depression, substance abuse, sleep disturbance  No bleeding, lymphadenopathy  No new rashes, lesions, or wounds    Outdoor activities:  Lives at home with wife, works on his boat weekly, no recent contact with water/brackish water.  Former smoker, former heavy drinker, no drugs. Last colonoscopy 10 years ago.  Travel:  None  Implants:  AICD since 20/12, battery replaced 2018  Antibiotic History:  Vancomycin/cefepime,  switched to ceftriaxone 9/1    EXAM & DIAGNOSTICS REVIEWED:   Vitals:     Temp:  [98.2 °F (36.8 °C)-98.6 °F (37 °C)]   Temp: 98.4 °F (36.9 °C) (09/02/22 0705)  Pulse: 66 (09/02/22 0819)  Resp: 18 (09/02/22 0819)  BP: (!) 109/58 (09/02/22 0800)  SpO2: 98 % (09/02/22 0819)    Intake/Output Summary (Last 24 hours) at 9/2/2022 0917  Last data filed at 9/2/2022 0500  Gross per 24 hour   Intake 1356.67 ml   Output 1250 ml   Net 106.67 ml       General:  In NAD. Alert and attentive, cooperative, comfortable on room air  Eyes:  Anicteric, PERRL  ENT:  No ulcers, exudates, thrush, nares patent, dentures upper, edentulous lower  Neck:  Supple, no adenopathy appreciated  Lungs: Clear to auscultation b/l  Heart:  S1/S2+, regular rhythm, systolic murmur+ - L AICD in place, no redness noted, non tender to palpation  Abd:  Obese, +BS, soft, non tender, non distended, no rebound  :  Voids, urine clear, no flank tenderness  Musc:  Except for R foot, Joints without effusion, swelling,  erythema, synovitis, ambulatory  Skin:  Warm, no rash  Wound:   Neuro:  Following commands, neuropathy  Psych:  Calm, cooperative  Lymphatic:     No cervical, supraclavicular nodes  Extrem: Right foot with chronic non-healing ulcer, probes 3mm deep, no evidence of purulent drainage  B/l venous stasis  No LE edema b/l  VAD:  Peripheral IV       Isolation: None    9/1:        General Labs reviewed:  Recent Labs   Lab 09/01/22 0153 09/01/22 0333 09/02/22 0439   WBC 16.26*  --  17.91*   HGB 14.3  --  13.2*   HCT 44.6 46 41.2   *  --  90*       Recent Labs   Lab 09/01/22 0153 09/02/22 0439    137   K 5.0 4.5   CL 99 99   CO2 27 27   BUN 46* 59*   CREATININE 2.1* 2.5*   CALCIUM 9.4 8.8   PROT 8.6*  --    BILITOT 1.6*  --    ALKPHOS 80  --    ALT 48*  --    AST 36  --      Recent Labs   Lab 09/01/22  0153   CRP 3.61*     No results for input(s): SEDRATE in the last 168 hours.    Estimated Creatinine Clearance: 42.3 mL/min (A) (based on SCr  of 2.5 mg/dL (H)).     Prior micro  Blood cultures 06/09/2020 Streptococcus pyogenes group A  Blood cultures 1/18/20 Streptococcus pneumoniae     Micro:  Microbiology Results (last 7 days)       Procedure Component Value Units Date/Time    Blood culture [[id number]]     Order Status: Sent Specimen: Blood     Blood culture [[id number]]     Order Status: Sent Specimen: Blood     Blood culture x two cultures. Draw prior to antibiotics. [[id number]] Collected: 09/01/22 0155    Order Status: Completed Specimen: Blood from Peripheral, Hand, Right Updated: 09/01/22 1301     Blood Culture, Routine Gram stain aer bottle: Gram positive cocci      Gram stain mayte bottle: Gram positive cocci      Results called to and read back by:David Berumen RN-ED;  09/01/2022  13:01      CJD    Narrative:      Aerobic and anaerobic    Blood culture x two cultures. Draw prior to antibiotics. [[id number]] Collected: 09/01/22 0152    Order Status: Completed Specimen: Blood from Peripheral, Antecubital, Right Updated: 09/01/22 1300     Blood Culture, Routine Gram stain aer bottle: Gram positive cocci      Gram stain mayte bottle: Gram positive cocci      Results called to and read back by:David Berumen RN-ED;  09/01/2022  13:00      CJD    Narrative:      Aerobic and anaerobic    Culture, Respiratory with Gram Stain [[id number]]     Order Status: No result Specimen: Respiratory     Aerobic culture [[id number]]     Order Status: No result Specimen: Wound             Imaging Reviewed:  Chest x-ray    Cardiology: Last ECHO 1/20/20  Eccentric left ventricular hypertrophy.  Severely decreased left ventricular systolic function. The estimated ejection fraction is 25%.  Grade III (severe) left ventricular diastolic dysfunction consistent with restrictive physiology.  Moderate left atrial enlargement.  Mild mitral regurgitation.  Mild tricuspid regurgitation.  Intermediate central venous pressure (8 mmHg).  The estimated PA systolic pressure is 53 mmHg.  Pulmonary  hypertension present.          IMPRESSION & PLAN     Sepsis likely secondary to GPC bacteremia, seems like Streptococcus, pending final results, cannot exclude endocarditis in the setting of AICD   CRP 3.2    2. AD, creatinine 2.5    3. R foot chronic non-healing ulcer  4. PMHx: CAD, CABG, AICD since 2012, CHF (EF: 15%), HTN, HLD, PVD, CKD not on dialysis, FARHAN on BiPAP, diabetes, neuropathy, GERD    Recommendations:  Follow cultures  Repeat blood cultures x2 sets  Echo to rule out endocarditis   GABRIELLA ordered due to presence of AICD, rule out infected leads  Continue vancomycin IV, keep level 15-20   Ceftriaxone 2 g IV daily   Aspiration precautions  Incentive spirometry     Will follow    Discussed with patient, Dr. Carvajal    Medical Decision Making during this encounter was  [_] Low Complexity  [_] Moderate Complexity  [xx] High Complexity

## 2022-09-02 NOTE — PROGRESS NOTES
[facility] Medicine  Progress Note    Patient name: Ana Bullard  MRN: [id number]  Admit Date: 9/1/2022   LOS: 1 day     SUBJECTIVE:     Principal problem: Acute respiratory failure with hypoxia    Interval History:  62-year-old  male with multiple medical comorbidities admitted yesterday after presenting with fever, shortness of breath and nonproductive cough.    He is no longer requiring supplemental oxygen even with exertion.  Cough is improved.  Blood cultures from yesterday growing Gram-positive cocci in 4 of 4 bottles.  Serum creatinine is slightly worse than baseline.    Scheduled Meds:   albuterol-ipratropium  3 mL Nebulization Q6H    amiodarone  200 mg Oral Daily    aspirin  81 mg Oral Daily    atorvastatin  10 mg Oral Daily    carvediloL  3.125 mg Oral BID    cefTRIAXone (ROCEPHIN) IVPB  2 g Intravenous Q24H    chlorhexidine  15 mL Mouth/Throat BID    famotidine  20 mg Oral Daily    gabapentin  600 mg Oral BID    insulin detemir U-100  20 Units Subcutaneous BID    mupirocin   Nasal BID    pantoprazole  40 mg Oral Before breakfast    sodium chloride 0.9%  10 mL Intravenous Q12H    vancomycin (VANCOCIN) IVPB  2,000 mg Intravenous Q24H     Continuous Infusions:   sodium chloride 0.9% 100 mL/hr at 09/02/22 0600     PRN Meds:acetaminophen, albuterol sulfate, calcium chloride IVPB, calcium chloride IVPB, calcium chloride IVPB, dextrose 10%, dextrose 10%, insulin aspart U-100, magnesium oxide, magnesium sulfate IVPB, magnesium sulfate IVPB, magnesium sulfate IVPB, magnesium sulfate IVPB, potassium chloride, potassium chloride, potassium chloride, potassium chloride, sodium phosphate IVPB, sodium phosphate IVPB, sodium phosphate IVPB, sodium phosphate IVPB, sodium phosphate IVPB, traMADoL, Pharmacy to dose Vancomycin consult **AND** vancomycin - pharmacy to dose    Review of patient's allergies indicates:   Allergen Reactions    Vasopressin Anaphylaxis and Other (See Comments)  "    Increased pressure in his head; felt like his eyeballs and veins were going to pop out of his head.     Heparin Other (See Comments)     Other reaction(s): "depleted my blood platets"    Decreased platelet count    Heparin analogues Other (See Comments)     Depleted WBC count    Vasopressin analogues Other (See Comments)     "felt like eyes going to pop out of my head"    Morphine Hallucinations, Other (See Comments) and Anxiety     Other reaction(s): Hallucinations  Paranoid, hallucinations         Review of Systems: As per interval history    OBJECTIVE:     Vital Signs (Most Recent)  Temp: 98.4 °F (36.9 °C) (09/02/22 0705)  Pulse: 66 (09/02/22 0819)  Resp: 18 (09/02/22 0819)  BP: (!) 109/58 (09/02/22 0800)  SpO2: 98 % (09/02/22 0819)    Vital Signs Range (Last 24H):  Temp:  [98.2 °F (36.8 °C)-98.6 °F (37 °C)]   Pulse:  [60-84]   Resp:  [12-28]   BP: ()/(46-83)   SpO2:  [92 %-100 %]     I & O (Last 24H):  Intake/Output Summary (Last 24 hours) at 9/2/2022 0916  Last data filed at 9/2/2022 0500  Gross per 24 hour   Intake 1356.67 ml   Output 1250 ml   Net 106.67 ml       Physical Exam:  General: Patient resting comfortably in no acute distress. Appears as stated age. Calm  Eyes: No conjunctival injection. No scleral icterus.  ENT: Hearing grossly intact. No discharge from ears. No nasal discharge.   Neck: Supple, trachea midline. No JVD  CVS: RRR. No LE edema BL  Lungs:  No tachypnea or accessory muscle use.  Clear to auscultation bilaterally  Abdomen:  Soft, nontender and nondistended.  No organomegaly  Neuro: AOx3. Moves all extremities. Follows commands. Responds appropriately   Skin:  No rash or erythema noted    Laboratory:  I have reviewed all pertinent lab results within the past 24 hours.  CBC:   Recent Labs   Lab 09/02/22  0439   WBC 17.91*   RBC 4.16*   HGB 13.2*   HCT 41.2   PLT 90*   MCV 99*   MCH 31.7*   MCHC 32.0     CMP:   Recent Labs   Lab 09/01/22  0153 09/02/22  0439   * 241* "   CALCIUM 9.4 8.8   ALBUMIN 4.2  --    PROT 8.6*  --     137   K 5.0 4.5   CO2 27 27   CL 99 99   BUN 46* 59*   CREATININE 2.1* 2.5*   ALKPHOS 80  --    ALT 48*  --    AST 36  --    BILITOT 1.6*  --      Cardiac markers:   Recent Labs   Lab 09/01/22  0153   TROPONINI 0.034       Diagnostic Results:  Labs: Reviewed    ASSESSMENT/PLAN:         Active Hospital Problems    Diagnosis  POA    *Acute respiratory failure with hypoxia [J96.01]  Yes    Bacteremia due to Gram-positive bacteria [R78.81]  Yes    Acute renal failure superimposed on stage 3b chronic kidney disease [N17.9, N18.32]  No    Chronic systolic heart failure [I50.22]  Yes    AICD (automatic cardioverter/defibrillator) present [Z95.810]  Yes    Chronic Thrombocytopenia [D69.6]  Yes    Type II diabetes mellitus with neurological manifestations [E11.49]  Yes     With neuropathy        Resolved Hospital Problems   No resolved problems to display.         Plan:   Acute hypoxic respiratory failure - improved   Bilateral pneumonia in the setting of GPC bacteremia   Continue vancomycin and ceftriaxone   Repeat blood cultures today  Ordered infectious Disease consultation -  discussed case with Dr. Goyal  AD on CKD stage 3b -  hold home diuretics and ACE-inhibitor.  Continue IV fluids -  normal saline at 100 cc/hour   Dose medications per GFR       Type 2 diabetes mellitus:  Continue detemir at 20 units b.I.d. and low-dose insulin sliding scale   Monitor electrolytes and replete per protocol  Stable for transfer out of ICU      VTE Risk Mitigation (From admission, onward)           Ordered     Place sequential compression device  Until discontinued         09/01/22 0633     IP VTE HIGH RISK PATIENT  Once         09/01/22 0633                        Department Hospital Medicine  [facility]  Patrick Carvajal MD  Date of service: 09/02/2022

## 2022-09-02 NOTE — NURSING
62 yr old male  awake and alert  wife at bedside   Right plantar foot  DM ulcer about 5 yrs old thick callus fissure   2x3x1 blood stain callus     Recommendation:   Right plantar foot  Clean with chlorhexidine/ns.  Pat dry. Apply Medihoney. Cover with large band aid.  Daily   Consult podiatry

## 2022-09-02 NOTE — CARE UPDATE
09/01/22 1956   Patient Assessment/Suction   Level of Consciousness (AVPU) alert   Respiratory Effort Normal;Unlabored   Expansion/Accessory Muscles/Retractions no use of accessory muscles   All Lung Fields Breath Sounds diminished   Rhythm/Pattern, Respiratory unlabored;pattern regular   PRE-TX-O2   O2 Device (Oxygen Therapy) room air   SpO2 95 %   Pulse Oximetry Type Continuous   $ Pulse Oximetry - Multiple Charge Pulse Oximetry - Multiple   Pulse 78   Resp 20   Aerosol Therapy   $ Aerosol Therapy Charges Aerosol Treatment   Daily Review of Necessity (SVN) completed   Respiratory Treatment Status (SVN) given   Treatment Route (SVN) mask;oxygen   Patient Position (SVN) HOB elevated   Post Treatment Assessment (SVN) breath sounds unchanged   Signs of Intolerance (SVN) none   Preset CPAP/BiPAP Settings   Mode Of Delivery BiPAP;Standby   $ CPAP/BiPAP Daily Charge BiPAP/CPAP Daily   Education   $ Education Bronchodilator;15 min   Respiratory Evaluation   $ Care Plan Tech Time 15 min   $ Eval/Re-eval Charges Evaluation

## 2022-09-02 NOTE — CARE UPDATE
09/02/22 0819   Patient Assessment/Suction   Level of Consciousness (AVPU) alert   Respiratory Effort Normal;Unlabored   Expansion/Accessory Muscles/Retractions expansion symmetric   All Lung Fields Breath Sounds clear;diminished   Rhythm/Pattern, Respiratory unlabored   PRE-TX-O2   O2 Device (Oxygen Therapy) room air   SpO2 98 %   Pulse Oximetry Type Continuous   $ Pulse Oximetry - Multiple Charge Pulse Oximetry - Multiple   Pulse 66   Resp 18   Aerosol Therapy   $ Aerosol Therapy Charges Aerosol Treatment   Daily Review of Necessity (SVN) completed   Treatment Route (SVN) mask   Patient Position (SVN) sitting in chair   Post Treatment Assessment (SVN) increased aeration   Signs of Intolerance (SVN) none   Breath Sounds Post-Respiratory Treatment   Throughout All Fields Post-Treatment All Fields   Throughout All Fields Post-Treatment aeration increased   Post-treatment Heart Rate (beats/min) 65   Post-treatment Resp Rate (breaths/min) 13   Preset CPAP/BiPAP Settings   Mode Of Delivery Standby   Education   $ Education Bronchodilator;BiPAP;15 min   Respiratory Evaluation   $ Care Plan Tech Time 15 min

## 2022-09-03 ENCOUNTER — CLINICAL SUPPORT (OUTPATIENT)
Dept: CARDIOLOGY | Facility: HOSPITAL | Age: 62
DRG: 871 | End: 2022-09-03
Attending: INTERNAL MEDICINE
Payer: MEDICARE

## 2022-09-03 LAB
ANION GAP SERPL CALC-SCNC: 10 MMOL/L (ref 8–16)
BASOPHILS # BLD AUTO: 0.01 K/UL (ref 0–0.2)
BASOPHILS NFR BLD: 0.1 % (ref 0–1.9)
BUN SERPL-MCNC: 66 MG/DL (ref 6–20)
CALCIUM SERPL-MCNC: 8.3 MG/DL (ref 8.7–10.5)
CHLORIDE SERPL-SCNC: 99 MMOL/L (ref 95–110)
CO2 SERPL-SCNC: 25 MMOL/L (ref 23–29)
CREAT SERPL-MCNC: 2.3 MG/DL (ref 0.5–1.4)
DIFFERENTIAL METHOD: ABNORMAL
EOSINOPHIL # BLD AUTO: 0 K/UL (ref 0–0.5)
EOSINOPHIL NFR BLD: 0.3 % (ref 0–8)
ERYTHROCYTE [DISTWIDTH] IN BLOOD BY AUTOMATED COUNT: 15.2 % (ref 11.5–14.5)
EST. GFR  (NO RACE VARIABLE): 31.7 ML/MIN/1.73 M^2
GLUCOSE SERPL-MCNC: 159 MG/DL (ref 70–110)
GLUCOSE SERPL-MCNC: 171 MG/DL (ref 70–110)
GLUCOSE SERPL-MCNC: 172 MG/DL (ref 70–110)
GLUCOSE SERPL-MCNC: 278 MG/DL (ref 70–110)
GLUCOSE SERPL-MCNC: 297 MG/DL (ref 70–110)
HCT VFR BLD AUTO: 36.7 % (ref 40–54)
HGB BLD-MCNC: 11.7 G/DL (ref 14–18)
IMM GRANULOCYTES # BLD AUTO: 0.03 K/UL (ref 0–0.04)
IMM GRANULOCYTES NFR BLD AUTO: 0.3 % (ref 0–0.5)
LYMPHOCYTES # BLD AUTO: 0.6 K/UL (ref 1–4.8)
LYMPHOCYTES NFR BLD: 5.9 % (ref 18–48)
MAGNESIUM SERPL-MCNC: 2.4 MG/DL (ref 1.6–2.6)
MCH RBC QN AUTO: 31.3 PG (ref 27–31)
MCHC RBC AUTO-ENTMCNC: 31.9 G/DL (ref 32–36)
MCV RBC AUTO: 98 FL (ref 82–98)
MONOCYTES # BLD AUTO: 0.8 K/UL (ref 0.3–1)
MONOCYTES NFR BLD: 8.8 % (ref 4–15)
NEUTROPHILS # BLD AUTO: 8 K/UL (ref 1.8–7.7)
NEUTROPHILS NFR BLD: 84.6 % (ref 38–73)
NRBC BLD-RTO: 0 /100 WBC
PLATELET # BLD AUTO: 64 K/UL (ref 150–450)
PMV BLD AUTO: 11.9 FL (ref 9.2–12.9)
POTASSIUM SERPL-SCNC: 4 MMOL/L (ref 3.5–5.1)
RBC # BLD AUTO: 3.74 M/UL (ref 4.6–6.2)
SODIUM SERPL-SCNC: 134 MMOL/L (ref 136–145)
VANCOMYCIN TROUGH SERPL-MCNC: 26.9 UG/ML
WBC # BLD AUTO: 9.5 K/UL (ref 3.9–12.7)

## 2022-09-03 PROCEDURE — 99233 SBSQ HOSP IP/OBS HIGH 50: CPT | Mod: ,,, | Performed by: INTERNAL MEDICINE

## 2022-09-03 PROCEDURE — 80202 ASSAY OF VANCOMYCIN: CPT | Performed by: INTERNAL MEDICINE

## 2022-09-03 PROCEDURE — 36415 COLL VENOUS BLD VENIPUNCTURE: CPT | Performed by: INTERNAL MEDICINE

## 2022-09-03 PROCEDURE — 99232 SBSQ HOSP IP/OBS MODERATE 35: CPT | Mod: ,,, | Performed by: INTERNAL MEDICINE

## 2022-09-03 PROCEDURE — 27000221 HC OXYGEN, UP TO 24 HOURS

## 2022-09-03 PROCEDURE — 94660 CPAP INITIATION&MGMT: CPT

## 2022-09-03 PROCEDURE — 99900031 HC PATIENT EDUCATION (STAT)

## 2022-09-03 PROCEDURE — 63600175 PHARM REV CODE 636 W HCPCS: Performed by: NURSE ANESTHETIST, CERTIFIED REGISTERED

## 2022-09-03 PROCEDURE — 25000003 PHARM REV CODE 250: Performed by: NURSE ANESTHETIST, CERTIFIED REGISTERED

## 2022-09-03 PROCEDURE — 94799 UNLISTED PULMONARY SVC/PX: CPT

## 2022-09-03 PROCEDURE — 80048 BASIC METABOLIC PNL TOTAL CA: CPT | Performed by: INTERNAL MEDICINE

## 2022-09-03 PROCEDURE — 25000003 PHARM REV CODE 250

## 2022-09-03 PROCEDURE — 63600175 PHARM REV CODE 636 W HCPCS: Performed by: INTERNAL MEDICINE

## 2022-09-03 PROCEDURE — 94761 N-INVAS EAR/PLS OXIMETRY MLT: CPT

## 2022-09-03 PROCEDURE — 99232 PR SUBSEQUENT HOSPITAL CARE,LEVL II: ICD-10-PCS | Mod: ,,, | Performed by: INTERNAL MEDICINE

## 2022-09-03 PROCEDURE — 25000003 PHARM REV CODE 250: Performed by: INTERNAL MEDICINE

## 2022-09-03 PROCEDURE — 21000000 HC CCU ICU ROOM CHARGE

## 2022-09-03 PROCEDURE — 85025 COMPLETE CBC W/AUTO DIFF WBC: CPT | Performed by: INTERNAL MEDICINE

## 2022-09-03 PROCEDURE — 99900035 HC TECH TIME PER 15 MIN (STAT)

## 2022-09-03 PROCEDURE — 99233 PR SUBSEQUENT HOSPITAL CARE,LEVL III: ICD-10-PCS | Mod: ,,, | Performed by: INTERNAL MEDICINE

## 2022-09-03 PROCEDURE — 83735 ASSAY OF MAGNESIUM: CPT | Performed by: INTERNAL MEDICINE

## 2022-09-03 PROCEDURE — 93320 DOPPLER ECHO COMPLETE: CPT

## 2022-09-03 RX ORDER — PROPOFOL 10 MG/ML
VIAL (ML) INTRAVENOUS
Status: DISCONTINUED | OUTPATIENT
Start: 2022-09-03 | End: 2022-09-03

## 2022-09-03 RX ADMIN — SODIUM CHLORIDE: 0.9 INJECTION, SOLUTION INTRAVENOUS at 10:09

## 2022-09-03 RX ADMIN — CARVEDILOL 3.12 MG: 3.12 TABLET, FILM COATED ORAL at 12:09

## 2022-09-03 RX ADMIN — VANCOMYCIN HYDROCHLORIDE 2000 MG: 500 INJECTION, POWDER, LYOPHILIZED, FOR SOLUTION INTRAVENOUS at 04:09

## 2022-09-03 RX ADMIN — INSULIN ASPART 3 UNITS: 100 INJECTION, SOLUTION INTRAVENOUS; SUBCUTANEOUS at 05:09

## 2022-09-03 RX ADMIN — CEFTRIAXONE 2 G: 2 INJECTION, SOLUTION INTRAVENOUS at 04:09

## 2022-09-03 RX ADMIN — FAMOTIDINE 20 MG: 20 TABLET ORAL at 12:09

## 2022-09-03 RX ADMIN — TRAMADOL HYDROCHLORIDE 50 MG: 50 TABLET, COATED ORAL at 12:09

## 2022-09-03 RX ADMIN — ATORVASTATIN CALCIUM 10 MG: 10 TABLET, FILM COATED ORAL at 12:09

## 2022-09-03 RX ADMIN — TRAMADOL HYDROCHLORIDE 50 MG: 50 TABLET, COATED ORAL at 08:09

## 2022-09-03 RX ADMIN — INSULIN DETEMIR 20 UNITS: 100 INJECTION, SOLUTION SUBCUTANEOUS at 08:09

## 2022-09-03 RX ADMIN — PROPOFOL 150 MG: 10 INJECTION, EMULSION INTRAVENOUS at 10:09

## 2022-09-03 RX ADMIN — AMIODARONE HYDROCHLORIDE 200 MG: 200 TABLET ORAL at 12:09

## 2022-09-03 RX ADMIN — GABAPENTIN 600 MG: 300 CAPSULE ORAL at 08:09

## 2022-09-03 RX ADMIN — CHLORHEXIDINE GLUCONATE 15 ML: 1.2 RINSE ORAL at 08:09

## 2022-09-03 RX ADMIN — CARVEDILOL 3.12 MG: 3.12 TABLET, FILM COATED ORAL at 08:09

## 2022-09-03 RX ADMIN — ASPIRIN 81 MG CHEWABLE TABLET 81 MG: 81 TABLET CHEWABLE at 12:09

## 2022-09-03 RX ADMIN — MUPIROCIN 1 G: 20 OINTMENT TOPICAL at 08:09

## 2022-09-03 RX ADMIN — GABAPENTIN 600 MG: 300 CAPSULE ORAL at 12:09

## 2022-09-03 NOTE — CARE UPDATE
09/02/22 2134   Patient Assessment/Suction   Respiratory Effort Unlabored   SAMMIE Breath Sounds clear   LLL Breath Sounds clear   RUL Breath Sounds wheezes, expiratory   RML Breath Sounds clear   RLL Breath Sounds clear   Rhythm/Pattern, Respiratory pattern regular   PRE-TX-O2   O2 Device (Oxygen Therapy) room air   SpO2 95 %   Pulse Oximetry Type Continuous   Pulse 70   Resp 16   Aerosol Therapy   $ Aerosol Therapy Charges Aerosol Treatment   Respiratory Treatment Status (SVN) given   Treatment Route (SVN) mask   Patient Position (SVN) HOB elevated   Post Treatment Assessment (SVN) increased aeration   Signs of Intolerance (SVN) none   Breath Sounds Post-Respiratory Treatment   Throughout All Fields Post-Treatment All Fields   Throughout All Fields Post-Treatment wheezes, expiratory   Post-treatment Heart Rate (beats/min) 69   Post-treatment Resp Rate (breaths/min) 16   Education   $ Education 15 min;Bronchodilator;BiPAP   Respiratory Evaluation   $ Care Plan Tech Time 15 min   $ Eval/Re-eval Charges Re-evaluation   Evaluation For   (CARE PLAN)

## 2022-09-03 NOTE — PROGRESS NOTES
"Pharmacokinetic Assessment Follow Up: IV Vancomycin    Vancomycin serum concentration assessment(s):    The trough level was drawn correctly and can be used to guide therapy at this time. The measurement is above the desired definitive target range of 15 to 20 mcg/mL.    Vancomycin Regimen Plan:    Discontinue the scheduled vancomycin regimen and re-dose when the random level is less than 15 mcg/mL, next level to be drawn at 0430 on 09/04.    Drug levels (last 3 results):  Recent Labs   Lab Result Units 09/03/22  0409   Vancomycin-Trough ug/mL 26.9*       Pharmacy will continue to follow and monitor vancomycin.    Please contact pharmacy at extension [phone] for questions regarding this assessment.    Thank you for the consult,   Wenceslao Aponte       Patient brief summary:  Ana Bullard is a 62 y.o. male initiated on antimicrobial therapy with IV Vancomycin for treatment of bacteremia    Drug Allergies:   Review of patient's allergies indicates:   Allergen Reactions    Vasopressin Anaphylaxis and Other (See Comments)     Increased pressure in his head; felt like his eyeballs and veins were going to pop out of his head.     Heparin Other (See Comments)     Other reaction(s): "depleted my blood platets"    Decreased platelet count    Heparin analogues Other (See Comments)     Depleted WBC count    Vasopressin analogues Other (See Comments)     "felt like eyes going to pop out of my head"    Morphine Hallucinations, Other (See Comments) and Anxiety     Other reaction(s): Hallucinations  Paranoid, hallucinations         Actual Body Weight:   121.4 kg    Renal Function:   Estimated Creatinine Clearance: 42.3 mL/min (A) (based on SCr of 2.5 mg/dL (H)).,     CBC (last 72 hours):  Recent Labs   Lab Result Units 09/01/22  0153 09/02/22  0439 09/03/22  0408   WBC K/uL 16.26* 17.91* 9.50   Hemoglobin g/dL 14.3 13.2* 11.7*   Hemoglobin A1C %  --  7.7*  --    Hematocrit % 44.6 41.2 36.7*   Platelets K/uL 101* 90* 64*   Gran % % " 91.7* 93.8* 84.6*   Lymph % % 1.8* 1.8* 5.9*   Mono % % 5.6 3.6* 8.8   Eosinophil % % 0.3 0.0 0.3   Basophil % % 0.1 0.1 0.1   Differential Method  Automated Automated Automated       Metabolic Panel (last 72 hours):  Recent Labs   Lab Result Units 09/01/22  0153 09/02/22  0439 09/03/22  0408   Sodium mmol/L 138 137 134*   Potassium mmol/L 5.0 4.5 4.0   Chloride mmol/L 99 99 99   CO2 mmol/L 27 27 25   Glucose mg/dL 174* 241* 172*   BUN mg/dL 46* 59*  --    Creatinine mg/dL 2.1* 2.5*  --    Albumin g/dL 4.2  --   --    Total Bilirubin mg/dL 1.6*  --   --    Alkaline Phosphatase U/L 80  --   --    AST U/L 36  --   --    ALT U/L 48*  --   --    Magnesium mg/dL  --  2.3  --        Vancomycin Administrations:  vancomycin given in the last 96 hours                     vancomycin (VANCOCIN) 2,000 mg in dextrose 5 % 500 mL IVPB (mg) 2,000 mg New Bag 09/03/22 0411     2,000 mg New Bag 09/02/22 0402    vancomycin in dextrose 5 % 1 gram/250 mL IVPB 1,000 mg (mg) 1,000 mg New Bag 09/01/22 0405    vancomycin in dextrose 5 % 1 gram/250 mL IVPB 1,000 mg (mg) 1,000 mg New Bag 09/01/22 0404                    Microbiologic Results:  Microbiology Results (last 7 days)       Procedure Component Value Units Date/Time    Blood culture [[id number]] Collected: 09/02/22 1550    Order Status: Completed Specimen: Blood Updated: 09/02/22 2317     Blood Culture, Routine No Growth to date    Narrative:      Collection has been rescheduled by ALD1 at 09/02/2022 10:15 Reason:   Unable to collect  Collection has been rescheduled by ALD1 at 09/02/2022 10:15 Reason:   Unable to collect    Blood culture [[id number]] Collected: 09/02/22 1014    Order Status: Completed Specimen: Blood Updated: 09/02/22 1717     Blood Culture, Routine No Growth to date    Culture, Respiratory with Gram Stain [[id number]] Collected: 09/02/22 1208    Order Status: Sent Specimen: Respiratory from Sputum Updated: 09/02/22 1221    Blood culture x two cultures. Draw prior to  antibiotics. [[id number]]  (Abnormal) Collected: 09/01/22 0155    Order Status: Completed Specimen: Blood from Peripheral, Hand, Right Updated: 09/02/22 1214     Blood Culture, Routine Gram stain aer bottle: Gram positive cocci      Gram stain mayte bottle: Gram positive cocci      Results called to and read back by:David Berumen RN-ED;  09/01/2022  13:01      CJD      STREPTOCOCCUS AGALACTIAE (GROUP B)  Beta-hemolytic streptococci are routinely susceptible to   penicillins,cephalosporins and carbapenems.      Narrative:      Aerobic and anaerobic    Blood culture x two cultures. Draw prior to antibiotics. [[id number]]  (Abnormal) Collected: 09/01/22 0152    Order Status: Completed Specimen: Blood from Peripheral, Antecubital, Right Updated: 09/02/22 1212     Blood Culture, Routine Gram stain aer bottle: Gram positive cocci      Gram stain mayte bottle: Gram positive cocci      Results called to and read back by:David Berumen RN-ED;  09/01/2022  13:00      CJD      STREPTOCOCCUS AGALACTIAE (GROUP B)  Beta-hemolytic streptococci are routinely susceptible to   penicillins,cephalosporins and carbapenems.      Narrative:      Aerobic and anaerobic    Aerobic culture [[id number]]     Order Status: No result Specimen: Wound

## 2022-09-03 NOTE — PROGRESS NOTES
Consult Note  Infectious Disease    Reason for Consult:  GPC bacteremia    HPI: Ana Bullard is a 62 y.o. male very pleasant, former heavy smoker, known to our service for admission in January 2020 for septic shock secondary to strep pneumo bacteremia, he has past medical history of CAD, CABG, AICD since 2012, battery replaced 2018, exchange in 2016, CHF (EF: 15%), HTN, HLD, PVD, CKD not on dialysis, AFRHAN on BiPAP, diabetes, neuropathy, GERD, and prior history of diabetic right foot infection due to Proteus vulgaris, group a Streptococcus and MRSA in January 2019, with residual chronic foot ulcer for which he follows with Dr. Heaton/podiatry outpatient who performs debridements.    Patient came to the hospital after sudden onset of fever, chills, not feeling well since 8/31, wife at home measured temperature about 99.  He states he has been having on and off headache, currently resolved.  He has been having some productive cough, white/thin sputum, denies nausea or vomiting, no abdominal pain, no dysuria or increased urinary frequency, or change in bowel movements.    As per patient, he is scheduled to have a GABRIELLA outpatient by Dr. Roth on 09/16.    In the ER, hypotensive 100/58, T-max 103.3°,   Lab significant for leukocytosis of 16.2, left shift 91.7%, H&H 14.3/44.6, platelet count 101, thrombocytopenia   AD creatinine 2.5,   Bilirubin 1.6   AST 36/ALT 48   CRP 3.6      Lactic 1.7, procalcitonin 0.12     Patient admitted to ICU for acute respiratory failure likely secondary to COPD exacerbation in addition to acute on chronic CHF, EF 15%.  Placed on BiPAP.  Started on IV steroids.  Empirically started on cefepime and vancomycin, switched to ceftriaxone 9/1.    Hospital course complicated by 4/4 bottles from admission growing GPC, pending ID and sensitivities.    ID consult for GPC.  Micro [facility] contacted, looks like Strep, results will be updated and within an hour.    09/03/2022.  On admission he had  "a temperature of 102.7°.  He has been afebrile since.  WBC trend 16--17.9--9.5.  GABRIELLA for positive are V wire thrombus versus endocarditis.    Antibiotics (From admission, onward)      Start     Stop Route Frequency Ordered    09/01/22 2100  mupirocin 2 % ointment  (MRSA Decolonization Orders [facility])         09/06 2059 Nasl 2 times daily 09/01/22 1611    09/01/22 1500  cefTRIAXone (ROCEPHIN) 2 g/50 mL D5W IVPB         -- IV Every 24 hours (non-standard times) 09/01/22 1404          Antifungals (From admission, onward)      None          Antivirals (From admission, onward)      None            Review of patient's allergies indicates:   Allergen Reactions    Vasopressin Anaphylaxis and Other (See Comments)     Increased pressure in his head; felt like his eyeballs and veins were going to pop out of his head.     Heparin Other (See Comments)     Other reaction(s): "depleted my blood platets"    Decreased platelet count    Heparin analogues Other (See Comments)     Depleted WBC count    Vasopressin analogues Other (See Comments)     "felt like eyes going to pop out of my head"    Morphine Hallucinations, Other (See Comments) and Anxiety     Other reaction(s): Hallucinations  Paranoid, hallucinations       Past Medical History:   Diagnosis Date    AICD (automatic cardioverter/defibrillator) present     Anemia, chronic disease 7/27/2021    Anemia, unspecified 7/27/2021    Anxiety and depression 2012    CAD (coronary artery disease) 2012    Cardiomegaly 2016    CHF (congestive heart failure) 2012    Cholecystitis 2017    Complete heart block 2012    Diabetic foot ulcer     DVT (deep venous thrombosis) 2012    And pulmonary embolus    GERD (gastroesophageal reflux disease) 2010    Heparin induced thrombocytopenia     Hyperlipemia 2011    IDDM (insulin dependent diabetes mellitus) 1983    With neuropathy    Ischemic cardiomyopathy 2012    MI (myocardial infarction) 2012    Morbid obesity     MRSA " (methicillin resistant Staphylococcus aureus) infection 01/19/2019    Noncompliance with therapeutic plan 2019    Normochromic normocytic anemia 7/27/2021    Osteomyelitis of right foot 01/2019    Pulmonary edema 2019    Respiratory failure 2019    Sepsis 01/2019    Due to cellulitis right leg    Sleep apnea 2013    Thrombocytopathy 2012    Venous stasis dermatitis of both lower extremities      Past Surgical History:   Procedure Laterality Date    CARDIAC PACEMAKER PLACEMENT  12/2012    CARDIAC PACEMAKER PLACEMENT  10/04/2018    battery replacement    CORONARY ARTERY BYPASS GRAFT  06/2012    ESOPHAGOGASTRODUODENOSCOPY  01/31/2017    SAMARA FILTER PLACEMENT  2012    vena cava    LUMBAR SYMPATHETIC NERVE BLOCK Right 8/22/2019    Procedure: BLOCK, NERVE, SYMPATHETIC, LUMBAR;  Surgeon: Tashi Good MD;  Location: [facility] OR;  Service: Pain Management;  Laterality: Right;  RIGHT SYMPATHETIC NERVE BLOCK     Social History     Tobacco Use    Smoking status: Former     Packs/day: 2.00     Years: 38.00     Pack years: 76.00     Types: Cigarettes     Quit date: 2012     Years since quitting: 10.6    Smokeless tobacco: Never   Substance Use Topics    Alcohol use: No        Family History   Problem Relation Age of Onset    Arthritis Mother     Diabetes Mother     Cancer Father     Heart disease Father     Stroke Father          Review of Systems:   +chills, fever, no sweats, weight loss  No change in vision, loss of vision or diplopia  No sinus congestion, purulent nasal discharge, post nasal drip or facial pain  No pain in mouth or throat. No problems with teeth, gums.  No chest pain, palpitations, syncope  Productive cough of yellow/mucoid phlegm, dyspnea on exertion and at rest, no hemoptysis or pleurisy  No dysphagia, odynophagia  No nausea, vomiting, diarrhea, constipation, no blood in stool, or focal abd pain,    No dysuria, hesitancy, hematuria, retention, incontinence  Right foot chronic plantar  ulcer at 5th metatarsal  No swelling of joints, redness of joints, injuries, or new focal pain  No unusual headaches, dizziness, vertigo, numbness, paresthesias, neuropathy, falls  No anxiety, depression, substance abuse, sleep disturbance  No bleeding, lymphadenopathy  No new rashes, lesions, or wounds    Outdoor activities:  Lives at home with wife, works on his boat weekly, no recent contact with water/brackish water.  Former smoker, former heavy drinker, no drugs. Last colonoscopy 10 years ago.  Travel:  None  Implants:  AICD since 20/12, battery replaced 2018  Antibiotic History:  Vancomycin/cefepime, switched to ceftriaxone 9/1    EXAM & DIAGNOSTICS REVIEWED:   Vitals:     Temp:  [97.6 °F (36.4 °C)-97.9 °F (36.6 °C)]   Temp: 97.9 °F (36.6 °C) (09/03/22 1126)  Pulse: 73 (09/03/22 1142)  Resp: 18 (09/03/22 1206)  BP: 121/65 (09/03/22 1126)  SpO2: (!) 94 % (09/03/22 1142)    Intake/Output Summary (Last 24 hours) at 9/3/2022 1752  Last data filed at 9/3/2022 1400  Gross per 24 hour   Intake 320 ml   Output 425 ml   Net -105 ml       General:  In NAD. Alert and attentive, cooperative, comfortable on room air  Eyes:  Anicteric, PERRL  ENT:  No ulcers, exudates, thrush, nares patent, dentures upper, edentulous lower  Neck:  Supple, no adenopathy appreciated  Lungs: Clear to auscultation b/l  Heart:  S1/S2+, regular rhythm, systolic murmur+ - L AICD in place, no redness noted, non tender to palpation  Abd:  Obese, +BS, soft, non tender, non distended, no rebound  :  Voids, urine clear, no flank tenderness  Musc:  Except for R foot, Joints without effusion, swelling,  erythema, synovitis, ambulatory  Skin:  Warm, no rash  Wound:   Neuro:  Following commands, neuropathy  Psych:  Calm, cooperative  Lymphatic:     No cervical, supraclavicular nodes  Extrem: Right foot with chronic non-healing ulcer, probes 3mm deep, no evidence of purulent drainage  B/l venous stasis  No LE edema b/l  VAD:  Peripheral  IV       Isolation: None    9/1:        General Labs reviewed:  Recent Labs   Lab 09/01/22  0153 09/01/22  0333 09/02/22 0439 09/03/22  0408   WBC 16.26*  --  17.91* 9.50   HGB 14.3  --  13.2* 11.7*   HCT 44.6 46 41.2 36.7*   *  --  90* 64*       Recent Labs   Lab 09/01/22  0153 09/02/22 0439 09/03/22  0408    137 134*   K 5.0 4.5 4.0   CL 99 99 99   CO2 27 27 25   BUN 46* 59* 66*   CREATININE 2.1* 2.5* 2.3*   CALCIUM 9.4 8.8 8.3*   PROT 8.6*  --   --    BILITOT 1.6*  --   --    ALKPHOS 80  --   --    ALT 48*  --   --    AST 36  --   --      Recent Labs   Lab 09/01/22 0153   CRP 3.61*     No results for input(s): SEDRATE in the last 168 hours.    Estimated Creatinine Clearance: 45.9 mL/min (A) (based on SCr of 2.3 mg/dL (H)).     Prior micro  Blood cultures 06/09/2020 Streptococcus pyogenes group A  Blood cultures 1/18/20 Streptococcus pneumoniae     Micro:  Microbiology Results (last 7 days)       Procedure Component Value Units Date/Time    Blood culture [[id number]] Collected: 09/02/22 1550    Order Status: Completed Specimen: Blood Updated: 09/03/22 1632     Blood Culture, Routine No Growth to date      No Growth to date    Narrative:      Collection has been rescheduled by ALD1 at 09/02/2022 10:15 Reason:   Unable to collect  Collection has been rescheduled by ALD1 at 09/02/2022 10:15 Reason:   Unable to collect    Culture, Respiratory with Gram Stain [[id number]] Collected: 09/02/22 1208    Order Status: Completed Specimen: Respiratory from Sputum Updated: 09/03/22 1355     Respiratory Culture Normal respiratory [place]     Gram Stain (Respiratory) <10 epithelial cells per low power field.     Gram Stain (Respiratory) Few WBC's     Gram Stain (Respiratory) Few Gram positive cocci     Gram Stain (Respiratory) Few Gram positive rods    Blood culture [[id number]] Collected: 09/02/22 1014    Order Status: Completed Specimen: Blood Updated: 09/03/22 1232     Blood Culture, Routine No Growth to date       No Growth to date    Blood culture x two cultures. Draw prior to antibiotics. [[id number]]  (Abnormal) Collected: 09/01/22 0155    Order Status: Completed Specimen: Blood from Peripheral, Hand, Right Updated: 09/02/22 1214     Blood Culture, Routine Gram stain aer bottle: Gram positive cocci      Gram stain mayte bottle: Gram positive cocci      Results called to and read back by:David Berumen RN-ED;  09/01/2022  13:01      CJD      STREPTOCOCCUS AGALACTIAE (GROUP B)  Beta-hemolytic streptococci are routinely susceptible to   penicillins,cephalosporins and carbapenems.      Narrative:      Aerobic and anaerobic    Blood culture x two cultures. Draw prior to antibiotics. [[id number]]  (Abnormal) Collected: 09/01/22 0152    Order Status: Completed Specimen: Blood from Peripheral, Antecubital, Right Updated: 09/02/22 1212     Blood Culture, Routine Gram stain aer bottle: Gram positive cocci      Gram stain mayte bottle: Gram positive cocci      Results called to and read back by:ELVIN GarsiaED;  09/01/2022  13:00      CJD      STREPTOCOCCUS AGALACTIAE (GROUP B)  Beta-hemolytic streptococci are routinely susceptible to   penicillins,cephalosporins and carbapenems.      Narrative:      Aerobic and anaerobic    Aerobic culture [[id number]]     Order Status: No result Specimen: Wound             Imaging Reviewed:  Chest x-rayDiffuse interstitial prominence throughout both lungs, nonspecific but suggesting viral or atypical pneumonia given clinical history.     Cardiology: Last ECHO 1/20/20  Eccentric left ventricular hypertrophy.  Severely decreased left ventricular systolic function. The estimated ejection fraction is 25%.  Grade III (severe) left ventricular diastolic dysfunction consistent with restrictive physiology.  Moderate left atrial enlargement.  Mild mitral regurgitation.  Mild tricuspid regurgitation.  Intermediate central venous pressure (8 mmHg).  The estimated PA systolic pressure is 53 mmHg.  Pulmonary  hypertension present.          IMPRESSION & PLAN     Sepsis due to GBS  bacteremia, right ventricle the ICD lead infection, endocarditis.   CRP 3.2    2. AD, creatinine 2.5    3. R foot chronic non-healing ulcer  4. PMHx: CAD, CABG, AICD since 2012, CHF (EF: 15%), HTN, HLD, PVD, CKD not on dialysis, FARHAN on BiPAP, diabetes, neuropathy, GERD    Recommendations:  High-grade GPC bacteremia in setting of AICD, requires device removal   Continue Ceftriaxone 2 g IV daily    Discussed with hospitalist.  He will discuss with cardiologist in a.m. about removal of AICD.      --( If patient, for whatever reason, is not a surgical candidate, then he will need ceftriaxone IV followed by chronic suppression therapy for ever.  Of course the AICD removal is the standard of care in the best option for treatment of infection.)  Needs to be up-to-date with colonoscopies    Discussed with Dr. LOYA      Medical Decision Making during this encounter was  [_] Low Complexity  [_] Moderate Complexity  [xx] High Complexity

## 2022-09-03 NOTE — PLAN OF CARE
Problem: Adult Inpatient Plan of Care  Goal: Plan of Care Review  Outcome: Ongoing, Progressing  Goal: Patient-Specific Goal (Individualized)  Outcome: Ongoing, Progressing  Goal: Absence of Hospital-Acquired Illness or Injury  Outcome: Ongoing, Progressing  Goal: Optimal Comfort and Wellbeing  Outcome: Ongoing, Progressing  Goal: Readiness for Transition of Care  Outcome: Ongoing, Progressing     Problem: Diabetes Comorbidity  Goal: Blood Glucose Level Within Targeted Range  Outcome: Ongoing, Progressing     Problem: Adjustment to Illness (Sepsis/Septic Shock)  Goal: Optimal Coping  Outcome: Ongoing, Progressing     Problem: Bleeding (Sepsis/Septic Shock)  Goal: Absence of Bleeding  Outcome: Ongoing, Progressing     Problem: Glycemic Control Impaired (Sepsis/Septic Shock)  Goal: Blood Glucose Level Within Desired Range  Outcome: Ongoing, Progressing     Problem: Infection Progression (Sepsis/Septic Shock)  Goal: Absence of Infection Signs and Symptoms  Outcome: Ongoing, Progressing     Problem: Nutrition Impaired (Sepsis/Septic Shock)  Goal: Optimal Nutrition Intake  Outcome: Ongoing, Progressing     Problem: Fluid and Electrolyte Imbalance (Acute Kidney Injury/Impairment)  Goal: Fluid and Electrolyte Balance  Outcome: Ongoing, Progressing     Problem: Oral Intake Inadequate (Acute Kidney Injury/Impairment)  Goal: Optimal Nutrition Intake  Outcome: Ongoing, Progressing     Problem: Renal Function Impairment (Acute Kidney Injury/Impairment)  Goal: Effective Renal Function  Outcome: Ongoing, Progressing     Problem: Fluid Imbalance (Pneumonia)  Goal: Fluid Balance  Outcome: Ongoing, Progressing     Problem: Infection (Pneumonia)  Goal: Resolution of Infection Signs and Symptoms  Outcome: Ongoing, Progressing     Problem: Respiratory Compromise (Pneumonia)  Goal: Effective Oxygenation and Ventilation  Outcome: Ongoing, Progressing     Problem: Impaired Wound Healing  Goal: Optimal Wound Healing  Outcome: Ongoing,  Progressing

## 2022-09-03 NOTE — CARE UPDATE
Pt placed back on BIPAP due to low Sao2 while sleeping.     09/03/22 0854   Patient Assessment/Suction   Level of Consciousness (AVPU) alert   Respiratory Effort Normal;Unlabored   Expansion/Accessory Muscles/Retractions no use of accessory muscles   Skin Integrity   $ Wound Care Tech Time 15 min   Area Observed Bridge of nose   Skin Appearance without discoloration   PRE-TX-O2   O2 Device (Oxygen Therapy) BiPAP   $ Is the patient on Low Flow Oxygen? Yes   SpO2 96 %   Pulse 64   Resp 16   Preset CPAP/BiPAP Settings   Mode Of Delivery BiPAP S/T   $ Is patient using? Yes   Ipap 16   EPAP (cm H2O) 8   Pressure Support (cm H2O) 8   Set Rate (Breaths/Min) 12   ITime (sec) 1.3   Rise Time (sec) 3   Patient CPAP/BiPAP Settings   IPAP Rise Time (sec) 3   RR Total (Breaths/Min) 15   Tidal Volume (mL) 868   VE Minute Ventilation (L/min) 14.5 L/min   Peak Inspiratory Pressure (cm H2O) 16   TiTOT (%) 26   Total Leak (L/Min) 62   Patient Trigger - ST Mode Only (%) 70   CPAP/BiPAP Backup Settings   IPAP Backup 16 cmH2O   EPAP Backup 8 cmH2O   Backup Rate 12 breaths per minute (bpm)   FIO2 Backup 21 %   ITIME Backup 1.3 seconds   Rise Time Backup 3 seconds   CPAP/BiPAP Alarms   High Pressure (cm H2O) 40   Low Pressure (cm H2O) 5   Low Pressure Delay (Sec) 20   Minute Ventilation (L/Min) 5   High RR (breaths/min) 35   Low RR (breaths/min) 10   Apnea (Sec) 20   Respiratory Evaluation   $ Care Plan Tech Time 15 min

## 2022-09-03 NOTE — PROGRESS NOTES
"[facility] Medicine  Progress Note    Patient name: Ana Bullard  MRN: [id number]  Admit Date: 9/1/2022   LOS: 2 days     SUBJECTIVE:     Principal problem: Acute respiratory failure with hypoxia    Interval History:  No acute overnight events reported.  He admits to minimal productive cough.  Seen after undergoing GABRIELLA which revealed possibility of RV wire associated bacterial endocarditis versus thrombus.    Patient denies chest pain or shortness of breath.    Scheduled Meds:   amiodarone  200 mg Oral Daily    aspirin  81 mg Oral Daily    atorvastatin  10 mg Oral Daily    carvediloL  3.125 mg Oral BID    cefTRIAXone (ROCEPHIN) IVPB  2 g Intravenous Q24H    chlorhexidine  15 mL Mouth/Throat BID    enoxparin  40 mg Subcutaneous Daily    famotidine  20 mg Oral Daily    gabapentin  600 mg Oral BID    insulin detemir U-100  20 Units Subcutaneous BID    mupirocin   Nasal BID    pantoprazole  40 mg Oral Before breakfast    sodium chloride 0.9%  10 mL Intravenous Q12H     Continuous Infusions:      PRN Meds:acetaminophen, albuterol sulfate, calcium chloride IVPB, calcium chloride IVPB, calcium chloride IVPB, dextrose 10%, dextrose 10%, insulin aspart U-100, magnesium oxide, magnesium sulfate IVPB, magnesium sulfate IVPB, magnesium sulfate IVPB, magnesium sulfate IVPB, potassium chloride, potassium chloride, potassium chloride, potassium chloride, sodium chloride 0.9%, sodium phosphate IVPB, sodium phosphate IVPB, sodium phosphate IVPB, sodium phosphate IVPB, sodium phosphate IVPB, traMADoL    Review of patient's allergies indicates:   Allergen Reactions    Vasopressin Anaphylaxis and Other (See Comments)     Increased pressure in his head; felt like his eyeballs and veins were going to pop out of his head.     Heparin Other (See Comments)     Other reaction(s): "depleted my blood platets"    Decreased platelet count    Heparin analogues Other (See Comments)     Depleted WBC count    Vasopressin " "analogues Other (See Comments)     "felt like eyes going to pop out of my head"    Morphine Hallucinations, Other (See Comments) and Anxiety     Other reaction(s): Hallucinations  Paranoid, hallucinations         Review of Systems: As per interval history    OBJECTIVE:     Vital Signs (Most Recent)  Temp: 97.9 °F (36.6 °C) (09/03/22 1126)  Pulse: 73 (09/03/22 1142)  Resp: 18 (09/03/22 1206)  BP: 121/65 (09/03/22 1126)  SpO2: (!) 94 % (09/03/22 1142)    Vital Signs Range (Last 24H):  Temp:  [97.5 °F (36.4 °C)-97.9 °F (36.6 °C)]   Pulse:  [62-87]   Resp:  [12-22]   BP: ()/(50-66)   SpO2:  [92 %-100 %]     I & O (Last 24H):  Intake/Output Summary (Last 24 hours) at 9/3/2022 1409  Last data filed at 9/3/2022 1031  Gross per 24 hour   Intake 250 ml   Output 425 ml   Net -175 ml         Physical Exam:  General: Patient resting comfortably in no acute distress. Appears as stated age. Calm  Eyes: No conjunctival injection. No scleral icterus.  ENT: Hearing grossly intact. No discharge from ears. No nasal discharge.   CVS: RRR. No LE edema BL  Lungs:  No tachypnea or accessory muscle use.  Clear to auscultation bilaterally  Abdomen:  Soft, nontender and nondistended.  No organomegaly  Neuro: AOx3. Moves all extremities. Follows commands. Responds appropriately   Skin:  No rash or erythema noted.  Right foot lateral aspect dressing is clean, dry and intact    Laboratory:  I have reviewed all pertinent lab results within the past 24 hours.  CBC:   Recent Labs   Lab 09/03/22  0408   WBC 9.50   RBC 3.74*   HGB 11.7*   HCT 36.7*   PLT 64*   MCV 98   MCH 31.3*   MCHC 31.9*       CMP:   Recent Labs   Lab 09/01/22  0153 09/02/22  0439 09/03/22  0408   *   < > 172*   CALCIUM 9.4   < > 8.3*   ALBUMIN 4.2  --   --    PROT 8.6*  --   --       < > 134*   K 5.0   < > 4.0   CO2 27   < > 25   CL 99   < > 99   BUN 46*   < > 66*   CREATININE 2.1*   < > 2.3*   ALKPHOS 80  --   --    ALT 48*  --   --    AST 36  --   --  "   BILITOT 1.6*  --   --     < > = values in this interval not displayed.       Cardiac markers:   Recent Labs   Lab 09/01/22  0153   TROPONINI 0.034         Diagnostic Results:  Labs: Reviewed    ASSESSMENT/PLAN:         Active Hospital Problems    Diagnosis  POA    *Acute respiratory failure with hypoxia [J96.01]  Yes    Pulmonary hypertension [I27.20]  Yes    Bacteremia due to Gram-positive bacteria [R78.81]  Yes    Acute renal failure superimposed on stage 3b chronic kidney disease [N17.9, N18.32]  No    Chronic systolic heart failure [I50.22]  Yes    AICD (automatic cardioverter/defibrillator) present [Z95.810]  Yes    Chronic Thrombocytopenia [D69.6]  Yes    Type II diabetes mellitus with neurological manifestations [E11.49]  Yes     With neuropathy        Resolved Hospital Problems   No resolved problems to display.         Plan:   Acute hypoxic respiratory failure - improved   Bilateral pneumonia in the setting of group B strep agalactiae bacteremia  Discontinue vancomycin; continue ceftriaxone  Repeat blood cultures - NGTD   Status post GABRIELLA with concerns for RV wire endocarditis versus thrombus; noted to 4 mm mobile structure.  Discussed the case with Dr. Richardson from Cardiology  Infectious disease following; will need to discuss long-term IV antibiotics +/- ICD wire removed  AD on CKD stage 3b -  hold home diuretics and ACE-inhibitor  Dose medications per GFR       Type 2 diabetes mellitus:  Continue detemir at 20 units b.I.d. and low-dose insulin sliding scale   Monitor electrolytes and replete per protocol      VTE Risk Mitigation (From admission, onward)           Ordered     enoxaparin injection 40 mg  Daily         09/02/22 0952     Place sequential compression device  Until discontinued         09/01/22 0633     IP VTE HIGH RISK PATIENT  Once         09/01/22 0633                        Department Hospital Medicine  [facility]  Patrick Carvajal MD  Date of service: 09/03/2022

## 2022-09-03 NOTE — PROGRESS NOTES
"Pulmonary/Critical Care  Progress Note      Patient name: Ana Bullard  MRN: [id number]  Date: 09/03/2022    Admit Date: 9/1/2022  Consult Requested By: Patrick Carvajal MD    Reason for Consult: respiratory failure    HPI:    9/2/2022 - 61 yo male had chills and fever and then developed SOB.  Came to ER and was on BiPAP for a period of time.  Was febrile but that has resolved.  BC are + GPC.  He feels much better.  He reports a h/o "mild COPD" and has seen Dr Palumbo in past.  Has known cardiac history (EF 25% 2020).  No respiratory comaplints this AM.    9/3/2022 - Stable overnight and transferred to floor, BC + group B strept, sleeping when I sa him and in no distress.  For possible GABRIELLA today.    Review of Systems    Review of Systems   Constitutional:  Positive for chills, fever and malaise/fatigue. Negative for diaphoresis and weight loss.   HENT:  Negative for congestion.    Eyes:  Negative for pain.   Respiratory:  Positive for shortness of breath and wheezing. Negative for cough, hemoptysis, sputum production and stridor.    Cardiovascular:  Positive for leg swelling (chronic issue). Negative for chest pain, palpitations, orthopnea, claudication and PND.   Gastrointestinal:  Negative for abdominal pain, constipation, diarrhea, heartburn, nausea and vomiting.   Genitourinary:  Negative for dysuria, frequency and urgency.   Musculoskeletal:  Negative for falls and myalgias.        + foot wounds seen by Dr Heaton   Neurological:  Positive for weakness. Negative for sensory change and focal weakness.   Psychiatric/Behavioral:  Negative for depression, substance abuse and suicidal ideas. The patient is not nervous/anxious.      Past Medical History    Past Medical History:   Diagnosis Date    AICD (automatic cardioverter/defibrillator) present     Anemia, chronic disease 7/27/2021    Anemia, unspecified 7/27/2021    Anxiety and depression 2012    CAD (coronary artery disease) 2012    Cardiomegaly 2016    CHF " (congestive heart failure) 2012    Cholecystitis 2017    Complete heart block 2012    Diabetic foot ulcer     DVT (deep venous thrombosis) 2012    And pulmonary embolus    GERD (gastroesophageal reflux disease) 2010    Heparin induced thrombocytopenia     Hyperlipemia 2011    IDDM (insulin dependent diabetes mellitus) 1983    With neuropathy    Ischemic cardiomyopathy 2012    MI (myocardial infarction) 2012    Morbid obesity     MRSA (methicillin resistant Staphylococcus aureus) infection 01/19/2019    Noncompliance with therapeutic plan 2019    Normochromic normocytic anemia 7/27/2021    Osteomyelitis of right foot 01/2019    Pulmonary edema 2019    Respiratory failure 2019    Sepsis 01/2019    Due to cellulitis right leg    Sleep apnea 2013    Thrombocytopathy 2012    Venous stasis dermatitis of both lower extremities        Past Surgical History    Past Surgical History:   Procedure Laterality Date    CARDIAC PACEMAKER PLACEMENT  12/2012    CARDIAC PACEMAKER PLACEMENT  10/04/2018    battery replacement    CORONARY ARTERY BYPASS GRAFT  06/2012    ESOPHAGOGASTRODUODENOSCOPY  01/31/2017    SAMARA FILTER PLACEMENT  2012    vena cava    LUMBAR SYMPATHETIC NERVE BLOCK Right 8/22/2019    Procedure: BLOCK, NERVE, SYMPATHETIC, LUMBAR;  Surgeon: Tashi Good MD;  Location: [facility];  Service: Pain Management;  Laterality: Right;  RIGHT SYMPATHETIC NERVE BLOCK       Medications (scheduled):      amiodarone  200 mg Oral Daily    aspirin  81 mg Oral Daily    atorvastatin  10 mg Oral Daily    carvediloL  3.125 mg Oral BID    cefTRIAXone (ROCEPHIN) IVPB  2 g Intravenous Q24H    chlorhexidine  15 mL Mouth/Throat BID    enoxparin  40 mg Subcutaneous Daily    famotidine  20 mg Oral Daily    gabapentin  600 mg Oral BID    insulin detemir U-100  20 Units Subcutaneous BID    mupirocin   Nasal BID    pantoprazole  40 mg Oral Before breakfast    sodium chloride 0.9%  10 mL Intravenous Q12H    vancomycin (VANCOCIN) IVPB  2,000 mg  Intravenous Q24H       Medications (infusions):           Medications (prn):     acetaminophen, albuterol sulfate, calcium chloride IVPB, calcium chloride IVPB, calcium chloride IVPB, dextrose 10%, dextrose 10%, insulin aspart U-100, magnesium oxide, magnesium sulfate IVPB, magnesium sulfate IVPB, magnesium sulfate IVPB, magnesium sulfate IVPB, potassium chloride, potassium chloride, potassium chloride, potassium chloride, sodium chloride 0.9%, sodium phosphate IVPB, sodium phosphate IVPB, sodium phosphate IVPB, sodium phosphate IVPB, sodium phosphate IVPB, traMADoL, Pharmacy to dose Vancomycin consult **AND** vancomycin - pharmacy to dose    Family History:   Family History   Problem Relation Age of Onset    Arthritis Mother     Diabetes Mother     Cancer Father     Heart disease Father     Stroke Father        Social History: Tobacco:   Social History     Tobacco Use   Smoking Status Former    Packs/day: 2.00    Years: 38.00    Pack years: 76.00    Types: Cigarettes    Quit date: 2012    Years since quitting: 10.6   Smokeless Tobacco Never                                EtOH:   Social History     Substance and Sexual Activity   Alcohol Use No                                Drugs:   Social History     Substance and Sexual Activity   Drug Use Not on file       Physical Exam    Vital signs:  Temp:  [97.5 °F (36.4 °C)-98.4 °F (36.9 °C)]   Pulse:  [62-86]   Resp:  [12-28]   BP: ()/(50-64)   SpO2:  [93 %-98 %]     Intake/Output:   Intake/Output Summary (Last 24 hours) at 9/3/2022 0449  Last data filed at 9/3/2022 0030  Gross per 24 hour   Intake 651.67 ml   Output 1125 ml   Net -473.33 ml          BMI: Estimated body mass index is 36.3 kg/m² as calculated from the following:    Height as of this encounter: 6' (1.829 m).    Weight as of this encounter: 121.4 kg (267 lb 10.2 oz).    Physical Exam  Vitals and nursing note reviewed.   Constitutional:       General: He is not in acute distress.     Appearance: Normal  appearance. He is obese. He is not ill-appearing, toxic-appearing or diaphoretic.      Comments: Sitting up in chair   HENT:      Head: Normocephalic and atraumatic.      Right Ear: External ear normal.      Left Ear: External ear normal.      Nose: Nose normal.      Mouth/Throat:      Mouth: Mucous membranes are moist.      Pharynx: Oropharynx is clear. No oropharyngeal exudate.   Eyes:      General: No scleral icterus.        Right eye: No discharge.         Left eye: No discharge.      Extraocular Movements: Extraocular movements intact.      Conjunctiva/sclera: Conjunctivae normal.      Pupils: Pupils are equal, round, and reactive to light.   Neck:      Vascular: No carotid bruit.   Cardiovascular:      Rate and Rhythm: Normal rate and regular rhythm.      Pulses: Normal pulses.      Heart sounds: Normal heart sounds. No murmur heard.    No friction rub. No gallop.   Pulmonary:      Effort: Pulmonary effort is normal. No respiratory distress.      Breath sounds: Normal breath sounds. No stridor. No wheezing, rhonchi or rales.      Comments: Decreased at bases bilaterally  No acc m use  Chest:      Chest wall: No tenderness.   Abdominal:      General: Bowel sounds are normal. There is no distension.      Tenderness: There is no abdominal tenderness. There is no guarding.   Musculoskeletal:         General: No swelling. Normal range of motion.      Cervical back: Normal range of motion and neck supple. No rigidity or tenderness.      Right lower leg: No edema.      Left lower leg: No edema.   Lymphadenopathy:      Cervical: No cervical adenopathy.   Skin:     General: Skin is warm and dry.      Capillary Refill: Capillary refill takes less than 2 seconds.      Coloration: Skin is not jaundiced.      Findings: No bruising.      Comments: + chronic stasis changes bilaterally   Neurological:      General: No focal deficit present.      Mental Status: He is alert and oriented to person, place, and time. Mental status  is at baseline.      Cranial Nerves: No cranial nerve deficit.      Sensory: No sensory deficit.      Motor: No weakness.   Psychiatric:         Mood and Affect: Mood normal.         Behavior: Behavior normal.         Thought Content: Thought content normal.         Judgment: Judgment normal.       Laboratory    No results for input(s): WBC, RBC, HGB, HCT, PLT, MCV, MCH, MCHC in the last 24 hours.      No results for input(s): GLUCOSE, CALCIUM, PROT, NA, K, CO2, CL, BUN, CREATININE, ALKPHOS, ALT, AST, BILITOT in the last 24 hours.    Invalid input(s):  ALBUMIN      No results for input(s): PT, INR, APTT in the last 24 hours.    No results for input(s): CPK, CPKMB, TROPONINI, MB in the last 24 hours.    Additional labs: reviewed    Microbiology:       Microbiology Results (last 7 days)       Procedure Component Value Units Date/Time    Blood culture [[id number]] Collected: 09/02/22 1550    Order Status: Completed Specimen: Blood Updated: 09/02/22 2317     Blood Culture, Routine No Growth to date    Narrative:      Collection has been rescheduled by ALD1 at 09/02/2022 10:15 Reason:   Unable to collect  Collection has been rescheduled by ALD1 at 09/02/2022 10:15 Reason:   Unable to collect    Blood culture [[id number]] Collected: 09/02/22 1014    Order Status: Completed Specimen: Blood Updated: 09/02/22 1717     Blood Culture, Routine No Growth to date    Culture, Respiratory with Gram Stain [[id number]] Collected: 09/02/22 1208    Order Status: Sent Specimen: Respiratory from Sputum Updated: 09/02/22 1221    Blood culture x two cultures. Draw prior to antibiotics. [[id number]]  (Abnormal) Collected: 09/01/22 0155    Order Status: Completed Specimen: Blood from Peripheral, Hand, Right Updated: 09/02/22 1214     Blood Culture, Routine Gram stain aer bottle: Gram positive cocci      Gram stain mayte bottle: Gram positive cocci      Results called to and read back by:David Berumen RN-ED;  09/01/2022  13:01      TATO       STREPTOCOCCUS AGALACTIAE (GROUP B)  Beta-hemolytic streptococci are routinely susceptible to   penicillins,cephalosporins and carbapenems.      Narrative:      Aerobic and anaerobic    Blood culture x two cultures. Draw prior to antibiotics. [[id number]]  (Abnormal) Collected: 09/01/22 0152    Order Status: Completed Specimen: Blood from Peripheral, Antecubital, Right Updated: 09/02/22 1212     Blood Culture, Routine Gram stain aer bottle: Gram positive cocci      Gram stain mayte bottle: Gram positive cocci      Results called to and read back by:David Berumen RN-ED;  09/01/2022  13:00      CJD      STREPTOCOCCUS AGALACTIAE (GROUP B)  Beta-hemolytic streptococci are routinely susceptible to   penicillins,cephalosporins and carbapenems.      Narrative:      Aerobic and anaerobic    Aerobic culture [[id number]]     Order Status: No result Specimen: Wound             Radiology    X-Ray Chest AP Portable  HISTORY: COVID-19  dyspnea, fever.    FINDINGS: Portable chest radiograph at 0 2020 hours compared to 06/09/2020 shows triple lead left subclavian CCD, unchanged in position, with median sternotomy wires and mediastinal surgical clips. The cardiac silhouette is enlarged, stable in size, with stable markedly enlarged pulmonary arterial trunk.    The peripheral pulmonary vasculature is normal. The lungs are normally expanded, with scattered to the nodular densities throughout both lungs, nonspecific. No consolidation, large pleural effusion, or pneumothorax.    IMPRESSION: Diffuse interstitial prominence throughout both lungs, nonspecific but suggesting viral or atypical pneumonia given clinical history.    Electronically signed by:  Andrea Fuller MD  9/1/2022 7:12 AM CDT Workstation: [id number]        Additional Studies    reviewed    Ventilator Information    Oxygen Concentration (%):  [21] 21         Recent Labs     09/01/22  0333   PH 7.350   PCO2 54.1*   PO2 18*   HCO3 29.9*   POCSATURATED 24*   BE 4            Impression    Active Hospital Problems    Diagnosis  POA    *Acute respiratory failure with hypoxia [J96.01]  Yes    Pulmonary hypertension [I27.20]  Unknown    Bacteremia due to Gram-positive bacteria [R78.81]  Yes    Acute renal failure superimposed on stage 3b chronic kidney disease [N17.9, N18.32]  No    Chronic systolic heart failure [I50.22]  Yes    AICD (automatic cardioverter/defibrillator) present [Z95.810]  Yes    Chronic Thrombocytopenia [D69.6]  Yes    Type II diabetes mellitus with neurological manifestations [E11.49]  Yes     With neuropathy        Resolved Hospital Problems   No resolved problems to display.       Plan    Respiratory failure is better and was likely worse due to sepsis and acute CHF  Antibiotics per ID  Note plans for GABRIELLA  Prophylactic lovenox and monitor platelets  Increase activity as able  Has pulmonary hypertension which is group 2 and possibly group 3  Has FARHAN and using PAP at home  Watch renal function    Thank you for this consult.  I will follow with you while the patient is hospitalized.        Mauricio Gonsales MD  [facility] Pulmonary/Critical Care  09/03/2022

## 2022-09-03 NOTE — ANESTHESIA POSTPROCEDURE EVALUATION
Anesthesia Post Evaluation    Patient: Ana Bullard    Procedure(s) Performed: * No procedures listed *    Final Anesthesia Type: general      Patient location during evaluation: telemetry step down  Patient participation: Yes- Able to Participate  Level of consciousness: awake and alert and oriented  Post-procedure vital signs: reviewed and stable  Pain management: adequate  Airway patency: patent    PONV status at discharge: No PONV  Anesthetic complications: no      Cardiovascular status: blood pressure returned to baseline, hemodynamically stable and stable  Respiratory status: unassisted, spontaneous ventilation and room air  Hydration status: euvolemic  Follow-up not needed.          Vitals Value Taken Time   /59 09/03/22 1606   Temp 36.6 °C (97.9 °F) 09/03/22 1126   Pulse 71 09/03/22 1637   Resp 22 09/03/22 1238   SpO2 93 % 09/03/22 1226   Vitals shown include unvalidated device data.      No case tracking events are documented in the log.      Pain/Alyssa Score: Pain Rating Prior to Med Admin: 8 (9/3/2022 12:06 PM)  Alyssa Score: 10 (9/3/2022 10:00 AM)

## 2022-09-03 NOTE — PROCEDURES
GABRIELLA note    No complications    EF 30- global hypo    MV- mildly thickened, 1-2+ MR inflow patterns c/w stage 2 DD    AV- mild sclerosis, no AI. AS    TV ok moderate TR rvsp 50    PV ok mild/moderate PI    Ivc full    All three wires well viewed from subclavian vein to distal insertion sites. The RV wire appears to have a mobile structure 4 mm in RA segment, other wires ok. Considering GPC, have to assume endocarditis but small thrombus still a possibility.

## 2022-09-03 NOTE — CARE UPDATE
09/03/22 0757   Patient Assessment/Suction   Level of Consciousness (AVPU) alert   Respiratory Effort Normal;Unlabored   Expansion/Accessory Muscles/Retractions no use of accessory muscles   All Lung Fields Breath Sounds diminished   PRE-TX-O2   O2 Device (Oxygen Therapy) room air   SpO2 (!) 92 %   Pulse Oximetry Type Continuous   $ Pulse Oximetry - Multiple Charge Pulse Oximetry - Multiple   Pulse 70   Resp 12   Preset CPAP/BiPAP Settings   Mode Of Delivery BiPAP S/T   $ CPAP/BiPAP Daily Charge BiPAP/CPAP Daily   Respiratory Evaluation   $ Care Plan Tech Time 15 min

## 2022-09-03 NOTE — TRANSFER OF CARE
Anesthesia Transfer of Care Note    Patient: Ana DUNCAN [place]    Procedure(s) Performed: * No procedures listed *    Patient location: Telemetry/Step Down Unit    Anesthesia Type: MAC    Transport from OR: Transported from OR on 6-10 L/min O2 by face mask with adequate spontaneous ventilation    Post pain: adequate analgesia    Post assessment: no apparent anesthetic complications    Post vital signs: stable    Level of consciousness: awake    Nausea/Vomiting: no nausea/vomiting    Complications: none    Transfer of care protocol was followed      Last vitals:   Visit Vitals  /66   Pulse 87   Temp 36.4 °C (97.6 °F) (Oral)   Resp 20   Ht 6' (1.829 m)   Wt 121.4 kg (267 lb 10.2 oz)   SpO2 100%   BMI 36.30 kg/m²

## 2022-09-04 LAB
ANION GAP SERPL CALC-SCNC: 10 MMOL/L (ref 8–16)
BACTERIA SPEC AEROBE CULT: NORMAL
BASOPHILS # BLD AUTO: 0.01 K/UL (ref 0–0.2)
BASOPHILS NFR BLD: 0.1 % (ref 0–1.9)
BUN SERPL-MCNC: 60 MG/DL (ref 6–20)
CALCIUM SERPL-MCNC: 8.8 MG/DL (ref 8.7–10.5)
CHLORIDE SERPL-SCNC: 100 MMOL/L (ref 95–110)
CO2 SERPL-SCNC: 27 MMOL/L (ref 23–29)
CREAT SERPL-MCNC: 2.2 MG/DL (ref 0.5–1.4)
DIFFERENTIAL METHOD: ABNORMAL
EOSINOPHIL # BLD AUTO: 0.1 K/UL (ref 0–0.5)
EOSINOPHIL NFR BLD: 0.9 % (ref 0–8)
ERYTHROCYTE [DISTWIDTH] IN BLOOD BY AUTOMATED COUNT: 15.3 % (ref 11.5–14.5)
EST. GFR  (NO RACE VARIABLE): 33.5 ML/MIN/1.73 M^2
GLUCOSE SERPL-MCNC: 154 MG/DL (ref 70–110)
GLUCOSE SERPL-MCNC: 166 MG/DL (ref 70–110)
GLUCOSE SERPL-MCNC: 190 MG/DL (ref 70–110)
GLUCOSE SERPL-MCNC: 195 MG/DL (ref 70–110)
GRAM STN SPEC: NORMAL
HCT VFR BLD AUTO: 38.5 % (ref 40–54)
HGB BLD-MCNC: 12.2 G/DL (ref 14–18)
IMM GRANULOCYTES # BLD AUTO: 0.03 K/UL (ref 0–0.04)
IMM GRANULOCYTES NFR BLD AUTO: 0.3 % (ref 0–0.5)
LYMPHOCYTES # BLD AUTO: 0.5 K/UL (ref 1–4.8)
LYMPHOCYTES NFR BLD: 5.2 % (ref 18–48)
MAGNESIUM SERPL-MCNC: 2.5 MG/DL (ref 1.6–2.6)
MCH RBC QN AUTO: 31.4 PG (ref 27–31)
MCHC RBC AUTO-ENTMCNC: 31.7 G/DL (ref 32–36)
MCV RBC AUTO: 99 FL (ref 82–98)
MONOCYTES # BLD AUTO: 0.9 K/UL (ref 0.3–1)
MONOCYTES NFR BLD: 10.1 % (ref 4–15)
NEUTROPHILS # BLD AUTO: 7.3 K/UL (ref 1.8–7.7)
NEUTROPHILS NFR BLD: 83.4 % (ref 38–73)
NRBC BLD-RTO: 0 /100 WBC
PLATELET # BLD AUTO: 71 K/UL (ref 150–450)
PMV BLD AUTO: 12.1 FL (ref 9.2–12.9)
POTASSIUM SERPL-SCNC: 4.5 MMOL/L (ref 3.5–5.1)
RBC # BLD AUTO: 3.88 M/UL (ref 4.6–6.2)
SODIUM SERPL-SCNC: 137 MMOL/L (ref 136–145)
WBC # BLD AUTO: 8.78 K/UL (ref 3.9–12.7)

## 2022-09-04 PROCEDURE — 94660 CPAP INITIATION&MGMT: CPT

## 2022-09-04 PROCEDURE — 99232 PR SUBSEQUENT HOSPITAL CARE,LEVL II: ICD-10-PCS | Mod: ,,, | Performed by: INTERNAL MEDICINE

## 2022-09-04 PROCEDURE — 63600175 PHARM REV CODE 636 W HCPCS: Performed by: INTERNAL MEDICINE

## 2022-09-04 PROCEDURE — 94640 AIRWAY INHALATION TREATMENT: CPT

## 2022-09-04 PROCEDURE — 85025 COMPLETE CBC W/AUTO DIFF WBC: CPT | Performed by: INTERNAL MEDICINE

## 2022-09-04 PROCEDURE — 94761 N-INVAS EAR/PLS OXIMETRY MLT: CPT

## 2022-09-04 PROCEDURE — 83735 ASSAY OF MAGNESIUM: CPT | Performed by: INTERNAL MEDICINE

## 2022-09-04 PROCEDURE — 25000242 PHARM REV CODE 250 ALT 637 W/ HCPCS: Performed by: INTERNAL MEDICINE

## 2022-09-04 PROCEDURE — 25000003 PHARM REV CODE 250: Performed by: INTERNAL MEDICINE

## 2022-09-04 PROCEDURE — 99900035 HC TECH TIME PER 15 MIN (STAT)

## 2022-09-04 PROCEDURE — 80048 BASIC METABOLIC PNL TOTAL CA: CPT | Performed by: INTERNAL MEDICINE

## 2022-09-04 PROCEDURE — 99900031 HC PATIENT EDUCATION (STAT)

## 2022-09-04 PROCEDURE — 36415 COLL VENOUS BLD VENIPUNCTURE: CPT | Performed by: INTERNAL MEDICINE

## 2022-09-04 PROCEDURE — 25000003 PHARM REV CODE 250

## 2022-09-04 PROCEDURE — 99232 SBSQ HOSP IP/OBS MODERATE 35: CPT | Mod: ,,, | Performed by: INTERNAL MEDICINE

## 2022-09-04 PROCEDURE — 21000000 HC CCU ICU ROOM CHARGE

## 2022-09-04 RX ADMIN — MUPIROCIN 1 G: 20 OINTMENT TOPICAL at 09:09

## 2022-09-04 RX ADMIN — ALBUTEROL SULFATE 2.5 MG: 2.5 SOLUTION RESPIRATORY (INHALATION) at 08:09

## 2022-09-04 RX ADMIN — PANTOPRAZOLE SODIUM 40 MG: 40 TABLET, DELAYED RELEASE ORAL at 05:09

## 2022-09-04 RX ADMIN — GABAPENTIN 600 MG: 300 CAPSULE ORAL at 09:09

## 2022-09-04 RX ADMIN — CEFTRIAXONE 2 G: 2 INJECTION, SOLUTION INTRAVENOUS at 02:09

## 2022-09-04 RX ADMIN — FAMOTIDINE 20 MG: 20 TABLET ORAL at 09:09

## 2022-09-04 RX ADMIN — CHLORHEXIDINE GLUCONATE 15 ML: 1.2 RINSE ORAL at 09:09

## 2022-09-04 RX ADMIN — ASPIRIN 81 MG CHEWABLE TABLET 81 MG: 81 TABLET CHEWABLE at 09:09

## 2022-09-04 RX ADMIN — CARVEDILOL 3.12 MG: 3.12 TABLET, FILM COATED ORAL at 09:09

## 2022-09-04 RX ADMIN — ATORVASTATIN CALCIUM 10 MG: 10 TABLET, FILM COATED ORAL at 09:09

## 2022-09-04 RX ADMIN — INSULIN DETEMIR 20 UNITS: 100 INJECTION, SOLUTION SUBCUTANEOUS at 09:09

## 2022-09-04 RX ADMIN — TRAMADOL HYDROCHLORIDE 50 MG: 50 TABLET, COATED ORAL at 06:09

## 2022-09-04 RX ADMIN — GABAPENTIN 600 MG: 300 CAPSULE ORAL at 06:09

## 2022-09-04 RX ADMIN — AMIODARONE HYDROCHLORIDE 200 MG: 200 TABLET ORAL at 09:09

## 2022-09-04 RX ADMIN — MUPIROCIN: 20 OINTMENT TOPICAL at 09:09

## 2022-09-04 NOTE — PROGRESS NOTES
"Pulmonary/Critical Care  Progress Note      Patient name: Ana Bullard  MRN: [id number]  Date: 09/04/2022    Admit Date: 9/1/2022  Consult Requested By: Patrick Carvajal MD    Reason for Consult: respiratory failure    HPI:    9/2/2022 - 63 yo male had chills and fever and then developed SOB.  Came to ER and was on BiPAP for a period of time.  Was febrile but that has resolved.  BC are + GPC.  He feels much better.  He reports a h/o "mild COPD" and has seen Dr Palumbo in past.  Has known cardiac history (EF 25% 2020).  No respiratory comaplints this AM.    9/3/2022 - Stable overnight and transferred to floor, BC + group B strept, sleeping when I sa him and in no distress.  For possible GABRIELLA today.    9/4/2022 - Stable overnight, no new respiratory complaints except for some nasal congestion.  GABRIELLA noted.    Review of Systems    Review of Systems   Constitutional:  Positive for chills, fever and malaise/fatigue. Negative for diaphoresis and weight loss.   HENT:  Negative for congestion.    Eyes:  Negative for pain.   Respiratory:  Positive for shortness of breath and wheezing. Negative for cough, hemoptysis, sputum production and stridor.    Cardiovascular:  Positive for leg swelling (chronic issue). Negative for chest pain, palpitations, orthopnea, claudication and PND.   Gastrointestinal:  Negative for abdominal pain, constipation, diarrhea, heartburn, nausea and vomiting.   Genitourinary:  Negative for dysuria, frequency and urgency.   Musculoskeletal:  Negative for falls and myalgias.        + foot wounds seen by Dr Heaton   Neurological:  Positive for weakness. Negative for sensory change and focal weakness.   Psychiatric/Behavioral:  Negative for depression, substance abuse and suicidal ideas. The patient is not nervous/anxious.      Past Medical History    Past Medical History:   Diagnosis Date    AICD (automatic cardioverter/defibrillator) present     Anemia, chronic disease 7/27/2021    Anemia, unspecified " 7/27/2021    Anxiety and depression 2012    CAD (coronary artery disease) 2012    Cardiomegaly 2016    CHF (congestive heart failure) 2012    Cholecystitis 2017    Complete heart block 2012    Diabetic foot ulcer     DVT (deep venous thrombosis) 2012    And pulmonary embolus    GERD (gastroesophageal reflux disease) 2010    Heparin induced thrombocytopenia     Hyperlipemia 2011    IDDM (insulin dependent diabetes mellitus) 1983    With neuropathy    Ischemic cardiomyopathy 2012    MI (myocardial infarction) 2012    Morbid obesity     MRSA (methicillin resistant Staphylococcus aureus) infection 01/19/2019    Noncompliance with therapeutic plan 2019    Normochromic normocytic anemia 7/27/2021    Osteomyelitis of right foot 01/2019    Pulmonary edema 2019    Respiratory failure 2019    Sepsis 01/2019    Due to cellulitis right leg    Sleep apnea 2013    Thrombocytopathy 2012    Venous stasis dermatitis of both lower extremities        Past Surgical History    Past Surgical History:   Procedure Laterality Date    CARDIAC PACEMAKER PLACEMENT  12/2012    CARDIAC PACEMAKER PLACEMENT  10/04/2018    battery replacement    CORONARY ARTERY BYPASS GRAFT  06/2012    ESOPHAGOGASTRODUODENOSCOPY  01/31/2017    SAMARA FILTER PLACEMENT  2012    vena cava    LUMBAR SYMPATHETIC NERVE BLOCK Right 8/22/2019    Procedure: BLOCK, NERVE, SYMPATHETIC, LUMBAR;  Surgeon: Tashi Good MD;  Location: [facility];  Service: Pain Management;  Laterality: Right;  RIGHT SYMPATHETIC NERVE BLOCK       Medications (scheduled):      amiodarone  200 mg Oral Daily    aspirin  81 mg Oral Daily    atorvastatin  10 mg Oral Daily    carvediloL  3.125 mg Oral BID    cefTRIAXone (ROCEPHIN) IVPB  2 g Intravenous Q24H    chlorhexidine  15 mL Mouth/Throat BID    enoxparin  40 mg Subcutaneous Daily    famotidine  20 mg Oral Daily    gabapentin  600 mg Oral BID    insulin detemir U-100  20 Units Subcutaneous BID    mupirocin   Nasal BID    pantoprazole  40 mg Oral  Before breakfast       Medications (infusions):           Medications (prn):     acetaminophen, albuterol sulfate, calcium chloride IVPB, calcium chloride IVPB, calcium chloride IVPB, dextrose 10%, dextrose 10%, insulin aspart U-100, magnesium oxide, magnesium sulfate IVPB, magnesium sulfate IVPB, magnesium sulfate IVPB, magnesium sulfate IVPB, potassium chloride, potassium chloride, potassium chloride, potassium chloride, sodium chloride 0.9%, sodium phosphate IVPB, sodium phosphate IVPB, sodium phosphate IVPB, sodium phosphate IVPB, sodium phosphate IVPB, traMADoL    Family History:   Family History   Problem Relation Age of Onset    Arthritis Mother     Diabetes Mother     Cancer Father     Heart disease Father     Stroke Father        Social History: Tobacco:   Social History     Tobacco Use   Smoking Status Former    Packs/day: 2.00    Years: 38.00    Pack years: 76.00    Types: Cigarettes    Quit date: 2012    Years since quitting: 10.6   Smokeless Tobacco Never                                EtOH:   Social History     Substance and Sexual Activity   Alcohol Use No                                Drugs:   Social History     Substance and Sexual Activity   Drug Use Not on file       Physical Exam    Vital signs:  Temp:  [97.7 °F (36.5 °C)-98.7 °F (37.1 °C)]   Pulse:  [67-94]   Resp:  [16-20]   BP: (108-121)/(55-65)   SpO2:  [92 %-100 %]     Intake/Output:   Intake/Output Summary (Last 24 hours) at 9/4/2022 1100  Last data filed at 9/4/2022 0531  Gross per 24 hour   Intake 120 ml   Output 1000 ml   Net -880 ml          BMI: Estimated body mass index is 36.3 kg/m² as calculated from the following:    Height as of this encounter: 6' (1.829 m).    Weight as of this encounter: 121.4 kg (267 lb 10.2 oz).    Physical Exam  Vitals and nursing note reviewed.   Constitutional:       General: He is not in acute distress.     Appearance: Normal appearance. He is obese. He is not ill-appearing, toxic-appearing or  diaphoretic.      Comments: Sitting up in chair   HENT:      Head: Normocephalic and atraumatic.      Right Ear: External ear normal.      Left Ear: External ear normal.      Nose: Nose normal.      Mouth/Throat:      Mouth: Mucous membranes are moist.      Pharynx: Oropharynx is clear. No oropharyngeal exudate.   Eyes:      General: No scleral icterus.        Right eye: No discharge.         Left eye: No discharge.      Extraocular Movements: Extraocular movements intact.      Conjunctiva/sclera: Conjunctivae normal.      Pupils: Pupils are equal, round, and reactive to light.   Neck:      Vascular: No carotid bruit.   Cardiovascular:      Rate and Rhythm: Normal rate and regular rhythm.      Pulses: Normal pulses.      Heart sounds: Normal heart sounds. No murmur heard.    No friction rub. No gallop.   Pulmonary:      Effort: Pulmonary effort is normal. No respiratory distress.      Breath sounds: Normal breath sounds. No stridor. No wheezing, rhonchi or rales.      Comments: Decreased at bases bilaterally  No acc m use  Chest:      Chest wall: No tenderness.   Abdominal:      General: Bowel sounds are normal. There is no distension.      Tenderness: There is no abdominal tenderness. There is no guarding.   Musculoskeletal:         General: No swelling. Normal range of motion.      Cervical back: Normal range of motion and neck supple. No rigidity or tenderness.      Right lower leg: No edema.      Left lower leg: No edema.   Lymphadenopathy:      Cervical: No cervical adenopathy.   Skin:     General: Skin is warm and dry.      Capillary Refill: Capillary refill takes less than 2 seconds.      Coloration: Skin is not jaundiced.      Findings: No bruising.      Comments: + chronic stasis changes bilaterally   Neurological:      General: No focal deficit present.      Mental Status: He is alert and oriented to person, place, and time. Mental status is at baseline.      Cranial Nerves: No cranial nerve deficit.       Sensory: No sensory deficit.      Motor: No weakness.   Psychiatric:         Mood and Affect: Mood normal.         Behavior: Behavior normal.         Thought Content: Thought content normal.         Judgment: Judgment normal.       Laboratory    Recent Labs   Lab 09/04/22  0352   WBC 8.78   RBC 3.88*   HGB 12.2*   HCT 38.5*   PLT 71*   MCV 99*   MCH 31.4*   MCHC 31.7*         Recent Labs   Lab 09/04/22 0352   CALCIUM 8.8      K 4.5   CO2 27      BUN 60*   CREATININE 2.2*         No results for input(s): PT, INR, APTT in the last 24 hours.    No results for input(s): CPK, CPKMB, TROPONINI, MB in the last 24 hours.    Additional labs: reviewed    Microbiology:       Microbiology Results (last 7 days)       Procedure Component Value Units Date/Time    Culture, Respiratory with Gram Stain [[id number]] Collected: 09/02/22 1208    Order Status: Completed Specimen: Respiratory from Sputum Updated: 09/04/22 0815     Respiratory Culture Normal respiratory [place]     Gram Stain (Respiratory) <10 epithelial cells per low power field.     Gram Stain (Respiratory) Few WBC's     Gram Stain (Respiratory) Few Gram positive cocci     Gram Stain (Respiratory) Few Gram positive rods    Blood culture [[id number]] Collected: 09/02/22 1550    Order Status: Completed Specimen: Blood Updated: 09/03/22 1632     Blood Culture, Routine No Growth to date      No Growth to date    Narrative:      Collection has been rescheduled by [email or web address] at 09/02/2022 10:15 Reason:   Unable to collect  Collection has been rescheduled by [email or web address] at 09/02/2022 10:15 Reason:   Unable to collect    Blood culture [[id number]] Collected: 09/02/22 1014    Order Status: Completed Specimen: Blood Updated: 09/03/22 1232     Blood Culture, Routine No Growth to date      No Growth to date    Blood culture x two cultures. Draw prior to antibiotics. [[id number]]  (Abnormal) Collected: 09/01/22 0155    Order Status: Completed Specimen: Blood from Peripheral, Hand, Right  Updated: 09/02/22 1214     Blood Culture, Routine Gram stain aer bottle: Gram positive cocci      Gram stain mayte bottle: Gram positive cocci      Results called to and read back by:David Berumen RN-ED;  09/01/2022  13:01      CJD      STREPTOCOCCUS AGALACTIAE (GROUP B)  Beta-hemolytic streptococci are routinely susceptible to   penicillins,cephalosporins and carbapenems.      Narrative:      Aerobic and anaerobic    Blood culture x two cultures. Draw prior to antibiotics. [[id number]]  (Abnormal) Collected: 09/01/22 0152    Order Status: Completed Specimen: Blood from Peripheral, Antecubital, Right Updated: 09/02/22 1212     Blood Culture, Routine Gram stain aer bottle: Gram positive cocci      Gram stain mayte bottle: Gram positive cocci      Results called to and read back by:David Berumen RN-ED;  09/01/2022  13:00      CJD      STREPTOCOCCUS AGALACTIAE (GROUP B)  Beta-hemolytic streptococci are routinely susceptible to   penicillins,cephalosporins and carbapenems.      Narrative:      Aerobic and anaerobic    Aerobic culture [[id number]]     Order Status: No result Specimen: Wound             Radiology    X-Ray Chest AP Portable  HISTORY: COVID-19  dyspnea, fever.    FINDINGS: Portable chest radiograph at 0 2020 hours compared to 06/09/2020 shows triple lead left subclavian CCD, unchanged in position, with median sternotomy wires and mediastinal surgical clips. The cardiac silhouette is enlarged, stable in size, with stable markedly enlarged pulmonary arterial trunk.    The peripheral pulmonary vasculature is normal. The lungs are normally expanded, with scattered to the nodular densities throughout both lungs, nonspecific. No consolidation, large pleural effusion, or pneumothorax.    IMPRESSION: Diffuse interstitial prominence throughout both lungs, nonspecific but suggesting viral or atypical pneumonia given clinical history.    Electronically signed by:  Andrea Fuller MD  9/1/2022 7:12 AM CDT Workstation:  [id number]        Additional Studies    reviewed    Ventilator Information    Oxygen Concentration (%):  [21] 21         No results for input(s): PH, PCO2, PO2, HCO3, POCSATURATED, BE in the last 72 hours.        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       Plan    Respiratory status is stable  Antibiotics per ID  GABRIELLA noted  Prophylactic lovenox and monitor platelets  Increase activity as able  Has pulmonary hypertension which is group 2 and possibly group 3  Has FARHAN and using PAP at home      Thank you for this consult.  I will follow with you while the patient is hospitalized.      Respiratory status stable, I will sign off.  Please reconsult if I can be of further assistance.  Thank you for this consult.        Mauricio Gonsales MD  [facility] Pulmonary/Critical Care  09/04/2022

## 2022-09-04 NOTE — PLAN OF CARE
09/04/22 0811   Patient Assessment/Suction   Level of Consciousness (AVPU) alert   Respiratory Effort Normal;Unlabored   All Lung Fields Breath Sounds wheezes, expiratory   Skin Integrity   $ Wound Care Tech Time 15 min   Area Observed Bridge of nose   Skin Appearance without discoloration   Barrier used? Gel Cushion   PRE-TX-O2   O2 Device (Oxygen Therapy) room air   SpO2 100 %   Pulse Oximetry Type Continuous   $ Pulse Oximetry - Multiple Charge Pulse Oximetry - Multiple   Pulse 94   Resp 18   Aerosol Therapy   $ Aerosol Therapy Charges Aerosol Treatment   Daily Review of Necessity (SVN) completed   Respiratory Treatment Status (SVN) given   Treatment Route (SVN) mask;oxygen   Patient Position (SVN) Ruiz's;HOB elevated   Post Treatment Assessment (SVN) increased aeration   Signs of Intolerance (SVN) none   Education   $ Education Bronchodilator;BiPAP;15 min   Respiratory Evaluation   $ Care Plan Tech Time 15 min

## 2022-09-04 NOTE — PROGRESS NOTES
"[facility]   Cardiology Note      SUBJECTIVE:     History of Present Illness:     Patient of Dr Roth  H/o CAD CABG X % V with complications post op, CKD, DM, h/o tobacco use, HTN, HLP, PVD, FARHAN- BIPAP, neuropathy, ICM, AICD with BIV upgrade few years ago    Had viability study 5/2022-- There is large scar in the anterior segment(s). There is medium scar in the apical, inferolateral, inferoseptal segment(s).  There is very large scar in the inferior segment(s). Negative viability study with dilated left ventricle and large ant and inf MI.  There was also medium MI in apex, inf-septal and inf-lat walls.  No reversiblity at 4 or 24 hrs -no evidence of viable, hybernating myocardium.    Last interrogation 3% AP, 100% V paced  Came to hospital with sepsis, has chronic DM foot ulcer left foot  Magno any CV complaints- chest pain, pressure, SOB   9/3/2022 GABRIELLA- ef 30%, global hypokinesis, MR, DD, TR, RVSP 50, Mobile structure vs small thrombosis RV lead      Review of patient's allergies indicates:   Allergen Reactions    Vasopressin Anaphylaxis and Other (See Comments)     Increased pressure in his head; felt like his eyeballs and veins were going to pop out of his head.     Heparin Other (See Comments)     Other reaction(s): "depleted my blood platets"    Decreased platelet count    Heparin analogues Other (See Comments)     Depleted WBC count    Vasopressin analogues Other (See Comments)     "felt like eyes going to pop out of my head"    Morphine Hallucinations, Other (See Comments) and Anxiety     Other reaction(s): Hallucinations  Paranoid, hallucinations         Past Medical History:   Diagnosis Date    AICD (automatic cardioverter/defibrillator) present     Anemia, chronic disease 7/27/2021    Anemia, unspecified 7/27/2021    Anxiety and depression 2012    CAD (coronary artery disease) 2012    Cardiomegaly 2016    CHF (congestive heart failure) 2012    Cholecystitis 2017    Complete heart block 2012    " Diabetic foot ulcer     DVT (deep venous thrombosis) 2012    And pulmonary embolus    GERD (gastroesophageal reflux disease) 2010    Heparin induced thrombocytopenia     Hyperlipemia 2011    IDDM (insulin dependent diabetes mellitus) 1983    With neuropathy    Ischemic cardiomyopathy 2012    MI (myocardial infarction) 2012    Morbid obesity     MRSA (methicillin resistant Staphylococcus aureus) infection 01/19/2019    Noncompliance with therapeutic plan 2019    Normochromic normocytic anemia 7/27/2021    Osteomyelitis of right foot 01/2019    Pulmonary edema 2019    Respiratory failure 2019    Sepsis 01/2019    Due to cellulitis right leg    Sleep apnea 2013    Thrombocytopathy 2012    Venous stasis dermatitis of both lower extremities      Past Surgical History:   Procedure Laterality Date    CARDIAC PACEMAKER PLACEMENT  12/2012    CARDIAC PACEMAKER PLACEMENT  10/04/2018    battery replacement    CORONARY ARTERY BYPASS GRAFT  06/2012    ESOPHAGOGASTRODUODENOSCOPY  01/31/2017    SAMARA FILTER PLACEMENT  2012    vena cava    LUMBAR SYMPATHETIC NERVE BLOCK Right 8/22/2019    Procedure: BLOCK, NERVE, SYMPATHETIC, LUMBAR;  Surgeon: Tashi Good MD;  Location: [facility] OR;  Service: Pain Management;  Laterality: Right;  RIGHT SYMPATHETIC NERVE BLOCK     Family History   Problem Relation Age of Onset    Arthritis Mother     Diabetes Mother     Cancer Father     Heart disease Father     Stroke Father      Social History     Tobacco Use    Smoking status: Former     Packs/day: 2.00     Years: 38.00     Pack years: 76.00     Types: Cigarettes     Quit date: 2012     Years since quitting: 10.6    Smokeless tobacco: Never   Substance Use Topics    Alcohol use: No       Review of Systems:  ROS    OBJECTIVE:     Vital Signs (Most Recent)  Temp: 98.7 °F (37.1 °C) (09/04/22 0743)  Pulse: 94 (09/04/22 0811)  Resp: 18 (09/04/22 0811)  BP: 117/60 (09/04/22 0743)  SpO2: 100 % (09/04/22 0811)    Vital Signs Range (Last 24H):  Temp:   "[97.7 °F (36.5 °C)-98.7 °F (37.1 °C)]   Pulse:  [67-94]   Resp:  [16-20]   BP: (103-121)/(57-66)   SpO2:  [92 %-100 %]     I & O (Last 24H):    Intake/Output Summary (Last 24 hours) at 9/4/2022 0937  Last data filed at 9/4/2022 0531  Gross per 24 hour   Intake 320 ml   Output 1000 ml   Net -680 ml       Current Diet:     Current Diet Order   Procedures    Diet diabetic [facility]; 2000 Calorie; Standard Tray     Order Specific Question:   Indicate patient location for additional diet options:     Answer:   [facility]     Order Specific Question:   Total calories:     Answer:   2000 Calorie     Order Specific Question:   Tray type:     Answer:   Standard Tray        Allergies:  Review of patient's allergies indicates:   Allergen Reactions    Vasopressin Anaphylaxis and Other (See Comments)     Increased pressure in his head; felt like his eyeballs and veins were going to pop out of his head.     Heparin Other (See Comments)     Other reaction(s): "depleted my blood platets"    Decreased platelet count    Heparin analogues Other (See Comments)     Depleted WBC count    Vasopressin analogues Other (See Comments)     "felt like eyes going to pop out of my head"    Morphine Hallucinations, Other (See Comments) and Anxiety     Other reaction(s): Hallucinations  Paranoid, hallucinations         Meds:  Scheduled Meds:   amiodarone  200 mg Oral Daily    aspirin  81 mg Oral Daily    atorvastatin  10 mg Oral Daily    carvediloL  3.125 mg Oral BID    cefTRIAXone (ROCEPHIN) IVPB  2 g Intravenous Q24H    chlorhexidine  15 mL Mouth/Throat BID    enoxparin  40 mg Subcutaneous Daily    famotidine  20 mg Oral Daily    gabapentin  600 mg Oral BID    insulin detemir U-100  20 Units Subcutaneous BID    mupirocin   Nasal BID    pantoprazole  40 mg Oral Before breakfast    sodium chloride 0.9%  10 mL Intravenous Q12H     Continuous Infusions:  PRN Meds:acetaminophen, albuterol sulfate, calcium chloride IVPB, calcium chloride IVPB, calcium chloride " IVPB, dextrose 10%, dextrose 10%, insulin aspart U-100, magnesium oxide, magnesium sulfate IVPB, magnesium sulfate IVPB, magnesium sulfate IVPB, magnesium sulfate IVPB, potassium chloride, potassium chloride, potassium chloride, potassium chloride, sodium chloride 0.9%, sodium phosphate IVPB, sodium phosphate IVPB, sodium phosphate IVPB, sodium phosphate IVPB, sodium phosphate IVPB, traMADoL    Oxygen/Ventilator Data (Last 24H):  (if applicable)   Oxygen Concentration (%):  [21] 21    Hemodynamic Parameters (Last 24H):   (if applicable)        Laboratory and Radiology Data:  Recent Results (from the past 24 hour(s))   Transesophageal echo (GABRIELLA)    Collection Time: 09/03/22 10:30 AM   Result Value Ref Range    TR Max Dagoberto 3.17 m/s    Triscuspid Valve Regurgitation Peak Gradient 40 mmHg    BSA 2.49 m2   POCT glucose    Collection Time: 09/03/22 11:38 AM   Result Value Ref Range    POC Glucose 159 (H) 70 - 110   POCT glucose    Collection Time: 09/03/22  4:28 PM   Result Value Ref Range    POC Glucose 278 (H) 70 - 110   POCT glucose    Collection Time: 09/03/22  8:02 PM   Result Value Ref Range    POC Glucose 297 (H) 70 - 110   CBC auto differential    Collection Time: 09/04/22  3:52 AM   Result Value Ref Range    WBC 8.78 3.90 - 12.70 K/uL    RBC 3.88 (L) 4.60 - 6.20 M/uL    Hemoglobin 12.2 (L) 14.0 - 18.0 g/dL    Hematocrit 38.5 (L) 40.0 - 54.0 %    MCV 99 (H) 82 - 98 fL    MCH 31.4 (H) 27.0 - 31.0 pg    MCHC 31.7 (L) 32.0 - 36.0 g/dL    RDW 15.3 (H) 11.5 - 14.5 %    Platelets 71 (L) 150 - 450 K/uL    MPV 12.1 9.2 - 12.9 fL    Immature Granulocytes 0.3 0.0 - 0.5 %    Gran # (ANC) 7.3 1.8 - 7.7 K/uL    Immature Grans (Abs) 0.03 0.00 - 0.04 K/uL    Lymph # 0.5 (L) 1.0 - 4.8 K/uL    Mono # 0.9 0.3 - 1.0 K/uL    Eos # 0.1 0.0 - 0.5 K/uL    Baso # 0.01 0.00 - 0.20 K/uL    nRBC 0 0 /100 WBC    Gran % 83.4 (H) 38.0 - 73.0 %    Lymph % 5.2 (L) 18.0 - 48.0 %    Mono % 10.1 4.0 - 15.0 %    Eosinophil % 0.9 0.0 - 8.0 %     Basophil % 0.1 0.0 - 1.9 %    Differential Method Automated    Basic metabolic panel    Collection Time: 09/04/22  3:52 AM   Result Value Ref Range    Sodium 137 136 - 145 mmol/L    Potassium 4.5 3.5 - 5.1 mmol/L    Chloride 100 95 - 110 mmol/L    CO2 27 23 - 29 mmol/L    Glucose 190 (H) 70 - 110 mg/dL    BUN 60 (H) 6 - 20 mg/dL    Creatinine 2.2 (H) 0.5 - 1.4 mg/dL    Calcium 8.8 8.7 - 10.5 mg/dL    Anion Gap 10 8 - 16 mmol/L    eGFR 33.5 (A) >60 mL/min/1.73 m^2   Magnesium    Collection Time: 09/04/22  3:52 AM   Result Value Ref Range    Magnesium 2.5 1.6 - 2.6 mg/dL   POCT glucose    Collection Time: 09/04/22  5:27 AM   Result Value Ref Range    POC Glucose 166 (H) 70 - 110     Imaging Results              X-Ray Chest AP Portable (Final result)  Result time 09/01/22 07:12:09      Final result by Andrea Fuller MD (09/01/22 07:12:09)                   Narrative:    HISTORY: COVID-19  dyspnea, fever.    FINDINGS: Portable chest radiograph at 0 2020 hours compared to 06/09/2020 shows triple lead left subclavian CCD, unchanged in position, with median sternotomy wires and mediastinal surgical clips. The cardiac silhouette is enlarged, stable in size, with stable markedly enlarged pulmonary arterial trunk.    The peripheral pulmonary vasculature is normal. The lungs are normally expanded, with scattered to the nodular densities throughout both lungs, nonspecific. No consolidation, large pleural effusion, or pneumothorax.    IMPRESSION: Diffuse interstitial prominence throughout both lungs, nonspecific but suggesting viral or atypical pneumonia given clinical history.    Electronically signed by:  Andrea Fuller MD  9/1/2022 7:12 AM CDT Workstation: [id number]                                    12-lead EKG interpretation:  (if applicable)      Current Cardiac Rhythm:   (if applicable)    Physical Exam:   Physical Exam  Constitutional:       Appearance: He is obese.   Cardiovascular:      Rate and Rhythm: Normal rate.    Pulmonary:      Effort: Pulmonary effort is normal.      Breath sounds: Wheezing present.   Skin:     Comments: Discolored LE, faint PT pulses, dressing left lateral foot   Neurological:      Mental Status: He is oriented to person, place, and time.       ASSESSMENT/PLAN:   Assessment: Plan:     CAD  ICM  AICD BIV- 100% V paced  CKD  DM  Sepsis  RV lead wire infection/endocarditis    viability study 5/2022-- There is large scar in the anterior segment(s). There is medium scar in the apical, inferolateral, inferoseptal segment(s).  There is very large scar in the inferior segment(s). Negative viability study with dilated left ventricle and large ant and inf MI. There was also medium MI in apex, inf-septal and inf-lat walls.  No reversiblity at 4 or 24 hrs -no evidence of viable, hybernating myocardium.      9/3/2022 GABRIELLA- ef 30%, global hypokinesis, MR, DD, TR, RVSP 50, Mobile structure vs small thrombosis RV lead      Per ID note-High-grade GPC bacteremia in setting of AICD, requires device removal   Patient is well known to DR Roth and would like to discuss his options further with Dr Roth who will be back on Tuesday  If removal -will need to be transferred to [facility]

## 2022-09-04 NOTE — CARE UPDATE
09/03/22 2244   Patient Assessment/Suction   Respiratory Effort Unlabored   Rhythm/Pattern, Respiratory pattern regular   Skin Integrity   $ Wound Care Tech Time 15 min   Area Observed Bridge of nose   Skin Appearance without discoloration   Barrier used? Gel Cushion   PRE-TX-O2   O2 Device (Oxygen Therapy) BiPAP   SpO2 (!) 93 %   Pulse Oximetry Type Continuous   Pulse 76   Resp 20   Aerosol Therapy   $ Aerosol Therapy Charges PRN treatment not required   Preset CPAP/BiPAP Settings   Mode Of Delivery BiPAP   $ Is patient using? Yes   Size of Mask Large   Sized Appropriately? Yes   Equipment Type V60   Airway Device Type large full face mask   Ipap 16   EPAP (cm H2O) 8   Pressure Support (cm H2O) 8   Set Rate (Breaths/Min) 12   ITime (sec) 1   Rise Time (sec) 3   Patient CPAP/BiPAP Settings   RR Total (Breaths/Min) 14   Tidal Volume (mL) 964   VE Minute Ventilation (L/min) 13 L/min   Peak Inspiratory Pressure (cm H2O) 16   TiTOT (%) 26   Total Leak (L/Min) 50   Patient Trigger - ST Mode Only (%) 92   CPAP/BiPAP Backup Settings   IPAP Backup 16 cmH2O   EPAP Backup 8 cmH2O   Backup Rate 12 breaths per minute (bpm)   FIO2 Backup 0.21 %   ITIME Backup 1 seconds   Rise Time Backup 3 seconds   CPAP/BiPAP Alarms   High Pressure (cm H2O) 40   Low Pressure (cm H2O) 5   Minute Ventilation (L/Min) 3   High RR (breaths/min) 35   Low RR (breaths/min) 10   Apnea (Sec) 20   Education   $ Education 15 min;BiPAP   Respiratory Evaluation   $ Care Plan Tech Time 15 min   $ Eval/Re-eval Charges Re-evaluation   Evaluation For   (care plan)

## 2022-09-04 NOTE — PROGRESS NOTES
"[facility] Medicine  Progress Note    Patient name: Ana Bullard  MRN: [id number]  Admit Date: 9/1/2022   LOS: 3 days     SUBJECTIVE:     Principal problem: Acute respiratory failure with hypoxia    Interval History:  No acute overnight events reported.  Reports nasal congestion and minimal cough.  Denies shortness of breath.  Also reports faint erythematous rash involving inner aspect of left knee.    Scheduled Meds:   amiodarone  200 mg Oral Daily    aspirin  81 mg Oral Daily    atorvastatin  10 mg Oral Daily    carvediloL  3.125 mg Oral BID    cefTRIAXone (ROCEPHIN) IVPB  2 g Intravenous Q24H    chlorhexidine  15 mL Mouth/Throat BID    enoxparin  40 mg Subcutaneous Daily    famotidine  20 mg Oral Daily    gabapentin  600 mg Oral BID    insulin detemir U-100  20 Units Subcutaneous BID    mupirocin   Nasal BID    pantoprazole  40 mg Oral Before breakfast     Continuous Infusions:      PRN Meds:acetaminophen, albuterol sulfate, calcium chloride IVPB, calcium chloride IVPB, calcium chloride IVPB, dextrose 10%, dextrose 10%, insulin aspart U-100, magnesium oxide, magnesium sulfate IVPB, magnesium sulfate IVPB, magnesium sulfate IVPB, magnesium sulfate IVPB, potassium chloride, potassium chloride, potassium chloride, potassium chloride, sodium chloride 0.9%, sodium phosphate IVPB, sodium phosphate IVPB, sodium phosphate IVPB, sodium phosphate IVPB, sodium phosphate IVPB, traMADoL    Review of patient's allergies indicates:   Allergen Reactions    Vasopressin Anaphylaxis and Other (See Comments)     Increased pressure in his head; felt like his eyeballs and veins were going to pop out of his head.     Heparin Other (See Comments)     Other reaction(s): "depleted my blood platets"    Decreased platelet count    Heparin analogues Other (See Comments)     Depleted WBC count    Vasopressin analogues Other (See Comments)     "felt like eyes going to pop out of my head"    Morphine Hallucinations, " Other (See Comments) and Anxiety     Other reaction(s): Hallucinations  Paranoid, hallucinations         Review of Systems: As per interval history    OBJECTIVE:     Vital Signs (Most Recent)  Temp: 98.1 °F (36.7 °C) (09/04/22 1058)  Pulse: 82 (09/04/22 1100)  Resp: 18 (09/04/22 1058)  BP: (!) 112/55 (09/04/22 1100)  SpO2: (!) 89 % (09/04/22 1100)    Vital Signs Range (Last 24H):  Temp:  [97.7 °F (36.5 °C)-98.7 °F (37.1 °C)]   Pulse:  [67-94]   Resp:  [16-20]   BP: (108-117)/(55-64)   SpO2:  [89 %-100 %]     I & O (Last 24H):  Intake/Output Summary (Last 24 hours) at 9/4/2022 1424  Last data filed at 9/4/2022 0531  Gross per 24 hour   Intake --   Output 1000 ml   Net -1000 ml         Physical Exam:  General: Patient resting comfortably in no acute distress. Appears as stated age. Calm  Eyes: No conjunctival injection. No scleral icterus.  ENT: Hearing grossly intact. No discharge from ears. No nasal discharge.   CVS: RRR. No LE edema BL  Lungs:  No tachypnea or accessory muscle use.    Abdomen:  Soft, nontender and nondistended.  No organomegaly  Neuro: AOx3. Moves all extremities. Follows commands. Responds appropriately   Skin:  Faint erythematous rash involving inner aspect of the left knee    Laboratory:  I have reviewed all pertinent lab results within the past 24 hours.  CBC:   Recent Labs   Lab 09/04/22  0352   WBC 8.78   RBC 3.88*   HGB 12.2*   HCT 38.5*   PLT 71*   MCV 99*   MCH 31.4*   MCHC 31.7*       CMP:   Recent Labs   Lab 09/01/22  0153 09/02/22  0439 09/04/22  0352   *   < > 190*   CALCIUM 9.4   < > 8.8   ALBUMIN 4.2  --   --    PROT 8.6*  --   --       < > 137   K 5.0   < > 4.5   CO2 27   < > 27   CL 99   < > 100   BUN 46*   < > 60*   CREATININE 2.1*   < > 2.2*   ALKPHOS 80  --   --    ALT 48*  --   --    AST 36  --   --    BILITOT 1.6*  --   --     < > = values in this interval not displayed.       Cardiac markers:   Recent Labs   Lab 09/01/22  0153   TROPONINI 0.034         Diagnostic  Results:  Labs: Reviewed    ASSESSMENT/PLAN:         Active Hospital Problems    Diagnosis  POA    *Acute respiratory failure with hypoxia [J96.01]  Yes    Pulmonary hypertension [I27.20]  Yes    Bacteremia due to Gram-positive bacteria [R78.81]  Yes    Acute renal failure superimposed on stage 3b chronic kidney disease [N17.9, N18.32]  No    Chronic systolic heart failure [I50.22]  Yes    AICD (automatic cardioverter/defibrillator) present [Z95.810]  Yes    Chronic Thrombocytopenia [D69.6]  Yes    Type II diabetes mellitus with neurological manifestations [E11.49]  Yes     With neuropathy        Resolved Hospital Problems   No resolved problems to display.         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 Roth regarding ICD wire removal   AD on CKD stage 3b -  hold home diuretics and ACE-inhibitor  Dose medications per GFR       Type 2 diabetes mellitus:  Continue detemir at 20 units b.I.d. and low-dose insulin sliding scale   Monitor electrolytes and replete per protocol      VTE Risk Mitigation (From admission, onward)           Ordered     enoxaparin injection 40 mg  Daily         09/02/22 0952     Place sequential compression device  Until discontinued         09/01/22 0633     IP VTE HIGH RISK PATIENT  Once         09/01/22 0633                        Department Hospital Medicine  Formerly [facility], later [facility]  Patrick Carvajal MD  Date of service: 09/04/2022

## 2022-09-04 NOTE — PROGRESS NOTES
Consult Note  Infectious Disease    Reason for Consult:  GPC bacteremia    HPI: Ana Bullard is a 62 y.o. male very pleasant, former heavy smoker, known to our service for admission in January 2020 for septic shock secondary to strep pneumo bacteremia, he has past medical history of CAD, CABG, AICD since 2012, battery replaced 2018, exchange in 2016, CHF (EF: 15%), HTN, HLD, PVD, CKD not on dialysis, FARHAN on BiPAP, diabetes, neuropathy, GERD, and prior history of diabetic right foot infection due to Proteus vulgaris, group a Streptococcus and MRSA in January 2019, with residual chronic foot ulcer for which he follows with Dr. Heaton/podiatry outpatient who performs debridements.    Patient came to the hospital after sudden onset of fever, chills, not feeling well since 8/31, wife at home measured temperature about 99.  He states he has been having on and off headache, currently resolved.  He has been having some productive cough, white/thin sputum, denies nausea or vomiting, no abdominal pain, no dysuria or increased urinary frequency, or change in bowel movements.    As per patient, he is scheduled to have a GABRIELLA outpatient by Dr. Roth on 09/16.    In the ER, hypotensive 100/58, T-max 103.3°,   Lab significant for leukocytosis of 16.2, left shift 91.7%, H&H 14.3/44.6, platelet count 101, thrombocytopenia   AD creatinine 2.5,   Bilirubin 1.6   AST 36/ALT 48   CRP 3.6      Lactic 1.7, procalcitonin 0.12     Patient admitted to ICU for acute respiratory failure likely secondary to COPD exacerbation in addition to acute on chronic CHF, EF 15%.  Placed on BiPAP.  Started on IV steroids.  Empirically started on cefepime and vancomycin, switched to ceftriaxone 9/1.    Hospital course complicated by 4/4 bottles from admission growing GPC, pending ID and sensitivities.    ID consult for GPC.  Micro tobin contacted, looks like Strep, results will be updated and within an hour.    09/03/2022.  On admission he had  "a temperature of 102.7°.  He has been afebrile since.  WBC trend 16--17.9--9.5.  GABRIELLA for positive are V wire thrombus versus endocarditis.    09/04/2022.  Patient is resting comfortably.  We are waiting for Tuesday to discuss with patient's cardiologist, about need for AICD removal    Antibiotics (From admission, onward)      Start     Stop Route Frequency Ordered    09/01/22 2100  mupirocin 2 % ointment  (MRSA Decolonization Orders [facility])         09/06 2059 Nasl 2 times daily 09/01/22 1611    09/01/22 1500  cefTRIAXone (ROCEPHIN) 2 g/50 mL D5W IVPB         -- IV Every 24 hours (non-standard times) 09/01/22 1404          Antifungals (From admission, onward)      None          Antivirals (From admission, onward)      None            Review of patient's allergies indicates:   Allergen Reactions    Vasopressin Anaphylaxis and Other (See Comments)     Increased pressure in his head; felt like his eyeballs and veins were going to pop out of his head.     Heparin Other (See Comments)     Other reaction(s): "depleted my blood platets"    Decreased platelet count    Heparin analogues Other (See Comments)     Depleted WBC count    Vasopressin analogues Other (See Comments)     "felt like eyes going to pop out of my head"    Morphine Hallucinations, Other (See Comments) and Anxiety     Other reaction(s): Hallucinations  Paranoid, hallucinations       Past Medical History:   Diagnosis Date    AICD (automatic cardioverter/defibrillator) present     Anemia, chronic disease 7/27/2021    Anemia, unspecified 7/27/2021    Anxiety and depression 2012    CAD (coronary artery disease) 2012    Cardiomegaly 2016    CHF (congestive heart failure) 2012    Cholecystitis 2017    Complete heart block 2012    Diabetic foot ulcer     DVT (deep venous thrombosis) 2012    And pulmonary embolus    GERD (gastroesophageal reflux disease) 2010    Heparin induced thrombocytopenia     Hyperlipemia 2011    IDDM (insulin dependent " diabetes mellitus) 1983    With neuropathy    Ischemic cardiomyopathy 2012    MI (myocardial infarction) 2012    Morbid obesity     MRSA (methicillin resistant Staphylococcus aureus) infection 01/19/2019    Noncompliance with therapeutic plan 2019    Normochromic normocytic anemia 7/27/2021    Osteomyelitis of right foot 01/2019    Pulmonary edema 2019    Respiratory failure 2019    Sepsis 01/2019    Due to cellulitis right leg    Sleep apnea 2013    Thrombocytopathy 2012    Venous stasis dermatitis of both lower extremities      Past Surgical History:   Procedure Laterality Date    CARDIAC PACEMAKER PLACEMENT  12/2012    CARDIAC PACEMAKER PLACEMENT  10/04/2018    battery replacement    CORONARY ARTERY BYPASS GRAFT  06/2012    ESOPHAGOGASTRODUODENOSCOPY  01/31/2017    SAMARA FILTER PLACEMENT  2012    vena cava    LUMBAR SYMPATHETIC NERVE BLOCK Right 8/22/2019    Procedure: BLOCK, NERVE, SYMPATHETIC, LUMBAR;  Surgeon: Tashi Good MD;  Location: [facility];  Service: Pain Management;  Laterality: Right;  RIGHT SYMPATHETIC NERVE BLOCK     Social History     Tobacco Use    Smoking status: Former     Packs/day: 2.00     Years: 38.00     Pack years: 76.00     Types: Cigarettes     Quit date: 2012     Years since quitting: 10.6    Smokeless tobacco: Never   Substance Use Topics    Alcohol use: No        Family History   Problem Relation Age of Onset    Arthritis Mother     Diabetes Mother     Cancer Father     Heart disease Father     Stroke Father          Review of Systems:   +chills, fever, no sweats, weight loss  No change in vision, loss of vision or diplopia  No sinus congestion, purulent nasal discharge, post nasal drip or facial pain  No pain in mouth or throat. No problems with teeth, gums.  No chest pain, palpitations, syncope  Productive cough of yellow/mucoid phlegm, dyspnea on exertion and at rest, no hemoptysis or pleurisy  No dysphagia, odynophagia  No nausea, vomiting, diarrhea,  constipation, no blood in stool, or focal abd pain,    No dysuria, hesitancy, hematuria, retention, incontinence  Right foot chronic plantar ulcer at 5th metatarsal  No swelling of joints, redness of joints, injuries, or new focal pain  No unusual headaches, dizziness, vertigo, numbness, paresthesias, neuropathy, falls  No anxiety, depression, substance abuse, sleep disturbance  No bleeding, lymphadenopathy  No new rashes, lesions, or wounds    Outdoor activities:  Lives at home with wife, works on his boat weekly, no recent contact with water/brackish water.  Former smoker, former heavy drinker, no drugs. Last colonoscopy 10 years ago.  Travel:  None  Implants:  AICD since 20/12, battery replaced 2018  Antibiotic History:  Vancomycin/cefepime, switched to ceftriaxone 9/1    EXAM & DIAGNOSTICS REVIEWED:   Vitals:     Temp:  [97.5 °F (36.4 °C)-98.7 °F (37.1 °C)]   Temp: 97.5 °F (36.4 °C) (09/04/22 1533)  Pulse: 71 (09/04/22 1533)  Resp: 20 (09/04/22 1533)  BP: (!) 105/55 (09/04/22 1533)  SpO2: 95 % (09/04/22 1533)    Intake/Output Summary (Last 24 hours) at 9/4/2022 1745  Last data filed at 9/4/2022 0531  Gross per 24 hour   Intake --   Output 1000 ml   Net -1000 ml       General:  In NAD. Alert and attentive, cooperative, comfortable on room air  Eyes:  Anicteric, PERRL  ENT:  No ulcers, exudates, thrush, nares patent, dentures upper, edentulous lower  Neck:  Supple, no adenopathy appreciated  Lungs: Clear to auscultation b/l  Heart:  S1/S2+, regular rhythm, systolic murmur+ - L AICD in place, no redness noted, non tender to palpation  Abd:  Obese, +BS, soft, non tender, non distended, no rebound  :  Voids, urine clear, no flank tenderness  Musc:  Except for R foot, Joints without effusion, swelling,  erythema, synovitis, ambulatory  Skin:  Warm, no rash  Wound:   Neuro:  Following commands, neuropathy  Psych:  Calm, cooperative  Lymphatic:     No cervical, supraclavicular nodes  Extrem: Right foot with chronic  non-healing ulcer, probes 3mm deep, no evidence of purulent drainage  B/l venous stasis  No LE edema b/l  VAD:  Peripheral IV       Isolation: None    9/1:        General Labs reviewed:  Recent Labs   Lab 09/02/22 0439 09/03/22 0408 09/04/22  0352   WBC 17.91* 9.50 8.78   HGB 13.2* 11.7* 12.2*   HCT 41.2 36.7* 38.5*   PLT 90* 64* 71*       Recent Labs   Lab 09/01/22  0153 09/02/22 0439 09/03/22 0408 09/04/22  0352    137 134* 137   K 5.0 4.5 4.0 4.5   CL 99 99 99 100   CO2 27 27 25 27   BUN 46* 59* 66* 60*   CREATININE 2.1* 2.5* 2.3* 2.2*   CALCIUM 9.4 8.8 8.3* 8.8   PROT 8.6*  --   --   --    BILITOT 1.6*  --   --   --    ALKPHOS 80  --   --   --    ALT 48*  --   --   --    AST 36  --   --   --      Recent Labs   Lab 09/01/22  0153   CRP 3.61*     No results for input(s): SEDRATE in the last 168 hours.    Estimated Creatinine Clearance: 48 mL/min (A) (based on SCr of 2.2 mg/dL (H)).     Prior micro  Blood cultures 06/09/2020 Streptococcus pyogenes group A  Blood cultures 1/18/20 Streptococcus pneumoniae     Micro:  Microbiology Results (last 7 days)       Procedure Component Value Units Date/Time    Blood culture [[id number]] Collected: 09/02/22 1550    Order Status: Completed Specimen: Blood Updated: 09/04/22 1632     Blood Culture, Routine No Growth to date      No Growth to date      No Growth to date    Narrative:      Collection has been rescheduled by [email or web address] at 09/02/2022 10:15 Reason:   Unable to collect  Collection has been rescheduled by ALD1 at 09/02/2022 10:15 Reason:   Unable to collect    Blood culture [[id number]] Collected: 09/02/22 1014    Order Status: Completed Specimen: Blood Updated: 09/04/22 1232     Blood Culture, Routine No Growth to date      No Growth to date      No Growth to date    Culture, Respiratory with Gram Stain [[id number]] Collected: 09/02/22 1208    Order Status: Completed Specimen: Respiratory from Sputum Updated: 09/04/22 0815     Respiratory Culture Normal respiratory  carolina     Gram Stain (Respiratory) <10 epithelial cells per low power field.     Gram Stain (Respiratory) Few WBC's     Gram Stain (Respiratory) Few Gram positive cocci     Gram Stain (Respiratory) Few Gram positive rods    Blood culture x two cultures. Draw prior to antibiotics. [[id number]]  (Abnormal) Collected: 09/01/22 0155    Order Status: Completed Specimen: Blood from Peripheral, Hand, Right Updated: 09/02/22 1214     Blood Culture, Routine Gram stain aer bottle: Gram positive cocci      Gram stain mayte bottle: Gram positive cocci      Results called to and read back by:David Berumen RN-ED;  09/01/2022  13:01      CJD      STREPTOCOCCUS AGALACTIAE (GROUP B)  Beta-hemolytic streptococci are routinely susceptible to   penicillins,cephalosporins and carbapenems.      Narrative:      Aerobic and anaerobic    Blood culture x two cultures. Draw prior to antibiotics. [[id number]]  (Abnormal) Collected: 09/01/22 0152    Order Status: Completed Specimen: Blood from Peripheral, Antecubital, Right Updated: 09/02/22 1212     Blood Culture, Routine Gram stain aer bottle: Gram positive cocci      Gram stain mayte bottle: Gram positive cocci      Results called to and read back by:David Berumen RN-ED;  09/01/2022  13:00      CJD      STREPTOCOCCUS AGALACTIAE (GROUP B)  Beta-hemolytic streptococci are routinely susceptible to   penicillins,cephalosporins and carbapenems.      Narrative:      Aerobic and anaerobic    Aerobic culture [[id number]]     Order Status: No result Specimen: Wound             Imaging Reviewed:  Chest x-rayDiffuse interstitial prominence throughout both lungs, nonspecific but suggesting viral or atypical pneumonia given clinical history.     Cardiology: Last ECHO 1/20/20  Eccentric left ventricular hypertrophy.  Severely decreased left ventricular systolic function. The estimated ejection fraction is 25%.  Grade III (severe) left ventricular diastolic dysfunction consistent with restrictive  physiology.  Moderate left atrial enlargement.  Mild mitral regurgitation.  Mild tricuspid regurgitation.  Intermediate central venous pressure (8 mmHg).  The estimated PA systolic pressure is 53 mmHg.  Pulmonary hypertension present.          IMPRESSION & PLAN     Sepsis due to GBS  bacteremia, right ventricle the ICD lead infection, endocarditis.   CRP 3.2    2.  AD, creatinine 2.5    3.  R foot chronic non-healing ulcer-- must be addressed otherwise he will get new infection    4.  PMHx: CAD, CABG, AICD since 2012, CHF (EF: 15%), HTN, HLD, PVD, CKD not on dialysis, FARHAN on BiPAP, diabetes, neuropathy, GERD    Recommendations:  High-grade GBStrep bacteremia in setting of AICD, requires device removal   Continue Ceftriaxone 2 g IV daily    Discussed with hospitalist.  He will discuss with cardiologist in a.m. about removal of AICD.      --( If patient, for whatever reason, is not a surgical candidate, then he will need ceftriaxone IV followed by chronic suppression therapy for ever.  Of course the AICD removal is the standard of care in the best option for treatment of infection.)  Needs to be up-to-date with colonoscopies        Medical Decision Making during this encounter was  [_] Low Complexity  [_] Moderate Complexity  [xx] High Complexity

## 2022-09-05 LAB
GLUCOSE SERPL-MCNC: 145 MG/DL (ref 70–110)
GLUCOSE SERPL-MCNC: 212 MG/DL (ref 70–110)
GLUCOSE SERPL-MCNC: 233 MG/DL (ref 70–110)
MAGNESIUM SERPL-MCNC: 2.6 MG/DL (ref 1.6–2.6)

## 2022-09-05 PROCEDURE — 25000003 PHARM REV CODE 250: Performed by: INTERNAL MEDICINE

## 2022-09-05 PROCEDURE — 83735 ASSAY OF MAGNESIUM: CPT | Performed by: INTERNAL MEDICINE

## 2022-09-05 PROCEDURE — 27000221 HC OXYGEN, UP TO 24 HOURS

## 2022-09-05 PROCEDURE — 25000003 PHARM REV CODE 250

## 2022-09-05 PROCEDURE — 94660 CPAP INITIATION&MGMT: CPT

## 2022-09-05 PROCEDURE — 99900035 HC TECH TIME PER 15 MIN (STAT)

## 2022-09-05 PROCEDURE — 21000000 HC CCU ICU ROOM CHARGE

## 2022-09-05 PROCEDURE — 99222 1ST HOSP IP/OBS MODERATE 55: CPT | Mod: ,,, | Performed by: PODIATRIST

## 2022-09-05 PROCEDURE — 94761 N-INVAS EAR/PLS OXIMETRY MLT: CPT

## 2022-09-05 PROCEDURE — 94799 UNLISTED PULMONARY SVC/PX: CPT

## 2022-09-05 PROCEDURE — 36415 COLL VENOUS BLD VENIPUNCTURE: CPT | Performed by: INTERNAL MEDICINE

## 2022-09-05 PROCEDURE — 63600175 PHARM REV CODE 636 W HCPCS: Performed by: INTERNAL MEDICINE

## 2022-09-05 PROCEDURE — 99900031 HC PATIENT EDUCATION (STAT)

## 2022-09-05 PROCEDURE — 99222 PR INITIAL HOSPITAL CARE,LEVL II: ICD-10-PCS | Mod: ,,, | Performed by: PODIATRIST

## 2022-09-05 RX ADMIN — CEFTRIAXONE 2 G: 2 INJECTION, SOLUTION INTRAVENOUS at 04:09

## 2022-09-05 RX ADMIN — AMIODARONE HYDROCHLORIDE 200 MG: 200 TABLET ORAL at 09:09

## 2022-09-05 RX ADMIN — TRAMADOL HYDROCHLORIDE 50 MG: 50 TABLET, COATED ORAL at 09:09

## 2022-09-05 RX ADMIN — CARVEDILOL 3.12 MG: 3.12 TABLET, FILM COATED ORAL at 09:09

## 2022-09-05 RX ADMIN — FAMOTIDINE 20 MG: 20 TABLET ORAL at 09:09

## 2022-09-05 RX ADMIN — CHLORHEXIDINE GLUCONATE 15 ML: 1.2 RINSE ORAL at 09:09

## 2022-09-05 RX ADMIN — GABAPENTIN 600 MG: 300 CAPSULE ORAL at 09:09

## 2022-09-05 RX ADMIN — TRAMADOL HYDROCHLORIDE 50 MG: 50 TABLET, COATED ORAL at 12:09

## 2022-09-05 RX ADMIN — INSULIN DETEMIR 20 UNITS: 100 INJECTION, SOLUTION SUBCUTANEOUS at 09:09

## 2022-09-05 RX ADMIN — MUPIROCIN: 20 OINTMENT TOPICAL at 09:09

## 2022-09-05 RX ADMIN — INSULIN ASPART 2 UNITS: 100 INJECTION, SOLUTION INTRAVENOUS; SUBCUTANEOUS at 10:09

## 2022-09-05 RX ADMIN — MUPIROCIN 1 G: 20 OINTMENT TOPICAL at 09:09

## 2022-09-05 RX ADMIN — ASPIRIN 81 MG CHEWABLE TABLET 81 MG: 81 TABLET CHEWABLE at 09:09

## 2022-09-05 RX ADMIN — ATORVASTATIN CALCIUM 10 MG: 10 TABLET, FILM COATED ORAL at 09:09

## 2022-09-05 RX ADMIN — PANTOPRAZOLE SODIUM 40 MG: 40 TABLET, DELAYED RELEASE ORAL at 05:09

## 2022-09-05 NOTE — PROGRESS NOTES
[facility]  Adult Nutrition   Progress Note (Initial Assessment)     SUMMARY     Recommendations  Continue current 2000 kcal diabetic diet as tolerated.    Goals:  Pt to meet 75 to 100% of his EEN and EPN.    Nutrition Goal Status: goal met    Communication of RD Recs: other (comment)    Dietitian Rounds Brief  LOS: Pt with 100% PO intake and LBM on 9/2/2022. Will follow prn.    Diet order:   Current Diet Order: 2000 kcals ADA      Evaluation of Received Nutrient/Fluid Intake  Energy Calories Required: meeting needs  Protein Required: meeting needs  Fluid Required: meeting needs  Tolerance: tolerating     % Intake of Estimated Energy Needs: 75 - 100 %  % Meal Intake: 75 - 100 %      Intake/Output Summary (Last 24 hours) at 9/5/2022 1105  Last data filed at 9/4/2022 2007  Gross per 24 hour   Intake 260 ml   Output --   Net 260 ml        Anthropometrics  Temp: 97.8 °F (36.6 °C)  Height: 6' (182.9 cm)  Height (inches): 72 in  Weight Method: Bed Scale  Weight: 123.7 kg (272 lb 12.8 oz)  Weight (lb): 272.8 lb  Ideal Body Weight (IBW), Male: 178 lb  % Ideal Body Weight, Male (lb): 151.1 %  BMI (Calculated): 37  BMI Grade: 35 - 39.9 - obesity - grade II       Estimated/Assessed Needs  Weight Used For Calorie Calculations: 123.7 kg (272 lb 11.3 oz)  Energy Calorie Requirements (kcal): 4702-5989 kcals/day (20-25 kcals/kg ABW)  Energy Need Method: Kcal/kg  Protein Requirements: 122-162 g/day (1.5-2 g/kg IBW)  Weight Used For Protein Calculations: 81 kg (178 lb 9.2 oz)     Estimated Fluid Requirement Method: RDA Method  RDA Method (mL): 2474       Reason for Assessment  Reason For Assessment: length of stay  Diagnosis: other (see comments) (Acute respiratory failure with hypoxia)  Relevant Medical History: Noncompliance with therapeutic plan, Respiratory failure, Sepsis, due to cellulitis right leg, MRSA (methicillin resistant Staphylococcus aureus) infection, Pulmonary edema, Cholecystitis, Cardiomegaly, Sleep  apnea, Complete heart block, DVT (deep venous thrombosis), Pulmonary embolus, Ischemic cardiomyopathy, Thrombocytopathy, MI (myocardial infarction), CHF (congestive heart failure), Anxiety and depression, CAD (coronary artery disease), Hyperlipemia, GERD (gastroesophageal reflux disease), IDDM (insulin dependent diabetes mellitus), With neuropathy  Diabetic foot ulcer, AICD (automatic cardioverter/defibrillator) present, Osteomyelitis of right foot, Venous stasis dermatitis of both lower extremities, Morbid obesity, Heparin induced thrombocytopenia, Anemia, chronic disease, Anemia, unspecified, Normochromic normocytic anemia  Interdisciplinary Rounds: did not attend    Nutrition/Diet History  Spiritual, Cultural Beliefs, [employer]al Practices, Values that Affect Care: no  Food Allergies: NKFA  Factors Affecting Nutritional Intake: None identified at this time    Nutrition Risk Screen  Nutrition Risk Screen: large or nonhealing wound, burn or pressure injury     MST Score: 0  Have you recently lost weight without trying?: No  Weight loss score: 0  Have you been eating poorly because of a decreased appetite?: No  Appetite score: 0       Weight History:  Wt Readings from Last 5 Encounters:   09/05/22 123.7 kg (272 lb 12.8 oz)   07/07/22 122.9 kg (271 lb)   07/05/22 116.6 kg (257 lb)   04/28/22 116.6 kg (257 lb)   04/04/22 116.6 kg (257 lb)        Lab/Procedures/Meds: Pertinent Labs/Meds Reviewed    Medications:Pertinent Medications Reviewed  Scheduled Meds:   amiodarone  200 mg Oral Daily    aspirin  81 mg Oral Daily    atorvastatin  10 mg Oral Daily    carvediloL  3.125 mg Oral BID    cefTRIAXone (ROCEPHIN) IVPB  2 g Intravenous Q24H    chlorhexidine  15 mL Mouth/Throat BID    enoxparin  40 mg Subcutaneous Daily    famotidine  20 mg Oral Daily    gabapentin  600 mg Oral BID    insulin detemir U-100  20 Units Subcutaneous BID    mupirocin   Nasal BID    pantoprazole  40 mg Oral Before breakfast     Continuous  Infusions:  PRN Meds:.acetaminophen, albuterol sulfate, calcium chloride IVPB, calcium chloride IVPB, calcium chloride IVPB, dextrose 10%, dextrose 10%, insulin aspart U-100, magnesium oxide, magnesium sulfate IVPB, magnesium sulfate IVPB, magnesium sulfate IVPB, magnesium sulfate IVPB, potassium chloride, potassium chloride, potassium chloride, potassium chloride, sodium chloride 0.9%, sodium phosphate IVPB, sodium phosphate IVPB, sodium phosphate IVPB, sodium phosphate IVPB, sodium phosphate IVPB, traMADoL    Labs: Pertinent Labs Reviewed  Clinical Chemistry:  Recent Labs   Lab 09/01/22 0153 09/02/22 0439 09/04/22 0352 09/05/22 0427      < > 137  --    K 5.0   < > 4.5  --    CL 99   < > 100  --    CO2 27   < > 27  --    *   < > 190*  --    BUN 46*   < > 60*  --    CREATININE 2.1*   < > 2.2*  --    CALCIUM 9.4   < > 8.8  --    PROT 8.6*  --   --   --    ALBUMIN 4.2  --   --   --    BILITOT 1.6*  --   --   --    ALKPHOS 80  --   --   --    AST 36  --   --   --    ALT 48*  --   --   --    ANIONGAP 12   < > 10  --    MG  --    < > 2.5 2.6    < > = values in this interval not displayed.     CBC:   Recent Labs   Lab 09/04/22 0352   WBC 8.78   RBC 3.88*   HGB 12.2*   HCT 38.5*   PLT 71*   MCV 99*   MCH 31.4*   MCHC 31.7*     Cardiac Profile:  Recent Labs   Lab 09/01/22 0153   *   CPK 80   TROPONINI 0.034     Inflammatory Labs:  Recent Labs   Lab 09/01/22 0153   CRP 3.61*     Diabetes:  Recent Labs   Lab 09/02/22 0439   HGBA1C 7.7*     Monitor and Evaluation  Food and Nutrient Intake: food and beverage intake, energy intake  Food and Nutrient Adminstration: diet order  Knowledge/Beliefs/Attitudes: food and nutrition knowledge/skill, beliefs and attitudes  Physical Activity and Function: nutrition-related ADLs and IADLs, factors affecting access to physical activity  Anthropometric Measurements: weight, weight change, body mass index  Biochemical Data, Medical Tests and Procedures: lipid  profile, inflammatory profile, glucose/endocrine profile, gastrointestinal profile, electrolyte and renal panel  Nutrition-Focused Physical Findings: overall appearance     Nutrition Risk  Level of Risk/Frequency of Follow-up: low     Nutrition Follow-Up  RD Follow-up?: Yes      Dina hCin RD 09/05/2022 11:05 AM

## 2022-09-05 NOTE — PROGRESS NOTES
[facility] Medicine  Progress Note    Patient name: Ana Bullard  MRN: [id number]  Admit Date: 9/1/2022   LOS: 4 days     SUBJECTIVE:     Principal problem: Acute respiratory failure with hypoxia    Interval History:  No acute overnight events reported.  No new issues reported.  Tolerating antibiotics without any issues. Denies nausea or vomiting.  Shortness of breath has resolved.  Minimal cough persists.    Hospital course:   62-year-old  male with multiple medical comorbidities including but not limited to chronic combined CHF with last known LVEF of 25%, status post AICD, chronic thrombocytopenia, CKD stage 3b, type 2 diabetes mellitus admitted after presenting with shortness of breath.      Acute hypoxic respiratory failure likely secondary to bilateral pneumonia.  Further hospital workup notable for bacteremia secondary to Streptococcus agalactiae.  Initially on broad-spectrum antibiotics and eventually transition to ceftriaxone.  GABRIELLA revealed possibility of RV wire endocarditis versus thrombus.  Seen by Infectious Disease who recommended removal of the ICD wire.  Patient would like to discuss with Dr. Roth (primary cardiologist) before deciding on long-term antibiotics versus ICD wire removal.      Scheduled Meds:   amiodarone  200 mg Oral Daily    aspirin  81 mg Oral Daily    atorvastatin  10 mg Oral Daily    carvediloL  3.125 mg Oral BID    cefTRIAXone (ROCEPHIN) IVPB  2 g Intravenous Q24H    chlorhexidine  15 mL Mouth/Throat BID    enoxparin  40 mg Subcutaneous Daily    famotidine  20 mg Oral Daily    gabapentin  600 mg Oral BID    insulin detemir U-100  20 Units Subcutaneous BID    mupirocin   Nasal BID    pantoprazole  40 mg Oral Before breakfast     Continuous Infusions:      PRN Meds:acetaminophen, albuterol sulfate, calcium chloride IVPB, calcium chloride IVPB, calcium chloride IVPB, dextrose 10%, dextrose 10%, insulin aspart U-100, magnesium oxide, magnesium  "sulfate IVPB, magnesium sulfate IVPB, magnesium sulfate IVPB, magnesium sulfate IVPB, potassium chloride, potassium chloride, potassium chloride, potassium chloride, sodium chloride 0.9%, sodium phosphate IVPB, sodium phosphate IVPB, sodium phosphate IVPB, sodium phosphate IVPB, sodium phosphate IVPB, traMADoL    Review of patient's allergies indicates:   Allergen Reactions    Vasopressin Anaphylaxis and Other (See Comments)     Increased pressure in his head; felt like his eyeballs and veins were going to pop out of his head.     Heparin Other (See Comments)     Other reaction(s): "depleted my blood platets"    Decreased platelet count    Heparin analogues Other (See Comments)     Depleted WBC count    Vasopressin analogues Other (See Comments)     "felt like eyes going to pop out of my head"    Morphine Hallucinations, Other (See Comments) and Anxiety     Other reaction(s): Hallucinations  Paranoid, hallucinations         Review of Systems: As per interval history    OBJECTIVE:     Vital Signs (Most Recent)  Temp: 96.5 °F (35.8 °C) (09/05/22 1520)  Pulse: 68 (09/05/22 1520)  Resp: 18 (09/05/22 1520)  BP: (!) 96/52 (09/05/22 1520)  SpO2: 100 % (09/05/22 1520)    Vital Signs Range (Last 24H):  Temp:  [96.5 °F (35.8 °C)-98.4 °F (36.9 °C)]   Pulse:  [62-77]   Resp:  [18-20]   BP: ()/(52-73)   SpO2:  [95 %-100 %]     I & O (Last 24H):  Intake/Output Summary (Last 24 hours) at 9/5/2022 1730  Last data filed at 9/5/2022 1450  Gross per 24 hour   Intake 1100 ml   Output --   Net 1100 ml         Physical Exam:  General: Patient resting comfortably in no acute distress. Appears as stated age. Calm  Eyes: No conjunctival injection. No scleral icterus.  ENT: Hearing grossly intact. No discharge from ears. No nasal discharge.   CVS: RRR. No LE edema BL  Lungs:  No tachypnea or accessory muscle use.    Abdomen:  Soft, nontender and nondistended.  No organomegaly  Neuro: AOx3. Moves all extremities. Follows commands. " Responds appropriately     Laboratory:  I have reviewed all pertinent lab results within the past 24 hours.  CBC:   Recent Labs   Lab 09/04/22  0352   WBC 8.78   RBC 3.88*   HGB 12.2*   HCT 38.5*   PLT 71*   MCV 99*   MCH 31.4*   MCHC 31.7*       CMP:   Recent Labs   Lab 09/01/22  0153 09/02/22  0439 09/04/22  0352   *   < > 190*   CALCIUM 9.4   < > 8.8   ALBUMIN 4.2  --   --    PROT 8.6*  --   --       < > 137   K 5.0   < > 4.5   CO2 27   < > 27   CL 99   < > 100   BUN 46*   < > 60*   CREATININE 2.1*   < > 2.2*   ALKPHOS 80  --   --    ALT 48*  --   --    AST 36  --   --    BILITOT 1.6*  --   --     < > = values in this interval not displayed.       Cardiac markers:   Recent Labs   Lab 09/01/22 0153   TROPONINI 0.034         Diagnostic Results:  Labs: Reviewed    ASSESSMENT/PLAN:         Active Hospital Problems    Diagnosis  POA    *Acute respiratory failure with hypoxia [J96.01]  Yes    Pulmonary hypertension [I27.20]  Yes    Bacteremia due to Gram-positive bacteria [R78.81]  Yes    Acute renal failure superimposed on stage 3b chronic kidney disease [N17.9, N18.32]  No    Chronic systolic heart failure [I50.22]  Yes    AICD (automatic cardioverter/defibrillator) present [Z95.810]  Yes    Chronic Thrombocytopenia [D69.6]  Yes    Type II diabetes mellitus with neurological manifestations [E11.49]  Yes     With neuropathy        Resolved Hospital Problems   No resolved problems to display.         Plan:   Acute hypoxic respiratory failure - improved   Bilateral pneumonia in the setting of group B strep agalactiae bacteremia  Continue IV ceftriaxone  Repeat blood cultures - NGTD   Status post GABRIELLA with concerns for RV wire endocarditis versus thrombus; noted to have a 4 mm mobile structure  Infectious disease following; will need to discuss long-term IV antibiotics +/- ICD wire removed  Patient would like to discuss with his cardiologist Dr. Roth regarding ICD wire removal   AD on CKD stage 3b -  hold  home diuretics and ACE-inhibitor  Dose medications per GFR       Type 2 diabetes mellitus:  Continue detemir at 25 units b.I.d. and low-dose insulin sliding scale   Monitor electrolytes and replete per protocol      VTE Risk Mitigation (From admission, onward)           Ordered     enoxaparin injection 40 mg  Daily         09/02/22 0952     Place sequential compression device  Until discontinued         09/01/22 0633     IP VTE HIGH RISK PATIENT  Once         09/01/22 0633                        Department Hospital Medicine  [facility]  Patrick Carvajal MD  Date of service: 09/05/2022

## 2022-09-05 NOTE — CARE UPDATE
09/05/22 0800   Patient Assessment/Suction   Level of Consciousness (AVPU) alert   Respiratory Effort Normal;Unlabored   Expansion/Accessory Muscles/Retractions no use of accessory muscles;no retractions   PRE-TX-O2   O2 Device (Oxygen Therapy) room air   SpO2 95 %   Pulse Oximetry Type Continuous   $ Pulse Oximetry - Multiple Charge Pulse Oximetry - Multiple   Pulse 77   Aerosol Therapy   $ Aerosol Therapy Charges PRN treatment not required   Daily Review of Necessity (SVN) completed   Respiratory Treatment Status (SVN) PRN treatment not required   Preset CPAP/BiPAP Settings   Mode Of Delivery BiPAP;Standby   Education   $ Education 15 min   Respiratory Evaluation   $ Care Plan Tech Time 15 min   $ Eval/Re-eval Charges Evaluation   Evaluation For   (CARE PLAN)

## 2022-09-06 PROBLEM — L03.115 CELLULITIS OF RIGHT LEG: Status: ACTIVE | Noted: 2022-09-06

## 2022-09-06 PROBLEM — A49.1 GROUP B STREPTOCOCCAL INFECTION: Status: ACTIVE | Noted: 2022-09-06

## 2022-09-06 LAB
GLUCOSE SERPL-MCNC: 147 MG/DL (ref 70–110)
GLUCOSE SERPL-MCNC: 155 MG/DL (ref 70–110)
GLUCOSE SERPL-MCNC: 165 MG/DL (ref 70–110)
GLUCOSE SERPL-MCNC: 216 MG/DL (ref 70–110)
MAGNESIUM SERPL-MCNC: 2.4 MG/DL (ref 1.6–2.6)

## 2022-09-06 PROCEDURE — 99900035 HC TECH TIME PER 15 MIN (STAT)

## 2022-09-06 PROCEDURE — 94761 N-INVAS EAR/PLS OXIMETRY MLT: CPT

## 2022-09-06 PROCEDURE — 99222 PR INITIAL HOSPITAL CARE,LEVL II: ICD-10-PCS | Mod: ,,, | Performed by: PODIATRIST

## 2022-09-06 PROCEDURE — 27000221 HC OXYGEN, UP TO 24 HOURS

## 2022-09-06 PROCEDURE — 36415 COLL VENOUS BLD VENIPUNCTURE: CPT | Performed by: INTERNAL MEDICINE

## 2022-09-06 PROCEDURE — 99232 PR SUBSEQUENT HOSPITAL CARE,LEVL II: ICD-10-PCS | Mod: ,,, | Performed by: INTERNAL MEDICINE

## 2022-09-06 PROCEDURE — 25000003 PHARM REV CODE 250

## 2022-09-06 PROCEDURE — 99232 SBSQ HOSP IP/OBS MODERATE 35: CPT | Mod: ,,, | Performed by: INTERNAL MEDICINE

## 2022-09-06 PROCEDURE — 99222 1ST HOSP IP/OBS MODERATE 55: CPT | Mod: ,,, | Performed by: PODIATRIST

## 2022-09-06 PROCEDURE — 94660 CPAP INITIATION&MGMT: CPT

## 2022-09-06 PROCEDURE — 25000003 PHARM REV CODE 250: Performed by: INTERNAL MEDICINE

## 2022-09-06 PROCEDURE — 21000000 HC CCU ICU ROOM CHARGE

## 2022-09-06 PROCEDURE — 83735 ASSAY OF MAGNESIUM: CPT | Performed by: INTERNAL MEDICINE

## 2022-09-06 PROCEDURE — 63600175 PHARM REV CODE 636 W HCPCS: Performed by: INTERNAL MEDICINE

## 2022-09-06 PROCEDURE — 99900031 HC PATIENT EDUCATION (STAT)

## 2022-09-06 RX ADMIN — FAMOTIDINE 20 MG: 20 TABLET ORAL at 10:09

## 2022-09-06 RX ADMIN — CHLORHEXIDINE GLUCONATE 15 ML: 1.2 RINSE ORAL at 09:09

## 2022-09-06 RX ADMIN — GABAPENTIN 600 MG: 300 CAPSULE ORAL at 10:09

## 2022-09-06 RX ADMIN — CARVEDILOL 3.12 MG: 3.12 TABLET, FILM COATED ORAL at 08:09

## 2022-09-06 RX ADMIN — ATORVASTATIN CALCIUM 10 MG: 10 TABLET, FILM COATED ORAL at 10:09

## 2022-09-06 RX ADMIN — TRAMADOL HYDROCHLORIDE 50 MG: 50 TABLET, COATED ORAL at 08:09

## 2022-09-06 RX ADMIN — TRAMADOL HYDROCHLORIDE 50 MG: 50 TABLET, COATED ORAL at 10:09

## 2022-09-06 RX ADMIN — GABAPENTIN 600 MG: 300 CAPSULE ORAL at 08:09

## 2022-09-06 RX ADMIN — PANTOPRAZOLE SODIUM 40 MG: 40 TABLET, DELAYED RELEASE ORAL at 06:09

## 2022-09-06 RX ADMIN — ASPIRIN 81 MG CHEWABLE TABLET 81 MG: 81 TABLET CHEWABLE at 10:09

## 2022-09-06 RX ADMIN — MUPIROCIN 1 G: 20 OINTMENT TOPICAL at 09:09

## 2022-09-06 RX ADMIN — CEFTRIAXONE 2 G: 2 INJECTION, SOLUTION INTRAVENOUS at 03:09

## 2022-09-06 NOTE — PLAN OF CARE
Pt resting. No c/o pain or SOB. Dressing to right bottom foot is clean, dry, and intact. No complaints at this time.

## 2022-09-06 NOTE — CARE UPDATE
09/06/22 0722   Patient Assessment/Suction   Level of Consciousness (AVPU) alert   Respiratory Effort Normal;Unlabored   Expansion/Accessory Muscles/Retractions expansion symmetric   All Lung Fields Breath Sounds clear   Rhythm/Pattern, Respiratory unlabored   PRE-TX-O2   O2 Device (Oxygen Therapy) room air   SpO2 98 %   Pulse Oximetry Type Continuous   $ Pulse Oximetry - Multiple Charge Pulse Oximetry - Multiple   Preset CPAP/BiPAP Settings   Mode Of Delivery Standby   $ CPAP/BiPAP Daily Charge BiPAP/CPAP Daily   Education   $ Education BiPAP;15 min   Respiratory Evaluation   $ Care Plan Tech Time 15 min

## 2022-09-06 NOTE — CONSULTS
"[facility]  Podiatry  Consult Note    Patient Name: Ana Bullard  MRN: [id number]  Admission Date: 9/1/2022  Hospital Length of Stay: 5 days  Attending Physician: Patrick Carvajal MD  Primary Care Provider: YRN Pollard     Inpatient consult to Podiatry  Consult performed by: Tong Heaton DPM  Consult ordered by: Kayce Arthur MD      Subjective:  Long standing ulcer plantar 5th metatarsal head right foot     History of Present Illness:  Diabetic with history of ulceration right 5th metatarsal has not been seen in the office recently for debridement and offloading.  Admitted with infection of the heart possibly related to the foot.    Scheduled Meds:   amiodarone  200 mg Oral Daily    aspirin  81 mg Oral Daily    atorvastatin  10 mg Oral Daily    carvediloL  3.125 mg Oral BID    cefTRIAXone (ROCEPHIN) IVPB  2 g Intravenous Q24H    chlorhexidine  15 mL Mouth/Throat BID    enoxparin  40 mg Subcutaneous Daily    famotidine  20 mg Oral Daily    gabapentin  600 mg Oral BID    insulin detemir U-100  25 Units Subcutaneous BID    mupirocin   Nasal BID    pantoprazole  40 mg Oral Before breakfast     Continuous Infusions:  PRN Meds:acetaminophen, albuterol sulfate, calcium chloride IVPB, calcium chloride IVPB, calcium chloride IVPB, dextrose 10%, dextrose 10%, insulin aspart U-100, magnesium oxide, magnesium sulfate IVPB, magnesium sulfate IVPB, magnesium sulfate IVPB, magnesium sulfate IVPB, potassium chloride, potassium chloride, potassium chloride, potassium chloride, sodium chloride 0.9%, sodium phosphate IVPB, sodium phosphate IVPB, sodium phosphate IVPB, sodium phosphate IVPB, sodium phosphate IVPB, traMADoL    Review of patient's allergies indicates:   Allergen Reactions    Vasopressin Anaphylaxis and Other (See Comments)     Increased pressure in his head; felt like his eyeballs and veins were going to pop out of his head.     Heparin Other (See Comments)     Other reaction(s): "depleted " "my blood platets"    Decreased platelet count    Heparin analogues Other (See Comments)     Depleted WBC count    Vasopressin analogues Other (See Comments)     "felt like eyes going to pop out of my head"    Morphine Hallucinations, Other (See Comments) and Anxiety     Other reaction(s): Hallucinations  Paranoid, hallucinations          Past Medical History:   Diagnosis Date    AICD (automatic cardioverter/defibrillator) present     Anemia, chronic disease 7/27/2021    Anemia, unspecified 7/27/2021    Anxiety and depression 2012    CAD (coronary artery disease) 2012    Cardiomegaly 2016    CHF (congestive heart failure) 2012    Cholecystitis 2017    Complete heart block 2012    Diabetic foot ulcer     DVT (deep venous thrombosis) 2012    And pulmonary embolus    GERD (gastroesophageal reflux disease) 2010    Heparin induced thrombocytopenia     Hyperlipemia 2011    IDDM (insulin dependent diabetes mellitus) 1983    With neuropathy    Ischemic cardiomyopathy 2012    MI (myocardial infarction) 2012    Morbid obesity     MRSA (methicillin resistant Staphylococcus aureus) infection 01/19/2019    Noncompliance with therapeutic plan 2019    Normochromic normocytic anemia 7/27/2021    Osteomyelitis of right foot 01/2019    Pulmonary edema 2019    Respiratory failure 2019    Sepsis 01/2019    Due to cellulitis right leg    Sleep apnea 2013    Thrombocytopathy 2012    Venous stasis dermatitis of both lower extremities      Past Surgical History:   Procedure Laterality Date    CARDIAC PACEMAKER PLACEMENT  12/2012    CARDIAC PACEMAKER PLACEMENT  10/04/2018    battery replacement    CORONARY ARTERY BYPASS GRAFT  06/2012    ESOPHAGOGASTRODUODENOSCOPY  01/31/2017    SAMARA FILTER PLACEMENT  2012    vena cava    LUMBAR SYMPATHETIC NERVE BLOCK Right 8/22/2019    Procedure: BLOCK, NERVE, SYMPATHETIC, LUMBAR;  Surgeon: Tashi Good MD;  Location: [facility];  Service: Pain Management;  Laterality: Right;  RIGHT SYMPATHETIC NERVE " DOUG       Family History       Problem Relation (Age of Onset)    Arthritis Mother    Cancer Father    Diabetes Mother    Heart disease Father    Stroke Father          Tobacco Use    Smoking status: Former     Packs/day: 2.00     Years: 38.00     Pack years: 76.00     Types: Cigarettes     Quit date: 2012     Years since quitting: 10.6    Smokeless tobacco: Never   Substance and Sexual Activity    Alcohol use: No    Drug use: Not on file    Sexual activity: Never     Review of Systems  Objective:     Vital Signs (Most Recent):  Temp: 97.9 °F (36.6 °C) (09/06/22 1153)  Pulse: 73 (09/06/22 1153)  Resp: 18 (09/06/22 1153)  BP: 130/62 (09/06/22 1153)  SpO2: 98 % (09/06/22 1153) Vital Signs (24h Range):  Temp:  [96.5 °F (35.8 °C)-98.6 °F (37 °C)] 97.9 °F (36.6 °C)  Pulse:  [60-73] 73  Resp:  [18] 18  SpO2:  [95 %-100 %] 98 %  BP: ()/(52-62) 130/62     Weight: 126 kg (277 lb 12.5 oz)  Body mass index is 37.67 kg/m².    Foot Exam            Laboratory:  All pertinent labs reviewed within the last 24 hours.    Diagnostic Results:  I have reviewed all pertinent imaging results/findings within the past 24 hours.    Clinical Findings:  There was mild an ulceration at the plantar forefoot.    Assessment/Plan:  Diabetes with neuropathy and peripheral vascular disease with grade 2 ulcer plantar 5th metatarsal head right foot     Active Diagnoses:    Diagnosis Date Noted POA    PRINCIPAL PROBLEM:  Acute respiratory failure with hypoxia [J96.01] 09/01/2022 Yes    Group B streptococcal infection [A49.1] 09/06/2022 Unknown    Cellulitis of right leg [L03.115] 09/06/2022 Unknown    Pulmonary hypertension [I27.20] 09/02/2022 Yes    Bacteremia [R78.81] 01/20/2020 Yes    Acute renal failure superimposed on stage 3b chronic kidney disease [N17.9, N18.32] 01/19/2020 No    Chronic systolic heart failure [I50.22] 01/19/2020 Yes    AICD (automatic cardioverter/defibrillator) present [Z95.810] 01/19/2020 Yes    Chronic  Thrombocytopenia [D69.6] 01/01/2012 Yes    Type II diabetes mellitus with neurological manifestations [E11.49] 01/01/1983 Yes      Problems Resolved During this Admission:       Plan: There was no disposable blade on the floor asking the nurse to get me disposable 15 blade and have me set up to do a debridement at the bedside tomorrow between my surgeries.  That time I will do any deep cultures that are needed to evaluate for any purulent drainage.  No fluctuance was noted today no purulent drainage expressed today no lymphangitis no erythema.  CT of the foot showed minimal changes at the 5th metatarsal head which was not felt to be consistent with osteomyelitis.  I will review the three-phase bone scan later once is done.  I will do the debridement the bedside tomorrow unless there is concern of bone infection that I will arrange do bone biopsy tomorrow in the operating room.  I will follow while he is in hospital.    Thank you for your consult. I will follow-up with patient. Please contact us if you have any additional questions.    Tong Heaton DPM  Podiatry  [facility]

## 2022-09-06 NOTE — PROGRESS NOTES
Progress Note  Infectious Disease    Reason for Consult:  GPC bacteremia    HPI: Ana Bullard is a 62 y.o. male very pleasant, former heavy smoker, known to our service for admission in January 2020 for septic shock secondary to strep pneumo bacteremia, he has past medical history of CAD, CABG, AICD since 2012, battery replaced 2018, exchange in 2016, CHF (EF: 15%), HTN, HLD, PVD, CKD not on dialysis, FARHAN on BiPAP, diabetes, neuropathy, GERD, and prior history of diabetic right foot infection due to Proteus vulgaris, group a Streptococcus and MRSA in January 2019, with residual chronic foot ulcer for which he follows with Dr. Heaton/podiatry outpatient who performs debridements.    Patient came to the hospital after sudden onset of fever, chills, not feeling well since 8/31, wife at home measured temperature about 99.  He states he has been having on and off headache, currently resolved.  He has been having some productive cough, white/thin sputum, denies nausea or vomiting, no abdominal pain, no dysuria or increased urinary frequency, or change in bowel movements.    As per patient, he is scheduled to have a GABRIELLA outpatient by Dr. Roth on 09/16.    In the ER, hypotensive 100/58, T-max 103.3°,   Lab significant for leukocytosis of 16.2, left shift 91.7%, H&H 14.3/44.6, platelet count 101, thrombocytopenia   AD creatinine 2.5,   Bilirubin 1.6   AST 36/ALT 48   CRP 3.6      Lactic 1.7, procalcitonin 0.12     Patient admitted to ICU for acute respiratory failure likely secondary to COPD exacerbation in addition to acute on chronic CHF, EF 15%.  Placed on BiPAP.  Started on IV steroids.  Empirically started on cefepime and vancomycin, switched to ceftriaxone 9/1.    Hospital course complicated by 4/4 bottles from admission growing GPC, pending ID and sensitivities.    ID consult for GPC.  [facility] contacted, looks like Strep, results will be updated and within an hour.    09/03/2022.  On admission he  "had a temperature of 102.7°.  He has been afebrile since.  WBC trend 16--17.9--9.5.  GABRIELLA for positive are V wire thrombus versus endocarditis.    09/04/2022.  Patient is resting comfortably.  We are waiting for Tuesday to discuss with patient's cardiologist, about need for AICD removal    09/05/2022 no new events, awaiting cardiologist to discuss tomorrow . Right medial shin looks ok      Antibiotics (From admission, onward)      Start     Stop Route Frequency Ordered    09/01/22 2100  mupirocin 2 % ointment  (MRSA Decolonization Orders [facility])         09/06 2059 Nasl 2 times daily 09/01/22 1611    09/01/22 1500  cefTRIAXone (ROCEPHIN) 2 g/50 mL D5W IVPB         -- IV Every 24 hours (non-standard times) 09/01/22 1404          Antifungals (From admission, onward)      None          Antivirals (From admission, onward)      None            Review of patient's allergies indicates:   Allergen Reactions    Vasopressin Anaphylaxis and Other (See Comments)     Increased pressure in his head; felt like his eyeballs and veins were going to pop out of his head.     Heparin Other (See Comments)     Other reaction(s): "depleted my blood platets"    Decreased platelet count    Heparin analogues Other (See Comments)     Depleted WBC count    Vasopressin analogues Other (See Comments)     "felt like eyes going to pop out of my head"    Morphine Hallucinations, Other (See Comments) and Anxiety     Other reaction(s): Hallucinations  Paranoid, hallucinations       Past Medical History:   Diagnosis Date    AICD (automatic cardioverter/defibrillator) present     Anemia, chronic disease 7/27/2021    Anemia, unspecified 7/27/2021    Anxiety and depression 2012    CAD (coronary artery disease) 2012    Cardiomegaly 2016    CHF (congestive heart failure) 2012    Cholecystitis 2017    Complete heart block 2012    Diabetic foot ulcer     DVT (deep venous thrombosis) 2012    And pulmonary embolus    GERD (gastroesophageal reflux " disease) 2010    Heparin induced thrombocytopenia     Hyperlipemia 2011    IDDM (insulin dependent diabetes mellitus) 1983    With neuropathy    Ischemic cardiomyopathy 2012    MI (myocardial infarction) 2012    Morbid obesity     MRSA (methicillin resistant Staphylococcus aureus) infection 01/19/2019    Noncompliance with therapeutic plan 2019    Normochromic normocytic anemia 7/27/2021    Osteomyelitis of right foot 01/2019    Pulmonary edema 2019    Respiratory failure 2019    Sepsis 01/2019    Due to cellulitis right leg    Sleep apnea 2013    Thrombocytopathy 2012    Venous stasis dermatitis of both lower extremities      Past Surgical History:   Procedure Laterality Date    CARDIAC PACEMAKER PLACEMENT  12/2012    CARDIAC PACEMAKER PLACEMENT  10/04/2018    battery replacement    CORONARY ARTERY BYPASS GRAFT  06/2012    ESOPHAGOGASTRODUODENOSCOPY  01/31/2017    SAMARA FILTER PLACEMENT  2012    vena cava    LUMBAR SYMPATHETIC NERVE BLOCK Right 8/22/2019    Procedure: BLOCK, NERVE, SYMPATHETIC, LUMBAR;  Surgeon: Tashi Good MD;  Location: [facility];  Service: Pain Management;  Laterality: Right;  RIGHT SYMPATHETIC NERVE BLOCK     Social History     Tobacco Use    Smoking status: Former     Packs/day: 2.00     Years: 38.00     Pack years: 76.00     Types: Cigarettes     Quit date: 2012     Years since quitting: 10.6    Smokeless tobacco: Never   Substance Use Topics    Alcohol use: No        Family History   Problem Relation Age of Onset    Arthritis Mother     Diabetes Mother     Cancer Father     Heart disease Father     Stroke Father          Review of Systems:   +chills, fever- resolved  No change in vision, loss of vision or diplopia  No sinus congestion, purulent nasal discharge, post nasal drip or facial pain  No pain in mouth or throat. No problems with teeth, gums.  No chest pain, palpitations, syncope  Less phlegm, dyspnea on exertion and at rest, no hemoptysis or  pleurisy  No dysphagia, odynophagia  No nausea, vomiting, diarrhea, constipation, no blood in stool, or focal abd pain,    No dysuria, hesitancy, hematuria, retention, incontinence  Right foot chronic plantar ulcer at 5th metatarsal  No swelling of joints, redness of joints, injuries, or new focal pain  No unusual headaches, dizziness, vertigo, numbness, paresthesias, neuropathy, falls  No anxiety, depression, substance abuse, sleep disturbance  No bleeding, lymphadenopathy  No new rashes, lesions, or wounds    Outdoor activities:  Lives at home with wife, works on his boat weekly, no recent contact with water/brackish water.  Former smoker, former heavy drinker, no drugs. Last colonoscopy 10 years ago.  Travel:  None  Implants:  AICD since 20/12, battery replaced 2018  Antibiotic History:  Vancomycin/cefepime, switched to ceftriaxone 9/1    EXAM & DIAGNOSTICS REVIEWED:   Vitals:     Temp:  [96.5 °F (35.8 °C)-98.2 °F (36.8 °C)]   Temp: 98.2 °F (36.8 °C) (09/05/22 2312)  Pulse: 67 (09/05/22 2312)  Resp: 18 (09/05/22 2159)  BP: (!) 115/55 (09/05/22 2312)  SpO2: 95 % (09/05/22 2312)    Intake/Output Summary (Last 24 hours) at 9/5/2022 2316  Last data filed at 9/5/2022 1450  Gross per 24 hour   Intake 840 ml   Output --   Net 840 ml       General:  In NAD. Alert and attentive, cooperative, comfortable on room air  Eyes:  Anicteric,   ENT:  No ulcers, exudates, thrush, nares patent, dentures upper, edentulous lower  Neck:  Supple, no adenopathy appreciated  Lungs: Clear to auscultation b/l  Heart:  S1/S2+, regular rhythm, systolic murmur   L AICD in place, no redness noted, non tender to palpation  Abd:  Obese, +BS, soft, non tender, non distended, no rebound  :  Voids, urine clear, no flank tenderness  Musc:  Except for R foot, Joints without effusion, swelling,  erythema, synovitis, ambulatory  Skin:  Warm, no rash  Wound:   Neuro:  Following commands, neuropathy  Psych:  Calm, cooperative  Lymphatic:     No  cervical, supraclavicular nodes  Extrem: Right foot with chronic non-healing ulcer, probes 3mm deep, no evidence of purulent drainage  B/l venous stasis  No LE edema b/l  VAD:  Peripheral IV       Isolation: None  09/02 9/1:        General Labs reviewed:  Recent Labs   Lab 09/02/22 0439 09/03/22 0408 09/04/22  0352   WBC 17.91* 9.50 8.78   HGB 13.2* 11.7* 12.2*   HCT 41.2 36.7* 38.5*   PLT 90* 64* 71*       Recent Labs   Lab 09/01/22  0153 09/02/22  0439 09/03/22  0408 09/04/22  0352    137 134* 137   K 5.0 4.5 4.0 4.5   CL 99 99 99 100   CO2 27 27 25 27   BUN 46* 59* 66* 60*   CREATININE 2.1* 2.5* 2.3* 2.2*   CALCIUM 9.4 8.8 8.3* 8.8   PROT 8.6*  --   --   --    BILITOT 1.6*  --   --   --    ALKPHOS 80  --   --   --    ALT 48*  --   --   --    AST 36  --   --   --      Recent Labs   Lab 09/01/22  0153   CRP 3.61*     No results for input(s): SEDRATE in the last 168 hours.    Estimated Creatinine Clearance: 48.5 mL/min (A) (based on SCr of 2.2 mg/dL (H)).     Prior micro  Blood cultures 06/09/2020 Streptococcus pyogenes group A  Blood cultures 1/18/20 Streptococcus pneumoniae     Micro:  Microbiology Results (last 7 days)       Procedure Component Value Units Date/Time    Blood culture [[id number]] Collected: 09/02/22 1550    Order Status: Completed Specimen: Blood Updated: 09/05/22 1632     Blood Culture, Routine No Growth to date      No Growth to date      No Growth to date      No Growth to date    Narrative:      Collection has been rescheduled by [email or web address] at 09/02/2022 10:15 Reason:   Unable to collect  Collection has been rescheduled by [email or web address] at 09/02/2022 10:15 Reason:   Unable to collect    Blood culture [[id number]] Collected: 09/02/22 1014    Order Status: Completed Specimen: Blood Updated: 09/05/22 1232     Blood Culture, Routine No Growth to date      No Growth to date      No Growth to date      No Growth to date    Culture, Respiratory with Gram Stain [[id number]] Collected: 09/02/22 1205     Order Status: Completed Specimen: Respiratory from Sputum Updated: 09/04/22 0815     Respiratory Culture Normal respiratory [place]     Gram Stain (Respiratory) <10 epithelial cells per low power field.     Gram Stain (Respiratory) Few WBC's     Gram Stain (Respiratory) Few Gram positive cocci     Gram Stain (Respiratory) Few Gram positive rods    Blood culture x two cultures. Draw prior to antibiotics. [[id number]]  (Abnormal) Collected: 09/01/22 0155    Order Status: Completed Specimen: Blood from Peripheral, Hand, Right Updated: 09/02/22 1214     Blood Culture, Routine Gram stain aer bottle: Gram positive cocci      Gram stain mayte bottle: Gram positive cocci      Results called to and read back by:David Berumen RN-ED;  09/01/2022  13:01      CJD      STREPTOCOCCUS AGALACTIAE (GROUP B)  Beta-hemolytic streptococci are routinely susceptible to   penicillins,cephalosporins and carbapenems.      Narrative:      Aerobic and anaerobic    Blood culture x two cultures. Draw prior to antibiotics. [[id number]]  (Abnormal) Collected: 09/01/22 0152    Order Status: Completed Specimen: Blood from Peripheral, Antecubital, Right Updated: 09/02/22 1212     Blood Culture, Routine Gram stain aer bottle: Gram positive cocci      Gram stain mayte bottle: Gram positive cocci      Results called to and read back by:David Berumen RN-ED;  09/01/2022  13:00      CJD      STREPTOCOCCUS AGALACTIAE (GROUP B)  Beta-hemolytic streptococci are routinely susceptible to   penicillins,cephalosporins and carbapenems.      Narrative:      Aerobic and anaerobic    Aerobic culture [[id number]]     Order Status: No result Specimen: Wound             Imaging Reviewed:  Chest x-rayDiffuse interstitial prominence throughout both lungs, nonspecific but suggesting viral or atypical pneumonia given clinical history.     Cardiology: Last ECHO 1/20/20  Eccentric left ventricular hypertrophy.  Severely decreased left ventricular systolic function. The estimated ejection  fraction is 25%.  Grade III (severe) left ventricular diastolic dysfunction consistent with restrictive physiology.  Moderate left atrial enlargement.  Mild mitral regurgitation.  Mild tricuspid regurgitation.  Intermediate central venous pressure (8 mmHg).  The estimated PA systolic pressure is 53 mmHg.  Pulmonary hypertension present.          IMPRESSION & PLAN     Sepsis due to GBStreptococcus bacteremia, right ventricle the ICD lead infection, endocarditis.   CRP 3.2    2.  AD, creatinine 2.5    3.  R foot chronic non-healing ulcer-- must be addressed otherwise he will get new infection    4.  PMHx: CAD, CABG, AICD since 2012, CHF (EF: 15%), HTN, HLD, PVD, CKD not on dialysis, FARHAN on BiPAP, diabetes, neuropathy, GERD    Recommendations:  Continue Ceftriaxone 2 g IV daily     Will discuss with cardiologist in a.m. about removal of AICD:    It seems patient can not  make do without AICD/ PPM---- We may need to hold keep it?    Podiatrist for right foot ulcer  Needs to be up-to-date with colonoscopies        Medical Decision Making during this encounter was  [_] Low Complexity  [_] Moderate Complexity  [xx] High Complexity

## 2022-09-06 NOTE — CONSULTS
Identifying data:  62-year-old male    Chief complaint:  Nonhealing ulcer plantar 5th metatarsal head right foot     History of present illness:  Patient diabetic known to me with ulceration plantar 5th metatarsal head that has been treated outpatient with debridement offloading with the incomplete healing.  In the past he is had no bone infection no purulent drainage or any fluctuance needed to be cultured.  And there is hospital with septicemia redness in the leg that they may be related to the ulcer.    Objective:  Vascular:  DP 0/4  PT 1/4 right CFT 2 seconds  Neurological:  Decreased sensation foot   Integument:  Grade 2 ulcer with dry hemorrhagic blood no fluctuance no erythema no drainage plantar 5th metatarsal proximally 2 cm in diameter  Musculoskeletal:  No Charcot deformity seen    Assessment:  Nonhealing diabetic ulceration plantar 5th metatarsal head with neuropathy and peripheral vascular disease rule out osteomyelitis    Plan:  Evaluated patient there was no instrumentation on the floor for me do a debridement there was no disposable scalpel blade.  Asked the nurses to have a disposable blade for me to return tomorrow to do debridement at the bedside any cultures if needed.  I will review three-phase bone scan and other tests once done.  If bone biopsy is needed then will discuss with patient and with Infectious Disease.  Once I debrided I will order wound care.

## 2022-09-06 NOTE — PROGRESS NOTES
Progress Note  Infectious Disease    Reason for Consult:  GPC bacteremia    HPI: Ana Bullard is a 62 y.o. male very pleasant, former heavy smoker, known to our service for admission in January 2020 for septic shock secondary to strep pneumo bacteremia, he has past medical history of CAD, CABG, AICD since 2012, battery replaced 2018, exchange in 2016, CHF (EF: 15%), HTN, HLD, PVD, CKD not on dialysis, FARHAN on BiPAP, diabetes, neuropathy, GERD, and prior history of diabetic right foot infection due to Proteus vulgaris, group a Streptococcus and MRSA in January 2019, with residual chronic foot ulcer for which he follows with Dr. Heaton/podiatry outpatient who performs debridements.    Patient came to the hospital after sudden onset of fever, chills, not feeling well since 8/31, wife at home measured temperature about 99.  He states he has been having on and off headache, currently resolved.  He has been having some productive cough, white/thin sputum, denies nausea or vomiting, no abdominal pain, no dysuria or increased urinary frequency, or change in bowel movements.    As per patient, he is scheduled to have a GABRIELLA outpatient by Dr. Roth on 09/16.    In the ER, hypotensive 100/58, T-max 103.3°,   Lab significant for leukocytosis of 16.2, left shift 91.7%, H&H 14.3/44.6, platelet count 101, thrombocytopenia   AD creatinine 2.5,   Bilirubin 1.6   AST 36/ALT 48   CRP 3.6      Lactic 1.7, procalcitonin 0.12     Patient admitted to ICU for acute respiratory failure likely secondary to COPD exacerbation in addition to acute on chronic CHF, EF 15%.  Placed on BiPAP.  Started on IV steroids.  Empirically started on cefepime and vancomycin, switched to ceftriaxone 9/1.    Hospital course complicated by 4/4 bottles from admission growing GPC, pending ID and sensitivities.    ID consult for GPC.  Micro tobin contacted, looks like Strep, results will be updated and within an hour.    09/03/2022.  On admission he  had a temperature of 102.7°.  He has been afebrile since.  WBC trend 16--17.9--9.5.  GABRIELLA for positive are V wire thrombus versus endocarditis.    09/04/2022.  Patient is resting comfortably.  We are waiting for Tuesday to discuss with patient's cardiologist, about need for AICD removal    09/05/2022 no new events, awaiting cardiologist to discuss tomorrow . Right medial shin looks ok    9/6 (Miriam):  Consult reviewed and discussed with Dr. Arthur.  Official GABRIELLA report not yet posted. 3 phase bone scan in progress.  Reviewing history and physical exam he has the signs of a cellulitis on his right medial leg, proximally near the knee.  There is no lymphangitis.  He does endorse that his right medial calf was tender on admission, that the fever began prior to admission and the redness began approximately 1 day later.  This is consistent with group B strep cellulitis of the right leg.    Antibiotics (From admission, onward)      Start     Stop Route Frequency Ordered    09/01/22 2100  mupirocin 2 % ointment  (MRSA Decolonization Orders [facility])         09/06 2059 Nasl 2 times daily 09/01/22 1611    09/01/22 1500  cefTRIAXone (ROCEPHIN) 2 g/50 mL D5W IVPB         -- IV Every 24 hours (non-standard times) 09/01/22 1404          Implants:  AICD since 20/12, battery replaced 2018  Antibiotic History:  Vancomycin/cefepime, switched to ceftriaxone 9/1    EXAM & DIAGNOSTICS REVIEWED:   Vitals:     Temp:  [96.5 °F (35.8 °C)-98.6 °F (37 °C)]   Temp: 97.9 °F (36.6 °C) (09/06/22 1153)  Pulse: 73 (09/06/22 1153)  Resp: 18 (09/06/22 1153)  BP: 130/62 (09/06/22 1153)  SpO2: 98 % (09/06/22 1153)    Intake/Output Summary (Last 24 hours) at 9/6/2022 1305  Last data filed at 9/6/2022 0730  Gross per 24 hour   Intake 600 ml   Output --   Net 600 ml       General:  In NAD. Alert and attentive, cooperative, comfortable on room air  Eyes:  Anicteric,   ENT:  No ulcers, exudates, thrush, nares patent, dentures upper, broken carious teeth on the  lower  Neck:  Supple, no adenopathy appreciated  Lungs: Clear to auscultation b/l  Heart:  S1/S2+, regular rhythm, systolic murmur   L AICD in place, no redness noted, non tender to palpation  Abd:  Obese, +BS, soft, non tender, non distended, no rebound  :  Voids, urine clear, no flank tenderness  Musc:  Except for R foot, Joints without effusion, swelling,  erythema, synovitis, ambulatory  Skin:  Warm, advanced venous stasis hyperpigmentation, varicosities.  Evidence of recent cellulitis of the right medial calf and right medial knee.  No lymphangitis  Wound:   Neuro:  Following commands, neuropathy  Psych:  Calm, cooperative  Lymphatic:     No tender inguinal nodes  Extrem: Right foot with chronic non-healing ulcer, probes 3mm deep, no evidence of purulent drainage.  No cellulitis  , nontender  B/l venous stasis  No LE edema b/l  VAD:  Peripheral IV       Isolation: None  09/02 9/1:        General Labs reviewed:  Recent Labs   Lab 09/02/22 0439 09/03/22 0408 09/04/22  0352   WBC 17.91* 9.50 8.78   HGB 13.2* 11.7* 12.2*   HCT 41.2 36.7* 38.5*   PLT 90* 64* 71*       Recent Labs   Lab 09/01/22 0153 09/02/22 0439 09/03/22 0408 09/04/22  0352    137 134* 137   K 5.0 4.5 4.0 4.5   CL 99 99 99 100   CO2 27 27 25 27   BUN 46* 59* 66* 60*   CREATININE 2.1* 2.5* 2.3* 2.2*   CALCIUM 9.4 8.8 8.3* 8.8   PROT 8.6*  --   --   --    BILITOT 1.6*  --   --   --    ALKPHOS 80  --   --   --    ALT 48*  --   --   --    AST 36  --   --   --      Recent Labs   Lab 09/01/22 0153   CRP 3.61*     No results for input(s): SEDRATE in the last 168 hours.    Estimated Creatinine Clearance: 49 mL/min (A) (based on SCr of 2.2 mg/dL (H)).     Prior micro  Blood cultures 06/09/2020 Streptococcus pyogenes group A  Blood cultures 1/18/20 Streptococcus pneumoniae     Micro:  Microbiology Results (last 7 days)       Procedure Component Value Units Date/Time    Blood culture [[id number]] Collected: 09/02/22 1014    Order Status:  Completed Specimen: Blood Updated: 09/06/22 1232     Blood Culture, Routine No Growth to date      No Growth to date      No Growth to date      No Growth to date      No Growth to date    Blood culture [[id number]] Collected: 09/02/22 1550    Order Status: Completed Specimen: Blood Updated: 09/05/22 1632     Blood Culture, Routine No Growth to date      No Growth to date      No Growth to date      No Growth to date    Narrative:      Collection has been rescheduled by [email or web address] at 09/02/2022 10:15 Reason:   Unable to collect  Collection has been rescheduled by ALD1 at 09/02/2022 10:15 Reason:   Unable to collect    Culture, Respiratory with Gram Stain [[id number]] Collected: 09/02/22 1208    Order Status: Completed Specimen: Respiratory from Sputum Updated: 09/04/22 0815     Respiratory Culture Normal respiratory [place]     Gram Stain (Respiratory) <10 epithelial cells per low power field.     Gram Stain (Respiratory) Few WBC's     Gram Stain (Respiratory) Few Gram positive cocci     Gram Stain (Respiratory) Few Gram positive rods    Blood culture x two cultures. Draw prior to antibiotics. [[id number]]  (Abnormal) Collected: 09/01/22 0155    Order Status: Completed Specimen: Blood from Peripheral, Hand, Right Updated: 09/02/22 1214     Blood Culture, Routine Gram stain aer bottle: Gram positive cocci      Gram stain mayte bottle: Gram positive cocci      Results called to and read back by:David Berumen RN-ED;  09/01/2022  13:01      CJD      STREPTOCOCCUS AGALACTIAE (GROUP B)  Beta-hemolytic streptococci are routinely susceptible to   penicillins,cephalosporins and carbapenems.      Narrative:      Aerobic and anaerobic    Blood culture x two cultures. Draw prior to antibiotics. [[id number]]  (Abnormal) Collected: 09/01/22 0152    Order Status: Completed Specimen: Blood from Peripheral, Antecubital, Right Updated: 09/02/22 1212     Blood Culture, Routine Gram stain aer bottle: Gram positive cocci      Gram stain mayte bottle:  Gram positive cocci      Results called to and read back by:David Berumen RN-ED;  09/01/2022  13:00      CJD      STREPTOCOCCUS AGALACTIAE (GROUP B)  Beta-hemolytic streptococci are routinely susceptible to   penicillins,cephalosporins and carbapenems.      Narrative:      Aerobic and anaerobic    Aerobic culture [[id number]]     Order Status: No result Specimen: Wound             Imaging Reviewed:  Chest x-rayDiffuse interstitial prominence throughout both lungs, nonspecific but suggesting viral or atypical pneumonia given clinical history.     Cardiology: Last ECHO 1/20/20  Eccentric left ventricular hypertrophy.  Severely decreased left ventricular systolic function. The estimated ejection fraction is 25%.  Grade III (severe) left ventricular diastolic dysfunction consistent with restrictive physiology.  Moderate left atrial enlargement.  Mild mitral regurgitation.  Mild tricuspid regurgitation.  Intermediate central venous pressure (8 mmHg).  The estimated PA systolic pressure is 53 mmHg.  Pulmonary hypertension present.    9/3 GABRIELLA:  EF 30- global hypo   MV- mildly thickened, 1-2+ MR inflow patterns c/w stage 2 DD   AV- mild sclerosis, no AI. AS   TV ok moderate TR rvsp 50   PV ok mild/moderate PI   Ivc full   All three wires well viewed from subclavian vein to distal insertion sites. The RV wire appears to have a mobile structure 4 mm in RA segment, other wires ok. Considering GPC, have to assume endocarditis but small thrombus still a possibility.          IMPRESSION & PLAN     Sepsis due to GBStreptococcus bacteremia, right ventricle the ICD lead infection, endocarditis? Or bacteremic from his foot ulcer/cellulitis the right leg?.   CRP 3.2    2.  AD, creatinine 2.5    3.  R foot chronic non-healing ulcer--     4.  PMHx: CAD, CABG, AICD since 2012, CHF (EF: 15%), HTN, HLD, PVD, CKD not on dialysis, FARHAN on BiPAP, diabetes, neuropathy, GERD    Recommendations:  Continue Ceftriaxone 2 g IV daily    At this time I  think we can retain the AICD.  I will, however, treat him for several weeks and then do serial blood cultures.    Triple phase bone scan right foot in progress  Podiatry consult for right foot ulcer in progress  Needs to be up-to-date with colonoscopies    Discussed with Cardiology NP    Medical Decision Making during this encounter was  [_] Low Complexity  [_] Moderate Complexity  [xx] High Complexity

## 2022-09-06 NOTE — CARE UPDATE
09/05/22 2052   PRE-TX-O2   O2 Device (Oxygen Therapy) room air   $ Is the patient on Low Flow Oxygen? Yes   SpO2 98 %   Pulse Oximetry Type Continuous   $ Pulse Oximetry - Multiple Charge Pulse Oximetry - Multiple   Pulse 67   Preset CPAP/BiPAP Settings   Mode Of Delivery BiPAP;Standby   $ CPAP/BiPAP Daily Charge BiPAP/CPAP Daily   Education   $ Education BiPAP;15 min   Respiratory Evaluation   $ Care Plan Tech Time 15 min

## 2022-09-07 LAB
BACTERIA BLD CULT: NORMAL
BACTERIA BLD CULT: NORMAL
GLUCOSE SERPL-MCNC: 107 MG/DL (ref 70–110)
GLUCOSE SERPL-MCNC: 133 MG/DL (ref 70–110)
GLUCOSE SERPL-MCNC: 188 MG/DL (ref 70–110)
GLUCOSE SERPL-MCNC: 201 MG/DL (ref 70–110)
MAGNESIUM SERPL-MCNC: 2.2 MG/DL (ref 1.6–2.6)

## 2022-09-07 PROCEDURE — 63600175 PHARM REV CODE 636 W HCPCS: Performed by: INTERNAL MEDICINE

## 2022-09-07 PROCEDURE — 87118 MYCOBACTERIC IDENTIFICATION: CPT | Performed by: PODIATRIST

## 2022-09-07 PROCEDURE — 25000003 PHARM REV CODE 250: Performed by: INTERNAL MEDICINE

## 2022-09-07 PROCEDURE — 99232 SBSQ HOSP IP/OBS MODERATE 35: CPT | Mod: ,,, | Performed by: INTERNAL MEDICINE

## 2022-09-07 PROCEDURE — 99900031 HC PATIENT EDUCATION (STAT)

## 2022-09-07 PROCEDURE — 99232 PR SUBSEQUENT HOSPITAL CARE,LEVL II: ICD-10-PCS | Mod: ,,, | Performed by: INTERNAL MEDICINE

## 2022-09-07 PROCEDURE — 94761 N-INVAS EAR/PLS OXIMETRY MLT: CPT

## 2022-09-07 PROCEDURE — 87206 SMEAR FLUORESCENT/ACID STAI: CPT | Performed by: PODIATRIST

## 2022-09-07 PROCEDURE — 36415 COLL VENOUS BLD VENIPUNCTURE: CPT | Performed by: INTERNAL MEDICINE

## 2022-09-07 PROCEDURE — 87186 SC STD MICRODIL/AGAR DIL: CPT | Performed by: PODIATRIST

## 2022-09-07 PROCEDURE — 87116 MYCOBACTERIA CULTURE: CPT | Performed by: PODIATRIST

## 2022-09-07 PROCEDURE — 36569 INSJ PICC 5 YR+ W/O IMAGING: CPT

## 2022-09-07 PROCEDURE — 99900035 HC TECH TIME PER 15 MIN (STAT)

## 2022-09-07 PROCEDURE — 21000000 HC CCU ICU ROOM CHARGE

## 2022-09-07 PROCEDURE — 83735 ASSAY OF MAGNESIUM: CPT | Performed by: INTERNAL MEDICINE

## 2022-09-07 PROCEDURE — 87077 CULTURE AEROBIC IDENTIFY: CPT | Performed by: PODIATRIST

## 2022-09-07 PROCEDURE — 87070 CULTURE OTHR SPECIMN AEROBIC: CPT | Performed by: PODIATRIST

## 2022-09-07 PROCEDURE — 94660 CPAP INITIATION&MGMT: CPT

## 2022-09-07 PROCEDURE — 87075 CULTR BACTERIA EXCEPT BLOOD: CPT | Performed by: PODIATRIST

## 2022-09-07 RX ADMIN — CEFTRIAXONE 2 G: 2 INJECTION, SOLUTION INTRAVENOUS at 03:09

## 2022-09-07 RX ADMIN — AMIODARONE HYDROCHLORIDE 200 MG: 200 TABLET ORAL at 09:09

## 2022-09-07 RX ADMIN — TRAMADOL HYDROCHLORIDE 50 MG: 50 TABLET, COATED ORAL at 08:09

## 2022-09-07 RX ADMIN — ASPIRIN 81 MG CHEWABLE TABLET 81 MG: 81 TABLET CHEWABLE at 09:09

## 2022-09-07 RX ADMIN — TRAMADOL HYDROCHLORIDE 50 MG: 50 TABLET, COATED ORAL at 09:09

## 2022-09-07 RX ADMIN — GABAPENTIN 600 MG: 300 CAPSULE ORAL at 09:09

## 2022-09-07 RX ADMIN — FAMOTIDINE 20 MG: 20 TABLET ORAL at 09:09

## 2022-09-07 RX ADMIN — GABAPENTIN 600 MG: 300 CAPSULE ORAL at 08:09

## 2022-09-07 RX ADMIN — ATORVASTATIN CALCIUM 10 MG: 10 TABLET, FILM COATED ORAL at 09:09

## 2022-09-07 RX ADMIN — CARVEDILOL 3.12 MG: 3.12 TABLET, FILM COATED ORAL at 09:09

## 2022-09-07 RX ADMIN — PANTOPRAZOLE SODIUM 40 MG: 40 TABLET, DELAYED RELEASE ORAL at 05:09

## 2022-09-07 RX ADMIN — CARVEDILOL 3.12 MG: 3.12 TABLET, FILM COATED ORAL at 08:09

## 2022-09-07 NOTE — PROGRESS NOTES
[facility]  Progress Note    Patient name: Ana Bullard  MRN: [id number]  Admit Date: 9/1/2022   LOS: 5 days   DOS: 09/06/2022    SUBJECTIVE:     Principal problem: Acute respiratory failure with hypoxia    Interval History:  Patient seen and examined.  Patient reports shortness of breath has improved with medical treatment, currently resolved at rest.  Minimal cough.  Eating okay without nausea or vomiting.  Mobilizing independently.  Was seen by Podiatry today, planning bedside debridement in the near future.  Patient does not have significant pain of foot.    Hospital course:   62-year-old  male with multiple medical comorbidities including but not limited to chronic combined CHF with last known LVEF of 25%, status post AICD, chronic thrombocytopenia, CKD stage 3b, type 2 diabetes mellitus admitted after presenting with shortness of breath.      Acute hypoxic respiratory failure likely secondary to bilateral pneumonia.  Further hospital workup notable for bacteremia secondary to Streptococcus agalactiae.  Initially on broad-spectrum antibiotics and eventually transition to ceftriaxone.  GABRIELLA revealed possibility of RV wire endocarditis versus thrombus.  Seen by Infectious Disease who recommended removal of the ICD wire.  Patient would like to discuss with Dr. Roth (primary cardiologist) before deciding on long-term antibiotics versus ICD wire removal.      Scheduled Meds:   amiodarone  200 mg Oral Daily    aspirin  81 mg Oral Daily    atorvastatin  10 mg Oral Daily    carvediloL  3.125 mg Oral BID    cefTRIAXone (ROCEPHIN) IVPB  2 g Intravenous Q24H    enoxparin  40 mg Subcutaneous Daily    famotidine  20 mg Oral Daily    gabapentin  600 mg Oral BID    insulin detemir U-100  25 Units Subcutaneous BID    pantoprazole  40 mg Oral Before breakfast     Continuous Infusions:      PRN Meds:acetaminophen, albuterol sulfate, calcium chloride IVPB, calcium chloride IVPB,  "calcium chloride IVPB, dextrose 10%, dextrose 10%, insulin aspart U-100, magnesium oxide, magnesium sulfate IVPB, magnesium sulfate IVPB, magnesium sulfate IVPB, magnesium sulfate IVPB, potassium chloride, potassium chloride, potassium chloride, potassium chloride, sodium chloride 0.9%, sodium phosphate IVPB, sodium phosphate IVPB, sodium phosphate IVPB, sodium phosphate IVPB, sodium phosphate IVPB, traMADoL    Review of patient's allergies indicates:   Allergen Reactions    Vasopressin Anaphylaxis and Other (See Comments)     Increased pressure in his head; felt like his eyeballs and veins were going to pop out of his head.     Heparin Other (See Comments)     Other reaction(s): "depleted my blood platets"    Decreased platelet count    Heparin analogues Other (See Comments)     Depleted WBC count    Vasopressin analogues Other (See Comments)     "felt like eyes going to pop out of my head"    Morphine Hallucinations, Other (See Comments) and Anxiety     Other reaction(s): Hallucinations  Paranoid, hallucinations         Review of Systems: As per interval history    OBJECTIVE:     Vital Signs (Most Recent)  Temp: 97.4 °F (36.3 °C) (09/06/22 1925)  Pulse: 76 (09/06/22 1925)  Resp: 18 (09/06/22 2044)  BP: (!) 142/67 (09/06/22 1925)  SpO2: 95 % (09/06/22 1925)    Vital Signs Range (Last 24H):  Temp:  [97.4 °F (36.3 °C)-98.6 °F (37 °C)]   Pulse:  [60-77]   Resp:  [18]   BP: (100-142)/(55-69)   SpO2:  [95 %-99 %]     I & O (Last 24H):  Intake/Output Summary (Last 24 hours) at 9/6/2022 2127  Last data filed at 9/6/2022 1638  Gross per 24 hour   Intake 360 ml   Output --   Net 360 ml         Physical Exam:  General: Patient resting comfortably in no acute distress. Appears as stated age. Calm  Eyes: No conjunctival injection. No scleral icterus.  ENT:  Moist mucous membranes  CVS: RRR. No LE edema BL.  Palpable peripheral pulses.  Lungs:  Comfortable work of breathing  Abdomen:  Soft, nontender and nondistended.   Neuro: " AOx3. Moves all extremities. Follows commands. Responds appropriately   Psych:  Mood is calm, affect restricted, insight good  Skin:  Dry and warm no jaundice, area of erythema medial proximal right lower extremity    Laboratory:  I have reviewed all pertinent lab results within the past 24 hours.  CBC:   Recent Labs   Lab 09/04/22  0352   WBC 8.78   RBC 3.88*   HGB 12.2*   HCT 38.5*   PLT 71*   MCV 99*   MCH 31.4*   MCHC 31.7*       CMP:   Recent Labs   Lab 09/01/22  0153 09/02/22  0439 09/04/22  0352   *   < > 190*   CALCIUM 9.4   < > 8.8   ALBUMIN 4.2  --   --    PROT 8.6*  --   --       < > 137   K 5.0   < > 4.5   CO2 27   < > 27   CL 99   < > 100   BUN 46*   < > 60*   CREATININE 2.1*   < > 2.2*   ALKPHOS 80  --   --    ALT 48*  --   --    AST 36  --   --    BILITOT 1.6*  --   --     < > = values in this interval not displayed.       Cardiac markers:   Recent Labs   Lab 09/01/22 0153   TROPONINI 0.034         Diagnostic Results:  Labs: Reviewed    BONE SCAN IMPRESSION:  Consistent with osteomyelitis of the right 5th metatarsal head    ASSESSMENT/PLAN:       Osteomyelitis of right 5th metatarsal head   Active Hospital Problems    Diagnosis  POA    *Acute respiratory failure with hypoxia [J96.01]  Yes    Group B streptococcal infection [A49.1]  Yes    Cellulitis of right leg [L03.115]  Yes    Pulmonary hypertension [I27.20]  Yes    Bacteremia [R78.81]  Yes    Acute renal failure superimposed on stage 3b chronic kidney disease [N17.9, N18.32]  No    Chronic systolic heart failure [I50.22]  Yes    AICD (automatic cardioverter/defibrillator) present [Z95.810]  Yes    Chronic Thrombocytopenia [D69.6]  Yes    Type II diabetes mellitus with neurological manifestations [E11.49]  Yes     With neuropathy        Resolved Hospital Problems   No resolved problems to display.         Plan:   Acute hypoxic respiratory failure - improved   Bilateral pneumonia in the setting of group B strep agalactiae bacteremia,  osteomyelitis right 5th metatarsal head, cellulitis right lower extremity  Continue IV ceftriaxone.  Appreciate Infectious Disease.  We will discuss duration likely 4-6 weeks.  Podiatry to perform bedside debridement of foot the near future  Repeat blood cultures - NGTD   Status post GABRIELLA with concerns for RV wire endocarditis versus thrombus; noted to have a 4 mm mobile structure  Infectious disease following; will need to discuss long-term IV antibiotics +/- ICD wire removed  Patient would like to discuss with his cardiologist Dr. Roth regarding ICD wire removal   AD on CKD stage 3b -  hold home diuretics and ACE-inhibitor  Dose medications per GFR       Type 2 diabetes mellitus:  Continue detemir at 25 units b.I.d. and low-dose insulin sliding scale   Monitor electrolytes and replete per protocol      VTE Risk Mitigation (From admission, onward)           Ordered     enoxaparin injection 40 mg  Daily         09/02/22 0952     Place sequential compression device  Until discontinued         09/01/22 0633     IP VTE HIGH RISK PATIENT  Once         09/01/22 0633                        Department Hospital Medicine  [facility]  Wolf Ortega MD  Date of service: 09/06/2022

## 2022-09-07 NOTE — PLAN OF CARE
Problem: Adult Inpatient Plan of Care  Goal: Plan of Care Review  Outcome: Ongoing, Progressing  Goal: Absence of Hospital-Acquired Illness or Injury  Outcome: Ongoing, Progressing  Goal: Optimal Comfort and Wellbeing  Outcome: Ongoing, Progressing     Problem: Diabetes Comorbidity  Goal: Blood Glucose Level Within Targeted Range  Outcome: Ongoing, Progressing     Problem: Bleeding (Sepsis/Septic Shock)  Goal: Absence of Bleeding  Outcome: Ongoing, Progressing     Problem: Oral Intake Inadequate (Acute Kidney Injury/Impairment)  Goal: Optimal Nutrition Intake  Outcome: Ongoing, Progressing     Problem: Respiratory Compromise (Pneumonia)  Goal: Effective Oxygenation and Ventilation  Outcome: Ongoing, Progressing     Problem: Impaired Wound Healing  Goal: Optimal Wound Healing  Outcome: Ongoing, Progressing

## 2022-09-07 NOTE — PLAN OF CARE
09/07/22 0823   Patient Assessment/Suction   Level of Consciousness (AVPU) alert   Respiratory Effort Normal;Unlabored   All Lung Fields Breath Sounds clear   Skin Integrity   $ Wound Care Tech Time 15 min   Area Observed Bridge of nose   Skin Appearance without discoloration   PRE-TX-O2   O2 Device (Oxygen Therapy) room air   SpO2 (!) 94 %   Pulse Oximetry Type Intermittent   $ Pulse Oximetry - Multiple Charge Pulse Oximetry - Multiple   Pulse 81   Resp 18   Aerosol Therapy   $ Aerosol Therapy Charges PRN treatment not required   Respiratory Treatment Status (SVN) PRN treatment not required   Preset CPAP/BiPAP Settings   Mode Of Delivery Standby;BiPAP S/T   $ CPAP/BiPAP Daily Charge BiPAP/CPAP Daily   $ Initial CPAP/BiPAP Setup? No   $ Is patient using? Yes   Equipment Type V60   Ipap 16   EPAP (cm H2O) 8   Pressure Support (cm H2O) 8   Set Rate (Breaths/Min) 12   ITime (sec) 1   Rise Time (sec) 3   Education   $ Education Bronchodilator;BiPAP;15 min   Respiratory Evaluation   $ Care Plan Tech Time 15 min

## 2022-09-07 NOTE — PROGRESS NOTES
Progress Note  Infectious Disease    Reason for Consult:  GPC bacteremia    HPI: Ana Bullard is a 62 y.o. male very pleasant, former heavy smoker, known to our service for admission in January 2020 for septic shock secondary to strep pneumo bacteremia, he has past medical history of CAD, CABG, AICD since 2012, battery replaced 2018, exchange in 2016, CHF (EF: 15%), HTN, HLD, PVD, CKD not on dialysis, FARHAN on BiPAP, diabetes, neuropathy, GERD, and prior history of diabetic right foot infection due to Proteus vulgaris, group a Streptococcus and MRSA in January 2019, with residual chronic foot ulcer for which he follows with Dr. Heaton/podiatry outpatient who performs debridements.    Patient came to the hospital after sudden onset of fever, chills, not feeling well since 8/31, wife at home measured temperature about 99.  He states he has been having on and off headache, currently resolved.  He has been having some productive cough, white/thin sputum, denies nausea or vomiting, no abdominal pain, no dysuria or increased urinary frequency, or change in bowel movements.    As per patient, he is scheduled to have a GABRIELLA outpatient by Dr. Roth on 09/16.    In the ER, hypotensive 100/58, T-max 103.3°,   Lab significant for leukocytosis of 16.2, left shift 91.7%, H&H 14.3/44.6, platelet count 101, thrombocytopenia   AD creatinine 2.5,   Bilirubin 1.6   AST 36/ALT 48   CRP 3.6      Lactic 1.7, procalcitonin 0.12     Patient admitted to ICU for acute respiratory failure likely secondary to COPD exacerbation in addition to acute on chronic CHF, EF 15%.  Placed on BiPAP.  Started on IV steroids.  Empirically started on cefepime and vancomycin, switched to ceftriaxone 9/1.    Hospital course complicated by 4/4 bottles from admission growing GPC, pending ID and sensitivities.    ID consult for GPC.  Micro tobin contacted, looks like Strep, results will be updated and within an hour.    09/03/2022.  On admission he  had a temperature of 102.7°.  He has been afebrile since.  WBC trend 16--17.9--9.5.  GABRIELLA for positive are V wire thrombus versus endocarditis.    09/04/2022.  Patient is resting comfortably.  We are waiting for Tuesday to discuss with patient's cardiologist, about need for AICD removal    09/05/2022 no new events, awaiting cardiologist to discuss tomorrow . Right medial shin looks ok    9/6 (Miriam):  Consult reviewed and discussed with Dr. Arthur.  Official GABRIELLA report not yet posted. 3 phase bone scan in progress.  Reviewing history and physical exam he has the signs of a cellulitis on his right medial leg, proximally near the knee.  There is no lymphangitis.  He does endorse that his right medial calf was tender on admission, that the fever began prior to admission and the redness began approximately 1 day later.  This is consistent with group B strep cellulitis of the right leg.  9/7: interim reviewed. Dr. Roth's interpretation of GABRIELLA appreciated. Bone scan c/w osteomyelitis right 5th met head. He has given himself IV antibiotics at home in the past.     Antibiotics (From admission, onward)      Start     Stop Route Frequency Ordered    09/01/22 2100  mupirocin 2 % ointment  (MRSA Decolonization Orders ST)         09/06 2059 Nasl 2 times daily 09/01/22 1611    09/01/22 1500  cefTRIAXone (ROCEPHIN) 2 g/50 mL D5W IVPB         -- IV Every 24 hours (non-standard times) 09/01/22 1404          Implants:  AICD since 20/12, battery replaced 2018  Antibiotic History:  Vancomycin/cefepime, switched to ceftriaxone 9/1    EXAM & DIAGNOSTICS REVIEWED:   Vitals:     Temp:  [97.4 °F (36.3 °C)-98.5 °F (36.9 °C)]   Temp: 97.6 °F (36.4 °C) (09/07/22 0803)  Pulse: 86 (09/07/22 0803)  Resp: 18 (09/07/22 0940)  BP: 131/70 (09/07/22 0803)  SpO2: 97 % (09/07/22 0803)    Intake/Output Summary (Last 24 hours) at 9/7/2022 1013  Last data filed at 9/6/2022 2355  Gross per 24 hour   Intake 360 ml   Output 3 ml   Net 357 ml        General:  In NAD. Alert and attentive, cooperative, comfortable on room air  Eyes:  Anicteric,   ENT:  No ulcers, exudates, thrush, nares patent, dentures upper, broken carious teeth on the lower  Neck:  Supple, no adenopathy appreciated  Lungs: Clear to auscultation b/l  Heart:  S1/S2+, regular rhythm, systolic murmur   L AICD in place, no redness noted, non tender to palpation  Abd:  Obese, +BS, soft, non tender, non distended, no rebound  :  Voids, urine clear, no flank tenderness  Musc:  Except for R foot, Joints without effusion, swelling,  erythema, synovitis, ambulatory  Skin:  Warm, advanced venous stasis hyperpigmentation, varicosities.  Evidence of recent cellulitis of the right medial calf and right medial knee.  No lymphangitis  Wound:   Neuro:  Following commands, neuropathy  Psych:  Calm, cooperative  Lymphatic:     No tender inguinal nodes  Extrem: Right foot with chronic non-healing ulcer, probes 3mm deep, no evidence of purulent drainage.  No cellulitis  , nontender  B/l venous stasis  No LE edema b/l  VAD:  Peripheral IV       Isolation: None  09/02 9/1:        General Labs reviewed:  Recent Labs   Lab 09/02/22  0439 09/03/22  0408 09/04/22  0352   WBC 17.91* 9.50 8.78   HGB 13.2* 11.7* 12.2*   HCT 41.2 36.7* 38.5*   PLT 90* 64* 71*       Recent Labs   Lab 09/01/22  0153 09/02/22  0439 09/03/22  0408 09/04/22  0352    137 134* 137   K 5.0 4.5 4.0 4.5   CL 99 99 99 100   CO2 27 27 25 27   BUN 46* 59* 66* 60*   CREATININE 2.1* 2.5* 2.3* 2.2*   CALCIUM 9.4 8.8 8.3* 8.8   PROT 8.6*  --   --   --    BILITOT 1.6*  --   --   --    ALKPHOS 80  --   --   --    ALT 48*  --   --   --    AST 36  --   --   --      Recent Labs   Lab 09/01/22  0153   CRP 3.61*     No results for input(s): SEDRATE in the last 168 hours.    Estimated Creatinine Clearance: 47.9 mL/min (A) (based on SCr of 2.2 mg/dL (H)).     Prior micro  Blood cultures 06/09/2020 Streptococcus pyogenes group A  Blood cultures  1/18/20 Streptococcus pneumoniae     Micro:  Microbiology Results (last 7 days)       Procedure Component Value Units Date/Time    Blood culture [[id number]] Collected: 09/02/22 1550    Order Status: Completed Specimen: Blood Updated: 09/06/22 1632     Blood Culture, Routine No Growth to date      No Growth to date      No Growth to date      No Growth to date      No Growth to date    Narrative:      Collection has been rescheduled by [email or web address] at 09/02/2022 10:15 Reason:   Unable to collect  Collection has been rescheduled by [email or web address] at 09/02/2022 10:15 Reason:   Unable to collect    Blood culture [[id number]] Collected: 09/02/22 1014    Order Status: Completed Specimen: Blood Updated: 09/06/22 1232     Blood Culture, Routine No Growth to date      No Growth to date      No Growth to date      No Growth to date      No Growth to date    Culture, Respiratory with Gram Stain [[id number]] Collected: 09/02/22 1208    Order Status: Completed Specimen: Respiratory from Sputum Updated: 09/04/22 0815     Respiratory Culture Normal respiratory [place]     Gram Stain (Respiratory) <10 epithelial cells per low power field.     Gram Stain (Respiratory) Few WBC's     Gram Stain (Respiratory) Few Gram positive cocci     Gram Stain (Respiratory) Few Gram positive rods    Blood culture x two cultures. Draw prior to antibiotics. [[id number]]  (Abnormal) Collected: 09/01/22 0155    Order Status: Completed Specimen: Blood from Peripheral, Hand, Right Updated: 09/02/22 1214     Blood Culture, Routine Gram stain aer bottle: Gram positive cocci      Gram stain mayte bottle: Gram positive cocci      Results called to and read back by:David Berumen RN-ED;  09/01/2022  13:01      CJD      STREPTOCOCCUS AGALACTIAE (GROUP B)  Beta-hemolytic streptococci are routinely susceptible to   penicillins,cephalosporins and carbapenems.      Narrative:      Aerobic and anaerobic    Blood culture x two cultures. Draw prior to antibiotics. [[id number]]  (Abnormal)  Collected: 09/01/22 0152    Order Status: Completed Specimen: Blood from Peripheral, Antecubital, Right Updated: 09/02/22 1212     Blood Culture, Routine Gram stain aer bottle: Gram positive cocci      Gram stain mayte bottle: Gram positive cocci      Results called to and read back by:David Berumen RN-ED;  09/01/2022  13:00      CJD      STREPTOCOCCUS AGALACTIAE (GROUP B)  Beta-hemolytic streptococci are routinely susceptible to   penicillins,cephalosporins and carbapenems.      Narrative:      Aerobic and anaerobic    Aerobic culture [[id number]]     Order Status: No result Specimen: Wound             Imaging Reviewed:  Chest x-rayDiffuse interstitial prominence throughout both lungs, nonspecific but suggesting viral or atypical pneumonia given clinical history.     Bone scan foot 9/6 1. Scintigraphic findings consistent with focal osteomyelitis at the fifth metatarsal head on the right.    Cardiology: Last ECHO 1/20/20  Eccentric left ventricular hypertrophy.  Severely decreased left ventricular systolic function. The estimated ejection fraction is 25%.  Grade III (severe) left ventricular diastolic dysfunction consistent with restrictive physiology.  Moderate left atrial enlargement.  Mild mitral regurgitation.  Mild tricuspid regurgitation.  Intermediate central venous pressure (8 mmHg).  The estimated PA systolic pressure is 53 mmHg.  Pulmonary hypertension present.    9/3 GABRIELLA:  EF 30- global hypo   MV- mildly thickened, 1-2+ MR inflow patterns c/w stage 2 DD   AV- mild sclerosis, no AI. AS   TV ok moderate TR rvsp 50   PV ok mild/moderate PI   Ivc full   All three wires well viewed from subclavian vein to distal insertion sites. The RV wire appears to have a mobile structure 4 mm in RA segment, other wires ok. Considering GPC, have to assume endocarditis but small thrombus still a possibility.   (see also Dr. Roth's independent interpretation on 9/7)       IMPRESSION & PLAN     Sepsis due to GBStreptococcus  bacteremia, right ventricle the ICD lead infection, endocarditis? Or bacteremic from his foot ulcer/cellulitis the right leg?.   CRP 3.2    2.  AD, creatinine 2.5    3.  R foot chronic non-healing ulcer--     4.  PMHx: CAD, CABG, AICD since 2012, CHF (EF: 15%), HTN, HLD, PVD, CKD not on dialysis, FARHAN on BiPAP, diabetes, neuropathy, GERD    Recommendations:  Continue Ceftriaxone 2 g IV daily    At this time I think we can retain the AICD.  will treat him for 6 weeks for the foot and possibility of endocarditis and then do serial blood cultures.   I do not think a bone biopsy will be needed     Needs to be up-to-date with colonoscopies    Discussed with Cardiology      Medical Decision Making during this encounter was  [_] Low Complexity  [_] Moderate Complexity  [xx] High Complexity

## 2022-09-07 NOTE — RESPIRATORY THERAPY
09/06/22 2150   Patient Assessment/Suction   Level of Consciousness (AVPU) alert   Respiratory Effort Unlabored   Expansion/Accessory Muscles/Retractions no use of accessory muscles   All Lung Fields Breath Sounds diminished   Rhythm/Pattern, Respiratory assisted mechanically   Skin Integrity   $ Wound Care Tech Time 15 min   Area Observed Bridge of nose   Skin Appearance without discoloration   Barrier used? Gel Cushion   PRE-TX-O2   O2 Device (Oxygen Therapy) BiPAP   $ Is the patient on Low Flow Oxygen? Yes   Oxygen Concentration (%) 21   SpO2 96 %   Pulse Oximetry Type Intermittent   $ Pulse Oximetry - Multiple Charge Pulse Oximetry - Multiple   Pulse 64   Aerosol Therapy   $ Aerosol Therapy Charges PRN treatment not required   Ready to Wean/Extubation Screen   FIO2<=50 (chart decimal) 0.21   Preset CPAP/BiPAP Settings   Mode Of Delivery BiPAP S/T   $ Initial CPAP/BiPAP Setup? No   $ Is patient using? Yes   Size of Mask Large   Sized Appropriately? Yes   Equipment Type V60   Airway Device Type large full face mask   Ipap 16   EPAP (cm H2O) 8   Pressure Support (cm H2O) 8   Set Rate (Breaths/Min) 12   ITime (sec) 1   Rise Time (sec) 3   Patient CPAP/BiPAP Settings   RR Total (Breaths/Min) 19   Tidal Volume (mL) 791   VE Minute Ventilation (L/min) 15.2 L/min   Peak Inspiratory Pressure (cm H2O) 15   TiTOT (%) 22   Total Leak (L/Min) 26   Patient Trigger - ST Mode Only (%) 72   CPAP/BiPAP Alarms   High Pressure (cm H2O) 40   Low Pressure (cm H2O) 5   Minute Ventilation (L/Min) 3   High RR (breaths/min) 35   Low RR (breaths/min) 10   Apnea (Sec) 20   Education   $ Education BiPAP;15 min;Bronchodilator   Respiratory Evaluation   $ Care Plan Tech Time 15 min

## 2022-09-07 NOTE — PROGRESS NOTES
I reviewed the GABRIELLA and on not convinced that that was a  vegetation lesion on the RV lead.  Group B strep is not common organism for endocarditis and the increased lead thickness could just be artifact or normal progression of fibrosis and calcification.  It was definitely not a classic lesion consistent with thrombus or endocarditis.  Echo-as an appeared to be of the same density as the lead itself.  Will complete course of antibiotics for the pneumonia and cellulitis/osteomyelitis, which are other sources of infection.  Removing his device would be exceptionally difficult and I would not pursue that option at this time, unless his cultures remain positive this week for the same organism, despite adequate therapy-and improvement in his other issues including pneumonia and cellulitis/osteomyelitis.  For now proceed with recommendations from Infectious Disease and Podiatry -for possible osteomyelitis and foot ulcer. bone scan is pending.  Will follow

## 2022-09-08 VITALS
TEMPERATURE: 98 F | HEIGHT: 72 IN | RESPIRATION RATE: 16 BRPM | BODY MASS INDEX: 36.31 KG/M2 | WEIGHT: 268.06 LBS | OXYGEN SATURATION: 98 % | SYSTOLIC BLOOD PRESSURE: 117 MMHG | HEART RATE: 65 BPM | DIASTOLIC BLOOD PRESSURE: 66 MMHG

## 2022-09-08 LAB
ANION GAP SERPL CALC-SCNC: 8 MMOL/L (ref 8–16)
BUN SERPL-MCNC: 42 MG/DL (ref 6–20)
CALCIUM SERPL-MCNC: 8.7 MG/DL (ref 8.7–10.5)
CHLORIDE SERPL-SCNC: 103 MMOL/L (ref 95–110)
CO2 SERPL-SCNC: 27 MMOL/L (ref 23–29)
CREAT SERPL-MCNC: 1.7 MG/DL (ref 0.5–1.4)
EST. GFR  (NO RACE VARIABLE): 45.6 ML/MIN/1.73 M^2
GLUCOSE SERPL-MCNC: 112 MG/DL (ref 70–110)
GLUCOSE SERPL-MCNC: 129 MG/DL (ref 70–110)
GLUCOSE SERPL-MCNC: 146 MG/DL (ref 70–110)
MAGNESIUM SERPL-MCNC: 2.2 MG/DL (ref 1.6–2.6)
POTASSIUM SERPL-SCNC: 4.3 MMOL/L (ref 3.5–5.1)
SODIUM SERPL-SCNC: 138 MMOL/L (ref 136–145)

## 2022-09-08 PROCEDURE — 83735 ASSAY OF MAGNESIUM: CPT | Performed by: INTERNAL MEDICINE

## 2022-09-08 PROCEDURE — 94761 N-INVAS EAR/PLS OXIMETRY MLT: CPT

## 2022-09-08 PROCEDURE — 25000003 PHARM REV CODE 250: Performed by: INTERNAL MEDICINE

## 2022-09-08 PROCEDURE — 36415 COLL VENOUS BLD VENIPUNCTURE: CPT | Performed by: INTERNAL MEDICINE

## 2022-09-08 PROCEDURE — 63600175 PHARM REV CODE 636 W HCPCS: Performed by: INTERNAL MEDICINE

## 2022-09-08 PROCEDURE — 99900035 HC TECH TIME PER 15 MIN (STAT)

## 2022-09-08 PROCEDURE — 27000221 HC OXYGEN, UP TO 24 HOURS

## 2022-09-08 PROCEDURE — 80048 BASIC METABOLIC PNL TOTAL CA: CPT | Performed by: INTERNAL MEDICINE

## 2022-09-08 PROCEDURE — 94660 CPAP INITIATION&MGMT: CPT

## 2022-09-08 PROCEDURE — 99900031 HC PATIENT EDUCATION (STAT)

## 2022-09-08 RX ADMIN — CARVEDILOL 3.12 MG: 3.12 TABLET, FILM COATED ORAL at 08:09

## 2022-09-08 RX ADMIN — ATORVASTATIN CALCIUM 10 MG: 10 TABLET, FILM COATED ORAL at 08:09

## 2022-09-08 RX ADMIN — ASPIRIN 81 MG CHEWABLE TABLET 81 MG: 81 TABLET CHEWABLE at 08:09

## 2022-09-08 RX ADMIN — GABAPENTIN 600 MG: 300 CAPSULE ORAL at 08:09

## 2022-09-08 RX ADMIN — PANTOPRAZOLE SODIUM 40 MG: 40 TABLET, DELAYED RELEASE ORAL at 05:09

## 2022-09-08 RX ADMIN — AMIODARONE HYDROCHLORIDE 200 MG: 200 TABLET ORAL at 08:09

## 2022-09-08 RX ADMIN — CEFTRIAXONE 2 G: 2 INJECTION, SOLUTION INTRAVENOUS at 02:09

## 2022-09-08 RX ADMIN — TRAMADOL HYDROCHLORIDE 50 MG: 50 TABLET, COATED ORAL at 08:09

## 2022-09-08 RX ADMIN — FAMOTIDINE 20 MG: 20 TABLET ORAL at 08:09

## 2022-09-08 NOTE — PLAN OF CARE
Problem: Adult Inpatient Plan of Care  Goal: Plan of Care Review  Outcome: Ongoing, Progressing  Goal: Absence of Hospital-Acquired Illness or Injury  Outcome: Ongoing, Progressing  Goal: Readiness for Transition of Care  Outcome: Ongoing, Progressing     Problem: Diabetes Comorbidity  Goal: Blood Glucose Level Within Targeted Range  Outcome: Ongoing, Progressing     Problem: Adjustment to Illness (Sepsis/Septic Shock)  Goal: Optimal Coping  Outcome: Ongoing, Progressing     Problem: Fluid and Electrolyte Imbalance (Acute Kidney Injury/Impairment)  Goal: Fluid and Electrolyte Balance  Outcome: Ongoing, Progressing

## 2022-09-08 NOTE — PLAN OF CARE
1450:  Pilar with [facility] informs she is en route to hospital for patient teaching, etc.  She has not received full authorization from payer source at this time.     1323:  Tashi with [facility] stated they were unable to accept patient for insurance reasons.  Referral for home IV infusion services sent to [facility] via [facility] and liaison Pilar notified of referral.  Per Pilar, [facility] is in network with patient's managed care plan.        09/08/22 1322   Post-Acute Status   Post-Acute Authorization IV Infusion   IV Infusion Status Referral(s) sent

## 2022-09-08 NOTE — PLAN OF CARE
Home health orders noted.  Home health discussed with patient by  Karin MCDONALD and patient choice form signed showing [facility] as agency of choice.  Referral sent to [facility] via [facility] and liaison Emily notified of referral.      09/08/22 6848   Post-Acute Status   Post-Acute Authorization Home Health   Home Health Status Referrals Sent   Discharge Delays None known at this time   Discharge Plan   Discharge Plan A Home with family;Home Health   Discharge Plan B Home with family

## 2022-09-08 NOTE — CARE UPDATE
09/07/22 2028   Patient Assessment/Suction   Level of Consciousness (AVPU) alert   Skin Integrity   $ Wound Care Tech Time 15 min   Area Observed Bridge of nose   Skin Appearance without discoloration   Barrier used? Gel Cushion   PRE-TX-O2   O2 Device (Oxygen Therapy) room air   SpO2 (!) 94 %   Pulse Oximetry Type Continuous   $ Pulse Oximetry - Multiple Charge Pulse Oximetry - Multiple   Pulse 68   Aerosol Therapy   $ Aerosol Therapy Charges PRN treatment not required   Preset CPAP/BiPAP Settings   Mode Of Delivery BiPAP;Standby   $ Is patient using? Yes   Education   $ Education 15 min   Respiratory Evaluation   $ Care Plan Tech Time 15 min

## 2022-09-08 NOTE — DISCHARGE SUMMARY
[facility] Medicine  Discharge Summary      Patient Name: Ana Bullard  MRN: [id number]  Patient Class: IP- Inpatient  Admission Date: 9/1/2022  Hospital Length of Stay: 7 days  Discharge Date and Time:  09/08/2022 5:02 PM  Attending Physician: Wolf Ortega MD   Discharging Provider: Wolf Ortega MD  Primary Care Provider: YRN Pollard      HPI:   62 years old male with past medical history of diabetes on insulin at home chronic venous stasis with ulcers peripheral artery disease diabetic neuropathy CHF ejection fraction 15% patient follows with Dr. Roth he is status post CABG due to coronary artery disease and also status post pacemaker placement CKD history of osteomyelitis with chronic wounds he comes to the ER today because he states this tunneling last night he started having weakness dizziness could really moved associated with chills shortness of breath he denies chest pain states that he was having nausea but no vomit once he arrived in the ER he had fever his chest x-ray shows opacity on right side he received vancomycin and cefepime id DuoNebs Tylenol 1 g and he is feeling better at this time he was started on BiPAP due to hypoxia.    Hospital Course:   9/6: Patient seen and examined.  Patient reports shortness of breath has improved with medical treatment, currently resolved at rest.  Minimal cough.  Eating okay without nausea or vomiting.  Mobilizing independently.  Was seen by Podiatry today, planning bedside debridement in the near future.  Patient does not have significant pain of foot.     9/7:  Patient seen and examined.  Patient feels good today denies chest pain or shortness of breath.  Eating well without nausea or vomiting.  Minimal cough.  Mobilizing better and weakness is improving.  Minimal foot pain.  Patient had PICC line placed.  Eager for possible discharge in the near future     9/8: Patient seen and examined. No CP or SOB. No fever. PICC  placed.     Hospital course:   62-year-old  male with multiple medical comorbidities including but not limited to chronic combined CHF with last known LVEF of 25%, status post AICD, chronic thrombocytopenia, CKD stage 3b, type 2 diabetes mellitus admitted after presenting with shortness of breath. Acute hypoxic respiratory failure likely secondary to bilateral pneumonia.  Further hospital workup notable for bacteremia secondary to Streptococcus agalactiae.  Initially on broad-spectrum antibiotics and eventually transition to ceftriaxone.  GABRIELLA revealed possibility of RV wire endocarditis versus thrombus.  Seen by Infectious Disease who recommended removal of the ICD wire.  Patient would like to discuss with Dr. Roth (primary cardiologist) before deciding on long-term antibiotics versus ICD wire removal. Appreciate multiple consultants.  Discussed with Infectious Disease, Cardiology, and Podiatry.  GABRIELLA reviewed and no convincing evidence for RV wire vegetation. Continue antibiotic regimen with IV Rocephin for 6 week duration, case management working on arrangement for home antibiotics, end date OCT 13. PICC line placed. Continue supportive care measures  DISCHARGE TODAY WITH OUTPATIENT FU    General: Patient resting comfortably in no acute distress.   CVS: RRR. No LE edema BL.  Palpable peripheral pulses.  Lungs:  Comfortable work of breathing  Abdomen:  Soft, nontender and nondistended.   Neuro: AOx3. Moves all extremities. Follows commands. Responds appropriately   Psych:  Mood is calm, affect restricted, insight good  Skin:  Dry and warm no jaundice, area of erythema medial proximal right lower extremity improved from previous        Goals of Care Treatment Preferences:  Code Status: Full Code      Consults:   Consults (From admission, onward)          Status Ordering Provider     Inpatient consult to Social Work/Case Management  Once        Provider:  (Not yet assigned)    Acknowledged STEF WEAVER  ZEV     Inpatient consult to PICC Line Nurse  Once        Provider:  (Not yet assigned)    Acknowledged MEG STEF ZEV     Inpatient consult to Podiatry  Once        Provider:  Ryan Avila DPM    Completed GJINO, NITESH     Inpatient consult to Anesthesiology  Once        Provider:  Mauricio Garcia MD    Acknowledged CUCA HOYT     Inpatient consult to Cardiology  Once        Provider:  Antwan Richardson MD    Acknowledged CUCA HOYT     Inpatient consult to Infectious Diseases  Once        Provider:  Yuni Shannon MD    Completed CUCA HOYT     Inpatient consult to Pulmonology  Once        Provider:  Chetna Maldonado MD    Completed BUTCH LAGOS     Inpatient consult to Hospitalist  Once        Provider:  Butch Lagos MD    Acknowledged JERRY GUEVARA            No new Assessment & Plan notes have been filed under this hospital service since the last note was generated.  Service: Hospital Medicine    Final Active Diagnoses:    Diagnosis Date Noted POA    PRINCIPAL PROBLEM:  Acute respiratory failure with hypoxia [J96.01] 09/01/2022 Yes    Group B streptococcal infection [A49.1] 09/06/2022 Yes    Cellulitis of right leg [L03.115] 09/06/2022 Yes    Pulmonary hypertension [I27.20] 09/02/2022 Yes    Bacteremia [R78.81] 01/20/2020 Yes    Acute renal failure superimposed on stage 3b chronic kidney disease [N17.9, N18.32] 01/19/2020 No    Chronic systolic heart failure [I50.22] 01/19/2020 Yes    AICD (automatic cardioverter/defibrillator) present [Z95.810] 01/19/2020 Yes    Chronic Thrombocytopenia [D69.6] 01/01/2012 Yes    Type II diabetes mellitus with neurological manifestations [E11.49] 01/01/1983 Yes      Problems Resolved During this Admission:       Discharged Condition: stable    Disposition: [place] [facility]    Follow Up:   Contact information for follow-up providers       YRN Pollard Follow up in 1 week(s).    Specialties: Emergency Medicine,  Family Medicine  Contact information:  [address]  [address]  [place] [address]  [phone]               Kayce Arthur MD Follow up in 2 week(s).    Specialty: Infectious Diseases  Contact information:  [address]  [address]  [place] [address]  [phone]               Saurabh Roth MD Follow up in 3 week(s).    Specialties: Cardiology, Interventional Cardiology  Why: As needed  Contact information:  [address]  [address]  [facility]  [place] [address]  [phone]               Tong Heaton DPM Follow up in 1 week(s).    Specialties: Podiatry, Surgery  Contact information:  [address] [facility] [facility]  [address]  [place] [address]  [phone]                       Contact information for after-discharge care       Dialysis/Infusion       [facility] .    Service: Home Infusion and Injection  Contact information:  [address]  [place] [address]  [phone]                                 Patient Instructions:      Ambulatory referral/consult to Home Health   Standing Status: Future   Referral Priority: Routine Referral Type: Home Health   Referral Reason: Specialty Services Required   Requested Specialty: Home Health Services   Number of Visits Requested: 1     Diet diabetic     Diet Cardiac     Notify your health care provider if you experience any of the following:  temperature >100.4     Notify your health care provider if you experience any of the following:  persistent nausea and vomiting or diarrhea     Notify your health care provider if you experience any of the following:  severe uncontrolled pain     Notify your health care provider if you experience any of the following:  difficulty breathing or increased cough     Activity as tolerated       Pending Diagnostic Studies:       Procedure Component Value Units Date/Time    Transesophageal echo (GABRIELLA) [[id number]] Resulted: 09/06/22 1346    Order Status: Sent Lab Status: In process Updated:  09/06/22 1346     TR Max Dagoberto 3.17 m/s      Triscuspid Valve Regurgitation Peak Gradient 40 mmHg      BSA 2.49 m2            Medications:  Reconciled Home Medications:      Medication List        START taking these medications      cefTRIAXone 2 g/50 mL Pgbk IVPB  Commonly known as: ROCEPHIN  Inject 50 mLs (2 g total) into the vein once daily.            CONTINUE taking these medications      acetaminophen 500 mg Cap  Commonly known as: TYLENOL  Take 1,000 mg by mouth every 12 (twelve) hours as needed.     albuterol-ipratropium 2.5 mg-0.5 mg/3 mL nebulizer solution  Commonly known as: DUO-NEB  Inhale 3 mLs into the lungs every 6 (six) hours as needed.     amiodarone 200 MG Tab  Commonly known as: PACERONE  Take 200 mg by mouth once daily.     aspirin 81 MG Chew  Take 81 mg by mouth once daily.     atorvastatin 10 MG tablet  Commonly known as: LIPITOR  Take 1 tablet by mouth once daily.     BASAGLAR KWIKPEN U-100 INSULIN glargine 100 units/mL SubQ pen  Generic drug: insulin  25 Units once daily.     carvediloL 3.125 MG tablet  Commonly known as: COREG  Take 3.125 mg by mouth 2 (two) times daily.     CORLANOR 5 mg Tab  Generic drug: ivabradine  Take 5 mg by mouth nightly.     FARXIGA 10 mg tablet  Generic drug: dapagliflozin  Take 10 mg by mouth once daily.     furosemide 40 MG tablet  Commonly known as: LASIX  Take 1 tablet (40 mg total) by mouth once daily.     gabapentin 600 MG tablet  Commonly known as: NEURONTIN  Take 1 tablet by mouth 3 (three) times daily.     pantoprazole 40 MG tablet  Commonly known as: PROTONIX  Take 1 tablet by mouth once daily.     traMADoL 50 mg tablet  Commonly known as: ULTRAM  Take 50 mg by mouth every 8 (eight) hours as needed.     vitamin B complex-folic acid 0.4 mg Tab  Take 1 tablet by mouth.            STOP taking these medications      ENTRESTO 24-26 mg per tablet  Generic drug: sacubitriL-valsartan     fenofibrate 145 MG tablet  Commonly known as: TRICOR     hydrALAZINE 50 MG  tablet  Commonly known as: APRESOLINE     JANUVIA 50 MG Tab  Generic drug: SITagliptin     mupirocin 2 % ointment  Commonly known as: BACTROBAN     ofloxacin 0.3 % ophthalmic solution  Commonly known as: OCUFLOX     OZEMPIC 0.25 mg or 0.5 mg(2 mg/1.5 mL) pen injector  Generic drug: semaglutide     OZEMPIC SUBQ     prednisoLONE acetate 1 % Drps  Commonly known as: PRED FORTE     spironolactone 25 MG tablet  Commonly known as: ALDACTONE     VICTOZA 2-GRACIELA 0.6 mg/0.1 mL (18 mg/3 mL) Pnij pen  Generic drug: liraglutide 0.6 mg/0.1 mL (18 mg/3 mL) subq PNIJ     XARELTO 2.5 mg Tab  Generic drug: rivaroxaban              Indwelling Lines/Drains at time of discharge:   Lines/Drains/Airways       Peripherally Inserted Central Catheter Line  Duration             PICC Double Lumen 09/07/22 1145 right basilic 1 day              Airway  Duration                  Airway - Non-Surgical 09/03/22 0955 Nasal Cannula 5 days                    Time spent on the discharge of patient: 36 minutes         Wolf Ortega MD  Department of Hospital Medicine  [facility]

## 2022-09-08 NOTE — PLAN OF CARE
09/08/22 1521   Patient Assessment/Suction   Level of Consciousness (AVPU) alert   Respiratory Effort Normal;Unlabored   Skin Integrity   $ Wound Care Tech Time 15 min   Area Observed Bridge of nose   Skin Appearance without discoloration   Barrier used? Gel Cushion   PRE-TX-O2   O2 Device (Oxygen Therapy) room air   $ Is the patient on Low Flow Oxygen? Yes   SpO2 98 %   Pulse Oximetry Type Continuous   $ Pulse Oximetry - Multiple Charge Pulse Oximetry - Multiple   Pulse 65   Resp 16   Aerosol Therapy   $ Aerosol Therapy Charges PRN treatment not required   Respiratory Treatment Status (SVN) PRN treatment not required   Preset CPAP/BiPAP Settings   Mode Of Delivery Standby   $ CPAP/BiPAP Daily Charge BiPAP/CPAP Daily   $ Initial CPAP/BiPAP Setup? No   $ Is patient using? Yes   Equipment Type V60   Ipap 16   EPAP (cm H2O) 8   Pressure Support (cm H2O) 8   Set Rate (Breaths/Min) 12   ITime (sec) 1   Rise Time (sec) 3   Education   $ Education Bronchodilator;15 min   Respiratory Evaluation   $ Care Plan Tech Time 15 min

## 2022-09-08 NOTE — PLAN OF CARE
Problem: Adult Inpatient Plan of Care  Goal: Plan of Care Review  Outcome: Met  Goal: Patient-Specific Goal (Individualized)  Outcome: Met  Goal: Absence of Hospital-Acquired Illness or Injury  Outcome: Met  Goal: Optimal Comfort and Wellbeing  Outcome: Met  Goal: Readiness for Transition of Care  Outcome: Met     Problem: Diabetes Comorbidity  Goal: Blood Glucose Level Within Targeted Range  Outcome: Met     Problem: Adjustment to Illness (Sepsis/Septic Shock)  Goal: Optimal Coping  Outcome: Met     Problem: Bleeding (Sepsis/Septic Shock)  Goal: Absence of Bleeding  Outcome: Met     Problem: Glycemic Control Impaired (Sepsis/Septic Shock)  Goal: Blood Glucose Level Within Desired Range  Outcome: Met     Problem: Infection Progression (Sepsis/Septic Shock)  Goal: Absence of Infection Signs and Symptoms  Outcome: Met     Problem: Nutrition Impaired (Sepsis/Septic Shock)  Goal: Optimal Nutrition Intake  Outcome: Met     Problem: Fluid and Electrolyte Imbalance (Acute Kidney Injury/Impairment)  Goal: Fluid and Electrolyte Balance  Outcome: Met     Problem: Oral Intake Inadequate (Acute Kidney Injury/Impairment)  Goal: Optimal Nutrition Intake  Outcome: Met     Problem: Renal Function Impairment (Acute Kidney Injury/Impairment)  Goal: Effective Renal Function  Outcome: Met     Problem: Fluid Imbalance (Pneumonia)  Goal: Fluid Balance  Outcome: Met     Problem: Infection (Pneumonia)  Goal: Resolution of Infection Signs and Symptoms  Outcome: Met     Problem: Respiratory Compromise (Pneumonia)  Goal: Effective Oxygenation and Ventilation  Outcome: Met     Problem: Impaired Wound Healing  Goal: Optimal Wound Healing  Outcome: Met     Problem: Infection  Goal: Absence of Infection Signs and Symptoms  Outcome: Met

## 2022-09-08 NOTE — PROGRESS NOTES
No further cardiac recs. Plan per ID. Can repeat GABRIELLA at later date if ID believes its clinically necessary.

## 2022-09-08 NOTE — PLAN OF CARE
Home IV infusion services arranged with [facility].  Per Pilar, authorization has been received from patient's insurance provider and she is on site to provide patient education and set-up.        09/08/22 1530   Post-Acute Status   Post-Acute Authorization IV Infusion   IV Infusion Status Set-up Complete/Auth obtained   Hospital Resources/Appts/Education Provided Post-Acute resouces added to AVS   Discharge Delays None known at this time

## 2022-09-08 NOTE — PROGRESS NOTES
[facility] Medicine  Progress Note    Patient name: Ana Bullard  MRN: [id number]  Admit Date: 9/1/2022   LOS: 6 days   DOS: 09/07/2022    SUBJECTIVE:     Principal problem: Acute respiratory failure with hypoxia    Interval History:    9/6: Patient seen and examined.  Patient reports shortness of breath has improved with medical treatment, currently resolved at rest.  Minimal cough.  Eating okay without nausea or vomiting.  Mobilizing independently.  Was seen by Podiatry today, planning bedside debridement in the near future.  Patient does not have significant pain of foot.    9/7:  Patient seen and examined.  Patient feels good today denies chest pain or shortness of breath.  Eating well without nausea or vomiting.  Minimal cough.  Mobilizing better and weakness is improving.  Minimal foot pain.  Patient had PICC line placed.  Eager for possible discharge in the near future    ROS:  3 point review of systems negative except as per HPI above    Hospital course:   62-year-old  male with multiple medical comorbidities including but not limited to chronic combined CHF with last known LVEF of 25%, status post AICD, chronic thrombocytopenia, CKD stage 3b, type 2 diabetes mellitus admitted after presenting with shortness of breath.      Acute hypoxic respiratory failure likely secondary to bilateral pneumonia.  Further hospital workup notable for bacteremia secondary to Streptococcus agalactiae.  Initially on broad-spectrum antibiotics and eventually transition to ceftriaxone.  GABRIELLA revealed possibility of RV wire endocarditis versus thrombus.  Seen by Infectious Disease who recommended removal of the ICD wire.  Patient would like to discuss with Dr. Roth (primary cardiologist) before deciding on long-term antibiotics versus ICD wire removal.      Scheduled Meds:   amiodarone  200 mg Oral Daily    aspirin  81 mg Oral Daily    atorvastatin  10 mg Oral Daily    carvediloL  3.125 mg  "Oral BID    cefTRIAXone (ROCEPHIN) IVPB  2 g Intravenous Q24H    enoxparin  40 mg Subcutaneous Daily    famotidine  20 mg Oral Daily    gabapentin  600 mg Oral BID    insulin detemir U-100  25 Units Subcutaneous BID    pantoprazole  40 mg Oral Before breakfast     Continuous Infusions:      PRN Meds:acetaminophen, albuterol sulfate, calcium chloride IVPB, calcium chloride IVPB, calcium chloride IVPB, dextrose 10%, dextrose 10%, insulin aspart U-100, magnesium oxide, magnesium sulfate IVPB, magnesium sulfate IVPB, magnesium sulfate IVPB, magnesium sulfate IVPB, potassium chloride, potassium chloride, potassium chloride, potassium chloride, sodium chloride 0.9%, sodium phosphate IVPB, sodium phosphate IVPB, sodium phosphate IVPB, sodium phosphate IVPB, sodium phosphate IVPB, traMADoL    Review of patient's allergies indicates:   Allergen Reactions    Vasopressin Anaphylaxis and Other (See Comments)     Increased pressure in his head; felt like his eyeballs and veins were going to pop out of his head.     Heparin Other (See Comments)     Other reaction(s): "depleted my blood platets"    Decreased platelet count    Heparin analogues Other (See Comments)     Depleted WBC count    Vasopressin analogues Other (See Comments)     "felt like eyes going to pop out of my head"    Morphine Hallucinations, Other (See Comments) and Anxiety     Other reaction(s): Hallucinations  Paranoid, hallucinations         Review of Systems:  3 point review of systems negative except as per HPI above    OBJECTIVE:     Vital Signs (Most Recent)  Temp: 97.7 °F (36.5 °C) (09/07/22 1534)  Pulse: 64 (09/07/22 1534)  Resp: 18 (09/07/22 1534)  BP: 124/60 (09/07/22 1534)  SpO2: (!) 93 % (09/07/22 1534)    Vital Signs Range (Last 24H):  Temp:  [97.4 °F (36.3 °C)-98.5 °F (36.9 °C)]   Pulse:  [64-86]   Resp:  [18]   BP: (118-142)/(58-75)   SpO2:  [93 %-97 %]     I & O (Last 24H):  Intake/Output Summary (Last 24 hours) at 9/7/2022 1902  Last data filed at " 9/7/2022 1533  Gross per 24 hour   Intake 480 ml   Output 3 ml   Net 477 ml         Physical Exam:  General: Patient resting comfortably in no acute distress. Appears as stated age. Calm  Eyes: No conjunctival injection. No scleral icterus.  ENT:  Moist mucous membranes  CVS: RRR. No LE edema BL.  Palpable peripheral pulses.  Lungs:  Comfortable work of breathing  Abdomen:  Soft, nontender and nondistended.   Neuro: AOx3. Moves all extremities. Follows commands. Responds appropriately   Psych:  Mood is calm, affect restricted, insight good  Skin:  Dry and warm no jaundice, area of erythema medial proximal right lower extremity improved from previous    Laboratory:  I have reviewed all pertinent lab results within the past 24 hours.  CBC:   Recent Labs   Lab 09/04/22  0352   WBC 8.78   RBC 3.88*   HGB 12.2*   HCT 38.5*   PLT 71*   MCV 99*   MCH 31.4*   MCHC 31.7*       CMP:   Recent Labs   Lab 09/01/22 0153 09/02/22  0439 09/04/22 0352   *   < > 190*   CALCIUM 9.4   < > 8.8   ALBUMIN 4.2  --   --    PROT 8.6*  --   --       < > 137   K 5.0   < > 4.5   CO2 27   < > 27   CL 99   < > 100   BUN 46*   < > 60*   CREATININE 2.1*   < > 2.2*   ALKPHOS 80  --   --    ALT 48*  --   --    AST 36  --   --    BILITOT 1.6*  --   --     < > = values in this interval not displayed.       Cardiac markers:   Recent Labs   Lab 09/01/22 0153   TROPONINI 0.034         Diagnostic Results:  Labs: Reviewed    BONE SCAN IMPRESSION:  Consistent with osteomyelitis of the right 5th metatarsal head    CXR reviewed:  PICC line in place    ASSESSMENT/PLAN:       Osteomyelitis of right 5th metatarsal head   Active Hospital Problems    Diagnosis  POA    *Acute respiratory failure with hypoxia [J96.01]  Yes    Group B streptococcal infection [A49.1]  Yes    Cellulitis of right leg [L03.115]  Yes    Pulmonary hypertension [I27.20]  Yes    Bacteremia [R78.81]  Yes    Acute renal failure superimposed on stage 3b chronic kidney disease  [N17.9, N18.32]  No    Chronic systolic heart failure [I50.22]  Yes    AICD (automatic cardioverter/defibrillator) present [Z95.810]  Yes    Chronic Thrombocytopenia [D69.6]  Yes    Type II diabetes mellitus with neurological manifestations [E11.49]  Yes     With neuropathy        Resolved Hospital Problems   No resolved problems to display.         Plan:   Acute hypoxic respiratory failure - improved   Bilateral pneumonia in the setting of group B strep agalactiae bacteremia, osteomyelitis right 5th metatarsal head, cellulitis right lower extremity  Continue IV ceftriaxone.  Appreciate Infectious Disease.  We will discuss duration likely 4-6 weeks.  Podiatry to perform bedside debridement of foot the near future  Repeat blood cultures - NGTD   Status post GABRIELLA with concerns for RV wire endocarditis versus thrombus; noted to have a 4 mm mobile structure  Infectious disease following; will need to discuss long-term IV antibiotics +/- ICD wire removed  Patient would like to discuss with his cardiologist Dr. Roth regarding ICD wire removal   AD on CKD stage 3b -  hold home diuretics and ACE-inhibitor  Dose medications per GFR       Type 2 diabetes mellitus:  Continue detemir at 25 units b.I.d. and low-dose insulin sliding scale   Monitor electrolytes and replete per protocol    Plan update today:   Appreciate multiple consultants.  Discussed with Infectious Disease, Cardiology, and Podiatry.    GABRIELLA reviewed and no convincing evidence for RV wire vegetation  Continue antibiotic regimen with IV Rocephin for 6 week duration, case management working on arrangement for home antibiotics  PICC line placed   Continue supportive care measures  Serial labs  Debridement of foot wound per Podiatry  Possible discharge next 24-48 hours      VTE Risk Mitigation (From admission, onward)           Ordered     enoxaparin injection 40 mg  Daily         09/02/22 0952     Place sequential compression device  Until discontinued          09/01/22 0633     IP VTE HIGH RISK PATIENT  Once         09/01/22 0633                  High risk patient secondary to severe exacerbation of chronic illness; acute illness with risk to life;      Department Hospital Medicine  [facility]  Wolf Ortega MD  Date of service: 09/07/2022

## 2022-09-08 NOTE — PLAN OF CARE
09/08/22 1033   Discharge Reassessment   Assessment Type Discharge Planning Reassessment   Did the patient's condition or plan change since previous assessment? Yes   Discharge Plan discussed with: Patient   Communicated BILL with patient/caregiver Yes   Discharge Plan A Home with family   Discharge Plan B Home with family   DME Needed Upon Discharge  none   Discharge Barriers Identified None   Post-Acute Status   Post-Acute Authorization IV Infusion   Coverage [employer] managed medicare   IV Infusion Status Referral(s) sent   Discharge Delays None known at this time       Referral sent to [facility] for cost on rocephin Home IV abx per order by Miriam ROGERS. Notified Tashi with [facility].

## 2022-09-09 NOTE — PLAN OF CARE
Met with Patient/Caregiver to provide IMM.  IMM signed by Patient/Caregiver, copy of IMM provided to Patient/Caregiver, and IMM will be scanned to chart.

## 2022-09-10 LAB
BACTERIA SPEC AEROBE CULT: ABNORMAL
BACTERIA SPEC ANAEROBE CULT: NORMAL

## 2022-09-14 NOTE — PLAN OF CARE
IV Infusions arranged with [facility], per records.    Per Tali with [facility], Pt was admitted on 9/12/2022.  Disposition changed to home due to HH not seeing Pt within 3 days of discharge.       09/14/22 1411   Final Note   Assessment Type Final Discharge Note   Anticipated Discharge Disposition IV Therapy   Post-Acute Status   Post-Acute Authorization Home Health;IV Infusion   Home Health Status Set-up Complete/Auth obtained   IV Infusion Status Set-up Complete/Auth obtained   Discharge Delays None known at this time

## 2022-09-19 ENCOUNTER — HOSPITAL ENCOUNTER (EMERGENCY)
Facility: HOSPITAL | Age: 62
Discharge: HOME | End: 2022-09-19
Attending: EMERGENCY MEDICINE
Payer: MEDICARE

## 2022-09-19 ENCOUNTER — TELEPHONE (OUTPATIENT)
Dept: INFECTIOUS DISEASES | Facility: HOSPITAL | Age: 62
End: 2022-09-19

## 2022-09-19 VITALS
TEMPERATURE: 98 F | HEART RATE: 82 BPM | DIASTOLIC BLOOD PRESSURE: 70 MMHG | OXYGEN SATURATION: 95 % | WEIGHT: 250 LBS | RESPIRATION RATE: 18 BRPM | HEIGHT: 72 IN | BODY MASS INDEX: 33.86 KG/M2 | SYSTOLIC BLOOD PRESSURE: 117 MMHG

## 2022-09-19 DIAGNOSIS — R89.9 ABNORMAL LABORATORY TEST: Primary | ICD-10-CM

## 2022-09-19 LAB
ABO + RH BLD: NORMAL
ALBUMIN SERPL BCP-MCNC: 3.7 G/DL (ref 3.5–5.2)
ALP SERPL-CCNC: 81 U/L (ref 55–135)
ALT SERPL W/O P-5'-P-CCNC: 57 U/L (ref 10–44)
ANION GAP SERPL CALC-SCNC: 8 MMOL/L (ref 8–16)
AST SERPL-CCNC: 36 U/L (ref 10–40)
BASOPHILS # BLD AUTO: 0.03 K/UL (ref 0–0.2)
BASOPHILS NFR BLD: 0.5 % (ref 0–1.9)
BILIRUB SERPL-MCNC: 1.1 MG/DL (ref 0.1–1)
BLD GP AB SCN CELLS X3 SERPL QL: NORMAL
BSA FOR ECHO PROCEDURE: 2.49 M2
BUN SERPL-MCNC: 39 MG/DL (ref 6–20)
CALCIUM SERPL-MCNC: 9 MG/DL (ref 8.7–10.5)
CHLORIDE SERPL-SCNC: 99 MMOL/L (ref 95–110)
CO2 SERPL-SCNC: 30 MMOL/L (ref 23–29)
CREAT SERPL-MCNC: 2.1 MG/DL (ref 0.5–1.4)
DIFFERENTIAL METHOD: ABNORMAL
EOSINOPHIL # BLD AUTO: 0.1 K/UL (ref 0–0.5)
EOSINOPHIL NFR BLD: 0.9 % (ref 0–8)
ERYTHROCYTE [DISTWIDTH] IN BLOOD BY AUTOMATED COUNT: 15.9 % (ref 11.5–14.5)
EST. GFR  (NO RACE VARIABLE): 35.4 ML/MIN/1.73 M^2
GLUCOSE SERPL-MCNC: 156 MG/DL (ref 70–110)
HCT VFR BLD AUTO: 38.7 % (ref 40–54)
HGB BLD-MCNC: 12.4 G/DL (ref 14–18)
IMM GRANULOCYTES # BLD AUTO: 0.02 K/UL (ref 0–0.04)
IMM GRANULOCYTES NFR BLD AUTO: 0.3 % (ref 0–0.5)
LYMPHOCYTES # BLD AUTO: 0.6 K/UL (ref 1–4.8)
LYMPHOCYTES NFR BLD: 8.4 % (ref 18–48)
MCH RBC QN AUTO: 32 PG (ref 27–31)
MCHC RBC AUTO-ENTMCNC: 32 G/DL (ref 32–36)
MCV RBC AUTO: 100 FL (ref 82–98)
MONOCYTES # BLD AUTO: 0.7 K/UL (ref 0.3–1)
MONOCYTES NFR BLD: 10.1 % (ref 4–15)
NEUTROPHILS # BLD AUTO: 5.2 K/UL (ref 1.8–7.7)
NEUTROPHILS NFR BLD: 79.8 % (ref 38–73)
NRBC BLD-RTO: 0 /100 WBC
PISA TR MAX VEL: 3.17 M/S
PLATELET # BLD AUTO: 94 K/UL (ref 150–450)
PMV BLD AUTO: 11.3 FL (ref 9.2–12.9)
POTASSIUM SERPL-SCNC: 4.5 MMOL/L (ref 3.5–5.1)
PROT SERPL-MCNC: 8.1 G/DL (ref 6–8.4)
RBC # BLD AUTO: 3.87 M/UL (ref 4.6–6.2)
SODIUM SERPL-SCNC: 137 MMOL/L (ref 136–145)
TR MAX PG: 40 MMHG
WBC # BLD AUTO: 6.54 K/UL (ref 3.9–12.7)

## 2022-09-19 PROCEDURE — 80053 COMPREHEN METABOLIC PANEL: CPT | Performed by: NURSE PRACTITIONER

## 2022-09-19 PROCEDURE — 86850 RBC ANTIBODY SCREEN: CPT | Performed by: NURSE PRACTITIONER

## 2022-09-19 PROCEDURE — 85025 COMPLETE CBC W/AUTO DIFF WBC: CPT | Performed by: NURSE PRACTITIONER

## 2022-09-19 PROCEDURE — 99283 EMERGENCY DEPT VISIT LOW MDM: CPT

## 2022-09-19 NOTE — TELEPHONE ENCOUNTER
Called with critical h/h 5/15. Contacted patient and wife and requested that he come to the ED for repeat CBC .   He is currently cutting the grass on a riding mower outside. (With a picc line!).   He and wife agree to come.   Spoke with ED MD, Dr. Kyle

## 2022-09-20 NOTE — FIRST PROVIDER EVALUATION
" Emergency Department TeleTriage Encounter Note      CHIEF COMPLAINT    Chief Complaint   Patient presents with    abnormal labs     States home health [facility] labs today told low blood count to come to er       VITAL SIGNS   Initial Vitals [09/19/22 1945]   BP Pulse Resp Temp SpO2   117/70 82 18 98.1 °F (36.7 °C) 95 %      MAP       --            ALLERGIES    Review of patient's allergies indicates:   Allergen Reactions    Vasopressin Anaphylaxis and Other (See Comments)     Increased pressure in his head; felt like his eyeballs and veins were going to pop out of his head.     Heparin Other (See Comments)     Other reaction(s): "depleted my blood platets"    Decreased platelet count    Heparin analogues Other (See Comments)     Depleted WBC count    Vasopressin analogues Other (See Comments)     "felt like eyes going to pop out of my head"    Morphine Hallucinations, Other (See Comments) and Anxiety     Other reaction(s): Hallucinations  Paranoid, hallucinations         PROVIDER TRIAGE NOTE  This is a teletriage evaluation of a 62 y.o. male presenting to the ED with c/o anemia.  Pt states home health sarth[facility] blood today and pt was advised his H&H was low.  Pt was sent to ED for evaluation.  Pt denies any change in activity or weakness.Pt was mowing grass when he was called about the blood counts.      PE: VSS.  NAD noted.      Plan: repeat labs    Limited physical exam via telehealth: The patient is awake, alert, answering questions appropriately and is not in respiratory distress. All ED beds are full at present; patient notified of this status.  Patient seen and medically screened by Nurse Practitioner via teletriage.      Initial orders will be placed and care will be transferred to an alternate provider when patient is roomed for a full evaluation. Any additional orders and the final disposition will be determined by that provider.         ORDERS  Labs Reviewed   CBC W/ AUTO DIFFERENTIAL   COMPREHENSIVE METABOLIC " PANEL   TYPE & SCREEN       ED Orders (720h ago, onward)      Start Ordered     Status Ordering Provider    09/19/22 2002 09/19/22 2001  Comprehensive metabolic panel  STAT         Ordered HUBER HOLLOWAY    09/19/22 2002 09/19/22 2001  Type & Screen  STAT         Ordered HUBER HOLLOWAY    09/19/22 2001 09/19/22 2000  CBC auto differential  STAT         Ordered HUBER HOLLOWAY              Virtual Visit Note: The provider triage portion of this emergency department evaluation and documentation was performed via [employer], a HIPAA-compliant telemedicine application, in concert with a tele-presenter in the room. A face to face patient evaluation with one of my colleagues will occur once the patient is placed in an emergency department room.      DISCLAIMER: This note was prepared with [employer] voice recognition transcription software. Garbled syntax, mangled pronouns, and other bizarre constructions may be attributed to that software system.

## 2022-09-20 NOTE — ED PROVIDER NOTES
"Encounter Date: 9/19/2022       History     Chief Complaint   Patient presents with    abnormal labs     States home health [facility] labs today told low blood count to come to er     HPI    Seen and evaluated.  Presented without significant complaint.  Patient was cutting his grass and was contacted by his infectious disease doctor who monitors laboratory evaluations as patient is taking home Rocephin for bacteremia.  An outpatient laboratory resulted significantly lowered hemoglobin below his baseline.  He was sent in for lab evaluation and confirmation of this lab value.    Review of patient's allergies indicates:   Allergen Reactions    Vasopressin Anaphylaxis and Other (See Comments)     Increased pressure in his head; felt like his eyeballs and veins were going to pop out of his head.     Heparin Other (See Comments)     Other reaction(s): "depleted my blood platets"    Decreased platelet count    Heparin analogues Other (See Comments)     Depleted WBC count    Vasopressin analogues Other (See Comments)     "felt like eyes going to pop out of my head"    Morphine Hallucinations, Other (See Comments) and Anxiety     Other reaction(s): Hallucinations  Paranoid, hallucinations       Past Medical History:   Diagnosis Date    AICD (automatic cardioverter/defibrillator) present     Anemia, chronic disease 7/27/2021    Anemia, unspecified 7/27/2021    Anxiety and depression 2012    CAD (coronary artery disease) 2012    Cardiomegaly 2016    CHF (congestive heart failure) 2012    Cholecystitis 2017    Complete heart block 2012    Diabetic foot ulcer     DVT (deep venous thrombosis) 2012    And pulmonary embolus    GERD (gastroesophageal reflux disease) 2010    Heparin induced thrombocytopenia     Hyperlipemia 2011    IDDM (insulin dependent diabetes mellitus) 1983    With neuropathy    Ischemic cardiomyopathy 2012    MI (myocardial infarction) 2012    Morbid obesity     MRSA (methicillin resistant Staphylococcus aureus) " infection 01/19/2019    Noncompliance with therapeutic plan 2019    Normochromic normocytic anemia 7/27/2021    Osteomyelitis of right foot 01/2019    Pulmonary edema 2019    Respiratory failure 2019    Sepsis 01/2019    Due to cellulitis right leg    Sleep apnea 2013    Thrombocytopathy 2012    Venous stasis dermatitis of both lower extremities      Past Surgical History:   Procedure Laterality Date    CARDIAC PACEMAKER PLACEMENT  12/2012    CARDIAC PACEMAKER PLACEMENT  10/04/2018    battery replacement    CORONARY ARTERY BYPASS GRAFT  06/2012    ESOPHAGOGASTRODUODENOSCOPY  01/31/2017    SAMARA FILTER PLACEMENT  2012    vena cava    LUMBAR SYMPATHETIC NERVE BLOCK Right 8/22/2019    Procedure: BLOCK, NERVE, SYMPATHETIC, LUMBAR;  Surgeon: Tashi Good MD;  Location: [facility] OR;  Service: Pain Management;  Laterality: Right;  RIGHT SYMPATHETIC NERVE BLOCK     Family History   Problem Relation Age of Onset    Arthritis Mother     Diabetes Mother     Cancer Father     Heart disease Father     Stroke Father      Social History     Tobacco Use    Smoking status: Former     Packs/day: 2.00     Years: 38.00     Pack years: 76.00     Types: Cigarettes     Quit date: 2012     Years since quitting: 10.7    Smokeless tobacco: Never   Substance Use Topics    Alcohol use: No     Review of Systems   Constitutional:  Negative for fever.   Respiratory:  Negative for shortness of breath.    Cardiovascular:  Negative for chest pain.   Gastrointestinal:  Negative for abdominal pain.   Neurological:  Negative for weakness.   Psychiatric/Behavioral:  Negative for confusion.    All other systems reviewed and are negative.    Physical Exam     Initial Vitals [09/19/22 1945]   BP Pulse Resp Temp SpO2   117/70 82 18 98.1 °F (36.7 °C) 95 %      MAP       --         Physical Exam    Nursing note and vitals reviewed.  Constitutional: No distress.   HENT:   Head: Normocephalic and atraumatic.   Eyes: Conjunctivae are normal.   Cardiovascular:   Normal rate and regular rhythm.           Pulmonary/Chest: Effort normal.     Neurological: He is alert and oriented to person, place, and time.   Skin: Skin is warm and dry.   Psychiatric: He has a normal mood and affect. His speech is normal.       ED Course   Procedures  Labs Reviewed   CBC W/ AUTO DIFFERENTIAL - Abnormal; Notable for the following components:       Result Value    RBC 3.87 (*)     Hemoglobin 12.4 (*)     Hematocrit 38.7 (*)      (*)     MCH 32.0 (*)     RDW 15.9 (*)     Platelets 94 (*)     Lymph # 0.6 (*)     Gran % 79.8 (*)     Lymph % 8.4 (*)     All other components within normal limits   COMPREHENSIVE METABOLIC PANEL - Abnormal; Notable for the following components:    CO2 30 (*)     Glucose 156 (*)     BUN 39 (*)     Creatinine 2.1 (*)     Total Bilirubin 1.1 (*)     ALT 57 (*)     eGFR 35.4 (*)     All other components within normal limits   TYPE & SCREEN          Imaging Results    None          Medications - No data to display  Medical Decision Making:   Initial Assessment:   Presented for repeat lab evaluation as he had previously been reported to have a decreased hemoglobin.  He was in his usual state of health and cutting the grass when he was asked to return for evaluation.  He denies active complaints.  Laboratory evaluation confirms that was likely a lab error, and his hemoglobin is consistent previous blood draws.  He will be discharged home to follow with his outpatient provider.                        Clinical Impression:   Final diagnoses:  [R89.9] Abnormal laboratory test (Primary)        ED Disposition Condition    Discharge Stable          ED Prescriptions    None       Follow-up Information       Follow up With Specialties Details Why Contact Info Additional Information    [facility]   [address] [place] [address] [place] [address]  [phone]       [facility] hospital - Emergency Dept Emergency Medicine   [address]  [facility] [address]  [phone] 1st floor             Mychal Kyle Jr., MD  09/20/22 1056

## 2022-09-21 ENCOUNTER — INFUSION (OUTPATIENT)
Dept: INFUSION THERAPY | Facility: HOSPITAL | Age: 62
End: 2022-09-21
Attending: INTERNAL MEDICINE
Payer: MEDICARE

## 2022-09-21 ENCOUNTER — TELEPHONE (OUTPATIENT)
Dept: INFECTIOUS DISEASES | Facility: CLINIC | Age: 62
End: 2022-09-21

## 2022-09-21 VITALS
SYSTOLIC BLOOD PRESSURE: 114 MMHG | RESPIRATION RATE: 16 BRPM | OXYGEN SATURATION: 94 % | TEMPERATURE: 98 F | HEART RATE: 86 BPM | DIASTOLIC BLOOD PRESSURE: 70 MMHG

## 2022-09-21 DIAGNOSIS — Z45.2 ENCOUNTER FOR ADJUSTMENT AND MANAGEMENT OF VASCULAR ACCESS DEVICE: ICD-10-CM

## 2022-09-21 DIAGNOSIS — T82.898S OCCLUSION OF PERIPHERALLY INSERTED CENTRAL CATHETER (PICC) LINE, SEQUELA: Primary | ICD-10-CM

## 2022-09-21 DIAGNOSIS — T82.898S OCCLUSION OF PERIPHERALLY INSERTED CENTRAL CATHETER (PICC) LINE, SEQUELA: ICD-10-CM

## 2022-09-21 PROCEDURE — 96376 TX/PRO/DX INJ SAME DRUG ADON: CPT

## 2022-09-21 PROCEDURE — 63600175 PHARM REV CODE 636 W HCPCS: Mod: JG | Performed by: INTERNAL MEDICINE

## 2022-09-21 PROCEDURE — 96374 THER/PROPH/DIAG INJ IV PUSH: CPT

## 2022-09-21 RX ADMIN — ALTEPLASE 2 MG: 2.2 INJECTION, POWDER, LYOPHILIZED, FOR SOLUTION INTRAVENOUS at 02:09

## 2022-09-21 NOTE — PROGRESS NOTES
Pt presented for nonfunctioning picc. Both lumens difficult to fllush with minimal blood return noted. Cath jessi administered to both lumens. Waited 30 min. Blood aspirated and flushes easily. Pt instructed to flush both lumens at least twice daily. D/c;d home

## 2022-09-21 NOTE — TELEPHONE ENCOUNTER
Emily with [facility] called [phone]    Both lines of patient's PICC are clogged     Patient is unable to administer his IV abx.            I spoke with Emily ( [facility] nurse )   And  Ms Bunn ( [facility] scheduling )  to advise   Of declot orders for patient     Ms Sepideh in scheduling called the patient and   He will come to [facility] today for declot of PICC.   JUAN Pitt  CCMA  9/21/22

## 2022-09-30 DIAGNOSIS — G89.4 CHRONIC PAIN DISORDER: Primary | ICD-10-CM

## 2022-09-30 RX ORDER — TRAMADOL HYDROCHLORIDE 50 MG/1
50 TABLET ORAL EVERY 8 HOURS PRN
Qty: 90 TABLET | Refills: 2 | Status: SHIPPED | OUTPATIENT
Start: 2022-10-02 | End: 2022-10-31

## 2022-10-03 ENCOUNTER — OFFICE VISIT (OUTPATIENT)
Dept: PAIN MEDICINE | Facility: CLINIC | Age: 62
End: 2022-10-03
Payer: MEDICARE

## 2022-10-03 VITALS
HEART RATE: 87 BPM | DIASTOLIC BLOOD PRESSURE: 71 MMHG | BODY MASS INDEX: 33.13 KG/M2 | SYSTOLIC BLOOD PRESSURE: 112 MMHG | WEIGHT: 250 LBS | HEIGHT: 73 IN

## 2022-10-03 DIAGNOSIS — E11.42 DIABETIC POLYNEUROPATHY ASSOCIATED WITH TYPE 2 DIABETES MELLITUS: Primary | ICD-10-CM

## 2022-10-03 DIAGNOSIS — M79.672 BILATERAL FOOT PAIN: ICD-10-CM

## 2022-10-03 DIAGNOSIS — M79.671 BILATERAL FOOT PAIN: ICD-10-CM

## 2022-10-03 DIAGNOSIS — G89.4 CHRONIC PAIN DISORDER: ICD-10-CM

## 2022-10-03 PROCEDURE — 99214 OFFICE O/P EST MOD 30 MIN: CPT | Mod: S$GLB,,, | Performed by: PHYSICIAN ASSISTANT

## 2022-10-03 PROCEDURE — 99999 PR PBB SHADOW E&M-EST. PATIENT-LVL III: CPT | Mod: PBBFAC,,, | Performed by: PHYSICIAN ASSISTANT

## 2022-10-03 PROCEDURE — 1159F PR MEDICATION LIST DOCUMENTED IN MEDICAL RECORD: ICD-10-PCS | Mod: CPTII,S$GLB,, | Performed by: PHYSICIAN ASSISTANT

## 2022-10-03 PROCEDURE — 99999 PR PBB SHADOW E&M-EST. PATIENT-LVL III: ICD-10-PCS | Mod: PBBFAC,,, | Performed by: PHYSICIAN ASSISTANT

## 2022-10-03 PROCEDURE — 3008F BODY MASS INDEX DOCD: CPT | Mod: CPTII,S$GLB,, | Performed by: PHYSICIAN ASSISTANT

## 2022-10-03 PROCEDURE — 3078F DIAST BP <80 MM HG: CPT | Mod: CPTII,S$GLB,, | Performed by: PHYSICIAN ASSISTANT

## 2022-10-03 PROCEDURE — 1111F PR DISCHARGE MEDS RECONCILED W/ CURRENT OUTPATIENT MED LIST: ICD-10-PCS | Mod: CPTII,S$GLB,, | Performed by: PHYSICIAN ASSISTANT

## 2022-10-03 PROCEDURE — 1159F MED LIST DOCD IN RCRD: CPT | Mod: CPTII,S$GLB,, | Performed by: PHYSICIAN ASSISTANT

## 2022-10-03 PROCEDURE — 1111F DSCHRG MED/CURRENT MED MERGE: CPT | Mod: CPTII,S$GLB,, | Performed by: PHYSICIAN ASSISTANT

## 2022-10-03 PROCEDURE — 3008F PR BODY MASS INDEX (BMI) DOCUMENTED: ICD-10-PCS | Mod: CPTII,S$GLB,, | Performed by: PHYSICIAN ASSISTANT

## 2022-10-03 PROCEDURE — 3074F PR MOST RECENT SYSTOLIC BLOOD PRESSURE < 130 MM HG: ICD-10-PCS | Mod: CPTII,S$GLB,, | Performed by: PHYSICIAN ASSISTANT

## 2022-10-03 PROCEDURE — 3051F PR MOST RECENT HEMOGLOBIN A1C LEVEL 7.0 - < 8.0%: ICD-10-PCS | Mod: CPTII,S$GLB,, | Performed by: PHYSICIAN ASSISTANT

## 2022-10-03 PROCEDURE — 3074F SYST BP LT 130 MM HG: CPT | Mod: CPTII,S$GLB,, | Performed by: PHYSICIAN ASSISTANT

## 2022-10-03 PROCEDURE — 3051F HG A1C>EQUAL 7.0%<8.0%: CPT | Mod: CPTII,S$GLB,, | Performed by: PHYSICIAN ASSISTANT

## 2022-10-03 PROCEDURE — 99214 PR OFFICE/OUTPT VISIT, EST, LEVL IV, 30-39 MIN: ICD-10-PCS | Mod: S$GLB,,, | Performed by: PHYSICIAN ASSISTANT

## 2022-10-03 PROCEDURE — 3078F PR MOST RECENT DIASTOLIC BLOOD PRESSURE < 80 MM HG: ICD-10-PCS | Mod: CPTII,S$GLB,, | Performed by: PHYSICIAN ASSISTANT

## 2022-10-03 NOTE — PROGRESS NOTES
Referring Physician: No ref. provider found    PCP: YRN Pollard      CC:  Bilateral foot pain      Interval history:  Mr. Bullard is a 62 y.o. male with bilateral foot pain due to peripheral neuropathy diabetic neuropathy.   Foot pain remains tolerable. Stabbing pains resolved. He continues to take Gabapentin 600 mg t.i.d. with mild benefits. Nortriptyline 25 mg made him to drowsy so he discontinued it.  He continues to take tramadol 50 mg q.8 hours as needed with mild-to-moderate benefit.  Denies any side effects with the medication.  Able perform his ADLs with the medication.  He underwent a right-sided lumbar sympathetic nerve block which provided moderate benefit that was shortlived. Currently on IV antibiotics for bacteremia. He developed bilateral knee pain. Right intra articular knee injection with benefit. He denies any worsening weakness.  No bowel bladder changes. Pain today is rated 4/10.    Prior HPI:   Ana Bullard is a 62 y.o. male referred to us for bilateral foot pain. Patient with long history of diabetes.  He states neuropathy worsen after his CABG.  Bilateral foot pain has worsened over past 7 years.  He presents to us constant burning, sharp pain in his bilateral feet.  He also has some similar issues in his bilateral hands, but foot pain is worse.  Pain worsens prolonged sitting and standing.  He currently takes gabapentin 600 mg t.i.d. with mild benefits.  Increasing doses causes sedation.  He also takes tramadol q.8 hours as needed with mild-to-moderate benefit.  He denies any worsening weakness.  No bowel bladder changes.    Interventional history:  Right LSB on 09/2019 with 50% relief for 2 weeks.    ROS:  CONSTITUTIONAL: No fevers, chills, night sweats, wt. loss, appetite changes  SKIN: no rashes or itching  ENT: No headaches, head trauma, vision changes, or eye pain  LYMPH NODES: None noticed   CV: No chest pain, palpitations.   RESP: No shortness of breath, dyspnea on  exertion, cough, wheezing, or hemoptysis  GI: No nausea, emesis, diarrhea, constipation, melena, hematochezia, pain.    : No dysuria, hematuria, urgency, or frequency   HEME: No easy bruising, bleeding problems  PSYCHIATRIC: No depression, anxiety, psychosis, hallucinations.  NEURO: No seizures, memory loss, dizziness or difficulty sleeping  MSK:  Positive HPI      Past Medical History:   Diagnosis Date    AICD (automatic cardioverter/defibrillator) present     Anemia, chronic disease 7/27/2021    Anemia, unspecified 7/27/2021    Anxiety and depression 2012    CAD (coronary artery disease) 2012    Cardiomegaly 2016    CHF (congestive heart failure) 2012    Cholecystitis 2017    Complete heart block 2012    Diabetic foot ulcer     DVT (deep venous thrombosis) 2012    And pulmonary embolus    GERD (gastroesophageal reflux disease) 2010    Heparin induced thrombocytopenia     Hyperlipemia 2011    IDDM (insulin dependent diabetes mellitus) 1983    With neuropathy    Ischemic cardiomyopathy 2012    MI (myocardial infarction) 2012    Morbid obesity     MRSA (methicillin resistant Staphylococcus aureus) infection 01/19/2019    Noncompliance with therapeutic plan 2019    Normochromic normocytic anemia 7/27/2021    Osteomyelitis of right foot 01/2019    Pulmonary edema 2019    Respiratory failure 2019    Sepsis 01/2019    Due to cellulitis right leg    Sleep apnea 2013    Thrombocytopathy 2012    Venous stasis dermatitis of both lower extremities      Past Surgical History:   Procedure Laterality Date    CARDIAC PACEMAKER PLACEMENT  12/2012    CARDIAC PACEMAKER PLACEMENT  10/04/2018    battery replacement    CORONARY ARTERY BYPASS GRAFT  06/2012    ESOPHAGOGASTRODUODENOSCOPY  01/31/2017    SAMARA FILTER PLACEMENT  2012    vena cava    LUMBAR SYMPATHETIC NERVE BLOCK Right 8/22/2019    Procedure: BLOCK, NERVE, SYMPATHETIC, LUMBAR;  Surgeon: Tashi Good MD;  Location: [facility];  Service: Pain Management;  Laterality:  "Right;  RIGHT SYMPATHETIC NERVE BLOCK     Family History   Problem Relation Age of Onset    Arthritis Mother     Diabetes Mother     Cancer Father     Heart disease Father     Stroke Father      Social History     Socioeconomic History    Marital status:    Tobacco Use    Smoking status: Former     Packs/day: 2.00     Years: 38.00     Pack years: 76.00     Types: Cigarettes     Quit date: 2012     Years since quitting: 10.7    Smokeless tobacco: Never   Substance and Sexual Activity    Alcohol use: No    Sexual activity: Never         Medications/Allergies: See med card  Vitals:    10/03/22 0900   BP: 112/71   Pulse: 87   Weight: 113.4 kg (250 lb)   Height: 6' 1" (1.854 m)   PainSc:   4   PainLoc: Foot       GENERAL: A and O x3, the patient appears well groomed and is in no acute distress.  Skin: No rashes or obvious lesions  HEENT: normocephalic, atraumatic  CARDIOVASCULAR:  RRR  LUNGS: non labored breathing  ABDOMEN: soft, nontender   UPPER EXTREMITIES: Normal alignment, normal range of motion, no atrophy, no skin changes,  hair growth and nail growth normal and equal bilaterally. No swelling, no tenderness.    LOWER EXTREMITIES:  Normal alignment, no atrophy, no skin changes,  hair growth and nail growth normal and equal bilaterally. No swelling. Tenderness to right lateral patella.   LUMBAR SPINE  Lumbar spine: ROM is limited with flexion extension and oblique extension with moderate increased pain.    Samuel's test causes no increased pain on either side.    Supine straight leg raise is negative bilaterally.    Internal and external rotation of the hip causes no increased pain on either side.  Myofascial exam: No tenderness to palpation across lumbar paraspinous muscles.        MENTAL STATUS: normal orientation, speech, language, and fund of knowledge for social situation.  Emotional state appropriate.     CRANIAL NERVES:  II:         PERRL bilaterally,   III,IV,VI: EOMI.    V:         Facial sensation " equal bilaterally  VII:       Facial motor function normal.  VIII:      Hearing equal to finger rub bilaterally  IX/X:    Gag normal, palate symmetric  XI:        Shoulder shrug equal, head turn equal  XII:       Tongue midline without fasciculations        MOTOR: Tone and bulk: normal bilateral upper and lower Strength: normal   Delt      Bi         Tri        WE      WF                        R          5          5          5          5          5          5            L          5          5          5          5          5          5               IP         ADD     ABD     Quad   TA        Gas      HAM  R          5          5          5          5          5          5          5  L          5          5          5          5          5          5          5     SENSATION:  Decreased globally of bilateral feet  REFLEXES: normal, symmetric, nonbrisk.  Toes down, no clonus. No hoffmans.  GAIT: normal rise, base, steps, and arm swing.      Imaging:  None    Assessment:  Ana Bullard is a 62 y.o. male with bilateral foot pain  1. Diabetic polyneuropathy associated with type 2 diabetes mellitus    2. Bilateral foot pain    3. Chronic pain disorder      Plan:  1. I have stressed the importance of physical activity and exercise to improve overall health  2.  Discussed disease progression of peripheral neuropathy.  Continue gabapentin as prescribed by PCP.  Consider nortriptyline 10 mg in the future  3.  He does request continue tramadol with us.  I believe this is very reasonable.   reviewed.  He takes tramadol 50 mg q.8 hours as needed.  Previous UDS consistent. UDS LCV consistent  4.  May consider repeat lumbar sympathetic block if pain is exacerbated.  Briefly discussed spinal cord stimulation as well.  5.   Continue treatment for bacteremia   6. Follow-up in 3 months  Medication management provided by  Dr. Good

## 2022-10-13 ENCOUNTER — DOCUMENT SCAN (OUTPATIENT)
Dept: HOME HEALTH SERVICES | Facility: HOSPITAL | Age: 62
End: 2022-10-13
Payer: MEDICARE

## 2022-10-20 ENCOUNTER — OFFICE VISIT (OUTPATIENT)
Dept: PODIATRY | Facility: CLINIC | Age: 62
End: 2022-10-20
Payer: MEDICARE

## 2022-10-20 VITALS
WEIGHT: 250 LBS | RESPIRATION RATE: 18 BRPM | OXYGEN SATURATION: 97 % | BODY MASS INDEX: 33.13 KG/M2 | HEIGHT: 73 IN | HEART RATE: 60 BPM

## 2022-10-20 DIAGNOSIS — E11.49 TYPE II DIABETES MELLITUS WITH NEUROLOGICAL MANIFESTATIONS: ICD-10-CM

## 2022-10-20 DIAGNOSIS — I73.9 PERIPHERAL VASCULAR DISEASE: ICD-10-CM

## 2022-10-20 DIAGNOSIS — L97.512 SKIN ULCER OF RIGHT FOOT WITH FAT LAYER EXPOSED: Primary | ICD-10-CM

## 2022-10-20 DIAGNOSIS — I87.2 VENOUS STASIS DERMATITIS OF BOTH LOWER EXTREMITIES: ICD-10-CM

## 2022-10-20 DIAGNOSIS — M20.41 HAMMER TOES, BILATERAL: ICD-10-CM

## 2022-10-20 DIAGNOSIS — L60.2 OG (ONYCHOGRYPHOSIS): ICD-10-CM

## 2022-10-20 DIAGNOSIS — M20.42 HAMMER TOES, BILATERAL: ICD-10-CM

## 2022-10-20 PROCEDURE — 1159F MED LIST DOCD IN RCRD: CPT | Mod: CPTII,S$GLB,, | Performed by: PODIATRIST

## 2022-10-20 PROCEDURE — 1159F PR MEDICATION LIST DOCUMENTED IN MEDICAL RECORD: ICD-10-PCS | Mod: CPTII,S$GLB,, | Performed by: PODIATRIST

## 2022-10-20 PROCEDURE — 11042 WOUND DEBRIDEMENT: ICD-10-PCS | Mod: S$GLB,,, | Performed by: PODIATRIST

## 2022-10-20 PROCEDURE — 99213 OFFICE O/P EST LOW 20 MIN: CPT | Mod: 25,S$GLB,, | Performed by: PODIATRIST

## 2022-10-20 PROCEDURE — 1160F RVW MEDS BY RX/DR IN RCRD: CPT | Mod: CPTII,S$GLB,, | Performed by: PODIATRIST

## 2022-10-20 PROCEDURE — 3051F HG A1C>EQUAL 7.0%<8.0%: CPT | Mod: CPTII,S$GLB,, | Performed by: PODIATRIST

## 2022-10-20 PROCEDURE — 99213 PR OFFICE/OUTPT VISIT, EST, LEVL III, 20-29 MIN: ICD-10-PCS | Mod: 25,S$GLB,, | Performed by: PODIATRIST

## 2022-10-20 PROCEDURE — 11042 DBRDMT SUBQ TIS 1ST 20SQCM/<: CPT | Mod: S$GLB,,, | Performed by: PODIATRIST

## 2022-10-20 PROCEDURE — 3051F PR MOST RECENT HEMOGLOBIN A1C LEVEL 7.0 - < 8.0%: ICD-10-PCS | Mod: CPTII,S$GLB,, | Performed by: PODIATRIST

## 2022-10-20 PROCEDURE — 1160F PR REVIEW ALL MEDS BY PRESCRIBER/CLIN PHARMACIST DOCUMENTED: ICD-10-PCS | Mod: CPTII,S$GLB,, | Performed by: PODIATRIST

## 2022-10-20 NOTE — PROGRESS NOTES
"  [address] [facility] [address]  [place] [address]  Phone: [phone]   [phone]    Patient's PCP:YRN Pollard  Referring Provider: No ref. provider found    Subjective:      Chief Complaint:: Follow-up (Right foot ulcer)    CAROLIN Bullard is a 62 y.o. male who presents today for follow-up on  a complaint of right fifth mpj ulcer.  States he has been applying triple antibiotic ointment and dressing with guaze and bandage every other day. He states he has been using the [employer] flat insoles with accomodations.  Notes that a "big chunk of skin cam off the wound on Monday." Addtionally staes he is due to have PICline removed  next Monday.       Systemic Doctor: Shauna Hand PA-C  Date Last Seen: 07/05/2022  Blood Sugar: 165 yesterday  Hemoglobin A1c: 7.2    Vitals:    10/20/22 1428   Pulse: 60   Resp: 18   SpO2: 97%   Weight: 113.4 kg (250 lb)   Height: 6' 1" (1.854 m)   PainSc: 0-No pain      Shoe Size:     Past Surgical History:   Procedure Laterality Date    CARDIAC PACEMAKER PLACEMENT  12/2012    CARDIAC PACEMAKER PLACEMENT  10/04/2018    battery replacement    CORONARY ARTERY BYPASS GRAFT  06/2012    ESOPHAGOGASTRODUODENOSCOPY  01/31/2017    SAMARA FILTER PLACEMENT  2012    vena cava    LUMBAR SYMPATHETIC NERVE BLOCK Right 8/22/2019    Procedure: BLOCK, NERVE, SYMPATHETIC, LUMBAR;  Surgeon: Tashi Good MD;  Location: [facility];  Service: Pain Management;  Laterality: Right;  RIGHT SYMPATHETIC NERVE BLOCK     Past Medical History:   Diagnosis Date    AICD (automatic cardioverter/defibrillator) present     Anemia, chronic disease 7/27/2021    Anemia, unspecified 7/27/2021    Anxiety and depression 2012    CAD (coronary artery disease) 2012    Cardiomegaly 2016    CHF (congestive heart failure) 2012    Cholecystitis 2017    Complete heart block 2012    Diabetic foot ulcer     DVT (deep venous thrombosis) 2012    And pulmonary embolus    GERD (gastroesophageal reflux " "disease) 2010    Heparin induced thrombocytopenia     Hyperlipemia 2011    IDDM (insulin dependent diabetes mellitus) 1983    With neuropathy    Ischemic cardiomyopathy 2012    MI (myocardial infarction) 2012    Morbid obesity     MRSA (methicillin resistant Staphylococcus aureus) infection 01/19/2019    Noncompliance with therapeutic plan 2019    Normochromic normocytic anemia 7/27/2021    Osteomyelitis of right foot 01/2019    Pulmonary edema 2019    Respiratory failure 2019    Sepsis 01/2019    Due to cellulitis right leg    Sleep apnea 2013    Thrombocytopathy 2012    Venous stasis dermatitis of both lower extremities      Family History   Problem Relation Age of Onset    Arthritis Mother     Diabetes Mother     Cancer Father     Heart disease Father     Stroke Father         Social History:   Marital Status:   Alcohol History:  reports no history of alcohol use.  Tobacco History:  reports that he quit smoking about 10 years ago. His smoking use included cigarettes. He has a 76.00 pack-year smoking history. He has never used smokeless tobacco.  Drug History:  has no history on file for drug use.    Review of patient's allergies indicates:   Allergen Reactions    Vasopressin Anaphylaxis and Other (See Comments)     Increased pressure in his head; felt like his eyeballs and veins were going to pop out of his head.     Heparin Other (See Comments)     Other reaction(s): "depleted my blood platets"    Decreased platelet count    Heparin analogues Other (See Comments)     Depleted WBC count    Vasopressin analogues Other (See Comments)     "felt like eyes going to pop out of my head"    Morphine Hallucinations, Other (See Comments) and Anxiety     Other reaction(s): Hallucinations  Paranoid, hallucinations         Current Outpatient Medications   Medication Sig Dispense Refill    acetaminophen (TYLENOL) 500 mg Cap Take 1,000 mg by mouth every 12 (twelve) hours as needed.      " albuterol-ipratropium (DUO-NEB) 2.5 mg-0.5 mg/3 mL nebulizer solution Inhale 3 mLs into the lungs every 6 (six) hours as needed.      amiodarone (PACERONE) 200 MG Tab Take 200 mg by mouth once daily.      aspirin 81 MG Chew Take 81 mg by mouth once daily.      atorvastatin (LIPITOR) 10 MG tablet Take 1 tablet by mouth once daily.  1    BASAGLAR KWIKPEN U-100 INSULIN glargine 100 units/mL (3mL) SubQ pen 25 Units once daily.   3    carvediloL (COREG) 3.125 MG tablet Take 3.125 mg by mouth 2 (two) times daily.      CORLANOR 5 mg Tab Take 5 mg by mouth nightly.      FARXIGA 10 mg tablet Take 10 mg by mouth once daily.      furosemide (LASIX) 40 MG tablet Take 1 tablet (40 mg total) by mouth once daily. 30 tablet 1    gabapentin (NEURONTIN) 600 MG tablet Take 1 tablet by mouth 3 (three) times daily.  0    pantoprazole (PROTONIX) 40 MG tablet Take 1 tablet by mouth once daily.  0    traMADoL (ULTRAM) 50 mg tablet Take 1 tablet (50 mg total) by mouth every 8 (eight) hours as needed for Pain. 90 tablet 2    vitamin B complex-folic acid 0.4 mg Tab Take 1 tablet by mouth.       No current facility-administered medications for this visit.       Review of Systems   Constitutional:  Negative for chills, fatigue, fever and unexpected weight change.   HENT:  Negative for hearing loss and trouble swallowing.    Eyes:  Negative for photophobia and visual disturbance.   Respiratory:  Negative for cough, shortness of breath and wheezing.    Cardiovascular:  Negative for chest pain, palpitations and leg swelling.   Gastrointestinal:  Negative for abdominal pain and nausea.   Genitourinary:  Negative for dysuria and frequency.   Musculoskeletal:  Positive for gait problem. Negative for arthralgias, back pain, joint swelling and myalgias.   Skin:  Positive for color change and wound. Negative for rash.   Neurological:  Positive for numbness. Negative for tremors, seizures, weakness and headaches.   Hematological:  Does not  bruise/bleed easily.       Objective:        Physical Exam:   Foot Exam    General  General Appearance: appears stated age and healthy   Orientation: alert and oriented to person, place, and time   Affect: appropriate   Gait: antalgic       Right Foot/Ankle     Inspection and Palpation  Ecchymosis: none  Tenderness: none   Swelling: none   Skin Exam: ulcer (Grade 2 ulcer plantar 5th metatarsal head the ulcers approximately 1 cm diameter 2 mm deep there is a perimeter of slough and callus tissue to make the total size of proximally 3 cm.  There is no fluctuance no exposed bone today no purulent drainage.);   Neurovascular  Dorsalis pedis: 1+  Posterior tibial: 1+  Capillary Refill: 2+  Saphenous nerve sensation: diminished  Tibial nerve sensation: diminished  Superficial peroneal nerve sensation: diminished  Deep peroneal nerve sensation: diminished        Physical Exam  Cardiovascular:      Pulses:           Dorsalis pedis pulses are 1+ on the right side.        Posterior tibial pulses are 1+ on the right side.   Musculoskeletal:        Feet:    Feet:      Right foot:      Skin integrity: Ulcer (Grade 2 ulcer plantar 5th metatarsal head the ulcers approximately 1 cm diameter 2 mm deep there is a perimeter of slough and callus tissue to make the total size of proximally 3 cm.  There is no fluctuance no exposed bone today no purulent drainage.) present.                 Vascular Exam     Right Pulses  Dorsalis Pedis:      1+  Posterior Tibial:      1+           Imaging:            Assessment:       1. Skin ulcer of right foot with fat layer exposed    2. Type II diabetes mellitus with neurological manifestations    3. Hammer toes, bilateral    4. Venous stasis dermatitis of both lower extremities    5. Peripheral vascular disease    6. OG (onychogryphosis)      Plan:   Skin ulcer of right foot with fat layer exposed    Type II diabetes mellitus with neurological manifestations    Hammer toes, bilateral    Venous stasis  dermatitis of both lower extremities    Peripheral vascular disease    OG (onychogryphosis)    Evaluated him today the ulcer is not any deeper appears be slightly more superficial there is no signs of infection I am debrided he is continue offloaded treated with topical antibiotics return to see me in 2 months or as needed.    Return in 2 months      Wound Debridement    Date/Time: 10/20/2022 2:20 PM  Performed by: Tong Heaton DPM  Authorized by: Tong Heaton DPM     Consent Done?:  Yes (Written)  Local anesthesia used?: No      Wound Details:    Location:  Right foot    Location:  Right 5th Metatarsal Head    Type of Debridement:  Excisional       Length (cm):  2       Area (sq cm):  4       Width (cm):  2       Percent Debrided (%):  90       Depth (cm):  0.5       Total Area Debrided (sq cm):  3.6    Depth of debridement:  Subcutaneous tissue    Tissue debrided:  Dermis, Epidermis, Hypergranulation and Subcutaneous    Devitalized tissue debrided:  Biofilm, Callus, Slough and Fibrin    Instruments:  Blade, Forceps and Nippers    Bleeding:  Minimal  Hemostasis Achieved: Yes    Method Used:  Pressure  Patient tolerance:  Patient tolerated the procedure well with no immediate complications     Continue daily dressing changes accommodative inserts relieve pressure on ulcer return to see me in 2 weeks monitor for any signs of infection          Counseling:     I provided patient education verbally regarding:   Patient diagnosis, treatment options, as well as alternatives, risks, and benefits.     This note was created using Dragon voice recognition software that occasionally misinterpreted phrases or words.

## 2022-10-20 NOTE — PATIENT INSTRUCTIONS
Simple Skin Ulcer  A skin ulcer is a sore on the skin. Skin ulcers often form when blood circulation is impaired. Being bed- or wheelchair-bound can cause pressure that may lead to skin ulcers. Ulcers are generally round areas of red, swollen, thickened skin around a crater-like depression. They are often very slow to heal. If a skin ulcer isn't properly treated, it may become infected. If the infection spreads, it can cause serious health issues.    Symptoms of a skin ulcer include:  Reddish area on the skin  Skin color and texture changes  Swelling  Wound that isn't healing  Crater in the skin  Pain  Drainage or pus    Causes  There are many causes of skin ulcers. Some of these include:  Decreased blood flow to a part of the skin, vascular insufficiency  Trauma  Lack of movement of a part of the body for long periods of time  Infection  Poor hygiene  Varicose veins  Vitamin deficiency    Pressure ulcers  Pressure ulcers are a type of ulcer most commonly seen in people who are confined to bed or a wheelchair. They are caused by prolonged pressure to a spot on the skin. Pressure ulcers usually occur on the back, buttocks, or backs or sides of the legs, arms, or feet (especially the heels).    Home care  You may be prescribed antibiotics to prevent infection. If this is the case, be sure to take all of the medicine, even if your symptoms get better. You may also be given medicines to help relieve pain. Follow the healthcare providers instructions when using these medicines.    General care  Care for the skin ulcer as instructed. Always wash your hands with soap and warm water before and after caring for your wound.  Cover the ulcer with a clean, dry bandage. Remove and change the bandage as instructed. If the bandage becomes wet or dirty, change it as soon as possible.  Follow the doctors instructions about washing. You can shower, but do not soak the healing ulcer until the doctor says its OK.  Do not scratch,  rub, or pick at the healing skin.  Check the area every day for signs of infection, such as increasing pain, redness, warmth, red streaking, swelling, or pus draining from the ulcer.  When resting, raise the area where the ulcer is above the level of the heart.  Avoid smoking or drinking alcohol, as these can delay wound healing.  If you are able, try to walk regularly. This can help with circulation.  Avoid prolonged standing or sitting in one position.  The following tips can help prevent future skin ulcers:  Know your risks for skin ulcers.  Keep the skin clean and dry.  Reposition frequently.  Use protective devices such as pillows, foam wedges, and heel protectors for the knees, ankles, and heels.  Avoid immobilization.    Follow-up care  Follow up with your healthcare provider, or as advised.    When to seek medical advice  Call your healthcare provider right away if any of these occur:  Fever of 100.4°F (38°C) or higher, or as advised by your healthcare provider  Signs of infection. These include increasing pain, warmth, redness, or pus draining from the skin ulcer.  Bleeding from the skin ulcer  Pain in or around the ulcer that doesn't get better even with medicines  Increased swelling  Changes in skin color    Date Last Reviewed: 9/1/2016  © 7609-3330 [employer]. [address] [place] [address], [place] [address]. All rights reserved. This information is not intended as a substitute for professional medical care. Always follow your healthcare professional's instructions.       Your Diabetes Foot Care Program    Every day you depend on your feet to keep you moving. But when you have diabetes, your feet need special care. Even a small foot problem can become very serious. So dont take your feet for granted. By working with your diabetes healthcare team, you can learn how to protect your feet and keep them healthy.  Evaluating your feet  An evaluation helps your healthcare provider check the condition  of your feet. The evaluation includes a review of your diabetes history and overall health. It may also include a foot exam, X-rays, or other tests. These can help show problems beneath the skin that you cant see or feel.  Medical history  You will be asked about your overall health and any history of foot problems. Youll also discuss your diabetes history, such as whether your blood sugar level has changed over time. It also includes questions about sensations of pain, tingling, pins and needles, or numbness. Your healthcare provider will also want to know if you have high blood pressure and heart disease, or if you smoke. Be sure to mention any medicines (including over-the-counter), supplements, or herbal remedies you take.  Foot exam  A foot exam checks the condition of different parts of your foot. First, your skin and nails are examined for any signs of infection. Blood flow is checked by feeling for the pulses in each foot. You may also have tests to study the nerves in the foot. These include using a small filament (wire) to see how sensitive your feet are. In certain cases, you will be asked to walk a short distance to check for bone, joint, and muscle problems.  Diagnostic tests  If needed, your healthcare provider will suggest certain tests to learn more about your feet. These include:  Doppler tests to measure blood flow in the feet and lower leg.  X-rays, which can show bone or joint problems.  Other imaging tests, such as an MRI (magnetic resonance imaging), bone scan, and CT (computed tomography) scan. These can help show bone infections.  Other tests, such as vascular tests, which study the blood flow in your feet and legs. You may also have nerve studies to learn how sensitive your feet are.  Creating a foot care program  Based on the evaluation, your healthcare provider will create a foot care program for you. Your program may be as simple as starting a daily self-care routine and changing the  types of shoes your wear. It may also involve treating minor foot problems, such as a corn or blister. In some cases, surgery will be needed to treat an infection or mechanical problems, such as hammer toes.  Preventing problems  When you have diabetes, its easier to prevent problems than to treat them later on. So see your healthcare team for regular checkups and foot care. Your healthcare team can also help you learn more about caring for your feet at home. For example, you may be told to avoid walking barefoot. Or you may be told that special footwear is needed to protect your feet.  Have regular checkups  Foot problems can develop quickly. So be sure to follow your healthcare teams schedule for regular checkups. During office visits, take off your shoes and socks as soon as you get in the exam room. Ask your healthcare provider to examine your feet for problems. This will make it easier to find and treat small skin irritations before they get worse. Regular checkups can also help keep track of the blood flow and feeling in your feet. If you have neuropathy (lack of feeling in your feet), you will need to have checkups more often.  Learn about self-care  The more you know about diabetes and your feet, the easier it will be to prevent problems. Members of your healthcare team can teach you how to inspect your feet and teach you to look for warning signs. They can also give you other foot care tips. During office visits, be sure to ask any questions you have.  Date Last Reviewed: 7/1/2016  © 8729-5542 [employer]. [address] [place] [address], [place] [address]. All rights reserved. This information is not intended as a substitute for professional medical care. Always follow your healthcare professional's instructions.

## 2022-10-24 ENCOUNTER — OFFICE VISIT (OUTPATIENT)
Dept: INFECTIOUS DISEASES | Facility: CLINIC | Age: 62
End: 2022-10-24
Payer: MEDICARE

## 2022-10-24 VITALS
WEIGHT: 260.5 LBS | OXYGEN SATURATION: 95 % | TEMPERATURE: 98 F | DIASTOLIC BLOOD PRESSURE: 68 MMHG | HEART RATE: 79 BPM | BODY MASS INDEX: 34.52 KG/M2 | SYSTOLIC BLOOD PRESSURE: 122 MMHG | HEIGHT: 73 IN

## 2022-10-24 DIAGNOSIS — Z45.2 ENCOUNTER FOR REMOVAL OF PERIPHERALLY INSERTED CENTRAL CATHETER: ICD-10-CM

## 2022-10-24 DIAGNOSIS — R93.1 ABNORMAL ECHOCARDIOGRAM: ICD-10-CM

## 2022-10-24 DIAGNOSIS — Z95.810 AICD (AUTOMATIC CARDIOVERTER/DEFIBRILLATOR) PRESENT: ICD-10-CM

## 2022-10-24 DIAGNOSIS — M86.171 OTHER ACUTE OSTEOMYELITIS OF RIGHT FOOT: ICD-10-CM

## 2022-10-24 DIAGNOSIS — A49.1 GROUP B STREPTOCOCCAL INFECTION: Primary | ICD-10-CM

## 2022-10-24 DIAGNOSIS — Z79.2 ENCOUNTER FOR LONG-TERM (CURRENT) USE OF ANTIBIOTICS: ICD-10-CM

## 2022-10-24 PROCEDURE — 1159F PR MEDICATION LIST DOCUMENTED IN MEDICAL RECORD: ICD-10-PCS | Mod: CPTII,S$GLB,, | Performed by: INTERNAL MEDICINE

## 2022-10-24 PROCEDURE — 3074F SYST BP LT 130 MM HG: CPT | Mod: CPTII,S$GLB,, | Performed by: INTERNAL MEDICINE

## 2022-10-24 PROCEDURE — 99214 OFFICE O/P EST MOD 30 MIN: CPT | Mod: S$GLB,,, | Performed by: INTERNAL MEDICINE

## 2022-10-24 PROCEDURE — 3078F PR MOST RECENT DIASTOLIC BLOOD PRESSURE < 80 MM HG: ICD-10-PCS | Mod: CPTII,S$GLB,, | Performed by: INTERNAL MEDICINE

## 2022-10-24 PROCEDURE — 1159F MED LIST DOCD IN RCRD: CPT | Mod: CPTII,S$GLB,, | Performed by: INTERNAL MEDICINE

## 2022-10-24 PROCEDURE — 3074F PR MOST RECENT SYSTOLIC BLOOD PRESSURE < 130 MM HG: ICD-10-PCS | Mod: CPTII,S$GLB,, | Performed by: INTERNAL MEDICINE

## 2022-10-24 PROCEDURE — 3078F DIAST BP <80 MM HG: CPT | Mod: CPTII,S$GLB,, | Performed by: INTERNAL MEDICINE

## 2022-10-24 PROCEDURE — 3051F HG A1C>EQUAL 7.0%<8.0%: CPT | Mod: CPTII,S$GLB,, | Performed by: INTERNAL MEDICINE

## 2022-10-24 PROCEDURE — 3051F PR MOST RECENT HEMOGLOBIN A1C LEVEL 7.0 - < 8.0%: ICD-10-PCS | Mod: CPTII,S$GLB,, | Performed by: INTERNAL MEDICINE

## 2022-10-24 PROCEDURE — 99214 PR OFFICE/OUTPT VISIT, EST, LEVL IV, 30-39 MIN: ICD-10-PCS | Mod: S$GLB,,, | Performed by: INTERNAL MEDICINE

## 2022-10-24 NOTE — PATIENT INSTRUCTIONS
Picc line removed 42 cm  Leave bandage on until afternoon    Please return next Monday and for 4 mondays in a row to our lab for a blood culture and CRP    Please call me if you have fever, chills or have any other questions    Return in 6 weeks

## 2022-10-24 NOTE — PROGRESS NOTES
"Subjective:       Patient ID: Ana Bullard is a 62 y.o. male.    Chief Complaint:: Follow-up    HPI  Seen at [facility] for GBS strep bacteremia, right leg cellulitis, positive bone scan(culture from right lateral ulcer with Klebsiella),   and abnormality on initial GABRIELLA report worrisome for lead infection     Per Dr. Reddy 9/7: "I reviewed the GABRIELLA and on not convinced that that was a  vegetation lesion on the RV lead.  Group B strep is not common organism for endocarditis and the increased lead thickness could just be artifact or normal progression of fibrosis and calcification.  It was definitely not a classic lesion consistent with thrombus or endocarditis.  Echo-as an appeared to be of the same density as the lead itself. "    Discharged on rocephin which he has received for 6 weeks. He was sent to ED once for ultimately sp urious hgb. His foot is doing well, no acute problems, but CRP won't fall. (See care everywhere)        Review of patient's allergies indicates:   Allergen Reactions    Vasopressin Anaphylaxis and Other (See Comments)     Increased pressure in his head; felt like his eyeballs and veins were going to pop out of his head.     Heparin Other (See Comments)     Other reaction(s): "depleted my blood platets"    Decreased platelet count    Heparin analogues Other (See Comments)     Depleted WBC count    Vasopressin analogues Other (See Comments)     "felt like eyes going to pop out of my head"    Morphine Hallucinations, Other (See Comments) and Anxiety     Other reaction(s): Hallucinations  Paranoid, hallucinations       Past Medical History:   Diagnosis Date    AICD (automatic cardioverter/defibrillator) present     Anemia, chronic disease 07/27/2021    Anemia, unspecified 07/27/2021    Anxiety and depression 2012    CAD (coronary artery disease) 2012    Cardiomegaly 2016    CHF (congestive heart failure) 2012    Cholecystitis 2017    Complete heart block 2012    Diabetic foot ulcer     DVT (deep " venous thrombosis) 2012    And pulmonary embolus    GERD (gastroesophageal reflux disease) 2010    Heparin induced thrombocytopenia     Hyperlipemia 2011    IDDM (insulin dependent diabetes mellitus) 1983    With neuropathy    Ischemic cardiomyopathy 2012    MI (myocardial infarction) 2012    Morbid obesity     MRSA (methicillin resistant Staphylococcus aureus) infection 01/19/2019    Noncompliance with therapeutic plan 2019    Normochromic normocytic anemia 07/27/2021    Osteomyelitis of right foot 01/2019    Osteomyelitis of right foot 09/01/2022    Klebsiella from bone, GBS in blood    Pulmonary edema 2019    Respiratory failure 2019    Sepsis 01/2019    Due to cellulitis right leg    Sleep apnea 2013    Thrombocytopathy 2012    Venous stasis dermatitis of both lower extremities      Past Surgical History:   Procedure Laterality Date    CARDIAC PACEMAKER PLACEMENT  12/2012    CARDIAC PACEMAKER PLACEMENT  10/04/2018    battery replacement    CORONARY ARTERY BYPASS GRAFT  06/2012    ESOPHAGOGASTRODUODENOSCOPY  01/31/2017    SAMARA FILTER PLACEMENT  2012    vena cava    LUMBAR SYMPATHETIC NERVE BLOCK Right 8/22/2019    Procedure: BLOCK, NERVE, SYMPATHETIC, LUMBAR;  Surgeon: Tashi Good MD;  Location: [facility];  Service: Pain Management;  Laterality: Right;  RIGHT SYMPATHETIC NERVE BLOCK     Social History     Tobacco Use    Smoking status: Former     Packs/day: 2.00     Years: 38.00     Pack years: 76.00     Types: Cigarettes     Quit date: 2012     Years since quitting: 10.8    Smokeless tobacco: Never   Substance Use Topics    Alcohol use: No        Family History   Problem Relation Age of Onset    Arthritis Mother     Diabetes Mother     Cancer Father     Heart disease Father     Stroke Father          Review of Systems    Constitutional: No fever, chills, sweats, fatigue, weakness, weight loss    Eyes: No change in vision, loss of vision or diplopia, photophobia    ENT: No sore throat, mouth pains, or  "lesions    Cardiovascular: No chest pain, BAPTISTE, or pedal edema    Respiratory: No shortness of breath, BAPTISTE, mild cough, no wheeze, clear sputum, or hemoptysis    Gastrointestinal: No abdominal pain, nausea, vomiting, diarrhea,      Genitourinary: No dysuria, hematuria, strangury, retention, incontinence, nocturia,     Musculoskeletal: No new pain, joint swelling, or injuries    Integumentary: No new rashes, skin lesions. Dr. Heaton trimmed his callous right foot last week.     Neurological: No dizziness, vertigo, unusual headaches, neuropathy, loss of vision, falls    Psychiatric: No anxiety, depression    Endocrine: Blood sugars are fairly well controlled. 130-180    Lymphatic: No lymphadenopathy, blood loss, anemia, malignancy    VAD: he has some chest discomfort and sensation of congestion after his IV flush is given with antibiotics    Objective:      Blood pressure 122/68, pulse 79, temperature 97.9 °F (36.6 °C), height 6' 1" (1.854 m), weight 118.2 kg (260 lb 8 oz), SpO2 95 %. Body mass index is 34.37 kg/m².  Physical Exam      General: Alert and attentive, cooperative and in no distress    Eyes: Pupils equal, round, reactive to light, anicteric, EOMI    Neck: Supple, non-tender, no thyromegaly or masses    ENT: EAC patent, TM normal, nares patent, no oral lesions, teeth in good condition, no thrush    Cardiovascular: Regular rate and rhythm, no murmurs, rubs, or gallop    Respiratory: Lungs clear without wheezes, rales, rubs or rhonci    Gastrointestinal:  Soft, bowel sounds normal,  no mass or organomegaly, no tenderness or distention    Genitourinary: no flank tenderness    Integumentary: Skin without rashes, lesions , no stigmata of endocarditis     Vascular: No peripheral edema or phlebitis, warm and well perfused. Extensive BLE venous varicosities with venous stasis hyperpigmentation    Musculoskeletal: Ambulates without difficulty, no acute arthritis, synovitis or myositis. Normal muscle bulk and " strength    Lymphatic: No cervical, axillary, or inguinal LAD    Neurological: Normal LOC, cranial nerves, speech,   gait    Psychiatric: Normal mood, speech,  demeanor     Wound:        VAD: no redness or tenderness. Removed, 42 cm, pressure bandage applied       Recent Diagnostics:   Bone scan foot 9/6  1. Scintigraphic findings consistent with focal osteomyelitis at the fifth metatarsal head on the right.       Assessment and Plan:           Group B streptococcal infection  -     Blood culture; Standing  -     C-Reactive Protein; Standing    Abnormal echocardiogram    Other acute osteomyelitis of right foot    Encounter for long-term (current) use of antibiotics    Encounter for removal of peripherally inserted central catheter    AICD (automatic cardioverter/defibrillator) present    Picc line removed 42 cm  Leave bandage on until afternoon    Please return next Monday and for 4 mondays in a row to our lab for a blood culture and CRP    Please call me if you have fever, chills or have any other questions    Return in 6 weeks    This note was created using Dragon voice recognition software that occasionally misinterpreted phrases or words.

## 2022-10-26 LAB
ACID FAST MOD KINY STN SPEC: NORMAL
MYCOBACTERIUM SPEC QL CULT: NORMAL

## 2022-10-31 ENCOUNTER — LAB VISIT (OUTPATIENT)
Dept: LAB | Facility: HOSPITAL | Age: 62
End: 2022-10-31
Attending: INTERNAL MEDICINE
Payer: MEDICARE

## 2022-10-31 DIAGNOSIS — A49.1 GROUP B STREPTOCOCCAL INFECTION: ICD-10-CM

## 2022-10-31 LAB — CRP SERPL-MCNC: 3.51 MG/DL

## 2022-10-31 PROCEDURE — 87040 BLOOD CULTURE FOR BACTERIA: CPT | Performed by: INTERNAL MEDICINE

## 2022-10-31 PROCEDURE — 86140 C-REACTIVE PROTEIN: CPT | Performed by: INTERNAL MEDICINE

## 2022-10-31 PROCEDURE — 36415 COLL VENOUS BLD VENIPUNCTURE: CPT | Performed by: INTERNAL MEDICINE

## 2022-11-05 LAB — BACTERIA BLD CULT: NORMAL

## 2022-11-07 ENCOUNTER — LAB VISIT (OUTPATIENT)
Dept: LAB | Facility: HOSPITAL | Age: 62
End: 2022-11-07
Attending: INTERNAL MEDICINE
Payer: MEDICARE

## 2022-11-07 DIAGNOSIS — A49.1 GROUP B STREPTOCOCCAL INFECTION: ICD-10-CM

## 2022-11-07 LAB — CRP SERPL-MCNC: 2.33 MG/DL

## 2022-11-07 PROCEDURE — 86140 C-REACTIVE PROTEIN: CPT | Performed by: INTERNAL MEDICINE

## 2022-11-07 PROCEDURE — 87040 BLOOD CULTURE FOR BACTERIA: CPT | Performed by: INTERNAL MEDICINE

## 2022-11-12 LAB — BACTERIA BLD CULT: NORMAL

## 2022-11-14 ENCOUNTER — LAB VISIT (OUTPATIENT)
Dept: LAB | Facility: HOSPITAL | Age: 62
End: 2022-11-14
Attending: INTERNAL MEDICINE
Payer: MEDICARE

## 2022-11-14 DIAGNOSIS — A49.1 GROUP B STREPTOCOCCAL INFECTION: ICD-10-CM

## 2022-11-14 LAB — CRP SERPL-MCNC: 2.93 MG/DL

## 2022-11-14 PROCEDURE — 86140 C-REACTIVE PROTEIN: CPT | Performed by: INTERNAL MEDICINE

## 2022-11-14 PROCEDURE — 87040 BLOOD CULTURE FOR BACTERIA: CPT | Performed by: INTERNAL MEDICINE

## 2022-11-14 PROCEDURE — 36415 COLL VENOUS BLD VENIPUNCTURE: CPT | Performed by: INTERNAL MEDICINE

## 2022-11-19 LAB — BACTERIA BLD CULT: NORMAL

## 2022-11-23 ENCOUNTER — LAB VISIT (OUTPATIENT)
Dept: LAB | Facility: HOSPITAL | Age: 62
End: 2022-11-23
Attending: INTERNAL MEDICINE
Payer: MEDICARE

## 2022-11-23 DIAGNOSIS — A49.1 GROUP B STREPTOCOCCAL INFECTION: ICD-10-CM

## 2022-11-23 LAB — CRP SERPL-MCNC: 3.07 MG/DL

## 2022-11-23 PROCEDURE — 87040 BLOOD CULTURE FOR BACTERIA: CPT | Performed by: INTERNAL MEDICINE

## 2022-11-23 PROCEDURE — 36415 COLL VENOUS BLD VENIPUNCTURE: CPT | Performed by: INTERNAL MEDICINE

## 2022-11-23 PROCEDURE — 86140 C-REACTIVE PROTEIN: CPT | Performed by: INTERNAL MEDICINE

## 2022-11-28 LAB — BACTERIA BLD CULT: NORMAL

## 2022-12-05 PROBLEM — N17.9 ACUTE RENAL FAILURE SUPERIMPOSED ON STAGE 3B CHRONIC KIDNEY DISEASE: Status: RESOLVED | Noted: 2020-01-19 | Resolved: 2022-12-05

## 2022-12-05 PROBLEM — J96.01 ACUTE RESPIRATORY FAILURE WITH HYPOXIA: Status: RESOLVED | Noted: 2022-09-01 | Resolved: 2022-12-05

## 2022-12-05 PROBLEM — N18.32 ACUTE RENAL FAILURE SUPERIMPOSED ON STAGE 3B CHRONIC KIDNEY DISEASE: Status: RESOLVED | Noted: 2020-01-19 | Resolved: 2022-12-05

## 2022-12-20 ENCOUNTER — OFFICE VISIT (OUTPATIENT)
Dept: PODIATRY | Facility: CLINIC | Age: 62
End: 2022-12-20
Payer: MEDICARE

## 2022-12-20 VITALS — WEIGHT: 268 LBS | HEIGHT: 73 IN | BODY MASS INDEX: 35.52 KG/M2

## 2022-12-20 DIAGNOSIS — E11.49 TYPE II DIABETES MELLITUS WITH NEUROLOGICAL MANIFESTATIONS: ICD-10-CM

## 2022-12-20 DIAGNOSIS — I87.2 VENOUS STASIS DERMATITIS OF BOTH LOWER EXTREMITIES: ICD-10-CM

## 2022-12-20 DIAGNOSIS — L97.511 SKIN ULCER OF RIGHT FOOT, LIMITED TO BREAKDOWN OF SKIN: Primary | ICD-10-CM

## 2022-12-20 PROCEDURE — 3008F PR BODY MASS INDEX (BMI) DOCUMENTED: ICD-10-PCS | Mod: CPTII,S$GLB,, | Performed by: PODIATRIST

## 2022-12-20 PROCEDURE — 4010F ACE/ARB THERAPY RXD/TAKEN: CPT | Mod: CPTII,S$GLB,, | Performed by: PODIATRIST

## 2022-12-20 PROCEDURE — 3008F BODY MASS INDEX DOCD: CPT | Mod: CPTII,S$GLB,, | Performed by: PODIATRIST

## 2022-12-20 PROCEDURE — 11042 DBRDMT SUBQ TIS 1ST 20SQCM/<: CPT | Mod: S$GLB,,, | Performed by: PODIATRIST

## 2022-12-20 PROCEDURE — 3044F HG A1C LEVEL LT 7.0%: CPT | Mod: CPTII,S$GLB,, | Performed by: PODIATRIST

## 2022-12-20 PROCEDURE — 11042 WOUND DEBRIDEMENT: ICD-10-PCS | Mod: S$GLB,,, | Performed by: PODIATRIST

## 2022-12-20 PROCEDURE — 3044F PR MOST RECENT HEMOGLOBIN A1C LEVEL <7.0%: ICD-10-PCS | Mod: CPTII,S$GLB,, | Performed by: PODIATRIST

## 2022-12-20 PROCEDURE — 1160F PR REVIEW ALL MEDS BY PRESCRIBER/CLIN PHARMACIST DOCUMENTED: ICD-10-PCS | Mod: CPTII,S$GLB,, | Performed by: PODIATRIST

## 2022-12-20 PROCEDURE — 4010F PR ACE/ARB THEARPY RXD/TAKEN: ICD-10-PCS | Mod: CPTII,S$GLB,, | Performed by: PODIATRIST

## 2022-12-20 PROCEDURE — 1160F RVW MEDS BY RX/DR IN RCRD: CPT | Mod: CPTII,S$GLB,, | Performed by: PODIATRIST

## 2022-12-20 PROCEDURE — 99213 PR OFFICE/OUTPT VISIT, EST, LEVL III, 20-29 MIN: ICD-10-PCS | Mod: 25,S$GLB,, | Performed by: PODIATRIST

## 2022-12-20 PROCEDURE — 1159F PR MEDICATION LIST DOCUMENTED IN MEDICAL RECORD: ICD-10-PCS | Mod: CPTII,S$GLB,, | Performed by: PODIATRIST

## 2022-12-20 PROCEDURE — 99213 OFFICE O/P EST LOW 20 MIN: CPT | Mod: 25,S$GLB,, | Performed by: PODIATRIST

## 2022-12-20 PROCEDURE — 1159F MED LIST DOCD IN RCRD: CPT | Mod: CPTII,S$GLB,, | Performed by: PODIATRIST

## 2022-12-20 RX ORDER — RIVAROXABAN 2.5 MG/1
TABLET, FILM COATED ORAL
COMMUNITY
Start: 2022-12-01 | End: 2023-03-22

## 2022-12-20 RX ORDER — LISINOPRIL 2.5 MG/1
2.5 TABLET ORAL 2 TIMES DAILY
COMMUNITY
Start: 2022-12-01 | End: 2023-06-18 | Stop reason: HOSPADM

## 2022-12-20 RX ORDER — CEFTRIAXONE SODIUM 10 G/100ML
INJECTION, POWDER, FOR SOLUTION INTRAVENOUS
Status: ON HOLD | COMMUNITY
Start: 2022-10-11 | End: 2023-06-18 | Stop reason: HOSPADM

## 2022-12-20 RX ORDER — TRAMADOL HYDROCHLORIDE 50 MG/1
50 TABLET ORAL
COMMUNITY
Start: 2022-12-02 | End: 2022-12-28 | Stop reason: SDUPTHER

## 2022-12-20 RX ORDER — DULAGLUTIDE 0.75 MG/.5ML
0.75 INJECTION, SOLUTION SUBCUTANEOUS
Status: ON HOLD | COMMUNITY
Start: 2022-12-12 | End: 2023-10-19 | Stop reason: HOSPADM

## 2022-12-20 NOTE — PROGRESS NOTES
"  [address]  [place] [address]  Phone: [phone]   [phone]    Patient's PCP:YRN Pollard  Referring Provider: No ref. provider found    Subjective:      Chief Complaint:: Follow-up (Ulcer right foot)    CAROLIN Bullard is a 62 y.o. male who presents today for follow up of ulcer of right foot. Patient states he is having no pain. Patient is here for debridement.     Systemic Doctor: Renita Messina NP  Date Last Seen: 06/07/2022  Blood Sugar: 135  Hemoglobin A1c: 6.8    Vitals:    12/20/22 1355   Weight: 121.6 kg (268 lb)   Height: 6' 1" (1.854 m)   PainSc: 0-No pain          Past Surgical History:   Procedure Laterality Date    CARDIAC PACEMAKER PLACEMENT  12/2012    CARDIAC PACEMAKER PLACEMENT  10/04/2018    battery replacement    CORONARY ARTERY BYPASS GRAFT  06/2012    ESOPHAGOGASTRODUODENOSCOPY  01/31/2017    SAMARA FILTER PLACEMENT  2012    vena cava    LUMBAR SYMPATHETIC NERVE BLOCK Right 8/22/2019    Procedure: BLOCK, NERVE, SYMPATHETIC, LUMBAR;  Surgeon: Tashi Good MD;  Location: [facility] OR;  Service: Pain Management;  Laterality: Right;  RIGHT SYMPATHETIC NERVE BLOCK     Past Medical History:   Diagnosis Date    AICD (automatic cardioverter/defibrillator) present     Anemia, chronic disease 07/27/2021    Anemia, unspecified 07/27/2021    Anxiety and depression 2012    CAD (coronary artery disease) 2012    Cardiomegaly 2016    CHF (congestive heart failure) 2012    Cholecystitis 2017    Complete heart block 2012    Diabetic foot ulcer     DVT (deep venous thrombosis) 2012    And pulmonary embolus    GERD (gastroesophageal reflux disease) 2010    Heparin induced thrombocytopenia     Hyperlipemia 2011    IDDM (insulin dependent diabetes mellitus) 1983    With neuropathy    Ischemic cardiomyopathy 2012    MI (myocardial infarction) 2012    Morbid obesity     MRSA (methicillin resistant Staphylococcus aureus) infection 01/19/2019    " "Noncompliance with therapeutic plan 2019    Normochromic normocytic anemia 07/27/2021    Osteomyelitis of right foot 01/2019    Osteomyelitis of right foot 09/01/2022    Klebsiella from bone, GBS in blood    Pulmonary edema 2019    Respiratory failure 2019    Sepsis 01/2019    Due to cellulitis right leg    Sleep apnea 2013    Thrombocytopathy 2012    Venous stasis dermatitis of both lower extremities      Family History   Problem Relation Age of Onset    Arthritis Mother     Diabetes Mother     Cancer Father     Heart disease Father     Stroke Father         Social History:   Marital Status:   Alcohol History:  reports no history of alcohol use.  Tobacco History:  reports that he quit smoking about 10 years ago. His smoking use included cigarettes. He has a 76.00 pack-year smoking history. He has never used smokeless tobacco.  Drug History:  has no history on file for drug use.    Review of patient's allergies indicates:   Allergen Reactions    Vasopressin Anaphylaxis and Other (See Comments)     Increased pressure in his head; felt like his eyeballs and veins were going to pop out of his head.     Heparin Other (See Comments)     Other reaction(s): "depleted my blood platets"    Decreased platelet count    Heparin analogues Other (See Comments)     Depleted WBC count    Vasopressin analogues Other (See Comments)     "felt like eyes going to pop out of my head"    Morphine Hallucinations, Other (See Comments) and Anxiety     Other reaction(s): Hallucinations  Paranoid, hallucinations         Current Outpatient Medications   Medication Sig Dispense Refill    albuterol-ipratropium (DUO-NEB) 2.5 mg-0.5 mg/3 mL nebulizer solution Inhale 3 mLs into the lungs every 6 (six) hours as needed.      amiodarone (PACERONE) 200 MG Tab Take 200 mg by mouth once daily.      aspirin 81 MG Chew Take 81 mg by mouth once daily.      atorvastatin (LIPITOR) 10 MG tablet Take 1 tablet by mouth once daily.  1 "    BASAGLAR KWIKPEN U-100 INSULIN glargine 100 units/mL (3mL) SubQ pen 25 Units once daily.   3    carvediloL (COREG) 3.125 MG tablet Take 3.125 mg by mouth 2 (two) times daily.      cefTRIAXone (ROCEPHIN) 10 gram injection       CORLANOR 5 mg Tab Take 5 mg by mouth nightly.      FARXIGA 10 mg tablet Take 10 mg by mouth once daily.      furosemide (LASIX) 40 MG tablet Take 1 tablet (40 mg total) by mouth once daily. 30 tablet 1    gabapentin (NEURONTIN) 600 MG tablet Take 1 tablet by mouth 3 (three) times daily.  0    lisinopriL (PRINIVIL,ZESTRIL) 2.5 MG tablet Take 2.5 mg by mouth 2 (two) times daily.      pantoprazole (PROTONIX) 40 MG tablet Take 1 tablet by mouth once daily.  0    traMADoL (ULTRAM) 50 mg tablet Take 50 mg by mouth.      TRULICITY 0.75 mg/0.5 mL pen injector Inject into the skin.      vitamin B complex-folic acid 0.4 mg Tab Take 1 tablet by mouth.      XARELTO 2.5 mg Tab Take by mouth.       No current facility-administered medications for this visit.       Review of Systems      Objective:        Physical Exam:   Foot Exam    General  General Appearance: appears stated age and healthy   Orientation: alert and oriented to person, place, and time   Affect: appropriate   Gait: unimpaired       Right Foot/Ankle     Inspection and Palpation  Ecchymosis: none  Tenderness: none   Swelling: none   Skin Exam: ulcer (Grade 2 ulcer 1 cm diameter with perimeter of slough and callus tissue base of granulation tissue.  Ulcer 2 mm deep with no exposed bone no fluctuance no purulent drainage);   Neurovascular  Dorsalis pedis: 1+  Posterior tibial: 1+  Capillary Refill: 2+  Saphenous nerve sensation: diminished  Tibial nerve sensation: diminished  Superficial peroneal nerve sensation: diminished  Deep peroneal nerve sensation: diminished  Sural nerve sensation: diminished        Physical Exam  Cardiovascular:      Pulses:           Dorsalis pedis pulses are 1+ on the right side.        Posterior  "tibial pulses are 1+ on the right side.   Musculoskeletal:        Feet:    Feet:      Right foot:      Skin integrity: Ulcer (Grade 2 ulcer 1 cm diameter with perimeter of slough and callus tissue base of granulation tissue.  Ulcer 2 mm deep with no exposed bone no fluctuance no purulent drainage) present.                 Vascular Exam     Right Pulses  Dorsalis Pedis:      1+  Posterior Tibial:      1+         Imaging:            Assessment:       1. Venous stasis dermatitis of both lower extremities    2. Type II diabetes mellitus with neurological manifestations    3. Skin ulcer of right foot, limited to breakdown of skin      Plan:   Venous stasis dermatitis of both lower extremities    Type II diabetes mellitus with neurological manifestations    Skin ulcer of right foot, limited to breakdown of skin    Evaluated patient the ulcers slowly healing has no signs of any infection right foot today.  Today I am going to debride the wound he will continue local wound care return in 2 months or as needed      Wound Debridement    Date/Time: 12/20/2022 1:50 PM  Performed by: Tong Heaton DPM  Authorized by: Tong Heaton DPM     Time out: Immediately prior to procedure a "time out" was called to verify the correct patient, procedure, equipment, support staff and site/side marked as required.    Consent Done?:  Yes (Written)  Local anesthesia used?: No      Wound Details:    Location:  Right foot    Location:  Right 5th Metatarsal Head    Type of Debridement:  Excisional       Length (cm):  1       Area (sq cm):  1       Width (cm):  1       Percent Debrided (%):  100       Depth (cm):  0.2       Total Area Debrided (sq cm):  1    Depth of debridement:  Subcutaneous tissue    Tissue debrided:  Dermis, Epidermis, Hypergranulation and Subcutaneous    Devitalized tissue debrided:  Biofilm, Callus, Slough and Fibrin    Instruments:  Nippers, Forceps and Blade    Bleeding:  Minimal  Hemostasis Achieved: Yes  "   Method Used:  Pressure  Patient tolerance:  Patient tolerated the procedure well with no immediate complications     Continue local wound care right foot ulcer and offloading with accommodative inserts and diabetic shoes return in 2 months or as needed.          Counseling:     I provided patient education verbally regarding:   Patient diagnosis, treatment options, as well as alternatives, risks, and benefits.     This note was created using Dragon voice recognition software that occasionally misinterpreted phrases or words.

## 2022-12-28 ENCOUNTER — OFFICE VISIT (OUTPATIENT)
Dept: PAIN MEDICINE | Facility: CLINIC | Age: 62
End: 2022-12-28
Payer: MEDICARE

## 2022-12-28 VITALS
SYSTOLIC BLOOD PRESSURE: 135 MMHG | WEIGHT: 268 LBS | HEIGHT: 73 IN | BODY MASS INDEX: 35.52 KG/M2 | DIASTOLIC BLOOD PRESSURE: 75 MMHG | HEART RATE: 75 BPM

## 2022-12-28 DIAGNOSIS — E11.42 DIABETIC POLYNEUROPATHY ASSOCIATED WITH TYPE 2 DIABETES MELLITUS: Primary | ICD-10-CM

## 2022-12-28 DIAGNOSIS — G89.4 CHRONIC PAIN DISORDER: ICD-10-CM

## 2022-12-28 DIAGNOSIS — M79.672 BILATERAL FOOT PAIN: ICD-10-CM

## 2022-12-28 DIAGNOSIS — M79.671 BILATERAL FOOT PAIN: ICD-10-CM

## 2022-12-28 PROCEDURE — 3008F PR BODY MASS INDEX (BMI) DOCUMENTED: ICD-10-PCS | Mod: CPTII,S$GLB,, | Performed by: PHYSICIAN ASSISTANT

## 2022-12-28 PROCEDURE — 99999 PR PBB SHADOW E&M-EST. PATIENT-LVL IV: ICD-10-PCS | Mod: PBBFAC,,, | Performed by: PHYSICIAN ASSISTANT

## 2022-12-28 PROCEDURE — 3075F SYST BP GE 130 - 139MM HG: CPT | Mod: CPTII,S$GLB,, | Performed by: PHYSICIAN ASSISTANT

## 2022-12-28 PROCEDURE — 3008F BODY MASS INDEX DOCD: CPT | Mod: CPTII,S$GLB,, | Performed by: PHYSICIAN ASSISTANT

## 2022-12-28 PROCEDURE — 99999 PR PBB SHADOW E&M-EST. PATIENT-LVL IV: CPT | Mod: PBBFAC,,, | Performed by: PHYSICIAN ASSISTANT

## 2022-12-28 PROCEDURE — 3044F HG A1C LEVEL LT 7.0%: CPT | Mod: CPTII,S$GLB,, | Performed by: PHYSICIAN ASSISTANT

## 2022-12-28 PROCEDURE — 3044F PR MOST RECENT HEMOGLOBIN A1C LEVEL <7.0%: ICD-10-PCS | Mod: CPTII,S$GLB,, | Performed by: PHYSICIAN ASSISTANT

## 2022-12-28 PROCEDURE — 1159F MED LIST DOCD IN RCRD: CPT | Mod: CPTII,S$GLB,, | Performed by: PHYSICIAN ASSISTANT

## 2022-12-28 PROCEDURE — 4010F PR ACE/ARB THEARPY RXD/TAKEN: ICD-10-PCS | Mod: CPTII,S$GLB,, | Performed by: PHYSICIAN ASSISTANT

## 2022-12-28 PROCEDURE — 99214 PR OFFICE/OUTPT VISIT, EST, LEVL IV, 30-39 MIN: ICD-10-PCS | Mod: S$GLB,,, | Performed by: PHYSICIAN ASSISTANT

## 2022-12-28 PROCEDURE — 3075F PR MOST RECENT SYSTOLIC BLOOD PRESS GE 130-139MM HG: ICD-10-PCS | Mod: CPTII,S$GLB,, | Performed by: PHYSICIAN ASSISTANT

## 2022-12-28 PROCEDURE — 3078F DIAST BP <80 MM HG: CPT | Mod: CPTII,S$GLB,, | Performed by: PHYSICIAN ASSISTANT

## 2022-12-28 PROCEDURE — 4010F ACE/ARB THERAPY RXD/TAKEN: CPT | Mod: CPTII,S$GLB,, | Performed by: PHYSICIAN ASSISTANT

## 2022-12-28 PROCEDURE — 99214 OFFICE O/P EST MOD 30 MIN: CPT | Mod: S$GLB,,, | Performed by: PHYSICIAN ASSISTANT

## 2022-12-28 PROCEDURE — 3078F PR MOST RECENT DIASTOLIC BLOOD PRESSURE < 80 MM HG: ICD-10-PCS | Mod: CPTII,S$GLB,, | Performed by: PHYSICIAN ASSISTANT

## 2022-12-28 PROCEDURE — 1159F PR MEDICATION LIST DOCUMENTED IN MEDICAL RECORD: ICD-10-PCS | Mod: CPTII,S$GLB,, | Performed by: PHYSICIAN ASSISTANT

## 2022-12-28 RX ORDER — TRAMADOL HYDROCHLORIDE 50 MG/1
50 TABLET ORAL EVERY 8 HOURS PRN
Qty: 90 TABLET | Refills: 2 | Status: SHIPPED | OUTPATIENT
Start: 2022-12-31 | End: 2023-03-22 | Stop reason: SDUPTHER

## 2022-12-28 NOTE — PROGRESS NOTES
Referring Physician: No ref. provider found    PCP: YRN Pollard      CC:  Bilateral foot pain      Interval history:  Mr. Bullard is a 62 y.o. male with bilateral foot pain due to peripheral neuropathy diabetic neuropathy.   Foot pain remains tolerable. Stabbing pains resolved. He continues to take Gabapentin 600 mg t.i.d. with mild benefits. Nortriptyline 25 mg made him to drowsy so he discontinued it.  He continues to take tramadol 50 mg q.8 hours as needed with mild-to-moderate benefit.  Denies any side effects with the medication.  Able perform his ADLs with the medication.  He underwent a right-sided lumbar sympathetic nerve block which provided moderate benefit that was shortlived. Currently on IV antibiotics for bacteremia. He developed bilateral knee pain. Right intra articular knee injection with benefit. He denies any worsening weakness.  No bowel bladder changes. Pain today is rated 0/10.    Prior HPI:   Ana Bullard is a 62 y.o. male referred to us for bilateral foot pain. Patient with long history of diabetes.  He states neuropathy worsen after his CABG.  Bilateral foot pain has worsened over past 7 years.  He presents to us constant burning, sharp pain in his bilateral feet.  He also has some similar issues in his bilateral hands, but foot pain is worse.  Pain worsens prolonged sitting and standing.  He currently takes gabapentin 600 mg t.i.d. with mild benefits.  Increasing doses causes sedation.  He also takes tramadol q.8 hours as needed with mild-to-moderate benefit.  He denies any worsening weakness.  No bowel bladder changes.    Interventional history:  Right LSB on 09/2019 with 50% relief for 2 weeks.    ROS:  CONSTITUTIONAL: No fevers, chills, night sweats, wt. loss, appetite changes  SKIN: no rashes or itching  ENT: No headaches, head trauma, vision changes, or eye pain  LYMPH NODES: None noticed   CV: No chest pain, palpitations.   RESP: No shortness of breath, dyspnea on  exertion, cough, wheezing, or hemoptysis  GI: No nausea, emesis, diarrhea, constipation, melena, hematochezia, pain.    : No dysuria, hematuria, urgency, or frequency   HEME: No easy bruising, bleeding problems  PSYCHIATRIC: No depression, anxiety, psychosis, hallucinations.  NEURO: No seizures, memory loss, dizziness or difficulty sleeping  MSK:  Positive HPI      Past Medical History:   Diagnosis Date    AICD (automatic cardioverter/defibrillator) present     Anemia, chronic disease 07/27/2021    Anemia, unspecified 07/27/2021    Anxiety and depression 2012    CAD (coronary artery disease) 2012    Cardiomegaly 2016    CHF (congestive heart failure) 2012    Cholecystitis 2017    Complete heart block 2012    Diabetic foot ulcer     DVT (deep venous thrombosis) 2012    And pulmonary embolus    GERD (gastroesophageal reflux disease) 2010    Heparin induced thrombocytopenia     Hyperlipemia 2011    IDDM (insulin dependent diabetes mellitus) 1983    With neuropathy    Ischemic cardiomyopathy 2012    MI (myocardial infarction) 2012    Morbid obesity     MRSA (methicillin resistant Staphylococcus aureus) infection 01/19/2019    Noncompliance with therapeutic plan 2019    Normochromic normocytic anemia 07/27/2021    Osteomyelitis of right foot 01/2019    Osteomyelitis of right foot 09/01/2022    Klebsiella from bone, GBS in blood    Pulmonary edema 2019    Respiratory failure 2019    Sepsis 01/2019    Due to cellulitis right leg    Sleep apnea 2013    Thrombocytopathy 2012    Venous stasis dermatitis of both lower extremities      Past Surgical History:   Procedure Laterality Date    CARDIAC PACEMAKER PLACEMENT  12/2012    CARDIAC PACEMAKER PLACEMENT  10/04/2018    battery replacement    CORONARY ARTERY BYPASS GRAFT  06/2012    ESOPHAGOGASTRODUODENOSCOPY  01/31/2017    SAMARA FILTER PLACEMENT  2012    vena cava    LUMBAR SYMPATHETIC NERVE BLOCK Right 8/22/2019    Procedure: BLOCK, NERVE, SYMPATHETIC, LUMBAR;   "Surgeon: Tashi Good MD;  Location: [facility] OR;  Service: Pain Management;  Laterality: Right;  RIGHT SYMPATHETIC NERVE BLOCK     Family History   Problem Relation Age of Onset    Arthritis Mother     Diabetes Mother     Cancer Father     Heart disease Father     Stroke Father      Social History     Socioeconomic History    Marital status:    Tobacco Use    Smoking status: Former     Packs/day: 2.00     Years: 38.00     Pack years: 76.00     Types: Cigarettes     Quit date: 2012     Years since quitting: 10.9    Smokeless tobacco: Never   Substance and Sexual Activity    Alcohol use: No    Sexual activity: Never         Medications/Allergies: See med card  Vitals:    12/28/22 0918   BP: 135/75   Pulse: 75   Weight: 121.6 kg (268 lb)   Height: 6' 1" (1.854 m)   PainSc: 0-No pain   PainLoc: Foot       GENERAL: A and O x3, the patient appears well groomed and is in no acute distress.  Skin: No rashes or obvious lesions  HEENT: normocephalic, atraumatic  CARDIOVASCULAR:  RRR  LUNGS: non labored breathing  ABDOMEN: soft, nontender   UPPER EXTREMITIES: Normal alignment, normal range of motion, no atrophy, no skin changes,  hair growth and nail growth normal and equal bilaterally. No swelling, no tenderness.    LOWER EXTREMITIES:  Normal alignment, no atrophy, no skin changes,  hair growth and nail growth normal and equal bilaterally. No swelling. Tenderness to right lateral patella.   LUMBAR SPINE  Lumbar spine: ROM is limited with flexion extension and oblique extension with moderate increased pain.    Samuel's test causes no increased pain on either side.    Supine straight leg raise is negative bilaterally.    Internal and external rotation of the hip causes no increased pain on either side.  Myofascial exam: No tenderness to palpation across lumbar paraspinous muscles.        MENTAL STATUS: normal orientation, speech, language, and fund of knowledge for social situation.  Emotional state appropriate.     CRANIAL " NERVES:  II:         PERRL bilaterally,   III,IV,VI: EOMI.    V:         Facial sensation equal bilaterally  VII:       Facial motor function normal.  VIII:      Hearing equal to finger rub bilaterally  IX/X:    Gag normal, palate symmetric  XI:        Shoulder shrug equal, head turn equal  XII:       Tongue midline without fasciculations        MOTOR: Tone and bulk: normal bilateral upper and lower Strength: normal   Delt      Bi         Tri        WE      WF                        R          5          5          5          5          5          5            L          5          5          5          5          5          5               IP         ADD     ABD     Quad   TA        Gas      HAM  R          5          5          5          5          5          5          5  L          5          5          5          5          5          5          5     SENSATION:  Decreased globally of bilateral feet  REFLEXES: normal, symmetric, nonbrisk.  Toes down, no clonus. No hoffmans.  GAIT: normal rise, base, steps, and arm swing.      Imaging:  None    Assessment:  Ana Bullard is a 62 y.o. male with bilateral foot pain  1. Diabetic polyneuropathy associated with type 2 diabetes mellitus    2. Bilateral foot pain    3. Chronic pain disorder        Plan:  1. I have stressed the importance of physical activity and exercise to improve overall health  2.  Discussed disease progression of peripheral neuropathy.  Continue gabapentin as prescribed by PCP.  Consider nortriptyline 10 mg in the future  3.  He does request continue tramadol with us.  I believe this is very reasonable.   reviewed.  He takes tramadol 50 mg q.8 hours as needed.  Previous UDS consistent. UDS next clinic visit  4.  May consider repeat lumbar sympathetic block if pain is exacerbated.  Briefly discussed spinal cord stimulation as well.  5. Follow-up in 3 months  Medication management provided by  Dr. Good

## 2023-02-20 ENCOUNTER — OFFICE VISIT (OUTPATIENT)
Dept: PODIATRY | Facility: CLINIC | Age: 63
End: 2023-02-20
Payer: MEDICARE

## 2023-02-20 VITALS — RESPIRATION RATE: 16 BRPM | WEIGHT: 268 LBS | BODY MASS INDEX: 35.36 KG/M2

## 2023-02-20 DIAGNOSIS — L97.511 ULCER OF RIGHT FOOT, LIMITED TO BREAKDOWN OF SKIN: ICD-10-CM

## 2023-02-20 DIAGNOSIS — I87.2 VENOUS STASIS DERMATITIS OF BOTH LOWER EXTREMITIES: ICD-10-CM

## 2023-02-20 DIAGNOSIS — E11.49 TYPE II DIABETES MELLITUS WITH NEUROLOGICAL MANIFESTATIONS: ICD-10-CM

## 2023-02-20 DIAGNOSIS — L97.511 SKIN ULCER OF RIGHT FOOT, LIMITED TO BREAKDOWN OF SKIN: ICD-10-CM

## 2023-02-20 DIAGNOSIS — L97.512 SKIN ULCER OF RIGHT FOOT WITH FAT LAYER EXPOSED: Primary | ICD-10-CM

## 2023-02-20 PROCEDURE — 1160F RVW MEDS BY RX/DR IN RCRD: CPT | Mod: CPTII,S$GLB,, | Performed by: PODIATRIST

## 2023-02-20 PROCEDURE — 99499 UNLISTED E&M SERVICE: CPT | Mod: S$GLB,,, | Performed by: PODIATRIST

## 2023-02-20 PROCEDURE — 11042 WOUND DEBRIDEMENT: ICD-10-PCS | Mod: RT,S$GLB,, | Performed by: PODIATRIST

## 2023-02-20 PROCEDURE — 1159F PR MEDICATION LIST DOCUMENTED IN MEDICAL RECORD: ICD-10-PCS | Mod: CPTII,S$GLB,, | Performed by: PODIATRIST

## 2023-02-20 PROCEDURE — 99499 NO LOS: ICD-10-PCS | Mod: S$GLB,,, | Performed by: PODIATRIST

## 2023-02-20 PROCEDURE — 3008F PR BODY MASS INDEX (BMI) DOCUMENTED: ICD-10-PCS | Mod: CPTII,S$GLB,, | Performed by: PODIATRIST

## 2023-02-20 PROCEDURE — 11042 DBRDMT SUBQ TIS 1ST 20SQCM/<: CPT | Mod: RT,S$GLB,, | Performed by: PODIATRIST

## 2023-02-20 PROCEDURE — 1160F PR REVIEW ALL MEDS BY PRESCRIBER/CLIN PHARMACIST DOCUMENTED: ICD-10-PCS | Mod: CPTII,S$GLB,, | Performed by: PODIATRIST

## 2023-02-20 PROCEDURE — 3008F BODY MASS INDEX DOCD: CPT | Mod: CPTII,S$GLB,, | Performed by: PODIATRIST

## 2023-02-20 PROCEDURE — 1159F MED LIST DOCD IN RCRD: CPT | Mod: CPTII,S$GLB,, | Performed by: PODIATRIST

## 2023-02-20 NOTE — PROGRESS NOTES
[address] [facility] [address]  [place] [address]  Phone: [phone]   [phone]    Patient's PCP:YRN Pollard  Referring Provider: No ref. provider found    Subjective:      Chief Complaint:: Callouses (Patient presents with callus formation with ulcer on the bottom lateral of the right foot.)    CAROLIN Bullard is a 62 y.o. male who presents today with a callus/ ulceration on the bottom lateral side of the right foot. Onset of symptoms 5 years ago and reports trauma.  Current symptoms include Callus formation and ulceration with small amount of drainage that stated about one week ago.  Aggravating factors are none. Symptoms have not improved. Treatment to date have included callus debridement here in office..    Systemic Doctor: YRN Townsend  Date Last Seen: 06/07/2022  Blood Sugar: not checked today   Hemoglobin A1c: 6.8    Vitals:    02/20/23 1406   Resp: 16   Weight: 121.6 kg (268 lb)   PainSc: 0-No pain      Shoe Size:     Past Surgical History:   Procedure Laterality Date    CARDIAC PACEMAKER PLACEMENT  12/2012    CARDIAC PACEMAKER PLACEMENT  10/04/2018    battery replacement    CORONARY ARTERY BYPASS GRAFT  06/2012    ESOPHAGOGASTRODUODENOSCOPY  01/31/2017    SAMARA FILTER PLACEMENT  2012    vena cava    LUMBAR SYMPATHETIC NERVE BLOCK Right 8/22/2019    Procedure: BLOCK, NERVE, SYMPATHETIC, LUMBAR;  Surgeon: Tashi Good MD;  Location: [facility] OR;  Service: Pain Management;  Laterality: Right;  RIGHT SYMPATHETIC NERVE BLOCK     Past Medical History:   Diagnosis Date    AICD (automatic cardioverter/defibrillator) present     Anemia, chronic disease 07/27/2021    Anemia, unspecified 07/27/2021    Anxiety and depression 2012    CAD (coronary artery disease) 2012    Cardiomegaly 2016    CHF (congestive heart failure) 2012    Cholecystitis 2017    Complete heart block 2012    Diabetic foot ulcer     DVT (deep venous thrombosis) 2012    And pulmonary embolus    GERD  "(gastroesophageal reflux disease) 2010    Heparin induced thrombocytopenia     Hyperlipemia 2011    IDDM (insulin dependent diabetes mellitus) 1983    With neuropathy    Ischemic cardiomyopathy 2012    MI (myocardial infarction) 2012    Morbid obesity     MRSA (methicillin resistant Staphylococcus aureus) infection 01/19/2019    Noncompliance with therapeutic plan 2019    Normochromic normocytic anemia 07/27/2021    Osteomyelitis of right foot 01/2019    Osteomyelitis of right foot 09/01/2022    Klebsiella from bone, GBS in blood    Pulmonary edema 2019    Respiratory failure 2019    Sepsis 01/2019    Due to cellulitis right leg    Sleep apnea 2013    Thrombocytopathy 2012    Venous stasis dermatitis of both lower extremities      Family History   Problem Relation Age of Onset    Arthritis Mother     Diabetes Mother     Cancer Father     Heart disease Father     Stroke Father         Social History:   Marital Status:   Alcohol History:  reports no history of alcohol use.  Tobacco History:  reports that he quit smoking about 11 years ago. His smoking use included cigarettes. He has a 76.00 pack-year smoking history. He has never used smokeless tobacco.  Drug History:  has no history on file for drug use.    Review of patient's allergies indicates:   Allergen Reactions    Vasopressin Anaphylaxis and Other (See Comments)     Increased pressure in his head; felt like his eyeballs and veins were going to pop out of his head.     Heparin Other (See Comments)     Other reaction(s): "depleted my blood platets"    Decreased platelet count    Heparin analogues Other (See Comments)     Depleted WBC count    Vasopressin analogues Other (See Comments)     "felt like eyes going to pop out of my head"    Morphine Hallucinations, Other (See Comments) and Anxiety     Other reaction(s): Hallucinations  Paranoid, hallucinations         Current Outpatient Medications   Medication Sig Dispense Refill "    albuterol-ipratropium (DUO-NEB) 2.5 mg-0.5 mg/3 mL nebulizer solution Inhale 3 mLs into the lungs every 6 (six) hours as needed.      amiodarone (PACERONE) 200 MG Tab Take 200 mg by mouth once daily.      aspirin 81 MG Chew Take 81 mg by mouth once daily.      atorvastatin (LIPITOR) 10 MG tablet Take 1 tablet by mouth once daily.  1    BASAGLAR KWIKPEN U-100 INSULIN glargine 100 units/mL (3mL) SubQ pen 25 Units once daily.   3    carvediloL (COREG) 3.125 MG tablet Take 3.125 mg by mouth 2 (two) times daily.      cefTRIAXone (ROCEPHIN) 10 gram injection       CORLANOR 5 mg Tab Take 5 mg by mouth nightly.      FARXIGA 10 mg tablet Take 10 mg by mouth once daily.      furosemide (LASIX) 40 MG tablet Take 1 tablet (40 mg total) by mouth once daily. 30 tablet 1    gabapentin (NEURONTIN) 600 MG tablet Take 1 tablet by mouth 3 (three) times daily.  0    lisinopriL (PRINIVIL,ZESTRIL) 2.5 MG tablet Take 2.5 mg by mouth 2 (two) times daily.      mupirocin (BACTROBAN) 2 % ointment APPLY TOPICALLY ONCE DAILY. 22 g 5    pantoprazole (PROTONIX) 40 MG tablet Take 1 tablet by mouth once daily.  0    TRULICITY 0.75 mg/0.5 mL pen injector Inject into the skin.      vitamin B complex-folic acid 0.4 mg Tab Take 1 tablet by mouth.      XARELTO 2.5 mg Tab Take by mouth.       No current facility-administered medications for this visit.       Review of Systems   Constitutional:  Negative for chills, fatigue, fever and unexpected weight change.   HENT:  Negative for hearing loss and trouble swallowing.    Eyes:  Negative for photophobia and visual disturbance.   Respiratory:  Negative for cough, shortness of breath and wheezing.    Cardiovascular:  Negative for chest pain, palpitations and leg swelling.   Gastrointestinal:  Negative for abdominal pain and nausea.   Genitourinary:  Negative for dysuria and frequency.   Musculoskeletal:  Negative for arthralgias, back pain, gait problem and joint swelling.   Skin:   Negative for rash and wound.   Neurological:  Positive for numbness. Negative for tremors, seizures, weakness and headaches.   Hematological:  Does not bruise/bleed easily.       Objective:        Physical Exam:   Foot Exam    General  General Appearance: appears stated age and healthy   Orientation: alert and oriented to person, place, and time   Affect: appropriate   Gait: unimpaired       Right Foot/Ankle     Inspection and Palpation  Ecchymosis: none  Tenderness: none   Skin Exam: ulcer (Ulcer plantar 5th metatarsal head 2 cm diameter 3 mm deep with perimeter of slough base of fibrin and granulation tissue no exposed bone no lymphangitis no fluctuance no purulent drainage.);   Neurovascular  Dorsalis pedis: 1+  Posterior tibial: 2+  Capillary Refill: 2+  Saphenous nerve sensation: diminished  Tibial nerve sensation: diminished  Superficial peroneal nerve sensation: diminished  Deep peroneal nerve sensation: diminished  Sural nerve sensation: diminished      Physical Exam  Cardiovascular:      Pulses:           Dorsalis pedis pulses are 1+ on the right side.        Posterior tibial pulses are 2+ on the right side.   Musculoskeletal:        Feet:    Feet:      Right foot:      Skin integrity: Ulcer (Ulcer plantar 5th metatarsal head 2 cm diameter 3 mm deep with perimeter of slough base of fibrin and granulation tissue no exposed bone no lymphangitis no fluctuance no purulent drainage.) present.                 Vascular Exam     Right Pulses  Dorsalis Pedis:      1+  Posterior Tibial:      2+         Imaging:            Assessment:       1. Skin ulcer of right foot with fat layer exposed    2. Venous stasis dermatitis of both lower extremities    3. Type II diabetes mellitus with neurological manifestations    4. Skin ulcer of right foot, limited to breakdown of skin    5. Ulcer of right foot, limited to breakdown of skin - Right Foot      Plan:   Skin ulcer of right foot with fat layer exposed    Venous stasis  "dermatitis of both lower extremities    Type II diabetes mellitus with neurological manifestations    Skin ulcer of right foot, limited to breakdown of skin    Ulcer of right foot, limited to breakdown of skin - Right Foot    Ulcers unchanged there is no signs of infection do debridement today he will continue offloading it with the accommodative inserts [facility]. Avila for follow-up since I will be retiring      Wound Debridement    Date/Time: 2/20/2023 1:50 PM  Performed by: Tong Heaton DPM  Authorized by: Tong Heaton DPM     Time out: Immediately prior to procedure a "time out" was called to verify the correct patient, procedure, equipment, support staff and site/side marked as required.    Consent Done?:  Yes (Written)  Local anesthesia used?: No      Wound Details:    Location:  Right foot    Location:  Right 5th Metatarsal Head    Type of Debridement:  Excisional       Length (cm):  2       Area (sq cm):  4       Width (cm):  2       Percent Debrided (%):  90       Depth (cm):  0.3       Total Area Debrided (sq cm):  3.6    Depth of debridement:  Subcutaneous tissue    Tissue debrided:  Epidermis, Dermis and Subcutaneous    Devitalized tissue debrided:  Biofilm, Slough and Fibrin    Instruments:  Blade, Forceps and Nippers    Bleeding:  Minimal  Hemostasis Achieved: Yes    Method Used:  Pressure  Patient tolerance:  Patient tolerated the procedure well with no immediate complications     Daily dressing changes with Neosporin and accommodative inserts referred to Dr. Avila wound care center.           Counseling:     I provided patient education verbally regarding:   Patient diagnosis, treatment options, as well as alternatives, risks, and benefits.     This note was created using Dragon voice recognition software that occasionally misinterpreted phrases or words.                 "

## 2023-02-20 NOTE — PATIENT INSTRUCTIONS
What is a Corn? What is a Callus?    Corns and calluses are areas of thickened skin that develop to protect that area from irritation. They occur when something rubs against the foot repeatedly or causes excess pressure against part of the foot. The term callus commonly is used if the thickening of skin occurs on the bottom of the foot, and if thickening occurs on the top of the foot (or toe), it's called a corn. However, the location of the thickened skin is less important than the pattern of thickening: flat, widespread skin thickening indicates a callus, and skin lesions that are thicker or deeper indicate a corn.    Corns and calluses are not contagious but may become painful if they get too thick. In people with diabetes or decreased circulation, they can lead to more serious foot problems.      Causes  Corns often occur where a toe rubs against the interior of a shoe. Excessive pressure at the balls of the feet--common in women who regularly wear high heels--may cause calluses to develop on the balls of the feet.    People with certain deformities of the foot, such as hammer toes, are prone to corns and calluses.      Symptoms  Corns and calluses typically have a rough, dull appearance. They may be raised or rounded, and they can be hard to differentiate from warts. Corns or calluses sometimes cause pain.      Home Care  Mild corns and calluses may not require treatment. If the corn or callus isn't bothering you, it can probably be left alone. It's a good idea, though, to investigate possible causes of the corn or callus. If your footwear is contributing to the development of a corn or callus, it's time to look for other shoes.    Over-the-counter treatments can do more harm than good, especially if you have any medical conditions such as diabetes. Some over-the-counter treatments contain harsh chemicals, which can lead to burns or even foot ulcers.       When to Visit a Podiatrist  If corns or calluses are  causing pain and discomfort or inhibiting your daily life in any way, see a podiatrist. Also, people with diabetes, poor circulation, or other serious illnesses should have their feet checked.      Diagnosis and Treatment  The podiatrist will conduct a complete examination of your feet. X-rays may be taken; your podiatrist may also want to inspect your shoes and watch you walk. He or she will also take a complete medical history. Corns and calluses are diagnosed based on appearance and history.    If you have mild corns or calluses, your podiatrist may suggest changing your shoes and/or adding padding to your shoes. Larger corns and calluses are most effectively reduced (made smaller) with a surgical blade. A podiatrist can use the blade to carefully shave away the thickened, dead skin--right in the office. The procedure is painless because the skin is already dead. Additional treatments may be needed if the corn or callus recurs.    Cortisone injections into the foot or toe may be given if the corn or callus is causing significant pain. Surgery may be necessary in cases that do not respond to conservative treatment.      Prevention  Wear properly fitted shoes. If you have any deformities of the toe or foot, talk to your podiatrist to find out what shoes are best for you.  Gel pad inserts may decrease friction points and pressure. Your podiatrist can help you determine where pads might be useful.        Simple Skin Ulcer  A skin ulcer is a sore on the skin. Skin ulcers often form when blood circulation is impaired. Being bed- or wheelchair-bound can cause pressure that may lead to skin ulcers. Ulcers are generally round areas of red, swollen, thickened skin around a crater-like depression. They are often very slow to heal. If a skin ulcer isn't properly treated, it may become infected. If the infection spreads, it can cause serious health issues.    Symptoms of a skin ulcer include:  Reddish area on the skin  Skin  color and texture changes  Swelling  Wound that isn't healing  Crater in the skin  Pain  Drainage or pus    Causes  There are many causes of skin ulcers. Some of these include:  Decreased blood flow to a part of the skin, vascular insufficiency  Trauma  Lack of movement of a part of the body for long periods of time  Infection  Poor hygiene  Varicose veins  Vitamin deficiency    Pressure ulcers  Pressure ulcers are a type of ulcer most commonly seen in people who are confined to bed or a wheelchair. They are caused by prolonged pressure to a spot on the skin. Pressure ulcers usually occur on the back, buttocks, or backs or sides of the legs, arms, or feet (especially the heels).    Home care  You may be prescribed antibiotics to prevent infection. If this is the case, be sure to take all of the medicine, even if your symptoms get better. You may also be given medicines to help relieve pain. Follow the healthcare providers instructions when using these medicines.    General care  Care for the skin ulcer as instructed. Always wash your hands with soap and warm water before and after caring for your wound.  Cover the ulcer with a clean, dry bandage. Remove and change the bandage as instructed. If the bandage becomes wet or dirty, change it as soon as possible.  Follow the doctors instructions about washing. You can shower, but do not soak the healing ulcer until the doctor says its OK.  Do not scratch, rub, or pick at the healing skin.  Check the area every day for signs of infection, such as increasing pain, redness, warmth, red streaking, swelling, or pus draining from the ulcer.  When resting, raise the area where the ulcer is above the level of the heart.  Avoid smoking or drinking alcohol, as these can delay wound healing.  If you are able, try to walk regularly. This can help with circulation.  Avoid prolonged standing or sitting in one position.  The following tips can help prevent future skin ulcers:  Know  your risks for skin ulcers.  Keep the skin clean and dry.  Reposition frequently.  Use protective devices such as pillows, foam wedges, and heel protectors for the knees, ankles, and heels.  Avoid immobilization.    Follow-up care  Follow up with your healthcare provider, or as advised.    When to seek medical advice  Call your healthcare provider right away if any of these occur:  Fever of 100.4°F (38°C) or higher, or as advised by your healthcare provider  Signs of infection. These include increasing pain, warmth, redness, or pus draining from the skin ulcer.  Bleeding from the skin ulcer  Pain in or around the ulcer that doesn't get better even with medicines  Increased swelling  Changes in skin color    Date Last Reviewed: 9/1/2016  © 8021-5661 [employer]. [address] [place] [address], [place] [address]. All rights reserved. This information is not intended as a substitute for professional medical care. Always follow your healthcare professional's instructions.       Your Diabetes Foot Care Program    Every day you depend on your feet to keep you moving. But when you have diabetes, your feet need special care. Even a small foot problem can become very serious. So dont take your feet for granted. By working with your diabetes healthcare team, you can learn how to protect your feet and keep them healthy.  Evaluating your feet  An evaluation helps your healthcare provider check the condition of your feet. The evaluation includes a review of your diabetes history and overall health. It may also include a foot exam, X-rays, or other tests. These can help show problems beneath the skin that you cant see or feel.  Medical history  You will be asked about your overall health and any history of foot problems. Youll also discuss your diabetes history, such as whether your blood sugar level has changed over time. It also includes questions about sensations of pain, tingling, pins and needles, or numbness. Your  healthcare provider will also want to know if you have high blood pressure and heart disease, or if you smoke. Be sure to mention any medicines (including over-the-counter), supplements, or herbal remedies you take.  Foot exam  A foot exam checks the condition of different parts of your foot. First, your skin and nails are examined for any signs of infection. Blood flow is checked by feeling for the pulses in each foot. You may also have tests to study the nerves in the foot. These include using a small filament (wire) to see how sensitive your feet are. In certain cases, you will be asked to walk a short distance to check for bone, joint, and muscle problems.  Diagnostic tests  If needed, your healthcare provider will suggest certain tests to learn more about your feet. These include:  Doppler tests to measure blood flow in the feet and lower leg.  X-rays, which can show bone or joint problems.  Other imaging tests, such as an MRI (magnetic resonance imaging), bone scan, and CT (computed tomography) scan. These can help show bone infections.  Other tests, such as vascular tests, which study the blood flow in your feet and legs. You may also have nerve studies to learn how sensitive your feet are.  Creating a foot care program  Based on the evaluation, your healthcare provider will create a foot care program for you. Your program may be as simple as starting a daily self-care routine and changing the types of shoes your wear. It may also involve treating minor foot problems, such as a corn or blister. In some cases, surgery will be needed to treat an infection or mechanical problems, such as hammer toes.  Preventing problems  When you have diabetes, its easier to prevent problems than to treat them later on. So see your healthcare team for regular checkups and foot care. Your healthcare team can also help you learn more about caring for your feet at home. For example, you may be told to avoid walking barefoot. Or  you may be told that special footwear is needed to protect your feet.  Have regular checkups  Foot problems can develop quickly. So be sure to follow your healthcare teams schedule for regular checkups. During office visits, take off your shoes and socks as soon as you get in the exam room. Ask your healthcare provider to examine your feet for problems. This will make it easier to find and treat small skin irritations before they get worse. Regular checkups can also help keep track of the blood flow and feeling in your feet. If you have neuropathy (lack of feeling in your feet), you will need to have checkups more often.  Learn about self-care  The more you know about diabetes and your feet, the easier it will be to prevent problems. Members of your healthcare team can teach you how to inspect your feet and teach you to look for warning signs. They can also give you other foot care tips. During office visits, be sure to ask any questions you have.  Date Last Reviewed: 7/1/2016  © 7098-3559 [employer]. [address] [place] [address], [place] [address]. All rights reserved. This information is not intended as a substitute for professional medical care. Always follow your healthcare professional's instructions.

## 2023-03-22 ENCOUNTER — TELEPHONE (OUTPATIENT)
Dept: PAIN MEDICINE | Facility: CLINIC | Age: 63
End: 2023-03-22
Payer: MEDICARE

## 2023-03-22 ENCOUNTER — OFFICE VISIT (OUTPATIENT)
Dept: PAIN MEDICINE | Facility: CLINIC | Age: 63
End: 2023-03-22
Payer: MEDICARE

## 2023-03-22 DIAGNOSIS — M79.672 BILATERAL FOOT PAIN: ICD-10-CM

## 2023-03-22 DIAGNOSIS — M79.671 BILATERAL FOOT PAIN: ICD-10-CM

## 2023-03-22 DIAGNOSIS — E11.42 DIABETIC POLYNEUROPATHY ASSOCIATED WITH TYPE 2 DIABETES MELLITUS: Primary | ICD-10-CM

## 2023-03-22 DIAGNOSIS — G89.4 CHRONIC PAIN DISORDER: ICD-10-CM

## 2023-03-22 PROCEDURE — 99214 OFFICE O/P EST MOD 30 MIN: CPT | Mod: 95,,, | Performed by: PHYSICIAN ASSISTANT

## 2023-03-22 PROCEDURE — 99214 PR OFFICE/OUTPT VISIT, EST, LEVL IV, 30-39 MIN: ICD-10-PCS | Mod: 95,,, | Performed by: PHYSICIAN ASSISTANT

## 2023-03-22 RX ORDER — TRAMADOL HYDROCHLORIDE 50 MG/1
50 TABLET ORAL EVERY 8 HOURS PRN
Qty: 90 TABLET | Refills: 0 | Status: SHIPPED | OUTPATIENT
Start: 2023-03-30 | End: 2023-04-20 | Stop reason: SDUPTHER

## 2023-03-22 NOTE — PROGRESS NOTES
Referring Physician: No ref. provider found    PCP: YRN Pollard      CC:  Bilateral foot pain    Visit type: Virtual visit with synchronous audio and video  Total time spent with patient: 25 minutes  Each patient to whom he or she provides medical services by telemedicine is:  (1) informed of the relationship between the physician and patient and the respective role of any other health care provider with respect to management of the patient; and (2) notified that he or she may decline to receive medical services by telemedicine and may withdraw from such care at any time.  At home safe in [place]    Interval history:  Mr. Bullard is a 63 y.o. male with bilateral foot pain due to peripheral neuropathy diabetic neuropathy.  He is currently at home with Covid19. He denies SOB. Has had fever <102 and cough Tested on home test yesterday.  Foot pain remains tolerable. Stabbing pains resolved. He continues to take Gabapentin 600 mg t.i.d. with mild benefits. Nortriptyline 25 mg made him to drowsy so he discontinued it.  He continues to take tramadol 50 mg q.8 hours as needed with mild-to-moderate benefit.  Denies any side effects with the medication.  Able perform his ADLs with the medication.  He underwent a right-sided lumbar sympathetic nerve block which provided moderate benefit that was shortlived. Currently on IV antibiotics for bacteremia. He developed bilateral knee pain. Right intra articular knee injection with benefit. He denies any worsening weakness.  No bowel bladder changes. Pain today is rated 0/10.    Prior HPI:   Ana Bullard is a 63 y.o. male referred to us for bilateral foot pain. Patient with long history of diabetes.  He states neuropathy worsen after his CABG.  Bilateral foot pain has worsened over past 7 years.  He presents to us constant burning, sharp pain in his bilateral feet.  He also has some similar issues in his bilateral hands, but foot pain is worse.  Pain worsens  prolonged sitting and standing.  He currently takes gabapentin 600 mg t.i.d. with mild benefits.  Increasing doses causes sedation.  He also takes tramadol q.8 hours as needed with mild-to-moderate benefit.  He denies any worsening weakness.  No bowel bladder changes.    Interventional history:  Right LSB on 09/2019 with 50% relief for 2 weeks.    ROS:  CONSTITUTIONAL: No fevers, chills, night sweats, wt. loss, appetite changes  SKIN: no rashes or itching  ENT: No headaches, head trauma, vision changes, or eye pain  LYMPH NODES: None noticed   CV: No chest pain, palpitations.   RESP: No shortness of breath, dyspnea on exertion, wheezing, or hemoptysis. + cough  GI: No nausea, emesis, diarrhea, constipation, melena, hematochezia, pain.    : No dysuria, hematuria, urgency, or frequency   HEME: No easy bruising, bleeding problems  PSYCHIATRIC: No depression, anxiety, psychosis, hallucinations.  NEURO: No seizures, memory loss, dizziness or difficulty sleeping  MSK:  Positive HPI      Past Medical History:   Diagnosis Date    AICD (automatic cardioverter/defibrillator) present     Anemia, chronic disease 07/27/2021    Anemia, unspecified 07/27/2021    Anxiety and depression 2012    CAD (coronary artery disease) 2012    Cardiomegaly 2016    CHF (congestive heart failure) 2012    Cholecystitis 2017    Complete heart block 2012    Diabetic foot ulcer     DVT (deep venous thrombosis) 2012    And pulmonary embolus    GERD (gastroesophageal reflux disease) 2010    Heparin induced thrombocytopenia     Hyperlipemia 2011    IDDM (insulin dependent diabetes mellitus) 1983    With neuropathy    Ischemic cardiomyopathy 2012    MI (myocardial infarction) 2012    Morbid obesity     MRSA (methicillin resistant Staphylococcus aureus) infection 01/19/2019    Noncompliance with therapeutic plan 2019    Normochromic normocytic anemia 07/27/2021    Osteomyelitis of right foot 01/2019    Osteomyelitis of right foot 09/01/2022     Klebsiella from bone, GBS in blood    Pulmonary edema     Respiratory failure 2019    Sepsis 2019    Due to cellulitis right leg    Sleep apnea 2013    Thrombocytopathy 2012    Venous stasis dermatitis of both lower extremities      Past Surgical History:   Procedure Laterality Date    CARDIAC PACEMAKER PLACEMENT  2012    CARDIAC PACEMAKER PLACEMENT  10/04/2018    battery replacement    CORONARY ARTERY BYPASS GRAFT  2012    ESOPHAGOGASTRODUODENOSCOPY  2017    SAMARA FILTER PLACEMENT  2012    vena cava    LUMBAR SYMPATHETIC NERVE BLOCK Right 2019    Procedure: BLOCK, NERVE, SYMPATHETIC, LUMBAR;  Surgeon: Tashi Good MD;  Location: [facility];  Service: Pain Management;  Laterality: Right;  RIGHT SYMPATHETIC NERVE BLOCK     Family History   Problem Relation Age of Onset    Arthritis Mother     Diabetes Mother     Cancer Father     Heart disease Father     Stroke Father      Social History     Socioeconomic History    Marital status:    Tobacco Use    Smoking status: Former     Packs/day: 2.00     Years: 38.00     Pack years: 76.00     Types: Cigarettes     Quit date:      Years since quittin.2    Smokeless tobacco: Never   Substance and Sexual Activity    Alcohol use: No    Sexual activity: Never         Medications/Allergies: See med card      GENERAL: A and O x3, the patient appears well groomed and is in no acute distress.  Skin: No rashes or obvious lesions  HEENT: normocephalic, atraumatic  LUNGS: non labored breathing  ABDOMEN: non distended  UPPER EXTREMITIES and lower extremities. Normal ROM  CERVICAL SPINE:  Cervical spine: ROM is full in flexion, extension and lateral rotation without increased pain.    LUMBAR SPINE  Lumbar spine: ROM is limited with flexion extension and oblique extension with mild-to-moderate increased pain.      MENTAL STATUS: normal orientation, speech, language, and fund of knowledge for social situation.  Emotional state appropriate.    GAIT:  normal rise, base, steps, and arm swing.        Imaging:  None    Assessment:  Ana Bullard is a 63 y.o. male with bilateral foot pain  1. Diabetic polyneuropathy associated with type 2 diabetes mellitus    2. Bilateral foot pain    3. Chronic pain disorder          Plan:  1. I have stressed the importance of physical activity and exercise to improve overall health  2.  Discussed disease progression of peripheral neuropathy.  Continue gabapentin as prescribed by PCP.  Consider nortriptyline 10 mg in the future  3.  He does request continue tramadol with us.  I believe this is very reasonable.   reviewed.  He takes tramadol 50 mg q.8 hours as needed.  Previous UDS consistent. UDS next clinic visit  4.  May consider repeat lumbar sympathetic block if pain is exacerbated.  Briefly discussed spinal cord stimulation as well.  5. Follow-up in 1 month  Medication management provided by  Dr. Good

## 2023-03-22 NOTE — TELEPHONE ENCOUNTER
----- Message from Fany Ramirez sent at 3/22/2023  7:19 AM CDT -----  Contact: Patient  Type:  Patient Needs Advice    Who Called:  Patient  What this is regarding?:  Pt has covid and had an apt today but still needs to have his (Tremedol) medication refilled still. Pt's wife is checking w insurance to see if they will cover a virtual visit.  Best Call Back Number:  [phone], wife is [phone]  Additional Information:  Please call the patient's wife back at the phone number listed above to advise. Thank you!

## 2023-03-22 NOTE — TELEPHONE ENCOUNTER
----- Message from Brando Cline sent at 3/22/2023  8:22 AM CDT -----  Contact: Wife/katelynn  Type: Needs Medical Advice  Who Called:  Wife/Katelynn  Symptoms (please be specific):  Covid    Best Call Back Number: [phone]    Additional Information: Pt would like to have a virtual appt for  today at 9 due to pt having positive covid test

## 2023-04-03 ENCOUNTER — OFFICE VISIT (OUTPATIENT)
Dept: PODIATRY | Facility: CLINIC | Age: 63
End: 2023-04-03
Payer: MEDICARE

## 2023-04-03 VITALS — BODY MASS INDEX: 35.52 KG/M2 | HEIGHT: 73 IN | WEIGHT: 268 LBS

## 2023-04-03 DIAGNOSIS — M20.41 HAMMER TOES, BILATERAL: ICD-10-CM

## 2023-04-03 DIAGNOSIS — I73.9 PERIPHERAL VASCULAR DISEASE: ICD-10-CM

## 2023-04-03 DIAGNOSIS — E11.621 TYPE 2 DIABETES MELLITUS WITH FOOT ULCER, UNSPECIFIED WHETHER LONG TERM INSULIN USE: ICD-10-CM

## 2023-04-03 DIAGNOSIS — M20.42 HAMMER TOES, BILATERAL: ICD-10-CM

## 2023-04-03 DIAGNOSIS — L97.512 SKIN ULCER OF RIGHT FOOT WITH FAT LAYER EXPOSED: ICD-10-CM

## 2023-04-03 DIAGNOSIS — L97.509 TYPE 2 DIABETES MELLITUS WITH FOOT ULCER, UNSPECIFIED WHETHER LONG TERM INSULIN USE: ICD-10-CM

## 2023-04-03 DIAGNOSIS — E11.49 TYPE II DIABETES MELLITUS WITH NEUROLOGICAL MANIFESTATIONS: Primary | ICD-10-CM

## 2023-04-03 PROCEDURE — 1160F RVW MEDS BY RX/DR IN RCRD: CPT | Mod: CPTII,S$GLB,, | Performed by: PODIATRIST

## 2023-04-03 PROCEDURE — 1159F MED LIST DOCD IN RCRD: CPT | Mod: CPTII,S$GLB,, | Performed by: PODIATRIST

## 2023-04-03 PROCEDURE — 11042 WOUND DEBRIDEMENT: ICD-10-PCS | Mod: S$GLB,,, | Performed by: PODIATRIST

## 2023-04-03 PROCEDURE — 99214 OFFICE O/P EST MOD 30 MIN: CPT | Mod: 25,S$GLB,, | Performed by: PODIATRIST

## 2023-04-03 PROCEDURE — 1159F PR MEDICATION LIST DOCUMENTED IN MEDICAL RECORD: ICD-10-PCS | Mod: CPTII,S$GLB,, | Performed by: PODIATRIST

## 2023-04-03 PROCEDURE — 11042 DBRDMT SUBQ TIS 1ST 20SQCM/<: CPT | Mod: S$GLB,,, | Performed by: PODIATRIST

## 2023-04-03 PROCEDURE — 3008F PR BODY MASS INDEX (BMI) DOCUMENTED: ICD-10-PCS | Mod: CPTII,S$GLB,, | Performed by: PODIATRIST

## 2023-04-03 PROCEDURE — 3008F BODY MASS INDEX DOCD: CPT | Mod: CPTII,S$GLB,, | Performed by: PODIATRIST

## 2023-04-03 PROCEDURE — 99214 PR OFFICE/OUTPT VISIT, EST, LEVL IV, 30-39 MIN: ICD-10-PCS | Mod: 25,S$GLB,, | Performed by: PODIATRIST

## 2023-04-03 PROCEDURE — 1160F PR REVIEW ALL MEDS BY PRESCRIBER/CLIN PHARMACIST DOCUMENTED: ICD-10-PCS | Mod: CPTII,S$GLB,, | Performed by: PODIATRIST

## 2023-04-03 RX ORDER — CETIRIZINE HYDROCHLORIDE 10 MG/1
10 TABLET ORAL DAILY
COMMUNITY
Start: 2023-03-24 | End: 2023-10-08

## 2023-04-03 NOTE — PATIENT INSTRUCTIONS

## 2023-04-03 NOTE — PROGRESS NOTES
"  [address] [facility] [place] [place] [address]  Phone: [phone]   [phone]    Patient's PCP:YRN Pollard  Referring Provider: No ref. provider found    Subjective:      Chief Complaint:: Follow-up    Follow-up  Associated symptoms include numbness. Pertinent negatives include no abdominal pain, arthralgias, chest pain, chills, coughing, fatigue, fever, headaches, joint swelling, nausea, rash or weakness.     Ana Bullard is a 63 y.o. male who presents today for follow up ulcer right foot.  Presents with bandaid covering ulceration.  Dressing changes performed mostly every day or every other day.  Patient has been seeing Dr. Heaton for the wound.  Patient has been following up approximately every 2 months for debridement.    Systemic Doctor: YRN Townsend  Date Last Seen: 06/07/2022  Blood Sugar: not checked today   Hemoglobin A1c: 6.8 (11/23/22)      Vitals:    04/03/23 1355   Weight: 121.6 kg (268 lb)   Height: 6' 1" (1.854 m)   PainSc: 0-No pain      Shoe Size:     Past Surgical History:   Procedure Laterality Date    CARDIAC PACEMAKER PLACEMENT  12/2012    CARDIAC PACEMAKER PLACEMENT  10/04/2018    battery replacement    CORONARY ARTERY BYPASS GRAFT  06/2012    ESOPHAGOGASTRODUODENOSCOPY  01/31/2017    SAMARA FILTER PLACEMENT  2012    vena cava    LUMBAR SYMPATHETIC NERVE BLOCK Right 8/22/2019    Procedure: BLOCK, NERVE, SYMPATHETIC, LUMBAR;  Surgeon: Tashi Good MD;  Location: [facility];  Service: Pain Management;  Laterality: Right;  RIGHT SYMPATHETIC NERVE BLOCK     Past Medical History:   Diagnosis Date    AICD (automatic cardioverter/defibrillator) present     Anemia, chronic disease 07/27/2021    Anemia, unspecified 07/27/2021    Anxiety and depression 2012    CAD (coronary artery disease) 2012    Cardiomegaly 2016    CHF (congestive heart failure) 2012    Cholecystitis 2017    Complete heart block 2012    Diabetic foot ulcer     DVT (deep venous thrombosis) 2012    And " "pulmonary embolus    GERD (gastroesophageal reflux disease) 2010    Heparin induced thrombocytopenia     Hyperlipemia 2011    IDDM (insulin dependent diabetes mellitus) 1983    With neuropathy    Ischemic cardiomyopathy 2012    MI (myocardial infarction) 2012    Morbid obesity     MRSA (methicillin resistant Staphylococcus aureus) infection 01/19/2019    Noncompliance with therapeutic plan 2019    Normochromic normocytic anemia 07/27/2021    Osteomyelitis of right foot 01/2019    Osteomyelitis of right foot 09/01/2022    Klebsiella from bone, GBS in blood    Pulmonary edema 2019    Respiratory failure 2019    Sepsis 01/2019    Due to cellulitis right leg    Sleep apnea 2013    Thrombocytopathy 2012    Venous stasis dermatitis of both lower extremities      Family History   Problem Relation Age of Onset    Arthritis Mother     Diabetes Mother     Cancer Father     Heart disease Father     Stroke Father         Social History:   Marital Status:   Alcohol History:  reports no history of alcohol use.  Tobacco History:  reports that he quit smoking about 11 years ago. His smoking use included cigarettes. He has a 76.00 pack-year smoking history. He has never used smokeless tobacco.  Drug History:  has no history on file for drug use.    Review of patient's allergies indicates:   Allergen Reactions    Vasopressin Anaphylaxis and Other (See Comments)     Increased pressure in his head; felt like his eyeballs and veins were going to pop out of his head.     Heparin Other (See Comments)     Other reaction(s): "depleted my blood platets"    Decreased platelet count    Heparin analogues Other (See Comments)     Depleted WBC count    Vasopressin analogues Other (See Comments)     "felt like eyes going to pop out of my head"    Morphine Hallucinations, Other (See Comments) and Anxiety     Other reaction(s): Hallucinations  Paranoid, hallucinations         Current Outpatient Medications   Medication Sig Dispense Refill    " albuterol-ipratropium (DUO-NEB) 2.5 mg-0.5 mg/3 mL nebulizer solution Inhale 3 mLs into the lungs every 6 (six) hours as needed.      amiodarone (PACERONE) 200 MG Tab Take 200 mg by mouth once daily.      aspirin 81 MG Chew Take 81 mg by mouth once daily.      atorvastatin (LIPITOR) 10 MG tablet Take 1 tablet by mouth once daily.  1    BASAGLAR KWIKPEN U-100 INSULIN glargine 100 units/mL (3mL) SubQ pen 25 Units once daily.   3    carvediloL (COREG) 3.125 MG tablet Take 3.125 mg by mouth 2 (two) times daily.      cefTRIAXone (ROCEPHIN) 10 gram injection       cetirizine (ZYRTEC) 10 MG tablet Take 10 mg by mouth.      CORLANOR 5 mg Tab Take 5 mg by mouth nightly.      FARXIGA 10 mg tablet Take 10 mg by mouth once daily.      furosemide (LASIX) 40 MG tablet Take 1 tablet (40 mg total) by mouth once daily. 30 tablet 1    gabapentin (NEURONTIN) 600 MG tablet Take 1 tablet by mouth 3 (three) times daily.  0    lisinopriL (PRINIVIL,ZESTRIL) 2.5 MG tablet Take 2.5 mg by mouth 2 (two) times daily.      mupirocin (BACTROBAN) 2 % ointment APPLY TOPICALLY ONCE DAILY. 22 g 5    pantoprazole (PROTONIX) 40 MG tablet Take 1 tablet by mouth once daily.  0    traMADoL (ULTRAM) 50 mg tablet Take 1 tablet (50 mg total) by mouth every 8 (eight) hours as needed for Pain. 90 tablet 0    TRULICITY 0.75 mg/0.5 mL pen injector Inject into the skin.      vitamin B complex-folic acid 0.4 mg Tab Take 1 tablet by mouth.       No current facility-administered medications for this visit.       Review of Systems   Constitutional:  Negative for chills, fatigue, fever and unexpected weight change.   HENT:  Negative for hearing loss and trouble swallowing.    Eyes:  Negative for photophobia and visual disturbance.   Respiratory:  Negative for cough, shortness of breath and wheezing.    Cardiovascular:  Positive for leg swelling. Negative for chest pain and palpitations.   Gastrointestinal:  Negative for abdominal pain and nausea.   Genitourinary:   Negative for dysuria and frequency.   Musculoskeletal:  Negative for arthralgias, back pain, gait problem and joint swelling.   Skin:  Positive for color change and wound. Negative for rash.   Neurological:  Positive for numbness. Negative for tremors, seizures, weakness and headaches.   Hematological:  Does not bruise/bleed easily.       Objective:        Physical Exam:   Foot Exam    General  Orientation: alert and oriented to person, place, and time   Affect: appropriate   Gait: unimpaired       Right Foot/Ankle     Inspection and Palpation  Ecchymosis: none  Tenderness: none   Swelling: (Moderate lower extremity edema)  Arch: normal  Skin Exam: callus and ulcer;   Neurovascular  Dorsalis pedis: 1+  Posterior tibial: 1+  Capillary Refill: 3+  Saphenous nerve sensation: diminished  Tibial nerve sensation: diminished  Superficial peroneal nerve sensation: diminished  Deep peroneal nerve sensation: diminished  Sural nerve sensation: diminished        Physical Exam  Cardiovascular:      Pulses:           Dorsalis pedis pulses are 1+ on the right side.        Posterior tibial pulses are 1+ on the right side.   Musculoskeletal:        Feet:    Feet:      Right foot:      Skin integrity: Ulcer and callus present.                 Vascular Exam     Right Pulses  Dorsalis Pedis:      1+  Posterior Tibial:      1+         Imaging: none            Assessment:       1. Type II diabetes mellitus with neurological manifestations    2. Type 2 diabetes mellitus with foot ulcer, unspecified whether long term insulin use    3. Peripheral vascular disease    4. Skin ulcer of right foot with fat layer exposed    5. Hammer toes, bilateral      Plan:   Type II diabetes mellitus with neurological manifestations  -     Ambulatory referral/consult to Wound Clinic; Future; Expected date: 04/10/2023  -     US Lower Extremity Arteries Bilateral; Future; Expected date: 04/03/2023  -     AIR CAST WALKER BOOT FOR HOME USE  -     Wound  "Debridement    Type 2 diabetes mellitus with foot ulcer, unspecified whether long term insulin use    Peripheral vascular disease  -     Ambulatory referral/consult to Wound Clinic; Future; Expected date: 04/10/2023  -      Lower Extremity Arteries Bilateral; Future; Expected date: 04/03/2023  -     AIR CAST WALKER BOOT FOR HOME USE  -     Wound Debridement    Skin ulcer of right foot with fat layer exposed  -     Ambulatory referral/consult to Wound Clinic; Future; Expected date: 04/10/2023  -      Lower Extremity Arteries Bilateral; Future; Expected date: 04/03/2023  -     AIR CAST WALKER BOOT FOR HOME USE  -     Wound Debridement    Hammer toes, bilateral      Follow up Patient will be referred to the outpatient wound care center.    Wound Debridement    Date/Time: 4/3/2023 1:50 PM  Performed by: Ryan Avila DPM  Authorized by: Ryan Avila DPM     Time out: Immediately prior to procedure a "time out" was called to verify the correct patient, procedure, equipment, support staff and site/side marked as required.    Consent Done?:  Yes (Verbal)    Preparation: Patient was prepped and draped in usual sterile fashion    Local anesthesia used?: No      Wound Details:    Location:  Right foot    Location:  Right 5th Metatarsal Head    Type of Debridement:  Excisional       Length (cm):  1       Area (sq cm):  0.5       Width (cm):  0.5       Percent Debrided (%):  100       Depth (cm):  0.3       Total Area Debrided (sq cm):  0.5    Depth of debridement:  Subcutaneous tissue    Tissue debrided:  Subcutaneous, Epidermis and Dermis    Devitalized tissue debrided:  Biofilm, Slough, Callus and Fibrin    Instruments:  Blade and Nippers  Bleeding:  Moderate  Hemostasis Achieved: Yes  Method Used:  Pressure  Patient tolerance:  Patient tolerated the procedure well with no immediate complications        I discussed with the patient that the wound on the plantar right foot has not healed due to continued excess pressure. "  Also explained the need for more periodic debridement.  I also explained to him that his underlying peripheral vascular disease is also likely a complicating factor.  Today I am going to place him in a Cam Walker boot and dress the foot with a large padded dressing.  I am going to order home dressing supplies for the patient.  I am also ordering arterial ultrasounds for further evaluation of his lower extremity circulation.  I am also referring the patient to the outpatient wound care center for further evaluation.    Counseling:     I provided patient education verbally regarding:   Patient diagnosis, treatment options, as well as alternatives, risks, and benefits.     Ulcers    Ulcers, which are open sores in the skin, occur when the outer layers of the skin are injured and the deeper tissues become exposed. They can be caused by excess pressure due to ill-fitting shoes, long periods in bed or after an injury that breaks the skin. Ulcers are commonly seen in patients living with diabetes, neuropathy or vascular disease. Open wounds can put patients at increased risk of developing infection in the skin and bone.    The signs and symptoms of ulcers may include drainage, odor or red, inflamed, thickened tissue. Pain may or may not be present.    Diagnosis may include x-rays to evaluate possible bone involvement. Other advanced imaging studies may also be ordered to evaluate for vascular disease, which may affect a patients ability to heal the wound.    Ulcers are treated by removing the unhealthy tissue and performing local wound care to assist in healing. Special shoes or padding may be used to remove excess pressure on the area. If infection is present, antibiotics will be necessary. In severe cases that involve extensive infection or are slow to heal, surgery or other advanced wound care treatments may be necessary.      This note was created using Dragon voice recognition software that occasionally  misinterpreted phrases or words.

## 2023-04-10 ENCOUNTER — TELEPHONE (OUTPATIENT)
Dept: PODIATRY | Facility: CLINIC | Age: 63
End: 2023-04-10
Payer: MEDICARE

## 2023-04-10 NOTE — TELEPHONE ENCOUNTER
----- Message from Mackenzie Ventura MA sent at 4/10/2023  2:07 PM CDT -----  Contact: Patient  Type: Needs Medical Advice  Who Called:  Patient    Pharmacy name and phone #:    [facility] - [place], [place] - [address]  [address]  [place] [address]  Phone: [phone] Fax: [phone]      Best Call Back Number: [phone]    Additional Information: Patient would like a nurse to call him back to discuss getting wound care with Dr. Avila's wife. He also wants to discuss medical supplies that he is supposed to get.

## 2023-04-10 NOTE — TELEPHONE ENCOUNTER
Transferred patient for an appointment with Dr. Leah Avila at the podiatry office on Thursday since wound care does not accept his insurance.

## 2023-04-11 ENCOUNTER — TELEPHONE (OUTPATIENT)
Dept: PODIATRY | Facility: CLINIC | Age: 63
End: 2023-04-11
Payer: MEDICARE

## 2023-04-11 NOTE — TELEPHONE ENCOUNTER
----- Message from Feliz Patiño sent at 4/11/2023  3:54 PM CDT -----  Type: Needs Medical Advice  Who Called:  pt  Best Call Back Number: [phone]  Additional Information: pt would like to get info in regards to an appt that needs to be made. Please call back to advise, ASAP. Thanks!

## 2023-04-12 NOTE — PATIENT INSTRUCTIONS
Your Diabetes Foot Care Program    Every day you depend on your feet to keep you moving. But when you have diabetes, your feet need special care. Even a small foot problem can become very serious. So dont take your feet for granted. By working with your diabetes healthcare team, you can learn how to protect your feet and keep them healthy.  Evaluating your feet  An evaluation helps your healthcare provider check the condition of your feet. The evaluation includes a review of your diabetes history and overall health. It may also include a foot exam, X-rays, or other tests. These can help show problems beneath the skin that you cant see or feel.  Medical history  You will be asked about your overall health and any history of foot problems. Youll also discuss your diabetes history, such as whether your blood sugar level has changed over time. It also includes questions about sensations of pain, tingling, pins and needles, or numbness. Your healthcare provider will also want to know if you have high blood pressure and heart disease, or if you smoke. Be sure to mention any medicines (including over-the-counter), supplements, or herbal remedies you take.  Foot exam  A foot exam checks the condition of different parts of your foot. First, your skin and nails are examined for any signs of infection. Blood flow is checked by feeling for the pulses in each foot. You may also have tests to study the nerves in the foot. These include using a small filament (wire) to see how sensitive your feet are. In certain cases, you will be asked to walk a short distance to check for bone, joint, and muscle problems.  Diagnostic tests  If needed, your healthcare provider will suggest certain tests to learn more about your feet. These include:  Doppler tests to measure blood flow in the feet and lower leg.  X-rays, which can show bone or joint problems.  Other imaging tests, such as an MRI (magnetic resonance imaging), bone scan, and CT  (computed tomography) scan. These can help show bone infections.  Other tests, such as vascular tests, which study the blood flow in your feet and legs. You may also have nerve studies to learn how sensitive your feet are.  Creating a foot care program  Based on the evaluation, your healthcare provider will create a foot care program for you. Your program may be as simple as starting a daily self-care routine and changing the types of shoes your wear. It may also involve treating minor foot problems, such as a corn or blister. In some cases, surgery will be needed to treat an infection or mechanical problems, such as hammer toes.  Preventing problems  When you have diabetes, its easier to prevent problems than to treat them later on. So see your healthcare team for regular checkups and foot care. Your healthcare team can also help you learn more about caring for your feet at home. For example, you may be told to avoid walking barefoot. Or you may be told that special footwear is needed to protect your feet.  Have regular checkups  Foot problems can develop quickly. So be sure to follow your healthcare teams schedule for regular checkups. During office visits, take off your shoes and socks as soon as you get in the exam room. Ask your healthcare provider to examine your feet for problems. This will make it easier to find and treat small skin irritations before they get worse. Regular checkups can also help keep track of the blood flow and feeling in your feet. If you have neuropathy (lack of feeling in your feet), you will need to have checkups more often.  Learn about self-care  The more you know about diabetes and your feet, the easier it will be to prevent problems. Members of your healthcare team can teach you how to inspect your feet and teach you to look for warning signs. They can also give you other foot care tips. During office visits, be sure to ask any questions you have.  Date Last Reviewed: 7/1/2016  ©  5472-4269 The "[employer]. [address] [place] [address], [place] [address]. All rights reserved. This information is not intended as a substitute for professional medical care. Always follow your healthcare professional's instructions.

## 2023-04-13 ENCOUNTER — OFFICE VISIT (OUTPATIENT)
Dept: PODIATRY | Facility: CLINIC | Age: 63
End: 2023-04-13
Payer: MEDICARE

## 2023-04-13 VITALS — WEIGHT: 268 LBS | BODY MASS INDEX: 35.52 KG/M2 | HEIGHT: 73 IN

## 2023-04-13 DIAGNOSIS — M20.41 HAMMER TOES, BILATERAL: ICD-10-CM

## 2023-04-13 DIAGNOSIS — L97.509 TYPE 2 DIABETES MELLITUS WITH FOOT ULCER, UNSPECIFIED WHETHER LONG TERM INSULIN USE: ICD-10-CM

## 2023-04-13 DIAGNOSIS — I87.2 VENOUS STASIS DERMATITIS OF BOTH LOWER EXTREMITIES: ICD-10-CM

## 2023-04-13 DIAGNOSIS — I73.9 PERIPHERAL VASCULAR DISEASE: ICD-10-CM

## 2023-04-13 DIAGNOSIS — E11.49 TYPE II DIABETES MELLITUS WITH NEUROLOGICAL MANIFESTATIONS: Primary | ICD-10-CM

## 2023-04-13 DIAGNOSIS — L03.119 CELLULITIS AND ABSCESS OF FOOT: ICD-10-CM

## 2023-04-13 DIAGNOSIS — Z91.89 AT HIGH RISK FOR INADEQUATE NUTRITIONAL INTAKE: ICD-10-CM

## 2023-04-13 DIAGNOSIS — R26.2 DIFFICULTY IN WALKING, NOT ELSEWHERE CLASSIFIED: ICD-10-CM

## 2023-04-13 DIAGNOSIS — E66.9 OBESITY, UNSPECIFIED CLASSIFICATION, UNSPECIFIED OBESITY TYPE, UNSPECIFIED WHETHER SERIOUS COMORBIDITY PRESENT: ICD-10-CM

## 2023-04-13 DIAGNOSIS — E11.628 DIABETIC FOOT INFECTION: ICD-10-CM

## 2023-04-13 DIAGNOSIS — E11.621 TYPE 2 DIABETES MELLITUS WITH FOOT ULCER, UNSPECIFIED WHETHER LONG TERM INSULIN USE: ICD-10-CM

## 2023-04-13 DIAGNOSIS — M20.42 HAMMER TOES, BILATERAL: ICD-10-CM

## 2023-04-13 DIAGNOSIS — E11.40 TYPE 2 DIABETES MELLITUS WITH DIABETIC NEUROPATHY, UNSPECIFIED WHETHER LONG TERM INSULIN USE: ICD-10-CM

## 2023-04-13 DIAGNOSIS — L02.619 CELLULITIS AND ABSCESS OF FOOT: ICD-10-CM

## 2023-04-13 DIAGNOSIS — L60.2 OG (ONYCHOGRYPHOSIS): ICD-10-CM

## 2023-04-13 DIAGNOSIS — L97.512 SKIN ULCER OF RIGHT FOOT WITH FAT LAYER EXPOSED: ICD-10-CM

## 2023-04-13 DIAGNOSIS — L08.9 DIABETIC FOOT INFECTION: ICD-10-CM

## 2023-04-13 DIAGNOSIS — L60.2 ONYCHOGRYPHOSIS: ICD-10-CM

## 2023-04-13 DIAGNOSIS — R20.2 PARESTHESIA OF BOTH LOWER EXTREMITIES: ICD-10-CM

## 2023-04-13 PROCEDURE — 87070 CULTURE OTHR SPECIMN AEROBIC: CPT | Performed by: PODIATRIST

## 2023-04-13 PROCEDURE — 1159F PR MEDICATION LIST DOCUMENTED IN MEDICAL RECORD: ICD-10-PCS | Mod: CPTII,S$GLB,, | Performed by: PODIATRIST

## 2023-04-13 PROCEDURE — 3008F PR BODY MASS INDEX (BMI) DOCUMENTED: ICD-10-PCS | Mod: CPTII,S$GLB,, | Performed by: PODIATRIST

## 2023-04-13 PROCEDURE — 11042 DBRDMT SUBQ TIS 1ST 20SQCM/<: CPT | Mod: S$GLB,,, | Performed by: PODIATRIST

## 2023-04-13 PROCEDURE — 11042 WOUND DEBRIDEMENT: ICD-10-PCS | Mod: S$GLB,,, | Performed by: PODIATRIST

## 2023-04-13 PROCEDURE — 1160F RVW MEDS BY RX/DR IN RCRD: CPT | Mod: CPTII,S$GLB,, | Performed by: PODIATRIST

## 2023-04-13 PROCEDURE — 99214 PR OFFICE/OUTPT VISIT, EST, LEVL IV, 30-39 MIN: ICD-10-PCS | Mod: 25,S$GLB,, | Performed by: PODIATRIST

## 2023-04-13 PROCEDURE — 3008F BODY MASS INDEX DOCD: CPT | Mod: CPTII,S$GLB,, | Performed by: PODIATRIST

## 2023-04-13 PROCEDURE — 1160F PR REVIEW ALL MEDS BY PRESCRIBER/CLIN PHARMACIST DOCUMENTED: ICD-10-PCS | Mod: CPTII,S$GLB,, | Performed by: PODIATRIST

## 2023-04-13 PROCEDURE — 99214 OFFICE O/P EST MOD 30 MIN: CPT | Mod: 25,S$GLB,, | Performed by: PODIATRIST

## 2023-04-13 PROCEDURE — 87075 CULTR BACTERIA EXCEPT BLOOD: CPT | Performed by: PODIATRIST

## 2023-04-13 PROCEDURE — 1159F MED LIST DOCD IN RCRD: CPT | Mod: CPTII,S$GLB,, | Performed by: PODIATRIST

## 2023-04-13 RX ORDER — DOXYCYCLINE 100 MG/1
100 CAPSULE ORAL EVERY 12 HOURS
Qty: 20 CAPSULE | Refills: 0 | Status: ON HOLD | OUTPATIENT
Start: 2023-04-13 | End: 2023-06-18 | Stop reason: HOSPADM

## 2023-04-13 NOTE — PROGRESS NOTES
"  [address] [facility] [place] [place] [address]  Phone: [phone]   [phone]    Patient's PCP:YRN Pollard  Referring Provider: No ref. provider found    Subjective:      Chief Complaint:: Foot Ulcer (Right foot), Diabetes, Pressure Ulcer, Wound Care, Wound Check, Wound Consult, Wound Infection, Cellulitis, Diabetes Mellitus, Diabetic Foot Ulcer, Numbness, Non-healing Wound, and Difficulty Walking    HPI  Ana Bullard is a 63 y.o. male who presents today for follow up chronic ulcer right foot.  Dressing changes performed mostly every day or every other day.  Patient has been seeing Dr. Heaton for the wound.  Patient has been following up approximately every 2 months for debridement.     Systemic Doctor: YRN Townsend  Date Last Seen: 06/07/2022  Blood Sugar: not checked today   Hemoglobin A1c: 6.8 (11/23/22)    Vitals:    04/13/23 1448   Weight: 121.6 kg (268 lb)   Height: 6' 1" (1.854 m)   PainSc: 0-No pain      Shoe Size:     Past Surgical History:   Procedure Laterality Date    CARDIAC PACEMAKER PLACEMENT  12/2012    CARDIAC PACEMAKER PLACEMENT  10/04/2018    battery replacement    CORONARY ARTERY BYPASS GRAFT  06/2012    ESOPHAGOGASTRODUODENOSCOPY  01/31/2017    SAMARA FILTER PLACEMENT  2012    vena cava    LUMBAR SYMPATHETIC NERVE BLOCK Right 8/22/2019    Procedure: BLOCK, NERVE, SYMPATHETIC, LUMBAR;  Surgeon: Tashi Good MD;  Location: [facility] OR;  Service: Pain Management;  Laterality: Right;  RIGHT SYMPATHETIC NERVE BLOCK     Past Medical History:   Diagnosis Date    AICD (automatic cardioverter/defibrillator) present     Anemia, chronic disease 07/27/2021    Anemia, unspecified 07/27/2021    Anxiety and depression 2012    CAD (coronary artery disease) 2012    Cardiomegaly 2016    CHF (congestive heart failure) 2012    Cholecystitis 2017    Complete heart block 2012    Diabetic foot ulcer     DVT (deep venous thrombosis) 2012    And pulmonary embolus    GERD (gastroesophageal " "reflux disease) 2010    Heparin induced thrombocytopenia     Hyperlipemia 2011    IDDM (insulin dependent diabetes mellitus) 1983    With neuropathy    Ischemic cardiomyopathy 2012    MI (myocardial infarction) 2012    Morbid obesity     MRSA (methicillin resistant Staphylococcus aureus) infection 01/19/2019    Noncompliance with therapeutic plan 2019    Normochromic normocytic anemia 07/27/2021    Osteomyelitis of right foot 01/2019    Osteomyelitis of right foot 09/01/2022    Klebsiella from bone, GBS in blood    Pulmonary edema 2019    Respiratory failure 2019    Sepsis 01/2019    Due to cellulitis right leg    Sleep apnea 2013    Thrombocytopathy 2012    Venous stasis dermatitis of both lower extremities      Family History   Problem Relation Age of Onset    Arthritis Mother     Diabetes Mother     Cancer Father     Heart disease Father     Stroke Father         Social History:   Marital Status:   Alcohol History:  reports no history of alcohol use.  Tobacco History:  reports that he quit smoking about 11 years ago. His smoking use included cigarettes. He has a 76.00 pack-year smoking history. He has never used smokeless tobacco.  Drug History:  has no history on file for drug use.    Review of patient's allergies indicates:   Allergen Reactions    Vasopressin Anaphylaxis and Other (See Comments)     Increased pressure in his head; felt like his eyeballs and veins were going to pop out of his head.     Heparin Other (See Comments)     Other reaction(s): "depleted my blood platets"    Decreased platelet count    Heparin analogues Other (See Comments)     Depleted WBC count    Vasopressin analogues Other (See Comments)     "felt like eyes going to pop out of my head"    Morphine Hallucinations, Other (See Comments) and Anxiety     Other reaction(s): Hallucinations  Paranoid, hallucinations         Current Outpatient Medications   Medication Sig Dispense Refill    albuterol-ipratropium (DUO-NEB) 2.5 mg-0.5 " mg/3 mL nebulizer solution Inhale 3 mLs into the lungs every 6 (six) hours as needed.      amiodarone (PACERONE) 200 MG Tab Take 200 mg by mouth once daily.      aspirin 81 MG Chew Take 81 mg by mouth once daily.      atorvastatin (LIPITOR) 10 MG tablet Take 1 tablet by mouth once daily.  1    BASAGLAR KWIKPEN U-100 INSULIN glargine 100 units/mL (3mL) SubQ pen 25 Units once daily.   3    carvediloL (COREG) 3.125 MG tablet Take 3.125 mg by mouth 2 (two) times daily.      cefTRIAXone (ROCEPHIN) 10 gram injection       cetirizine (ZYRTEC) 10 MG tablet Take 10 mg by mouth.      CORLANOR 5 mg Tab Take 5 mg by mouth nightly.      doxycycline (VIBRAMYCIN) 100 MG Cap Take 1 capsule (100 mg total) by mouth every 12 (twelve) hours. 20 capsule 0    FARXIGA 10 mg tablet Take 10 mg by mouth once daily.      furosemide (LASIX) 40 MG tablet Take 1 tablet (40 mg total) by mouth once daily. 30 tablet 1    gabapentin (NEURONTIN) 600 MG tablet Take 1 tablet by mouth 3 (three) times daily.  0    lisinopriL (PRINIVIL,ZESTRIL) 2.5 MG tablet Take 2.5 mg by mouth 2 (two) times daily.      mupirocin (BACTROBAN) 2 % ointment APPLY TOPICALLY ONCE DAILY. 22 g 5    pantoprazole (PROTONIX) 40 MG tablet Take 1 tablet by mouth once daily.  0    traMADoL (ULTRAM) 50 mg tablet Take 1 tablet (50 mg total) by mouth every 8 (eight) hours as needed for Pain. 90 tablet 0    TRULICITY 0.75 mg/0.5 mL pen injector Inject into the skin.      vitamin B complex-folic acid 0.4 mg Tab Take 1 tablet by mouth.       No current facility-administered medications for this visit.       Review of Systems   Constitutional:  Negative for chills, fatigue, fever and unexpected weight change.   HENT:  Negative for hearing loss and trouble swallowing.    Eyes:  Negative for photophobia and visual disturbance.   Respiratory:  Negative for cough, shortness of breath and wheezing.    Cardiovascular:  Positive for leg swelling. Negative for chest pain and palpitations.    Gastrointestinal:  Negative for abdominal pain and nausea.   Genitourinary:  Negative for dysuria and frequency.   Musculoskeletal:  Negative for arthralgias, back pain, gait problem and joint swelling.   Skin:  Positive for color change and wound. Negative for rash.   Neurological:  Positive for numbness. Negative for tremors, seizures, weakness and headaches.   Hematological:  Does not bruise/bleed easily.       Objective:        Physical Exam:   Foot Exam    Right Foot/Ankle     Inspection and Palpation  Skin Exam: callus and ulcer;   Neurovascular  Dorsalis pedis: 1+  Posterior tibial: 1+        Physical Exam  Cardiovascular:      Pulses:           Dorsalis pedis pulses are 1+ on the right side.        Posterior tibial pulses are 1+ on the right side.   Musculoskeletal:        Feet:    Feet:      Right foot:      Skin integrity: Ulcer and callus present.                 Vascular Exam     Right Pulses  Dorsalis Pedis:      1+  Posterior Tibial:      1+           Physical examination: General: Pt. is well-developed, well-nourished, appears stated age, in no acute distress, alert and oriented x 3.    Vascular: Dorsalis pedis and posterior tibial pulses are 1/4  Bilaterally. Toes are warm to touch. Feet are warm proximally.    There is decreased digital hair. Skin is atrophic, slightly hyperpigmented, and mildly edematous. Capillary refill less than 5 seconds all toes/distal feet    Neurologic: Shelby-Latrell 5.07 monofilament is decreased bilateral feet. Sharp/dull sensation absent Bilateral feet.    Vibratory sensation absent bilateral    Musculoskeletal: adequate joint range of motion without pain, limitation, nor crepitation Bilateral feet and ankle joints. Muscle strength is 5/5 in all groups bilaterally.    Lymphatics: no lymphangitic streaking bilaterally.    Dermatologic: Elongated, thickened, dystrophic, discolored nails x 10. Xerosis Bilaterally.      Imaging:            Assessment:       1. Type II  diabetes mellitus with neurological manifestations    2. Type 2 diabetes mellitus with foot ulcer, unspecified whether long term insulin use    3. Skin ulcer of right foot with fat layer exposed    4. Peripheral vascular disease    5. Hammer toes, bilateral    6. Venous stasis dermatitis of both lower extremities    7. OG (onychogryphosis)    8. Difficulty in walking, not elsewhere classified    9. Diabetic foot infection    10. At high risk for inadequate nutritional intake    11. Cellulitis and abscess of foot    12. Onychogryphosis    13. Type 2 diabetes mellitus with diabetic neuropathy, unspecified whether long term insulin use    14. Paresthesia of both lower extremities    15. Obesity, unspecified classification, unspecified obesity type, unspecified whether serious comorbidity present      Plan:   Type II diabetes mellitus with neurological manifestations  -     Wound Debridement    Type 2 diabetes mellitus with foot ulcer, unspecified whether long term insulin use  -     MRI Foot (Forefoot) Right Without Contrast; Future; Expected date: 04/13/2023  -     doxycycline (VIBRAMYCIN) 100 MG Cap; Take 1 capsule (100 mg total) by mouth every 12 (twelve) hours.  Dispense: 20 capsule; Refill: 0  -     Aerobic culture  -     Culture, Anaerobic  -     Ambulatory referral/consult to Home Health; Future; Expected date: 04/14/2023    Skin ulcer of right foot with fat layer exposed  -     MRI Foot (Forefoot) Right Without Contrast; Future; Expected date: 04/13/2023  -     doxycycline (VIBRAMYCIN) 100 MG Cap; Take 1 capsule (100 mg total) by mouth every 12 (twelve) hours.  Dispense: 20 capsule; Refill: 0  -     Aerobic culture  -     Culture, Anaerobic  -     Ambulatory referral/consult to Home Health; Future; Expected date: 04/14/2023  -     Wound Debridement    Peripheral vascular disease  -     MRI Foot (Forefoot) Right Without Contrast; Future; Expected date: 04/13/2023  -     doxycycline (VIBRAMYCIN) 100 MG Cap; Take  "1 capsule (100 mg total) by mouth every 12 (twelve) hours.  Dispense: 20 capsule; Refill: 0  -     Aerobic culture  -     Culture, Anaerobic  -     Ambulatory referral/consult to Home Health; Future; Expected date: 04/14/2023    Hammer toes, bilateral    Venous stasis dermatitis of both lower extremities    OG (onychogryphosis)    Difficulty in walking, not elsewhere classified    Diabetic foot infection    At high risk for inadequate nutritional intake    Cellulitis and abscess of foot  -     Wound Debridement    Onychogryphosis    Type 2 diabetes mellitus with diabetic neuropathy, unspecified whether long term insulin use  -     Wound Debridement    Paresthesia of both lower extremities    Obesity, unspecified classification, unspecified obesity type, unspecified whether serious comorbidity present      No follow-ups on file.    Wound Debridement    Date/Time: 4/13/2023 2:40 PM  Performed by: Leah Avila DPM  Authorized by: Leah Avila DPM     Time out: Immediately prior to procedure a "time out" was called to verify the correct patient, procedure, equipment, support staff and site/side marked as required.    Consent Done?:  Yes (Written)    Preparation: Patient was prepped and draped in usual sterile fashion, Patient was prepped and draped with aseptic technique and Patient was prepped and draped with clean technique      Wound Details:    Location:  Right foot    Location:  Right 5th Metatarsal Head    Type of Debridement:  Excisional       Length (cm):  1.6       Area (sq cm):  0.96       Width (cm):  0.6       Percent Debrided (%):  100       Depth (cm):  0.4       Total Area Debrided (sq cm):  0.96    Depth of debridement:  Subcutaneous tissue    Tissue debrided:  Subcutaneous    Devitalized tissue debrided:  Biofilm, Callus, Slough and Necrotic/Eschar    Instruments:  Blade, Curette, Forceps and Nippers  Bleeding:  Minimal  Hemostasis Achieved: Yes  Method Used:  Pressure  Patient tolerance:  Patient " tolerated the procedure well with no immediate complications  1st Wound Pain Assessment: 0  Specimen Collected: Specimen sent to microbiology        Counseling:     I provided patient education verbally regarding:   Patient diagnosis, treatment options, as well as alternatives, risks, and benefits.     This note was created using Dragon voice recognition software that occasionally misinterpreted phrases or words.       Patient instructed to obtain arterial ultrasounds that were ordered at last visit.  Patient voiced an understanding and states he will do that    Counseling/Education:  I provided patient education verbally regarding:   The aspects of diabetes and how it pertains to the feet. I explained the importance of proper diabetic foot care and how it is essential for the health of their feet.    I discussed the importance of knowing their Hemoglobin A1c and that the level needs to be as close to 6 as possible. I discussed the increase complications of high blood sugar including stroke, blindness, heart attack, kidney failure and loss of limb secondary to neuropathy and PVD.     With neuropathy, beware of any breaks in the skin or redness. These areas are not recognized early due to the numbness.    I discussed Diabetes, lower back issues, metabolic disorders, systemic causes, chemotherapy, vitamin deficiency, heavy metal exposure, as some of the causes. I also explained that as much as 40% of the time we can not find a cause. I discussed different treatments available to control the symptoms but which may not cure the problem.       Counseled patient on the aspects of diabetes and how it pertains to the feet.  I explained the importance of proper diabetic foot care and how it is essential for the health of their feet.      Shoe inspection. Patient instructed on proper foot hygeine. We discussed wearing proper shoe gear, daily foot inspections, never walking without protective shoe gear, never putting sharp  instruments to feet, routine podiatric nail visits every 2-3 months.        Follow-up: Patient is to return to the clinic in 2 week(s) for follow-up but should call the office immediately if any signs of infection, such as fever, chills, sweats, increased redness or pain.  Recommendations:  Check feet daily.  dressing change, Iodosorb followed by border foam followed by bulky wrap for protection and removal of pressure  No Barefoot  Use surgery shoe for walking or standing.     Counseled patient on increasing protein intake, not getting wound wet, keeping dressing clean dry and intact, following a healthy diet, elevating legs when able, removing pressure from wound      I provided patient education verbally regarding: proper ulcer care and the possible need for serial debridements, topical medications, specific dressings and biological engineered skin substitutes if indicated.    proper ulcer care and the possible need for serial debridements, topical medications, specific dressings and biological engineered skin substitutes if indicated.

## 2023-04-16 LAB — BACTERIA SPEC ANAEROBE CULT: NORMAL

## 2023-04-17 ENCOUNTER — TELEPHONE (OUTPATIENT)
Dept: PODIATRY | Facility: CLINIC | Age: 63
End: 2023-04-17
Payer: MEDICARE

## 2023-04-17 LAB — BACTERIA SPEC AEROBE CULT: NO GROWTH

## 2023-04-17 NOTE — TELEPHONE ENCOUNTER
----- Message from Cheyanne Smith sent at 4/17/2023 10:34 AM CDT -----  Regarding: Home Health  Pt said he was told that the only ones to change bandage is home health and he doesn't think home health has been order because no one has come out to change bandage. Can someone give him a call and let him know wether he should wait for home health or go ahead and change bandage himself.     Thank you,   Cheyanne

## 2023-04-17 NOTE — TELEPHONE ENCOUNTER
Wait until [facility] care goes out to see patient.  If they do not go out by Wednesday then he is to change dressing.

## 2023-04-17 NOTE — TELEPHONE ENCOUNTER
"Spoke with Patient and informed him per Dr. Avila "Wait until [place] [facility] goes out to see patient.  If they do not go out by Wednesday then he is to change dressing." Patient also stated that home health contacted him to schedule for either lateral today or tomorrow.  "

## 2023-04-20 ENCOUNTER — OFFICE VISIT (OUTPATIENT)
Dept: PAIN MEDICINE | Facility: CLINIC | Age: 63
End: 2023-04-20
Payer: MEDICARE

## 2023-04-20 VITALS — WEIGHT: 268 LBS | BODY MASS INDEX: 35.52 KG/M2 | HEIGHT: 73 IN

## 2023-04-20 DIAGNOSIS — M79.672 BILATERAL FOOT PAIN: ICD-10-CM

## 2023-04-20 DIAGNOSIS — M79.671 BILATERAL FOOT PAIN: ICD-10-CM

## 2023-04-20 DIAGNOSIS — G89.4 CHRONIC PAIN DISORDER: ICD-10-CM

## 2023-04-20 DIAGNOSIS — E11.42 DIABETIC POLYNEUROPATHY ASSOCIATED WITH TYPE 2 DIABETES MELLITUS: Primary | ICD-10-CM

## 2023-04-20 PROCEDURE — 99214 PR OFFICE/OUTPT VISIT, EST, LEVL IV, 30-39 MIN: ICD-10-PCS | Mod: S$GLB,,, | Performed by: PHYSICIAN ASSISTANT

## 2023-04-20 PROCEDURE — 80326 AMPHETAMINES 5 OR MORE: CPT | Performed by: PHYSICIAN ASSISTANT

## 2023-04-20 PROCEDURE — 99999 PR PBB SHADOW E&M-EST. PATIENT-LVL III: ICD-10-PCS | Mod: PBBFAC,,, | Performed by: PHYSICIAN ASSISTANT

## 2023-04-20 PROCEDURE — 3008F PR BODY MASS INDEX (BMI) DOCUMENTED: ICD-10-PCS | Mod: CPTII,S$GLB,, | Performed by: PHYSICIAN ASSISTANT

## 2023-04-20 PROCEDURE — 3008F BODY MASS INDEX DOCD: CPT | Mod: CPTII,S$GLB,, | Performed by: PHYSICIAN ASSISTANT

## 2023-04-20 PROCEDURE — 99999 PR PBB SHADOW E&M-EST. PATIENT-LVL III: CPT | Mod: PBBFAC,,, | Performed by: PHYSICIAN ASSISTANT

## 2023-04-20 PROCEDURE — 1159F PR MEDICATION LIST DOCUMENTED IN MEDICAL RECORD: ICD-10-PCS | Mod: CPTII,S$GLB,, | Performed by: PHYSICIAN ASSISTANT

## 2023-04-20 PROCEDURE — 1159F MED LIST DOCD IN RCRD: CPT | Mod: CPTII,S$GLB,, | Performed by: PHYSICIAN ASSISTANT

## 2023-04-20 PROCEDURE — 99214 OFFICE O/P EST MOD 30 MIN: CPT | Mod: S$GLB,,, | Performed by: PHYSICIAN ASSISTANT

## 2023-04-20 RX ORDER — TRAMADOL HYDROCHLORIDE 50 MG/1
50 TABLET ORAL EVERY 8 HOURS PRN
Qty: 90 TABLET | Refills: 2 | Status: SHIPPED | OUTPATIENT
Start: 2023-04-28 | End: 2023-05-27

## 2023-04-20 NOTE — PROGRESS NOTES
Referring Physician: No ref. provider found    PCP: YRN Pollard      CC:  Bilateral foot pain    Interval history:  Mr. Bullard is a 63 y.o. male with bilateral foot pain due to peripheral neuropathy diabetic neuropathy.  Foot pain remains tolerable. Stabbing pains resolved. He continues to take Gabapentin 600 mg t.i.d. with mild benefits. Nortriptyline 25 mg made him to drowsy so he discontinued it.  He continues to take tramadol 50 mg q.8 hours as needed with mild-to-moderate benefit.  Denies any side effects with the medication.  Able perform his ADLs with the medication.  He underwent a right-sided lumbar sympathetic nerve block which provided moderate benefit that was shortlived. Currently on antibiotics.  He developed bilateral knee pain. Right intra articular knee injection with benefit. He denies any worsening weakness.  No bowel bladder changes. Pain today is rated 3/10.    Prior HPI:   Ana Bullard is a 63 y.o. male referred to us for bilateral foot pain. Patient with long history of diabetes.  He states neuropathy worsen after his CABG.  Bilateral foot pain has worsened over past 7 years.  He presents to us constant burning, sharp pain in his bilateral feet.  He also has some similar issues in his bilateral hands, but foot pain is worse.  Pain worsens prolonged sitting and standing.  He currently takes gabapentin 600 mg t.i.d. with mild benefits.  Increasing doses causes sedation.  He also takes tramadol q.8 hours as needed with mild-to-moderate benefit.  He denies any worsening weakness.  No bowel bladder changes.    Interventional history:  Right LSB on 09/2019 with 50% relief for 2 weeks.    ROS:  CONSTITUTIONAL: No fevers, chills, night sweats, wt. loss, appetite changes  SKIN: no rashes or itching  ENT: No headaches, head trauma, vision changes, or eye pain  LYMPH NODES: None noticed   CV: No chest pain, palpitations.   RESP: No shortness of breath, dyspnea on exertion, wheezing, or  hemoptysis. + cough  GI: No nausea, emesis, diarrhea, constipation, melena, hematochezia, pain.    : No dysuria, hematuria, urgency, or frequency   HEME: No easy bruising, bleeding problems  PSYCHIATRIC: No depression, anxiety, psychosis, hallucinations.  NEURO: No seizures, memory loss, dizziness or difficulty sleeping  MSK:  Positive HPI      Past Medical History:   Diagnosis Date    AICD (automatic cardioverter/defibrillator) present     Anemia, chronic disease 07/27/2021    Anemia, unspecified 07/27/2021    Anxiety and depression 2012    CAD (coronary artery disease) 2012    Cardiomegaly 2016    CHF (congestive heart failure) 2012    Cholecystitis 2017    Complete heart block 2012    Diabetic foot ulcer     DVT (deep venous thrombosis) 2012    And pulmonary embolus    GERD (gastroesophageal reflux disease) 2010    Heparin induced thrombocytopenia     Hyperlipemia 2011    IDDM (insulin dependent diabetes mellitus) 1983    With neuropathy    Ischemic cardiomyopathy 2012    MI (myocardial infarction) 2012    Morbid obesity     MRSA (methicillin resistant Staphylococcus aureus) infection 01/19/2019    Noncompliance with therapeutic plan 2019    Normochromic normocytic anemia 07/27/2021    Osteomyelitis of right foot 01/2019    Osteomyelitis of right foot 09/01/2022    Klebsiella from bone, GBS in blood    Pulmonary edema 2019    Respiratory failure 2019    Sepsis 01/2019    Due to cellulitis right leg    Sleep apnea 2013    Thrombocytopathy 2012    Venous stasis dermatitis of both lower extremities      Past Surgical History:   Procedure Laterality Date    CARDIAC PACEMAKER PLACEMENT  12/2012    CARDIAC PACEMAKER PLACEMENT  10/04/2018    battery replacement    CORONARY ARTERY BYPASS GRAFT  06/2012    ESOPHAGOGASTRODUODENOSCOPY  01/31/2017    SAMARA FILTER PLACEMENT  2012    vena cava    LUMBAR SYMPATHETIC NERVE BLOCK Right 8/22/2019    Procedure: BLOCK, NERVE, SYMPATHETIC, LUMBAR;  Surgeon: Tashi Good MD;   "Location: [facility];  Service: Pain Management;  Laterality: Right;  RIGHT SYMPATHETIC NERVE BLOCK     Family History   Problem Relation Age of Onset    Arthritis Mother     Diabetes Mother     Cancer Father     Heart disease Father     Stroke Father      Social History     Socioeconomic History    Marital status:    Tobacco Use    Smoking status: Former     Packs/day: 2.00     Years: 38.00     Pack years: 76.00     Types: Cigarettes     Quit date:      Years since quittin.3    Smokeless tobacco: Never   Substance and Sexual Activity    Alcohol use: No    Sexual activity: Never         Medications/Allergies: See med card    Vitals:    [id number] [phone]   Weight: 121.6 kg (268 lb)   Height: 6' 1" (1.854 m)   PainSc:   3   PainLoc: Foot         GENERAL: A and O x3, the patient appears well groomed and is in no acute distress.  Skin: No rashes or obvious lesions  HEENT: normocephalic, atraumatic  CARDIOVASCULAR:  RRR  LUNGS: non labored breathing  ABDOMEN: soft, nontender   UPPER EXTREMITIES: Normal alignment, normal range of motion, no atrophy, no skin changes,  hair growth and nail growth normal and equal bilaterally. No swelling, no tenderness.    LOWER EXTREMITIES:  Normal alignment, no atrophy, no skin changes,  hair growth and nail growth normal and equal bilaterally. No swelling. Tenderness to right lateral patella.   LUMBAR SPINE  Lumbar spine: ROM is limited with flexion extension and oblique extension with moderate increased pain.    Samuel's test causes no increased pain on either side.    Supine straight leg raise is negative bilaterally.    Internal and external rotation of the hip causes no increased pain on either side.  Myofascial exam: No tenderness to palpation across lumbar paraspinous muscles.        MENTAL STATUS: normal orientation, speech, language, and fund of knowledge for social situation.  Emotional state appropriate.     CRANIAL NERVES:  II:         PERRL bilaterally,   III,IV,VI: " EOMI.    V:         Facial sensation equal bilaterally  VII:       Facial motor function normal.  VIII:      Hearing equal to finger rub bilaterally  IX/X:    Gag normal, palate symmetric  XI:        Shoulder shrug equal, head turn equal  XII:       Tongue midline without fasciculations        MOTOR: Tone and bulk: normal bilateral upper and lower Strength: normal   Delt      Bi         Tri        WE      WF                        R          5          5          5          5          5          5            L          5          5          5          5          5          5               IP         ADD     ABD     Quad   TA        Gas      HAM  R          5          5          5          5          5          5          5  L          5          5          5          5          5          5          5     SENSATION:  Decreased globally of bilateral feet  REFLEXES: normal, symmetric, nonbrisk.  Toes down, no clonus. No hoffmans.  GAIT: normal rise, base, steps, and arm swing.       Imaging:  None    Assessment:  Ana Bullard is a 63 y.o. male with bilateral foot pain  1. Diabetic polyneuropathy associated with type 2 diabetes mellitus    2. Bilateral foot pain    3. Chronic pain disorder            Plan:  1. I have stressed the importance of physical activity and exercise to improve overall health  2.  Discussed disease progression of peripheral neuropathy.  Continue gabapentin as prescribed by PCP.  Consider nortriptyline 10 mg in the future  3.  He does request continue tramadol with us.  I believe this is very reasonable.   reviewed.  He takes tramadol 50 mg q.8 hours as needed.  UDS today  4.  May consider repeat lumbar sympathetic block if pain is exacerbated.  Briefly discussed spinal cord stimulation as well.  5. Follow-up in 3 month  Medication management provided by  Dr. Good

## 2023-04-20 NOTE — PATIENT INSTRUCTIONS
Your Diabetes Foot Care Program    Every day you depend on your feet to keep you moving. But when you have diabetes, your feet need special care. Even a small foot problem can become very serious. So dont take your feet for granted. By working with your diabetes healthcare team, you can learn how to protect your feet and keep them healthy.  Evaluating your feet  An evaluation helps your healthcare provider check the condition of your feet. The evaluation includes a review of your diabetes history and overall health. It may also include a foot exam, X-rays, or other tests. These can help show problems beneath the skin that you cant see or feel.  Medical history  You will be asked about your overall health and any history of foot problems. Youll also discuss your diabetes history, such as whether your blood sugar level has changed over time. It also includes questions about sensations of pain, tingling, pins and needles, or numbness. Your healthcare provider will also want to know if you have high blood pressure and heart disease, or if you smoke. Be sure to mention any medicines (including over-the-counter), supplements, or herbal remedies you take.  Foot exam  A foot exam checks the condition of different parts of your foot. First, your skin and nails are examined for any signs of infection. Blood flow is checked by feeling for the pulses in each foot. You may also have tests to study the nerves in the foot. These include using a small filament (wire) to see how sensitive your feet are. In certain cases, you will be asked to walk a short distance to check for bone, joint, and muscle problems.  Diagnostic tests  If needed, your healthcare provider will suggest certain tests to learn more about your feet. These include:  Doppler tests to measure blood flow in the feet and lower leg.  X-rays, which can show bone or joint problems.  Other imaging tests, such as an MRI (magnetic resonance imaging), bone scan, and CT  (computed tomography) scan. These can help show bone infections.  Other tests, such as vascular tests, which study the blood flow in your feet and legs. You may also have nerve studies to learn how sensitive your feet are.  Creating a foot care program  Based on the evaluation, your healthcare provider will create a foot care program for you. Your program may be as simple as starting a daily self-care routine and changing the types of shoes your wear. It may also involve treating minor foot problems, such as a corn or blister. In some cases, surgery will be needed to treat an infection or mechanical problems, such as hammer toes.  Preventing problems  When you have diabetes, its easier to prevent problems than to treat them later on. So see your healthcare team for regular checkups and foot care. Your healthcare team can also help you learn more about caring for your feet at home. For example, you may be told to avoid walking barefoot. Or you may be told that special footwear is needed to protect your feet.  Have regular checkups  Foot problems can develop quickly. So be sure to follow your healthcare teams schedule for regular checkups. During office visits, take off your shoes and socks as soon as you get in the exam room. Ask your healthcare provider to examine your feet for problems. This will make it easier to find and treat small skin irritations before they get worse. Regular checkups can also help keep track of the blood flow and feeling in your feet. If you have neuropathy (lack of feeling in your feet), you will need to have checkups more often.  Learn about self-care  The more you know about diabetes and your feet, the easier it will be to prevent problems. Members of your healthcare team can teach you how to inspect your feet and teach you to look for warning signs. They can also give you other foot care tips. During office visits, be sure to ask any questions you have.  Date Last Reviewed: 7/1/2016  ©  1905-2395 [employer]. [address] [place] [address], [place] [address]. All rights reserved. This information is not intended as a substitute for professional medical care. Always follow your healthcare professional's instructions.

## 2023-04-23 NOTE — PROGRESS NOTES
"  [address] [facility] [place] [address]  Phone: [phone]   [phone]    Patient's PCP:YRN Pollard  Referring Provider: No ref. provider found    Subjective:      Chief Complaint:: Follow-up, Wound Check, Diabetes Mellitus, Diabetes, Wound Care, Wound Infection, Numbness, Poor Circulation, Non-healing Wound, Foot Ulcer, Difficulty Walking, and Diabetic Foot Ulcer    HPI  Ana Bullard is a 63 y.o. male who presents today for follow up chronic ulcer right foot.    Patient states he is still walking an excessive amount and therefore the wound has worsened and the depth has increased due to this. patient states it is hard for him to stay off his foot and there is significant periwound callus.    Additionally, patient states he is not increasing his protein as much as he was instructed.  States he will increase his protein intake going forward.    Patient has a history of a pacemaker and therefore we will order a bone scan instead of the MRI    Patient instructed to obtain arterial ultrasounds that were ordered at previous visit.  Not yet scheduled by patient      Systemic Doctor: YRN Townsend  Date Last Seen: 06/07/2022  Blood Sugar: not checked today   Hemoglobin A1c: 6.8 (11/23/22)    Vitals:    04/27/23 1105   Weight: 121.6 kg (268 lb)   Height: 6' 1" (1.854 m)   PainSc: 0-No pain      Shoe Size:     Past Surgical History:   Procedure Laterality Date    CARDIAC PACEMAKER PLACEMENT  12/2012    CARDIAC PACEMAKER PLACEMENT  10/04/2018    battery replacement    CORONARY ARTERY BYPASS GRAFT  06/2012    ESOPHAGOGASTRODUODENOSCOPY  01/31/2017    SAMARA FILTER PLACEMENT  2012    vena cava    LUMBAR SYMPATHETIC NERVE BLOCK Right 8/22/2019    Procedure: BLOCK, NERVE, SYMPATHETIC, LUMBAR;  Surgeon: Tashi Good MD;  Location: [facility] OR;  Service: Pain Management;  Laterality: Right;  RIGHT SYMPATHETIC NERVE BLOCK     Past Medical History:   Diagnosis Date    AICD (automatic " "cardioverter/defibrillator) present     Anemia, chronic disease 07/27/2021    Anemia, unspecified 07/27/2021    Anxiety and depression 2012    CAD (coronary artery disease) 2012    Cardiomegaly 2016    CHF (congestive heart failure) 2012    Cholecystitis 2017    Complete heart block 2012    Diabetic foot ulcer     DVT (deep venous thrombosis) 2012    And pulmonary embolus    GERD (gastroesophageal reflux disease) 2010    Heparin induced thrombocytopenia     Hyperlipemia 2011    IDDM (insulin dependent diabetes mellitus) 1983    With neuropathy    Ischemic cardiomyopathy 2012    MI (myocardial infarction) 2012    Morbid obesity     MRSA (methicillin resistant Staphylococcus aureus) infection 01/19/2019    Noncompliance with therapeutic plan 2019    Normochromic normocytic anemia 07/27/2021    Osteomyelitis of right foot 01/2019    Osteomyelitis of right foot 09/01/2022    Klebsiella from bone, GBS in blood    Pulmonary edema 2019    Respiratory failure 2019    Sepsis 01/2019    Due to cellulitis right leg    Sleep apnea 2013    Thrombocytopathy 2012    Venous stasis dermatitis of both lower extremities      Family History   Problem Relation Age of Onset    Arthritis Mother     Diabetes Mother     Cancer Father     Heart disease Father     Stroke Father         Social History:   Marital Status:   Alcohol History:  reports no history of alcohol use.  Tobacco History:  reports that he quit smoking about 11 years ago. His smoking use included cigarettes. He has a 76.00 pack-year smoking history. He has never used smokeless tobacco.  Drug History:  has no history on file for drug use.    Review of patient's allergies indicates:   Allergen Reactions    Vasopressin Anaphylaxis and Other (See Comments)     Increased pressure in his head; felt like his eyeballs and veins were going to pop out of his head.     Heparin Other (See Comments)     Other reaction(s): "depleted my blood platets"    Decreased platelet count    " "Heparin analogues Other (See Comments)     Depleted WBC count    Vasopressin analogues Other (See Comments)     "felt like eyes going to pop out of my head"    Morphine Hallucinations, Other (See Comments) and Anxiety     Other reaction(s): Hallucinations  Paranoid, hallucinations         Current Outpatient Medications   Medication Sig Dispense Refill    albuterol-ipratropium (DUO-NEB) 2.5 mg-0.5 mg/3 mL nebulizer solution Inhale 3 mLs into the lungs every 6 (six) hours as needed.      amiodarone (PACERONE) 200 MG Tab Take 200 mg by mouth once daily.      aspirin 81 MG Chew Take 81 mg by mouth once daily.      atorvastatin (LIPITOR) 10 MG tablet Take 1 tablet by mouth once daily.  1    BASAGLAR KWIKPEN U-100 INSULIN glargine 100 units/mL (3mL) SubQ pen 25 Units once daily.   3    carvediloL (COREG) 3.125 MG tablet Take 3.125 mg by mouth 2 (two) times daily.      cefTRIAXone (ROCEPHIN) 10 gram injection       cetirizine (ZYRTEC) 10 MG tablet Take 10 mg by mouth.      CORLANOR 5 mg Tab Take 5 mg by mouth nightly.      doxycycline (VIBRAMYCIN) 100 MG Cap Take 1 capsule (100 mg total) by mouth every 12 (twelve) hours. 20 capsule 0    FARXIGA 10 mg tablet Take 10 mg by mouth once daily.      furosemide (LASIX) 40 MG tablet Take 1 tablet (40 mg total) by mouth once daily. 30 tablet 1    gabapentin (NEURONTIN) 600 MG tablet Take 1 tablet by mouth 3 (three) times daily.  0    lisinopriL (PRINIVIL,ZESTRIL) 2.5 MG tablet Take 2.5 mg by mouth 2 (two) times daily.      mupirocin (BACTROBAN) 2 % ointment APPLY TOPICALLY ONCE DAILY. 22 g 5    pantoprazole (PROTONIX) 40 MG tablet Take 1 tablet by mouth once daily.  0    [START ON 4/28/2023] traMADoL (ULTRAM) 50 mg tablet Take 1 tablet (50 mg total) by mouth every 8 (eight) hours as needed for Pain. 90 tablet 2    TRULICITY 0.75 mg/0.5 mL pen injector Inject into the skin.      vitamin B complex-folic acid 0.4 mg Tab Take 1 tablet by mouth.       No current facility-administered " medications for this visit.       Review of Systems   Constitutional:  Negative for chills, fatigue, fever and unexpected weight change.   HENT:  Negative for hearing loss and trouble swallowing.    Eyes:  Negative for photophobia and visual disturbance.   Respiratory:  Negative for cough, shortness of breath and wheezing.    Cardiovascular:  Negative for chest pain, palpitations and leg swelling.   Gastrointestinal:  Negative for abdominal pain and nausea.   Genitourinary:  Negative for dysuria and frequency.   Musculoskeletal:  Negative for arthralgias, back pain, gait problem, joint swelling and myalgias.   Skin:  Positive for wound. Negative for rash.   Neurological:  Positive for numbness. Negative for tremors, seizures, weakness and headaches.   Hematological:  Does not bruise/bleed easily.       Objective:        Physical Exam:   Foot Exam    Right Foot/Ankle     Inspection and Palpation  Skin Exam: callus and ulcer;   Neurovascular  Dorsalis pedis: 1+  Posterior tibial: 1+        Physical Exam  Cardiovascular:      Pulses:           Dorsalis pedis pulses are 1+ on the right side.        Posterior tibial pulses are 1+ on the right side.   Musculoskeletal:        Feet:    Feet:      Right foot:      Skin integrity: Ulcer and callus present.                 Vascular Exam     Right Pulses  Dorsalis Pedis:      1+  Posterior Tibial:      1+         Physical examination: General: Pt. is well-developed, well-nourished, appears stated age, in no acute distress, alert and oriented x 3.     Vascular: Dorsalis pedis and posterior tibial pulses are 1/4  Bilaterally. Toes are warm to touch. Feet are warm proximally.     There is decreased digital hair. Skin is atrophic, slightly hyperpigmented, and mildly edematous. Capillary refill less than 5 seconds all toes/distal feet     Neurologic: Moody-Latrell 5.07 monofilament is decreased bilateral feet. Sharp/dull sensation absent Bilateral feet.     Vibratory sensation  absent bilateral     Musculoskeletal: adequate joint range of motion without pain, limitation, nor crepitation Bilateral feet and ankle joints. Muscle strength is 5/5 in all groups bilaterally.     Lymphatics: no lymphangitic streaking bilaterally.     Dermatologic: Elongated, thickened, dystrophic, discolored nails x 10. Xerosis Bilaterally.  Imaging:            Assessment:       1. Ulcer of right foot with necrosis of muscle    2. Type II diabetes mellitus with neurological manifestations    3. Type 2 diabetes mellitus with foot ulcer, unspecified whether long term insulin use    4. Skin ulcer of right foot with fat layer exposed    5. Peripheral vascular disease    6. Hammer toes, bilateral    7. Venous stasis dermatitis of both lower extremities    8. OG (onychogryphosis)    9. Difficulty in walking, not elsewhere classified    10. Onychogryphosis    11. Cellulitis and abscess of foot    12. At high risk for inadequate nutritional intake    13. Diabetic foot infection    14. Type 2 diabetes mellitus with diabetic neuropathy, unspecified whether long term insulin use    15. Paresthesia of both lower extremities    16. Obesity, unspecified classification, unspecified obesity type, unspecified whether serious comorbidity present    17. Other osteomyelitis of right foot    18. PVD (peripheral vascular disease)      Plan:   Ulcer of right foot with necrosis of muscle    Type II diabetes mellitus with neurological manifestations    Type 2 diabetes mellitus with foot ulcer, unspecified whether long term insulin use    Skin ulcer of right foot with fat layer exposed  -     Ambulatory referral/consult to Home Health; Future; Expected date: 04/28/2023  -     NM Bone Scan 3 Phase Foot; Future; Expected date: 04/27/2023    Peripheral vascular disease    Hammer toes, bilateral    Venous stasis dermatitis of both lower extremities    OG (onychogryphosis)    Difficulty in walking, not elsewhere  "classified    Onychogryphosis    Cellulitis and abscess of foot  -     Ambulatory referral/consult to Home Health; Future; Expected date: 04/28/2023  -     NM Bone Scan 3 Phase Foot; Future; Expected date: 04/27/2023    At high risk for inadequate nutritional intake    Diabetic foot infection    Type 2 diabetes mellitus with diabetic neuropathy, unspecified whether long term insulin use    Paresthesia of both lower extremities    Obesity, unspecified classification, unspecified obesity type, unspecified whether serious comorbidity present    Other osteomyelitis of right foot  -     Ambulatory referral/consult to Home Health; Future; Expected date: 04/28/2023  -     NM Bone Scan 3 Phase Foot; Future; Expected date: 04/27/2023    PVD (peripheral vascular disease)  -     Ambulatory referral/consult to Vascular Surgery; Future; Expected date: 05/04/2023    Other orders  -     Wound Debridement      No follow-ups on file.    Wound Debridement    Date/Time: 4/27/2023 10:50 AM  Performed by: Leah Avila DPM  Authorized by: Leah Avila DPM     Time out: Immediately prior to procedure a "time out" was called to verify the correct patient, procedure, equipment, support staff and site/side marked as required.    Consent Done?:  Yes (Written)    Preparation: Patient was prepped and draped in usual sterile fashion, Patient was prepped and draped with aseptic technique and Patient was prepped and draped with clean technique      Wound Details:    Location:  Right foot    Location:  Right 5th Metatarsal Head    Type of Debridement:  Excisional       Length (cm):  1.3       Area (sq cm):  10.4       Width (cm):  8       Percent Debrided (%):  100       Depth (cm):  0.7       Total Area Debrided (sq cm):  10.4    Depth of debridement:  Muscle/fascia/tendon    Tissue debrided:  Fascia and Subcutaneous    Devitalized tissue debrided:  Slough, Necrotic/Eschar, Other, Biofilm, Callus, Exudate and Fibrin    Instruments:  Blade, " Curette, Forceps, Nippers and Scissors  Bleeding:  Moderate  Hemostasis Achieved: Yes  Method Used:  Pressure  Patient tolerance:  Patient tolerated the procedure well with no immediate complications  1st Wound Pain Assessment: 0          Counseling:     I provided patient education verbally regarding:   Patient diagnosis, treatment options, as well as alternatives, risks, and benefits.     This note was created using Dragon voice recognition software that occasionally misinterpreted phrases or words.     Vascular referral placed due to diminished pedal pulses.    Bone scan ordered due to concerns of deeper infection.      Follow-up: Patient is to return to the clinic in 2 week(s) for follow-up but should call the office immediately if any signs of infection, such as fever, chills, sweats, increased redness or pain.  Recommendations:  Check feet daily.  dressing changes, home health Iodosorb followed by border foam followed by bulky wrap for protection  Use surgery shoe for walking or standing.    I provided patient education verbally regarding: proper ulcer care and the possible need for serial debridements, topical medications, specific dressings and biological engineered skin substitutes if indicated.        Counseling/Education:  I provided patient education verbally regarding:   The aspects of diabetes and how it pertains to the feet. I explained the importance of proper diabetic foot care and how it is essential for the health of their feet.    I discussed the importance of knowing their Hemoglobin A1c and that the level needs to be as close to 6 as possible. I discussed the increase complications of high blood sugar including stroke, blindness, heart attack, kidney failure and loss of limb secondary to neuropathy and PVD.     With neuropathy, beware of any breaks in the skin or redness. These areas are not recognized early due to the numbness.    I discussed Diabetes, lower back issues, metabolic disorders,  systemic causes, chemotherapy, vitamin deficiency, heavy metal exposure, as some of the causes. I also explained that as much as 40% of the time we can not find a cause. I discussed different treatments available to control the symptoms but which may not cure the problem.       Counseled patient on the aspects of diabetes and how it pertains to the feet.  I explained the importance of proper diabetic foot care and how it is essential for the health of their feet.      Shoe inspection. Patient instructed on proper foot hygeine. We discussed wearing proper shoe gear, daily foot inspections, never walking without protective shoe gear, never putting sharp instruments to feet, routine podiatric nail visits every 2-3 months.      Patient should call the office immediately if any signs of infection, such as fever, chills, sweats, increased redness or pain.    Patient was instructed to call the clinic or go to the emergency department if their symptoms do not improve, worsens, or if new symptoms develop.  Patient was advised that if any increased swelling, pain, or numbness arise to go immediately to the ED. Patient knows to call any time if an emergency arises. Shared decision making occurred and patient verbalized understanding in agreement with this plan.     I counseled the patient on their conditions, their implications and medical management.     >50% of this > 60 minute visit was spent face to face educating/counseling the patient    I spent a total of 60 minutes on the day of the visit.This includes face to face time and non-face to face time preparing to see the patient (eg, review of tests), obtaining and/or reviewing separately obtained history, documenting clinical information in the electronic or other health record, independently interpreting results and communicating results to the patient/family/caregiver, or care coordinator.

## 2023-04-25 LAB
6MAM UR QL: NOT DETECTED
7AMINOCLONAZEPAM UR QL: NOT DETECTED
A-OH ALPRAZ UR QL: NOT DETECTED
ALPHA-OH-MIDAZOLAM: NOT DETECTED
ALPRAZ UR QL: NOT DETECTED
AMPHET UR QL SCN: NOT DETECTED
ANNOTATION COMMENT IMP: NORMAL
ANNOTATION COMMENT IMP: NORMAL
BARBITURATES UR QL: NOT DETECTED
BUPRENORPHINE UR QL: NOT DETECTED
BZE UR QL: NOT DETECTED
CARBOXYTHC UR QL: NOT DETECTED
CARISOPRODOL UR QL: NOT DETECTED
CLONAZEPAM UR QL: NOT DETECTED
CODEINE UR QL: NOT DETECTED
CREAT UR-MCNC: 32.2 MG/DL (ref 20–400)
DIAZEPAM UR QL: NOT DETECTED
ETHYL GLUCURONIDE UR QL: NOT DETECTED
FENTANYL UR QL: NOT DETECTED
GABAPENTIN: PRESENT
HYDROCODONE UR QL: NOT DETECTED
HYDROMORPHONE UR QL: NOT DETECTED
LORAZEPAM UR QL: NOT DETECTED
MDA UR QL: NOT DETECTED
MDEA UR QL: NOT DETECTED
MDMA UR QL: NOT DETECTED
ME-PHENIDATE UR QL: NOT DETECTED
METHADONE UR QL: NOT DETECTED
METHAMPHET UR QL: NOT DETECTED
MIDAZOLAM UR QL SCN: NOT DETECTED
MORPHINE UR QL: NOT DETECTED
NALOXONE: NOT DETECTED
NORBUPRENORPHINE UR QL CFM: NOT DETECTED
NORDIAZEPAM UR QL: NOT DETECTED
NORFENTANYL UR QL: NOT DETECTED
NORHYDROCODONE UR QL CFM: NOT DETECTED
NORMEPERIDINE UR QL CFM: NOT DETECTED
NOROXYCODONE UR QL CFM: NOT DETECTED
NOROXYMORPHONE UR QL SCN: NOT DETECTED
OXAZEPAM UR QL: NOT DETECTED
OXYCODONE UR QL: NOT DETECTED
OXYMORPHONE UR QL: NOT DETECTED
PATHOLOGY STUDY: NORMAL
PCP UR QL: NOT DETECTED
PHENTERMINE UR QL: NOT DETECTED
PREGABALIN: NOT DETECTED
SERVICE CMNT-IMP: NORMAL
TAPENTADOL UR QL SCN: NOT DETECTED
TAPENTADOL UR QL SCN: NOT DETECTED
TEMAZEPAM UR QL: NOT DETECTED
TRAMADOL UR QL: PRESENT
ZOLPIDEM METABOLITE: NOT DETECTED
ZOLPIDEM UR QL: NOT DETECTED

## 2023-04-27 ENCOUNTER — OFFICE VISIT (OUTPATIENT)
Dept: PODIATRY | Facility: CLINIC | Age: 63
End: 2023-04-27
Payer: MEDICARE

## 2023-04-27 VITALS — HEIGHT: 73 IN | WEIGHT: 268 LBS | BODY MASS INDEX: 35.52 KG/M2

## 2023-04-27 DIAGNOSIS — E11.628 DIABETIC FOOT INFECTION: ICD-10-CM

## 2023-04-27 DIAGNOSIS — L02.619 CELLULITIS AND ABSCESS OF FOOT: ICD-10-CM

## 2023-04-27 DIAGNOSIS — L08.9 DIABETIC FOOT INFECTION: ICD-10-CM

## 2023-04-27 DIAGNOSIS — M86.8X7 OTHER OSTEOMYELITIS OF RIGHT FOOT: ICD-10-CM

## 2023-04-27 DIAGNOSIS — Z91.89 AT HIGH RISK FOR INADEQUATE NUTRITIONAL INTAKE: ICD-10-CM

## 2023-04-27 DIAGNOSIS — E11.40 TYPE 2 DIABETES MELLITUS WITH DIABETIC NEUROPATHY, UNSPECIFIED WHETHER LONG TERM INSULIN USE: ICD-10-CM

## 2023-04-27 DIAGNOSIS — E11.49 TYPE II DIABETES MELLITUS WITH NEUROLOGICAL MANIFESTATIONS: ICD-10-CM

## 2023-04-27 DIAGNOSIS — L60.2 OG (ONYCHOGRYPHOSIS): ICD-10-CM

## 2023-04-27 DIAGNOSIS — R26.2 DIFFICULTY IN WALKING, NOT ELSEWHERE CLASSIFIED: ICD-10-CM

## 2023-04-27 DIAGNOSIS — M20.41 HAMMER TOES, BILATERAL: ICD-10-CM

## 2023-04-27 DIAGNOSIS — I73.9 PERIPHERAL VASCULAR DISEASE: ICD-10-CM

## 2023-04-27 DIAGNOSIS — E66.9 OBESITY, UNSPECIFIED CLASSIFICATION, UNSPECIFIED OBESITY TYPE, UNSPECIFIED WHETHER SERIOUS COMORBIDITY PRESENT: ICD-10-CM

## 2023-04-27 DIAGNOSIS — L60.2 ONYCHOGRYPHOSIS: ICD-10-CM

## 2023-04-27 DIAGNOSIS — L03.119 CELLULITIS AND ABSCESS OF FOOT: ICD-10-CM

## 2023-04-27 DIAGNOSIS — L97.509 TYPE 2 DIABETES MELLITUS WITH FOOT ULCER, UNSPECIFIED WHETHER LONG TERM INSULIN USE: ICD-10-CM

## 2023-04-27 DIAGNOSIS — R20.2 PARESTHESIA OF BOTH LOWER EXTREMITIES: ICD-10-CM

## 2023-04-27 DIAGNOSIS — E11.621 TYPE 2 DIABETES MELLITUS WITH FOOT ULCER, UNSPECIFIED WHETHER LONG TERM INSULIN USE: ICD-10-CM

## 2023-04-27 DIAGNOSIS — M20.42 HAMMER TOES, BILATERAL: ICD-10-CM

## 2023-04-27 DIAGNOSIS — I73.9 PVD (PERIPHERAL VASCULAR DISEASE): ICD-10-CM

## 2023-04-27 DIAGNOSIS — I87.2 VENOUS STASIS DERMATITIS OF BOTH LOWER EXTREMITIES: ICD-10-CM

## 2023-04-27 DIAGNOSIS — L97.512 SKIN ULCER OF RIGHT FOOT WITH FAT LAYER EXPOSED: ICD-10-CM

## 2023-04-27 DIAGNOSIS — L97.513 ULCER OF RIGHT FOOT WITH NECROSIS OF MUSCLE: Primary | ICD-10-CM

## 2023-04-27 PROCEDURE — 1160F RVW MEDS BY RX/DR IN RCRD: CPT | Mod: CPTII,S$GLB,, | Performed by: PODIATRIST

## 2023-04-27 PROCEDURE — 11043 DBRDMT MUSC&/FSCA 1ST 20/<: CPT | Mod: S$GLB,,, | Performed by: PODIATRIST

## 2023-04-27 PROCEDURE — 1160F PR REVIEW ALL MEDS BY PRESCRIBER/CLIN PHARMACIST DOCUMENTED: ICD-10-PCS | Mod: CPTII,S$GLB,, | Performed by: PODIATRIST

## 2023-04-27 PROCEDURE — 3008F BODY MASS INDEX DOCD: CPT | Mod: CPTII,S$GLB,, | Performed by: PODIATRIST

## 2023-04-27 PROCEDURE — 1159F MED LIST DOCD IN RCRD: CPT | Mod: CPTII,S$GLB,, | Performed by: PODIATRIST

## 2023-04-27 PROCEDURE — 11043 WOUND DEBRIDEMENT: ICD-10-PCS | Mod: S$GLB,,, | Performed by: PODIATRIST

## 2023-04-27 PROCEDURE — 99214 PR OFFICE/OUTPT VISIT, EST, LEVL IV, 30-39 MIN: ICD-10-PCS | Mod: 25,S$GLB,, | Performed by: PODIATRIST

## 2023-04-27 PROCEDURE — 99214 OFFICE O/P EST MOD 30 MIN: CPT | Mod: 25,S$GLB,, | Performed by: PODIATRIST

## 2023-04-27 PROCEDURE — 3008F PR BODY MASS INDEX (BMI) DOCUMENTED: ICD-10-PCS | Mod: CPTII,S$GLB,, | Performed by: PODIATRIST

## 2023-04-27 PROCEDURE — 1159F PR MEDICATION LIST DOCUMENTED IN MEDICAL RECORD: ICD-10-PCS | Mod: CPTII,S$GLB,, | Performed by: PODIATRIST

## 2023-05-11 ENCOUNTER — TELEPHONE (OUTPATIENT)
Dept: PODIATRY | Facility: CLINIC | Age: 63
End: 2023-05-11

## 2023-05-11 ENCOUNTER — OFFICE VISIT (OUTPATIENT)
Dept: PODIATRY | Facility: CLINIC | Age: 63
End: 2023-05-11
Payer: MEDICARE

## 2023-05-11 VITALS — BODY MASS INDEX: 35.52 KG/M2 | HEIGHT: 73 IN | RESPIRATION RATE: 18 BRPM | WEIGHT: 268 LBS

## 2023-05-11 DIAGNOSIS — L08.9 DIABETIC FOOT INFECTION: ICD-10-CM

## 2023-05-11 DIAGNOSIS — R20.2 PARESTHESIA OF BOTH LOWER EXTREMITIES: ICD-10-CM

## 2023-05-11 DIAGNOSIS — L60.2 OG (ONYCHOGRYPHOSIS): ICD-10-CM

## 2023-05-11 DIAGNOSIS — L03.119 CELLULITIS AND ABSCESS OF FOOT: ICD-10-CM

## 2023-05-11 DIAGNOSIS — I73.9 PERIPHERAL VASCULAR DISEASE: ICD-10-CM

## 2023-05-11 DIAGNOSIS — E11.628 DIABETIC FOOT INFECTION: ICD-10-CM

## 2023-05-11 DIAGNOSIS — L97.513 ULCER OF RIGHT FOOT WITH NECROSIS OF MUSCLE: ICD-10-CM

## 2023-05-11 DIAGNOSIS — L97.512 SKIN ULCER OF RIGHT FOOT WITH FAT LAYER EXPOSED: ICD-10-CM

## 2023-05-11 DIAGNOSIS — L97.512 SKIN ULCER OF RIGHT FOOT WITH FAT LAYER EXPOSED: Primary | ICD-10-CM

## 2023-05-11 DIAGNOSIS — Z91.89 AT HIGH RISK FOR INADEQUATE NUTRITIONAL INTAKE: ICD-10-CM

## 2023-05-11 DIAGNOSIS — M20.42 HAMMER TOES, BILATERAL: ICD-10-CM

## 2023-05-11 DIAGNOSIS — E66.9 OBESITY, UNSPECIFIED CLASSIFICATION, UNSPECIFIED OBESITY TYPE, UNSPECIFIED WHETHER SERIOUS COMORBIDITY PRESENT: ICD-10-CM

## 2023-05-11 DIAGNOSIS — L60.2 ONYCHOGRYPHOSIS: ICD-10-CM

## 2023-05-11 DIAGNOSIS — R26.2 DIFFICULTY IN WALKING, NOT ELSEWHERE CLASSIFIED: ICD-10-CM

## 2023-05-11 DIAGNOSIS — E11.49 TYPE II DIABETES MELLITUS WITH NEUROLOGICAL MANIFESTATIONS: Primary | ICD-10-CM

## 2023-05-11 DIAGNOSIS — I73.9 PVD (PERIPHERAL VASCULAR DISEASE): ICD-10-CM

## 2023-05-11 DIAGNOSIS — L02.619 CELLULITIS AND ABSCESS OF FOOT: ICD-10-CM

## 2023-05-11 DIAGNOSIS — L97.509 TYPE 2 DIABETES MELLITUS WITH FOOT ULCER, UNSPECIFIED WHETHER LONG TERM INSULIN USE: ICD-10-CM

## 2023-05-11 DIAGNOSIS — E11.621 TYPE 2 DIABETES MELLITUS WITH FOOT ULCER, UNSPECIFIED WHETHER LONG TERM INSULIN USE: ICD-10-CM

## 2023-05-11 DIAGNOSIS — I87.2 VENOUS STASIS DERMATITIS OF BOTH LOWER EXTREMITIES: ICD-10-CM

## 2023-05-11 DIAGNOSIS — M20.41 HAMMER TOES, BILATERAL: ICD-10-CM

## 2023-05-11 DIAGNOSIS — E11.40 TYPE 2 DIABETES MELLITUS WITH DIABETIC NEUROPATHY, UNSPECIFIED WHETHER LONG TERM INSULIN USE: ICD-10-CM

## 2023-05-11 PROCEDURE — 1159F MED LIST DOCD IN RCRD: CPT | Mod: CPTII,S$GLB,, | Performed by: PODIATRIST

## 2023-05-11 PROCEDURE — 99999 PR PBB SHADOW E&M-EST. PATIENT-LVL IV: ICD-10-PCS | Mod: PBBFAC,,, | Performed by: PODIATRIST

## 2023-05-11 PROCEDURE — 1159F PR MEDICATION LIST DOCUMENTED IN MEDICAL RECORD: ICD-10-PCS | Mod: CPTII,S$GLB,, | Performed by: PODIATRIST

## 2023-05-11 PROCEDURE — 1160F PR REVIEW ALL MEDS BY PRESCRIBER/CLIN PHARMACIST DOCUMENTED: ICD-10-PCS | Mod: CPTII,S$GLB,, | Performed by: PODIATRIST

## 2023-05-11 PROCEDURE — 99214 PR OFFICE/OUTPT VISIT, EST, LEVL IV, 30-39 MIN: ICD-10-PCS | Mod: S$GLB,,, | Performed by: PODIATRIST

## 2023-05-11 PROCEDURE — 1160F RVW MEDS BY RX/DR IN RCRD: CPT | Mod: CPTII,S$GLB,, | Performed by: PODIATRIST

## 2023-05-11 PROCEDURE — 3008F BODY MASS INDEX DOCD: CPT | Mod: CPTII,S$GLB,, | Performed by: PODIATRIST

## 2023-05-11 PROCEDURE — 99999 PR PBB SHADOW E&M-EST. PATIENT-LVL IV: CPT | Mod: PBBFAC,,, | Performed by: PODIATRIST

## 2023-05-11 PROCEDURE — 3008F PR BODY MASS INDEX (BMI) DOCUMENTED: ICD-10-PCS | Mod: CPTII,S$GLB,, | Performed by: PODIATRIST

## 2023-05-11 PROCEDURE — 99214 OFFICE O/P EST MOD 30 MIN: CPT | Mod: S$GLB,,, | Performed by: PODIATRIST

## 2023-05-11 RX ORDER — ALBUTEROL SULFATE 5 MG/ML
2.5 SOLUTION RESPIRATORY (INHALATION) EVERY 6 HOURS PRN
COMMUNITY

## 2023-05-11 NOTE — PROGRESS NOTES
"  [address] [place] [address]  Phone: [phone]   [phone]    Patient's PCP:YRN Pollard  Referring Provider: No ref. provider found    Subjective:      Chief Complaint:: Follow-up (Right foot ulcer), Difficulty Walking, Numbness, Wound Check, Wound Care, Pressure Ulcer, Non-healing Wound, and Foot Ulcer    HPI  Ana Bullard is a 63 y.o. male who presents today for follow up chronic ulcer right foot.    Patient states he is still walking an excessive amount and therefore the wound has worsened and the depth has increased due to this. patient states it is hard for him to stay off his foot and there is significant periwound callus.     Additionally, patient states he is not increasing his protein as much as he was instructed.  States he will increase his protein intake going forward.     Patient has a history of a pacemaker and therefore we will order a bone scan instead of the MRI-Now scheduled     Patient instructed to obtain arterial ultrasounds that were ordered at previous visit- Now scheduled        Systemic Doctor: YRN Townsend  Date Last Seen: 06/07/2022  Blood Sugar: not checked today   Hemoglobin A1c: 6.8 (11/23/22)    Vitals:    05/11/23 1047   Resp: 18   Weight: 121.6 kg (268 lb)   Height: 6' 1" (1.854 m)   PainSc: 0-No pain      Shoe Size:     Past Surgical History:   Procedure Laterality Date    CARDIAC PACEMAKER PLACEMENT  12/2012    CARDIAC PACEMAKER PLACEMENT  10/04/2018    battery replacement    CORONARY ARTERY BYPASS GRAFT  06/2012    ESOPHAGOGASTRODUODENOSCOPY  01/31/2017    SAMARA FILTER PLACEMENT  2012    vena cava    LUMBAR SYMPATHETIC NERVE BLOCK Right 8/22/2019    Procedure: BLOCK, NERVE, SYMPATHETIC, LUMBAR;  Surgeon: Tashi Good MD;  Location: [facility] OR;  Service: Pain Management;  Laterality: Right;  RIGHT SYMPATHETIC NERVE BLOCK     Past Medical History:   Diagnosis Date    AICD (automatic cardioverter/defibrillator) present     Anemia, " "chronic disease 07/27/2021    Anemia, unspecified 07/27/2021    Anxiety and depression 2012    CAD (coronary artery disease) 2012    Cardiomegaly 2016    CHF (congestive heart failure) 2012    Cholecystitis 2017    Complete heart block 2012    Diabetic foot ulcer     DVT (deep venous thrombosis) 2012    And pulmonary embolus    GERD (gastroesophageal reflux disease) 2010    Heparin induced thrombocytopenia     Hyperlipemia 2011    IDDM (insulin dependent diabetes mellitus) 1983    With neuropathy    Ischemic cardiomyopathy 2012    MI (myocardial infarction) 2012    Morbid obesity     MRSA (methicillin resistant Staphylococcus aureus) infection 01/19/2019    Noncompliance with therapeutic plan 2019    Normochromic normocytic anemia 07/27/2021    Osteomyelitis of right foot 01/2019    Osteomyelitis of right foot 09/01/2022    Klebsiella from bone, GBS in blood    Pulmonary edema 2019    Respiratory failure 2019    Sepsis 01/2019    Due to cellulitis right leg    Sleep apnea 2013    Thrombocytopathy 2012    Venous stasis dermatitis of both lower extremities      Family History   Problem Relation Age of Onset    Arthritis Mother     Diabetes Mother     Cancer Father     Heart disease Father     Stroke Father         Social History:   Marital Status:   Alcohol History:  reports no history of alcohol use.  Tobacco History:  reports that he quit smoking about 11 years ago. His smoking use included cigarettes. He has a 76.00 pack-year smoking history. He has never used smokeless tobacco.  Drug History:  has no history on file for drug use.    Review of patient's allergies indicates:   Allergen Reactions    Vasopressin Anaphylaxis and Other (See Comments)     Increased pressure in his head; felt like his eyeballs and veins were going to pop out of his head.     Vasopressin analogues Other (See Comments) and Anaphylaxis     "felt like eyes going to pop out of my head"  Other reaction(s): Other (See Comments)  "felt " "like eyes going to pop out of my head"  Increased pressure in his head; felt like his eyeballs and veins were going to pop out of his head.     Heparin Other (See Comments)     Other reaction(s): "depleted my blood platets"    Decreased platelet count    Heparin analogues Other (See Comments)     Depleted WBC count    Morphine Hallucinations, Other (See Comments) and Anxiety     Other reaction(s): Hallucinations  Paranoid, hallucinations         Current Outpatient Medications   Medication Sig Dispense Refill    albuterol (PROVENTIL) 5 mg/mL nebulizer solution Inhale 2.5 mg into the lungs every 6 (six) hours as needed.      albuterol-ipratropium (DUO-NEB) 2.5 mg-0.5 mg/3 mL nebulizer solution Inhale 3 mLs into the lungs every 6 (six) hours as needed.      amiodarone (PACERONE) 200 MG Tab Take 200 mg by mouth once daily.      aspirin 81 MG Chew Take 81 mg by mouth once daily.      atorvastatin (LIPITOR) 10 MG tablet Take 1 tablet by mouth once daily.  1    B complex-vitamin C-folic acid (JLUIS-JIMMIE/NEPHRO-JIMMIE) 0.8 mg Tab Take 0.4 mg by mouth.      BASAGLAR KWIKPEN U-100 INSULIN glargine 100 units/mL (3mL) SubQ pen 25 Units once daily.   3    carvediloL (COREG) 3.125 MG tablet Take 3.125 mg by mouth 2 (two) times daily.      cefTRIAXone (ROCEPHIN) 10 gram injection       cetirizine (ZYRTEC) 10 MG tablet Take 10 mg by mouth.      CORLANOR 5 mg Tab Take 5 mg by mouth nightly.      doxycycline (VIBRAMYCIN) 100 MG Cap Take 1 capsule (100 mg total) by mouth every 12 (twelve) hours. 20 capsule 0    FARXIGA 10 mg tablet Take 10 mg by mouth once daily.      furosemide (LASIX) 40 MG tablet Take 1 tablet (40 mg total) by mouth once daily. 30 tablet 1    gabapentin (NEURONTIN) 600 MG tablet Take 1 tablet by mouth 3 (three) times daily.  0    lisinopriL (PRINIVIL,ZESTRIL) 2.5 MG tablet Take 2.5 mg by mouth 2 (two) times daily.      mupirocin (BACTROBAN) 2 % ointment APPLY TOPICALLY ONCE DAILY. 22 g 5    pantoprazole (PROTONIX) 40 " MG tablet Take 1 tablet by mouth once daily.  0    traMADoL (ULTRAM) 50 mg tablet Take 1 tablet (50 mg total) by mouth every 8 (eight) hours as needed for Pain. 90 tablet 2    TRULICITY 0.75 mg/0.5 mL pen injector Inject into the skin.      vitamin B complex-folic acid 0.4 mg Tab Take 1 tablet by mouth.       No current facility-administered medications for this visit.       Review of Systems   Constitutional:  Negative for chills, fatigue, fever and unexpected weight change.   HENT:  Negative for hearing loss and trouble swallowing.    Eyes:  Negative for photophobia and visual disturbance.   Respiratory:  Negative for cough, shortness of breath and wheezing.    Cardiovascular:  Negative for chest pain, palpitations and leg swelling.   Gastrointestinal:  Negative for abdominal pain and nausea.   Genitourinary:  Negative for dysuria and frequency.   Musculoskeletal:  Negative for arthralgias, back pain, gait problem, joint swelling and myalgias.   Skin:  Positive for wound. Negative for rash.   Neurological:  Positive for numbness. Negative for tremors, seizures, weakness and headaches.   Hematological:  Does not bruise/bleed easily.       Objective:        Physical Exam:   Foot Exam    Right Foot/Ankle     Inspection and Palpation  Skin Exam: callus and ulcer;   Neurovascular  Dorsalis pedis: 1+  Posterior tibial: 1+        Physical Exam  Cardiovascular:      Pulses:           Dorsalis pedis pulses are 1+ on the right side.        Posterior tibial pulses are 1+ on the right side.   Musculoskeletal:        Feet:    Feet:      Right foot:      Skin integrity: Ulcer and callus present.                 Vascular Exam     Right Pulses  Dorsalis Pedis:      1+  Posterior Tibial:      1+           Physical examination: General: Pt. is well-developed, well-nourished, appears stated age, in no acute distress, alert and oriented x 3.     Vascular: Dorsalis pedis and posterior tibial pulses are 1/4  Bilaterally. Toes are warm  to touch. Feet are warm proximally.     There is decreased digital hair. Skin is atrophic, slightly hyperpigmented, and mildly edematous. Capillary refill less than 5 seconds all toes/distal feet     Neurologic: Parks-Latrell 5.07 monofilament is decreased bilateral feet. Sharp/dull sensation absent Bilateral feet.     Vibratory sensation absent bilateral     Musculoskeletal: adequate joint range of motion without pain, limitation, nor crepitation Bilateral feet and ankle joints. Muscle strength is 5/5 in all groups bilaterally.     Lymphatics: no lymphangitic streaking bilaterally.     Dermatologic: Elongated, thickened, dystrophic, discolored nails x 10. Xerosis Bilaterally.  Imaging:            Assessment:       1. Type II diabetes mellitus with neurological manifestations    2. Skin ulcer of right foot with fat layer exposed    3. Peripheral vascular disease    4. OG (onychogryphosis)    5. Difficulty in walking, not elsewhere classified    6. Diabetic foot infection    7. At high risk for inadequate nutritional intake    8. Obesity, unspecified classification, unspecified obesity type, unspecified whether serious comorbidity present    9. Paresthesia of both lower extremities    10. Cellulitis and abscess of foot    11. Type 2 diabetes mellitus with foot ulcer, unspecified whether long term insulin use    12. Venous stasis dermatitis of both lower extremities    13. Hammer toes, bilateral    14. Onychogryphosis    15. Type 2 diabetes mellitus with diabetic neuropathy, unspecified whether long term insulin use    16. PVD (peripheral vascular disease)    17. Ulcer of right foot with necrosis of muscle      Plan:   Type II diabetes mellitus with neurological manifestations    Skin ulcer of right foot with fat layer exposed    Peripheral vascular disease    OG (onychogryphosis)    Difficulty in walking, not elsewhere classified    Diabetic foot infection    At high risk for inadequate nutritional  intake    Obesity, unspecified classification, unspecified obesity type, unspecified whether serious comorbidity present    Paresthesia of both lower extremities    Cellulitis and abscess of foot    Type 2 diabetes mellitus with foot ulcer, unspecified whether long term insulin use    Venous stasis dermatitis of both lower extremities    Hammer toes, bilateral    Onychogryphosis    Type 2 diabetes mellitus with diabetic neuropathy, unspecified whether long term insulin use    PVD (peripheral vascular disease)    Ulcer of right foot with necrosis of muscle      No follow-ups on file.    Procedures          Counseling:     I provided patient education verbally regarding:   Patient diagnosis, treatment options, as well as alternatives, risks, and benefits.     This note was created using Dragon voice recognition software that occasionally misinterpreted phrases or words.           Vascular referral placed due to diminished pedal pulses- scheduled     Bone scan ordered due to concerns of deeper infection.- scheduled        Follow-up: Patient is to return to the clinic in 2 week(s) for follow-up but should call the office immediately if any signs of infection, such as fever, chills, sweats, increased redness or pain.  Recommendations:  Check feet daily.  dressing changes, home health Iodosorb followed by border foam followed by bulky wrap for protection  Use surgery shoe for walking or standing.     I provided patient education verbally regarding: proper ulcer care and the possible need for serial debridements, topical medications, specific dressings and biological engineered skin substitutes if indicated.           Counseling/Education:  I provided patient education verbally regarding:   The aspects of diabetes and how it pertains to the feet. I explained the importance of proper diabetic foot care and how it is essential for the health of their feet.     I discussed the importance of knowing their Hemoglobin A1c and  that the level needs to be as close to 6 as possible. I discussed the increase complications of high blood sugar including stroke, blindness, heart attack, kidney failure and loss of limb secondary to neuropathy and PVD.      With neuropathy, beware of any breaks in the skin or redness. These areas are not recognized early due to the numbness.     I discussed Diabetes, lower back issues, metabolic disorders, systemic causes, chemotherapy, vitamin deficiency, heavy metal exposure, as some of the causes. I also explained that as much as 40% of the time we can not find a cause. I discussed different treatments available to control the symptoms but which may not cure the problem.         Counseled patient on the aspects of diabetes and how it pertains to the feet.  I explained the importance of proper diabetic foot care and how it is essential for the health of their feet.        Shoe inspection. Patient instructed on proper foot hygeine. We discussed wearing proper shoe gear, daily foot inspections, never walking without protective shoe gear, never putting sharp instruments to feet, routine podiatric nail visits every 2-3 months.       Patient should call the office immediately if any signs of infection, such as fever, chills, sweats, increased redness or pain.     Patient was instructed to call the clinic or go to the emergency department if their symptoms do not improve, worsens, or if new symptoms develop.  Patient was advised that if any increased swelling, pain, or numbness arise to go immediately to the ED. Patient knows to call any time if an emergency arises. Shared decision making occurred and patient verbalized understanding in agreement with this plan.      I counseled the patient on their conditions, their implications and medical management.     >50% of this > 60 minute visit was spent face to face educating/counseling the patient     I spent a total of 60 minutes on the day of the visit.This includes face  to face time and non-face to face time preparing to see the patient (eg, review of tests), obtaining and/or reviewing separately obtained history, documenting clinical information in the electronic or other health record, independently interpreting results and communicating results to the patient/family/caregiver, or care coordinator.

## 2023-05-11 NOTE — TELEPHONE ENCOUNTER
----- Message from Sade Broderick sent at 5/11/2023  2:26 PM CDT -----  Regarding: Xray ordered needed for NM testing  Hi, Mr. Bullard is scheduled for a NM 3 PHASE BONE SCAN on tomorrow (5/12). However he has not had an xray in the past 3 months and we will be needing an order for an XRAY OF THE FOOT to correspond with the NM TEST. Please send over the xray order at your earliest convenience.     Thanks,   [facility] Centralized Scheduling

## 2023-05-12 ENCOUNTER — HOSPITAL ENCOUNTER (OUTPATIENT)
Dept: RADIOLOGY | Facility: HOSPITAL | Age: 63
Discharge: HOME | End: 2023-05-12
Attending: PODIATRIST
Payer: MEDICARE

## 2023-05-12 DIAGNOSIS — L03.119 CELLULITIS AND ABSCESS OF FOOT: ICD-10-CM

## 2023-05-12 DIAGNOSIS — L97.512 SKIN ULCER OF RIGHT FOOT WITH FAT LAYER EXPOSED: ICD-10-CM

## 2023-05-12 DIAGNOSIS — L02.619 CELLULITIS AND ABSCESS OF FOOT: ICD-10-CM

## 2023-05-12 DIAGNOSIS — M86.8X7 OTHER OSTEOMYELITIS OF RIGHT FOOT: ICD-10-CM

## 2023-05-12 PROCEDURE — 78315 BONE IMAGING 3 PHASE: CPT | Mod: TC

## 2023-05-12 PROCEDURE — A9503 TC99M MEDRONATE: HCPCS

## 2023-05-12 NOTE — TELEPHONE ENCOUNTER
Shauna, may I change this to a virtual visit? Or one time tramadol refill and see pt in one month? Thank you    Take ondansetron as needed for nausea or vomiting.  Drink plenty of fluids.  Clear liquid diet, advance as tolerated.    Return to the emergency department if you develop abdominal pain (especially if it is in the right lower abdomen or right upper abdomen), vomiting despite treatment with ondansetron, high fever, worsening symptoms, or other concerns.    Follow-up with your primary care clinic in 3 to 4 days if any ongoing symptoms.

## 2023-05-15 DIAGNOSIS — M86.60 CHRONIC OSTEOMYELITIS: Primary | ICD-10-CM

## 2023-05-15 NOTE — PROGRESS NOTES
Please contact patient and let him know that he does have bone infection present.  Let him know I will refer him to Infectious Disease.

## 2023-05-18 ENCOUNTER — EXTERNAL HOME HEALTH (OUTPATIENT)
Dept: HOME HEALTH SERVICES | Facility: HOSPITAL | Age: 63
End: 2023-05-18
Payer: MEDICARE

## 2023-05-23 ENCOUNTER — DOCUMENT SCAN (OUTPATIENT)
Dept: HOME HEALTH SERVICES | Facility: HOSPITAL | Age: 63
End: 2023-05-23
Payer: MEDICARE

## 2023-05-29 PROBLEM — M86.60 CHRONIC OSTEOMYELITIS: Status: ACTIVE | Noted: 2023-05-29

## 2023-05-29 NOTE — PROGRESS NOTES
"  [address] [facility] [address]  [place] [address]  Phone: [phone]   [phone]    Patient's PCP:YRN Pollard  Referring Provider: No ref. provider found    Subjective:      Chief Complaint:: Follow-up, Wound Check, Diabetes, Numbness, Pressure Ulcer, Wound Care, Wound Infection, Venous Stasis, Leg Swelling, Non-healing Wound, Abrasion (New ulcer located left anterior lower leg), Foot Ulcer, Diabetes Mellitus, Difficulty Walking, and Results (Discuss results of recent bone scan)    CAROLIN Bullard is a 63 y.o. male who presents today for follow up chronic ulcer right foot.      Additionally, patient presents with a new wound located on his left anterior lower leg.  Patient states he bumped his leg leading to opening of the wound.    Additionally, patient presents to discuss results of recent bone scan. Referral sent to Infectious disease. Patient had visit on 05/31/2023, reviewed note from infectious disease.  Recommendations taken from infectious disease note on 05/31/2023:  I believe the bone scan results are nearly identical if not slightly better than they were in September.  The bone scan can remain abnormal for up to 12-18 months after an active bone infection   At this time, I would recommend observation, and wound care, and no biopsy or other empiric antibiotics"    Bone scan reviewed and discussed:  CLINICAL DATA: Recurrent diabetic foot ulcer, right foot     Radiopharmaceutical: 21.5 mCi technetium 99m MDP IV     No recent radiographs are currently available for comparison. Comparison is made to previous CT and bone scan examinations from September 2022.     FINDINGS: Following the intravenous administration of radiopharmaceutical, 3 phase imaging of the bilateral feet and ankles was performed.     Flow images demonstrate hyperemia to the lower leg, ankle, and foot on the right, with normal flow on the left.     Blood pool images demonstrate mild diffuse abnormal persistent soft " "tissue tracer activity throughout the right ankle and foot, with normal soft tissue activity on the left.     Delayed images demonstrate a focus of moderately increased uptake at the lateral right foot, at the expected level of the fifth metatarsal. This is similar in location to the previous uptake abnormality from September 2022, but somewhat more pronounced. Findings likely reflect residual or recurrent osteomyelitis. Mild degenerative uptake is noted at the right midfoot.     IMPRESSION:  1. Scintigraphic findings compatible with residual or recurrent osteomyelitis of the right fifth metatarsal, as detailed above.     Additionally, patient states he is not increasing his protein as much as he was instructed.  States he will increase his protein intake going forward.    Patient states he is still walking an excessive amount and therefore the wound has worsened and the depth has increased due to this. patient states it is hard for him to stay off his foot and there is significant periwound callus.      Patient has been referred to vascular surgery-  patient to follow-up on June 8th       Systemic Doctor: YRN Townsend  Date Last Seen: 06/07/2022  Blood Sugar: not checked today   Hemoglobin A1c: 6.8 (11/23/22)    Vitals:    06/01/23 1058   Weight: 126.6 kg (279 lb)   Height: 6' 1" (1.854 m)   PainSc: 0-No pain      Shoe Size:     Past Surgical History:   Procedure Laterality Date    CARDIAC PACEMAKER PLACEMENT  12/2012    CARDIAC PACEMAKER PLACEMENT  10/04/2018    battery replacement    CORONARY ARTERY BYPASS GRAFT  06/2012    ESOPHAGOGASTRODUODENOSCOPY  01/31/2017    SAMARA FILTER PLACEMENT  2012    vena cava    LUMBAR SYMPATHETIC NERVE BLOCK Right 8/22/2019    Procedure: BLOCK, NERVE, SYMPATHETIC, LUMBAR;  Surgeon: Tashi Good MD;  Location: [facility] OR;  Service: Pain Management;  Laterality: Right;  RIGHT SYMPATHETIC NERVE BLOCK     Past Medical History:   Diagnosis Date    AICD (automatic " "cardioverter/defibrillator) present     Anemia, chronic disease 07/27/2021    Anemia, unspecified 07/27/2021    Anxiety and depression 2012    CAD (coronary artery disease) 2012    Cardiomegaly 2016    CHF (congestive heart failure) 2012    Cholecystitis 2017    Complete heart block 2012    Diabetic foot ulcer     DVT (deep venous thrombosis) 2012    And pulmonary embolus    GERD (gastroesophageal reflux disease) 2010    Heparin induced thrombocytopenia     Hyperlipemia 2011    IDDM (insulin dependent diabetes mellitus) 1983    With neuropathy    Ischemic cardiomyopathy 2012    MI (myocardial infarction) 2012    Morbid obesity     MRSA (methicillin resistant Staphylococcus aureus) infection 01/19/2019    Noncompliance with therapeutic plan 2019    Normochromic normocytic anemia 07/27/2021    Osteomyelitis of right foot 01/2019    Osteomyelitis of right foot 09/01/2022    Klebsiella from bone, GBS in blood    Pulmonary edema 2019    Respiratory failure 2019    Sepsis 01/2019    Due to cellulitis right leg    Sleep apnea 2013    Thrombocytopathy 2012    Venous stasis dermatitis of both lower extremities      Family History   Problem Relation Age of Onset    Arthritis Mother     Diabetes Mother     Cancer Father     Heart disease Father     Stroke Father         Social History:   Marital Status:   Alcohol History:  reports no history of alcohol use.  Tobacco History:  reports that he quit smoking about 11 years ago. His smoking use included cigarettes. He has a 76.00 pack-year smoking history. He has never used smokeless tobacco.  Drug History:  has no history on file for drug use.    Review of patient's allergies indicates:   Allergen Reactions    Vasopressin Anaphylaxis and Other (See Comments)     Increased pressure in his head; felt like his eyeballs and veins were going to pop out of his head.     Vasopressin analogues Other (See Comments) and Anaphylaxis     "felt like eyes going to pop out of my " "head"  Other reaction(s): Other (See Comments)  "felt like eyes going to pop out of my head"  Increased pressure in his head; felt like his eyeballs and veins were going to pop out of his head.     Heparin Other (See Comments)     Other reaction(s): "depleted my blood platets"    Decreased platelet count    Heparin analogues Other (See Comments)     Depleted WBC count    Morphine Hallucinations, Other (See Comments) and Anxiety     Other reaction(s): Hallucinations  Paranoid, hallucinations         Current Outpatient Medications   Medication Sig Dispense Refill    albuterol (PROVENTIL) 5 mg/mL nebulizer solution Inhale 2.5 mg into the lungs every 6 (six) hours as needed.      albuterol-ipratropium (DUO-NEB) 2.5 mg-0.5 mg/3 mL nebulizer solution Inhale 3 mLs into the lungs every 6 (six) hours as needed.      amiodarone (PACERONE) 200 MG Tab Take 200 mg by mouth once daily.      aspirin 81 MG Chew Take 81 mg by mouth once daily.      atorvastatin (LIPITOR) 10 MG tablet Take 1 tablet by mouth once daily.  1    B complex-vitamin C-folic acid (JLUIS-JIMMIE/NEPHRO-JIMMIE) 0.8 mg Tab Take 0.4 mg by mouth.      BASAGLAR KWIKPEN U-100 INSULIN glargine 100 units/mL (3mL) SubQ pen 25 Units once daily.   3    carvediloL (COREG) 3.125 MG tablet Take 3.125 mg by mouth 2 (two) times daily.      cefTRIAXone (ROCEPHIN) 10 gram injection       cetirizine (ZYRTEC) 10 MG tablet Take 10 mg by mouth.      CORLANOR 5 mg Tab Take 5 mg by mouth nightly.      doxycycline (VIBRAMYCIN) 100 MG Cap Take 1 capsule (100 mg total) by mouth every 12 (twelve) hours. (Patient not taking: Reported on 5/31/2023) 20 capsule 0    FARXIGA 10 mg tablet Take 10 mg by mouth once daily.      furosemide (LASIX) 40 MG tablet Take 1 tablet (40 mg total) by mouth once daily. 30 tablet 1    gabapentin (NEURONTIN) 600 MG tablet Take 1 tablet by mouth 3 (three) times daily.  0    lisinopriL (PRINIVIL,ZESTRIL) 2.5 MG tablet Take 2.5 mg by mouth 2 (two) times daily.      " mupirocin (BACTROBAN) 2 % ointment APPLY TOPICALLY ONCE DAILY. 22 g 5    pantoprazole (PROTONIX) 40 MG tablet Take 1 tablet by mouth once daily.  0    TRULICITY 0.75 mg/0.5 mL pen injector Inject into the skin.      vitamin B complex-folic acid 0.4 mg Tab Take 1 tablet by mouth.       No current facility-administered medications for this visit.       Review of Systems   Constitutional:  Negative for chills, fatigue, fever and unexpected weight change.   HENT:  Negative for hearing loss and trouble swallowing.    Eyes:  Negative for photophobia and visual disturbance.   Respiratory:  Negative for cough, shortness of breath and wheezing.    Cardiovascular:  Positive for leg swelling. Negative for chest pain and palpitations.   Gastrointestinal:  Negative for abdominal pain and nausea.   Genitourinary:  Negative for dysuria and frequency.   Musculoskeletal:  Negative for arthralgias, back pain, gait problem, joint swelling and myalgias.   Skin:  Positive for wound. Negative for rash.   Neurological:  Positive for numbness. Negative for tremors, seizures, weakness and headaches.   Hematological:  Does not bruise/bleed easily.       Objective:        Physical Exam:   Foot Exam    Right Foot/Ankle     Inspection and Palpation  Skin Exam: ulcer;       Physical Exam  Musculoskeletal:        Legs:         Feet:    Feet:      Right foot:      Skin integrity: Ulcer present.     Ortho/SPM Exam        Physical examination: General: Pt. is well-developed, well-nourished, appears stated age, in no acute distress, alert and oriented x 3.     Vascular: Dorsalis pedis and posterior tibial pulses are 1/4  Bilaterally. Toes are warm to touch. Feet are warm proximally.     There is decreased digital hair. Skin is atrophic, slightly hyperpigmented, and mildly edematous. Capillary refill less than 5 seconds all toes/distal feet     Neurologic: Woodland-Latrell 5.07 monofilament is decreased bilateral feet. Sharp/dull sensation absent  Bilateral feet.     Vibratory sensation absent bilateral     Musculoskeletal: adequate joint range of motion without pain, limitation, nor crepitation Bilateral feet and ankle joints. Muscle strength is 5/5 in all groups bilaterally.     Lymphatics: no lymphangitic streaking bilaterally.     Dermatologic: Elongated, thickened, dystrophic, discolored nails x 10. Xerosis Bilaterally.      Imaging:   CLINICAL DATA: Recurrent diabetic foot ulcer, right foot     Radiopharmaceutical: 21.5 mCi technetium 99m MDP IV     No recent radiographs are currently available for comparison. Comparison is made to previous CT and bone scan examinations from September 2022.     FINDINGS: Following the intravenous administration of radiopharmaceutical, 3 phase imaging of the bilateral feet and ankles was performed.     Flow images demonstrate hyperemia to the lower leg, ankle, and foot on the right, with normal flow on the left.     Blood pool images demonstrate mild diffuse abnormal persistent soft tissue tracer activity throughout the right ankle and foot, with normal soft tissue activity on the left.     Delayed images demonstrate a focus of moderately increased uptake at the lateral right foot, at the expected level of the fifth metatarsal. This is similar in location to the previous uptake abnormality from September 2022, but somewhat more pronounced. Findings likely reflect residual or recurrent osteomyelitis. Mild degenerative uptake is noted at the right midfoot.     IMPRESSION:  1. Scintigraphic findings compatible with residual or recurrent osteomyelitis of the right fifth metatarsal, as detailed above.         Assessment:       1. Ulcer of right foot with necrosis of bone    2. Type II diabetes mellitus with neurological manifestations    3. Peripheral vascular disease    4. OG (onychogryphosis)    5. Obesity, unspecified classification, unspecified obesity type, unspecified whether serious comorbidity present    6. At high  risk for inadequate nutritional intake    7. Type 2 diabetes mellitus with foot ulcer, unspecified whether long term insulin use    8. Venous stasis dermatitis of both lower extremities    9. Cellulitis and abscess of foot    10. Diabetic foot infection    11. Paresthesia of both lower extremities    12. Difficulty in walking, not elsewhere classified    13. Hammer toes, bilateral    14. Onychogryphosis    15. PVD (peripheral vascular disease)    16. Type 2 diabetes mellitus with diabetic neuropathy, unspecified whether long term insulin use    17. Other osteomyelitis of right foot    18. Chronic osteomyelitis    19. Ankle ulcer, left, limited to breakdown of skin    20. Non-pressure chronic ulcer of left lower leg with fat layer exposed      Plan:   Ulcer of right foot with necrosis of bone  -     Ambulatory referral/consult to Home Health; Future; Expected date: 06/02/2023    Type II diabetes mellitus with neurological manifestations    Peripheral vascular disease    OG (onychogryphosis)    Obesity, unspecified classification, unspecified obesity type, unspecified whether serious comorbidity present    At high risk for inadequate nutritional intake    Type 2 diabetes mellitus with foot ulcer, unspecified whether long term insulin use    Venous stasis dermatitis of both lower extremities    Cellulitis and abscess of foot    Diabetic foot infection    Paresthesia of both lower extremities    Difficulty in walking, not elsewhere classified    Hammer toes, bilateral    Onychogryphosis    PVD (peripheral vascular disease)  -     Ambulatory referral/consult to Home Health; Future; Expected date: 06/02/2023    Type 2 diabetes mellitus with diabetic neuropathy, unspecified whether long term insulin use    Other osteomyelitis of right foot    Chronic osteomyelitis    Ankle ulcer, left, limited to breakdown of skin  -     Ambulatory referral/consult to Home Health; Future; Expected date: 06/02/2023    Non-pressure chronic  "ulcer of left lower leg with fat layer exposed    Other orders  -     Wound Debridement      No follow-ups on file.    Wound Debridement    Date/Time: 6/1/2023 10:40 AM  Performed by: Leah Avila DPM  Authorized by: Leah Avila DPM     Time out: Immediately prior to procedure a "time out" was called to verify the correct patient, procedure, equipment, support staff and site/side marked as required.    Consent Done?:  Yes (Written)    Preparation: Patient was prepped and draped in usual sterile fashion, Patient was prepped and draped with aseptic technique and Patient was prepped and draped with clean technique      Wound Details:    Location:  Left leg (Left anterior lower leg)    Type of Debridement:  Excisional       Length (cm):  1.2       Area (sq cm):  1.92       Width (cm):  1.6       Percent Debrided (%):  100       Depth (cm):  0.2       Total Area Debrided (sq cm):  1.92    Depth of debridement:  Subcutaneous tissue    Tissue debrided:  Subcutaneous    Devitalized tissue debrided:  Biofilm, Slough and Fibrin    Instruments:  Blade, Curette and Forceps  Bleeding:  Moderate  Hemostasis Achieved: Yes  Method Used:  Pressure  Patient tolerance:  Patient tolerated the procedure well with no immediate complications  1st Wound Pain Assessment: 0          Counseling:     I provided patient education verbally regarding:   Patient diagnosis, treatment options, as well as alternatives, risks, and benefits.     This note was created using Dragon voice recognition software that occasionally misinterpreted phrases or words.        Patient has been referred to vascular surgery-  patient to follow-up on June 8th, discussed with patient that he needs to continue to follow with vascular         Follow-up: Patient is to return to the clinic in 2 week(s) for follow-up but should call the office immediately if any signs of infection, such as fever, chills, sweats, increased redness or pain.  Recommendations:  Check feet " daily.  dressing changes, home health Iodosorb followed by border foam followed by bulky wrap for protection, on both right plantar foot ulcer and left lower leg ulcer.  Use surgery shoe for walking or standing.     I provided patient education verbally regarding: proper ulcer care and the possible need for serial debridements, topical medications, specific dressings and biological engineered skin substitutes if indicated.           Counseling/Education:  I provided patient education verbally regarding:   The aspects of diabetes and how it pertains to the feet. I explained the importance of proper diabetic foot care and how it is essential for the health of their feet.     I discussed the importance of knowing their Hemoglobin A1c and that the level needs to be as close to 6 as possible. I discussed the increase complications of high blood sugar including stroke, blindness, heart attack, kidney failure and loss of limb secondary to neuropathy and PVD.      With neuropathy, beware of any breaks in the skin or redness. These areas are not recognized early due to the numbness.     I discussed Diabetes, lower back issues, metabolic disorders, systemic causes, chemotherapy, vitamin deficiency, heavy metal exposure, as some of the causes. I also explained that as much as 40% of the time we can not find a cause. I discussed different treatments available to control the symptoms but which may not cure the problem.         Counseled patient on the aspects of diabetes and how it pertains to the feet.  I explained the importance of proper diabetic foot care and how it is essential for the health of their feet.        Shoe inspection. Patient instructed on proper foot hygeine. We discussed wearing proper shoe gear, daily foot inspections, never walking without protective shoe gear, never putting sharp instruments to feet, routine podiatric nail visits every 2-3 months.       Patient should call the office immediately if any  signs of infection, such as fever, chills, sweats, increased redness or pain.     Patient was instructed to call the clinic or go to the emergency department if their symptoms do not improve, worsens, or if new symptoms develop.  Patient was advised that if any increased swelling, pain, or numbness arise to go immediately to the ED. Patient knows to call any time if an emergency arises. Shared decision making occurred and patient verbalized understanding in agreement with this plan.      I counseled the patient on their conditions, their implications and medical management.     >50% of this > 60 minute visit was spent face to face educating/counseling the patient     I spent a total of 60 minutes on the day of the visit.This includes face to face time and non-face to face time preparing to see the patient (eg, review of tests), obtaining and/or reviewing separately obtained history, documenting clinical information in the electronic or other health record, independently interpreting results and communicating results to the patient/family/caregiver, or care coordinator.

## 2023-05-29 NOTE — PATIENT INSTRUCTIONS

## 2023-05-31 ENCOUNTER — OFFICE VISIT (OUTPATIENT)
Dept: INFECTIOUS DISEASES | Facility: CLINIC | Age: 63
End: 2023-05-31
Payer: MEDICARE

## 2023-05-31 VITALS
WEIGHT: 279.38 LBS | OXYGEN SATURATION: 98 % | SYSTOLIC BLOOD PRESSURE: 128 MMHG | HEART RATE: 74 BPM | DIASTOLIC BLOOD PRESSURE: 70 MMHG | BODY MASS INDEX: 36.86 KG/M2 | TEMPERATURE: 98 F

## 2023-05-31 DIAGNOSIS — Z87.39 HISTORY OF OSTEOMYELITIS: Primary | ICD-10-CM

## 2023-05-31 DIAGNOSIS — E11.621 DIABETIC ULCER OF RIGHT FOOT ASSOCIATED WITH TYPE 2 DIABETES MELLITUS, WITH FAT LAYER EXPOSED, UNSPECIFIED PART OF FOOT: ICD-10-CM

## 2023-05-31 DIAGNOSIS — L97.512 DIABETIC ULCER OF RIGHT FOOT ASSOCIATED WITH TYPE 2 DIABETES MELLITUS, WITH FAT LAYER EXPOSED, UNSPECIFIED PART OF FOOT: ICD-10-CM

## 2023-05-31 DIAGNOSIS — E11.49 TYPE II DIABETES MELLITUS WITH NEUROLOGICAL MANIFESTATIONS: ICD-10-CM

## 2023-05-31 PROCEDURE — 99214 PR OFFICE/OUTPT VISIT, EST, LEVL IV, 30-39 MIN: ICD-10-PCS | Mod: S$GLB,,, | Performed by: INTERNAL MEDICINE

## 2023-05-31 PROCEDURE — 3074F PR MOST RECENT SYSTOLIC BLOOD PRESSURE < 130 MM HG: ICD-10-PCS | Mod: CPTII,S$GLB,, | Performed by: INTERNAL MEDICINE

## 2023-05-31 PROCEDURE — 3078F PR MOST RECENT DIASTOLIC BLOOD PRESSURE < 80 MM HG: ICD-10-PCS | Mod: CPTII,S$GLB,, | Performed by: INTERNAL MEDICINE

## 2023-05-31 PROCEDURE — 99214 OFFICE O/P EST MOD 30 MIN: CPT | Mod: S$GLB,,, | Performed by: INTERNAL MEDICINE

## 2023-05-31 PROCEDURE — 3008F BODY MASS INDEX DOCD: CPT | Mod: CPTII,S$GLB,, | Performed by: INTERNAL MEDICINE

## 2023-05-31 PROCEDURE — 1159F MED LIST DOCD IN RCRD: CPT | Mod: CPTII,S$GLB,, | Performed by: INTERNAL MEDICINE

## 2023-05-31 PROCEDURE — 3008F PR BODY MASS INDEX (BMI) DOCUMENTED: ICD-10-PCS | Mod: CPTII,S$GLB,, | Performed by: INTERNAL MEDICINE

## 2023-05-31 PROCEDURE — 1159F PR MEDICATION LIST DOCUMENTED IN MEDICAL RECORD: ICD-10-PCS | Mod: CPTII,S$GLB,, | Performed by: INTERNAL MEDICINE

## 2023-05-31 PROCEDURE — 3078F DIAST BP <80 MM HG: CPT | Mod: CPTII,S$GLB,, | Performed by: INTERNAL MEDICINE

## 2023-05-31 PROCEDURE — 3074F SYST BP LT 130 MM HG: CPT | Mod: CPTII,S$GLB,, | Performed by: INTERNAL MEDICINE

## 2023-05-31 NOTE — PATIENT INSTRUCTIONS
At this time, I would recommend observation, and wound care, and no biopsy or other empiric antibiotics    Please call if needed

## 2023-05-31 NOTE — PROGRESS NOTES
"Subjective:       Patient ID: Ana Bullard is a 63 y.o. male.    Chief Complaint:: Follow-up (Follow up Right Foot Osteomyelitis)      Follow-up  10/2022  Seen at [facility] for GBS strep bacteremia, right leg cellulitis, positive bone scan(culture from right lateral ulcer with Klebsiella),   and abnormality on initial GABRIELLA report worrisome for lead infection     Per Dr. Reddy 9/7: "I reviewed the GABRIELLA and on not convinced that that was a  vegetation lesion on the RV lead.  Group B strep is not common organism for endocarditis and the increased lead thickness could just be artifact or normal progression of fibrosis and calcification.  It was definitely not a classic lesion consistent with thrombus or endocarditis.  Echo-as an appeared to be of the same density as the lead itself. "    Discharged on rocephin which he has received for 6 weeks. He was sent to ED once for ultimately sp urious hgb. His foot is doing well, no acute problems, but CRP won't fall. (See care everywhere)    5/31/23:  New office consult.  since last visit, he has been going to see both Patric Avila and Dr. GONZALEZ has been debriding his right lateral foot ulcer, put him in a cam walker.  On April 13th in part because of excessive ambulation, his wound had deteriorated.  She placed him on oral doxycycline for 10 days in ordered a bone scan.  This bone scan is abnormal but in exactly the same place as the bone scan in September.  The wound itself has improved since then and is very shallow and small in comparison to the at the time of my last visit with him.  He has no drainage, no cellulitis no increase in pain.  He does buildup of callus which she subsequently has to debride each time.        Review of patient's allergies indicates:   Allergen Reactions    Vasopressin Anaphylaxis and Other (See Comments)     Increased pressure in his head; felt like his eyeballs and veins were going to pop out of his head.     Vasopressin analogues Other (See Comments) " "and Anaphylaxis     "felt like eyes going to pop out of my head"  Other reaction(s): Other (See Comments)  "felt like eyes going to pop out of my head"  Increased pressure in his head; felt like his eyeballs and veins were going to pop out of his head.     Heparin Other (See Comments)     Other reaction(s): "depleted my blood platets"    Decreased platelet count    Heparin analogues Other (See Comments)     Depleted WBC count    Morphine Hallucinations, Other (See Comments) and Anxiety     Other reaction(s): Hallucinations  Paranoid, hallucinations       Past Medical History:   Diagnosis Date    AICD (automatic cardioverter/defibrillator) present     Anemia, chronic disease 07/27/2021    Anemia, unspecified 07/27/2021    Anxiety and depression 2012    CAD (coronary artery disease) 2012    Cardiomegaly 2016    CHF (congestive heart failure) 2012    Cholecystitis 2017    Complete heart block 2012    Diabetic foot ulcer     DVT (deep venous thrombosis) 2012    And pulmonary embolus    GERD (gastroesophageal reflux disease) 2010    Heparin induced thrombocytopenia     Hyperlipemia 2011    IDDM (insulin dependent diabetes mellitus) 1983    With neuropathy    Ischemic cardiomyopathy 2012    MI (myocardial infarction) 2012    Morbid obesity     MRSA (methicillin resistant Staphylococcus aureus) infection 01/19/2019    Noncompliance with therapeutic plan 2019    Normochromic normocytic anemia 07/27/2021    Osteomyelitis of right foot 01/2019    Osteomyelitis of right foot 09/01/2022    Klebsiella from bone, GBS in blood    Pulmonary edema 2019    Respiratory failure 2019    Sepsis 01/2019    Due to cellulitis right leg    Sleep apnea 2013    Thrombocytopathy 2012    Venous stasis dermatitis of both lower extremities      Past Surgical History:   Procedure Laterality Date    CARDIAC PACEMAKER PLACEMENT  12/2012    CARDIAC PACEMAKER PLACEMENT  10/04/2018    battery replacement    CORONARY ARTERY BYPASS GRAFT  06/2012    " ESOPHAGOGASTRODUODENOSCOPY  2017    SAMARA FILTER PLACEMENT  2012    vena cava    LUMBAR SYMPATHETIC NERVE BLOCK Right 2019    Procedure: BLOCK, NERVE, SYMPATHETIC, LUMBAR;  Surgeon: Tashi Good MD;  Location: [facility] OR;  Service: Pain Management;  Laterality: Right;  RIGHT SYMPATHETIC NERVE BLOCK     Social History     Tobacco Use    Smoking status: Former     Packs/day: 2.00     Years: 38.00     Pack years: 76.00     Types: Cigarettes     Quit date:      Years since quittin.4    Smokeless tobacco: Never   Substance Use Topics    Alcohol use: No        Family History   Problem Relation Age of Onset    Arthritis Mother     Diabetes Mother     Cancer Father     Heart disease Father     Stroke Father          Review of Systems    Constitutional: No fever, chills, sweats, fatigue, weakness, weight loss    Eyes: No change in vision     ENT: No sore throat, mouth pains, or lesions    Cardiovascular: No chest pain but has chronic venous stasis, venous stasis dermatitis and severe varicosities.    Respiratory: No shortness of breath, BAPTISTE,     Gastrointestinal: No abdominal pain, nausea, vomiting, diarrhea,      Genitourinary:      Musculoskeletal: No new pain, joint swelling, or injuries    Integumentary: No new rashes,      Neurological: No dizziness, vertigo,     Psychiatric: No anxiety, depression    Endocrine: Blood sugars are fairly well controlled     Lymphatic:      VAD:      Objective:      Blood pressure 128/70, pulse 74, temperature 97.5 °F (36.4 °C), weight 126.7 kg (279 lb 6.4 oz), SpO2 98 %. Body mass index is 36.86 kg/m².  Physical Exam      General: Alert and attentive, cooperative and in no distress    Eyes:   anicteric, EOMI    Neck: Supple,      ENT:      Cardiovascular:      Respiratory:     Gastrointestinal:       Genitourinary:      Integumentary:  Venous stasis dermatitis   Vascular:  Chronic bilateral lower extremity edema Extensive BLE venous varicosities with venous stasis  hyperpigmentation    Musculoskeletal: Ambulates without difficulty, no acute arthritis, synovitis or myositis. Normal muscle bulk and strength    Lymphatic:      Neurological: Normal LOC, cranial nerves, speech,   gait    Psychiatric: Normal mood, speech,  demeanor     Wound:  October 2022 5/31.  The ulcer is much smaller, it is very shallow, in the center there is a cavity about 2-3 mm wide there is perhaps 1 mm of tunneling.  There is no purulence and it does not probe to bone.        VAD:         Recent Diagnostics:   Bone scan foot 9/6/22  1. Scintigraphic findings consistent with focal osteomyelitis at the fifth metatarsal head on the right.    Bone scan may 2023  1. Scintigraphic findings compatible with residual or recurrent osteomyelitis of the right fifth metatarsal, as detailed above.     Assessment and Plan:           History of osteomyelitis    Type II diabetes mellitus with neurological manifestations    Diabetic ulcer of right foot associated with type 2 diabetes mellitus, with fat layer exposed, unspecified part of foot     I believe the bone scan results are nearly identical if not slightly better than they were in September.  The bone scan can remain abnormal for up to 12-18 months after an active bone infection   At this time, I would recommend observation, and wound care, and no biopsy or other empiric antibiotics    Please call if needed    This note was created using Dragon voice recognition software that occasionally misinterpreted phrases or words.

## 2023-06-01 ENCOUNTER — OFFICE VISIT (OUTPATIENT)
Dept: PODIATRY | Facility: CLINIC | Age: 63
End: 2023-06-01
Payer: MEDICARE

## 2023-06-01 VITALS — WEIGHT: 279 LBS | BODY MASS INDEX: 36.98 KG/M2 | HEIGHT: 73 IN

## 2023-06-01 DIAGNOSIS — E11.628 DIABETIC FOOT INFECTION: ICD-10-CM

## 2023-06-01 DIAGNOSIS — Z91.89 AT HIGH RISK FOR INADEQUATE NUTRITIONAL INTAKE: ICD-10-CM

## 2023-06-01 DIAGNOSIS — L02.619 CELLULITIS AND ABSCESS OF FOOT: ICD-10-CM

## 2023-06-01 DIAGNOSIS — L97.509 TYPE 2 DIABETES MELLITUS WITH FOOT ULCER, UNSPECIFIED WHETHER LONG TERM INSULIN USE: ICD-10-CM

## 2023-06-01 DIAGNOSIS — L08.9 DIABETIC FOOT INFECTION: ICD-10-CM

## 2023-06-01 DIAGNOSIS — E11.49 TYPE II DIABETES MELLITUS WITH NEUROLOGICAL MANIFESTATIONS: ICD-10-CM

## 2023-06-01 DIAGNOSIS — R26.2 DIFFICULTY IN WALKING, NOT ELSEWHERE CLASSIFIED: ICD-10-CM

## 2023-06-01 DIAGNOSIS — E11.40 TYPE 2 DIABETES MELLITUS WITH DIABETIC NEUROPATHY, UNSPECIFIED WHETHER LONG TERM INSULIN USE: ICD-10-CM

## 2023-06-01 DIAGNOSIS — L97.922 NON-PRESSURE CHRONIC ULCER OF LEFT LOWER LEG WITH FAT LAYER EXPOSED: ICD-10-CM

## 2023-06-01 DIAGNOSIS — E11.621 TYPE 2 DIABETES MELLITUS WITH FOOT ULCER, UNSPECIFIED WHETHER LONG TERM INSULIN USE: ICD-10-CM

## 2023-06-01 DIAGNOSIS — L97.514 ULCER OF RIGHT FOOT WITH NECROSIS OF BONE: Primary | ICD-10-CM

## 2023-06-01 DIAGNOSIS — M86.60 CHRONIC OSTEOMYELITIS: ICD-10-CM

## 2023-06-01 DIAGNOSIS — M20.41 HAMMER TOES, BILATERAL: ICD-10-CM

## 2023-06-01 DIAGNOSIS — M20.42 HAMMER TOES, BILATERAL: ICD-10-CM

## 2023-06-01 DIAGNOSIS — I73.9 PERIPHERAL VASCULAR DISEASE: ICD-10-CM

## 2023-06-01 DIAGNOSIS — M86.8X7 OTHER OSTEOMYELITIS OF RIGHT FOOT: ICD-10-CM

## 2023-06-01 DIAGNOSIS — I87.2 VENOUS STASIS DERMATITIS OF BOTH LOWER EXTREMITIES: ICD-10-CM

## 2023-06-01 DIAGNOSIS — L03.119 CELLULITIS AND ABSCESS OF FOOT: ICD-10-CM

## 2023-06-01 DIAGNOSIS — I73.9 PVD (PERIPHERAL VASCULAR DISEASE): ICD-10-CM

## 2023-06-01 DIAGNOSIS — L97.321 ANKLE ULCER, LEFT, LIMITED TO BREAKDOWN OF SKIN: ICD-10-CM

## 2023-06-01 DIAGNOSIS — R20.2 PARESTHESIA OF BOTH LOWER EXTREMITIES: ICD-10-CM

## 2023-06-01 DIAGNOSIS — L60.2 OG (ONYCHOGRYPHOSIS): ICD-10-CM

## 2023-06-01 DIAGNOSIS — E66.9 OBESITY, UNSPECIFIED CLASSIFICATION, UNSPECIFIED OBESITY TYPE, UNSPECIFIED WHETHER SERIOUS COMORBIDITY PRESENT: ICD-10-CM

## 2023-06-01 DIAGNOSIS — L60.2 ONYCHOGRYPHOSIS: ICD-10-CM

## 2023-06-01 PROCEDURE — 3008F BODY MASS INDEX DOCD: CPT | Mod: CPTII,S$GLB,, | Performed by: PODIATRIST

## 2023-06-01 PROCEDURE — 11042 WOUND DEBRIDEMENT: ICD-10-PCS | Mod: S$GLB,,, | Performed by: PODIATRIST

## 2023-06-01 PROCEDURE — 1160F PR REVIEW ALL MEDS BY PRESCRIBER/CLIN PHARMACIST DOCUMENTED: ICD-10-PCS | Mod: CPTII,S$GLB,, | Performed by: PODIATRIST

## 2023-06-01 PROCEDURE — 1159F MED LIST DOCD IN RCRD: CPT | Mod: CPTII,S$GLB,, | Performed by: PODIATRIST

## 2023-06-01 PROCEDURE — 99214 PR OFFICE/OUTPT VISIT, EST, LEVL IV, 30-39 MIN: ICD-10-PCS | Mod: 25,S$GLB,, | Performed by: PODIATRIST

## 2023-06-01 PROCEDURE — 1159F PR MEDICATION LIST DOCUMENTED IN MEDICAL RECORD: ICD-10-PCS | Mod: CPTII,S$GLB,, | Performed by: PODIATRIST

## 2023-06-01 PROCEDURE — 99214 OFFICE O/P EST MOD 30 MIN: CPT | Mod: 25,S$GLB,, | Performed by: PODIATRIST

## 2023-06-01 PROCEDURE — 99999 PR PBB SHADOW E&M-EST. PATIENT-LVL IV: ICD-10-PCS | Mod: PBBFAC,,, | Performed by: PODIATRIST

## 2023-06-01 PROCEDURE — 11042 DBRDMT SUBQ TIS 1ST 20SQCM/<: CPT | Mod: S$GLB,,, | Performed by: PODIATRIST

## 2023-06-01 PROCEDURE — 3008F PR BODY MASS INDEX (BMI) DOCUMENTED: ICD-10-PCS | Mod: CPTII,S$GLB,, | Performed by: PODIATRIST

## 2023-06-01 PROCEDURE — 99999 PR PBB SHADOW E&M-EST. PATIENT-LVL IV: CPT | Mod: PBBFAC,,, | Performed by: PODIATRIST

## 2023-06-01 PROCEDURE — 1160F RVW MEDS BY RX/DR IN RCRD: CPT | Mod: CPTII,S$GLB,, | Performed by: PODIATRIST

## 2023-06-12 ENCOUNTER — HOSPITAL ENCOUNTER (INPATIENT)
Facility: HOSPITAL | Age: 63
LOS: 6 days | Discharge: HOME HEALTH | DRG: 264 | End: 2023-06-18
Attending: EMERGENCY MEDICINE | Admitting: INTERNAL MEDICINE
Payer: MEDICARE

## 2023-06-12 DIAGNOSIS — R00.0 WIDE-COMPLEX TACHYCARDIA: ICD-10-CM

## 2023-06-12 DIAGNOSIS — R00.0 TACHYCARDIA: ICD-10-CM

## 2023-06-12 DIAGNOSIS — I95.9 HYPOTENSION: ICD-10-CM

## 2023-06-12 DIAGNOSIS — I47.20 VENTRICULAR TACHYCARDIA: Primary | ICD-10-CM

## 2023-06-12 DIAGNOSIS — R07.9 CHEST PAIN: ICD-10-CM

## 2023-06-12 DIAGNOSIS — L97.512 ULCER OF RIGHT FOOT WITH FAT LAYER EXPOSED: ICD-10-CM

## 2023-06-12 PROBLEM — N39.0 UTI (URINARY TRACT INFECTION): Status: ACTIVE | Noted: 2023-06-12

## 2023-06-12 PROBLEM — I50.42 CHRONIC COMBINED SYSTOLIC AND DIASTOLIC HEART FAILURE: Status: ACTIVE | Noted: 2020-01-19

## 2023-06-12 LAB
ALBUMIN SERPL BCP-MCNC: 3.2 G/DL (ref 3.5–5.2)
ALP SERPL-CCNC: 70 U/L (ref 55–135)
ALT SERPL W/O P-5'-P-CCNC: 22 U/L (ref 10–44)
AMPHET+METHAMPHET UR QL: NEGATIVE
ANION GAP SERPL CALC-SCNC: 10 MMOL/L (ref 8–16)
AST SERPL-CCNC: 31 U/L (ref 10–40)
BACTERIA #/AREA URNS HPF: NEGATIVE /HPF
BARBITURATES UR QL SCN>200 NG/ML: NEGATIVE
BASOPHILS # BLD AUTO: 0.02 K/UL (ref 0–0.2)
BASOPHILS NFR BLD: 0.3 % (ref 0–1.9)
BENZODIAZ UR QL SCN>200 NG/ML: ABNORMAL
BILIRUB SERPL-MCNC: 1.6 MG/DL (ref 0.1–1)
BILIRUB UR QL STRIP: NEGATIVE
BNP SERPL-MCNC: 1162 PG/ML (ref 0–99)
BUN SERPL-MCNC: 62 MG/DL (ref 8–23)
BZE UR QL SCN: NEGATIVE
CALCIUM SERPL-MCNC: 8.2 MG/DL (ref 8.7–10.5)
CANNABINOIDS UR QL SCN: NEGATIVE
CHLORIDE SERPL-SCNC: 96 MMOL/L (ref 95–110)
CK SERPL-CCNC: 86 U/L (ref 20–200)
CLARITY UR: ABNORMAL
CO2 SERPL-SCNC: 25 MMOL/L (ref 23–29)
COLOR UR: YELLOW
CREAT SERPL-MCNC: 2.9 MG/DL (ref 0.5–1.4)
CREAT UR-MCNC: 236 MG/DL (ref 23–375)
DIFFERENTIAL METHOD: ABNORMAL
EOSINOPHIL # BLD AUTO: 0 K/UL (ref 0–0.5)
EOSINOPHIL NFR BLD: 0.1 % (ref 0–8)
ERYTHROCYTE [DISTWIDTH] IN BLOOD BY AUTOMATED COUNT: 20.3 % (ref 11.5–14.5)
EST. GFR  (NO RACE VARIABLE): 23.9 ML/MIN/1.73 M^2
GLUCOSE SERPL-MCNC: 166 MG/DL (ref 70–110)
GLUCOSE UR QL STRIP: ABNORMAL
HCT VFR BLD AUTO: 33.7 % (ref 40–54)
HGB BLD-MCNC: 9.6 G/DL (ref 14–18)
HGB UR QL STRIP: ABNORMAL
HYALINE CASTS #/AREA URNS LPF: 32 /LPF
IMM GRANULOCYTES # BLD AUTO: 0.02 K/UL (ref 0–0.04)
IMM GRANULOCYTES NFR BLD AUTO: 0.3 % (ref 0–0.5)
INFLUENZA A, MOLECULAR: NEGATIVE
INFLUENZA B, MOLECULAR: NEGATIVE
KETONES UR QL STRIP: NEGATIVE
LACTATE SERPL-SCNC: 1.8 MMOL/L (ref 0.5–1.9)
LEUKOCYTE ESTERASE UR QL STRIP: ABNORMAL
LIPASE SERPL-CCNC: 59 U/L (ref 4–60)
LYMPHOCYTES # BLD AUTO: 0.3 K/UL (ref 1–4.8)
LYMPHOCYTES NFR BLD: 3.4 % (ref 18–48)
MAGNESIUM SERPL-MCNC: 2.3 MG/DL (ref 1.6–2.6)
MCH RBC QN AUTO: 25.7 PG (ref 27–31)
MCHC RBC AUTO-ENTMCNC: 28.5 G/DL (ref 32–36)
MCV RBC AUTO: 90 FL (ref 82–98)
MICROSCOPIC COMMENT: ABNORMAL
MONOCYTES # BLD AUTO: 0.8 K/UL (ref 0.3–1)
MONOCYTES NFR BLD: 10.3 % (ref 4–15)
NEUTROPHILS # BLD AUTO: 6.7 K/UL (ref 1.8–7.7)
NEUTROPHILS NFR BLD: 85.6 % (ref 38–73)
NITRITE UR QL STRIP: NEGATIVE
NRBC BLD-RTO: 0 /100 WBC
OPIATES UR QL SCN: NEGATIVE
PCP UR QL SCN>25 NG/ML: NEGATIVE
PH UR STRIP: 5 [PH] (ref 5–8)
PLATELET # BLD AUTO: 75 K/UL (ref 150–450)
PMV BLD AUTO: 11.4 FL (ref 9.2–12.9)
POTASSIUM SERPL-SCNC: 4.8 MMOL/L (ref 3.5–5.1)
PROT SERPL-MCNC: 7.5 G/DL (ref 6–8.4)
PROT UR QL STRIP: ABNORMAL
RBC # BLD AUTO: 3.74 M/UL (ref 4.6–6.2)
RBC #/AREA URNS HPF: >100 /HPF (ref 0–4)
SARS-COV-2 RDRP RESP QL NAA+PROBE: NEGATIVE
SODIUM SERPL-SCNC: 131 MMOL/L (ref 136–145)
SP GR UR STRIP: 1.02 (ref 1–1.03)
SPECIMEN SOURCE: NORMAL
SQUAMOUS #/AREA URNS HPF: 1 /HPF
TOXICOLOGY INFORMATION: ABNORMAL
TROPONIN I SERPL HS-MCNC: 17.9 PG/ML (ref 0–14.9)
TROPONIN I SERPL HS-MCNC: 18.2 PG/ML (ref 0–14.9)
TSH SERPL DL<=0.005 MIU/L-ACNC: 4.83 UIU/ML (ref 0.34–5.6)
URN SPEC COLLECT METH UR: ABNORMAL
UROBILINOGEN UR STRIP-ACNC: ABNORMAL EU/DL
WBC # BLD AUTO: 7.76 K/UL (ref 3.9–12.7)
WBC #/AREA URNS HPF: 28 /HPF (ref 0–5)

## 2023-06-12 PROCEDURE — 83690 ASSAY OF LIPASE: CPT | Performed by: NURSE PRACTITIONER

## 2023-06-12 PROCEDURE — 81001 URINALYSIS AUTO W/SCOPE: CPT | Performed by: EMERGENCY MEDICINE

## 2023-06-12 PROCEDURE — 21000000 HC CCU ICU ROOM CHARGE

## 2023-06-12 PROCEDURE — 87040 BLOOD CULTURE FOR BACTERIA: CPT | Mod: 59 | Performed by: NURSE PRACTITIONER

## 2023-06-12 PROCEDURE — 96374 THER/PROPH/DIAG INJ IV PUSH: CPT

## 2023-06-12 PROCEDURE — 83735 ASSAY OF MAGNESIUM: CPT | Performed by: NURSE PRACTITIONER

## 2023-06-12 PROCEDURE — 96361 HYDRATE IV INFUSION ADD-ON: CPT

## 2023-06-12 PROCEDURE — 63600175 PHARM REV CODE 636 W HCPCS

## 2023-06-12 PROCEDURE — 83605 ASSAY OF LACTIC ACID: CPT | Performed by: NURSE PRACTITIONER

## 2023-06-12 PROCEDURE — 84484 ASSAY OF TROPONIN QUANT: CPT | Performed by: NURSE PRACTITIONER

## 2023-06-12 PROCEDURE — 80053 COMPREHEN METABOLIC PANEL: CPT | Performed by: NURSE PRACTITIONER

## 2023-06-12 PROCEDURE — 92960 CARDIOVERSION ELECTRIC EXT: CPT

## 2023-06-12 PROCEDURE — 25000003 PHARM REV CODE 250: Performed by: INTERNAL MEDICINE

## 2023-06-12 PROCEDURE — 80307 DRUG TEST PRSMV CHEM ANLYZR: CPT | Performed by: EMERGENCY MEDICINE

## 2023-06-12 PROCEDURE — 63600175 PHARM REV CODE 636 W HCPCS: Performed by: EMERGENCY MEDICINE

## 2023-06-12 PROCEDURE — 82550 ASSAY OF CK (CPK): CPT | Performed by: NURSE PRACTITIONER

## 2023-06-12 PROCEDURE — 93005 ELECTROCARDIOGRAM TRACING: CPT | Performed by: GENERAL PRACTICE

## 2023-06-12 PROCEDURE — 99291 CRITICAL CARE FIRST HOUR: CPT

## 2023-06-12 PROCEDURE — 84484 ASSAY OF TROPONIN QUANT: CPT | Mod: 91 | Performed by: EMERGENCY MEDICINE

## 2023-06-12 PROCEDURE — 83880 ASSAY OF NATRIURETIC PEPTIDE: CPT | Performed by: NURSE PRACTITIONER

## 2023-06-12 PROCEDURE — 93010 ELECTROCARDIOGRAM REPORT: CPT | Mod: ,,, | Performed by: GENERAL PRACTICE

## 2023-06-12 PROCEDURE — 96375 TX/PRO/DX INJ NEW DRUG ADDON: CPT

## 2023-06-12 PROCEDURE — 85025 COMPLETE CBC W/AUTO DIFF WBC: CPT | Performed by: NURSE PRACTITIONER

## 2023-06-12 PROCEDURE — 93010 EKG 12-LEAD: ICD-10-PCS | Mod: ,,, | Performed by: GENERAL PRACTICE

## 2023-06-12 PROCEDURE — 87086 URINE CULTURE/COLONY COUNT: CPT | Performed by: EMERGENCY MEDICINE

## 2023-06-12 PROCEDURE — 63600175 PHARM REV CODE 636 W HCPCS: Performed by: INTERNAL MEDICINE

## 2023-06-12 PROCEDURE — 63600175 PHARM REV CODE 636 W HCPCS: Performed by: NURSE PRACTITIONER

## 2023-06-12 PROCEDURE — 87502 INFLUENZA DNA AMP PROBE: CPT | Performed by: EMERGENCY MEDICINE

## 2023-06-12 PROCEDURE — 84443 ASSAY THYROID STIM HORMONE: CPT | Performed by: NURSE PRACTITIONER

## 2023-06-12 PROCEDURE — U0002 COVID-19 LAB TEST NON-CDC: HCPCS | Performed by: EMERGENCY MEDICINE

## 2023-06-12 PROCEDURE — 36415 COLL VENOUS BLD VENIPUNCTURE: CPT | Performed by: NURSE PRACTITIONER

## 2023-06-12 RX ORDER — FUROSEMIDE 10 MG/ML
40 INJECTION INTRAMUSCULAR; INTRAVENOUS ONCE
Status: COMPLETED | OUTPATIENT
Start: 2023-06-12 | End: 2023-06-12

## 2023-06-12 RX ORDER — MIDAZOLAM HYDROCHLORIDE 1 MG/ML
INJECTION INTRAMUSCULAR; INTRAVENOUS
Status: COMPLETED
Start: 2023-06-12 | End: 2023-06-12

## 2023-06-12 RX ORDER — MIDAZOLAM HYDROCHLORIDE 1 MG/ML
4 INJECTION INTRAMUSCULAR; INTRAVENOUS
Status: COMPLETED | OUTPATIENT
Start: 2023-06-12 | End: 2023-06-12

## 2023-06-12 RX ORDER — CEFDINIR 300 MG/1
300 CAPSULE ORAL 2 TIMES DAILY
Status: ON HOLD | COMMUNITY
Start: 2023-06-12 | End: 2023-06-18 | Stop reason: HOSPADM

## 2023-06-12 RX ORDER — MIDAZOLAM HYDROCHLORIDE 1 MG/ML
2 INJECTION INTRAMUSCULAR; INTRAVENOUS ONCE
Status: CANCELLED | OUTPATIENT
Start: 2023-06-12 | End: 2023-06-12

## 2023-06-12 RX ORDER — MORPHINE SULFATE 2 MG/ML
INJECTION, SOLUTION INTRAMUSCULAR; INTRAVENOUS
Status: DISPENSED
Start: 2023-06-12 | End: 2023-06-13

## 2023-06-12 RX ORDER — TRAMADOL HYDROCHLORIDE 50 MG/1
50 TABLET ORAL EVERY 6 HOURS PRN
COMMUNITY
Start: 2023-05-30 | End: 2023-07-17 | Stop reason: SDUPTHER

## 2023-06-12 RX ORDER — MIDAZOLAM HYDROCHLORIDE 1 MG/ML
4 INJECTION INTRAMUSCULAR; INTRAVENOUS
Status: CANCELLED | OUTPATIENT
Start: 2023-06-12 | End: 2023-06-12

## 2023-06-12 RX ADMIN — MIDAZOLAM HYDROCHLORIDE: 1 INJECTION, SOLUTION INTRAMUSCULAR; INTRAVENOUS at 07:06

## 2023-06-12 RX ADMIN — SODIUM CHLORIDE, SODIUM LACTATE, POTASSIUM CHLORIDE, AND CALCIUM CHLORIDE 1000 ML: .6; .31; .03; .02 INJECTION, SOLUTION INTRAVENOUS at 06:06

## 2023-06-12 RX ADMIN — FUROSEMIDE 40 MG: 10 INJECTION, SOLUTION INTRAMUSCULAR; INTRAVENOUS at 09:06

## 2023-06-12 RX ADMIN — CEFTRIAXONE SODIUM 1 G: 1 INJECTION, POWDER, FOR SOLUTION INTRAMUSCULAR; INTRAVENOUS at 09:06

## 2023-06-12 RX ADMIN — MIDAZOLAM HYDROCHLORIDE 2 MG: 1 INJECTION, SOLUTION INTRAMUSCULAR; INTRAVENOUS at 07:06

## 2023-06-12 RX ADMIN — AMIODARONE HYDROCHLORIDE 1 MG/MIN: 1.8 INJECTION, SOLUTION INTRAVENOUS at 07:06

## 2023-06-13 LAB
ANION GAP SERPL CALC-SCNC: 11 MMOL/L (ref 8–16)
BUN SERPL-MCNC: 64 MG/DL (ref 8–23)
CALCIUM SERPL-MCNC: 8.5 MG/DL (ref 8.7–10.5)
CHLORIDE SERPL-SCNC: 99 MMOL/L (ref 95–110)
CO2 SERPL-SCNC: 26 MMOL/L (ref 23–29)
CREAT SERPL-MCNC: 3 MG/DL (ref 0.5–1.4)
ERYTHROCYTE [DISTWIDTH] IN BLOOD BY AUTOMATED COUNT: 20.2 % (ref 11.5–14.5)
EST. GFR  (NO RACE VARIABLE): 22.9 ML/MIN/1.73 M^2
GLUCOSE SERPL-MCNC: 155 MG/DL (ref 70–110)
GLUCOSE SERPL-MCNC: 168 MG/DL (ref 70–110)
GLUCOSE SERPL-MCNC: 207 MG/DL (ref 70–110)
GLUCOSE SERPL-MCNC: 231 MG/DL (ref 70–110)
GLUCOSE SERPL-MCNC: 238 MG/DL (ref 70–110)
HCT VFR BLD AUTO: 32.5 % (ref 40–54)
HGB BLD-MCNC: 9.4 G/DL (ref 14–18)
LACTATE SERPL-SCNC: 1.2 MMOL/L (ref 0.5–1.9)
MAGNESIUM SERPL-MCNC: 2.3 MG/DL (ref 1.6–2.6)
MCH RBC QN AUTO: 25.8 PG (ref 27–31)
MCHC RBC AUTO-ENTMCNC: 28.9 G/DL (ref 32–36)
MCV RBC AUTO: 89 FL (ref 82–98)
PLATELET # BLD AUTO: 69 K/UL (ref 150–450)
PMV BLD AUTO: 11.2 FL (ref 9.2–12.9)
POTASSIUM SERPL-SCNC: 4.9 MMOL/L (ref 3.5–5.1)
RBC # BLD AUTO: 3.65 M/UL (ref 4.6–6.2)
SODIUM SERPL-SCNC: 136 MMOL/L (ref 136–145)
WBC # BLD AUTO: 6.74 K/UL (ref 3.9–12.7)

## 2023-06-13 PROCEDURE — 63600175 PHARM REV CODE 636 W HCPCS: Performed by: INTERNAL MEDICINE

## 2023-06-13 PROCEDURE — 27000221 HC OXYGEN, UP TO 24 HOURS

## 2023-06-13 PROCEDURE — 25000003 PHARM REV CODE 250: Performed by: INTERNAL MEDICINE

## 2023-06-13 PROCEDURE — 83735 ASSAY OF MAGNESIUM: CPT | Performed by: INTERNAL MEDICINE

## 2023-06-13 PROCEDURE — 36415 COLL VENOUS BLD VENIPUNCTURE: CPT | Performed by: INTERNAL MEDICINE

## 2023-06-13 PROCEDURE — 94761 N-INVAS EAR/PLS OXIMETRY MLT: CPT

## 2023-06-13 PROCEDURE — 25000003 PHARM REV CODE 250: Performed by: HOSPITALIST

## 2023-06-13 PROCEDURE — 21000000 HC CCU ICU ROOM CHARGE

## 2023-06-13 PROCEDURE — 80048 BASIC METABOLIC PNL TOTAL CA: CPT | Performed by: INTERNAL MEDICINE

## 2023-06-13 PROCEDURE — 25000003 PHARM REV CODE 250

## 2023-06-13 PROCEDURE — 94640 AIRWAY INHALATION TREATMENT: CPT

## 2023-06-13 PROCEDURE — 25000242 PHARM REV CODE 250 ALT 637 W/ HCPCS: Performed by: HOSPITALIST

## 2023-06-13 PROCEDURE — 85027 COMPLETE CBC AUTOMATED: CPT | Performed by: INTERNAL MEDICINE

## 2023-06-13 PROCEDURE — 94760 N-INVAS EAR/PLS OXIMETRY 1: CPT

## 2023-06-13 PROCEDURE — 83605 ASSAY OF LACTIC ACID: CPT | Performed by: NURSE PRACTITIONER

## 2023-06-13 PROCEDURE — 36415 COLL VENOUS BLD VENIPUNCTURE: CPT | Performed by: NURSE PRACTITIONER

## 2023-06-13 PROCEDURE — 99900035 HC TECH TIME PER 15 MIN (STAT)

## 2023-06-13 RX ORDER — INSULIN ASPART 100 [IU]/ML
0-5 INJECTION, SOLUTION INTRAVENOUS; SUBCUTANEOUS
Status: DISCONTINUED | OUTPATIENT
Start: 2023-06-13 | End: 2023-06-18 | Stop reason: HOSPADM

## 2023-06-13 RX ORDER — CHLORHEXIDINE GLUCONATE ORAL RINSE 1.2 MG/ML
15 SOLUTION DENTAL 2 TIMES DAILY
Status: COMPLETED | OUTPATIENT
Start: 2023-06-13 | End: 2023-06-17

## 2023-06-13 RX ORDER — PANTOPRAZOLE SODIUM 40 MG/1
40 TABLET, DELAYED RELEASE ORAL
Status: DISCONTINUED | OUTPATIENT
Start: 2023-06-13 | End: 2023-06-18 | Stop reason: HOSPADM

## 2023-06-13 RX ORDER — POLYETHYLENE GLYCOL 3350 17 G/17G
17 POWDER, FOR SOLUTION ORAL DAILY PRN
Status: DISCONTINUED | OUTPATIENT
Start: 2023-06-13 | End: 2023-06-18 | Stop reason: HOSPADM

## 2023-06-13 RX ORDER — IBUPROFEN 200 MG
24 TABLET ORAL
Status: DISCONTINUED | OUTPATIENT
Start: 2023-06-13 | End: 2023-06-18 | Stop reason: HOSPADM

## 2023-06-13 RX ORDER — ATORVASTATIN CALCIUM 10 MG/1
10 TABLET, FILM COATED ORAL DAILY
Status: DISCONTINUED | OUTPATIENT
Start: 2023-06-13 | End: 2023-06-18 | Stop reason: HOSPADM

## 2023-06-13 RX ORDER — SODIUM,POTASSIUM PHOSPHATES 280-250MG
2 POWDER IN PACKET (EA) ORAL
Status: DISCONTINUED | OUTPATIENT
Start: 2023-06-13 | End: 2023-06-18 | Stop reason: HOSPADM

## 2023-06-13 RX ORDER — LANOLIN ALCOHOL/MO/W.PET/CERES
800 CREAM (GRAM) TOPICAL
Status: DISCONTINUED | OUTPATIENT
Start: 2023-06-13 | End: 2023-06-18 | Stop reason: HOSPADM

## 2023-06-13 RX ORDER — IBUPROFEN 200 MG
16 TABLET ORAL
Status: DISCONTINUED | OUTPATIENT
Start: 2023-06-13 | End: 2023-06-18 | Stop reason: HOSPADM

## 2023-06-13 RX ORDER — GABAPENTIN 300 MG/1
300 CAPSULE ORAL 3 TIMES DAILY
Status: DISCONTINUED | OUTPATIENT
Start: 2023-06-13 | End: 2023-06-18 | Stop reason: HOSPADM

## 2023-06-13 RX ORDER — GABAPENTIN 300 MG/1
300 CAPSULE ORAL 3 TIMES DAILY
Status: DISCONTINUED | OUTPATIENT
Start: 2023-06-13 | End: 2023-06-13

## 2023-06-13 RX ORDER — NALOXONE HCL 0.4 MG/ML
0.02 VIAL (ML) INJECTION
Status: DISCONTINUED | OUTPATIENT
Start: 2023-06-13 | End: 2023-06-18 | Stop reason: HOSPADM

## 2023-06-13 RX ORDER — FUROSEMIDE 10 MG/ML
40 INJECTION INTRAMUSCULAR; INTRAVENOUS
Status: DISCONTINUED | OUTPATIENT
Start: 2023-06-13 | End: 2023-06-14

## 2023-06-13 RX ORDER — ACETAMINOPHEN 325 MG/1
650 TABLET ORAL EVERY 8 HOURS PRN
Status: DISCONTINUED | OUTPATIENT
Start: 2023-06-13 | End: 2023-06-18 | Stop reason: HOSPADM

## 2023-06-13 RX ORDER — MUPIROCIN 20 MG/G
OINTMENT TOPICAL 2 TIMES DAILY
Status: DISPENSED | OUTPATIENT
Start: 2023-06-13 | End: 2023-06-18

## 2023-06-13 RX ORDER — TALC
6 POWDER (GRAM) TOPICAL NIGHTLY PRN
Status: DISCONTINUED | OUTPATIENT
Start: 2023-06-13 | End: 2023-06-18 | Stop reason: HOSPADM

## 2023-06-13 RX ORDER — ONDANSETRON 2 MG/ML
4 INJECTION INTRAMUSCULAR; INTRAVENOUS EVERY 8 HOURS PRN
Status: DISCONTINUED | OUTPATIENT
Start: 2023-06-13 | End: 2023-06-18 | Stop reason: HOSPADM

## 2023-06-13 RX ORDER — ALBUTEROL SULFATE 0.83 MG/ML
2.5 SOLUTION RESPIRATORY (INHALATION)
Status: DISCONTINUED | OUTPATIENT
Start: 2023-06-13 | End: 2023-06-18 | Stop reason: HOSPADM

## 2023-06-13 RX ORDER — NAPROXEN SODIUM 220 MG/1
81 TABLET, FILM COATED ORAL DAILY
Status: DISCONTINUED | OUTPATIENT
Start: 2023-06-13 | End: 2023-06-18 | Stop reason: HOSPADM

## 2023-06-13 RX ORDER — TRAMADOL HYDROCHLORIDE 50 MG/1
50 TABLET ORAL EVERY 6 HOURS PRN
Status: DISCONTINUED | OUTPATIENT
Start: 2023-06-13 | End: 2023-06-18 | Stop reason: HOSPADM

## 2023-06-13 RX ADMIN — CHLORHEXIDINE GLUCONATE 15 ML: 1.2 RINSE ORAL at 08:06

## 2023-06-13 RX ADMIN — ATORVASTATIN CALCIUM 10 MG: 10 TABLET, FILM COATED ORAL at 09:06

## 2023-06-13 RX ADMIN — TRAMADOL HYDROCHLORIDE 50 MG: 50 TABLET, COATED ORAL at 09:06

## 2023-06-13 RX ADMIN — GABAPENTIN 300 MG: 300 CAPSULE ORAL at 02:06

## 2023-06-13 RX ADMIN — ASPIRIN 81 MG 81 MG: 81 TABLET ORAL at 09:06

## 2023-06-13 RX ADMIN — AMIODARONE HYDROCHLORIDE 1 MG/MIN: 1.8 INJECTION, SOLUTION INTRAVENOUS at 01:06

## 2023-06-13 RX ADMIN — MUPIROCIN 1 G: 20 OINTMENT TOPICAL at 09:06

## 2023-06-13 RX ADMIN — INSULIN ASPART 2 UNITS: 100 INJECTION, SOLUTION INTRAVENOUS; SUBCUTANEOUS at 01:06

## 2023-06-13 RX ADMIN — INSULIN DETEMIR 10 UNITS: 100 INJECTION, SOLUTION SUBCUTANEOUS at 09:06

## 2023-06-13 RX ADMIN — ALBUTEROL SULFATE 2.5 MG: 2.5 SOLUTION RESPIRATORY (INHALATION) at 08:06

## 2023-06-13 RX ADMIN — TRAMADOL HYDROCHLORIDE 50 MG: 50 TABLET, COATED ORAL at 02:06

## 2023-06-13 RX ADMIN — FUROSEMIDE 40 MG: 10 INJECTION, SOLUTION INTRAMUSCULAR; INTRAVENOUS at 07:06

## 2023-06-13 RX ADMIN — GABAPENTIN 300 MG: 300 CAPSULE ORAL at 09:06

## 2023-06-13 RX ADMIN — AMIODARONE HYDROCHLORIDE 1 MG/MIN: 1.8 INJECTION, SOLUTION INTRAVENOUS at 02:06

## 2023-06-13 RX ADMIN — PANTOPRAZOLE SODIUM 40 MG: 40 TABLET, DELAYED RELEASE ORAL at 09:06

## 2023-06-13 RX ADMIN — CHLORHEXIDINE GLUCONATE 15 ML: 1.2 RINSE ORAL at 09:06

## 2023-06-13 RX ADMIN — MUPIROCIN 1 G: 20 OINTMENT TOPICAL at 08:06

## 2023-06-13 RX ADMIN — AMIODARONE HYDROCHLORIDE 1 MG/MIN: 1.8 INJECTION, SOLUTION INTRAVENOUS at 09:06

## 2023-06-13 RX ADMIN — GABAPENTIN 300 MG: 300 CAPSULE ORAL at 03:06

## 2023-06-13 RX ADMIN — INSULIN ASPART 2 UNITS: 100 INJECTION, SOLUTION INTRAVENOUS; SUBCUTANEOUS at 05:06

## 2023-06-13 RX ADMIN — AMIODARONE HYDROCHLORIDE 1 MG/MIN: 1.8 INJECTION, SOLUTION INTRAVENOUS at 07:06

## 2023-06-13 RX ADMIN — FUROSEMIDE 40 MG: 10 INJECTION, SOLUTION INTRAMUSCULAR; INTRAVENOUS at 08:06

## 2023-06-13 RX ADMIN — CEFTRIAXONE SODIUM 1 G: 1 INJECTION, POWDER, FOR SOLUTION INTRAMUSCULAR; INTRAVENOUS at 10:06

## 2023-06-13 RX ADMIN — GABAPENTIN 300 MG: 300 CAPSULE ORAL at 08:06

## 2023-06-13 RX ADMIN — INSULIN ASPART 1 UNITS: 100 INJECTION, SOLUTION INTRAVENOUS; SUBCUTANEOUS at 09:06

## 2023-06-13 RX ADMIN — TRAMADOL HYDROCHLORIDE 50 MG: 50 TABLET, COATED ORAL at 05:06

## 2023-06-13 RX ADMIN — GABAPENTIN 300 MG: 300 CAPSULE ORAL at 07:06

## 2023-06-13 NOTE — SUBJECTIVE & OBJECTIVE
Past Medical History:   Diagnosis Date    AICD (automatic cardioverter/defibrillator) present     Anemia, chronic disease 07/27/2021    Anemia, unspecified 07/27/2021    Anxiety and depression 2012    CAD (coronary artery disease) 2012    Cardiomegaly 2016    CHF (congestive heart failure) 2012    Cholecystitis 2017    Complete heart block 2012    Diabetic foot ulcer     DVT (deep venous thrombosis) 2012    And pulmonary embolus    GERD (gastroesophageal reflux disease) 2010    Heparin induced thrombocytopenia     Hyperlipemia 2011    IDDM (insulin dependent diabetes mellitus) 1983    With neuropathy    Ischemic cardiomyopathy 2012    MI (myocardial infarction) 2012    Morbid obesity     MRSA (methicillin resistant Staphylococcus aureus) infection 01/19/2019    Noncompliance with therapeutic plan 2019    Normochromic normocytic anemia 07/27/2021    Osteomyelitis of right foot 01/2019    Osteomyelitis of right foot 09/01/2022    Klebsiella from bone, GBS in blood    Pulmonary edema 2019    Respiratory failure 2019    Sepsis 01/2019    Due to cellulitis right leg    Sleep apnea 2013    Thrombocytopathy 2012    Venous stasis dermatitis of both lower extremities        Past Surgical History:   Procedure Laterality Date    CARDIAC PACEMAKER PLACEMENT  12/2012    CARDIAC PACEMAKER PLACEMENT  10/04/2018    battery replacement    CORONARY ARTERY BYPASS GRAFT  06/2012    ESOPHAGOGASTRODUODENOSCOPY  01/31/2017    SAMARA FILTER PLACEMENT  2012    vena cava    LUMBAR SYMPATHETIC NERVE BLOCK Right 8/22/2019    Procedure: BLOCK, NERVE, SYMPATHETIC, LUMBAR;  Surgeon: Tashi Good MD;  Location: [facility];  Service: Pain Management;  Laterality: Right;  RIGHT SYMPATHETIC NERVE BLOCK       Review of patient's allergies indicates:   Allergen Reactions    Vasopressin Anaphylaxis and Other (See Comments)     Increased pressure in his head; felt like his eyeballs and veins were going to pop out of his head.     Vasopressin analogues  "Other (See Comments) and Anaphylaxis     "felt like eyes going to pop out of my head"  Other reaction(s): Other (See Comments)  "felt like eyes going to pop out of my head"  Increased pressure in his head; felt like his eyeballs and veins were going to pop out of his head.     Heparin Other (See Comments)     Other reaction(s): "depleted my blood platets"    Decreased platelet count    Heparin analogues Other (See Comments)     Depleted WBC count    Morphine Hallucinations, Other (See Comments) and Anxiety     Other reaction(s): Hallucinations  Paranoid, hallucinations         No current facility-administered medications on file prior to encounter.     Current Outpatient Medications on File Prior to Encounter   Medication Sig    albuterol (PROVENTIL) 5 mg/mL nebulizer solution Inhale 2.5 mg into the lungs every 6 (six) hours as needed.    albuterol-ipratropium (DUO-NEB) 2.5 mg-0.5 mg/3 mL nebulizer solution Inhale 3 mLs into the lungs every 6 (six) hours as needed.    amiodarone (PACERONE) 200 MG Tab Take 200 mg by mouth once daily.    aspirin 81 MG Chew Take 81 mg by mouth once daily.    atorvastatin (LIPITOR) 10 MG tablet Take 1 tablet by mouth once daily.    B complex-vitamin C-folic acid (JLUIS-JIMMIE/NEPHRO-JIMMIE) 0.8 mg Tab Take 0.4 mg by mouth.    BASAGLAR KWIKPEN U-100 INSULIN glargine 100 units/mL (3mL) SubQ pen 25 Units once daily.     carvediloL (COREG) 3.125 MG tablet Take 3.125 mg by mouth 2 (two) times daily.    cefTRIAXone (ROCEPHIN) 10 gram injection     cetirizine (ZYRTEC) 10 MG tablet Take 10 mg by mouth.    CORLANOR 5 mg Tab Take 5 mg by mouth nightly.    doxycycline (VIBRAMYCIN) 100 MG Cap Take 1 capsule (100 mg total) by mouth every 12 (twelve) hours. (Patient not taking: Reported on 5/31/2023)    FARXIGA 10 mg tablet Take 10 mg by mouth once daily.    furosemide (LASIX) 40 MG tablet Take 1 tablet (40 mg total) by mouth once daily.    gabapentin (NEURONTIN) 600 MG tablet Take 1 tablet by mouth 3 " (three) times daily.    lisinopriL (PRINIVIL,ZESTRIL) 2.5 MG tablet Take 2.5 mg by mouth 2 (two) times daily.    mupirocin (BACTROBAN) 2 % ointment APPLY TOPICALLY ONCE DAILY.    pantoprazole (PROTONIX) 40 MG tablet Take 1 tablet by mouth once daily.    TRULICITY 0.75 mg/0.5 mL pen injector Inject into the skin.    vitamin B complex-folic acid 0.4 mg Tab Take 1 tablet by mouth.     Family History       Problem Relation (Age of Onset)    Arthritis Mother    Cancer Father    Diabetes Mother    Heart disease Father    Stroke Father          Tobacco Use    Smoking status: Former     Packs/day: 2.00     Years: 38.00     Pack years: 76.00     Types: Cigarettes     Quit date:      Years since quittin.4    Smokeless tobacco: Never   Substance and Sexual Activity    Alcohol use: No    Drug use: Not on file    Sexual activity: Never       Objective:     Vital Signs (Most Recent):  Temp: 98.8 °F (37.1 °C) (23)  Pulse: 66 (23)  Resp: 18 (23)  BP: 105/61 (23)  SpO2: 99 % (23) Vital Signs (24h Range):  Temp:  [98.8 °F (37.1 °C)] 98.8 °F (37.1 °C)  Pulse:  [] 66  Resp:  [11-22] 18  SpO2:  [85 %-99 %] 99 %  BP: ()/(46-68) 105/61     Weight: 126.6 kg (279 lb)  Body mass index is 36.81 kg/m².     Physical Exam        General:  Appears ill.  No acute distress.  Appears stated age  Head: Normocephalic and atraumatic   Eyes:  No conjunctival pallor. No scleral icterus.  Mouth: Oral mucosa moist   Lungs: CTA. Good air entry.  Cor: Regular rate and rhythm. No murmurs.  2+ pitting edema  Abd:  Soft.  Distended.  No tenderness  Musculoskeletal: No arthropathy, deformity.  Skin:  Right foot wound dressing is clean, dry and intact  Neuro: A&O x 3. Moving all extremities equally. Follows commands  Ext: No cyanosis. Peripheral pulses +  Psych: Normal mood and affect. Memory intact     Significant Labs: All pertinent labs within the past 24 hours have been  reviewed.  CBC:   Recent Labs   Lab 06/12/23  1835   WBC 7.76   HGB 9.6*   HCT 33.7*   PLT 75*     CMP:   Recent Labs   Lab 06/12/23  1835   *   K 4.8   CL 96   CO2 25   *   BUN 62*   CREATININE 2.9*   CALCIUM 8.2*   PROT 7.5   ALBUMIN 3.2*   BILITOT 1.6*   ALKPHOS 70   AST 31   ALT 22   ANIONGAP 10       Significant Imaging: I have reviewed all pertinent imaging results/findings within the past 24 hours.

## 2023-06-13 NOTE — H&P
[facility] - Emergency Dept  Hospital Medicine  History & Physical    Patient Name: Ana Bullard  MRN: [id number]  Patient Class: IP- Inpatient  Admission Date: 6/12/2023  Attending Physician: Patrick Carvajal MD  Primary Care Provider: YRN Pollard         Patient information was obtained from patient, spouse/SO, past medical records and ER records.     Subjective:     Principal Problem:Wide-complex tachycardia    Chief Complaint:   Chief Complaint   Patient presents with    Shortness of Breath        HPI: 63-year-old  male with multiple medical comorbidities including but not limited to chronic combined CHF, type 2 DM, CAD, thrombocytopenia, chronic diabetic foot ulcer and CKD stage 3b who was discharged from outside hospital earlier today after being managed for UTI presented here with generalized weakness and tachycardia.    He was managed for complicated UTI at outside hospital for 2 days.  He was discharged on oral antibiotics and resumption of home health.  Today his home health nurse found him to be tachycardic and directed him to the ER.  Initial rhythm on presentation was wide complex tachycardia.  Wife reports that he was not given his amiodarone when he was hospitalized.    In the ED here, after consultation with Cardiology patient underwent successful synchronized cardioversion with restoration of normal sinus rhythm.    Patient denies chest pain but admits to shortness of breath and increased abdominal distention, lower extremity edema and weight gain over the last several days.  Admits to being compliant with diuretics.  He is currently being followed by Podiatry for chronic right foot ulcer and receiving local wound care.    Rest of the 10 point review of systems is negative except as mentioned above.      Past Medical History:   Diagnosis Date    AICD (automatic cardioverter/defibrillator) present     Anemia, chronic disease 07/27/2021    Anemia, unspecified  "07/27/2021    Anxiety and depression 2012    CAD (coronary artery disease) 2012    Cardiomegaly 2016    CHF (congestive heart failure) 2012    Cholecystitis 2017    Complete heart block 2012    Diabetic foot ulcer     DVT (deep venous thrombosis) 2012    And pulmonary embolus    GERD (gastroesophageal reflux disease) 2010    Heparin induced thrombocytopenia     Hyperlipemia 2011    IDDM (insulin dependent diabetes mellitus) 1983    With neuropathy    Ischemic cardiomyopathy 2012    MI (myocardial infarction) 2012    Morbid obesity     MRSA (methicillin resistant Staphylococcus aureus) infection 01/19/2019    Noncompliance with therapeutic plan 2019    Normochromic normocytic anemia 07/27/2021    Osteomyelitis of right foot 01/2019    Osteomyelitis of right foot 09/01/2022    Klebsiella from bone, GBS in blood    Pulmonary edema 2019    Respiratory failure 2019    Sepsis 01/2019    Due to cellulitis right leg    Sleep apnea 2013    Thrombocytopathy 2012    Venous stasis dermatitis of both lower extremities        Past Surgical History:   Procedure Laterality Date    CARDIAC PACEMAKER PLACEMENT  12/2012    CARDIAC PACEMAKER PLACEMENT  10/04/2018    battery replacement    CORONARY ARTERY BYPASS GRAFT  06/2012    ESOPHAGOGASTRODUODENOSCOPY  01/31/2017    SAMARA FILTER PLACEMENT  2012    vena cava    LUMBAR SYMPATHETIC NERVE BLOCK Right 8/22/2019    Procedure: BLOCK, NERVE, SYMPATHETIC, LUMBAR;  Surgeon: Tashi Good MD;  Location: [facility];  Service: Pain Management;  Laterality: Right;  RIGHT SYMPATHETIC NERVE BLOCK       Review of patient's allergies indicates:   Allergen Reactions    Vasopressin Anaphylaxis and Other (See Comments)     Increased pressure in his head; felt like his eyeballs and veins were going to pop out of his head.     Vasopressin analogues Other (See Comments) and Anaphylaxis     "felt like eyes going to pop out of my head"  Other reaction(s): Other (See " "Comments)  "felt like eyes going to pop out of my head"  Increased pressure in his head; felt like his eyeballs and veins were going to pop out of his head.     Heparin Other (See Comments)     Other reaction(s): "depleted my blood platets"    Decreased platelet count    Heparin analogues Other (See Comments)     Depleted WBC count    Morphine Hallucinations, Other (See Comments) and Anxiety     Other reaction(s): Hallucinations  Paranoid, hallucinations         No current facility-administered medications on file prior to encounter.     Current Outpatient Medications on File Prior to Encounter   Medication Sig    albuterol (PROVENTIL) 5 mg/mL nebulizer solution Inhale 2.5 mg into the lungs every 6 (six) hours as needed.    albuterol-ipratropium (DUO-NEB) 2.5 mg-0.5 mg/3 mL nebulizer solution Inhale 3 mLs into the lungs every 6 (six) hours as needed.    amiodarone (PACERONE) 200 MG Tab Take 200 mg by mouth once daily.    aspirin 81 MG Chew Take 81 mg by mouth once daily.    atorvastatin (LIPITOR) 10 MG tablet Take 1 tablet by mouth once daily.    B complex-vitamin C-folic acid (JLUIS-JIMMIE/NEPHRO-JIMMIE) 0.8 mg Tab Take 0.4 mg by mouth.    BASAGLAR KWIKPEN U-100 INSULIN glargine 100 units/mL (3mL) SubQ pen 25 Units once daily.     carvediloL (COREG) 3.125 MG tablet Take 3.125 mg by mouth 2 (two) times daily.    cefTRIAXone (ROCEPHIN) 10 gram injection     cetirizine (ZYRTEC) 10 MG tablet Take 10 mg by mouth.    CORLANOR 5 mg Tab Take 5 mg by mouth nightly.    doxycycline (VIBRAMYCIN) 100 MG Cap Take 1 capsule (100 mg total) by mouth every 12 (twelve) hours. (Patient not taking: Reported on 5/31/2023)    FARXIGA 10 mg tablet Take 10 mg by mouth once daily.    furosemide (LASIX) 40 MG tablet Take 1 tablet (40 mg total) by mouth once daily.    gabapentin (NEURONTIN) 600 MG tablet Take 1 tablet by mouth 3 (three) times daily.    lisinopriL (PRINIVIL,ZESTRIL) 2.5 MG tablet Take 2.5 mg by mouth 2 (two) " times daily.    mupirocin (BACTROBAN) 2 % ointment APPLY TOPICALLY ONCE DAILY.    pantoprazole (PROTONIX) 40 MG tablet Take 1 tablet by mouth once daily.    TRULICITY 0.75 mg/0.5 mL pen injector Inject into the skin.    vitamin B complex-folic acid 0.4 mg Tab Take 1 tablet by mouth.     Family History       Problem Relation (Age of Onset)    Arthritis Mother    Cancer Father    Diabetes Mother    Heart disease Father    Stroke Father          Tobacco Use    Smoking status: Former     Packs/day: 2.00     Years: 38.00     Pack years: 76.00     Types: Cigarettes     Quit date:      Years since quittin.4    Smokeless tobacco: Never   Substance and Sexual Activity    Alcohol use: No    Drug use: Not on file    Sexual activity: Never       Objective:     Vital Signs (Most Recent):  Temp: 98.8 °F (37.1 °C) (23)  Pulse: 66 (23)  Resp: 18 (23)  BP: 105/61 (23)  SpO2: 99 % (23) Vital Signs (24h Range):  Temp:  [98.8 °F (37.1 °C)] 98.8 °F (37.1 °C)  Pulse:  [] 66  Resp:  [11-22] 18  SpO2:  [85 %-99 %] 99 %  BP: ()/(46-68) 105/61     Weight: 126.6 kg (279 lb)  Body mass index is 36.81 kg/m².     Physical Exam        General:  Appears ill.  No acute distress.  Appears stated age  Head: Normocephalic and atraumatic   Eyes:  No conjunctival pallor. No scleral icterus.  Mouth: Oral mucosa moist   Lungs: CTA. Good air entry.  Cor: Regular rate and rhythm. No murmurs.  2+ pitting edema  Abd:  Soft.  Distended.  No tenderness  Musculoskeletal: No arthropathy, deformity.  Skin:  Right foot wound dressing is clean, dry and intact  Neuro: A&O x 3. Moving all extremities equally. Follows commands  Ext: No cyanosis. Peripheral pulses +  Psych: Normal mood and affect. Memory intact     Significant Labs: All pertinent labs within the past 24 hours have been reviewed.  CBC:   Recent Labs   Lab 23  1835   WBC 7.76   HGB 9.6*   HCT 33.7*   PLT 75*      CMP:   Recent Labs   Lab 06/12/23  1835   *   K 4.8   CL 96   CO2 25   *   BUN 62*   CREATININE 2.9*   CALCIUM 8.2*   PROT 7.5   ALBUMIN 3.2*   BILITOT 1.6*   ALKPHOS 70   AST 31   ALT 22   ANIONGAP 10       Significant Imaging: I have reviewed all pertinent imaging results/findings within the past 24 hours.    Assessment/Plan:     * Wide-complex tachycardia  Wide complex tachycardia with heart rate in the 130-140 beats per minute   Patient has ICD in place however not cardioverted by it  Status post synchronized cardioversion while in the ED   Initiated on amiodarone drip  Cardiology consultation   Patient appears to be in decompensated CHF - start IV diuretics with close monitoring of renal function   Strict input output charting, low-sodium diet and daily weights   Might require furosemide drip if no response        Acute renal failure superimposed on stage 3b chronic kidney disease  Likely cardiorenal syndrome  Monitor renal function with daily labs especially while on diuretics   Nephrology consultation   Dose medications per GFR      UTI (urinary tract infection)  Recent partially treated UTI at outside facility  Cultures unavailable even under care everywhere   Continue empiric ceftriaxone for additional 3 days to complete a 5 day course for complicated UTI      Diabetic foot ulcer  Continue daily wound care    Chronic combined systolic and diastolic heart failure  As above for wide complex tachycardia      Type II diabetes mellitus with neurological manifestations  Basal insulin with detemir at an adjusted dose   Low-dose insulin sliding scale  Gabapentin at a lower dose due to AD    Chronic Thrombocytopenia  Also patient has a history of heparin induced thrombocytopenia   Monitor platelets daily labs        VTE risk high. SCDs.  No heparin products due to history of HIT       Patrick Carvajal MD  Department of Hospital Medicine  Formerly [facility] - Emergency Dept

## 2023-06-13 NOTE — ASSESSMENT & PLAN NOTE
Likely cardiorenal syndrome  Monitor renal function with daily labs especially while on diuretics   Nephrology consultation   Dose medications per GFR

## 2023-06-13 NOTE — NURSING
"Patient refused picture of chronic diabetic foot wound for chart review.        Brief H/P received from patient and wife at bedside this AM:    -Chronic diabetic foot wound has been present for 8 years. Wound care specialist (Dr. Avila) follows/manages patient care strategically and instructed patient to not let anyone take dressing off, including patient himself and home health nurse. Hx of osteomyelitis in 2022 but recent bone scan with Oleary at [place] was WNL. Follow up with Miriam (ID) after and no infectious process was present either.     -Patient was recently hospitalized at [facility] in [place] on 6/11 - 6/12  due to UTI. Unsure of abx received but only a dose or two had been given. PO home abx prescription was never taken by patient because patient had already made plans to come to [facility] and would receive them here. Patient planned to come to [facility] due to "feeling off" for the last couple of days & the issue had yet to be addressed. Amio PO was not given a [place] either.    -Later on 6/12 after d/c, home health nurse noticed an irregular pulse and instructed patient to go to [facility] ED.          "

## 2023-06-13 NOTE — ASSESSMENT & PLAN NOTE
Wide complex tachycardia with heart rate in the 130-140 beats per minute   Patient has ICD in place however not cardioverted by it  Status post synchronized cardioversion while in the ED   Initiated on amiodarone drip  Cardiology consultation   Patient appears to be in decompensated CHF - start IV diuretics with close monitoring of renal function   Strict input output charting, low-sodium diet and daily weights   Might require furosemide drip if no response

## 2023-06-13 NOTE — NURSING
Pt resting comfortably p receiving tramadol and gabapentin. CPAP in use c O2 applied. No distress noted. Small skin tear noted to left elbow-bandage applied-pt states he frequently gets skin tears. Chronic diabetic ulcer to R foot noted c kerlex drsg in place,pt states hes seeing wound care outpatient.

## 2023-06-13 NOTE — ASSESSMENT & PLAN NOTE
Basal insulin with detemir at an adjusted dose   Low-dose insulin sliding scale  Gabapentin at a lower dose due to AD

## 2023-06-13 NOTE — HPI
63-year-old  male with multiple medical comorbidities including but not limited to chronic combined CHF, type 2 DM, CAD, thrombocytopenia, chronic diabetic foot ulcer and CKD stage 3b who was discharged from outside hospital earlier today after being managed for UTI presented here with generalized weakness and tachycardia.    He was managed for complicated UTI at outside hospital for 2 days.  He was discharged on oral antibiotics and resumption of home health.  Today his home health nurse found him to be tachycardic and directed him to the ER.  Initial rhythm on presentation was wide complex tachycardia.  Wife reports that he was not given his amiodarone when he was hospitalized.    In the ED here, after consultation with Cardiology patient underwent successful synchronized cardioversion with restoration of normal sinus rhythm.    Patient denies chest pain but admits to shortness of breath and increased abdominal distention, lower extremity edema and weight gain over the last several days.  Admits to being compliant with diuretics.  He is currently being followed by Podiatry for chronic right foot ulcer and receiving local wound care.    Rest of the 10 point review of systems is negative except as mentioned above.

## 2023-06-13 NOTE — NURSING
NP with cardiology at patient bedside this AM. Educated pt and wife that AICD interrogation scheduled today to lower VT zone to notice slower ventricular tachy rhythms, such as the one seen in ED.  Also discussed risks/benefits r/t angiogram in patient with kidney disease. Nephrology has been consulted

## 2023-06-13 NOTE — CONSULTS
Nephrology Consult Note        Patient Name: Ana Bullard  MRN: [id number]    Patient Class: IP- Inpatient   Admission Date: 6/12/2023  Length of Stay: 1 days  Date of Service: 6/13/2023    Attending Physician: Vladimir Jensen, *  Primary Care Provider: YRN Pollard    Reason for Consult: ailyn    SUBJECTIVE:     HPI: 63-year-old  male with multiple medical comorbidities including chronic combined CHF, type 2 DM, CAD, thrombocytopenia, chronic diabetic foot ulcer and CKD stage 3b, who was discharged from another hospital after admission for UTI presented here with generalized weakness and tachycardia.    He was discharged on oral antibiotics and resumption of home health. His home health nurse found him to be tachycardic and directed him to the ER. Initial rhythm on presentation was wide complex tachycardia. Wife reports that he was not given his amiodarone when he was hospitalized.     In the ED here, after consultation with Cardiology patient underwent successful synchronized cardioversion with restoration of normal sinus rhythm.    Past Medical History:   Diagnosis Date    AICD (automatic cardioverter/defibrillator) present     Anemia, chronic disease 07/27/2021    Anemia, unspecified 07/27/2021    Anxiety and depression 2012    CAD (coronary artery disease) 2012    Cardiomegaly 2016    CHF (congestive heart failure) 2012    Cholecystitis 2017    Complete heart block 2012    Diabetic foot ulcer     DVT (deep venous thrombosis) 2012    And pulmonary embolus    GERD (gastroesophageal reflux disease) 2010    Heparin induced thrombocytopenia     Hyperlipemia 2011    IDDM (insulin dependent diabetes mellitus) 1983    With neuropathy    Ischemic cardiomyopathy 2012    MI (myocardial infarction) 2012    Morbid obesity     MRSA (methicillin resistant Staphylococcus aureus) infection 01/19/2019    Noncompliance with therapeutic plan 2019    Normochromic normocytic anemia 07/27/2021    Osteomyelitis  "of right foot 2019    Osteomyelitis of right foot 2022    Klebsiella from bone, GBS in blood    Pulmonary edema     Respiratory failure     Sepsis 2019    Due to cellulitis right leg    Sleep apnea 2013    Thrombocytopathy     Venous stasis dermatitis of both lower extremities      Past Surgical History:   Procedure Laterality Date    CARDIAC PACEMAKER PLACEMENT  2012    CARDIAC PACEMAKER PLACEMENT  10/04/2018    battery replacement    CORONARY ARTERY BYPASS GRAFT  2012    ESOPHAGOGASTRODUODENOSCOPY  2017    SAMARA FILTER PLACEMENT  2012    vena cava    LUMBAR SYMPATHETIC NERVE BLOCK Right 2019    Procedure: BLOCK, NERVE, SYMPATHETIC, LUMBAR;  Surgeon: Tashi Good MD;  Location: [facility] OR;  Service: Pain Management;  Laterality: Right;  RIGHT SYMPATHETIC NERVE BLOCK     Family History   Problem Relation Age of Onset    Arthritis Mother     Diabetes Mother     Cancer Father     Heart disease Father     Stroke Father      Social History     Tobacco Use    Smoking status: Former     Packs/day: 2.00     Years: 38.00     Pack years: 76.00     Types: Cigarettes     Quit date:      Years since quittin.4    Smokeless tobacco: Never   Substance Use Topics    Alcohol use: No    Drug use: Never       Review of patient's allergies indicates:   Allergen Reactions    Vasopressin Anaphylaxis and Other (See Comments)     Increased pressure in his head; felt like his eyeballs and veins were going to pop out of his head.     Vasopressin analogues Other (See Comments) and Anaphylaxis     "felt like eyes going to pop out of my head"  Other reaction(s): Other (See Comments)  "felt like eyes going to pop out of my head"  Increased pressure in his head; felt like his eyeballs and veins were going to pop out of his head.     Heparin Other (See Comments)     Other reaction(s): "depleted my blood platets"    Decreased platelet count    Heparin analogues Other (See Comments)     Depleted WBC " count    Morphine Hallucinations, Other (See Comments) and Anxiety     Other reaction(s): Hallucinations  Paranoid, hallucinations         Outpatient meds:  No current facility-administered medications on file prior to encounter.     Current Outpatient Medications on File Prior to Encounter   Medication Sig Dispense Refill    albuterol (PROVENTIL) 5 mg/mL nebulizer solution Take 2.5 mg by nebulization every 6 (six) hours as needed.      albuterol-ipratropium (DUO-NEB) 2.5 mg-0.5 mg/3 mL nebulizer solution Take 3 mLs by nebulization every 6 (six) hours as needed.      amiodarone (PACERONE) 200 MG Tab Take 200 mg by mouth once daily.      aspirin 81 MG Chew Take 81 mg by mouth once daily.      atorvastatin (LIPITOR) 10 MG tablet Take 1 tablet by mouth once daily.  1    B complex-vitamin C-folic acid (JLUIS-JIMMIE/NEPHRO-JIMMIE) 0.8 mg Tab Take 0.4 mg by mouth.      BASAGLAR KWIKPEN U-100 INSULIN glargine 100 units/mL (3mL) SubQ pen Inject 25 Units into the skin once daily.  3    carvediloL (COREG) 3.125 MG tablet Take 3.125 mg by mouth 2 (two) times daily.      cefdinir (OMNICEF) 300 MG capsule Take 300 mg by mouth 2 (two) times daily.      cefTRIAXone (ROCEPHIN) 10 gram injection       cetirizine (ZYRTEC) 10 MG tablet Take 10 mg by mouth once daily.      CORLANOR 5 mg Tab Take 5 mg by mouth nightly.      doxycycline (VIBRAMYCIN) 100 MG Cap Take 1 capsule (100 mg total) by mouth every 12 (twelve) hours. (Patient not taking: Reported on 5/31/2023) 20 capsule 0    FARXIGA 10 mg tablet Take 10 mg by mouth once daily.      furosemide (LASIX) 40 MG tablet Take 1 tablet (40 mg total) by mouth once daily. 30 tablet 1    gabapentin (NEURONTIN) 600 MG tablet Take 1 tablet by mouth 3 (three) times daily.  0    lisinopriL (PRINIVIL,ZESTRIL) 2.5 MG tablet Take 2.5 mg by mouth 2 (two) times daily.      mupirocin (BACTROBAN) 2 % ointment APPLY TOPICALLY ONCE DAILY. (Patient taking differently: Apply 1 g topically once daily.) 22 g 5     pantoprazole (PROTONIX) 40 MG tablet Take 1 tablet by mouth once daily.  0    traMADoL (ULTRAM) 50 mg tablet Take 50 mg by mouth every 6 (six) hours as needed.      TRULICITY 0.75 mg/0.5 mL pen injector Inject 0.75 mg into the skin every 7 days.      vitamin B complex-folic acid 0.4 mg Tab Take 1 tablet by mouth.         Scheduled meds:   aspirin  81 mg Oral Daily    atorvastatin  10 mg Oral Daily    cefTRIAXone (ROCEPHIN) IVPB  1 g Intravenous Q24H    furosemide (LASIX) injection  40 mg Intravenous Q12H    gabapentin  300 mg Oral TID    insulin detemir U-100  10 Units Subcutaneous Daily    pantoprazole  40 mg Oral Before breakfast       Infusions:   amiodarone in dextrose 5% 1 mg/min (06/13/23 0750)       PRN meds:  acetaminophen, dextrose 50%, dextrose 50%, glucose, glucose, insulin aspart U-100, magnesium oxide, magnesium oxide, melatonin, naloxone, ondansetron, polyethylene glycol, potassium bicarbonate, potassium bicarbonate, potassium bicarbonate, potassium, sodium phosphates, potassium, sodium phosphates, potassium, sodium phosphates, traMADoL    Review of Systems:  Constitutional:  Negative for chills, fever, malaise/fatigue and weight loss.   HENT:  Negative for hearing loss and nosebleeds.    Eyes:  Negative for blurred vision, double vision and photophobia.   Respiratory:  Negative for cough, shortness of breath and wheezing.    Cardiovascular:  Negative for chest pain, palpitations and leg swelling.   Gastrointestinal:  Negative for abdominal pain, constipation, diarrhea, heartburn, nausea and vomiting.   Genitourinary:  Negative for dysuria, frequency and urgency.   Musculoskeletal:  Negative for falls, joint pain and myalgias.   Skin:  Negative for itching and rash.   Neurological:  Negative for dizziness, speech change, focal weakness, loss of consciousness and headaches.   Endo/Heme/Allergies:  Does not bruise/bleed easily.   Psychiatric/Behavioral:  Negative for depression and substance abuse. The  patient is not nervous/anxious.      OBJECTIVE:     Vital Signs and IO:  Temp:  [97.2 °F (36.2 °C)-98.8 °F (37.1 °C)]   Pulse:  []   Resp:  [11-22]   BP: ()/(46-68)   SpO2:  [85 %-100 %]   I/O last 3 completed shifts:  In: 398.6 [I.V.:298.6; IV Piggyback:100]  Out: 100 [Urine:100]  Wt Readings from Last 5 Encounters:   06/13/23 132.2 kg (291 lb 7.2 oz)   06/01/23 126.6 kg (279 lb)   05/31/23 126.7 kg (279 lb 6.4 oz)   05/11/23 121.6 kg (268 lb)   04/27/23 121.6 kg (268 lb)     Body mass index is 38.45 kg/m².    Physical Exam  Constitutional:       General: Patient is not in acute distress.     Appearance: Patient is well-developed. She is not diaphoretic.   HENT:      Head: Normocephalic and atraumatic.      Mouth/Throat: Mucous membranes are moist.   Eyes:      General: No scleral icterus.     Pupils: Pupils are equal, round, and reactive to light.   Cardiovascular:      Rate and Rhythm: Normal rate and regular rhythm.   Pulmonary:      Effort: Pulmonary effort is normal. No respiratory distress.      Breath sounds: No stridor.   Abdominal:      General: There is no distension.      Palpations: Abdomen is soft.   Musculoskeletal:         General: No deformity. Normal range of motion.      Cervical back: Neck supple.   Skin:     General: Skin is warm and dry.      Findings: No rash present. No erythema.   Neurological:      Mental Status: Patient is alert and oriented to person, place, and time.      Cranial Nerves: No cranial nerve deficit.   Psychiatric:         Behavior: Behavior normal.     Laboratory:  Recent Labs   Lab 06/12/23 1835 06/13/23  0411   * 136   K 4.8 4.9   CL 96 99   CO2 25 26   BUN 62* 64*   CREATININE 2.9* 3.0*   * 155*       Recent Labs   Lab 06/12/23 1835 06/13/23  0411   CALCIUM 8.2* 8.5*   ALBUMIN 3.2*  --    MG 2.3 2.3             No results for input(s): POCTGLUCOSE in the last 168 hours.    Recent Labs   Lab 09/02/22  0439 11/23/22  0951   Hemoglobin A1C 7.7 H  6.8 H       Recent Labs   Lab 06/12/23  1835 06/13/23  0411   WBC 7.76 6.74   HGB 9.6* 9.4*   HCT 33.7* 32.5*   PLT 75* 69*   MCV 90 89   MCHC 28.5* 28.9*   MONO 10.3  0.8  --        Recent Labs   Lab 06/12/23  1835   BILITOT 1.6*   PROT 7.5   ALBUMIN 3.2*   ALKPHOS 70   ALT 22   AST 31       Recent Labs   Lab 06/12/23 2241   Color, UA Yellow   Appearance, UA Cloudy A   pH, UA 5.0   Specific Gravity, UA 1.020   Protein, UA 1+ A   Glucose, UA 1+ A   Ketones, UA Negative   Urobilinogen, UA 4.0-6.0 A   Bilirubin (UA) Negative   Occult Blood UA 3+ A   Nitrite, UA Negative   RBC, UA >100 H   WBC, UA 28 H   Bacteria Negative   Hyaline Casts, UA 32 A       Recent Labs   Lab 09/01/22  0333   POC PH 7.350   POC PCO2 54.1 H   POC HCO3 29.9 H   POC PO2 18 L   POC SATURATED O2 24 L   POC BE 4   Sample VENOUS       Microbiology Results (last 7 days)       Procedure Component Value Units Date/Time    Urine culture [[id number]] Collected: 06/12/23 2241    Order Status: Completed Specimen: Urine Updated: 06/13/23 0734     Urine Culture, Routine No growth to date    Narrative:      Specimen Source->Urine    Blood culture x two cultures. Draw prior to antibiotics. [[id number]] Collected: 06/12/23 1927    Order Status: Completed Specimen: Blood from Peripheral, Antecubital, Right Updated: 06/13/23 0317     Blood Culture, Routine No Growth to date    Narrative:      Aerobic and anaerobic    Blood culture x two cultures. Draw prior to antibiotics. [[id number]] Collected: 06/12/23 1843    Order Status: Completed Specimen: Blood from Peripheral, Antecubital, Left Updated: 06/13/23 0317     Blood Culture, Routine No Growth to date    Narrative:      Aerobic and anaerobic            ASSESSMENT/PLAN:     AD 2/2 VT s/p cardioversion  Hematuria of unclear significance with many hyaline casts  CKD stage 3  CHF exacerbation  DM  Imaging negative for  obstruction.  No NSAIDs, ACEI/ARB, IV contrast or other nephrotoxins.  Keep MAP > 60, SBP >  100.  Dose meds for GFR < 30 ml/min.  Renal diet - low K, low phos.  Agree with Cards plan for amiodarone gtt, PPM integration, IV diuretic, holding ACEi and BBL.  Control DM.    Anemia of CKD  Hgb and HCT are acceptable. Monitor for now.  Will provide GANGA and/or IV iron PRN.    MBD / Secondary HPT  Ca, Phos, PTH and vitamin D levels are acceptable.   Phos binders, vitamin D and analogues, calcimimetics will be given as needed.    HTN  BP seem controlled.   Tolerate asymptomatic HTN up to -160.  Meds per Cards for now.    Thank you for allowing us to participate in the care of your patient!   We will follow the patient and provide recommendations as needed.    Patient care time was spent personally by me on the following activities:     Obtaining a history.  Examination of patient.  Providing medical care at the patients bedside.  Developing a treatment plan with patient or surrogate and bedside caregivers.  Ordering and reviewing laboratory studies, radiographic studies, pulse oximetry.  Ordering and performing treatments and interventions.  Evaluation of patient's response to treatment.  Discussions with consultants while on the unit and immediately available to the patient.  Re-evaluation of the patient's condition.  Documentation in the medical record.     Maicol Leach MD    [facility]  [address] [place] [address], [place] [address]    [phone] - tel  [phone] - fax    6/13/2023

## 2023-06-13 NOTE — ASSESSMENT & PLAN NOTE
Recent partially treated UTI at outside facility  Cultures unavailable even under care everywhere   Continue empiric ceftriaxone for additional 3 days to complete a 5 day course for complicated UTI

## 2023-06-13 NOTE — ED PROVIDER NOTES
"Encounter Date: 6/12/2023       History     Chief Complaint   Patient presents with    Shortness of Breath     This is a 63-year-old male with a significant cardiac history including coronary artery disease, CHF with an EF of 20%, current AICD in place who presents complaining of lightheadedness weakness and shortness of breath.  The patient was discharged from [facility] hospital this morning after an admission for a urinary tract infection.  Two days ago he developed hematuria.  He went to the emergency room, was diagnosed with UTI and was admitted.  CT scan at that time was negative for evidence of kidney stone.  He states he was discharged this morning despite the fact that he was feeling ill.  He presents to this emergency room with complaints of above.  He denies chest pain.  He does report feeling weak in general and short of breath.  He denies abdominal pain, nausea vomiting or diarrhea.  He denies gastrointestinal bleeding.  He denies dysuria, frequency urgency or urinary hesitancy.  He has been having increasing lower extremity edema over the past week.  He is currently under the care of podiatry for a chronic right foot infection for which he underwent home health care and wound care today.  He denies any headaches.  He denies focal weakness, numbness tingling or paresthesias.  He denies any other problems or complaints.      Review of patient's allergies indicates:   Allergen Reactions    Vasopressin Anaphylaxis and Other (See Comments)     Increased pressure in his head; felt like his eyeballs and veins were going to pop out of his head.     Vasopressin analogues Other (See Comments) and Anaphylaxis     "felt like eyes going to pop out of my head"  Other reaction(s): Other (See Comments)  "felt like eyes going to pop out of my head"  Increased pressure in his head; felt like his eyeballs and veins were going to pop out of his head.     Heparin Other (See Comments)     Other reaction(s): "depleted my blood " "platets"    Decreased platelet count    Heparin analogues Other (See Comments)     Depleted WBC count    Morphine Hallucinations, Other (See Comments) and Anxiety     Other reaction(s): Hallucinations  Paranoid, hallucinations       Past Medical History:   Diagnosis Date    AICD (automatic cardioverter/defibrillator) present     Anemia, chronic disease 07/27/2021    Anemia, unspecified 07/27/2021    Anxiety and depression 2012    CAD (coronary artery disease) 2012    Cardiomegaly 2016    CHF (congestive heart failure) 2012    Cholecystitis 2017    Complete heart block 2012    Diabetic foot ulcer     DVT (deep venous thrombosis) 2012    And pulmonary embolus    GERD (gastroesophageal reflux disease) 2010    Heparin induced thrombocytopenia     Hyperlipemia 2011    IDDM (insulin dependent diabetes mellitus) 1983    With neuropathy    Ischemic cardiomyopathy 2012    MI (myocardial infarction) 2012    Morbid obesity     MRSA (methicillin resistant Staphylococcus aureus) infection 01/19/2019    Noncompliance with therapeutic plan 2019    Normochromic normocytic anemia 07/27/2021    Osteomyelitis of right foot 01/2019    Osteomyelitis of right foot 09/01/2022    Klebsiella from bone, GBS in blood    Pulmonary edema 2019    Respiratory failure 2019    Sepsis 01/2019    Due to cellulitis right leg    Sleep apnea 2013    Thrombocytopathy 2012    Venous stasis dermatitis of both lower extremities      Past Surgical History:   Procedure Laterality Date    CARDIAC PACEMAKER PLACEMENT  12/2012    CARDIAC PACEMAKER PLACEMENT  10/04/2018    battery replacement    CORONARY ARTERY BYPASS GRAFT  06/2012    ESOPHAGOGASTRODUODENOSCOPY  01/31/2017    SAMARA FILTER PLACEMENT  2012    vena cava    LUMBAR SYMPATHETIC NERVE BLOCK Right 8/22/2019    Procedure: BLOCK, NERVE, SYMPATHETIC, LUMBAR;  Surgeon: Tashi Good MD;  Location: [facility];  Service: Pain Management;  Laterality: Right;  RIGHT SYMPATHETIC NERVE BLOCK     Family History "   Problem Relation Age of Onset    Arthritis Mother     Diabetes Mother     Cancer Father     Heart disease Father     Stroke Father      Social History     Tobacco Use    Smoking status: Former     Packs/day: 2.00     Years: 38.00     Pack years: 76.00     Types: Cigarettes     Quit date:      Years since quittin.5    Smokeless tobacco: Never   Substance Use Topics    Alcohol use: No    Drug use: Never     Review of Systems   Constitutional:  Positive for activity change, appetite change and fatigue. Negative for chills, diaphoresis, fever and unexpected weight change.   HENT: Negative.  Negative for congestion, dental problem, ear pain, nosebleeds, rhinorrhea, sinus pain, sore throat, trouble swallowing and voice change.    Eyes: Negative.  Negative for pain and visual disturbance.   Respiratory:  Positive for shortness of breath. Negative for cough, chest tightness and wheezing.    Cardiovascular:  Positive for leg swelling. Negative for chest pain and palpitations.   Gastrointestinal: Negative.  Negative for abdominal pain, blood in stool, constipation, diarrhea, nausea and vomiting.   Endocrine: Negative.    Genitourinary: Negative.  Negative for difficulty urinating, dysuria, flank pain, frequency, penile pain, testicular pain and urgency.   Musculoskeletal: Negative.  Negative for arthralgias, back pain, gait problem, joint swelling, myalgias, neck pain and neck stiffness.   Skin:  Positive for wound (right foot wound undergoing wound care). Negative for pallor and rash.   Neurological:  Positive for weakness (Nonfocal generalized weakness and fatigue.). Negative for dizziness, seizures, syncope, facial asymmetry, speech difficulty, light-headedness, numbness and headaches.   Hematological: Negative.  Does not bruise/bleed easily.   Psychiatric/Behavioral: Negative.  Negative for confusion.    All other systems reviewed and are negative.    Physical Exam     Initial Vitals [23 1828]   BP Pulse  Resp Temp SpO2   (!) 76/52 (!) 138 (!) 22 98.8 °F (37.1 °C) (!) 90 %      MAP       --         Physical Exam    Nursing note and vitals reviewed.  Constitutional: He is not diaphoretic. He is cooperative. He has a sickly appearance. He appears ill.   Patient pale appearing   HENT:   Head: Normocephalic and atraumatic.   Nose: Nose normal. No mucosal edema or rhinorrhea.   Mouth/Throat: Uvula is midline, oropharynx is clear and moist and mucous membranes are normal. No oral lesions. No trismus in the jaw. No uvula swelling. No oropharyngeal exudate, posterior oropharyngeal edema, posterior oropharyngeal erythema or tonsillar abscesses.   Eyes: Conjunctivae, EOM and lids are normal. Pupils are equal, round, and reactive to light. Right eye exhibits no discharge. Left eye exhibits no discharge. No scleral icterus.   Neck: Trachea normal and phonation normal. Neck supple. No thyromegaly present. No stridor present. No tracheal deviation present. No JVD present.   Normal range of motion.   Full passive range of motion without pain.     Cardiovascular:  Regular rhythm, normal heart sounds, intact distal pulses and normal pulses.   Tachycardia present.   Exam reveals no gallop, no distant heart sounds, no friction rub and no decreased pulses.       No murmur heard.  Pulmonary/Chest: Effort normal. No stridor. No respiratory distress. He has no decreased breath sounds. He has no wheezes. He has no rhonchi. He has rales.   No respiratory distress, bibasilar rales noted.   Abdominal: Abdomen is soft and protuberant. Bowel sounds are normal. He exhibits no distension, no pulsatile midline mass and no mass. There is no abdominal tenderness. No hernia.   Abdomen protuberant but nontender throughout.   No right CVA tenderness.  No left CVA tenderness. There is no rebound and no guarding.   Musculoskeletal:         General: No tenderness. Normal range of motion.      Right hand: Normal. Normal strength. Normal sensation. Normal  capillary refill. Normal pulse.      Left hand: Normal. Normal strength. Normal sensation. Normal capillary refill. Normal pulse.      Cervical back: Normal, full passive range of motion without pain, normal range of motion and neck supple. No swelling, edema, erythema, rigidity, tenderness or bony tenderness. No pain with movement, spinous process tenderness or muscular tenderness. Normal range of motion and normal range of motion. Normal.      Thoracic back: Normal. No swelling, tenderness or bony tenderness. Normal range of motion.      Lumbar back: Normal. No swelling, edema, tenderness or bony tenderness. Normal range of motion.      Right lower leg: Swelling present. No tenderness. Edema present.      Left lower leg: Swelling present. No tenderness. Edema present.      Right ankle: Swelling present.      Left ankle: Swelling present.      Right foot: Normal range of motion. Swelling present. No tenderness. Normal pulse.      Left foot: Normal range of motion. Swelling present. No tenderness. Normal pulse.      Comments: Pulses are 2+ throughout, cap refill is less than 2 sec throughout, no edema noted, extremities are nontender throughout with full range of motion     Lymphadenopathy:     He has no cervical adenopathy.   Neurological: He is alert and oriented to person, place, and time. He has normal strength. No cranial nerve deficit or sensory deficit. Coordination and gait normal. GCS score is 15. GCS eye subscore is 4. GCS verbal subscore is 5. GCS motor subscore is 6.   No focal deficits   Skin: Capillary refill takes 2 to 3 seconds. No ecchymosis, no petechiae and no rash noted. No cyanosis or erythema. There is pallor.   Patient pale appearing    Psychiatric: He has a normal mood and affect. His speech is normal and behavior is normal. Judgment and thought content normal. Cognition and memory are normal.       ED Course   Procedures  Labs Reviewed   CBC W/ AUTO DIFFERENTIAL - Abnormal; Notable for the  following components:       Result Value    RBC 3.74 (*)     Hemoglobin 9.6 (*)     Hematocrit 33.7 (*)     MCH 25.7 (*)     MCHC 28.5 (*)     RDW 20.3 (*)     Platelets 75 (*)     Lymph # 0.3 (*)     Gran % 85.6 (*)     Lymph % 3.4 (*)     All other components within normal limits   COMPREHENSIVE METABOLIC PANEL - Abnormal; Notable for the following components:    Sodium 131 (*)     Glucose 166 (*)     BUN 62 (*)     Creatinine 2.9 (*)     Calcium 8.2 (*)     Albumin 3.2 (*)     Total Bilirubin 1.6 (*)     eGFR 23.9 (*)     All other components within normal limits   DRUG SCREEN PANEL, URINE EMERGENCY - Abnormal; Notable for the following components:    Benzodiazepines Presumptive Positive (*)     All other components within normal limits    Narrative:     Specimen Source->Urine   URINALYSIS, REFLEX TO URINE CULTURE - Abnormal; Notable for the following components:    Appearance, UA Cloudy (*)     Protein, UA 1+ (*)     Glucose, UA 1+ (*)     Occult Blood UA 3+ (*)     Urobilinogen, UA 4.0-6.0 (*)     Leukocytes, UA 2+ (*)     All other components within normal limits    Narrative:     Specimen Source->Urine   B-TYPE NATRIURETIC PEPTIDE - Abnormal; Notable for the following components:    BNP 1,162 (*)     All other components within normal limits   TROPONIN I HIGH SENSITIVITY - Abnormal; Notable for the following components:    Troponin I High Sensitivity 17.9 (*)     All other components within normal limits   TROPONIN I HIGH SENSITIVITY - Abnormal; Notable for the following components:    Troponin I High Sensitivity 18.2 (*)     All other components within normal limits   URINALYSIS MICROSCOPIC - Abnormal; Notable for the following components:    RBC, UA >100 (*)     WBC, UA 28 (*)     Hyaline Casts, UA 32 (*)     All other components within normal limits    Narrative:     Specimen Source->Urine   LACTIC ACID, PLASMA   B-TYPE NATRIURETIC PEPTIDE   SARS-COV-2 RNA AMPLIFICATION, QUAL   INFLUENZA A AND B ANTIGEN     Narrative:     Specimen Source->Nasopharyngeal Swab   TROPONIN I HIGH SENSITIVITY   CK   LIPASE   MAGNESIUM   TSH   MAGNESIUM   CK   LIPASE   TSH        ECG Results              EKG 12-lead (Final result)  Result time 06/13/23 18:39:39      Final result by Interface, Lab In [facility] (06/13/23 18:39:39)                   Narrative:    Test Reason : R00.0,    Vent. Rate : 071 BPM     Atrial Rate : 071 BPM     P-R Int : 140 ms          QRS Dur : 180 ms      QT Int : 460 ms       P-R-T Axes : 105 -75 047 degrees     QTc Int : 499 ms    Atrial-sensed ventricular-paced rhythm  Biventricular pacemaker detected  Abnormal ECG  When compared with ECG of 12-JUN-2023 19:26,  Vent. rate has decreased BY  59 BPM  Confirmed by [place] ROGERS, Clyde VIEYRA ([id number]) on 6/13/2023 6:39:30 PM    Referred By: [email or web address]   SELF           Confirmed By:Clyde Storey MD                                  Imaging Results              X-Ray Chest AP Portable (Final result)  Result time 06/12/23 19:00:35      Final result by Driss Merritt MD (06/12/23 19:00:35)                   Narrative:      EXAM: XR CHEST AP PORTABLE    HISTORY: Sepsis    COMPARISON:Chest x-ray dated 9/7/2022    FINDINGS: The lungs are fairly well-expanded and appear free of focal areas of consolidation. No pleural effusions are evident. The heart is moderately enlarged and unchanged. There is a mass-like opacity lateral to the AP window that appears similar on multiple previous chest x-rays. A CT of the chest 1/14/2019 shows that this corresponds to a dilated main pulmonary artery. A left subclavian dual-chamber biventricular pacemaker remains in place in the left subclavian vein without change.    IMPRESSION:   Moderate cardiomegaly. No evidence of acute disease within the chest.    Electronically signed by:  Barry Merritt MD  6/12/2023 7:00 PM CDT Workstation: [id number]                                     Medications   morphine 2 mg/mL injection (  Not Given 6/12/23 1945)    mupirocin 2 % ointment (1 g Nasal Given 6/17/23 2110)   lactated ringers bolus 3,798 mL (0 mLs Intravenous Stopped 6/12/23 1948)   midazolam (VERSED) 1 mg/mL injection (  Given 6/12/23 1945)   midazolam (VERSED) 1 mg/mL injection 4 mg (2 mg Intravenous Given 6/12/23 1945)   furosemide injection 40 mg (40 mg Intravenous Given 6/12/23 2130)   chlorhexidine 0.12 % solution 15 mL (15 mLs Mouth/Throat Given 6/17/23 2110)   furosemide injection 80 mg (80 mg Intravenous Given 6/14/23 1025)     Medical Decision Making:   Clinical Tests:   Lab Tests: Ordered and Reviewed  Radiological Study: Ordered and Reviewed  Medical Tests: Ordered and Reviewed  ED Management:  Emergent evaluation of a 63-year-old male presenting for shortness of breath and generalized weakness.  EKG demonstrates a wide complex tachycardia.  Patient does have a pacemaker defibrillator, previous EKGs with a wide complex however current morphology suggestive of V-tach, case also discussed with Dr. Andrade on-call for the patient's own cardiologist.  Agrees with need for defibrillation.  Patient sedated with Versed and cardioverted/synchronized at 100 joules with subsequent narrow complex rhythm, blood pressure improved to 110 systolic.  Patient's pallor has improved.  Case rediscussed with Dr. Kwan or, amiodarone infusion initiated.  I have discussed in detail with the hospitalist provider who has assumed care and will admit to the ICU.          Attending Attestation:         Attending Critical Care:   Critical Care Times:   Direct Patient Care (initial evaluation, reassessments, and time considering the case)................................................................25 minutes.   Additional History from reviewing old medical records or taking additional history from the family, EMS, PCP, etc.......................6 minutes.   Ordering, Reviewing, and Interpreting Diagnostic  Studies...............................................................................................................7 minutes.   Documentation..................................................................................................................................................................................7 minutes.   Consultation with other Physicians. .................................................................................................................................................10 minutes.   Consultation with the patient's family directly relating to the patient's condition, care, and DNR status (when patient unable)......5 minutes.   ==============================================================  Total Critical Care Time - exclusive of procedural time: 60 minutes.  ==============================================================                     Clinical Impression:   Final diagnoses:  [I95.9] Hypotension  [R00.0] Tachycardia  [I47.20] Ventricular tachycardia (Primary)        ED Disposition Condition    Admit Stable                Vanesa Don MD  07/17/23 [id number]

## 2023-06-13 NOTE — CONSULTS
Patient sitting on side of bed.  Patient does not want me to unwrap his foot.  States Dr. Avila will be mad if we remove her dressing.  I explained we see her patient all the time.  He see her weekly and would like to have her consulted.  Will not let me remove dressing.  Secure chat to Dr. Jensen regarding the above.

## 2023-06-13 NOTE — CONSULTS
"[facility]   Cardiology Note    Consult Requested By: hospital medicine  Reason for Consult: Ventricular tachycardia    SUBJECTIVE:     History of Present Illness: Patient is a 62 yo female with PMHx of CAD s/p CABG 2012, HFrEF, AICD, DM2, CKD3, HTN, HLP, chronic diabetic foot ulcer who presented to the ED with shortness of breath and generalized weakness. He was just discharged from a [facility] yesterday with a UTI. He reports he was in that hospital for 2 days for IV antibiotics and felt overall weak the entire time. When he was discharged home his symptoms worsened and he experienced presyncopal symptoms. He denies syncope, chest pain, N/V, fevers, chills. Reports increased LE edema over the last few days. In the ED EKG showed wide complex tachycardia. He reports he was not given amiodarone while in the hospital. He underwent successful cardioversion with restoration of NSR. ED workup showed, BNP 1162. Troponin elevated to 17. UA positive for UTI, lactate negative. Bcx NGTD. CXR showed cardiomegaly with no acute cardiopulmonary process. Cr 3 today. Electrolytes normal. He was started on amio gtt. Today patient states his breathing has improved. Pt states he had a peripheral angiogram a few months ago with Dr. Oleary with no intervention needed.     Review of patient's allergies indicates:   Allergen Reactions    Vasopressin Anaphylaxis and Other (See Comments)     Increased pressure in his head; felt like his eyeballs and veins were going to pop out of his head.     Vasopressin analogues Other (See Comments) and Anaphylaxis     "felt like eyes going to pop out of my head"  Other reaction(s): Other (See Comments)  "felt like eyes going to pop out of my head"  Increased pressure in his head; felt like his eyeballs and veins were going to pop out of his head.     Heparin Other (See Comments)     Other reaction(s): "depleted my blood platets"    Decreased platelet count    Heparin analogues Other " (See Comments)     Depleted WBC count    Morphine Hallucinations, Other (See Comments) and Anxiety     Other reaction(s): Hallucinations  Paranoid, hallucinations         Past Medical History:   Diagnosis Date    AICD (automatic cardioverter/defibrillator) present     Anemia, chronic disease 07/27/2021    Anemia, unspecified 07/27/2021    Anxiety and depression 2012    CAD (coronary artery disease) 2012    Cardiomegaly 2016    CHF (congestive heart failure) 2012    Cholecystitis 2017    Complete heart block 2012    Diabetic foot ulcer     DVT (deep venous thrombosis) 2012    And pulmonary embolus    GERD (gastroesophageal reflux disease) 2010    Heparin induced thrombocytopenia     Hyperlipemia 2011    IDDM (insulin dependent diabetes mellitus) 1983    With neuropathy    Ischemic cardiomyopathy 2012    MI (myocardial infarction) 2012    Morbid obesity     MRSA (methicillin resistant Staphylococcus aureus) infection 01/19/2019    Noncompliance with therapeutic plan 2019    Normochromic normocytic anemia 07/27/2021    Osteomyelitis of right foot 01/2019    Osteomyelitis of right foot 09/01/2022    Klebsiella from bone, GBS in blood    Pulmonary edema 2019    Respiratory failure 2019    Sepsis 01/2019    Due to cellulitis right leg    Sleep apnea 2013    Thrombocytopathy 2012    Venous stasis dermatitis of both lower extremities      Past Surgical History:   Procedure Laterality Date    CARDIAC PACEMAKER PLACEMENT  12/2012    CARDIAC PACEMAKER PLACEMENT  10/04/2018    battery replacement    CORONARY ARTERY BYPASS GRAFT  06/2012    ESOPHAGOGASTRODUODENOSCOPY  01/31/2017    SAMARA FILTER PLACEMENT  2012    vena cava    LUMBAR SYMPATHETIC NERVE BLOCK Right 8/22/2019    Procedure: BLOCK, NERVE, SYMPATHETIC, LUMBAR;  Surgeon: Tashi Good MD;  [facility]  Service: Pain Management;  Laterality: Right;  RIGHT SYMPATHETIC NERVE BLOCK     Family History   Problem Relation Age of Onset    Arthritis Mother     Diabetes  "Mother     Cancer Father     Heart disease Father     Stroke Father      Social History     Tobacco Use    Smoking status: Former     Packs/day: 2.00     Years: 38.00     Pack years: 76.00     Types: Cigarettes     Quit date:      Years since quittin.4    Smokeless tobacco: Never   Substance Use Topics    Alcohol use: No    Drug use: Never       Review of Systems:  Review of Systems   Constitutional:  Positive for malaise/fatigue. Negative for chills, diaphoresis and fever.   Respiratory:  Positive for shortness of breath. Negative for cough and sputum production.    Cardiovascular:  Positive for palpitations and leg swelling. Negative for chest pain and orthopnea.   Gastrointestinal:  Negative for nausea and vomiting.   Neurological:  Positive for weakness. Negative for dizziness and loss of consciousness.     OBJECTIVE:     Vital Signs (Most Recent)  Temp: 98.2 °F (36.8 °C) (23)  Pulse: 60 (23)  Resp: 20 (23)  BP: 108/63 (23)  SpO2: 99 % (23)    Vital Signs Range (Last 24H):  Temp:  [97.2 °F (36.2 °C)-98.8 °F (37.1 °C)]   Pulse:  []   Resp:  [11-22]   BP: ()/(46-68)   SpO2:  [85 %-100 %]     I & O (Last 24H):    Intake/Output Summary (Last 24 hours) at 2023 0926  Last data filed at 2023 0852  Gross per 24 hour   Intake 638.59 ml   Output 100 ml   Net 538.59 ml       Current Diet:     Current Diet Order   Procedures    Diet Cardiac        Allergies:  Review of patient's allergies indicates:   Allergen Reactions    Vasopressin Anaphylaxis and Other (See Comments)     Increased pressure in his head; felt like his eyeballs and veins were going to pop out of his head.     Vasopressin analogues Other (See Comments) and Anaphylaxis     "felt like eyes going to pop out of my head"  Other reaction(s): Other (See Comments)  "felt like eyes going to pop out of my head"  Increased pressure in his head; felt like his eyeballs and veins were " "going to pop out of his head.     Heparin Other (See Comments)     Other reaction(s): "depleted my blood platets"    Decreased platelet count    Heparin analogues Other (See Comments)     Depleted WBC count    Morphine Hallucinations, Other (See Comments) and Anxiety     Other reaction(s): Hallucinations  Paranoid, hallucinations         Meds:  Scheduled Meds:   aspirin  81 mg Oral Daily    atorvastatin  10 mg Oral Daily    cefTRIAXone (ROCEPHIN) IVPB  1 g Intravenous Q24H    chlorhexidine  15 mL Mouth/Throat BID    furosemide (LASIX) injection  40 mg Intravenous Q12H    gabapentin  300 mg Oral TID    insulin detemir U-100  10 Units Subcutaneous Daily    mupirocin   Nasal BID    pantoprazole  40 mg Oral Before breakfast     Continuous Infusions:   amiodarone in dextrose 5% 1 mg/min (06/13/23 0750)     PRN Meds:acetaminophen, dextrose 50%, dextrose 50%, glucose, glucose, insulin aspart U-100, magnesium oxide, magnesium oxide, melatonin, naloxone, ondansetron, polyethylene glycol, potassium bicarbonate, potassium bicarbonate, potassium bicarbonate, potassium, sodium phosphates, potassium, sodium phosphates, potassium, sodium phosphates, traMADoL    Oxygen/Ventilator Data (Last 24H):  (if applicable)            Hemodynamic Parameters (Last 24H):   (if applicable)        Laboratory and Radiology Data:  Recent Results (from the past 24 hour(s))   BNP    Collection Time: 06/12/23  6:31 PM   Result Value Ref Range    BNP 1,162 (H) 0 - 99 pg/mL   CBC auto differential    Collection Time: 06/12/23  6:35 PM   Result Value Ref Range    WBC 7.76 3.90 - 12.70 K/uL    RBC 3.74 (L) 4.60 - 6.20 M/uL    Hemoglobin 9.6 (L) 14.0 - 18.0 g/dL    Hematocrit 33.7 (L) 40.0 - 54.0 %    MCV 90 82 - 98 fL    MCH 25.7 (L) 27.0 - 31.0 pg    MCHC 28.5 (L) 32.0 - 36.0 g/dL    RDW 20.3 (H) 11.5 - 14.5 %    Platelets 75 (L) 150 - 450 K/uL    MPV 11.4 9.2 - 12.9 fL    Immature Granulocytes 0.3 0.0 - 0.5 %    Gran # (ANC) 6.7 1.8 - 7.7 K/uL    " Immature Grans (Abs) 0.02 0.00 - 0.04 K/uL    Lymph # 0.3 (L) 1.0 - 4.8 K/uL    Mono # 0.8 0.3 - 1.0 K/uL    Eos # 0.0 0.0 - 0.5 K/uL    Baso # 0.02 0.00 - 0.20 K/uL    nRBC 0 0 /100 WBC    Gran % 85.6 (H) 38.0 - 73.0 %    Lymph % 3.4 (L) 18.0 - 48.0 %    Mono % 10.3 4.0 - 15.0 %    Eosinophil % 0.1 0.0 - 8.0 %    Basophil % 0.3 0.0 - 1.9 %    Differential Method Automated    Comprehensive metabolic panel    Collection Time: 06/12/23  6:35 PM   Result Value Ref Range    Sodium 131 (L) 136 - 145 mmol/L    Potassium 4.8 3.5 - 5.1 mmol/L    Chloride 96 95 - 110 mmol/L    CO2 25 23 - 29 mmol/L    Glucose 166 (H) 70 - 110 mg/dL    BUN 62 (H) 8 - 23 mg/dL    Creatinine 2.9 (H) 0.5 - 1.4 mg/dL    Calcium 8.2 (L) 8.7 - 10.5 mg/dL    Total Protein 7.5 6.0 - 8.4 g/dL    Albumin 3.2 (L) 3.5 - 5.2 g/dL    Total Bilirubin 1.6 (H) 0.1 - 1.0 mg/dL    Alkaline Phosphatase 70 55 - 135 U/L    AST 31 10 - 40 U/L    ALT 22 10 - 44 U/L    Anion Gap 10 8 - 16 mmol/L    eGFR 23.9 (A) >60 mL/min/1.73 m^2   TROPONIN I HIGH SENSITIVITY    Collection Time: 06/12/23  6:35 PM   Result Value Ref Range    Troponin I High Sensitivity 18.2 (H) 0.0 - 14.9 pg/mL   Magnesium    Collection Time: 06/12/23  6:35 PM   Result Value Ref Range    Magnesium 2.3 1.6 - 2.6 mg/dL   CK    Collection Time: 06/12/23  6:35 PM   Result Value Ref Range    CPK 86 20 - 200 U/L   Lipase    Collection Time: 06/12/23  6:35 PM   Result Value Ref Range    Lipase 59 4 - 60 U/L   TSH    Collection Time: 06/12/23  6:35 PM   Result Value Ref Range    TSH 4.830 0.340 - 5.600 uIU/mL   Blood culture x two cultures. Draw prior to antibiotics.    Collection Time: 06/12/23  6:43 PM    Specimen: Peripheral, Antecubital, Left; Blood   Result Value Ref Range    Blood Culture, Routine No Growth to date    Blood culture x two cultures. Draw prior to antibiotics.    Collection Time: 06/12/23  7:27 PM    Specimen: Peripheral, Antecubital, Right; Blood   Result Value Ref Range    Blood  Culture, Routine No Growth to date    Lactic acid, plasma #1    Collection Time: 06/12/23  7:34 PM   Result Value Ref Range    Lactate (Lactic Acid) 1.8 0.5 - 1.9 mmol/L   COVID-19 Rapid Screening    Collection Time: 06/12/23  7:37 PM   Result Value Ref Range    SARS-CoV-2 RNA, Amplification, Qual Negative Negative   Influenza antigen Nasopharyngeal Swab    Collection Time: 06/12/23  7:37 PM   Result Value Ref Range    Influenza A, Molecular Negative Negative    Influenza B, Molecular Negative Negative    Flu A & B Source NP    Troponin I High Sensitivity #2    Collection Time: 06/12/23  9:04 PM   Result Value Ref Range    Troponin I High Sensitivity 17.9 (H) 0.0 - 14.9 pg/mL   Drug screen panel, in-house    Collection Time: 06/12/23 10:41 PM   Result Value Ref Range    Benzodiazepines Presumptive Positive (A) Negative    Cocaine (Metab.) Negative Negative    Opiate Scrn, Ur Negative Negative    Barbiturate Screen, Ur Negative Negative    Amphetamine Screen, Ur Negative Negative    THC Negative Negative    Phencyclidine Negative Negative    Creatinine, Urine 236.0 23.0 - 375.0 mg/dL    Toxicology Information SEE COMMENT    Urinalysis, Reflex to Urine Culture Urine, Clean Catch    Collection Time: 06/12/23 10:41 PM    Specimen: Urine, Clean Catch   Result Value Ref Range    Specimen UA Urine, Clean Catch     Color, UA Yellow Yellow, Straw, Lois    Appearance, UA Cloudy (A) Clear    pH, UA 5.0 5.0 - 8.0    Specific Gravity, UA 1.020 1.005 - 1.030    Protein, UA 1+ (A) Negative    Glucose, UA 1+ (A) Negative    Ketones, UA Negative Negative    Bilirubin (UA) Negative Negative    Occult Blood UA 3+ (A) Negative    Nitrite, UA Negative Negative    Urobilinogen, UA 4.0-6.0 (A) Negative EU/dL    Leukocytes, UA 2+ (A) Negative   Urinalysis Microscopic    Collection Time: 06/12/23 10:41 PM   Result Value Ref Range    RBC, UA >100 (H) 0 - 4 /hpf    WBC, UA 28 (H) 0 - 5 /hpf    Bacteria Negative None-Occ /hpf    Squam  Epithel, UA 1 /hpf    Hyaline Casts, UA 32 (A) 0-1/lpf /lpf    Microscopic Comment SEE COMMENT    Urine culture    Collection Time: 06/12/23 10:41 PM    Specimen: Urine   Result Value Ref Range    Urine Culture, Routine No growth to date    Lactic acid, plasma #2    Collection Time: 06/13/23  1:08 AM   Result Value Ref Range    Lactate (Lactic Acid) 1.2 0.5 - 1.9 mmol/L   Basic Metabolic Panel (BMP)    Collection Time: 06/13/23  4:11 AM   Result Value Ref Range    Sodium 136 136 - 145 mmol/L    Potassium 4.9 3.5 - 5.1 mmol/L    Chloride 99 95 - 110 mmol/L    CO2 26 23 - 29 mmol/L    Glucose 155 (H) 70 - 110 mg/dL    BUN 64 (H) 8 - 23 mg/dL    Creatinine 3.0 (H) 0.5 - 1.4 mg/dL    Calcium 8.5 (L) 8.7 - 10.5 mg/dL    Anion Gap 11 8 - 16 mmol/L    eGFR 22.9 (A) >60 mL/min/1.73 m^2   Magnesium    Collection Time: 06/13/23  4:11 AM   Result Value Ref Range    Magnesium 2.3 1.6 - 2.6 mg/dL   CBC Without Differential    Collection Time: 06/13/23  4:11 AM   Result Value Ref Range    WBC 6.74 3.90 - 12.70 K/uL    RBC 3.65 (L) 4.60 - 6.20 M/uL    Hemoglobin 9.4 (L) 14.0 - 18.0 g/dL    Hematocrit 32.5 (L) 40.0 - 54.0 %    MCV 89 82 - 98 fL    MCH 25.8 (L) 27.0 - 31.0 pg    MCHC 28.9 (L) 32.0 - 36.0 g/dL    RDW 20.2 (H) 11.5 - 14.5 %    Platelets 69 (L) 150 - 450 K/uL    MPV 11.2 9.2 - 12.9 fL   POCT glucose    Collection Time: 06/13/23  7:20 AM   Result Value Ref Range    POC Glucose 168 (H) 70 - 110     Imaging Results              X-Ray Chest AP Portable (Final result)  Result time 06/12/23 19:00:35      Final result by Driss Merritt MD (06/12/23 19:00:35)                   Narrative:      EXAM: XR CHEST AP PORTABLE    HISTORY: Sepsis    COMPARISON:Chest x-ray dated 9/7/2022    FINDINGS: The lungs are fairly well-expanded and appear free of focal areas of consolidation. No pleural effusions are evident. The heart is moderately enlarged and unchanged. There is a mass-like opacity lateral to the AP window that appears  similar on multiple previous chest x-rays. A CT of the chest 1/14/2019 shows that this corresponds to a dilated main pulmonary artery. A left subclavian dual-chamber biventricular pacemaker remains in place in the left subclavian vein without change.    IMPRESSION:   Moderate cardiomegaly. No evidence of acute disease within the chest.    Electronically signed by:  Barry Merritt MD  6/12/2023 7:00 PM CDT Workstation: [id number]                                    12-lead EKG interpretation:  (if applicable)      Current Cardiac Rhythm:   (if applicable)    Physical Exam:   Physical Exam  Vitals and nursing note reviewed.   Constitutional:       General: He is not in acute distress.     Appearance: Normal appearance. He is not ill-appearing.   Cardiovascular:      Rate and Rhythm: Normal rate and regular rhythm.      Pulses: Normal pulses.      Heart sounds: Murmur heard.   Pulmonary:      Effort: Pulmonary effort is normal. No respiratory distress.      Breath sounds: Normal breath sounds.   Musculoskeletal:      Right lower leg: Edema present.      Left lower leg: Edema present.   Skin:     General: Skin is warm and dry.      Findings: No rash.      Comments: R foot wound   Neurological:      Mental Status: He is alert and oriented to person, place, and time.       ASSESSMENT/PLAN:   Assessment:   Ventricular Tachycardia - AICD - s/p successful cardioversion. On amiodarone gtt.   Acute on Chronic Systolic Heart Failure- BNP 1162, CXR cardiomegaly. On IV lasix. Echo 3/2023 showed EF 25%. MUGA scan 10/2022 showed EF 21%.   AD on Chronic Kidney Disease - Cr 3, Nephrology consulted.   CAD s/p CABG 2012 - denies chest pain. Troponin elevation to 29 and flat. Last stress test 2021.   Hypertension - on coreg, lisinopril at home.   Hyperlipidemia   Diabetes Mellitus Type 2  Diabetic Foot Ulcer     Plan:   Continue amiodarone gtt.   Pacemaker interrogation, his device settings will likely need to be changed.   Continue IV  lasix.  Strict I/Os.   Daily weights.   Hold lisinopril due to AD.   Coreg on hold due to low pressures.   Discussed pt with Dr. Bailey, will not proceed with angiogram at this time due to renal function.     Thank you for your consult.

## 2023-06-13 NOTE — PLAN OF CARE
Critical access hospital  Initial Discharge Assessment       Primary Care Provider: YRN Pollard    Admission Diagnosis: Ventricular tachycardia [I47.20]    Admission Date: 6/12/2023  Expected Discharge Date: 6/19/2023    Transition of Care Barriers: None    Assessment completed with patient at bedside. SW verified information on facesheet including PCP, address, pharmacy, phone numbers, and emergency contact. Patient reports he was independent with all ADLs prior to admission. He also reports using a bipap, nebulizer (when needed), a glucometer, and a shower chair at home, and denies the need for any other DME. Receives services through [facility] and would like to resume care with them upon discharge. Plans to discharge home with family and wife will transport home. CM/SW will continue to follow for any discharge needs that may arise.     Payor: [employer] MANAGED MEDICARE / Plan: [employer] MEDICARE PLAN PPO / Product Type: Medicare Advantage /     Extended Emergency Contact Information  Primary Emergency Contact: Juan MiguelKatelynn  Home Phone: [phone]  Mobile Phone: [phone]  Relation: Spouse    Discharge Plan A: Home Health  Discharge Plan B: Home Health      [facility]  [place] - [address]  [address]  [place] [address]  Phone: [phone] Fax: [phone]      Initial Assessment (most recent)       Adult Discharge Assessment - 06/13/23 1528          Discharge Assessment    Assessment Type Discharge Planning Assessment     Confirmed/corrected address, phone number and insurance Yes     Confirmed Demographics Correct on Facesheet     Source of Information patient     When was your last doctors appointment? --   Unknown date May 2023    Reason For Admission Wide context tachycardia     People in Home spouse     Facility Arrived From: Home     Do you expect to return to your current living situation? Yes     Do you have help at home or someone to help you manage your  care at home? Yes     Who are your caregiver(s) and their phone number(s)? Nella Pace - [phone] (P)      Prior to hospitilization cognitive status: Alert/Oriented;No Deficits     Current cognitive status: No Deficits;Alert/Oriented     Walking or Climbing Stairs --   Independent with all ADLs prior to admission.    Dressing/Bathing --   Independent with all ADLs prior to admission.    Home Accessibility wheelchair accessible     Home Layout Able to live on 1st floor     Equipment Currently Used at Home nebulizer;BIPAP;glucometer;shower chair (P)      Readmission within 30 days? No     Patient currently being followed by outpatient case management? No     Do you currently have service(s) that help you manage your care at home? Yes     Name and Contact number of agency [facility]     Is the pt/caregiver preference to resume services with current agency Yes     Do you take prescription medications? Yes     Do you have prescription coverage? Yes     Coverage [employer] Managed Medicare     Do you have any problems affording any of your prescribed medications? Yes     If yes, what medications? Trulicity and Forxiga     Is the patient taking medications as prescribed? yes     Who is going to help you get home at discharge? Nella Pace     How do you get to doctors appointments? car, drives self     Are you on dialysis? No     Do you take coumadin? No     Discharge Plan A Home Health     Discharge Plan B Home Health     DME Needed Upon Discharge  none     Discharge Plan discussed with: Patient     Transition of Care Barriers None        OTHER    Name(s) of People in Home Nella Pace

## 2023-06-14 LAB
ANION GAP SERPL CALC-SCNC: 11 MMOL/L (ref 8–16)
BUN SERPL-MCNC: 77 MG/DL (ref 8–23)
CALCIUM SERPL-MCNC: 8.4 MG/DL (ref 8.7–10.5)
CHLORIDE SERPL-SCNC: 97 MMOL/L (ref 95–110)
CO2 SERPL-SCNC: 25 MMOL/L (ref 23–29)
CREAT SERPL-MCNC: 3.4 MG/DL (ref 0.5–1.4)
ERYTHROCYTE [DISTWIDTH] IN BLOOD BY AUTOMATED COUNT: 20 % (ref 11.5–14.5)
EST. GFR  (NO RACE VARIABLE): 19.7 ML/MIN/1.73 M^2
GLUCOSE SERPL-MCNC: 166 MG/DL (ref 70–110)
GLUCOSE SERPL-MCNC: 171 MG/DL (ref 70–110)
GLUCOSE SERPL-MCNC: 178 MG/DL (ref 70–110)
GLUCOSE SERPL-MCNC: 216 MG/DL (ref 70–110)
GLUCOSE SERPL-MCNC: 227 MG/DL (ref 70–110)
HCT VFR BLD AUTO: 34.4 % (ref 40–54)
HGB BLD-MCNC: 9.9 G/DL (ref 14–18)
MAGNESIUM SERPL-MCNC: 2.6 MG/DL (ref 1.6–2.6)
MCH RBC QN AUTO: 26 PG (ref 27–31)
MCHC RBC AUTO-ENTMCNC: 28.8 G/DL (ref 32–36)
MCV RBC AUTO: 90 FL (ref 82–98)
PLATELET # BLD AUTO: 83 K/UL (ref 150–450)
PMV BLD AUTO: 11.3 FL (ref 9.2–12.9)
POTASSIUM SERPL-SCNC: 4.8 MMOL/L (ref 3.5–5.1)
RBC # BLD AUTO: 3.81 M/UL (ref 4.6–6.2)
SODIUM SERPL-SCNC: 133 MMOL/L (ref 136–145)
WBC # BLD AUTO: 5.39 K/UL (ref 3.9–12.7)

## 2023-06-14 PROCEDURE — 83735 ASSAY OF MAGNESIUM: CPT | Performed by: INTERNAL MEDICINE

## 2023-06-14 PROCEDURE — 63600175 PHARM REV CODE 636 W HCPCS: Performed by: STUDENT IN AN ORGANIZED HEALTH CARE EDUCATION/TRAINING PROGRAM

## 2023-06-14 PROCEDURE — 99900035 HC TECH TIME PER 15 MIN (STAT)

## 2023-06-14 PROCEDURE — 63600175 PHARM REV CODE 636 W HCPCS: Performed by: INTERNAL MEDICINE

## 2023-06-14 PROCEDURE — 80048 BASIC METABOLIC PNL TOTAL CA: CPT | Performed by: INTERNAL MEDICINE

## 2023-06-14 PROCEDURE — 25000003 PHARM REV CODE 250

## 2023-06-14 PROCEDURE — 36415 COLL VENOUS BLD VENIPUNCTURE: CPT | Performed by: INTERNAL MEDICINE

## 2023-06-14 PROCEDURE — 25000003 PHARM REV CODE 250: Performed by: HOSPITALIST

## 2023-06-14 PROCEDURE — 25000242 PHARM REV CODE 250 ALT 637 W/ HCPCS: Performed by: HOSPITALIST

## 2023-06-14 PROCEDURE — 94640 AIRWAY INHALATION TREATMENT: CPT

## 2023-06-14 PROCEDURE — 27000221 HC OXYGEN, UP TO 24 HOURS

## 2023-06-14 PROCEDURE — 25000003 PHARM REV CODE 250: Performed by: INTERNAL MEDICINE

## 2023-06-14 PROCEDURE — 99900031 HC PATIENT EDUCATION (STAT)

## 2023-06-14 PROCEDURE — 94761 N-INVAS EAR/PLS OXIMETRY MLT: CPT

## 2023-06-14 PROCEDURE — 21000000 HC CCU ICU ROOM CHARGE

## 2023-06-14 PROCEDURE — 85027 COMPLETE CBC AUTOMATED: CPT | Performed by: INTERNAL MEDICINE

## 2023-06-14 PROCEDURE — 63600175 PHARM REV CODE 636 W HCPCS

## 2023-06-14 PROCEDURE — 82962 GLUCOSE BLOOD TEST: CPT

## 2023-06-14 RX ORDER — FUROSEMIDE 10 MG/ML
80 INJECTION INTRAMUSCULAR; INTRAVENOUS
Status: DISCONTINUED | OUTPATIENT
Start: 2023-06-14 | End: 2023-06-16

## 2023-06-14 RX ORDER — AMIODARONE HYDROCHLORIDE 200 MG/1
200 TABLET ORAL 2 TIMES DAILY
Status: DISCONTINUED | OUTPATIENT
Start: 2023-06-14 | End: 2023-06-18 | Stop reason: HOSPADM

## 2023-06-14 RX ORDER — DOBUTAMINE HYDROCHLORIDE 400 MG/100ML
2.5 INJECTION INTRAVENOUS CONTINUOUS
Status: DISCONTINUED | OUTPATIENT
Start: 2023-06-14 | End: 2023-06-16

## 2023-06-14 RX ORDER — FAMOTIDINE 20 MG/1
40 TABLET, FILM COATED ORAL DAILY PRN
Status: DISCONTINUED | OUTPATIENT
Start: 2023-06-14 | End: 2023-06-16

## 2023-06-14 RX ORDER — FUROSEMIDE 10 MG/ML
120 INJECTION INTRAMUSCULAR; INTRAVENOUS ONCE
Status: DISCONTINUED | OUTPATIENT
Start: 2023-06-14 | End: 2023-06-14

## 2023-06-14 RX ORDER — FUROSEMIDE 10 MG/ML
80 INJECTION INTRAMUSCULAR; INTRAVENOUS ONCE
Status: COMPLETED | OUTPATIENT
Start: 2023-06-14 | End: 2023-06-14

## 2023-06-14 RX ADMIN — ALBUTEROL SULFATE 2.5 MG: 2.5 SOLUTION RESPIRATORY (INHALATION) at 08:06

## 2023-06-14 RX ADMIN — FUROSEMIDE 80 MG: 10 INJECTION, SOLUTION INTRAVENOUS at 08:06

## 2023-06-14 RX ADMIN — ASPIRIN 81 MG 81 MG: 81 TABLET ORAL at 08:06

## 2023-06-14 RX ADMIN — PANTOPRAZOLE SODIUM 40 MG: 40 TABLET, DELAYED RELEASE ORAL at 06:06

## 2023-06-14 RX ADMIN — INSULIN ASPART 2 UNITS: 100 INJECTION, SOLUTION INTRAVENOUS; SUBCUTANEOUS at 11:06

## 2023-06-14 RX ADMIN — CEFTRIAXONE SODIUM 1 G: 1 INJECTION, POWDER, FOR SOLUTION INTRAMUSCULAR; INTRAVENOUS at 09:06

## 2023-06-14 RX ADMIN — FAMOTIDINE 40 MG: 20 TABLET, FILM COATED ORAL at 10:06

## 2023-06-14 RX ADMIN — ATORVASTATIN CALCIUM 10 MG: 10 TABLET, FILM COATED ORAL at 08:06

## 2023-06-14 RX ADMIN — FUROSEMIDE 80 MG: 10 INJECTION, SOLUTION INTRAMUSCULAR; INTRAVENOUS at 10:06

## 2023-06-14 RX ADMIN — TRAMADOL HYDROCHLORIDE 50 MG: 50 TABLET, COATED ORAL at 10:06

## 2023-06-14 RX ADMIN — TRAMADOL HYDROCHLORIDE 50 MG: 50 TABLET, COATED ORAL at 03:06

## 2023-06-14 RX ADMIN — GABAPENTIN 300 MG: 300 CAPSULE ORAL at 08:06

## 2023-06-14 RX ADMIN — FUROSEMIDE 40 MG: 10 INJECTION, SOLUTION INTRAMUSCULAR; INTRAVENOUS at 08:06

## 2023-06-14 RX ADMIN — MUPIROCIN 1 G: 20 OINTMENT TOPICAL at 08:06

## 2023-06-14 RX ADMIN — CHLORHEXIDINE GLUCONATE 15 ML: 1.2 RINSE ORAL at 08:06

## 2023-06-14 RX ADMIN — POLYETHYLENE GLYCOL 3350 17 G: 17 POWDER, FOR SOLUTION ORAL at 08:06

## 2023-06-14 RX ADMIN — INSULIN DETEMIR 10 UNITS: 100 INJECTION, SOLUTION SUBCUTANEOUS at 08:06

## 2023-06-14 RX ADMIN — DOBUTAMINE HYDROCHLORIDE 2.5 MCG/KG/MIN: 400 INJECTION INTRAVENOUS at 12:06

## 2023-06-14 RX ADMIN — AMIODARONE HYDROCHLORIDE 200 MG: 200 TABLET ORAL at 10:06

## 2023-06-14 RX ADMIN — INSULIN ASPART 2 UNITS: 100 INJECTION, SOLUTION INTRAVENOUS; SUBCUTANEOUS at 07:06

## 2023-06-14 RX ADMIN — TRAMADOL HYDROCHLORIDE 50 MG: 50 TABLET, COATED ORAL at 04:06

## 2023-06-14 RX ADMIN — AMIODARONE HYDROCHLORIDE 1 MG/MIN: 1.8 INJECTION, SOLUTION INTRAVENOUS at 03:06

## 2023-06-14 RX ADMIN — AMIODARONE HYDROCHLORIDE 200 MG: 200 TABLET ORAL at 08:06

## 2023-06-14 RX ADMIN — GABAPENTIN 300 MG: 300 CAPSULE ORAL at 03:06

## 2023-06-14 RX ADMIN — POLYETHYLENE GLYCOL 3350 17 G: 17 POWDER, FOR SOLUTION ORAL at 10:06

## 2023-06-14 NOTE — PROGRESS NOTES
"[facility] Medicine  Progress Note    Patient name: Ana Bullard  MRN: [id number]  Admit Date: 6/12/2023   LOS: 2 days     SUBJECTIVE:     Principal problem: Wide-complex tachycardia    Interval History:      6/13- cardioverted in ER, plan for pm eval today and diuresis.  He feels short of breath, but better today    Scheduled Meds:   albuterol sulfate  2.5 mg Nebulization Q6H WAKE    aspirin  81 mg Oral Daily    atorvastatin  10 mg Oral Daily    cefTRIAXone (ROCEPHIN) IVPB  1 g Intravenous Q24H    chlorhexidine  15 mL Mouth/Throat BID    furosemide (LASIX) injection  40 mg Intravenous Q12H    gabapentin  300 mg Oral TID    insulin detemir U-100  10 Units Subcutaneous Daily    mupirocin   Nasal BID    pantoprazole  40 mg Oral Before breakfast     Continuous Infusions:   amiodarone in dextrose 5% 1 mg/min (06/14/23 0322)     PRN Meds:acetaminophen, dextrose 50%, dextrose 50%, glucose, glucose, insulin aspart U-100, magnesium oxide, magnesium oxide, melatonin, naloxone, ondansetron, polyethylene glycol, potassium bicarbonate, potassium bicarbonate, potassium bicarbonate, potassium, sodium phosphates, potassium, sodium phosphates, potassium, sodium phosphates, traMADoL    Review of patient's allergies indicates:   Allergen Reactions    Vasopressin Anaphylaxis and Other (See Comments)     Increased pressure in his head; felt like his eyeballs and veins were going to pop out of his head.     Vasopressin analogues Other (See Comments) and Anaphylaxis     "felt like eyes going to pop out of my head"  Other reaction(s): Other (See Comments)  "felt like eyes going to pop out of my head"  Increased pressure in his head; felt like his eyeballs and veins were going to pop out of his head.     Heparin Other (See Comments)     Other reaction(s): "depleted my blood platets"    Decreased platelet count    Heparin analogues Other (See Comments)     Depleted WBC count    Morphine Hallucinations, Other (See " Comments) and Anxiety     Other reaction(s): Hallucinations  Paranoid, hallucinations         Review of Systems: As per interval history    OBJECTIVE:     Vital Signs (Most Recent)  Temp: 96.6 °F (35.9 °C) (06/14/23 0327)  Pulse: 60 (06/14/23 0600)  Resp: 20 (06/14/23 0600)  BP: (!) 101/56 (06/14/23 0600)  SpO2: (!) 92 % (06/14/23 0600)    Vital Signs Range (Last 24H):  Temp:  [96.5 °F (35.8 °C)-98.3 °F (36.8 °C)]   Pulse:  [60-67]   Resp:  [9-22]   BP: ()/(52-88)   SpO2:  [86 %-100 %]     I & O (Last 24H):  Intake/Output Summary (Last 24 hours) at 6/14/2023 0646  Last data filed at 6/13/2023 1930  Gross per 24 hour   Intake 480 ml   Output 150 ml   Net 330 ml       Physical Exam:  General:  No acute distress.    Head: Normocephalic and atraumatic   Eyes:  No conjunctival pallor. No scleral icterus.  Mouth: Oral mucosa moist   Lungs: CTA. Good air entry.  Cor: Regular rate and rhythm. No murmurs.  2+ pitting edema  Abd:  Soft.  Distended.  No tenderness  Musculoskeletal: No arthropathy, deformity.  Skin:  Right foot wound dressing is clean, dry and intact  Neuro: A&O x 3. Moving all extremities equally. Follows commands  Ext: No cyanosis. Peripheral pulses +  Psych: Normal mood and affect. Memory intact     Laboratory:  I have reviewed all pertinent lab results within the past 24 hours.    Diagnostic Results:  Labs: Reviewed  ECG: Reviewed  X-Ray: Reviewed    ASSESSMENT/PLAN:     Wide-complex tachycardia  Wide complex tachycardia with heart rate in the 130-140 beats per minute   Patient has ICD in place however not cardioverted by it  Status post synchronized cardioversion while in the ED   Initiated on amiodarone drip  Cardiology consultation   Patient appears to be in decompensated CHF - start IV diuretics with close monitoring of renal function   Strict input output charting, low-sodium diet and daily weights   Lasix IV  PM eval           Acute renal failure superimposed on stage 3b chronic kidney  disease  Likely cardiorenal syndrome  Monitor renal function with daily labs especially while on diuretics   Nephrology consultation   Dose medications per GFR        UTI (urinary tract infection)  Recent partially treated UTI at outside facility  Cultures unavailable even under care everywhere   Continue empiric ceftriaxone for additional 3 days to complete a 5 day course for complicated UTI        Diabetic foot ulcer  Continue daily wound care     Chronic combined systolic and diastolic heart failure  As above for wide complex tachycardia        Type II diabetes mellitus with neurological manifestations  Basal insulin with detemir at an adjusted dose   Low-dose insulin sliding scale  Gabapentin at a lower dose due to AD     Chronic Thrombocytopenia  Also patient has a history of heparin induced thrombocytopenia   Monitor platelets daily labs       Active Hospital Problems    Diagnosis  POA    *Wide-complex tachycardia [R00.0]  Yes    UTI (urinary tract infection) [N39.0]  Yes    Diabetic foot ulcer [E11.621, L97.509]  Yes    Chronic combined systolic and diastolic heart failure [I50.42]  Yes    Acute renal failure superimposed on stage 3b chronic kidney disease [N17.9, N18.32]  Yes    Chronic Thrombocytopenia [D69.6]  Yes    Type II diabetes mellitus with neurological manifestations [E11.49]  Yes     With neuropathy        Resolved Hospital Problems   No resolved problems to display.           VTE Risk Mitigation (From admission, onward)           Ordered     IP VTE HIGH RISK PATIENT  Once         06/13/23 0106     Place sequential compression device  Until discontinued         06/13/23 0106     Reason for No Pharmacological VTE Prophylaxis  Once        Question:  Reasons:  Answer:  Thrombocytopenia    06/13/23 0106                        Department Hospital Medicine  [facility]  Vladimir Jensen MD  Date of service: 6/13/23

## 2023-06-14 NOTE — PROGRESS NOTES
"[facility]   Cardiology Note    Consult Requested By: hospital medicine  Reason for Consult: Ventricular tachycardia    SUBJECTIVE:     History of Present Illness: Patient is a 62 yo female with PMHx of CAD s/p CABG 2012, HFrEF, AICD, DM2, CKD3, HTN, HLP, chronic diabetic foot ulcer who presented to the ED with shortness of breath and generalized weakness. He was just discharged from a [facility] yesterday with a UTI. He reports he was in that hospital for 2 days for IV antibiotics and felt overall weak the entire time. When he was discharged home his symptoms worsened and he experienced presyncopal symptoms. He denies syncope, chest pain, N/V, fevers, chills. Reports increased LE edema over the last few days. In the ED EKG showed wide complex tachycardia. He reports he was not given amiodarone while in the hospital. He underwent successful cardioversion with restoration of NSR. ED workup showed, BNP 1162. Troponin elevated to 17. UA positive for UTI, lactate negative. Bcx NGTD. CXR showed cardiomegaly with no acute cardiopulmonary process. Cr 3 today. Electrolytes normal. He was started on amio gtt. Today patient states his breathing has improved. Pt states he had a peripheral angiogram a few months ago with Dr. Oleary with no intervention needed.     6/14: Pt seen and examined this morning.   Vitals reviewed. Blood pressure 88/52 this morning. Lisinopril and coreg on hold. Labs reviewed. Cr 3.4. Na 133. H&H stable. Nephrology following.  cc. Tele reviewed. No events overnight.     Review of patient's allergies indicates:   Allergen Reactions    Vasopressin Anaphylaxis and Other (See Comments)     Increased pressure in his head; felt like his eyeballs and veins were going to pop out of his head.     Vasopressin analogues Other (See Comments) and Anaphylaxis     "felt like eyes going to pop out of my head"  Other reaction(s): Other (See Comments)  "felt like eyes going to pop out of my " "head"  Increased pressure in his head; felt like his eyeballs and veins were going to pop out of his head.     Heparin Other (See Comments)     Other reaction(s): "depleted my blood platets"    Decreased platelet count    Heparin analogues Other (See Comments)     Depleted WBC count    Morphine Hallucinations, Other (See Comments) and Anxiety     Other reaction(s): Hallucinations  Paranoid, hallucinations         Past Medical History:   Diagnosis Date    AICD (automatic cardioverter/defibrillator) present     Anemia, chronic disease 07/27/2021    Anemia, unspecified 07/27/2021    Anxiety and depression 2012    CAD (coronary artery disease) 2012    Cardiomegaly 2016    CHF (congestive heart failure) 2012    Cholecystitis 2017    Complete heart block 2012    Diabetic foot ulcer     DVT (deep venous thrombosis) 2012    And pulmonary embolus    GERD (gastroesophageal reflux disease) 2010    Heparin induced thrombocytopenia     Hyperlipemia 2011    IDDM (insulin dependent diabetes mellitus) 1983    With neuropathy    Ischemic cardiomyopathy 2012    MI (myocardial infarction) 2012    Morbid obesity     MRSA (methicillin resistant Staphylococcus aureus) infection 01/19/2019    Noncompliance with therapeutic plan 2019    Normochromic normocytic anemia 07/27/2021    Osteomyelitis of right foot 01/2019    Osteomyelitis of right foot 09/01/2022    Klebsiella from bone, GBS in blood    Pulmonary edema 2019    Respiratory failure 2019    Sepsis 01/2019    Due to cellulitis right leg    Sleep apnea 2013    Thrombocytopathy 2012    Venous stasis dermatitis of both lower extremities      Past Surgical History:   Procedure Laterality Date    CARDIAC PACEMAKER PLACEMENT  12/2012    CARDIAC PACEMAKER PLACEMENT  10/04/2018    battery replacement    CORONARY ARTERY BYPASS GRAFT  06/2012    ESOPHAGOGASTRODUODENOSCOPY  01/31/2017    SAMARA FILTER PLACEMENT  2012    vena cava    LUMBAR SYMPATHETIC NERVE BLOCK Right 8/22/2019    " Procedure: BLOCK, NERVE, SYMPATHETIC, LUMBAR;  Surgeon: Tashi Good MD;  Location: [facility] OR;  Service: Pain Management;  Laterality: Right;  RIGHT SYMPATHETIC NERVE BLOCK     Family History   Problem Relation Age of Onset    Arthritis Mother     Diabetes Mother     Cancer Father     Heart disease Father     Stroke Father      Social History     Tobacco Use    Smoking status: Former     Packs/day: 2.00     Years: 38.00     Pack years: 76.00     Types: Cigarettes     Quit date:      Years since quittin.4    Smokeless tobacco: Never   Substance Use Topics    Alcohol use: No    Drug use: Never       Review of Systems:  Review of Systems   Constitutional:  Positive for malaise/fatigue. Negative for chills, diaphoresis and fever.   Respiratory:  Positive for shortness of breath. Negative for cough and sputum production.    Cardiovascular:  Positive for palpitations and leg swelling. Negative for chest pain and orthopnea.   Gastrointestinal:  Negative for nausea and vomiting.   Neurological:  Positive for weakness. Negative for dizziness and loss of consciousness.     OBJECTIVE:     Vital Signs (Most Recent)  Temp: 97.5 °F (36.4 °C) (23)  Pulse: 61 (23)  Resp: 20 (23)  BP: (!) 88/52 (23)  SpO2: (!) 94 % (23)    Vital Signs Range (Last 24H):  Temp:  [96.5 °F (35.8 °C)-98.3 °F (36.8 °C)]   Pulse:  [60-67]   Resp:  [9-22]   BP: ()/(52-88)   SpO2:  [86 %-100 %]     I & O (Last 24H):    Intake/Output Summary (Last 24 hours) at 2023 0822  Last data filed at 2023 1930  Gross per 24 hour   Intake 480 ml   Output 150 ml   Net 330 ml         Current Diet:     Current Diet Order   Procedures    Diet Cardiac        Allergies:  Review of patient's allergies indicates:   Allergen Reactions    Vasopressin Anaphylaxis and Other (See Comments)     Increased pressure in his head; felt like his eyeballs and veins were going to pop out of his head.     Vasopressin  "analogues Other (See Comments) and Anaphylaxis     "felt like eyes going to pop out of my head"  Other reaction(s): Other (See Comments)  "felt like eyes going to pop out of my head"  Increased pressure in his head; felt like his eyeballs and veins were going to pop out of his head.     Heparin Other (See Comments)     Other reaction(s): "depleted my blood platets"    Decreased platelet count    Heparin analogues Other (See Comments)     Depleted WBC count    Morphine Hallucinations, Other (See Comments) and Anxiety     Other reaction(s): Hallucinations  Paranoid, hallucinations         Meds:  Scheduled Meds:   albuterol sulfate  2.5 mg Nebulization Q6H WAKE    aspirin  81 mg Oral Daily    atorvastatin  10 mg Oral Daily    cefTRIAXone (ROCEPHIN) IVPB  1 g Intravenous Q24H    chlorhexidine  15 mL Mouth/Throat BID    furosemide (LASIX) injection  40 mg Intravenous Q12H    gabapentin  300 mg Oral TID    insulin detemir U-100  10 Units Subcutaneous Daily    mupirocin   Nasal BID    pantoprazole  40 mg Oral Before breakfast     Continuous Infusions:   amiodarone in dextrose 5% 1 mg/min (06/14/23 0322)     PRN Meds:acetaminophen, dextrose 50%, dextrose 50%, glucose, glucose, insulin aspart U-100, magnesium oxide, magnesium oxide, melatonin, naloxone, ondansetron, polyethylene glycol, potassium bicarbonate, potassium bicarbonate, potassium bicarbonate, potassium, sodium phosphates, potassium, sodium phosphates, potassium, sodium phosphates, traMADoL    Oxygen/Ventilator Data (Last 24H):  (if applicable)            Hemodynamic Parameters (Last 24H):   (if applicable)        Laboratory and Radiology Data:  Recent Results (from the past 24 hour(s))   POCT glucose    Collection Time: 06/13/23 11:17 AM   Result Value Ref Range    POC Glucose 231 (H) 70 - 110   POCT glucose    Collection Time: 06/13/23  4:16 PM   Result Value Ref Range    POC Glucose 238 (H) 70 - 110   POCT glucose    Collection Time: 06/13/23  8:58 PM   Result " Value Ref Range    POC Glucose 207 (H) 70 - 110   Basic Metabolic Panel (BMP)    Collection Time: 06/14/23  4:13 AM   Result Value Ref Range    Sodium 133 (L) 136 - 145 mmol/L    Potassium 4.8 3.5 - 5.1 mmol/L    Chloride 97 95 - 110 mmol/L    CO2 25 23 - 29 mmol/L    Glucose 171 (H) 70 - 110 mg/dL    BUN 77 (H) 8 - 23 mg/dL    Creatinine 3.4 (H) 0.5 - 1.4 mg/dL    Calcium 8.4 (L) 8.7 - 10.5 mg/dL    Anion Gap 11 8 - 16 mmol/L    eGFR 19.7 (A) >60 mL/min/1.73 m^2   Magnesium    Collection Time: 06/14/23  4:13 AM   Result Value Ref Range    Magnesium 2.6 1.6 - 2.6 mg/dL   CBC Without Differential    Collection Time: 06/14/23  4:13 AM   Result Value Ref Range    WBC 5.39 3.90 - 12.70 K/uL    RBC 3.81 (L) 4.60 - 6.20 M/uL    Hemoglobin 9.9 (L) 14.0 - 18.0 g/dL    Hematocrit 34.4 (L) 40.0 - 54.0 %    MCV 90 82 - 98 fL    MCH 26.0 (L) 27.0 - 31.0 pg    MCHC 28.8 (L) 32.0 - 36.0 g/dL    RDW 20.0 (H) 11.5 - 14.5 %    Platelets 83 (L) 150 - 450 K/uL    MPV 11.3 9.2 - 12.9 fL   POCT glucose    Collection Time: 06/14/23  7:22 AM   Result Value Ref Range    POC Glucose 227 (H) 70 - 110     Imaging Results              X-Ray Chest AP Portable (Final result)  Result time 06/12/23 19:00:35      Final result by Driss Merritt MD (06/12/23 19:00:35)                   Narrative:      EXAM: XR CHEST AP PORTABLE    HISTORY: Sepsis    COMPARISON:Chest x-ray dated 9/7/2022    FINDINGS: The lungs are fairly well-expanded and appear free of focal areas of consolidation. No pleural effusions are evident. The heart is moderately enlarged and unchanged. There is a mass-like opacity lateral to the AP window that appears similar on multiple previous chest x-rays. A CT of the chest 1/14/2019 shows that this corresponds to a dilated main pulmonary artery. A left subclavian dual-chamber biventricular pacemaker remains in place in the left subclavian vein without change.    IMPRESSION:   Moderate cardiomegaly. No evidence of acute disease  within the chest.    Electronically signed by:  Barry Merritt MD  6/12/2023 7:00 PM CDT Workstation: [id number]                                    12-lead EKG interpretation:  (if applicable)      Current Cardiac Rhythm:   (if applicable)    Physical Exam:   Physical Exam  Vitals and nursing note reviewed.   Constitutional:       General: He is not in acute distress.     Appearance: Normal appearance. He is not ill-appearing.   Cardiovascular:      Rate and Rhythm: Normal rate and regular rhythm.      Pulses: Normal pulses.      Heart sounds: Murmur heard.   Pulmonary:      Effort: Pulmonary effort is normal. No respiratory distress.      Breath sounds: Normal breath sounds.   Musculoskeletal:      Right lower leg: Edema present.      Left lower leg: Edema present.   Skin:     General: Skin is warm and dry.      Findings: No rash.      Comments: R foot wound   Neurological:      Mental Status: He is alert and oriented to person, place, and time.       ASSESSMENT/PLAN:   Assessment:   Wide Complex Tachycardia - AICD - s/p successful cardioversion. On amiodarone gtt. Discussed interrogation with rep from [employer], appears he was in an atrial tachycardia with BiV pacing at 140 bpm on admission. No Vfib noted. VT zone set at 190.   Acute on Chronic Systolic Heart Failure- BNP 1162, CXR cardiomegaly. On IV lasix. Echo 3/2023 showed EF 25%. MUGA scan 10/2022 showed EF 21%.   AD on Chronic Kidney Disease - Cr 3, Nephrology consulted.   CAD s/p CABG 2012 - denies chest pain. Troponin elevation to 29 and flat. Last stress test 2021.   Hypertension - on coreg, lisinopril at home.   Hyperlipidemia   Diabetes Mellitus Type 2  Diabetic Foot Ulcer     Plan:   D/c amiodarone gtt.   Start amiodarone 200 mg BID PO.  Patient reports continued shortness of breath.  On Lasix IV 40 mg, minimal urine output.   Start dobutrex 2.5 mcg/kg. Monitor BP.   Start lasix gtt? Appreciate Nephrology's recommendations.    Patient may need dialysis  if he does not diurese.   Strict I/Os.   Daily weights.   Hold lisinopril due to AD.   Coreg on hold due to low pressures.   Discussed patient with Dr. Bailey.

## 2023-06-14 NOTE — PLAN OF CARE
06/14/23 0844   Patient Assessment/Suction   Level of Consciousness (AVPU) alert   Respiratory Effort Normal;Unlabored   All Lung Fields Breath Sounds wheezes, expiratory   Rhythm/Pattern, Respiratory pattern regular;depth regular;unlabored   Cough Frequency infrequent   Cough Type dry;nonproductive   PRE-TX-O2   Device (Oxygen Therapy) nasal cannula   $ Is the patient on Low Flow Oxygen? Yes   Flow (L/min) 2   SpO2 99 %   Pulse Oximetry Type Continuous   $ Pulse Oximetry - Multiple Charge Pulse Oximetry - Multiple   Pulse 60   Resp 18   Aerosol Therapy   $ Aerosol Therapy Charges Aerosol Treatment   Daily Review of Necessity (SVN) completed   Respiratory Treatment Status (SVN) given   Treatment Route (SVN) oxygen;mouthpiece   Patient Position (SVN) sitting on edge of bed   Post Treatment Assessment (SVN) breath sounds improved   Signs of Intolerance (SVN) none   Preset CPAP/BiPAP Settings   Mode Of Delivery Standby;CPAP   Equipment Type   (home unit)   Education   $ Education Bronchodilator;15 min   Respiratory Evaluation   $ Care Plan Tech Time 15 min   Home Oxygen   Has Home Oxygen? No

## 2023-06-14 NOTE — PROGRESS NOTES
Nephrology Consult Note        Patient Name: Ana Bullard  MRN: [id number]    Patient Class: IP- Inpatient   Admission Date: 6/12/2023  Length of Stay: 2 days  Date of Service: 6/14/2023    Attending Physician: Vladimir Jensen, *  Primary Care Provider: YRN Pollard    Reason for Consult: ailyn    SUBJECTIVE:     HPI: 63-year-old  male with multiple medical comorbidities including chronic combined CHF, type 2 DM, CAD, thrombocytopenia, chronic diabetic foot ulcer and CKD stage 3b, who was discharged from another hospital after admission for UTI presented here with generalized weakness and tachycardia.    He was discharged on oral antibiotics and resumption of home health. His home health nurse found him to be tachycardic and directed him to the ER. Initial rhythm on presentation was wide complex tachycardia. Wife reports that he was not given his amiodarone when he was hospitalized.     In the ED here, after consultation with Cardiology patient underwent successful synchronized cardioversion with restoration of normal sinus rhythm.    6/14 VSS. HR stable, BP low over night. UO is low. sCr rising , consistent with ATN due to hemodynamic instability. Gote more lasix this AM by primary team along with initiation of dobutamine gtt.    Past Medical History:   Diagnosis Date    AICD (automatic cardioverter/defibrillator) present     Anemia, chronic disease 07/27/2021    Anemia, unspecified 07/27/2021    Anxiety and depression 2012    CAD (coronary artery disease) 2012    Cardiomegaly 2016    CHF (congestive heart failure) 2012    Cholecystitis 2017    Complete heart block 2012    Diabetic foot ulcer     DVT (deep venous thrombosis) 2012    And pulmonary embolus    GERD (gastroesophageal reflux disease) 2010    Heparin induced thrombocytopenia     Hyperlipemia 2011    IDDM (insulin dependent diabetes mellitus) 1983    With neuropathy    Ischemic cardiomyopathy 2012    MI (myocardial infarction) 2012  "   Morbid obesity     MRSA (methicillin resistant Staphylococcus aureus) infection 2019    Noncompliance with therapeutic plan     Normochromic normocytic anemia 2021    Osteomyelitis of right foot 2019    Osteomyelitis of right foot 2022    Klebsiella from bone, GBS in blood    Pulmonary edema     Respiratory failure     Sepsis 2019    Due to cellulitis right leg    Sleep apnea 2013    Thrombocytopathy     Venous stasis dermatitis of both lower extremities      Past Surgical History:   Procedure Laterality Date    CARDIAC PACEMAKER PLACEMENT  2012    CARDIAC PACEMAKER PLACEMENT  10/04/2018    battery replacement    CORONARY ARTERY BYPASS GRAFT  2012    ESOPHAGOGASTRODUODENOSCOPY  2017    SAMARA FILTER PLACEMENT  2012    vena cava    LUMBAR SYMPATHETIC NERVE BLOCK Right 2019    Procedure: BLOCK, NERVE, SYMPATHETIC, LUMBAR;  Surgeon: Tashi Good MD;  Location: [facility] OR;  Service: Pain Management;  Laterality: Right;  RIGHT SYMPATHETIC NERVE BLOCK     Family History   Problem Relation Age of Onset    Arthritis Mother     Diabetes Mother     Cancer Father     Heart disease Father     Stroke Father      Social History     Tobacco Use    Smoking status: Former     Packs/day: 2.00     Years: 38.00     Pack years: 76.00     Types: Cigarettes     Quit date:      Years since quittin.4    Smokeless tobacco: Never   Substance Use Topics    Alcohol use: No    Drug use: Never       Review of patient's allergies indicates:   Allergen Reactions    Vasopressin Anaphylaxis and Other (See Comments)     Increased pressure in his head; felt like his eyeballs and veins were going to pop out of his head.     Vasopressin analogues Other (See Comments) and Anaphylaxis     "felt like eyes going to pop out of my head"  Other reaction(s): Other (See Comments)  "felt like eyes going to pop out of my head"  Increased pressure in his head; felt like his eyeballs and veins were " "going to pop out of his head.     Heparin Other (See Comments)     Other reaction(s): "depleted my blood platets"    Decreased platelet count    Heparin analogues Other (See Comments)     Depleted WBC count    Morphine Hallucinations, Other (See Comments) and Anxiety     Other reaction(s): Hallucinations  Paranoid, hallucinations         Outpatient meds:  No current facility-administered medications on file prior to encounter.     Current Outpatient Medications on File Prior to Encounter   Medication Sig Dispense Refill    albuterol (PROVENTIL) 5 mg/mL nebulizer solution Take 2.5 mg by nebulization every 6 (six) hours as needed.      albuterol-ipratropium (DUO-NEB) 2.5 mg-0.5 mg/3 mL nebulizer solution Take 3 mLs by nebulization every 6 (six) hours as needed.      amiodarone (PACERONE) 200 MG Tab Take 200 mg by mouth once daily.      aspirin 81 MG Chew Take 81 mg by mouth once daily.      atorvastatin (LIPITOR) 10 MG tablet Take 1 tablet by mouth once daily.  1    B complex-vitamin C-folic acid (JLUIS-JIMMIE/NEPHRO-JIMMIE) 0.8 mg Tab Take 0.4 mg by mouth.      BASAGLAR KWIKPEN U-100 INSULIN glargine 100 units/mL (3mL) SubQ pen Inject 25 Units into the skin once daily.  3    carvediloL (COREG) 3.125 MG tablet Take 3.125 mg by mouth 2 (two) times daily.      cefdinir (OMNICEF) 300 MG capsule Take 300 mg by mouth 2 (two) times daily.      cefTRIAXone (ROCEPHIN) 10 gram injection       cetirizine (ZYRTEC) 10 MG tablet Take 10 mg by mouth once daily.      CORLANOR 5 mg Tab Take 5 mg by mouth nightly.      doxycycline (VIBRAMYCIN) 100 MG Cap Take 1 capsule (100 mg total) by mouth every 12 (twelve) hours. (Patient not taking: Reported on 5/31/2023) 20 capsule 0    FARXIGA 10 mg tablet Take 10 mg by mouth once daily.      furosemide (LASIX) 40 MG tablet Take 1 tablet (40 mg total) by mouth once daily. 30 tablet 1    gabapentin (NEURONTIN) 600 MG tablet Take 1 tablet by mouth 3 (three) times daily.  0    lisinopriL " (PRINIVIL,ZESTRIL) 2.5 MG tablet Take 2.5 mg by mouth 2 (two) times daily.      mupirocin (BACTROBAN) 2 % ointment APPLY TOPICALLY ONCE DAILY. (Patient taking differently: Apply 1 g topically once daily.) 22 g 5    pantoprazole (PROTONIX) 40 MG tablet Take 1 tablet by mouth once daily.  0    traMADoL (ULTRAM) 50 mg tablet Take 50 mg by mouth every 6 (six) hours as needed.      TRULICITY 0.75 mg/0.5 mL pen injector Inject 0.75 mg into the skin every 7 days.      vitamin B complex-folic acid 0.4 mg Tab Take 1 tablet by mouth.         Scheduled meds:   albuterol sulfate  2.5 mg Nebulization Q6H WAKE    amiodarone  200 mg Oral BID    aspirin  81 mg Oral Daily    atorvastatin  10 mg Oral Daily    cefTRIAXone (ROCEPHIN) IVPB  1 g Intravenous Q24H    chlorhexidine  15 mL Mouth/Throat BID    furosemide (LASIX) injection  40 mg Intravenous Q12H    gabapentin  300 mg Oral TID    insulin detemir U-100  10 Units Subcutaneous Daily    mupirocin   Nasal BID    pantoprazole  40 mg Oral Before breakfast       Infusions:   DOBUTamine IV infusion (non-titrating)         PRN meds:  acetaminophen, dextrose 50%, dextrose 50%, glucose, glucose, insulin aspart U-100, magnesium oxide, magnesium oxide, melatonin, naloxone, ondansetron, polyethylene glycol, potassium bicarbonate, potassium bicarbonate, potassium bicarbonate, potassium, sodium phosphates, potassium, sodium phosphates, potassium, sodium phosphates, traMADoL    Review of Systems:    OBJECTIVE:     Vital Signs and IO:  Temp:  [96.5 °F (35.8 °C)-98.3 °F (36.8 °C)]   Pulse:  [60-67]   Resp:  [9-22]   BP: ()/(52-88)   SpO2:  [86 %-100 %]   I/O last 3 completed shifts:  In: 878.6 [P.O.:480; I.V.:298.6; IV Piggyback:100]  Out: 250 [Urine:250]  Wt Readings from Last 5 Encounters:   06/13/23 132.2 kg (291 lb 7.2 oz)   06/01/23 126.6 kg (279 lb)   05/31/23 126.7 kg (279 lb 6.4 oz)   05/11/23 121.6 kg (268 lb)   04/27/23 121.6 kg (268 lb)     Body mass index is 38.45  kg/m².    Physical Exam  Constitutional:       General: Patient is not in acute distress.     Appearance: Patient is well-developed. She is not diaphoretic.   HENT:      Head: Normocephalic and atraumatic.      Mouth/Throat: Mucous membranes are moist.   Eyes:      General: No scleral icterus.     Pupils: Pupils are equal, round, and reactive to light.   Cardiovascular:      Rate and Rhythm: Normal rate and regular rhythm.   Pulmonary:      Effort: Pulmonary effort is normal. No respiratory distress.      Breath sounds: No stridor.   Abdominal:      General: There is no distension.      Palpations: Abdomen is soft.   Musculoskeletal:         General: No deformity. Normal range of motion.      Cervical back: Neck supple.   Skin:     General: Skin is warm and dry.      Findings: No rash present. No erythema.   Neurological:      Mental Status: Patient is alert and oriented to person, place, and time.      Cranial Nerves: No cranial nerve deficit.   Psychiatric:         Behavior: Behavior normal.     Laboratory:  Recent Labs   Lab 06/12/23 1835 06/13/23 0411 06/14/23 0413   * 136 133*   K 4.8 4.9 4.8   CL 96 99 97   CO2 25 26 25   BUN 62* 64* 77*   CREATININE 2.9* 3.0* 3.4*   * 155* 171*         Recent Labs   Lab 06/12/23 1835 06/13/23 0411 06/14/23 0413   CALCIUM 8.2* 8.5* 8.4*   ALBUMIN 3.2*  --   --    MG 2.3 2.3 2.6               No results for input(s): POCTGLUCOSE in the last 168 hours.    Recent Labs   Lab 09/02/22 0439 11/23/22  0951   Hemoglobin A1C 7.7 H 6.8 H         Recent Labs   Lab 06/12/23 1835 06/13/23 0411 06/14/23 0413   WBC 7.76 6.74 5.39   HGB 9.6* 9.4* 9.9*   HCT 33.7* 32.5* 34.4*   PLT 75* 69* 83*   MCV 90 89 90   MCHC 28.5* 28.9* 28.8*   MONO 10.3  0.8  --   --          Recent Labs   Lab 06/12/23 1835   BILITOT 1.6*   PROT 7.5   ALBUMIN 3.2*   ALKPHOS 70   ALT 22   AST 31         Recent Labs   Lab 06/12/23  2775   Color, UA Yellow   Appearance, UA Cloudy A   pH, UA 5.0    Specific Gravity, UA 1.020   Protein, UA 1+ A   Glucose, UA 1+ A   Ketones, UA Negative   Urobilinogen, UA 4.0-6.0 A   Bilirubin (UA) Negative   Occult Blood UA 3+ A   Nitrite, UA Negative   RBC, UA >100 H   WBC, UA 28 H   Bacteria Negative   Hyaline Casts, UA 32 A         Recent Labs   Lab 09/01/22  0333   POC PH 7.350   POC PCO2 54.1 H   POC HCO3 29.9 H   POC PO2 18 L   POC SATURATED O2 24 L   POC BE 4   Sample VENOUS         Microbiology Results (last 7 days)       Procedure Component Value Units Date/Time    Urine culture [[id number]] Collected: 06/12/23 2241    Order Status: Completed Specimen: Urine Updated: 06/14/23 0728     Urine Culture, Routine No growth to date    Narrative:      Specimen Source->Urine    Blood culture x two cultures. Draw prior to antibiotics. [[id number]] Collected: 06/12/23 1927    Order Status: Completed Specimen: Blood from Peripheral, Antecubital, Right Updated: 06/13/23 2032     Blood Culture, Routine No Growth to date      No Growth to date    Narrative:      Aerobic and anaerobic    Blood culture x two cultures. Draw prior to antibiotics. [[id number]] Collected: 06/12/23 1843    Order Status: Completed Specimen: Blood from Peripheral, Antecubital, Left Updated: 06/13/23 2032     Blood Culture, Routine No Growth to date      No Growth to date    Narrative:      Aerobic and anaerobic            ASSESSMENT/PLAN:     AD 2/2 VT s/p cardioversion  Hematuria of unclear significance with many hyaline casts  CKD stage 3  CHF exacerbation  DM  Imaging negative for  obstruction.  No NSAIDs, ACEI/ARB, IV contrast or other nephrotoxins.  Keep MAP > 60, SBP > 100.  Dose meds for GFR < 30 ml/min.  Renal diet - low K, low phos.  Agree with Cards plan for amiodarone gtt, PPM integration, IV diuretic + dobutamone, holding ACEi and BBL.  Control DM.    Anemia of CKD  Hgb and HCT are acceptable. Monitor for now.  Will provide GANGA and/or IV iron PRN.    MBD / Secondary HPT  Ca, Phos, PTH and  vitamin D levels are acceptable.   Phos binders, vitamin D and analogues, calcimimetics will be given as needed.    HTN  BP seem controlled.   Tolerate asymptomatic HTN up to -160.  Meds per Cards for now.    Thank you for allowing us to participate in the care of your patient!   We will follow the patient and provide recommendations as needed.    Patient care time was spent personally by me on the following activities:     Obtaining a history.  Examination of patient.  Providing medical care at the patients bedside.  Developing a treatment plan with patient or surrogate and bedside caregivers.  Ordering and reviewing laboratory studies, radiographic studies, pulse oximetry.  Ordering and performing treatments and interventions.  Evaluation of patient's response to treatment.  Discussions with consultants while on the unit and immediately available to the patient.  Re-evaluation of the patient's condition.  Documentation in the medical record.     Maicol Leach MD    [facility]  [address] [place] [address]  [place] [address]    [phone] - tel  [phone] - fax    6/14/2023

## 2023-06-14 NOTE — CARE UPDATE
06/13/23 2043   Patient Assessment/Suction   Level of Consciousness (AVPU) alert   Respiratory Effort Normal;Unlabored   Expansion/Accessory Muscles/Retractions expansion symmetric;no retractions;no use of accessory muscles   All Lung Fields Breath Sounds Anterior:;Lateral:;coarse   Rhythm/Pattern, Respiratory depth regular;pattern regular;unlabored   Cough Frequency infrequent   Cough Type nonproductive   PRE-TX-O2   Device (Oxygen Therapy) nasal cannula   $ Is the patient on Low Flow Oxygen? Yes   SpO2 100 %   Pulse Oximetry Type Continuous   $ Pulse Oximetry - Multiple Charge Pulse Oximetry - Multiple   Pulse 60   Resp (!) 22   Aerosol Therapy   $ Aerosol Therapy Charges Aerosol Treatment   Daily Review of Necessity (SVN) completed   Respiratory Treatment Status (SVN) given   Treatment Route (SVN) mask;oxygen   Patient Position (SVN) HOB elevated   Post Treatment Assessment (SVN) breath sounds unchanged   Signs of Intolerance (SVN) none   Breath Sounds Post-Respiratory Treatment   Throughout All Fields Post-Treatment All Fields   Throughout All Fields Post-Treatment no change   Post-treatment Heart Rate (beats/min) 60   Post-treatment Resp Rate (breaths/min) 20   Respiratory Evaluation   $ Care Plan Tech Time 15 min

## 2023-06-14 NOTE — PROGRESS NOTES
"[facility] Medicine  Progress Note    Patient name: Ana Bullard  MRN: [id number]  Admit Date: 6/12/2023   LOS: 2 days     SUBJECTIVE:     Principal problem: Wide-complex tachycardia    Interval History:      6/14- vitals stable, only about 100 ml uop despite IV lasix.  He is having abd discomfort to left flank/abdomen.  Starting dobutamine per cardiology recs.      6/13- cardioverted in ER, plan for pm eval today and diuresis.  He feels short of breath, but better today    Scheduled Meds:   albuterol sulfate  2.5 mg Nebulization Q6H WAKE    amiodarone  200 mg Oral BID    aspirin  81 mg Oral Daily    atorvastatin  10 mg Oral Daily    cefTRIAXone (ROCEPHIN) IVPB  1 g Intravenous Q24H    chlorhexidine  15 mL Mouth/Throat BID    furosemide (LASIX) injection  80 mg Intravenous Q12H    gabapentin  300 mg Oral TID    insulin detemir U-100  10 Units Subcutaneous Daily    mupirocin   Nasal BID    pantoprazole  40 mg Oral Before breakfast     Continuous Infusions:   DOBUTamine IV infusion (non-titrating) 2.5 mcg/kg/min (06/14/23 1251)     PRN Meds:acetaminophen, dextrose 50%, dextrose 50%, glucose, glucose, insulin aspart U-100, magnesium oxide, magnesium oxide, melatonin, naloxone, ondansetron, polyethylene glycol, potassium bicarbonate, potassium bicarbonate, potassium bicarbonate, potassium, sodium phosphates, potassium, sodium phosphates, potassium, sodium phosphates, traMADoL    Review of patient's allergies indicates:   Allergen Reactions    Vasopressin Anaphylaxis and Other (See Comments)     Increased pressure in his head; felt like his eyeballs and veins were going to pop out of his head.     Vasopressin analogues Other (See Comments) and Anaphylaxis     "felt like eyes going to pop out of my head"  Other reaction(s): Other (See Comments)  "felt like eyes going to pop out of my head"  Increased pressure in his head; felt like his eyeballs and veins were going to pop out of his head.     " "Heparin Other (See Comments)     Other reaction(s): "depleted my blood platets"    Decreased platelet count    Heparin analogues Other (See Comments)     Depleted WBC count    Morphine Hallucinations, Other (See Comments) and Anxiety     Other reaction(s): Hallucinations  Paranoid, hallucinations         Review of Systems: As per interval history    OBJECTIVE:     Vital Signs (Most Recent)  Temp: 97.8 °F (36.6 °C) (06/14/23 1500)  Pulse: 60 (06/14/23 1500)  Resp: 18 (06/14/23 1500)  BP: (!) 158/80 (06/14/23 1500)  SpO2: 100 % (06/14/23 1500)    Vital Signs Range (Last 24H):  Temp:  [96.5 °F (35.8 °C)-97.8 °F (36.6 °C)]   Pulse:  [59-68]   Resp:  [9-22]   BP: ()/(52-88)   SpO2:  [86 %-100 %]     I & O (Last 24H):  Intake/Output Summary (Last 24 hours) at 6/14/2023 1522  Last data filed at 6/14/2023 1115  Gross per 24 hour   Intake 360 ml   Output 250 ml   Net 110 ml         Physical Exam:  General:  No acute distress.    Head: Normocephalic and atraumatic   Eyes:  No conjunctival pallor. No scleral icterus.  Mouth: Oral mucosa moist   Lungs: CTA. Good air entry.  Cor: Regular rate and rhythm. No murmurs.  2+ pitting edema  Abd:  Soft.  Distended.    Musculoskeletal: No arthropathy, deformity.  Skin:  Right foot wound dressing is clean, dry and intact  Neuro: A&O x 3. Moving all extremities equally. Follows commands  Ext: No cyanosis. Peripheral pulses +  Psych: Normal mood and affect. Memory intact     Laboratory:  I have reviewed all pertinent lab results within the past 24 hours.    Diagnostic Results:  Labs: Reviewed  ECG: Reviewed  X-Ray: Reviewed    ASSESSMENT/PLAN:     Wide-complex tachycardia  Wide complex tachycardia with heart rate in the 130-140 beats per minute   Patient has ICD in place however not cardioverted by it  Status post synchronized cardioversion while in the ED   Initiated on amiodarone drip  Cardiology consultation   Patient appears to be in decompensated CHF - start IV diuretics with " close monitoring of renal function   Strict in/outs, low-sodium diet and daily weights   Lasix IV  Start dobutamine  PM eval           Acute renal failure superimposed on stage 3b chronic kidney disease  Likely cardiorenal syndrome  Monitor renal function with daily labs especially while on diuretics   Nephrology consultation   Dose medications per GFR        UTI (urinary tract infection)  Recent partially treated UTI at outside facility  Cultures unavailable even under care everywhere   Continue empiric ceftriaxone for additional 3 days to complete a 5 day course for complicated UTI        Diabetic foot ulcer  Continue daily wound care     Chronic combined systolic and diastolic heart failure  As above for wide complex tachycardia        Type II diabetes mellitus with neurological manifestations  Basal insulin with detemir at an adjusted dose   Low-dose insulin sliding scale  Gabapentin at a lower dose due to AD     Chronic Thrombocytopenia  Also patient has a history of heparin induced thrombocytopenia   Monitor platelets daily labs       Active Hospital Problems    Diagnosis  POA    *Wide-complex tachycardia [R00.0]  Yes    UTI (urinary tract infection) [N39.0]  Yes    Diabetic foot ulcer [E11.621, L97.509]  Yes    Chronic combined systolic and diastolic heart failure [I50.42]  Yes    Acute renal failure superimposed on stage 3b chronic kidney disease [N17.9, N18.32]  Yes    Chronic Thrombocytopenia [D69.6]  Yes    Type II diabetes mellitus with neurological manifestations [E11.49]  Yes     With neuropathy        Resolved Hospital Problems   No resolved problems to display.           VTE Risk Mitigation (From admission, onward)           Ordered     IP VTE HIGH RISK PATIENT  Once         06/13/23 0106     Place sequential compression device  Until discontinued         06/13/23 0106     Reason for No Pharmacological VTE Prophylaxis  Once        Question:  Reasons:  Answer:  Thrombocytopenia    06/13/23 0106                         Department Hospital Medicine  [facility]  Vladimir Jensen MD  Date of service: 6/13/23

## 2023-06-15 PROBLEM — L97.512 ULCER OF RIGHT FOOT WITH FAT LAYER EXPOSED: Status: ACTIVE | Noted: 2023-06-15

## 2023-06-15 LAB
ANION GAP SERPL CALC-SCNC: 13 MMOL/L (ref 8–16)
BACTERIA UR CULT: NO GROWTH
BUN SERPL-MCNC: 79 MG/DL (ref 8–23)
CALCIUM SERPL-MCNC: 8.3 MG/DL (ref 8.7–10.5)
CHLORIDE SERPL-SCNC: 96 MMOL/L (ref 95–110)
CO2 SERPL-SCNC: 25 MMOL/L (ref 23–29)
CREAT SERPL-MCNC: 3.5 MG/DL (ref 0.5–1.4)
ERYTHROCYTE [DISTWIDTH] IN BLOOD BY AUTOMATED COUNT: 20.2 % (ref 11.5–14.5)
EST. GFR  (NO RACE VARIABLE): 19 ML/MIN/1.73 M^2
GLUCOSE SERPL-MCNC: 134 MG/DL (ref 70–110)
GLUCOSE SERPL-MCNC: 137 MG/DL (ref 70–110)
GLUCOSE SERPL-MCNC: 158 MG/DL (ref 70–110)
GLUCOSE SERPL-MCNC: 162 MG/DL (ref 70–110)
GLUCOSE SERPL-MCNC: 166 MG/DL (ref 70–110)
GLUCOSE SERPL-MCNC: 182 MG/DL (ref 70–110)
GLUCOSE SERPL-MCNC: 195 MG/DL (ref 70–110)
HCT VFR BLD AUTO: 31.8 % (ref 40–54)
HGB BLD-MCNC: 9.4 G/DL (ref 14–18)
MAGNESIUM SERPL-MCNC: 2.4 MG/DL (ref 1.6–2.6)
MCH RBC QN AUTO: 25.9 PG (ref 27–31)
MCHC RBC AUTO-ENTMCNC: 29.6 G/DL (ref 32–36)
MCV RBC AUTO: 88 FL (ref 82–98)
PLATELET # BLD AUTO: 68 K/UL (ref 150–450)
PMV BLD AUTO: 11.4 FL (ref 9.2–12.9)
POTASSIUM SERPL-SCNC: 4.5 MMOL/L (ref 3.5–5.1)
RBC # BLD AUTO: 3.63 M/UL (ref 4.6–6.2)
SODIUM SERPL-SCNC: 134 MMOL/L (ref 136–145)
WBC # BLD AUTO: 4.79 K/UL (ref 3.9–12.7)

## 2023-06-15 PROCEDURE — 11042 DEBRIDEMENT: ICD-10-PCS | Mod: ,,, | Performed by: PODIATRIST

## 2023-06-15 PROCEDURE — 83735 ASSAY OF MAGNESIUM: CPT | Performed by: INTERNAL MEDICINE

## 2023-06-15 PROCEDURE — 25000242 PHARM REV CODE 250 ALT 637 W/ HCPCS: Performed by: HOSPITALIST

## 2023-06-15 PROCEDURE — 94761 N-INVAS EAR/PLS OXIMETRY MLT: CPT

## 2023-06-15 PROCEDURE — 25000003 PHARM REV CODE 250

## 2023-06-15 PROCEDURE — 27000221 HC OXYGEN, UP TO 24 HOURS

## 2023-06-15 PROCEDURE — 99900035 HC TECH TIME PER 15 MIN (STAT)

## 2023-06-15 PROCEDURE — 25000003 PHARM REV CODE 250: Performed by: INTERNAL MEDICINE

## 2023-06-15 PROCEDURE — 21000000 HC CCU ICU ROOM CHARGE

## 2023-06-15 PROCEDURE — 25000003 PHARM REV CODE 250: Performed by: HOSPITALIST

## 2023-06-15 PROCEDURE — 63600175 PHARM REV CODE 636 W HCPCS: Performed by: STUDENT IN AN ORGANIZED HEALTH CARE EDUCATION/TRAINING PROGRAM

## 2023-06-15 PROCEDURE — 80048 BASIC METABOLIC PNL TOTAL CA: CPT | Performed by: INTERNAL MEDICINE

## 2023-06-15 PROCEDURE — 94640 AIRWAY INHALATION TREATMENT: CPT

## 2023-06-15 PROCEDURE — 36415 COLL VENOUS BLD VENIPUNCTURE: CPT | Performed by: INTERNAL MEDICINE

## 2023-06-15 PROCEDURE — 11042 DBRDMT SUBQ TIS 1ST 20SQCM/<: CPT | Mod: ,,, | Performed by: PODIATRIST

## 2023-06-15 PROCEDURE — 99222 PR INITIAL HOSPITAL CARE,LEVL II: ICD-10-PCS | Mod: 25,,, | Performed by: PODIATRIST

## 2023-06-15 PROCEDURE — 99222 1ST HOSP IP/OBS MODERATE 55: CPT | Mod: 25,,, | Performed by: PODIATRIST

## 2023-06-15 PROCEDURE — 82962 GLUCOSE BLOOD TEST: CPT

## 2023-06-15 PROCEDURE — 63600175 PHARM REV CODE 636 W HCPCS: Performed by: INTERNAL MEDICINE

## 2023-06-15 PROCEDURE — 85027 COMPLETE CBC AUTOMATED: CPT | Performed by: INTERNAL MEDICINE

## 2023-06-15 PROCEDURE — 99900031 HC PATIENT EDUCATION (STAT)

## 2023-06-15 RX ORDER — AMOXICILLIN 250 MG
1 CAPSULE ORAL NIGHTLY
Status: DISCONTINUED | OUTPATIENT
Start: 2023-06-15 | End: 2023-06-18 | Stop reason: HOSPADM

## 2023-06-15 RX ADMIN — AMIODARONE HYDROCHLORIDE 200 MG: 200 TABLET ORAL at 09:06

## 2023-06-15 RX ADMIN — GABAPENTIN 300 MG: 300 CAPSULE ORAL at 08:06

## 2023-06-15 RX ADMIN — CEFTRIAXONE SODIUM 1 G: 1 INJECTION, POWDER, FOR SOLUTION INTRAMUSCULAR; INTRAVENOUS at 10:06

## 2023-06-15 RX ADMIN — PANTOPRAZOLE SODIUM 40 MG: 40 TABLET, DELAYED RELEASE ORAL at 05:06

## 2023-06-15 RX ADMIN — MUPIROCIN 1 G: 20 OINTMENT TOPICAL at 08:06

## 2023-06-15 RX ADMIN — ATORVASTATIN CALCIUM 10 MG: 10 TABLET, FILM COATED ORAL at 09:06

## 2023-06-15 RX ADMIN — TRAMADOL HYDROCHLORIDE 50 MG: 50 TABLET, COATED ORAL at 09:06

## 2023-06-15 RX ADMIN — INSULIN DETEMIR 10 UNITS: 100 INJECTION, SOLUTION SUBCUTANEOUS at 09:06

## 2023-06-15 RX ADMIN — CHLORHEXIDINE GLUCONATE 15 ML: 1.2 RINSE ORAL at 08:06

## 2023-06-15 RX ADMIN — ALBUTEROL SULFATE 2.5 MG: 2.5 SOLUTION RESPIRATORY (INHALATION) at 07:06

## 2023-06-15 RX ADMIN — ASPIRIN 81 MG 81 MG: 81 TABLET ORAL at 09:06

## 2023-06-15 RX ADMIN — GABAPENTIN 300 MG: 300 CAPSULE ORAL at 09:06

## 2023-06-15 RX ADMIN — SENNOSIDES AND DOCUSATE SODIUM 1 TABLET: 50; 8.6 TABLET ORAL at 08:06

## 2023-06-15 RX ADMIN — MUPIROCIN 1 G: 20 OINTMENT TOPICAL at 09:06

## 2023-06-15 RX ADMIN — ALBUTEROL SULFATE 2.5 MG: 2.5 SOLUTION RESPIRATORY (INHALATION) at 02:06

## 2023-06-15 RX ADMIN — GABAPENTIN 300 MG: 300 CAPSULE ORAL at 02:06

## 2023-06-15 RX ADMIN — FUROSEMIDE 80 MG: 10 INJECTION, SOLUTION INTRAVENOUS at 07:06

## 2023-06-15 RX ADMIN — CHLORHEXIDINE GLUCONATE 15 ML: 1.2 RINSE ORAL at 09:06

## 2023-06-15 RX ADMIN — TRAMADOL HYDROCHLORIDE 50 MG: 50 TABLET, COATED ORAL at 10:06

## 2023-06-15 RX ADMIN — FUROSEMIDE 80 MG: 10 INJECTION, SOLUTION INTRAVENOUS at 09:06

## 2023-06-15 RX ADMIN — AMIODARONE HYDROCHLORIDE 200 MG: 200 TABLET ORAL at 08:06

## 2023-06-15 NOTE — PLAN OF CARE
Problem: Adult Inpatient Plan of Care  Goal: Plan of Care Review  6/15/2023 1751 by Sudhir Faust RN  Outcome: Ongoing, Progressing  6/15/2023 1314 by Sudhir Faust RN  Outcome: Ongoing, Progressing  Goal: Patient-Specific Goal (Individualized)  6/15/2023 1751 by Sudhir Faust RN  Outcome: Ongoing, Progressing  6/15/2023 1314 by Sudhir Faust RN  Outcome: Ongoing, Progressing  Goal: Absence of Hospital-Acquired Illness or Injury  6/15/2023 1751 by Sudhir Faust RN  Outcome: Ongoing, Progressing  6/15/2023 1314 by Sudhir Faust RN  Outcome: Ongoing, Progressing  Goal: Optimal Comfort and Wellbeing  6/15/2023 1751 by Sudhir Faust RN  Outcome: Ongoing, Progressing  6/15/2023 1314 by Sudhir Faust RN  Outcome: Ongoing, Progressing  Goal: Readiness for Transition of Care  6/15/2023 1751 by Sudhir Faust RN  Outcome: Ongoing, Progressing  6/15/2023 1314 by Sudhir Faust RN  Outcome: Ongoing, Progressing     Problem: Diabetes Comorbidity  Goal: Blood Glucose Level Within Targeted Range  6/15/2023 1751 by Sudhir Faust RN  Outcome: Ongoing, Progressing  6/15/2023 1314 by Sudhir Faust RN  Outcome: Ongoing, Progressing     Problem: Adjustment to Illness (Sepsis/Septic Shock)  Goal: Optimal Coping  6/15/2023 1751 by Sudhir Faust RN  Outcome: Ongoing, Progressing  6/15/2023 1314 by Sudhir Faust RN  Outcome: Ongoing, Progressing     Problem: Bleeding (Sepsis/Septic Shock)  Goal: Absence of Bleeding  6/15/2023 1751 by Sudhir Faust RN  Outcome: Ongoing, Progressing  6/15/2023 1314 by Sudhir Faust RN  Outcome: Ongoing, Progressing     Problem: Glycemic Control Impaired (Sepsis/Septic Shock)  Goal: Blood Glucose Level Within Desired Range  6/15/2023 1751 by Sudhir Faust RN  Outcome: Ongoing, Progressing  6/15/2023 1314 by Sudhir Faust RN  Outcome: Ongoing, Progressing     Problem: Infection Progression (Sepsis/Septic Shock)  Goal: Absence of Infection Signs and  Symptoms  6/15/2023 1751 by Sudhir Faust RN  Outcome: Ongoing, Progressing  6/15/2023 1314 by Sudhir Faust RN  Outcome: Ongoing, Progressing     Problem: Nutrition Impaired (Sepsis/Septic Shock)  Goal: Optimal Nutrition Intake  6/15/2023 1751 by Sudhir Faust RN  Outcome: Ongoing, Progressing  6/15/2023 1314 by Sudhir Faust RN  Outcome: Ongoing, Progressing     Problem: Fluid and Electrolyte Imbalance (Acute Kidney Injury/Impairment)  Goal: Fluid and Electrolyte Balance  6/15/2023 1751 by Sudhir Faust RN  Outcome: Ongoing, Progressing  6/15/2023 1314 by Sudhir Faust RN  Outcome: Ongoing, Progressing     Problem: Oral Intake Inadequate (Acute Kidney Injury/Impairment)  Goal: Optimal Nutrition Intake  6/15/2023 1751 by Sudhir Faust RN  Outcome: Ongoing, Progressing  6/15/2023 1314 by Sudhir Faust RN  Outcome: Ongoing, Progressing     Problem: Renal Function Impairment (Acute Kidney Injury/Impairment)  Goal: Effective Renal Function  6/15/2023 1751 by Sudhir Faust RN  Outcome: Ongoing, Progressing  6/15/2023 1314 by Sudhir Faust RN  Outcome: Ongoing, Progressing     Problem: Fluid Imbalance (Pneumonia)  Goal: Fluid Balance  6/15/2023 1751 by Sudhir Faust RN  Outcome: Ongoing, Progressing  6/15/2023 1314 by Sudhir Faust RN  Outcome: Ongoing, Progressing     Problem: Infection (Pneumonia)  Goal: Resolution of Infection Signs and Symptoms  6/15/2023 1751 by Sudhir Faust RN  Outcome: Ongoing, Progressing  6/15/2023 1314 by Sudhir Faust RN  Outcome: Ongoing, Progressing     Problem: Respiratory Compromise (Pneumonia)  Goal: Effective Oxygenation and Ventilation  6/15/2023 1751 by Sudhir Faust RN  Outcome: Ongoing, Progressing  6/15/2023 1314 by Sudhir Faust RN  Outcome: Ongoing, Progressing     Problem: Infection  Goal: Absence of Infection Signs and Symptoms  6/15/2023 1751 by Sudhir Faust RN  Outcome: Ongoing, Progressing  6/15/2023 1314 by Sudhir  GEOVANY Faust  Outcome: Ongoing, Progressing     Problem: Impaired Wound Healing  Goal: Optimal Wound Healing  6/15/2023 1751 by Sudhir Faust RN  Outcome: Ongoing, Progressing  6/15/2023 1314 by Sudhir Faust RN  Outcome: Ongoing, Progressing     Problem: Skin Injury Risk Increased  Goal: Skin Health and Integrity  6/15/2023 1751 by Sudhir Faust RN  Outcome: Ongoing, Progressing  6/15/2023 1314 by Sudhir Faust RN  Outcome: Ongoing, Progressing

## 2023-06-15 NOTE — PLAN OF CARE
Problem: Nutrition Impaired (Sepsis/Septic Shock)  Goal: Optimal Nutrition Intake  Outcome: Ongoing, Progressing     Problem: Impaired Wound Healing  Goal: Optimal Wound Healing  Outcome: Ongoing, Progressing  Intervention: Promote Wound Healing  Flowsheets (Taken 6/15/2023 1630)  Oral Nutrition Promotion:   calorie-dense foods provided   calorie-dense liquids provided   other (see comments)     Recommendations  1.) Adjusted diet to cardiac, diabetic diet restrictions.   2.) Adjusted Glucerna to [place] to preserve kidney function.   3.) Adjusted Brent to BID x14 days for wound healing.   4.) Suggest lactobacillus acidophilus while prescribed antibiotics.   5.) Wt loss diet education provided.   6.) RD to monitor intake, tolerance, weight, labs and provide recommendations PRN.     Goals:   1.) Pt to consume/tolerate >75% of meals and ONS.   2.) Progression of wound healing.   3.) BG levels to stabilize and return to standard reference range.  Nutrition Goal Status: new

## 2023-06-15 NOTE — SUBJECTIVE & OBJECTIVE
Interval History:   As per subjective     Review of Systems  Objective:     Vital Signs (Most Recent):  Temp: 97 °F (36.1 °C) (06/15/23 0701)  Pulse: 60 (06/15/23 1445)  Resp: 18 (06/15/23 1445)  BP: (!) 144/72 (06/15/23 1300)  SpO2: 98 % (06/15/23 1445) Vital Signs (24h Range):  Temp:  [96.9 °F (36.1 °C)-97.4 °F (36.3 °C)] 97 °F (36.1 °C)  Pulse:  [60-67] 60  Resp:  [18-22] 18  SpO2:  [72 %-100 %] 98 %  BP: (104-152)/(53-82) 144/72     Weight: 132.2 kg (291 lb 7.2 oz)  Body mass index is 38.45 kg/m².    Intake/Output Summary (Last 24 hours) at 6/15/2023 1734  Last data filed at 6/15/2023 1251  Gross per 24 hour   Intake 796.08 ml   Output 1025 ml   Net -228.92 ml         Physical Exam  Constitutional:       General: He is not in acute distress.     Appearance: He is well-developed.   HENT:      Head: Normocephalic.   Eyes:      Pupils: Pupils are equal, round, and reactive to light.   Cardiovascular:      Rate and Rhythm: Normal rate and regular rhythm.   Pulmonary:      Effort: Pulmonary effort is normal. No respiratory distress.   Abdominal:      General: There is distension.      Tenderness: There is no abdominal tenderness.   Musculoskeletal:         General: Normal range of motion.      Cervical back: Neck supple.   Skin:     Findings: No rash.   Neurological:      Mental Status: He is alert and oriented to person, place, and time.   Psychiatric:         Mood and Affect: Mood normal.           Significant Labs: All pertinent labs within the past 24 hours have been reviewed.  CBC:   Recent Labs   Lab 06/14/23 0413 06/15/23  0431   WBC 5.39 4.79   HGB 9.9* 9.4*   HCT 34.4* 31.8*   PLT 83* 68*     CMP:   Recent Labs   Lab 06/14/23 0413 06/15/23  0431   * 134*   K 4.8 4.5   CL 97 96   CO2 25 25   * 137*   BUN 77* 79*   CREATININE 3.4* 3.5*   CALCIUM 8.4* 8.3*   ANIONGAP 11 13     Cardiac Markers: No results for input(s): CKMB, MYOGLOBIN, BNP, TROPISTAT in the last 48 hours.    Significant Imaging: I  have reviewed all pertinent imaging results/findings within the past 24 hours.

## 2023-06-15 NOTE — HPI
63-year-old  male with multiple medical comorbidities including but not limited to chronic combined CHF, type 2 DM, CAD, thrombocytopenia, chronic diabetic foot ulcer and CKD stage 3b who was discharged from outside hospital earlier today after being managed for UTI and presented to the ER generalized weakness and tachycardia.   Patient is a currently seeing me in clinic for treatment of chronic right foot ulcer.    Patient has had bone infection, which was subsequently treated and followed by infectious disease.    Podiatry was consulted for continued treatment of patients wound.     Arterial ultrasounds ordered and reviewed.  Patient has seen vascular surgery outpatient.  Will consult vascular to determine if any intervention is recommended  IMPRESSION:     1.  Scattered atherosclerosis with triphasic and biphasic waveforms. Findings indicate mild PVD.  2.  Borderline elevated peak systolic velocity of the left mid SFA indicates mild stenosis.  3.  No vascular occlusions.  4.  Bilateral subcutaneous edema of the legs.

## 2023-06-15 NOTE — PROCEDURES
"Ana Bullard is a 63 y.o. male patient.    Temp: 97.2 °F (36.2 °C) (06/15/23 0400)  Pulse: 60 (06/15/23 0740)  Resp: 18 (06/15/23 0740)  BP: (!) 114/56 (06/15/23 0500)  SpO2: 98 % (06/15/23 0740)  Weight: 132.2 kg (291 lb 7.2 oz) (06/13/23 0113)  Height: 6' 1" (185.4 cm) (06/13/23 0113)       Debridement    Date/Time: 6/15/2023 8:44 AM  Performed by: Leah Avila DPM  Authorized by: Leah Avila DPM     Time out: Immediately prior to procedure a "time out" was called to verify the correct patient, procedure, equipment, support staff and site/side marked as required.    Consent Done?:  Yes (Verbal)    Preparation: Patient was prepped and draped in usual sterile fashion, Patient was prepped and draped with aseptic technique and Patient was prepped and draped with clean technique    Local anesthesia used?: No      Wound Details:    Location:  Right foot    Location:  Right 5th Metatarsal Head    Type of Debridement:  Excisional       Length (cm):  0.5       Area (sq cm):  0.3       Width (cm):  0.6       Percent Debrided (%):  100       Depth (cm):  0.2       Total Area Debrided (sq cm):  0.3    Depth of debridement:  Subcutaneous tissue    Tissue debrided:  Subcutaneous    Devitalized tissue debrided:  Biofilm, Callus, Slough, Other and Fibrin    Instruments:  Forceps, Blade and Curette  Bleeding:  Minimal  Hemostasis Achieved: Yes  Method Used:  Pressure  Patient tolerance:  Patient tolerated the procedure well with no immediate complications  1st Wound Pain Assessment: 0    6/15/2023    "

## 2023-06-15 NOTE — PLAN OF CARE
06/15/23 0740   Patient Assessment/Suction   Level of Consciousness (AVPU) alert   Respiratory Effort Normal;Unlabored   All Lung Fields Breath Sounds diminished   Rhythm/Pattern, Respiratory no shortness of breath reported   PRE-TX-O2   Device (Oxygen Therapy) nasal cannula   $ Is the patient on Low Flow Oxygen? Yes   Flow (L/min) 2   SpO2 98 %   Pulse Oximetry Type Continuous   $ Pulse Oximetry - Multiple Charge Pulse Oximetry - Multiple   Pulse 60   Resp 18   Aerosol Therapy   $ Aerosol Therapy Charges Aerosol Treatment   Daily Review of Necessity (SVN) completed   Respiratory Treatment Status (SVN) given   Treatment Route (SVN) mask;oxygen   Patient Position (SVN) Ruiz's;HOB elevated   Post Treatment Assessment (SVN) breath sounds improved   Signs of Intolerance (SVN) none   Preset CPAP/BiPAP Settings   Mode Of Delivery Standby   Equipment Type   (HOME UNIT)   Education   $ Education Bronchodilator;15 min   Respiratory Evaluation   $ Care Plan Tech Time 15 min   Home Oxygen   Has Home Oxygen? No

## 2023-06-15 NOTE — PROGRESS NOTES
Nephrology Consult Note        Patient Name: Ana Bullard  MRN: [id number]    Patient Class: IP- Inpatient   Admission Date: 6/12/2023  Length of Stay: 3 days  Date of Service: 6/15/2023    Attending Physician: Haseeb Romero MD  Primary Care Provider: YRN Pollard    Reason for Consult: ailyn    SUBJECTIVE:     HPI: 63-year-old  male with multiple medical comorbidities including chronic combined CHF, type 2 DM, CAD, thrombocytopenia, chronic diabetic foot ulcer and CKD stage 3b, who was discharged from another hospital after admission for UTI presented here with generalized weakness and tachycardia.    He was discharged on oral antibiotics and resumption of home health. His home health nurse found him to be tachycardic and directed him to the ER. Initial rhythm on presentation was wide complex tachycardia. Wife reports that he was not given his amiodarone when he was hospitalized.     In the ED here, after consultation with Cardiology patient underwent successful synchronized cardioversion with restoration of normal sinus rhythm.    6/14 VSS. HR stable, BP low over night. UO is low. sCr rising , consistent with ATN due to hemodynamic instability. Gote more lasix this AM by primary team along with initiation of dobutamine gtt.    6/15 VSS. Continue dobutamine and lasix. S/p debridement by Dr. Avila on 6/15. Appreciate Cards and Vascular surgery input.    Past Medical History:   Diagnosis Date    AICD (automatic cardioverter/defibrillator) present     Anemia, chronic disease 07/27/2021    Anemia, unspecified 07/27/2021    Anxiety and depression 2012    CAD (coronary artery disease) 2012    Cardiomegaly 2016    CHF (congestive heart failure) 2012    Cholecystitis 2017    Complete heart block 2012    Diabetic foot ulcer     DVT (deep venous thrombosis) 2012    And pulmonary embolus    GERD (gastroesophageal reflux disease) 2010    Heparin induced thrombocytopenia     Hyperlipemia 2011    IDDM (insulin  "dependent diabetes mellitus) 1983    With neuropathy    Ischemic cardiomyopathy     MI (myocardial infarction)     Morbid obesity     MRSA (methicillin resistant Staphylococcus aureus) infection 2019    Noncompliance with therapeutic plan     Normochromic normocytic anemia 2021    Osteomyelitis of right foot 2019    Osteomyelitis of right foot 2022    Klebsiella from bone, GBS in blood    Pulmonary edema     Respiratory failure     Sepsis 2019    Due to cellulitis right leg    Sleep apnea 2013    Thrombocytopathy     Venous stasis dermatitis of both lower extremities      Past Surgical History:   Procedure Laterality Date    CARDIAC PACEMAKER PLACEMENT  2012    CARDIAC PACEMAKER PLACEMENT  10/04/2018    battery replacement    CORONARY ARTERY BYPASS GRAFT  2012    ESOPHAGOGASTRODUODENOSCOPY  2017    SAMARA FILTER PLACEMENT  2012    vena cava    LUMBAR SYMPATHETIC NERVE BLOCK Right 2019    Procedure: BLOCK, NERVE, SYMPATHETIC, LUMBAR;  Surgeon: Tashi Good MD;  Location: [facility];  Service: Pain Management;  Laterality: Right;  RIGHT SYMPATHETIC NERVE BLOCK     Family History   Problem Relation Age of Onset    Arthritis Mother     Diabetes Mother     Cancer Father     Heart disease Father     Stroke Father      Social History     Tobacco Use    Smoking status: Former     Packs/day: 2.00     Years: 38.00     Pack years: 76.00     Types: Cigarettes     Quit date:      Years since quittin.4    Smokeless tobacco: Never   Substance Use Topics    Alcohol use: No    Drug use: Never       Review of patient's allergies indicates:   Allergen Reactions    Vasopressin Anaphylaxis and Other (See Comments)     Increased pressure in his head; felt like his eyeballs and veins were going to pop out of his head.     Vasopressin analogues Other (See Comments) and Anaphylaxis     "felt like eyes going to pop out of my head"  Other reaction(s): Other (See " "Comments)  "felt like eyes going to pop out of my head"  Increased pressure in his head; felt like his eyeballs and veins were going to pop out of his head.     Heparin Other (See Comments)     Other reaction(s): "depleted my blood platets"    Decreased platelet count    Heparin analogues Other (See Comments)     Depleted WBC count    Morphine Hallucinations, Other (See Comments) and Anxiety     Other reaction(s): Hallucinations  Paranoid, hallucinations         Outpatient meds:  No current facility-administered medications on file prior to encounter.     Current Outpatient Medications on File Prior to Encounter   Medication Sig Dispense Refill    albuterol (PROVENTIL) 5 mg/mL nebulizer solution Take 2.5 mg by nebulization every 6 (six) hours as needed.      albuterol-ipratropium (DUO-NEB) 2.5 mg-0.5 mg/3 mL nebulizer solution Take 3 mLs by nebulization every 6 (six) hours as needed.      amiodarone (PACERONE) 200 MG Tab Take 200 mg by mouth once daily.      aspirin 81 MG Chew Take 81 mg by mouth once daily.      atorvastatin (LIPITOR) 10 MG tablet Take 1 tablet by mouth once daily.  1    B complex-vitamin C-folic acid (JLUIS-JIMMIE/NEPHRO-JIMMIE) 0.8 mg Tab Take 0.4 mg by mouth.      BASAGLAR KWIKPEN U-100 INSULIN glargine 100 units/mL (3mL) SubQ pen Inject 25 Units into the skin once daily.  3    carvediloL (COREG) 3.125 MG tablet Take 3.125 mg by mouth 2 (two) times daily.      cefdinir (OMNICEF) 300 MG capsule Take 300 mg by mouth 2 (two) times daily.      cefTRIAXone (ROCEPHIN) 10 gram injection       cetirizine (ZYRTEC) 10 MG tablet Take 10 mg by mouth once daily.      CORLANOR 5 mg Tab Take 5 mg by mouth nightly.      doxycycline (VIBRAMYCIN) 100 MG Cap Take 1 capsule (100 mg total) by mouth every 12 (twelve) hours. (Patient not taking: Reported on 5/31/2023) 20 capsule 0    FARXIGA 10 mg tablet Take 10 mg by mouth once daily.      furosemide (LASIX) 40 MG tablet Take 1 tablet (40 mg total) by mouth once daily. 30 " tablet 1    gabapentin (NEURONTIN) 600 MG tablet Take 1 tablet by mouth 3 (three) times daily.  0    lisinopriL (PRINIVIL,ZESTRIL) 2.5 MG tablet Take 2.5 mg by mouth 2 (two) times daily.      mupirocin (BACTROBAN) 2 % ointment APPLY TOPICALLY ONCE DAILY. (Patient taking differently: Apply 1 g topically once daily.) 22 g 5    pantoprazole (PROTONIX) 40 MG tablet Take 1 tablet by mouth once daily.  0    traMADoL (ULTRAM) 50 mg tablet Take 50 mg by mouth every 6 (six) hours as needed.      TRULICITY 0.75 mg/0.5 mL pen injector Inject 0.75 mg into the skin every 7 days.      vitamin B complex-folic acid 0.4 mg Tab Take 1 tablet by mouth.         Scheduled meds:   albuterol sulfate  2.5 mg Nebulization Q6H WAKE    amiodarone  200 mg Oral BID    aspirin  81 mg Oral Daily    atorvastatin  10 mg Oral Daily    cefTRIAXone (ROCEPHIN) IVPB  1 g Intravenous Q24H    chlorhexidine  15 mL Mouth/Throat BID    collagenase   Topical (Top) Daily    furosemide (LASIX) injection  80 mg Intravenous Q12H    gabapentin  300 mg Oral TID    insulin detemir U-100  10 Units Subcutaneous Daily    mupirocin   Nasal BID    pantoprazole  40 mg Oral Before breakfast       Infusions:   DOBUTamine IV infusion (non-titrating) 2.5 mcg/kg/min (06/14/23 1251)       PRN meds:  acetaminophen, dextrose 50%, dextrose 50%, famotidine, glucose, glucose, insulin aspart U-100, magnesium oxide, magnesium oxide, melatonin, naloxone, ondansetron, polyethylene glycol, potassium bicarbonate, potassium bicarbonate, potassium bicarbonate, potassium, sodium phosphates, potassium, sodium phosphates, potassium, sodium phosphates, traMADoL    Review of Systems:    OBJECTIVE:     Vital Signs and IO:  Temp:  [96.9 °F (36.1 °C)-97.8 °F (36.6 °C)]   Pulse:  [60-67]   Resp:  [18-22]   BP: (104-158)/(53-82)   SpO2:  [72 %-100 %]   I/O last 3 completed shifts:  In: 2092.9 [P.O.:870; I.V.:1022.9; IV Piggyback:200]  Out: 1000 [Urine:1000]  Wt Readings from Last 5 Encounters:    06/13/23 132.2 kg (291 lb 7.2 oz)   06/01/23 126.6 kg (279 lb)   05/31/23 126.7 kg (279 lb 6.4 oz)   05/11/23 121.6 kg (268 lb)   04/27/23 121.6 kg (268 lb)     Body mass index is 38.45 kg/m².    Physical Exam  Constitutional:       General: Patient is not in acute distress.     Appearance: Patient is well-developed. She is not diaphoretic.   HENT:      Head: Normocephalic and atraumatic.      Mouth/Throat: Mucous membranes are moist.   Eyes:      General: No scleral icterus.     Pupils: Pupils are equal, round, and reactive to light.   Cardiovascular:      Rate and Rhythm: Normal rate and regular rhythm.   Pulmonary:      Effort: Pulmonary effort is normal. No respiratory distress.      Breath sounds: No stridor.   Abdominal:      General: There is no distension.      Palpations: Abdomen is soft.   Musculoskeletal:         General: No deformity. Normal range of motion.      Cervical back: Neck supple.   Skin:     General: Skin is warm and dry.      Findings: No rash present. No erythema.   Neurological:      Mental Status: Patient is alert and oriented to person, place, and time.      Cranial Nerves: No cranial nerve deficit.   Psychiatric:         Behavior: Behavior normal.     Laboratory:  Recent Labs   Lab 06/13/23  0411 06/14/23  0413 06/15/23  0431    133* 134*   K 4.9 4.8 4.5   CL 99 97 96   CO2 26 25 25   BUN 64* 77* 79*   CREATININE 3.0* 3.4* 3.5*   * 171* 137*         Recent Labs   Lab 06/12/23  1835 06/13/23  0411 06/14/23  0413 06/15/23  0431   CALCIUM 8.2* 8.5* 8.4* 8.3*   ALBUMIN 3.2*  --   --   --    MG 2.3 2.3 2.6 2.4               No results for input(s): POCTGLUCOSE in the last 168 hours.    Recent Labs   Lab 09/02/22  0439 11/23/22  0951   Hemoglobin A1C 7.7 H 6.8 H         Recent Labs   Lab 06/12/23 1835 06/13/23 0411 06/14/23  0413 06/15/23  0431   WBC 7.76 6.74 5.39 4.79   HGB 9.6* 9.4* 9.9* 9.4*   HCT 33.7* 32.5* 34.4* 31.8*   PLT 75* 69* 83* 68*   MCV 90 89 90 88   MCHC 28.5*  28.9* 28.8* 29.6*   MONO 10.3  0.8  --   --   --          Recent Labs   Lab 06/12/23  1835   BILITOT 1.6*   PROT 7.5   ALBUMIN 3.2*   ALKPHOS 70   ALT 22   AST 31         Recent Labs   Lab 06/12/23 2241   Color, UA Yellow   Appearance, UA Cloudy A   pH, UA 5.0   Specific Gravity, UA 1.020   Protein, UA 1+ A   Glucose, UA 1+ A   Ketones, UA Negative   Urobilinogen, UA 4.0-6.0 A   Bilirubin (UA) Negative   Occult Blood UA 3+ A   Nitrite, UA Negative   RBC, UA >100 H   WBC, UA 28 H   Bacteria Negative   Hyaline Casts, UA 32 A         Recent Labs   Lab 09/01/22  0333   POC PH 7.350   POC PCO2 54.1 H   POC HCO3 29.9 H   POC PO2 18 L   POC SATURATED O2 24 L   POC BE 4   Sample VENOUS         Microbiology Results (last 7 days)       Procedure Component Value Units Date/Time    Urine culture [[id number]] Collected: 06/12/23 2241    Order Status: Completed Specimen: Urine Updated: 06/15/23 0742     Urine Culture, Routine No growth    Narrative:      Specimen Source->Urine    Blood culture x two cultures. Draw prior to antibiotics. [[id number]] Collected: 06/12/23 1843    Order Status: Completed Specimen: Blood from Peripheral, Antecubital, Left Updated: 06/14/23 2032     Blood Culture, Routine No Growth to date      No Growth to date      No Growth to date    Narrative:      Aerobic and anaerobic    Blood culture x two cultures. Draw prior to antibiotics. [[id number]] Collected: 06/12/23 1927    Order Status: Completed Specimen: Blood from Peripheral, Antecubital, Right Updated: 06/14/23 2032     Blood Culture, Routine No Growth to date      No Growth to date      No Growth to date    Narrative:      Aerobic and anaerobic            ASSESSMENT/PLAN:     AD 2/2 VT s/p cardioversion  Hematuria of unclear significance with many hyaline casts  CKD stage 3  CHF exacerbation  DM  Imaging negative for  obstruction.  No NSAIDs, ACEI/ARB, IV contrast or other nephrotoxins.  Keep MAP > 60, SBP > 100.  Dose meds for GFR < 30  ml/min.  Renal diet - low K, low phos.  Agree with Cards plan for PPM integration, IV diuretic + dobutamine gtt, holding ACEi and BBL for now.  Control DM.    Anemia of CKD  Hgb and HCT are acceptable. Monitor for now.  Will provide GANGA and/or IV iron PRN.    MBD / Secondary HPT  Ca, Phos, PTH and vitamin D levels are acceptable.   Phos binders, vitamin D and analogues, calcimimetics will be given as needed.    HTN  BP seem controlled.   Tolerate asymptomatic HTN up to -160.  Meds per Cards for now.    Thank you for allowing us to participate in the care of your patient!   We will follow the patient and provide recommendations as needed.    Patient care time was spent personally by me on the following activities:     Obtaining a history.  Examination of patient.  Providing medical care at the patients bedside.  Developing a treatment plan with patient or surrogate and bedside caregivers.  Ordering and reviewing laboratory studies, radiographic studies, pulse oximetry.  Ordering and performing treatments and interventions.  Evaluation of patient's response to treatment.  Discussions with consultants while on the unit and immediately available to the patient.  Re-evaluation of the patient's condition.  Documentation in the medical record.     Maicol Leach MD    [facility]  [address] [place] [address], [place] [id number] [phone] - tel  [phone] - fax    6/15/2023

## 2023-06-15 NOTE — PLAN OF CARE
06/14/23 2012   Patient Assessment/Suction   Level of Consciousness (AVPU) alert   Respiratory Effort Normal;Unlabored   All Lung Fields Breath Sounds diminished   PRE-TX-O2   Device (Oxygen Therapy) nasal cannula   $ Is the patient on Low Flow Oxygen? Yes   Flow (L/min) 3   SpO2 100 %   Pulse Oximetry Type Continuous   $ Pulse Oximetry - Multiple Charge Pulse Oximetry - Multiple   Pulse 60   Resp 18   Aerosol Therapy   $ Aerosol Therapy Charges Aerosol Treatment   Daily Review of Necessity (SVN) completed   Respiratory Treatment Status (SVN) given   Treatment Route (SVN) mask   Patient Position (SVN) sitting on edge of bed   Post Treatment Assessment (SVN) breath sounds improved   Signs of Intolerance (SVN) none   Breath Sounds Post-Respiratory Treatment   Throughout All Fields Post-Treatment All Fields   Throughout All Fields Post-Treatment aeration increased   Post-treatment Heart Rate (beats/min) 60   Post-treatment Resp Rate (breaths/min) 18

## 2023-06-15 NOTE — CONSULTS
[facility]iatry  Consult Note    Patient Name: Ana Bullard  MRN: [id number]  Admission Date: 6/12/2023  Hospital Length of Stay: 3 days  Attending Physician: Haseeb Romero MD  Primary Care Provider: YRN Pollard     Consults  Subjective:     History of Present Illness:   63-year-old  male with multiple medical comorbidities including but not limited to chronic combined CHF, type 2 DM, CAD, thrombocytopenia, chronic diabetic foot ulcer and CKD stage 3b who was discharged from outside hospital earlier today after being managed for UTI and presented to the ER generalized weakness and tachycardia.   Patient is a currently seeing me in clinic for treatment of chronic right foot ulcer.    Patient has had bone infection, which was subsequently treated and followed by infectious disease.    Podiatry was consulted for continued treatment of patients wound.     Arterial ultrasounds ordered and reviewed.  Patient has seen vascular surgery outpatient.  Will consult vascular to determine if any intervention is recommended  IMPRESSION:     1.  Scattered atherosclerosis with triphasic and biphasic waveforms. Findings indicate mild PVD.  2.  Borderline elevated peak systolic velocity of the left mid SFA indicates mild stenosis.  3.  No vascular occlusions.  4.  Bilateral subcutaneous edema of the legs.      Scheduled Meds:   albuterol sulfate  2.5 mg Nebulization Q6H WAKE    amiodarone  200 mg Oral BID    aspirin  81 mg Oral Daily    atorvastatin  10 mg Oral Daily    cefTRIAXone (ROCEPHIN) IVPB  1 g Intravenous Q24H    chlorhexidine  15 mL Mouth/Throat BID    furosemide (LASIX) injection  80 mg Intravenous Q12H    gabapentin  300 mg Oral TID    insulin detemir U-100  10 Units Subcutaneous Daily    mupirocin   Nasal BID    pantoprazole  40 mg Oral Before breakfast     Continuous Infusions:   DOBUTamine IV infusion (non-titrating) 2.5 mcg/kg/min (06/14/23 1251)     PRN  "Meds:acetaminophen, dextrose 50%, dextrose 50%, glucose, glucose, insulin aspart U-100, magnesium oxide, magnesium oxide, melatonin, naloxone, ondansetron, polyethylene glycol, potassium bicarbonate, potassium bicarbonate, potassium bicarbonate, potassium, sodium phosphates, potassium, sodium phosphates, potassium, sodium phosphates, traMADoL    Review of patient's allergies indicates:   Allergen Reactions    Vasopressin Anaphylaxis and Other (See Comments)     Increased pressure in his head; felt like his eyeballs and veins were going to pop out of his head.     Vasopressin analogues Other (See Comments) and Anaphylaxis     "felt like eyes going to pop out of my head"  Other reaction(s): Other (See Comments)  "felt like eyes going to pop out of my head"  Increased pressure in his head; felt like his eyeballs and veins were going to pop out of his head.     Heparin Other (See Comments)     Other reaction(s): "depleted my blood platets"    Decreased platelet count    Heparin analogues Other (See Comments)     Depleted WBC count    Morphine Hallucinations, Other (See Comments) and Anxiety     Other reaction(s): Hallucinations  Paranoid, hallucinations          Past Medical History:   Diagnosis Date    AICD (automatic cardioverter/defibrillator) present     Anemia, chronic disease 07/27/2021    Anemia, unspecified 07/27/2021    Anxiety and depression 2012    CAD (coronary artery disease) 2012    Cardiomegaly 2016    CHF (congestive heart failure) 2012    Cholecystitis 2017    Complete heart block 2012    Diabetic foot ulcer     DVT (deep venous thrombosis) 2012    And pulmonary embolus    GERD (gastroesophageal reflux disease) 2010    Heparin induced thrombocytopenia     Hyperlipemia 2011    IDDM (insulin dependent diabetes mellitus) 1983    With neuropathy    Ischemic cardiomyopathy 2012    MI (myocardial infarction) 2012    Morbid obesity     MRSA (methicillin resistant Staphylococcus aureus) " infection 2019    Noncompliance with therapeutic plan     Normochromic normocytic anemia 2021    Osteomyelitis of right foot 2019    Osteomyelitis of right foot 2022    Klebsiella from bone, GBS in blood    Pulmonary edema     Respiratory failure     Sepsis 2019    Due to cellulitis right leg    Sleep apnea 2013    Thrombocytopathy     Venous stasis dermatitis of both lower extremities      Past Surgical History:   Procedure Laterality Date    CARDIAC PACEMAKER PLACEMENT  2012    CARDIAC PACEMAKER PLACEMENT  10/04/2018    battery replacement    CORONARY ARTERY BYPASS GRAFT  2012    ESOPHAGOGASTRODUODENOSCOPY  2017    SAMARA FILTER PLACEMENT  2012    vena cava    LUMBAR SYMPATHETIC NERVE BLOCK Right 2019    Procedure: BLOCK, NERVE, SYMPATHETIC, LUMBAR;  Surgeon: Tashi Good MD;  [facility];  Service: Pain Management;  Laterality: Right;  RIGHT SYMPATHETIC NERVE BLOCK       Family History       Problem Relation (Age of Onset)    Arthritis Mother    Cancer Father    Diabetes Mother    Heart disease Father    Stroke Father          Tobacco Use    Smoking status: Former     Packs/day: 2.00     Years: 38.00     Pack years: 76.00     Types: Cigarettes     Quit date:      Years since quittin.4    Smokeless tobacco: Never   Substance and Sexual Activity    Alcohol use: No    Drug use: Never    Sexual activity: Never     Review of Systems  Objective:     Vital Signs (Most Recent):  Temp: 97.4 °F (36.3 °C) (23)  Pulse: 60 (23)  Resp: 18 (23)  BP: 120/60 (230)  SpO2: 100 % (23) Vital Signs (24h Range):  Temp:  [96.5 °F (35.8 °C)-97.8 °F (36.6 °C)] 97.4 °F (36.3 °C)  Pulse:  [59-68] 60  Resp:  [9-22] 18  SpO2:  [91 %-100 %] 100 %  BP: ()/(52-88) 120/60     Weight: 132.2 kg (291 lb 7.2 oz)  Body mass index is 38.45 kg/m².    Foot Exam    Right Foot/Ankle      Neurovascular  Dorsalis pedis: 1+  Posterior tibial: 1+  Saphenous nerve sensation: absent  Tibial nerve sensation: absent  Superficial peroneal nerve sensation: absent  Deep peroneal nerve sensation: absent  Sural nerve sensation: absent      Left Foot/Ankle      Neurovascular  Dorsalis pedis: 1+  Posterior tibial: 1+  Saphenous nerve sensation: absent  Tibial nerve sensation: absent  Superficial peroneal nerve sensation: absent  Deep peroneal nerve sensation: absent  Sural nerve sensation: absent      Grade 2 ulceration located sub 5th metatarsal head right foot.  No signs of infection.  Measures approximately 0.5 cm x 0.6 cm x 0.2 cm deep.   Periwound callus.  Mixed necrotic and slough in wound base.    Laboratory:  All pertinent labs reviewed within the last 24 hours.    Diagnostic Results:  I have reviewed all pertinent imaging results/findings within the past 24 hours.    Assessment/Plan:     Orthopedic  Ulcer of right foot with fat layer exposed  Bedside debridement, see procedure note    Arterial ultrasounds ordered and reviewed.  Patient has seen vascular surgery outpatient.  Will consult vascular to determine if any intervention is recommended    Wound care dressing changes to consist of Santyl followed by Aquacel Ag    Brent and protein drinks ordered      Patient to follow up outpatient once discharged       Counseled patient on increasing protein intake, not getting wound wet, keeping dressing clean dry and intact, following a healthy diet, elevating legs when able, removing pressure from wound          Thank you for your consult. I will follow-up with patient. Please contact us if you have any additional questions.    Leah Avila DPM  Podiatry  [facility]

## 2023-06-15 NOTE — SUBJECTIVE & OBJECTIVE
"Scheduled Meds:   albuterol sulfate  2.5 mg Nebulization Q6H WAKE    amiodarone  200 mg Oral BID    aspirin  81 mg Oral Daily    atorvastatin  10 mg Oral Daily    cefTRIAXone (ROCEPHIN) IVPB  1 g Intravenous Q24H    chlorhexidine  15 mL Mouth/Throat BID    furosemide (LASIX) injection  80 mg Intravenous Q12H    gabapentin  300 mg Oral TID    insulin detemir U-100  10 Units Subcutaneous Daily    mupirocin   Nasal BID    pantoprazole  40 mg Oral Before breakfast     Continuous Infusions:   DOBUTamine IV infusion (non-titrating) 2.5 mcg/kg/min (06/14/23 1251)     PRN Meds:acetaminophen, dextrose 50%, dextrose 50%, glucose, glucose, insulin aspart U-100, magnesium oxide, magnesium oxide, melatonin, naloxone, ondansetron, polyethylene glycol, potassium bicarbonate, potassium bicarbonate, potassium bicarbonate, potassium, sodium phosphates, potassium, sodium phosphates, potassium, sodium phosphates, traMADoL    Review of patient's allergies indicates:   Allergen Reactions    Vasopressin Anaphylaxis and Other (See Comments)     Increased pressure in his head; felt like his eyeballs and veins were going to pop out of his head.     Vasopressin analogues Other (See Comments) and Anaphylaxis     "felt like eyes going to pop out of my head"  Other reaction(s): Other (See Comments)  "felt like eyes going to pop out of my head"  Increased pressure in his head; felt like his eyeballs and veins were going to pop out of his head.     Heparin Other (See Comments)     Other reaction(s): "depleted my blood platets"    Decreased platelet count    Heparin analogues Other (See Comments)     Depleted WBC count    Morphine Hallucinations, Other (See Comments) and Anxiety     Other reaction(s): Hallucinations  Paranoid, hallucinations          Past Medical History:   Diagnosis Date    AICD (automatic cardioverter/defibrillator) present     Anemia, chronic disease 07/27/2021    Anemia, unspecified 07/27/2021    Anxiety and depression 2012    " CAD (coronary artery disease) 2012    Cardiomegaly 2016    CHF (congestive heart failure) 2012    Cholecystitis 2017    Complete heart block     Diabetic foot ulcer     DVT (deep venous thrombosis)     And pulmonary embolus    GERD (gastroesophageal reflux disease) 2010    Heparin induced thrombocytopenia     Hyperlipemia 2011    IDDM (insulin dependent diabetes mellitus) 1983    With neuropathy    Ischemic cardiomyopathy     MI (myocardial infarction)     Morbid obesity     MRSA (methicillin resistant Staphylococcus aureus) infection 2019    Noncompliance with therapeutic plan     Normochromic normocytic anemia 2021    Osteomyelitis of right foot 2019    Osteomyelitis of right foot 2022    Klebsiella from bone, GBS in blood    Pulmonary edema     Respiratory failure     Sepsis 2019    Due to cellulitis right leg    Sleep apnea 2013    Thrombocytopathy     Venous stasis dermatitis of both lower extremities      Past Surgical History:   Procedure Laterality Date    CARDIAC PACEMAKER PLACEMENT  2012    CARDIAC PACEMAKER PLACEMENT  10/04/2018    battery replacement    CORONARY ARTERY BYPASS GRAFT  2012    ESOPHAGOGASTRODUODENOSCOPY  2017    SAMARA FILTER PLACEMENT  2012    vena cava    LUMBAR SYMPATHETIC NERVE BLOCK Right 2019    Procedure: BLOCK, NERVE, SYMPATHETIC, LUMBAR;  Surgeon: Tashi Good MD;  Location: [facility];  Service: Pain Management;  Laterality: Right;  RIGHT SYMPATHETIC NERVE BLOCK       Family History       Problem Relation (Age of Onset)    Arthritis Mother    Cancer Father    Diabetes Mother    Heart disease Father    Stroke Father          Tobacco Use    Smoking status: Former     Packs/day: 2.00     Years: 38.00     Pack years: 76.00     Types: Cigarettes     Quit date:      Years since quittin.4    Smokeless tobacco: Never   Substance and Sexual Activity    Alcohol use: No    Drug use: Never    Sexual activity:  Never     Review of Systems  Objective:     Vital Signs (Most Recent):  Temp: 97.4 °F (36.3 °C) (06/14/23 1951)  Pulse: 60 (06/14/23 2012)  Resp: 18 (06/14/23 2012)  BP: 120/60 (06/14/23 1900)  SpO2: 100 % (06/14/23 2012) Vital Signs (24h Range):  Temp:  [96.5 °F (35.8 °C)-97.8 °F (36.6 °C)] 97.4 °F (36.3 °C)  Pulse:  [59-68] 60  Resp:  [9-22] 18  SpO2:  [91 %-100 %] 100 %  BP: ()/(52-88) 120/60     Weight: 132.2 kg (291 lb 7.2 oz)  Body mass index is 38.45 kg/m².    Foot Exam    Right Foot/Ankle     Neurovascular  Dorsalis pedis: 1+  Posterior tibial: 1+  Saphenous nerve sensation: absent  Tibial nerve sensation: absent  Superficial peroneal nerve sensation: absent  Deep peroneal nerve sensation: absent  Sural nerve sensation: absent      Left Foot/Ankle      Neurovascular  Dorsalis pedis: 1+  Posterior tibial: 1+  Saphenous nerve sensation: absent  Tibial nerve sensation: absent  Superficial peroneal nerve sensation: absent  Deep peroneal nerve sensation: absent  Sural nerve sensation: absent      Grade 2 ulceration located sub 5th metatarsal head right foot.  No signs of infection.  Measures approximately 0.5 cm x 0.6 cm x 0.2 cm deep.   Periwound callus.  Mixed necrotic and slough in wound base.    Laboratory:  All pertinent labs reviewed within the last 24 hours.    Diagnostic Results:  I have reviewed all pertinent imaging results/findings within the past 24 hours.

## 2023-06-15 NOTE — CONSULTS
Patient underwent angiography are earlier this month which demonstrated no significant occlusive disease in the right lower extremity with 3 vessel runoff into the foot.  Feel that blood supply is optimal.  No vascular intervention necessary.

## 2023-06-15 NOTE — ASSESSMENT & PLAN NOTE
Bedside debridement, see procedure note    Arterial ultrasounds ordered and reviewed.  Patient has seen vascular surgery outpatient.  Will consult vascular to determine if any intervention is recommended    Wound care dressing changes to consist of Santyl followed by Aquacel Ag    Brent and protein drinks ordered      Patient to follow up outpatient once discharged       Counseled patient on increasing protein intake, not getting wound wet, keeping dressing clean dry and intact, following a healthy diet, elevating legs when able, removing pressure from wound

## 2023-06-15 NOTE — PLAN OF CARE
Problem: Adult Inpatient Plan of Care  Goal: Plan of Care Review  Outcome: Ongoing, Progressing  Goal: Patient-Specific Goal (Individualized)  Outcome: Ongoing, Progressing  Goal: Absence of Hospital-Acquired Illness or Injury  Outcome: Ongoing, Progressing  Goal: Optimal Comfort and Wellbeing  Outcome: Ongoing, Progressing  Goal: Readiness for Transition of Care  Outcome: Ongoing, Progressing     Problem: Diabetes Comorbidity  Goal: Blood Glucose Level Within Targeted Range  Outcome: Ongoing, Progressing     Problem: Adjustment to Illness (Sepsis/Septic Shock)  Goal: Optimal Coping  Outcome: Ongoing, Progressing     Problem: Bleeding (Sepsis/Septic Shock)  Goal: Absence of Bleeding  Outcome: Ongoing, Progressing     Problem: Glycemic Control Impaired (Sepsis/Septic Shock)  Goal: Blood Glucose Level Within Desired Range  Outcome: Ongoing, Progressing     Problem: Infection Progression (Sepsis/Septic Shock)  Goal: Absence of Infection Signs and Symptoms  Outcome: Ongoing, Progressing     Problem: Nutrition Impaired (Sepsis/Septic Shock)  Goal: Optimal Nutrition Intake  Outcome: Ongoing, Progressing     Problem: Fluid and Electrolyte Imbalance (Acute Kidney Injury/Impairment)  Goal: Fluid and Electrolyte Balance  Outcome: Ongoing, Progressing     Problem: Oral Intake Inadequate (Acute Kidney Injury/Impairment)  Goal: Optimal Nutrition Intake  Outcome: Ongoing, Progressing     Problem: Renal Function Impairment (Acute Kidney Injury/Impairment)  Goal: Effective Renal Function  Outcome: Ongoing, Progressing     Problem: Fluid Imbalance (Pneumonia)  Goal: Fluid Balance  Outcome: Ongoing, Progressing     Problem: Infection (Pneumonia)  Goal: Resolution of Infection Signs and Symptoms  Outcome: Ongoing, Progressing     Problem: Respiratory Compromise (Pneumonia)  Goal: Effective Oxygenation and Ventilation  Outcome: Ongoing, Progressing     Problem: Infection  Goal: Absence of Infection Signs and Symptoms  Outcome:  Ongoing, Progressing     Problem: Impaired Wound Healing  Goal: Optimal Wound Healing  Outcome: Ongoing, Progressing     Problem: Skin Injury Risk Increased  Goal: Skin Health and Integrity  Outcome: Ongoing, Progressing

## 2023-06-15 NOTE — PROGRESS NOTES
[facility]   Cardiology Note    Consult Requested By: hospital medicine  Reason for Consult: Ventricular tachycardia    SUBJECTIVE:     History of Present Illness: Patient is a 62 yo female with PMHx of CAD s/p CABG 2012, HFrEF, AICD, DM2, CKD3, HTN, HLP, chronic diabetic foot ulcer who presented to the ED with shortness of breath and generalized weakness. He was just discharged from a [facility] [place] yesterday with a UTI. He reports he was in that hospital for 2 days for IV antibiotics and felt overall weak the entire time. When he was discharged home his symptoms worsened and he experienced presyncopal symptoms. He denies syncope, chest pain, N/V, fevers, chills. Reports increased LE edema over the last few days. In the ED EKG showed wide complex tachycardia. He reports he was not given amiodarone while in the hospital. He underwent successful cardioversion with restoration of NSR. ED workup showed, BNP 1162. Troponin elevated to 17. UA positive for UTI, lactate negative. Bcx NGTD. CXR showed cardiomegaly with no acute cardiopulmonary process. Cr 3 today. Electrolytes normal. He was started on amio gtt. Today patient states his breathing has improved. Pt states he had a peripheral angiogram a few months ago with Dr. Oleary with no intervention needed.     6/14: Pt seen and examined this morning.   Vitals reviewed. Blood pressure 88/52 this morning. Lisinopril and coreg on hold. Labs reviewed. Cr 3.4. Na 133. H&H stable. Nephrology following.  cc. Tele reviewed. No events overnight.     6/15: Pt seen and examined this morning. He states he is feeling much better today. Reports shortness of breath has improved. He denies chest pain or palpitations. Lasix was increased to 80 mg IV q12h yesterday.  cc. On dobutamine. VSS. Labs reviewed. Cr 3.5. H&H 9.4/31.8, stable. LE arterial US showed mild PVD, no significant stenosis. Tele reviewed, no events overnight.     Review of patient's allergies  "indicates:   Allergen Reactions    Vasopressin Anaphylaxis and Other (See Comments)     Increased pressure in his head; felt like his eyeballs and veins were going to pop out of his head.     Vasopressin analogues Other (See Comments) and Anaphylaxis     "felt like eyes going to pop out of my head"  Other reaction(s): Other (See Comments)  "felt like eyes going to pop out of my head"  Increased pressure in his head; felt like his eyeballs and veins were going to pop out of his head.     Heparin Other (See Comments)     Other reaction(s): "depleted my blood platets"    Decreased platelet count    Heparin analogues Other (See Comments)     Depleted WBC count    Morphine Hallucinations, Other (See Comments) and Anxiety     Other reaction(s): Hallucinations  Paranoid, hallucinations         Past Medical History:   Diagnosis Date    AICD (automatic cardioverter/defibrillator) present     Anemia, chronic disease 07/27/2021    Anemia, unspecified 07/27/2021    Anxiety and depression 2012    CAD (coronary artery disease) 2012    Cardiomegaly 2016    CHF (congestive heart failure) 2012    Cholecystitis 2017    Complete heart block 2012    Diabetic foot ulcer     DVT (deep venous thrombosis) 2012    And pulmonary embolus    GERD (gastroesophageal reflux disease) 2010    Heparin induced thrombocytopenia     Hyperlipemia 2011    IDDM (insulin dependent diabetes mellitus) 1983    With neuropathy    Ischemic cardiomyopathy 2012    MI (myocardial infarction) 2012    Morbid obesity     MRSA (methicillin resistant Staphylococcus aureus) infection 01/19/2019    Noncompliance with therapeutic plan 2019    Normochromic normocytic anemia 07/27/2021    Osteomyelitis of right foot 01/2019    Osteomyelitis of right foot 09/01/2022    Klebsiella from bone, GBS in blood    Pulmonary edema 2019    Respiratory failure 2019    Sepsis 01/2019    Due to cellulitis right leg    Sleep apnea 2013    Thrombocytopathy 2012    Venous stasis dermatitis " of both lower extremities      Past Surgical History:   Procedure Laterality Date    CARDIAC PACEMAKER PLACEMENT  2012    CARDIAC PACEMAKER PLACEMENT  10/04/2018    battery replacement    CORONARY ARTERY BYPASS GRAFT  2012    ESOPHAGOGASTRODUODENOSCOPY  2017    SAMARA FILTER PLACEMENT  2012    vena cava    LUMBAR SYMPATHETIC NERVE BLOCK Right 2019    Procedure: BLOCK, NERVE, SYMPATHETIC, LUMBAR;  Surgeon: Tashi Good MD;  Location: [facility] OR;  Service: Pain Management;  Laterality: Right;  RIGHT SYMPATHETIC NERVE BLOCK     Family History   Problem Relation Age of Onset    Arthritis Mother     Diabetes Mother     Cancer Father     Heart disease Father     Stroke Father      Social History     Tobacco Use    Smoking status: Former     Packs/day: 2.00     Years: 38.00     Pack years: 76.00     Types: Cigarettes     Quit date:      Years since quittin.4    Smokeless tobacco: Never   Substance Use Topics    Alcohol use: No    Drug use: Never       Review of Systems:  Review of Systems   Constitutional:  Positive for malaise/fatigue. Negative for chills, diaphoresis and fever.   Respiratory:  Positive for shortness of breath. Negative for cough and sputum production.    Cardiovascular:  Positive for palpitations and leg swelling. Negative for chest pain and orthopnea.   Gastrointestinal:  Negative for nausea and vomiting.   Neurological:  Positive for weakness. Negative for dizziness and loss of consciousness.     OBJECTIVE:     Vital Signs (Most Recent)  Temp: 97.2 °F (36.2 °C) (06/15/23 0400)  Pulse: 60 (06/15/23 0740)  Resp: 18 (06/15/23 0740)  BP: (!) 114/56 (06/15/23 0500)  SpO2: 98 % (06/15/23 0740)    Vital Signs Range (Last 24H):  Temp:  [96.9 °F (36.1 °C)-97.8 °F (36.6 °C)]   Pulse:  [59-67]   Resp:  [18-22]   BP: (104-158)/(56-80)   SpO2:  [72 %-100 %]     I & O (Last 24H):    Intake/Output Summary (Last 24 hours) at 6/15/2023 0900  Last data filed at 6/15/2023 0604  Gross per 24 hour  "  Intake 1732.92 ml   Output 950 ml   Net 782.92 ml         Current Diet:     Current Diet Order   Procedures    Diet Cardiac        Allergies:  Review of patient's allergies indicates:   Allergen Reactions    Vasopressin Anaphylaxis and Other (See Comments)     Increased pressure in his head; felt like his eyeballs and veins were going to pop out of his head.     Vasopressin analogues Other (See Comments) and Anaphylaxis     "felt like eyes going to pop out of my head"  Other reaction(s): Other (See Comments)  "felt like eyes going to pop out of my head"  Increased pressure in his head; felt like his eyeballs and veins were going to pop out of his head.     Heparin Other (See Comments)     Other reaction(s): "depleted my blood platets"    Decreased platelet count    Heparin analogues Other (See Comments)     Depleted WBC count    Morphine Hallucinations, Other (See Comments) and Anxiety     Other reaction(s): Hallucinations  Paranoid, hallucinations         Meds:  Scheduled Meds:   albuterol sulfate  2.5 mg Nebulization Q6H WAKE    amiodarone  200 mg Oral BID    aspirin  81 mg Oral Daily    atorvastatin  10 mg Oral Daily    cefTRIAXone (ROCEPHIN) IVPB  1 g Intravenous Q24H    chlorhexidine  15 mL Mouth/Throat BID    collagenase   Topical (Top) Daily    furosemide (LASIX) injection  80 mg Intravenous Q12H    gabapentin  300 mg Oral TID    insulin detemir U-100  10 Units Subcutaneous Daily    mupirocin   Nasal BID    pantoprazole  40 mg Oral Before breakfast     Continuous Infusions:   DOBUTamine IV infusion (non-titrating) 2.5 mcg/kg/min (06/14/23 1251)     PRN Meds:acetaminophen, dextrose 50%, dextrose 50%, famotidine, glucose, glucose, insulin aspart U-100, magnesium oxide, magnesium oxide, melatonin, naloxone, ondansetron, polyethylene glycol, potassium bicarbonate, potassium bicarbonate, potassium bicarbonate, potassium, sodium phosphates, potassium, sodium phosphates, potassium, sodium phosphates, " traMADoL    Oxygen/Ventilator Data (Last 24H):  (if applicable)            Hemodynamic Parameters (Last 24H):   (if applicable)        Laboratory and Radiology Data:  Recent Results (from the past 24 hour(s))   POCT glucose    Collection Time: 06/14/23 11:09 AM   Result Value Ref Range    POC Glucose 216 (H) 70 - 110   POCT glucose    Collection Time: 06/14/23  4:24 PM   Result Value Ref Range    POC Glucose 166 (H) 70 - 110   POCT glucose    Collection Time: 06/14/23  9:49 PM   Result Value Ref Range    POC Glucose 178 (H) 70 - 110   POCT glucose    Collection Time: 06/15/23 12:14 AM   Result Value Ref Range    POC Glucose 166 (H) 70 - 110   Basic Metabolic Panel (BMP)    Collection Time: 06/15/23  4:31 AM   Result Value Ref Range    Sodium 134 (L) 136 - 145 mmol/L    Potassium 4.5 3.5 - 5.1 mmol/L    Chloride 96 95 - 110 mmol/L    CO2 25 23 - 29 mmol/L    Glucose 137 (H) 70 - 110 mg/dL    BUN 79 (H) 8 - 23 mg/dL    Creatinine 3.5 (H) 0.5 - 1.4 mg/dL    Calcium 8.3 (L) 8.7 - 10.5 mg/dL    Anion Gap 13 8 - 16 mmol/L    eGFR 19.0 (A) >60 mL/min/1.73 m^2   Magnesium    Collection Time: 06/15/23  4:31 AM   Result Value Ref Range    Magnesium 2.4 1.6 - 2.6 mg/dL   CBC Without Differential    Collection Time: 06/15/23  4:31 AM   Result Value Ref Range    WBC 4.79 3.90 - 12.70 K/uL    RBC 3.63 (L) 4.60 - 6.20 M/uL    Hemoglobin 9.4 (L) 14.0 - 18.0 g/dL    Hematocrit 31.8 (L) 40.0 - 54.0 %    MCV 88 82 - 98 fL    MCH 25.9 (L) 27.0 - 31.0 pg    MCHC 29.6 (L) 32.0 - 36.0 g/dL    RDW 20.2 (H) 11.5 - 14.5 %    Platelets 68 (L) 150 - 450 K/uL    MPV 11.4 9.2 - 12.9 fL   POCT glucose    Collection Time: 06/15/23  5:37 AM   Result Value Ref Range    POC Glucose 134 (H) 70 - 110   POCT glucose    Collection Time: 06/15/23  8:51 AM   Result Value Ref Range    POC Glucose 195 (H) 70 - 110     Imaging Results              X-Ray Chest AP Portable (Final result)  Result time 06/12/23 19:00:35      Final result by Driss Merritt MD  (06/12/23 19:00:35)                   Narrative:      EXAM: XR CHEST AP PORTABLE    HISTORY: Sepsis    COMPARISON:Chest x-ray dated 9/7/2022    FINDINGS: The lungs are fairly well-expanded and appear free of focal areas of consolidation. No pleural effusions are evident. The heart is moderately enlarged and unchanged. There is a mass-like opacity lateral to the AP window that appears similar on multiple previous chest x-rays. A CT of the chest 1/14/2019 shows that this corresponds to a dilated main pulmonary artery. A left subclavian dual-chamber biventricular pacemaker remains in place in the left subclavian vein without change.    IMPRESSION:   Moderate cardiomegaly. No evidence of acute disease within the chest.    Electronically signed by:  Barry Merritt MD  6/12/2023 7:00 PM CDT Workstation: [id number]                                    12-lead EKG interpretation:  (if applicable)      Current Cardiac Rhythm:   (if applicable)    Physical Exam:   Physical Exam  Vitals and nursing note reviewed.   Constitutional:       General: He is not in acute distress.     Appearance: Normal appearance. He is not ill-appearing.   Cardiovascular:      Rate and Rhythm: Normal rate and regular rhythm.      Pulses: Normal pulses.      Heart sounds: Murmur heard.   Pulmonary:      Effort: Pulmonary effort is normal. No respiratory distress.      Breath sounds: Normal breath sounds.   Musculoskeletal:      Right lower leg: Edema present.      Left lower leg: Edema present.   Skin:     General: Skin is warm and dry.      Findings: No rash.      Comments: R foot wound   Neurological:      Mental Status: He is alert and oriented to person, place, and time.       ASSESSMENT/PLAN:   Assessment:   Wide Complex Tachycardia - AICD - s/p successful cardioversion. On amiodarone gtt. Discussed interrogation with rep from [employer], appears he was in an atrial tachycardia with BiV pacing at 140 bpm on admission. No Vfib noted. VT zone set at  190.   Acute on Chronic Systolic Heart Failure- BNP 1162, CXR cardiomegaly. On IV lasix. Echo 3/2023 showed EF 25%. MUGA scan 10/2022 showed EF 21%.   AD on Chronic Kidney Disease - Cr 3.5, Nephrology consulted.   CAD s/p CABG 2012 - denies chest pain. Troponin elevation to 29 and flat. Last stress test 2021.   Hypertension - on coreg, lisinopril at home.   Hyperlipidemia   Diabetes Mellitus Type 2  Diabetic Foot Ulcer     Plan:   Patient reports he is feeling better.   Continue dobutamine gtt.   Lasix was increased to 80 mg IV q12h yesterday,  cc.Cr 3.5 today.   Appreciate Nephrology's recommendations.   Strict I/Os.  Daily weights.   D/c amiodarone gtt on 6/15.   Continue 200 mg BID PO.  Hold lisinopril due to AD.   Coreg on hold due to low pressures.

## 2023-06-15 NOTE — CONSULTS
Critical access hospital  Adult Nutrition   Consult Note (Nutrition Education)     SUMMARY     Recommendations  1.) Adjusted diet to cardiac, diabetic diet restrictions.   2.) Adjusted Glucerna to Suplena to preserve kidney function.   3.) Adjusted Brent to BID x14 days for wound healing.   4.) Suggest lactobacillus acidophilus while prescribed antibiotics.   5.) Wt loss diet education provided.   6.) RD to monitor intake, tolerance, weight, labs and provide recommendations PRN.    Goals:   1.) Pt to consume/tolerate >75% of meals and ONS.   2.) Progression of wound healing.   3.) BG levels to stabilize and return to standard reference range.  Nutrition Goal Status: new    Dietitian Rounds Brief  Pt presented here with generalized weakness and tachycardia. Recently d/c from another hospital for UTI. Pt reports good appetite/intake. Chart confirms with pt consuming 100% of meals. He reports consuming Brent that was ordered by MD. Informed pt I would change Glucerna to Suplena to preserve kidney function. Consult received for diet education, however d/t wound, pt at risk. Skin: Grade 2 ulceration located sub 5th metatarsal head right foot s/p debridement 6/15. He denies GI distress. LBM 6/11. Wt reviewed. .7kg (9/2022), admit 132.2kg with +wt gain, likely r/t fluid retention. Educated pt on healthy diet and encouraged exercise. Provided wt loss diet handouts with RD contact information. NFPE completed with no fat loss or muscle loss, +edema to BLE. No malnutrition identified.    Diet order:   Current Diet Order: cardiac   Oral Nutrition Supplement: Glucerna and Brent TID      Evaluation of Received Nutrient/Fluid Intake  Energy Calories Required: meeting needs  Protein Required: exceeds needs  Fluid Required: meeting needs  Tolerance: tolerating     % Intake of Estimated Energy Needs: 75 - 100 %  % Meal Intake: 75 - 100 %      Intake/Output Summary (Last 24 hours) at 6/15/2023 5884  Last data filed at  "6/15/2023 1251  Gross per 24 hour   Intake 796.08 ml   Output 1025 ml   Net -228.92 ml        Anthropometrics  Temp: 97 °F (36.1 °C)  Height Method: Stated  Height: 6' 1" (185.4 cm)  Height (inches): 73 in  Weight Method: Bed Scale  Weight: 132.2 kg (291 lb 7.2 oz)  Weight (lb): 291.45 lb  Ideal Body Weight (IBW), Male: 184 lb  % Ideal Body Weight, Male (lb): 158.4 %  BMI (Calculated): 38.5  BMI Grade: 35 - 39.9 - obesity - grade II  Usual Body Weight (UBW), k.7 kg (2022)  % Usual Body Weight: 107.09       Estimated/Assessed Needs  Weight Used For Calorie Calculations: 132.2 kg (291 lb 7.2 oz)  Energy Calorie Requirements (kcal): 2726  Energy Need Method: [place]-St Jeor (x1.25 AF)  Protein Requirements:  (0.6-0.8 g/kg)  Weight Used For Protein Calculations: 132.2 kg (291 lb 7.2 oz)     Estimated Fluid Requirement Method: RDA Method  RDA Method (mL): 2726  CHO Requirement: 45-60gm CHO per meal    Reason for Assessment  Reason For Assessment: consult, identified at risk by screening criteria (education; identified at risk for wound)  Diagnosis: cardiac disease  Relevant Medical History: CHF, type 2 DM, CAD, thrombocytopenia, chronic diabetic foot ulcer and CKD stage 3b  Nutrition Discharge Planning: Cardiac, diabetic, low K, low phos diet with Brent BID.    Nutrition/Diet History  Food Allergies: NKFA  Factors Affecting Nutritional Intake: None identified at this time    Nutrition Risk Screen  Nutrition Risk Screen: no indicators present     MST Score: 0  Have you recently lost weight without trying?: No  Weight loss score: 0  Have you been eating poorly because of a decreased appetite?: No  Appetite score: 0       Weight History:  Wt Readings from Last 5 Encounters:   23 132.2 kg (291 lb 7.2 oz)   23 126.6 kg (279 lb)   23 126.7 kg (279 lb 6.4 oz)   23 121.6 kg (268 lb)   23 121.6 kg (268 lb)        Lab/Procedures/Meds: Pertinent Labs/Meds Reviewed    Medications:Pertinent " Medications Reviewed  Scheduled Meds:   albuterol sulfate  2.5 mg Nebulization Q6H WAKE    amiodarone  200 mg Oral BID    aspirin  81 mg Oral Daily    atorvastatin  10 mg Oral Daily    cefTRIAXone (ROCEPHIN) IVPB  1 g Intravenous Q24H    chlorhexidine  15 mL Mouth/Throat BID    collagenase   Topical (Top) Daily    furosemide (LASIX) injection  80 mg Intravenous Q12H    gabapentin  300 mg Oral TID    insulin detemir U-100  10 Units Subcutaneous Daily    mupirocin   Nasal BID    pantoprazole  40 mg Oral Before breakfast     Continuous Infusions:   DOBUTamine IV infusion (non-titrating) 2.5 mcg/kg/min (06/14/23 1251)     PRN Meds:.acetaminophen, dextrose 50%, dextrose 50%, famotidine, glucose, glucose, insulin aspart U-100, magnesium oxide, magnesium oxide, melatonin, naloxone, ondansetron, polyethylene glycol, potassium bicarbonate, potassium bicarbonate, potassium bicarbonate, potassium, sodium phosphates, potassium, sodium phosphates, potassium, sodium phosphates, traMADoL    Labs: Pertinent Labs Reviewed  Clinical Chemistry:  Recent Labs   Lab 06/12/23  1835 06/13/23  0411 06/15/23  0431   *   < > 134*   K 4.8   < > 4.5   CL 96   < > 96   CO2 25   < > 25   *   < > 137*   BUN 62*   < > 79*   CREATININE 2.9*   < > 3.5*   CALCIUM 8.2*   < > 8.3*   PROT 7.5  --   --    ALBUMIN 3.2*  --   --    BILITOT 1.6*  --   --    ALKPHOS 70  --   --    AST 31  --   --    ALT 22  --   --    ANIONGAP 10   < > 13   MG 2.3   < > 2.4   LIPASE 59  --   --     < > = values in this interval not displayed.     CBC:   Recent Labs   Lab 06/15/23  0431   WBC 4.79   RBC 3.63*   HGB 9.4*   HCT 31.8*   PLT 68*   MCV 88   MCH 25.9*   MCHC 29.6*     Cardiac Profile:  Recent Labs   Lab 06/12/23 1831 06/12/23 1835   BNP 1,162*  --    CPK  --  86     Thyroid & Parathyroid:  Recent Labs   Lab 06/12/23 1835   TSH 4.830       Monitor and Evaluation  Food and Nutrient Intake: energy intake, food and beverage intake  Food and Nutrient  Adminstration: diet order  Knowledge/Beliefs/Attitudes: food and nutrition knowledge/skill  Physical Activity and Function: nutrition-related ADLs and IADLs  Anthropometric Measurements: weight, weight change  Biochemical Data, Medical Tests and Procedures: electrolyte and renal panel, gastrointestinal profile, glucose/endocrine profile, other (specify) (renal profile)  Nutrition-Focused Physical Findings: overall appearance     Nutrition Risk  Level of Risk/Frequency of Follow-up: moderate     Nutrition Follow-Up  RD Follow-up?: Yes      Chetna Thornton RD 06/15/2023 4:29 PM

## 2023-06-16 LAB
ANION GAP SERPL CALC-SCNC: 10 MMOL/L (ref 8–16)
BASOPHILS # BLD AUTO: 0.02 K/UL (ref 0–0.2)
BASOPHILS NFR BLD: 0.4 % (ref 0–1.9)
BUN SERPL-MCNC: 85 MG/DL (ref 8–23)
CALCIUM SERPL-MCNC: 8.4 MG/DL (ref 8.7–10.5)
CHLORIDE SERPL-SCNC: 98 MMOL/L (ref 95–110)
CO2 SERPL-SCNC: 27 MMOL/L (ref 23–29)
CREAT SERPL-MCNC: 3.7 MG/DL (ref 0.5–1.4)
DIFFERENTIAL METHOD: ABNORMAL
EOSINOPHIL # BLD AUTO: 0 K/UL (ref 0–0.5)
EOSINOPHIL NFR BLD: 0.6 % (ref 0–8)
ERYTHROCYTE [DISTWIDTH] IN BLOOD BY AUTOMATED COUNT: 20.1 % (ref 11.5–14.5)
EST. GFR  (NO RACE VARIABLE): 17.8 ML/MIN/1.73 M^2
GLUCOSE SERPL-MCNC: 126 MG/DL (ref 70–110)
GLUCOSE SERPL-MCNC: 129 MG/DL (ref 70–110)
GLUCOSE SERPL-MCNC: 156 MG/DL (ref 70–110)
GLUCOSE SERPL-MCNC: 177 MG/DL (ref 70–110)
GLUCOSE SERPL-MCNC: 211 MG/DL (ref 70–110)
HCT VFR BLD AUTO: 30.4 % (ref 40–54)
HGB BLD-MCNC: 9.1 G/DL (ref 14–18)
IMM GRANULOCYTES # BLD AUTO: 0.02 K/UL (ref 0–0.04)
IMM GRANULOCYTES NFR BLD AUTO: 0.4 % (ref 0–0.5)
LYMPHOCYTES # BLD AUTO: 0.3 K/UL (ref 1–4.8)
LYMPHOCYTES NFR BLD: 5.1 % (ref 18–48)
MAGNESIUM SERPL-MCNC: 2.4 MG/DL (ref 1.6–2.6)
MCH RBC QN AUTO: 26.3 PG (ref 27–31)
MCHC RBC AUTO-ENTMCNC: 29.9 G/DL (ref 32–36)
MCV RBC AUTO: 88 FL (ref 82–98)
MONOCYTES # BLD AUTO: 0.8 K/UL (ref 0.3–1)
MONOCYTES NFR BLD: 14.6 % (ref 4–15)
NEUTROPHILS # BLD AUTO: 4.1 K/UL (ref 1.8–7.7)
NEUTROPHILS NFR BLD: 78.9 % (ref 38–73)
NRBC BLD-RTO: 0 /100 WBC
PLATELET # BLD AUTO: 56 K/UL (ref 150–450)
PMV BLD AUTO: 9.6 FL (ref 9.2–12.9)
POTASSIUM SERPL-SCNC: 4.5 MMOL/L (ref 3.5–5.1)
RBC # BLD AUTO: 3.46 M/UL (ref 4.6–6.2)
SODIUM SERPL-SCNC: 135 MMOL/L (ref 136–145)
WBC # BLD AUTO: 5.13 K/UL (ref 3.9–12.7)

## 2023-06-16 PROCEDURE — 63600175 PHARM REV CODE 636 W HCPCS: Performed by: INTERNAL MEDICINE

## 2023-06-16 PROCEDURE — 63600175 PHARM REV CODE 636 W HCPCS: Performed by: STUDENT IN AN ORGANIZED HEALTH CARE EDUCATION/TRAINING PROGRAM

## 2023-06-16 PROCEDURE — 80048 BASIC METABOLIC PNL TOTAL CA: CPT | Performed by: INTERNAL MEDICINE

## 2023-06-16 PROCEDURE — 99900031 HC PATIENT EDUCATION (STAT)

## 2023-06-16 PROCEDURE — 27000221 HC OXYGEN, UP TO 24 HOURS

## 2023-06-16 PROCEDURE — 94799 UNLISTED PULMONARY SVC/PX: CPT

## 2023-06-16 PROCEDURE — 99232 SBSQ HOSP IP/OBS MODERATE 35: CPT | Mod: ,,, | Performed by: PODIATRIST

## 2023-06-16 PROCEDURE — 36415 COLL VENOUS BLD VENIPUNCTURE: CPT | Performed by: INTERNAL MEDICINE

## 2023-06-16 PROCEDURE — 25000003 PHARM REV CODE 250

## 2023-06-16 PROCEDURE — 99900035 HC TECH TIME PER 15 MIN (STAT)

## 2023-06-16 PROCEDURE — 25000003 PHARM REV CODE 250: Performed by: INTERNAL MEDICINE

## 2023-06-16 PROCEDURE — 21000000 HC CCU ICU ROOM CHARGE

## 2023-06-16 PROCEDURE — 25000242 PHARM REV CODE 250 ALT 637 W/ HCPCS: Performed by: HOSPITALIST

## 2023-06-16 PROCEDURE — 99232 PR SUBSEQUENT HOSPITAL CARE,LEVL II: ICD-10-PCS | Mod: ,,, | Performed by: PODIATRIST

## 2023-06-16 PROCEDURE — 25000003 PHARM REV CODE 250: Performed by: HOSPITALIST

## 2023-06-16 PROCEDURE — 83735 ASSAY OF MAGNESIUM: CPT | Performed by: INTERNAL MEDICINE

## 2023-06-16 PROCEDURE — 94640 AIRWAY INHALATION TREATMENT: CPT

## 2023-06-16 PROCEDURE — 25000003 PHARM REV CODE 250: Performed by: PODIATRIST

## 2023-06-16 PROCEDURE — 85025 COMPLETE CBC W/AUTO DIFF WBC: CPT | Performed by: INTERNAL MEDICINE

## 2023-06-16 PROCEDURE — 94761 N-INVAS EAR/PLS OXIMETRY MLT: CPT

## 2023-06-16 RX ORDER — BISACODYL 10 MG
10 SUPPOSITORY, RECTAL RECTAL DAILY PRN
Status: DISCONTINUED | OUTPATIENT
Start: 2023-06-16 | End: 2023-06-18 | Stop reason: HOSPADM

## 2023-06-16 RX ORDER — FUROSEMIDE 10 MG/ML
40 INJECTION INTRAMUSCULAR; INTRAVENOUS
Status: DISCONTINUED | OUTPATIENT
Start: 2023-06-16 | End: 2023-06-18 | Stop reason: HOSPADM

## 2023-06-16 RX ORDER — CARVEDILOL 3.12 MG/1
3.12 TABLET ORAL 2 TIMES DAILY
Status: DISCONTINUED | OUTPATIENT
Start: 2023-06-16 | End: 2023-06-17

## 2023-06-16 RX ORDER — FAMOTIDINE 20 MG/1
20 TABLET, FILM COATED ORAL DAILY PRN
Status: DISCONTINUED | OUTPATIENT
Start: 2023-06-16 | End: 2023-06-18 | Stop reason: HOSPADM

## 2023-06-16 RX ADMIN — COLLAGENASE SANTYL: 250 OINTMENT TOPICAL at 09:06

## 2023-06-16 RX ADMIN — ALBUTEROL SULFATE 2.5 MG: 2.5 SOLUTION RESPIRATORY (INHALATION) at 08:06

## 2023-06-16 RX ADMIN — FUROSEMIDE 40 MG: 10 INJECTION, SOLUTION INTRAVENOUS at 08:06

## 2023-06-16 RX ADMIN — GABAPENTIN 300 MG: 300 CAPSULE ORAL at 09:06

## 2023-06-16 RX ADMIN — ALBUTEROL SULFATE 2.5 MG: 2.5 SOLUTION RESPIRATORY (INHALATION) at 07:06

## 2023-06-16 RX ADMIN — GABAPENTIN 300 MG: 300 CAPSULE ORAL at 08:06

## 2023-06-16 RX ADMIN — MUPIROCIN 1 G: 20 OINTMENT TOPICAL at 09:06

## 2023-06-16 RX ADMIN — BISACODYL 10 MG: 10 SUPPOSITORY RECTAL at 12:06

## 2023-06-16 RX ADMIN — PANTOPRAZOLE SODIUM 40 MG: 40 TABLET, DELAYED RELEASE ORAL at 06:06

## 2023-06-16 RX ADMIN — TRAMADOL HYDROCHLORIDE 50 MG: 50 TABLET, COATED ORAL at 09:06

## 2023-06-16 RX ADMIN — AMIODARONE HYDROCHLORIDE 200 MG: 200 TABLET ORAL at 09:06

## 2023-06-16 RX ADMIN — INSULIN DETEMIR 10 UNITS: 100 INJECTION, SOLUTION SUBCUTANEOUS at 09:06

## 2023-06-16 RX ADMIN — CEFTRIAXONE SODIUM 1 G: 1 INJECTION, POWDER, FOR SOLUTION INTRAMUSCULAR; INTRAVENOUS at 10:06

## 2023-06-16 RX ADMIN — SENNOSIDES AND DOCUSATE SODIUM 1 TABLET: 50; 8.6 TABLET ORAL at 08:06

## 2023-06-16 RX ADMIN — ASPIRIN 81 MG 81 MG: 81 TABLET ORAL at 09:06

## 2023-06-16 RX ADMIN — ATORVASTATIN CALCIUM 10 MG: 10 TABLET, FILM COATED ORAL at 09:06

## 2023-06-16 RX ADMIN — CHLORHEXIDINE GLUCONATE 15 ML: 1.2 RINSE ORAL at 08:06

## 2023-06-16 RX ADMIN — INSULIN ASPART 2 UNITS: 100 INJECTION, SOLUTION INTRAVENOUS; SUBCUTANEOUS at 05:06

## 2023-06-16 RX ADMIN — CARVEDILOL 3.12 MG: 3.12 TABLET, FILM COATED ORAL at 09:06

## 2023-06-16 RX ADMIN — ALBUTEROL SULFATE 2.5 MG: 2.5 SOLUTION RESPIRATORY (INHALATION) at 01:06

## 2023-06-16 RX ADMIN — GABAPENTIN 300 MG: 300 CAPSULE ORAL at 02:06

## 2023-06-16 RX ADMIN — CHLORHEXIDINE GLUCONATE 15 ML: 1.2 RINSE ORAL at 09:06

## 2023-06-16 RX ADMIN — FUROSEMIDE 80 MG: 10 INJECTION, SOLUTION INTRAVENOUS at 09:06

## 2023-06-16 RX ADMIN — AMIODARONE HYDROCHLORIDE 200 MG: 200 TABLET ORAL at 08:06

## 2023-06-16 RX ADMIN — CARVEDILOL 3.12 MG: 3.12 TABLET, FILM COATED ORAL at 08:06

## 2023-06-16 NOTE — PROGRESS NOTES
[facility]  Podiatry  Progress Note    Patient Name: Ana Bullard  MRN: [id number]  Admission Date: 6/12/2023  Hospital Length of Stay: 4 days  Attending Physician: Haseeb Romero MD  Primary Care Provider: YRN Pollard     Subjective:     Interval History:  Staying in bed.  Dressing intact to foot.  Reviewed note from vascular consult.  No vascular intervention necessary.    Scheduled Meds:   albuterol sulfate  2.5 mg Nebulization Q6H WAKE    amiodarone  200 mg Oral BID    aspirin  81 mg Oral Daily    atorvastatin  10 mg Oral Daily    cefTRIAXone (ROCEPHIN) IVPB  1 g Intravenous Q24H    chlorhexidine  15 mL Mouth/Throat BID    collagenase   Topical (Top) Daily    furosemide (LASIX) injection  80 mg Intravenous Q12H    gabapentin  300 mg Oral TID    insulin detemir U-100  10 Units Subcutaneous Daily    mupirocin   Nasal BID    pantoprazole  40 mg Oral Before breakfast    senna-docusate 8.6-50 mg  1 tablet Oral QHS     Continuous Infusions:   DOBUTamine IV infusion (non-titrating) 2.5 mcg/kg/min (06/16/23 0600)     PRN Meds:acetaminophen, dextrose 50%, dextrose 50%, famotidine, glucose, glucose, insulin aspart U-100, magnesium oxide, magnesium oxide, melatonin, naloxone, ondansetron, polyethylene glycol, potassium bicarbonate, potassium bicarbonate, potassium bicarbonate, potassium, sodium phosphates, potassium, sodium phosphates, potassium, sodium phosphates, traMADoL    Review of Systems  Objective:     Vital Signs (Most Recent):  Temp: 97.1 °F (36.2 °C) (06/16/23 0701)  Pulse: 60 (06/16/23 0800)  Resp: (!) 22 (06/16/23 0800)  BP: (!) 143/79 (06/16/23 0800)  SpO2: 100 % (06/16/23 0800) Vital Signs (24h Range):  Temp:  [97 °F (36.1 °C)-98.4 °F (36.9 °C)] 97.1 °F (36.2 °C)  Pulse:  [60-65] 60  Resp:  [16-26] 22  SpO2:  [91 %-100 %] 100 %  BP: (109-149)/(57-90) 143/79     Weight: 134.6 kg (296 lb 11.8 oz)  Body mass index is 39.15 kg/m².    Foot Exam  Neurovascular  Dorsalis  pedis: 1+  Posterior tibial: 1+  Saphenous nerve sensation: absent  Tibial nerve sensation: absent  Superficial peroneal nerve sensation: absent  Deep peroneal nerve sensation: absent  Sural nerve sensation: absent        Left Foot/Ankle       Neurovascular  Dorsalis pedis: 1+  Posterior tibial: 1+  Saphenous nerve sensation: absent  Tibial nerve sensation: absent  Superficial peroneal nerve sensation: absent  Deep peroneal nerve sensation: absent  Sural nerve sensation: absent        Grade 2 ulceration located sub 5th metatarsal head right foot.  No signs of infection.  Measures approximately 0.4 cm x 0.4 cm x 0.2 cm deep.   Periwound callus.  Mixed necrotic and slough in wound base  Laboratory:  All pertinent labs reviewed within the last 24 hours.    Diagnostic Results:  I have reviewed all pertinent imaging results/findings within the past 24 hours.    Assessment/Plan:     Orthopedic  Ulcer of right foot with fat layer exposed  Wound care dressing changes to consist of Santyl followed by Aquacel Ag    Brent and protein drinks ordered    Patient to follow up outpatient once discharged    Heel touch weight-bearing to right foot     Counseled patient on increasing protein intake, not getting wound wet, keeping dressing clean dry and intact, following a healthy diet, elevating legs when able, removing pressure from wound          Leah Avila DPM  Podiatry  [facility]

## 2023-06-16 NOTE — PROGRESS NOTES
Nephrology Consult Note        Patient Name: Ana Bullard  MRN: [id number]    Patient Class: IP- Inpatient   Admission Date: 6/12/2023  Length of Stay: 4 days  Date of Service: 6/16/2023    Attending Physician: Haseeb Romero MD  Primary Care Provider: YRN Pollard    Reason for Consult: ailyn    SUBJECTIVE:     HPI: 63-year-old  male with multiple medical comorbidities including chronic combined CHF, type 2 DM, CAD, thrombocytopenia, chronic diabetic foot ulcer and CKD stage 3b, who was discharged from another hospital after admission for UTI presented here with generalized weakness and tachycardia.    He was discharged on oral antibiotics and resumption of home health. His home health nurse found him to be tachycardic and directed him to the ER. Initial rhythm on presentation was wide complex tachycardia. Wife reports that he was not given his amiodarone when he was hospitalized.     In the ED here, after consultation with Cardiology patient underwent successful synchronized cardioversion with restoration of normal sinus rhythm.    6/14 VSS. HR stable, BP low over night. UO is low. sCr rising , consistent with ATN due to hemodynamic instability. Gote more lasix this AM by primary team along with initiation of dobutamine gtt.    6/15 VSS. Continue dobutamine and lasix. S/p debridement by Dr. Avila on 6/15. Appreciate Cards and Vascular surgery input.    6/16 VSS. Agree with dc dobutamine gtt. Will decrease IV diuretic to 40mg IV BID to see if he can tolerate, since sCr is up.    Past Medical History:   Diagnosis Date    AICD (automatic cardioverter/defibrillator) present     Anemia, chronic disease 07/27/2021    Anemia, unspecified 07/27/2021    Anxiety and depression 2012    CAD (coronary artery disease) 2012    Cardiomegaly 2016    CHF (congestive heart failure) 2012    Cholecystitis 2017    Complete heart block 2012    Diabetic foot ulcer     DVT (deep venous thrombosis) 2012    And pulmonary  embolus    GERD (gastroesophageal reflux disease) 2010    Heparin induced thrombocytopenia     Hyperlipemia 2011    IDDM (insulin dependent diabetes mellitus) 1983    With neuropathy    Ischemic cardiomyopathy     MI (myocardial infarction)     Morbid obesity     MRSA (methicillin resistant Staphylococcus aureus) infection 2019    Noncompliance with therapeutic plan     Normochromic normocytic anemia 2021    Osteomyelitis of right foot 2019    Osteomyelitis of right foot 2022    Klebsiella from bone, GBS in blood    Pulmonary edema     Respiratory failure     Sepsis 2019    Due to cellulitis right leg    Sleep apnea 2013    Thrombocytopathy     Venous stasis dermatitis of both lower extremities      Past Surgical History:   Procedure Laterality Date    CARDIAC PACEMAKER PLACEMENT  2012    CARDIAC PACEMAKER PLACEMENT  10/04/2018    battery replacement    CORONARY ARTERY BYPASS GRAFT  2012    ESOPHAGOGASTRODUODENOSCOPY  2017    SAMARA FILTER PLACEMENT  2012    vena cava    LUMBAR SYMPATHETIC NERVE BLOCK Right 2019    Procedure: BLOCK, NERVE, SYMPATHETIC, LUMBAR;  Surgeon: Tashi Good MD;  Location: [facility];  Service: Pain Management;  Laterality: Right;  RIGHT SYMPATHETIC NERVE BLOCK     Family History   Problem Relation Age of Onset    Arthritis Mother     Diabetes Mother     Cancer Father     Heart disease Father     Stroke Father      Social History     Tobacco Use    Smoking status: Former     Packs/day: 2.00     Years: 38.00     Pack years: 76.00     Types: Cigarettes     Quit date:      Years since quittin.4    Smokeless tobacco: Never   Substance Use Topics    Alcohol use: No    Drug use: Never       Review of patient's allergies indicates:   Allergen Reactions    Vasopressin Anaphylaxis and Other (See Comments)     Increased pressure in his head; felt like his eyeballs and veins were going to pop out of his head.     Vasopressin  "analogues Other (See Comments) and Anaphylaxis     "felt like eyes going to pop out of my head"  Other reaction(s): Other (See Comments)  "felt like eyes going to pop out of my head"  Increased pressure in his head; felt like his eyeballs and veins were going to pop out of his head.     Heparin Other (See Comments)     Other reaction(s): "depleted my blood platets"    Decreased platelet count    Heparin analogues Other (See Comments)     Depleted WBC count    Morphine Hallucinations, Other (See Comments) and Anxiety     Other reaction(s): Hallucinations  Paranoid, hallucinations         Outpatient meds:  No current facility-administered medications on file prior to encounter.     Current Outpatient Medications on File Prior to Encounter   Medication Sig Dispense Refill    albuterol (PROVENTIL) 5 mg/mL nebulizer solution Take 2.5 mg by nebulization every 6 (six) hours as needed.      albuterol-ipratropium (DUO-NEB) 2.5 mg-0.5 mg/3 mL nebulizer solution Take 3 mLs by nebulization every 6 (six) hours as needed.      amiodarone (PACERONE) 200 MG Tab Take 200 mg by mouth once daily.      aspirin 81 MG Chew Take 81 mg by mouth once daily.      atorvastatin (LIPITOR) 10 MG tablet Take 1 tablet by mouth once daily.  1    B complex-vitamin C-folic acid (JLUIS-JIMMIE/NEPHRO-JIMMIE) 0.8 mg Tab Take 0.4 mg by mouth.      BASAGLAR KWIKPEN U-100 INSULIN glargine 100 units/mL (3mL) SubQ pen Inject 25 Units into the skin once daily.  3    carvediloL (COREG) 3.125 MG tablet Take 3.125 mg by mouth 2 (two) times daily.      cefdinir (OMNICEF) 300 MG capsule Take 300 mg by mouth 2 (two) times daily.      cefTRIAXone (ROCEPHIN) 10 gram injection       cetirizine (ZYRTEC) 10 MG tablet Take 10 mg by mouth once daily.      CORLANOR 5 mg Tab Take 5 mg by mouth nightly.      doxycycline (VIBRAMYCIN) 100 MG Cap Take 1 capsule (100 mg total) by mouth every 12 (twelve) hours. (Patient not taking: Reported on 5/31/2023) 20 capsule 0    FARXIGA 10 mg " tablet Take 10 mg by mouth once daily.      furosemide (LASIX) 40 MG tablet Take 1 tablet (40 mg total) by mouth once daily. 30 tablet 1    gabapentin (NEURONTIN) 600 MG tablet Take 1 tablet by mouth 3 (three) times daily.  0    lisinopriL (PRINIVIL,ZESTRIL) 2.5 MG tablet Take 2.5 mg by mouth 2 (two) times daily.      mupirocin (BACTROBAN) 2 % ointment APPLY TOPICALLY ONCE DAILY. (Patient taking differently: Apply 1 g topically once daily.) 22 g 5    pantoprazole (PROTONIX) 40 MG tablet Take 1 tablet by mouth once daily.  0    traMADoL (ULTRAM) 50 mg tablet Take 50 mg by mouth every 6 (six) hours as needed.      TRULICITY 0.75 mg/0.5 mL pen injector Inject 0.75 mg into the skin every 7 days.      vitamin B complex-folic acid 0.4 mg Tab Take 1 tablet by mouth.         Scheduled meds:   albuterol sulfate  2.5 mg Nebulization Q6H WAKE    amiodarone  200 mg Oral BID    aspirin  81 mg Oral Daily    atorvastatin  10 mg Oral Daily    carvediloL  3.125 mg Oral BID    cefTRIAXone (ROCEPHIN) IVPB  1 g Intravenous Q24H    chlorhexidine  15 mL Mouth/Throat BID    collagenase   Topical (Top) Daily    furosemide (LASIX) injection  80 mg Intravenous Q12H    gabapentin  300 mg Oral TID    insulin detemir U-100  10 Units Subcutaneous Daily    mupirocin   Nasal BID    pantoprazole  40 mg Oral Before breakfast    senna-docusate 8.6-50 mg  1 tablet Oral QHS    sodium phosphates  1 enema Rectal Once       Infusions:        PRN meds:  acetaminophen, bisacodyL, dextrose 50%, dextrose 50%, famotidine, glucose, glucose, insulin aspart U-100, magnesium oxide, magnesium oxide, melatonin, naloxone, ondansetron, polyethylene glycol, potassium bicarbonate, potassium bicarbonate, potassium bicarbonate, potassium, sodium phosphates, potassium, sodium phosphates, potassium, sodium phosphates, traMADoL    Review of Systems:    OBJECTIVE:     Vital Signs and IO:  Temp:  [97.1 °F (36.2 °C)-98.4 °F (36.9 °C)]   Pulse:  [59-65]   Resp:  [16-26]   BP:  (104-149)/(59-90)   SpO2:  [91 %-100 %]   I/O last 3 completed shifts:  In: 1015.8 [P.O.:510; I.V.:205.8; IV Piggyback:300]  Out: 2025 [Urine:2025]  Wt Readings from Last 5 Encounters:   06/16/23 134.6 kg (296 lb 11.8 oz)   06/01/23 126.6 kg (279 lb)   05/31/23 126.7 kg (279 lb 6.4 oz)   05/11/23 121.6 kg (268 lb)   04/27/23 121.6 kg (268 lb)     Body mass index is 39.15 kg/m².    Physical Exam  Constitutional:       General: Patient is not in acute distress.     Appearance: Patient is well-developed. She is not diaphoretic.   HENT:      Head: Normocephalic and atraumatic.      Mouth/Throat: Mucous membranes are moist.   Eyes:      General: No scleral icterus.     Pupils: Pupils are equal, round, and reactive to light.   Cardiovascular:      Rate and Rhythm: Normal rate and regular rhythm.   Pulmonary:      Effort: Pulmonary effort is normal. No respiratory distress.      Breath sounds: No stridor.   Abdominal:      General: There is no distension.      Palpations: Abdomen is soft.   Musculoskeletal:         General: No deformity. Normal range of motion.      Cervical back: Neck supple.   Skin:     General: Skin is warm and dry.      Findings: No rash present. No erythema.   Neurological:      Mental Status: Patient is alert and oriented to person, place, and time.      Cranial Nerves: No cranial nerve deficit.   Psychiatric:         Behavior: Behavior normal.     Laboratory:  Recent Labs   Lab 06/14/23  0413 06/15/23  0431 06/16/23  0643   * 134* 135*   K 4.8 4.5 4.5   CL 97 96 98   CO2 25 25 27   BUN 77* 79* 85*   CREATININE 3.4* 3.5* 3.7*   * 137* 129*         Recent Labs   Lab 06/12/23  1835 06/13/23 0411 06/14/23  0413 06/15/23  0431 06/16/23  0643   CALCIUM 8.2*   < > 8.4* 8.3* 8.4*   ALBUMIN 3.2*  --   --   --   --    MG 2.3   < > 2.6 2.4 2.4    < > = values in this interval not displayed.               No results for input(s): POCTGLUCOSE in the last 168 hours.    Recent Labs   Lab  09/02/22  0439 11/23/22  0951   Hemoglobin A1C 7.7 H 6.8 H         Recent Labs   Lab 06/12/23  1835 06/13/23  0411 06/14/23  0413 06/15/23  0431 06/16/23  0643   WBC 7.76   < > 5.39 4.79 5.13   HGB 9.6*   < > 9.9* 9.4* 9.1*   HCT 33.7*   < > 34.4* 31.8* 30.4*   PLT 75*   < > 83* 68* 56*   MCV 90   < > 90 88 88   MCHC 28.5*   < > 28.8* 29.6* 29.9*   MONO 10.3  0.8  --   --   --  14.6  0.8    < > = values in this interval not displayed.         Recent Labs   Lab 06/12/23  1835   BILITOT 1.6*   PROT 7.5   ALBUMIN 3.2*   ALKPHOS 70   ALT 22   AST 31         Recent Labs   Lab 06/12/23  2241   Color, UA Yellow   Appearance, UA Cloudy A   pH, UA 5.0   Specific Gravity, UA 1.020   Protein, UA 1+ A   Glucose, UA 1+ A   Ketones, UA Negative   Urobilinogen, UA 4.0-6.0 A   Bilirubin (UA) Negative   Occult Blood UA 3+ A   Nitrite, UA Negative   RBC, UA >100 H   WBC, UA 28 H   Bacteria Negative   Hyaline Casts, UA 32 A         Recent Labs   Lab 09/01/22  0333   POC PH 7.350   POC PCO2 54.1 H   POC HCO3 29.9 H   POC PO2 18 L   POC SATURATED O2 24 L   POC BE 4   Sample VENOUS         Microbiology Results (last 7 days)       Procedure Component Value Units Date/Time    Blood culture x two cultures. Draw prior to antibiotics. [[id number]] Collected: 06/12/23 1843    Order Status: Completed Specimen: Blood from Peripheral, Antecubital, Left Updated: 06/15/23 2032     Blood Culture, Routine No Growth to date      No Growth to date      No Growth to date      No Growth to date    Narrative:      Aerobic and anaerobic    Blood culture x two cultures. Draw prior to antibiotics. [[id number]] Collected: 06/12/23 1927    Order Status: Completed Specimen: Blood from Peripheral, Antecubital, Right Updated: 06/15/23 2032     Blood Culture, Routine No Growth to date      No Growth to date      No Growth to date      No Growth to date    Narrative:      Aerobic and anaerobic    Urine culture [[id number]] Collected: 06/12/23 9395    Order Status:  Completed Specimen: Urine Updated: 06/15/23 0742     Urine Culture, Routine No growth    Narrative:      Specimen Source->Urine            ASSESSMENT/PLAN:     AD 2/2 VT s/p cardioversion  Hematuria of unclear significance with many hyaline casts  CKD stage 3  CHF exacerbation  DM  Imaging negative for  obstruction.  No NSAIDs, ACEI/ARB, IV contrast or other nephrotoxins.  Keep MAP > 60, SBP > 100.  Dose meds for GFR < 30 ml/min.  Renal diet - low K, low phos.  Decrease diuretic today, off dobutamine gtt today per cards, holding ACEi and BBL for now.  Control DM.    Anemia of CKD  Hgb and HCT are acceptable. Monitor for now.  Will provide GANGA and/or IV iron PRN.    MBD / Secondary HPT  Ca, Phos, PTH and vitamin D levels are acceptable.   Phos binders, vitamin D and analogues, calcimimetics will be given as needed.    HTN  BP seem controlled.   Tolerate asymptomatic HTN up to -160.  Meds per Cards for now.    Thank you for allowing us to participate in the care of your patient!   We will follow the patient and provide recommendations as needed.    Patient care time was spent personally by me on the following activities:     Obtaining a history.  Examination of patient.  Providing medical care at the patients bedside.  Developing a treatment plan with patient or surrogate and bedside caregivers.  Ordering and reviewing laboratory studies, radiographic studies, pulse oximetry.  Ordering and performing treatments and interventions.  Evaluation of patient's response to treatment.  Discussions with consultants while on the unit and immediately available to the patient.  Re-evaluation of the patient's condition.  Documentation in the medical record.     Maicol Leach MD    [facility]  [address] [place] [address]  [place] [address]    [phone] - tel  [phone] - fax    6/16/2023

## 2023-06-16 NOTE — SUBJECTIVE & OBJECTIVE
Subjective:     Interval History:  Staying in bed.  Dressing intact to foot.  Reviewed note from vascular consult.  No vascular intervention necessary.    Scheduled Meds:   albuterol sulfate  2.5 mg Nebulization Q6H WAKE    amiodarone  200 mg Oral BID    aspirin  81 mg Oral Daily    atorvastatin  10 mg Oral Daily    cefTRIAXone (ROCEPHIN) IVPB  1 g Intravenous Q24H    chlorhexidine  15 mL Mouth/Throat BID    collagenase   Topical (Top) Daily    furosemide (LASIX) injection  80 mg Intravenous Q12H    gabapentin  300 mg Oral TID    insulin detemir U-100  10 Units Subcutaneous Daily    mupirocin   Nasal BID    pantoprazole  40 mg Oral Before breakfast    senna-docusate 8.6-50 mg  1 tablet Oral QHS     Continuous Infusions:   DOBUTamine IV infusion (non-titrating) 2.5 mcg/kg/min (06/16/23 0600)     PRN Meds:acetaminophen, dextrose 50%, dextrose 50%, famotidine, glucose, glucose, insulin aspart U-100, magnesium oxide, magnesium oxide, melatonin, naloxone, ondansetron, polyethylene glycol, potassium bicarbonate, potassium bicarbonate, potassium bicarbonate, potassium, sodium phosphates, potassium, sodium phosphates, potassium, sodium phosphates, traMADoL    Review of Systems  Objective:     Vital Signs (Most Recent):  Temp: 97.1 °F (36.2 °C) (06/16/23 0701)  Pulse: 60 (06/16/23 0800)  Resp: (!) 22 (06/16/23 0800)  BP: (!) 143/79 (06/16/23 0800)  SpO2: 100 % (06/16/23 0800) Vital Signs (24h Range):  Temp:  [97 °F (36.1 °C)-98.4 °F (36.9 °C)] 97.1 °F (36.2 °C)  Pulse:  [60-65] 60  Resp:  [16-26] 22  SpO2:  [91 %-100 %] 100 %  BP: (109-149)/(57-90) 143/79     Weight: 134.6 kg (296 lb 11.8 oz)  Body mass index is 39.15 kg/m².    Foot Exam  Neurovascular  Dorsalis pedis: 1+  Posterior tibial: 1+  Saphenous nerve sensation: absent  Tibial nerve sensation: absent  Superficial peroneal nerve sensation: absent  Deep peroneal nerve sensation: absent  Sural nerve sensation: absent        Left Foot/Ankle        Neurovascular  Dorsalis pedis: 1+  Posterior tibial: 1+  Saphenous nerve sensation: absent  Tibial nerve sensation: absent  Superficial peroneal nerve sensation: absent  Deep peroneal nerve sensation: absent  Sural nerve sensation: absent        Grade 2 ulceration located sub 5th metatarsal head right foot.  No signs of infection.  Measures approximately 0.4 cm x 0.4 cm x 0.2 cm deep.   Periwound callus.  Mixed necrotic and slough in wound base  Laboratory:  All pertinent labs reviewed within the last 24 hours.    Diagnostic Results:  I have reviewed all pertinent imaging results/findings within the past 24 hours.   Yes

## 2023-06-16 NOTE — CARE UPDATE
06/15/23 1926   Patient Assessment/Suction   Respiratory Effort Unlabored   All Lung Fields Breath Sounds clear   Rhythm/Pattern, Respiratory pattern regular   PRE-TX-O2   Device (Oxygen Therapy) nasal cannula   Flow (L/min) 2   SpO2 100 %   Pulse Oximetry Type Continuous   Pulse 60   Resp 16   Aerosol Therapy   $ Aerosol Therapy Charges Aerosol Treatment   Respiratory Treatment Status (SVN) given   Treatment Route (SVN) mask   Patient Position (SVN) HOB elevated   Post Treatment Assessment (SVN) increased aeration   Signs of Intolerance (SVN) none   Breath Sounds Post-Respiratory Treatment   Throughout All Fields Post-Treatment All Fields   Throughout All Fields Post-Treatment aeration increased   Post-treatment Heart Rate (beats/min) 60   Post-treatment Resp Rate (breaths/min) 16   Education   $ Education 15 min;Bronchodilator

## 2023-06-16 NOTE — PROGRESS NOTES
Pharmacist Renal Dose Adjustment Note    Ana Bullard is a 63 y.o. male being treated with the medication famotidine    Patient Data:    Vital Signs (Most Recent):  Temp: 97.1 °F (36.2 °C) (06/16/23 0701)  Pulse: 60 (06/16/23 0956)  Resp: 18 (06/16/23 0901)  BP: 129/63 (06/16/23 0956)  SpO2: 100 % (06/16/23 0800) Vital Signs (72h Range):  Temp:  [96.5 °F (35.8 °C)-98.4 °F (36.9 °C)]   Pulse:  [59-68]   Resp:  [9-26]   BP: ()/(52-90)   SpO2:  [72 %-100 %]      Recent Labs   Lab 06/14/23  0413 06/15/23  0431 06/16/23  0643   CREATININE 3.4* 3.5* 3.7*     Serum creatinine: 3.7 mg/dL (H) 06/16/23 0643  Estimated creatinine clearance: 30.2 mL/min (A)    Medication:Famotidine 40 mg daily PRN will be changed to 20 mg daily PRN     Pharmacist's Name: Shannan Jensen  Pharmacist's Extension: [phone]

## 2023-06-16 NOTE — PROGRESS NOTES
[facility] Medicine  Progress Note    Patient Name: Ana Bullard  MRN: [id number]  Patient Class: IP- Inpatient   Admission Date: 6/12/2023  Length of Stay: 4 days  Attending Physician: Haseeb Romero MD  Primary Care Provider: YRN Pollard        Subjective:     Principal Problem:Wide-complex tachycardia        HPI:  63-year-old  male with multiple medical comorbidities including but not limited to chronic combined CHF, type 2 DM, CAD, thrombocytopenia, chronic diabetic foot ulcer and CKD stage 3b who was discharged from outside hospital earlier today after being managed for UTI presented here with generalized weakness and tachycardia.    He was managed for complicated UTI at outside hospital for 2 days.  He was discharged on oral antibiotics and resumption of home health.  Today his home health nurse found him to be tachycardic and directed him to the ER.  Initial rhythm on presentation was wide complex tachycardia.  Wife reports that he was not given his amiodarone when he was hospitalized.    In the ED here, after consultation with Cardiology patient underwent successful synchronized cardioversion with restoration of normal sinus rhythm.    Patient denies chest pain but admits to shortness of breath and increased abdominal distention, lower extremity edema and weight gain over the last several days.  Admits to being compliant with diuretics.  He is currently being followed by Podiatry for chronic right foot ulcer and receiving local wound care.    Rest of the 10 point review of systems is negative except as mentioned above.      Overview/Hospital Course:  Assumed care of the patient  Patient is sitting on the bed no shortness of breath, lung exam with very limited entry, the whole abdomen is filled  with fluid from CHF , renal function slightly worse on Lasix    6/16   Creatinine up. No CP or SOB. Off dobutamine   Constipated .      Interval History:   As per subjective      Review of Systems  Objective:     Vital Signs (Most Recent):  Temp: 97.4 °F (36.3 °C) (06/16/23 1500)  Pulse: 60 (06/16/23 1700)  Resp: 20 (06/16/23 1700)  BP: 130/61 (06/16/23 1700)  SpO2: 98 % (06/16/23 1700) Vital Signs (24h Range):  Temp:  [97.1 °F (36.2 °C)-98.4 °F (36.9 °C)] 97.4 °F (36.3 °C)  Pulse:  [59-65] 60  Resp:  [16-26] 20  SpO2:  [91 %-100 %] 98 %  BP: (103-148)/(59-79) 130/61     Weight: 134.6 kg (296 lb 11.8 oz)  Body mass index is 39.15 kg/m².    Intake/Output Summary (Last 24 hours) at 6/16/2023 1730  Last data filed at 6/16/2023 1200  Gross per 24 hour   Intake 898.92 ml   Output 1500 ml   Net -601.08 ml         Physical Exam  Constitutional:       General: He is not in acute distress.     Appearance: He is well-developed.   HENT:      Head: Normocephalic.   Eyes:      Pupils: Pupils are equal, round, and reactive to light.   Cardiovascular:      Rate and Rhythm: Normal rate and regular rhythm.   Pulmonary:      Effort: Pulmonary effort is normal. No respiratory distress.   Abdominal:      General: Abdomen is flat. There is distension.      Tenderness: There is no abdominal tenderness.   Musculoskeletal:      Cervical back: Neck supple.   Skin:     Findings: No rash.   Neurological:      Mental Status: He is alert and oriented to person, place, and time.           Significant Labs: All pertinent labs within the past 24 hours have been reviewed.  Coagulation: No results for input(s): PT, INR, APTT in the last 48 hours.    Significant Imaging: I have reviewed all pertinent imaging results/findings within the past 24 hours.      Assessment/Plan:      Wide-complex tachycardia with ICD and severe heart failure   Wide complex tachycardia with heart rate in the 130-140 beats per minute   Patient has ICD in place however not cardioverted by it  Status post synchronized cardioversion while in the ED   Off  amiodarone drip  Off Dobutamine d rip   Decreased  dose IV lasix to 40 mg bid for worsening  renal function   EF around 22 % and discussed about poor prognosis   monitoring of renal function   Strict in/outs, low-sodium diet and daily weights        Acute renal failure superimposed on stage 3b chronic kidney disease  Likely cardiorenal syndrome  Monitor renal function with daily labs especially while on diuretics   Nephrology follow up   Dose medications per GFR  Decreased lasix   May end up HD         UTI (urinary tract infection)  Continue empiric ceftriaxone for additional 1   days      Diabetic foot ulcer s/p debridement right foot with fat layer exposed  Continue daily wound care  S/p debridement by podiatry      Chronic combined systolic and diastolic heart failure  As above        Type II diabetes mellitus with neurological manifestations  Basal insulin with detemir at an adjusted dose   Low-dose insulin sliding scale  Gabapentin at a lower dose due to AD     Chronic Thrombocytopenia  Also patient has a history of heparin induced thrombocytopenia   Monitor platelets daily labs     Constipation       3 types of laxatives ordered      VTE Risk Mitigation (From admission, onward)         Ordered     IP VTE HIGH RISK PATIENT  Once         06/13/23 0106     Place sequential compression device  Until discontinued         06/13/23 0106     Reason for No Pharmacological VTE Prophylaxis  Once        Question:  Reasons:  Answer:  Thrombocytopenia    06/13/23 0106                Discharge Planning   BILL:      Code Status: Full Code   Is the patient medically ready for discharge?:     Reason for patient still in hospital (select all that apply): Treatment  Discharge Plan A: Home Health                  Haseeb Romero MD  Department of Hospital Medicine   [facility]

## 2023-06-16 NOTE — CARE UPDATE
06/16/23 0751   Patient Assessment/Suction   Level of Consciousness (AVPU) alert   Respiratory Effort Normal;Unlabored   Expansion/Accessory Muscles/Retractions expansion symmetric;no retractions;no use of accessory muscles   All Lung Fields Breath Sounds clear;diminished   Skin Integrity   $ Wound Care Tech Time 15 min   Area Observed Bridge of nose   Skin Appearance without discoloration   PRE-TX-O2   Device (Oxygen Therapy) nasal cannula   $ Is the patient on Low Flow Oxygen? Yes   Flow (L/min) 2   SpO2 100 %   Pulse Oximetry Type Continuous   $ Pulse Oximetry - Multiple Charge Pulse Oximetry - Multiple   Pulse 60   Aerosol Therapy   $ Aerosol Therapy Charges Aerosol Treatment   Daily Review of Necessity (SVN) completed   Respiratory Treatment Status (SVN) given   Treatment Route (SVN) mask;oxygen   Patient Position (SVN) HOB elevated   Post Treatment Assessment (SVN) increased aeration   Signs of Intolerance (SVN) none   Education   $ Education Bronchodilator;15 min   Respiratory Evaluation   $ Care Plan Tech Time 15 min   $ Eval/Re-eval Charges Re-evaluation

## 2023-06-16 NOTE — SUBJECTIVE & OBJECTIVE
Interval History:   As per subjective     Review of Systems  Objective:     Vital Signs (Most Recent):  Temp: 97.4 °F (36.3 °C) (06/16/23 1500)  Pulse: 60 (06/16/23 1700)  Resp: 20 (06/16/23 1700)  BP: 130/61 (06/16/23 1700)  SpO2: 98 % (06/16/23 1700) Vital Signs (24h Range):  Temp:  [97.1 °F (36.2 °C)-98.4 °F (36.9 °C)] 97.4 °F (36.3 °C)  Pulse:  [59-65] 60  Resp:  [16-26] 20  SpO2:  [91 %-100 %] 98 %  BP: (103-148)/(59-79) 130/61     Weight: 134.6 kg (296 lb 11.8 oz)  Body mass index is 39.15 kg/m².    Intake/Output Summary (Last 24 hours) at 6/16/2023 1730  Last data filed at 6/16/2023 1200  Gross per 24 hour   Intake 898.92 ml   Output 1500 ml   Net -601.08 ml         Physical Exam  Constitutional:       General: He is not in acute distress.     Appearance: He is well-developed.   HENT:      Head: Normocephalic.   Eyes:      Pupils: Pupils are equal, round, and reactive to light.   Cardiovascular:      Rate and Rhythm: Normal rate and regular rhythm.   Pulmonary:      Effort: Pulmonary effort is normal. No respiratory distress.   Abdominal:      General: Abdomen is flat. There is distension.      Tenderness: There is no abdominal tenderness.   Musculoskeletal:      Cervical back: Neck supple.   Skin:     Findings: No rash.   Neurological:      Mental Status: He is alert and oriented to person, place, and time.           Significant Labs: All pertinent labs within the past 24 hours have been reviewed.  Coagulation: No results for input(s): PT, INR, APTT in the last 48 hours.    Significant Imaging: I have reviewed all pertinent imaging results/findings within the past 24 hours.

## 2023-06-16 NOTE — PROGRESS NOTES
[facility]   Cardiology Note    Consult Requested By: hospital medicine  Reason for Consult: Ventricular tachycardia    SUBJECTIVE:     History of Present Illness: Patient is a 62 yo female with PMHx of CAD s/p CABG 2012, HFrEF, AICD, DM2, CKD3, HTN, HLP, chronic diabetic foot ulcer who presented to the ED with shortness of breath and generalized weakness. He was just discharged from a [facility] yesterday with a UTI. He reports he was in that hospital for 2 days for IV antibiotics and felt overall weak the entire time. When he was discharged home his symptoms worsened and he experienced presyncopal symptoms. He denies syncope, chest pain, N/V, fevers, chills. Reports increased LE edema over the last few days. In the ED EKG showed wide complex tachycardia. He reports he was not given amiodarone while in the hospital. He underwent successful cardioversion with restoration of NSR. ED workup showed, BNP 1162. Troponin elevated to 17. UA positive for UTI, lactate negative. Bcx NGTD. CXR showed cardiomegaly with no acute cardiopulmonary process. Cr 3 today. Electrolytes normal. He was started on amio gtt. Today patient states his breathing has improved. Pt states he had a peripheral angiogram a few months ago with Dr. Oleary with no intervention needed.     6/14: Pt seen and examined this morning.   Vitals reviewed. Blood pressure 88/52 this morning. Lisinopril and coreg on hold. Labs reviewed. Cr 3.4. Na 133. H&H stable. Nephrology following.  cc. Tele reviewed. No events overnight.     6/15: Pt seen and examined this morning. He states he is feeling much better today. Reports shortness of breath has improved. He denies chest pain or palpitations. Lasix was increased to 80 mg IV q12h yesterday.  cc. On dobutamine. VSS. Labs reviewed. Cr 3.5. H&H 9.4/31.8, stable. LE arterial US showed mild PVD, no significant stenosis. Tele reviewed, no events overnight.     6/16: No events overnight.  "Reports he is feeling much better today. Shortness of breath has improved. Denies chest pain. Vascular surgery was consulted, no plan for intervention. VSS. Labs reviewed. Cr 3.7. UOP 1350 cc.     Review of patient's allergies indicates:   Allergen Reactions    Vasopressin Anaphylaxis and Other (See Comments)     Increased pressure in his head; felt like his eyeballs and veins were going to pop out of his head.     Vasopressin analogues Other (See Comments) and Anaphylaxis     "felt like eyes going to pop out of my head"  Other reaction(s): Other (See Comments)  "felt like eyes going to pop out of my head"  Increased pressure in his head; felt like his eyeballs and veins were going to pop out of his head.     Heparin Other (See Comments)     Other reaction(s): "depleted my blood platets"    Decreased platelet count    Heparin analogues Other (See Comments)     Depleted WBC count    Morphine Hallucinations, Other (See Comments) and Anxiety     Other reaction(s): Hallucinations  Paranoid, hallucinations         Past Medical History:   Diagnosis Date    AICD (automatic cardioverter/defibrillator) present     Anemia, chronic disease 07/27/2021    Anemia, unspecified 07/27/2021    Anxiety and depression 2012    CAD (coronary artery disease) 2012    Cardiomegaly 2016    CHF (congestive heart failure) 2012    Cholecystitis 2017    Complete heart block 2012    Diabetic foot ulcer     DVT (deep venous thrombosis) 2012    And pulmonary embolus    GERD (gastroesophageal reflux disease) 2010    Heparin induced thrombocytopenia     Hyperlipemia 2011    IDDM (insulin dependent diabetes mellitus) 1983    With neuropathy    Ischemic cardiomyopathy 2012    MI (myocardial infarction) 2012    Morbid obesity     MRSA (methicillin resistant Staphylococcus aureus) infection 01/19/2019    Noncompliance with therapeutic plan 2019    Normochromic normocytic anemia 07/27/2021    Osteomyelitis of right foot 01/2019    Osteomyelitis of right " foot 2022    Klebsiella from bone, GBS in blood    Pulmonary edema     Respiratory failure 2019    Sepsis 2019    Due to cellulitis right leg    Sleep apnea 2013    Thrombocytopathy     Venous stasis dermatitis of both lower extremities      Past Surgical History:   Procedure Laterality Date    CARDIAC PACEMAKER PLACEMENT  2012    CARDIAC PACEMAKER PLACEMENT  10/04/2018    battery replacement    CORONARY ARTERY BYPASS GRAFT  2012    ESOPHAGOGASTRODUODENOSCOPY  2017    SAMARA FILTER PLACEMENT  2012    vena cava    LUMBAR SYMPATHETIC NERVE BLOCK Right 2019    Procedure: BLOCK, NERVE, SYMPATHETIC, LUMBAR;  Surgeon: Tashi Good MD;  Location: [facility] OR;  Service: Pain Management;  Laterality: Right;  RIGHT SYMPATHETIC NERVE BLOCK     Family History   Problem Relation Age of Onset    Arthritis Mother     Diabetes Mother     Cancer Father     Heart disease Father     Stroke Father      Social History     Tobacco Use    Smoking status: Former     Packs/day: 2.00     Years: 38.00     Pack years: 76.00     Types: Cigarettes     Quit date:      Years since quittin.4    Smokeless tobacco: Never   Substance Use Topics    Alcohol use: No    Drug use: Never       Review of Systems:  Review of Systems   Constitutional:  Positive for malaise/fatigue. Negative for chills, diaphoresis and fever.   Respiratory:  Positive for shortness of breath. Negative for cough and sputum production.    Cardiovascular:  Positive for palpitations and leg swelling. Negative for chest pain and orthopnea.   Gastrointestinal:  Negative for nausea and vomiting.   Neurological:  Positive for weakness. Negative for dizziness and loss of consciousness.     OBJECTIVE:     Vital Signs (Most Recent)  Temp: 97.1 °F (36.2 °C) (23 0701)  Pulse: 60 (23 0800)  Resp: 18 (23 0901)  BP: (!) 143/79 (23 0800)  SpO2: 100 % (23 08)    Vital Signs Range (Last 24H):  Temp:  [97 °F (36.1 °C)-98.4 °F  "(36.9 °C)]   Pulse:  [60-65]   Resp:  [16-26]   BP: (109-149)/(57-90)   SpO2:  [91 %-100 %]     I & O (Last 24H):    Intake/Output Summary (Last 24 hours) at 6/16/2023 0932  Last data filed at 6/16/2023 0928  Gross per 24 hour   Intake 459.67 ml   Output 1650 ml   Net -1190.33 ml         Current Diet:     Current Diet Order   Procedures    Diet Cardiac [facility]; Diabetic Diet     Order Specific Question:   Indicate patient location for additional diet options:     Answer:   [facility]     Order Specific Question:   Additional Diet Options:     Answer:   Diabetic Diet        Allergies:  Review of patient's allergies indicates:   Allergen Reactions    Vasopressin Anaphylaxis and Other (See Comments)     Increased pressure in his head; felt like his eyeballs and veins were going to pop out of his head.     Vasopressin analogues Other (See Comments) and Anaphylaxis     "felt like eyes going to pop out of my head"  Other reaction(s): Other (See Comments)  "felt like eyes going to pop out of my head"  Increased pressure in his head; felt like his eyeballs and veins were going to pop out of his head.     Heparin Other (See Comments)     Other reaction(s): "depleted my blood platets"    Decreased platelet count    Heparin analogues Other (See Comments)     Depleted WBC count    Morphine Hallucinations, Other (See Comments) and Anxiety     Other reaction(s): Hallucinations  Paranoid, hallucinations         Meds:  Scheduled Meds:   albuterol sulfate  2.5 mg Nebulization Q6H WAKE    amiodarone  200 mg Oral BID    aspirin  81 mg Oral Daily    atorvastatin  10 mg Oral Daily    cefTRIAXone (ROCEPHIN) IVPB  1 g Intravenous Q24H    chlorhexidine  15 mL Mouth/Throat BID    collagenase   Topical (Top) Daily    furosemide (LASIX) injection  80 mg Intravenous Q12H    gabapentin  300 mg Oral TID    insulin detemir U-100  10 Units Subcutaneous Daily    mupirocin   Nasal BID    pantoprazole  40 mg Oral Before breakfast    senna-docusate 8.6-50 mg  1 " tablet Oral QHS     Continuous Infusions:   DOBUTamine IV infusion (non-titrating) 2.5 mcg/kg/min (06/16/23 0600)     PRN Meds:acetaminophen, dextrose 50%, dextrose 50%, famotidine, glucose, glucose, insulin aspart U-100, magnesium oxide, magnesium oxide, melatonin, naloxone, ondansetron, polyethylene glycol, potassium bicarbonate, potassium bicarbonate, potassium bicarbonate, potassium, sodium phosphates, potassium, sodium phosphates, potassium, sodium phosphates, traMADoL    Oxygen/Ventilator Data (Last 24H):  (if applicable)            Hemodynamic Parameters (Last 24H):   (if applicable)        Laboratory and Radiology Data:  Recent Results (from the past 24 hour(s))   POCT glucose    Collection Time: 06/15/23 11:53 AM   Result Value Ref Range    POC Glucose 158 (H) 70 - 110   POCT glucose    Collection Time: 06/15/23  5:29 PM   Result Value Ref Range    POC Glucose 162 (H) 70 - 110   POCT glucose    Collection Time: 06/15/23  8:04 PM   Result Value Ref Range    POC Glucose 182 (H) 70 - 110   Basic metabolic panel    Collection Time: 06/16/23  6:43 AM   Result Value Ref Range    Sodium 135 (L) 136 - 145 mmol/L    Potassium 4.5 3.5 - 5.1 mmol/L    Chloride 98 95 - 110 mmol/L    CO2 27 23 - 29 mmol/L    Glucose 129 (H) 70 - 110 mg/dL    BUN 85 (H) 8 - 23 mg/dL    Creatinine 3.7 (H) 0.5 - 1.4 mg/dL    Calcium 8.4 (L) 8.7 - 10.5 mg/dL    Anion Gap 10 8 - 16 mmol/L    eGFR 17.8 (A) >60 mL/min/1.73 m^2   Magnesium    Collection Time: 06/16/23  6:43 AM   Result Value Ref Range    Magnesium 2.4 1.6 - 2.6 mg/dL   CBC auto differential    Collection Time: 06/16/23  6:43 AM   Result Value Ref Range    WBC 5.13 3.90 - 12.70 K/uL    RBC 3.46 (L) 4.60 - 6.20 M/uL    Hemoglobin 9.1 (L) 14.0 - 18.0 g/dL    Hematocrit 30.4 (L) 40.0 - 54.0 %    MCV 88 82 - 98 fL    MCH 26.3 (L) 27.0 - 31.0 pg    MCHC 29.9 (L) 32.0 - 36.0 g/dL    RDW 20.1 (H) 11.5 - 14.5 %    Platelets 56 (L) 150 - 450 K/uL    MPV 9.6 9.2 - 12.9 fL    Immature  Granulocytes 0.4 0.0 - 0.5 %    Gran # (ANC) 4.1 1.8 - 7.7 K/uL    Immature Grans (Abs) 0.02 0.00 - 0.04 K/uL    Lymph # 0.3 (L) 1.0 - 4.8 K/uL    Mono # 0.8 0.3 - 1.0 K/uL    Eos # 0.0 0.0 - 0.5 K/uL    Baso # 0.02 0.00 - 0.20 K/uL    nRBC 0 0 /100 WBC    Gran % 78.9 (H) 38.0 - 73.0 %    Lymph % 5.1 (L) 18.0 - 48.0 %    Mono % 14.6 4.0 - 15.0 %    Eosinophil % 0.6 0.0 - 8.0 %    Basophil % 0.4 0.0 - 1.9 %    Differential Method Automated    POCT glucose    Collection Time: 06/16/23  7:42 AM   Result Value Ref Range    POC Glucose 126 (H) 70 - 110     Imaging Results              X-Ray Chest AP Portable (Final result)  Result time 06/12/23 19:00:35      Final result by Driss Merritt MD (06/12/23 19:00:35)                   Narrative:      EXAM: XR CHEST AP PORTABLE    HISTORY: Sepsis    COMPARISON:Chest x-ray dated 9/7/2022    FINDINGS: The lungs are fairly well-expanded and appear free of focal areas of consolidation. No pleural effusions are evident. The heart is moderately enlarged and unchanged. There is a mass-like opacity lateral to the AP window that appears similar on multiple previous chest x-rays. A CT of the chest 1/14/2019 shows that this corresponds to a dilated main pulmonary artery. A left subclavian dual-chamber biventricular pacemaker remains in place in the left subclavian vein without change.    IMPRESSION:   Moderate cardiomegaly. No evidence of acute disease within the chest.    Electronically signed by:  Barry Merritt MD  6/12/2023 7:00 PM CDT Workstation: [id number]                                    12-lead EKG interpretation:  (if applicable)      Current Cardiac Rhythm:   (if applicable)    Physical Exam:   Physical Exam  Vitals and nursing note reviewed.   Constitutional:       General: He is not in acute distress.     Appearance: Normal appearance. He is not ill-appearing.   Cardiovascular:      Rate and Rhythm: Normal rate and regular rhythm.      Pulses: Normal pulses.      Heart  sounds: Murmur heard.   Pulmonary:      Effort: Pulmonary effort is normal. No respiratory distress.      Breath sounds: Normal breath sounds.   Musculoskeletal:      Right lower leg: Edema present.      Left lower leg: Edema present.   Skin:     General: Skin is warm and dry.      Findings: No rash.      Comments: R foot wound   Neurological:      Mental Status: He is alert and oriented to person, place, and time.       ASSESSMENT/PLAN:   Assessment:   Wide Complex Tachycardia - AICD - s/p successful cardioversion. On amiodarone gtt. Discussed interrogation with rep from [employer], appears he was in an atrial tachycardia with BiV pacing at 140 bpm on admission. No Vfib noted. VT zone set at 190.   Acute on Chronic Systolic Heart Failure- BNP 1162, CXR cardiomegaly. On IV lasix. Echo 3/2023 showed EF 25%. MUGA scan 10/2022 showed EF 21%.   AD on Chronic Kidney Disease - Cr 3.5, Nephrology consulted.   CAD s/p CABG 2012 - denies chest pain. Troponin elevation to 29 and flat. Last stress test 2021.   Hypertension - on coreg, lisinopril at home.   Hyperlipidemia   Diabetes Mellitus Type 2  Diabetic Foot Ulcer     Plan:   Patient reports he is feeling better.   D/c dobutamine.  Lasix was increased to 80 mg IV q12h, UOP 1350 cc.Cr 3.7 today.   Appreciate Nephrology's recommendations.   Patient may need dialysis in the future.  Strict I/Os.  Daily weights.   D/c amiodarone gtt on 6/15.   Continue 200 mg BID PO.  Hold lisinopril due to AD.   Coreg on hold due to low pressures.   BP 140s/70s, restart coreg.   Patient can be discharged from a cardiac standpoint with follow up in 1-2 weeks.

## 2023-06-17 LAB
ANION GAP SERPL CALC-SCNC: 10 MMOL/L (ref 8–16)
BACTERIA BLD CULT: NORMAL
BACTERIA BLD CULT: NORMAL
BASOPHILS # BLD AUTO: 0.02 K/UL (ref 0–0.2)
BASOPHILS NFR BLD: 0.4 % (ref 0–1.9)
BUN SERPL-MCNC: 89 MG/DL (ref 8–23)
CALCIUM SERPL-MCNC: 8.4 MG/DL (ref 8.7–10.5)
CHLORIDE SERPL-SCNC: 98 MMOL/L (ref 95–110)
CO2 SERPL-SCNC: 26 MMOL/L (ref 23–29)
CREAT SERPL-MCNC: 3.7 MG/DL (ref 0.5–1.4)
DIFFERENTIAL METHOD: ABNORMAL
EOSINOPHIL # BLD AUTO: 0.1 K/UL (ref 0–0.5)
EOSINOPHIL NFR BLD: 1.2 % (ref 0–8)
ERYTHROCYTE [DISTWIDTH] IN BLOOD BY AUTOMATED COUNT: 20.2 % (ref 11.5–14.5)
EST. GFR  (NO RACE VARIABLE): 17.8 ML/MIN/1.73 M^2
GLUCOSE SERPL-MCNC: 122 MG/DL (ref 70–110)
GLUCOSE SERPL-MCNC: 134 MG/DL (ref 70–110)
GLUCOSE SERPL-MCNC: 142 MG/DL (ref 70–110)
GLUCOSE SERPL-MCNC: 185 MG/DL (ref 70–110)
GLUCOSE SERPL-MCNC: 202 MG/DL (ref 70–110)
GLUCOSE SERPL-MCNC: 224 MG/DL (ref 70–110)
HCT VFR BLD AUTO: 31.2 % (ref 40–54)
HGB BLD-MCNC: 9.2 G/DL (ref 14–18)
IMM GRANULOCYTES # BLD AUTO: 0.03 K/UL (ref 0–0.04)
IMM GRANULOCYTES NFR BLD AUTO: 0.6 % (ref 0–0.5)
LYMPHOCYTES # BLD AUTO: 0.2 K/UL (ref 1–4.8)
LYMPHOCYTES NFR BLD: 4.9 % (ref 18–48)
MAGNESIUM SERPL-MCNC: 2.4 MG/DL (ref 1.6–2.6)
MCH RBC QN AUTO: 25.8 PG (ref 27–31)
MCHC RBC AUTO-ENTMCNC: 29.5 G/DL (ref 32–36)
MCV RBC AUTO: 88 FL (ref 82–98)
MONOCYTES # BLD AUTO: 0.6 K/UL (ref 0.3–1)
MONOCYTES NFR BLD: 13 % (ref 4–15)
NEUTROPHILS # BLD AUTO: 3.9 K/UL (ref 1.8–7.7)
NEUTROPHILS NFR BLD: 79.9 % (ref 38–73)
NRBC BLD-RTO: 0 /100 WBC
PLATELET # BLD AUTO: 58 K/UL (ref 150–450)
PMV BLD AUTO: 10.5 FL (ref 9.2–12.9)
POTASSIUM SERPL-SCNC: 4.3 MMOL/L (ref 3.5–5.1)
RBC # BLD AUTO: 3.56 M/UL (ref 4.6–6.2)
SODIUM SERPL-SCNC: 134 MMOL/L (ref 136–145)
WBC # BLD AUTO: 4.91 K/UL (ref 3.9–12.7)

## 2023-06-17 PROCEDURE — 63600175 PHARM REV CODE 636 W HCPCS: Performed by: INTERNAL MEDICINE

## 2023-06-17 PROCEDURE — 25000003 PHARM REV CODE 250: Performed by: INTERNAL MEDICINE

## 2023-06-17 PROCEDURE — 25000003 PHARM REV CODE 250

## 2023-06-17 PROCEDURE — 99900031 HC PATIENT EDUCATION (STAT)

## 2023-06-17 PROCEDURE — 99900035 HC TECH TIME PER 15 MIN (STAT)

## 2023-06-17 PROCEDURE — 27000221 HC OXYGEN, UP TO 24 HOURS

## 2023-06-17 PROCEDURE — 94761 N-INVAS EAR/PLS OXIMETRY MLT: CPT

## 2023-06-17 PROCEDURE — 99232 PR SUBSEQUENT HOSPITAL CARE,LEVL II: ICD-10-PCS | Mod: ,,, | Performed by: PODIATRIST

## 2023-06-17 PROCEDURE — 83735 ASSAY OF MAGNESIUM: CPT | Performed by: INTERNAL MEDICINE

## 2023-06-17 PROCEDURE — 99232 SBSQ HOSP IP/OBS MODERATE 35: CPT | Mod: ,,, | Performed by: PODIATRIST

## 2023-06-17 PROCEDURE — 85025 COMPLETE CBC W/AUTO DIFF WBC: CPT | Performed by: INTERNAL MEDICINE

## 2023-06-17 PROCEDURE — 94640 AIRWAY INHALATION TREATMENT: CPT

## 2023-06-17 PROCEDURE — 25000242 PHARM REV CODE 250 ALT 637 W/ HCPCS: Performed by: HOSPITALIST

## 2023-06-17 PROCEDURE — 25000003 PHARM REV CODE 250: Performed by: HOSPITALIST

## 2023-06-17 PROCEDURE — 21000000 HC CCU ICU ROOM CHARGE

## 2023-06-17 PROCEDURE — 94799 UNLISTED PULMONARY SVC/PX: CPT

## 2023-06-17 PROCEDURE — 36415 COLL VENOUS BLD VENIPUNCTURE: CPT | Performed by: INTERNAL MEDICINE

## 2023-06-17 PROCEDURE — 80048 BASIC METABOLIC PNL TOTAL CA: CPT | Performed by: INTERNAL MEDICINE

## 2023-06-17 RX ADMIN — INSULIN DETEMIR 10 UNITS: 100 INJECTION, SOLUTION SUBCUTANEOUS at 11:06

## 2023-06-17 RX ADMIN — CHLORHEXIDINE GLUCONATE 15 ML: 1.2 RINSE ORAL at 09:06

## 2023-06-17 RX ADMIN — CEFTRIAXONE SODIUM 1 G: 1 INJECTION, POWDER, FOR SOLUTION INTRAMUSCULAR; INTRAVENOUS at 10:06

## 2023-06-17 RX ADMIN — TRAMADOL HYDROCHLORIDE 50 MG: 50 TABLET, COATED ORAL at 03:06

## 2023-06-17 RX ADMIN — GABAPENTIN 300 MG: 300 CAPSULE ORAL at 11:06

## 2023-06-17 RX ADMIN — ALBUTEROL SULFATE 2.5 MG: 2.5 SOLUTION RESPIRATORY (INHALATION) at 07:06

## 2023-06-17 RX ADMIN — SENNOSIDES AND DOCUSATE SODIUM 1 TABLET: 50; 8.6 TABLET ORAL at 09:06

## 2023-06-17 RX ADMIN — TRAMADOL HYDROCHLORIDE 50 MG: 50 TABLET, COATED ORAL at 05:06

## 2023-06-17 RX ADMIN — FUROSEMIDE 40 MG: 10 INJECTION, SOLUTION INTRAVENOUS at 07:06

## 2023-06-17 RX ADMIN — TRAMADOL HYDROCHLORIDE 50 MG: 50 TABLET, COATED ORAL at 09:06

## 2023-06-17 RX ADMIN — ALBUTEROL SULFATE 2.5 MG: 2.5 SOLUTION RESPIRATORY (INHALATION) at 02:06

## 2023-06-17 RX ADMIN — GABAPENTIN 300 MG: 300 CAPSULE ORAL at 03:06

## 2023-06-17 RX ADMIN — ASPIRIN 81 MG 81 MG: 81 TABLET ORAL at 11:06

## 2023-06-17 RX ADMIN — INSULIN ASPART 3 UNITS: 100 INJECTION, SOLUTION INTRAVENOUS; SUBCUTANEOUS at 11:06

## 2023-06-17 RX ADMIN — ATORVASTATIN CALCIUM 10 MG: 10 TABLET, FILM COATED ORAL at 11:06

## 2023-06-17 RX ADMIN — COLLAGENASE SANTYL: 250 OINTMENT TOPICAL at 11:06

## 2023-06-17 RX ADMIN — AMIODARONE HYDROCHLORIDE 200 MG: 200 TABLET ORAL at 11:06

## 2023-06-17 RX ADMIN — CHLORHEXIDINE GLUCONATE 15 ML: 1.2 RINSE ORAL at 11:06

## 2023-06-17 RX ADMIN — FUROSEMIDE 40 MG: 10 INJECTION, SOLUTION INTRAVENOUS at 11:06

## 2023-06-17 RX ADMIN — MUPIROCIN 1 G: 20 OINTMENT TOPICAL at 09:06

## 2023-06-17 RX ADMIN — MUPIROCIN 1 G: 20 OINTMENT TOPICAL at 11:06

## 2023-06-17 RX ADMIN — AMIODARONE HYDROCHLORIDE 200 MG: 200 TABLET ORAL at 09:06

## 2023-06-17 RX ADMIN — GABAPENTIN 300 MG: 300 CAPSULE ORAL at 09:06

## 2023-06-17 RX ADMIN — PANTOPRAZOLE SODIUM 40 MG: 40 TABLET, DELAYED RELEASE ORAL at 05:06

## 2023-06-17 RX ADMIN — ALBUTEROL SULFATE 2.5 MG: 2.5 SOLUTION RESPIRATORY (INHALATION) at 08:06

## 2023-06-17 NOTE — PLAN OF CARE
06/17/23 0750   Patient Assessment/Suction   Level of Consciousness (AVPU) alert   Respiratory Effort Unlabored;Normal   Expansion/Accessory Muscles/Retractions no use of accessory muscles   All Lung Fields Breath Sounds clear;equal bilaterally   Rhythm/Pattern, Respiratory unlabored;pattern regular;depth regular   Cough Frequency infrequent   Cough Type nonproductive   PRE-TX-O2   Device (Oxygen Therapy) nasal cannula   $ Is the patient on Low Flow Oxygen? Yes   Flow (L/min) 2   SpO2 99 %   Pulse Oximetry Type Continuous   $ Pulse Oximetry - Multiple Charge Pulse Oximetry - Multiple   Oximetry Probe Site Assessed;Intact   Pulse 60   Resp 16   Aerosol Therapy   $ Aerosol Therapy Charges Aerosol Treatment   Daily Review of Necessity (SVN) completed   Respiratory Treatment Status (SVN) given   Treatment Route (SVN) mask   Patient Position (SVN) semi-Ruiz's   Post Treatment Assessment (SVN) breath sounds improved   Signs of Intolerance (SVN) none   Breath Sounds Post-Respiratory Treatment   Throughout All Fields Post-Treatment All Fields   Throughout All Fields Post-Treatment aeration increased   Post-treatment Heart Rate (beats/min) 64   Post-treatment Resp Rate (breaths/min) 18   Education   $ Education Bronchodilator;15 min   Respiratory Evaluation   $ Care Plan Tech Time 15 min   $ Eval/Re-eval Charges Re-evaluation   Evaluation For Re-Eval 5+ day

## 2023-06-17 NOTE — CARE UPDATE
06/16/23 2059   Patient Assessment/Suction   Level of Consciousness (AVPU) alert   Respiratory Effort Unlabored   Expansion/Accessory Muscles/Retractions no use of accessory muscles   All Lung Fields Breath Sounds clear;wheezes, expiratory   Rhythm/Pattern, Respiratory no shortness of breath reported   Cough Frequency no cough   PRE-TX-O2   Device (Oxygen Therapy) nasal cannula   $ Is the patient on Low Flow Oxygen? Yes   Flow (L/min) 2   SpO2 99 %   Pulse Oximetry Type Continuous   $ Pulse Oximetry - Multiple Charge Pulse Oximetry - Multiple   Pulse 60   Resp 20   Positioning   Head of Bed (HOB) Positioning HOB elevated;HOB at 30 degrees   Aerosol Therapy   $ Aerosol Therapy Charges Aerosol Treatment   Daily Review of Necessity (SVN) completed   Respiratory Treatment Status (SVN) given   Treatment Route (SVN) mask   Patient Position (SVN) sitting on edge of bed   Post Treatment Assessment (SVN) breath sounds improved   Signs of Intolerance (SVN) none   Breath Sounds Post-Respiratory Treatment   Throughout All Fields Post-Treatment All Fields   Throughout All Fields Post-Treatment aeration increased   Post-treatment Heart Rate (beats/min) 66   Post-treatment Resp Rate (breaths/min) 15   Education   $ Education Bronchodilator;15 min   Respiratory Evaluation   $ Care Plan Tech Time 15 min   $ Eval/Re-eval Charges Re-evaluation

## 2023-06-17 NOTE — ASSESSMENT & PLAN NOTE
Wound care dressing changes to continue consisting  of Santyl followed by Aquacel Ag    Brent and protein drinks ordered    Patient to follow up outpatient once discharged    Heel touch weight-bearing to right foot     Counseled patient on increasing protein intake, not getting wound wet, keeping dressing clean dry and intact, following a healthy diet, elevating legs when able, removing pressure from wound

## 2023-06-17 NOTE — PROGRESS NOTES
[facility]  Podiatry  Progress Note    Patient Name: Ana Bullard  MRN: [id number]  Admission Date: 6/12/2023  Hospital Length of Stay: 5 days  Attending Physician: Haseeb Romero MD  Primary Care Provider: YRN Pollard     Subjective:     Interval History:  Patient resting in bed.  Wife bedside.  Denies any new complaints.  Denies any complaints of pain at this time.  Dressing intact.  Dressing recently changed according to wife.        Scheduled Meds:   albuterol sulfate  2.5 mg Nebulization Q6H WAKE    amiodarone  200 mg Oral BID    aspirin  81 mg Oral Daily    atorvastatin  10 mg Oral Daily    cefTRIAXone (ROCEPHIN) IVPB  1 g Intravenous Q24H    chlorhexidine  15 mL Mouth/Throat BID    collagenase   Topical (Top) Daily    furosemide (LASIX) injection  40 mg Intravenous Q12H    gabapentin  300 mg Oral TID    insulin detemir U-100  10 Units Subcutaneous Daily    mupirocin   Nasal BID    pantoprazole  40 mg Oral Before breakfast    senna-docusate 8.6-50 mg  1 tablet Oral QHS    sodium phosphates  1 enema Rectal Once     Continuous Infusions:  PRN Meds:acetaminophen, bisacodyL, dextrose 50%, dextrose 50%, famotidine, glucose, glucose, insulin aspart U-100, magnesium oxide, magnesium oxide, melatonin, naloxone, ondansetron, polyethylene glycol, potassium bicarbonate, potassium bicarbonate, potassium bicarbonate, potassium, sodium phosphates, potassium, sodium phosphates, potassium, sodium phosphates, traMADoL    Review of Systems  Objective:     Vital Signs (Most Recent):  Temp: 98 °F (36.7 °C) (06/17/23 1100)  Pulse: (!) 59 (06/17/23 1447)  Resp: 14 (06/17/23 1447)  BP: (!) 107/56 (06/17/23 1100)  SpO2: 95 % (06/17/23 1447) Vital Signs (24h Range):  Temp:  [97 °F (36.1 °C)-98 °F (36.7 °C)] 98 °F (36.7 °C)  Pulse:  [59-63] 59  Resp:  [14-26] 14  SpO2:  [88 %-100 %] 95 %  BP: ()/(52-75) 107/56     Weight: 134.6 kg (296 lb 11.8 oz)  Body mass index is 39.15 kg/m².    Foot  Exam  Neurovascular  Dorsalis pedis: 1+  Posterior tibial: 1+  Saphenous nerve sensation: absent  Tibial nerve sensation: absent  Superficial peroneal nerve sensation: absent  Deep peroneal nerve sensation: absent  Sural nerve sensation: absent        Left Foot/Ankle       Neurovascular  Dorsalis pedis: 1+  Posterior tibial: 1+  Saphenous nerve sensation: absent  Tibial nerve sensation: absent  Superficial peroneal nerve sensation: absent  Deep peroneal nerve sensation: absent  Sural nerve sensation: absent        Grade 2 ulceration located sub 5th metatarsal head right foot.  No signs of infection.  Measures approximately 0.4 cm x 0.4 cm x 0.2 cm deep.   Periwound callus.  Mixed necrotic and slough in wound base  Laboratory:  All pertinent labs reviewed within the last 24 hours.    Diagnostic Results:  I have reviewed all pertinent imaging results/findings within the past 24 hours.    Assessment/Plan:     Orthopedic  Ulcer of right foot with fat layer exposed  Wound care dressing changes to continue consisting  of Santyl followed by Aquacel Ag    Brent and protein drinks ordered    Patient to follow up outpatient once discharged    Heel touch weight-bearing to right foot     Counseled patient on increasing protein intake, not getting wound wet, keeping dressing clean dry and intact, following a healthy diet, elevating legs when able, removing pressure from wound          Leah Avila DPM  Podiatry  [facility]

## 2023-06-17 NOTE — SUBJECTIVE & OBJECTIVE
Interval History:     Review of Systems  Objective:     Vital Signs (Most Recent):  Temp: 97.9 °F (36.6 °C) (06/17/23 1500)  Pulse: (!) 59 (06/17/23 1500)  Resp: 16 (06/17/23 1558)  BP: 135/67 (06/17/23 1500)  SpO2: (!) 93 % (06/17/23 1500) Vital Signs (24h Range):  Temp:  [97 °F (36.1 °C)-98 °F (36.7 °C)] 97.9 °F (36.6 °C)  Pulse:  [59-63] 59  Resp:  [14-22] 16  SpO2:  [88 %-100 %] 93 %  BP: ()/(52-75) 135/67     Weight: 134.6 kg (296 lb 11.8 oz)  Body mass index is 39.15 kg/m².    Intake/Output Summary (Last 24 hours) at 6/17/2023 1726  Last data filed at 6/17/2023 0845  Gross per 24 hour   Intake 460 ml   Output 500 ml   Net -40 ml         Physical Exam  Constitutional:       General: He is not in acute distress.     Appearance: He is well-developed.   HENT:      Head: Normocephalic.   Eyes:      Pupils: Pupils are equal, round, and reactive to light.   Cardiovascular:      Rate and Rhythm: Normal rate and regular rhythm.   Pulmonary:      Effort: Pulmonary effort is normal. No respiratory distress.   Abdominal:      General: Abdomen is flat. There is no distension.      Tenderness: There is no abdominal tenderness.   Musculoskeletal:      Cervical back: Neck supple.   Skin:     Findings: No rash.   Neurological:      Mental Status: He is alert and oriented to person, place, and time.           Significant Labs: All pertinent labs within the past 24 hours have been reviewed.  CBC:   Recent Labs   Lab 06/16/23  0643 06/17/23  0708   WBC 5.13 4.91   HGB 9.1* 9.2*   HCT 30.4* 31.2*   PLT 56* 58*     CMP:   Recent Labs   Lab 06/16/23  0643 06/17/23  0708   * 134*   K 4.5 4.3   CL 98 98   CO2 27 26   * 134*   BUN 85* 89*   CREATININE 3.7* 3.7*   CALCIUM 8.4* 8.4*   ANIONGAP 10 10     Cardiac Markers: No results for input(s): CKMB, MYOGLOBIN, BNP, TROPISTAT in the last 48 hours.    Significant Imaging: I have reviewed all pertinent imaging results/findings within the past 24 hours.

## 2023-06-17 NOTE — SUBJECTIVE & OBJECTIVE
Subjective:     Interval History:  Patient resting in bed.  Wife bedside.  Denies any new complaints.  Denies any complaints of pain at this time.  Dressing intact.  Dressing recently changed according to wife.        Scheduled Meds:   albuterol sulfate  2.5 mg Nebulization Q6H WAKE    amiodarone  200 mg Oral BID    aspirin  81 mg Oral Daily    atorvastatin  10 mg Oral Daily    cefTRIAXone (ROCEPHIN) IVPB  1 g Intravenous Q24H    chlorhexidine  15 mL Mouth/Throat BID    collagenase   Topical (Top) Daily    furosemide (LASIX) injection  40 mg Intravenous Q12H    gabapentin  300 mg Oral TID    insulin detemir U-100  10 Units Subcutaneous Daily    mupirocin   Nasal BID    pantoprazole  40 mg Oral Before breakfast    senna-docusate 8.6-50 mg  1 tablet Oral QHS    sodium phosphates  1 enema Rectal Once     Continuous Infusions:  PRN Meds:acetaminophen, bisacodyL, dextrose 50%, dextrose 50%, famotidine, glucose, glucose, insulin aspart U-100, magnesium oxide, magnesium oxide, melatonin, naloxone, ondansetron, polyethylene glycol, potassium bicarbonate, potassium bicarbonate, potassium bicarbonate, potassium, sodium phosphates, potassium, sodium phosphates, potassium, sodium phosphates, traMADoL    Review of Systems  Objective:     Vital Signs (Most Recent):  Temp: 98 °F (36.7 °C) (06/17/23 1100)  Pulse: (!) 59 (06/17/23 1447)  Resp: 14 (06/17/23 1447)  BP: (!) 107/56 (06/17/23 1100)  SpO2: 95 % (06/17/23 1447) Vital Signs (24h Range):  Temp:  [97 °F (36.1 °C)-98 °F (36.7 °C)] 98 °F (36.7 °C)  Pulse:  [59-63] 59  Resp:  [14-26] 14  SpO2:  [88 %-100 %] 95 %  BP: ()/(52-75) 107/56     Weight: 134.6 kg (296 lb 11.8 oz)  Body mass index is 39.15 kg/m².    Foot Exam  Neurovascular  Dorsalis pedis: 1+  Posterior tibial: 1+  Saphenous nerve sensation: absent  Tibial nerve sensation: absent  Superficial peroneal nerve sensation: absent  Deep peroneal nerve sensation: absent  Sural nerve sensation: absent        Left  Foot/Ankle       Neurovascular  Dorsalis pedis: 1+  Posterior tibial: 1+  Saphenous nerve sensation: absent  Tibial nerve sensation: absent  Superficial peroneal nerve sensation: absent  Deep peroneal nerve sensation: absent  Sural nerve sensation: absent        Grade 2 ulceration located sub 5th metatarsal head right foot.  No signs of infection.  Measures approximately 0.4 cm x 0.4 cm x 0.2 cm deep.   Periwound callus.  Mixed necrotic and slough in wound base  Laboratory:  All pertinent labs reviewed within the last 24 hours.    Diagnostic Results:  I have reviewed all pertinent imaging results/findings within the past 24 hours.

## 2023-06-17 NOTE — PROGRESS NOTES
Nephrology Consult Note        Patient Name: Ana Bullard  MRN: [id number]    Patient Class: IP- Inpatient   Admission Date: 6/12/2023  Length of Stay: 5 days  Date of Service: 6/17/2023    Attending Physician: Haseeb Romero MD  Primary Care Provider: YRN Pollard    Reason for Consult: ailyn    SUBJECTIVE:     HPI: 63-year-old  male with multiple medical comorbidities including chronic combined CHF, type 2 DM, CAD, thrombocytopenia, chronic diabetic foot ulcer and CKD stage 3b, who was discharged from another hospital after admission for UTI presented here with generalized weakness and tachycardia.    He was discharged on oral antibiotics and resumption of home health. His home health nurse found him to be tachycardic and directed him to the ER. Initial rhythm on presentation was wide complex tachycardia. Wife reports that he was not given his amiodarone when he was hospitalized.     In the ED here, after consultation with Cardiology patient underwent successful synchronized cardioversion with restoration of normal sinus rhythm.    6/14 VSS. HR stable, BP low over night. UO is low. sCr rising , consistent with ATN due to hemodynamic instability. Gote more lasix this AM by primary team along with initiation of dobutamine gtt.    6/15 VSS. Continue dobutamine and lasix. S/p debridement by Dr. Avila on 6/15. Appreciate Cards and Vascular surgery input.    6/16 VSS. Agree with dc dobutamine gtt. Will decrease IV diuretic to 40mg IV BID to see if he can tolerate, since sCr is up.    6/17 VSS, decreased UO since dobutamine was stopped and diuretic decreased on 6/16. sCr remain elevated but not worse today.    Past Medical History:   Diagnosis Date    AICD (automatic cardioverter/defibrillator) present     Anemia, chronic disease 07/27/2021    Anemia, unspecified 07/27/2021    Anxiety and depression 2012    CAD (coronary artery disease) 2012    Cardiomegaly 2016    CHF (congestive heart failure) 2012     Cholecystitis     Complete heart block     Diabetic foot ulcer     DVT (deep venous thrombosis)     And pulmonary embolus    GERD (gastroesophageal reflux disease) 2010    Heparin induced thrombocytopenia     Hyperlipemia 2011    IDDM (insulin dependent diabetes mellitus) 1983    With neuropathy    Ischemic cardiomyopathy     MI (myocardial infarction)     Morbid obesity     MRSA (methicillin resistant Staphylococcus aureus) infection 2019    Noncompliance with therapeutic plan     Normochromic normocytic anemia 2021    Osteomyelitis of right foot 2019    Osteomyelitis of right foot 2022    Klebsiella from bone, GBS in blood    Pulmonary edema     Respiratory failure     Sepsis 2019    Due to cellulitis right leg    Sleep apnea 2013    Thrombocytopathy 2012    Venous stasis dermatitis of both lower extremities      Past Surgical History:   Procedure Laterality Date    CARDIAC PACEMAKER PLACEMENT  2012    CARDIAC PACEMAKER PLACEMENT  10/04/2018    battery replacement    CORONARY ARTERY BYPASS GRAFT  2012    ESOPHAGOGASTRODUODENOSCOPY  2017    SAMARA FILTER PLACEMENT  2012    vena cava    LUMBAR SYMPATHETIC NERVE BLOCK Right 2019    Procedure: BLOCK, NERVE, SYMPATHETIC, LUMBAR;  Surgeon: Tashi Good MD;  Location: [facility];  Service: Pain Management;  Laterality: Right;  RIGHT SYMPATHETIC NERVE BLOCK     Family History   Problem Relation Age of Onset    Arthritis Mother     Diabetes Mother     Cancer Father     Heart disease Father     Stroke Father      Social History     Tobacco Use    Smoking status: Former     Packs/day: 2.00     Years: 38.00     Pack years: 76.00     Types: Cigarettes     Quit date:      Years since quittin.4    Smokeless tobacco: Never   Substance Use Topics    Alcohol use: No    Drug use: Never       Review of patient's allergies indicates:   Allergen Reactions    Vasopressin Anaphylaxis and Other (See  "Comments)     Increased pressure in his head; felt like his eyeballs and veins were going to pop out of his head.     Vasopressin analogues Other (See Comments) and Anaphylaxis     "felt like eyes going to pop out of my head"  Other reaction(s): Other (See Comments)  "felt like eyes going to pop out of my head"  Increased pressure in his head; felt like his eyeballs and veins were going to pop out of his head.     Heparin Other (See Comments)     Other reaction(s): "depleted my blood platets"    Decreased platelet count    Heparin analogues Other (See Comments)     Depleted WBC count    Morphine Hallucinations, Other (See Comments) and Anxiety     Other reaction(s): Hallucinations  Paranoid, hallucinations         Outpatient meds:  No current facility-administered medications on file prior to encounter.     Current Outpatient Medications on File Prior to Encounter   Medication Sig Dispense Refill    albuterol (PROVENTIL) 5 mg/mL nebulizer solution Take 2.5 mg by nebulization every 6 (six) hours as needed.      albuterol-ipratropium (DUO-NEB) 2.5 mg-0.5 mg/3 mL nebulizer solution Take 3 mLs by nebulization every 6 (six) hours as needed.      amiodarone (PACERONE) 200 MG Tab Take 200 mg by mouth once daily.      aspirin 81 MG Chew Take 81 mg by mouth once daily.      atorvastatin (LIPITOR) 10 MG tablet Take 1 tablet by mouth once daily.  1    B complex-vitamin C-folic acid (JLUIS-JIMMIE/NEPHRO-JIMMIE) 0.8 mg Tab Take 0.4 mg by mouth.      BASAGLAR KWIKPEN U-100 INSULIN glargine 100 units/mL (3mL) SubQ pen Inject 25 Units into the skin once daily.  3    carvediloL (COREG) 3.125 MG tablet Take 3.125 mg by mouth 2 (two) times daily.      cefdinir (OMNICEF) 300 MG capsule Take 300 mg by mouth 2 (two) times daily.      cefTRIAXone (ROCEPHIN) 10 gram injection       cetirizine (ZYRTEC) 10 MG tablet Take 10 mg by mouth once daily.      CORLANOR 5 mg Tab Take 5 mg by mouth nightly.      doxycycline (VIBRAMYCIN) 100 MG Cap Take 1 " capsule (100 mg total) by mouth every 12 (twelve) hours. (Patient not taking: Reported on 5/31/2023) 20 capsule 0    FARXIGA 10 mg tablet Take 10 mg by mouth once daily.      furosemide (LASIX) 40 MG tablet Take 1 tablet (40 mg total) by mouth once daily. 30 tablet 1    gabapentin (NEURONTIN) 600 MG tablet Take 1 tablet by mouth 3 (three) times daily.  0    lisinopriL (PRINIVIL,ZESTRIL) 2.5 MG tablet Take 2.5 mg by mouth 2 (two) times daily.      mupirocin (BACTROBAN) 2 % ointment APPLY TOPICALLY ONCE DAILY. (Patient taking differently: Apply 1 g topically once daily.) 22 g 5    pantoprazole (PROTONIX) 40 MG tablet Take 1 tablet by mouth once daily.  0    traMADoL (ULTRAM) 50 mg tablet Take 50 mg by mouth every 6 (six) hours as needed.      TRULICITY 0.75 mg/0.5 mL pen injector Inject 0.75 mg into the skin every 7 days.      vitamin B complex-folic acid 0.4 mg Tab Take 1 tablet by mouth.         Scheduled meds:   albuterol sulfate  2.5 mg Nebulization Q6H WAKE    amiodarone  200 mg Oral BID    aspirin  81 mg Oral Daily    atorvastatin  10 mg Oral Daily    cefTRIAXone (ROCEPHIN) IVPB  1 g Intravenous Q24H    chlorhexidine  15 mL Mouth/Throat BID    collagenase   Topical (Top) Daily    furosemide (LASIX) injection  40 mg Intravenous Q12H    gabapentin  300 mg Oral TID    insulin detemir U-100  10 Units Subcutaneous Daily    mupirocin   Nasal BID    pantoprazole  40 mg Oral Before breakfast    senna-docusate 8.6-50 mg  1 tablet Oral QHS    sodium phosphates  1 enema Rectal Once       Infusions:        PRN meds:  acetaminophen, bisacodyL, dextrose 50%, dextrose 50%, famotidine, glucose, glucose, insulin aspart U-100, magnesium oxide, magnesium oxide, melatonin, naloxone, ondansetron, polyethylene glycol, potassium bicarbonate, potassium bicarbonate, potassium bicarbonate, potassium, sodium phosphates, potassium, sodium phosphates, potassium, sodium phosphates, traMADoL    Review of Systems:    OBJECTIVE:     Vital Signs  and IO:  Temp:  [97 °F (36.1 °C)-97.8 °F (36.6 °C)]   Pulse:  [59-63]   Resp:  [16-26]   BP: ()/(52-75)   SpO2:  [88 %-100 %]   I/O last 3 completed shifts:  In: 939.8 [P.O.:660; I.V.:79.8; IV Piggyback:200]  Out: 1800 [Urine:1800]  Wt Readings from Last 5 Encounters:   06/16/23 134.6 kg (296 lb 11.8 oz)   06/01/23 126.6 kg (279 lb)   05/31/23 126.7 kg (279 lb 6.4 oz)   05/11/23 121.6 kg (268 lb)   04/27/23 121.6 kg (268 lb)     Body mass index is 39.15 kg/m².    Physical Exam  Constitutional:       General: Patient is not in acute distress.     Appearance: Patient is well-developed. She is not diaphoretic.   HENT:      Head: Normocephalic and atraumatic.      Mouth/Throat: Mucous membranes are moist.   Eyes:      General: No scleral icterus.     Pupils: Pupils are equal, round, and reactive to light.   Cardiovascular:      Rate and Rhythm: Normal rate and regular rhythm.   Pulmonary:      Effort: Pulmonary effort is normal. No respiratory distress.      Breath sounds: No stridor.   Abdominal:      General: There is no distension.      Palpations: Abdomen is soft.   Musculoskeletal:         General: No deformity. Normal range of motion.      Cervical back: Neck supple.   Skin:     General: Skin is warm and dry.      Findings: No rash present. No erythema.   Neurological:      Mental Status: Patient is alert and oriented to person, place, and time.      Cranial Nerves: No cranial nerve deficit.   Psychiatric:         Behavior: Behavior normal.     Laboratory:  Recent Labs   Lab 06/15/23  0431 06/16/23  0643 06/17/23  0708   * 135* 134*   K 4.5 4.5 4.3   CL 96 98 98   CO2 25 27 26   BUN 79* 85* 89*   CREATININE 3.5* 3.7* 3.7*   * 129* 134*         Recent Labs   Lab 06/12/23  1835 06/13/23  0411 06/15/23  0431 06/16/23  0643 06/17/23  0708   CALCIUM 8.2*   < > 8.3* 8.4* 8.4*   ALBUMIN 3.2*  --   --   --   --    MG 2.3   < > 2.4 2.4 2.4    < > = values in this interval not displayed.               No  results for input(s): POCTGLUCOSE in the last 168 hours.    Recent Labs   Lab 09/02/22  0439 11/23/22  0951   Hemoglobin A1C 7.7 H 6.8 H         Recent Labs   Lab 06/15/23  0431 06/16/23  0643 06/17/23  0708   WBC 4.79 5.13 4.91   HGB 9.4* 9.1* 9.2*   HCT 31.8* 30.4* 31.2*   PLT 68* 56* 58*   MCV 88 88 88   MCHC 29.6* 29.9* 29.5*   MONO  --  14.6  0.8 13.0  0.6         Recent Labs   Lab 06/12/23  1835   BILITOT 1.6*   PROT 7.5   ALBUMIN 3.2*   ALKPHOS 70   ALT 22   AST 31         Recent Labs   Lab 06/12/23  2241   Color, UA Yellow   Appearance, UA Cloudy A   pH, UA 5.0   Specific Gravity, UA 1.020   Protein, UA 1+ A   Glucose, UA 1+ A   Ketones, UA Negative   Urobilinogen, UA 4.0-6.0 A   Bilirubin (UA) Negative   Occult Blood UA 3+ A   Nitrite, UA Negative   RBC, UA >100 H   WBC, UA 28 H   Bacteria Negative   Hyaline Casts, UA 32 A         Recent Labs   Lab 09/01/22  0333   POC PH 7.350   POC PCO2 54.1 H   POC HCO3 29.9 H   POC PO2 18 L   POC SATURATED O2 24 L   POC BE 4   Sample VENOUS         Microbiology Results (last 7 days)       Procedure Component Value Units Date/Time    Blood culture x two cultures. Draw prior to antibiotics. [[id number]] Collected: 06/12/23 1927    Order Status: Completed Specimen: Blood from Peripheral, Antecubital, Right Updated: 06/16/23 2032     Blood Culture, Routine No Growth to date      No Growth to date      No Growth to date      No Growth to date      No Growth to date    Narrative:      Aerobic and anaerobic    Blood culture x two cultures. Draw prior to antibiotics. [[id number]] Collected: 06/12/23 1843    Order Status: Completed Specimen: Blood from Peripheral, Antecubital, Left Updated: 06/16/23 2032     Blood Culture, Routine No Growth to date      No Growth to date      No Growth to date      No Growth to date      No Growth to date    Narrative:      Aerobic and anaerobic    Urine culture [[id number]] Collected: 06/12/23 2241    Order Status: Completed Specimen: Urine  Updated: 06/15/23 0742     Urine Culture, Routine No growth    Narrative:      Specimen Source->Urine            ASSESSMENT/PLAN:     AD 2/2 VT s/p cardioversion  Hematuria of unclear significance with many hyaline casts  CKD stage 3  CHF exacerbation  DM  Imaging negative for  obstruction.  No NSAIDs, ACEI/ARB, IV contrast or other nephrotoxins.  Keep MAP > 60, SBP > 100.  Dose meds for GFR < 30 ml/min.  Renal diet - low K, low phos.  Decreased diuretic on 61/7, off dobutamine gtt  per cards, holding ACEi and BBL for now.  Control DM.    Anemia of CKD  Hgb and HCT are acceptable. Monitor for now.  Will provide GANGA and/or IV iron PRN.    MBD / Secondary HPT  Ca, Phos, PTH and vitamin D levels are acceptable.   Phos binders, vitamin D and analogues, calcimimetics will be given as needed.    HTN  BP seem controlled.   Tolerate asymptomatic HTN up to -160.  Meds per Cards for now.    Thank you for allowing us to participate in the care of your patient!   We will follow the patient and provide recommendations as needed.    Patient care time was spent personally by me on the following activities:     Obtaining a history.  Examination of patient.  Providing medical care at the patients bedside.  Developing a treatment plan with patient or surrogate and bedside caregivers.  Ordering and reviewing laboratory studies, radiographic studies, pulse oximetry.  Ordering and performing treatments and interventions.  Evaluation of patient's response to treatment.  Discussions with consultants while on the unit and immediately available to the patient.  Re-evaluation of the patient's condition.  Documentation in the medical record.     Maicol Leach MD    [facility]  [address] [place] [address]  [place] [address]    [phone] - tel  [phone] - fax    6/17/2023

## 2023-06-17 NOTE — PROGRESS NOTES
[facility]   Cardiology Note    Consult Requested By: hospital medicine  Reason for Consult: Ventricular tachycardia    SUBJECTIVE:     History of Present Illness: Patient is a 64 yo female with PMHx of CAD s/p CABG 2012, HFrEF, AICD, DM2, CKD3, HTN, HLP, chronic diabetic foot ulcer who presented to the ED with shortness of breath and generalized weakness. He was just discharged from a [facility] yesterday with a UTI. He reports he was in that hospital for 2 days for IV antibiotics and felt overall weak the entire time. When he was discharged home his symptoms worsened and he experienced presyncopal symptoms. He denies syncope, chest pain, N/V, fevers, chills. Reports increased LE edema over the last few days. In the ED EKG showed wide complex tachycardia. He reports he was not given amiodarone while in the hospital. He underwent successful cardioversion with restoration of NSR. ED workup showed, BNP 1162. Troponin elevated to 17. UA positive for UTI, lactate negative. Bcx NGTD. CXR showed cardiomegaly with no acute cardiopulmonary process. Cr 3 today. Electrolytes normal. He was started on amio gtt. Today patient states his breathing has improved. Pt states he had a peripheral angiogram a few months ago with Dr. Oleary with no intervention needed.     6/14: Pt seen and examined this morning.   Vitals reviewed. Blood pressure 88/52 this morning. Lisinopril and coreg on hold. Labs reviewed. Cr 3.4. Na 133. H&H stable. Nephrology following.  cc. Tele reviewed. No events overnight.     6/15: Pt seen and examined this morning. He states he is feeling much better today. Reports shortness of breath has improved. He denies chest pain or palpitations. Lasix was increased to 80 mg IV q12h yesterday.  cc. On dobutamine. VSS. Labs reviewed. Cr 3.5. H&H 9.4/31.8, stable. LE arterial US showed mild PVD, no significant stenosis. Tele reviewed, no events overnight.     6/16: No events overnight.  "Reports he is feeling much better today. Shortness of breath has improved. Denies chest pain. Vascular surgery was consulted, no plan for intervention. VSS. Labs reviewed. Cr 3.7. UOP 1350 cc.     6/17: No events overnight. Denies chest pain or shortness of breath today. His main complaint today is every time he eats he experiences bloating.  Blood pressure on the lower end today. Hold coreg. Labs reviewed. Cr 3.7. UOP 1000 cc. Tele reviewed.     Review of patient's allergies indicates:   Allergen Reactions    Vasopressin Anaphylaxis and Other (See Comments)     Increased pressure in his head; felt like his eyeballs and veins were going to pop out of his head.     Vasopressin analogues Other (See Comments) and Anaphylaxis     "felt like eyes going to pop out of my head"  Other reaction(s): Other (See Comments)  "felt like eyes going to pop out of my head"  Increased pressure in his head; felt like his eyeballs and veins were going to pop out of his head.     Heparin Other (See Comments)     Other reaction(s): "depleted my blood platets"    Decreased platelet count    Heparin analogues Other (See Comments)     Depleted WBC count    Morphine Hallucinations, Other (See Comments) and Anxiety     Other reaction(s): Hallucinations  Paranoid, hallucinations         Past Medical History:   Diagnosis Date    AICD (automatic cardioverter/defibrillator) present     Anemia, chronic disease 07/27/2021    Anemia, unspecified 07/27/2021    Anxiety and depression 2012    CAD (coronary artery disease) 2012    Cardiomegaly 2016    CHF (congestive heart failure) 2012    Cholecystitis 2017    Complete heart block 2012    Diabetic foot ulcer     DVT (deep venous thrombosis) 2012    And pulmonary embolus    GERD (gastroesophageal reflux disease) 2010    Heparin induced thrombocytopenia     Hyperlipemia 2011    IDDM (insulin dependent diabetes mellitus) 1983    With neuropathy    Ischemic cardiomyopathy 2012    MI (myocardial infarction) "     Morbid obesity     MRSA (methicillin resistant Staphylococcus aureus) infection 2019    Noncompliance with therapeutic plan     Normochromic normocytic anemia 2021    Osteomyelitis of right foot 2019    Osteomyelitis of right foot 2022    Klebsiella from bone, GBS in blood    Pulmonary edema     Respiratory failure     Sepsis 2019    Due to cellulitis right leg    Sleep apnea 2013    Thrombocytopathy     Venous stasis dermatitis of both lower extremities      Past Surgical History:   Procedure Laterality Date    CARDIAC PACEMAKER PLACEMENT  2012    CARDIAC PACEMAKER PLACEMENT  10/04/2018    battery replacement    CORONARY ARTERY BYPASS GRAFT  2012    ESOPHAGOGASTRODUODENOSCOPY  2017    SAMARA FILTER PLACEMENT  2012    vena cava    LUMBAR SYMPATHETIC NERVE BLOCK Right 2019    Procedure: BLOCK, NERVE, SYMPATHETIC, LUMBAR;  Surgeon: Tashi Good MD;  Location: [facility] OR;  Service: Pain Management;  Laterality: Right;  RIGHT SYMPATHETIC NERVE BLOCK     Family History   Problem Relation Age of Onset    Arthritis Mother     Diabetes Mother     Cancer Father     Heart disease Father     Stroke Father      Social History     Tobacco Use    Smoking status: Former     Packs/day: 2.00     Years: 38.00     Pack years: 76.00     Types: Cigarettes     Quit date:      Years since quittin.4    Smokeless tobacco: Never   Substance Use Topics    Alcohol use: No    Drug use: Never       Review of Systems:  Review of Systems   Constitutional:  Positive for malaise/fatigue. Negative for chills, diaphoresis and fever.   Respiratory:  Positive for shortness of breath. Negative for cough and sputum production.    Cardiovascular:  Positive for palpitations and leg swelling. Negative for chest pain and orthopnea.   Gastrointestinal:  Negative for nausea and vomiting.   Neurological:  Positive for weakness. Negative for dizziness and loss of consciousness.  "    OBJECTIVE:     Vital Signs (Most Recent)  Temp: 97.8 °F (36.6 °C) (06/17/23 0701)  Pulse: 60 (06/17/23 0750)  Resp: 16 (06/17/23 0750)  BP: (!) 102/52 (06/17/23 0701)  SpO2: 99 % (06/17/23 0750)    Vital Signs Range (Last 24H):  Temp:  [97 °F (36.1 °C)-97.8 °F (36.6 °C)]   Pulse:  [59-63]   Resp:  [16-26]   BP: ()/(52-75)   SpO2:  [88 %-100 %]     I & O (Last 24H):    Intake/Output Summary (Last 24 hours) at 6/17/2023 1010  Last data filed at 6/17/2023 0845  Gross per 24 hour   Intake 760 ml   Output 500 ml   Net 260 ml         Current Diet:     Current Diet Order   Procedures    Diet Cardiac [facility]; Diabetic Diet     Order Specific Question:   Indicate patient location for additional diet options:     Answer:   [facility]     Order Specific Question:   Additional Diet Options:     Answer:   Diabetic Diet        Allergies:  Review of patient's allergies indicates:   Allergen Reactions    Vasopressin Anaphylaxis and Other (See Comments)     Increased pressure in his head; felt like his eyeballs and veins were going to pop out of his head.     Vasopressin analogues Other (See Comments) and Anaphylaxis     "felt like eyes going to pop out of my head"  Other reaction(s): Other (See Comments)  "felt like eyes going to pop out of my head"  Increased pressure in his head; felt like his eyeballs and veins were going to pop out of his head.     Heparin Other (See Comments)     Other reaction(s): "depleted my blood platets"    Decreased platelet count    Heparin analogues Other (See Comments)     Depleted WBC count    Morphine Hallucinations, Other (See Comments) and Anxiety     Other reaction(s): Hallucinations  Paranoid, hallucinations         Meds:  Scheduled Meds:   albuterol sulfate  2.5 mg Nebulization Q6H WAKE    amiodarone  200 mg Oral BID    aspirin  81 mg Oral Daily    atorvastatin  10 mg Oral Daily    cefTRIAXone (ROCEPHIN) IVPB  1 g Intravenous Q24H    chlorhexidine  15 mL Mouth/Throat BID    collagenase   Topical " (Top) Daily    furosemide (LASIX) injection  40 mg Intravenous Q12H    gabapentin  300 mg Oral TID    insulin detemir U-100  10 Units Subcutaneous Daily    mupirocin   Nasal BID    pantoprazole  40 mg Oral Before breakfast    senna-docusate 8.6-50 mg  1 tablet Oral QHS    sodium phosphates  1 enema Rectal Once     Continuous Infusions:      PRN Meds:acetaminophen, bisacodyL, dextrose 50%, dextrose 50%, famotidine, glucose, glucose, insulin aspart U-100, magnesium oxide, magnesium oxide, melatonin, naloxone, ondansetron, polyethylene glycol, potassium bicarbonate, potassium bicarbonate, potassium bicarbonate, potassium, sodium phosphates, potassium, sodium phosphates, potassium, sodium phosphates, traMADoL    Oxygen/Ventilator Data (Last 24H):  (if applicable)            Hemodynamic Parameters (Last 24H):   (if applicable)        Laboratory and Radiology Data:  Recent Results (from the past 24 hour(s))   POCT glucose    Collection Time: 06/16/23 12:13 PM   Result Value Ref Range    POC Glucose 156 (H) 70 - 110   POCT glucose    Collection Time: 06/16/23  5:00 PM   Result Value Ref Range    POC Glucose 211 (H) 70 - 110   POCT glucose    Collection Time: 06/16/23  9:17 PM   Result Value Ref Range    POC Glucose 177 (H) 70 - 110   POCT glucose    Collection Time: 06/17/23  5:49 AM   Result Value Ref Range    POC Glucose 122 (H) 70 - 110   Basic metabolic panel    Collection Time: 06/17/23  7:08 AM   Result Value Ref Range    Sodium 134 (L) 136 - 145 mmol/L    Potassium 4.3 3.5 - 5.1 mmol/L    Chloride 98 95 - 110 mmol/L    CO2 26 23 - 29 mmol/L    Glucose 134 (H) 70 - 110 mg/dL    BUN 89 (H) 8 - 23 mg/dL    Creatinine 3.7 (H) 0.5 - 1.4 mg/dL    Calcium 8.4 (L) 8.7 - 10.5 mg/dL    Anion Gap 10 8 - 16 mmol/L    eGFR 17.8 (A) >60 mL/min/1.73 m^2   Magnesium    Collection Time: 06/17/23  7:08 AM   Result Value Ref Range    Magnesium 2.4 1.6 - 2.6 mg/dL   CBC auto differential    Collection Time: 06/17/23  7:08 AM   Result  Value Ref Range    WBC 4.91 3.90 - 12.70 K/uL    RBC 3.56 (L) 4.60 - 6.20 M/uL    Hemoglobin 9.2 (L) 14.0 - 18.0 g/dL    Hematocrit 31.2 (L) 40.0 - 54.0 %    MCV 88 82 - 98 fL    MCH 25.8 (L) 27.0 - 31.0 pg    MCHC 29.5 (L) 32.0 - 36.0 g/dL    RDW 20.2 (H) 11.5 - 14.5 %    Platelets 58 (L) 150 - 450 K/uL    MPV 10.5 9.2 - 12.9 fL    Immature Granulocytes 0.6 (H) 0.0 - 0.5 %    Gran # (ANC) 3.9 1.8 - 7.7 K/uL    Immature Grans (Abs) 0.03 0.00 - 0.04 K/uL    Lymph # 0.2 (L) 1.0 - 4.8 K/uL    Mono # 0.6 0.3 - 1.0 K/uL    Eos # 0.1 0.0 - 0.5 K/uL    Baso # 0.02 0.00 - 0.20 K/uL    nRBC 0 0 /100 WBC    Gran % 79.9 (H) 38.0 - 73.0 %    Lymph % 4.9 (L) 18.0 - 48.0 %    Mono % 13.0 4.0 - 15.0 %    Eosinophil % 1.2 0.0 - 8.0 %    Basophil % 0.4 0.0 - 1.9 %    Differential Method Automated    POCT glucose    Collection Time: 06/17/23  7:37 AM   Result Value Ref Range    POC Glucose 142 (H) 70 - 110     Imaging Results              X-Ray Chest AP Portable (Final result)  Result time 06/12/23 19:00:35      Final result by Driss Merritt MD (06/12/23 19:00:35)                   Narrative:      EXAM: XR CHEST AP PORTABLE    HISTORY: Sepsis    COMPARISON:Chest x-ray dated 9/7/2022    FINDINGS: The lungs are fairly well-expanded and appear free of focal areas of consolidation. No pleural effusions are evident. The heart is moderately enlarged and unchanged. There is a mass-like opacity lateral to the AP window that appears similar on multiple previous chest x-rays. A CT of the chest 1/14/2019 shows that this corresponds to a dilated main pulmonary artery. A left subclavian dual-chamber biventricular pacemaker remains in place in the left subclavian vein without change.    IMPRESSION:   Moderate cardiomegaly. No evidence of acute disease within the chest.    Electronically signed by:  Barry Merritt MD  6/12/2023 7:00 PM CDT Workstation: [id number]                                    12-lead EKG interpretation:  (if applicable)       Current Cardiac Rhythm:   (if applicable)    Physical Exam:   Physical Exam  Vitals and nursing note reviewed.   Constitutional:       General: He is not in acute distress.     Appearance: Normal appearance. He is not ill-appearing.   Cardiovascular:      Rate and Rhythm: Normal rate and regular rhythm.      Pulses: Normal pulses.      Heart sounds: Murmur heard.   Pulmonary:      Effort: Pulmonary effort is normal. No respiratory distress.      Breath sounds: Normal breath sounds.   Musculoskeletal:      Right lower leg: Edema present.      Left lower leg: Edema present.   Skin:     General: Skin is warm and dry.      Findings: No rash.      Comments: R foot wound   Neurological:      Mental Status: He is alert and oriented to person, place, and time.       ASSESSMENT/PLAN:   Assessment:   Wide Complex Tachycardia - AICD - s/p successful cardioversion. On amiodarone gtt. Discussed interrogation with rep from [employer], appears he was in an atrial tachycardia with BiV pacing at 140 bpm on admission. No Vfib noted. VT zone set at 190.   Acute on Chronic Systolic Heart Failure- BNP 1162, CXR cardiomegaly. On IV lasix. Echo 3/2023 showed EF 25%. MUGA scan 10/2022 showed EF 21%.   AD on Chronic Kidney Disease - Cr 3.5, Nephrology consulted.   CAD s/p CABG 2012 - denies chest pain. Troponin elevation to 29 and flat. Last stress test 2021.   Hypertension - on coreg, lisinopril at home.   Hyperlipidemia   Diabetes Mellitus Type 2  Diabetic Foot Ulcer     Plan:   Patient reports he is feeling better.   Lasix decreased to 40 mg IV BID yesterday. UOP 1000. Cr 3.7.   Appreciate Nephrology's recommendations for diuretics.   Patient may need dialysis in the future.   Dobutamine discontinued on 6/16.   Strict I/Os.  Daily weights.   D/c amiodarone gtt on 6/15.   Continue 200 mg BID PO.  Hold lisinopril and coreg.  No further cardiac recs at this time.

## 2023-06-17 NOTE — PROGRESS NOTES
[facility] Medicine  Progress Note    Patient Name: Ana Bullard  MRN: [id number]  Patient Class: IP- Inpatient   Admission Date: 6/12/2023  Length of Stay: 5 days  Attending Physician: Haseeb Romero MD  Primary Care Provider: YRN Pollard        Subjective:     Principal Problem:Wide-complex tachycardia        HPI:  63-year-old  male with multiple medical comorbidities including but not limited to chronic combined CHF, type 2 DM, CAD, thrombocytopenia, chronic diabetic foot ulcer and CKD stage 3b who was discharged from outside hospital earlier today after being managed for UTI presented here with generalized weakness and tachycardia.    He was managed for complicated UTI at outside hospital for 2 days.  He was discharged on oral antibiotics and resumption of home health.  Today his home health nurse found him to be tachycardic and directed him to the ER.  Initial rhythm on presentation was wide complex tachycardia.  Wife reports that he was not given his amiodarone when he was hospitalized.    In the ED here, after consultation with Cardiology patient underwent successful synchronized cardioversion with restoration of normal sinus rhythm.    Patient denies chest pain but admits to shortness of breath and increased abdominal distention, lower extremity edema and weight gain over the last several days.  Admits to being compliant with diuretics.  He is currently being followed by Podiatry for chronic right foot ulcer and receiving local wound care.    Rest of the 10 point review of systems is negative except as mentioned above.      Overview/Hospital Course:  Assumed care of the patient  Patient is sitting on the bed no shortness of breath, lung exam with very limited entry, the whole abdomen is filled  with fluid from CHF , renal function slightly worse on Lasix    6/16   Creatinine up. No CP or SOB. Off dobutamine   Constipated .    6/17  On BIPap   Long discussion with  pt and wife   Renal function not worsen      Interval History:     Review of Systems  Objective:     Vital Signs (Most Recent):  Temp: 97.9 °F (36.6 °C) (06/17/23 1500)  Pulse: (!) 59 (06/17/23 1500)  Resp: 16 (06/17/23 1558)  BP: 135/67 (06/17/23 1500)  SpO2: (!) 93 % (06/17/23 1500) Vital Signs (24h Range):  Temp:  [97 °F (36.1 °C)-98 °F (36.7 °C)] 97.9 °F (36.6 °C)  Pulse:  [59-63] 59  Resp:  [14-22] 16  SpO2:  [88 %-100 %] 93 %  BP: ()/(52-75) 135/67     Weight: 134.6 kg (296 lb 11.8 oz)  Body mass index is 39.15 kg/m².    Intake/Output Summary (Last 24 hours) at 6/17/2023 1726  Last data filed at 6/17/2023 0845  Gross per 24 hour   Intake 460 ml   Output 500 ml   Net -40 ml         Physical Exam  Constitutional:       General: He is not in acute distress.     Appearance: He is well-developed.   HENT:      Head: Normocephalic.   Eyes:      Pupils: Pupils are equal, round, and reactive to light.   Cardiovascular:      Rate and Rhythm: Normal rate and regular rhythm.   Pulmonary:      Effort: Pulmonary effort is normal. No respiratory distress.   Abdominal:      General: Abdomen is flat. There is no distension.      Tenderness: There is no abdominal tenderness.   Musculoskeletal:      Cervical back: Neck supple.   Skin:     Findings: No rash.   Neurological:      Mental Status: He is alert and oriented to person, place, and time.           Significant Labs: All pertinent labs within the past 24 hours have been reviewed.  CBC:   Recent Labs   Lab 06/16/23  0643 06/17/23  0708   WBC 5.13 4.91   HGB 9.1* 9.2*   HCT 30.4* 31.2*   PLT 56* 58*     CMP:   Recent Labs   Lab 06/16/23  0643 06/17/23  0708   * 134*   K 4.5 4.3   CL 98 98   CO2 27 26   * 134*   BUN 85* 89*   CREATININE 3.7* 3.7*   CALCIUM 8.4* 8.4*   ANIONGAP 10 10     Cardiac Markers: No results for input(s): CKMB, MYOGLOBIN, BNP, TROPISTAT in the last 48 hours.    Significant Imaging: I have reviewed all pertinent imaging results/findings  within the past 24 hours.      Assessment/Plan:      Wide-complex tachycardia with ICD and severe heart failure   Wide complex tachycardia with heart rate in the 130-140 beats per minute   Patient has ICD in place however not cardioverted by it  Status post synchronized cardioversion while in the ED   Off  amiodarone drip  Off Dobutamine d rip   Decreased  dose IV lasix to 40 mg bid for worsening renal function   EF around 22 % and discussed about poor prognosis   monitoring of renal function   Strict in/outs, low-sodium diet and daily weights  Patient may need to transfer to advance heart failure team in [facility]        Acute renal failure superimposed on stage 3b chronic kidney disease  Likely cardiorenal syndrome  Monitor renal function with daily labs especially while on diuretics   Nephrology follow up   Dose medications per GFR  Decreased lasix   May end up HD         UTI (urinary tract infection)  Continue empiric ceftriaxone for additional 1   days       Diabetic foot ulcer s/p debridement right foot with fat layer exposed  Continue daily wound care  S/p debridement by podiatry      Chronic combined systolic and diastolic heart failure  As above        Type II diabetes mellitus with neurological manifestations  Basal insulin with detemir at an adjusted dose   Low-dose insulin sliding scale  Gabapentin at a lower dose due to AD     Chronic Thrombocytopenia  Also patient has a history of heparin induced thrombocytopenia   Monitor platelets daily labs     Constipation       3 types of laxatives ordered      VTE Risk Mitigation (From admission, onward)         Ordered     IP VTE HIGH RISK PATIENT  Once         06/13/23 0106     Place sequential compression device  Until discontinued         06/13/23 0106     Reason for No Pharmacological VTE Prophylaxis  Once        Question:  Reasons:  Answer:  Thrombocytopenia    06/13/23 0106                Discharge Planning   BILL:      Code Status: Full Code   Is the patient  medically ready for discharge?:     Reason for patient still in hospital (select all that apply): Treatment  Discharge Plan A: Home Health                  Haseeb Romero MD  Department of Hospital Medicine   [facility]

## 2023-06-18 VITALS
RESPIRATION RATE: 19 BRPM | HEART RATE: 60 BPM | BODY MASS INDEX: 39.18 KG/M2 | SYSTOLIC BLOOD PRESSURE: 138 MMHG | TEMPERATURE: 98 F | WEIGHT: 295.63 LBS | DIASTOLIC BLOOD PRESSURE: 73 MMHG | OXYGEN SATURATION: 95 % | HEIGHT: 73 IN

## 2023-06-18 LAB
ANION GAP SERPL CALC-SCNC: 10 MMOL/L (ref 8–16)
BASOPHILS # BLD AUTO: 0.02 K/UL (ref 0–0.2)
BASOPHILS NFR BLD: 0.4 % (ref 0–1.9)
BUN SERPL-MCNC: 98 MG/DL (ref 8–23)
CALCIUM SERPL-MCNC: 8.4 MG/DL (ref 8.7–10.5)
CHLORIDE SERPL-SCNC: 96 MMOL/L (ref 95–110)
CO2 SERPL-SCNC: 26 MMOL/L (ref 23–29)
CREAT SERPL-MCNC: 3.6 MG/DL (ref 0.5–1.4)
DIFFERENTIAL METHOD: ABNORMAL
EOSINOPHIL # BLD AUTO: 0.1 K/UL (ref 0–0.5)
EOSINOPHIL NFR BLD: 0.9 % (ref 0–8)
ERYTHROCYTE [DISTWIDTH] IN BLOOD BY AUTOMATED COUNT: 20.3 % (ref 11.5–14.5)
EST. GFR  (NO RACE VARIABLE): 18.4 ML/MIN/1.73 M^2
GLUCOSE SERPL-MCNC: 143 MG/DL (ref 70–110)
GLUCOSE SERPL-MCNC: 147 MG/DL (ref 70–110)
GLUCOSE SERPL-MCNC: 218 MG/DL (ref 70–110)
HCT VFR BLD AUTO: 33.5 % (ref 40–54)
HGB BLD-MCNC: 9.9 G/DL (ref 14–18)
IMM GRANULOCYTES # BLD AUTO: 0.03 K/UL (ref 0–0.04)
IMM GRANULOCYTES NFR BLD AUTO: 0.5 % (ref 0–0.5)
LYMPHOCYTES # BLD AUTO: 0.3 K/UL (ref 1–4.8)
LYMPHOCYTES NFR BLD: 4.7 % (ref 18–48)
MAGNESIUM SERPL-MCNC: 2.5 MG/DL (ref 1.6–2.6)
MCH RBC QN AUTO: 25.9 PG (ref 27–31)
MCHC RBC AUTO-ENTMCNC: 29.6 G/DL (ref 32–36)
MCV RBC AUTO: 88 FL (ref 82–98)
MONOCYTES # BLD AUTO: 0.6 K/UL (ref 0.3–1)
MONOCYTES NFR BLD: 11.3 % (ref 4–15)
NEUTROPHILS # BLD AUTO: 4.6 K/UL (ref 1.8–7.7)
NEUTROPHILS NFR BLD: 82.2 % (ref 38–73)
NRBC BLD-RTO: 0 /100 WBC
PLATELET # BLD AUTO: 76 K/UL (ref 150–450)
PMV BLD AUTO: 11.8 FL (ref 9.2–12.9)
POTASSIUM SERPL-SCNC: 4.3 MMOL/L (ref 3.5–5.1)
RBC # BLD AUTO: 3.82 M/UL (ref 4.6–6.2)
SODIUM SERPL-SCNC: 132 MMOL/L (ref 136–145)
WBC # BLD AUTO: 5.56 K/UL (ref 3.9–12.7)

## 2023-06-18 PROCEDURE — 83735 ASSAY OF MAGNESIUM: CPT | Performed by: INTERNAL MEDICINE

## 2023-06-18 PROCEDURE — 27000221 HC OXYGEN, UP TO 24 HOURS

## 2023-06-18 PROCEDURE — 25000242 PHARM REV CODE 250 ALT 637 W/ HCPCS: Performed by: HOSPITALIST

## 2023-06-18 PROCEDURE — 25000003 PHARM REV CODE 250: Performed by: INTERNAL MEDICINE

## 2023-06-18 PROCEDURE — 94640 AIRWAY INHALATION TREATMENT: CPT

## 2023-06-18 PROCEDURE — 25000003 PHARM REV CODE 250: Performed by: HOSPITALIST

## 2023-06-18 PROCEDURE — 36415 COLL VENOUS BLD VENIPUNCTURE: CPT | Performed by: INTERNAL MEDICINE

## 2023-06-18 PROCEDURE — 63600175 PHARM REV CODE 636 W HCPCS: Performed by: INTERNAL MEDICINE

## 2023-06-18 PROCEDURE — 25000003 PHARM REV CODE 250

## 2023-06-18 PROCEDURE — 94761 N-INVAS EAR/PLS OXIMETRY MLT: CPT

## 2023-06-18 PROCEDURE — 80048 BASIC METABOLIC PNL TOTAL CA: CPT | Performed by: INTERNAL MEDICINE

## 2023-06-18 PROCEDURE — 85025 COMPLETE CBC W/AUTO DIFF WBC: CPT | Performed by: INTERNAL MEDICINE

## 2023-06-18 PROCEDURE — 99900031 HC PATIENT EDUCATION (STAT)

## 2023-06-18 RX ORDER — AMIODARONE HYDROCHLORIDE 200 MG/1
200 TABLET ORAL 2 TIMES DAILY
Qty: 90 TABLET | Refills: 0 | Status: ON HOLD | OUTPATIENT
Start: 2023-06-18 | End: 2023-10-19 | Stop reason: HOSPADM

## 2023-06-18 RX ORDER — GABAPENTIN 300 MG/1
300 CAPSULE ORAL 3 TIMES DAILY
Qty: 90 CAPSULE | Refills: 0 | OUTPATIENT
Start: 2023-06-18 | End: 2023-10-19

## 2023-06-18 RX ORDER — TORSEMIDE 20 MG/1
20 TABLET ORAL 2 TIMES DAILY
Qty: 120 TABLET | Refills: 0 | Status: SHIPPED | OUTPATIENT
Start: 2023-06-18 | End: 2024-02-08

## 2023-06-18 RX ADMIN — FUROSEMIDE 40 MG: 10 INJECTION, SOLUTION INTRAVENOUS at 09:06

## 2023-06-18 RX ADMIN — ATORVASTATIN CALCIUM 10 MG: 10 TABLET, FILM COATED ORAL at 09:06

## 2023-06-18 RX ADMIN — INSULIN ASPART 2 UNITS: 100 INJECTION, SOLUTION INTRAVENOUS; SUBCUTANEOUS at 12:06

## 2023-06-18 RX ADMIN — INSULIN DETEMIR 10 UNITS: 100 INJECTION, SOLUTION SUBCUTANEOUS at 09:06

## 2023-06-18 RX ADMIN — COLLAGENASE SANTYL: 250 OINTMENT TOPICAL at 09:06

## 2023-06-18 RX ADMIN — AMIODARONE HYDROCHLORIDE 200 MG: 200 TABLET ORAL at 09:06

## 2023-06-18 RX ADMIN — ALBUTEROL SULFATE 2.5 MG: 2.5 SOLUTION RESPIRATORY (INHALATION) at 08:06

## 2023-06-18 RX ADMIN — PANTOPRAZOLE SODIUM 40 MG: 40 TABLET, DELAYED RELEASE ORAL at 06:06

## 2023-06-18 RX ADMIN — ASPIRIN 81 MG 81 MG: 81 TABLET ORAL at 09:06

## 2023-06-18 RX ADMIN — GABAPENTIN 300 MG: 300 CAPSULE ORAL at 09:06

## 2023-06-18 RX ADMIN — TRAMADOL HYDROCHLORIDE 50 MG: 50 TABLET, COATED ORAL at 09:06

## 2023-06-18 RX ADMIN — ALBUTEROL SULFATE 2.5 MG: 2.5 SOLUTION RESPIRATORY (INHALATION) at 01:06

## 2023-06-18 NOTE — PLAN OF CARE
Problem: Adult Inpatient Plan of Care  Goal: Plan of Care Review  Outcome: Ongoing, Progressing  Goal: Patient-Specific Goal (Individualized)  Outcome: Ongoing, Progressing  Goal: Absence of Hospital-Acquired Illness or Injury  Outcome: Ongoing, Progressing  Goal: Optimal Comfort and Wellbeing  Outcome: Ongoing, Progressing  Goal: Readiness for Transition of Care  Outcome: Ongoing, Progressing     Problem: Diabetes Comorbidity  Goal: Blood Glucose Level Within Targeted Range  Outcome: Ongoing, Progressing     Problem: Adjustment to Illness (Sepsis/Septic Shock)  Goal: Optimal Coping  Outcome: Ongoing, Progressing     Problem: Bleeding (Sepsis/Septic Shock)  Goal: Absence of Bleeding  Outcome: Ongoing, Progressing     Problem: Glycemic Control Impaired (Sepsis/Septic Shock)  Goal: Blood Glucose Level Within Desired Range  Outcome: Ongoing, Progressing

## 2023-06-18 NOTE — PROGRESS NOTES
Nephrology Consult Progress Note        Patient Name: Ana Bullard  MRN: [id number]    Patient Class: IP- Inpatient   Admission Date: 6/12/2023  Length of Stay: 6 days  Date of Service: 6/18/2023    Attending Physician: Haseeb Romero MD  Primary Care Provider: YRN Pollard    Reason for Consult: ailyn    SUBJECTIVE:     HPI: 63-year-old  male with multiple medical comorbidities including chronic combined CHF, type 2 DM, CAD, thrombocytopenia, chronic diabetic foot ulcer and CKD stage 3b, who was discharged from another hospital after admission for UTI presented here with generalized weakness and tachycardia.    He was discharged on oral antibiotics and resumption of home health. His home health nurse found him to be tachycardic and directed him to the ER. Initial rhythm on presentation was wide complex tachycardia. Wife reports that he was not given his amiodarone when he was hospitalized.     In the ED here, after consultation with Cardiology patient underwent successful synchronized cardioversion with restoration of normal sinus rhythm.    6/14 VSS. HR stable, BP low over night. UO is low. sCr rising , consistent with ATN due to hemodynamic instability. Gote more lasix this AM by primary team along with initiation of dobutamine gtt.    6/15 VSS. Continue dobutamine and lasix. S/p debridement by Dr. Avila on 6/15. Appreciate Cards and Vascular surgery input.    6/16 VSS. Agree with dc dobutamine gtt. Will decrease IV diuretic to 40mg IV BID to see if he can tolerate, since sCr is up.    6/17 VSS, decreased UO since dobutamine was stopped and diuretic decreased on 6/16. sCr remain elevated but not worse today.    6/18 VSS, no new complains. sCr remains abnormal.    Past Medical History:   Diagnosis Date    AICD (automatic cardioverter/defibrillator) present     Anemia, chronic disease 07/27/2021    Anemia, unspecified 07/27/2021    Anxiety and depression 2012    CAD (coronary artery disease) 2012     Cardiomegaly 2016    CHF (congestive heart failure) 2012    Cholecystitis 2017    Complete heart block     Diabetic foot ulcer     DVT (deep venous thrombosis)     And pulmonary embolus    GERD (gastroesophageal reflux disease) 2010    Heparin induced thrombocytopenia     Hyperlipemia 2011    IDDM (insulin dependent diabetes mellitus) 1983    With neuropathy    Ischemic cardiomyopathy     MI (myocardial infarction)     Morbid obesity     MRSA (methicillin resistant Staphylococcus aureus) infection 2019    Noncompliance with therapeutic plan     Normochromic normocytic anemia 2021    Osteomyelitis of right foot 2019    Osteomyelitis of right foot 2022    Klebsiella from bone, GBS in blood    Pulmonary edema     Respiratory failure     Sepsis 2019    Due to cellulitis right leg    Sleep apnea 2013    Thrombocytopathy     Venous stasis dermatitis of both lower extremities      Past Surgical History:   Procedure Laterality Date    CARDIAC PACEMAKER PLACEMENT  2012    CARDIAC PACEMAKER PLACEMENT  10/04/2018    battery replacement    CORONARY ARTERY BYPASS GRAFT  2012    ESOPHAGOGASTRODUODENOSCOPY  2017    SAMARA FILTER PLACEMENT  2012    vena cava    LUMBAR SYMPATHETIC NERVE BLOCK Right 2019    Procedure: BLOCK, NERVE, SYMPATHETIC, LUMBAR;  Surgeon: Tashi Good MD;  Location: [facility];  Service: Pain Management;  Laterality: Right;  RIGHT SYMPATHETIC NERVE BLOCK     Family History   Problem Relation Age of Onset    Arthritis Mother     Diabetes Mother     Cancer Father     Heart disease Father     Stroke Father      Social History     Tobacco Use    Smoking status: Former     Packs/day: 2.00     Years: 38.00     Pack years: 76.00     Types: Cigarettes     Quit date:      Years since quittin.4    Smokeless tobacco: Never   Substance Use Topics    Alcohol use: No    Drug use: Never       Review of patient's allergies indicates:  "  Allergen Reactions    Vasopressin Anaphylaxis and Other (See Comments)     Increased pressure in his head; felt like his eyeballs and veins were going to pop out of his head.     Vasopressin analogues Other (See Comments) and Anaphylaxis     "felt like eyes going to pop out of my head"  Other reaction(s): Other (See Comments)  "felt like eyes going to pop out of my head"  Increased pressure in his head; felt like his eyeballs and veins were going to pop out of his head.     Heparin Other (See Comments)     Other reaction(s): "depleted my blood platets"    Decreased platelet count    Heparin analogues Other (See Comments)     Depleted WBC count    Morphine Hallucinations, Other (See Comments) and Anxiety     Other reaction(s): Hallucinations  Paranoid, hallucinations         Outpatient meds:  No current facility-administered medications on file prior to encounter.     Current Outpatient Medications on File Prior to Encounter   Medication Sig Dispense Refill    albuterol (PROVENTIL) 5 mg/mL nebulizer solution Take 2.5 mg by nebulization every 6 (six) hours as needed.      albuterol-ipratropium (DUO-NEB) 2.5 mg-0.5 mg/3 mL nebulizer solution Take 3 mLs by nebulization every 6 (six) hours as needed.      amiodarone (PACERONE) 200 MG Tab Take 200 mg by mouth once daily.      aspirin 81 MG Chew Take 81 mg by mouth once daily.      atorvastatin (LIPITOR) 10 MG tablet Take 1 tablet by mouth once daily.  1    B complex-vitamin C-folic acid (JLUIS-JIMMIE/NEPHRO-JIMMIE) 0.8 mg Tab Take 0.4 mg by mouth.      BASAGLAR KWIKPEN U-100 INSULIN glargine 100 units/mL (3mL) SubQ pen Inject 25 Units into the skin once daily.  3    carvediloL (COREG) 3.125 MG tablet Take 3.125 mg by mouth 2 (two) times daily.      [] cefdinir (OMNICEF) 300 MG capsule Take 300 mg by mouth 2 (two) times daily.      cefTRIAXone (ROCEPHIN) 10 gram injection       cetirizine (ZYRTEC) 10 MG tablet Take 10 mg by mouth once daily.      CORLANOR 5 mg Tab Take " 5 mg by mouth nightly.      doxycycline (VIBRAMYCIN) 100 MG Cap Take 1 capsule (100 mg total) by mouth every 12 (twelve) hours. (Patient not taking: Reported on 5/31/2023) 20 capsule 0    FARXIGA 10 mg tablet Take 10 mg by mouth once daily.      furosemide (LASIX) 40 MG tablet Take 1 tablet (40 mg total) by mouth once daily. 30 tablet 1    gabapentin (NEURONTIN) 600 MG tablet Take 1 tablet by mouth 3 (three) times daily.  0    lisinopriL (PRINIVIL,ZESTRIL) 2.5 MG tablet Take 2.5 mg by mouth 2 (two) times daily.      mupirocin (BACTROBAN) 2 % ointment APPLY TOPICALLY ONCE DAILY. (Patient taking differently: Apply 1 g topically once daily.) 22 g 5    pantoprazole (PROTONIX) 40 MG tablet Take 1 tablet by mouth once daily.  0    traMADoL (ULTRAM) 50 mg tablet Take 50 mg by mouth every 6 (six) hours as needed.      TRULICITY 0.75 mg/0.5 mL pen injector Inject 0.75 mg into the skin every 7 days.      vitamin B complex-folic acid 0.4 mg Tab Take 1 tablet by mouth.         Scheduled meds:   albuterol sulfate  2.5 mg Nebulization Q6H WAKE    amiodarone  200 mg Oral BID    aspirin  81 mg Oral Daily    atorvastatin  10 mg Oral Daily    cefTRIAXone (ROCEPHIN) IVPB  1 g Intravenous Q24H    collagenase   Topical (Top) Daily    furosemide (LASIX) injection  40 mg Intravenous Q12H    gabapentin  300 mg Oral TID    insulin detemir U-100  10 Units Subcutaneous Daily    mupirocin   Nasal BID    pantoprazole  40 mg Oral Before breakfast    senna-docusate 8.6-50 mg  1 tablet Oral QHS    sodium phosphates  1 enema Rectal Once       Infusions:        PRN meds:  acetaminophen, bisacodyL, dextrose 50%, dextrose 50%, famotidine, glucose, glucose, insulin aspart U-100, magnesium oxide, magnesium oxide, melatonin, naloxone, ondansetron, polyethylene glycol, potassium bicarbonate, potassium bicarbonate, potassium bicarbonate, potassium, sodium phosphates, potassium, sodium phosphates, potassium, sodium phosphates, traMADoL    Review of  Systems:    OBJECTIVE:     Vital Signs and IO:  Temp:  [96.9 °F (36.1 °C)-98 °F (36.7 °C)]   Pulse:  [59-61]   Resp:  [14-21]   BP: (104-135)/(51-75)   SpO2:  [92 %-99 %]   I/O last 3 completed shifts:  In: 820 [P.O.:720; IV Piggyback:100]  Out: 2650 [Urine:2650]  Wt Readings from Last 5 Encounters:   06/18/23 134.1 kg (295 lb 10.2 oz)   06/01/23 126.6 kg (279 lb)   05/31/23 126.7 kg (279 lb 6.4 oz)   05/11/23 121.6 kg (268 lb)   04/27/23 121.6 kg (268 lb)     Body mass index is 39 kg/m².    Physical Exam  Constitutional:       General: Patient is not in acute distress.     Appearance: Patient is well-developed. She is not diaphoretic.   HENT:      Head: Normocephalic and atraumatic.      Mouth/Throat: Mucous membranes are moist.   Eyes:      General: No scleral icterus.     Pupils: Pupils are equal, round, and reactive to light.   Cardiovascular:      Rate and Rhythm: Normal rate and regular rhythm.   Pulmonary:      Effort: Pulmonary effort is normal. No respiratory distress.      Breath sounds: No stridor.   Abdominal:      General: There is no distension.      Palpations: Abdomen is soft.   Musculoskeletal:         General: No deformity. Normal range of motion.      Cervical back: Neck supple.   Skin:     General: Skin is warm and dry.      Findings: No rash present. No erythema.   Neurological:      Mental Status: Patient is alert and oriented to person, place, and time.      Cranial Nerves: No cranial nerve deficit.   Psychiatric:         Behavior: Behavior normal.     Laboratory:  Recent Labs   Lab 06/16/23  0643 06/17/23  0708 06/18/23  0503   * 134* 132*   K 4.5 4.3 4.3   CL 98 98 96   CO2 27 26 26   BUN 85* 89* 98*   CREATININE 3.7* 3.7* 3.6*   * 134* 147*         Recent Labs   Lab 06/12/23  1835 06/13/23  0411 06/16/23  0643 06/17/23  0708 06/18/23  0503   CALCIUM 8.2*   < > 8.4* 8.4* 8.4*   ALBUMIN 3.2*  --   --   --   --    MG 2.3   < > 2.4 2.4 2.5    < > = values in this interval not  displayed.               No results for input(s): POCTGLUCOSE in the last 168 hours.    Recent Labs   Lab 09/02/22  0439 11/23/22  0951   Hemoglobin A1C 7.7 H 6.8 H         Recent Labs   Lab 06/16/23  0643 06/17/23  0708 06/18/23  0503   WBC 5.13 4.91 5.56   HGB 9.1* 9.2* 9.9*   HCT 30.4* 31.2* 33.5*   PLT 56* 58* 76*   MCV 88 88 88   MCHC 29.9* 29.5* 29.6*   MONO 14.6  0.8 13.0  0.6 11.3  0.6         Recent Labs   Lab 06/12/23  1835   BILITOT 1.6*   PROT 7.5   ALBUMIN 3.2*   ALKPHOS 70   ALT 22   AST 31         Recent Labs   Lab 06/12/23 2241   Color, UA Yellow   Appearance, UA Cloudy A   pH, UA 5.0   Specific Gravity, UA 1.020   Protein, UA 1+ A   Glucose, UA 1+ A   Ketones, UA Negative   Urobilinogen, UA 4.0-6.0 A   Bilirubin (UA) Negative   Occult Blood UA 3+ A   Nitrite, UA Negative   RBC, UA >100 H   WBC, UA 28 H   Bacteria Negative   Hyaline Casts, UA 32 A         Recent Labs   Lab 09/01/22  0333   POC PH 7.350   POC PCO2 54.1 H   POC HCO3 29.9 H   POC PO2 18 L   POC SATURATED O2 24 L   POC BE 4   Sample VENOUS         Microbiology Results (last 7 days)       Procedure Component Value Units Date/Time    Blood culture x two cultures. Draw prior to antibiotics. [[id number]] Collected: 06/12/23 1843    Order Status: Completed Specimen: Blood from Peripheral, Antecubital, Left Updated: 06/17/23 2032     Blood Culture, Routine No growth after 5 days.    Narrative:      Aerobic and anaerobic    Blood culture x two cultures. Draw prior to antibiotics. [[id number]] Collected: 06/12/23 1927    Order Status: Completed Specimen: Blood from Peripheral, Antecubital, Right Updated: 06/17/23 2032     Blood Culture, Routine No growth after 5 days.    Narrative:      Aerobic and anaerobic    Urine culture [[id number]] Collected: 06/12/23 2241    Order Status: Completed Specimen: Urine Updated: 06/15/23 0742     Urine Culture, Routine No growth    Narrative:      Specimen Source->Urine            ASSESSMENT/PLAN:     AD 2/2  VT s/p cardioversion  Hematuria of unclear significance with many hyaline casts  CKD stage 3  CHF exacerbation  DM  Imaging negative for  obstruction.  No NSAIDs, ACEI/ARB, IV contrast or other nephrotoxins.  Keep MAP > 60, SBP > 100.  Dose meds for GFR < 30 ml/min.  Renal diet - low K, low phos.  Decreased diuretic on 6/17, off dobutamine gtt per cards, holding ACEi and BBL for now.  Control DM.    Anemia of CKD  Hgb and HCT are acceptable. Monitor for now.  Will provide GANGA and/or IV iron PRN.    MBD / Secondary HPT  Ca, Phos, PTH and vitamin D levels are acceptable.   Phos binders, vitamin D and analogues, calcimimetics will be given as needed.    HTN  BP seem controlled.   Tolerate asymptomatic HTN up to -160.  Meds per Cards for now.    Thank you for allowing us to participate in the care of your patient!   We will follow the patient and provide recommendations as needed.    Patient care time was spent personally by me on the following activities:     Obtaining a history.  Examination of patient.  Providing medical care at the patients bedside.  Developing a treatment plan with patient or surrogate and bedside caregivers.  Ordering and reviewing laboratory studies, radiographic studies, pulse oximetry.  Ordering and performing treatments and interventions.  Evaluation of patient's response to treatment.  Discussions with consultants while on the unit and immediately available to the patient.  Re-evaluation of the patient's condition.  Documentation in the medical record.     Maicol Leach MD    [facility]  [address] [place] [address]  [place] [id number]    [phone] - tel  [phone] - fax    6/18/2023

## 2023-06-18 NOTE — PROGRESS NOTES
[facility]   Cardiology Note    Consult Requested By: hospital medicine  Reason for Consult: Ventricular tachycardia    SUBJECTIVE:     History of Present Illness: Patient is a 64 yo female with PMHx of CAD s/p CABG 2012, HFrEF, AICD, DM2, CKD3, HTN, HLP, chronic diabetic foot ulcer who presented to the ED with shortness of breath and generalized weakness. He was just discharged from a [facility] yesterday with a UTI. He reports he was in that hospital for 2 days for IV antibiotics and felt overall weak the entire time. When he was discharged home his symptoms worsened and he experienced presyncopal symptoms. He denies syncope, chest pain, N/V, fevers, chills. Reports increased LE edema over the last few days. In the ED EKG showed wide complex tachycardia. He reports he was not given amiodarone while in the hospital. He underwent successful cardioversion with restoration of NSR. ED workup showed, BNP 1162. Troponin elevated to 17. UA positive for UTI, lactate negative. Bcx NGTD. CXR showed cardiomegaly with no acute cardiopulmonary process. Cr 3 today. Electrolytes normal. He was started on amio gtt. Today patient states his breathing has improved. Pt states he had a peripheral angiogram a few months ago with Dr. Oleary with no intervention needed.     6/14: Pt seen and examined this morning.   Vitals reviewed. Blood pressure 88/52 this morning. Lisinopril and coreg on hold. Labs reviewed. Cr 3.4. Na 133. H&H stable. Nephrology following.  cc. Tele reviewed. No events overnight.     6/15: Pt seen and examined this morning. He states he is feeling much better today. Reports shortness of breath has improved. He denies chest pain or palpitations. Lasix was increased to 80 mg IV q12h yesterday.  cc. On dobutamine. VSS. Labs reviewed. Cr 3.5. H&H 9.4/31.8, stable. LE arterial US showed mild PVD, no significant stenosis. Tele reviewed, no events overnight.     6/16: No events overnight.  "Reports he is feeling much better today. Shortness of breath has improved. Denies chest pain. Vascular surgery was consulted, no plan for intervention. VSS. Labs reviewed. Cr 3.7. UOP 1350 cc.     6/17: No events overnight. Denies chest pain or shortness of breath today. His main complaint today is every time he eats he experiences bloating.  Blood pressure on the lower end today. Hold coreg. Labs reviewed. Cr 3.7. UOP 1000 cc. Tele reviewed.     6/18: No events overnight. Patient is feeling well. Denies chest pain or shortness of breath. Labs reviewed. Cr 3.6 previously 3.7. Nephrology following. UOP 2150 cc. Tele reviewed.    Review of patient's allergies indicates:   Allergen Reactions    Vasopressin Anaphylaxis and Other (See Comments)     Increased pressure in his head; felt like his eyeballs and veins were going to pop out of his head.     Vasopressin analogues Other (See Comments) and Anaphylaxis     "felt like eyes going to pop out of my head"  Other reaction(s): Other (See Comments)  "felt like eyes going to pop out of my head"  Increased pressure in his head; felt like his eyeballs and veins were going to pop out of his head.     Heparin Other (See Comments)     Other reaction(s): "depleted my blood platets"    Decreased platelet count    Heparin analogues Other (See Comments)     Depleted WBC count    Morphine Hallucinations, Other (See Comments) and Anxiety     Other reaction(s): Hallucinations  Paranoid, hallucinations         Past Medical History:   Diagnosis Date    AICD (automatic cardioverter/defibrillator) present     Anemia, chronic disease 07/27/2021    Anemia, unspecified 07/27/2021    Anxiety and depression 2012    CAD (coronary artery disease) 2012    Cardiomegaly 2016    CHF (congestive heart failure) 2012    Cholecystitis 2017    Complete heart block 2012    Diabetic foot ulcer     DVT (deep venous thrombosis) 2012    And pulmonary embolus    GERD (gastroesophageal reflux disease) 2010    " Heparin induced thrombocytopenia     Hyperlipemia 2011    IDDM (insulin dependent diabetes mellitus) 1983    With neuropathy    Ischemic cardiomyopathy     MI (myocardial infarction)     Morbid obesity     MRSA (methicillin resistant Staphylococcus aureus) infection 2019    Noncompliance with therapeutic plan     Normochromic normocytic anemia 2021    Osteomyelitis of right foot 2019    Osteomyelitis of right foot 2022    Klebsiella from bone, GBS in blood    Pulmonary edema     Respiratory failure     Sepsis 2019    Due to cellulitis right leg    Sleep apnea 2013    Thrombocytopathy     Venous stasis dermatitis of both lower extremities      Past Surgical History:   Procedure Laterality Date    CARDIAC PACEMAKER PLACEMENT  2012    CARDIAC PACEMAKER PLACEMENT  10/04/2018    battery replacement    CORONARY ARTERY BYPASS GRAFT  2012    ESOPHAGOGASTRODUODENOSCOPY  2017    SAMARA FILTER PLACEMENT  2012    vena cava    LUMBAR SYMPATHETIC NERVE BLOCK Right 2019    Procedure: BLOCK, NERVE, SYMPATHETIC, LUMBAR;  Surgeon: Tashi Good MD;  Location: [facility] OR;  Service: Pain Management;  Laterality: Right;  RIGHT SYMPATHETIC NERVE BLOCK     Family History   Problem Relation Age of Onset    Arthritis Mother     Diabetes Mother     Cancer Father     Heart disease Father     Stroke Father      Social History     Tobacco Use    Smoking status: Former     Packs/day: 2.00     Years: 38.00     Pack years: 76.00     Types: Cigarettes     Quit date:      Years since quittin.4    Smokeless tobacco: Never   Substance Use Topics    Alcohol use: No    Drug use: Never       Review of Systems:  Review of Systems   Constitutional:  Positive for malaise/fatigue. Negative for chills, diaphoresis and fever.   Respiratory:  Positive for shortness of breath. Negative for cough and sputum production.    Cardiovascular:  Positive for palpitations and leg swelling. Negative  "for chest pain and orthopnea.   Gastrointestinal:  Negative for nausea and vomiting.   Neurological:  Positive for weakness. Negative for dizziness and loss of consciousness.     OBJECTIVE:     Vital Signs (Most Recent)  Temp: 97.8 °F (36.6 °C) (06/18/23 0720)  Pulse: 60 (06/18/23 0828)  Resp: 18 (06/18/23 0828)  BP: (!) 104/55 (06/18/23 0720)  SpO2: 97 % (06/18/23 0828)    Vital Signs Range (Last 24H):  Temp:  [96.9 °F (36.1 °C)-98 °F (36.7 °C)]   Pulse:  [59-61]   Resp:  [14-21]   BP: (104-135)/(51-75)   SpO2:  [92 %-99 %]     I & O (Last 24H):    Intake/Output Summary (Last 24 hours) at 6/18/2023 0918  Last data filed at 6/18/2023 0914  Gross per 24 hour   Intake 720 ml   Output 1750 ml   Net -1030 ml         Current Diet:     Current Diet Order   Procedures    Diet Cardiac [facility]; Diabetic Diet     Order Specific Question:   Indicate patient location for additional diet options:     Answer:   [facility]     Order Specific Question:   Additional Diet Options:     Answer:   Diabetic Diet        Allergies:  Review of patient's allergies indicates:   Allergen Reactions    Vasopressin Anaphylaxis and Other (See Comments)     Increased pressure in his head; felt like his eyeballs and veins were going to pop out of his head.     Vasopressin analogues Other (See Comments) and Anaphylaxis     "felt like eyes going to pop out of my head"  Other reaction(s): Other (See Comments)  "felt like eyes going to pop out of my head"  Increased pressure in his head; felt like his eyeballs and veins were going to pop out of his head.     Heparin Other (See Comments)     Other reaction(s): "depleted my blood platets"    Decreased platelet count    Heparin analogues Other (See Comments)     Depleted WBC count    Morphine Hallucinations, Other (See Comments) and Anxiety     Other reaction(s): Hallucinations  Paranoid, hallucinations         Meds:  Scheduled Meds:   albuterol sulfate  2.5 mg Nebulization Q6H WAKE    amiodarone  200 mg Oral BID    " aspirin  81 mg Oral Daily    atorvastatin  10 mg Oral Daily    cefTRIAXone (ROCEPHIN) IVPB  1 g Intravenous Q24H    collagenase   Topical (Top) Daily    furosemide (LASIX) injection  40 mg Intravenous Q12H    gabapentin  300 mg Oral TID    insulin detemir U-100  10 Units Subcutaneous Daily    pantoprazole  40 mg Oral Before breakfast    senna-docusate 8.6-50 mg  1 tablet Oral QHS    sodium phosphates  1 enema Rectal Once     Continuous Infusions:      PRN Meds:acetaminophen, bisacodyL, dextrose 50%, dextrose 50%, famotidine, glucose, glucose, insulin aspart U-100, magnesium oxide, magnesium oxide, melatonin, naloxone, ondansetron, polyethylene glycol, potassium bicarbonate, potassium bicarbonate, potassium bicarbonate, potassium, sodium phosphates, potassium, sodium phosphates, potassium, sodium phosphates, traMADoL    Oxygen/Ventilator Data (Last 24H):  (if applicable)            Hemodynamic Parameters (Last 24H):   (if applicable)        Laboratory and Radiology Data:  Recent Results (from the past 24 hour(s))   POCT glucose    Collection Time: 06/17/23 11:26 AM   Result Value Ref Range    POC Glucose 224 (H) 70 - 110   POCT glucose    Collection Time: 06/17/23  4:36 PM   Result Value Ref Range    POC Glucose 202 (H) 70 - 110   POCT glucose    Collection Time: 06/17/23  8:56 PM   Result Value Ref Range    POC Glucose 185 (H) 70 - 110   Basic metabolic panel    Collection Time: 06/18/23  5:03 AM   Result Value Ref Range    Sodium 132 (L) 136 - 145 mmol/L    Potassium 4.3 3.5 - 5.1 mmol/L    Chloride 96 95 - 110 mmol/L    CO2 26 23 - 29 mmol/L    Glucose 147 (H) 70 - 110 mg/dL    BUN 98 (H) 8 - 23 mg/dL    Creatinine 3.6 (H) 0.5 - 1.4 mg/dL    Calcium 8.4 (L) 8.7 - 10.5 mg/dL    Anion Gap 10 8 - 16 mmol/L    eGFR 18.4 (A) >60 mL/min/1.73 m^2   Magnesium    Collection Time: 06/18/23  5:03 AM   Result Value Ref Range    Magnesium 2.5 1.6 - 2.6 mg/dL   CBC auto differential    Collection Time: 06/18/23  5:03 AM    Result Value Ref Range    WBC 5.56 3.90 - 12.70 K/uL    RBC 3.82 (L) 4.60 - 6.20 M/uL    Hemoglobin 9.9 (L) 14.0 - 18.0 g/dL    Hematocrit 33.5 (L) 40.0 - 54.0 %    MCV 88 82 - 98 fL    MCH 25.9 (L) 27.0 - 31.0 pg    MCHC 29.6 (L) 32.0 - 36.0 g/dL    RDW 20.3 (H) 11.5 - 14.5 %    Platelets 76 (L) 150 - 450 K/uL    MPV 11.8 9.2 - 12.9 fL    Immature Granulocytes 0.5 0.0 - 0.5 %    Gran # (ANC) 4.6 1.8 - 7.7 K/uL    Immature Grans (Abs) 0.03 0.00 - 0.04 K/uL    Lymph # 0.3 (L) 1.0 - 4.8 K/uL    Mono # 0.6 0.3 - 1.0 K/uL    Eos # 0.1 0.0 - 0.5 K/uL    Baso # 0.02 0.00 - 0.20 K/uL    nRBC 0 0 /100 WBC    Gran % 82.2 (H) 38.0 - 73.0 %    Lymph % 4.7 (L) 18.0 - 48.0 %    Mono % 11.3 4.0 - 15.0 %    Eosinophil % 0.9 0.0 - 8.0 %    Basophil % 0.4 0.0 - 1.9 %    Differential Method Automated    POCT glucose    Collection Time: 06/18/23  7:24 AM   Result Value Ref Range    POC Glucose 143 (H) 70 - 110     Imaging Results              X-Ray Chest AP Portable (Final result)  Result time 06/12/23 19:00:35      Final result by Driss Merritt MD (06/12/23 19:00:35)                   Narrative:      EXAM: XR CHEST AP PORTABLE    HISTORY: Sepsis    COMPARISON:Chest x-ray dated 9/7/2022    FINDINGS: The lungs are fairly well-expanded and appear free of focal areas of consolidation. No pleural effusions are evident. The heart is moderately enlarged and unchanged. There is a mass-like opacity lateral to the AP window that appears similar on multiple previous chest x-rays. A CT of the chest 1/14/2019 shows that this corresponds to a dilated main pulmonary artery. A left subclavian dual-chamber biventricular pacemaker remains in place in the left subclavian vein without change.    IMPRESSION:   Moderate cardiomegaly. No evidence of acute disease within the chest.    Electronically signed by:  Barry Merritt MD  6/12/2023 7:00 PM CDT Workstation: [id number]                                    12-lead EKG interpretation:  (if  applicable)      Current Cardiac Rhythm:   (if applicable)    Physical Exam:   Physical Exam  Vitals and nursing note reviewed.   Constitutional:       General: He is not in acute distress.     Appearance: Normal appearance. He is not ill-appearing.   Cardiovascular:      Rate and Rhythm: Normal rate and regular rhythm.      Pulses: Normal pulses.      Heart sounds: Murmur heard.   Pulmonary:      Effort: Pulmonary effort is normal. No respiratory distress.      Breath sounds: Normal breath sounds.   Musculoskeletal:      Right lower leg: Edema present.      Left lower leg: Edema present.   Skin:     General: Skin is warm and dry.      Findings: No rash.      Comments: R foot wound   Neurological:      Mental Status: He is alert and oriented to person, place, and time.       ASSESSMENT/PLAN:   Assessment:   Wide Complex Tachycardia - AICD - s/p successful cardioversion. On amiodarone gtt. Discussed interrogation with rep from [employer], appears he was in an atrial tachycardia with BiV pacing at 140 bpm on admission. No Vfib noted. VT zone set at 190.   Acute on Chronic Systolic Heart Failure- BNP 1162, CXR cardiomegaly. On IV lasix. Echo 3/2023 showed EF 25%. MUGA scan 10/2022 showed EF 21%.   AD on Chronic Kidney Disease - Cr 3.5, Nephrology consulted.   CAD s/p CABG 2012 - denies chest pain. Troponin elevation to 29 and flat. Last stress test 2021.   Hypertension - on coreg, lisinopril at home.   Hyperlipidemia   Diabetes Mellitus Type 2  Diabetic Foot Ulcer     Plan:   Patient reports he is feeling better.   Lasix decreased to 40 mg IV BID. UOP 2150 cc. Cr 3.6.  Appreciate Nephrology's recommendations for diuretics.   Patient may need dialysis in the future.   Dobutamine discontinued on 6/16.   Strict I/Os.  Daily weights.   D/c amiodarone gtt on 6/15.   Continue 200 mg BID PO.  Hold lisinopril and coreg.  No further cardiac recs at this time.

## 2023-06-18 NOTE — PLAN OF CARE
Patient cleared for discharge from case management standpoint.    Resumption of care orders for home health sent to [facility] via [facility] to resume home health. Resume 6.19.2023    Chart and discharge orders reviewed.  Patient discharged home with no further case management needs.       06/18/23 1414   Final Note   Assessment Type Final Discharge Note   Anticipated Discharge Disposition Home-Health   What phone number can be called within the next 1-3 days to see how you are doing after discharge? [phone]   Post-Acute Status   Post-Acute Authorization Home Health   Home Health Status Set-up Complete/Auth obtained   Discharge Delays None known at this time

## 2023-06-19 NOTE — DISCHARGE SUMMARY
[facility]  Discharge Summary      Patient Name: Ana Bullard  MRN: [id number]  GALE: [id number]  Patient Class: IP- Inpatient  Admission Date: 6/12/2023  Hospital Length of Stay: 6 days  Discharge Date and Time:  06/18/2023 7:33 PM  Attending Physician: No att. providers found   Discharging Provider: Haseeb Romero MD  Primary Care Provider: YRN Polladr    Primary Care Team: Networked reference to record PCT     HPI:   63-year-old  male with multiple medical comorbidities including but not limited to chronic combined CHF, type 2 DM, CAD, thrombocytopenia, chronic diabetic foot ulcer and CKD stage 3b who was discharged from outside hospital earlier today after being managed for UTI presented here with generalized weakness and tachycardia.    He was managed for complicated UTI at outside hospital for 2 days.  He was discharged on oral antibiotics and resumption of home health.  Today his home health nurse found him to be tachycardic and directed him to the ER.  Initial rhythm on presentation was wide complex tachycardia.  Wife reports that he was not given his amiodarone when he was hospitalized.    In the ED here, after consultation with Cardiology patient underwent successful synchronized cardioversion with restoration of normal sinus rhythm.    Patient denies chest pain but admits to shortness of breath and increased abdominal distention, lower extremity edema and weight gain over the last several days.  Admits to being compliant with diuretics.  He is currently being followed by Podiatry for chronic right foot ulcer and receiving local wound care.    Rest of the 10 point review of systems is negative except as mentioned above.      * No surgery found *      Hospital Course:    63-year-male with multiple medical comorbidities including but not limited to chronic combined CHF, type 2 DM, CAD, thrombocytopenia, chronic diabetic foot ulcer and CKD stage 3b,being  managed for UTI  was admitted with generalized weakness and tachycardia.     He was managed for complicated UTI at outside hospital for 2 days.  He was discharged on oral antibiotics   Initial rhythm on presentation was wide complex tachycardia.  Wife reports that he was not given his amiodarone when he was hospitalized.     In the ED here, after consultation with Cardiology patient underwent successful synchronized cardioversion with restoration of normal sinus rhythm.   He is  being compliant with diuretics but whole body anasarca noted.. CT abdomen was one with small volume ascites and appear interstitial fluid .    He is currently being followed by Podiatry for chronic right foot ulcer and receiving local wound care and podiatry consulted and bedside debridement was done and doing well.    Started high dose IV lasix and dobutamine drip and developed AD and stopped but renal function not worsen. Later amio Gtt changed to PO and DC with BID instead of daily as per cardiology recommendation and lisinopril was held because of renal failure   Patient prognosis is very poor and d/w nephrology and given torsemide 20 mg bid and DC lasix .  He is to follow up with nephrology in less than 1 week and repeat renal panel ordered to follow up in 3 days .                  Goals of Care Treatment Preferences:  Code Status: Full Code      Consults:   Consults (From admission, onward)        Status Ordering Provider     Inpatient consult to Vascular Surgery  Once        Provider:  Ali Khoobehi, MD    Completed BRAYAN ROQUE     Inpatient consult to Registered Dietitian/Nutritionist  Once        Provider:  (Not yet assigned)    Completed ALLYSON RODRIGUEZ     Inpatient consult to Nephrology  Once        Provider:  Maicol Leach MD    Completed [facility]     Inpatient consult to Cardiology  Once        Provider:  Clyde Storey MD    Completed [facility] HOYT          No new Assessment & Plan notes have  been filed under this hospital service since the last note was generated.  Service: Hospital Medicine    Final Active Diagnoses:    Diagnosis Date Noted POA    PRINCIPAL PROBLEM:  Wide-complex tachycardia [R00.0] 06/12/2023 Yes    Ulcer of right foot with fat layer exposed [L97.512] 06/15/2023 Yes    UTI (urinary tract infection) [N39.0] 06/12/2023 Yes    Diabetic foot ulcer [E11.621, L97.509]  Yes    Chronic combined systolic and diastolic heart failure [I50.42] 01/19/2020 Yes    Acute renal failure superimposed on stage 3b chronic kidney disease [N17.9, N18.32] 01/19/2020 Yes    Chronic Thrombocytopenia [D69.6] 01/01/2012 Yes    Type II diabetes mellitus with neurological manifestations [E11.49] 01/01/1983 Yes      Problems Resolved During this Admission:       Discharged Condition: good    Disposition: Home or Self Care    Follow Up:   Follow-up Information     Flo Drake MD Follow up in 1 week(s).    Specialty: Nephrology  Contact information:  [address] [facility] [place]  [place] [address]  [phone]             Haim Bailey MD Follow up in 2 week(s).    Specialties: Cardiology, Interventional Cardiology  Contact information:  [address]  [place] [address]  [phone]             [facility] Follow up.    Specialties: Physical Therapy, Home Health Services  Why: Home Health will resume your services within 3 days of discharge.  Contact information:  [address] [place] [address]  [address]  [facility] [id number] [phone]                       Patient Instructions:      Basic metabolic panel   Standing Status: Future Standing Exp. Date: 08/16/24     Ambulatory referral/consult to Home Health   Standing Status: Future   Referral Priority: Routine Referral Type: Home Health   Referral Reason: Specialty Services Required   Requested Specialty: Home Health Services   Number of Visits Requested: 1     Diet Cardiac     Activity as tolerated       Significant  Diagnostic Studies: Labs:   CMP   Recent Labs   Lab 06/17/23  0708 06/18/23  0503   * 132*   K 4.3 4.3   CL 98 96   CO2 26 26   * 147*   BUN 89* 98*   CREATININE 3.7* 3.6*   CALCIUM 8.4* 8.4*   ANIONGAP 10 10    and CBC   Recent Labs   Lab 06/17/23  0708 06/18/23  0503   WBC 4.91 5.56   HGB 9.2* 9.9*   HCT 31.2* 33.5*   PLT 58* 76*       Pending Diagnostic Studies:     None         Medications:  Reconciled Home Medications:      Medication List      START taking these medications    gabapentin 300 MG capsule  Commonly known as: NEURONTIN  Take 1 capsule (300 mg total) by mouth 3 (three) times daily.  Replaces: gabapentin 600 MG tablet     torsemide 20 MG Tab  Commonly known as: DEMADEX  Take 1 tablet (20 mg total) by mouth 2 (two) times a day.        CHANGE how you take these medications    amiodarone 200 MG Tab  Commonly known as: PACERONE  Take 1 tablet (200 mg total) by mouth 2 (two) times daily.  What changed: when to take this        CONTINUE taking these medications    albuterol 5 mg/mL nebulizer solution  Commonly known as: PROVENTIL  Take 2.5 mg by nebulization every 6 (six) hours as needed.     albuterol-ipratropium 2.5 mg-0.5 mg/3 mL nebulizer solution  Commonly known as: DUO-NEB  Take 3 mLs by nebulization every 6 (six) hours as needed.     aspirin 81 MG Chew  Take 81 mg by mouth once daily.     atorvastatin 10 MG tablet  Commonly known as: LIPITOR  Take 1 tablet by mouth once daily.     B complex-vitamin C-folic acid 0.8 mg Tab  Commonly known as: JLUIS-JIMMIE/NEPHRO-JIMMIE  Take 0.4 mg by mouth.     BASAGLAR KWIKPEN U-100 INSULIN glargine 100 units/mL SubQ pen  Generic drug: insulin  Inject 25 Units into the skin once daily.     cetirizine 10 MG tablet  Commonly known as: ZYRTEC  Take 10 mg by mouth once daily.     CORLANOR 5 mg Tab  Generic drug: ivabradine  Take 5 mg by mouth nightly.     FARXIGA 10 mg tablet  Generic drug: dapagliflozin propanediol  Take 10 mg by mouth once daily.      mupirocin 2 % ointment  Commonly known as: BACTROBAN  APPLY TOPICALLY ONCE DAILY.     pantoprazole 40 MG tablet  Commonly known as: PROTONIX  Take 1 tablet by mouth once daily.     traMADoL 50 mg tablet  Commonly known as: ULTRAM  Take 50 mg by mouth every 6 (six) hours as needed.     TRULICITY 0.75 mg/0.5 mL pen injector  Generic drug: dulaglutide  Inject 0.75 mg into the skin every 7 days.     vitamin B complex-folic acid 0.4 mg Tab  Take 1 tablet by mouth.        STOP taking these medications    carvediloL 3.125 MG tablet  Commonly known as: COREG     cefdinir 300 MG capsule  Commonly known as: OMNICEF     cefTRIAXone 10 gram injection  Commonly known as: ROCEPHIN     doxycycline 100 MG Cap  Commonly known as: VIBRAMYCIN     furosemide 40 MG tablet  Commonly known as: LASIX     gabapentin 600 MG tablet  Commonly known as: NEURONTIN  Replaced by: gabapentin 300 MG capsule     lisinopriL 2.5 MG tablet  Commonly known as: PRINIVIL,ZESTRIL            Indwelling Lines/Drains at time of discharge:   Lines/Drains/Airways     None               General: Patient resting comfortably in no acute distress. Appears as stated age. Calm  Eyes: EOM intact. No conjunctivae injection. No scleral icterus.  ENT: Hearing grossly intact. No discharge from ears. No nasal discharge.   CVS: RRR. No LE edema BL.  Lungs: CTA BL, no wheezing or crackles. Good breath sounds. No accessory muscle use. No acute respiratory distress  Neuro: non focal , Follows commands. Responds appropriately  Time spent on the discharge of patient: 33 minutes         Haseeb Romero MD  Department of Hospital Medicine  [facility]ly [facility]

## 2023-06-29 NOTE — PATIENT INSTRUCTIONS

## 2023-07-06 ENCOUNTER — OFFICE VISIT (OUTPATIENT)
Dept: PODIATRY | Facility: CLINIC | Age: 63
End: 2023-07-06
Payer: MEDICARE

## 2023-07-06 VITALS — WEIGHT: 282 LBS | HEIGHT: 73 IN | BODY MASS INDEX: 37.37 KG/M2

## 2023-07-06 DIAGNOSIS — L02.619 CELLULITIS AND ABSCESS OF FOOT: ICD-10-CM

## 2023-07-06 DIAGNOSIS — I73.9 PVD (PERIPHERAL VASCULAR DISEASE): ICD-10-CM

## 2023-07-06 DIAGNOSIS — R20.2 PARESTHESIA OF BOTH LOWER EXTREMITIES: ICD-10-CM

## 2023-07-06 DIAGNOSIS — M20.42 HAMMER TOES, BILATERAL: ICD-10-CM

## 2023-07-06 DIAGNOSIS — R26.2 DIFFICULTY IN WALKING, NOT ELSEWHERE CLASSIFIED: ICD-10-CM

## 2023-07-06 DIAGNOSIS — E11.40 TYPE 2 DIABETES MELLITUS WITH DIABETIC NEUROPATHY, UNSPECIFIED WHETHER LONG TERM INSULIN USE: ICD-10-CM

## 2023-07-06 DIAGNOSIS — I87.2 VENOUS STASIS DERMATITIS OF BOTH LOWER EXTREMITIES: ICD-10-CM

## 2023-07-06 DIAGNOSIS — L08.9 DIABETIC FOOT INFECTION: ICD-10-CM

## 2023-07-06 DIAGNOSIS — L60.2 ONYCHOGRYPHOSIS: ICD-10-CM

## 2023-07-06 DIAGNOSIS — L97.514 ULCER OF RIGHT FOOT WITH NECROSIS OF BONE: ICD-10-CM

## 2023-07-06 DIAGNOSIS — E11.621 TYPE 2 DIABETES MELLITUS WITH FOOT ULCER, UNSPECIFIED WHETHER LONG TERM INSULIN USE: ICD-10-CM

## 2023-07-06 DIAGNOSIS — M20.41 HAMMER TOES, BILATERAL: ICD-10-CM

## 2023-07-06 DIAGNOSIS — L03.119 CELLULITIS AND ABSCESS OF FOOT: ICD-10-CM

## 2023-07-06 DIAGNOSIS — E66.9 OBESITY, UNSPECIFIED CLASSIFICATION, UNSPECIFIED OBESITY TYPE, UNSPECIFIED WHETHER SERIOUS COMORBIDITY PRESENT: ICD-10-CM

## 2023-07-06 DIAGNOSIS — L60.2 OG (ONYCHOGRYPHOSIS): ICD-10-CM

## 2023-07-06 DIAGNOSIS — E11.49 TYPE II DIABETES MELLITUS WITH NEUROLOGICAL MANIFESTATIONS: ICD-10-CM

## 2023-07-06 DIAGNOSIS — I73.9 PERIPHERAL VASCULAR DISEASE: ICD-10-CM

## 2023-07-06 DIAGNOSIS — Z09 HOSPITAL DISCHARGE FOLLOW-UP: ICD-10-CM

## 2023-07-06 DIAGNOSIS — E11.628 DIABETIC FOOT INFECTION: ICD-10-CM

## 2023-07-06 DIAGNOSIS — Z91.89 AT HIGH RISK FOR INADEQUATE NUTRITIONAL INTAKE: Primary | ICD-10-CM

## 2023-07-06 DIAGNOSIS — L97.512 SKIN ULCER OF RIGHT FOOT WITH FAT LAYER EXPOSED: ICD-10-CM

## 2023-07-06 DIAGNOSIS — L97.509 TYPE 2 DIABETES MELLITUS WITH FOOT ULCER, UNSPECIFIED WHETHER LONG TERM INSULIN USE: ICD-10-CM

## 2023-07-06 PROCEDURE — 3008F BODY MASS INDEX DOCD: CPT | Mod: CPTII,S$GLB,, | Performed by: PODIATRIST

## 2023-07-06 PROCEDURE — 1159F MED LIST DOCD IN RCRD: CPT | Mod: CPTII,S$GLB,, | Performed by: PODIATRIST

## 2023-07-06 PROCEDURE — 1160F PR REVIEW ALL MEDS BY PRESCRIBER/CLIN PHARMACIST DOCUMENTED: ICD-10-PCS | Mod: CPTII,S$GLB,, | Performed by: PODIATRIST

## 2023-07-06 PROCEDURE — 99999 PR PBB SHADOW E&M-EST. PATIENT-LVL IV: CPT | Mod: PBBFAC,,, | Performed by: PODIATRIST

## 2023-07-06 PROCEDURE — 11042 WOUND DEBRIDEMENT: ICD-10-PCS | Mod: S$GLB,,, | Performed by: PODIATRIST

## 2023-07-06 PROCEDURE — 99214 OFFICE O/P EST MOD 30 MIN: CPT | Mod: 25,S$GLB,, | Performed by: PODIATRIST

## 2023-07-06 PROCEDURE — 1111F DSCHRG MED/CURRENT MED MERGE: CPT | Mod: CPTII,S$GLB,, | Performed by: PODIATRIST

## 2023-07-06 PROCEDURE — 11042 DBRDMT SUBQ TIS 1ST 20SQCM/<: CPT | Mod: S$GLB,,, | Performed by: PODIATRIST

## 2023-07-06 PROCEDURE — 99214 PR OFFICE/OUTPT VISIT, EST, LEVL IV, 30-39 MIN: ICD-10-PCS | Mod: 25,S$GLB,, | Performed by: PODIATRIST

## 2023-07-06 PROCEDURE — 1159F PR MEDICATION LIST DOCUMENTED IN MEDICAL RECORD: ICD-10-PCS | Mod: CPTII,S$GLB,, | Performed by: PODIATRIST

## 2023-07-06 PROCEDURE — 3008F PR BODY MASS INDEX (BMI) DOCUMENTED: ICD-10-PCS | Mod: CPTII,S$GLB,, | Performed by: PODIATRIST

## 2023-07-06 PROCEDURE — 1111F PR DISCHARGE MEDS RECONCILED W/ CURRENT OUTPATIENT MED LIST: ICD-10-PCS | Mod: CPTII,S$GLB,, | Performed by: PODIATRIST

## 2023-07-06 PROCEDURE — 99999 PR PBB SHADOW E&M-EST. PATIENT-LVL IV: ICD-10-PCS | Mod: PBBFAC,,, | Performed by: PODIATRIST

## 2023-07-06 PROCEDURE — 1160F RVW MEDS BY RX/DR IN RCRD: CPT | Mod: CPTII,S$GLB,, | Performed by: PODIATRIST

## 2023-07-06 NOTE — PROGRESS NOTES
"  [address] [facility] [place] [address]  Phone: [phone]   [phone]    Patient's PCP:YRN Pollard  Referring Provider: No ref. provider found    Subjective:      Chief Complaint:: Follow-up, Wound Check, Diabetes, Wound Care, Pressure Ulcer, Venous Stasis, Numbness, Non-healing Wound, Hospital Follow Up, Foot Ulcer, Diabetes Mellitus, Diabetic Foot Ulcer, and Difficulty Walking    Follow-up  Associated symptoms include numbness. Pertinent negatives include no abdominal pain, arthralgias, chest pain, chills, coughing, fatigue, fever, headaches, joint swelling, myalgias, nausea, rash or weakness.   Wound Check    Ana Bullard is a 63 y.o. male who presents today for follow up ulcer right foot.  Presents in boot cast with dressing intact.      Additionally, patient was recently hospitalized.  Reviewed all notes from patients medical chart from recent hospital admission, including imaging, procedures, studies, progress notes,  cultures, antibiotic regimens, photos,  etc.       Systemic Doctor: YRN Townsend  Date Last Seen: 06/07/2022  Blood Sugar: 143  Hemoglobin A1c: 6.8 (11/23/22)     Vitals:    07/06/23 1357   Weight: 127.9 kg (282 lb)   Height: 6' 1" (1.854 m)   PainSc: 0-No pain      Shoe Size:     Past Surgical History:   Procedure Laterality Date    CARDIAC PACEMAKER PLACEMENT  12/2012    CARDIAC PACEMAKER PLACEMENT  10/04/2018    battery replacement    CORONARY ARTERY BYPASS GRAFT  06/2012    ESOPHAGOGASTRODUODENOSCOPY  01/31/2017    SAMARA FILTER PLACEMENT  2012    vena cava    LUMBAR SYMPATHETIC NERVE BLOCK Right 8/22/2019    Procedure: BLOCK, NERVE, SYMPATHETIC, LUMBAR;  Surgeon: Tashi Good MD;  Location: [facility] OR;  Service: Pain Management;  Laterality: Right;  RIGHT SYMPATHETIC NERVE BLOCK     Past Medical History:   Diagnosis Date    AICD (automatic cardioverter/defibrillator) present     Anemia, chronic disease 07/27/2021    Anemia, unspecified 07/27/2021    " "Anxiety and depression 2012    CAD (coronary artery disease) 2012    Cardiomegaly 2016    CHF (congestive heart failure) 2012    Cholecystitis 2017    Complete heart block 2012    Diabetic foot ulcer     DVT (deep venous thrombosis) 2012    And pulmonary embolus    GERD (gastroesophageal reflux disease) 2010    Heparin induced thrombocytopenia     Hyperlipemia 2011    IDDM (insulin dependent diabetes mellitus) 1983    With neuropathy    Ischemic cardiomyopathy 2012    MI (myocardial infarction) 2012    Morbid obesity     MRSA (methicillin resistant Staphylococcus aureus) infection 01/19/2019    Noncompliance with therapeutic plan 2019    Normochromic normocytic anemia 07/27/2021    Osteomyelitis of right foot 01/2019    Osteomyelitis of right foot 09/01/2022    Klebsiella from bone, GBS in blood    Pulmonary edema 2019    Respiratory failure 2019    Sepsis 01/2019    Due to cellulitis right leg    Sleep apnea 2013    Thrombocytopathy 2012    Venous stasis dermatitis of both lower extremities      Family History   Problem Relation Age of Onset    Arthritis Mother     Diabetes Mother     Cancer Father     Heart disease Father     Stroke Father         Social History:   Marital Status:   Alcohol History:  reports no history of alcohol use.  Tobacco History:  reports that he quit smoking about 11 years ago. His smoking use included cigarettes. He has a 76.00 pack-year smoking history. He has never used smokeless tobacco.  Drug History:  reports no history of drug use.    Review of patient's allergies indicates:   Allergen Reactions    Vasopressin Anaphylaxis and Other (See Comments)     Increased pressure in his head; felt like his eyeballs and veins were going to pop out of his head.     Vasopressin analogues Other (See Comments) and Anaphylaxis     "felt like eyes going to pop out of my head"  Other reaction(s): Other (See Comments)  "felt like eyes going to pop out of my head"  Increased pressure in his head; " "felt like his eyeballs and veins were going to pop out of his head.     Heparin Other (See Comments)     Other reaction(s): "depleted my blood platets"    Decreased platelet count    Heparin analogues Other (See Comments)     Depleted WBC count    Morphine Hallucinations, Other (See Comments) and Anxiety     Other reaction(s): Hallucinations  Paranoid, hallucinations         Current Outpatient Medications   Medication Sig Dispense Refill    albuterol (PROVENTIL) 5 mg/mL nebulizer solution Take 2.5 mg by nebulization every 6 (six) hours as needed.      albuterol-ipratropium (DUO-NEB) 2.5 mg-0.5 mg/3 mL nebulizer solution Take 3 mLs by nebulization every 6 (six) hours as needed.      amiodarone (PACERONE) 200 MG Tab Take 1 tablet (200 mg total) by mouth 2 (two) times daily. 90 tablet 0    aspirin 81 MG Chew Take 81 mg by mouth once daily.      atorvastatin (LIPITOR) 10 MG tablet Take 1 tablet by mouth once daily.  1    B complex-vitamin C-folic acid (JLUIS-JIMMIE/NEPHRO-JIMMIE) 0.8 mg Tab Take 0.4 mg by mouth.      BASAGLAR KWIKPEN U-100 INSULIN glargine 100 units/mL (3mL) SubQ pen Inject 25 Units into the skin once daily.  3    cetirizine (ZYRTEC) 10 MG tablet Take 10 mg by mouth once daily.      CORLANOR 5 mg Tab Take 5 mg by mouth nightly.      FARXIGA 10 mg tablet Take 10 mg by mouth once daily.      gabapentin (NEURONTIN) 300 MG capsule Take 1 capsule (300 mg total) by mouth 3 (three) times daily. 90 capsule 0    mupirocin (BACTROBAN) 2 % ointment APPLY TOPICALLY ONCE DAILY. (Patient taking differently: Apply 1 g topically once daily.) 22 g 5    pantoprazole (PROTONIX) 40 MG tablet Take 1 tablet by mouth once daily.  0    torsemide (DEMADEX) 20 MG Tab Take 1 tablet (20 mg total) by mouth 2 (two) times a day. 120 tablet 0    traMADoL (ULTRAM) 50 mg tablet Take 50 mg by mouth every 6 (six) hours as needed.      TRULICITY 0.75 mg/0.5 mL pen injector Inject 0.75 mg into the skin every 7 days.      vitamin B complex-folic " acid 0.4 mg Tab Take 1 tablet by mouth.       No current facility-administered medications for this visit.       Review of Systems   Constitutional:  Negative for chills, fatigue, fever and unexpected weight change.   HENT:  Negative for hearing loss and trouble swallowing.    Eyes:  Negative for photophobia and visual disturbance.   Respiratory:  Negative for cough, shortness of breath and wheezing.    Cardiovascular:  Positive for leg swelling. Negative for chest pain and palpitations.   Gastrointestinal:  Negative for abdominal pain and nausea.   Genitourinary:  Negative for dysuria and frequency.   Musculoskeletal:  Negative for arthralgias, back pain, gait problem, joint swelling and myalgias.   Skin:  Positive for color change and wound. Negative for rash.   Neurological:  Positive for numbness. Negative for tremors, seizures, weakness and headaches.   Hematological:  Does not bruise/bleed easily.       Objective:        Physical Exam:   Foot Exam    Right Foot/Ankle     Inspection and Palpation  Skin Exam: ulcer;       Physical Exam  Musculoskeletal:        Legs:         Feet:    Feet:      Right foot:      Skin integrity: Ulcer present.     Ortho/SPM Exam     Physical examination: General: Pt. is well-developed, well-nourished, appears stated age, in no acute distress, alert and oriented x 3.     Vascular: Dorsalis pedis and posterior tibial pulses are 1/4  Bilaterally. Toes are warm to touch. Feet are warm proximally.     There is decreased digital hair. Skin is atrophic, slightly hyperpigmented, and mildly edematous. Capillary refill less than 5 seconds all toes/distal feet     Neurologic: [place] 5.07 monofilament is decreased bilateral feet. Sharp/dull sensation absent Bilateral feet.     Vibratory sensation absent bilateral     Musculoskeletal: adequate joint range of motion without pain, limitation, nor crepitation Bilateral feet and ankle joints. Muscle strength is 5/5 in all groups  bilaterally.     Lymphatics: no lymphangitic streaking bilaterally.     Dermatologic: Elongated, thickened, dystrophic, discolored nails x 10. Xerosis Bilaterally  Imaging:            Assessment:       1. At high risk for inadequate nutritional intake    2. Type II diabetes mellitus with neurological manifestations    3. Peripheral vascular disease    4. OG (onychogryphosis)    5. Cellulitis and abscess of foot    6. Obesity, unspecified classification, unspecified obesity type, unspecified whether serious comorbidity present    7. Venous stasis dermatitis of both lower extremities    8. Type 2 diabetes mellitus with foot ulcer, unspecified whether long term insulin use    9. Paresthesia of both lower extremities    10. Difficulty in walking, not elsewhere classified    11. Hammer toes, bilateral    12. Ulcer of right foot with necrosis of bone    13. Type 2 diabetes mellitus with diabetic neuropathy, unspecified whether long term insulin use    14. Onychogryphosis    15. PVD (peripheral vascular disease)    16. Diabetic foot infection    17. Hospital discharge follow-up    18. Skin ulcer of right foot with fat layer exposed      Plan:   At high risk for inadequate nutritional intake    Type II diabetes mellitus with neurological manifestations    Peripheral vascular disease    OG (onychogryphosis)    Cellulitis and abscess of foot    Obesity, unspecified classification, unspecified obesity type, unspecified whether serious comorbidity present    Venous stasis dermatitis of both lower extremities    Type 2 diabetes mellitus with foot ulcer, unspecified whether long term insulin use    Paresthesia of both lower extremities    Difficulty in walking, not elsewhere classified    Hammer toes, bilateral    Ulcer of right foot with necrosis of bone  -     Ambulatory referral/consult to Home Health; Future; Expected date: 07/07/2023    Type 2 diabetes mellitus with diabetic neuropathy, unspecified whether long term insulin  "use    Onychogryphosis    PVD (peripheral vascular disease)    Diabetic foot infection    Hospital discharge follow-up    Skin ulcer of right foot with fat layer exposed    Other orders  -     Wound Debridement      No follow-ups on file.    Wound Debridement    Date/Time: 7/6/2023 1:50 PM  Performed by: Leah Avila DPM  Authorized by: Leah Avila DPM     Time out: Immediately prior to procedure a "time out" was called to verify the correct patient, procedure, equipment, support staff and site/side marked as required.    Consent Done?:  Yes (Written)    Preparation: Patient was prepped and draped in usual sterile fashion, Patient was prepped and draped with aseptic technique and Patient was prepped and draped with clean technique      Wound Details:    Location:  Right foot    Location:  Right 5th Metatarsal Head    Type of Debridement:  Excisional       Length (cm):  0.6       Area (sq cm):  0.3       Width (cm):  0.5       Percent Debrided (%):  100       Depth (cm):  0.3       Total Area Debrided (sq cm):  0.3    Depth of debridement:  Subcutaneous tissue    Tissue debrided:  Subcutaneous    Devitalized tissue debrided:  Slough, Necrotic/Eschar, Fibrin, Callus and Biofilm    Instruments:  Blade, Curette, Forceps and Nippers  Bleeding:  Minimal  Hemostasis Achieved: Yes  Method Used:  Pressure  Patient tolerance:  Patient tolerated the procedure well with no immediate complications  1st Wound Pain Assessment: 0          Counseling:     I provided patient education verbally regarding:   Patient diagnosis, treatment options, as well as alternatives, risks, and benefits.     This note was created using Dragon voice recognition software that occasionally misinterpreted phrases or words.             Follow-up: Patient is to return to the clinic in 2 week(s) for follow-up but should call the office immediately if any signs of infection, such as fever, chills, sweats, increased redness or " pain.  Recommendations:  Check feet daily.  dressing changes, home health Iodosorb followed by border foam followed by bulky wrap for protection, on both right plantar foot ulcer and left lower leg ulcer.  Use surgery shoe for walking or standing.     I provided patient education verbally regarding: proper ulcer care and the possible need for serial debridements, topical medications, specific dressings and biological engineered skin substitutes if indicated.           Counseling/Education:  I provided patient education verbally regarding:   The aspects of diabetes and how it pertains to the feet. I explained the importance of proper diabetic foot care and how it is essential for the health of their feet.     I discussed the importance of knowing their Hemoglobin A1c and that the level needs to be as close to 6 as possible. I discussed the increase complications of high blood sugar including stroke, blindness, heart attack, kidney failure and loss of limb secondary to neuropathy and PVD.      With neuropathy, beware of any breaks in the skin or redness. These areas are not recognized early due to the numbness.     I discussed Diabetes, lower back issues, metabolic disorders, systemic causes, chemotherapy, vitamin deficiency, heavy metal exposure, as some of the causes. I also explained that as much as 40% of the time we can not find a cause. I discussed different treatments available to control the symptoms but which may not cure the problem.         Counseled patient on the aspects of diabetes and how it pertains to the feet.  I explained the importance of proper diabetic foot care and how it is essential for the health of their feet.        Shoe inspection. Patient instructed on proper foot hygeine. We discussed wearing proper shoe gear, daily foot inspections, never walking without protective shoe gear, never putting sharp instruments to feet, routine podiatric nail visits every 2-3 months.       Patient should  call the office immediately if any signs of infection, such as fever, chills, sweats, increased redness or pain.     Patient was instructed to call the clinic or go to the emergency department if their symptoms do not improve, worsens, or if new symptoms develop.  Patient was advised that if any increased swelling, pain, or numbness arise to go immediately to the ED. Patient knows to call any time if an emergency arises. Shared decision making occurred and patient verbalized understanding in agreement with this plan.      I counseled the patient on their conditions, their implications and medical management.     >50% of this > 60 minute visit was spent face to face educating/counseling the patient     I spent a total of 60 minutes on the day of the visit.This includes face to face time and non-face to face time preparing to see the patient (eg, review of tests), obtaining and/or reviewing separately obtained history, documenting clinical information in the electronic or other health record, independently interpreting results and communicating results to the patient/family/caregiver, or care coordinator.

## 2023-07-07 NOTE — PATIENT INSTRUCTIONS
Dr. Avila's Wound Healing Tips    How Does Good Nutrition Help with Wound Healing?  Eating well during wound healing can help your body heal faster and fight infection.  To heal, you need more calories and more nutrients like protein, fluids, vitamin A, vitamin C, and Zinc.  Wounds heal faster when you get enough of the right foods.    Prioritize protein! Protein provides the building blocks for muscle and skin repair; and you need more protein for wound healing.  It also helps to boot immunity!    In ADDITION to below, I recommend supplementing with 2 protein drinks each day while healing.  I recommend finding a protein drink with around 30g or protein per bottle.  A good example is Premier Protein drinks, which can be purchased at [employer], [employer], or online.  They are a great, affordable option with low carbohydrates (low sugars!) and high protein.    Good sources include:  Lean animal meat such as chicken, turkey, fish  Beans, peas, lentils or tofu  Nuts, peanut butter, or seeds  Cheese, yogurt, cottage cheese or eggs  Milk or a milk alternative like fortified soy    Vitamins and Minerals  Vitamin A, Vitamin C, and Zinc help your body to repair tissue damage, fight infections and keep your skin healthy.    Collagen! Collagen plays a KEY role in ALL stages of wound healing  I recommend supplementing with Collagen DAILY.  There are several collagen supplements on the market, in both powder and capsule form.  A brand I regularly recommend is: Collagen Peptides by [employer] - 2 scoops per day in coffee/tea/water/etc. (sold at [employer], [employer], several drug stores, etc.)  Collagen has several other health benefits related to skin, joint, bone, muscle and even heart health!    Diabetes and Wound Healing  Good blood sugar control is very important during wound healing.  This helps you heal faster and reduces risk of infection.  Chronically high blood sugars can lead to poor blood circulation, thus increasing wound  healing time.  Please ask your dietician for tips on managing blood sugars.    What Else Can I Do To Help My Wound Heal?  If you use tobacco, quit.  Nicotine can reduce blood and oxygen flow to your tissues which can increase healing time.    Cast Bag (Can be purchased on [employer])  Re-use same cast bag for each shower or bath.  Recommend hanging bag to dry in between each shower or bath.  Follow directions on cast bag for proper application and use.    (Keep dressing clean, dry, and intact between dressing changes!)

## 2023-07-10 ENCOUNTER — EXTERNAL HOME HEALTH (OUTPATIENT)
Dept: HOME HEALTH SERVICES | Facility: HOSPITAL | Age: 63
End: 2023-07-10
Payer: MEDICARE

## 2023-07-17 ENCOUNTER — OFFICE VISIT (OUTPATIENT)
Dept: PAIN MEDICINE | Facility: CLINIC | Age: 63
End: 2023-07-17
Payer: MEDICARE

## 2023-07-17 VITALS
WEIGHT: 282 LBS | HEART RATE: 60 BPM | SYSTOLIC BLOOD PRESSURE: 116 MMHG | DIASTOLIC BLOOD PRESSURE: 71 MMHG | BODY MASS INDEX: 37.37 KG/M2 | HEIGHT: 73 IN

## 2023-07-17 DIAGNOSIS — G89.4 CHRONIC PAIN DISORDER: Primary | ICD-10-CM

## 2023-07-17 DIAGNOSIS — E11.42 DIABETIC POLYNEUROPATHY ASSOCIATED WITH TYPE 2 DIABETES MELLITUS: Primary | ICD-10-CM

## 2023-07-17 DIAGNOSIS — G89.4 CHRONIC PAIN DISORDER: ICD-10-CM

## 2023-07-17 DIAGNOSIS — M79.671 BILATERAL FOOT PAIN: ICD-10-CM

## 2023-07-17 DIAGNOSIS — M79.672 BILATERAL FOOT PAIN: ICD-10-CM

## 2023-07-17 PROCEDURE — 1159F MED LIST DOCD IN RCRD: CPT | Mod: CPTII,S$GLB,, | Performed by: PHYSICIAN ASSISTANT

## 2023-07-17 PROCEDURE — 3074F SYST BP LT 130 MM HG: CPT | Mod: CPTII,S$GLB,, | Performed by: PHYSICIAN ASSISTANT

## 2023-07-17 PROCEDURE — 3078F DIAST BP <80 MM HG: CPT | Mod: CPTII,S$GLB,, | Performed by: PHYSICIAN ASSISTANT

## 2023-07-17 PROCEDURE — 3078F PR MOST RECENT DIASTOLIC BLOOD PRESSURE < 80 MM HG: ICD-10-PCS | Mod: CPTII,S$GLB,, | Performed by: PHYSICIAN ASSISTANT

## 2023-07-17 PROCEDURE — 1111F PR DISCHARGE MEDS RECONCILED W/ CURRENT OUTPATIENT MED LIST: ICD-10-PCS | Mod: CPTII,S$GLB,, | Performed by: PHYSICIAN ASSISTANT

## 2023-07-17 PROCEDURE — 1111F DSCHRG MED/CURRENT MED MERGE: CPT | Mod: CPTII,S$GLB,, | Performed by: PHYSICIAN ASSISTANT

## 2023-07-17 PROCEDURE — 3008F BODY MASS INDEX DOCD: CPT | Mod: CPTII,S$GLB,, | Performed by: PHYSICIAN ASSISTANT

## 2023-07-17 PROCEDURE — 99999 PR PBB SHADOW E&M-EST. PATIENT-LVL III: CPT | Mod: PBBFAC,,, | Performed by: PHYSICIAN ASSISTANT

## 2023-07-17 PROCEDURE — 99214 PR OFFICE/OUTPT VISIT, EST, LEVL IV, 30-39 MIN: ICD-10-PCS | Mod: S$GLB,,, | Performed by: PHYSICIAN ASSISTANT

## 2023-07-17 PROCEDURE — 99214 OFFICE O/P EST MOD 30 MIN: CPT | Mod: S$GLB,,, | Performed by: PHYSICIAN ASSISTANT

## 2023-07-17 PROCEDURE — 3074F PR MOST RECENT SYSTOLIC BLOOD PRESSURE < 130 MM HG: ICD-10-PCS | Mod: CPTII,S$GLB,, | Performed by: PHYSICIAN ASSISTANT

## 2023-07-17 PROCEDURE — 3008F PR BODY MASS INDEX (BMI) DOCUMENTED: ICD-10-PCS | Mod: CPTII,S$GLB,, | Performed by: PHYSICIAN ASSISTANT

## 2023-07-17 PROCEDURE — 1159F PR MEDICATION LIST DOCUMENTED IN MEDICAL RECORD: ICD-10-PCS | Mod: CPTII,S$GLB,, | Performed by: PHYSICIAN ASSISTANT

## 2023-07-17 PROCEDURE — 99999 PR PBB SHADOW E&M-EST. PATIENT-LVL III: ICD-10-PCS | Mod: PBBFAC,,, | Performed by: PHYSICIAN ASSISTANT

## 2023-07-17 RX ORDER — TRAMADOL HYDROCHLORIDE 50 MG/1
50 TABLET ORAL EVERY 6 HOURS PRN
Qty: 120 TABLET | Refills: 2 | Status: CANCELLED | OUTPATIENT
Start: 2023-07-29 | End: 2023-10-27

## 2023-07-17 RX ORDER — TRAMADOL HYDROCHLORIDE 50 MG/1
50 TABLET ORAL EVERY 6 HOURS PRN
Qty: 120 TABLET | Refills: 2 | Status: SHIPPED | OUTPATIENT
Start: 2023-07-29 | End: 2023-10-25 | Stop reason: SDUPTHER

## 2023-07-17 NOTE — PROGRESS NOTES
Referring Physician: No ref. provider found    PCP: YRN Pollard      CC:  Bilateral foot pain    Interval history:  Mr. Bullard is a 63 y.o. male with bilateral foot pain due to peripheral neuropathy diabetic neuropathy. Hospitalized last month for UTI and Vtach. Reports he is doing better.  Foot pain remains tolerable. Stabbing pains resolved. He continues to take Gabapentin 600 mg t.i.d. with mild benefits. Nortriptyline 25 mg made him to drowsy so he discontinued it.  He continues to take tramadol 50 mg q.8 hours as needed with mild-to-moderate benefit.  Denies any side effects with the medication.  Able perform his ADLs with the medication.  He underwent a right-sided lumbar sympathetic nerve block which provided moderate benefit that was shortlived. Currently on antibiotics.  He developed bilateral knee pain. Right intra articular knee injection with benefit. He denies any worsening weakness.  No bowel bladder changes. Pain today is rated 5/10.    Prior HPI:   Ana Bullard is a 63 y.o. male referred to us for bilateral foot pain. Patient with long history of diabetes.  He states neuropathy worsen after his CABG.  Bilateral foot pain has worsened over past 7 years.  He presents to us constant burning, sharp pain in his bilateral feet.  He also has some similar issues in his bilateral hands, but foot pain is worse.  Pain worsens prolonged sitting and standing.  He currently takes gabapentin 600 mg t.i.d. with mild benefits.  Increasing doses causes sedation.  He also takes tramadol q.8 hours as needed with mild-to-moderate benefit.  He denies any worsening weakness.  No bowel bladder changes.    Interventional history:  Right LSB on 09/2019 with 50% relief for 2 weeks.    ROS:  CONSTITUTIONAL: No fevers, chills, night sweats, wt. loss, appetite changes  SKIN: no rashes or itching  ENT: No headaches, head trauma, vision changes, or eye pain  LYMPH NODES: None noticed   CV: No chest pain,  palpitations.   RESP: No shortness of breath, dyspnea on exertion, wheezing, or hemoptysis. + cough  GI: No nausea, emesis, diarrhea, constipation, melena, hematochezia, pain.    : No dysuria, hematuria, urgency, or frequency   HEME: No easy bruising, bleeding problems  PSYCHIATRIC: No depression, anxiety, psychosis, hallucinations.  NEURO: No seizures, memory loss, dizziness or difficulty sleeping  MSK:  Positive HPI      Past Medical History:   Diagnosis Date    AICD (automatic cardioverter/defibrillator) present     Anemia, chronic disease 07/27/2021    Anemia, unspecified 07/27/2021    Anxiety and depression 2012    CAD (coronary artery disease) 2012    Cardiomegaly 2016    CHF (congestive heart failure) 2012    Cholecystitis 2017    Complete heart block 2012    Diabetic foot ulcer     DVT (deep venous thrombosis) 2012    And pulmonary embolus    GERD (gastroesophageal reflux disease) 2010    Heparin induced thrombocytopenia     Hyperlipemia 2011    IDDM (insulin dependent diabetes mellitus) 1983    With neuropathy    Ischemic cardiomyopathy 2012    MI (myocardial infarction) 2012    Morbid obesity     MRSA (methicillin resistant Staphylococcus aureus) infection 01/19/2019    Noncompliance with therapeutic plan 2019    Normochromic normocytic anemia 07/27/2021    Osteomyelitis of right foot 01/2019    Osteomyelitis of right foot 09/01/2022    Klebsiella from bone, GBS in blood    Pulmonary edema 2019    Respiratory failure 2019    Sepsis 01/2019    Due to cellulitis right leg    Sleep apnea 2013    Thrombocytopathy 2012    Venous stasis dermatitis of both lower extremities      Past Surgical History:   Procedure Laterality Date    CARDIAC PACEMAKER PLACEMENT  12/2012    CARDIAC PACEMAKER PLACEMENT  10/04/2018    battery replacement    CORONARY ARTERY BYPASS GRAFT  06/2012    ESOPHAGOGASTRODUODENOSCOPY  01/31/2017    SAMARA FILTER PLACEMENT  2012    vena cava    LUMBAR SYMPATHETIC NERVE BLOCK Right  "2019    Procedure: BLOCK, NERVE, SYMPATHETIC, LUMBAR;  Surgeon: Tashi Good MD;  Location: [facility] OR;  Service: Pain Management;  Laterality: Right;  RIGHT SYMPATHETIC NERVE BLOCK     Family History   Problem Relation Age of Onset    Arthritis Mother     Diabetes Mother     Cancer Father     Heart disease Father     Stroke Father      Social History     Socioeconomic History    Marital status:    Tobacco Use    Smoking status: Former     Packs/day: 2.00     Years: 38.00     Pack years: 76.00     Types: Cigarettes     Quit date:      Years since quittin.5    Smokeless tobacco: Never   Substance and Sexual Activity    Alcohol use: No    Drug use: Never    Sexual activity: Never         Medications/Allergies: See med card    Vitals:    23 1250   BP: 116/71   Pulse: 60   Weight: 127.9 kg (282 lb)   Height: 6' 1" (1.854 m)   PainSc:   5   PainLoc: Foot         GENERAL: A and O x3, the patient appears well groomed and is in no acute distress.  Skin: No rashes or obvious lesions  HEENT: normocephalic, atraumatic  CARDIOVASCULAR:  RRR  LUNGS: non labored breathing  ABDOMEN: soft, nontender   UPPER EXTREMITIES: Normal alignment, normal range of motion, no atrophy, no skin changes,  hair growth and nail growth normal and equal bilaterally. No swelling, no tenderness.    LOWER EXTREMITIES:  Normal alignment, no atrophy, no skin changes,  hair growth and nail growth normal and equal bilaterally. No swelling. Tenderness to right lateral patella.   LUMBAR SPINE  Lumbar spine: ROM is limited with flexion extension and oblique extension with moderate increased pain.    Samuel's test causes no increased pain on either side.    Supine straight leg raise is negative bilaterally.    Internal and external rotation of the hip causes no increased pain on either side.  Myofascial exam: No tenderness to palpation across lumbar paraspinous muscles.        MENTAL STATUS: normal orientation, speech, language, and fund " of knowledge for social situation.  Emotional state appropriate.     CRANIAL NERVES:  II:         PERRL bilaterally,   III,IV,VI: EOMI.    V:         Facial sensation equal bilaterally  VII:       Facial motor function normal.  VIII:      Hearing equal to finger rub bilaterally  IX/X:    Gag normal, palate symmetric  XI:        Shoulder shrug equal, head turn equal  XII:       Tongue midline without fasciculations        MOTOR: Tone and bulk: normal bilateral upper and lower Strength: normal   Delt      Bi         Tri        WE      WF                        R          5          5          5          5          5          5            L          5          5          5          5          5          5               IP         ADD     ABD     Quad   TA        Gas      HAM  R          5          5          5          5          5          5          5  L          5          5          5          5          5          5          5     SENSATION:  Decreased globally of bilateral feet  REFLEXES: normal, symmetric, nonbrisk.  Toes down, no clonus. No hoffmans.  GAIT: normal rise, base, steps, and arm swing.       Imaging:  None    Assessment:  Ana Bullard is a 63 y.o. male with bilateral foot pain  1. Diabetic polyneuropathy associated with type 2 diabetes mellitus    2. Bilateral foot pain    3. Chronic pain disorder          Plan:  1. I have stressed the importance of physical activity and exercise to improve overall health  2.  Discussed disease progression of peripheral neuropathy.  Continue gabapentin as prescribed by PCP.  Consider nortriptyline 10 mg in the future  3.  He does request continue tramadol with us.  I believe this is very reasonable.   reviewed.  He takes tramadol 50 mg q.8 hours as needed.  Previous UDS consistent.   4.  May consider repeat lumbar sympathetic block if pain is exacerbated.  Briefly discussed spinal cord stimulation as well.  5. Follow-up in 3 month  Medication management  provided by  Dr. Good

## 2023-07-20 ENCOUNTER — OFFICE VISIT (OUTPATIENT)
Dept: PODIATRY | Facility: CLINIC | Age: 63
End: 2023-07-20
Payer: MEDICARE

## 2023-07-20 VITALS — BODY MASS INDEX: 37.37 KG/M2 | HEIGHT: 73 IN | WEIGHT: 282 LBS

## 2023-07-20 DIAGNOSIS — L60.2 OG (ONYCHOGRYPHOSIS): ICD-10-CM

## 2023-07-20 DIAGNOSIS — I73.9 PERIPHERAL VASCULAR DISEASE: ICD-10-CM

## 2023-07-20 DIAGNOSIS — M20.41 HAMMER TOES, BILATERAL: ICD-10-CM

## 2023-07-20 DIAGNOSIS — I73.9 PVD (PERIPHERAL VASCULAR DISEASE): ICD-10-CM

## 2023-07-20 DIAGNOSIS — E11.49 TYPE II DIABETES MELLITUS WITH NEUROLOGICAL MANIFESTATIONS: ICD-10-CM

## 2023-07-20 DIAGNOSIS — M20.42 HAMMER TOES, BILATERAL: ICD-10-CM

## 2023-07-20 DIAGNOSIS — R26.2 DIFFICULTY IN WALKING, NOT ELSEWHERE CLASSIFIED: ICD-10-CM

## 2023-07-20 DIAGNOSIS — L97.514 ULCER OF RIGHT FOOT WITH NECROSIS OF BONE: ICD-10-CM

## 2023-07-20 DIAGNOSIS — L97.512 ULCER OF RIGHT FOOT WITH FAT LAYER EXPOSED: Primary | ICD-10-CM

## 2023-07-20 DIAGNOSIS — E11.40 TYPE 2 DIABETES MELLITUS WITH DIABETIC NEUROPATHY, UNSPECIFIED WHETHER LONG TERM INSULIN USE: ICD-10-CM

## 2023-07-20 DIAGNOSIS — Z91.89 AT HIGH RISK FOR INADEQUATE NUTRITIONAL INTAKE: ICD-10-CM

## 2023-07-20 DIAGNOSIS — E66.9 OBESITY, UNSPECIFIED CLASSIFICATION, UNSPECIFIED OBESITY TYPE, UNSPECIFIED WHETHER SERIOUS COMORBIDITY PRESENT: ICD-10-CM

## 2023-07-20 DIAGNOSIS — R20.2 PARESTHESIA OF BOTH LOWER EXTREMITIES: ICD-10-CM

## 2023-07-20 DIAGNOSIS — L60.2 ONYCHOGRYPHOSIS: ICD-10-CM

## 2023-07-20 DIAGNOSIS — I87.2 VENOUS STASIS DERMATITIS OF BOTH LOWER EXTREMITIES: ICD-10-CM

## 2023-07-20 DIAGNOSIS — L97.509 TYPE 2 DIABETES MELLITUS WITH FOOT ULCER, UNSPECIFIED WHETHER LONG TERM INSULIN USE: ICD-10-CM

## 2023-07-20 DIAGNOSIS — E11.621 TYPE 2 DIABETES MELLITUS WITH FOOT ULCER, UNSPECIFIED WHETHER LONG TERM INSULIN USE: ICD-10-CM

## 2023-07-20 PROCEDURE — 99999 PR PBB SHADOW E&M-EST. PATIENT-LVL IV: CPT | Mod: PBBFAC,,, | Performed by: PODIATRIST

## 2023-07-20 PROCEDURE — 3008F BODY MASS INDEX DOCD: CPT | Mod: CPTII,S$GLB,, | Performed by: PODIATRIST

## 2023-07-20 PROCEDURE — 1160F RVW MEDS BY RX/DR IN RCRD: CPT | Mod: CPTII,S$GLB,, | Performed by: PODIATRIST

## 2023-07-20 PROCEDURE — 99999 PR PBB SHADOW E&M-EST. PATIENT-LVL IV: ICD-10-PCS | Mod: PBBFAC,,, | Performed by: PODIATRIST

## 2023-07-20 PROCEDURE — 3008F PR BODY MASS INDEX (BMI) DOCUMENTED: ICD-10-PCS | Mod: CPTII,S$GLB,, | Performed by: PODIATRIST

## 2023-07-20 PROCEDURE — 99214 OFFICE O/P EST MOD 30 MIN: CPT | Mod: S$GLB,,, | Performed by: PODIATRIST

## 2023-07-20 PROCEDURE — 1160F PR REVIEW ALL MEDS BY PRESCRIBER/CLIN PHARMACIST DOCUMENTED: ICD-10-PCS | Mod: CPTII,S$GLB,, | Performed by: PODIATRIST

## 2023-07-20 PROCEDURE — 1159F PR MEDICATION LIST DOCUMENTED IN MEDICAL RECORD: ICD-10-PCS | Mod: CPTII,S$GLB,, | Performed by: PODIATRIST

## 2023-07-20 PROCEDURE — 99214 PR OFFICE/OUTPT VISIT, EST, LEVL IV, 30-39 MIN: ICD-10-PCS | Mod: S$GLB,,, | Performed by: PODIATRIST

## 2023-07-20 PROCEDURE — 1159F MED LIST DOCD IN RCRD: CPT | Mod: CPTII,S$GLB,, | Performed by: PODIATRIST

## 2023-07-20 NOTE — PROGRESS NOTES
"  [address] [facility] [place] [place] [address]  Phone: [phone]   [phone]    Patient's PCP:YRN Pollard  Referring Provider: No ref. provider found    Subjective:      Chief Complaint:: Follow-up, Wound Check, Diabetes, Wound Care, Pressure Ulcer, Numbness, Non-healing Wound, Difficulty Walking, Foot Ulcer, Diabetic Foot Ulcer, Diabetes Mellitus, Leg Swelling, Venous Stasis, and Varicose Veins    Follow-up  Associated symptoms include numbness. Pertinent negatives include no abdominal pain, arthralgias, chest pain, chills, coughing, fatigue, fever, headaches, joint swelling, myalgias, nausea, rash or weakness.   Wound Check    Ana Bullard is a 63 y.o. male who presents today for follow up ulcer right foot.  Presents in boot cast with dressing intact.      Additionally, patient presents to clinic today for evaluation and treatment of diabetic feet.     Additionally, patient presents with bilateral lower extremity edema with chronic venous stasis disease  Systemic Doctor: YRN Townsend  Date Last Seen: 06/07/2022  Blood Sugar: 155  Hemoglobin A1c: 6.8 (11/23/22)    Vitals:    07/20/23 1506   Weight: 127.9 kg (282 lb)   Height: 6' 1" (1.854 m)   PainSc: 0-No pain      Shoe Size:     Past Surgical History:   Procedure Laterality Date    CARDIAC PACEMAKER PLACEMENT  12/2012    CARDIAC PACEMAKER PLACEMENT  10/04/2018    battery replacement    CORONARY ARTERY BYPASS GRAFT  06/2012    ESOPHAGOGASTRODUODENOSCOPY  01/31/2017    SAMARA FILTER PLACEMENT  2012    vena cava    LUMBAR SYMPATHETIC NERVE BLOCK Right 8/22/2019    Procedure: BLOCK, NERVE, SYMPATHETIC, LUMBAR;  Surgeon: Tashi Good MD;  Location: [facility] OR;  Service: Pain Management;  Laterality: Right;  RIGHT SYMPATHETIC NERVE BLOCK     Past Medical History:   Diagnosis Date    AICD (automatic cardioverter/defibrillator) present     Anemia, chronic disease 07/27/2021    Anemia, unspecified 07/27/2021    Anxiety and depression 2012 " "   CAD (coronary artery disease) 2012    Cardiomegaly 2016    CHF (congestive heart failure) 2012    Cholecystitis 2017    Complete heart block 2012    Diabetic foot ulcer     DVT (deep venous thrombosis) 2012    And pulmonary embolus    GERD (gastroesophageal reflux disease) 2010    Heparin induced thrombocytopenia     Hyperlipemia 2011    IDDM (insulin dependent diabetes mellitus) 1983    With neuropathy    Ischemic cardiomyopathy 2012    MI (myocardial infarction) 2012    Morbid obesity     MRSA (methicillin resistant Staphylococcus aureus) infection 01/19/2019    Noncompliance with therapeutic plan 2019    Normochromic normocytic anemia 07/27/2021    Osteomyelitis of right foot 01/2019    Osteomyelitis of right foot 09/01/2022    Klebsiella from bone, GBS in blood    Pulmonary edema 2019    Respiratory failure 2019    Sepsis 01/2019    Due to cellulitis right leg    Sleep apnea 2013    Thrombocytopathy 2012    Venous stasis dermatitis of both lower extremities      Family History   Problem Relation Age of Onset    Arthritis Mother     Diabetes Mother     Cancer Father     Heart disease Father     Stroke Father         Social History:   Marital Status:   Alcohol History:  reports no history of alcohol use.  Tobacco History:  reports that he quit smoking about 11 years ago. His smoking use included cigarettes. He has a 76.00 pack-year smoking history. He has never used smokeless tobacco.  Drug History:  reports no history of drug use.    Review of patient's allergies indicates:   Allergen Reactions    Vasopressin Anaphylaxis and Other (See Comments)     Increased pressure in his head; felt like his eyeballs and veins were going to pop out of his head.     Vasopressin analogues Other (See Comments) and Anaphylaxis     "felt like eyes going to pop out of my head"  Other reaction(s): Other (See Comments)  "felt like eyes going to pop out of my head"  Increased pressure in his head; felt like his eyeballs and " "veins were going to pop out of his head.     Heparin Other (See Comments)     Other reaction(s): "depleted my blood platets"    Decreased platelet count    Heparin analogues Other (See Comments)     Depleted WBC count    Morphine Hallucinations, Other (See Comments) and Anxiety     Other reaction(s): Hallucinations  Paranoid, hallucinations         Current Outpatient Medications   Medication Sig Dispense Refill    albuterol (PROVENTIL) 5 mg/mL nebulizer solution Take 2.5 mg by nebulization every 6 (six) hours as needed.      albuterol-ipratropium (DUO-NEB) 2.5 mg-0.5 mg/3 mL nebulizer solution Take 3 mLs by nebulization every 6 (six) hours as needed.      amiodarone (PACERONE) 200 MG Tab Take 1 tablet (200 mg total) by mouth 2 (two) times daily. 90 tablet 0    aspirin 81 MG Chew Take 81 mg by mouth once daily.      atorvastatin (LIPITOR) 10 MG tablet Take 1 tablet by mouth once daily.  1    B complex-vitamin C-folic acid (JLUIS-JIMMIE/NEPHRO-JIMMIE) 0.8 mg Tab Take 0.4 mg by mouth.      BASAGLAR KWIKPEN U-100 INSULIN glargine 100 units/mL (3mL) SubQ pen Inject 25 Units into the skin once daily.  3    cetirizine (ZYRTEC) 10 MG tablet Take 10 mg by mouth once daily.      CORLANOR 5 mg Tab Take 5 mg by mouth nightly.      FARXIGA 10 mg tablet Take 10 mg by mouth once daily.      gabapentin (NEURONTIN) 300 MG capsule Take 1 capsule (300 mg total) by mouth 3 (three) times daily. 90 capsule 0    mupirocin (BACTROBAN) 2 % ointment APPLY TOPICALLY ONCE DAILY. (Patient taking differently: Apply 1 g topically once daily.) 22 g 5    pantoprazole (PROTONIX) 40 MG tablet Take 1 tablet by mouth once daily.  0    torsemide (DEMADEX) 20 MG Tab Take 1 tablet (20 mg total) by mouth 2 (two) times a day. 120 tablet 0    [START ON 7/29/2023] traMADoL (ULTRAM) 50 mg tablet Take 1 tablet (50 mg total) by mouth every 6 (six) hours as needed for Pain. 120 tablet 2    TRULICITY 0.75 mg/0.5 mL pen injector Inject 0.75 mg into the skin every 7 " days.      vitamin B complex-folic acid 0.4 mg Tab Take 1 tablet by mouth.       No current facility-administered medications for this visit.       Review of Systems   Constitutional:  Negative for chills, fatigue, fever and unexpected weight change.   HENT:  Negative for hearing loss and trouble swallowing.    Eyes:  Negative for photophobia and visual disturbance.   Respiratory:  Negative for cough, shortness of breath and wheezing.    Cardiovascular:  Positive for leg swelling. Negative for chest pain and palpitations.   Gastrointestinal:  Negative for abdominal pain and nausea.   Genitourinary:  Negative for dysuria and frequency.   Musculoskeletal:  Negative for arthralgias, back pain, gait problem, joint swelling and myalgias.   Skin:  Positive for color change and wound. Negative for rash.   Neurological:  Positive for numbness. Negative for tremors, seizures, weakness and headaches.   Hematological:  Does not bruise/bleed easily.       Objective:        Physical Exam:   Foot Exam    Right Foot/Ankle     Inspection and Palpation  Skin Exam: ulcer;       Physical Exam  Musculoskeletal:        Legs:         Feet:    Feet:      Right foot:      Skin integrity: Ulcer present.     Ortho/SPM Exam        Physical examination: General: Pt. is well-developed, well-nourished, appears stated age, in no acute distress, alert and oriented x 3.     Vascular: Dorsalis pedis and posterior tibial pulses are 1/4  Bilaterally. Toes are warm to touch. Feet are warm proximally.     There is decreased digital hair. Skin is atrophic, slightly hyperpigmented, and mildly edematous. Capillary refill less than 5 seconds all toes/distal feet     Neurologic: Wolbach-Latrell 5.07 monofilament is decreased bilateral feet. Sharp/dull sensation absent Bilateral feet.     Vibratory sensation absent bilateral     Musculoskeletal: adequate joint range of motion without pain, limitation, nor crepitation Bilateral feet and ankle joints. Muscle  strength is 5/5 in all groups bilaterally.     Lymphatics: no lymphangitic streaking bilaterally.     Dermatologic: Elongated, thickened, dystrophic, discolored nails x 10. Xerosis Bilaterally  Imaging:            Assessment:       1. Ulcer of right foot with fat layer exposed    2. At high risk for inadequate nutritional intake    3. Type II diabetes mellitus with neurological manifestations    4. Peripheral vascular disease    5. Obesity, unspecified classification, unspecified obesity type, unspecified whether serious comorbidity present    6. Paresthesia of both lower extremities    7. Type 2 diabetes mellitus with foot ulcer, unspecified whether long term insulin use    8. OG (onychogryphosis)    9. Venous stasis dermatitis of both lower extremities    10. Difficulty in walking, not elsewhere classified    11. Ulcer of right foot with necrosis of bone    12. PVD (peripheral vascular disease)    13. Hammer toes, bilateral    14. Onychogryphosis    15. Type 2 diabetes mellitus with diabetic neuropathy, unspecified whether long term insulin use      Plan:   Ulcer of right foot with fat layer exposed  -     Ambulatory referral/consult to Home Health; Future; Expected date: 07/21/2023    At high risk for inadequate nutritional intake    Type II diabetes mellitus with neurological manifestations    Peripheral vascular disease    Obesity, unspecified classification, unspecified obesity type, unspecified whether serious comorbidity present    Paresthesia of both lower extremities    Type 2 diabetes mellitus with foot ulcer, unspecified whether long term insulin use    OG (onychogryphosis)    Venous stasis dermatitis of both lower extremities    Difficulty in walking, not elsewhere classified    Ulcer of right foot with necrosis of bone    PVD (peripheral vascular disease)    Hammer toes, bilateral    Onychogryphosis    Type 2 diabetes mellitus with diabetic neuropathy, unspecified whether long term insulin use      No  follow-ups on file.    Procedures          Counseling:     I provided patient education verbally regarding:   Patient diagnosis, treatment options, as well as alternatives, risks, and benefits.     This note was created using Dragon voice recognition software that occasionally misinterpreted phrases or words.          Follow-up: Patient is to return to the clinic in 2 week(s) for follow-up but should call the office immediately if any signs of infection, such as fever, chills, sweats, increased redness or pain.  Recommendations:  Check feet daily.  dressing changes, home health Iodosorb followed by border foam followed by bulky wrap for protection, on both right plantar foot ulcer and left lower leg ulcer.  Use surgery shoe for walking or standing.     I provided patient education verbally regarding: proper ulcer care and the possible need for serial debridements, topical medications, specific dressings and biological engineered skin substitutes if indicated.           Counseling/Education:  I provided patient education verbally regarding:   The aspects of diabetes and how it pertains to the feet. I explained the importance of proper diabetic foot care and how it is essential for the health of their feet.     I discussed the importance of knowing their Hemoglobin A1c and that the level needs to be as close to 6 as possible. I discussed the increase complications of high blood sugar including stroke, blindness, heart attack, kidney failure and loss of limb secondary to neuropathy and PVD.      With neuropathy, beware of any breaks in the skin or redness. These areas are not recognized early due to the numbness.     I discussed Diabetes, lower back issues, metabolic disorders, systemic causes, chemotherapy, vitamin deficiency, heavy metal exposure, as some of the causes. I also explained that as much as 40% of the time we can not find a cause. I discussed different treatments available to control the symptoms but  which may not cure the problem.         Counseled patient on the aspects of diabetes and how it pertains to the feet.  I explained the importance of proper diabetic foot care and how it is essential for the health of their feet.        Shoe inspection. Patient instructed on proper foot hygeine. We discussed wearing proper shoe gear, daily foot inspections, never walking without protective shoe gear, never putting sharp instruments to feet, routine podiatric nail visits every 2-3 months.       Patient should call the office immediately if any signs of infection, such as fever, chills, sweats, increased redness or pain.     Patient was instructed to call the clinic or go to the emergency department if their symptoms do not improve, worsens, or if new symptoms develop.  Patient was advised that if any increased swelling, pain, or numbness arise to go immediately to the ED. Patient knows to call any time if an emergency arises. Shared decision making occurred and patient verbalized understanding in agreement with this plan.      I counseled the patient on their conditions, their implications and medical management.     >50% of this > 60 minute visit was spent face to face educating/counseling the patient     I spent a total of 60 minutes on the day of the visit.This includes face to face time and non-face to face time preparing to see the patient (eg, review of tests), obtaining and/or reviewing separately obtained history, documenting clinical information in the electronic or other health record, independently interpreting results and communicating results to the patient/family/caregiver, or care coordinator.

## 2023-07-24 NOTE — PATIENT INSTRUCTIONS
Your Diabetes Foot Care Program    Every day you depend on your feet to keep you moving. But when you have diabetes, your feet need special care. Even a small foot problem can become very serious. So dont take your feet for granted. By working with your diabetes healthcare team, you can learn how to protect your feet and keep them healthy.  Evaluating your feet  An evaluation helps your healthcare provider check the condition of your feet. The evaluation includes a review of your diabetes history and overall health. It may also include a foot exam, X-rays, or other tests. These can help show problems beneath the skin that you cant see or feel.  Medical history  You will be asked about your overall health and any history of foot problems. Youll also discuss your diabetes history, such as whether your blood sugar level has changed over time. It also includes questions about sensations of pain, tingling, pins and needles, or numbness. Your healthcare provider will also want to know if you have high blood pressure and heart disease, or if you smoke. Be sure to mention any medicines (including over-the-counter), supplements, or herbal remedies you take.  Foot exam  A foot exam checks the condition of different parts of your foot. First, your skin and nails are examined for any signs of infection. Blood flow is checked by feeling for the pulses in each foot. You may also have tests to study the nerves in the foot. These include using a small filament (wire) to see how sensitive your feet are. In certain cases, you will be asked to walk a short distance to check for bone, joint, and muscle problems.  Diagnostic tests  If needed, your healthcare provider will suggest certain tests to learn more about your feet. These include:  Doppler tests to measure blood flow in the feet and lower leg.  X-rays, which can show bone or joint problems.  Other imaging tests, such as an MRI (magnetic resonance imaging), bone scan, and CT  (computed tomography) scan. These can help show bone infections.  Other tests, such as vascular tests, which study the blood flow in your feet and legs. You may also have nerve studies to learn how sensitive your feet are.  Creating a foot care program  Based on the evaluation, your healthcare provider will create a foot care program for you. Your program may be as simple as starting a daily self-care routine and changing the types of shoes your wear. It may also involve treating minor foot problems, such as a corn or blister. In some cases, surgery will be needed to treat an infection or mechanical problems, such as hammer toes.  Preventing problems  When you have diabetes, its easier to prevent problems than to treat them later on. So see your healthcare team for regular checkups and foot care. Your healthcare team can also help you learn more about caring for your feet at home. For example, you may be told to avoid walking barefoot. Or you may be told that special footwear is needed to protect your feet.  Have regular checkups  Foot problems can develop quickly. So be sure to follow your healthcare teams schedule for regular checkups. During office visits, take off your shoes and socks as soon as you get in the exam room. Ask your healthcare provider to examine your feet for problems. This will make it easier to find and treat small skin irritations before they get worse. Regular checkups can also help keep track of the blood flow and feeling in your feet. If you have neuropathy (lack of feeling in your feet), you will need to have checkups more often.  Learn about self-care  The more you know about diabetes and your feet, the easier it will be to prevent problems. Members of your healthcare team can teach you how to inspect your feet and teach you to look for warning signs. They can also give you other foot care tips. During office visits, be sure to ask any questions you have.  Date Last Reviewed: 7/1/2016  ©  1108-2390 [employer]. [address] [place] [address], [place] [address]. All rights reserved. This information is not intended as a substitute for professional medical care. Always follow your healthcare professional's instructions.

## 2023-07-25 ENCOUNTER — DOCUMENT SCAN (OUTPATIENT)
Dept: HOME HEALTH SERVICES | Facility: HOSPITAL | Age: 63
End: 2023-07-25
Payer: MEDICARE

## 2023-08-03 ENCOUNTER — OFFICE VISIT (OUTPATIENT)
Dept: PODIATRY | Facility: CLINIC | Age: 63
End: 2023-08-03
Payer: MEDICARE

## 2023-08-03 VITALS — HEIGHT: 73 IN | WEIGHT: 282 LBS | BODY MASS INDEX: 37.37 KG/M2

## 2023-08-03 DIAGNOSIS — M20.42 HAMMER TOES, BILATERAL: ICD-10-CM

## 2023-08-03 DIAGNOSIS — L97.509 TYPE 2 DIABETES MELLITUS WITH FOOT ULCER, UNSPECIFIED WHETHER LONG TERM INSULIN USE: ICD-10-CM

## 2023-08-03 DIAGNOSIS — Z91.89 AT HIGH RISK FOR INADEQUATE NUTRITIONAL INTAKE: ICD-10-CM

## 2023-08-03 DIAGNOSIS — L97.514 ULCER OF RIGHT FOOT WITH NECROSIS OF BONE: ICD-10-CM

## 2023-08-03 DIAGNOSIS — L97.912 NON-PRESSURE CHRONIC ULCER OF RIGHT LOWER LEG WITH FAT LAYER EXPOSED: ICD-10-CM

## 2023-08-03 DIAGNOSIS — E11.621 TYPE 2 DIABETES MELLITUS WITH FOOT ULCER, UNSPECIFIED WHETHER LONG TERM INSULIN USE: ICD-10-CM

## 2023-08-03 DIAGNOSIS — L60.2 OG (ONYCHOGRYPHOSIS): ICD-10-CM

## 2023-08-03 DIAGNOSIS — E66.9 OBESITY, UNSPECIFIED CLASSIFICATION, UNSPECIFIED OBESITY TYPE, UNSPECIFIED WHETHER SERIOUS COMORBIDITY PRESENT: ICD-10-CM

## 2023-08-03 DIAGNOSIS — I87.2 VENOUS STASIS DERMATITIS OF BOTH LOWER EXTREMITIES: ICD-10-CM

## 2023-08-03 DIAGNOSIS — R26.2 DIFFICULTY IN WALKING, NOT ELSEWHERE CLASSIFIED: ICD-10-CM

## 2023-08-03 DIAGNOSIS — I73.9 PVD (PERIPHERAL VASCULAR DISEASE): ICD-10-CM

## 2023-08-03 DIAGNOSIS — E11.49 TYPE II DIABETES MELLITUS WITH NEUROLOGICAL MANIFESTATIONS: Primary | ICD-10-CM

## 2023-08-03 DIAGNOSIS — L97.512 ULCER OF RIGHT FOOT WITH FAT LAYER EXPOSED: ICD-10-CM

## 2023-08-03 DIAGNOSIS — L60.2 ONYCHOGRYPHOSIS: ICD-10-CM

## 2023-08-03 DIAGNOSIS — L97.512 SKIN ULCER OF RIGHT FOOT WITH FAT LAYER EXPOSED: ICD-10-CM

## 2023-08-03 DIAGNOSIS — I73.9 PERIPHERAL VASCULAR DISEASE: ICD-10-CM

## 2023-08-03 DIAGNOSIS — E11.40 TYPE 2 DIABETES MELLITUS WITH DIABETIC NEUROPATHY, UNSPECIFIED WHETHER LONG TERM INSULIN USE: ICD-10-CM

## 2023-08-03 DIAGNOSIS — R20.2 PARESTHESIA OF BOTH LOWER EXTREMITIES: ICD-10-CM

## 2023-08-03 DIAGNOSIS — M20.41 HAMMER TOES, BILATERAL: ICD-10-CM

## 2023-08-03 PROCEDURE — 11042 WOUND DEBRIDEMENT: ICD-10-PCS | Mod: S$GLB,,, | Performed by: PODIATRIST

## 2023-08-03 PROCEDURE — 1159F PR MEDICATION LIST DOCUMENTED IN MEDICAL RECORD: ICD-10-PCS | Mod: CPTII,S$GLB,, | Performed by: PODIATRIST

## 2023-08-03 PROCEDURE — 99999 PR PBB SHADOW E&M-EST. PATIENT-LVL IV: CPT | Mod: PBBFAC,,, | Performed by: PODIATRIST

## 2023-08-03 PROCEDURE — 1160F RVW MEDS BY RX/DR IN RCRD: CPT | Mod: CPTII,S$GLB,, | Performed by: PODIATRIST

## 2023-08-03 PROCEDURE — 1159F MED LIST DOCD IN RCRD: CPT | Mod: CPTII,S$GLB,, | Performed by: PODIATRIST

## 2023-08-03 PROCEDURE — 1160F PR REVIEW ALL MEDS BY PRESCRIBER/CLIN PHARMACIST DOCUMENTED: ICD-10-PCS | Mod: CPTII,S$GLB,, | Performed by: PODIATRIST

## 2023-08-03 PROCEDURE — 99214 OFFICE O/P EST MOD 30 MIN: CPT | Mod: 25,S$GLB,, | Performed by: PODIATRIST

## 2023-08-03 PROCEDURE — 99999 PR PBB SHADOW E&M-EST. PATIENT-LVL IV: ICD-10-PCS | Mod: PBBFAC,,, | Performed by: PODIATRIST

## 2023-08-03 PROCEDURE — 3008F PR BODY MASS INDEX (BMI) DOCUMENTED: ICD-10-PCS | Mod: CPTII,S$GLB,, | Performed by: PODIATRIST

## 2023-08-03 PROCEDURE — 99214 PR OFFICE/OUTPT VISIT, EST, LEVL IV, 30-39 MIN: ICD-10-PCS | Mod: 25,S$GLB,, | Performed by: PODIATRIST

## 2023-08-03 PROCEDURE — 11042 DBRDMT SUBQ TIS 1ST 20SQCM/<: CPT | Mod: S$GLB,,, | Performed by: PODIATRIST

## 2023-08-03 PROCEDURE — 3008F BODY MASS INDEX DOCD: CPT | Mod: CPTII,S$GLB,, | Performed by: PODIATRIST

## 2023-08-03 NOTE — PROGRESS NOTES
"  [address] [facility] [address]  [place] [address]  Phone: [phone]   [phone]    Patient's PCP:Michelle Messina FNP  Referring Provider: No ref. provider found    Subjective:      Chief Complaint:: Wound Check (New right dorsal 2nd toe wound and new right lower leg wound), Diabetes, Abrasion (New, right lower leg wound), Venous Ulcer, Venous Stasis, Wound Care, Peripheral Neuropathy, Numbness, Non-healing Wound, Pressure Ulcer, Poor Circulation, Foot Ulcer, Difficulty Walking, Diabetic Foot Ulcer, Diabetes Mellitus, and Leg Swelling    Wound Check    Ana Bullard is a 63 y.o. male who presents today for follow up ulcer right foot.  Presents in regular shoes today.  States he did have a fall last week.  Thinks the right 2nd toe took a toll from the fall.  Home health has been coming out 1x week and patient/patient's wife has been performing dressing changes otherwise.    Additionally, patient presents with a new wound located on his right lower leg.  Patient states he recently fell leading to opening of right lower leg wound.  Patient states he did not hit his head when he fell.    Additionally, patient presents with an additional new wound located on his right dorsal 2nd toe.  This wound also occurred from his fall.     Additionally, patient presents to clinic today for evaluation and treatment of diabetic feet.      Additionally, patient presents with bilateral lower extremity edema with chronic venous stasis disease      Systemic Doctor: YRN Townsend  Date Last Seen: 06/07/2022  Blood Sugar: 153  Hemoglobin A1c: 6.8 (11/23/22)       Vitals:    08/03/23 1105   Weight: 127.9 kg (282 lb)   Height: 6' 1" (1.854 m)   PainSc: 0-No pain      Shoe Size:     Past Surgical History:   Procedure Laterality Date    CARDIAC PACEMAKER PLACEMENT  12/2012    CARDIAC PACEMAKER PLACEMENT  10/04/2018    battery replacement    CORONARY ARTERY BYPASS GRAFT  06/2012    ESOPHAGOGASTRODUODENOSCOPY  01/31/2017    " SAMARA FILTER PLACEMENT  2012    vena cava    LUMBAR SYMPATHETIC NERVE BLOCK Right 8/22/2019    Procedure: BLOCK, NERVE, SYMPATHETIC, LUMBAR;  Surgeon: Tashi Good MD;  [facility];  Service: Pain Management;  Laterality: Right;  RIGHT SYMPATHETIC NERVE BLOCK     Past Medical History:   Diagnosis Date    AICD (automatic cardioverter/defibrillator) present     Anemia, chronic disease 07/27/2021    Anemia, unspecified 07/27/2021    Anxiety and depression 2012    CAD (coronary artery disease) 2012    Cardiomegaly 2016    CHF (congestive heart failure) 2012    Cholecystitis 2017    Complete heart block 2012    Diabetic foot ulcer     DVT (deep venous thrombosis) 2012    And pulmonary embolus    GERD (gastroesophageal reflux disease) 2010    Heparin induced thrombocytopenia     Hyperlipemia 2011    IDDM (insulin dependent diabetes mellitus) 1983    With neuropathy    Ischemic cardiomyopathy 2012    MI (myocardial infarction) 2012    Morbid obesity     MRSA (methicillin resistant Staphylococcus aureus) infection 01/19/2019    Noncompliance with therapeutic plan 2019    Normochromic normocytic anemia 07/27/2021    Osteomyelitis of right foot 01/2019    Osteomyelitis of right foot 09/01/2022    Klebsiella from bone, GBS in blood    Pulmonary edema 2019    Respiratory failure 2019    Sepsis 01/2019    Due to cellulitis right leg    Sleep apnea 2013    Thrombocytopathy 2012    Venous stasis dermatitis of both lower extremities      Family History   Problem Relation Age of Onset    Arthritis Mother     Diabetes Mother     Cancer Father     Heart disease Father     Stroke Father         Social History:   Marital Status:   Alcohol History:  reports no history of alcohol use.  Tobacco History:  reports that he quit smoking about 11 years ago. His smoking use included cigarettes. He started smoking about 49 years ago. He has a 76.0 pack-year smoking history. He has never used smokeless tobacco.  Drug History:   "reports no history of drug use.    Review of patient's allergies indicates:   Allergen Reactions    Vasopressin Anaphylaxis and Other (See Comments)     Increased pressure in his head; felt like his eyeballs and veins were going to pop out of his head.     Vasopressin analogues Other (See Comments) and Anaphylaxis     "felt like eyes going to pop out of my head"  Other reaction(s): Other (See Comments)  "felt like eyes going to pop out of my head"  Increased pressure in his head; felt like his eyeballs and veins were going to pop out of his head.     Heparin Other (See Comments)     Other reaction(s): "depleted my blood platets"    Decreased platelet count    Heparin analogues Other (See Comments)     Depleted WBC count    Morphine Hallucinations, Other (See Comments) and Anxiety     Other reaction(s): Hallucinations  Paranoid, hallucinations         Current Outpatient Medications   Medication Sig Dispense Refill    albuterol (PROVENTIL) 5 mg/mL nebulizer solution Take 2.5 mg by nebulization every 6 (six) hours as needed.      albuterol-ipratropium (DUO-NEB) 2.5 mg-0.5 mg/3 mL nebulizer solution Take 3 mLs by nebulization every 6 (six) hours as needed.      amiodarone (PACERONE) 200 MG Tab Take 1 tablet (200 mg total) by mouth 2 (two) times daily. 90 tablet 0    aspirin 81 MG Chew Take 81 mg by mouth once daily.      atorvastatin (LIPITOR) 10 MG tablet Take 1 tablet by mouth once daily.  1    B complex-vitamin C-folic acid (JLUIS-JIMMIE/NEPHRO-JIMMIE) 0.8 mg Tab Take 0.4 mg by mouth.      BASAGLAR KWIKPEN U-100 INSULIN glargine 100 units/mL (3mL) SubQ pen Inject 25 Units into the skin once daily.  3    cetirizine (ZYRTEC) 10 MG tablet Take 10 mg by mouth once daily.      CORLANOR 5 mg Tab Take 5 mg by mouth nightly.      FARXIGA 10 mg tablet Take 10 mg by mouth once daily.      gabapentin (NEURONTIN) 300 MG capsule Take 1 capsule (300 mg total) by mouth 3 (three) times daily. 90 capsule 0    mupirocin (BACTROBAN) 2 % " ointment APPLY TOPICALLY ONCE DAILY. (Patient taking differently: Apply 1 g topically once daily.) 22 g 5    pantoprazole (PROTONIX) 40 MG tablet Take 1 tablet by mouth once daily.  0    torsemide (DEMADEX) 20 MG Tab Take 1 tablet (20 mg total) by mouth 2 (two) times a day. 120 tablet 0    traMADoL (ULTRAM) 50 mg tablet Take 1 tablet (50 mg total) by mouth every 6 (six) hours as needed for Pain. 120 tablet 2    TRULICITY 0.75 mg/0.5 mL pen injector Inject 0.75 mg into the skin every 7 days.      vitamin B complex-folic acid 0.4 mg Tab Take 1 tablet by mouth.       No current facility-administered medications for this visit.       Review of Systems   Constitutional:  Negative for chills, fatigue, fever and unexpected weight change.   HENT:  Negative for hearing loss and trouble swallowing.    Eyes:  Negative for photophobia and visual disturbance.   Respiratory:  Negative for cough, shortness of breath and wheezing.    Cardiovascular:  Positive for leg swelling. Negative for chest pain and palpitations.   Gastrointestinal:  Negative for abdominal pain and nausea.   Genitourinary:  Negative for dysuria and frequency.   Musculoskeletal:  Negative for arthralgias, back pain, gait problem, joint swelling and myalgias.   Skin:  Positive for color change and wound. Negative for rash.   Neurological:  Positive for numbness. Negative for tremors, seizures, weakness and headaches.   Hematological:  Does not bruise/bleed easily.         Objective:        Physical Exam:   Foot Exam    Right Foot/Ankle     Inspection and Palpation  Skin Exam: ulcer;         Physical Exam  Musculoskeletal:        Legs:         Feet:    Feet:      Right foot:      Skin integrity: Ulcer present.       Ortho/SPM Exam     Physical examination: General: Pt. is well-developed, well-nourished, appears stated age, in no acute distress, alert and oriented x 3.     Vascular: Dorsalis pedis and posterior tibial pulses are 1/4  Bilaterally. Toes are warm to  touch. Feet are warm proximally.     There is decreased digital hair. Skin is atrophic, slightly hyperpigmented, and mildly edematous. Capillary refill less than 5 seconds all toes/distal feet     Neurologic: [place] 5.07 monofilament is decreased bilateral feet. Sharp/dull sensation absent Bilateral feet.     Vibratory sensation absent bilateral     Musculoskeletal: adequate joint range of motion without pain, limitation, nor crepitation Bilateral feet and ankle joints. Muscle strength is 5/5 in all groups bilaterally.     Lymphatics: no lymphangitic streaking bilaterally.     Dermatologic: Elongated, thickened, dystrophic, discolored nails x 10. Xerosis Bilaterally  Imaging:            Assessment:       1. Type II diabetes mellitus with neurological manifestations    2. Type 2 diabetes mellitus with foot ulcer, unspecified whether long term insulin use    3. Ulcer of right foot with fat layer exposed    4. Peripheral vascular disease    5. Non-pressure chronic ulcer of right lower leg with fat layer exposed    6. At high risk for inadequate nutritional intake    7. OG (onychogryphosis)    8. Venous stasis dermatitis of both lower extremities    9. Difficulty in walking, not elsewhere classified    10. Paresthesia of both lower extremities    11. PVD (peripheral vascular disease)    12. Obesity, unspecified classification, unspecified obesity type, unspecified whether serious comorbidity present    13. Type 2 diabetes mellitus with diabetic neuropathy, unspecified whether long term insulin use    14. Onychogryphosis    15. Hammer toes, bilateral    16. Skin ulcer of right foot with fat layer exposed    17. Ulcer of right foot with necrosis of bone      Plan:   Type II diabetes mellitus with neurological manifestations    Type 2 diabetes mellitus with foot ulcer, unspecified whether long term insulin use  -     Ambulatory referral/consult to Home Health; Future; Expected date: 08/04/2023    Ulcer of right  "foot with fat layer exposed  -     Ambulatory referral/consult to Home Health; Future; Expected date: 08/04/2023    Peripheral vascular disease    Non-pressure chronic ulcer of right lower leg with fat layer exposed    At high risk for inadequate nutritional intake    OG (onychogryphosis)    Venous stasis dermatitis of both lower extremities    Difficulty in walking, not elsewhere classified    Paresthesia of both lower extremities    PVD (peripheral vascular disease)    Obesity, unspecified classification, unspecified obesity type, unspecified whether serious comorbidity present    Type 2 diabetes mellitus with diabetic neuropathy, unspecified whether long term insulin use    Onychogryphosis    Hammer toes, bilateral    Skin ulcer of right foot with fat layer exposed    Ulcer of right foot with necrosis of bone    Other orders  -     Wound Debridement      No follow-ups on file.    Wound Debridement    Date/Time: 8/3/2023 10:50 AM    Performed by: Leah Avila DPM  Authorized by: Leah Avila DPM    Time out: Immediately prior to procedure a "time out" was called to verify the correct patient, procedure, equipment, support staff and site/side marked as required.    Consent Done?:  Yes (Written)    Preparation: Patient was prepped and draped in usual sterile fashion, Patient was prepped and draped with aseptic technique and Patient was prepped and draped with clean technique      Wound Details:    Location:  Right foot    Location:  Right 5th Metatarsal Head    Type of Debridement:  Excisional       Length (cm):  0.3       Area (sq cm):  0.15       Width (cm):  0.5       Percent Debrided (%):  100       Depth (cm):  0.2       Total Area Debrided (sq cm):  0.15    Depth of debridement:  Subcutaneous tissue    Tissue debrided:  Subcutaneous    Devitalized tissue debrided:  Slough, Necrotic/Eschar, Biofilm and Callus    Instruments:  Forceps, Curette and Blade  Bleeding:  Minimal  Hemostasis Achieved: Yes  Method " Used:  Pressure  Patient tolerance:  Patient tolerated the procedure well with no immediate complications  1st Wound Pain Assessment: 0            Counseling:     I provided patient education verbally regarding:   Patient diagnosis, treatment options, as well as alternatives, risks, and benefits.     This note was created using Dragon voice recognition software that occasionally misinterpreted phrases or words.             Follow-up: Patient is to return to the clinic in 2 week(s) for follow-up but should call the office immediately if any signs of infection, such as fever, chills, sweats, increased redness or pain.  Recommendations:  Check feet daily.  dressing changes, home health Iodosorb followed by border foam followed by bulky wrap for protection, on both right plantar foot ulcer and left lower leg ulcer.  Use surgery shoe for walking or standing.     I provided patient education verbally regarding: proper ulcer care and the possible need for serial debridements, topical medications, specific dressings and biological engineered skin substitutes if indicated.           Counseling/Education:  I provided patient education verbally regarding:   The aspects of diabetes and how it pertains to the feet. I explained the importance of proper diabetic foot care and how it is essential for the health of their feet.     I discussed the importance of knowing their Hemoglobin A1c and that the level needs to be as close to 6 as possible. I discussed the increase complications of high blood sugar including stroke, blindness, heart attack, kidney failure and loss of limb secondary to neuropathy and PVD.      With neuropathy, beware of any breaks in the skin or redness. These areas are not recognized early due to the numbness.     I discussed Diabetes, lower back issues, metabolic disorders, systemic causes, chemotherapy, vitamin deficiency, heavy metal exposure, as some of the causes. I also explained that as much as 40% of  the time we can not find a cause. I discussed different treatments available to control the symptoms but which may not cure the problem.         Counseled patient on the aspects of diabetes and how it pertains to the feet.  I explained the importance of proper diabetic foot care and how it is essential for the health of their feet.        Shoe inspection. Patient instructed on proper foot hygeine. We discussed wearing proper shoe gear, daily foot inspections, never walking without protective shoe gear, never putting sharp instruments to feet, routine podiatric nail visits every 2-3 months.       Patient should call the office immediately if any signs of infection, such as fever, chills, sweats, increased redness or pain.     Patient was instructed to call the clinic or go to the emergency department if their symptoms do not improve, worsens, or if new symptoms develop.  Patient was advised that if any increased swelling, pain, or numbness arise to go immediately to the ED. Patient knows to call any time if an emergency arises. Shared decision making occurred and patient verbalized understanding in agreement with this plan.      I counseled the patient on their conditions, their implications and medical management.     >50% of this > 60 minute visit was spent face to face educating/counseling the patient     I spent a total of 60 minutes on the day of the visit.This includes face to face time and non-face to face time preparing to see the patient (eg, review of tests), obtaining and/or reviewing separately obtained history, documenting clinical information in the electronic or other health record, independently interpreting results and communicating results to the patient/family/caregiver, or care coordinator.

## 2023-08-03 NOTE — PATIENT INSTRUCTIONS
Your Diabetes Foot Care Program    Every day you depend on your feet to keep you moving. But when you have diabetes, your feet need special care. Even a small foot problem can become very serious. So dont take your feet for granted. By working with your diabetes healthcare team, you can learn how to protect your feet and keep them healthy.  Evaluating your feet  An evaluation helps your healthcare provider check the condition of your feet. The evaluation includes a review of your diabetes history and overall health. It may also include a foot exam, X-rays, or other tests. These can help show problems beneath the skin that you cant see or feel.  Medical history  You will be asked about your overall health and any history of foot problems. Youll also discuss your diabetes history, such as whether your blood sugar level has changed over time. It also includes questions about sensations of pain, tingling, pins and needles, or numbness. Your healthcare provider will also want to know if you have high blood pressure and heart disease, or if you smoke. Be sure to mention any medicines (including over-the-counter), supplements, or herbal remedies you take.  Foot exam  A foot exam checks the condition of different parts of your foot. First, your skin and nails are examined for any signs of infection. Blood flow is checked by feeling for the pulses in each foot. You may also have tests to study the nerves in the foot. These include using a small filament (wire) to see how sensitive your feet are. In certain cases, you will be asked to walk a short distance to check for bone, joint, and muscle problems.  Diagnostic tests  If needed, your healthcare provider will suggest certain tests to learn more about your feet. These include:  Doppler tests to measure blood flow in the feet and lower leg.  X-rays, which can show bone or joint problems.  Other imaging tests, such as an MRI (magnetic resonance imaging), bone scan, and CT  (computed tomography) scan. These can help show bone infections.  Other tests, such as vascular tests, which study the blood flow in your feet and legs. You may also have nerve studies to learn how sensitive your feet are.  Creating a foot care program  Based on the evaluation, your healthcare provider will create a foot care program for you. Your program may be as simple as starting a daily self-care routine and changing the types of shoes your wear. It may also involve treating minor foot problems, such as a corn or blister. In some cases, surgery will be needed to treat an infection or mechanical problems, such as hammer toes.  Preventing problems  When you have diabetes, its easier to prevent problems than to treat them later on. So see your healthcare team for regular checkups and foot care. Your healthcare team can also help you learn more about caring for your feet at home. For example, you may be told to avoid walking barefoot. Or you may be told that special footwear is needed to protect your feet.  Have regular checkups  Foot problems can develop quickly. So be sure to follow your healthcare teams schedule for regular checkups. During office visits, take off your shoes and socks as soon as you get in the exam room. Ask your healthcare provider to examine your feet for problems. This will make it easier to find and treat small skin irritations before they get worse. Regular checkups can also help keep track of the blood flow and feeling in your feet. If you have neuropathy (lack of feeling in your feet), you will need to have checkups more often.  Learn about self-care  The more you know about diabetes and your feet, the easier it will be to prevent problems. Members of your healthcare team can teach you how to inspect your feet and teach you to look for warning signs. They can also give you other foot care tips. During office visits, be sure to ask any questions you have.  Date Last Reviewed: 7/1/2016  ©  0784-7021 [employer]. [address] [place] [address], [place] [address]. All rights reserved. This information is not intended as a substitute for professional medical care. Always follow your healthcare professional's instructions.       Dr. Avila's Wound Healing Tips    How Does Good Nutrition Help with Wound Healing?  Eating well during wound healing can help your body heal faster and fight infection.  To heal, you need more calories and more nutrients like protein, fluids, vitamin A, vitamin C, and Zinc.  Wounds heal faster when you get enough of the right foods.    Prioritize protein! Protein provides the building blocks for muscle and skin repair; and you need more protein for wound healing.  It also helps to boot immunity!    In ADDITION to below, I recommend supplementing with 2 protein drinks each day while healing.  I recommend finding a protein drink with around 30g or protein per bottle.  A good example is Premier Protein drinks, which can be purchased at [employer], [employer], or online.  They are a great, affordable option with low carbohydrates (low sugars!) and high protein.    Good sources include:  Lean animal meat such as chicken, turkey, fish  Beans, peas, lentils or tofu  Nuts, peanut butter, or seeds  Cheese, yogurt, cottage cheese or eggs  Milk or a milk alternative like fortified soy    Vitamins and Minerals  Vitamin A, Vitamin C, and Zinc help your body to repair tissue damage, fight infections and keep your skin healthy.    Collagen! Collagen plays a KEY role in ALL stages of wound healing  I recommend supplementing with Collagen DAILY.  There are several collagen supplements on the market, in both powder and capsule form.  A brand I regularly recommend is: Collagen Peptides by [employer] - 2 scoops per day in coffee/tea/water/etc. (sold at [employer], only, several drug stores, etc.)  Collagen has several other health benefits related to skin, joint, bone, muscle and even heart  health!    Diabetes and Wound Healing  Good blood sugar control is very important during wound healing.  This helps you heal faster and reduces risk of infection.  Chronically high blood sugars can lead to poor blood circulation, thus increasing wound healing time.  Please ask your dietician for tips on managing blood sugars.    What Else Can I Do To Help My Wound Heal?  If you use tobacco, quit.  Nicotine can reduce blood and oxygen flow to your tissues which can increase healing time.    Cast Bag (Can be purchased on [employer])  Re-use same cast bag for each shower or bath.  Recommend hanging bag to dry in between each shower or bath.  Follow directions on cast bag for proper application and use.    (Keep dressing clean, dry, and intact between dressing changes!)

## 2023-08-17 ENCOUNTER — OFFICE VISIT (OUTPATIENT)
Dept: PODIATRY | Facility: CLINIC | Age: 63
End: 2023-08-17
Payer: MEDICARE

## 2023-08-17 VITALS — BODY MASS INDEX: 37.21 KG/M2 | WEIGHT: 274.69 LBS | HEIGHT: 72 IN | OXYGEN SATURATION: 92 % | HEART RATE: 61 BPM

## 2023-08-17 DIAGNOSIS — I87.2 VENOUS STASIS DERMATITIS OF BOTH LOWER EXTREMITIES: ICD-10-CM

## 2023-08-17 DIAGNOSIS — E11.621 TYPE 2 DIABETES MELLITUS WITH FOOT ULCER, UNSPECIFIED WHETHER LONG TERM INSULIN USE: ICD-10-CM

## 2023-08-17 DIAGNOSIS — L97.514 ULCER OF RIGHT FOOT WITH NECROSIS OF BONE: ICD-10-CM

## 2023-08-17 DIAGNOSIS — L08.9 DIABETIC FOOT INFECTION: ICD-10-CM

## 2023-08-17 DIAGNOSIS — M20.42 HAMMER TOES, BILATERAL: ICD-10-CM

## 2023-08-17 DIAGNOSIS — I73.9 PVD (PERIPHERAL VASCULAR DISEASE): ICD-10-CM

## 2023-08-17 DIAGNOSIS — M20.41 HAMMER TOES, BILATERAL: ICD-10-CM

## 2023-08-17 DIAGNOSIS — L97.509 TYPE 2 DIABETES MELLITUS WITH FOOT ULCER, UNSPECIFIED WHETHER LONG TERM INSULIN USE: ICD-10-CM

## 2023-08-17 DIAGNOSIS — L97.512 ULCER OF RIGHT FOOT WITH FAT LAYER EXPOSED: ICD-10-CM

## 2023-08-17 DIAGNOSIS — L02.619 CELLULITIS AND ABSCESS OF FOOT: ICD-10-CM

## 2023-08-17 DIAGNOSIS — E11.40 TYPE 2 DIABETES MELLITUS WITH DIABETIC NEUROPATHY, UNSPECIFIED WHETHER LONG TERM INSULIN USE: ICD-10-CM

## 2023-08-17 DIAGNOSIS — Z91.89 AT HIGH RISK FOR INADEQUATE NUTRITIONAL INTAKE: ICD-10-CM

## 2023-08-17 DIAGNOSIS — E66.9 OBESITY, UNSPECIFIED CLASSIFICATION, UNSPECIFIED OBESITY TYPE, UNSPECIFIED WHETHER SERIOUS COMORBIDITY PRESENT: ICD-10-CM

## 2023-08-17 DIAGNOSIS — L60.2 OG (ONYCHOGRYPHOSIS): ICD-10-CM

## 2023-08-17 DIAGNOSIS — L97.912 NON-PRESSURE CHRONIC ULCER OF RIGHT LOWER LEG WITH FAT LAYER EXPOSED: ICD-10-CM

## 2023-08-17 DIAGNOSIS — L60.2 ONYCHOGRYPHOSIS: ICD-10-CM

## 2023-08-17 DIAGNOSIS — R26.2 DIFFICULTY IN WALKING, NOT ELSEWHERE CLASSIFIED: ICD-10-CM

## 2023-08-17 DIAGNOSIS — R20.2 PARESTHESIA OF BOTH LOWER EXTREMITIES: ICD-10-CM

## 2023-08-17 DIAGNOSIS — L97.512 SKIN ULCER OF RIGHT FOOT WITH FAT LAYER EXPOSED: ICD-10-CM

## 2023-08-17 DIAGNOSIS — I73.9 PERIPHERAL VASCULAR DISEASE: ICD-10-CM

## 2023-08-17 DIAGNOSIS — E11.628 DIABETIC FOOT INFECTION: ICD-10-CM

## 2023-08-17 DIAGNOSIS — E11.49 TYPE II DIABETES MELLITUS WITH NEUROLOGICAL MANIFESTATIONS: Primary | ICD-10-CM

## 2023-08-17 DIAGNOSIS — L03.119 CELLULITIS AND ABSCESS OF FOOT: ICD-10-CM

## 2023-08-17 PROCEDURE — 99999 PR PBB SHADOW E&M-EST. PATIENT-LVL IV: CPT | Mod: PBBFAC,,, | Performed by: PODIATRIST

## 2023-08-17 PROCEDURE — 11042 WOUND DEBRIDEMENT: ICD-10-PCS | Mod: S$GLB,,, | Performed by: PODIATRIST

## 2023-08-17 PROCEDURE — 99214 OFFICE O/P EST MOD 30 MIN: CPT | Mod: 25,S$GLB,, | Performed by: PODIATRIST

## 2023-08-17 PROCEDURE — 99999 PR PBB SHADOW E&M-EST. PATIENT-LVL IV: ICD-10-PCS | Mod: PBBFAC,,, | Performed by: PODIATRIST

## 2023-08-17 PROCEDURE — 1160F PR REVIEW ALL MEDS BY PRESCRIBER/CLIN PHARMACIST DOCUMENTED: ICD-10-PCS | Mod: CPTII,S$GLB,, | Performed by: PODIATRIST

## 2023-08-17 PROCEDURE — 1159F MED LIST DOCD IN RCRD: CPT | Mod: CPTII,S$GLB,, | Performed by: PODIATRIST

## 2023-08-17 PROCEDURE — 11042 DBRDMT SUBQ TIS 1ST 20SQCM/<: CPT | Mod: S$GLB,,, | Performed by: PODIATRIST

## 2023-08-17 PROCEDURE — 3008F PR BODY MASS INDEX (BMI) DOCUMENTED: ICD-10-PCS | Mod: CPTII,S$GLB,, | Performed by: PODIATRIST

## 2023-08-17 PROCEDURE — 99214 PR OFFICE/OUTPT VISIT, EST, LEVL IV, 30-39 MIN: ICD-10-PCS | Mod: 25,S$GLB,, | Performed by: PODIATRIST

## 2023-08-17 PROCEDURE — 1159F PR MEDICATION LIST DOCUMENTED IN MEDICAL RECORD: ICD-10-PCS | Mod: CPTII,S$GLB,, | Performed by: PODIATRIST

## 2023-08-17 PROCEDURE — 1160F RVW MEDS BY RX/DR IN RCRD: CPT | Mod: CPTII,S$GLB,, | Performed by: PODIATRIST

## 2023-08-17 PROCEDURE — 3008F BODY MASS INDEX DOCD: CPT | Mod: CPTII,S$GLB,, | Performed by: PODIATRIST

## 2023-08-17 RX ORDER — CARVEDILOL 3.12 MG/1
3.12 TABLET ORAL 2 TIMES DAILY
COMMUNITY
Start: 2023-07-27

## 2023-08-17 NOTE — PROGRESS NOTES
[address] [facility] [address]  [place] [address]  Phone: [phone]   [phone]    Patient's PCP:Michelle Messina FNP  Referring Provider: No ref. provider found    Subjective:      Chief Complaint:: Follow-up (Right foot ulcer), Diabetic Foot Ulcer, Foot Ulcer, Difficulty Walking, Wound Care, Wound Check, Non-healing Wound ( right dorsal 2nd toe wound,  right lower leg wound), Numbness, Peripheral Neuropathy, Poor Circulation, Pressure Ulcer, Leg Swelling, Venous Stasis, Venous Ulcer, and Abrasion    HPI:   Ana Bullard is a 63 y.o. male who presents today for follow up ulcer right foot.  Presents in regular shoes today.  States he did have a fall last week.  Thinks the right 2nd toe took a toll from the fall.  Home health has been coming out 1x week and patient/patient's wife has been performing dressing changes otherwise.     Additionally, patient presents for follow-up of wound located on his right lower leg.      Additionally, patient presents for follow-up of right dorsal 2nd toe wound.      Additionally, patient presents to clinic today for evaluation and treatment of diabetic feet.      Additionally, patient presents with bilateral lower extremity edema with chronic venous stasis disease        Systemic Doctor: YRN Townsend  Date Last Seen: 06/07/2022  Blood Sugar: 153  Hemoglobin A1c: 6.8 (11/23/22)    Vitals:    08/17/23 1419   Pulse: 61   SpO2: (!) 92%   Weight: 124.6 kg (274 lb 11.1 oz)   Height: 6' (1.829 m)   PainSc: 0-No pain      Shoe Size: 12    Past Surgical History:   Procedure Laterality Date    CARDIAC PACEMAKER PLACEMENT  12/2012    CARDIAC PACEMAKER PLACEMENT  10/04/2018    battery replacement    CORONARY ARTERY BYPASS GRAFT  06/2012    ESOPHAGOGASTRODUODENOSCOPY  01/31/2017    SAMARA FILTER PLACEMENT  2012    vena cava    LUMBAR SYMPATHETIC NERVE BLOCK Right 8/22/2019    Procedure: BLOCK, NERVE, SYMPATHETIC, LUMBAR;  Surgeon: Tashi Good MD;  Location: [facility];   Service: Pain Management;  Laterality: Right;  RIGHT SYMPATHETIC NERVE BLOCK     Past Medical History:   Diagnosis Date    AICD (automatic cardioverter/defibrillator) present     Anemia, chronic disease 07/27/2021    Anemia, unspecified 07/27/2021    Anxiety and depression 2012    CAD (coronary artery disease) 2012    Cardiomegaly 2016    CHF (congestive heart failure) 2012    Cholecystitis 2017    Complete heart block 2012    Diabetic foot ulcer     DVT (deep venous thrombosis) 2012    And pulmonary embolus    GERD (gastroesophageal reflux disease) 2010    Heparin induced thrombocytopenia     Hyperlipemia 2011    IDDM (insulin dependent diabetes mellitus) 1983    With neuropathy    Ischemic cardiomyopathy 2012    MI (myocardial infarction) 2012    Morbid obesity     MRSA (methicillin resistant Staphylococcus aureus) infection 01/19/2019    Noncompliance with therapeutic plan 2019    Normochromic normocytic anemia 07/27/2021    Osteomyelitis of right foot 01/2019    Osteomyelitis of right foot 09/01/2022    Klebsiella from bone, GBS in blood    Pulmonary edema 2019    Respiratory failure 2019    Sepsis 01/2019    Due to cellulitis right leg    Sleep apnea 2013    Thrombocytopathy 2012    Venous stasis dermatitis of both lower extremities      Family History   Problem Relation Age of Onset    Arthritis Mother     Diabetes Mother     Cancer Father     Heart disease Father     Stroke Father         Social History:   Marital Status:   Alcohol History:  reports no history of alcohol use.  Tobacco History:  reports that he quit smoking about 11 years ago. His smoking use included cigarettes. He started smoking about 49 years ago. He has a 76.0 pack-year smoking history. He has never used smokeless tobacco.  Drug History:  reports no history of drug use.    Review of patient's allergies indicates:   Allergen Reactions    Vasopressin Anaphylaxis and Other (See Comments)     Increased pressure in his head; felt like  "his eyeballs and veins were going to pop out of his head.     Vasopressin analogues Other (See Comments) and Anaphylaxis     "felt like eyes going to pop out of my head"  Other reaction(s): Other (See Comments)  "felt like eyes going to pop out of my head"  Increased pressure in his head; felt like his eyeballs and veins were going to pop out of his head.     Heparin Other (See Comments)     Other reaction(s): "depleted my blood platets"    Decreased platelet count    Heparin analogues Other (See Comments)     Depleted WBC count    Morphine Hallucinations, Other (See Comments) and Anxiety     Other reaction(s): Hallucinations  Paranoid, hallucinations         Current Outpatient Medications   Medication Sig Dispense Refill    albuterol (PROVENTIL) 5 mg/mL nebulizer solution Take 2.5 mg by nebulization every 6 (six) hours as needed.      albuterol-ipratropium (DUO-NEB) 2.5 mg-0.5 mg/3 mL nebulizer solution Take 3 mLs by nebulization every 6 (six) hours as needed.      amiodarone (PACERONE) 200 MG Tab Take 1 tablet (200 mg total) by mouth 2 (two) times daily. 90 tablet 0    aspirin 81 MG Chew Take 81 mg by mouth once daily.      atorvastatin (LIPITOR) 10 MG tablet Take 1 tablet by mouth once daily.  1    B complex-vitamin C-folic acid (JLUIS-JIMMIE/NEPHRO-JIMMIE) 0.8 mg Tab Take 0.4 mg by mouth.      BASAGLAR KWIKPEN U-100 INSULIN glargine 100 units/mL (3mL) SubQ pen Inject 25 Units into the skin once daily.  3    cetirizine (ZYRTEC) 10 MG tablet Take 10 mg by mouth once daily.      CORLANOR 5 mg Tab Take 5 mg by mouth nightly.      FARXIGA 10 mg tablet Take 10 mg by mouth once daily.      mupirocin (BACTROBAN) 2 % ointment APPLY TOPICALLY ONCE DAILY. (Patient taking differently: Apply 1 g topically once daily.) 22 g 5    pantoprazole (PROTONIX) 40 MG tablet Take 1 tablet by mouth once daily.  0    torsemide (DEMADEX) 20 MG Tab Take 1 tablet (20 mg total) by mouth 2 (two) times a day. 120 tablet 0    traMADoL (ULTRAM) 50 mg " tablet Take 1 tablet (50 mg total) by mouth every 6 (six) hours as needed for Pain. 120 tablet 2    TRULICITY 0.75 mg/0.5 mL pen injector Inject 0.75 mg into the skin every 7 days.      vitamin B complex-folic acid 0.4 mg Tab Take 1 tablet by mouth.      carvediloL (COREG) 3.125 MG tablet       gabapentin (NEURONTIN) 300 MG capsule Take 1 capsule (300 mg total) by mouth 3 (three) times daily. 90 capsule 0     No current facility-administered medications for this visit.       Review of Systems   Constitutional:  Negative for chills, fatigue, fever and unexpected weight change.   HENT:  Negative for hearing loss and trouble swallowing.    Eyes:  Negative for photophobia and visual disturbance.   Respiratory:  Negative for cough, shortness of breath and wheezing.    Cardiovascular:  Positive for leg swelling. Negative for chest pain and palpitations.   Gastrointestinal:  Negative for abdominal pain and nausea.   Genitourinary:  Negative for dysuria and frequency.   Musculoskeletal:  Negative for arthralgias, back pain, gait problem, joint swelling and myalgias.   Skin:  Positive for color change and wound. Negative for rash.   Neurological:  Positive for numbness. Negative for tremors, seizures, weakness and headaches.   Hematological:  Does not bruise/bleed easily.         Objective:        Physical Exam:   Foot Exam    Right Foot/Ankle     Inspection and Palpation  Skin Exam: ulcer;         Physical Exam  Musculoskeletal:        Legs:         Feet:    Feet:      Right foot:      Skin integrity: Ulcer present.       Ortho/SPM Exam     Physical examination: General: Pt. is well-developed, well-nourished, appears stated age, in no acute distress, alert and oriented x 3.     Vascular: Dorsalis pedis and posterior tibial pulses are 1/4  Bilaterally. Toes are warm to touch. Feet are warm proximally.     There is decreased digital hair. Skin is atrophic, slightly hyperpigmented, and mildly edematous. Capillary refill less  than 5 seconds all toes/distal feet     Neurologic: [place] 5.07 monofilament is decreased bilateral feet. Sharp/dull sensation absent Bilateral feet.     Vibratory sensation absent bilateral     Musculoskeletal: adequate joint range of motion without pain, limitation, nor crepitation Bilateral feet and ankle joints. Muscle strength is 5/5 in all groups bilaterally.     Lymphatics: no lymphangitic streaking bilaterally.     Dermatologic: Elongated, thickened, dystrophic, discolored nails x 10. Xerosis Bilaterally  Imaging:            Assessment:       1. Type II diabetes mellitus with neurological manifestations    2. Type 2 diabetes mellitus with foot ulcer, unspecified whether long term insulin use    3. Ulcer of right foot with fat layer exposed    4. Non-pressure chronic ulcer of right lower leg with fat layer exposed    5. At high risk for inadequate nutritional intake    6. Difficulty in walking, not elsewhere classified    7. Venous stasis dermatitis of both lower extremities    8. OG (onychogryphosis)    9. Peripheral vascular disease    10. Type 2 diabetes mellitus with diabetic neuropathy, unspecified whether long term insulin use    11. Paresthesia of both lower extremities    12. PVD (peripheral vascular disease)    13. Onychogryphosis    14. Obesity, unspecified classification, unspecified obesity type, unspecified whether serious comorbidity present    15. Hammer toes, bilateral    16. Skin ulcer of right foot with fat layer exposed    17. Cellulitis and abscess of foot    18. Diabetic foot infection    19. Ulcer of right foot with necrosis of bone      Plan:   Type II diabetes mellitus with neurological manifestations    Type 2 diabetes mellitus with foot ulcer, unspecified whether long term insulin use    Ulcer of right foot with fat layer exposed    Non-pressure chronic ulcer of right lower leg with fat layer exposed    At high risk for inadequate nutritional intake    Difficulty in  "walking, not elsewhere classified    Venous stasis dermatitis of both lower extremities    OG (onychogryphosis)    Peripheral vascular disease    Type 2 diabetes mellitus with diabetic neuropathy, unspecified whether long term insulin use    Paresthesia of both lower extremities    PVD (peripheral vascular disease)    Onychogryphosis    Obesity, unspecified classification, unspecified obesity type, unspecified whether serious comorbidity present    Hammer toes, bilateral    Skin ulcer of right foot with fat layer exposed    Cellulitis and abscess of foot    Diabetic foot infection    Ulcer of right foot with necrosis of bone    Other orders  -     Wound Debridement      No follow-ups on file.    Wound Debridement    Date/Time: 8/17/2023 2:20 PM    Performed by: Leah Avila DPM  Authorized by: Leah Avila DPM    Time out: Immediately prior to procedure a "time out" was called to verify the correct patient, procedure, equipment, support staff and site/side marked as required.    Consent Done?:  Yes (Written)    Preparation: Patient was prepped and draped in usual sterile fashion, Patient was prepped and draped with aseptic technique and Patient was prepped and draped with clean technique      Wound Details:    Location:  Right foot    Location:  Right 5th Metatarsal Head    Type of Debridement:  Excisional       Length (cm):  0.2       Area (sq cm):  0.08       Width (cm):  0.4       Percent Debrided (%):  100       Depth (cm):  0.2       Total Area Debrided (sq cm):  0.08    Depth of debridement:  Subcutaneous tissue    Tissue debrided:  Subcutaneous    Devitalized tissue debrided:  Biofilm, Slough, Callus, Necrotic/Eschar and Other    Instruments:  Blade, Forceps, Nippers and Curette  Bleeding:  Minimal  Hemostasis Achieved: Yes  Method Used:  Pressure  Patient tolerance:  Patient tolerated the procedure well with no immediate complications  1st Wound Pain Assessment: 0            Counseling:     I provided " patient education verbally regarding:   Patient diagnosis, treatment options, as well as alternatives, risks, and benefits.     This note was created using Dragon voice recognition software that occasionally misinterpreted phrases or words.                   Follow-up: Patient is to return to the clinic in 2 week(s) for follow-up but should call the office immediately if any signs of infection, such as fever, chills, sweats, increased redness or pain.  Recommendations:  Check feet daily.  dressing changes, home health Iodosorb followed by border foam followed by bulky wrap for protection, on both right plantar foot ulcer and left lower leg ulcer.  Use surgery shoe for walking or standing.     I provided patient education verbally regarding: proper ulcer care and the possible need for serial debridements, topical medications, specific dressings and biological engineered skin substitutes if indicated.           Counseling/Education:  I provided patient education verbally regarding:   The aspects of diabetes and how it pertains to the feet. I explained the importance of proper diabetic foot care and how it is essential for the health of their feet.     I discussed the importance of knowing their Hemoglobin A1c and that the level needs to be as close to 6 as possible. I discussed the increase complications of high blood sugar including stroke, blindness, heart attack, kidney failure and loss of limb secondary to neuropathy and PVD.      With neuropathy, beware of any breaks in the skin or redness. These areas are not recognized early due to the numbness.     I discussed Diabetes, lower back issues, metabolic disorders, systemic causes, chemotherapy, vitamin deficiency, heavy metal exposure, as some of the causes. I also explained that as much as 40% of the time we can not find a cause. I discussed different treatments available to control the symptoms but which may not cure the problem.         Counseled patient on the  aspects of diabetes and how it pertains to the feet.  I explained the importance of proper diabetic foot care and how it is essential for the health of their feet.        Shoe inspection. Patient instructed on proper foot hygeine. We discussed wearing proper shoe gear, daily foot inspections, never walking without protective shoe gear, never putting sharp instruments to feet, routine podiatric nail visits every 2-3 months.       Patient should call the office immediately if any signs of infection, such as fever, chills, sweats, increased redness or pain.     Patient was instructed to call the clinic or go to the emergency department if their symptoms do not improve, worsens, or if new symptoms develop.  Patient was advised that if any increased swelling, pain, or numbness arise to go immediately to the ED. Patient knows to call any time if an emergency arises. Shared decision making occurred and patient verbalized understanding in agreement with this plan.      I counseled the patient on their conditions, their implications and medical management.     >50% of this > 60 minute visit was spent face to face educating/counseling the patient     I spent a total of 60 minutes on the day of the visit.This includes face to face time and non-face to face time preparing to see the patient (eg, review of tests), obtaining and/or reviewing separately obtained history, documenting clinical information in the electronic or other health record, independently interpreting results and communicating results to the patient/family/caregiver, or care coordinator.

## 2023-08-25 ENCOUNTER — DOCUMENT SCAN (OUTPATIENT)
Dept: HOME HEALTH SERVICES | Facility: HOSPITAL | Age: 63
End: 2023-08-25
Payer: MEDICARE

## 2023-09-07 ENCOUNTER — OFFICE VISIT (OUTPATIENT)
Dept: PODIATRY | Facility: CLINIC | Age: 63
End: 2023-09-07
Payer: MEDICARE

## 2023-09-07 VITALS — WEIGHT: 265.94 LBS | HEIGHT: 72 IN | BODY MASS INDEX: 36.02 KG/M2

## 2023-09-07 DIAGNOSIS — M20.41 HAMMER TOES, BILATERAL: ICD-10-CM

## 2023-09-07 DIAGNOSIS — E11.40 TYPE 2 DIABETES MELLITUS WITH DIABETIC NEUROPATHY, UNSPECIFIED WHETHER LONG TERM INSULIN USE: ICD-10-CM

## 2023-09-07 DIAGNOSIS — I73.9 PVD (PERIPHERAL VASCULAR DISEASE): ICD-10-CM

## 2023-09-07 DIAGNOSIS — L60.2 ONYCHOGRYPHOSIS: ICD-10-CM

## 2023-09-07 DIAGNOSIS — L97.514 ULCER OF RIGHT FOOT WITH NECROSIS OF BONE: ICD-10-CM

## 2023-09-07 DIAGNOSIS — L97.912 NON-PRESSURE CHRONIC ULCER OF RIGHT LOWER LEG WITH FAT LAYER EXPOSED: ICD-10-CM

## 2023-09-07 DIAGNOSIS — L97.512 SKIN ULCER OF RIGHT FOOT WITH FAT LAYER EXPOSED: ICD-10-CM

## 2023-09-07 DIAGNOSIS — Z91.89 AT HIGH RISK FOR INADEQUATE NUTRITIONAL INTAKE: ICD-10-CM

## 2023-09-07 DIAGNOSIS — I87.2 VENOUS STASIS DERMATITIS OF BOTH LOWER EXTREMITIES: ICD-10-CM

## 2023-09-07 DIAGNOSIS — L08.9 DIABETIC FOOT INFECTION: ICD-10-CM

## 2023-09-07 DIAGNOSIS — L60.2 OG (ONYCHOGRYPHOSIS): ICD-10-CM

## 2023-09-07 DIAGNOSIS — R20.2 PARESTHESIA OF BOTH LOWER EXTREMITIES: ICD-10-CM

## 2023-09-07 DIAGNOSIS — L97.509 TYPE 2 DIABETES MELLITUS WITH FOOT ULCER, UNSPECIFIED WHETHER LONG TERM INSULIN USE: ICD-10-CM

## 2023-09-07 DIAGNOSIS — I73.9 PERIPHERAL VASCULAR DISEASE: ICD-10-CM

## 2023-09-07 DIAGNOSIS — E66.9 OBESITY, UNSPECIFIED CLASSIFICATION, UNSPECIFIED OBESITY TYPE, UNSPECIFIED WHETHER SERIOUS COMORBIDITY PRESENT: ICD-10-CM

## 2023-09-07 DIAGNOSIS — R26.2 DIFFICULTY IN WALKING, NOT ELSEWHERE CLASSIFIED: ICD-10-CM

## 2023-09-07 DIAGNOSIS — L97.512 ULCER OF RIGHT FOOT WITH FAT LAYER EXPOSED: ICD-10-CM

## 2023-09-07 DIAGNOSIS — E11.628 DIABETIC FOOT INFECTION: ICD-10-CM

## 2023-09-07 DIAGNOSIS — E11.49 TYPE II DIABETES MELLITUS WITH NEUROLOGICAL MANIFESTATIONS: Primary | ICD-10-CM

## 2023-09-07 DIAGNOSIS — M20.42 HAMMER TOES, BILATERAL: ICD-10-CM

## 2023-09-07 DIAGNOSIS — E11.621 TYPE 2 DIABETES MELLITUS WITH FOOT ULCER, UNSPECIFIED WHETHER LONG TERM INSULIN USE: ICD-10-CM

## 2023-09-07 PROCEDURE — 11042 DBRDMT SUBQ TIS 1ST 20SQCM/<: CPT | Mod: S$GLB,,, | Performed by: PODIATRIST

## 2023-09-07 PROCEDURE — 99999 PR PBB SHADOW E&M-EST. PATIENT-LVL III: ICD-10-PCS | Mod: PBBFAC,,, | Performed by: PODIATRIST

## 2023-09-07 PROCEDURE — 11042 WOUND DEBRIDEMENT: ICD-10-PCS | Mod: S$GLB,,, | Performed by: PODIATRIST

## 2023-09-07 PROCEDURE — 1159F PR MEDICATION LIST DOCUMENTED IN MEDICAL RECORD: ICD-10-PCS | Mod: CPTII,S$GLB,, | Performed by: PODIATRIST

## 2023-09-07 PROCEDURE — 1160F RVW MEDS BY RX/DR IN RCRD: CPT | Mod: CPTII,S$GLB,, | Performed by: PODIATRIST

## 2023-09-07 PROCEDURE — 99214 OFFICE O/P EST MOD 30 MIN: CPT | Mod: 25,S$GLB,, | Performed by: PODIATRIST

## 2023-09-07 PROCEDURE — 99214 PR OFFICE/OUTPT VISIT, EST, LEVL IV, 30-39 MIN: ICD-10-PCS | Mod: 25,S$GLB,, | Performed by: PODIATRIST

## 2023-09-07 PROCEDURE — 1159F MED LIST DOCD IN RCRD: CPT | Mod: CPTII,S$GLB,, | Performed by: PODIATRIST

## 2023-09-07 PROCEDURE — 3008F BODY MASS INDEX DOCD: CPT | Mod: CPTII,S$GLB,, | Performed by: PODIATRIST

## 2023-09-07 PROCEDURE — 1160F PR REVIEW ALL MEDS BY PRESCRIBER/CLIN PHARMACIST DOCUMENTED: ICD-10-PCS | Mod: CPTII,S$GLB,, | Performed by: PODIATRIST

## 2023-09-07 PROCEDURE — 99999 PR PBB SHADOW E&M-EST. PATIENT-LVL III: CPT | Mod: PBBFAC,,, | Performed by: PODIATRIST

## 2023-09-07 PROCEDURE — 3008F PR BODY MASS INDEX (BMI) DOCUMENTED: ICD-10-PCS | Mod: CPTII,S$GLB,, | Performed by: PODIATRIST

## 2023-09-07 NOTE — PROGRESS NOTES
[address] [facility] [place] [address]  Phone: [phone]   [phone]    Patient's PCP:Michelle Messina FNP  Referring Provider: No ref. provider found    Subjective:      Chief Complaint:: Foot Ulcer (Lateral side of the right foot), Diabetes, Diabetes Mellitus, Diabetic Foot Ulcer, Wound Check, Wound Care, Difficulty Walking, Non-healing Wound, Peripheral Neuropathy, Numbness, Pressure Ulcer, Leg Swelling, and Poor Circulation    Foot Ulcer  Associated symptoms include numbness. Pertinent negatives include no abdominal pain, arthralgias, chest pain, chills, coughing, fatigue, fever, headaches, joint swelling, myalgias, nausea, rash or weakness.     Ana Bullard is a 63 y.o. male who presents today for follow up ulcer right foot.  Presents in regular shoes today.  Home health has been coming out 1x week and patient/patient's wife has been performing dressing changes otherwise.     Additionally, patient presents for follow-up of wound located on his right lower leg.       Additionally, patient presents for follow-up of right dorsal 2nd toe wound.      Additionally, patient presents to clinic today for evaluation and treatment of diabetic feet.      Additionally, patient presents with bilateral lower extremity edema with chronic venous stasis disease        Systemic Doctor: YRN Townsend  Date Last Seen: 06/07/2022  Blood Sugar: 153  Hemoglobin A1c: 6.8 (11/23/22)    Vitals:    09/07/23 1430   Weight: 120.6 kg (265 lb 15.1 oz)   Height: 6' (1.829 m)   PainSc:   1      Shoe Size: 12    Past Surgical History:   Procedure Laterality Date    CARDIAC PACEMAKER PLACEMENT  12/2012    CARDIAC PACEMAKER PLACEMENT  10/04/2018    battery replacement    CORONARY ARTERY BYPASS GRAFT  06/2012    ESOPHAGOGASTRODUODENOSCOPY  01/31/2017    SAMARA FILTER PLACEMENT  2012    vena cava    LUMBAR SYMPATHETIC NERVE BLOCK Right 8/22/2019    Procedure: BLOCK, NERVE, SYMPATHETIC, LUMBAR;  Surgeon: Tashi ALEXANDER  MD Florentino;  Location: [facility] OR;  Service: Pain Management;  Laterality: Right;  RIGHT SYMPATHETIC NERVE BLOCK     Past Medical History:   Diagnosis Date    AICD (automatic cardioverter/defibrillator) present     Anemia, chronic disease 07/27/2021    Anemia, unspecified 07/27/2021    Anxiety and depression 2012    CAD (coronary artery disease) 2012    Cardiomegaly 2016    CHF (congestive heart failure) 2012    Cholecystitis 2017    Complete heart block 2012    Diabetic foot ulcer     DVT (deep venous thrombosis) 2012    And pulmonary embolus    GERD (gastroesophageal reflux disease) 2010    Heparin induced thrombocytopenia     Hyperlipemia 2011    IDDM (insulin dependent diabetes mellitus) 1983    With neuropathy    Ischemic cardiomyopathy 2012    MI (myocardial infarction) 2012    Morbid obesity     MRSA (methicillin resistant Staphylococcus aureus) infection 01/19/2019    Noncompliance with therapeutic plan 2019    Normochromic normocytic anemia 07/27/2021    Osteomyelitis of right foot 01/2019    Osteomyelitis of right foot 09/01/2022    Klebsiella from bone, GBS in blood    Pulmonary edema 2019    Respiratory failure 2019    Sepsis 01/2019    Due to cellulitis right leg    Sleep apnea 2013    Thrombocytopathy 2012    Venous stasis dermatitis of both lower extremities      Family History   Problem Relation Age of Onset    Arthritis Mother     Diabetes Mother     Cancer Father     Heart disease Father     Stroke Father         Social History:   Marital Status:   Alcohol History:  reports no history of alcohol use.  Tobacco History:  reports that he quit smoking about 11 years ago. His smoking use included cigarettes. He started smoking about 49 years ago. He has a 76.0 pack-year smoking history. He has never used smokeless tobacco.  Drug History:  reports no history of drug use.    Review of patient's allergies indicates:   Allergen Reactions    Vasopressin Anaphylaxis and Other (See Comments)     Increased  "pressure in his head; felt like his eyeballs and veins were going to pop out of his head.     Vasopressin analogues Other (See Comments) and Anaphylaxis     "felt like eyes going to pop out of my head"  Other reaction(s): Other (See Comments)  "felt like eyes going to pop out of my head"  Increased pressure in his head; felt like his eyeballs and veins were going to pop out of his head.     Heparin Other (See Comments)     Other reaction(s): "depleted my blood platets"    Decreased platelet count    Heparin analogues Other (See Comments)     Depleted WBC count    Morphine Hallucinations, Other (See Comments) and Anxiety     Other reaction(s): Hallucinations  Paranoid, hallucinations         Current Outpatient Medications   Medication Sig Dispense Refill    albuterol (PROVENTIL) 5 mg/mL nebulizer solution Take 2.5 mg by nebulization every 6 (six) hours as needed.      albuterol-ipratropium (DUO-NEB) 2.5 mg-0.5 mg/3 mL nebulizer solution Take 3 mLs by nebulization every 6 (six) hours as needed.      amiodarone (PACERONE) 200 MG Tab Take 1 tablet (200 mg total) by mouth 2 (two) times daily. 90 tablet 0    aspirin 81 MG Chew Take 81 mg by mouth once daily.      atorvastatin (LIPITOR) 10 MG tablet Take 1 tablet by mouth once daily.  1    B complex-vitamin C-folic acid (JLUIS-JIMMIE/NEPHRO-JIMMIE) 0.8 mg Tab Take 0.4 mg by mouth.      BASAGLAR KWIKPEN U-100 INSULIN glargine 100 units/mL (3mL) SubQ pen Inject 25 Units into the skin once daily.  3    carvediloL (COREG) 3.125 MG tablet       cetirizine (ZYRTEC) 10 MG tablet Take 10 mg by mouth once daily.      CORLANOR 5 mg Tab Take 5 mg by mouth nightly.      FARXIGA 10 mg tablet Take 10 mg by mouth once daily.      gabapentin (NEURONTIN) 300 MG capsule Take 1 capsule (300 mg total) by mouth 3 (three) times daily. 90 capsule 0    mupirocin (BACTROBAN) 2 % ointment APPLY TOPICALLY ONCE DAILY. (Patient taking differently: Apply 1 g topically once daily.) 22 g 5    pantoprazole " (PROTONIX) 40 MG tablet Take 1 tablet by mouth once daily.  0    torsemide (DEMADEX) 20 MG Tab Take 1 tablet (20 mg total) by mouth 2 (two) times a day. 120 tablet 0    traMADoL (ULTRAM) 50 mg tablet Take 1 tablet (50 mg total) by mouth every 6 (six) hours as needed for Pain. 120 tablet 2    TRULICITY 0.75 mg/0.5 mL pen injector Inject 0.75 mg into the skin every 7 days.      vitamin B complex-folic acid 0.4 mg Tab Take 1 tablet by mouth.       No current facility-administered medications for this visit.       Review of Systems   Constitutional:  Negative for chills, fatigue, fever and unexpected weight change.   HENT:  Negative for hearing loss and trouble swallowing.    Eyes:  Negative for photophobia and visual disturbance.   Respiratory:  Negative for cough, shortness of breath and wheezing.    Cardiovascular:  Positive for leg swelling. Negative for chest pain and palpitations.   Gastrointestinal:  Negative for abdominal pain and nausea.   Genitourinary:  Negative for dysuria and frequency.   Musculoskeletal:  Negative for arthralgias, back pain, gait problem, joint swelling and myalgias.   Skin:  Positive for color change and wound. Negative for rash.   Neurological:  Positive for numbness. Negative for tremors, seizures, weakness and headaches.   Hematological:  Does not bruise/bleed easily.         Objective:        Physical Exam:   Foot Exam    Right Foot/Ankle     Inspection and Palpation  Skin Exam: ulcer;         Physical Exam  Musculoskeletal:        Legs:         Feet:    Feet:      Right foot:      Skin integrity: Ulcer present.       Ortho/SPM Exam       Physical examination: General: Pt. is well-developed, well-nourished, appears stated age, in no acute distress, alert and oriented x 3.     Vascular: Dorsalis pedis and posterior tibial pulses are 1/4  Bilaterally. Toes are warm to touch. Feet are warm proximally.     There is decreased digital hair. Skin is atrophic, slightly hyperpigmented, and  mildly edematous. Capillary refill less than 5 seconds all toes/distal feet     Neurologic: [place] 5.07 monofilament is decreased bilateral feet. Sharp/dull sensation absent Bilateral feet.     Vibratory sensation absent bilateral     Musculoskeletal: adequate joint range of motion without pain, limitation, nor crepitation Bilateral feet and ankle joints. Muscle strength is 5/5 in all groups bilaterally.     Lymphatics: no lymphangitic streaking bilaterally.     Dermatologic: Elongated, thickened, dystrophic, discolored nails x 10. Xerosis Bilaterally  Imaging:            Assessment:       1. Type II diabetes mellitus with neurological manifestations    2. Type 2 diabetes mellitus with foot ulcer, unspecified whether long term insulin use    3. Ulcer of right foot with fat layer exposed    4. Non-pressure chronic ulcer of right lower leg with fat layer exposed    5. OG (onychogryphosis)    6. At high risk for inadequate nutritional intake    7. Difficulty in walking, not elsewhere classified    8. Peripheral vascular disease    9. Venous stasis dermatitis of both lower extremities    10. Type 2 diabetes mellitus with diabetic neuropathy, unspecified whether long term insulin use    11. Paresthesia of both lower extremities    12. PVD (peripheral vascular disease)    13. Hammer toes, bilateral    14. Obesity, unspecified classification, unspecified obesity type, unspecified whether serious comorbidity present    15. Onychogryphosis    16. Ulcer of right foot with necrosis of bone    17. Skin ulcer of right foot with fat layer exposed    18. Diabetic foot infection      Plan:   Type II diabetes mellitus with neurological manifestations  -     DIABETIC SHOES FOR HOME USE    Type 2 diabetes mellitus with foot ulcer, unspecified whether long term insulin use  -     Ambulatory referral/consult to Home Health; Future; Expected date: 09/08/2023  -     DIABETIC SHOES FOR HOME USE    Ulcer of right foot with fat  "layer exposed  -     Ambulatory referral/consult to Home Health; Future; Expected date: 09/08/2023    Non-pressure chronic ulcer of right lower leg with fat layer exposed    OG (onychogryphosis)    At high risk for inadequate nutritional intake    Difficulty in walking, not elsewhere classified    Peripheral vascular disease    Venous stasis dermatitis of both lower extremities    Type 2 diabetes mellitus with diabetic neuropathy, unspecified whether long term insulin use    Paresthesia of both lower extremities    PVD (peripheral vascular disease)    Hammer toes, bilateral    Obesity, unspecified classification, unspecified obesity type, unspecified whether serious comorbidity present    Onychogryphosis    Ulcer of right foot with necrosis of bone    Skin ulcer of right foot with fat layer exposed    Diabetic foot infection    Other orders  -     Wound Debridement      No follow-ups on file.    Wound Debridement    Date/Time: 9/7/2023 2:30 PM    Performed by: Leah Avila DPM  Authorized by: Leah Avila DPM    Time out: Immediately prior to procedure a "time out" was called to verify the correct patient, procedure, equipment, support staff and site/side marked as required.    Consent Done?:  Yes (Written)    Preparation: Patient was prepped and draped in usual sterile fashion, Patient was prepped and draped with aseptic technique and Patient was prepped and draped with clean technique      Wound Details:    Location:  Right foot    Location:  Right 5th Metatarsal Head    Type of Debridement:  Excisional       Length (cm):  0.2       Area (sq cm):  0.06       Width (cm):  0.3       Percent Debrided (%):  100       Depth (cm):  0.2       Total Area Debrided (sq cm):  0.06    Depth of debridement:  Subcutaneous tissue    Tissue debrided:  Subcutaneous    Devitalized tissue debrided:  Slough, Biofilm, Callus and Other    Instruments:  Blade, Curette, Forceps and Nippers  Bleeding:  Minimal  Hemostasis Achieved: " Yes  Method Used:  Pressure  Patient tolerance:  Patient tolerated the procedure well with no immediate complications  1st Wound Pain Assessment: 0            Counseling:     I provided patient education verbally regarding:   Patient diagnosis, treatment options, as well as alternatives, risks, and benefits.     This note was created using Dragon voice recognition software that occasionally misinterpreted phrases or words.            Follow-up: Patient is to return to the clinic in 2 week(s) for follow-up but should call the office immediately if any signs of infection, such as fever, chills, sweats, increased redness or pain.  Recommendations:  Check feet daily.  dressing changes, home health Iodosorb followed by border foam followed by bulky wrap for protection, on both right plantar foot ulcer and left lower leg ulcer.  Use surgery shoe for walking or standing.     I provided patient education verbally regarding: proper ulcer care and the possible need for serial debridements, topical medications, specific dressings and biological engineered skin substitutes if indicated.           Counseling/Education:  I provided patient education verbally regarding:   The aspects of diabetes and how it pertains to the feet. I explained the importance of proper diabetic foot care and how it is essential for the health of their feet.     I discussed the importance of knowing their Hemoglobin A1c and that the level needs to be as close to 6 as possible. I discussed the increase complications of high blood sugar including stroke, blindness, heart attack, kidney failure and loss of limb secondary to neuropathy and PVD.      With neuropathy, beware of any breaks in the skin or redness. These areas are not recognized early due to the numbness.     I discussed Diabetes, lower back issues, metabolic disorders, systemic causes, chemotherapy, vitamin deficiency, heavy metal exposure, as some of the causes. I also explained that as much  as 40% of the time we can not find a cause. I discussed different treatments available to control the symptoms but which may not cure the problem.         Counseled patient on the aspects of diabetes and how it pertains to the feet.  I explained the importance of proper diabetic foot care and how it is essential for the health of their feet.        Shoe inspection. Patient instructed on proper foot hygeine. We discussed wearing proper shoe gear, daily foot inspections, never walking without protective shoe gear, never putting sharp instruments to feet, routine podiatric nail visits every 2-3 months.       Patient should call the office immediately if any signs of infection, such as fever, chills, sweats, increased redness or pain.     Patient was instructed to call the clinic or go to the emergency department if their symptoms do not improve, worsens, or if new symptoms develop.  Patient was advised that if any increased swelling, pain, or numbness arise to go immediately to the ED. Patient knows to call any time if an emergency arises. Shared decision making occurred and patient verbalized understanding in agreement with this plan.      I counseled the patient on their conditions, their implications and medical management.     >50% of this > 60 minute visit was spent face to face educating/counseling the patient     I spent a total of 60 minutes on the day of the visit.This includes face to face time and non-face to face time preparing to see the patient (eg, review of tests), obtaining and/or reviewing separately obtained history, documenting clinical information in the electronic or other health record, independently interpreting results and communicating results to the patient/family/caregiver, or care coordinator.

## 2023-09-11 ENCOUNTER — TELEPHONE (OUTPATIENT)
Dept: PODIATRY | Facility: CLINIC | Age: 63
End: 2023-09-11
Payer: MEDICARE

## 2023-09-11 PROBLEM — N17.9 ACUTE RENAL FAILURE SUPERIMPOSED ON STAGE 3B CHRONIC KIDNEY DISEASE: Status: RESOLVED | Noted: 2020-01-19 | Resolved: 2023-09-11

## 2023-09-11 PROBLEM — N18.32 ACUTE RENAL FAILURE SUPERIMPOSED ON STAGE 3B CHRONIC KIDNEY DISEASE: Status: RESOLVED | Noted: 2020-01-19 | Resolved: 2023-09-11

## 2023-09-11 PROBLEM — N39.0 UTI (URINARY TRACT INFECTION): Status: RESOLVED | Noted: 2023-06-12 | Resolved: 2023-09-11

## 2023-09-11 NOTE — TELEPHONE ENCOUNTER
----- Message from Farzaneh Mata sent at 9/11/2023  3:15 PM CDT -----  Regarding: [facility] voicemail  [facility] left a voicemail saying they need the primary care doctor to sign off on his paperwork so they can order his shoes.    Thank you,  Farzaneh

## 2023-09-19 ENCOUNTER — EXTERNAL HOME HEALTH (OUTPATIENT)
Dept: HOME HEALTH SERVICES | Facility: HOSPITAL | Age: 63
End: 2023-09-19
Payer: MEDICARE

## 2023-09-27 NOTE — PATIENT INSTRUCTIONS
Simple Skin Ulcer  A skin ulcer is a sore on the skin. Skin ulcers often form when blood circulation is impaired. Being bed- or wheelchair-bound can cause pressure that may lead to skin ulcers. Ulcers are generally round areas of red, swollen, thickened skin around a crater-like depression. They are often very slow to heal. If a skin ulcer isn't properly treated, it may become infected. If the infection spreads, it can cause serious health issues.    Symptoms of a skin ulcer include:  Reddish area on the skin  Skin color and texture changes  Swelling  Wound that isn't healing  Crater in the skin  Pain  Drainage or pus    Causes  There are many causes of skin ulcers. Some of these include:  Decreased blood flow to a part of the skin, vascular insufficiency  Trauma  Lack of movement of a part of the body for long periods of time  Infection  Poor hygiene  Varicose veins  Vitamin deficiency    Pressure ulcers  Pressure ulcers are a type of ulcer most commonly seen in people who are confined to bed or a wheelchair. They are caused by prolonged pressure to a spot on the skin. Pressure ulcers usually occur on the back, buttocks, or backs or sides of the legs, arms, or feet (especially the heels).    Home care  You may be prescribed antibiotics to prevent infection. If this is the case, be sure to take all of the medicine, even if your symptoms get better. You may also be given medicines to help relieve pain. Follow the healthcare providers instructions when using these medicines.    General care  Care for the skin ulcer as instructed. Always wash your hands with soap and warm water before and after caring for your wound.  Cover the ulcer with a clean, dry bandage. Remove and change the bandage as instructed. If the bandage becomes wet or dirty, change it as soon as possible.  Follow the doctors instructions about washing. You can shower, but do not soak the healing ulcer until the doctor says its OK.  Do not scratch,  rub, or pick at the healing skin.  Check the area every day for signs of infection, such as increasing pain, redness, warmth, red streaking, swelling, or pus draining from the ulcer.  When resting, raise the area where the ulcer is above the level of the heart.  Avoid smoking or drinking alcohol, as these can delay wound healing.  If you are able, try to walk regularly. This can help with circulation.  Avoid prolonged standing or sitting in one position.  The following tips can help prevent future skin ulcers:  Know your risks for skin ulcers.  Keep the skin clean and dry.  Reposition frequently.  Use protective devices such as pillows, foam wedges, and heel protectors for the knees, ankles, and heels.  Avoid immobilization.    Follow-up care  Follow up with your healthcare provider, or as advised.    When to seek medical advice  Call your healthcare provider right away if any of these occur:  Fever of 100.4°F (38°C) or higher, or as advised by your healthcare provider  Signs of infection. These include increasing pain, warmth, redness, or pus draining from the skin ulcer.  Bleeding from the skin ulcer  Pain in or around the ulcer that doesn't get better even with medicines  Increased swelling  Changes in skin color    Date Last Reviewed: 9/1/2016  © 5346-5005 [employer]. [address] [place] [address], [place] [address]. All rights reserved. This information is not intended as a substitute for professional medical care. Always follow your healthcare professional's instructions.

## 2023-09-28 ENCOUNTER — OFFICE VISIT (OUTPATIENT)
Dept: PODIATRY | Facility: CLINIC | Age: 63
End: 2023-09-28
Payer: MEDICARE

## 2023-09-28 VITALS — HEIGHT: 72 IN | BODY MASS INDEX: 36.37 KG/M2 | WEIGHT: 268.5 LBS

## 2023-09-28 DIAGNOSIS — R26.2 DIFFICULTY IN WALKING, NOT ELSEWHERE CLASSIFIED: ICD-10-CM

## 2023-09-28 DIAGNOSIS — E11.49 TYPE II DIABETES MELLITUS WITH NEUROLOGICAL MANIFESTATIONS: ICD-10-CM

## 2023-09-28 DIAGNOSIS — L97.912 NON-PRESSURE CHRONIC ULCER OF RIGHT LOWER LEG WITH FAT LAYER EXPOSED: ICD-10-CM

## 2023-09-28 DIAGNOSIS — E11.40 TYPE 2 DIABETES MELLITUS WITH DIABETIC NEUROPATHY, UNSPECIFIED WHETHER LONG TERM INSULIN USE: ICD-10-CM

## 2023-09-28 DIAGNOSIS — M20.42 HAMMER TOES, BILATERAL: ICD-10-CM

## 2023-09-28 DIAGNOSIS — L97.509 TYPE 2 DIABETES MELLITUS WITH FOOT ULCER, UNSPECIFIED WHETHER LONG TERM INSULIN USE: ICD-10-CM

## 2023-09-28 DIAGNOSIS — E11.621 TYPE 2 DIABETES MELLITUS WITH FOOT ULCER, UNSPECIFIED WHETHER LONG TERM INSULIN USE: ICD-10-CM

## 2023-09-28 DIAGNOSIS — E66.9 OBESITY, UNSPECIFIED CLASSIFICATION, UNSPECIFIED OBESITY TYPE, UNSPECIFIED WHETHER SERIOUS COMORBIDITY PRESENT: ICD-10-CM

## 2023-09-28 DIAGNOSIS — I73.9 PVD (PERIPHERAL VASCULAR DISEASE): ICD-10-CM

## 2023-09-28 DIAGNOSIS — I87.2 VENOUS STASIS DERMATITIS OF BOTH LOWER EXTREMITIES: ICD-10-CM

## 2023-09-28 DIAGNOSIS — M20.41 HAMMER TOES, BILATERAL: ICD-10-CM

## 2023-09-28 DIAGNOSIS — Z91.89 AT HIGH RISK FOR INADEQUATE NUTRITIONAL INTAKE: ICD-10-CM

## 2023-09-28 DIAGNOSIS — R20.2 PARESTHESIA OF BOTH LOWER EXTREMITIES: ICD-10-CM

## 2023-09-28 DIAGNOSIS — L60.2 ONYCHOGRYPHOSIS: ICD-10-CM

## 2023-09-28 DIAGNOSIS — L97.512 ULCER OF RIGHT FOOT WITH FAT LAYER EXPOSED: Primary | ICD-10-CM

## 2023-09-28 DIAGNOSIS — L60.2 OG (ONYCHOGRYPHOSIS): ICD-10-CM

## 2023-09-28 DIAGNOSIS — I73.9 PERIPHERAL VASCULAR DISEASE: ICD-10-CM

## 2023-09-28 PROCEDURE — 3008F PR BODY MASS INDEX (BMI) DOCUMENTED: ICD-10-PCS | Mod: CPTII,S$GLB,, | Performed by: PODIATRIST

## 2023-09-28 PROCEDURE — 99214 OFFICE O/P EST MOD 30 MIN: CPT | Mod: S$GLB,,, | Performed by: PODIATRIST

## 2023-09-28 PROCEDURE — 1159F MED LIST DOCD IN RCRD: CPT | Mod: CPTII,S$GLB,, | Performed by: PODIATRIST

## 2023-09-28 PROCEDURE — 3008F BODY MASS INDEX DOCD: CPT | Mod: CPTII,S$GLB,, | Performed by: PODIATRIST

## 2023-09-28 PROCEDURE — 1160F PR REVIEW ALL MEDS BY PRESCRIBER/CLIN PHARMACIST DOCUMENTED: ICD-10-PCS | Mod: CPTII,S$GLB,, | Performed by: PODIATRIST

## 2023-09-28 PROCEDURE — 1159F PR MEDICATION LIST DOCUMENTED IN MEDICAL RECORD: ICD-10-PCS | Mod: CPTII,S$GLB,, | Performed by: PODIATRIST

## 2023-09-28 PROCEDURE — 99999 PR PBB SHADOW E&M-EST. PATIENT-LVL IV: CPT | Mod: PBBFAC,,, | Performed by: PODIATRIST

## 2023-09-28 PROCEDURE — 1160F RVW MEDS BY RX/DR IN RCRD: CPT | Mod: CPTII,S$GLB,, | Performed by: PODIATRIST

## 2023-09-28 PROCEDURE — 99999 PR PBB SHADOW E&M-EST. PATIENT-LVL IV: ICD-10-PCS | Mod: PBBFAC,,, | Performed by: PODIATRIST

## 2023-09-28 PROCEDURE — 99214 PR OFFICE/OUTPT VISIT, EST, LEVL IV, 30-39 MIN: ICD-10-PCS | Mod: S$GLB,,, | Performed by: PODIATRIST

## 2023-09-28 NOTE — PROGRESS NOTES
[address] [facility] [place] [place] [address]  Phone: [phone]   [phone]    Patient's PCP:Michelle Messina FNP  Referring Provider: No ref. provider found    Subjective:      Chief Complaint:: Foot Ulcer (Follow up on ulcer of right foot), Diabetes, Wound Check, Wound Care, Pressure Ulcer, Non-healing Wound, Numbness, Peripheral Neuropathy, and Diabetic Foot Ulcer    HPIClluis manuel Bullard is a 63 y.o. male who presents today for follow up ulcer right foot.  Presents in regular shoes today.  Home health has been coming out 1x week and patient/patient's wife has been performing dressing changes otherwise.     Additionally, patient presents for follow-up of wound located on his right lower leg.       Additionally, patient presents for follow-up of right dorsal 2nd toe wound.      Additionally, patient presents to clinic today for evaluation and treatment of diabetic feet.      Additionally, patient presents with bilateral lower extremity edema with chronic venous stasis disease          Systemic Doctor: YRN Townsend  Date Last Seen: 06/07/2022  Blood Sugar: 160 yesterday  Hemoglobin A1c: 6.8 (11/23/22)    Vitals:    09/28/23 1052   Weight: 121.8 kg (268 lb 8 oz)   Height: 6' (1.829 m)   PainSc: 0-No pain      Shoe Size: 12    Past Surgical History:   Procedure Laterality Date    CARDIAC PACEMAKER PLACEMENT  12/2012    CARDIAC PACEMAKER PLACEMENT  10/04/2018    battery replacement    CORONARY ARTERY BYPASS GRAFT  06/2012    ESOPHAGOGASTRODUODENOSCOPY  01/31/2017    SAMARA FILTER PLACEMENT  2012    vena cava    LUMBAR SYMPATHETIC NERVE BLOCK Right 8/22/2019    Procedure: BLOCK, NERVE, SYMPATHETIC, LUMBAR;  Surgeon: Tashi Good MD;  Location: [facility] OR;  Service: Pain Management;  Laterality: Right;  RIGHT SYMPATHETIC NERVE BLOCK     Past Medical History:   Diagnosis Date    AICD (automatic cardioverter/defibrillator) present     Anemia, chronic disease 07/27/2021    Anemia, unspecified  "07/27/2021    Anxiety and depression 2012    CAD (coronary artery disease) 2012    Cardiomegaly 2016    CHF (congestive heart failure) 2012    Cholecystitis 2017    Complete heart block 2012    Diabetic foot ulcer     DVT (deep venous thrombosis) 2012    And pulmonary embolus    GERD (gastroesophageal reflux disease) 2010    Heparin induced thrombocytopenia     Hyperlipemia 2011    IDDM (insulin dependent diabetes mellitus) 1983    With neuropathy    Ischemic cardiomyopathy 2012    MI (myocardial infarction) 2012    Morbid obesity     MRSA (methicillin resistant Staphylococcus aureus) infection 01/19/2019    Noncompliance with therapeutic plan 2019    Normochromic normocytic anemia 07/27/2021    Osteomyelitis of right foot 01/2019    Osteomyelitis of right foot 09/01/2022    Klebsiella from bone, GBS in blood    Pulmonary edema 2019    Respiratory failure 2019    Sepsis 01/2019    Due to cellulitis right leg    Sleep apnea 2013    Thrombocytopathy 2012    Venous stasis dermatitis of both lower extremities      Family History   Problem Relation Age of Onset    Arthritis Mother     Diabetes Mother     Cancer Father     Heart disease Father     Stroke Father         Social History:   Marital Status:   Alcohol History:  reports no history of alcohol use.  Tobacco History:  reports that he quit smoking about 11 years ago. His smoking use included cigarettes. He started smoking about 49 years ago. He has a 76.0 pack-year smoking history. He has never used smokeless tobacco.  Drug History:  reports no history of drug use.    Review of patient's allergies indicates:   Allergen Reactions    Vasopressin Anaphylaxis and Other (See Comments)     Increased pressure in his head; felt like his eyeballs and veins were going to pop out of his head.     Vasopressin analogues Other (See Comments) and Anaphylaxis     "felt like eyes going to pop out of my head"  Other reaction(s): Other (See Comments)  "felt like eyes going to " "pop out of my head"  Increased pressure in his head; felt like his eyeballs and veins were going to pop out of his head.     Heparin Other (See Comments)     Other reaction(s): "depleted my blood platets"    Decreased platelet count    Heparin analogues Other (See Comments)     Depleted WBC count    Morphine Hallucinations, Other (See Comments) and Anxiety     Other reaction(s): Hallucinations  Paranoid, hallucinations         Current Outpatient Medications   Medication Sig Dispense Refill    albuterol (PROVENTIL) 5 mg/mL nebulizer solution Take 2.5 mg by nebulization every 6 (six) hours as needed.      albuterol-ipratropium (DUO-NEB) 2.5 mg-0.5 mg/3 mL nebulizer solution Take 3 mLs by nebulization every 6 (six) hours as needed.      amiodarone (PACERONE) 200 MG Tab Take 1 tablet (200 mg total) by mouth 2 (two) times daily. 90 tablet 0    aspirin 81 MG Chew Take 81 mg by mouth once daily.      atorvastatin (LIPITOR) 10 MG tablet Take 1 tablet by mouth once daily.  1    B complex-vitamin C-folic acid (JLUIS-JIMMIE/NEPHRO-JIMMIE) 0.8 mg Tab Take 0.4 mg by mouth.      BASAGLAR KWIKPEN U-100 INSULIN glargine 100 units/mL (3mL) SubQ pen Inject 25 Units into the skin once daily.  3    carvediloL (COREG) 3.125 MG tablet       cetirizine (ZYRTEC) 10 MG tablet Take 10 mg by mouth once daily.      CORLANOR 5 mg Tab Take 5 mg by mouth nightly.      FARXIGA 10 mg tablet Take 10 mg by mouth once daily.      gabapentin (NEURONTIN) 300 MG capsule Take 1 capsule (300 mg total) by mouth 3 (three) times daily. 90 capsule 0    mupirocin (BACTROBAN) 2 % ointment APPLY TOPICALLY ONCE DAILY. (Patient taking differently: Apply 1 g topically once daily.) 22 g 5    pantoprazole (PROTONIX) 40 MG tablet Take 1 tablet by mouth once daily.  0    torsemide (DEMADEX) 20 MG Tab Take 1 tablet (20 mg total) by mouth 2 (two) times a day. 120 tablet 0    traMADoL (ULTRAM) 50 mg tablet Take 1 tablet (50 mg total) by mouth every 6 (six) hours as needed for " Pain. 120 tablet 2    TRULICITY 0.75 mg/0.5 mL pen injector Inject 0.75 mg into the skin every 7 days.      vitamin B complex-folic acid 0.4 mg Tab Take 1 tablet by mouth.       No current facility-administered medications for this visit.       Review of Systems   Constitutional:  Negative for chills, fatigue, fever and unexpected weight change.   HENT:  Negative for hearing loss and trouble swallowing.    Eyes:  Negative for photophobia and visual disturbance.   Respiratory:  Negative for cough, shortness of breath and wheezing.    Cardiovascular:  Positive for leg swelling. Negative for chest pain and palpitations.   Gastrointestinal:  Negative for abdominal pain and nausea.   Genitourinary:  Negative for dysuria and frequency.   Musculoskeletal:  Negative for arthralgias, back pain, gait problem, joint swelling and myalgias.   Skin:  Positive for color change and wound. Negative for rash.   Neurological:  Positive for numbness. Negative for tremors, seizures, weakness and headaches.   Hematological:  Does not bruise/bleed easily.         Objective:        Physical Exam:   Foot Exam    Right Foot/Ankle     Inspection and Palpation  Skin Exam: ulcer;         Physical Exam  Musculoskeletal:        Legs:         Feet:    Feet:      Right foot:      Skin integrity: Ulcer present.       Ortho/SPM Exam       Physical examination: General: Pt. is well-developed, well-nourished, appears stated age, in no acute distress, alert and oriented x 3.     Vascular: Dorsalis pedis and posterior tibial pulses are 1/4  Bilaterally. Toes are warm to touch. Feet are warm proximally.     There is decreased digital hair. Skin is atrophic, slightly hyperpigmented, and mildly edematous. Capillary refill less than 5 seconds all toes/distal feet     Neurologic: [place] 5.07 monofilament is decreased bilateral feet. Sharp/dull sensation absent Bilateral feet.     Vibratory sensation absent bilateral     Musculoskeletal: adequate  joint range of motion without pain, limitation, nor crepitation Bilateral feet and ankle joints. Muscle strength is 5/5 in all groups bilaterally.     Lymphatics: no lymphangitic streaking bilaterally.     Dermatologic: Elongated, thickened, dystrophic, discolored nails x 10. Xerosis Bilaterally  Imaging:            Assessment:       1. Ulcer of right foot with fat layer exposed    2. Type II diabetes mellitus with neurological manifestations    3. Type 2 diabetes mellitus with foot ulcer, unspecified whether long term insulin use    4. Non-pressure chronic ulcer of right lower leg with fat layer exposed    5. OG (onychogryphosis)    6. Venous stasis dermatitis of both lower extremities    7. Peripheral vascular disease    8. Type 2 diabetes mellitus with diabetic neuropathy, unspecified whether long term insulin use    9. Onychogryphosis    10. At high risk for inadequate nutritional intake    11. Paresthesia of both lower extremities    12. PVD (peripheral vascular disease)    13. Difficulty in walking, not elsewhere classified    14. Hammer toes, bilateral    15. Obesity, unspecified classification, unspecified obesity type, unspecified whether serious comorbidity present      Plan:   Ulcer of right foot with fat layer exposed  -     Ambulatory referral/consult to Home Health; Future; Expected date: 09/29/2023    Type II diabetes mellitus with neurological manifestations    Type 2 diabetes mellitus with foot ulcer, unspecified whether long term insulin use    Non-pressure chronic ulcer of right lower leg with fat layer exposed    OG (onychogryphosis)    Venous stasis dermatitis of both lower extremities    Peripheral vascular disease    Type 2 diabetes mellitus with diabetic neuropathy, unspecified whether long term insulin use    Onychogryphosis    At high risk for inadequate nutritional intake    Paresthesia of both lower extremities    PVD (peripheral vascular disease)    Difficulty in walking, not elsewhere  classified    Hammer toes, bilateral    Obesity, unspecified classification, unspecified obesity type, unspecified whether serious comorbidity present      No follow-ups on file.    Procedures          Counseling:     I provided patient education verbally regarding:   Patient diagnosis, treatment options, as well as alternatives, risks, and benefits.     This note was created using Dragon voice recognition software that occasionally misinterpreted phrases or words.                 Follow-up: Patient is to return to the clinic in 2 week(s) for follow-up but should call the office immediately if any signs of infection, such as fever, chills, sweats, increased redness or pain.  Recommendations:  Check feet daily.  dressing changes, home health Iodosorb followed by border foam followed by bulky wrap for protection, on both right plantar foot ulcer and left lower leg ulcer.  Use surgery shoe for walking or standing.     I provided patient education verbally regarding: proper ulcer care and the possible need for serial debridements, topical medications, specific dressings and biological engineered skin substitutes if indicated.           Counseling/Education:  I provided patient education verbally regarding:   The aspects of diabetes and how it pertains to the feet. I explained the importance of proper diabetic foot care and how it is essential for the health of their feet.     I discussed the importance of knowing their Hemoglobin A1c and that the level needs to be as close to 6 as possible. I discussed the increase complications of high blood sugar including stroke, blindness, heart attack, kidney failure and loss of limb secondary to neuropathy and PVD.      With neuropathy, beware of any breaks in the skin or redness. These areas are not recognized early due to the numbness.     I discussed Diabetes, lower back issues, metabolic disorders, systemic causes, chemotherapy, vitamin deficiency, heavy metal exposure, as  some of the causes. I also explained that as much as 40% of the time we can not find a cause. I discussed different treatments available to control the symptoms but which may not cure the problem.         Counseled patient on the aspects of diabetes and how it pertains to the feet.  I explained the importance of proper diabetic foot care and how it is essential for the health of their feet.        Shoe inspection. Patient instructed on proper foot hygeine. We discussed wearing proper shoe gear, daily foot inspections, never walking without protective shoe gear, never putting sharp instruments to feet, routine podiatric nail visits every 2-3 months.       Patient should call the office immediately if any signs of infection, such as fever, chills, sweats, increased redness or pain.     Patient was instructed to call the clinic or go to the emergency department if their symptoms do not improve, worsens, or if new symptoms develop.  Patient was advised that if any increased swelling, pain, or numbness arise to go immediately to the ED. Patient knows to call any time if an emergency arises. Shared decision making occurred and patient verbalized understanding in agreement with this plan.      I counseled the patient on their conditions, their implications and medical management.     >50% of this > 60 minute visit was spent face to face educating/counseling the patient     I spent a total of 60 minutes on the day of the visit.This includes face to face time and non-face to face time preparing to see the patient (eg, review of tests), obtaining and/or reviewing separately obtained history, documenting clinical information in the electronic or other health record, independently interpreting results and communicating results to the patient/family/caregiver, or care coordinator.

## 2023-10-08 ENCOUNTER — HOSPITAL ENCOUNTER (INPATIENT)
Facility: HOSPITAL | Age: 63
LOS: 11 days | Discharge: HOME HEALTH | DRG: 682 | End: 2023-10-19
Attending: EMERGENCY MEDICINE | Admitting: FAMILY MEDICINE
Payer: MEDICARE

## 2023-10-08 DIAGNOSIS — I87.2 VENOUS STASIS DERMATITIS OF BOTH LOWER EXTREMITIES: ICD-10-CM

## 2023-10-08 DIAGNOSIS — I49.9 PACEMAKER-MEDIATED TACHYCARDIA: ICD-10-CM

## 2023-10-08 DIAGNOSIS — L03.115 CELLULITIS OF RIGHT LEG: ICD-10-CM

## 2023-10-08 DIAGNOSIS — R00.0 WIDE-COMPLEX TACHYCARDIA: ICD-10-CM

## 2023-10-08 DIAGNOSIS — D63.1 ANEMIA DUE TO STAGE 4 CHRONIC KIDNEY DISEASE: ICD-10-CM

## 2023-10-08 DIAGNOSIS — D69.6 THROMBOCYTOPENIA: ICD-10-CM

## 2023-10-08 DIAGNOSIS — D64.9 NORMOCHROMIC NORMOCYTIC ANEMIA: ICD-10-CM

## 2023-10-08 DIAGNOSIS — N18.5 CKD (CHRONIC KIDNEY DISEASE), STAGE V: ICD-10-CM

## 2023-10-08 DIAGNOSIS — N18.4 ANEMIA DUE TO STAGE 4 CHRONIC KIDNEY DISEASE: ICD-10-CM

## 2023-10-08 DIAGNOSIS — L97.512 ULCER OF RIGHT FOOT WITH FAT LAYER EXPOSED: ICD-10-CM

## 2023-10-08 DIAGNOSIS — I50.9 ACUTE ON CHRONIC CONGESTIVE HEART FAILURE, UNSPECIFIED HEART FAILURE TYPE: ICD-10-CM

## 2023-10-08 DIAGNOSIS — I47.19 ATRIAL TACHYCARDIA: Primary | ICD-10-CM

## 2023-10-08 LAB
ALBUMIN SERPL BCP-MCNC: 3.6 G/DL (ref 3.5–5.2)
ALP SERPL-CCNC: 61 U/L (ref 55–135)
ALT SERPL W/O P-5'-P-CCNC: 17 U/L (ref 10–44)
ANION GAP SERPL CALC-SCNC: 13 MMOL/L (ref 8–16)
APTT PPP: 28.6 SEC (ref 21–32)
AST SERPL-CCNC: 30 U/L (ref 10–40)
BASOPHILS # BLD AUTO: 0.02 K/UL (ref 0–0.2)
BASOPHILS NFR BLD: 0.3 % (ref 0–1.9)
BILIRUB SERPL-MCNC: 1.8 MG/DL (ref 0.1–1)
BNP SERPL-MCNC: 1720 PG/ML (ref 0–99)
BUN SERPL-MCNC: 72 MG/DL (ref 8–23)
CALCIUM SERPL-MCNC: 9 MG/DL (ref 8.7–10.5)
CHLORIDE SERPL-SCNC: 100 MMOL/L (ref 95–110)
CO2 SERPL-SCNC: 22 MMOL/L (ref 23–29)
CREAT SERPL-MCNC: 3.1 MG/DL (ref 0.5–1.4)
DIFFERENTIAL METHOD: ABNORMAL
EOSINOPHIL # BLD AUTO: 0 K/UL (ref 0–0.5)
EOSINOPHIL NFR BLD: 0.6 % (ref 0–8)
ERYTHROCYTE [DISTWIDTH] IN BLOOD BY AUTOMATED COUNT: 19 % (ref 11.5–14.5)
EST. GFR  (NO RACE VARIABLE): 22 ML/MIN/1.73 M^2
GLUCOSE SERPL-MCNC: 121 MG/DL (ref 70–110)
GLUCOSE SERPL-MCNC: 121 MG/DL (ref 70–110)
HCT VFR BLD AUTO: 32.5 % (ref 40–54)
HGB BLD-MCNC: 9.8 G/DL (ref 14–18)
IMM GRANULOCYTES # BLD AUTO: 0.02 K/UL (ref 0–0.04)
IMM GRANULOCYTES NFR BLD AUTO: 0.3 % (ref 0–0.5)
INR PPP: 1.3 (ref 0.8–1.2)
LACTATE SERPL-SCNC: 1.4 MMOL/L (ref 0.5–1.9)
LIPASE SERPL-CCNC: 35 U/L (ref 4–60)
LYMPHOCYTES # BLD AUTO: 0.4 K/UL (ref 1–4.8)
LYMPHOCYTES NFR BLD: 6.7 % (ref 18–48)
MAGNESIUM SERPL-MCNC: 2.2 MG/DL (ref 1.6–2.6)
MCH RBC QN AUTO: 28.2 PG (ref 27–31)
MCHC RBC AUTO-ENTMCNC: 30.2 G/DL (ref 32–36)
MCV RBC AUTO: 93 FL (ref 82–98)
MONOCYTES # BLD AUTO: 0.6 K/UL (ref 0.3–1)
MONOCYTES NFR BLD: 9.1 % (ref 4–15)
NEUTROPHILS # BLD AUTO: 5.2 K/UL (ref 1.8–7.7)
NEUTROPHILS NFR BLD: 83 % (ref 38–73)
NRBC BLD-RTO: 0 /100 WBC
PLATELET # BLD AUTO: 114 K/UL (ref 150–450)
PMV BLD AUTO: 11.4 FL (ref 9.2–12.9)
POTASSIUM SERPL-SCNC: 4.8 MMOL/L (ref 3.5–5.1)
PROT SERPL-MCNC: 7.4 G/DL (ref 6–8.4)
PROTHROMBIN TIME: 14 SEC (ref 9–12.5)
RBC # BLD AUTO: 3.48 M/UL (ref 4.6–6.2)
SODIUM SERPL-SCNC: 135 MMOL/L (ref 136–145)
T4 FREE SERPL-MCNC: 1.23 NG/DL (ref 0.71–1.51)
TROPONIN I SERPL HS-MCNC: 23.5 PG/ML (ref 0–14.9)
TROPONIN I SERPL HS-MCNC: 25.8 PG/ML (ref 0–14.9)
TROPONIN I SERPL HS-MCNC: 25.8 PG/ML (ref 0–14.9)
TSH SERPL DL<=0.005 MIU/L-ACNC: 11.89 UIU/ML (ref 0.34–5.6)
WBC # BLD AUTO: 6.26 K/UL (ref 3.9–12.7)

## 2023-10-08 PROCEDURE — 36415 COLL VENOUS BLD VENIPUNCTURE: CPT | Performed by: STUDENT IN AN ORGANIZED HEALTH CARE EDUCATION/TRAINING PROGRAM

## 2023-10-08 PROCEDURE — 93005 ELECTROCARDIOGRAM TRACING: CPT | Performed by: INTERNAL MEDICINE

## 2023-10-08 PROCEDURE — 83735 ASSAY OF MAGNESIUM: CPT | Performed by: STUDENT IN AN ORGANIZED HEALTH CARE EDUCATION/TRAINING PROGRAM

## 2023-10-08 PROCEDURE — 96375 TX/PRO/DX INJ NEW DRUG ADDON: CPT

## 2023-10-08 PROCEDURE — 99900031 HC PATIENT EDUCATION (STAT)

## 2023-10-08 PROCEDURE — 63600175 PHARM REV CODE 636 W HCPCS: Performed by: EMERGENCY MEDICINE

## 2023-10-08 PROCEDURE — 93010 EKG 12-LEAD: ICD-10-PCS | Mod: ,,, | Performed by: INTERNAL MEDICINE

## 2023-10-08 PROCEDURE — 96374 THER/PROPH/DIAG INJ IV PUSH: CPT

## 2023-10-08 PROCEDURE — 83880 ASSAY OF NATRIURETIC PEPTIDE: CPT | Performed by: STUDENT IN AN ORGANIZED HEALTH CARE EDUCATION/TRAINING PROGRAM

## 2023-10-08 PROCEDURE — 85610 PROTHROMBIN TIME: CPT | Performed by: STUDENT IN AN ORGANIZED HEALTH CARE EDUCATION/TRAINING PROGRAM

## 2023-10-08 PROCEDURE — 99285 EMERGENCY DEPT VISIT HI MDM: CPT | Mod: 25

## 2023-10-08 PROCEDURE — 94761 N-INVAS EAR/PLS OXIMETRY MLT: CPT

## 2023-10-08 PROCEDURE — 80053 COMPREHEN METABOLIC PANEL: CPT | Performed by: STUDENT IN AN ORGANIZED HEALTH CARE EDUCATION/TRAINING PROGRAM

## 2023-10-08 PROCEDURE — 93010 ELECTROCARDIOGRAM REPORT: CPT | Mod: 76,,, | Performed by: INTERNAL MEDICINE

## 2023-10-08 PROCEDURE — 25000003 PHARM REV CODE 250: Performed by: STUDENT IN AN ORGANIZED HEALTH CARE EDUCATION/TRAINING PROGRAM

## 2023-10-08 PROCEDURE — 51702 INSERT TEMP BLADDER CATH: CPT

## 2023-10-08 PROCEDURE — 63600175 PHARM REV CODE 636 W HCPCS: Performed by: STUDENT IN AN ORGANIZED HEALTH CARE EDUCATION/TRAINING PROGRAM

## 2023-10-08 PROCEDURE — 84484 ASSAY OF TROPONIN QUANT: CPT | Mod: 91 | Performed by: NURSE PRACTITIONER

## 2023-10-08 PROCEDURE — 85025 COMPLETE CBC W/AUTO DIFF WBC: CPT | Performed by: STUDENT IN AN ORGANIZED HEALTH CARE EDUCATION/TRAINING PROGRAM

## 2023-10-08 PROCEDURE — 84484 ASSAY OF TROPONIN QUANT: CPT | Performed by: STUDENT IN AN ORGANIZED HEALTH CARE EDUCATION/TRAINING PROGRAM

## 2023-10-08 PROCEDURE — 20000000 HC ICU ROOM

## 2023-10-08 PROCEDURE — 25000003 PHARM REV CODE 250: Performed by: NURSE PRACTITIONER

## 2023-10-08 PROCEDURE — 84443 ASSAY THYROID STIM HORMONE: CPT | Performed by: STUDENT IN AN ORGANIZED HEALTH CARE EDUCATION/TRAINING PROGRAM

## 2023-10-08 PROCEDURE — 87040 BLOOD CULTURE FOR BACTERIA: CPT | Performed by: STUDENT IN AN ORGANIZED HEALTH CARE EDUCATION/TRAINING PROGRAM

## 2023-10-08 PROCEDURE — 83605 ASSAY OF LACTIC ACID: CPT | Performed by: STUDENT IN AN ORGANIZED HEALTH CARE EDUCATION/TRAINING PROGRAM

## 2023-10-08 PROCEDURE — 27000221 HC OXYGEN, UP TO 24 HOURS

## 2023-10-08 PROCEDURE — 63600175 PHARM REV CODE 636 W HCPCS: Performed by: INTERNAL MEDICINE

## 2023-10-08 PROCEDURE — 99900035 HC TECH TIME PER 15 MIN (STAT)

## 2023-10-08 PROCEDURE — 84439 ASSAY OF FREE THYROXINE: CPT | Performed by: STUDENT IN AN ORGANIZED HEALTH CARE EDUCATION/TRAINING PROGRAM

## 2023-10-08 PROCEDURE — 94799 UNLISTED PULMONARY SVC/PX: CPT

## 2023-10-08 PROCEDURE — 25000003 PHARM REV CODE 250: Performed by: FAMILY MEDICINE

## 2023-10-08 PROCEDURE — 93010 ELECTROCARDIOGRAM REPORT: CPT | Mod: ,,, | Performed by: INTERNAL MEDICINE

## 2023-10-08 PROCEDURE — 83690 ASSAY OF LIPASE: CPT | Performed by: STUDENT IN AN ORGANIZED HEALTH CARE EDUCATION/TRAINING PROGRAM

## 2023-10-08 PROCEDURE — 85730 THROMBOPLASTIN TIME PARTIAL: CPT | Performed by: STUDENT IN AN ORGANIZED HEALTH CARE EDUCATION/TRAINING PROGRAM

## 2023-10-08 RX ORDER — MUPIROCIN 20 MG/G
OINTMENT TOPICAL 2 TIMES DAILY
Status: COMPLETED | OUTPATIENT
Start: 2023-10-08 | End: 2023-10-13

## 2023-10-08 RX ORDER — ATORVASTATIN CALCIUM 20 MG/1
20 TABLET, FILM COATED ORAL DAILY
COMMUNITY

## 2023-10-08 RX ORDER — IBUPROFEN 200 MG
24 TABLET ORAL
Status: DISCONTINUED | OUTPATIENT
Start: 2023-10-08 | End: 2023-10-19 | Stop reason: HOSPADM

## 2023-10-08 RX ORDER — FUROSEMIDE 10 MG/ML
40 INJECTION INTRAMUSCULAR; INTRAVENOUS
Status: COMPLETED | OUTPATIENT
Start: 2023-10-08 | End: 2023-10-08

## 2023-10-08 RX ORDER — NAPROXEN SODIUM 220 MG/1
81 TABLET, FILM COATED ORAL DAILY
Status: DISCONTINUED | OUTPATIENT
Start: 2023-10-09 | End: 2023-10-12

## 2023-10-08 RX ORDER — GLUCAGON 1 MG
1 KIT INJECTION
Status: DISCONTINUED | OUTPATIENT
Start: 2023-10-08 | End: 2023-10-19 | Stop reason: HOSPADM

## 2023-10-08 RX ORDER — ACETAMINOPHEN 325 MG/1
650 TABLET ORAL EVERY 8 HOURS PRN
Status: DISCONTINUED | OUTPATIENT
Start: 2023-10-08 | End: 2023-10-19 | Stop reason: HOSPADM

## 2023-10-08 RX ORDER — CARVEDILOL 3.12 MG/1
3.12 TABLET ORAL 2 TIMES DAILY
Status: DISCONTINUED | OUTPATIENT
Start: 2023-10-08 | End: 2023-10-09

## 2023-10-08 RX ORDER — BUMETANIDE 0.25 MG/ML
1 INJECTION INTRAMUSCULAR; INTRAVENOUS 2 TIMES DAILY
Status: DISCONTINUED | OUTPATIENT
Start: 2023-10-08 | End: 2023-10-08

## 2023-10-08 RX ORDER — SODIUM CHLORIDE 0.9 % (FLUSH) 0.9 %
10 SYRINGE (ML) INJECTION
Status: DISCONTINUED | OUTPATIENT
Start: 2023-10-08 | End: 2023-10-19 | Stop reason: HOSPADM

## 2023-10-08 RX ORDER — BUMETANIDE 0.25 MG/ML
1 INJECTION INTRAMUSCULAR; INTRAVENOUS 2 TIMES DAILY
Status: DISCONTINUED | OUTPATIENT
Start: 2023-10-09 | End: 2023-10-09

## 2023-10-08 RX ORDER — TORSEMIDE 20 MG/1
20 TABLET ORAL DAILY
Status: DISCONTINUED | OUTPATIENT
Start: 2023-10-08 | End: 2023-10-08

## 2023-10-08 RX ORDER — IBUPROFEN 200 MG
16 TABLET ORAL
Status: DISCONTINUED | OUTPATIENT
Start: 2023-10-08 | End: 2023-10-19 | Stop reason: HOSPADM

## 2023-10-08 RX ORDER — TALC
6 POWDER (GRAM) TOPICAL NIGHTLY PRN
Status: DISCONTINUED | OUTPATIENT
Start: 2023-10-08 | End: 2023-10-19 | Stop reason: HOSPADM

## 2023-10-08 RX ORDER — ALBUTEROL SULFATE 0.83 MG/ML
2.5 SOLUTION RESPIRATORY (INHALATION) EVERY 8 HOURS PRN
Status: DISCONTINUED | OUTPATIENT
Start: 2023-10-08 | End: 2023-10-19 | Stop reason: HOSPADM

## 2023-10-08 RX ORDER — GABAPENTIN 300 MG/1
300 CAPSULE ORAL 2 TIMES DAILY
Status: DISCONTINUED | OUTPATIENT
Start: 2023-10-08 | End: 2023-10-19 | Stop reason: HOSPADM

## 2023-10-08 RX ORDER — INSULIN ASPART 100 [IU]/ML
0-5 INJECTION, SOLUTION INTRAVENOUS; SUBCUTANEOUS
Status: DISCONTINUED | OUTPATIENT
Start: 2023-10-08 | End: 2023-10-19 | Stop reason: HOSPADM

## 2023-10-08 RX ORDER — ONDANSETRON 2 MG/ML
4 INJECTION INTRAMUSCULAR; INTRAVENOUS EVERY 8 HOURS PRN
Status: DISCONTINUED | OUTPATIENT
Start: 2023-10-08 | End: 2023-10-19 | Stop reason: HOSPADM

## 2023-10-08 RX ORDER — LIDOCAINE HYDROCHLORIDE 20 MG/ML
JELLY TOPICAL
Status: COMPLETED | OUTPATIENT
Start: 2023-10-08 | End: 2023-10-08

## 2023-10-08 RX ORDER — ALPRAZOLAM 0.25 MG/1
0.25 TABLET ORAL NIGHTLY PRN
Status: DISCONTINUED | OUTPATIENT
Start: 2023-10-08 | End: 2023-10-19 | Stop reason: HOSPADM

## 2023-10-08 RX ORDER — GABAPENTIN 600 MG/1
600 TABLET ORAL 3 TIMES DAILY
COMMUNITY

## 2023-10-08 RX ORDER — TRAMADOL HYDROCHLORIDE 50 MG/1
50 TABLET ORAL EVERY 6 HOURS PRN
Status: DISCONTINUED | OUTPATIENT
Start: 2023-10-08 | End: 2023-10-19 | Stop reason: HOSPADM

## 2023-10-08 RX ORDER — FUROSEMIDE 10 MG/ML
40 INJECTION INTRAMUSCULAR; INTRAVENOUS ONCE
Status: COMPLETED | OUTPATIENT
Start: 2023-10-08 | End: 2023-10-08

## 2023-10-08 RX ORDER — PANTOPRAZOLE SODIUM 40 MG/1
40 TABLET, DELAYED RELEASE ORAL DAILY
Status: DISCONTINUED | OUTPATIENT
Start: 2023-10-09 | End: 2023-10-14

## 2023-10-08 RX ORDER — ATORVASTATIN CALCIUM 20 MG/1
20 TABLET, FILM COATED ORAL NIGHTLY
Status: DISCONTINUED | OUTPATIENT
Start: 2023-10-08 | End: 2023-10-19 | Stop reason: HOSPADM

## 2023-10-08 RX ADMIN — AMIODARONE HYDROCHLORIDE 1 MG/MIN: 1.8 INJECTION, SOLUTION INTRAVENOUS at 04:10

## 2023-10-08 RX ADMIN — ALPRAZOLAM 0.25 MG: 0.25 TABLET ORAL at 10:10

## 2023-10-08 RX ADMIN — GABAPENTIN 300 MG: 300 CAPSULE ORAL at 08:10

## 2023-10-08 RX ADMIN — AMIODARONE HYDROCHLORIDE 1 MG/MIN: 1.8 INJECTION, SOLUTION INTRAVENOUS at 09:10

## 2023-10-08 RX ADMIN — LIDOCAINE HYDROCHLORIDE 10 ML: 20 JELLY TOPICAL at 03:10

## 2023-10-08 RX ADMIN — TRAMADOL HYDROCHLORIDE 50 MG: 50 TABLET, COATED ORAL at 08:10

## 2023-10-08 RX ADMIN — ATORVASTATIN CALCIUM 20 MG: 20 TABLET, FILM COATED ORAL at 08:10

## 2023-10-08 RX ADMIN — MUPIROCIN 1 G: 20 OINTMENT TOPICAL at 09:10

## 2023-10-08 RX ADMIN — CARVEDILOL 3.12 MG: 3.12 TABLET, FILM COATED ORAL at 08:10

## 2023-10-08 RX ADMIN — FUROSEMIDE 40 MG: 10 INJECTION, SOLUTION INTRAMUSCULAR; INTRAVENOUS at 09:10

## 2023-10-08 RX ADMIN — FUROSEMIDE 40 MG: 10 INJECTION, SOLUTION INTRAMUSCULAR; INTRAVENOUS at 04:10

## 2023-10-08 NOTE — ED PROVIDER NOTES
"Encounter Date: 10/8/2023       History     Chief Complaint   Patient presents with    Tachycardia    Shortness of Breath     This is a 63-year-old male with a significant cardiac history including coronary artery disease, CHF with an EF of 20%, current AICD in place who presents complaining of fatigue, flank pain, decreased urination, shortness of breath, feels as though he is fluid overloaded.  Tells me that symptoms began approximately 2 days ago, not associated with fever but overall feels fatigued.  His shortness of breath has been progressively worse over the weekend, not really associated with chest pain.  Tells me that he has had intermittent episodes of tachycardia.    The history is provided by the patient. No  was used.     Review of patient's allergies indicates:   Allergen Reactions    Vasopressin Anaphylaxis and Other (See Comments)     Increased pressure in his head; felt like his eyeballs and veins were going to pop out of his head.     Vasopressin analogues Other (See Comments) and Anaphylaxis     "felt like eyes going to pop out of my head"  Other reaction(s): Other (See Comments)  "felt like eyes going to pop out of my head"  Increased pressure in his head; felt like his eyeballs and veins were going to pop out of his head.     Heparin Other (See Comments)     Other reaction(s): "depleted my blood platets"    Decreased platelet count    Heparin analogues Other (See Comments)     Depleted WBC count    Morphine Hallucinations, Other (See Comments) and Anxiety     Other reaction(s): Hallucinations  Paranoid, hallucinations       Past Medical History:   Diagnosis Date    AICD (automatic cardioverter/defibrillator) present     Anemia, chronic disease 07/27/2021    Anemia, unspecified 07/27/2021    Anxiety and depression 2012    CAD (coronary artery disease) 2012    Cardiomegaly 2016    CHF (congestive heart failure) 2012    Cholecystitis 2017    Complete heart block 2012    Diabetic " foot ulcer     DVT (deep venous thrombosis)     And pulmonary embolus    GERD (gastroesophageal reflux disease)     Heparin induced thrombocytopenia     Hyperlipemia     IDDM (insulin dependent diabetes mellitus) 1983    With neuropathy    Ischemic cardiomyopathy     MI (myocardial infarction)     Morbid obesity     MRSA (methicillin resistant Staphylococcus aureus) infection 2019    Noncompliance with therapeutic plan     Normochromic normocytic anemia 2021    Osteomyelitis of right foot 2019    Osteomyelitis of right foot 2022    Klebsiella from bone, GBS in blood    Pulmonary edema     Respiratory failure     Sepsis 2019    Due to cellulitis right leg    Sleep apnea 2013    Thrombocytopathy     Venous stasis dermatitis of both lower extremities      Past Surgical History:   Procedure Laterality Date    CARDIAC PACEMAKER PLACEMENT  2012    CARDIAC PACEMAKER PLACEMENT  10/04/2018    battery replacement    CORONARY ARTERY BYPASS GRAFT  2012    ESOPHAGOGASTRODUODENOSCOPY  2017    SAMARA FILTER PLACEMENT  2012    vena cava    LUMBAR SYMPATHETIC NERVE BLOCK Right 2019    Procedure: BLOCK, NERVE, SYMPATHETIC, LUMBAR;  Surgeon: Tashi Good MD;  Location: [facility] OR;  Service: Pain Management;  Laterality: Right;  RIGHT SYMPATHETIC NERVE BLOCK     Family History   Problem Relation Age of Onset    Arthritis Mother     Diabetes Mother     Cancer Father     Heart disease Father     Stroke Father      Social History     Tobacco Use    Smoking status: Former     Current packs/day: 0.00     Average packs/day: 2.0 packs/day for 38.0 years (76.0 ttl pk-yrs)     Types: Cigarettes     Start date:      Quit date:      Years since quittin.7    Smokeless tobacco: Never   Substance Use Topics    Alcohol use: No    Drug use: Never     Review of Systems   Constitutional:  Negative for chills, fatigue and fever.   HENT:  Negative for congestion,  hearing loss, sore throat and trouble swallowing.    Eyes:  Negative for visual disturbance.   Respiratory:  Positive for chest tightness and shortness of breath. Negative for cough.    Cardiovascular:  Positive for palpitations and leg swelling. Negative for chest pain.   Gastrointestinal:  Negative for abdominal pain and nausea.   Endocrine: Negative for polyuria.   Genitourinary:  Negative for difficulty urinating.   Musculoskeletal:  Negative for arthralgias and myalgias.   Skin:  Negative for rash.   Neurological:  Negative for dizziness and headaches.   Psychiatric/Behavioral:  The patient is not nervous/anxious.        Physical Exam     Initial Vitals   BP Pulse Resp Temp SpO2   10/08/23 1433 10/08/23 1433 10/08/23 1433 10/08/23 1508 10/08/23 1433   100/82 (!) 118 20 97.6 °F (36.4 °C) 96 %      MAP       --                Physical Exam    Nursing note and vitals reviewed.  Constitutional:   Acutely ill-appearing   HENT:   Head: Normocephalic and atraumatic.   Eyes: Conjunctivae and EOM are normal.   Neck: Neck supple.   Cardiovascular:            Faint distal pulses bilateral lower extremities, 2+ bilateral upper  3+ pitting edema bilateral lower extremities to the level of the knee.    Exam significant for irregular tachycardia.  Faint distal pulses bilateral lower extremities, 2+ bilateral upper  3+ pitting edema bilateral lower extremities to the level of the knee.    Otherwise chest wall is stable, nontender. No overlying skin changes. No masses or crepitus.        Pulmonary/Chest:   Mildly increased work of breathing with lower lung crackles.  Hypoxic to 93% on room air.   Abdominal:   Protuberant abdomen that is nontender throughout, no peritoneal signs   Musculoskeletal:      Cervical back: Neck supple.     Neurological: He is alert and oriented to person, place, and time. GCS score is 15. GCS eye subscore is 4. GCS verbal subscore is 5. GCS motor subscore is 6.   Skin: Skin is warm and dry.   Venous  stasis changes to bilateral lower extremities   Psychiatric: He has a normal mood and affect. His behavior is normal. Judgment and thought content normal.         ED Course   Critical Care    Date/Time: 10/8/2023 5:05 PM    Performed by: Chapincito Dickens PA-C  Authorized by: Vanesa Don MD  Direct patient critical care time: 20 minutes  Additional history critical care time: 10 minutes  Ordering / reviewing critical care time: 10 minutes  Documentation critical care time: 10 minutes  Consulting other physicians critical care time: 10 minutes  Total critical care time (exclusive of procedural time) : 60 minutes  Critical care time was exclusive of separately billable procedures and treating other patients and teaching time.  Critical care was necessary to treat or prevent imminent or life-threatening deterioration of the following conditions: cardiac failure, circulatory failure, shock and respiratory failure.  Critical care was time spent personally by me on the following activities: discussions with consultants, discussions with primary provider, interpretation of cardiac output measurements, evaluation of patient's response to treatment, examination of patient, obtaining history from patient or surrogate, ordering and performing treatments and interventions, ordering and review of laboratory studies, pulse oximetry, re-evaluation of patient's condition and review of old charts.        Labs Reviewed   CBC W/ AUTO DIFFERENTIAL - Abnormal; Notable for the following components:       Result Value    RBC 3.48 (*)     Hemoglobin 9.8 (*)     Hematocrit 32.5 (*)     MCHC 30.2 (*)     RDW 19.0 (*)     Platelets 114 (*)     Lymph # 0.4 (*)     Gran % 83.0 (*)     Lymph % 6.7 (*)     All other components within normal limits   COMPREHENSIVE METABOLIC PANEL - Abnormal; Notable for the following components:    Sodium 135 (*)     CO2 22 (*)     Glucose 121 (*)     BUN 72 (*)     Creatinine 3.1 (*)     Total Bilirubin 1.8  (*)     eGFR 22.0 (*)     All other components within normal limits   PROTIME-INR - Abnormal; Notable for the following components:    Prothrombin Time 14.0 (*)     INR 1.3 (*)     All other components within normal limits   TROPONIN I HIGH SENSITIVITY - Abnormal; Notable for the following components:    Troponin I High Sensitivity 23.5 (*)     All other components within normal limits   TSH - Abnormal; Notable for the following components:    TSH 11.890 (*)     All other components within normal limits   B-TYPE NATRIURETIC PEPTIDE - Abnormal; Notable for the following components:    BNP 1,720 (*)     All other components within normal limits   TROPONIN I HIGH SENSITIVITY - Abnormal; Notable for the following components:    Troponin I High Sensitivity 25.8 (*)     All other components within normal limits    Narrative:     In order to access the algorithm for troponin high  sensitivity orders access the address below:  [email or web address]   TROPONIN I HIGH SENSITIVITY - Abnormal; Notable for the following components:    Troponin I High Sensitivity 25.8 (*)     All other components within normal limits    Narrative:     In order to access the algorithm for troponin high  sensitivity orders access the address below:  [email or web address]   LACTIC ACID, PLASMA   LIPASE   MAGNESIUM   APTT   T4, FREE   URINALYSIS, REFLEX TO URINE CULTURE        ECG Results              EKG 12-lead (Final result)  Result time 10/11/23 15:19:59      Final result by Interface, Lab In [facility] (10/11/23 15:19:59)                   Narrative:    Test Reason : R00.0,    Vent. Rate : 120 BPM     Atrial Rate : 144 BPM     P-R Int : 000 ms          QRS Dur : 196 ms      QT Int : 436 ms       P-R-T Axes : 071 202 026 degrees     QTc Int : 616 ms    Ventricular-paced rhythm  Biventricular pacemaker detected  Abnormal ECG  When compared with ECG  of 08-OCT-2023 14:39,  Vent. rate has increased BY   4 BPM  Confirmed by Cruz Torres MD ([id number]) on 10/11/2023 3:19:48 PM    Referred By: NOE   SELF           Confirmed By:Cruz Torres MD                                     EKG 12-lead (Final result)  Result time 10/11/23 15:19:34      Final result by Interface, Lab In [facility] (10/11/23 15:19:34)                   Narrative:    Test Reason :     Vent. Rate : 116 BPM     Atrial Rate : 045 BPM     P-R Int : 000 ms          QRS Dur : 196 ms      QT Int : 442 ms       P-R-T Axes : 090 205 -02 degrees     QTc Int : 614 ms    Ventricular-paced rhythm  Suspect unspecified pacemaker failure  Abnormal ECG  When compared with ECG of 12-JUN-2023 19:32,  Vent. rate has increased BY  45 BPM  Confirmed by Cruz Torres MD ([id number]) on 10/11/2023 3:19:24 PM    Referred By: NOE   SELF           Confirmed By:Cruz Torres MD                                  Imaging Results              X-Ray Chest AP Portable (Final result)  Result time 10/08/23 15:13:32      Final result by Tong Morales MD (10/08/23 15:13:32)                   Narrative:    CLINICAL HISTORY:  63 years (1960) Male Tachycardia; Shortness of Breath    TECHNIQUE:  Portable AP radiograph the chest. One view.    COMPARISON:  Radiograph from June 12, 2023    FINDINGS:  There is vascular congestion with increased interstitial markings findings indicating mild pulmonary edema.  There is blunting of both costophrenic angles consistent with trace pleural effusions and adjacent atelectasis. No pneumothorax is identified. The cardiac silhouette is moderately enlarged. The median sternotomy wires and the left sided pacemaker are unchanged.  Osseous structures appear unchanged. The visualized upper abdomen is unremarkable.    IMPRESSION:  Moderate to severe cardiomegaly and findings of mild interstitial pulmonary edema.                  .            Electronically signed by:  Tong Morales MD   10/08/2023 03:13 PM CDT Workstation: [id number]                                     Medications   albuterol nebulizer solution 2.5 mg (has no administration in time range)   atorvastatin tablet 20 mg (20 mg Oral Given 10/13/23 2037)   gabapentin capsule 300 mg (300 mg Oral Given 10/14/23 1012)   pantoprazole EC tablet 40 mg (40 mg Oral Given 10/14/23 0551)   traMADoL tablet 50 mg (50 mg Oral Given 10/14/23 1012)   sodium chloride 0.9% flush 10 mL (has no administration in time range)   melatonin tablet 6 mg (6 mg Oral Given 10/13/23 2037)   ondansetron injection 4 mg (has no administration in time range)   acetaminophen tablet 650 mg (650 mg Oral Not Given 10/13/23 2027)   glucose chewable tablet 16 g (has no administration in time range)   glucose chewable tablet 24 g (has no administration in time range)   dextrose 50% injection 12.5 g (has no administration in time range)   dextrose 50% injection 25 g (has no administration in time range)   glucagon (human recombinant) injection 1 mg (has no administration in time range)   insulin aspart U-100 pen 0-5 Units (1 Units Subcutaneous Given 10/12/23 2055)   ALPRAZolam tablet 0.25 mg (0.25 mg Oral Given 10/13/23 2037)   PHENYLephrine 10 mg/mL injection (  Not Given 10/9/23 0115)   PHENYLephrine 10 mg/mL injection (  Not Given 10/9/23 0130)   levothyroxine tablet 25 mcg (25 mcg Oral Given 10/14/23 0551)   albumin human 25% bottle 25 g (0 g Intravenous Stopped 10/10/23 2008)   polyethylene glycol packet 17 g (17 g Oral Given 10/14/23 1012)   LIDOcaine HCl 2% urojet (10 mLs Mucous Membrane Given 10/8/23 1547)   furosemide injection 40 mg (40 mg Intravenous Given 10/8/23 1649)   mupirocin 2 % ointment (1 g Nasal Given 10/13/23 0853)   furosemide injection 40 mg (40 mg Intravenous Given 10/8/23 2150)   LIDOcaine HCl 2% urojet ( Urethral Given 10/9/23 1645)     Medical Decision Making  63-year-old male presents emergency room for evaluation of generalized fatigue and  tachycardia.  He is ill-appearing on my exam with evidence of fluid overload.  Patient found to have a paced wide complex rhythm on initial presentation, tachycardic to 116.  Throughout his course, developed worsening tachycardia with a rate max of a proximally 140, repeat EKG showing again wide complex tachycardia characterized by 3-4 beat runs of what appears to be V-tach followed by isolated p-wave.  Given runs of V-tach in this ill-appearing patient, discussed with cardiology on-call.  He does not feel this represents ventricular tachycardia but instead a pacemaker mediated tachycardia and does not recommend cardioversion.  Pacemaker interrogation shows atrial tachycardia.  Recommendation was to monitor, weight for labs.  Labs notable for stable anemia, stable GFR, mildly elevated troponin at 23.5, elevated BNP at 1720, elevated TSH at 11.89.  During course of emergency stay, patient became hypotensive with a pressure of 97/59, but maintain normal mental status.      Amount and/or Complexity of Data Reviewed  Labs: ordered. Decision-making details documented in ED Course.  Radiology: ordered and independent interpretation performed. Decision-making details documented in ED Course.     Details: Chest x-ray on my review with mild worsening of interstitial edema, cardiomegaly with unchanged left dual-chamber pacemaker  ECG/medicine tests: ordered and independent interpretation performed. Decision-making details documented in ED Course.     Details: This represents my own interpretation of the EKG. EKG was reviewed by an attending physician for evidence of acute ischemia. Previous EKGs were reviewed as available and indicated for comparison.    1439: paced wide complex tachycardia at a ventricular rate of 116  1525: Paced wide complex tachycardia at a ventricular rate of 120 with 4 beat runs of V-tach  by P wave.    Discussion of management or test interpretation with external provider(s): Patient case with  cardiology on-call, Dr. Vences.  He recommends diuresis as well as initiate amiodarone drip.  Drip ordered and 40 mg IV Lasix ordered.  Discussed patient case with Hospital Medicine, Gisselle Villanueva.  She will admit the patient to the ICU    Risk  Prescription drug management.  Decision regarding hospitalization.              Attending Attestation:     Physician Attestation Statement for NP/PA:   I have directed and reviewed the workup performed by the PA/NP.  I performed the substantive portion of the medical decision making.     Other NP/PA Attestation Additions:    History of Present Illness: 63-year-old male with complaints of shortness of breath and generalized weakness.   Physical Exam: Patient appears pale ill, no acute respiratory distress noted, mentating normally, lungs clear to auscultation bilaterally, tachycardia noted, afebrile.  No abdominal tenderness to palpation.   Medical Decision Making: Patient with previous presentations similar to this.  Patient with an AICD, no shocks liver, concerned that this rhythm is still ventricular, case immediately discussed with Cardiology for recommendations.  No cardioversion/defibrillation or shock recommended per Cardiology, pacemaker interrogated, no ventricular dysrhythmias noted, medication recommendations per cardiology initiated.  Patient with persistent tachycardia, cardiology aware.  Patient will be admitted to the intensive care unit by the Internal Medicine hospitalist service who has assumed care.  Patient unfortunately with mixed picture of both worsening but chronic kidney dysfunction as well as volume overload.  This will make treatment of volume overload more complicated.  All care of this patient has been discussed with Cardiology and medication recommendations as per Cardiology.  Blood cultures have been obtained per evidence of an ongoing foot wound for which he is being evaluated and followed by podiatry.  Currently there is no evidence of cellulitis  around this wound.  Care of the patient has been assumed by the hospitalist provider.             ED Course as of 10/14/23 1205   Sun Oct 08, 2023   1535 Initial consultation with Cardiology on-call, Dr. Vences.  Concern for pacemaker mediated tachycardia versus ventricular tachycardia.  Will send EKG for his review. [AN]   1540 Cardiology returned call.  EKG interpretation of likely pacemaker mediated tachycardia.  Does not recommend cardioversion at this time.  Send chest x-ray and await BNP. [AN]   1545 Interrogated pacemaker, [employer] rep informs patient is in atrial tachycardia.  Informed consulting cardiologist.  Pending BNP [AN]   1554 Troponin I High Sensitivity(!) [AN]   1619 BNP(!): 1,720 [AN]   1619 TSH(!): 11.890 [AN]   1625 Again discussed with cardiology.  Elevated BNP, now hypotensive but mentating well.  Recommendations for 40 mg IV Lasix, start amiodarone infusion, admit to ICU [AN]   1635 Informed patient of plan for IV diuretics and amiodarone drip.  Patient informs me that cardiology just discontinued his p.o. amiodarone b.i.d. approximately 1 month ago. [AN]   1636 Discussed with Hospital Medicine.  Admit to ICU [AN]   Sat Oct 14, 2023   1156 Potassium: 4.8 [AR]   1156 BUN(!): 72 [AR]   1156 Creatinine(!): 3.1 [AR]   1156 Lactate, Shane: 1.4 [AR]   1156 Magnesium : 2.2 [AR]   1156 Lipase: 35 [AR]   1156 BNP(!): 1,720 [AR]   1156 TSH(!): 11.890 [AR]   1156 Sodium(!): 135 [AR]   1156 Hemoglobin(!): 9.8 [AR]   1156 Hematocrit(!): 32.5 [AR]   1156 WBC: 6.26 [AR]   1156 Platelet Count(!): 114 [AR]      ED Course User Index  [AN] Chapincito Dickens PA-C  [AR] Vanesa Don MD                    Clinical Impression:   Final diagnoses:  [I47.19] Atrial tachycardia (Primary)  [I49.9] Pacemaker-mediated tachycardia  [I50.9] Acute on chronic congestive heart failure, unspecified heart failure type        ED Disposition Condition    Admit Stable                Chapincito Dickens, AIDEE  10/08/23 [id number]        Vanesa Don MD  10/14/23 [id number]

## 2023-10-08 NOTE — H&P
[facility] Medicine History & Physical Examination   Patient Name: Ana Bullard  MRN: [id number]  Patient Class: Emergency   Admission Date: 10/8/2023  2:31 PM  Length of Stay: 0  Attending Physician:   Primary Care Provider: Michelle Messina FNP  Face-to-Face encounter date: 10/08/2023  Code Status:Full Code  MPOA:  Chief Complaint: Tachycardia and Shortness of Breath        Patient information was obtained from patient, past medical records and ER records.   HISTORY OF PRESENT ILLNESS:   Ana Bullard is a 63 y.o. old  male who  has a past medical history of AICD (automatic cardioverter/defibrillator) present, Anemia, chronic disease (07/27/2021), Anemia, unspecified (07/27/2021), Anxiety and depression (2012), CAD (coronary artery disease) (2012), Cardiomegaly (2016), CHF (congestive heart failure) (2012), Cholecystitis (2017), Complete heart block (2012), Diabetic foot ulcer, DVT (deep venous thrombosis) (2012), GERD (gastroesophageal reflux disease) (2010), Heparin induced thrombocytopenia, Hyperlipemia (2011), IDDM (insulin dependent diabetes mellitus) (1983), Ischemic cardiomyopathy (2012), MI (myocardial infarction) (2012), Morbid obesity, MRSA (methicillin resistant Staphylococcus aureus) infection (01/19/2019), Noncompliance with therapeutic plan (2019), Normochromic normocytic anemia (07/27/2021), Osteomyelitis of right foot (01/2019), Osteomyelitis of right foot (09/01/2022), Pulmonary edema (2019), Respiratory failure (2019), Sepsis (01/2019), Sleep apnea (2013), Thrombocytopathy (2012), and Venous stasis dermatitis of both lower extremities.. The patient presented to [facility] on 10/8/2023 with a primary complaint of Tachycardia and Shortness of Breath  .   63-year-old male presents to emergency room with complaints a 10 lb weight gain in over 20 the 25 days, swelling to his lower extremities shortness of breath with minimal activity and at  rest.    Past medical history significant for coronary artery disease, insulin-dependent type 2 diabetes, heparin induced thrombocytopenia, ischemic cardiomyopathy, MRSA staph infection, chronic thrombocytopenia, osteomyelitis of the right foot, sleep apnea, venous stasis dermatitis to lower extremities    The patient states about a month ago some changes were made to his heart medications.  He was taken off of his hydralazine and amiodarone.  His cardiologist's having watching him closely.  The patient states he has been compliant with all of his medications and had been adhering to a strict low-salt diabetic cardiac diet.  Nonetheless noted that slowly over the past 20-25 days he has been gaining weight.  Also noted increasing shortness of breath and dyspnea with minimal activity.  He denied extra chest pain did complain of some dizziness and generalized fatigue      In the emergency room the patient was found to be in a wide complex tachycardia.  The rhythm was intermittently paced.  The patient's cardiologist was consulted by the ER physicians who recommended placing the patient on amiodarone drip.  The pacer was interrogated in the ED. the patient was given IV Lasix to manage his acute on chronic heart failure.  Will be admitted to the ICU secondary to being on vasoactive drip amiodarone and close observation of his cardiac status and his arrhythmias    REVIEW OF SYSTEMS:   10 Point Review of System was performed and was found to be negative except for that mentioned already in the HPI and   Review of Systems (Negative unless checked off)  Review of Systems   Constitutional:  Positive for malaise/fatigue.   HENT: Negative.     Eyes: Negative.    Respiratory:  Positive for cough and shortness of breath.    Cardiovascular:  Positive for leg swelling.   Gastrointestinal:  Positive for nausea.   Genitourinary: Negative.    Musculoskeletal: Negative.    Skin: Negative.    Neurological:  Positive for dizziness and  weakness.   Psychiatric/Behavioral: Negative.             PAST MEDICAL HISTORY:     Past Medical History:   Diagnosis Date    AICD (automatic cardioverter/defibrillator) present     Anemia, chronic disease 07/27/2021    Anemia, unspecified 07/27/2021    Anxiety and depression 2012    CAD (coronary artery disease) 2012    Cardiomegaly 2016    CHF (congestive heart failure) 2012    Cholecystitis 2017    Complete heart block 2012    Diabetic foot ulcer     DVT (deep venous thrombosis) 2012    And pulmonary embolus    GERD (gastroesophageal reflux disease) 2010    Heparin induced thrombocytopenia     Hyperlipemia 2011    IDDM (insulin dependent diabetes mellitus) 1983    With neuropathy    Ischemic cardiomyopathy 2012    MI (myocardial infarction) 2012    Morbid obesity     MRSA (methicillin resistant Staphylococcus aureus) infection 01/19/2019    Noncompliance with therapeutic plan 2019    Normochromic normocytic anemia 07/27/2021    Osteomyelitis of right foot 01/2019    Osteomyelitis of right foot 09/01/2022    Klebsiella from bone, GBS in blood    Pulmonary edema 2019    Respiratory failure 2019    Sepsis 01/2019    Due to cellulitis right leg    Sleep apnea 2013    Thrombocytopathy 2012    Venous stasis dermatitis of both lower extremities        PAST SURGICAL HISTORY:     Past Surgical History:   Procedure Laterality Date    CARDIAC PACEMAKER PLACEMENT  12/2012    CARDIAC PACEMAKER PLACEMENT  10/04/2018    battery replacement    CORONARY ARTERY BYPASS GRAFT  06/2012    ESOPHAGOGASTRODUODENOSCOPY  01/31/2017    SAMARA FILTER PLACEMENT  2012    vena cava    LUMBAR SYMPATHETIC NERVE BLOCK Right 8/22/2019    Procedure: BLOCK, NERVE, SYMPATHETIC, LUMBAR;  Surgeon: Tashi Good MD;  Location: [facility];  Service: Pain Management;  Laterality: Right;  RIGHT SYMPATHETIC NERVE BLOCK       ALLERGIES:   Vasopressin, Vasopressin analogues, Heparin, Heparin analogues, and Morphine    FAMILY HISTORY:     Family History    Problem Relation Age of Onset    Arthritis Mother     Diabetes Mother     Cancer Father     Heart disease Father     Stroke Father        SOCIAL HISTORY:     Social History     Tobacco Use    Smoking status: Former     Current packs/day: 0.00     Average packs/day: 2.0 packs/day for 38.0 years (76.0 ttl pk-yrs)     Types: Cigarettes     Start date:      Quit date:      Years since quittin.7    Smokeless tobacco: Never   Substance Use Topics    Alcohol use: No        Social History     Substance and Sexual Activity   Sexual Activity Never        HOME MEDICATIONS:     Prior to Admission medications    Medication Sig Start Date End Date Taking? Authorizing Provider   albuterol (PROVENTIL) 5 mg/mL nebulizer solution Take 2.5 mg by nebulization every 6 (six) hours as needed.   Yes Provider, Historical   aspirin 81 MG Chew Take 81 mg by mouth once daily.   Yes Provider, Historical   atorvastatin (LIPITOR) 20 MG tablet Take 20 mg by mouth once daily.   Yes Provider, Historical   BASAGLAR KWIKPEN U-100 INSULIN glargine 100 units/mL (3mL) SubQ pen Inject 25 Units into the skin once daily. 19  Yes Provider, Historical   carvediloL (COREG) 3.125 MG tablet Take 3.125 mg by mouth 2 (two) times daily. 23  Yes Provider, Historical   FARXIGA 10 mg tablet Take 10 mg by mouth once daily. 3/21/22  Yes Provider, Historical   gabapentin (NEURONTIN) 600 MG tablet Take 600 mg by mouth 3 (three) times daily.   Yes Provider, Historical   pantoprazole (PROTONIX) 40 MG tablet Take 1 tablet by mouth once daily. 18  Yes Provider, Historical   torsemide (DEMADEX) 20 MG Tab Take 1 tablet (20 mg total) by mouth 2 (two) times a day. 6/18/23 10/8/23 Yes Haseeb Romero MD   traMADoL (ULTRAM) 50 mg tablet Take 1 tablet (50 mg total) by mouth every 6 (six) hours as needed for Pain. 7/29/23 10/27/23 Yes Tashi Good MD   vitamin B complex-folic acid 0.4 mg Tab Take 1 tablet by mouth once daily.   Yes Provider, Historical    albuterol-ipratropium (DUO-NEB) 2.5 mg-0.5 mg/3 mL nebulizer solution Take 3 mLs by nebulization every 6 (six) hours as needed. 11/13/20   Provider, Historical   amiodarone (PACERONE) 200 MG Tab Take 1 tablet (200 mg total) by mouth 2 (two) times daily. 6/18/23   Haseeb Romero MD   CORLANOR 5 mg Tab Take 5 mg by mouth nightly. 1/13/22   Provider, Historical   TRULICITY 0.75 mg/0.5 mL pen injector Inject 0.75 mg into the skin every 7 days. 12/12/22   Provider, Historical   atorvastatin (LIPITOR) 10 MG tablet Take 1 tablet by mouth once daily. 12/12/18 10/8/23  Provider, Historical   B complex-vitamin C-folic acid (JLUIS-JIMMIE/NEPHRO-JIMMIE) 0.8 mg Tab Take 0.4 mg by mouth.  10/8/23  Provider, Historical   cetirizine (ZYRTEC) 10 MG tablet Take 10 mg by mouth once daily. 3/24/23 10/8/23  Provider, Historical   mupirocin (BACTROBAN) 2 % ointment APPLY TOPICALLY ONCE DAILY.  Patient taking differently: Apply 1 g topically once daily. 12/29/22 10/8/23  Tong Heaton, JESSE         PHYSICAL EXAM:   BP (!) 97/59   Pulse (!) 124   Temp 97.6 °F (36.4 °C) (Oral)   Resp 17   Ht 6' (1.829 m)   Wt 121.6 kg (268 lb)   SpO2 97%   BMI 36.35 kg/m²   Vitals Reviewed  General appearance:  Chronically ill-appearing male in moderate apparent distress.  Skin: No Rash.   Neuro: Motor and sensory exams grossly intact. Good tone. Power in all 4 extremities 5/5.   HENT: Atraumatic head. Moist mucous membranes of oral cavity.  Eyes: Normal extraocular movements.   Neck: Supple. No evidence of lymphadenopathy. No thyroidomegaly.  Lungs:  Rhonchi to bases bilaterally mild tachypnea with talking some pursed lip breathing   Heart: Regular rate and rhythm. S1 and S2 present with no murmurs/gallop/rub. No pedal edema. No JVD present.   Abdomen: Soft, distended, non-tender. No rebound tenderness/guarding. No masses or organomegaly. Bowel sounds are normal. Bladder is not palpable.   Extremities: No cyanosis, clubbing, or  "edema.  Psych/mental status: Alert and oriented. Cooperative. Responds appropriately to questions.   EMERGENCY DEPARTMENT LABS AND IMAGING:   Following labs were Reviewed   Recent Labs   Lab 10/08/23  1506   WBC 6.26   HGB 9.8*   HCT 32.5*   *   CALCIUM 9.0   ALBUMIN 3.6   PROT 7.4   *   K 4.8   CO2 22*      BUN 72*   CREATININE 3.1*   ALKPHOS 61   ALT 17   AST 30   BILITOT 1.8*         BMP:   Recent Labs   Lab 10/08/23  1506   *   *   K 4.8      CO2 22*   BUN 72*   CREATININE 3.1*   CALCIUM 9.0   MG 2.2   , CMP   Recent Labs   Lab 10/08/23  1506   *   K 4.8      CO2 22*   *   BUN 72*   CREATININE 3.1*   CALCIUM 9.0   PROT 7.4   ALBUMIN 3.6   BILITOT 1.8*   ALKPHOS 61   AST 30   ALT 17   ANIONGAP 13   , CBC   Recent Labs   Lab 10/08/23  1506   WBC 6.26   HGB 9.8*   HCT 32.5*   *   , INR   Lab Results   Component Value Date    INR 1.3 (H) 10/08/2023    INR 1.2 01/18/2020    INR 1.4 01/23/2019   , Lipid Panel No results found for: "CHOL", "HDL", "LDLCALC", "TRIG", "CHOLHDL", Troponin No results for input(s): "TROPONINI" in the last 168 hours., A1C: No results for input(s): "HGBA1C" in the last 4320 hours., and All labs within the past 24 hours have been reviewed  Microbiology Results (last 7 days)       Procedure Component Value Units Date/Time    Blood culture #2 **CANNOT BE ORDERED STAT** [[id number]] Collected: 10/08/23 1512    Order Status: Sent Specimen: Blood Updated: 10/08/23 1629    Blood culture #1 **CANNOT BE ORDERED STAT** [[id number]] Collected: 10/08/23 1504    Order Status: Sent Specimen: Blood from Antecubital, Right Updated: 10/08/23 1624          X-Ray Chest AP Portable   Final Result        X-Ray Chest AP Portable    Result Date: 10/8/2023  CLINICAL HISTORY: 63 years (1960) Male Tachycardia; Shortness of Breath TECHNIQUE: Portable AP radiograph the chest. One view. COMPARISON: Radiograph from June 12, 2023 FINDINGS: There is " vascular congestion with increased interstitial markings findings indicating mild pulmonary edema.  There is blunting of both costophrenic angles consistent with trace pleural effusions and adjacent atelectasis. No pneumothorax is identified. The cardiac silhouette is moderately enlarged. The median sternotomy wires and the left sided pacemaker are unchanged.  Osseous structures appear unchanged. The visualized upper abdomen is unremarkable. IMPRESSION: Moderate to severe cardiomegaly and findings of mild interstitial pulmonary edema. . Electronically signed by:  Tong Morales MD  10/08/2023 03:13 PM CDT Workstation: [id number]      I personally reviewed and agree with the radiologist's findings    ASSESSMENT & PLAN:   Ana Bullard is a 63 y.o. male admitted for        Acute on Chronic combined systolic and diastolic heart failure  IV Lasix  Daily Wt   May need + ionotropic agent and blood pressure remains soft  Cardiology consult  Strict low-salt low-fat diet    Acute renal failure superimposed on stage 3b chronic kidney disease  Likely cardiorenal syndrome  Monitor renal function with daily labs especially while on diuretics   Nephrology consultation   Dose medications per GFR      3. Wide Complex tachy    Wide complex tachycardia with heart rate in the 120-130 beats per minute   Patient has ICD in place  but not cardioverted by it  Pacer interrogation in ED  Placed on amiodarone drip per Cardiology recommendation   Initiated on amiodarone drip in ED  Cardiology consultation   Continue IV Lasix for diuresis   monitoring of renal function   Strict input output charting, low-sodium diet and daily weights   Might require furosemide drip if no response       4.  Chronic Right Diabetic foot ulcer  Continue daily wound care        5. Type II diabetes mellitus with neurological manifestations  Hypoglycemic protocol  Low-dose insulin sliding scale  Hold oral hypoglycemic medicines     6. Chronic  Thrombocytopenia  Also patient has a history of heparin induced thrombocytopenia   Monitor platelets daily labs         7. History of heparin induced thrombocytopenia  -no heparin at this time    8. Elevated TSH  - check FT4  - may need Synthroid    VTE risk high. SCDs.  No heparin products due to history of HIT      High risk for clinical decline secondary to wide complex ventricular arrhythmias need for vasoactive antiarrhythmic /amiodarone, diabetic and multiple other co morbidities      Critical care time spent 1 hour 28    DVT Prophylaxis: will be placed on SCDs for DVT prophylaxis and will be advised to be as mobile as possible and sit in a chair as tolerated.   ________________________________________________________________  Face-to-Face encounter date: 10/08/2023  Encounter included review of the medical records, interviewing and examining the patient face-to-face, discussion with family and other health care providers including emergency medicine physician, admission orders, interpreting lab/test results and formulating a plan of care.   Medical Decision Making during this encounter was  [_] Low Complexity  [_] Moderate Complexity  [x] High Complexity  ________________________________________________________________    INPATIENT LIST OF MEDICATIONS     Current Facility-Administered Medications:     amiodarone 360 mg/200 mL (1.8 mg/mL) infusion, 1 mg/min, Intravenous, Continuous, Vanesa Don MD, Last Rate: 33.3 mL/hr at 10/08/23 1647, 1 mg/min at 10/08/23 1647    Current Outpatient Medications:     albuterol (PROVENTIL) 5 mg/mL nebulizer solution, Take 2.5 mg by nebulization every 6 (six) hours as needed., Disp: , Rfl:     aspirin 81 MG Chew, Take 81 mg by mouth once daily., Disp: , Rfl:     atorvastatin (LIPITOR) 20 MG tablet, Take 20 mg by mouth once daily., Disp: , Rfl:     BASAGLAR KWIKPEN U-100 INSULIN glargine 100 units/mL (3mL) SubQ pen, Inject 25 Units into the skin once daily., Disp: , Rfl:  3    carvediloL (COREG) 3.125 MG tablet, Take 3.125 mg by mouth 2 (two) times daily., Disp: , Rfl:     FARXIGA 10 mg tablet, Take 10 mg by mouth once daily., Disp: , Rfl:     gabapentin (NEURONTIN) 600 MG tablet, Take 600 mg by mouth 3 (three) times daily., Disp: , Rfl:     pantoprazole (PROTONIX) 40 MG tablet, Take 1 tablet by mouth once daily., Disp: , Rfl: 0    torsemide (DEMADEX) 20 MG Tab, Take 1 tablet (20 mg total) by mouth 2 (two) times a day., Disp: 120 tablet, Rfl: 0    traMADoL (ULTRAM) 50 mg tablet, Take 1 tablet (50 mg total) by mouth every 6 (six) hours as needed for Pain., Disp: 120 tablet, Rfl: 2    vitamin B complex-folic acid 0.4 mg Tab, Take 1 tablet by mouth once daily., Disp: , Rfl:     albuterol-ipratropium (DUO-NEB) 2.5 mg-0.5 mg/3 mL nebulizer solution, Take 3 mLs by nebulization every 6 (six) hours as needed., Disp: , Rfl:     amiodarone (PACERONE) 200 MG Tab, Take 1 tablet (200 mg total) by mouth 2 (two) times daily., Disp: 90 tablet, Rfl: 0    CORLANOR 5 mg Tab, Take 5 mg by mouth nightly., Disp: , Rfl:     TRULICITY 0.75 mg/0.5 mL pen injector, Inject 0.75 mg into the skin every 7 days., Disp: , Rfl:       Scheduled Meds:  Continuous Infusions:   amiodarone in dextrose 5% 1 mg/min (10/08/23 1647)     PRN Meds:.      Betty Villela  [facility] Hospitalist NP  10/08/2023

## 2023-10-09 ENCOUNTER — ANESTHESIA EVENT (OUTPATIENT)
Dept: INTENSIVE CARE | Facility: HOSPITAL | Age: 63
DRG: 682 | End: 2023-10-09
Payer: MEDICARE

## 2023-10-09 ENCOUNTER — ANESTHESIA (OUTPATIENT)
Dept: INTENSIVE CARE | Facility: HOSPITAL | Age: 63
DRG: 682 | End: 2023-10-09
Payer: MEDICARE

## 2023-10-09 LAB
ALBUMIN SERPL BCP-MCNC: 3.4 G/DL (ref 3.5–5.2)
ALP SERPL-CCNC: 61 U/L (ref 55–135)
ALT SERPL W/O P-5'-P-CCNC: 17 U/L (ref 10–44)
ANION GAP SERPL CALC-SCNC: 13 MMOL/L (ref 8–16)
AST SERPL-CCNC: 25 U/L (ref 10–40)
BACTERIA #/AREA URNS HPF: NEGATIVE /HPF
BASOPHILS # BLD AUTO: 0.03 K/UL (ref 0–0.2)
BASOPHILS NFR BLD: 0.4 % (ref 0–1.9)
BILIRUB SERPL-MCNC: 1.8 MG/DL (ref 0.1–1)
BILIRUB UR QL STRIP: NEGATIVE
BUN SERPL-MCNC: 75 MG/DL (ref 8–23)
CALCIUM SERPL-MCNC: 8.8 MG/DL (ref 8.7–10.5)
CHLORIDE SERPL-SCNC: 99 MMOL/L (ref 95–110)
CLARITY UR: ABNORMAL
CO2 SERPL-SCNC: 22 MMOL/L (ref 23–29)
COLOR UR: YELLOW
CREAT SERPL-MCNC: 3.4 MG/DL (ref 0.5–1.4)
DIFFERENTIAL METHOD: ABNORMAL
EOSINOPHIL # BLD AUTO: 0 K/UL (ref 0–0.5)
EOSINOPHIL NFR BLD: 0.4 % (ref 0–8)
ERYTHROCYTE [DISTWIDTH] IN BLOOD BY AUTOMATED COUNT: 18.8 % (ref 11.5–14.5)
EST. GFR  (NO RACE VARIABLE): 19.7 ML/MIN/1.73 M^2
ESTIMATED AVG GLUCOSE: 131 MG/DL (ref 68–131)
GLUCOSE SERPL-MCNC: 125 MG/DL (ref 70–110)
GLUCOSE SERPL-MCNC: 183 MG/DL (ref 70–110)
GLUCOSE SERPL-MCNC: 216 MG/DL (ref 70–110)
GLUCOSE UR QL STRIP: ABNORMAL
HBA1C MFR BLD: 6.2 % (ref 4.5–6.2)
HCT VFR BLD AUTO: 31.4 % (ref 40–54)
HGB BLD-MCNC: 9.8 G/DL (ref 14–18)
HGB UR QL STRIP: ABNORMAL
HYALINE CASTS #/AREA URNS LPF: 44 /LPF
IMM GRANULOCYTES # BLD AUTO: 0.01 K/UL (ref 0–0.04)
IMM GRANULOCYTES NFR BLD AUTO: 0.1 % (ref 0–0.5)
KETONES UR QL STRIP: NEGATIVE
LACTATE SERPL-SCNC: 1.8 MMOL/L (ref 0.5–1.9)
LEUKOCYTE ESTERASE UR QL STRIP: ABNORMAL
LYMPHOCYTES # BLD AUTO: 0.5 K/UL (ref 1–4.8)
LYMPHOCYTES NFR BLD: 6 % (ref 18–48)
MCH RBC QN AUTO: 28.5 PG (ref 27–31)
MCHC RBC AUTO-ENTMCNC: 31.2 G/DL (ref 32–36)
MCV RBC AUTO: 91 FL (ref 82–98)
MICROSCOPIC COMMENT: ABNORMAL
MONOCYTES # BLD AUTO: 0.8 K/UL (ref 0.3–1)
MONOCYTES NFR BLD: 9.6 % (ref 4–15)
NEUTROPHILS # BLD AUTO: 7.1 K/UL (ref 1.8–7.7)
NEUTROPHILS NFR BLD: 83.5 % (ref 38–73)
NITRITE UR QL STRIP: NEGATIVE
NRBC BLD-RTO: 0 /100 WBC
PH UR STRIP: 5 [PH] (ref 5–8)
PLATELET # BLD AUTO: 135 K/UL (ref 150–450)
PMV BLD AUTO: 10.8 FL (ref 9.2–12.9)
POTASSIUM SERPL-SCNC: 4.9 MMOL/L (ref 3.5–5.1)
PROCALCITONIN SERPL IA-MCNC: 0.23 NG/ML (ref 0–0.5)
PROT SERPL-MCNC: 7.2 G/DL (ref 6–8.4)
PROT UR QL STRIP: ABNORMAL
RBC # BLD AUTO: 3.44 M/UL (ref 4.6–6.2)
RBC #/AREA URNS HPF: 58 /HPF (ref 0–4)
SODIUM SERPL-SCNC: 134 MMOL/L (ref 136–145)
SP GR UR STRIP: 1.01 (ref 1–1.03)
SQUAMOUS #/AREA URNS HPF: 2 /HPF
TROPONIN I SERPL HS-MCNC: 24.7 PG/ML (ref 0–14.9)
URN SPEC COLLECT METH UR: ABNORMAL
UROBILINOGEN UR STRIP-ACNC: NEGATIVE EU/DL
WBC # BLD AUTO: 8.51 K/UL (ref 3.9–12.7)
WBC #/AREA URNS HPF: 4 /HPF (ref 0–5)

## 2023-10-09 PROCEDURE — 25000003 PHARM REV CODE 250: Performed by: FAMILY MEDICINE

## 2023-10-09 PROCEDURE — C1751 CATH, INF, PER/CENT/MIDLINE: HCPCS

## 2023-10-09 PROCEDURE — 84145 PROCALCITONIN (PCT): CPT | Performed by: INTERNAL MEDICINE

## 2023-10-09 PROCEDURE — 80053 COMPREHEN METABOLIC PANEL: CPT | Performed by: NURSE PRACTITIONER

## 2023-10-09 PROCEDURE — 36556 INSERT NON-TUNNEL CV CATH: CPT

## 2023-10-09 PROCEDURE — 99221 1ST HOSP IP/OBS SF/LOW 40: CPT | Mod: ,,, | Performed by: FAMILY MEDICINE

## 2023-10-09 PROCEDURE — 36620 INSERTION CATHETER ARTERY: CPT

## 2023-10-09 PROCEDURE — 85025 COMPLETE CBC W/AUTO DIFF WBC: CPT | Performed by: NURSE PRACTITIONER

## 2023-10-09 PROCEDURE — 25000003 PHARM REV CODE 250: Performed by: INTERNAL MEDICINE

## 2023-10-09 PROCEDURE — 63600175 PHARM REV CODE 636 W HCPCS: Performed by: EMERGENCY MEDICINE

## 2023-10-09 PROCEDURE — 99448 PR INTERPROF, PHONE/INTERNET/EHR, CONSULT, 21-30 MINS: ICD-10-PCS | Mod: ,,, | Performed by: STUDENT IN AN ORGANIZED HEALTH CARE EDUCATION/TRAINING PROGRAM

## 2023-10-09 PROCEDURE — 83036 HEMOGLOBIN GLYCOSYLATED A1C: CPT | Performed by: FAMILY MEDICINE

## 2023-10-09 PROCEDURE — 84484 ASSAY OF TROPONIN QUANT: CPT | Performed by: NURSE PRACTITIONER

## 2023-10-09 PROCEDURE — 83605 ASSAY OF LACTIC ACID: CPT | Performed by: INTERNAL MEDICINE

## 2023-10-09 PROCEDURE — 36556 CENTRAL LINE: ICD-10-PCS | Mod: ,,, | Performed by: STUDENT IN AN ORGANIZED HEALTH CARE EDUCATION/TRAINING PROGRAM

## 2023-10-09 PROCEDURE — 99900035 HC TECH TIME PER 15 MIN (STAT)

## 2023-10-09 PROCEDURE — 94761 N-INVAS EAR/PLS OXIMETRY MLT: CPT

## 2023-10-09 PROCEDURE — 99900031 HC PATIENT EDUCATION (STAT)

## 2023-10-09 PROCEDURE — 99221 PR INITIAL HOSPITAL CARE,LEVL I: ICD-10-PCS | Mod: ,,, | Performed by: FAMILY MEDICINE

## 2023-10-09 PROCEDURE — 36556 INSERT NON-TUNNEL CV CATH: CPT | Mod: ,,, | Performed by: STUDENT IN AN ORGANIZED HEALTH CARE EDUCATION/TRAINING PROGRAM

## 2023-10-09 PROCEDURE — 99223 PR INITIAL HOSPITAL CARE,LEVL III: ICD-10-PCS | Mod: ,,, | Performed by: INTERNAL MEDICINE

## 2023-10-09 PROCEDURE — 20000000 HC ICU ROOM

## 2023-10-09 PROCEDURE — 63600175 PHARM REV CODE 636 W HCPCS: Performed by: INTERNAL MEDICINE

## 2023-10-09 PROCEDURE — 25000003 PHARM REV CODE 250: Performed by: NURSE PRACTITIONER

## 2023-10-09 PROCEDURE — 3E033XZ INTRODUCTION OF VASOPRESSOR INTO PERIPHERAL VEIN, PERCUTANEOUS APPROACH: ICD-10-PCS | Performed by: INTERNAL MEDICINE

## 2023-10-09 PROCEDURE — 63600175 PHARM REV CODE 636 W HCPCS

## 2023-10-09 PROCEDURE — 87040 BLOOD CULTURE FOR BACTERIA: CPT | Performed by: INTERNAL MEDICINE

## 2023-10-09 PROCEDURE — 76937 US GUIDE VASCULAR ACCESS: CPT | Performed by: STUDENT IN AN ORGANIZED HEALTH CARE EDUCATION/TRAINING PROGRAM

## 2023-10-09 PROCEDURE — 99223 1ST HOSP IP/OBS HIGH 75: CPT | Mod: ,,, | Performed by: INTERNAL MEDICINE

## 2023-10-09 PROCEDURE — 81001 URINALYSIS AUTO W/SCOPE: CPT | Performed by: INTERNAL MEDICINE

## 2023-10-09 PROCEDURE — 25000003 PHARM REV CODE 250

## 2023-10-09 PROCEDURE — 76937 CENTRAL LINE: ICD-10-PCS | Mod: 26,,, | Performed by: STUDENT IN AN ORGANIZED HEALTH CARE EDUCATION/TRAINING PROGRAM

## 2023-10-09 PROCEDURE — 63600175 PHARM REV CODE 636 W HCPCS: Performed by: NURSE PRACTITIONER

## 2023-10-09 PROCEDURE — 99448 NTRPROF PH1/NTRNET/EHR 21-30: CPT | Mod: ,,, | Performed by: STUDENT IN AN ORGANIZED HEALTH CARE EDUCATION/TRAINING PROGRAM

## 2023-10-09 PROCEDURE — 76937 US GUIDE VASCULAR ACCESS: CPT | Mod: 26,,, | Performed by: STUDENT IN AN ORGANIZED HEALTH CARE EDUCATION/TRAINING PROGRAM

## 2023-10-09 RX ORDER — PHENYLEPHRINE HCL IN 0.9% NACL 20MG/250ML
0-5 PLASTIC BAG, INJECTION (ML) INTRAVENOUS CONTINUOUS
Status: DISCONTINUED | OUTPATIENT
Start: 2023-10-09 | End: 2023-10-09

## 2023-10-09 RX ORDER — PHENYLEPHRINE HYDROCHLORIDE 10 MG/ML
INJECTION INTRAVENOUS
Status: DISPENSED
Start: 2023-10-09 | End: 2023-10-09

## 2023-10-09 RX ORDER — LIDOCAINE HYDROCHLORIDE 20 MG/ML
JELLY TOPICAL ONCE
Status: COMPLETED | OUTPATIENT
Start: 2023-10-09 | End: 2023-10-09

## 2023-10-09 RX ORDER — MIDODRINE HYDROCHLORIDE 2.5 MG/1
5 TABLET ORAL
Status: DISCONTINUED | OUTPATIENT
Start: 2023-10-09 | End: 2023-10-09

## 2023-10-09 RX ORDER — LEVOTHYROXINE SODIUM 25 UG/1
25 TABLET ORAL
Status: DISCONTINUED | OUTPATIENT
Start: 2023-10-10 | End: 2023-10-19 | Stop reason: HOSPADM

## 2023-10-09 RX ORDER — BUMETANIDE 0.25 MG/ML
2 INJECTION INTRAMUSCULAR; INTRAVENOUS 2 TIMES DAILY
Status: DISCONTINUED | OUTPATIENT
Start: 2023-10-09 | End: 2023-10-09

## 2023-10-09 RX ADMIN — PHENYLEPHRINE HYDROCHLORIDE 0.9 MCG/KG/MIN: 100 INJECTION INTRAVENOUS at 05:10

## 2023-10-09 RX ADMIN — LIDOCAINE HYDROCHLORIDE: 20 JELLY TOPICAL at 04:10

## 2023-10-09 RX ADMIN — INSULIN ASPART 1 UNITS: 100 INJECTION, SOLUTION INTRAVENOUS; SUBCUTANEOUS at 10:10

## 2023-10-09 RX ADMIN — Medication 6 MG: at 09:10

## 2023-10-09 RX ADMIN — TRAMADOL HYDROCHLORIDE 50 MG: 50 TABLET, COATED ORAL at 11:10

## 2023-10-09 RX ADMIN — ATORVASTATIN CALCIUM 20 MG: 20 TABLET, FILM COATED ORAL at 09:10

## 2023-10-09 RX ADMIN — MUPIROCIN 1 G: 20 OINTMENT TOPICAL at 09:10

## 2023-10-09 RX ADMIN — ASPIRIN 81 MG 81 MG: 81 TABLET ORAL at 10:10

## 2023-10-09 RX ADMIN — MUPIROCIN 1 G: 20 OINTMENT TOPICAL at 10:10

## 2023-10-09 RX ADMIN — FUROSEMIDE 2.5 MG/HR: 10 INJECTION, SOLUTION INTRAVENOUS at 11:10

## 2023-10-09 RX ADMIN — GABAPENTIN 300 MG: 300 CAPSULE ORAL at 09:10

## 2023-10-09 RX ADMIN — PANTOPRAZOLE SODIUM 40 MG: 40 TABLET, DELAYED RELEASE ORAL at 06:10

## 2023-10-09 RX ADMIN — AMIODARONE HYDROCHLORIDE 1 MG/MIN: 1.8 INJECTION, SOLUTION INTRAVENOUS at 03:10

## 2023-10-09 RX ADMIN — AMIODARONE HYDROCHLORIDE 1 MG/MIN: 1.8 INJECTION, SOLUTION INTRAVENOUS at 09:10

## 2023-10-09 RX ADMIN — PHENYLEPHRINE HYDROCHLORIDE 0.5 MCG/KG/MIN: 100 INJECTION INTRAVENOUS at 01:10

## 2023-10-09 RX ADMIN — TRAMADOL HYDROCHLORIDE 50 MG: 50 TABLET, COATED ORAL at 06:10

## 2023-10-09 NOTE — PLAN OF CARE
[facility]  Initial Discharge Assessment       Primary Care Provider: Michelle Messina FNP    Admission Diagnosis: Atrial tachycardia [I47.19]    Admission Date: 10/8/2023  Expected Discharge Date: 10/13/2023    Transition of Care Barriers: None    Initial assessment completed with patient and wife, Katelynn, at bedside with patient's permission. He was alert and oriented x4. SW verified face sheet information. Patient reports being independent with all ADLs prior to admission and uses a CPAP that is self-managed at home. He also has a nebulizer to use when needed. Patient drives himself to appointment. No HH, dialysis, coumadin, or medication affordability/compliance issues. Patient plans to discharge home with his wife who will provide transportation. No discharge needs anticipated. SW/CM will continue to monitor.     Payor: [employer] MANAGED MEDICARE / Plan: [employer] MEDICARE PLAN PPO / Product Type: Medicare Advantage /     Extended Emergency Contact Information  Primary Emergency Contact: Katelynn Bullard  Home Phone: [phone]  Mobile Phone: [phone]  Relation: Spouse    Discharge Plan A: Home with family  Discharge Plan B: Home with family      [facility] - [place], [place] - [address]  [address]  [place] [address]  Phone: [phone] Fax: [phone]      Initial Assessment (most recent)       Adult Discharge Assessment - 10/09/23 1210          Discharge Assessment    Assessment Type Discharge Planning Assessment     Confirmed/corrected address, phone number and insurance Yes     Confirmed Demographics Correct on Facesheet     Source of Information patient;family     When was your last doctors appointment? 09/14/23   Dr. Vences - cardiologist    Communicated BILL with patient/caregiver Date not available/Unable to determine     Reason For Admission Wide-complex tachycardia     People in Home spouse     Facility Arrived From: Home     Do you expect to return to your  current living situation? Yes     Do you have help at home or someone to help you manage your care at home? Yes     Who are your caregiver(s) and their phone number(s)? Katelynn (wife) - [phone]     Prior to hospitilization cognitive status: No Deficits;Alert/Oriented     Current cognitive status: Alert/Oriented;No Deficits     Walking or Climbing Stairs --   Independent with all ADLs    Dressing/Bathing --   Independent with all ADLs    Home Accessibility wheelchair accessible     Home Layout Able to live on 1st floor     Equipment Currently Used at Home CPAP;nebulizer     Readmission within 30 days? No     Patient currently being followed by outpatient case management? No     Do you currently have service(s) that help you manage your care at home? No     Do you take prescription medications? Yes     Do you have prescription coverage? Yes     Coverage [employer] Managed Medicare     Do you have any problems affording any of your prescribed medications? No     Is the patient taking medications as prescribed? yes     Who is going to help you get home at discharge? Katelynn (wife)     How do you get to doctors appointments? car, drives self     Are you on dialysis? No     Do you take coumadin? No     DME Needed Upon Discharge  none     Discharge Plan discussed with: Patient;Spouse/sig other     Name(s) and Number(s) Katelynn (see above)     Transition of Care Barriers None     Discharge Plan A Home with family     Discharge Plan B Home with family        OTHER    Name(s) of People in Home Katelynn (wife)

## 2023-10-09 NOTE — CONSULTS
INPATIENT NEPHROLOGY CONSULT   [facility]    Patient Name: Ana Bullard  Date: 10/09/2023    Reason for consultation: AD/CKD    Chief Complaint:   Chief Complaint   Patient presents with    Tachycardia    Shortness of Breath       History of Present Illness:  62 y/o Male pt of  who presented to ED with h/o fatigue, back pain, decreased urination and SOB for 2 days. He endorses SOB has progressively worsened over this time and he is having dyspnea with minimal exertion, but no reported chest pain . He reports some feeling some palpitations, BLE edema as well. PMH includes  ischemic cardiomyopathy, 5 vessel bypass in 2012, s/p multiple MIs, chronic systolic heart failure, most recent ejection fraction 20%, complete heart block, PPM dependent, HIT, DVT/PE, diabetes mellitus, hyperlipidemia, hypertension, obstructive sleep apnea, and chronic kidney disease IV. In the ER he had wide complex tachycardia, amiodarone started. Pt was also started on leyla and lasix gtts, transferred to ICU. Consulted for renal management.    Interval History:  10/9- on BIPAP, overloaded    Plan of Care:    Assessment:  AD/CKD IV due to CRS  Wide complex tachycardia  CHF exacerbation with hypoxia requiring NIPPV (EF 25%, grade III DD, pulm HTN)  Hyponatremia  SHPT  Anemia of CKD    Plan:    - work on diuresis  - rate control improved with amio- get off leyla if able to maintain SBP > 90  - continue lasix gtt  - cardiac diet, 1.5L fluid restriction  - hold farxiga  - no acute RRT needs  - no nsaids or IV contrast- strict ins/outs  - no acute BMM issues  - trend H/H for now    Thank you for allowing us to participate in this patient's care. We will continue to follow.    Vital Signs:  Temp Readings from Last 3 Encounters:   10/09/23 97.5 °F (36.4 °C) (Oral)   06/18/23 97.8 °F (36.6 °C)   05/31/23 97.5 °F (36.4 °C)       Pulse Readings from Last 3 Encounters:   10/09/23 (!) 133   08/17/23 61   07/17/23 60        BP Readings from Last 3 Encounters:   10/09/23 (!) 85/57   07/17/23 116/71   06/18/23 138/73       Weight:  Wt Readings from Last 3 Encounters:   10/09/23 125.2 kg (276 lb 0.3 oz)   09/28/23 121.8 kg (268 lb 8 oz)   09/07/23 120.6 kg (265 lb 15.1 oz)       INS/OUTS:  I/O last 3 completed shifts:  In: 945.4 [P.O.:440; I.V.:505.4]  Out: 237 [Urine:237]  No intake/output data recorded.    Past Medical & Surgical History:  Past Medical History:   Diagnosis Date    AICD (automatic cardioverter/defibrillator) present     Anemia, chronic disease 07/27/2021    Anemia, unspecified 07/27/2021    Anxiety and depression 2012    CAD (coronary artery disease) 2012    Cardiomegaly 2016    CHF (congestive heart failure) 2012    Cholecystitis 2017    Complete heart block 2012    Diabetic foot ulcer     DVT (deep venous thrombosis) 2012    And pulmonary embolus    GERD (gastroesophageal reflux disease) 2010    Heparin induced thrombocytopenia     Hyperlipemia 2011    IDDM (insulin dependent diabetes mellitus) 1983    With neuropathy    Ischemic cardiomyopathy 2012    MI (myocardial infarction) 2012    Morbid obesity     MRSA (methicillin resistant Staphylococcus aureus) infection 01/19/2019    Noncompliance with therapeutic plan 2019    Normochromic normocytic anemia 07/27/2021    Osteomyelitis of right foot 01/2019    Osteomyelitis of right foot 09/01/2022    Klebsiella from bone, GBS in blood    Pulmonary edema 2019    Respiratory failure 2019    Sepsis 01/2019    Due to cellulitis right leg    Sleep apnea 2013    Thrombocytopathy 2012    Venous stasis dermatitis of both lower extremities        Past Surgical History:   Procedure Laterality Date    CARDIAC PACEMAKER PLACEMENT  12/2012    CARDIAC PACEMAKER PLACEMENT  10/04/2018    battery replacement    CORONARY ARTERY BYPASS GRAFT  06/2012    ESOPHAGOGASTRODUODENOSCOPY  01/31/2017    SAMARA FILTER PLACEMENT  2012    vena cava    LUMBAR SYMPATHETIC NERVE BLOCK Right  2019    Procedure: BLOCK, NERVE, SYMPATHETIC, LUMBAR;  Surgeon: Tashi Good MD;  [facility];  Service: Pain Management;  Laterality: Right;  RIGHT SYMPATHETIC NERVE BLOCK       Past Social History:  Social History     Socioeconomic History    Marital status:    Tobacco Use    Smoking status: Former     Current packs/day: 0.00     Average packs/day: 2.0 packs/day for 38.0 years (76.0 ttl pk-yrs)     Types: Cigarettes     Start date:      Quit date:      Years since quittin.7    Smokeless tobacco: Never   Substance and Sexual Activity    Alcohol use: No    Drug use: Never    Sexual activity: Never       Medications:  Scheduled Meds:   aspirin  81 mg Oral Daily    atorvastatin  20 mg Oral QHS    gabapentin  300 mg Oral BID    mupirocin   Nasal BID    pantoprazole  40 mg Oral Daily     Continuous Infusions:   amiodarone in dextrose 5% 1 mg/min (10/09/23 0924)    furosemide (LASIX) 2 mg/mL continuous infusion (non-titrating) 2.5 mg/hr (10/09/23 1110)    phenylephrine 0.5 mcg/kg/min (10/09/23 0130)     PRN Meds:.acetaminophen, albuterol, ALPRAZolam, dextrose 50%, dextrose 50%, glucagon (human recombinant), glucose, glucose, insulin aspart U-100, melatonin, ondansetron, sodium chloride 0.9%, traMADoL  No current facility-administered medications on file prior to encounter.     Current Outpatient Medications on File Prior to Encounter   Medication Sig Dispense Refill    albuterol (PROVENTIL) 5 mg/mL nebulizer solution Take 2.5 mg by nebulization every 6 (six) hours as needed.      aspirin 81 MG Chew Take 81 mg by mouth once daily.      atorvastatin (LIPITOR) 20 MG tablet Take 20 mg by mouth once daily.      BASAGLAR KWIKPEN U-100 INSULIN glargine 100 units/mL (3mL) SubQ pen Inject 25 Units into the skin once daily.  3    carvediloL (COREG) 3.125 MG tablet Take 3.125 mg by mouth 2 (two) times daily.      FARXIGA 10 mg tablet Take 10 mg by mouth once daily.      gabapentin (NEURONTIN) 600 MG  tablet Take 600 mg by mouth 3 (three) times daily.      pantoprazole (PROTONIX) 40 MG tablet Take 1 tablet by mouth once daily.  0    torsemide (DEMADEX) 20 MG Tab Take 1 tablet (20 mg total) by mouth 2 (two) times a day. 120 tablet 0    traMADoL (ULTRAM) 50 mg tablet Take 1 tablet (50 mg total) by mouth every 6 (six) hours as needed for Pain. 120 tablet 2    vitamin B complex-folic acid 0.4 mg Tab Take 1 tablet by mouth once daily.      albuterol-ipratropium (DUO-NEB) 2.5 mg-0.5 mg/3 mL nebulizer solution Take 3 mLs by nebulization every 6 (six) hours as needed.      amiodarone (PACERONE) 200 MG Tab Take 1 tablet (200 mg total) by mouth 2 (two) times daily. 90 tablet 0    CORLANOR 5 mg Tab Take 5 mg by mouth nightly.      TRULICITY 0.75 mg/0.5 mL pen injector Inject 0.75 mg into the skin every 7 days.         Allergies:  Vasopressin, Vasopressin analogues, Heparin, Heparin analogues, and Morphine    Past Family History:  Reviewed; refer to Hospitalist Admission Note    Review of Systems:  Review of Systems - All 14 systems reviewed and negative, except as noted in HPI    Physical Exam:  BP (!) 85/57   Pulse (!) 133   Temp 97.5 °F (36.4 °C) (Oral)   Resp 18   Ht 6' (1.829 m)   Wt 125.2 kg (276 lb 0.3 oz)   SpO2 (!) 92%   BMI 37.43 kg/m²     General Appearance:    NAD, AAO x 3, cooperative, appears stated age   Head:    Normocephalic, atraumatic   Eyes:    PER, EOMI, and conjunctiva/sclera clear bilaterally        Mouth:   Moist mucus membranes, no thrush or oral lesions, normal      dentition   Back:     No CVA tenderness   Lungs:     Rales   Heart:    Tachy    Abdomen:     Soft, non-tender, distended,   Extremities:   Edematous   MSK:   No joint or muscle swelling, tenderness or deformity   Skin:   Skin color, texture, turgor normal, no rashes or lesions   Neurologic/Psychiatric:   CNII-XII intact, normal strength and sensation       throughout, no asterixis; normal affect, memory, judgement     and insight      Results:  Lab Results   Component Value Date     (L) 10/09/2023    K 4.9 10/09/2023    CL 99 10/09/2023    CO2 22 (L) 10/09/2023    BUN 75 (H) 10/09/2023    CREATININE 3.4 (H) 10/09/2023    CALCIUM 8.8 10/09/2023    ANIONGAP 13 10/09/2023    ESTGFRAFRICA 38.9 (A) 06/12/2020    EGFRNONAA 33.7 (A) 06/12/2020       Lab Results   Component Value Date    CALCIUM 8.8 10/09/2023       Recent Labs   Lab 10/09/23  0224   WBC 8.51   RBC 3.44*   HGB 9.8*   HCT 31.4*   *   MCV 91   MCH 28.5   MCHC 31.2*       I have personally reviewed pertinent radiological imaging and reports.    I have spent > 70 minutes providing care for this patient for the above diagnoses. These services have included chart/data/imaging review, evaluation, exam, formulation of plan, , note preparation, and discussions with staff involved in this patient's care.    Bartolo Valenzuela MD MPH  [facility]  [address] [place] [address]  [place] [address]  [phone] (p)  [phone] (f)

## 2023-10-09 NOTE — RESPIRATORY THERAPY
10/08/23 2013   Patient Assessment/Suction   Level of Consciousness (AVPU) alert   Respiratory Effort Unlabored   Expansion/Accessory Muscles/Retractions no retractions;no use of accessory muscles   All Lung Fields Breath Sounds diminished   PRE-TX-O2   Device (Oxygen Therapy) nasal cannula   $ Is the patient on Low Flow Oxygen? Yes   Flow (L/min) 2   SpO2 95 %   Pulse Oximetry Type Continuous   $ Pulse Oximetry - Multiple Charge Pulse Oximetry - Multiple   Pulse (!) 123   Resp (!) 24   Aerosol Therapy   $ Aerosol Therapy Charges PRN treatment not required   Respiratory Treatment Status (SVN) PRN treatment not required   Education   $ Education Bronchodilator;15 min   Respiratory Evaluation   $ Care Plan Tech Time 15 min   $ Eval/Re-eval Charges Evaluation

## 2023-10-09 NOTE — NURSING
Nurses Note -- 4 Eyes      10/9/2023   2000 AM      Skin assessed during: Admit      [] No Altered Skin Integrity Present    []Prevention Measures Documented      [x] Yes- Altered Skin Integrity Present or Discovered   [x] LDA Added if Not in Epic (Describe Wound)   [x] New Altered Skin Integrity was Present on Admit and Documented in LDA   [x] Wound Image Taken    Wound Care Consulted? Yes    Attending Nurse:  Lakeisha ARMENTA     Second RN/Staff Member:  MARCO A Jose RN

## 2023-10-09 NOTE — CONSULTS
"/[facility]   Cardiology Note    Consult Requested By: SIDDHARTHA  Reason for Consult: SOB    SUBJECTIVE:     History of Present Illness: The pt is 62 y/o Male pt of  who presented to ED with h/o fatigue, back pain, decreased urination and SOB for 2 days. He endorses SOB has progressively worsened over this time and heis having dyspnea with minimal exertion, but no reported chest pain . He reports some feeling some palpitations, BLE edema as well. Pmh includes  ischemic cardiomyopathy, 5 vessel bypass in 2012 (sequential SVG to PDA and PLB, SVG split graft to OM1 and OM2, and SVG to LAD, viability stuudy in May 2022 was negative for hibernation or viability with large anterior scar, s/p multiple MIs, chronic systolic heart failure, most recent ejection fraction 20%, complete heart block, PPM dependent, HIT, DVT/PE, status post CRT D placement, diabetes mellitus, hyperlipidemia, hypertension, obstructive sleep apnea, and chronic kidney disease. Im ER pt had wide complex tachycardia, amiodarone started. Pt was also started on iv diuretic and pressors, transferred to ICU      Labs this am/ED --- 9.8/31.4 cr 3.4 albumin 3.4 BNP 1720 trop 23/25/24 CXR -+ pulmonary congestion-moderate blood/urine culture pending Mag 2.2 platelets 135, 237 cc urine output yesterday. Pt report feeling somewhat less short of breath and reports slight improvement in LE edema. Hypotensive, low urine output.  range this am. However pt is aao In bed , wife at bedside, appears well with no labored breathing at rest.   Review of patient's allergies indicates:   Allergen Reactions    Vasopressin Anaphylaxis and Other (See Comments)     Increased pressure in his head; felt like his eyeballs and veins were going to pop out of his head.     Vasopressin analogues Other (See Comments) and Anaphylaxis     "felt like eyes going to pop out of my head"  Other reaction(s): Other (See Comments)  "felt like eyes going to pop out of my " "head"  Increased pressure in his head; felt like his eyeballs and veins were going to pop out of his head.     Heparin Other (See Comments)     Other reaction(s): "depleted my blood platets"    Decreased platelet count    Heparin analogues Other (See Comments)     Depleted WBC count    Morphine Hallucinations, Other (See Comments) and Anxiety     Other reaction(s): Hallucinations  Paranoid, hallucinations         Past Medical History:   Diagnosis Date    AICD (automatic cardioverter/defibrillator) present     Anemia, chronic disease 07/27/2021    Anemia, unspecified 07/27/2021    Anxiety and depression 2012    CAD (coronary artery disease) 2012    Cardiomegaly 2016    CHF (congestive heart failure) 2012    Cholecystitis 2017    Complete heart block 2012    Diabetic foot ulcer     DVT (deep venous thrombosis) 2012    And pulmonary embolus    GERD (gastroesophageal reflux disease) 2010    Heparin induced thrombocytopenia     Hyperlipemia 2011    IDDM (insulin dependent diabetes mellitus) 1983    With neuropathy    Ischemic cardiomyopathy 2012    MI (myocardial infarction) 2012    Morbid obesity     MRSA (methicillin resistant Staphylococcus aureus) infection 01/19/2019    Noncompliance with therapeutic plan 2019    Normochromic normocytic anemia 07/27/2021    Osteomyelitis of right foot 01/2019    Osteomyelitis of right foot 09/01/2022    Klebsiella from bone, GBS in blood    Pulmonary edema 2019    Respiratory failure 2019    Sepsis 01/2019    Due to cellulitis right leg    Sleep apnea 2013    Thrombocytopathy 2012    Venous stasis dermatitis of both lower extremities      Past Surgical History:   Procedure Laterality Date    CARDIAC PACEMAKER PLACEMENT  12/2012    CARDIAC PACEMAKER PLACEMENT  10/04/2018    battery replacement    CORONARY ARTERY BYPASS GRAFT  06/2012    ESOPHAGOGASTRODUODENOSCOPY  01/31/2017    SAMARA FILTER PLACEMENT  2012    vena cava    LUMBAR SYMPATHETIC NERVE BLOCK Right 8/22/2019    " Procedure: BLOCK, NERVE, SYMPATHETIC, LUMBAR;  Surgeon: Tashi Good MD;  Location: [facility] OR;  Service: Pain Management;  Laterality: Right;  RIGHT SYMPATHETIC NERVE BLOCK     Family History   Problem Relation Age of Onset    Arthritis Mother     Diabetes Mother     Cancer Father     Heart disease Father     Stroke Father      Social History     Tobacco Use    Smoking status: Former     Current packs/day: 0.00     Average packs/day: 2.0 packs/day for 38.0 years (76.0 ttl pk-yrs)     Types: Cigarettes     Start date:      Quit date:      Years since quittin.7    Smokeless tobacco: Never   Substance Use Topics    Alcohol use: No    Drug use: Never       Review of Systems:  Review of Systems   Constitutional:  Negative for chills, fever, malaise/fatigue and weight loss.   Cardiovascular:  Positive for palpitations and leg swelling. Negative for chest pain, orthopnea, claudication and PND.   Gastrointestinal:  Negative for abdominal pain, heartburn, nausea and vomiting.   Genitourinary:  Positive for flank pain.   Neurological:  Negative for headaches.       OBJECTIVE:     Vital Signs (Most Recent)  Temp: 97.5 °F (36.4 °C) (10/09/23 0305)  Pulse: (!) 133 (10/09/23 0725)  Resp: 19 (10/09/23 0725)  BP: (!) 85/57 (10/09/23 0630)  SpO2: (!) 92 % (10/09/23 0725)    Vital Signs Range (Last 24H):  Temp:  [97.5 °F (36.4 °C)-97.6 °F (36.4 °C)]   Pulse:  []   Resp:  [14-28]   BP: ()/(44-84)   SpO2:  [86 %-100 %]   Arterial Line BP: ()/(55-78)     I & O (Last 24H):    Intake/Output Summary (Last 24 hours) at 10/9/2023 0904  Last data filed at 10/9/2023 06  Gross per 24 hour   Intake 945.4 ml   Output 237 ml   Net 708.4 ml       Current Diet:     Current Diet Order   Procedures    Diet diabetic [facility]; 1800 Calorie     Order Specific Question:   Indicate patient location for additional diet options:     Answer:   H     Order Specific Question:   Total calories:     Answer:   1800 Calorie       "  Allergies:  Review of patient's allergies indicates:   Allergen Reactions    Vasopressin Anaphylaxis and Other (See Comments)     Increased pressure in his head; felt like his eyeballs and veins were going to pop out of his head.     Vasopressin analogues Other (See Comments) and Anaphylaxis     "felt like eyes going to pop out of my head"  Other reaction(s): Other (See Comments)  "felt like eyes going to pop out of my head"  Increased pressure in his head; felt like his eyeballs and veins were going to pop out of his head.     Heparin Other (See Comments)     Other reaction(s): "depleted my blood platets"    Decreased platelet count    Heparin analogues Other (See Comments)     Depleted WBC count    Morphine Hallucinations, Other (See Comments) and Anxiety     Other reaction(s): Hallucinations  Paranoid, hallucinations         Meds:  Scheduled Meds:   aspirin  81 mg Oral Daily    atorvastatin  20 mg Oral QHS    bumetanide  2 mg Intravenous BID    gabapentin  300 mg Oral BID    mupirocin   Nasal BID    pantoprazole  40 mg Oral Daily    PHENYLephrine        PHENYLephrine         Continuous Infusions:   amiodarone in dextrose 5% 1 mg/min (10/09/23 0315)    phenylephrine 0.5 mcg/kg/min (10/09/23 0130)     PRN Meds:acetaminophen, albuterol, ALPRAZolam, dextrose 50%, dextrose 50%, glucagon (human recombinant), glucose, glucose, insulin aspart U-100, melatonin, ondansetron, PHENYLephrine, PHENYLephrine, sodium chloride 0.9%, traMADoL    Oxygen/Ventilator Data (Last 24H):  (if applicable)            Hemodynamic Parameters (Last 24H):   (if applicable)        Laboratory and Radiology Data:  Recent Results (from the past 24 hour(s))   Blood culture #1 **CANNOT BE ORDERED STAT**    Collection Time: 10/08/23  3:04 PM    Specimen: Antecubital, Right; Blood   Result Value Ref Range    Blood Culture, Routine No Growth to date    CBC auto differential    Collection Time: 10/08/23  3:06 PM   Result Value Ref Range    WBC 6.26 " 3.90 - 12.70 K/uL    RBC 3.48 (L) 4.60 - 6.20 M/uL    Hemoglobin 9.8 (L) 14.0 - 18.0 g/dL    Hematocrit 32.5 (L) 40.0 - 54.0 %    MCV 93 82 - 98 fL    MCH 28.2 27.0 - 31.0 pg    MCHC 30.2 (L) 32.0 - 36.0 g/dL    RDW 19.0 (H) 11.5 - 14.5 %    Platelets 114 (L) 150 - 450 K/uL    MPV 11.4 9.2 - 12.9 fL    Immature Granulocytes 0.3 0.0 - 0.5 %    Gran # (ANC) 5.2 1.8 - 7.7 K/uL    Immature Grans (Abs) 0.02 0.00 - 0.04 K/uL    Lymph # 0.4 (L) 1.0 - 4.8 K/uL    Mono # 0.6 0.3 - 1.0 K/uL    Eos # 0.0 0.0 - 0.5 K/uL    Baso # 0.02 0.00 - 0.20 K/uL    nRBC 0 0 /100 WBC    Gran % 83.0 (H) 38.0 - 73.0 %    Lymph % 6.7 (L) 18.0 - 48.0 %    Mono % 9.1 4.0 - 15.0 %    Eosinophil % 0.6 0.0 - 8.0 %    Basophil % 0.3 0.0 - 1.9 %    Differential Method Automated    Comprehensive metabolic panel    Collection Time: 10/08/23  3:06 PM   Result Value Ref Range    Sodium 135 (L) 136 - 145 mmol/L    Potassium 4.8 3.5 - 5.1 mmol/L    Chloride 100 95 - 110 mmol/L    CO2 22 (L) 23 - 29 mmol/L    Glucose 121 (H) 70 - 110 mg/dL    BUN 72 (H) 8 - 23 mg/dL    Creatinine 3.1 (H) 0.5 - 1.4 mg/dL    Calcium 9.0 8.7 - 10.5 mg/dL    Total Protein 7.4 6.0 - 8.4 g/dL    Albumin 3.6 3.5 - 5.2 g/dL    Total Bilirubin 1.8 (H) 0.1 - 1.0 mg/dL    Alkaline Phosphatase 61 55 - 135 U/L    AST 30 10 - 40 U/L    ALT 17 10 - 44 U/L    eGFR 22.0 (A) >60 mL/min/1.73 m^2    Anion Gap 13 8 - 16 mmol/L   Lactic acid, plasma    Collection Time: 10/08/23  3:06 PM   Result Value Ref Range    Lactate (Lactic Acid) 1.4 0.5 - 1.9 mmol/L   Lipase    Collection Time: 10/08/23  3:06 PM   Result Value Ref Range    Lipase 35 4 - 60 U/L   Magnesium    Collection Time: 10/08/23  3:06 PM   Result Value Ref Range    Magnesium 2.2 1.6 - 2.6 mg/dL   Protime-INR    Collection Time: 10/08/23  3:06 PM   Result Value Ref Range    Prothrombin Time 14.0 (H) 9.0 - 12.5 sec    INR 1.3 (H) 0.8 - 1.2   Troponin I High Sensitivity    Collection Time: 10/08/23  3:06 PM   Result Value Ref Range     Troponin I High Sensitivity 23.5 (H) 0.0 - 14.9 pg/mL   TSH    Collection Time: 10/08/23  3:06 PM   Result Value Ref Range    TSH 11.890 (H) 0.340 - 5.600 uIU/mL   APTT    Collection Time: 10/08/23  3:06 PM   Result Value Ref Range    aPTT 28.6 21.0 - 32.0 sec   Brain natriuretic peptide    Collection Time: 10/08/23  3:06 PM   Result Value Ref Range    BNP 1,720 (H) 0 - 99 pg/mL   T4, Free    Collection Time: 10/08/23  3:06 PM   Result Value Ref Range    Free T4 1.23 0.71 - 1.51 ng/dL   Blood culture #2 **CANNOT BE ORDERED STAT**    Collection Time: 10/08/23  3:12 PM    Specimen: Blood   Result Value Ref Range    Blood Culture, Routine No Growth to date    Troponin I High Sensitivity    Collection Time: 10/08/23  6:01 PM   Result Value Ref Range    Troponin I High Sensitivity 25.8 (H) 0.0 - 14.9 pg/mL   Troponin I High Sensitivity    Collection Time: 10/08/23  6:01 PM   Result Value Ref Range    Troponin I High Sensitivity 25.8 (H) 0.0 - 14.9 pg/mL   POCT glucose    Collection Time: 10/08/23  9:53 PM   Result Value Ref Range    POC Glucose 121 (H) 70 - 110   Lactic acid, plasma    Collection Time: 10/09/23  2:24 AM   Result Value Ref Range    Lactate (Lactic Acid) 1.8 0.5 - 1.9 mmol/L   Procalcitonin    Collection Time: 10/09/23  2:24 AM   Result Value Ref Range    Procalcitonin 0.234 0.000 - 0.500 ng/mL   CBC auto differential    Collection Time: 10/09/23  2:24 AM   Result Value Ref Range    WBC 8.51 3.90 - 12.70 K/uL    RBC 3.44 (L) 4.60 - 6.20 M/uL    Hemoglobin 9.8 (L) 14.0 - 18.0 g/dL    Hematocrit 31.4 (L) 40.0 - 54.0 %    MCV 91 82 - 98 fL    MCH 28.5 27.0 - 31.0 pg    MCHC 31.2 (L) 32.0 - 36.0 g/dL    RDW 18.8 (H) 11.5 - 14.5 %    Platelets 135 (L) 150 - 450 K/uL    MPV 10.8 9.2 - 12.9 fL    Immature Granulocytes 0.1 0.0 - 0.5 %    Gran # (ANC) 7.1 1.8 - 7.7 K/uL    Immature Grans (Abs) 0.01 0.00 - 0.04 K/uL    Lymph # 0.5 (L) 1.0 - 4.8 K/uL    Mono # 0.8 0.3 - 1.0 K/uL    Eos # 0.0 0.0 - 0.5 K/uL    Baso #  0.03 0.00 - 0.20 K/uL    nRBC 0 0 /100 WBC    Gran % 83.5 (H) 38.0 - 73.0 %    Lymph % 6.0 (L) 18.0 - 48.0 %    Mono % 9.6 4.0 - 15.0 %    Eosinophil % 0.4 0.0 - 8.0 %    Basophil % 0.4 0.0 - 1.9 %    Differential Method Automated    Comprehensive metabolic panel    Collection Time: 10/09/23  2:24 AM   Result Value Ref Range    Sodium 134 (L) 136 - 145 mmol/L    Potassium 4.9 3.5 - 5.1 mmol/L    Chloride 99 95 - 110 mmol/L    CO2 22 (L) 23 - 29 mmol/L    Glucose 125 (H) 70 - 110 mg/dL    BUN 75 (H) 8 - 23 mg/dL    Creatinine 3.4 (H) 0.5 - 1.4 mg/dL    Calcium 8.8 8.7 - 10.5 mg/dL    Total Protein 7.2 6.0 - 8.4 g/dL    Albumin 3.4 (L) 3.5 - 5.2 g/dL    Total Bilirubin 1.8 (H) 0.1 - 1.0 mg/dL    Alkaline Phosphatase 61 55 - 135 U/L    AST 25 10 - 40 U/L    ALT 17 10 - 44 U/L    eGFR 19.7 (A) >60 mL/min/1.73 m^2    Anion Gap 13 8 - 16 mmol/L   TROPONIN I HIGH SENSITIVITY    Collection Time: 10/09/23  2:24 AM   Result Value Ref Range    Troponin I High Sensitivity 24.7 (H) 0.0 - 14.9 pg/mL   Urinalysis, Reflex to Urine Culture Urine, Clean Catch    Collection Time: 10/09/23  2:41 AM    Specimen: Urine   Result Value Ref Range    Specimen UA Urine, Clean Catch     Color, UA Yellow Yellow, Straw, Lois    Appearance, UA Hazy (A) Clear    pH, UA 5.0 5.0 - 8.0    Specific Gravity, UA 1.015 1.005 - 1.030    Protein, UA 1+ (A) Negative    Glucose, UA 2+ (A) Negative    Ketones, UA Negative Negative    Bilirubin (UA) Negative Negative    Occult Blood UA 3+ (A) Negative    Nitrite, UA Negative Negative    Urobilinogen, UA Negative Negative EU/dL    Leukocytes, UA 1+ (A) Negative   Urinalysis Microscopic    Collection Time: 10/09/23  2:41 AM   Result Value Ref Range    RBC, UA 58 (H) 0 - 4 /hpf    WBC, UA 4 0 - 5 /hpf    Bacteria Negative None-Occ /hpf    Squam Epithel, UA 2 /hpf    Hyaline Casts, UA 44 (A) 0-1/lpf /lpf    Microscopic Comment SEE COMMENT      Imaging Results              X-Ray Chest AP Portable (Final result)   Result time 10/08/23 15:13:32      Final result by Tong Morales MD (10/08/23 15:13:32)                   Narrative:    CLINICAL HISTORY:  63 years (1960) Male Tachycardia; Shortness of Breath    TECHNIQUE:  Portable AP radiograph the chest. One view.    COMPARISON:  Radiograph from June 12, 2023    FINDINGS:  There is vascular congestion with increased interstitial markings findings indicating mild pulmonary edema.  There is blunting of both costophrenic angles consistent with trace pleural effusions and adjacent atelectasis. No pneumothorax is identified. The cardiac silhouette is moderately enlarged. The median sternotomy wires and the left sided pacemaker are unchanged.  Osseous structures appear unchanged. The visualized upper abdomen is unremarkable.    IMPRESSION:  Moderate to severe cardiomegaly and findings of mild interstitial pulmonary edema.                  .            Electronically signed by:  Tong Morales MD  10/08/2023 03:13 PM CDT Workstation: [id number]                                    12-lead EKG interpretation:  (if applicable)      Current Cardiac Rhythm:   (if applicable)    Physical Exam:   Physical Exam  Constitutional:       Appearance: Normal appearance.   Cardiovascular:      Rate and Rhythm: Normal rate and regular rhythm.      Heart sounds: Murmur heard.   Pulmonary:      Effort: Pulmonary effort is normal.      Breath sounds: Wheezing present.   Abdominal:      General: Abdomen is flat.      Palpations: Abdomen is soft.   Musculoskeletal:         General: Swelling present.   Skin:     General: Skin is warm and dry.   Neurological:      General: No focal deficit present.      Mental Status: He is alert and oriented to person, place, and time.         ASSESSMENT/PLAN:   Assessment:   Acute on chronic combined systolic /diastolic HFrEF   ICM  AD/CKD-cardiorenal  CAD-CABG--in office cr 3.2 9/1  Ventricular tachycardia  DM   DM foot ulcer   HTN  HLP  FARHAN  Elevated  TSH  Chronic thrombocytopenia   [employer] AICD insitu    Plan:   Pt reports slight improvement in symptoms, but urine output remains low 237 cc yesterday  Continue diuresis-likely cardiorenal  Will change to lasix drip which may be better tolerated  Consult nephology, pt know to   Interrogate pm, ? Atrial tachycardia upon EKG review by   Will interrogate device today   range  Continue amio for now  Repeat lactate  Strict I/O   Daily wts   Monitory electrolytes  Hypotensive continue pressor wean as tolerated.   Case discussed with   Thank you for your consult , will follow

## 2023-10-09 NOTE — CONSULTS
Pulmonary/Critical Care Consult      Patient name: Ana Bullard  MRN: [id number]  Date: 10/09/2023    Admit Date: 10/8/2023  Consult Requested By: Cong Montalvo MD    Reason for Consult: tachycardia, CHF, dyspnea    HPI:    10/9/2023 - 64 yo AICD, HFrEF (20%), ASCVD, DM, DVT, GERD, obesity, FARHAN came to ER with tachycardia, SOB and weight gain.  He reports that this happened after changes to his medications (amio and hydralazine stopped).  He devloped increased SOB, BAPTISTE< edema and tachycardia.  In ER was found to be in wide complex tachycardia.  He required neosynephrine due to hypotension, amiodarone for tachyc=arrhythmia and diuretics and was admitted to ICU.   He has been seen by cardiology and to get AICD interrogated and further decision will be made (? Change in programming, ? Cardioversion).  He does feel SOB and BAPTISTE.  ROS as below.  CXR reviewed and he has some increased interstitial markings (likely edema) and elevated left diaphragm (old finding).    Review of Systems    Review of Systems   Constitutional:  Positive for malaise/fatigue. Negative for chills, diaphoresis, fever and weight loss.   HENT:  Negative for congestion.    Eyes:  Negative for pain.   Respiratory:  Positive for shortness of breath (BAPTISTE). Negative for cough, hemoptysis, sputum production, wheezing and stridor.    Cardiovascular:  Positive for palpitations and leg swelling. Negative for chest pain, orthopnea, claudication and PND.        + weight gain   Gastrointestinal:  Negative for abdominal pain, constipation, diarrhea, heartburn, nausea and vomiting.   Genitourinary:  Positive for dysuria (related to [place]). Negative for frequency and urgency.   Musculoskeletal:  Negative for falls and myalgias.   Neurological:  Negative for sensory change, focal weakness and weakness.   Psychiatric/Behavioral:  Negative for depression, substance abuse and suicidal ideas. The patient is not nervous/anxious.        Past Medical History    Past  Medical History:   Diagnosis Date    AICD (automatic cardioverter/defibrillator) present     Anemia, chronic disease 07/27/2021    Anemia, unspecified 07/27/2021    Anxiety and depression 2012    CAD (coronary artery disease) 2012    Cardiomegaly 2016    CHF (congestive heart failure) 2012    Cholecystitis 2017    Complete heart block 2012    Diabetic foot ulcer     DVT (deep venous thrombosis) 2012    And pulmonary embolus    GERD (gastroesophageal reflux disease) 2010    Heparin induced thrombocytopenia     Hyperlipemia 2011    IDDM (insulin dependent diabetes mellitus) 1983    With neuropathy    Ischemic cardiomyopathy 2012    MI (myocardial infarction) 2012    Morbid obesity     MRSA (methicillin resistant Staphylococcus aureus) infection 01/19/2019    Noncompliance with therapeutic plan 2019    Normochromic normocytic anemia 07/27/2021    Osteomyelitis of right foot 01/2019    Osteomyelitis of right foot 09/01/2022    Klebsiella from bone, GBS in blood    Pulmonary edema 2019    Respiratory failure 2019    Sepsis 01/2019    Due to cellulitis right leg    Sleep apnea 2013    Thrombocytopathy 2012    Venous stasis dermatitis of both lower extremities        Past Surgical History    Past Surgical History:   Procedure Laterality Date    CARDIAC PACEMAKER PLACEMENT  12/2012    CARDIAC PACEMAKER PLACEMENT  10/04/2018    battery replacement    CORONARY ARTERY BYPASS GRAFT  06/2012    ESOPHAGOGASTRODUODENOSCOPY  01/31/2017    SAMARA FILTER PLACEMENT  2012    vena cava    LUMBAR SYMPATHETIC NERVE BLOCK Right 8/22/2019    Procedure: BLOCK, NERVE, SYMPATHETIC, LUMBAR;  Surgeon: Tashi Good MD;  Location: [facility] OR;  Service: Pain Management;  Laterality: Right;  RIGHT SYMPATHETIC NERVE BLOCK       Medications (scheduled):      aspirin  81 mg Oral Daily    atorvastatin  20 mg Oral QHS    gabapentin  300 mg Oral BID    mupirocin   Nasal BID    pantoprazole  40 mg Oral Daily       Medications (infusions):      amiodarone  in dextrose 5% 1 mg/min (10/09/23 0924)    furosemide (LASIX) 2 mg/mL continuous infusion (non-titrating) 2.5 mg/hr (10/09/23 1110)    phenylephrine 0.5 mcg/kg/min (10/09/23 0130)       Medications (prn):     acetaminophen, albuterol, ALPRAZolam, dextrose 50%, dextrose 50%, glucagon (human recombinant), glucose, glucose, insulin aspart U-100, melatonin, ondansetron, sodium chloride 0.9%, traMADoL    Family History:   Family History   Problem Relation Age of Onset    Arthritis Mother     Diabetes Mother     Cancer Father     Heart disease Father     Stroke Father        Social History: Tobacco:   Social History     Tobacco Use   Smoking Status Former    Current packs/day: 0.00    Average packs/day: 2.0 packs/day for 38.0 years (76.0 ttl pk-yrs)    Types: Cigarettes    Start date:     Quit date:     Years since quittin.7   Smokeless Tobacco Never                                EtOH:   Social History     Substance and Sexual Activity   Alcohol Use No                                Drugs:   Social History     Substance and Sexual Activity   Drug Use Never     Physical Exam    Vital signs:  Temp:  [97.5 °F (36.4 °C)-97.6 °F (36.4 °C)]   Pulse:  []   Resp:  [14-28]   BP: ()/(44-84)   SpO2:  [86 %-100 %]   Arterial Line BP: ()/(55-78)     Intake/Output:   Intake/Output Summary (Last 24 hours) at 10/9/2023 1407  Last data filed at 10/9/2023 0633  Gross per 24 hour   Intake 945.4 ml   Output 237 ml   Net 708.4 ml        BMI: Estimated body mass index is 37.43 kg/m² as calculated from the following:    Height as of this encounter: 6' (1.829 m).    Weight as of this encounter: 125.2 kg (276 lb 0.3 oz).    Physical Exam  Vitals and nursing note reviewed.   Constitutional:       General: He is not in acute distress.     Appearance: Normal appearance. He is obese. He is ill-appearing (chronically). He is not toxic-appearing or diaphoretic.   HENT:      Head: Normocephalic and atraumatic.      Right  Ear: External ear normal.      Left Ear: External ear normal.      Nose: Nose normal.      Mouth/Throat:      Mouth: Mucous membranes are moist.      Pharynx: Oropharynx is clear. No oropharyngeal exudate.   Eyes:      General: No scleral icterus.        Right eye: No discharge.         Left eye: No discharge.      Extraocular Movements: Extraocular movements intact.      Conjunctiva/sclera: Conjunctivae normal.      Pupils: Pupils are equal, round, and reactive to light.   Neck:      Vascular: No carotid bruit.   Cardiovascular:      Rate and Rhythm: Regular rhythm. Tachycardia present.      Pulses: Normal pulses.      Heart sounds: Normal heart sounds. No murmur heard.     No friction rub. No gallop.      Comments: AICD  Pulmonary:      Effort: Pulmonary effort is normal. No respiratory distress.      Breath sounds: No stridor. Rales present. No wheezing or rhonchi.      Comments: Decreased at bases  No acc m use  Chest:      Chest wall: No tenderness.   Abdominal:      General: Bowel sounds are normal. There is no distension.      Tenderness: There is no abdominal tenderness. There is no guarding.   Musculoskeletal:         General: No swelling. Normal range of motion.      Cervical back: Normal range of motion and neck supple. No rigidity or tenderness.      Right lower leg: Edema present.      Left lower leg: Edema present.   Lymphadenopathy:      Cervical: No cervical adenopathy.   Skin:     General: Skin is warm and dry.      Capillary Refill: Capillary refill takes less than 2 seconds.      Coloration: Skin is not jaundiced.      Findings: No bruising.   Neurological:      General: No focal deficit present.      Mental Status: He is alert and oriented to person, place, and time. Mental status is at baseline.      Cranial Nerves: No cranial nerve deficit.      Sensory: No sensory deficit.      Motor: No weakness.   Psychiatric:         Mood and Affect: Mood normal.         Behavior: Behavior normal.          "Thought Content: Thought content normal.         Judgment: Judgment normal.         Laboratory    Recent Labs   Lab 10/09/23  0224   WBC 8.51   RBC 3.44*   HGB 9.8*   HCT 31.4*   *   MCV 91   MCH 28.5   MCHC 31.2*       Recent Labs   Lab 10/09/23  0224   CALCIUM 8.8   ALBUMIN 3.4*   PROT 7.2   *   K 4.9   CO2 22*   CL 99   BUN 75*   CREATININE 3.4*   ALKPHOS 61   ALT 17   AST 25   BILITOT 1.8*       Recent Labs   Lab 10/08/23  1506   INR 1.3*   APTT 28.6       No results for input(s): "CPK", "CPKMB", "TROPONINI", "MB" in the last 24 hours.    Additional labs: reviewed    Microbiology:       Microbiology Results (last 7 days)       Procedure Component Value Units Date/Time    Blood culture [[id number]] Collected: 10/09/23 0226    Order Status: Completed Specimen: Blood from Line, Jugular, Internal Right Updated: 10/09/23 0917     Blood Culture, Routine No Growth to date    Blood culture [[id number]] Collected: 10/09/23 0226    Order Status: Sent Specimen: Blood from Line, Jugular, Internal Right Updated: 10/09/23 0227    Blood culture #1 **CANNOT BE ORDERED STAT** [[id number]] Collected: 10/08/23 1504    Order Status: Completed Specimen: Blood from Antecubital, Right Updated: 10/08/23 2317     Blood Culture, Routine No Growth to date    Blood culture #2 **CANNOT BE ORDERED STAT** [[id number]] Collected: 10/08/23 1512    Order Status: Completed Specimen: Blood Updated: 10/08/23 2317     Blood Culture, Routine No Growth to date            Radiology    X-Ray Chest AP Portable  Reason: CVP Placement    FINDINGS:    Portable chest with comparison chest x-ray October 8, 2023 show unchanged enlarged cardiomediastinal silhouette. Median sternotomy wires and left AICD again noted.  Right internal jugular central venous catheter tip is in the anatomic region of the SVC. No pneumothorax. Bilateral interstitial lung opacities again noted. No acute osseous abnormality.    IMPRESSION:    1.  Right internal jugular " "central venous catheter tip is in the anatomic region of the SVC.  2.  No pneumothorax.  3.  Bilateral interstitial lung opacities again noted.    Electronically signed by:  Wolf Delarosa DO  10/09/2023 07:34 AM CDT Workstation: [id number]        Additional Studies    reviewed    Ventilator Information                  No results for input(s): "PH", "PCO2", "PO2", "HCO3", "POCSATURATED", "BE" in the last 72 hours.      Impression    Active Hospital Problems    Diagnosis  POA    *Wide-complex tachycardia [R00.0]  Yes    Pulmonary hypertension [I27.20]  Yes    AICD (automatic cardioverter/defibrillator) present [Z95.810]  Yes    Obesity [E66.9]  Yes    Chronic combined systolic and diastolic heart failure [I50.42]  Yes    Venous stasis dermatitis of both lower extremities [I87.2]  Yes    CAD (coronary artery disease) [I25.10]  Yes    Type II diabetes mellitus with neurological manifestations [E11.49]  Yes     With neuropathy        Resolved Hospital Problems   No resolved problems to display.       Plan    O2 as needed  BiPAP if needed - will discuss further with him about ? FARHAN  Treatment of tachyarrhythmia ramila cardiology  Awaiting interrogation of AICD  Continue amiodarone drip  Pressor support as needed with neosynephrine  Diuresis as able  Start oral synthroid  Pulmonary hypertension likley group 2 but there may also be a component of group 3  Needs to lose weight  Has AD/CKD - watch creatinine (was 3.1 -  2 months ago)  Watch H/H and platelets    Thank you for this consult.  I will follow with you while the patient is hospitalized.        Mauricio Gonsales MD  [facility] Pulmonary/Critical Care  10/09/2023          "

## 2023-10-09 NOTE — NURSING
Pt HR continues to be elevated, u/o = 175ml after previous lasix administration. Cardiologist updated per hospitalist instruction.     Received order from Dr. Vences to administer another Lasix 40mg IV.

## 2023-10-09 NOTE — CARE UPDATE
10/09/23 0725   Patient Assessment/Suction   Level of Consciousness (AVPU) alert   Respiratory Effort Normal;Unlabored   Expansion/Accessory Muscles/Retractions no use of accessory muscles;expansion symmetric;no retractions   Rhythm/Pattern, Respiratory shortness of breath  (on exertion)   PRE-TX-O2   Device (Oxygen Therapy) room air   SpO2 (!) 92 %   Pulse Oximetry Type Continuous   $ Pulse Oximetry - Multiple Charge Pulse Oximetry - Multiple   Pulse (!) 133   Resp 19

## 2023-10-09 NOTE — ANESTHESIA PROCEDURE NOTES
Central Line    Diagnosis: CHF exacerbation  Patient location during procedure: ICU  Timeout: 10/9/2023 2:31 AM  Procedure end time: 10/9/2023 2:31 AM    Staffing  Authorizing Provider: George Marino MD  Performing Provider: George Marino MD    Staffing  Performed: anesthesiologist   Anesthesiologist: George Marino MD  Performed by: George Marino MD  Authorized by: George Marino MD    Anesthesiologist was present at the time of the procedure.  Preanesthetic Checklist  Completed: patient identified, IV checked, site marked, risks and benefits discussed, surgical consent, monitors and equipment checked, pre-op evaluation, timeout performed and anesthesia consent given  Indication   Indication: hemodynamic monitoring, vascular access, med administration     Anesthesia   local infiltration    Central Line   Skin Prep: skin prepped with ChloraPrep, skin prep agent completely dried prior to procedure  Sterile Barriers Followed: Yes    All five maximal barriers used- gloves, gown, cap, mask, and large sterile sheet    hand hygiene performed prior to central venous catheter insertion  Location: right internal jugular.   Catheter type: triple lumen  Catheter Size: 7 Fr  Inserted Catheter Length: 15 cm  Ultrasound: vascular probe with ultrasound   Vessel Caliber: medium, patent, compressibility normal  Needle advanced into vessel with real time Ultrasound guidance.  Guidewire confirmed in vessel.  Image recorded and saved.  sterile gel and probe cover used in ultrasound-guided central venous catheter insertion  Manometry: none  Insertion Attempts: 1   Securement:line sutured, chlorhexidine patch, sterile dressing applied and blood return through all ports    Post-Procedure   X-Ray: placement verified by x-ray   Adverse Events:none      Guidewire Guidewire removed intact.

## 2023-10-09 NOTE — NURSING
0000: Noted pt 8-beat run of Vtach, low B/P, and low u/o after lasix administration. EKG obtained. Cardiologist updated; order o consult intensivist received.    0030: Received order from Dr. Palumbo to start A-line, Central line and neosynephrine gtt.    0230: All ordered lined placed and verified.

## 2023-10-10 LAB
ALBUMIN SERPL BCP-MCNC: 3.4 G/DL (ref 3.5–5.2)
ALP SERPL-CCNC: 71 U/L (ref 55–135)
ALT SERPL W/O P-5'-P-CCNC: 29 U/L (ref 10–44)
ANION GAP SERPL CALC-SCNC: 13 MMOL/L (ref 8–16)
AST SERPL-CCNC: 39 U/L (ref 10–40)
BASOPHILS # BLD AUTO: 0.01 K/UL (ref 0–0.2)
BASOPHILS NFR BLD: 0.1 % (ref 0–1.9)
BILIRUB SERPL-MCNC: 2.4 MG/DL (ref 0.1–1)
BUN SERPL-MCNC: 82 MG/DL (ref 8–23)
CALCIUM SERPL-MCNC: 8.9 MG/DL (ref 8.7–10.5)
CHLORIDE SERPL-SCNC: 97 MMOL/L (ref 95–110)
CO2 SERPL-SCNC: 21 MMOL/L (ref 23–29)
CREAT SERPL-MCNC: 4.1 MG/DL (ref 0.5–1.4)
DIFFERENTIAL METHOD: ABNORMAL
EOSINOPHIL # BLD AUTO: 0 K/UL (ref 0–0.5)
EOSINOPHIL NFR BLD: 0 % (ref 0–8)
ERYTHROCYTE [DISTWIDTH] IN BLOOD BY AUTOMATED COUNT: 19.2 % (ref 11.5–14.5)
EST. GFR  (NO RACE VARIABLE): 15.7 ML/MIN/1.73 M^2
GLUCOSE SERPL-MCNC: 165 MG/DL (ref 70–110)
GLUCOSE SERPL-MCNC: 188 MG/DL (ref 70–110)
GLUCOSE SERPL-MCNC: 209 MG/DL (ref 70–110)
GLUCOSE SERPL-MCNC: 221 MG/DL (ref 70–110)
HCT VFR BLD AUTO: 32.7 % (ref 40–54)
HGB BLD-MCNC: 10.2 G/DL (ref 14–18)
IMM GRANULOCYTES # BLD AUTO: 0.03 K/UL (ref 0–0.04)
IMM GRANULOCYTES NFR BLD AUTO: 0.3 % (ref 0–0.5)
LYMPHOCYTES # BLD AUTO: 0.4 K/UL (ref 1–4.8)
LYMPHOCYTES NFR BLD: 4.5 % (ref 18–48)
MAGNESIUM SERPL-MCNC: 2.6 MG/DL (ref 1.6–2.6)
MCH RBC QN AUTO: 28.2 PG (ref 27–31)
MCHC RBC AUTO-ENTMCNC: 31.2 G/DL (ref 32–36)
MCV RBC AUTO: 90 FL (ref 82–98)
MONOCYTES # BLD AUTO: 1 K/UL (ref 0.3–1)
MONOCYTES NFR BLD: 10.3 % (ref 4–15)
NEUTROPHILS # BLD AUTO: 8 K/UL (ref 1.8–7.7)
NEUTROPHILS NFR BLD: 84.8 % (ref 38–73)
NRBC BLD-RTO: 0 /100 WBC
PLATELET # BLD AUTO: 190 K/UL (ref 150–450)
PMV BLD AUTO: 10.9 FL (ref 9.2–12.9)
POTASSIUM SERPL-SCNC: 4.9 MMOL/L (ref 3.5–5.1)
PROT SERPL-MCNC: 7.4 G/DL (ref 6–8.4)
RBC # BLD AUTO: 3.62 M/UL (ref 4.6–6.2)
SODIUM SERPL-SCNC: 131 MMOL/L (ref 136–145)
WBC # BLD AUTO: 9.42 K/UL (ref 3.9–12.7)

## 2023-10-10 PROCEDURE — 25000003 PHARM REV CODE 250: Performed by: INTERNAL MEDICINE

## 2023-10-10 PROCEDURE — 85025 COMPLETE CBC W/AUTO DIFF WBC: CPT | Performed by: NURSE PRACTITIONER

## 2023-10-10 PROCEDURE — 27000221 HC OXYGEN, UP TO 24 HOURS

## 2023-10-10 PROCEDURE — 5A1D70Z PERFORMANCE OF URINARY FILTRATION, INTERMITTENT, LESS THAN 6 HOURS PER DAY: ICD-10-PCS | Performed by: INTERNAL MEDICINE

## 2023-10-10 PROCEDURE — 0JH63XZ INSERTION OF TUNNELED VASCULAR ACCESS DEVICE INTO CHEST SUBCUTANEOUS TISSUE AND FASCIA, PERCUTANEOUS APPROACH: ICD-10-PCS | Performed by: SURGERY

## 2023-10-10 PROCEDURE — 99900031 HC PATIENT EDUCATION (STAT)

## 2023-10-10 PROCEDURE — 63600175 PHARM REV CODE 636 W HCPCS: Performed by: EMERGENCY MEDICINE

## 2023-10-10 PROCEDURE — 20000000 HC ICU ROOM

## 2023-10-10 PROCEDURE — 25000003 PHARM REV CODE 250: Performed by: NURSE PRACTITIONER

## 2023-10-10 PROCEDURE — 02HV33Z INSERTION OF INFUSION DEVICE INTO SUPERIOR VENA CAVA, PERCUTANEOUS APPROACH: ICD-10-PCS | Performed by: SURGERY

## 2023-10-10 PROCEDURE — 80100014 HC HEMODIALYSIS 1:1

## 2023-10-10 PROCEDURE — 63600175 PHARM REV CODE 636 W HCPCS: Mod: JZ,JG | Performed by: INTERNAL MEDICINE

## 2023-10-10 PROCEDURE — C1751 CATH, INF, PER/CENT/MIDLINE: HCPCS

## 2023-10-10 PROCEDURE — 25000003 PHARM REV CODE 250: Performed by: FAMILY MEDICINE

## 2023-10-10 PROCEDURE — 36556 INSERT NON-TUNNEL CV CATH: CPT | Mod: RT,,, | Performed by: SURGERY

## 2023-10-10 PROCEDURE — 90935 HEMODIALYSIS ONE EVALUATION: CPT

## 2023-10-10 PROCEDURE — 80053 COMPREHEN METABOLIC PANEL: CPT | Performed by: NURSE PRACTITIONER

## 2023-10-10 PROCEDURE — 36556 PR INSERT NON-TUNNEL CV CATH 5+ YRS OLD: ICD-10-PCS | Mod: RT,,, | Performed by: SURGERY

## 2023-10-10 PROCEDURE — P9047 ALBUMIN (HUMAN), 25%, 50ML: HCPCS | Mod: JZ,JG | Performed by: INTERNAL MEDICINE

## 2023-10-10 PROCEDURE — 80074 ACUTE HEPATITIS PANEL: CPT | Performed by: INTERNAL MEDICINE

## 2023-10-10 PROCEDURE — 99233 PR SUBSEQUENT HOSPITAL CARE,LEVL III: ICD-10-PCS | Mod: ,,, | Performed by: INTERNAL MEDICINE

## 2023-10-10 PROCEDURE — 83735 ASSAY OF MAGNESIUM: CPT | Performed by: INTERNAL MEDICINE

## 2023-10-10 PROCEDURE — 99233 SBSQ HOSP IP/OBS HIGH 50: CPT | Mod: ,,, | Performed by: INTERNAL MEDICINE

## 2023-10-10 PROCEDURE — 94761 N-INVAS EAR/PLS OXIMETRY MLT: CPT

## 2023-10-10 RX ORDER — ALBUMIN HUMAN 250 G/1000ML
25 SOLUTION INTRAVENOUS DAILY PRN
Status: DISCONTINUED | OUTPATIENT
Start: 2023-10-10 | End: 2023-10-19 | Stop reason: HOSPADM

## 2023-10-10 RX ADMIN — MUPIROCIN 1 G: 20 OINTMENT TOPICAL at 09:10

## 2023-10-10 RX ADMIN — ATORVASTATIN CALCIUM 20 MG: 20 TABLET, FILM COATED ORAL at 09:10

## 2023-10-10 RX ADMIN — Medication 6 MG: at 09:10

## 2023-10-10 RX ADMIN — ALBUMIN (HUMAN) 25 G: 0.25 INJECTION, SOLUTION INTRAVENOUS at 07:10

## 2023-10-10 RX ADMIN — LEVOTHYROXINE SODIUM 25 MCG: 0.03 TABLET ORAL at 05:10

## 2023-10-10 RX ADMIN — GABAPENTIN 300 MG: 300 CAPSULE ORAL at 09:10

## 2023-10-10 RX ADMIN — INSULIN ASPART 2 UNITS: 100 INJECTION, SOLUTION INTRAVENOUS; SUBCUTANEOUS at 11:10

## 2023-10-10 RX ADMIN — TRAMADOL HYDROCHLORIDE 50 MG: 50 TABLET, COATED ORAL at 04:10

## 2023-10-10 RX ADMIN — ASPIRIN 81 MG 81 MG: 81 TABLET ORAL at 09:10

## 2023-10-10 RX ADMIN — TRAMADOL HYDROCHLORIDE 50 MG: 50 TABLET, COATED ORAL at 09:10

## 2023-10-10 RX ADMIN — AMIODARONE HYDROCHLORIDE 1 MG/MIN: 1.8 INJECTION, SOLUTION INTRAVENOUS at 04:10

## 2023-10-10 RX ADMIN — INSULIN ASPART 1 UNITS: 100 INJECTION, SOLUTION INTRAVENOUS; SUBCUTANEOUS at 09:10

## 2023-10-10 RX ADMIN — ACETAMINOPHEN 650 MG: 325 TABLET ORAL at 09:10

## 2023-10-10 RX ADMIN — PANTOPRAZOLE SODIUM 40 MG: 40 TABLET, DELAYED RELEASE ORAL at 05:10

## 2023-10-10 NOTE — CONSULTS
Chief complaint:  Tachycardia and Shortness of Breath      HPI:  Ana Bullard is a 63 y.o. male presenting with a thiuck callus of the right plantar foot and a large bruise of the left buttock, both POA. Pt has had the callus for a long time, and when he gets it trimmed it returns. Bruise is new but not sure how he got the bruise. No other complaints today    PMH:  As per HPI and below:  Past Medical History:   Diagnosis Date    AICD (automatic cardioverter/defibrillator) present     Anemia, chronic disease 2021    Anemia, unspecified 2021    Anxiety and depression     CAD (coronary artery disease) 2012    Cardiomegaly 2016    CHF (congestive heart failure) 2012    Cholecystitis 2017    Complete heart block     Diabetic foot ulcer     DVT (deep venous thrombosis)     And pulmonary embolus    GERD (gastroesophageal reflux disease)     Heparin induced thrombocytopenia     Hyperlipemia     IDDM (insulin dependent diabetes mellitus) 1983    With neuropathy    Ischemic cardiomyopathy     MI (myocardial infarction)     Morbid obesity     MRSA (methicillin resistant Staphylococcus aureus) infection 2019    Noncompliance with therapeutic plan     Normochromic normocytic anemia 2021    Osteomyelitis of right foot 2019    Osteomyelitis of right foot 2022    Klebsiella from bone, GBS in blood    Pulmonary edema     Respiratory failure     Sepsis 2019    Due to cellulitis right leg    Sleep apnea 2013    Thrombocytopathy 2012    Venous stasis dermatitis of both lower extremities        Social History     Socioeconomic History    Marital status:    Tobacco Use    Smoking status: Former     Current packs/day: 0.00     Average packs/day: 2.0 packs/day for 38.0 years (76.0 ttl pk-yrs)     Types: Cigarettes     Start date:      Quit date:      Years since quittin.7    Smokeless tobacco: Never   Substance and Sexual Activity     "Alcohol use: No    Drug use: Never    Sexual activity: Never       Past Surgical History:   Procedure Laterality Date    CARDIAC PACEMAKER PLACEMENT  12/2012    CARDIAC PACEMAKER PLACEMENT  10/04/2018    battery replacement    CORONARY ARTERY BYPASS GRAFT  06/2012    ESOPHAGOGASTRODUODENOSCOPY  01/31/2017    SAMARA FILTER PLACEMENT  2012    vena cava    LUMBAR SYMPATHETIC NERVE BLOCK Right 8/22/2019    Procedure: BLOCK, NERVE, SYMPATHETIC, LUMBAR;  Surgeon: Tashi Good MD;  Location: [facility] OR;  Service: Pain Management;  Laterality: Right;  RIGHT SYMPATHETIC NERVE BLOCK       Family History   Problem Relation Age of Onset    Arthritis Mother     Diabetes Mother     Cancer Father     Heart disease Father     Stroke Father        Review of patient's allergies indicates:   Allergen Reactions    Vasopressin Anaphylaxis and Other (See Comments)     Increased pressure in his head; felt like his eyeballs and veins were going to pop out of his head.     Vasopressin analogues Other (See Comments) and Anaphylaxis     "felt like eyes going to pop out of my head"  Other reaction(s): Other (See Comments)  "felt like eyes going to pop out of my head"  Increased pressure in his head; felt like his eyeballs and veins were going to pop out of his head.     Heparin Other (See Comments)     Other reaction(s): "depleted my blood platets"    Decreased platelet count    Heparin analogues Other (See Comments)     Depleted WBC count    Morphine Hallucinations, Other (See Comments) and Anxiety     Other reaction(s): Hallucinations  Paranoid, hallucinations         No current facility-administered medications on file prior to encounter.     Current Outpatient Medications on File Prior to Encounter   Medication Sig Dispense Refill    albuterol (PROVENTIL) 5 mg/mL nebulizer solution Take 2.5 mg by nebulization every 6 (six) hours as needed.      aspirin 81 MG Chew Take 81 mg by mouth once daily.      atorvastatin (LIPITOR) 20 MG tablet Take " 20 mg by mouth once daily.      BASAGLAR KWIKPEN U-100 INSULIN glargine 100 units/mL (3mL) SubQ pen Inject 25 Units into the skin once daily.  3    carvediloL (COREG) 3.125 MG tablet Take 3.125 mg by mouth 2 (two) times daily.      FARXIGA 10 mg tablet Take 10 mg by mouth once daily.      gabapentin (NEURONTIN) 600 MG tablet Take 600 mg by mouth 3 (three) times daily.      pantoprazole (PROTONIX) 40 MG tablet Take 1 tablet by mouth once daily.  0    torsemide (DEMADEX) 20 MG Tab Take 1 tablet (20 mg total) by mouth 2 (two) times a day. 120 tablet 0    traMADoL (ULTRAM) 50 mg tablet Take 1 tablet (50 mg total) by mouth every 6 (six) hours as needed for Pain. 120 tablet 2    vitamin B complex-folic acid 0.4 mg Tab Take 1 tablet by mouth once daily.      albuterol-ipratropium (DUO-NEB) 2.5 mg-0.5 mg/3 mL nebulizer solution Take 3 mLs by nebulization every 6 (six) hours as needed.      amiodarone (PACERONE) 200 MG Tab Take 1 tablet (200 mg total) by mouth 2 (two) times daily. 90 tablet 0    CORLANOR 5 mg Tab Take 5 mg by mouth nightly.      TRULICITY 0.75 mg/0.5 mL pen injector Inject 0.75 mg into the skin every 7 days.         ROS: As per HPI and below:  Pertinent items are noted in HPI.      Physical Exam:     Vitals:    10/09/23 1801 10/09/23 1815 10/09/23 1830 10/09/23 2023   BP:       Pulse: (!) 126 (!) 123 (!) 122 (!) 123   Resp: 20 (!) 21 19 (!) 26   Temp:       TempSrc:       SpO2: 95% 96% 99% 99%   Weight:       Height:           BP  Min: 66/49  Max: 132/72  Temp  Av.6 °F (36.4 °C)  Min: 97.5 °F (36.4 °C)  Max: 97.6 °F (36.4 °C)  Pulse  Av  Min: 93  Max: 141  Resp  Av.4  Min: 14  Max: 30  SpO2  Av.8 %  Min: 86 %  Max: 100 %  Height  Av' (182.9 cm)  Min: 6' (182.9 cm)  Max: 6' (182.9 cm)  Weight  Av.7 kg (272 lb 12.1 oz)  Min: 121.6 kg (268 lb)  Max: 125.2 kg (276 lb 0.3 oz)    Body mass index is 37.43 kg/m².          General:             Well developed, well nourished, no apparent  "distress  HEENT:              NCAT, no JVD, mucous membranes moist, EOM intact  Cardiovascular:  Regular rate and rhythm, normal S1, normal S2, No murmurs, rubs, or gallops  Respiratory:        Normal breath sounds, no wheezes, no rales, no rhonchi  Abdomen:           Bowel sounds present, non tender, non distended, no masses, no hepatojugular reflux  Extremities:        No clubbing, no cyanosis, no edema  Neurological:      No focal deficits  Skin:                   No obvious rashes or erythema, thick callus of the right plantar foot, large bruise of the left buttock              Lab Results   Component Value Date    WBC 8.51 10/09/2023    HGB 9.8 (L) 10/09/2023    HCT 31.4 (L) 10/09/2023    MCV 91 10/09/2023     (L) 10/09/2023     No results found for: "CHOL"  No results found for: "HDL"  No results found for: "LDLCALC"  No results found for: "TRIG"  No results found for: "CHOLHDL"  CMP  Recent Labs   Lab 10/09/23  0224   *   CALCIUM 8.8   ALBUMIN 3.4*   PROT 7.2   *   K 4.9   CO2 22*   CL 99   BUN 75*   CREATININE 3.4*   ALKPHOS 61   ALT 17   AST 25   BILITOT 1.8*      Lab Results   Component Value Date    TSH 11.890 (H) 10/08/2023           Assessment and Recommendations       Diagnoses:    1. Bruise of the left buttock   2. Right foot callus    Plan:  1. Callus will need to be trimmed for evaluation of underlying open wound  2. FU at the wound clinic on DC    Complexity:    medium     "

## 2023-10-10 NOTE — PROGRESS NOTES
INPATIENT NEPHROLOGY Progress Note   [facility]    Patient Name: Ana Bullard  Date: 10/10/2023    Reason for consultation: AD/CKD    Chief Complaint:   Chief Complaint   Patient presents with    Tachycardia    Shortness of Breath       History of Present Illness:  62 y/o Male pt of  who presented to ED with h/o fatigue, back pain, decreased urination and SOB for 2 days. He endorses SOB has progressively worsened over this time and he is having dyspnea with minimal exertion, but no reported chest pain . He reports some feeling some palpitations, BLE edema as well. PMH includes  ischemic cardiomyopathy, 5 vessel bypass in 2012, s/p multiple MIs, chronic systolic heart failure, most recent ejection fraction 20%, complete heart block, PPM dependent, HIT, DVT/PE, diabetes mellitus, hyperlipidemia, hypertension, obstructive sleep apnea, and chronic kidney disease IV. In the ER he had wide complex tachycardia, amiodarone started. Pt was also started on leyla and lasix gtts, transferred to ICU. Consulted for renal management.    Interval History:  10/9- on BIPAP, overloaded  10/10- AF with RVR, SBP , on 3L NC, UOP 80cc, ashford placed- renal function worse- remains volume overloaded    I have discussed the risks and benefits of hemodialysis with the patient/family.  All of their questions were answered.  Risks include low blood pressure, stroke, heart attack, seizure, infection, nausea, delirium, and death.     Plan of Care:    Assessment:  AD/CKD IV due to CRS  Wide complex tachycardia vs Sinus tachycardia- AICD to be interrogated  CHF exacerbation with hypoxia requiring NIPPV (EF 25%, grade III DD, pulm HTN)  Hyponatremia  SHPT  Anemia of CKD    Plan:    - unresponsive to diuretic regimen- initiate RRT today for clearance and UF- anticipate 3-4 consecutive treatments  - rate control ongoing issue- remains on leyla- adding albumin PRN with dialysis  - stop lasix gtt  - cardiac diet,  1.5L fluid restriction  - hold farxiga- will eventually resume  - no nsaids or IV contrast  - continue ashford  - no acute BMM issues  - H/H stable    Thank you for allowing us to participate in this patient's care. We will continue to follow.    Vital Signs:  Temp Readings from Last 3 Encounters:   10/10/23 98 °F (36.7 °C) (Oral)   06/18/23 97.8 °F (36.6 °C)   05/31/23 97.5 °F (36.4 °C)       Pulse Readings from Last 3 Encounters:   10/10/23 88   08/17/23 61   07/17/23 60       BP Readings from Last 3 Encounters:   10/10/23 (!) 101/57   07/17/23 116/71   06/18/23 138/73       Weight:  Wt Readings from Last 3 Encounters:   10/10/23 127.1 kg (280 lb 3.3 oz)   09/28/23 121.8 kg (268 lb 8 oz)   09/07/23 120.6 kg (265 lb 15.1 oz)       INS/OUTS:  I/O last 3 completed shifts:  In: 1975.2 [P.O.:440; I.V.:1535.2]  Out: 317 [Urine:317]  I/O this shift:  In: -   Out: 15 [Urine:15]    Medications:  Scheduled Meds:   aspirin  81 mg Oral Daily    atorvastatin  20 mg Oral QHS    gabapentin  300 mg Oral BID    levothyroxine  25 mcg Oral Before breakfast    mupirocin   Nasal BID    pantoprazole  40 mg Oral Daily     Continuous Infusions:   furosemide (LASIX) 2 mg/mL continuous infusion (non-titrating) 2.5 mg/hr (10/09/23 1110)    phenylephrine Stopped (10/10/23 0400)     PRN Meds:.acetaminophen, albuterol, ALPRAZolam, dextrose 50%, dextrose 50%, glucagon (human recombinant), glucose, glucose, insulin aspart U-100, melatonin, ondansetron, sodium chloride 0.9%, traMADoL  No current facility-administered medications on file prior to encounter.     Current Outpatient Medications on File Prior to Encounter   Medication Sig Dispense Refill    albuterol (PROVENTIL) 5 mg/mL nebulizer solution Take 2.5 mg by nebulization every 6 (six) hours as needed.      aspirin 81 MG Chew Take 81 mg by mouth once daily.      atorvastatin (LIPITOR) 20 MG tablet Take 20 mg by mouth once daily.      BASAGLAR KWIKPEN U-100 INSULIN glargine 100 units/mL (3mL)  SubQ pen Inject 25 Units into the skin once daily.  3    carvediloL (COREG) 3.125 MG tablet Take 3.125 mg by mouth 2 (two) times daily.      FARXIGA 10 mg tablet Take 10 mg by mouth once daily.      gabapentin (NEURONTIN) 600 MG tablet Take 600 mg by mouth 3 (three) times daily.      pantoprazole (PROTONIX) 40 MG tablet Take 1 tablet by mouth once daily.  0    torsemide (DEMADEX) 20 MG Tab Take 1 tablet (20 mg total) by mouth 2 (two) times a day. 120 tablet 0    traMADoL (ULTRAM) 50 mg tablet Take 1 tablet (50 mg total) by mouth every 6 (six) hours as needed for Pain. 120 tablet 2    vitamin B complex-folic acid 0.4 mg Tab Take 1 tablet by mouth once daily.      albuterol-ipratropium (DUO-NEB) 2.5 mg-0.5 mg/3 mL nebulizer solution Take 3 mLs by nebulization every 6 (six) hours as needed.      amiodarone (PACERONE) 200 MG Tab Take 1 tablet (200 mg total) by mouth 2 (two) times daily. 90 tablet 0    CORLANOR 5 mg Tab Take 5 mg by mouth nightly.      TRULICITY 0.75 mg/0.5 mL pen injector Inject 0.75 mg into the skin every 7 days.         Review of Systems:  Neg    Physical Exam:  BP (!) 101/57   Pulse 88   Temp 98 °F (36.7 °C) (Oral)   Resp 16   Ht 6' (1.829 m)   Wt 127.1 kg (280 lb 3.3 oz)   SpO2 98%   BMI 38.00 kg/m²     General Appearance:    NAD, AAO x 3, cooperative, appears stated age   Head:    Normocephalic, atraumatic   Eyes:    PER, EOMI, and conjunctiva/sclera clear bilaterally        Mouth:   Moist mucus membranes, no thrush or oral lesions, normal      dentition   Back:     No CVA tenderness   Lungs:     Rales   Heart:    Tachy    Abdomen:     Soft, non-tender, distended,   Extremities:   Edematous   MSK:   No joint or muscle swelling, tenderness or deformity   Skin:   Skin color, texture, turgor normal, no rashes or lesions   Neurologic/Psychiatric:   CNII-XII intact, normal strength and sensation       throughout, no asterixis; normal affect, memory, judgement     and insight     Results:  Lab  Results   Component Value Date     (L) 10/10/2023    K 4.9 10/10/2023    CL 97 10/10/2023    CO2 21 (L) 10/10/2023    BUN 82 (H) 10/10/2023    CREATININE 4.1 (H) 10/10/2023    CALCIUM 8.9 10/10/2023    ANIONGAP 13 10/10/2023    ESTGFRAFRICA 38.9 (A) 06/12/2020    EGFRNONAA 33.7 (A) 06/12/2020       Lab Results   Component Value Date    CALCIUM 8.9 10/10/2023       Recent Labs   Lab 10/10/23  0430   WBC 9.42   RBC 3.62*   HGB 10.2*   HCT 32.7*      MCV 90   MCH 28.2   MCHC 31.2*         I have personally reviewed pertinent radiological imaging and reports.    I have spent > 35 minutes providing care for this patient for the above diagnoses. These services have included chart/data/imaging review, evaluation, exam, formulation of plan, , note preparation, and discussions with staff involved in this patient's care.    Bartolo Valenzuela MD MPH  [facility] [place]  [address] [place] [address]  KEYSHA Ferreira [id number]  [phone] (p)  [phone] (f)

## 2023-10-10 NOTE — PROCEDURES
Ana Bullard is a 63 y.o. male patient.    Temp: 98 °F (36.7 °C) (10/10/23 1101)  Pulse: 91 (10/10/23 1501)  Resp: 18 (10/10/23 1626)  BP: 97/69 (10/10/23 1501)  SpO2: 100 % (10/10/23 1501)  Weight: 127.1 kg (280 lb 3.3 oz) (10/10/23 0600)  Height: 6' (182.9 cm) (10/08/23 1930)       Central Line    Date/Time: 10/8/2023 2:31 PM    Performed by: Max Wan MD  Authorized by: Max Wan MD    Location procedure was performed:  [facility] ICU B 2ND FLOOR  Pre-operative diagnosis:  Renal failure  Post-operative diagnosis:  Same  Consent Done ?:  Yes  Time out complete?: Verified correct patient, procedure, equipment, staff, and site/side    Indications:  Hemodialysis  Anesthesia:  Local infiltration  Local anesthetic:  Lidocaine 1% with epinephrine  Preparation:  Skin prepped with ChloraPrep  Skin prep agent dried: Skin prep agent completely dried prior to procedure    Sterile barriers: All five maximal sterile barriers used - gloves, gown, cap, mask and large sterile sheet    Hand hygiene: Hand hygiene performed immediately prior to central venous catheter insertion    Location:  Right internal jugular  Catheter type:  Trialysis  Catheter size:  13 Fr  Inserted Catheter Length (cm):  18  Ultrasound guidance: No    Manometry: No    Number of attempts: wire through previous placed triple lumen.  Technical Procedures Used:  Seldinger technique.  Wired through previously placed triple lumen  Complications: No    Estimated blood loss (mL):  15  Specimens: No    Implants: No    Adverse Events:  NoneTermination Site: superior vena cava      10/10/2023

## 2023-10-10 NOTE — PROGRESS NOTES
Pulmonary/Critical Care  Progress Note      Patient name: Ana Bullard  MRN: [id number]  Date: 10/10/2023    Admit Date: 10/8/2023  Consult Requested By: Cong Montalvo MD    Reason for Consult: tachycardia, CHF, dyspnea    HPI:    10/9/2023 - 62 yo AICD, HFrEF (20%), ASCVD, DM, DVT, GERD, obesity, FARHAN came to ER with tachycardia, SOB and weight gain.  He reports that this happened after changes to his medications (amio and hydralazine stopped).  He devloped increased SOB, BAPTISTE< edema and tachycardia.  In ER was found to be in wide complex tachycardia.  He required neosynephrine due to hypotension, amiodarone for tachyc=arrhythmia and diuretics and was admitted to ICU.   He has been seen by cardiology and to get AICD interrogated and further decision will be made (? Change in programming, ? Cardioversion).  He does feel SOB and BAPTISTE.  ROS as below.  CXR reviewed and he has some increased interstitial markings (likely edema) and elevated left diaphragm (old finding).    10/10/2023 - Stable overnight, sleeping on BiPAP when I saw him.  Has remained tachycardic (atrial tachycardia vs sinus tachycardia per cardiology), has been on lasix drip and dobutrex (UOP not brisk).  No new problems reported this AM.  Renal function is worse this AM (renal following).  No new respiratory complaints.    Review of Systems    Review of Systems   Constitutional:  Positive for malaise/fatigue. Negative for chills, diaphoresis, fever and weight loss.   HENT:  Negative for congestion.    Eyes:  Negative for pain.   Respiratory:  Positive for shortness of breath (BAPTISTE). Negative for cough, hemoptysis, sputum production, wheezing and stridor.    Cardiovascular:  Positive for palpitations and leg swelling. Negative for chest pain, orthopnea, claudication and PND.        + weight gain   Gastrointestinal:  Negative for abdominal pain, constipation, diarrhea, heartburn, nausea and vomiting.   Genitourinary:  Positive for dysuria (related to  makeda). Negative for frequency and urgency.   Musculoskeletal:  Negative for falls and myalgias.   Neurological:  Negative for sensory change, focal weakness and weakness.   Psychiatric/Behavioral:  Negative for depression, substance abuse and suicidal ideas. The patient is not nervous/anxious.        Past Medical History    Past Medical History:   Diagnosis Date    AICD (automatic cardioverter/defibrillator) present     Anemia, chronic disease 07/27/2021    Anemia, unspecified 07/27/2021    Anxiety and depression 2012    CAD (coronary artery disease) 2012    Cardiomegaly 2016    CHF (congestive heart failure) 2012    Cholecystitis 2017    Complete heart block 2012    Diabetic foot ulcer     DVT (deep venous thrombosis) 2012    And pulmonary embolus    GERD (gastroesophageal reflux disease) 2010    Heparin induced thrombocytopenia     Hyperlipemia 2011    IDDM (insulin dependent diabetes mellitus) 1983    With neuropathy    Ischemic cardiomyopathy 2012    MI (myocardial infarction) 2012    Morbid obesity     MRSA (methicillin resistant Staphylococcus aureus) infection 01/19/2019    Noncompliance with therapeutic plan 2019    Normochromic normocytic anemia 07/27/2021    Osteomyelitis of right foot 01/2019    Osteomyelitis of right foot 09/01/2022    Klebsiella from bone, GBS in blood    Pulmonary edema 2019    Respiratory failure 2019    Sepsis 01/2019    Due to cellulitis right leg    Sleep apnea 2013    Thrombocytopathy 2012    Venous stasis dermatitis of both lower extremities        Past Surgical History    Past Surgical History:   Procedure Laterality Date    CARDIAC PACEMAKER PLACEMENT  12/2012    CARDIAC PACEMAKER PLACEMENT  10/04/2018    battery replacement    CORONARY ARTERY BYPASS GRAFT  06/2012    ESOPHAGOGASTRODUODENOSCOPY  01/31/2017    SAMARA FILTER PLACEMENT  2012    vena cava    LUMBAR SYMPATHETIC NERVE BLOCK Right 8/22/2019    Procedure: BLOCK, NERVE, SYMPATHETIC, LUMBAR;  Surgeon: Tashi Good,  MD;  Location: [facility] OR;  Service: Pain Management;  Laterality: Right;  RIGHT SYMPATHETIC NERVE BLOCK       Medications (scheduled):      aspirin  81 mg Oral Daily    atorvastatin  20 mg Oral QHS    gabapentin  300 mg Oral BID    levothyroxine  25 mcg Oral Before breakfast    mupirocin   Nasal BID    pantoprazole  40 mg Oral Daily       Medications (infusions):      amiodarone in dextrose 5% 1 mg/min (10/10/23 0423)    furosemide (LASIX) 2 mg/mL continuous infusion (non-titrating) 2.5 mg/hr (10/09/23 1110)    phenylephrine Stopped (10/10/23 0400)       Medications (prn):     acetaminophen, albuterol, ALPRAZolam, dextrose 50%, dextrose 50%, glucagon (human recombinant), glucose, glucose, insulin aspart U-100, melatonin, ondansetron, sodium chloride 0.9%, traMADoL    Family History:   Family History   Problem Relation Age of Onset    Arthritis Mother     Diabetes Mother     Cancer Father     Heart disease Father     Stroke Father        Social History: Tobacco:   Social History     Tobacco Use   Smoking Status Former    Current packs/day: 0.00    Average packs/day: 2.0 packs/day for 38.0 years (76.0 ttl pk-yrs)    Types: Cigarettes    Start date:     Quit date:     Years since quittin.7   Smokeless Tobacco Never                                EtOH:   Social History     Substance and Sexual Activity   Alcohol Use No                                Drugs:   Social History     Substance and Sexual Activity   Drug Use Never     Physical Exam    Vital signs:  Temp:  [98.1 °F (36.7 °C)-99.2 °F (37.3 °C)]   Pulse:  [122-129]   Resp:  [13-33]   BP: (105-125)/(69-85)   SpO2:  [89 %-100 %]   Arterial Line BP: ()/(56-69)     Intake/Output:   Intake/Output Summary (Last 24 hours) at 10/10/2023 0821  Last data filed at 10/10/2023 0629  Gross per 24 hour   Intake 1029.81 ml   Output 80 ml   Net 949.81 ml          BMI: Estimated body mass index is 38 kg/m² as calculated from the following:    Height as of this  encounter: 6' (1.829 m).    Weight as of this encounter: 127.1 kg (280 lb 3.3 oz).    Physical Exam  Vitals and nursing note reviewed.   Constitutional:       General: He is not in acute distress.     Appearance: Normal appearance. He is obese. He is ill-appearing (chronically). He is not toxic-appearing or diaphoretic.   HENT:      Head: Normocephalic and atraumatic.      Right Ear: External ear normal.      Left Ear: External ear normal.      Nose: Nose normal.      Mouth/Throat:      Mouth: Mucous membranes are moist.      Pharynx: Oropharynx is clear. No oropharyngeal exudate.   Eyes:      General: No scleral icterus.        Right eye: No discharge.         Left eye: No discharge.      Extraocular Movements: Extraocular movements intact.      Conjunctiva/sclera: Conjunctivae normal.      Pupils: Pupils are equal, round, and reactive to light.   Neck:      Vascular: No carotid bruit.   Cardiovascular:      Rate and Rhythm: Regular rhythm. Tachycardia present.      Pulses: Normal pulses.      Heart sounds: Normal heart sounds. No murmur heard.     No friction rub. No gallop.      Comments: AICD  Pulmonary:      Effort: Pulmonary effort is normal. No respiratory distress.      Breath sounds: No stridor. Rales present. No wheezing or rhonchi.      Comments: Decreased at bases  No acc m use  Chest:      Chest wall: No tenderness.   Abdominal:      General: Bowel sounds are normal. There is no distension.      Tenderness: There is no abdominal tenderness. There is no guarding.   Musculoskeletal:         General: No swelling. Normal range of motion.      Cervical back: Normal range of motion and neck supple. No rigidity or tenderness.      Right lower leg: Edema present.      Left lower leg: Edema present.   Lymphadenopathy:      Cervical: No cervical adenopathy.   Skin:     General: Skin is warm and dry.      Capillary Refill: Capillary refill takes less than 2 seconds.      Coloration: Skin is not jaundiced.       "Findings: No bruising.   Neurological:      General: No focal deficit present.      Mental Status: He is alert and oriented to person, place, and time. Mental status is at baseline.      Cranial Nerves: No cranial nerve deficit.      Sensory: No sensory deficit.      Motor: No weakness.   Psychiatric:         Mood and Affect: Mood normal.         Behavior: Behavior normal.         Thought Content: Thought content normal.         Judgment: Judgment normal.         Laboratory    Recent Labs   Lab 10/10/23  0430   WBC 9.42   RBC 3.62*   HGB 10.2*   HCT 32.7*      MCV 90   MCH 28.2   MCHC 31.2*         Recent Labs   Lab 10/10/23  0430   CALCIUM 8.9   ALBUMIN 3.4*   PROT 7.4   *   K 4.9   CO2 21*   CL 97   BUN 82*   CREATININE 4.1*   ALKPHOS 71   ALT 29   AST 39   BILITOT 2.4*         No results for input(s): "PT", "INR", "APTT" in the last 24 hours.      No results for input(s): "CPK", "CPKMB", "TROPONINI", "MB" in the last 24 hours.    Additional labs: reviewed    Microbiology:       Microbiology Results (last 7 days)       Procedure Component Value Units Date/Time    Blood culture [[id number]] Collected: 10/09/23 0226    Order Status: Completed Specimen: Blood from Line, Jugular, Internal Right Updated: 10/10/23 0432     Blood Culture, Routine No Growth to date      No Growth to date    Blood culture #1 **CANNOT BE ORDERED STAT** [[id number]] Collected: 10/08/23 1504    Order Status: Completed Specimen: Blood from Antecubital, Right Updated: 10/09/23 1832     Blood Culture, Routine No Growth to date      No Growth to date    Blood culture #2 **CANNOT BE ORDERED STAT** [[id number]] Collected: 10/08/23 1512    Order Status: Completed Specimen: Blood Updated: 10/09/23 1832     Blood Culture, Routine No Growth to date      No Growth to date    Blood culture [[id number]] Collected: 10/09/23 0226    Order Status: Sent Specimen: Blood from Line, Jugular, Internal Right Updated: 10/09/23 0227      " "      Radiology    US Bladder  Reason: urinary retention catheter placement    COMPARISON: None    FINDINGS:  Targeted ultrasound of the pelvis at expected location of the bladder is partially obscured by bowel gas, limiting evaluation. Furthermore, the bladder is apparently decompressed with patient reporting voiding prior to exchange of catheter. There is small echogenic focus partially visualized within the visualized portion of the pelvis such that this is nonspecific but could conceivably represent evidence of Egan catheter.    IMPRESSION:  Largely nondiagnostic evaluation of the pelvis with details as above.    Electronically signed by:  Sin Abdi MD  10/10/2023 08:08 AM CDT Workstation: [id number]        Additional Studies    reviewed    Ventilator Information                  No results for input(s): "PH", "PCO2", "PO2", "HCO3", "POCSATURATED", "BE" in the last 72 hours.      Impression    Active Hospital Problems    Diagnosis  POA    *Wide-complex tachycardia [R00.0]  Yes    Pulmonary hypertension [I27.20]  Yes    AICD (automatic cardioverter/defibrillator) present [Z95.810]  Yes    Obesity [E66.9]  Yes    Chronic combined systolic and diastolic heart failure [I50.42]  Yes    Venous stasis dermatitis of both lower extremities [I87.2]  Yes    CAD (coronary artery disease) [I25.10]  Yes    Type II diabetes mellitus with neurological manifestations [E11.49]  Yes     With neuropathy        Resolved Hospital Problems   No resolved problems to display.       Plan    O2 as needed  BiPAP while sleeping  Would not do VQ as lo clinical suspicion for PE and study would most likely be indeterminate  Could consider checking venous dopplers and ECHO of heart  Treatment of tachyarrhythmia per cardiology  Continue amiodarone drip  Pressor support as needed with neosynephrine - currently off  Diuresis as able with lasix drip  Start oral synthroid and follow  Pulmonary hypertension christophley group 2 but there may also be a " component of group 3  Needs to lose weight  Has AD/CKD - renal following  I suspect much of what we are seeing is related to his cardiac status (tachycardia leading to decreased LV filling leading to decreased forward cardiac output leading to AD, hypotension).    Thank you for this consult.  I will follow with you while the patient is hospitalized.        Mauricio Gonsales MD  [facility] Pulmonary/Critical Care  10/10/2023

## 2023-10-10 NOTE — CONSULTS
[facility]  General Surgery  Consult Note    Patient Name: Ana Bullard  MRN: [id number]  Code Status: Full Code  Admission Date: 10/8/2023  Hospital Length of Stay: 2 days  Attending Physician: Cong Montalvo MD  Primary Care Provider: Michelle Messina FNP    Patient information was obtained from patient and ER records.     Inpatient consult to General Surgery  Consult performed by: Max Wan MD  Consult ordered by: Bartolo Valenzuela MD        Subjective:     Principal Problem: Wide-complex tachycardia    History of Present Illness: 64 yo M in need of trialysis catheter for dialysis.  Surgery consulted for placement      No current facility-administered medications on file prior to encounter.     Current Outpatient Medications on File Prior to Encounter   Medication Sig    albuterol (PROVENTIL) 5 mg/mL nebulizer solution Take 2.5 mg by nebulization every 6 (six) hours as needed.    aspirin 81 MG Chew Take 81 mg by mouth once daily.    atorvastatin (LIPITOR) 20 MG tablet Take 20 mg by mouth once daily.    BASAGLAR KWIKPEN U-100 INSULIN glargine 100 units/mL (3mL) SubQ pen Inject 25 Units into the skin once daily.    carvediloL (COREG) 3.125 MG tablet Take 3.125 mg by mouth 2 (two) times daily.    FARXIGA 10 mg tablet Take 10 mg by mouth once daily.    gabapentin (NEURONTIN) 600 MG tablet Take 600 mg by mouth 3 (three) times daily.    pantoprazole (PROTONIX) 40 MG tablet Take 1 tablet by mouth once daily.    torsemide (DEMADEX) 20 MG Tab Take 1 tablet (20 mg total) by mouth 2 (two) times a day.    traMADoL (ULTRAM) 50 mg tablet Take 1 tablet (50 mg total) by mouth every 6 (six) hours as needed for Pain.    vitamin B complex-folic acid 0.4 mg Tab Take 1 tablet by mouth once daily.    albuterol-ipratropium (DUO-NEB) 2.5 mg-0.5 mg/3 mL nebulizer solution Take 3 mLs by nebulization every 6 (six) hours as needed.    amiodarone (PACERONE) 200 MG Tab Take 1 tablet (200 mg total)  "by mouth 2 (two) times daily.    CORLANOR 5 mg Tab Take 5 mg by mouth nightly.    TRULICITY 0.75 mg/0.5 mL pen injector Inject 0.75 mg into the skin every 7 days.       Review of patient's allergies indicates:   Allergen Reactions    Vasopressin Anaphylaxis and Other (See Comments)     Increased pressure in his head; felt like his eyeballs and veins were going to pop out of his head.     Vasopressin analogues Other (See Comments) and Anaphylaxis     "felt like eyes going to pop out of my head"  Other reaction(s): Other (See Comments)  "felt like eyes going to pop out of my head"  Increased pressure in his head; felt like his eyeballs and veins were going to pop out of his head.     Heparin Other (See Comments)     Other reaction(s): "depleted my blood platets"    Decreased platelet count    Heparin analogues Other (See Comments)     Depleted WBC count    Morphine Hallucinations, Other (See Comments) and Anxiety     Other reaction(s): Hallucinations  Paranoid, hallucinations         Past Medical History:   Diagnosis Date    AICD (automatic cardioverter/defibrillator) present     Anemia, chronic disease 07/27/2021    Anemia, unspecified 07/27/2021    Anxiety and depression 2012    CAD (coronary artery disease) 2012    Cardiomegaly 2016    CHF (congestive heart failure) 2012    Cholecystitis 2017    Complete heart block 2012    Diabetic foot ulcer     DVT (deep venous thrombosis) 2012    And pulmonary embolus    GERD (gastroesophageal reflux disease) 2010    Heparin induced thrombocytopenia     Hyperlipemia 2011    IDDM (insulin dependent diabetes mellitus) 1983    With neuropathy    Ischemic cardiomyopathy 2012    MI (myocardial infarction) 2012    Morbid obesity     MRSA (methicillin resistant Staphylococcus aureus) infection 01/19/2019    Noncompliance with therapeutic plan 2019    Normochromic normocytic anemia 07/27/2021    Osteomyelitis of right foot 01/2019    Osteomyelitis of right " foot 2022    Klebsiella from bone, GBS in blood    Pulmonary edema 2019    Respiratory failure 2019    Sepsis 2019    Due to cellulitis right leg    Sleep apnea 2013    Thrombocytopathy     Venous stasis dermatitis of both lower extremities      Past Surgical History:   Procedure Laterality Date    CARDIAC PACEMAKER PLACEMENT  2012    CARDIAC PACEMAKER PLACEMENT  10/04/2018    battery replacement    CORONARY ARTERY BYPASS GRAFT  2012    ESOPHAGOGASTRODUODENOSCOPY  2017    SAMARA FILTER PLACEMENT  2012    vena cava    LUMBAR SYMPATHETIC NERVE BLOCK Right 2019    Procedure: BLOCK, NERVE, SYMPATHETIC, LUMBAR;  Surgeon: Tashi Good MD;  Location: [facility] OR;  Service: Pain Management;  Laterality: Right;  RIGHT SYMPATHETIC NERVE BLOCK     Family History       Problem Relation (Age of Onset)    Arthritis Mother    Cancer Father    Diabetes Mother    Heart disease Father    Stroke Father          Tobacco Use    Smoking status: Former     Current packs/day: 0.00     Average packs/day: 2.0 packs/day for 38.0 years (76.0 ttl pk-yrs)     Types: Cigarettes     Start date:      Quit date:      Years since quittin.7    Smokeless tobacco: Never   Substance and Sexual Activity    Alcohol use: No    Drug use: Never    Sexual activity: Never     Review of Systems  Objective:     Vital Signs (Most Recent):  Temp: 98 °F (36.7 °C) (10/10/23 1101)  Pulse: 91 (10/10/23 1501)  Resp: 18 (10/10/23 1626)  BP: 97/69 (10/10/23 1501)  SpO2: 100 % (10/10/23 1501) Vital Signs (24h Range):  Temp:  [97.4 °F (36.3 °C)-99.2 °F (37.3 °C)] 98 °F (36.7 °C)  Pulse:  [] 91  Resp:  [13-33] 18  SpO2:  [89 %-100 %] 100 %  BP: ()/(48-69) 97/69  Arterial Line BP: ()/(50-69) 101/53     Weight: 127.1 kg (280 lb 3.3 oz)  Body mass index is 38 kg/m².     Physical Exam       I have reviewed all pertinent lab results within the past 24 hours.  CBC:   Recent Labs   Lab 10/10/23  0430   WBC  9.42   RBC 3.62*   HGB 10.2*   HCT 32.7*      MCV 90   MCH 28.2   MCHC 31.2*     BMP:   Recent Labs   Lab 10/10/23  0430   *   *   K 4.9   CL 97   CO2 21*   BUN 82*   CREATININE 4.1*   CALCIUM 8.9   MG 2.6       Significant Diagnostics:        Assessment/Plan:     Chronic combined systolic and diastolic heart failure  To place trialysis line today      VTE Risk Mitigation (From admission, onward)         Ordered     IP VTE LOW RISK PATIENT  Once         10/08/23 1740     Place sequential compression device  Until discontinued         10/08/23 1740                Thank you for your consult. I will sign off. Please contact us if you have any additional questions.    Max Wan MD  General Surgery  [facility]

## 2023-10-10 NOTE — NURSING
Right Blood open ulcers  clean drainage     Right lower leg  Clean with chlorhexidine/ns.  Pat dry. Apply Medihoney and cover with mepilex.

## 2023-10-10 NOTE — CONSULTS
Urology Telephone Note: (interprofessional telephone consult)     Discussion re this patient with Dr Montalvo of [facility] medicine, with patient having verbally consented to discussing his treatment plan with consultants     Ana DAKOTA Bullard is a 63 y.o. male with hx of AICD, HFrEF (20%), ASCVD, DM, DVT, GERD, obesity, FARHAN.     Currently admitted to ICU for tachycardia, SOB and fluid overload.   On a lasix drip for diuresis.     Called by ICU team today for concern that ashford catheter was not draining. Reported bladder scan > 800 cc.   Stated that they attempted to irrigate catheter however no urine was draining. Ashford replaced multiple times without resolution.     Cr is elevated at 3.4.   UOP yesterday 237 cc and today only 50 cc reported.   UA from today 58 RBC, 4 WBC.     No urologic history per chart reviewed. No urologic history reported by RN.     Advised to obtain bladder ultrasound which shows bladder is decompressed and ashford balloon appears to be within the lumen.     Advised that in patients with fluid overload bladder scanner will be unreliable.   Suspect patient has low urine output due to low production as opposed to obstruction.     No need for further urologic intervention.   Please call with any further questions.       Radha Rowley MD  Urology Department         21 mins spent in review/comminuncation with MD and nursing staff, including review of labs/imaging, with >50% in verbal consulting and discussion with provider about presentation and treatment plan.  Verbal and written report provided to Tina Velasco RN and Dr Montalvo.

## 2023-10-10 NOTE — CONSULTS
[facility]  Adult Nutrition   Consult Note (Nutrition Education)     SUMMARY     Recommendations  Recommendation/Intervention: 1. Recommend Diabetic 2000 kcal Renal diet. Brent BID X 14 days for healing assistance. 3. RD to monitor labs, intake, and status change. 4. Provided verbal and written diet education per consult, along with contact information for further questions.  Goals: 1. Intake to be >/= 75% EEN / EPN. 2. Lab values trend to target range.  Nutrition Goal Status: new    Dietitian Rounds Brief  Provided verbal and written education for diabetic, renal, and CHF diets to patient and wife. Pt and spouse voiced understanding. Pt concerned his home DM medication is on hold; discussed possible reasons such as kidney function and encouraged pt to ask MD. Patient ate fair today; agreeable to try Suplena, he drinks Glucerna at home. Also added Brent BID. RD to monitor for labs, intake, and status change PRN.    Malnutrition Assessment   No signs of malnutrition.      Diet order:   Current Diet Order: diabetic 1800 kcal           Evaluation of Received Nutrient/Fluid Intake  Energy Calories Required: not meeting needs  Protein Required: not meeting needs  Fluid Required: not meeting needs  Tolerance: tolerating     % Intake of Estimated Energy Needs: 25 - 50 %  % Meal Intake: 25 - 50 %      Intake/Output Summary (Last 24 hours) at 10/10/2023 1311  Last data filed at 10/10/2023 0935  Gross per 24 hour   Intake 797.63 ml   Output 45 ml   Net 752.63 ml        Anthropometrics  Temp: 98 °F (36.7 °C)  Height Method: Stated  Height: 6' (182.9 cm)  Height (inches): 72 in  Weight Method: Bed Scale  Weight: 127.1 kg (280 lb 3.3 oz)  Weight (lb): 280.21 lb  Ideal Body Weight (IBW), Male: 178 lb  % Ideal Body Weight, Male (lb): 154.07 %  BMI (Calculated): 38       Estimated/Assessed Needs  Weight Used For Calorie Calculations: 127.1 kg (280 lb 3.3 oz)  Energy Calorie Requirements (kcal): 2142 / day (17  kcal/kg)     Protein Requirements: 101-114 gm/day (0.8-0.9 gm/kg)  Weight Used For Protein Calculations: 127 kg (279 lb 15.8 oz)     Estimated Fluid Requirement Method: RDA Method  RDA Method (mL): 2142       Reason for Assessment  Relevant Medical History: AICD , Anemia, chronic disease, Anemia, unspecified, Anxiety and depression,CAD, Cardiomegaly, CHF ), Cholecystitis , Complete heart block ,Diabetic foot ulcer, DVT  GERD , Hyperlipemia DM 2 (insulin dependent), Ischemic cardiomyopathy , MI , obesity, MRSA infection (01/19/2019), Noncompliance with therapeutic plan (2019), Normochromic normocytic anemia (07/27/2021), Osteomyelitis of right foot (01/2019), Osteomyelitis of right foot (09/01/2022), Pulmonary edema (2019), Respiratory failure (2019), Sepsis (01/2019), Sleep apnea (2013), Thrombocytopathy (2012), and Venous stasis dermatitis of both lower extremities.. The patient presented to [facility] on 10/8/2023 with a primary complaint of Tachycardia and Shortness of Breath  Interdisciplinary Rounds: did not attend    Nutrition/Diet History  Food Allergies: NKFA  Factors Affecting Nutritional Intake: None identified at this time    Nutrition Risk Screen  Nutrition Risk Screen: no indicators present       Altered Skin Integrity 10/08/23 2000 Right anterior;lower Leg Ulceration Partial thickness tissue loss. Shallow open ulcer with a red or pink wound bed, without slough. Intact or Open/Ruptured Serum-filled blister.-Wound Image: Images linked       Altered Skin Integrity 10/08/23 2000 Right lateral Foot Ulceration -Wound Image: Images linked       Altered Skin Integrity 10/08/23 2000 Left Buttocks Contusion Purple or maroon localized area of discolored intact skin or non-intact skin or a blood-filled blister.-Wound Image: Images linked       Altered Skin Integrity 10/08/23 2000 Left lower;dorsal Arm Contusion Purple or maroon localized area of discolored intact skin or non-intact skin or a  "blood-filled blister.-Wound Image: Images linked  MST Score: 0  Have you recently lost weight without trying?: No  Weight loss score: 0  Have you been eating poorly because of a decreased appetite?: No  Appetite score: 0       Weight History:  Wt Readings from Last 5 Encounters:   10/10/23 127.1 kg (280 lb 3.3 oz)   09/28/23 121.8 kg (268 lb 8 oz)   09/07/23 120.6 kg (265 lb 15.1 oz)   08/17/23 124.6 kg (274 lb 11.1 oz)   08/03/23 127.9 kg (282 lb)        Lab/Procedures/Meds: Pertinent Labs/Meds Reviewed    Medications:Pertinent Medications Reviewed  Scheduled Meds:   aspirin  81 mg Oral Daily    atorvastatin  20 mg Oral QHS    gabapentin  300 mg Oral BID    levothyroxine  25 mcg Oral Before breakfast    mupirocin   Nasal BID    pantoprazole  40 mg Oral Daily     Continuous Infusions:   furosemide (LASIX) 2 mg/mL continuous infusion (non-titrating) 2.5 mg/hr (10/09/23 1110)    phenylephrine Stopped (10/10/23 0400)     PRN Meds:.acetaminophen, albuterol, ALPRAZolam, dextrose 50%, dextrose 50%, glucagon (human recombinant), glucose, glucose, insulin aspart U-100, melatonin, ondansetron, sodium chloride 0.9%, traMADoL    Labs: Pertinent Labs Reviewed  Clinical Chemistry:  Recent Labs   Lab 10/08/23  1506 10/09/23  0224 10/10/23  0430   * 134* 131*   K 4.8 4.9 4.9    99 97   CO2 22* 22* 21*   * 125* 165*   BUN 72* 75* 82*   CREATININE 3.1* 3.4* 4.1*   CALCIUM 9.0 8.8 8.9   PROT 7.4 7.2 7.4   ALBUMIN 3.6 3.4* 3.4*   BILITOT 1.8* 1.8* 2.4*   ALKPHOS 61 61 71   AST 30 25 39   ALT 17 17 29   ANIONGAP 13 13 13   MG 2.2  --  2.6   LIPASE 35  --   --      CBC:   Recent Labs   Lab 10/10/23  0430   WBC 9.42   RBC 3.62*   HGB 10.2*   HCT 32.7*      MCV 90   MCH 28.2   MCHC 31.2*     Lipid Panel:  No results for input(s): "CHOL", "HDL", "LDLCALC", "TRIG", "CHOLHDL" in the last 168 hours.  Cardiac Profile:  Recent Labs   Lab 10/08/23  1506   BNP 1,720*     Inflammatory Labs:  No results for input(s): "CRP" " in the last 168 hours.  Diabetes:  Recent Labs   Lab 10/09/23  0230   HGBA1C 6.2     Thyroid & Parathyroid:  Recent Labs   Lab 10/08/23  1506   TSH 11.890*   FREET4 1.23       Monitor and Evaluation  Food and Nutrient Intake: energy intake, food and beverage intake  Food and Nutrient Adminstration: diet order  Knowledge/Beliefs/Attitudes: food and nutrition knowledge/skill  Physical Activity and Function: nutrition-related ADLs and IADLs  Anthropometric Measurements: weight, weight change, body mass index  Biochemical Data, Medical Tests and Procedures: electrolyte and renal panel, gastrointestinal profile, glucose/endocrine profile, inflammatory profile, lipid profile  Nutrition-Focused Physical Findings: overall appearance     Nutrition Risk  Level of Risk/Frequency of Follow-up: high     Nutrition Follow-Up  RD Follow-up?: Yes      Arelis Darden RD, LDN 10/10/2023 1:11 PM

## 2023-10-10 NOTE — PROGRESS NOTES
[facility] Medicine  Progress Note    Patient name: Ana Bullard  MRN: [id number]  Admit Date: 10/8/2023   LOS: 1 day     SUBJECTIVE:     Principal problem: Wide-complex tachycardia    Interval History:  63 year old male PMHx coronary artery disease, insulin-dependent type 2 diabetes, heparin induced thrombocytopenia, ischemic cardiomyopathy with AICD, MRSA staph infection, chronic thrombocytopenia, osteomyelitis of the right foot, sleep apnea, venous stasis dermatitis to lower extremities presented to the ED with SOB, worsening LE edema, 10 lbs weight gain in the last 1.5 weeks.  In the ED, he was noted to have wide complex tachycardia, volume overload on exam, soft blood presures and AD.  He remained tachy in the 130s.  He was started on Amiodarone gtt.  He ultimately required vasopressor initiation for hypotension.  Dobutamine gtt with IV Bumex started. Troponin in the 20s with trend remaining flat.  BNP 1720.  CXR with interstitial edema.  Seen by Cardiology and initially felt to have Atrial tachycardia with wide complex secondary to AICD.  This was not felt to be Ventricular tachycardia.  His device was interrogated and per my later conversation with Cardiology, based on this information, there is now a question if this is sinus tachycardia. IV Bumex changed to lasix gtt. Egan placed and very little UOP thus far.  Nephrology consulted and following. Synthroid started for subtherapeutic hypothyroidism with TSH of 11.     Hospital Course:    Scheduled Meds:   aspirin  81 mg Oral Daily    atorvastatin  20 mg Oral QHS    gabapentin  300 mg Oral BID    [START ON 10/10/2023] levothyroxine  25 mcg Oral Before breakfast    mupirocin   Nasal BID    pantoprazole  40 mg Oral Daily     Continuous Infusions:   amiodarone in dextrose 5% 1 mg/min (10/09/23 1540)    furosemide (LASIX) 2 mg/mL continuous infusion (non-titrating) 2.5 mg/hr (10/09/23 1110)    phenylephrine 0.9 mcg/kg/min (10/09/23  "6989)     PRN Meds:acetaminophen, albuterol, ALPRAZolam, dextrose 50%, dextrose 50%, glucagon (human recombinant), glucose, glucose, insulin aspart U-100, melatonin, ondansetron, sodium chloride 0.9%, traMADoL    Review of patient's allergies indicates:   Allergen Reactions    Vasopressin Anaphylaxis and Other (See Comments)     Increased pressure in his head; felt like his eyeballs and veins were going to pop out of his head.     Vasopressin analogues Other (See Comments) and Anaphylaxis     "felt like eyes going to pop out of my head"  Other reaction(s): Other (See Comments)  "felt like eyes going to pop out of my head"  Increased pressure in his head; felt like his eyeballs and veins were going to pop out of his head.     Heparin Other (See Comments)     Other reaction(s): "depleted my blood platets"    Decreased platelet count    Heparin analogues Other (See Comments)     Depleted WBC count    Morphine Hallucinations, Other (See Comments) and Anxiety     Other reaction(s): Hallucinations  Paranoid, hallucinations         Review of Systems: As per interval history    OBJECTIVE:     Vital Signs (Most Recent)  Temp: 97.5 °F (36.4 °C) (10/09/23 0305)  Pulse: (!) 123 (10/09/23 2023)  Resp: (!) 26 (10/09/23 2023)  BP: 119/74 (10/09/23 1600)  SpO2: 99 % (10/09/23 2023)    Vital Signs Range (Last 24H):  Temp:  [97.5 °F (36.4 °C)-97.6 °F (36.4 °C)]   Pulse:  [116-141]   Resp:  [14-30]   BP: ()/(44-85)   SpO2:  [86 %-100 %]   Arterial Line BP: ()/(55-78)     I & O (Last 24H):  Intake/Output Summary (Last 24 hours) at 10/9/2023 2058  Last data filed at 10/9/2023 1200  Gross per 24 hour   Intake 1177.58 ml   Output 287 ml   Net 890.58 ml       Physical Exam:    Vitals and nursing note reviewed.     Constitutional:       General: Not in acute distress. Sitting up in chair with bipap in place.     Appearance: Well-developed.   HENT:      Head: Normocephalic and atraumatic.   Eyes:      Pupils: Pupils are equal, " "round, and reactive to light.   Cardiovascular:      Rate and Rhythm: tachycardia  Bilateral LE edema  Pulmonary:      Effort: Pulmonary effort is normal.       Abdominal:      General: There is no distension.        Musculoskeletal:         General: Normal range of motion.      Cervical back: Normal range of motion.   Skin:    Neurological:      Mental Status: Alert and oriented to person, place, and time.      Cranial Nerves: No cranial nerve deficit.      Sensory: No sensory deficit.   [place]    Laboratory:  I have reviewed all pertinent lab results within the past 24 hours.  CBC:   Recent Labs   Lab 10/09/23  0224   WBC 8.51   RBC 3.44*   HGB 9.8*   HCT 31.4*   *   MCV 91   MCH 28.5   MCHC 31.2*     CMP:   Recent Labs   Lab 10/09/23  0224   *   CALCIUM 8.8   ALBUMIN 3.4*   PROT 7.2   *   K 4.9   CO2 22*   CL 99   BUN 75*   CREATININE 3.4*   ALKPHOS 61   ALT 17   AST 25   BILITOT 1.8*     Coagulation:   Recent Labs   Lab 10/08/23  1506   LABPROT 14.0*   INR 1.3*   APTT 28.6     Cardiac markers: No results for input(s): "CKMB", "CPKMB", "TROPONINT", "TROPONINI", "MYOGLOBIN" in the last 168 hours.  Microbiology Results (last 7 days)       Procedure Component Value Units Date/Time    Blood culture #1 **CANNOT BE ORDERED STAT** [[id number]] Collected: 10/08/23 1504    Order Status: Completed Specimen: Blood from Antecubital, Right Updated: 10/09/23 1832     Blood Culture, Routine No Growth to date      No Growth to date    Blood culture #2 **CANNOT BE ORDERED STAT** [[id number]] Collected: 10/08/23 1512    Order Status: Completed Specimen: Blood Updated: 10/09/23 1832     Blood Culture, Routine No Growth to date      No Growth to date    Blood culture [[id number]] Collected: 10/09/23 0226    Order Status: Completed Specimen: Blood from Line, Jugular, Internal Right Updated: 10/09/23 0917     Blood Culture, Routine No Growth to date    Blood culture [[id number]] Collected: 10/09/23 0226    Order " Status: Sent Specimen: Blood from Line, Jugular, Internal Right Updated: 10/09/23 0227          Recent Labs   Lab 10/09/23  0241   COLORU Yellow   SPECGRAV 1.015   PHUR 5.0   PROTEINUA 1+*   BACTERIA Negative   NITRITE Negative   LEUKOCYTESUR 1+*   UROBILINOGEN Negative   HYALINECASTS 44*       Diagnostic Results:      ASSESSMENT/PLAN:         Active Hospital Problems    Diagnosis  POA    *Wide-complex tachycardia [R00.0]  Yes    Pulmonary hypertension [I27.20]  Yes    AICD (automatic cardioverter/defibrillator) present [Z95.810]  Yes    Obesity [E66.9]  Yes    Chronic combined systolic and diastolic heart failure [I50.42]  Yes    Venous stasis dermatitis of both lower extremities [I87.2]  Yes    CAD (coronary artery disease) [I25.10]  Yes    Type II diabetes mellitus with neurological manifestations [E11.49]  Yes     With neuropathy        Resolved Hospital Problems   No resolved problems to display.         Plan:     -On Slade gtt for shock.  Suspect Cardiogenic shock.  -On Dobutamine gtt and Lasix gtt for diuresis.  Poor UOP.    -Cardiology consulted and following.  -Wide complex tachycardia.  Per my conversation with Cardiology, this is not Ventricular tachycardia.  Initially felt to be atrial tachycardia with wide complex given the AICD.  After interrogation of his device, now a question if this is sinus tachycardia. Will continue with Amiodarone gtt until Cardiology is clear it is of no benefit. Investigating for etiologies of sinus tachycardia.  No signs of infection. Will discuss with Pulmonary/Radiology if  VQ scan would be of any utility to rule out PE given pulmonary edema and habitus.   -Nephrology consulted and following for oliguric João. Strict ins and outs. Daily weights.   -Synthroid started for subclinical hypothyroidism with TSH 11.  -ISS.  Accuchecks.  -CPAP QHS      VTE Risk Mitigation (From admission, onward)           Ordered     IP VTE LOW RISK PATIENT  Once         10/08/23 8950     Place  sequential compression device  Until discontinued         10/08/23 1740                      Department Hospital Medicine  [facility]  Cong Montalvo MD  Date of service: 10/09/2023

## 2023-10-10 NOTE — PLAN OF CARE
Problem: Skin Injury Risk Increased  Goal: Skin Health and Integrity  Intervention: Promote and Optimize Oral Intake  Flowsheets (Taken 10/10/2023 1319)  Oral Nutrition Promotion: calorie-dense liquids provided--Brent BID x 14 days     Problem: Oral Intake Inadequate  Goal: Improved Oral Intake  Intervention: Promote and Optimize Oral Intake  Flowsheets (Taken 10/10/2023 1319)  Oral Nutrition Promotion: calorie-dense liquids provided---Suplena daily at dinner

## 2023-10-10 NOTE — PLAN OF CARE
Lasix gtt stopped, amio drip stopped, pacemaker settings adjusted with improvement in heart rate and BP. Trialysis catheter inserted, dialysis to begin this evening. Pt hypothermic, sheela hugger placed but patient could not tolerate so sheela hugger turned off.   Problem: Adult Inpatient Plan of Care  Goal: Plan of Care Review  Outcome: Ongoing, Progressing     Problem: Adult Inpatient Plan of Care  Goal: Patient-Specific Goal (Individualized)  Outcome: Ongoing, Progressing     Problem: Adult Inpatient Plan of Care  Goal: Absence of Hospital-Acquired Illness or Injury  Outcome: Ongoing, Progressing

## 2023-10-10 NOTE — PLAN OF CARE
10/09/23 2023   Patient Assessment/Suction   Level of Consciousness (AVPU) alert   Respiratory Effort Unlabored   Expansion/Accessory Muscles/Retractions expansion symmetric   All Lung Fields Breath Sounds clear   Rhythm/Pattern, Respiratory depth regular;pattern regular   Cough Frequency infrequent   Cough Type good;nonproductive   PRE-TX-O2   SpO2 99 %   Pulse Oximetry Type Continuous   $ Pulse Oximetry - Multiple Charge Pulse Oximetry - Multiple   Pulse (!) 123   Resp (!) 26   Preset CPAP/BiPAP Settings   Mode Of Delivery CPAP;Standby   Education   $ Education DME Oxygen

## 2023-10-10 NOTE — PROGRESS NOTES
"[facility]    Critical Care Cardiology Progress Note    Subjective:  Patient is sitting up in chair.  Heart rate still in the 120s blood pressure 80 range he denies any chest pains or significant shortness of breath ,no new chest x-ray today    Objective:  Vital Signs (Most Recent)  Temp: 99.2 °F (37.3 °C) (10/10/23 0400)  Pulse: (!) 127 (10/10/23 0600)  Resp: (!) 29 (10/10/23 0600)  BP: 119/74 (10/09/23 1600)  SpO2: (!) 89 % (10/10/23 0600)    Vital Signs Range (Last 24H):  Temp:  [98.1 °F (36.7 °C)-99.2 °F (37.3 °C)]   Pulse:  [122-129]   Resp:  [13-33]   BP: (105-125)/(69-85)   SpO2:  [89 %-100 %]   Arterial Line BP: ()/(56-69)     I & O (Last 24H):    Intake/Output Summary (Last 24 hours) at 10/10/2023 0821  Last data filed at 10/10/2023 0629  Gross per 24 hour   Intake 1029.81 ml   Output 80 ml   Net 949.81 ml       Current Diet:     Current Diet Order   Procedures    Diet diabetic [facility]; 1800 Calorie     Order Specific Question:   Indicate patient location for additional diet options:     Answer:   [facility]     Order Specific Question:   Total calories:     Answer:   1800 Calorie        Allergies:  Review of patient's allergies indicates:   Allergen Reactions    Vasopressin Anaphylaxis and Other (See Comments)     Increased pressure in his head; felt like his eyeballs and veins were going to pop out of his head.     Vasopressin analogues Other (See Comments) and Anaphylaxis     "felt like eyes going to pop out of my head"  Other reaction(s): Other (See Comments)  "felt like eyes going to pop out of my head"  Increased pressure in his head; felt like his eyeballs and veins were going to pop out of his head.     Heparin Other (See Comments)     Other reaction(s): "depleted my blood platets"    Decreased platelet count    Heparin analogues Other (See Comments)     Depleted WBC count    Morphine Hallucinations, Other (See Comments) and Anxiety     Other reaction(s): Hallucinations  Paranoid, " hallucinations         Meds:  Scheduled Meds:   aspirin  81 mg Oral Daily    atorvastatin  20 mg Oral QHS    gabapentin  300 mg Oral BID    levothyroxine  25 mcg Oral Before breakfast    mupirocin   Nasal BID    pantoprazole  40 mg Oral Daily     Continuous Infusions:   amiodarone in dextrose 5% 1 mg/min (10/10/23 0423)    furosemide (LASIX) 2 mg/mL continuous infusion (non-titrating) 2.5 mg/hr (10/09/23 1110)    phenylephrine Stopped (10/10/23 0400)     PRN Meds:acetaminophen, albuterol, ALPRAZolam, dextrose 50%, dextrose 50%, glucagon (human recombinant), glucose, glucose, insulin aspart U-100, melatonin, ondansetron, sodium chloride 0.9%, traMADoL    Oxygen/Ventilator Data (Last 24H):  (if applicable)            Hemodynamic Parameters (Last 24H):   (if applicable)        Lines/Drains:  (if applicable)  Percutaneous Central Line Insertion/Assessment - Triple Lumen  10/09/23 0232 Internal Jugular Right (Active)   Line Necessity Review Hemodynamic instability 10/09/23 0701   $ Central Line Charges (Upon insertion) Catheter - Triple Lumen (Supply) 10/09/23 2000   Verification by X-ray Yes 10/09/23 2000   Site Assessment No drainage;No redness;No swelling;No warmth;Not assessed 10/10/23 0600   Line Securement Device Secured with sutures 10/09/23 2000   Dressing Type CHG impregnated dressing/sponge 10/10/23 0600   Dressing Status Clean;Dry;Intact 10/10/23 0600   Dressing Intervention Integrity maintained 10/10/23 0600   Date on Dressing 10/09/23 10/09/23 2000   Dressing Due to be Changed 10/16/23 10/10/23 0600   Number of days: 1            Peripheral IV - Single Lumen 10/08/23 1500 20 G Left Antecubital (Active)   Site Assessment Clean;Dry;Intact;No redness;No swelling 10/10/23 0600   Extremity Assessment Distal to IV No abnormal discoloration;No redness;No swelling;No warmth 10/10/23 0600   Line Status Infusing 10/10/23 0600   Dressing Status Clean;Intact;Dry 10/10/23 0600   Dressing Intervention Integrity maintained  "10/10/23 0600   Dressing Change Due 10/12/23 10/08/23 1930   Site Change Due 10/12/23 10/08/23 1930   Number of days: 1            Peripheral IV - Single Lumen 10/08/23 1621 18 G Right Antecubital (Active)   Site Assessment Clean;Dry;Intact;No redness;No swelling 10/10/23 0600   Extremity Assessment Distal to IV No abnormal discoloration;No redness;No swelling;No warmth 10/10/23 0600   Line Status Flushed;Saline locked 10/10/23 0600   Dressing Status Clean;Dry;Intact 10/10/23 0600   Dressing Intervention Integrity maintained 10/10/23 0600   Dressing Change Due 10/12/23 10/08/23 1950   Site Change Due 10/12/23 10/08/23 1950   Number of days: 1       Arterial Line 10/09/23 0139 Left Radial (Active)   $ Arterial Line Charges (Upon Insertion) Bedside Insertion Performed 10/09/23 0301   Site Assessment Dry;Clean;No redness;Intact;No swelling 10/10/23 0600   Line Status Pulsatile blood flow 10/10/23 0600   Art Line Waveform Appropriate 10/10/23 0600   Arterial Line Interventions Zeroed and calibrated 10/10/23 0600   Color/Movement/Sensation Capillary refill less than 3 sec 10/10/23 0600   Dressing Type CHG impregnated dressing/sponge 10/10/23 0600   Dressing Status Clean;Dry;Intact 10/10/23 0600   Dressing Intervention Integrity maintained 10/10/23 0600   Dressing Change Due 10/16/23 10/10/23 0600   Number of days: 1            Urethral Catheter 10/08/23 1600 16 Fr. (Active)   Site Assessment Clean;Intact 10/10/23 0600   Collection Container Urimeter 10/10/23 0600   Securement Method secured to top of thigh w/ tape 10/10/23 0600   Catheter Care Performed yes 10/10/23 0600   Reason for Continuing Urinary Catheterization Critically ill in ICU and requiring hourly monitoring of intake/output 10/10/23 0600   CAUTI Prevention Bundle Securement Device in place with 1" slack;Intact seal between catheter & drainage tubing;Drainage bag/urimeter off the floor;No dependent loops or kinks 10/10/23 0600   Output (mL) 30 mL 10/10/23 " 0629   Number of days: 1       Lab Results :  Recent Results (from the past 24 hour(s))   POCT glucose    Collection Time: 10/09/23  5:55 PM   Result Value Ref Range    POC Glucose 183 (H) 70 - 110   POCT glucose    Collection Time: 10/09/23  9:48 PM   Result Value Ref Range    POC Glucose 216 (H) 70 - 110   CBC auto differential    Collection Time: 10/10/23  4:30 AM   Result Value Ref Range    WBC 9.42 3.90 - 12.70 K/uL    RBC 3.62 (L) 4.60 - 6.20 M/uL    Hemoglobin 10.2 (L) 14.0 - 18.0 g/dL    Hematocrit 32.7 (L) 40.0 - 54.0 %    MCV 90 82 - 98 fL    MCH 28.2 27.0 - 31.0 pg    MCHC 31.2 (L) 32.0 - 36.0 g/dL    RDW 19.2 (H) 11.5 - 14.5 %    Platelets 190 150 - 450 K/uL    MPV 10.9 9.2 - 12.9 fL    Immature Granulocytes 0.3 0.0 - 0.5 %    Gran # (ANC) 8.0 (H) 1.8 - 7.7 K/uL    Immature Grans (Abs) 0.03 0.00 - 0.04 K/uL    Lymph # 0.4 (L) 1.0 - 4.8 K/uL    Mono # 1.0 0.3 - 1.0 K/uL    Eos # 0.0 0.0 - 0.5 K/uL    Baso # 0.01 0.00 - 0.20 K/uL    nRBC 0 0 /100 WBC    Gran % 84.8 (H) 38.0 - 73.0 %    Lymph % 4.5 (L) 18.0 - 48.0 %    Mono % 10.3 4.0 - 15.0 %    Eosinophil % 0.0 0.0 - 8.0 %    Basophil % 0.1 0.0 - 1.9 %    Differential Method Automated    Comprehensive metabolic panel    Collection Time: 10/10/23  4:30 AM   Result Value Ref Range    Sodium 131 (L) 136 - 145 mmol/L    Potassium 4.9 3.5 - 5.1 mmol/L    Chloride 97 95 - 110 mmol/L    CO2 21 (L) 23 - 29 mmol/L    Glucose 165 (H) 70 - 110 mg/dL    BUN 82 (H) 8 - 23 mg/dL    Creatinine 4.1 (H) 0.5 - 1.4 mg/dL    Calcium 8.9 8.7 - 10.5 mg/dL    Total Protein 7.4 6.0 - 8.4 g/dL    Albumin 3.4 (L) 3.5 - 5.2 g/dL    Total Bilirubin 2.4 (H) 0.1 - 1.0 mg/dL    Alkaline Phosphatase 71 55 - 135 U/L    AST 39 10 - 40 U/L    ALT 29 10 - 44 U/L    eGFR 15.7 (A) >60 mL/min/1.73 m^2    Anion Gap 13 8 - 16 mmol/L   Magnesium    Collection Time: 10/10/23  4:30 AM   Result Value Ref Range    Magnesium 2.6 1.6 - 2.6 mg/dL       Diagnostic Results:  Imaging Results               X-Ray Chest AP Portable (Final result)  Result time 10/08/23 15:13:32      Final result by Tong Morales MD (10/08/23 15:13:32)                   Narrative:    CLINICAL HISTORY:  63 years (1960) Male Tachycardia; Shortness of Breath    TECHNIQUE:  Portable AP radiograph the chest. One view.    COMPARISON:  Radiograph from June 12, 2023    FINDINGS:  There is vascular congestion with increased interstitial markings findings indicating mild pulmonary edema.  There is blunting of both costophrenic angles consistent with trace pleural effusions and adjacent atelectasis. No pneumothorax is identified. The cardiac silhouette is moderately enlarged. The median sternotomy wires and the left sided pacemaker are unchanged.  Osseous structures appear unchanged. The visualized upper abdomen is unremarkable.    IMPRESSION:  Moderate to severe cardiomegaly and findings of mild interstitial pulmonary edema.                  .            Electronically signed by:  Tong Morales MD  10/08/2023 03:13 PM CDT Workstation: [id number]                                    12-lead EKG interpretation:   (if applicable)    Recent Cardiac Rhythm:  (if applicable)      Physical Exam:  Objective:  General Appearance:  In no acute distress.    Vital signs: (most recent): Blood pressure 119/74, pulse (!) 127, temperature 99.2 °F (37.3 °C), temperature source Oral, resp. rate (!) 29, height 6' (1.829 m), weight 127.1 kg (280 lb 3.3 oz), SpO2 (!) 89 %.    Lungs:  There are decreased breath sounds.    Heart: Tachycardia.  S1 normal and S2 normal.  Positive for murmur.    Abdomen: Abdomen is soft.  Bowel sounds are normal.     Extremities: There is local swelling.        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

## 2023-10-10 NOTE — SUBJECTIVE & OBJECTIVE
"No current facility-administered medications on file prior to encounter.     Current Outpatient Medications on File Prior to Encounter   Medication Sig    albuterol (PROVENTIL) 5 mg/mL nebulizer solution Take 2.5 mg by nebulization every 6 (six) hours as needed.    aspirin 81 MG Chew Take 81 mg by mouth once daily.    atorvastatin (LIPITOR) 20 MG tablet Take 20 mg by mouth once daily.    BASAGLAR KWIKPEN U-100 INSULIN glargine 100 units/mL (3mL) SubQ pen Inject 25 Units into the skin once daily.    carvediloL (COREG) 3.125 MG tablet Take 3.125 mg by mouth 2 (two) times daily.    FARXIGA 10 mg tablet Take 10 mg by mouth once daily.    gabapentin (NEURONTIN) 600 MG tablet Take 600 mg by mouth 3 (three) times daily.    pantoprazole (PROTONIX) 40 MG tablet Take 1 tablet by mouth once daily.    torsemide (DEMADEX) 20 MG Tab Take 1 tablet (20 mg total) by mouth 2 (two) times a day.    traMADoL (ULTRAM) 50 mg tablet Take 1 tablet (50 mg total) by mouth every 6 (six) hours as needed for Pain.    vitamin B complex-folic acid 0.4 mg Tab Take 1 tablet by mouth once daily.    albuterol-ipratropium (DUO-NEB) 2.5 mg-0.5 mg/3 mL nebulizer solution Take 3 mLs by nebulization every 6 (six) hours as needed.    amiodarone (PACERONE) 200 MG Tab Take 1 tablet (200 mg total) by mouth 2 (two) times daily.    CORLANOR 5 mg Tab Take 5 mg by mouth nightly.    TRULICITY 0.75 mg/0.5 mL pen injector Inject 0.75 mg into the skin every 7 days.       Review of patient's allergies indicates:   Allergen Reactions    Vasopressin Anaphylaxis and Other (See Comments)     Increased pressure in his head; felt like his eyeballs and veins were going to pop out of his head.     Vasopressin analogues Other (See Comments) and Anaphylaxis     "felt like eyes going to pop out of my head"  Other reaction(s): Other (See Comments)  "felt like eyes going to pop out of my head"  Increased pressure in his head; felt like his eyeballs and veins were going to pop out " "of his head.     Heparin Other (See Comments)     Other reaction(s): "depleted my blood platets"    Decreased platelet count    Heparin analogues Other (See Comments)     Depleted WBC count    Morphine Hallucinations, Other (See Comments) and Anxiety     Other reaction(s): Hallucinations  Paranoid, hallucinations         Past Medical History:   Diagnosis Date    AICD (automatic cardioverter/defibrillator) present     Anemia, chronic disease 07/27/2021    Anemia, unspecified 07/27/2021    Anxiety and depression 2012    CAD (coronary artery disease) 2012    Cardiomegaly 2016    CHF (congestive heart failure) 2012    Cholecystitis 2017    Complete heart block 2012    Diabetic foot ulcer     DVT (deep venous thrombosis) 2012    And pulmonary embolus    GERD (gastroesophageal reflux disease) 2010    Heparin induced thrombocytopenia     Hyperlipemia 2011    IDDM (insulin dependent diabetes mellitus) 1983    With neuropathy    Ischemic cardiomyopathy 2012    MI (myocardial infarction) 2012    Morbid obesity     MRSA (methicillin resistant Staphylococcus aureus) infection 01/19/2019    Noncompliance with therapeutic plan 2019    Normochromic normocytic anemia 07/27/2021    Osteomyelitis of right foot 01/2019    Osteomyelitis of right foot 09/01/2022    Klebsiella from bone, GBS in blood    Pulmonary edema 2019    Respiratory failure 2019    Sepsis 01/2019    Due to cellulitis right leg    Sleep apnea 2013    Thrombocytopathy 2012    Venous stasis dermatitis of both lower extremities      Past Surgical History:   Procedure Laterality Date    CARDIAC PACEMAKER PLACEMENT  12/2012    CARDIAC PACEMAKER PLACEMENT  10/04/2018    battery replacement    CORONARY ARTERY BYPASS GRAFT  06/2012    ESOPHAGOGASTRODUODENOSCOPY  01/31/2017    SAMARA FILTER PLACEMENT  2012    vena cava    LUMBAR SYMPATHETIC NERVE BLOCK Right 8/22/2019    Procedure: BLOCK, NERVE, SYMPATHETIC, LUMBAR;  Surgeon: Tashi Good MD;  Location: [facility] OR;  Service: " Pain Management;  Laterality: Right;  RIGHT SYMPATHETIC NERVE BLOCK     Family History       Problem Relation (Age of Onset)    Arthritis Mother    Cancer Father    Diabetes Mother    Heart disease Father    Stroke Father          Tobacco Use    Smoking status: Former     Current packs/day: 0.00     Average packs/day: 2.0 packs/day for 38.0 years (76.0 ttl pk-yrs)     Types: Cigarettes     Start date:      Quit date:      Years since quittin.7    Smokeless tobacco: Never   Substance and Sexual Activity    Alcohol use: No    Drug use: Never    Sexual activity: Never     Review of Systems  Objective:     Vital Signs (Most Recent):  Temp: 98 °F (36.7 °C) (10/10/23 1101)  Pulse: 91 (10/10/23 1501)  Resp: 18 (10/10/23 1626)  BP: 97/69 (10/10/23 1501)  SpO2: 100 % (10/10/23 1501) Vital Signs (24h Range):  Temp:  [97.4 °F (36.3 °C)-99.2 °F (37.3 °C)] 98 °F (36.7 °C)  Pulse:  [] 91  Resp:  [13-33] 18  SpO2:  [89 %-100 %] 100 %  BP: ()/(48-69) 97/69  Arterial Line BP: ()/(50-69) 101/53     Weight: 127.1 kg (280 lb 3.3 oz)  Body mass index is 38 kg/m².     Physical Exam       I have reviewed all pertinent lab results within the past 24 hours.  CBC:   Recent Labs   Lab 10/10/23  0430   WBC 9.42   RBC 3.62*   HGB 10.2*   HCT 32.7*      MCV 90   MCH 28.2   MCHC 31.2*     BMP:   Recent Labs   Lab 10/10/23  0430   *   *   K 4.9   CL 97   CO2 21*   BUN 82*   CREATININE 4.1*   CALCIUM 8.9   MG 2.6       Significant Diagnostics:

## 2023-10-11 LAB
ALBUMIN SERPL BCP-MCNC: 3.4 G/DL (ref 3.5–5.2)
ALP SERPL-CCNC: 60 U/L (ref 55–135)
ALT SERPL W/O P-5'-P-CCNC: 33 U/L (ref 10–44)
ANION GAP SERPL CALC-SCNC: 11 MMOL/L (ref 8–16)
AST SERPL-CCNC: 43 U/L (ref 10–40)
BASOPHILS # BLD AUTO: 0 K/UL (ref 0–0.2)
BASOPHILS NFR BLD: 0 % (ref 0–1.9)
BILIRUB SERPL-MCNC: 1.6 MG/DL (ref 0.1–1)
BUN SERPL-MCNC: 72 MG/DL (ref 8–23)
CALCIUM SERPL-MCNC: 8.4 MG/DL (ref 8.7–10.5)
CHLORIDE SERPL-SCNC: 98 MMOL/L (ref 95–110)
CO2 SERPL-SCNC: 24 MMOL/L (ref 23–29)
CREAT SERPL-MCNC: 3.8 MG/DL (ref 0.5–1.4)
DIFFERENTIAL METHOD: ABNORMAL
EOSINOPHIL # BLD AUTO: 0 K/UL (ref 0–0.5)
EOSINOPHIL NFR BLD: 0.4 % (ref 0–8)
ERYTHROCYTE [DISTWIDTH] IN BLOOD BY AUTOMATED COUNT: 18.9 % (ref 11.5–14.5)
EST. GFR  (NO RACE VARIABLE): 17.3 ML/MIN/1.73 M^2
GLUCOSE SERPL-MCNC: 176 MG/DL (ref 70–110)
GLUCOSE SERPL-MCNC: 237 MG/DL (ref 70–110)
GLUCOSE SERPL-MCNC: 298 MG/DL (ref 70–110)
HCT VFR BLD AUTO: 28.2 % (ref 40–54)
HGB BLD-MCNC: 8.7 G/DL (ref 14–18)
IMM GRANULOCYTES # BLD AUTO: 0.03 K/UL (ref 0–0.04)
IMM GRANULOCYTES NFR BLD AUTO: 0.5 % (ref 0–0.5)
LYMPHOCYTES # BLD AUTO: 0.2 K/UL (ref 1–4.8)
LYMPHOCYTES NFR BLD: 4.2 % (ref 18–48)
MAGNESIUM SERPL-MCNC: 2.4 MG/DL (ref 1.6–2.6)
MCH RBC QN AUTO: 28.2 PG (ref 27–31)
MCHC RBC AUTO-ENTMCNC: 30.9 G/DL (ref 32–36)
MCV RBC AUTO: 92 FL (ref 82–98)
MONOCYTES # BLD AUTO: 0.7 K/UL (ref 0.3–1)
MONOCYTES NFR BLD: 12 % (ref 4–15)
NEUTROPHILS # BLD AUTO: 4.5 K/UL (ref 1.8–7.7)
NEUTROPHILS NFR BLD: 82.9 % (ref 38–73)
NRBC BLD-RTO: 1 /100 WBC
PLATELET # BLD AUTO: 128 K/UL (ref 150–450)
PMV BLD AUTO: 11.2 FL (ref 9.2–12.9)
POTASSIUM SERPL-SCNC: 4.4 MMOL/L (ref 3.5–5.1)
PROT SERPL-MCNC: 7 G/DL (ref 6–8.4)
RBC # BLD AUTO: 3.08 M/UL (ref 4.6–6.2)
SODIUM SERPL-SCNC: 133 MMOL/L (ref 136–145)
WBC # BLD AUTO: 5.48 K/UL (ref 3.9–12.7)

## 2023-10-11 PROCEDURE — 20000000 HC ICU ROOM

## 2023-10-11 PROCEDURE — 99233 PR SUBSEQUENT HOSPITAL CARE,LEVL III: ICD-10-PCS | Mod: ,,, | Performed by: INTERNAL MEDICINE

## 2023-10-11 PROCEDURE — 99900035 HC TECH TIME PER 15 MIN (STAT)

## 2023-10-11 PROCEDURE — 25000003 PHARM REV CODE 250: Performed by: NURSE PRACTITIONER

## 2023-10-11 PROCEDURE — 80053 COMPREHEN METABOLIC PANEL: CPT | Performed by: NURSE PRACTITIONER

## 2023-10-11 PROCEDURE — 94761 N-INVAS EAR/PLS OXIMETRY MLT: CPT

## 2023-10-11 PROCEDURE — 25000003 PHARM REV CODE 250: Performed by: FAMILY MEDICINE

## 2023-10-11 PROCEDURE — 25000003 PHARM REV CODE 250: Performed by: INTERNAL MEDICINE

## 2023-10-11 PROCEDURE — 90935 HEMODIALYSIS ONE EVALUATION: CPT

## 2023-10-11 PROCEDURE — 99233 SBSQ HOSP IP/OBS HIGH 50: CPT | Mod: ,,, | Performed by: INTERNAL MEDICINE

## 2023-10-11 PROCEDURE — 83735 ASSAY OF MAGNESIUM: CPT | Performed by: INTERNAL MEDICINE

## 2023-10-11 PROCEDURE — 94799 UNLISTED PULMONARY SVC/PX: CPT

## 2023-10-11 PROCEDURE — 80100014 HC HEMODIALYSIS 1:1

## 2023-10-11 PROCEDURE — 85025 COMPLETE CBC W/AUTO DIFF WBC: CPT | Performed by: NURSE PRACTITIONER

## 2023-10-11 RX ADMIN — Medication 6 MG: at 09:10

## 2023-10-11 RX ADMIN — LEVOTHYROXINE SODIUM 25 MCG: 0.03 TABLET ORAL at 05:10

## 2023-10-11 RX ADMIN — TRAMADOL HYDROCHLORIDE 50 MG: 50 TABLET, COATED ORAL at 08:10

## 2023-10-11 RX ADMIN — MUPIROCIN 1 G: 20 OINTMENT TOPICAL at 08:10

## 2023-10-11 RX ADMIN — GABAPENTIN 300 MG: 300 CAPSULE ORAL at 08:10

## 2023-10-11 RX ADMIN — INSULIN ASPART 1 UNITS: 100 INJECTION, SOLUTION INTRAVENOUS; SUBCUTANEOUS at 09:10

## 2023-10-11 RX ADMIN — TRAMADOL HYDROCHLORIDE 50 MG: 50 TABLET, COATED ORAL at 09:10

## 2023-10-11 RX ADMIN — ASPIRIN 81 MG 81 MG: 81 TABLET ORAL at 08:10

## 2023-10-11 RX ADMIN — ATORVASTATIN CALCIUM 20 MG: 20 TABLET, FILM COATED ORAL at 09:10

## 2023-10-11 RX ADMIN — TRAMADOL HYDROCHLORIDE 50 MG: 50 TABLET, COATED ORAL at 04:10

## 2023-10-11 RX ADMIN — GABAPENTIN 300 MG: 300 CAPSULE ORAL at 09:10

## 2023-10-11 RX ADMIN — PANTOPRAZOLE SODIUM 40 MG: 40 TABLET, DELAYED RELEASE ORAL at 05:10

## 2023-10-11 RX ADMIN — MUPIROCIN 1 G: 20 OINTMENT TOPICAL at 09:10

## 2023-10-11 RX ADMIN — INSULIN ASPART 3 UNITS: 100 INJECTION, SOLUTION INTRAVENOUS; SUBCUTANEOUS at 12:10

## 2023-10-11 NOTE — PROGRESS NOTES
10/10/23 2045   Vital Signs   Temp (!) 95 °F (35 °C)   Temp Source Axillary   Pulse 62   SpO2 98 %   Art Line   Arterial Line /61   Arterial Line MAP (mmHg) 76 mmHg   Post-Hemodialysis Assessment   Rinseback Volume (mL) 250 mL   Blood Volume Processed (Liters) 43 L   Dialyzer Clearance Lightly streaked   Duration of Treatment 180 minutes   Additional Fluid Intake (mL) 0 mL   Total UF (mL) 2500 mL   Net Fluid Removal 2000   Patient Response to Treatment tolerated well   Post-Treatment Weight 129.4 kg (285 lb 4.4 oz)   Treatment Weight Change -2.6   Post-Hemodialysis Comments No problems during tx

## 2023-10-11 NOTE — PROGRESS NOTES
INPATIENT NEPHROLOGY Progress Note   [facility]    Patient Name: Ana Bullard  Date: 10/11/2023    Reason for consultation: AD/CKD    Chief Complaint:   Chief Complaint   Patient presents with    Tachycardia    Shortness of Breath       History of Present Illness:  64 y/o Male pt of  who presented to ED with h/o fatigue, back pain, decreased urination and SOB for 2 days. He endorses SOB has progressively worsened over this time and he is having dyspnea with minimal exertion, but no reported chest pain . He reports some feeling some palpitations, BLE edema as well. PMH includes  ischemic cardiomyopathy, 5 vessel bypass in 2012, s/p multiple MIs, chronic systolic heart failure, most recent ejection fraction 20%, complete heart block, PPM dependent, HIT, DVT/PE, diabetes mellitus, hyperlipidemia, hypertension, obstructive sleep apnea, and chronic kidney disease IV. In the ER he had wide complex tachycardia, amiodarone started. Pt was also started on leyla and lasix gtts, transferred to ICU. Consulted for renal management.    Interval History:  10/9- on BIPAP, overloaded  10/10- AF with RVR, SBP , on 3L NC, UOP 80cc, ashford placed- renal function worse- remains volume overloaded    I have discussed the risks and benefits of hemodialysis with the patient/family.  All of their questions were answered.  Risks include low blood pressure, stroke, heart attack, seizure, infection, nausea, delirium, and death.     10/10 tolerated first HD- got off 2L- 2nd treatment today- AICD adjusted yest    Plan of Care:    Assessment:  Oliguric AD/CKD IV due to CRS- initiated RRT on 10/10 for clearance and UF  Wide complex tachycardia vs Sinus tachycardia- AICD to be interrogated  CHF exacerbation with hypoxia requiring NIPPV (EF 25%, grade III DD, pulm HTN)  Hyponatremia  SHPT  Anemia of CKD    Plan:    - continue RRT today for clearance and UF- anticipate 3-4 consecutive treatments  - rate control  improved- AICD adjusted 10/10  - off leyla- use albumin PRN for hypotension- push UF with each treatment  - cardiac diet, 1.5L fluid restriction  - hold farxiga- will eventually resume  - no nsaids or IV contrast  - continue ashford  - no acute BMM issues  - H/H stable- may start to improve with more UF- hold off GANGA    Thank you for allowing us to participate in this patient's care. We will continue to follow.    Vital Signs:  Temp Readings from Last 3 Encounters:   10/11/23 97.5 °F (36.4 °C) (Oral)   06/18/23 97.8 °F (36.6 °C)   05/31/23 97.5 °F (36.4 °C)       Pulse Readings from Last 3 Encounters:   10/11/23 60   08/17/23 61   07/17/23 60       BP Readings from Last 3 Encounters:   10/11/23 (!) 104/47   07/17/23 116/71   06/18/23 138/73       Weight:  Wt Readings from Last 3 Encounters:   10/10/23 127.1 kg (280 lb 3.3 oz)   09/28/23 121.8 kg (268 lb 8 oz)   09/07/23 120.6 kg (265 lb 15.1 oz)       INS/OUTS:  I/O last 3 completed shifts:  In: 1237.6 [P.O.:440; I.V.:797.6]  Out: 2615 [Urine:115; Other:2500]  I/O this shift:  In: 120 [P.O.:120]  Out: 50 [Urine:50]    Medications:  Scheduled Meds:   aspirin  81 mg Oral Daily    atorvastatin  20 mg Oral QHS    gabapentin  300 mg Oral BID    levothyroxine  25 mcg Oral Before breakfast    mupirocin   Nasal BID    pantoprazole  40 mg Oral Daily     Continuous Infusions:   phenylephrine Stopped (10/10/23 0400)     PRN Meds:.acetaminophen, albumin human 25%, albuterol, ALPRAZolam, dextrose 50%, dextrose 50%, glucagon (human recombinant), glucose, glucose, insulin aspart U-100, melatonin, ondansetron, sodium chloride 0.9%, traMADoL  No current facility-administered medications on file prior to encounter.     Current Outpatient Medications on File Prior to Encounter   Medication Sig Dispense Refill    albuterol (PROVENTIL) 5 mg/mL nebulizer solution Take 2.5 mg by nebulization every 6 (six) hours as needed.      aspirin 81 MG Chew Take 81 mg by mouth once daily.       atorvastatin (LIPITOR) 20 MG tablet Take 20 mg by mouth once daily.      BASAGLAR KWIKPEN U-100 INSULIN glargine 100 units/mL (3mL) SubQ pen Inject 25 Units into the skin once daily.  3    carvediloL (COREG) 3.125 MG tablet Take 3.125 mg by mouth 2 (two) times daily.      FARXIGA 10 mg tablet Take 10 mg by mouth once daily.      gabapentin (NEURONTIN) 600 MG tablet Take 600 mg by mouth 3 (three) times daily.      pantoprazole (PROTONIX) 40 MG tablet Take 1 tablet by mouth once daily.  0    torsemide (DEMADEX) 20 MG Tab Take 1 tablet (20 mg total) by mouth 2 (two) times a day. 120 tablet 0    traMADoL (ULTRAM) 50 mg tablet Take 1 tablet (50 mg total) by mouth every 6 (six) hours as needed for Pain. 120 tablet 2    vitamin B complex-folic acid 0.4 mg Tab Take 1 tablet by mouth once daily.      albuterol-ipratropium (DUO-NEB) 2.5 mg-0.5 mg/3 mL nebulizer solution Take 3 mLs by nebulization every 6 (six) hours as needed.      amiodarone (PACERONE) 200 MG Tab Take 1 tablet (200 mg total) by mouth 2 (two) times daily. 90 tablet 0    CORLANOR 5 mg Tab Take 5 mg by mouth nightly.      TRULICITY 0.75 mg/0.5 mL pen injector Inject 0.75 mg into the skin every 7 days.         Review of Systems:  Neg    Physical Exam:  BP (!) 104/47   Pulse 60   Temp 97.5 °F (36.4 °C) (Oral)   Resp 18   Ht 6' (1.829 m)   Wt 127.1 kg (280 lb 3.3 oz)   SpO2 96%   BMI 38.00 kg/m²     General Appearance:    NAD, AAO x 3, cooperative, appears stated age   Head:    Normocephalic, atraumatic   Eyes:    PER, EOMI, and conjunctiva/sclera clear bilaterally        Mouth:   Moist mucus membranes, no thrush or oral lesions, normal      dentition   Back:     No CVA tenderness   Lungs:     Rales   Heart:    Tachy    Abdomen:     Soft, non-tender, distended,   Extremities:   Edematous   MSK:   No joint or muscle swelling, tenderness or deformity   Skin:   Skin color, texture, turgor normal, no rashes or lesions   Neurologic/Psychiatric:   CNII-XII  intact, normal strength and sensation       throughout, no asterixis; normal affect, memory, judgement     and insight     Results:  Lab Results   Component Value Date     (L) 10/11/2023    K 4.4 10/11/2023    CL 98 10/11/2023    CO2 24 10/11/2023    BUN 72 (H) 10/11/2023    CREATININE 3.8 (H) 10/11/2023    CALCIUM 8.4 (L) 10/11/2023    ANIONGAP 11 10/11/2023    ESTGFRAFRICA 38.9 (A) 06/12/2020    EGFRNONAA 33.7 (A) 06/12/2020       Lab Results   Component Value Date    CALCIUM 8.4 (L) 10/11/2023       Recent Labs   Lab 10/11/23  0350   WBC 5.48   RBC 3.08*   HGB 8.7*   HCT 28.2*   *   MCV 92   MCH 28.2   MCHC 30.9*         I have personally reviewed pertinent radiological imaging and reports.    I have spent > 35 minutes providing care for this patient for the above diagnoses. These services have included chart/data/imaging review, evaluation, exam, formulation of plan, , note preparation, and discussions with staff involved in this patient's care.    Bartolo Valenzuela MD MPH  [facility]  [address] [place] [id number]  [phone] (p)  [phone] (f)

## 2023-10-11 NOTE — PROGRESS NOTES
10/11/23 1615   Required for all Hemodialysis Patients   Hepatitis Status other (see comments)  (results pending)   Handoff Report   Received From Ghada Stein   Treatment Type   Treatment Type Acute   Vital Signs   Temp 98 °F (36.7 °C)   Temp Source Axillary   Pulse 60   Heart Rate Source Monitor   Resp (!) 21   SpO2 97 %   Device (Oxygen Therapy) room air   BP (!) 105/56   MAP (mmHg) 75   BP Location Right arm   BP Method Automatic   Patient Position Lying   Assessments (Pre/Post)   Consent Obtained yes   Date Hepatitis Profile Obtained 10/10/23   Blood Liters Processed (BLP) 50.6   Transport Modality not applicable   Level of Consciousness (AVPU) alert   Dialyzer Clearance mildly streaked   Pain   Preferred Pain Scale number (Numeric Rating Pain Scale)   Pain Rating (0-10): Rest 0   Pain Rating (0-10): Activity 0   Pre-Hemodialysis Assessment   Patient Status Departed   Trialysis (Dialysis) Catheter 10/10/23 1700 right internal jugular   Placement Date/Time: 10/10/23 1700   Present Prior to Hospital Arrival?: No  Hand Hygiene: Performed  Skin Antisepsis: ChloraPrep  Location: right internal jugular  Guidewire Removed?: Yes  Guidewire Intact?: Yes  Patient Tolerance: Insertion: tolerat...   Line Necessity Review CRRT/HD   Site Assessment No drainage;No warmth;No swelling;No redness   Line Securement Device Secured with sutures   Dressing Type CHG impregnated dressing/sponge;Central line dressing   Dressing Status Clean;Dry;Intact   Dressing Intervention Integrity maintained   Date on Dressing 10/10/23   Dressing Due to be Changed 10/16/23   Venous Patency/Care flushed w/o difficulty;normal saline locked   Arterial Patency/Care flushed w/o difficulty;normal saline locked   Post-Hemodialysis Assessment   Rinseback Volume (mL) 250 mL   Blood Volume Processed (Liters) 50.6 L   Dialyzer Clearance Lightly streaked   Duration of Treatment 180 minutes   Additional Fluid Intake (mL) 500 mL   Total UF (mL) 3500  mL   Net Fluid Removal 3000   Patient Response to Treatment tolerated well   Post-Treatment Weight 124.1 kg (273 lb 9.5 oz)   Treatment Weight Change -3   Post-Hemodialysis Comments tx completed, pt stable, lines flushed, clamped and capped   Edema   Edema generalized

## 2023-10-11 NOTE — PROGRESS NOTES
[facility] Medicine  Progress Note    Patient name: Ana Bullard  MRN: [id number]  Admit Date: 10/8/2023   LOS: 2 days     SUBJECTIVE:     Principal problem: Wide-complex tachycardia    Interval History:  Tachycardia has resolved with adjustments to his AICD by Dr. Bailey per history from RN.  Device driving tachycardia?  I will try to clarify. BP improved.  Lasix gtt, Amiodarone gtt stopped.  Poor UOP with worsening renal function and hyponatremia. and thus Nephrology has recommended to initiate RRT. General surgery consulted and has placed tunneled cath.     Hospital Course:    63 year old male PMHx coronary artery disease, insulin-dependent type 2 diabetes, heparin induced thrombocytopenia, ischemic cardiomyopathy with AICD, MRSA staph infection, chronic thrombocytopenia, osteomyelitis of the right foot, sleep apnea, venous stasis dermatitis to lower extremities presented to the ED with SOB, worsening LE edema, 10 lbs weight gain in the last 1.5 weeks.  In the ED, he was noted to have wide complex tachycardia, volume overload on exam, soft blood presures and AD.  He remained tachy in the 130s.  He was started on Amiodarone gtt.  He ultimately required vasopressor initiation for hypotension.  Dobutamine gtt with IV Bumex started. Troponin in the 20s with trend remaining flat.  BNP 1720.  CXR with interstitial edema.  Seen by Cardiology and initially felt to have Atrial tachycardia with wide complex secondary to AICD.  This was not felt to be Ventricular tachycardia.  His device was interrogated and per my later conversation with Cardiology, based on this information, there is now a question if this is sinus tachycardia. IV Bumex changed to lasix gtt. Egan placed and very little UOP thus far.  Nephrology consulted and following. Synthroid started for subtherapeutic hypothyroidism with TSH of 11.     Scheduled Meds:   aspirin  81 mg Oral Daily    atorvastatin  20 mg Oral QHS     "gabapentin  300 mg Oral BID    levothyroxine  25 mcg Oral Before breakfast    mupirocin   Nasal BID    pantoprazole  40 mg Oral Daily     Continuous Infusions:   phenylephrine Stopped (10/10/23 0400)     PRN Meds:acetaminophen, albumin human 25%, albuterol, ALPRAZolam, dextrose 50%, dextrose 50%, glucagon (human recombinant), glucose, glucose, insulin aspart U-100, melatonin, ondansetron, sodium chloride 0.9%, traMADoL    Review of patient's allergies indicates:   Allergen Reactions    Vasopressin Anaphylaxis and Other (See Comments)     Increased pressure in his head; felt like his eyeballs and veins were going to pop out of his head.     Vasopressin analogues Other (See Comments) and Anaphylaxis     "felt like eyes going to pop out of my head"  Other reaction(s): Other (See Comments)  "felt like eyes going to pop out of my head"  Increased pressure in his head; felt like his eyeballs and veins were going to pop out of his head.     Heparin Other (See Comments)     Other reaction(s): "depleted my blood platets"    Decreased platelet count    Heparin analogues Other (See Comments)     Depleted WBC count    Morphine Hallucinations, Other (See Comments) and Anxiety     Other reaction(s): Hallucinations  Paranoid, hallucinations         Review of Systems: As per interval history    OBJECTIVE:     Vital Signs (Most Recent)  Temp: (!) 95.2 °F (35.1 °C) (10/10/23 1900)  Pulse: 60 (10/10/23 1945)  Resp: 14 (10/10/23 1900)  BP: (!) 96/56 (10/10/23 1945)  SpO2: 100 % (10/10/23 1900)    Vital Signs Range (Last 24H):  Temp:  [94.4 °F (34.7 °C)-99.2 °F (37.3 °C)]   Pulse:  []   Resp:  [13-33]   BP: ()/(48-69)   SpO2:  [89 %-100 %]   Arterial Line BP: ()/(44-69)     I & O (Last 24H):  Intake/Output Summary (Last 24 hours) at 10/10/2023 1957  Last data filed at 10/10/2023 1748  Gross per 24 hour   Intake 1237.63 ml   Output 115 ml   Net 1122.63 ml         Physical Exam:    Vitals and nursing note reviewed. " "    Constitutional:       General: Not in acute distress. Sitting up in chair with bipap in place.     Appearance: Well-developed.   HENT:      Head: Normocephalic and atraumatic.   Eyes:      Pupils: Pupils are equal, round, and reactive to light.   Cardiovascular:      Rate and Rhythm: tachycardia  Bilateral LE edema  Pulmonary:      Effort: Pulmonary effort is normal.       Abdominal:      General: There is no distension.        Musculoskeletal:         General: Normal range of motion.      Cervical back: Normal range of motion.   Skin:    Neurological:      Mental Status: Alert and oriented to person, place, and time.      Cranial Nerves: No cranial nerve deficit.      Sensory: No sensory deficit.   Morgan    Laboratory:  I have reviewed all pertinent lab results within the past 24 hours.  CBC:   Recent Labs   Lab 10/10/23  0430   WBC 9.42   RBC 3.62*   HGB 10.2*   HCT 32.7*      MCV 90   MCH 28.2   MCHC 31.2*       CMP:   Recent Labs   Lab 10/10/23  0430   *   CALCIUM 8.9   ALBUMIN 3.4*   PROT 7.4   *   K 4.9   CO2 21*   CL 97   BUN 82*   CREATININE 4.1*   ALKPHOS 71   ALT 29   AST 39   BILITOT 2.4*       Coagulation:   Recent Labs   Lab 10/08/23  1506   LABPROT 14.0*   INR 1.3*   APTT 28.6       Cardiac markers: No results for input(s): "CKMB", "CPKMB", "TROPONINT", "TROPONINI", "MYOGLOBIN" in the last 168 hours.  Microbiology Results (last 7 days)       Procedure Component Value Units Date/Time    Blood culture #1 **CANNOT BE ORDERED STAT** [[id number]] Collected: 10/08/23 1504    Order Status: Completed Specimen: Blood from Antecubital, Right Updated: 10/10/23 1832     Blood Culture, Routine No Growth to date      No Growth to date      No Growth to date    Blood culture #2 **CANNOT BE ORDERED STAT** [[id number]] Collected: 10/08/23 1512    Order Status: Completed Specimen: Blood Updated: 10/10/23 1832     Blood Culture, Routine No Growth to date      No Growth to date      No Growth to " date    Blood culture [[id number]] Collected: 10/09/23 0226    Order Status: Completed Specimen: Blood from Line, Jugular, Internal Right Updated: 10/10/23 0432     Blood Culture, Routine No Growth to date      No Growth to date    Blood culture [[id number]] Collected: 10/09/23 0226    Order Status: Sent Specimen: Blood from Line, Jugular, Internal Right Updated: 10/09/23 0227          Recent Labs   Lab 10/09/23  0241   COLORU Yellow   SPECGRAV 1.015   PHUR 5.0   PROTEINUA 1+*   BACTERIA Negative   NITRITE Negative   LEUKOCYTESUR 1+*   UROBILINOGEN Negative   HYALINECASTS 44*         Diagnostic Results:      ASSESSMENT/PLAN:         Active Hospital Problems    Diagnosis  POA    *Wide-complex tachycardia [R00.0]  Yes    Pulmonary hypertension [I27.20]  Yes    AICD (automatic cardioverter/defibrillator) present [Z95.810]  Yes    Obesity [E66.9]  Yes    Chronic combined systolic and diastolic heart failure [I50.42]  Yes    Venous stasis dermatitis of both lower extremities [I87.2]  Yes    CAD (coronary artery disease) [I25.10]  Yes    Type II diabetes mellitus with neurological manifestations [E11.49]  Yes     With neuropathy        Resolved Hospital Problems   No resolved problems to display.         Plan:     -Slade gtt weaned off 10/10 for cardiogenic shock  -Dobutamine and lasix gtt stopped given plan to now initiate RRT.  He has not been responsive to diuretic therapy.    -Cardiology consulted and following.  -Wide complex tachycardia. Vtach has been ruled out.  Question of atrial versus sinus tachycardia . Per RN, Dr Bailey made adjustments to his AICD and tachycardia now resolved.  Device mediated tachycardia?  I will clarify with Cardiology.   -Nephrology consulted and following for oliguric João. Strict ins and outs. Daily weights. Plan to initiate RRT.   -Synthroid started for subclinical hypothyroidism with TSH 11.  -ISS.  Accuchecks.  -CPAP QHS      VTE Risk Mitigation (From admission, onward)            Ordered     IP VTE LOW RISK PATIENT  Once         10/08/23 1740     Place sequential compression device  Until discontinued         10/08/23 1740                      Department Hospital Medicine  [facility]  Cong Montalvo MD  Date of service: 10/10/2023

## 2023-10-11 NOTE — PROGRESS NOTES
Pulmonary/Critical Care  Progress Note      Patient name: Ana Bullard  MRN: [id number]  Date: 10/11/2023    Admit Date: 10/8/2023  Consult Requested By: Cong Montalvo MD    Reason for Consult: tachycardia, CHF, dyspnea    HPI:    10/9/2023 - 64 yo AICD, HFrEF (20%), ASCVD, DM, DVT, GERD, obesity, FARHAN came to ER with tachycardia, SOB and weight gain.  He reports that this happened after changes to his medications (amio and hydralazine stopped).  He devloped increased SOB, BAPTISTE< edema and tachycardia.  In ER was found to be in wide complex tachycardia.  He required neosynephrine due to hypotension, amiodarone for tachyc=arrhythmia and diuretics and was admitted to ICU.   He has been seen by cardiology and to get AICD interrogated and further decision will be made (? Change in programming, ? Cardioversion).  He does feel SOB and BAPTISTE.  ROS as below.  CXR reviewed and he has some increased interstitial markings (likely edema) and elevated left diaphragm (old finding).    10/10/2023 - Stable overnight, sleeping on BiPAP when I saw him.  Has remained tachycardic (atrial tachycardia vs sinus tachycardia per cardiology), has been on lasix drip and dobutrex (UOP not brisk).  No new problems reported this AM.  Renal function is worse this AM (renal following).  No new respiratory complaints.    10/11/2023 - Pt had AICD adjusted and heart rhythm better, had dialysis yesterday and is feeling much better this AM.  Sats 99 % on RA.  No new respiratory complaints.  He is > 2 liters negative yesterday.    Review of Systems    Review of Systems   Constitutional:  Positive for malaise/fatigue. Negative for chills, diaphoresis, fever and weight loss.   HENT:  Negative for congestion.    Eyes:  Negative for pain.   Respiratory:  Positive for shortness of breath (BAPTISTE). Negative for cough, hemoptysis, sputum production, wheezing and stridor.    Cardiovascular:  Positive for palpitations and leg swelling. Negative for chest pain,  orthopnea, claudication and PND.        + weight gain   Gastrointestinal:  Negative for abdominal pain, constipation, diarrhea, heartburn, nausea and vomiting.   Genitourinary:  Positive for dysuria (related to [place]). Negative for frequency and urgency.   Musculoskeletal:  Negative for falls and myalgias.   Neurological:  Negative for sensory change, focal weakness and weakness.   Psychiatric/Behavioral:  Negative for depression, substance abuse and suicidal ideas. The patient is not nervous/anxious.        Past Medical History    Past Medical History:   Diagnosis Date    AICD (automatic cardioverter/defibrillator) present     Anemia, chronic disease 07/27/2021    Anemia, unspecified 07/27/2021    Anxiety and depression 2012    CAD (coronary artery disease) 2012    Cardiomegaly 2016    CHF (congestive heart failure) 2012    Cholecystitis 2017    Complete heart block 2012    Diabetic foot ulcer     DVT (deep venous thrombosis) 2012    And pulmonary embolus    GERD (gastroesophageal reflux disease) 2010    Heparin induced thrombocytopenia     Hyperlipemia 2011    IDDM (insulin dependent diabetes mellitus) 1983    With neuropathy    Ischemic cardiomyopathy 2012    MI (myocardial infarction) 2012    Morbid obesity     MRSA (methicillin resistant Staphylococcus aureus) infection 01/19/2019    Noncompliance with therapeutic plan 2019    Normochromic normocytic anemia 07/27/2021    Osteomyelitis of right foot 01/2019    Osteomyelitis of right foot 09/01/2022    Klebsiella from bone, GBS in blood    Pulmonary edema 2019    Respiratory failure 2019    Sepsis 01/2019    Due to cellulitis right leg    Sleep apnea 2013    Thrombocytopathy 2012    Venous stasis dermatitis of both lower extremities        Past Surgical History    Past Surgical History:   Procedure Laterality Date    CARDIAC PACEMAKER PLACEMENT  12/2012    CARDIAC PACEMAKER PLACEMENT  10/04/2018    battery replacement    CORONARY ARTERY BYPASS GRAFT  06/2012     ESOPHAGOGASTRODUODENOSCOPY  2017    SAMARA FILTER PLACEMENT  2012    vena cava    LUMBAR SYMPATHETIC NERVE BLOCK Right 2019    Procedure: BLOCK, NERVE, SYMPATHETIC, LUMBAR;  Surgeon: Tashi Good MD;  Location: [facility] OR;  Service: Pain Management;  Laterality: Right;  RIGHT SYMPATHETIC NERVE BLOCK       Medications (scheduled):      aspirin  81 mg Oral Daily    atorvastatin  20 mg Oral QHS    gabapentin  300 mg Oral BID    levothyroxine  25 mcg Oral Before breakfast    mupirocin   Nasal BID    pantoprazole  40 mg Oral Daily       Medications (infusions):      phenylephrine Stopped (10/10/23 0400)       Medications (prn):     acetaminophen, albumin human 25%, albuterol, ALPRAZolam, dextrose 50%, dextrose 50%, glucagon (human recombinant), glucose, glucose, insulin aspart U-100, melatonin, ondansetron, sodium chloride 0.9%, traMADoL    Family History:   Family History   Problem Relation Age of Onset    Arthritis Mother     Diabetes Mother     Cancer Father     Heart disease Father     Stroke Father        Social History: Tobacco:   Social History     Tobacco Use   Smoking Status Former    Current packs/day: 0.00    Average packs/day: 2.0 packs/day for 38.0 years (76.0 ttl pk-yrs)    Types: Cigarettes    Start date:     Quit date:     Years since quittin.7   Smokeless Tobacco Never                                EtOH:   Social History     Substance and Sexual Activity   Alcohol Use No                                Drugs:   Social History     Substance and Sexual Activity   Drug Use Never     Physical Exam    Vital signs:  Temp:  [94.4 °F (34.7 °C)-98 °F (36.7 °C)]   Pulse:  []   Resp:  [11-33]   BP: ()/(47-69)   SpO2:  [95 %-100 %]   Arterial Line BP: ()/(41-65)     Intake/Output:   Intake/Output Summary (Last 24 hours) at 10/11/2023 0751  Last data filed at 10/11/2023 0713  Gross per 24 hour   Intake 340 ml   Output 2635 ml   Net -2295 ml          BMI: Estimated body mass  index is 38 kg/m² as calculated from the following:    Height as of this encounter: 6' (1.829 m).    Weight as of this encounter: 127.1 kg (280 lb 3.3 oz).    Physical Exam  Vitals and nursing note reviewed.   Constitutional:       General: He is not in acute distress.     Appearance: Normal appearance. He is obese. He is ill-appearing (chronically). He is not toxic-appearing or diaphoretic.   HENT:      Head: Normocephalic and atraumatic.      Right Ear: External ear normal.      Left Ear: External ear normal.      Nose: Nose normal.      Mouth/Throat:      Mouth: Mucous membranes are moist.      Pharynx: Oropharynx is clear. No oropharyngeal exudate.   Eyes:      General: No scleral icterus.        Right eye: No discharge.         Left eye: No discharge.      Extraocular Movements: Extraocular movements intact.      Conjunctiva/sclera: Conjunctivae normal.      Pupils: Pupils are equal, round, and reactive to light.   Neck:      Vascular: No carotid bruit.   Cardiovascular:      Rate and Rhythm: Regular rhythm. Tachycardia present.      Pulses: Normal pulses.      Heart sounds: Normal heart sounds. No murmur heard.     No friction rub. No gallop.      Comments: AICD  Pulmonary:      Effort: Pulmonary effort is normal. No respiratory distress.      Breath sounds: No stridor. Rales present. No wheezing or rhonchi.      Comments: Decreased at bases  No acc m use  Chest:      Chest wall: No tenderness.   Abdominal:      General: Bowel sounds are normal. There is no distension.      Tenderness: There is no abdominal tenderness. There is no guarding.   Musculoskeletal:         General: No swelling. Normal range of motion.      Cervical back: Normal range of motion and neck supple. No rigidity or tenderness.      Right lower leg: Edema present.      Left lower leg: Edema present.   Lymphadenopathy:      Cervical: No cervical adenopathy.   Skin:     General: Skin is warm and dry.      Capillary Refill: Capillary refill  "takes less than 2 seconds.      Coloration: Skin is not jaundiced.      Findings: No bruising.   Neurological:      General: No focal deficit present.      Mental Status: He is alert and oriented to person, place, and time. Mental status is at baseline.      Cranial Nerves: No cranial nerve deficit.      Sensory: No sensory deficit.      Motor: No weakness.   Psychiatric:         Mood and Affect: Mood normal.         Behavior: Behavior normal.         Thought Content: Thought content normal.         Judgment: Judgment normal.         Laboratory    Recent Labs   Lab 10/11/23  0350   WBC 5.48   RBC 3.08*   HGB 8.7*   HCT 28.2*   *   MCV 92   MCH 28.2   MCHC 30.9*         Recent Labs   Lab 10/11/23  0350   CALCIUM 8.4*   ALBUMIN 3.4*   PROT 7.0   *   K 4.4   CO2 24   CL 98   BUN 72*   CREATININE 3.8*   ALKPHOS 60   ALT 33   AST 43*   BILITOT 1.6*         No results for input(s): "PT", "INR", "APTT" in the last 24 hours.      No results for input(s): "CPK", "CPKMB", "TROPONINI", "MB" in the last 24 hours.    Additional labs: reviewed    Microbiology:       Microbiology Results (last 7 days)       Procedure Component Value Units Date/Time    Blood culture [[id number]] Collected: 10/09/23 0226    Order Status: Completed Specimen: Blood from Line, Jugular, Internal Right Updated: 10/11/23 0432     Blood Culture, Routine No Growth to date      No Growth to date      No Growth to date    Blood culture #1 **CANNOT BE ORDERED STAT** [[id number]] Collected: 10/08/23 1504    Order Status: Completed Specimen: Blood from Antecubital, Right Updated: 10/10/23 1832     Blood Culture, Routine No Growth to date      No Growth to date      No Growth to date    Blood culture #2 **CANNOT BE ORDERED STAT** [[id number]] Collected: 10/08/23 1512    Order Status: Completed Specimen: Blood Updated: 10/10/23 1832     Blood Culture, Routine No Growth to date      No Growth to date      No Growth to date    Blood culture [[id number]] " "Collected: 10/09/23 0226    Order Status: Sent Specimen: Blood from Line, Jugular, Internal Right Updated: 10/09/23 0227            Radiology    X-Ray Chest 1 View  Portable chest x-ray at 4:30 PM is compared to prior study dated 10/9/2023    Clinical history is catheter placement    There is a right IJ trialysis catheter with the tip at the atrial caval junction. There is no pneumothorax.  The heart is enlarged. There are median sternotomy wires. There is a left subclavian vein dual lead pacemaker in stable position  There are no confluent infiltrates or pleural effusions.    IMPRESSION: No pneumothorax status post right IJ trialysis catheter placement    Cardiomegaly, prior CABG    Electronically signed by:  Berenice Givens MD  10/10/2023 04:48 PM CDT Workstation: [id number]  US Bladder  Reason: urinary retention catheter placement    COMPARISON: None    FINDINGS:  Targeted ultrasound of the pelvis at expected location of the bladder is partially obscured by bowel gas, limiting evaluation. Furthermore, the bladder is apparently decompressed with patient reporting voiding prior to exchange of catheter. There is small echogenic focus partially visualized within the visualized portion of the pelvis such that this is nonspecific but could conceivably represent evidence of Egan catheter.    IMPRESSION:  Largely nondiagnostic evaluation of the pelvis with details as above.    Electronically signed by:  Sin Abdi MD  10/10/2023 08:08 AM CDT Workstation: [id number]        Additional Studies    reviewed    Ventilator Information                  No results for input(s): "PH", "PCO2", "PO2", "HCO3", "POCSATURATED", "BE" in the last 72 hours.      Impression    Active Hospital Problems    Diagnosis  POA    *Wide-complex tachycardia [R00.0]  Yes    Pulmonary hypertension [I27.20]  Yes    AICD (automatic cardioverter/defibrillator) present [Z95.810]  Yes    Obesity [E66.9]  Yes    Chronic combined systolic and diastolic " heart failure [I50.42]  Yes    Venous stasis dermatitis of both lower extremities [I87.2]  Yes    CAD (coronary artery disease) [I25.10]  Yes    Type II diabetes mellitus with neurological manifestations [E11.49]  Yes     With neuropathy        Resolved Hospital Problems   No resolved problems to display.       Plan    O2 as needed  BiPAP while sleeping  Diuresis/dialysis per renal  Stared oral synthroid and follow  Pulmonary hypertension likley group 2 but there may also be a component of group 3  Needs to lose weight  Has AD/CKD - renal following  Better today  OK to transfer to floor if OK with cardiology    Respiratory status stable, I will sign off.  Please reconsult if I can be of further assistance.  Thank you for this consult.        Mauricio Gonsales MD  [facility] Pulmonary/Critical Care  10/11/2023

## 2023-10-11 NOTE — PLAN OF CARE
10/10/23 2015   Patient Assessment/Suction   Level of Consciousness (AVPU) alert   Respiratory Effort Unlabored   Expansion/Accessory Muscles/Retractions expansion symmetric   All Lung Fields Breath Sounds coarse   PRE-TX-O2   Device (Oxygen Therapy) nasal cannula   $ Is the patient on Low Flow Oxygen? Yes   Flow (L/min) 3   SpO2 98 %   Pulse Oximetry Type Continuous   $ Pulse Oximetry - Multiple Charge Pulse Oximetry - Multiple   Pulse 60   BP (!) 93/54   Education   $ Education DME Oxygen;15 min

## 2023-10-11 NOTE — PROGRESS NOTES
"[facility]    Critical Care Cardiology Progress Note    Subjective:  Patient is doing much better he is currently in sinus rhythm with occasional AV pacing rates around 60-65.  He is breathing much better.  He was dialyzed yesterday and had 2 L removed.  Blood pressure has been stable and he is much improved    Objective:  Vital Signs (Most Recent)  Temp: 97.5 °F (36.4 °C) (10/11/23 0701)  Pulse: 60 (10/11/23 0701)  Resp: 18 (10/11/23 0816)  BP: (!) 104/47 (10/11/23 0701)  SpO2: 96 % (10/11/23 0701)    Vital Signs Range (Last 24H):  Temp:  [94.4 °F (34.7 °C)-98 °F (36.7 °C)]   Pulse:  [59-93]   Resp:  [11-31]   BP: ()/(47-69)   SpO2:  [95 %-100 %]   Arterial Line BP: ()/(41-65)     I & O (Last 24H):    Intake/Output Summary (Last 24 hours) at 10/11/2023 0836  Last data filed at 10/11/2023 0817  Gross per 24 hour   Intake 460 ml   Output 2635 ml   Net -2175 ml       Current Diet:     Current Diet Order   Procedures    Diet diabetic [facility]; 2000 Calorie; Renal; Fluid - 1500mL     Order Specific Question:   Indicate patient location for additional diet options:     Answer:   [facility]     Order Specific Question:   Total calories:     Answer:   2000 Calorie     Order Specific Question:   Additional Diet Options:     Answer:   Renal     Order Specific Question:   Fluid restriction:     Answer:   Fluid - 1500mL        Allergies:  Review of patient's allergies indicates:   Allergen Reactions    Vasopressin Anaphylaxis and Other (See Comments)     Increased pressure in his head; felt like his eyeballs and veins were going to pop out of his head.     Vasopressin analogues Other (See Comments) and Anaphylaxis     "felt like eyes going to pop out of my head"  Other reaction(s): Other (See Comments)  "felt like eyes going to pop out of my head"  Increased pressure in his head; felt like his eyeballs and veins were going to pop out of his head.     Heparin Other (See Comments)     Other reaction(s): "depleted my " "blood platets"    Decreased platelet count    Heparin analogues Other (See Comments)     Depleted WBC count    Morphine Hallucinations, Other (See Comments) and Anxiety     Other reaction(s): Hallucinations  Paranoid, hallucinations         Meds:  Scheduled Meds:   aspirin  81 mg Oral Daily    atorvastatin  20 mg Oral QHS    gabapentin  300 mg Oral BID    levothyroxine  25 mcg Oral Before breakfast    mupirocin   Nasal BID    pantoprazole  40 mg Oral Daily     Continuous Infusions:   phenylephrine Stopped (10/10/23 0400)     PRN Meds:acetaminophen, albumin human 25%, albuterol, ALPRAZolam, dextrose 50%, dextrose 50%, glucagon (human recombinant), glucose, glucose, insulin aspart U-100, melatonin, ondansetron, sodium chloride 0.9%, traMADoL    Oxygen/Ventilator Data (Last 24H):  (if applicable)            Hemodynamic Parameters (Last 24H):   (if applicable)        Lines/Drains:  (if applicable)  Trialysis (Dialysis) Catheter 10/10/23 1700 right internal jugular (Active)   $ Dialysis Supplies Trialysis Catheter (Supply) 10/11/23 0701   Line Necessity Review CRRT/HD 10/11/23 0701   Verification by X-ray Yes 10/11/23 0701   Site Assessment No drainage;No redness;No swelling;No warmth 10/11/23 0701   Line Securement Device Secured with sutures 10/11/23 0701   Dressing Type CHG impregnated dressing/sponge;Central line dressing 10/11/23 0701   Dressing Status Clean;Dry;Intact 10/11/23 0701   Dressing Intervention First dressing 10/11/23 0701   Date on Dressing 10/10/23 10/11/23 0701   Dressing Due to be Changed 10/17/23 10/11/23 0701   Number of days: 0            Peripheral IV - Single Lumen 10/08/23 1500 20 G Left Antecubital (Active)   Site Assessment Clean;Dry;Intact;No redness;No swelling 10/11/23 0701   Extremity Assessment Distal to IV No redness;No swelling 10/11/23 0701   Line Status Saline locked 10/11/23 0701   Dressing Status Clean;Dry;Intact 10/11/23 0701   Dressing Intervention Integrity maintained 10/11/23 " "0701   Dressing Change Due 10/12/23 10/08/23 1930   Site Change Due 10/12/23 10/08/23 1930   Number of days: 2            Peripheral IV - Single Lumen 10/08/23 1621 18 G Right Antecubital (Active)   Site Assessment Clean;Dry;Intact;No redness;No swelling 10/11/23 0701   Extremity Assessment Distal to IV No swelling;No redness;No warmth 10/11/23 0701   Line Status Infusing 10/11/23 0701   Dressing Status Clean;Dry;Intact 10/11/23 0701   Dressing Intervention Integrity maintained 10/11/23 0701   Dressing Change Due 10/12/23 10/08/23 1950   Site Change Due 10/12/23 10/08/23 1950   Number of days: 2       Arterial Line 10/09/23 0139 Left Radial (Active)   $ Arterial Line Charges (Upon Insertion) Bedside Insertion Performed 10/09/23 0301   Site Assessment Clean;Dry;Intact;No redness;No swelling 10/11/23 0701   Line Status Pulsatile blood flow 10/11/23 0701   Art Line Waveform Appropriate 10/11/23 0701   Arterial Line Interventions Zeroed and calibrated;Flushed per protocol 10/11/23 0701   Color/Movement/Sensation Capillary refill less than 3 sec 10/11/23 0701   Dressing Type CHG impregnated dressing/sponge 10/11/23 0701   Dressing Status Clean;Dry;Intact 10/11/23 0701   Dressing Intervention Integrity maintained 10/11/23 0701   Dressing Change Due 10/16/23 10/11/23 0600   Number of days: 2            Urethral Catheter 10/08/23 1600 16 Fr. (Active)   Site Assessment Clean;Intact 10/11/23 0701   Collection Container Urimeter 10/11/23 0701   Securement Method secured to top of thigh w/ adhesive device 10/11/23 0701   Catheter Care Performed yes 10/11/23 0701   Reason for Continuing Urinary Catheterization Urinary retention;Critically ill in ICU and requiring hourly monitoring of intake/output 10/11/23 0701   CAUTI Prevention Bundle Intact seal between catheter & drainage tubing;Securement Device in place with 1" slack;Drainage bag/urimeter off the floor;Sheeting clip in use 10/11/23 0400   Output (mL) 50 mL 10/11/23 0713 "   Number of days: 2       Lab Results :  Recent Results (from the past 24 hour(s))   POCT glucose    Collection Time: 10/10/23 11:10 AM   Result Value Ref Range    POC Glucose 221 (H) 70 - 110   POCT glucose    Collection Time: 10/10/23  4:53 PM   Result Value Ref Range    POC Glucose 188 (H) 70 - 110   POCT glucose    Collection Time: 10/10/23  9:46 PM   Result Value Ref Range    POC Glucose 209 (H) 70 - 110   CBC auto differential    Collection Time: 10/11/23  3:50 AM   Result Value Ref Range    WBC 5.48 3.90 - 12.70 K/uL    RBC 3.08 (L) 4.60 - 6.20 M/uL    Hemoglobin 8.7 (L) 14.0 - 18.0 g/dL    Hematocrit 28.2 (L) 40.0 - 54.0 %    MCV 92 82 - 98 fL    MCH 28.2 27.0 - 31.0 pg    MCHC 30.9 (L) 32.0 - 36.0 g/dL    RDW 18.9 (H) 11.5 - 14.5 %    Platelets 128 (L) 150 - 450 K/uL    MPV 11.2 9.2 - 12.9 fL    Immature Granulocytes 0.5 0.0 - 0.5 %    Gran # (ANC) 4.5 1.8 - 7.7 K/uL    Immature Grans (Abs) 0.03 0.00 - 0.04 K/uL    Lymph # 0.2 (L) 1.0 - 4.8 K/uL    Mono # 0.7 0.3 - 1.0 K/uL    Eos # 0.0 0.0 - 0.5 K/uL    Baso # 0.00 0.00 - 0.20 K/uL    nRBC 1 (A) 0 /100 WBC    Gran % 82.9 (H) 38.0 - 73.0 %    Lymph % 4.2 (L) 18.0 - 48.0 %    Mono % 12.0 4.0 - 15.0 %    Eosinophil % 0.4 0.0 - 8.0 %    Basophil % 0.0 0.0 - 1.9 %    Differential Method Automated    Comprehensive metabolic panel    Collection Time: 10/11/23  3:50 AM   Result Value Ref Range    Sodium 133 (L) 136 - 145 mmol/L    Potassium 4.4 3.5 - 5.1 mmol/L    Chloride 98 95 - 110 mmol/L    CO2 24 23 - 29 mmol/L    Glucose 176 (H) 70 - 110 mg/dL    BUN 72 (H) 8 - 23 mg/dL    Creatinine 3.8 (H) 0.5 - 1.4 mg/dL    Calcium 8.4 (L) 8.7 - 10.5 mg/dL    Total Protein 7.0 6.0 - 8.4 g/dL    Albumin 3.4 (L) 3.5 - 5.2 g/dL    Total Bilirubin 1.6 (H) 0.1 - 1.0 mg/dL    Alkaline Phosphatase 60 55 - 135 U/L    AST 43 (H) 10 - 40 U/L    ALT 33 10 - 44 U/L    eGFR 17.3 (A) >60 mL/min/1.73 m^2    Anion Gap 11 8 - 16 mmol/L   Magnesium    Collection Time: 10/11/23  3:50 AM    Result Value Ref Range    Magnesium 2.4 1.6 - 2.6 mg/dL       Diagnostic Results:  Imaging Results              X-Ray Chest AP Portable (Final result)  Result time 10/08/23 15:13:32      Final result by Tong Morales MD (10/08/23 15:13:32)                   Narrative:    CLINICAL HISTORY:  63 years (1960) Male Tachycardia; Shortness of Breath    TECHNIQUE:  Portable AP radiograph the chest. One view.    COMPARISON:  Radiograph from June 12, 2023    FINDINGS:  There is vascular congestion with increased interstitial markings findings indicating mild pulmonary edema.  There is blunting of both costophrenic angles consistent with trace pleural effusions and adjacent atelectasis. No pneumothorax is identified. The cardiac silhouette is moderately enlarged. The median sternotomy wires and the left sided pacemaker are unchanged.  Osseous structures appear unchanged. The visualized upper abdomen is unremarkable.    IMPRESSION:  Moderate to severe cardiomegaly and findings of mild interstitial pulmonary edema.                  .            Electronically signed by:  Tong Morales MD  10/08/2023 03:13 PM CDT Workstation: [id number]                                    12-lead EKG interpretation:   (if applicable)    Recent Cardiac Rhythm:  (if applicable)      Physical Exam:  Objective:  General Appearance:  Comfortable.    Vital signs: (most recent): Blood pressure (!) 104/47, pulse 60, temperature 97.5 °F (36.4 °C), temperature source Oral, resp. rate 18, height 6' (1.829 m), weight 127.1 kg (280 lb 3.3 oz), SpO2 96 %.    Lungs:  There are rales.    Heart: Normal rate.  Regular rhythm.  S1 normal and S2 normal.  Positive for murmur.  No gallop.   Abdomen: Bowel sounds are normal.     Extremities: There is local swelling.        Current Consults:  IP CONSULT TO CARDIOLOGY  IP CONSULT TO HOSPITAL MEDICINE  IP CONSULT TO INTENSIVIST  IP CONSULT TO ANESTHESIOLOGY  WOUND CARE CONSULT  IP CONSULT TO  NEPHROLOGY  WOUND CARE CONSULT  IP CONSULT TO REGISTERED DIETITIAN/NUTRITIONIST  IP CONSULT TO REGISTERED DIETITIAN/NUTRITIONIST  IP CONSULT TO UROLOGY  IP CONSULT TO UROLOGY  IP CONSULT TO GENERAL SURGERY    Assessment/Plan:  Assessment:   Acute on chronic combined systolic /diastolic HFrEF   ICM EF 20%  AD/CKD-cardiorenal  CAD-CABG--in office cr 3.2 9/1-currently on dialysis  Atrial arrhythmias  DM   DM foot ulcer   HTN  HLP  FARHAN  Elevated TSH  Chronic thrombocytopenia   [employer]bbot AICD insitu     Plan:    Okay to transfer patient to floor agree with continuing dialysis

## 2023-10-11 NOTE — PLAN OF CARE
10/11/23 1008   Patient Assessment/Suction   Level of Consciousness (AVPU) alert   Respiratory Effort Unlabored   PRE-TX-O2   Device (Oxygen Therapy) room air   SpO2 96 %   Pulse Oximetry Type Continuous   $ Pulse Oximetry - Multiple Charge Pulse Oximetry - Multiple   Pulse 60   Resp 17

## 2023-10-12 LAB
ALBUMIN SERPL BCP-MCNC: 3.5 G/DL (ref 3.5–5.2)
ALP SERPL-CCNC: 69 U/L (ref 55–135)
ALT SERPL W/O P-5'-P-CCNC: 33 U/L (ref 10–44)
ANION GAP SERPL CALC-SCNC: 10 MMOL/L (ref 8–16)
AST SERPL-CCNC: 34 U/L (ref 10–40)
BASOPHILS # BLD AUTO: 0.02 K/UL (ref 0–0.2)
BASOPHILS NFR BLD: 0.4 % (ref 0–1.9)
BILIRUB SERPL-MCNC: 1.3 MG/DL (ref 0.1–1)
BUN SERPL-MCNC: 59 MG/DL (ref 8–23)
CALCIUM SERPL-MCNC: 8.7 MG/DL (ref 8.7–10.5)
CHLORIDE SERPL-SCNC: 100 MMOL/L (ref 95–110)
CO2 SERPL-SCNC: 24 MMOL/L (ref 23–29)
CREAT SERPL-MCNC: 3.8 MG/DL (ref 0.5–1.4)
DIFFERENTIAL METHOD: ABNORMAL
EOSINOPHIL # BLD AUTO: 0 K/UL (ref 0–0.5)
EOSINOPHIL NFR BLD: 0.8 % (ref 0–8)
ERYTHROCYTE [DISTWIDTH] IN BLOOD BY AUTOMATED COUNT: 19.2 % (ref 11.5–14.5)
EST. GFR  (NO RACE VARIABLE): 17.3 ML/MIN/1.73 M^2
GLUCOSE SERPL-MCNC: 157 MG/DL (ref 70–110)
GLUCOSE SERPL-MCNC: 212 MG/DL (ref 70–110)
HAV IGM SERPL QL IA: NEGATIVE
HBV CORE IGM SERPL QL IA: NEGATIVE
HBV SURFACE AG SERPL QL IA: NEGATIVE
HCT VFR BLD AUTO: 28 % (ref 40–54)
HCV AB S/CO SERPL IA: NON REACTIVE
HCV AB SERPL QL IA: NORMAL
HGB BLD-MCNC: 8.6 G/DL (ref 14–18)
IMM GRANULOCYTES # BLD AUTO: 0.01 K/UL (ref 0–0.04)
IMM GRANULOCYTES NFR BLD AUTO: 0.2 % (ref 0–0.5)
LYMPHOCYTES # BLD AUTO: 0.3 K/UL (ref 1–4.8)
LYMPHOCYTES NFR BLD: 5.6 % (ref 18–48)
MAGNESIUM SERPL-MCNC: 2.2 MG/DL (ref 1.6–2.6)
MCH RBC QN AUTO: 29 PG (ref 27–31)
MCHC RBC AUTO-ENTMCNC: 30.7 G/DL (ref 32–36)
MCV RBC AUTO: 94 FL (ref 82–98)
MONOCYTES # BLD AUTO: 0.7 K/UL (ref 0.3–1)
MONOCYTES NFR BLD: 14.7 % (ref 4–15)
NEUTROPHILS # BLD AUTO: 3.8 K/UL (ref 1.8–7.7)
NEUTROPHILS NFR BLD: 78.3 % (ref 38–73)
NRBC BLD-RTO: 1 /100 WBC
PLATELET # BLD AUTO: 66 K/UL (ref 150–450)
PLATELET BLD QL SMEAR: ABNORMAL
PMV BLD AUTO: 11.3 FL (ref 9.2–12.9)
POTASSIUM SERPL-SCNC: 4.4 MMOL/L (ref 3.5–5.1)
PROT SERPL-MCNC: 7 G/DL (ref 6–8.4)
RBC # BLD AUTO: 2.97 M/UL (ref 4.6–6.2)
SODIUM SERPL-SCNC: 134 MMOL/L (ref 136–145)
WBC # BLD AUTO: 4.82 K/UL (ref 3.9–12.7)

## 2023-10-12 PROCEDURE — 21400001 HC TELEMETRY ROOM

## 2023-10-12 PROCEDURE — 90935 HEMODIALYSIS ONE EVALUATION: CPT

## 2023-10-12 PROCEDURE — 25000003 PHARM REV CODE 250: Performed by: INTERNAL MEDICINE

## 2023-10-12 PROCEDURE — 80100014 HC HEMODIALYSIS 1:1

## 2023-10-12 PROCEDURE — 99900035 HC TECH TIME PER 15 MIN (STAT)

## 2023-10-12 PROCEDURE — 94761 N-INVAS EAR/PLS OXIMETRY MLT: CPT

## 2023-10-12 PROCEDURE — 85025 COMPLETE CBC W/AUTO DIFF WBC: CPT | Performed by: NURSE PRACTITIONER

## 2023-10-12 PROCEDURE — 25000003 PHARM REV CODE 250: Performed by: FAMILY MEDICINE

## 2023-10-12 PROCEDURE — 27000221 HC OXYGEN, UP TO 24 HOURS

## 2023-10-12 PROCEDURE — 80053 COMPREHEN METABOLIC PANEL: CPT | Performed by: NURSE PRACTITIONER

## 2023-10-12 PROCEDURE — 25000003 PHARM REV CODE 250: Performed by: NURSE PRACTITIONER

## 2023-10-12 PROCEDURE — 83735 ASSAY OF MAGNESIUM: CPT | Performed by: INTERNAL MEDICINE

## 2023-10-12 PROCEDURE — 99900031 HC PATIENT EDUCATION (STAT)

## 2023-10-12 RX ADMIN — MUPIROCIN 1 G: 20 OINTMENT TOPICAL at 08:10

## 2023-10-12 RX ADMIN — PANTOPRAZOLE SODIUM 40 MG: 40 TABLET, DELAYED RELEASE ORAL at 06:10

## 2023-10-12 RX ADMIN — INSULIN ASPART 1 UNITS: 100 INJECTION, SOLUTION INTRAVENOUS; SUBCUTANEOUS at 08:10

## 2023-10-12 RX ADMIN — LEVOTHYROXINE SODIUM 25 MCG: 0.03 TABLET ORAL at 06:10

## 2023-10-12 RX ADMIN — GABAPENTIN 300 MG: 300 CAPSULE ORAL at 08:10

## 2023-10-12 RX ADMIN — GABAPENTIN 300 MG: 300 CAPSULE ORAL at 09:10

## 2023-10-12 RX ADMIN — ASPIRIN 81 MG 81 MG: 81 TABLET ORAL at 09:10

## 2023-10-12 RX ADMIN — ACETAMINOPHEN 650 MG: 325 TABLET ORAL at 05:10

## 2023-10-12 RX ADMIN — TRAMADOL HYDROCHLORIDE 50 MG: 50 TABLET, COATED ORAL at 02:10

## 2023-10-12 RX ADMIN — TRAMADOL HYDROCHLORIDE 50 MG: 50 TABLET, COATED ORAL at 09:10

## 2023-10-12 RX ADMIN — MUPIROCIN 1 G: 20 OINTMENT TOPICAL at 09:10

## 2023-10-12 RX ADMIN — ATORVASTATIN CALCIUM 20 MG: 20 TABLET, FILM COATED ORAL at 08:10

## 2023-10-12 RX ADMIN — Medication 6 MG: at 09:10

## 2023-10-12 NOTE — PROGRESS NOTES
INPATIENT NEPHROLOGY Progress Note   [facility]    Patient Name: Ana Bullard  Date: 10/12/2023    Reason for consultation: AD/CKD    Chief Complaint:   Chief Complaint   Patient presents with    Tachycardia    Shortness of Breath       History of Present Illness:  64 y/o Male pt of  who presented to ED with h/o fatigue, back pain, decreased urination and SOB for 2 days. He endorses SOB has progressively worsened over this time and he is having dyspnea with minimal exertion, but no reported chest pain . He reports some feeling some palpitations, BLE edema as well. PMH includes  ischemic cardiomyopathy, 5 vessel bypass in 2012, s/p multiple MIs, chronic systolic heart failure, most recent ejection fraction 20%, complete heart block, PPM dependent, HIT, DVT/PE, diabetes mellitus, hyperlipidemia, hypertension, obstructive sleep apnea, and chronic kidney disease IV. In the ER he had wide complex tachycardia, amiodarone started. Pt was also started on leyla and lasix gtts, transferred to ICU. Consulted for renal management.    Interval History:  10/9- on BIPAP, overloaded  10/10- AF with RVR, SBP , on 3L NC, UOP 80cc, ashford placed- renal function worse- remains volume overloaded    I have discussed the risks and benefits of hemodialysis with the patient/family.  All of their questions were answered.  Risks include low blood pressure, stroke, heart attack, seizure, infection, nausea, delirium, and death.     10/10 tolerated first HD- got off 2L- 2nd treatment today- AICD adjusted yest    10/11 got off 3L yest- plan for 3rd treatment today- UOP 225cc- net -2.8L    Plan of Care:    Assessment:  Oliguric AD/CKD IV due to CRS- initiated RRT on 10/10 for clearance and UF  Wide complex tachycardia vs Sinus tachycardia- AICD to be interrogated  CHF exacerbation with hypoxia requiring NIPPV (EF 25%, grade III DD, pulm HTN)  Hyponatremia  SHPT  Anemia of CKD    Plan:    - continue RRT  today for clearance and UF- anticipate 3-4 consecutive treatments  - rate control improved- AICD adjusted 10/10  - off leyla- use albumin PRN for hypotension- push UF with each treatment  - cardiac diet, 1.5L fluid restriction  - hold farxiga- will eventually resume  - no nsaids or IV contrast  - continue ashford  - no acute BMM issues  - H/H stable- may start to improve with more UF- hold off GANGA    Remains oliguric---plan for HD Friday and Sat and then hold Sun.  Anticipate may need outpt HD but will have better sense Monday. Will need hemosplit as well.    Thank you for allowing us to participate in this patient's care. We will continue to follow.    Vital Signs:  Temp Readings from Last 3 Encounters:   10/12/23 98.6 °F (37 °C) (Axillary)   06/18/23 97.8 °F (36.6 °C)   05/31/23 97.5 °F (36.4 °C)       Pulse Readings from Last 3 Encounters:   10/12/23 60   08/17/23 61   07/17/23 60       BP Readings from Last 3 Encounters:   10/12/23 113/62   07/17/23 116/71   06/18/23 138/73       Weight:  Wt Readings from Last 3 Encounters:   10/10/23 127.1 kg (280 lb 3.3 oz)   09/28/23 121.8 kg (268 lb 8 oz)   09/07/23 120.6 kg (265 lb 15.1 oz)       INS/OUTS:  I/O last 3 completed shifts:  In: 1320 [P.O.:820; Other:500]  Out: 6225 [Urine:225; Other:6000]  No intake/output data recorded.    Medications:  Scheduled Meds:   atorvastatin  20 mg Oral QHS    gabapentin  300 mg Oral BID    levothyroxine  25 mcg Oral Before breakfast    mupirocin   Nasal BID    pantoprazole  40 mg Oral Daily     Continuous Infusions:      PRN Meds:.acetaminophen, albumin human 25%, albuterol, ALPRAZolam, dextrose 50%, dextrose 50%, glucagon (human recombinant), glucose, glucose, insulin aspart U-100, melatonin, ondansetron, sodium chloride 0.9%, traMADoL  No current facility-administered medications on file prior to encounter.     Current Outpatient Medications on File Prior to Encounter   Medication Sig Dispense Refill    albuterol (PROVENTIL) 5 mg/mL  nebulizer solution Take 2.5 mg by nebulization every 6 (six) hours as needed.      aspirin 81 MG Chew Take 81 mg by mouth once daily.      atorvastatin (LIPITOR) 20 MG tablet Take 20 mg by mouth once daily.      BASAGLAR KWIKPEN U-100 INSULIN glargine 100 units/mL (3mL) SubQ pen Inject 25 Units into the skin once daily.  3    carvediloL (COREG) 3.125 MG tablet Take 3.125 mg by mouth 2 (two) times daily.      FARXIGA 10 mg tablet Take 10 mg by mouth once daily.      gabapentin (NEURONTIN) 600 MG tablet Take 600 mg by mouth 3 (three) times daily.      pantoprazole (PROTONIX) 40 MG tablet Take 1 tablet by mouth once daily.  0    torsemide (DEMADEX) 20 MG Tab Take 1 tablet (20 mg total) by mouth 2 (two) times a day. 120 tablet 0    traMADoL (ULTRAM) 50 mg tablet Take 1 tablet (50 mg total) by mouth every 6 (six) hours as needed for Pain. 120 tablet 2    vitamin B complex-folic acid 0.4 mg Tab Take 1 tablet by mouth once daily.      albuterol-ipratropium (DUO-NEB) 2.5 mg-0.5 mg/3 mL nebulizer solution Take 3 mLs by nebulization every 6 (six) hours as needed.      amiodarone (PACERONE) 200 MG Tab Take 1 tablet (200 mg total) by mouth 2 (two) times daily. 90 tablet 0    CORLANOR 5 mg Tab Take 5 mg by mouth nightly.      TRULICITY 0.75 mg/0.5 mL pen injector Inject 0.75 mg into the skin every 7 days.         Review of Systems:  Neg    Physical Exam:  /62 (BP Location: Right arm, Patient Position: Lying)   Pulse 60   Temp 98.6 °F (37 °C) (Axillary)   Resp 20   Ht 6' (1.829 m)   Wt 127.1 kg (280 lb 3.3 oz)   SpO2 (!) 94%   BMI 38.00 kg/m²     General Appearance:    NAD, AAO x 3, cooperative, appears stated age   Head:    Normocephalic, atraumatic   Eyes:    PER, EOMI, and conjunctiva/sclera clear bilaterally        Mouth:   Moist mucus membranes, no thrush or oral lesions, normal      dentition   Back:     No CVA tenderness   Lungs:     Rales   Heart:    Tachy    Abdomen:     Soft, non-tender, distended,    Extremities:   Edematous   MSK:   No joint or muscle swelling, tenderness or deformity   Skin:   Skin color, texture, turgor normal, no rashes or lesions   Neurologic/Psychiatric:   CNII-XII intact, normal strength and sensation       throughout, no asterixis; normal affect, memory, judgement     and insight     Results:  Lab Results   Component Value Date     (L) 10/12/2023    K 4.4 10/12/2023     10/12/2023    CO2 24 10/12/2023    BUN 59 (H) 10/12/2023    CREATININE 3.8 (H) 10/12/2023    CALCIUM 8.7 10/12/2023    ANIONGAP 10 10/12/2023    ESTGFRAFRICA 38.9 (A) 06/12/2020    EGFRNONAA 33.7 (A) 06/12/2020       Lab Results   Component Value Date    CALCIUM 8.7 10/12/2023       Recent Labs   Lab 10/12/23  0423   WBC 4.82   RBC 2.97*   HGB 8.6*   HCT 28.0*   PLT 66*   MCV 94   MCH 29.0   MCHC 30.7*         I have personally reviewed pertinent radiological imaging and reports.    I have spent > 35 minutes providing care for this patient for the above diagnoses. These services have included chart/data/imaging review, evaluation, exam, formulation of plan, , note preparation, and discussions with staff involved in this patient's care.    Bartolo Valenzuela MD MPH  [facility] [address]  [place] [address]  [phone] (p)  [phone] (f)

## 2023-10-12 NOTE — PROGRESS NOTES
4000 ml net uf removed     10/12/23 1800   Required for all Hemodialysis Patients   Hepatitis Status negative   Handoff Report   Received From Isabel   Given To Mariajose   Treatment Type   Treatment Type Acute   Vital Signs   Temp 98.5 °F (36.9 °C)   Pulse 60   Resp 18   SpO2 98 %   BP (!) 99/56   MAP (mmHg) 73   Assessments (Pre/Post)   Date Hepatitis Profile Obtained 10/10/23   Blood Liters Processed (BLP) 47.4   Transport Modality not applicable   Level of Consciousness (AVPU) alert   Dialyzer Clearance mildly streaked   Pain   Preferred Pain Scale number (Numeric Rating Pain Scale)   Pain Rating (0-10): Rest 0   Trialysis (Dialysis) Catheter 10/10/23 1700 right internal jugular   Placement Date/Time: 10/10/23 1700   Present Prior to Hospital Arrival?: No  Hand Hygiene: Performed  Skin Antisepsis: ChloraPrep  Location: right internal jugular  Guidewire Removed?: Yes  Guidewire Intact?: Yes  Patient Tolerance: Insertion: tolerat...   Line Necessity Review CRRT/HD   Site Assessment No drainage;No redness;No swelling   Line Securement Device Secured with sutures   Dressing Type CHG impregnated dressing/sponge   Dressing Status Clean;Dry;Intact   Dressing Intervention Integrity maintained   Date on Dressing 10/10/23   Dressing Due to be Changed 10/16/23   Venous Patency/Care flushed w/o difficulty;normal saline locked   Arterial Patency/Care flushed w/o difficulty;normal saline locked   Post-Hemodialysis Assessment   Rinseback Volume (mL) 250 mL   Blood Volume Processed (Liters) 47.4 L   Dialyzer Clearance Lightly streaked   Duration of Treatment 180 minutes   Additional Fluid Intake (mL) 500 mL   Total UF (mL) 4500 mL   Net Fluid Removal 4000   Patient Response to Treatment tolerated well   Post-Treatment Weight 123.1 kg (271 lb 6.2 oz)   Treatment Weight Change -4   Post-Hemodialysis Comments tx completed   Edema   Edema generalized     Educated on hd tx

## 2023-10-12 NOTE — ASSESSMENT & PLAN NOTE
If heparin products were not used this admission - plts should not drop again.   Will monitor daily CBC

## 2023-10-12 NOTE — PROGRESS NOTES
[facility]  Progress Note    Patient name: Ana Bullard  MRN: [id number]  Admit Date: 10/8/2023   LOS: 3 days     SUBJECTIVE:     Principal problem: Wide-complex tachycardia    Interval History:  Started HD yesterday. Anticipate 3-4 treatments per nephrology. Tolerating well and looks good.  Off gtts and appropriate to move out of the ICU.     Hospital Course:    63 year old male PMHx coronary artery disease, insulin-dependent type 2 diabetes, heparin induced thrombocytopenia, ischemic cardiomyopathy with AICD, MRSA staph infection, chronic thrombocytopenia, osteomyelitis of the right foot, sleep apnea, venous stasis dermatitis to lower extremities presented to the ED with SOB, worsening LE edema, 10 lbs weight gain in the last 1.5 weeks.  In the ED, he was noted to have wide complex tachycardia, volume overload on exam, soft blood presures and AD.  He remained tachy in the 130s.  He was started on Amiodarone gtt.  He ultimately required vasopressor initiation for hypotension.  Dobutamine gtt with IV Bumex started. Troponin in the 20s with trend remaining flat.  BNP 1720.  CXR with interstitial edema.  Seen by Cardiology and initially felt to have Atrial tachycardia with wide complex secondary to AICD.  This was not felt to be Ventricular tachycardia.  His device was interrogated and per my later conversation with Cardiology, based on this information, there is now a question if this is sinus tachycardia. IV Bumex changed to lasix gtt. Egan placed and very little UOP thus far.  Nephrology consulted and following. Synthroid started for subtherapeutic hypothyroidism with TSH of 11. 10/10 Tachycardia has resolved with adjustments to his AICD by Dr. Bailey per history from RN.  Device driving tachycardia?  I will try to clarify. BP improved.  Lasix gtt, Amiodarone gtt stopped.  Poor UOP with worsening renal function and hyponatremia. and thus Nephrology has recommended to  "initiate RRT. General surgery consulted and has placed tunneled cath.     Scheduled Meds:   aspirin  81 mg Oral Daily    atorvastatin  20 mg Oral QHS    gabapentin  300 mg Oral BID    levothyroxine  25 mcg Oral Before breakfast    mupirocin   Nasal BID    pantoprazole  40 mg Oral Daily     Continuous Infusions:      PRN Meds:acetaminophen, albumin human 25%, albuterol, ALPRAZolam, dextrose 50%, dextrose 50%, glucagon (human recombinant), glucose, glucose, insulin aspart U-100, melatonin, ondansetron, sodium chloride 0.9%, traMADoL    Review of patient's allergies indicates:   Allergen Reactions    Vasopressin Anaphylaxis and Other (See Comments)     Increased pressure in his head; felt like his eyeballs and veins were going to pop out of his head.     Vasopressin analogues Other (See Comments) and Anaphylaxis     "felt like eyes going to pop out of my head"  Other reaction(s): Other (See Comments)  "felt like eyes going to pop out of my head"  Increased pressure in his head; felt like his eyeballs and veins were going to pop out of his head.     Heparin Other (See Comments)     Other reaction(s): "depleted my blood platets"    Decreased platelet count    Heparin analogues Other (See Comments)     Depleted WBC count    Morphine Hallucinations, Other (See Comments) and Anxiety     Other reaction(s): Hallucinations  Paranoid, hallucinations         Review of Systems: As per interval history    OBJECTIVE:     Vital Signs (Most Recent)  Temp: 97.4 °F (36.3 °C) (10/11/23 1714)  Pulse: 62 (10/11/23 1714)  Resp: 17 (10/11/23 1714)  BP: (!) 107/55 (10/11/23 1714)  SpO2: 96 % (10/11/23 1714)    Vital Signs Range (Last 24H):  Temp:  [95 °F (35 °C)-98 °F (36.7 °C)]   Pulse:  [59-66]   Resp:  [11-28]   BP: ()/(47-75)   SpO2:  [90 %-100 %]   Arterial Line BP: ()/(41-65)     I & O (Last 24H):  Intake/Output Summary (Last 24 hours) at 10/11/2023 1901  Last data filed at 10/11/2023 1716  Gross per 24 hour   Intake 960 ml " "  Output 6125 ml   Net -5165 ml         Physical Exam:    Vitals and nursing note reviewed.     Constitutional:       General: Not in acute distress.      Appearance: Well-developed.   HENT:      Head: Normocephalic and atraumatic.   Eyes:      Pupils: Pupils are equal, round, and reactive to light.   Cardiovascular:      Rate and Rhythm: tachycardia  Bilateral LE edema  Pulmonary:      Effort: Pulmonary effort is normal.       Abdominal:      General: There is no distension.        Musculoskeletal:         General: Normal range of motion.      Cervical back: Normal range of motion.   Skin:    Neurological:      Mental Status: Alert and oriented to person, place, and time.      Cranial Nerves: No cranial nerve deficit.      Sensory: No sensory deficit.   [place]    Laboratory:  I have reviewed all pertinent lab results within the past 24 hours.  CBC:   Recent Labs   Lab 10/11/23  0350   WBC 5.48   RBC 3.08*   HGB 8.7*   HCT 28.2*   *   MCV 92   MCH 28.2   MCHC 30.9*       CMP:   Recent Labs   Lab 10/11/23  0350   *   CALCIUM 8.4*   ALBUMIN 3.4*   PROT 7.0   *   K 4.4   CO2 24   CL 98   BUN 72*   CREATININE 3.8*   ALKPHOS 60   ALT 33   AST 43*   BILITOT 1.6*       Coagulation:   Recent Labs   Lab 10/08/23  1506   LABPROT 14.0*   INR 1.3*   APTT 28.6       Cardiac markers: No results for input(s): "CKMB", "CPKMB", "TROPONINT", "TROPONINI", "MYOGLOBIN" in the last 168 hours.  Microbiology Results (last 7 days)       Procedure Component Value Units Date/Time    Blood culture #1 **CANNOT BE ORDERED STAT** [[id number]] Collected: 10/08/23 1504    Order Status: Completed Specimen: Blood from Antecubital, Right Updated: 10/11/23 1832     Blood Culture, Routine No Growth to date      No Growth to date      No Growth to date      No Growth to date    Blood culture #2 **CANNOT BE ORDERED STAT** [[id number]] Collected: 10/08/23 1512    Order Status: Completed Specimen: Blood Updated: 10/11/23 1832     Blood " Culture, Routine No Growth to date      No Growth to date      No Growth to date      No Growth to date    Blood culture [[id number]] Collected: 10/09/23 0226    Order Status: Completed Specimen: Blood from Line, Jugular, Internal Right Updated: 10/11/23 0432     Blood Culture, Routine No Growth to date      No Growth to date      No Growth to date    Blood culture [[id number]] Collected: 10/09/23 0226    Order Status: Sent Specimen: Blood from Line, Jugular, Internal Right Updated: 10/09/23 0227          Recent Labs   Lab 10/09/23  0241   COLORU Yellow   SPECGRAV 1.015   PHUR 5.0   PROTEINUA 1+*   BACTERIA Negative   NITRITE Negative   LEUKOCYTESUR 1+*   UROBILINOGEN Negative   HYALINECASTS 44*         Diagnostic Results:      ASSESSMENT/PLAN:         Active Hospital Problems    Diagnosis  POA    *Wide-complex tachycardia [R00.0]  Yes    Pulmonary hypertension [I27.20]  Yes    AICD (automatic cardioverter/defibrillator) present [Z95.810]  Yes    Obesity [E66.9]  Yes    Chronic combined systolic and diastolic heart failure [I50.42]  Yes    Venous stasis dermatitis of both lower extremities [I87.2]  Yes    CAD (coronary artery disease) [I25.10]  Yes    Type II diabetes mellitus with neurological manifestations [E11.49]  Yes     With neuropathy        Resolved Hospital Problems   No resolved problems to display.         Plan:     -Slade gtt weaned off 10/10 for cardiogenic shock  -Dobutamine and lasix gtt stopped given plan to now initiate RRT.  He has not been responsive to diuretic therapy.  RRT initiated 10/10.  -Cardiology consulted and following.  -Wide complex tachycardia. Vtach has been ruled out.  Question of atrial versus sinus tachycardia . Per RN, Dr Bailey made adjustments to his AICD and tachycardia now resolved.  Device mediated tachycardia?  I will clarify with Cardiology.   -Nephrology consulted and following for oliguric João. Strict ins and outs. Daily weights.   -Synthroid started for subclinical  hypothyroidism with TSH 11.  -ISS.  Accuchecks.  -CPAP QHS      VTE Risk Mitigation (From admission, onward)           Ordered     IP VTE LOW RISK PATIENT  Once         10/08/23 1740     Place sequential compression device  Until discontinued         10/08/23 1740                      Department Hospital Medicine  [facility] hospital  Cong Montalvo MD  Date of service: 10/11/2023

## 2023-10-12 NOTE — SUBJECTIVE & OBJECTIVE
Interval History: Pt was sitting in bed with family around and was reporting of feeling better no chest pain, sob, fever, chills, n/v, urine or bowel problem.    Review of Systems   Constitutional:  Negative for activity change, appetite change, chills and fever.   HENT:  Negative for congestion, ear pain and nosebleeds.    Eyes:  Negative for itching and visual disturbance.   Respiratory:  Negative for apnea, cough, chest tightness, shortness of breath and wheezing.    Cardiovascular:  Negative for chest pain, palpitations and leg swelling.   Gastrointestinal:  Negative for abdominal distention, abdominal pain, constipation, diarrhea, nausea and vomiting.   Genitourinary:  Negative for dysuria and flank pain.   Musculoskeletal:  Negative for back pain.   Neurological:  Negative for dizziness and headaches.     Objective:     Vital Signs (Most Recent):  Temp: 97.7 °F (36.5 °C) (10/12/23 1515)  Pulse: 60 (10/12/23 1600)  Resp: 15 (10/12/23 1600)  BP: (!) 95/54 (10/12/23 1600)  SpO2: (!) 94 % (10/12/23 1600) Vital Signs (24h Range):  Temp:  [96.4 °F (35.8 °C)-98.6 °F (37 °C)] 97.7 °F (36.5 °C)  Pulse:  [60-78] 60  Resp:  [12-32] 15  SpO2:  [91 %-100 %] 94 %  BP: ()/(50-70) 95/54     Weight: 127.1 kg (280 lb 3.3 oz)  Body mass index is 38 kg/m².    Intake/Output Summary (Last 24 hours) at 10/12/2023 1625  Last data filed at 10/12/2023 0650  Gross per 24 hour   Intake 600 ml   Output 175 ml   Net 425 ml         Physical Exam  Vitals reviewed.   Constitutional:       General: He is not in acute distress.     Appearance: Normal appearance. He is not ill-appearing, toxic-appearing or diaphoretic.      Comments: Dialysis catheter   HENT:      Head: Normocephalic and atraumatic.      Mouth/Throat:      Mouth: Mucous membranes are moist.      Pharynx: Oropharynx is clear.   Eyes:      General: No scleral icterus.     Conjunctiva/sclera: Conjunctivae normal.   Neck:      Vascular: No carotid bruit.   Cardiovascular:       Rate and Rhythm: Normal rate and regular rhythm.      Pulses: Normal pulses.      Heart sounds: Normal heart sounds.   Pulmonary:      Effort: Pulmonary effort is normal.      Breath sounds: Normal breath sounds.   Abdominal:      General: Abdomen is flat. Bowel sounds are normal.      Palpations: Abdomen is soft.   Musculoskeletal:         General: Normal range of motion.   Skin:     General: Skin is warm.   Neurological:      General: No focal deficit present.      Mental Status: He is alert and oriented to person, place, and time. Mental status is at baseline.   Psychiatric:         Mood and Affect: Mood normal.         Behavior: Behavior normal.             Significant Labs: All pertinent labs within the past 24 hours have been reviewed.  BMP:   Recent Labs   Lab 10/12/23  0423   *   *   K 4.4      CO2 24   BUN 59*   CREATININE 3.8*   CALCIUM 8.7   MG 2.2     CBC:   Recent Labs   Lab 10/11/23  0350 10/12/23  0423   WBC 5.48 4.82   HGB 8.7* 8.6*   HCT 28.2* 28.0*   * 66*     CMP:   Recent Labs   Lab 10/11/23  0350 10/12/23  0423   * 134*   K 4.4 4.4   CL 98 100   CO2 24 24   * 157*   BUN 72* 59*   CREATININE 3.8* 3.8*   CALCIUM 8.4* 8.7   PROT 7.0 7.0   ALBUMIN 3.4* 3.5   BILITOT 1.6* 1.3*   ALKPHOS 60 69   AST 43* 34   ALT 33 33   ANIONGAP 11 10     Magnesium:   Recent Labs   Lab 10/11/23  0350 10/12/23  0423   MG 2.4 2.2       Significant Imaging: I have reviewed all pertinent imaging results/findings within the past 24 hours.

## 2023-10-12 NOTE — ASSESSMENT & PLAN NOTE
Body mass index is 38 kg/m². Morbid obesity complicates all aspects of disease management from diagnostic modalities to treatment. Weight loss encouraged and health benefits explained to patient.

## 2023-10-12 NOTE — ASSESSMENT & PLAN NOTE
S/p lasix / bumex drips with help of dobutamine and currently getting RRT- should improve  Pt still makes some urine so in days where he is not getting HD can get lasix  Strict I/Os, daily weight

## 2023-10-12 NOTE — CARE UPDATE
10/11/23 2130   Patient Assessment/Suction   Level of Consciousness (AVPU) alert   Respiratory Effort Unlabored   Expansion/Accessory Muscles/Retractions no retractions;no use of accessory muscles;expansion symmetric   All Lung Fields Breath Sounds diminished   Rhythm/Pattern, Respiratory unlabored;pattern regular   Cough Frequency infrequent   Cough Type nonproductive;good   PRE-TX-O2   Device (Oxygen Therapy) room air   SpO2 97 %   Pulse Oximetry Type Continuous   $ Pulse Oximetry - Multiple Charge Pulse Oximetry - Multiple   Pulse 60   Resp (!) 21   /63   Respiratory Evaluation   $ Care Plan Tech Time 15 min   $ Eval/Re-eval Charges Re-evaluation

## 2023-10-12 NOTE — PROGRESS NOTES
[facility]    Critical Care Cardiology Progress Note    Subjective:  The pt is 62 y/o Male pt of  who presented to ED with h/o fatigue, back pain, decreased urination and SOB for 2 days. He endorses SOB has progressively worsened over this time and heis having dyspnea with minimal exertion, but no reported chest pain . He reports some feeling some palpitations, BLE edema as well. Pmh includes  ischemic cardiomyopathy, 5 vessel bypass in 2012 (sequential SVG to PDA and PLB, SVG split graft to OM1 and OM2, and SVG to LAD, viability stuudy in May 2022 was negative for hibernation or viability with large anterior scar, s/p multiple MIs, chronic systolic heart failure, most recent ejection fraction 20%, complete heart block, PPM dependent, HIT, DVT/PE, status post CRT D placement, diabetes mellitus, hyperlipidemia, hypertension, obstructive sleep apnea, and chronic kidney disease. Im ER pt had wide complex tachycardia, amiodarone started. Pt was also started on iv diuretic and pressors, transferred to ICU    AICD tacking -130's --dropped max track rate to 100.     The pt is sitting up in bed today eating breakfast. Denies CP and SOB has markedly improved. Swelling to LE has decreased. VSS , tele reviewed AV paced rate 60's. He was dialyzed yesterday wit plans for another session today. H/H 8.6/28.0 plts 66 cr 3.8 CXR on 10/10 post IJ placement, lungs improved. Net output yesterday -2405 cc     Objective:  Vital Signs (Most Recent)  Temp: 98.6 °F (37 °C) (10/12/23 0400)  Pulse: 60 (10/12/23 0600)  Resp: 20 (10/12/23 0732)  BP: 113/62 (10/12/23 0600)  SpO2: (!) 94 % (10/12/23 0732)    Vital Signs Range (Last 24H):  Temp:  [96.4 °F (35.8 °C)-98.6 °F (37 °C)]   Pulse:  [60-66]   Resp:  [12-31]   BP: ()/(52-75)   SpO2:  [90 %-100 %]     I & O (Last 24H):    Intake/Output Summary (Last 24 hours) at 10/12/2023 0848  Last data filed at 10/12/2023 0650  Gross per 24 hour   Intake 1200 ml   Output  "3675 ml   Net -2475 ml         Current Diet:     Current Diet Order   Procedures    Diet diabetic [facility]; 2000 Calorie; Renal; Fluid - 1500mL     Order Specific Question:   Indicate patient location for additional diet options:     Answer:   [facility]     Order Specific Question:   Total calories:     Answer:   2000 Calorie     Order Specific Question:   Additional Diet Options:     Answer:   Renal     Order Specific Question:   Fluid restriction:     Answer:   Fluid - 1500mL        Allergies:  Review of patient's allergies indicates:   Allergen Reactions    Vasopressin Anaphylaxis and Other (See Comments)     Increased pressure in his head; felt like his eyeballs and veins were going to pop out of his head.     Vasopressin analogues Other (See Comments) and Anaphylaxis     "felt like eyes going to pop out of my head"  Other reaction(s): Other (See Comments)  "felt like eyes going to pop out of my head"  Increased pressure in his head; felt like his eyeballs and veins were going to pop out of his head.     Heparin Other (See Comments)     Other reaction(s): "depleted my blood platets"    Decreased platelet count    Heparin analogues Other (See Comments)     Depleted WBC count    Morphine Hallucinations, Other (See Comments) and Anxiety     Other reaction(s): Hallucinations  Paranoid, hallucinations         Meds:  Scheduled Meds:   aspirin  81 mg Oral Daily    atorvastatin  20 mg Oral QHS    gabapentin  300 mg Oral BID    levothyroxine  25 mcg Oral Before breakfast    mupirocin   Nasal BID    pantoprazole  40 mg Oral Daily     Continuous Infusions:      PRN Meds:acetaminophen, albumin human 25%, albuterol, ALPRAZolam, dextrose 50%, dextrose 50%, glucagon (human recombinant), glucose, glucose, insulin aspart U-100, melatonin, ondansetron, sodium chloride 0.9%, traMADoL    Oxygen/Ventilator Data (Last 24H):  (if applicable)            Hemodynamic Parameters (Last 24H):   (if applicable)        Lines/Drains:  (if " applicable)  Trialysis (Dialysis) Catheter 10/10/23 1700 right internal jugular (Active)   $ Dialysis Supplies Trialysis Catheter (Supply) 10/11/23 0701   Line Necessity Review CRRT/HD 10/11/23 0701   Verification by X-ray Yes 10/11/23 0701   Site Assessment No drainage;No redness;No swelling;No warmth 10/11/23 0701   Line Securement Device Secured with sutures 10/11/23 0701   Dressing Type CHG impregnated dressing/sponge;Central line dressing 10/11/23 0701   Dressing Status Clean;Dry;Intact 10/11/23 0701   Dressing Intervention First dressing 10/11/23 0701   Date on Dressing 10/10/23 10/11/23 0701   Dressing Due to be Changed 10/17/23 10/11/23 0701   Number of days: 0            Peripheral IV - Single Lumen 10/08/23 1500 20 G Left Antecubital (Active)   Site Assessment Clean;Dry;Intact;No redness;No swelling 10/11/23 0701   Extremity Assessment Distal to IV No redness;No swelling 10/11/23 0701   Line Status Saline locked 10/11/23 0701   Dressing Status Clean;Dry;Intact 10/11/23 0701   Dressing Intervention Integrity maintained 10/11/23 0701   Dressing Change Due 10/12/23 10/08/23 1930   Site Change Due 10/12/23 10/08/23 1930   Number of days: 2            Peripheral IV - Single Lumen 10/08/23 1621 18 G Right Antecubital (Active)   Site Assessment Clean;Dry;Intact;No redness;No swelling 10/11/23 0701   Extremity Assessment Distal to IV No swelling;No redness;No warmth 10/11/23 0701   Line Status Infusing 10/11/23 0701   Dressing Status Clean;Dry;Intact 10/11/23 0701   Dressing Intervention Integrity maintained 10/11/23 0701   Dressing Change Due 10/12/23 10/08/23 1950   Site Change Due 10/12/23 10/08/23 1950   Number of days: 2       Arterial Line 10/09/23 0139 Left Radial (Active)   $ Arterial Line Charges (Upon Insertion) Bedside Insertion Performed 10/09/23 0301   Site Assessment Clean;Dry;Intact;No redness;No swelling 10/11/23 0701   Line Status Pulsatile blood flow 10/11/23 0701   Art Line Waveform Appropriate  "10/11/23 0701   Arterial Line Interventions Zeroed and calibrated;Flushed per protocol 10/11/23 0701   Color/Movement/Sensation Capillary refill less than 3 sec 10/11/23 0701   Dressing Type CHG impregnated dressing/sponge 10/11/23 0701   Dressing Status Clean;Dry;Intact 10/11/23 0701   Dressing Intervention Integrity maintained 10/11/23 0701   Dressing Change Due 10/16/23 10/11/23 0600   Number of days: 2            Urethral Catheter 10/08/23 1600 16 Fr. (Active)   Site Assessment Clean;Intact 10/11/23 0701   Collection Container Urimeter 10/11/23 0701   Securement Method secured to top of thigh w/ adhesive device 10/11/23 0701   Catheter Care Performed yes 10/11/23 0701   Reason for Continuing Urinary Catheterization Urinary retention;Critically ill in ICU and requiring hourly monitoring of intake/output 10/11/23 0701   CAUTI Prevention Bundle Intact seal between catheter & drainage tubing;Securement Device in place with 1" slack;Drainage bag/urimeter off the floor;Sheeting clip in use 10/11/23 0400   Output (mL) 50 mL 10/11/23 0713   Number of days: 2       Lab Results :  Recent Results (from the past 24 hour(s))   POCT glucose    Collection Time: 10/11/23 11:04 AM   Result Value Ref Range    POC Glucose 298 (H) 70 - 110   POCT glucose    Collection Time: 10/11/23  9:26 PM   Result Value Ref Range    POC Glucose 237 (H) 70 - 110   CBC auto differential    Collection Time: 10/12/23  4:23 AM   Result Value Ref Range    WBC 4.82 3.90 - 12.70 K/uL    RBC 2.97 (L) 4.60 - 6.20 M/uL    Hemoglobin 8.6 (L) 14.0 - 18.0 g/dL    Hematocrit 28.0 (L) 40.0 - 54.0 %    MCV 94 82 - 98 fL    MCH 29.0 27.0 - 31.0 pg    MCHC 30.7 (L) 32.0 - 36.0 g/dL    RDW 19.2 (H) 11.5 - 14.5 %    Platelets 66 (L) 150 - 450 K/uL    MPV 11.3 9.2 - 12.9 fL    Immature Granulocytes 0.2 0.0 - 0.5 %    Gran # (ANC) 3.8 1.8 - 7.7 K/uL    Immature Grans (Abs) 0.01 0.00 - 0.04 K/uL    Lymph # 0.3 (L) 1.0 - 4.8 K/uL    Mono # 0.7 0.3 - 1.0 K/uL    Eos # " 0.0 0.0 - 0.5 K/uL    Baso # 0.02 0.00 - 0.20 K/uL    nRBC 1 (A) 0 /100 WBC    Gran % 78.3 (H) 38.0 - 73.0 %    Lymph % 5.6 (L) 18.0 - 48.0 %    Mono % 14.7 4.0 - 15.0 %    Eosinophil % 0.8 0.0 - 8.0 %    Basophil % 0.4 0.0 - 1.9 %    Platelet Estimate Decreased (A)     Differential Method Automated    Comprehensive metabolic panel    Collection Time: 10/12/23  4:23 AM   Result Value Ref Range    Sodium 134 (L) 136 - 145 mmol/L    Potassium 4.4 3.5 - 5.1 mmol/L    Chloride 100 95 - 110 mmol/L    CO2 24 23 - 29 mmol/L    Glucose 157 (H) 70 - 110 mg/dL    BUN 59 (H) 8 - 23 mg/dL    Creatinine 3.8 (H) 0.5 - 1.4 mg/dL    Calcium 8.7 8.7 - 10.5 mg/dL    Total Protein 7.0 6.0 - 8.4 g/dL    Albumin 3.5 3.5 - 5.2 g/dL    Total Bilirubin 1.3 (H) 0.1 - 1.0 mg/dL    Alkaline Phosphatase 69 55 - 135 U/L    AST 34 10 - 40 U/L    ALT 33 10 - 44 U/L    eGFR 17.3 (A) >60 mL/min/1.73 m^2    Anion Gap 10 8 - 16 mmol/L   Magnesium    Collection Time: 10/12/23  4:23 AM   Result Value Ref Range    Magnesium 2.2 1.6 - 2.6 mg/dL       Diagnostic Results:  Imaging Results              X-Ray Chest AP Portable (Final result)  Result time 10/08/23 15:13:32      Final result by Tong Morales MD (10/08/23 15:13:32)                   Narrative:    CLINICAL HISTORY:  63 years (1960) Male Tachycardia; Shortness of Breath    TECHNIQUE:  Portable AP radiograph the chest. One view.    COMPARISON:  Radiograph from June 12, 2023    FINDINGS:  There is vascular congestion with increased interstitial markings findings indicating mild pulmonary edema.  There is blunting of both costophrenic angles consistent with trace pleural effusions and adjacent atelectasis. No pneumothorax is identified. The cardiac silhouette is moderately enlarged. The median sternotomy wires and the left sided pacemaker are unchanged.  Osseous structures appear unchanged. The visualized upper abdomen is unremarkable.    IMPRESSION:  Moderate to severe cardiomegaly and  findings of mild interstitial pulmonary edema.                  .            Electronically signed by:  Tong Morales MD  10/08/2023 03:13 PM CDT Workstation: [id number]                                    12-lead EKG interpretation:   (if applicable)    Recent Cardiac Rhythm:  (if applicable)      Physical Exam:  Objective:  General Appearance:  Comfortable.    Vital signs: (most recent): Blood pressure 113/62, pulse 60, temperature 98.6 °F (37 °C), temperature source Axillary, resp. rate 20, height 6' (1.829 m), weight 127.1 kg (280 lb 3.3 oz), SpO2 (!) 94 %.  Vital signs are normal.  No fever.    Lungs:  Normal effort and normal respiratory rate.  Breath sounds clear to auscultation.    Heart: Normal rate.  Regular rhythm.  S1 normal and S2 normal.  Positive for murmur.  No gallop.   Abdomen: Bowel sounds are normal.     Extremities: There is no local swelling.        Current Consults:  IP CONSULT TO CARDIOLOGY  IP CONSULT TO HOSPITAL MEDICINE  IP CONSULT TO INTENSIVIST  IP CONSULT TO ANESTHESIOLOGY  WOUND CARE CONSULT  IP CONSULT TO NEPHROLOGY  WOUND CARE CONSULT  IP CONSULT TO REGISTERED DIETITIAN/NUTRITIONIST  IP CONSULT TO REGISTERED DIETITIAN/NUTRITIONIST  IP CONSULT TO UROLOGY  IP CONSULT TO UROLOGY  IP CONSULT TO GENERAL SURGERY    Assessment/Plan:  Assessment:   Acute on chronic combined systolic /diastolic HFrEF   ICM EF 20%  DA/CKD-cardiorenal  CAD-CABG--in office cr 3.2 9/1-currently on dialysis  Atrial arrhythmias  DM   DM foot ulcer   HTN  HLP  FARHAN  Elevated TSH  Chronic thrombocytopenia   Abbot AICD insitu  Thrombocythemia      Plan:    Continue current management  Ok to transfer to floor  Hold ASA low platelets

## 2023-10-12 NOTE — PROGRESS NOTES
[facility]  Progress Note    Patient Name: Ana Bullard  MRN: [id number]  Patient Class: IP- Inpatient   Admission Date: 10/8/2023  Length of Stay: 4 days  Attending Physician: Judie Parikh MD  Primary Care Provider: Michelle Messina FNP        Subjective:     Principal Problem:Wide-complex tachycardia        HPI:  No notes on file    Overview/Hospital Course:  No notes on file    Interval History: Pt was sitting in bed with family around and was reporting of feeling better no chest pain, sob, fever, chills, n/v, urine or bowel problem.    Review of Systems   Constitutional:  Negative for activity change, appetite change, chills and fever.   HENT:  Negative for congestion, ear pain and nosebleeds.    Eyes:  Negative for itching and visual disturbance.   Respiratory:  Negative for apnea, cough, chest tightness, shortness of breath and wheezing.    Cardiovascular:  Negative for chest pain, palpitations and leg swelling.   Gastrointestinal:  Negative for abdominal distention, abdominal pain, constipation, diarrhea, nausea and vomiting.   Genitourinary:  Negative for dysuria and flank pain.   Musculoskeletal:  Negative for back pain.   Neurological:  Negative for dizziness and headaches.     Objective:     Vital Signs (Most Recent):  Temp: 97.7 °F (36.5 °C) (10/12/23 1515)  Pulse: 60 (10/12/23 1600)  Resp: 15 (10/12/23 1600)  BP: (!) 95/54 (10/12/23 1600)  SpO2: (!) 94 % (10/12/23 1600) Vital Signs (24h Range):  Temp:  [96.4 °F (35.8 °C)-98.6 °F (37 °C)] 97.7 °F (36.5 °C)  Pulse:  [60-78] 60  Resp:  [12-32] 15  SpO2:  [91 %-100 %] 94 %  BP: ()/(50-70) 95/54     Weight: 127.1 kg (280 lb 3.3 oz)  Body mass index is 38 kg/m².    Intake/Output Summary (Last 24 hours) at 10/12/2023 1625  Last data filed at 10/12/2023 0650  Gross per 24 hour   Intake 600 ml   Output 175 ml   Net 425 ml         Physical Exam  Vitals reviewed.   Constitutional:       General: He is not in acute  distress.     Appearance: Normal appearance. He is not ill-appearing, toxic-appearing or diaphoretic.      Comments: Dialysis catheter   HENT:      Head: Normocephalic and atraumatic.      Mouth/Throat:      Mouth: Mucous membranes are moist.      Pharynx: Oropharynx is clear.   Eyes:      General: No scleral icterus.     Conjunctiva/sclera: Conjunctivae normal.   Neck:      Vascular: No carotid bruit.   Cardiovascular:      Rate and Rhythm: Normal rate and regular rhythm.      Pulses: Normal pulses.      Heart sounds: Normal heart sounds.   Pulmonary:      Effort: Pulmonary effort is normal.      Breath sounds: Normal breath sounds.   Abdominal:      General: Abdomen is flat. Bowel sounds are normal.      Palpations: Abdomen is soft.   Musculoskeletal:         General: Normal range of motion.   Skin:     General: Skin is warm.   Neurological:      General: No focal deficit present.      Mental Status: He is alert and oriented to person, place, and time. Mental status is at baseline.   Psychiatric:         Mood and Affect: Mood normal.         Behavior: Behavior normal.             Significant Labs: All pertinent labs within the past 24 hours have been reviewed.  BMP:   Recent Labs   Lab 10/12/23  0423   *   *   K 4.4      CO2 24   BUN 59*   CREATININE 3.8*   CALCIUM 8.7   MG 2.2     CBC:   Recent Labs   Lab 10/11/23  0350 10/12/23  0423   WBC 5.48 4.82   HGB 8.7* 8.6*   HCT 28.2* 28.0*   * 66*     CMP:   Recent Labs   Lab 10/11/23  0350 10/12/23  0423   * 134*   K 4.4 4.4   CL 98 100   CO2 24 24   * 157*   BUN 72* 59*   CREATININE 3.8* 3.8*   CALCIUM 8.4* 8.7   PROT 7.0 7.0   ALBUMIN 3.4* 3.5   BILITOT 1.6* 1.3*   ALKPHOS 60 69   AST 43* 34   ALT 33 33   ANIONGAP 11 10     Magnesium:   Recent Labs   Lab 10/11/23  0350 10/12/23  0423   MG 2.4 2.2       Significant Imaging: I have reviewed all pertinent imaging results/findings within the past 24 hours.      Assessment/Plan:       * Wide-complex tachycardia  S/p amio drip- AICD adjusted to rate of 60 - pacing well now  Keep K at 4 and Mag at 2      Heparin induced thrombocytopenia  If heparin products were not used this admission - plts should not drop again.   Will monitor daily CBC      Chronic combined systolic and diastolic heart failure    S/p lasix / bumex drips with help of dobutamine and currently getting RRT- should improve  Pt still makes some urine so in days where he is not getting HD can get lasix  Strict I/Os, daily weight    CAD (coronary artery disease)  C/w home meds  Holding asa for now d/t low plts  C/w statin      Obesity  Body mass index is 38 kg/m². Morbid obesity complicates all aspects of disease management from diagnostic modalities to treatment. Weight loss encouraged and health benefits explained to patient.         AICD (automatic cardioverter/defibrillator) present  Readjusted to rate of 60 and its pacing accordingly without problem.   Cardio following      Venous stasis dermatitis of both lower extremities  stockings    Type II diabetes mellitus with neurological manifestations  C/w SSI and accu checks      Chronic Thrombocytopenia  plts are dropping again - holding ASA - will get daily CBC        VTE Risk Mitigation (From admission, onward)         Ordered     IP VTE LOW RISK PATIENT  Once         10/08/23 1740     Place sequential compression device  Until discontinued         10/08/23 1740                Discharge Planning   BILL: 10/18/2023     Code Status: Full Code   Is the patient medically ready for discharge?:     Reason for patient still in hospital (select all that apply): Treatment  Discharge Plan A: Home with family            Critical care time spent on the evaluation and treatment of severe organ dysfunction, review of pertinent labs and imaging studies, discussions with consulting providers and discussions with patient/family: 40 minutes.      Judie Parikh MD  Department of Hospital Medicine    [facility]

## 2023-10-13 LAB
BACTERIA BLD CULT: NORMAL
BACTERIA BLD CULT: NORMAL
GLUCOSE SERPL-MCNC: 144 MG/DL (ref 70–110)
GLUCOSE SERPL-MCNC: 158 MG/DL (ref 70–110)
GLUCOSE SERPL-MCNC: 164 MG/DL (ref 70–110)
GLUCOSE SERPL-MCNC: 245 MG/DL (ref 70–110)

## 2023-10-13 PROCEDURE — 25000003 PHARM REV CODE 250: Performed by: NURSE PRACTITIONER

## 2023-10-13 PROCEDURE — 99900035 HC TECH TIME PER 15 MIN (STAT)

## 2023-10-13 PROCEDURE — 94761 N-INVAS EAR/PLS OXIMETRY MLT: CPT

## 2023-10-13 PROCEDURE — 97116 GAIT TRAINING THERAPY: CPT

## 2023-10-13 PROCEDURE — 97165 OT EVAL LOW COMPLEX 30 MIN: CPT

## 2023-10-13 PROCEDURE — 25000003 PHARM REV CODE 250: Performed by: INTERNAL MEDICINE

## 2023-10-13 PROCEDURE — 25000003 PHARM REV CODE 250: Performed by: FAMILY MEDICINE

## 2023-10-13 PROCEDURE — 99900031 HC PATIENT EDUCATION (STAT)

## 2023-10-13 PROCEDURE — 21400001 HC TELEMETRY ROOM

## 2023-10-13 PROCEDURE — 90935 HEMODIALYSIS ONE EVALUATION: CPT

## 2023-10-13 PROCEDURE — 97161 PT EVAL LOW COMPLEX 20 MIN: CPT

## 2023-10-13 RX ADMIN — Medication 6 MG: at 08:10

## 2023-10-13 RX ADMIN — TRAMADOL HYDROCHLORIDE 50 MG: 50 TABLET, COATED ORAL at 08:10

## 2023-10-13 RX ADMIN — TRAMADOL HYDROCHLORIDE 50 MG: 50 TABLET, COATED ORAL at 09:10

## 2023-10-13 RX ADMIN — MUPIROCIN 1 G: 20 OINTMENT TOPICAL at 08:10

## 2023-10-13 RX ADMIN — GABAPENTIN 300 MG: 300 CAPSULE ORAL at 08:10

## 2023-10-13 RX ADMIN — PANTOPRAZOLE SODIUM 40 MG: 40 TABLET, DELAYED RELEASE ORAL at 05:10

## 2023-10-13 RX ADMIN — LEVOTHYROXINE SODIUM 25 MCG: 0.03 TABLET ORAL at 05:10

## 2023-10-13 RX ADMIN — ATORVASTATIN CALCIUM 20 MG: 20 TABLET, FILM COATED ORAL at 08:10

## 2023-10-13 RX ADMIN — ALPRAZOLAM 0.25 MG: 0.25 TABLET ORAL at 08:10

## 2023-10-13 NOTE — SUBJECTIVE & OBJECTIVE
Interval History: Pt was comfortably sitting in bed with no chest pain, sob, fever, chills, n/v, urine or bowel problem.       Review of Systems   Constitutional:  Negative for activity change, appetite change, chills and fever.   HENT:  Negative for congestion, ear pain and nosebleeds.    Eyes:  Negative for itching and visual disturbance.   Respiratory:  Negative for apnea, cough, chest tightness, shortness of breath and wheezing.    Cardiovascular:  Negative for chest pain, palpitations and leg swelling.   Gastrointestinal:  Negative for abdominal distention, abdominal pain, constipation, diarrhea, nausea and vomiting.   Genitourinary:  Negative for dysuria and flank pain.   Musculoskeletal:  Negative for back pain.   Neurological:  Negative for dizziness and headaches.     Objective:     Vital Signs (Most Recent):  Temp: 98.5 °F (36.9 °C) (10/13/23 1100)  Pulse: 62 (10/13/23 1100)  Resp: 18 (10/13/23 1100)  BP: (!) 97/58 (10/13/23 1100)  SpO2: 97 % (10/13/23 1100) Vital Signs (24h Range):  Temp:  [97.3 °F (36.3 °C)-98.5 °F (36.9 °C)] 98.5 °F (36.9 °C)  Pulse:  [56-68] 62  Resp:  [13-32] 18  SpO2:  [93 %-100 %] 97 %  BP: ()/(52-76) 97/58     Weight: 119.1 kg (262 lb 9.1 oz)  Body mass index is 35.61 kg/m².    Intake/Output Summary (Last 24 hours) at 10/13/2023 1255  Last data filed at 10/13/2023 0942  Gross per 24 hour   Intake 1410 ml   Output 4560 ml   Net -3150 ml         Physical Exam  Vitals reviewed.   Constitutional:       General: He is not in acute distress.     Appearance: Normal appearance. He is not ill-appearing, toxic-appearing or diaphoretic.   HENT:      Head: Normocephalic and atraumatic.      Mouth/Throat:      Mouth: Mucous membranes are moist.      Pharynx: Oropharynx is clear.   Eyes:      General: No scleral icterus.     Conjunctiva/sclera: Conjunctivae normal.   Neck:      Vascular: No carotid bruit.   Cardiovascular:      Rate and Rhythm: Normal rate and regular rhythm.      Pulses:  Normal pulses.      Heart sounds: Normal heart sounds.   Pulmonary:      Effort: Pulmonary effort is normal.      Breath sounds: Normal breath sounds.   Abdominal:      General: Abdomen is flat. Bowel sounds are normal.      Palpations: Abdomen is soft.   Musculoskeletal:         General: Normal range of motion.   Skin:     General: Skin is warm.   Neurological:      General: No focal deficit present.      Mental Status: He is alert and oriented to person, place, and time. Mental status is at baseline.   Psychiatric:         Mood and Affect: Mood normal.         Behavior: Behavior normal.             Significant Labs: All pertinent labs within the past 24 hours have been reviewed.  BMP:   Recent Labs   Lab 10/12/23  0423   *   *   K 4.4      CO2 24   BUN 59*   CREATININE 3.8*   CALCIUM 8.7   MG 2.2     CBC:   Recent Labs   Lab 10/12/23  0423   WBC 4.82   HGB 8.6*   HCT 28.0*   PLT 66*     CMP:   Recent Labs   Lab 10/12/23  0423   *   K 4.4      CO2 24   *   BUN 59*   CREATININE 3.8*   CALCIUM 8.7   PROT 7.0   ALBUMIN 3.5   BILITOT 1.3*   ALKPHOS 69   AST 34   ALT 33   ANIONGAP 10     Magnesium:   Recent Labs   Lab 10/12/23  0423   MG 2.2       Significant Imaging: I have reviewed all pertinent imaging results/findings within the past 24 hours.

## 2023-10-13 NOTE — PLAN OF CARE
Problem: Infection  Goal: Absence of Infection Signs and Symptoms  Outcome: Ongoing, Progressing     Problem: Adult Inpatient Plan of Care  Goal: Plan of Care Review  Outcome: Ongoing, Progressing  Goal: Patient-Specific Goal (Individualized)  Outcome: Ongoing, Progressing  Goal: Absence of Hospital-Acquired Illness or Injury  Outcome: Ongoing, Progressing  Goal: Optimal Comfort and Wellbeing  Outcome: Ongoing, Progressing  Goal: Readiness for Transition of Care  Outcome: Ongoing, Progressing     Problem: Diabetes Comorbidity  Goal: Blood Glucose Level Within Targeted Range  Outcome: Ongoing, Progressing     Problem: Adjustment to Illness (Sepsis/Septic Shock)  Goal: Optimal Coping  Outcome: Ongoing, Progressing     Problem: Bleeding (Sepsis/Septic Shock)  Goal: Absence of Bleeding  Outcome: Ongoing, Progressing     Problem: Glycemic Control Impaired (Sepsis/Septic Shock)  Goal: Blood Glucose Level Within Desired Range  Outcome: Ongoing, Progressing     Problem: Infection Progression (Sepsis/Septic Shock)  Goal: Absence of Infection Signs and Symptoms  Outcome: Ongoing, Progressing     Problem: Nutrition Impaired (Sepsis/Septic Shock)  Goal: Optimal Nutrition Intake  Outcome: Ongoing, Progressing     Problem: Fluid Imbalance (Pneumonia)  Goal: Fluid Balance  Outcome: Ongoing, Progressing     Problem: Infection (Pneumonia)  Goal: Resolution of Infection Signs and Symptoms  Outcome: Ongoing, Progressing     Problem: Respiratory Compromise (Pneumonia)  Goal: Effective Oxygenation and Ventilation  Outcome: Ongoing, Progressing     Problem: Impaired Wound Healing  Goal: Optimal Wound Healing  Outcome: Ongoing, Progressing     Problem: Skin Injury Risk Increased  Goal: Skin Health and Integrity  Outcome: Ongoing, Progressing     Problem: Oral Intake Inadequate  Goal: Improved Oral Intake  Outcome: Ongoing, Progressing     Problem: Device-Related Complication Risk (Hemodialysis)  Goal: Safe, Effective Therapy  Delivery  Outcome: Ongoing, Progressing     Problem: Hemodynamic Instability (Hemodialysis)  Goal: Effective Tissue Perfusion  Outcome: Ongoing, Progressing     Problem: Infection (Hemodialysis)  Goal: Absence of Infection Signs and Symptoms  Outcome: Ongoing, Progressing

## 2023-10-13 NOTE — PROGRESS NOTES
[facility] Medicine  Progress Note    Patient Name: Ana Bullard  MRN: [id number]  Patient Class: IP- Inpatient   Admission Date: 10/8/2023  Length of Stay: 5 days  Attending Physician: Judie Parikh MD  Primary Care Provider: Michelle Messina FNP        Subjective:     Principal Problem:Wide-complex tachycardia        HPI:  No notes on file    Overview/Hospital Course:  No notes on file    Interval History: Pt was comfortably sitting in bed with no chest pain, sob, fever, chills, n/v, urine or bowel problem.       Review of Systems   Constitutional:  Negative for activity change, appetite change, chills and fever.   HENT:  Negative for congestion, ear pain and nosebleeds.    Eyes:  Negative for itching and visual disturbance.   Respiratory:  Negative for apnea, cough, chest tightness, shortness of breath and wheezing.    Cardiovascular:  Negative for chest pain, palpitations and leg swelling.   Gastrointestinal:  Negative for abdominal distention, abdominal pain, constipation, diarrhea, nausea and vomiting.   Genitourinary:  Negative for dysuria and flank pain.   Musculoskeletal:  Negative for back pain.   Neurological:  Negative for dizziness and headaches.     Objective:     Vital Signs (Most Recent):  Temp: 98.5 °F (36.9 °C) (10/13/23 1100)  Pulse: 62 (10/13/23 1100)  Resp: 18 (10/13/23 1100)  BP: (!) 97/58 (10/13/23 1100)  SpO2: 97 % (10/13/23 1100) Vital Signs (24h Range):  Temp:  [97.3 °F (36.3 °C)-98.5 °F (36.9 °C)] 98.5 °F (36.9 °C)  Pulse:  [56-68] 62  Resp:  [13-32] 18  SpO2:  [93 %-100 %] 97 %  BP: ()/(52-76) 97/58     Weight: 119.1 kg (262 lb 9.1 oz)  Body mass index is 35.61 kg/m².    Intake/Output Summary (Last 24 hours) at 10/13/2023 1255  Last data filed at 10/13/2023 0942  Gross per 24 hour   Intake 1410 ml   Output 4560 ml   Net -3150 ml         Physical Exam  Vitals reviewed.   Constitutional:       General: He is not in acute distress.     Appearance: Normal  appearance. He is not ill-appearing, toxic-appearing or diaphoretic.   HENT:      Head: Normocephalic and atraumatic.      Mouth/Throat:      Mouth: Mucous membranes are moist.      Pharynx: Oropharynx is clear.   Eyes:      General: No scleral icterus.     Conjunctiva/sclera: Conjunctivae normal.   Neck:      Vascular: No carotid bruit.   Cardiovascular:      Rate and Rhythm: Normal rate and regular rhythm.      Pulses: Normal pulses.      Heart sounds: Normal heart sounds.   Pulmonary:      Effort: Pulmonary effort is normal.      Breath sounds: Normal breath sounds.   Abdominal:      General: Abdomen is flat. Bowel sounds are normal.      Palpations: Abdomen is soft.   Musculoskeletal:         General: Normal range of motion.   Skin:     General: Skin is warm.   Neurological:      General: No focal deficit present.      Mental Status: He is alert and oriented to person, place, and time. Mental status is at baseline.   Psychiatric:         Mood and Affect: Mood normal.         Behavior: Behavior normal.             Significant Labs: All pertinent labs within the past 24 hours have been reviewed.  BMP:   Recent Labs   Lab 10/12/23  0423   *   *   K 4.4      CO2 24   BUN 59*   CREATININE 3.8*   CALCIUM 8.7   MG 2.2     CBC:   Recent Labs   Lab 10/12/23  0423   WBC 4.82   HGB 8.6*   HCT 28.0*   PLT 66*     CMP:   Recent Labs   Lab 10/12/23  0423   *   K 4.4      CO2 24   *   BUN 59*   CREATININE 3.8*   CALCIUM 8.7   PROT 7.0   ALBUMIN 3.5   BILITOT 1.3*   ALKPHOS 69   AST 34   ALT 33   ANIONGAP 10     Magnesium:   Recent Labs   Lab 10/12/23  0423   MG 2.2       Significant Imaging: I have reviewed all pertinent imaging results/findings within the past 24 hours.      Assessment/Plan:      * Wide-complex tachycardia  S/p amio drip- AICD adjusted to rate of 60 - pacing well now  Keep K at 4 and Mag at 2      Heparin induced thrombocytopenia  If heparin products were not used this  admission - plts should not drop again.   Will monitor daily CBC      Chronic combined systolic and diastolic heart failure    S/p lasix / bumex drips with help of dobutamine and currently getting RRT- should improve  Pt still makes some urine so in days where he is not getting HD can get lasix  Strict I/Os, daily weight    CAD (coronary artery disease)  C/w home meds  Holding asa for now d/t low plts  C/w statin      Obesity  Body mass index is 38 kg/m². Morbid obesity complicates all aspects of disease management from diagnostic modalities to treatment. Weight loss encouraged and health benefits explained to patient.         AICD (automatic cardioverter/defibrillator) present  Readjusted to rate of 60 and its pacing accordingly without problem.   Cardio following      Venous stasis dermatitis of both lower extremities  stockings    Type II diabetes mellitus with neurological manifestations  C/w SSI and accu checks      Chronic Thrombocytopenia  Labs pending for today        VTE Risk Mitigation (From admission, onward)         Ordered     IP VTE LOW RISK PATIENT  Once         10/08/23 1740     Place sequential compression device  Until discontinued         10/08/23 1740                Discharge Planning   BILL: 10/18/2023     Code Status: Full Code   Is the patient medically ready for discharge?:     Reason for patient still in hospital (select all that apply): Treatment  Discharge Plan A: Home with family                  Judie Parikh MD  Department of Hospital Medicine   [facility]

## 2023-10-13 NOTE — PROGRESS NOTES
INPATIENT NEPHROLOGY Progress Note   [facility]    Patient Name: Ana Bullard  Date: 10/13/2023    Reason for consultation: AD/CKD    Chief Complaint:   Chief Complaint   Patient presents with    Tachycardia    Shortness of Breath       History of Present Illness:  62 y/o Male pt of  who presented to ED with h/o fatigue, back pain, decreased urination and SOB for 2 days. He endorses SOB has progressively worsened over this time and he is having dyspnea with minimal exertion, but no reported chest pain . He reports some feeling some palpitations, BLE edema as well. PMH includes  ischemic cardiomyopathy, 5 vessel bypass in 2012, s/p multiple MIs, chronic systolic heart failure, most recent ejection fraction 20%, complete heart block, PPM dependent, HIT, DVT/PE, diabetes mellitus, hyperlipidemia, hypertension, obstructive sleep apnea, and chronic kidney disease IV. In the ER he had wide complex tachycardia, amiodarone started. Pt was also started on leyla and lasix gtts, transferred to ICU. Consulted for renal management.    Interval History:  10/9- on BIPAP, overloaded  0/10- AF with RVR, SBP , on 3L NC, UOP 80cc, ashford placed- renal function worse- remains volume overloaded- I have discussed the risks and benefits of hemodialysis with the patient/family.  All of their questions were answered.  Risks include low blood pressure, stroke, heart attack, seizure, infection, nausea, delirium, and death.     10/11 tolerated first HD- got off 2L- 2nd treatment today- AICD adjusted yest    10/12 got off 3L yest- plan for 3rd treatment today- UOP 225cc- net -2.8L    10/13- got off 4L yest- plan for 4rth treatment today- UOP 60cc- net -6L    Plan of Care:    Assessment:  Oliguric AD/CKD IV due to CRS- initiated RRT on 10/10 for clearance and UF  Wide complex tachycardia vs Sinus tachycardia- AICD to be interrogated  CHF exacerbation with hypoxia requiring NIPPV (EF 25%, grade III DD,  pulm HTN)  Hyponatremia  SHPT  Anemia of CKD    Plan:    - continue RRT today for clearance and UF- doing daily treatments right now  - anticipate holding Sunday to assess for renal recovery  - rate control improved- AICD adjusted 10/10  - use albumin PRN for hypotension- push UF with each treatment  - cardiac diet, 1.5L fluid restriction  - hold farxiga- will eventually resume  - no nsaids or IV contrast  - continue ashford  - no acute BMM issues  - H/H noted- may start to improve with more UF- hold off GANGA    Remains oliguric---plan for HD Sat and then hold Sun.  Anticipate may need outpt HD but will have better sense Monday. Will need hemosplit as well.    Thank you for allowing us to participate in this patient's care. We will continue to follow.    Vital Signs:  Temp Readings from Last 3 Encounters:   10/13/23 98.2 °F (36.8 °C) (Oral)   06/18/23 97.8 °F (36.6 °C)   05/31/23 97.5 °F (36.4 °C)       Pulse Readings from Last 3 Encounters:   10/13/23 60   08/17/23 61   07/17/23 60       BP Readings from Last 3 Encounters:   10/13/23 103/65   07/17/23 116/71   06/18/23 138/73       Weight:  Wt Readings from Last 3 Encounters:   10/12/23 119.1 kg (262 lb 9.1 oz)   09/28/23 121.8 kg (268 lb 8 oz)   09/07/23 120.6 kg (265 lb 15.1 oz)       INS/OUTS:  I/O last 3 completed shifts:  In: 1410 [P.O.:910; Other:500]  Out: 4660 [Urine:160; Other:4500]  I/O this shift:  In: 360 [P.O.:360]  Out: -     Medications:  Scheduled Meds:   atorvastatin  20 mg Oral QHS    gabapentin  300 mg Oral BID    levothyroxine  25 mcg Oral Before breakfast    pantoprazole  40 mg Oral Daily     Continuous Infusions:      PRN Meds:.acetaminophen, albumin human 25%, albuterol, ALPRAZolam, dextrose 50%, dextrose 50%, glucagon (human recombinant), glucose, glucose, insulin aspart U-100, melatonin, ondansetron, sodium chloride 0.9%, traMADoL  No current facility-administered medications on file prior to encounter.     Current Outpatient Medications on  File Prior to Encounter   Medication Sig Dispense Refill    albuterol (PROVENTIL) 5 mg/mL nebulizer solution Take 2.5 mg by nebulization every 6 (six) hours as needed.      aspirin 81 MG Chew Take 81 mg by mouth once daily.      atorvastatin (LIPITOR) 20 MG tablet Take 20 mg by mouth once daily.      BASAGLAR KWIKPEN U-100 INSULIN glargine 100 units/mL (3mL) SubQ pen Inject 25 Units into the skin once daily.  3    carvediloL (COREG) 3.125 MG tablet Take 3.125 mg by mouth 2 (two) times daily.      FARXIGA 10 mg tablet Take 10 mg by mouth once daily.      gabapentin (NEURONTIN) 600 MG tablet Take 600 mg by mouth 3 (three) times daily.      pantoprazole (PROTONIX) 40 MG tablet Take 1 tablet by mouth once daily.  0    torsemide (DEMADEX) 20 MG Tab Take 1 tablet (20 mg total) by mouth 2 (two) times a day. 120 tablet 0    traMADoL (ULTRAM) 50 mg tablet Take 1 tablet (50 mg total) by mouth every 6 (six) hours as needed for Pain. 120 tablet 2    vitamin B complex-folic acid 0.4 mg Tab Take 1 tablet by mouth once daily.      albuterol-ipratropium (DUO-NEB) 2.5 mg-0.5 mg/3 mL nebulizer solution Take 3 mLs by nebulization every 6 (six) hours as needed.      amiodarone (PACERONE) 200 MG Tab Take 1 tablet (200 mg total) by mouth 2 (two) times daily. 90 tablet 0    CORLANOR 5 mg Tab Take 5 mg by mouth nightly.      TRULICITY 0.75 mg/0.5 mL pen injector Inject 0.75 mg into the skin every 7 days.         Review of Systems:  Neg    Physical Exam:  /65 (BP Location: Right arm, Patient Position: Lying)   Pulse 60   Temp 98.2 °F (36.8 °C) (Oral)   Resp 18   Ht 6' (1.829 m)   Wt 119.1 kg (262 lb 9.1 oz)   SpO2 95%   BMI 35.61 kg/m²     General Appearance:    NAD, AAO x 3, cooperative, appears stated age   Head:    Normocephalic, atraumatic   Eyes:    PER, EOMI, and conjunctiva/sclera clear bilaterally        Mouth:   Moist mucus membranes, no thrush or oral lesions, normal      dentition   Back:     No CVA tenderness  "  Lungs:     Rales   Heart:    Tachy    Abdomen:     Soft, non-tender, distended,   Extremities:   Edematous   MSK:   No joint or muscle swelling, tenderness or deformity   Skin:   Skin color, texture, turgor normal, no rashes or lesions   Neurologic/Psychiatric:   CNII-XII intact, normal strength and sensation       throughout, no asterixis; normal affect, memory, judgement     and insight     Results:  Lab Results   Component Value Date     (L) 10/12/2023    K 4.4 10/12/2023     10/12/2023    CO2 24 10/12/2023    BUN 59 (H) 10/12/2023    CREATININE 3.8 (H) 10/12/2023    CALCIUM 8.7 10/12/2023    ANIONGAP 10 10/12/2023    ESTGFRAFRICA 38.9 (A) 06/12/2020    EGFRNONAA 33.7 (A) 06/12/2020       Lab Results   Component Value Date    CALCIUM 8.7 10/12/2023       No results for input(s): "WBC", "RBC", "HGB", "HCT", "PLT", "MCV", "MCH", "MCHC" in the last 24 hours.      I have personally reviewed pertinent radiological imaging and reports.    I have spent > 35 minutes providing care for this patient for the above diagnoses. These services have included chart/data/imaging review, evaluation, exam, formulation of plan, , note preparation, and discussions with staff involved in this patient's care.    Bartolo Valenzuela MD MPH  [facility] [address] [address]  [phone] (p)  [phone] (f)   "

## 2023-10-13 NOTE — PLAN OF CARE
10/12/23 2191   Patient Assessment/Suction   Level of Consciousness (AVPU) alert   Respiratory Effort Normal;Unlabored   PRE-TX-O2   Device (Oxygen Therapy) CPAP   $ Is the patient on Low Flow Oxygen? Yes   Flow (L/min) 2   SpO2 100 %   Pulse Oximetry Type Continuous   $ Pulse Oximetry - Multiple Charge Pulse Oximetry - Multiple   Pulse (!) 56   Resp 18   Aerosol Therapy   $ Aerosol Therapy Charges PRN treatment not required   Respiratory Treatment Status (SVN) PRN treatment not required   Preset CPAP/BiPAP Settings   Mode Of Delivery CPAP  (HOME UNIT)   $ Is patient using? Yes   Equipment Type Other (see comment)  (HOME UNIT)   Education   $ Education 15 min;Bronchodilator   Respiratory Evaluation   $ Care Plan Tech Time 15 min

## 2023-10-13 NOTE — PT/OT/SLP EVAL
Occupational Therapy   Evaluation    Name: Ana Bullard  MRN: [id number]  Admitting Diagnosis: Wide-complex tachycardia  Recent Surgery: * No surgery found *      Recommendations:     Discharge Recommendations: home with Home Health   Discharge Equipment Recommendations:  None  Barriers to discharge:  None    Assessment:     Ana Bullard is a 63 y.o. male with a medical diagnosis of Wide-complex tachycardia.  Performance deficits affecting function: weakness, impaired endurance, impaired self care skills, impaired functional mobility, gait instability, impaired cardiopulmonary response to activity.  He reports he is feeling better with less fluid after dialysis.     Rehab Prognosis: Fair; patient would benefit from acute skilled OT services to address these deficits and reach maximum level of function.       Plan:     Patient to be seen 5 x/week to address the above listed problems via self-care/home management, therapeutic activities, therapeutic exercises  Plan of Care Expires: 11/13/23  Plan of Care Reviewed with: patient    Subjective     Chief Complaint: no new complaints; states he ambulated in [place] last night  Patient/Family Comments/goals: return home     Occupational Profile:  Living Environment: patient lives with spouse in single story home   Previous level of function: performing self care without AD; recently discharged from   Equipment Used at Home: CPAP, nebulizer, cane, straight  Assistance upon Discharge: spouse is available for assistance at discharge    Pain/Comfort:  Pain Rating 1: 0/10    Patients cultural, spiritual, Congregational conflicts given the current situation: no    Objective:     Communicated with: nurse prior to session.  Patient found HOB elevated with peripheral IV, ashford catheter, telemetry, Trialysis upon OT entry to room.    General Precautions: Standard, fall  Orthopedic Precautions: N/A  Braces: N/A  Respiratory Status: Room air    Occupational Performance:    Bed  Mobility:    Patient completed Rolling/Turning to Left with  supervision  Patient completed Scooting/Bridging with supervision  Patient completed Supine to Sit with contact guard assistance    Activities of Daily Living:  Feeding:  independence   Grooming: stand by assistance/setup for washing face and oral care  Lower Body Dressing: minimum assistance for donning socks while seated at edge of bed    Cognitive/Visual Perceptual:  Cognitive/Psychosocial Skills:     -       Oriented to: Person, Place, and Time   -       Follows Commands/attention:Follows multistep  commands  -       Communication: clear/fluent  -       Memory: No Deficits noted  -       Safety awareness/insight to disability: intact   -       Mood/Affect/Coping skills/emotional control: Appropriate to situation, Cooperative, and Pleasant    Physical Exam:  Upper Extremity Range of Motion:     -       Right Upper Extremity: WNL  -       Left Upper Extremity: WNL  Upper Extremity Strength:    -       Right Upper Extremity: WNL  -       Left Upper Extremity: WNL   Strength:    -       Right Upper Extremity: WNL  -       Left Upper Extremity: WNL  Fine Motor Coordination:    -       Intact    AMPAC 6 Click ADL:  AMPAC Total Score: 21    Treatment & Education:  Patient was educated on role of OT/POC, importance of activity during hospitalization, discharge planning and equipment needs and reviewed use of call bell for assistance.     Patient left HOB elevated with all lines intact and call button in reach    GOALS:   Multidisciplinary Problems       Occupational Therapy Goals          Problem: Occupational Therapy    Goal Priority Disciplines Outcome Interventions   Occupational Therapy Goal     OT, PT/OT     Description: Goals to be met by: 11/13/2023     Patient will increase functional independence with ADLs by performing:    UE Dressing with Modified [place].  LE Dressing with Modified [place].  Grooming while standing at sink with  Modified [place].  Toileting from toilet with Modified [place] for hygiene and clothing management.   Bathing from  tub bench with Modified [place].                         History:     Past Medical History:   Diagnosis Date    AICD (automatic cardioverter/defibrillator) present     Anemia, chronic disease 07/27/2021    Anemia, unspecified 07/27/2021    Anxiety and depression 2012    CAD (coronary artery disease) 2012    Cardiomegaly 2016    CHF (congestive heart failure) 2012    Cholecystitis 2017    Complete heart block 2012    Diabetic foot ulcer     DVT (deep venous thrombosis) 2012    And pulmonary embolus    GERD (gastroesophageal reflux disease) 2010    Heparin induced thrombocytopenia     Hyperlipemia 2011    IDDM (insulin dependent diabetes mellitus) 1983    With neuropathy    Ischemic cardiomyopathy 2012    MI (myocardial infarction) 2012    Morbid obesity     MRSA (methicillin resistant Staphylococcus aureus) infection 01/19/2019    Noncompliance with therapeutic plan 2019    Normochromic normocytic anemia 07/27/2021    Osteomyelitis of right foot 01/2019    Osteomyelitis of right foot 09/01/2022    Klebsiella from bone, GBS in blood    Pulmonary edema 2019    Respiratory failure 2019    Sepsis 01/2019    Due to cellulitis right leg    Sleep apnea 2013    Thrombocytopathy 2012    Venous stasis dermatitis of both lower extremities          Past Surgical History:   Procedure Laterality Date    CARDIAC PACEMAKER PLACEMENT  12/2012    CARDIAC PACEMAKER PLACEMENT  10/04/2018    battery replacement    CORONARY ARTERY BYPASS GRAFT  06/2012    ESOPHAGOGASTRODUODENOSCOPY  01/31/2017    SAMARA FILTER PLACEMENT  2012    vena cava    LUMBAR SYMPATHETIC NERVE BLOCK Right 8/22/2019    Procedure: BLOCK, NERVE, SYMPATHETIC, LUMBAR;  Surgeon: Tashi Good MD;  Location: [facility];  Service: Pain Management;  Laterality: Right;  RIGHT SYMPATHETIC NERVE BLOCK       Time Tracking:     OT Date of Treatment:  10/13/23  OT Start Time: 0957  OT Stop Time: 1011  OT Total Time (min): 14 min    Billable Minutes:Evaluation 14    10/13/2023

## 2023-10-13 NOTE — NURSING
Nurses Note -- 4 Eyes      10/13/2023   5:33 AM      Skin assessed during: Transfer      [] No Altered Skin Integrity Present    []Prevention Measures Documented      [] Yes- Altered Skin Integrity Present or Discovered   [] LDA Added if Not in Epic (Describe Wound)   [x] New Altered Skin Integrity was Present on Admit and Documented in LDA   [] Wound Image Taken    Wound Care Consulted? Yes    Attending Nurse:  Dorothea Vieira RN/Staff Member:  [id number]

## 2023-10-13 NOTE — NURSING
Pt transferred to room 2507 via wheelchair, called report to receiving nurse, pt belongings transferred with pt, cpap, cell phone and , pt belongings bag and suitcase, pt notified his wife of his new room via his cell phone,

## 2023-10-13 NOTE — PT/OT/SLP EVAL
Physical Therapy Evaluation    Patient Name:  Ana Bullard   MRN:  [id number]    Recommendations:     Discharge Recommendations: home health PT   Discharge Equipment Recommendations: to be determined by next level of care   Barriers to discharge:  high risk for falls, decrease activity tolerance, impaired balance,     Assessment:     Ana Bullard is a 63 y.o. male admitted with a medical diagnosis of Wide-complex tachycardia.  He presents with the following impairments/functional limitations: weakness, impaired endurance, impaired self care skills, impaired functional mobility, gait instability, impaired balance, decreased lower extremity function, decreased safety awareness, pain, impaired cardiopulmonary response to activity.    Pt found in bed with HOB elevated. Pt agreeable to visit. Pt initially required min A with ambulation due to mild instability with gait pattern and balance improved with increase ambulation    Rehab Prognosis: Fair; patient would benefit from acute skilled PT services to address these deficits and reach maximum level of function.    Recent Surgery: * No surgery found *      Plan:     During this hospitalization, patient to be seen 6 x/week to address the identified rehab impairments via gait training, therapeutic activities, therapeutic exercises, neuromuscular re-education and progress toward the following goals:    Plan of Care Expires:  11/13/23    Subjective     Chief Complaint: none stated  Patient/Family Comments/goals: return home  Pain/Comfort:  Pain Rating 1: 0/10    Patients cultural, spiritual, Yazidi conflicts given the current situation: no    Living Environment:  Pt lives with wife in a one story home with no VARUN. (+)   Prior to admission, patients level of function was Independent no AD.  Equipment used at home: cane, straight.  DME owned (not currently used): rolling walker.  Upon discharge, patient will have assistance from wife.    Objective:      Communicated with RN prior to session.  Patient found HOB elevated with peripheral IV, telemetry, ashford catheter  upon PT entry to room.    General Precautions: Standard, fall  Orthopedic Precautions:N/A   Braces: N/A  Respiratory Status: Room air    Exams:  RLE ROM: WFL  RLE Strength: WFL  LLE ROM: WFL  LLE Strength: WFL    Functional Mobility:  Bed Mobility:     Supine to Sit: supervision  Sit to Supine: supervision  Transfers:     Sit to Stand:  contact guard assistance with no AD  Gait: 50 ft with no AD and min  A initially improved to CGA with increase ambulation      AM-PAC 6 CLICK MOBILITY  Total Score:18       Treatment & Education:  Pt educated on POC, discharge recommendation, importance of time OOB, benefit of RW initially, pacing/energy conservation, need for assist with mobility, use of call bell to seek assistance as needed and fall prevention      Patient left sitting edge of bed with all lines intact, call button in reach, and bed alarm on.    GOALS:   Multidisciplinary Problems       Physical Therapy Goals          Problem: Physical Therapy    Goal Priority Disciplines Outcome Goal Variances Interventions   Physical Therapy Goal     PT, PT/OT Ongoing, Progressing     Description: Goals to be met by: 23     Patient will increase functional independence with mobility by performin. Supine to sit with Supervision  2. Sit to stand transfer with Supervision  3. Bed to chair transfer with Supervision using least restrictive assistive device  4. Gait  x 150 feet with Supervision using least restrictive assistive device                             History:     Past Medical History:   Diagnosis Date    AICD (automatic cardioverter/defibrillator) present     Anemia, chronic disease 2021    Anemia, unspecified 2021    Anxiety and depression     CAD (coronary artery disease) 2012    Cardiomegaly 2016    CHF (congestive heart failure) 2012    Cholecystitis 2017    Complete heart  block 2012    Diabetic foot ulcer     DVT (deep venous thrombosis) 2012    And pulmonary embolus    GERD (gastroesophageal reflux disease) 2010    Heparin induced thrombocytopenia     Hyperlipemia 2011    IDDM (insulin dependent diabetes mellitus) 1983    With neuropathy    Ischemic cardiomyopathy 2012    MI (myocardial infarction) 2012    Morbid obesity     MRSA (methicillin resistant Staphylococcus aureus) infection 01/19/2019    Noncompliance with therapeutic plan 2019    Normochromic normocytic anemia 07/27/2021    Osteomyelitis of right foot 01/2019    Osteomyelitis of right foot 09/01/2022    Klebsiella from bone, GBS in blood    Pulmonary edema 2019    Respiratory failure 2019    Sepsis 01/2019    Due to cellulitis right leg    Sleep apnea 2013    Thrombocytopathy 2012    Venous stasis dermatitis of both lower extremities        Past Surgical History:   Procedure Laterality Date    CARDIAC PACEMAKER PLACEMENT  12/2012    CARDIAC PACEMAKER PLACEMENT  10/04/2018    battery replacement    CORONARY ARTERY BYPASS GRAFT  06/2012    ESOPHAGOGASTRODUODENOSCOPY  01/31/2017    SAMARA FILTER PLACEMENT  2012    vena cava    LUMBAR SYMPATHETIC NERVE BLOCK Right 8/22/2019    Procedure: BLOCK, NERVE, SYMPATHETIC, LUMBAR;  Surgeon: Tashi Good MD;  Location: [facility];  Service: Pain Management;  Laterality: Right;  RIGHT SYMPATHETIC NERVE BLOCK       Time Tracking:     PT Received On: 10/13/23  PT Start Time: 1008     PT Stop Time: 1022  PT Total Time (min): 14 min     Billable Minutes: Evaluation 6 and Gait Training 8      10/13/2023

## 2023-10-13 NOTE — PROGRESS NOTES
[facility]    Critical Care Cardiology Progress Note    Subjective:  The pt is 64 y/o Male pt of  who presented to ED with h/o fatigue, back pain, decreased urination and SOB for 2 days. He endorses SOB has progressively worsened over this time and heis having dyspnea with minimal exertion, but no reported chest pain . He reports some feeling some palpitations, BLE edema as well. Pmh includes  ischemic cardiomyopathy, 5 vessel bypass in 2012 (sequential SVG to PDA and PLB, SVG split graft to OM1 and OM2, and SVG to LAD, viability stuudy in May 2022 was negative for hibernation or viability with large anterior scar, s/p multiple MIs, chronic systolic heart failure, most recent ejection fraction 20%, complete heart block, PPM dependent, HIT, DVT/PE, status post CRT D placement, diabetes mellitus, hyperlipidemia, hypertension, obstructive sleep apnea, and chronic kidney disease. Im ER pt had wide complex tachycardia, amiodarone started. Pt was also started on iv diuretic and pressors, transferred to ICU    AICD tacking -130's --dropped max track rate to 100.     10/12: The pt is sitting up in bed today eating breakfast. Denies CP and SOB has markedly improved. Swelling to LE has decreased. VSS , tele reviewed AV paced rate 60's. He was dialyzed yesterday with plans for another session today. H/H 8.6/28.0 plts 66 cr 3.8 CXR on 10/10 post IJ placement, lungs improved. Net output yesterday -2405 cc     10/13: Patient seen and examined this AM. He reports he is feeling much better. Denies shortness of breath or chest pain. LE edema has improved. He is tolerating dialysis well. Tele reviewed, SR with occasional AV pacing. Labs not resulted today. Blood pressures are soft, ranging from 90s-115 systolic.     Objective:  Vital Signs (Most Recent)  Temp: 98.2 °F (36.8 °C) (10/13/23 0814)  Pulse: 60 (10/13/23 0814)  Resp: 18 (10/13/23 0814)  BP: 103/65 (10/13/23 0814)  SpO2: 95 % (10/13/23  "0814)    Vital Signs Range (Last 24H):  Temp:  [97.3 °F (36.3 °C)-98.5 °F (36.9 °C)]   Pulse:  [56-78]   Resp:  [13-32]   BP: ()/(50-76)   SpO2:  [92 %-100 %]     I & O (Last 24H):    Intake/Output Summary (Last 24 hours) at 10/13/2023 0843  Last data filed at 10/12/2023 2101  Gross per 24 hour   Intake 1050 ml   Output 4560 ml   Net -3510 ml         Current Diet:     Current Diet Order   Procedures    Diet diabetic [facility]; 2000 Calorie; Renal; Fluid - 1500mL     Order Specific Question:   Indicate patient location for additional diet options:     Answer:   [facility]     Order Specific Question:   Total calories:     Answer:   2000 Calorie     Order Specific Question:   Additional Diet Options:     Answer:   Renal     Order Specific Question:   Fluid restriction:     Answer:   Fluid - 1500mL        Allergies:  Review of patient's allergies indicates:   Allergen Reactions    Vasopressin Anaphylaxis and Other (See Comments)     Increased pressure in his head; felt like his eyeballs and veins were going to pop out of his head.     Vasopressin analogues Other (See Comments) and Anaphylaxis     "felt like eyes going to pop out of my head"  Other reaction(s): Other (See Comments)  "felt like eyes going to pop out of my head"  Increased pressure in his head; felt like his eyeballs and veins were going to pop out of his head.     Heparin Other (See Comments)     Other reaction(s): "depleted my blood platets"    Decreased platelet count    Heparin analogues Other (See Comments)     Depleted WBC count    Morphine Hallucinations, Other (See Comments) and Anxiety     Other reaction(s): Hallucinations  Paranoid, hallucinations         Meds:  Scheduled Meds:   atorvastatin  20 mg Oral QHS    gabapentin  300 mg Oral BID    levothyroxine  25 mcg Oral Before breakfast    mupirocin   Nasal BID    pantoprazole  40 mg Oral Daily     Continuous Infusions:      PRN Meds:acetaminophen, albumin human 25%, albuterol, ALPRAZolam, dextrose 50%, " dextrose 50%, glucagon (human recombinant), glucose, glucose, insulin aspart U-100, melatonin, ondansetron, sodium chloride 0.9%, traMADoL    Oxygen/Ventilator Data (Last 24H):  (if applicable)            Hemodynamic Parameters (Last 24H):   (if applicable)        Lines/Drains:  (if applicable)  Trialysis (Dialysis) Catheter 10/10/23 1700 right internal jugular (Active)   $ Dialysis Supplies Trialysis Catheter (Supply) 10/11/23 0701   Line Necessity Review CRRT/HD 10/11/23 0701   Verification by X-ray Yes 10/11/23 0701   Site Assessment No drainage;No redness;No swelling;No warmth 10/11/23 0701   Line Securement Device Secured with sutures 10/11/23 0701   Dressing Type CHG impregnated dressing/sponge;Central line dressing 10/11/23 0701   Dressing Status Clean;Dry;Intact 10/11/23 0701   Dressing Intervention First dressing 10/11/23 0701   Date on Dressing 10/10/23 10/11/23 0701   Dressing Due to be Changed 10/17/23 10/11/23 0701   Number of days: 0            Peripheral IV - Single Lumen 10/08/23 1500 20 G Left Antecubital (Active)   Site Assessment Clean;Dry;Intact;No redness;No swelling 10/11/23 0701   Extremity Assessment Distal to IV No redness;No swelling 10/11/23 0701   Line Status Saline locked 10/11/23 0701   Dressing Status Clean;Dry;Intact 10/11/23 0701   Dressing Intervention Integrity maintained 10/11/23 0701   Dressing Change Due 10/12/23 10/08/23 1930   Site Change Due 10/12/23 10/08/23 1930   Number of days: 2            Peripheral IV - Single Lumen 10/08/23 1621 18 G Right Antecubital (Active)   Site Assessment Clean;Dry;Intact;No redness;No swelling 10/11/23 0701   Extremity Assessment Distal to IV No swelling;No redness;No warmth 10/11/23 0701   Line Status Infusing 10/11/23 0701   Dressing Status Clean;Dry;Intact 10/11/23 0701   Dressing Intervention Integrity maintained 10/11/23 0701   Dressing Change Due 10/12/23 10/08/23 1950   Site Change Due 10/12/23 10/08/23 1950   Number of days: 2      "  Arterial Line 10/09/23 0139 Left Radial (Active)   $ Arterial Line Charges (Upon Insertion) Bedside Insertion Performed 10/09/23 0301   Site Assessment Clean;Dry;Intact;No redness;No swelling 10/11/23 0701   Line Status Pulsatile blood flow 10/11/23 0701   Art Line Waveform Appropriate 10/11/23 0701   Arterial Line Interventions Zeroed and calibrated;Flushed per protocol 10/11/23 0701   Color/Movement/Sensation Capillary refill less than 3 sec 10/11/23 0701   Dressing Type CHG impregnated dressing/sponge 10/11/23 0701   Dressing Status Clean;Dry;Intact 10/11/23 0701   Dressing Intervention Integrity maintained 10/11/23 0701   Dressing Change Due 10/16/23 10/11/23 0600   Number of days: 2            Urethral Catheter 10/08/23 1600 16 Fr. (Active)   Site Assessment Clean;Intact 10/11/23 0701   Collection Container Urimeter 10/11/23 0701   Securement Method secured to top of thigh w/ adhesive device 10/11/23 0701   Catheter Care Performed yes 10/11/23 0701   Reason for Continuing Urinary Catheterization Urinary retention;Critically ill in ICU and requiring hourly monitoring of intake/output 10/11/23 0701   CAUTI Prevention Bundle Intact seal between catheter & drainage tubing;Securement Device in place with 1" slack;Drainage bag/urimeter off the floor;Sheeting clip in use 10/11/23 0400   Output (mL) 50 mL 10/11/23 0713   Number of days: 2       Lab Results :  Recent Results (from the past 24 hour(s))   POCT glucose    Collection Time: 10/12/23  8:54 PM   Result Value Ref Range    POC Glucose 212 (H) 70 - 110   POCT glucose    Collection Time: 10/13/23  7:14 AM   Result Value Ref Range    POC Glucose 158 (H) 70 - 110       Diagnostic Results:  Imaging Results              X-Ray Chest AP Portable (Final result)  Result time 10/08/23 15:13:32      Final result by Tong Morales MD (10/08/23 15:13:32)                   Narrative:    CLINICAL HISTORY:  63 years (1960) Male Tachycardia; Shortness of " Breath    TECHNIQUE:  Portable AP radiograph the chest. One view.    COMPARISON:  Radiograph from June 12, 2023    FINDINGS:  There is vascular congestion with increased interstitial markings findings indicating mild pulmonary edema.  There is blunting of both costophrenic angles consistent with trace pleural effusions and adjacent atelectasis. No pneumothorax is identified. The cardiac silhouette is moderately enlarged. The median sternotomy wires and the left sided pacemaker are unchanged.  Osseous structures appear unchanged. The visualized upper abdomen is unremarkable.    IMPRESSION:  Moderate to severe cardiomegaly and findings of mild interstitial pulmonary edema.                  .            Electronically signed by:  Tong Morales MD  10/08/2023 03:13 PM CDT Workstation: [id number]                                    12-lead EKG interpretation:   (if applicable)    Recent Cardiac Rhythm:  (if applicable)      Physical Exam:  Objective:  General Appearance:  Comfortable.    Vital signs: (most recent): Blood pressure 103/65, pulse 60, temperature 98.2 °F (36.8 °C), temperature source Oral, resp. rate 18, height 6' (1.829 m), weight 119.1 kg (262 lb 9.1 oz), SpO2 95 %.  Vital signs are normal.  No fever.    Lungs:  Normal effort and normal respiratory rate.  Breath sounds clear to auscultation.  He is not in respiratory distress.    Heart: Normal rate.  Regular rhythm.  S1 normal and S2 normal.  Positive for murmur.  No gallop.   Abdomen: Bowel sounds are normal.     Extremities: There is venous stasis.  There is no local swelling.    Neurological: Patient is oriented to person, place and time.        Current Consults:  IP CONSULT TO CARDIOLOGY  IP CONSULT TO HOSPITAL MEDICINE  IP CONSULT TO INTENSIVIST  IP CONSULT TO ANESTHESIOLOGY  WOUND CARE CONSULT  IP CONSULT TO NEPHROLOGY  WOUND CARE CONSULT  IP CONSULT TO REGISTERED DIETITIAN/NUTRITIONIST  IP CONSULT TO REGISTERED DIETITIAN/NUTRITIONIST  IP CONSULT  TO UROLOGY  IP CONSULT TO UROLOGY  IP CONSULT TO GENERAL SURGERY    Assessment/Plan:  Assessment:   Acute on chronic combined systolic /diastolic HFrEF   ICM EF 20%  AD/CKD-cardiorenal  CAD-CABG--in office cr 3.2 9/1-currently on dialysis  Atrial arrhythmias  DM   DM foot ulcer   HTN  HLP  FARHAN  Elevated TSH  Chronic thrombocytopenia   Abbot AICD insitu  Thrombocythemia      Plan:   Continue current management.  Nephrology following.   On dialysis.   ASA on hold for thrombocytopenia.   Monitor BP.

## 2023-10-14 LAB
ALBUMIN SERPL BCP-MCNC: 3.6 G/DL (ref 3.5–5.2)
ALP SERPL-CCNC: 65 U/L (ref 55–135)
ALT SERPL W/O P-5'-P-CCNC: 29 U/L (ref 10–44)
ANION GAP SERPL CALC-SCNC: 7 MMOL/L (ref 8–16)
AST SERPL-CCNC: 26 U/L (ref 10–40)
BACTERIA BLD CULT: NORMAL
BASOPHILS # BLD AUTO: 0.02 K/UL (ref 0–0.2)
BASOPHILS NFR BLD: 0.3 % (ref 0–1.9)
BILIRUB SERPL-MCNC: 1.2 MG/DL (ref 0.1–1)
BUN SERPL-MCNC: 53 MG/DL (ref 8–23)
CALCIUM SERPL-MCNC: 8.5 MG/DL (ref 8.7–10.5)
CHLORIDE SERPL-SCNC: 99 MMOL/L (ref 95–110)
CO2 SERPL-SCNC: 25 MMOL/L (ref 23–29)
CREAT SERPL-MCNC: 3.8 MG/DL (ref 0.5–1.4)
DIFFERENTIAL METHOD: ABNORMAL
EOSINOPHIL # BLD AUTO: 0.1 K/UL (ref 0–0.5)
EOSINOPHIL NFR BLD: 1.2 % (ref 0–8)
ERYTHROCYTE [DISTWIDTH] IN BLOOD BY AUTOMATED COUNT: 19.9 % (ref 11.5–14.5)
EST. GFR  (NO RACE VARIABLE): 17.3 ML/MIN/1.73 M^2
GLUCOSE SERPL-MCNC: 158 MG/DL (ref 70–110)
GLUCOSE SERPL-MCNC: 165 MG/DL (ref 70–110)
GLUCOSE SERPL-MCNC: 168 MG/DL (ref 70–110)
GLUCOSE SERPL-MCNC: 173 MG/DL (ref 70–110)
HCT VFR BLD AUTO: 29.7 % (ref 40–54)
HGB BLD-MCNC: 8.8 G/DL (ref 14–18)
IMM GRANULOCYTES # BLD AUTO: 0.01 K/UL (ref 0–0.04)
IMM GRANULOCYTES NFR BLD AUTO: 0.2 % (ref 0–0.5)
LYMPHOCYTES # BLD AUTO: 0.3 K/UL (ref 1–4.8)
LYMPHOCYTES NFR BLD: 5.3 % (ref 18–48)
MCH RBC QN AUTO: 27.9 PG (ref 27–31)
MCHC RBC AUTO-ENTMCNC: 29.6 G/DL (ref 32–36)
MCV RBC AUTO: 94 FL (ref 82–98)
MONOCYTES # BLD AUTO: 0.8 K/UL (ref 0.3–1)
MONOCYTES NFR BLD: 13.7 % (ref 4–15)
NEUTROPHILS # BLD AUTO: 4.6 K/UL (ref 1.8–7.7)
NEUTROPHILS NFR BLD: 79.3 % (ref 38–73)
NRBC BLD-RTO: 0 /100 WBC
PLATELET # BLD AUTO: 57 K/UL (ref 150–450)
PMV BLD AUTO: 10.7 FL (ref 9.2–12.9)
POTASSIUM SERPL-SCNC: 4.5 MMOL/L (ref 3.5–5.1)
PROT SERPL-MCNC: 7.5 G/DL (ref 6–8.4)
RBC # BLD AUTO: 3.15 M/UL (ref 4.6–6.2)
SODIUM SERPL-SCNC: 131 MMOL/L (ref 136–145)
WBC # BLD AUTO: 5.82 K/UL (ref 3.9–12.7)

## 2023-10-14 PROCEDURE — 25000003 PHARM REV CODE 250: Performed by: STUDENT IN AN ORGANIZED HEALTH CARE EDUCATION/TRAINING PROGRAM

## 2023-10-14 PROCEDURE — 85025 COMPLETE CBC W/AUTO DIFF WBC: CPT | Performed by: NURSE PRACTITIONER

## 2023-10-14 PROCEDURE — 82962 GLUCOSE BLOOD TEST: CPT

## 2023-10-14 PROCEDURE — 94761 N-INVAS EAR/PLS OXIMETRY MLT: CPT

## 2023-10-14 PROCEDURE — 25000003 PHARM REV CODE 250: Performed by: NURSE PRACTITIONER

## 2023-10-14 PROCEDURE — 90935 HEMODIALYSIS ONE EVALUATION: CPT

## 2023-10-14 PROCEDURE — 99900031 HC PATIENT EDUCATION (STAT)

## 2023-10-14 PROCEDURE — 99900035 HC TECH TIME PER 15 MIN (STAT)

## 2023-10-14 PROCEDURE — 21400001 HC TELEMETRY ROOM

## 2023-10-14 PROCEDURE — 80053 COMPREHEN METABOLIC PANEL: CPT | Performed by: NURSE PRACTITIONER

## 2023-10-14 PROCEDURE — 25000003 PHARM REV CODE 250: Performed by: INTERNAL MEDICINE

## 2023-10-14 RX ORDER — FAMOTIDINE 20 MG/1
20 TABLET, FILM COATED ORAL DAILY
Status: DISCONTINUED | OUTPATIENT
Start: 2023-10-14 | End: 2023-10-15

## 2023-10-14 RX ORDER — POLYETHYLENE GLYCOL 3350 17 G/17G
17 POWDER, FOR SOLUTION ORAL DAILY
Status: DISCONTINUED | OUTPATIENT
Start: 2023-10-14 | End: 2023-10-19 | Stop reason: HOSPADM

## 2023-10-14 RX ADMIN — GABAPENTIN 300 MG: 300 CAPSULE ORAL at 10:10

## 2023-10-14 RX ADMIN — POLYETHYLENE GLYCOL 3350 17 G: 17 POWDER, FOR SOLUTION ORAL at 10:10

## 2023-10-14 RX ADMIN — GABAPENTIN 300 MG: 300 CAPSULE ORAL at 08:10

## 2023-10-14 RX ADMIN — LEVOTHYROXINE SODIUM 25 MCG: 0.03 TABLET ORAL at 05:10

## 2023-10-14 RX ADMIN — ATORVASTATIN CALCIUM 20 MG: 20 TABLET, FILM COATED ORAL at 08:10

## 2023-10-14 RX ADMIN — PANTOPRAZOLE SODIUM 40 MG: 40 TABLET, DELAYED RELEASE ORAL at 05:10

## 2023-10-14 RX ADMIN — TRAMADOL HYDROCHLORIDE 50 MG: 50 TABLET, COATED ORAL at 10:10

## 2023-10-14 RX ADMIN — FAMOTIDINE 20 MG: 20 TABLET ORAL at 03:10

## 2023-10-14 NOTE — SUBJECTIVE & OBJECTIVE
Interval History: Pt was sitting comfortably in bed eating his breakfast and family sleeping on side bed- pt said he has some discomfort at ashford side.   Otherwise no chest pain, sob, fever, chills, n/v, bowel problem.     Review of Systems   Constitutional:  Negative for activity change, appetite change, chills and fever.   HENT:  Negative for congestion, ear pain and nosebleeds.    Eyes:  Negative for itching and visual disturbance.   Respiratory:  Negative for apnea, cough, chest tightness, shortness of breath and wheezing.    Cardiovascular:  Negative for chest pain, palpitations and leg swelling.   Gastrointestinal:  Negative for abdominal distention, abdominal pain, constipation, diarrhea, nausea and vomiting.   Genitourinary:  Negative for dysuria and flank pain.   Musculoskeletal:  Negative for back pain.   Neurological:  Negative for dizziness and headaches.     Objective:     Vital Signs (Most Recent):  Temp: 97.7 °F (36.5 °C) (10/14/23 1025)  Pulse: (!) 59 (10/14/23 1143)  Resp: 18 (10/14/23 1025)  BP: (!) 93/46 (10/14/23 1143)  SpO2: 96 % (10/14/23 1025) Vital Signs (24h Range):  Temp:  [97.5 °F (36.4 °C)-98.5 °F (36.9 °C)] 97.7 °F (36.5 °C)  Pulse:  [58-65] 59  Resp:  [16-20] 18  SpO2:  [96 %-100 %] 96 %  BP: ()/(39-65) 93/46     Weight: 119.5 kg (263 lb 7.2 oz)  Body mass index is 35.73 kg/m².    Intake/Output Summary (Last 24 hours) at 10/14/2023 1206  Last data filed at 10/14/2023 0926  Gross per 24 hour   Intake 740 ml   Output 2520 ml   Net -1780 ml         Physical Exam  Vitals reviewed.   Constitutional:       General: He is not in acute distress.     Appearance: Normal appearance. He is not ill-appearing, toxic-appearing or diaphoretic.   HENT:      Head: Normocephalic and atraumatic.      Mouth/Throat:      Mouth: Mucous membranes are moist.      Pharynx: Oropharynx is clear.   Eyes:      General: No scleral icterus.     Conjunctiva/sclera: Conjunctivae normal.   Neck:      Vascular: No  "carotid bruit.   Cardiovascular:      Rate and Rhythm: Normal rate and regular rhythm.      Pulses: Normal pulses.      Heart sounds: Normal heart sounds.   Pulmonary:      Effort: Pulmonary effort is normal.      Breath sounds: Normal breath sounds.   Abdominal:      General: Abdomen is flat. Bowel sounds are normal.      Palpations: Abdomen is soft.   Musculoskeletal:         General: Normal range of motion.   Skin:     General: Skin is warm.   Neurological:      General: No focal deficit present.      Mental Status: He is alert and oriented to person, place, and time. Mental status is at baseline.   Psychiatric:         Mood and Affect: Mood normal.         Behavior: Behavior normal.             Significant Labs: All pertinent labs within the past 24 hours have been reviewed.  BMP:   Recent Labs   Lab 10/14/23  0600   *   *   K 4.5   CL 99   CO2 25   BUN 53*   CREATININE 3.8*   CALCIUM 8.5*     CBC:   Recent Labs   Lab 10/14/23  0600   WBC 5.82   HGB 8.8*   HCT 29.7*   PLT 57*     CMP:   Recent Labs   Lab 10/14/23  0600   *   K 4.5   CL 99   CO2 25   *   BUN 53*   CREATININE 3.8*   CALCIUM 8.5*   PROT 7.5   ALBUMIN 3.6   BILITOT 1.2*   ALKPHOS 65   AST 26   ALT 29   ANIONGAP 7*     Magnesium: No results for input(s): "MG" in the last 48 hours.    Significant Imaging: I have reviewed all pertinent imaging results/findings within the past 24 hours.  "

## 2023-10-14 NOTE — PROGRESS NOTES
[facility]  Progress Note    Patient Name: Ana Bullard  MRN: [id number]  Patient Class: IP- Inpatient   Admission Date: 10/8/2023  Length of Stay: 6 days  Attending Physician: Judie Parikh MD  Primary Care Provider: Michelle Messina FNP        Subjective:     Principal Problem:Wide-complex tachycardia        HPI:  No notes on file    Overview/Hospital Course:  No notes on file    Interval History: Pt was sitting comfortably in bed eating his breakfast and family sleeping on side bed- pt said he has some discomfort at [place] side.   Otherwise no chest pain, sob, fever, chills, n/v, bowel problem.     Review of Systems   Constitutional:  Negative for activity change, appetite change, chills and fever.   HENT:  Negative for congestion, ear pain and nosebleeds.    Eyes:  Negative for itching and visual disturbance.   Respiratory:  Negative for apnea, cough, chest tightness, shortness of breath and wheezing.    Cardiovascular:  Negative for chest pain, palpitations and leg swelling.   Gastrointestinal:  Negative for abdominal distention, abdominal pain, constipation, diarrhea, nausea and vomiting.   Genitourinary:  Negative for dysuria and flank pain.   Musculoskeletal:  Negative for back pain.   Neurological:  Negative for dizziness and headaches.     Objective:     Vital Signs (Most Recent):  Temp: 97.7 °F (36.5 °C) (10/14/23 1025)  Pulse: (!) 59 (10/14/23 1143)  Resp: 18 (10/14/23 1025)  BP: (!) 93/46 (10/14/23 1143)  SpO2: 96 % (10/14/23 1025) Vital Signs (24h Range):  Temp:  [97.5 °F (36.4 °C)-98.5 °F (36.9 °C)] 97.7 °F (36.5 °C)  Pulse:  [58-65] 59  Resp:  [16-20] 18  SpO2:  [96 %-100 %] 96 %  BP: ()/(39-65) 93/46     Weight: 119.5 kg (263 lb 7.2 oz)  Body mass index is 35.73 kg/m².    Intake/Output Summary (Last 24 hours) at 10/14/2023 1206  Last data filed at 10/14/2023 0926  Gross per 24 hour   Intake 740 ml   Output 2520 ml   Net -1780 ml         Physical  "Exam  Vitals reviewed.   Constitutional:       General: He is not in acute distress.     Appearance: Normal appearance. He is not ill-appearing, toxic-appearing or diaphoretic.   HENT:      Head: Normocephalic and atraumatic.      Mouth/Throat:      Mouth: Mucous membranes are moist.      Pharynx: Oropharynx is clear.   Eyes:      General: No scleral icterus.     Conjunctiva/sclera: Conjunctivae normal.   Neck:      Vascular: No carotid bruit.   Cardiovascular:      Rate and Rhythm: Normal rate and regular rhythm.      Pulses: Normal pulses.      Heart sounds: Normal heart sounds.   Pulmonary:      Effort: Pulmonary effort is normal.      Breath sounds: Normal breath sounds.   Abdominal:      General: Abdomen is flat. Bowel sounds are normal.      Palpations: Abdomen is soft.   Musculoskeletal:         General: Normal range of motion.   Skin:     General: Skin is warm.   Neurological:      General: No focal deficit present.      Mental Status: He is alert and oriented to person, place, and time. Mental status is at baseline.   Psychiatric:         Mood and Affect: Mood normal.         Behavior: Behavior normal.             Significant Labs: All pertinent labs within the past 24 hours have been reviewed.  BMP:   Recent Labs   Lab 10/14/23  0600   *   *   K 4.5   CL 99   CO2 25   BUN 53*   CREATININE 3.8*   CALCIUM 8.5*     CBC:   Recent Labs   Lab 10/14/23  0600   WBC 5.82   HGB 8.8*   HCT 29.7*   PLT 57*     CMP:   Recent Labs   Lab 10/14/23  0600   *   K 4.5   CL 99   CO2 25   *   BUN 53*   CREATININE 3.8*   CALCIUM 8.5*   PROT 7.5   ALBUMIN 3.6   BILITOT 1.2*   ALKPHOS 65   AST 26   ALT 29   ANIONGAP 7*     Magnesium: No results for input(s): "MG" in the last 48 hours.    Significant Imaging: I have reviewed all pertinent imaging results/findings within the past 24 hours.      Assessment/Plan:      * Wide-complex tachycardia  S/p amio drip- AICD adjusted to rate of 60 - pacing well " now  Keep K at 4 and Mag at 2      Heparin induced thrombocytopenia  If heparin products were not used this admission - plts should not drop again.   Will monitor daily CBC      Chronic combined systolic and diastolic heart failure    S/p lasix / bumex drips with help of dobutamine and currently getting RRT- should improve  C/w schedule dialysis as per nephro  Strict I/Os, daily weight    CAD (coronary artery disease)  C/w home meds  Holding asa for now d/t low plts  C/w statin      Obesity  Body mass index is 38 kg/m². Morbid obesity complicates all aspects of disease management from diagnostic modalities to treatment. Weight loss encouraged and health benefits explained to patient.         AICD (automatic cardioverter/defibrillator) present  Readjusted to rate of 60 and its pacing accordingly without problem.   Cardio following      Venous stasis dermatitis of both lower extremities  stockings    Type II diabetes mellitus with neurological manifestations  C/w SSI and accu checks      Chronic Thrombocytopenia  Changed PPI to pepcid-   Continue to monitor- avoid all heparin related products-   I am not sure if heparin related products are still being used during dialysis.       VTE Risk Mitigation (From admission, onward)         Ordered     IP VTE LOW RISK PATIENT  Once         10/08/23 1740     Place sequential compression device  Until discontinued         10/08/23 1740                Discharge Planning   BILL: 10/18/2023     Code Status: Full Code   Is the patient medically ready for discharge?:     Reason for patient still in hospital (select all that apply): Treatment  Discharge Plan A: Home with family                  Judie Parikh MD  Department of Hospital Medicine   [facility]

## 2023-10-14 NOTE — ASSESSMENT & PLAN NOTE
Changed PPI to pepcid-   Continue to monitor- avoid all heparin related products-   I am not sure if heparin related products are still being used during dialysis.

## 2023-10-14 NOTE — ASSESSMENT & PLAN NOTE
S/p lasix / bumex drips with help of dobutamine and currently getting RRT- should improve  C/w schedule dialysis as per nephro  Strict I/Os, daily weight

## 2023-10-14 NOTE — PT/OT/SLP PROGRESS
Physical Therapy      Patient Name:  Ana Bullard   MRN:  [id number]    Patient not seen today secondary to off floor for Dialysis . Will follow-up 10/15/23.

## 2023-10-14 NOTE — PROGRESS NOTES
10/14/23 1345   Vital Signs   Temp 97.6 °F (36.4 °C)   Pulse 60   Resp 18   SpO2 98 %   BP (!) 101/52   Post-Hemodialysis Assessment   Rinseback Volume (mL) 250 mL   Blood Volume Processed (Liters) 51.6 L   Dialyzer Clearance Lightly streaked   Duration of Treatment 180 minutes   Total UF (mL) 2000 mL   Net Fluid Removal 1500   Patient Response to Treatment tolerated well   Post-Treatment Weight 118.1 kg (260 lb 5.8 oz)   Treatment Weight Change -1.4   Post-Hemodialysis Comments no problems during tx

## 2023-10-14 NOTE — PROGRESS NOTES
[facility]    Critical Care Cardiology Progress Note    Subjective:  The pt is 62 y/o Male pt of  who presented to ED with h/o fatigue, back pain, decreased urination and SOB for 2 days. He endorses SOB has progressively worsened over this time and heis having dyspnea with minimal exertion, but no reported chest pain . He reports some feeling some palpitations, BLE edema as well. Pmh includes  ischemic cardiomyopathy, 5 vessel bypass in 2012 (sequential SVG to PDA and PLB, SVG split graft to OM1 and OM2, and SVG to LAD, viability stuudy in May 2022 was negative for hibernation or viability with large anterior scar, s/p multiple MIs, chronic systolic heart failure, most recent ejection fraction 20%, complete heart block, PPM dependent, HIT, DVT/PE, status post CRT D placement, diabetes mellitus, hyperlipidemia, hypertension, obstructive sleep apnea, and chronic kidney disease. Im ER pt had wide complex tachycardia, amiodarone started. Pt was also started on iv diuretic and pressors, transferred to ICU    AICD tracking -130's --dropped max track rate to 100.     10/12: The pt is sitting up in bed today eating breakfast. Denies CP and SOB has markedly improved. Swelling to LE has decreased. VSS , tele reviewed AV paced rate 60's. He was dialyzed yesterday with plans for another session today. H/H 8.6/28.0 plts 66 cr 3.8 CXR on 10/10 post IJ placement, lungs improved. Net output yesterday -2405 cc     10/13: Patient seen and examined this AM. He reports he is feeling much better. Denies shortness of breath or chest pain. LE edema has improved. He is tolerating dialysis well. Tele reviewed, SR with occasional AV pacing. Labs not resulted today. Blood pressures are soft, ranging from 90s-115 systolic.     10/14. Patient off floor this AM. On HD per nephro. SR on tele, intermittent AVpacing.     Objective:  Vital Signs (Most Recent)  Temp: 98.3 °F (36.8 °C) (10/14/23 0701)  Pulse: 60 (10/14/23  "0701)  Resp: 18 (10/14/23 1012)  BP: (!) 92/55 (10/14/23 0701)  SpO2: 98 % (10/14/23 0701)    Vital Signs Range (Last 24H):  Temp:  [97.5 °F (36.4 °C)-98.5 °F (36.9 °C)]   Pulse:  [58-65]   Resp:  [16-20]   BP: ()/(39-64)   SpO2:  [96 %-100 %]     I & O (Last 24H):    Intake/Output Summary (Last 24 hours) at 10/14/2023 1029  Last data filed at 10/14/2023 0926  Gross per 24 hour   Intake 740 ml   Output 2520 ml   Net -1780 ml         Current Diet:     Current Diet Order   Procedures    Diet diabetic [facility]; 2000 Calorie; Renal; Fluid - 1500mL     Order Specific Question:   Indicate patient location for additional diet options:     Answer:   [facility]     Order Specific Question:   Total calories:     Answer:   2000 Calorie     Order Specific Question:   Additional Diet Options:     Answer:   Renal     Order Specific Question:   Fluid restriction:     Answer:   Fluid - 1500mL        Allergies:  Review of patient's allergies indicates:   Allergen Reactions    Vasopressin Anaphylaxis and Other (See Comments)     Increased pressure in his head; felt like his eyeballs and veins were going to pop out of his head.     Vasopressin analogues Other (See Comments) and Anaphylaxis     "felt like eyes going to pop out of my head"  Other reaction(s): Other (See Comments)  "felt like eyes going to pop out of my head"  Increased pressure in his head; felt like his eyeballs and veins were going to pop out of his head.     Heparin Other (See Comments)     Other reaction(s): "depleted my blood platets"    Decreased platelet count    Heparin analogues Other (See Comments)     Depleted WBC count    Morphine Hallucinations, Other (See Comments) and Anxiety     Other reaction(s): Hallucinations  Paranoid, hallucinations         Meds:  Scheduled Meds:   atorvastatin  20 mg Oral QHS    gabapentin  300 mg Oral BID    levothyroxine  25 mcg Oral Before breakfast    pantoprazole  40 mg Oral Daily    polyethylene glycol  17 g Oral Daily "     Continuous Infusions:      PRN Meds:acetaminophen, albumin human 25%, albuterol, ALPRAZolam, dextrose 50%, dextrose 50%, glucagon (human recombinant), glucose, glucose, insulin aspart U-100, melatonin, ondansetron, sodium chloride 0.9%, traMADoL    Oxygen/Ventilator Data (Last 24H):  (if applicable)            Hemodynamic Parameters (Last 24H):   (if applicable)        Lines/Drains:  (if applicable)  Trialysis (Dialysis) Catheter 10/10/23 1700 right internal jugular (Active)   $ Dialysis Supplies Trialysis Catheter (Supply) 10/11/23 0701   Line Necessity Review CRRT/HD 10/11/23 0701   Verification by X-ray Yes 10/11/23 0701   Site Assessment No drainage;No redness;No swelling;No warmth 10/11/23 0701   Line Securement Device Secured with sutures 10/11/23 0701   Dressing Type CHG impregnated dressing/sponge;Central line dressing 10/11/23 0701   Dressing Status Clean;Dry;Intact 10/11/23 0701   Dressing Intervention First dressing 10/11/23 0701   Date on Dressing 10/10/23 10/11/23 0701   Dressing Due to be Changed 10/17/23 10/11/23 0701   Number of days: 0            Peripheral IV - Single Lumen 10/08/23 1500 20 G Left Antecubital (Active)   Site Assessment Clean;Dry;Intact;No redness;No swelling 10/11/23 0701   Extremity Assessment Distal to IV No redness;No swelling 10/11/23 0701   Line Status Saline locked 10/11/23 0701   Dressing Status Clean;Dry;Intact 10/11/23 0701   Dressing Intervention Integrity maintained 10/11/23 0701   Dressing Change Due 10/12/23 10/08/23 1930   Site Change Due 10/12/23 10/08/23 1930   Number of days: 2            Peripheral IV - Single Lumen 10/08/23 1621 18 G Right Antecubital (Active)   Site Assessment Clean;Dry;Intact;No redness;No swelling 10/11/23 0701   Extremity Assessment Distal to IV No swelling;No redness;No warmth 10/11/23 0701   Line Status Infusing 10/11/23 0701   Dressing Status Clean;Dry;Intact 10/11/23 0701   Dressing Intervention Integrity maintained 10/11/23 0701  "  Dressing Change Due 10/12/23 10/08/23 1950   Site Change Due 10/12/23 10/08/23 1950   Number of days: 2       Arterial Line 10/09/23 0139 Left Radial (Active)   $ Arterial Line Charges (Upon Insertion) Bedside Insertion Performed 10/09/23 0301   Site Assessment Clean;Dry;Intact;No redness;No swelling 10/11/23 0701   Line Status Pulsatile blood flow 10/11/23 0701   Art Line Waveform Appropriate 10/11/23 0701   Arterial Line Interventions Zeroed and calibrated;Flushed per protocol 10/11/23 0701   Color/Movement/Sensation Capillary refill less than 3 sec 10/11/23 0701   Dressing Type CHG impregnated dressing/sponge 10/11/23 0701   Dressing Status Clean;Dry;Intact 10/11/23 0701   Dressing Intervention Integrity maintained 10/11/23 0701   Dressing Change Due 10/16/23 10/11/23 0600   Number of days: 2            Urethral Catheter 10/08/23 1600 16 Fr. (Active)   Site Assessment Clean;Intact 10/11/23 0701   Collection Container Urimeter 10/11/23 0701   Securement Method secured to top of thigh w/ adhesive device 10/11/23 0701   Catheter Care Performed yes 10/11/23 0701   Reason for Continuing Urinary Catheterization Urinary retention;Critically ill in ICU and requiring hourly monitoring of intake/output 10/11/23 0701   CAUTI Prevention Bundle Intact seal between catheter & drainage tubing;Securement Device in place with 1" slack;Drainage bag/urimeter off the floor;Sheeting clip in use 10/11/23 0400   Output (mL) 50 mL 10/11/23 0713   Number of days: 2       Lab Results :  Recent Results (from the past 24 hour(s))   POCT glucose    Collection Time: 10/13/23 11:34 AM   Result Value Ref Range    POC Glucose 245 (H) 70 - 110   POCT glucose    Collection Time: 10/13/23  4:16 PM   Result Value Ref Range    POC Glucose 164 (H) 70 - 110   POCT glucose    Collection Time: 10/13/23  8:10 PM   Result Value Ref Range    POC Glucose 144 (H) 70 - 110   CBC auto differential    Collection Time: 10/14/23  6:00 AM   Result Value Ref Range "    WBC 5.82 3.90 - 12.70 K/uL    RBC 3.15 (L) 4.60 - 6.20 M/uL    Hemoglobin 8.8 (L) 14.0 - 18.0 g/dL    Hematocrit 29.7 (L) 40.0 - 54.0 %    MCV 94 82 - 98 fL    MCH 27.9 27.0 - 31.0 pg    MCHC 29.6 (L) 32.0 - 36.0 g/dL    RDW 19.9 (H) 11.5 - 14.5 %    Platelets 57 (L) 150 - 450 K/uL    MPV 10.7 9.2 - 12.9 fL    Immature Granulocytes 0.2 0.0 - 0.5 %    Gran # (ANC) 4.6 1.8 - 7.7 K/uL    Immature Grans (Abs) 0.01 0.00 - 0.04 K/uL    Lymph # 0.3 (L) 1.0 - 4.8 K/uL    Mono # 0.8 0.3 - 1.0 K/uL    Eos # 0.1 0.0 - 0.5 K/uL    Baso # 0.02 0.00 - 0.20 K/uL    nRBC 0 0 /100 WBC    Gran % 79.3 (H) 38.0 - 73.0 %    Lymph % 5.3 (L) 18.0 - 48.0 %    Mono % 13.7 4.0 - 15.0 %    Eosinophil % 1.2 0.0 - 8.0 %    Basophil % 0.3 0.0 - 1.9 %    Differential Method Automated    Comprehensive metabolic panel    Collection Time: 10/14/23  6:00 AM   Result Value Ref Range    Sodium 131 (L) 136 - 145 mmol/L    Potassium 4.5 3.5 - 5.1 mmol/L    Chloride 99 95 - 110 mmol/L    CO2 25 23 - 29 mmol/L    Glucose 158 (H) 70 - 110 mg/dL    BUN 53 (H) 8 - 23 mg/dL    Creatinine 3.8 (H) 0.5 - 1.4 mg/dL    Calcium 8.5 (L) 8.7 - 10.5 mg/dL    Total Protein 7.5 6.0 - 8.4 g/dL    Albumin 3.6 3.5 - 5.2 g/dL    Total Bilirubin 1.2 (H) 0.1 - 1.0 mg/dL    Alkaline Phosphatase 65 55 - 135 U/L    AST 26 10 - 40 U/L    ALT 29 10 - 44 U/L    eGFR 17.3 (A) >60 mL/min/1.73 m^2    Anion Gap 7 (L) 8 - 16 mmol/L   POCT glucose    Collection Time: 10/14/23  7:09 AM   Result Value Ref Range    POC Glucose 173 (H) 70 - 110       Diagnostic Results:  Imaging Results              X-Ray Chest AP Portable (Final result)  Result time 10/08/23 15:13:32      Final result by Tong Morales MD (10/08/23 15:13:32)                   Narrative:    CLINICAL HISTORY:  63 years (1960) Male Tachycardia; Shortness of Breath    TECHNIQUE:  Portable AP radiograph the chest. One view.    COMPARISON:  Radiograph from June 12, 2023    FINDINGS:  There is vascular congestion with  increased interstitial markings findings indicating mild pulmonary edema.  There is blunting of both costophrenic angles consistent with trace pleural effusions and adjacent atelectasis. No pneumothorax is identified. The cardiac silhouette is moderately enlarged. The median sternotomy wires and the left sided pacemaker are unchanged.  Osseous structures appear unchanged. The visualized upper abdomen is unremarkable.    IMPRESSION:  Moderate to severe cardiomegaly and findings of mild interstitial pulmonary edema.                  .            Electronically signed by:  Tong Morales MD  10/08/2023 03:13 PM CDT Workstation: [id number]                                    12-lead EKG interpretation:   (if applicable)    Recent Cardiac Rhythm:  (if applicable)      Physical Exam:  Objective:  General Appearance:  Comfortable.    Vital signs: (most recent): Blood pressure (!) 92/55, pulse 60, temperature 98.3 °F (36.8 °C), temperature source Oral, resp. rate 18, height 6' (1.829 m), weight 119.5 kg (263 lb 7.2 oz), SpO2 98 %.  Vital signs are normal.  No fever.    Lungs:  Normal effort and normal respiratory rate.  Breath sounds clear to auscultation.  He is not in respiratory distress.    Heart: Normal rate.  Regular rhythm.  S1 normal and S2 normal.  Positive for murmur.  No gallop.   Abdomen: Bowel sounds are normal.     Extremities: There is venous stasis.  There is no local swelling.    Neurological: Patient is oriented to person, place and time.        Current Consults:  IP CONSULT TO CARDIOLOGY  IP CONSULT TO HOSPITAL MEDICINE  IP CONSULT TO INTENSIVIST  IP CONSULT TO ANESTHESIOLOGY  WOUND CARE CONSULT  IP CONSULT TO NEPHROLOGY  WOUND CARE CONSULT  IP CONSULT TO REGISTERED DIETITIAN/NUTRITIONIST  IP CONSULT TO REGISTERED DIETITIAN/NUTRITIONIST  IP CONSULT TO UROLOGY  IP CONSULT TO UROLOGY  IP CONSULT TO GENERAL SURGERY    Assessment/Plan:  Assessment:   Acute on chronic combined systolic /diastolic HFrEF    ICM EF 20%  AD/CKD-cardiorenal  CAD-CABG--in office cr 3.2 9/1-currently on dialysis  Atrial arrhythmias  DM   DM foot ulcer   HTN  HLP  FARHAN  Chronic thrombocytopenia   Abbott AICD in situ       Plan:   Continue current management, no new cardiac recs.   Nephrology following. On dialysis.

## 2023-10-14 NOTE — NURSING
Consent and hep B status verified, removed 2000 uf net, no issues with catheter, dressing cdi. Patient stable throughout treatment. Educated patient on Hd treatment. Report given to GEOVANY Piedra      10/13/23 1935   Handoff Report   Received From Maryellen   Given To Dorothea   Vital Signs   Temp 97.9 °F (36.6 °C)   Temp Source Oral   Pulse (!) 59   Heart Rate Source Monitor   Resp 16   SpO2 96 %   Pulse Oximetry Type Intermittent   Device (Oxygen Therapy) room air   BP (!) 119/39   BP Location Right arm   BP Method Automatic   Patient Position Lying   Assessments (Pre/Post)   Consent Obtained yes   Safety vein preservation armband present   Date Hepatitis Profile Obtained 10/10/23   Blood Liters Processed (BLP) 52   Transport Modality bed   Level of Consciousness (AVPU) alert   Dialyzer Clearance mildly streaked   Pain   Preferred Pain Scale number (Numeric Rating Pain Scale)   Comfort/Acceptable Pain Level 0   Pain Rating (0-10): Rest 0   Pain Rating (0-10): Activity 0   Pain Management Interventions quiet environment facilitated;position adjusted;pillow support provided   Pain/Comfort Interventions   Fever Reduction/Comfort Measures lightweight clothing;lightweight bedding   Pre-Hemodialysis Assessment   Additional Dialysis Information Needed Yes   Patient Status Departed   Treatment Consent Verified Yes   Treatment Status Completed   Trialysis (Dialysis) Catheter 10/10/23 1700 right internal jugular   Placement Date/Time: 10/10/23 1700   Present Prior to Hospital Arrival?: No  Hand Hygiene: Performed  Skin Antisepsis: ChloraPrep  Location: right internal jugular  Guidewire Removed?: Yes  Guidewire Intact?: Yes  Patient Tolerance: Insertion: tolerat...   Line Necessity Review CRRT/HD   Site Assessment No drainage;No redness;No swelling;No warmth   Line Securement Device Secured with sutures   Dressing Type CHG impregnated dressing/sponge   Dressing Status Clean;Dry;Intact   Dressing Intervention Integrity maintained    Date on Dressing 10/11/23   Dressing Due to be Changed 10/16/23   Venous Patency/Care deaccessed;flushed w/o difficulty;heparin locked   Arterial Patency/Care deaccessed;heparin locked;flushed w/o difficulty   Post-Hemodialysis Assessment   Rinseback Volume (mL) 250 mL   Blood Volume Processed (Liters) 52 L   Dialyzer Clearance Lightly streaked   Duration of Treatment 180 minutes   Additional Fluid Intake (mL) 500 mL   Total UF (mL) 2500 mL   Net Fluid Removal 2000   Patient Response to Treatment josé miguel   Post-Treatment Weight 117 kg (257 lb 15 oz)   Treatment Weight Change -2.1   Post-Hemodialysis Comments stable   Edema   Edema generalized

## 2023-10-14 NOTE — PLAN OF CARE
10/13/23 3499   Patient Assessment/Suction   Level of Consciousness (AVPU) alert   Respiratory Effort Normal;Unlabored   All Lung Fields Breath Sounds diminished;clear   PRE-TX-O2   Device (Oxygen Therapy) room air   SpO2 96 %   Pulse Oximetry Type Intermittent   $ Pulse Oximetry - Multiple Charge Pulse Oximetry - Multiple   Pulse 62   Resp 18   Aerosol Therapy   $ Aerosol Therapy Charges PRN treatment not required   Respiratory Treatment Status (SVN) PRN treatment not required   Preset CPAP/BiPAP Settings   Mode Of Delivery CPAP;Standby  (home unit)   $ Is patient using? Yes   Equipment Type Other (see comment)  (home unit)   Education   $ Education Bronchodilator;15 min   Respiratory Evaluation   $ Care Plan Tech Time 15 min

## 2023-10-14 NOTE — PROGRESS NOTES
INPATIENT NEPHROLOGY Progress Note   [facility]    Patient Name: Ana Bullard  Date: 10/14/2023    Reason for consultation: AD/CKD    Chief Complaint:   Chief Complaint   Patient presents with    Tachycardia    Shortness of Breath       History of Present Illness:  62 y/o Male pt of  who presented to ED with h/o fatigue, back pain, decreased urination and SOB for 2 days. He endorses SOB has progressively worsened over this time and he is having dyspnea with minimal exertion, but no reported chest pain . He reports some feeling some palpitations, BLE edema as well. PMH includes  ischemic cardiomyopathy, 5 vessel bypass in 2012, s/p multiple MIs, chronic systolic heart failure, most recent ejection fraction 20%, complete heart block, PPM dependent, HIT, DVT/PE, diabetes mellitus, hyperlipidemia, hypertension, obstructive sleep apnea, and chronic kidney disease IV. In the ER he had wide complex tachycardia, amiodarone started. Pt was also started on leyla and lasix gtts, transferred to ICU. Consulted for renal management.    Interval History:  10/9- on BIPAP, overloaded  0/10- AF with RVR, SBP , on 3L NC, UOP 80cc, ashford placed- renal function worse- remains volume overloaded- I have discussed the risks and benefits of hemodialysis with the patient/family.  All of their questions were answered.  Risks include low blood pressure, stroke, heart attack, seizure, infection, nausea, delirium, and death.     10/11 tolerated first HD- got off 2L- 2nd treatment today- AICD adjusted yest    10/12 got off 3L yest- plan for 3rd treatment today- UOP 225cc- net -2.8L    10/13- got off 4L yest- plan for 4rth treatment today- UOP 60cc- net -6L  10/14  2L off w/HD yesterday.  Pressures low, but stable, can have albumin w/HD if needed.  No complaints.  No UOP recorded.    Plan of Care:    Assessment:  Oliguric AD/CKD IV due to CRS- initiated RRT on 10/10 for clearance and UF  Wide complex  tachycardia vs Sinus tachycardia- AICD to be interrogated  CHF exacerbation with hypoxia requiring NIPPV (EF 25%, grade III DD, pulm HTN)  Hyponatremia  SHPT  Anemia of CKD    Plan:    - continue RRT today for clearance and UF- doing daily treatments right now  - anticipate holding Sunday to assess for renal recovery  - rate control improved- AICD adjusted 10/10  - use albumin PRN for hypotension- push UF with each treatment  - cardiac diet, 1.5L fluid restriction  - hold farxiga- will eventually resume  - no nsaids or IV contrast  - continue ashford  - no acute BMM issues  - H/H noted- may start to improve with more UF- hold off GANGA    Remains oliguric---plan for HD Sat and then hold Sun.  Anticipate may need outpt HD but will have better sense Monday. Will need hemosplit as well.    Thank you for allowing us to participate in this patient's care. We will continue to follow.    Vital Signs:  Temp Readings from Last 3 Encounters:   10/14/23 98.3 °F (36.8 °C) (Oral)   06/18/23 97.8 °F (36.6 °C)   05/31/23 97.5 °F (36.4 °C)       Pulse Readings from Last 3 Encounters:   10/14/23 60   08/17/23 61   07/17/23 60       BP Readings from Last 3 Encounters:   10/14/23 (!) 92/55   07/17/23 116/71   06/18/23 138/73       Weight:  Wt Readings from Last 3 Encounters:   10/14/23 119.5 kg (263 lb 7.2 oz)   09/28/23 121.8 kg (268 lb 8 oz)   09/07/23 120.6 kg (265 lb 15.1 oz)       INS/OUTS:  I/O last 3 completed shifts:  In: 960 [P.O.:460; Other:500]  Out: 2530 [Urine:30; Other:2500]  I/O this shift:  In: 240 [P.O.:240]  Out: -     Medications:  Scheduled Meds:   atorvastatin  20 mg Oral QHS    gabapentin  300 mg Oral BID    levothyroxine  25 mcg Oral Before breakfast    pantoprazole  40 mg Oral Daily     Continuous Infusions:      PRN Meds:.acetaminophen, albumin human 25%, albuterol, ALPRAZolam, dextrose 50%, dextrose 50%, glucagon (human recombinant), glucose, glucose, insulin aspart U-100, melatonin, ondansetron, sodium chloride  0.9%, traMADoL  No current facility-administered medications on file prior to encounter.     Current Outpatient Medications on File Prior to Encounter   Medication Sig Dispense Refill    albuterol (PROVENTIL) 5 mg/mL nebulizer solution Take 2.5 mg by nebulization every 6 (six) hours as needed.      aspirin 81 MG Chew Take 81 mg by mouth once daily.      atorvastatin (LIPITOR) 20 MG tablet Take 20 mg by mouth once daily.      BASAGLAR KWIKPEN U-100 INSULIN glargine 100 units/mL (3mL) SubQ pen Inject 25 Units into the skin once daily.  3    carvediloL (COREG) 3.125 MG tablet Take 3.125 mg by mouth 2 (two) times daily.      FARXIGA 10 mg tablet Take 10 mg by mouth once daily.      gabapentin (NEURONTIN) 600 MG tablet Take 600 mg by mouth 3 (three) times daily.      pantoprazole (PROTONIX) 40 MG tablet Take 1 tablet by mouth once daily.  0    torsemide (DEMADEX) 20 MG Tab Take 1 tablet (20 mg total) by mouth 2 (two) times a day. 120 tablet 0    traMADoL (ULTRAM) 50 mg tablet Take 1 tablet (50 mg total) by mouth every 6 (six) hours as needed for Pain. 120 tablet 2    vitamin B complex-folic acid 0.4 mg Tab Take 1 tablet by mouth once daily.      albuterol-ipratropium (DUO-NEB) 2.5 mg-0.5 mg/3 mL nebulizer solution Take 3 mLs by nebulization every 6 (six) hours as needed.      amiodarone (PACERONE) 200 MG Tab Take 1 tablet (200 mg total) by mouth 2 (two) times daily. 90 tablet 0    CORLANOR 5 mg Tab Take 5 mg by mouth nightly.      TRULICITY 0.75 mg/0.5 mL pen injector Inject 0.75 mg into the skin every 7 days.         Review of Systems:  Neg    Physical Exam:  BP (!) 92/55 (BP Location: Right arm, Patient Position: Sitting)   Pulse 60   Temp 98.3 °F (36.8 °C) (Oral)   Resp 18   Ht 6' (1.829 m)   Wt 119.5 kg (263 lb 7.2 oz)   SpO2 98%   BMI 35.73 kg/m²     General Appearance:    NAD, AAO x 3, cooperative, appears stated age   Head:    Normocephalic, atraumatic   Eyes:    PER, EOMI, and conjunctiva/sclera clear  bilaterally        Mouth:   Moist mucus membranes, no thrush or oral lesions, normal      dentition   Back:     No CVA tenderness   Lungs:     Rales   Heart:    Tachy    Abdomen:     Soft, non-tender, distended,   Extremities:   Edematous   MSK:   No joint or muscle swelling, tenderness or deformity   Skin:   Skin color, texture, turgor normal, no rashes or lesions   Neurologic/Psychiatric:   CNII-XII intact, normal strength and sensation       throughout, no asterixis; normal affect, memory, judgement     and insight     Results:  Lab Results   Component Value Date     (L) 10/14/2023    K 4.5 10/14/2023    CL 99 10/14/2023    CO2 25 10/14/2023    BUN 53 (H) 10/14/2023    CREATININE 3.8 (H) 10/14/2023    CALCIUM 8.5 (L) 10/14/2023    ANIONGAP 7 (L) 10/14/2023    ESTGFRAFRICA 38.9 (A) 06/12/2020    EGFRNONAA 33.7 (A) 06/12/2020       Lab Results   Component Value Date    CALCIUM 8.5 (L) 10/14/2023       Recent Labs   Lab 10/14/23  0600   WBC 5.82   RBC 3.15*   HGB 8.8*   HCT 29.7*   PLT 57*   MCV 94   MCH 27.9   MCHC 29.6*         I have personally reviewed pertinent radiological imaging and reports.    I have spent > 35 minutes providing care for this patient for the above diagnoses. These services have included chart/data/imaging review, evaluation, exam, formulation of plan, , note preparation, and discussions with staff involved in this patient's care.    Bartolo Valenzuela MD MPH  [facility] [place]  [address] [place] [id number]  [phone] (p)  [phone] (f)

## 2023-10-15 PROBLEM — N18.5 CKD (CHRONIC KIDNEY DISEASE), STAGE V: Status: ACTIVE | Noted: 2023-10-15

## 2023-10-15 LAB
ALBUMIN SERPL BCP-MCNC: 3.4 G/DL (ref 3.5–5.2)
ALBUMIN SERPL BCP-MCNC: 3.4 G/DL (ref 3.5–5.2)
ALP SERPL-CCNC: 68 U/L (ref 55–135)
ALP SERPL-CCNC: 68 U/L (ref 55–135)
ALT SERPL W/O P-5'-P-CCNC: 26 U/L (ref 10–44)
ALT SERPL W/O P-5'-P-CCNC: 26 U/L (ref 10–44)
ANION GAP SERPL CALC-SCNC: 11 MMOL/L (ref 8–16)
ANION GAP SERPL CALC-SCNC: 11 MMOL/L (ref 8–16)
AST SERPL-CCNC: 24 U/L (ref 10–40)
AST SERPL-CCNC: 24 U/L (ref 10–40)
BACTERIA #/AREA URNS HPF: NEGATIVE /HPF
BASOPHILS # BLD AUTO: 0.02 K/UL (ref 0–0.2)
BASOPHILS # BLD AUTO: 0.02 K/UL (ref 0–0.2)
BASOPHILS NFR BLD: 0.4 % (ref 0–1.9)
BASOPHILS NFR BLD: 0.4 % (ref 0–1.9)
BILIRUB SERPL-MCNC: 1.1 MG/DL (ref 0.1–1)
BILIRUB SERPL-MCNC: 1.1 MG/DL (ref 0.1–1)
BILIRUB UR QL STRIP: NEGATIVE
BUN SERPL-MCNC: 47 MG/DL (ref 8–23)
BUN SERPL-MCNC: 47 MG/DL (ref 8–23)
CALCIUM SERPL-MCNC: 8.7 MG/DL (ref 8.7–10.5)
CALCIUM SERPL-MCNC: 8.7 MG/DL (ref 8.7–10.5)
CHLORIDE SERPL-SCNC: 100 MMOL/L (ref 95–110)
CHLORIDE SERPL-SCNC: 100 MMOL/L (ref 95–110)
CLARITY UR: ABNORMAL
CO2 SERPL-SCNC: 21 MMOL/L (ref 23–29)
CO2 SERPL-SCNC: 21 MMOL/L (ref 23–29)
COLOR UR: YELLOW
CREAT SERPL-MCNC: 3.6 MG/DL (ref 0.5–1.4)
CREAT SERPL-MCNC: 3.6 MG/DL (ref 0.5–1.4)
DIFFERENTIAL METHOD: ABNORMAL
DIFFERENTIAL METHOD: ABNORMAL
EOSINOPHIL # BLD AUTO: 0.1 K/UL (ref 0–0.5)
EOSINOPHIL # BLD AUTO: 0.1 K/UL (ref 0–0.5)
EOSINOPHIL NFR BLD: 1.3 % (ref 0–8)
EOSINOPHIL NFR BLD: 1.3 % (ref 0–8)
ERYTHROCYTE [DISTWIDTH] IN BLOOD BY AUTOMATED COUNT: 19.5 % (ref 11.5–14.5)
ERYTHROCYTE [DISTWIDTH] IN BLOOD BY AUTOMATED COUNT: 19.5 % (ref 11.5–14.5)
EST. GFR  (NO RACE VARIABLE): 18.4 ML/MIN/1.73 M^2
EST. GFR  (NO RACE VARIABLE): 18.4 ML/MIN/1.73 M^2
GLUCOSE SERPL-MCNC: 136 MG/DL (ref 70–110)
GLUCOSE SERPL-MCNC: 136 MG/DL (ref 70–110)
GLUCOSE SERPL-MCNC: 137 MG/DL (ref 70–110)
GLUCOSE SERPL-MCNC: 167 MG/DL (ref 70–110)
GLUCOSE SERPL-MCNC: 186 MG/DL (ref 70–110)
GLUCOSE SERPL-MCNC: 215 MG/DL (ref 70–110)
GLUCOSE UR QL STRIP: ABNORMAL
HCT VFR BLD AUTO: 27.8 % (ref 40–54)
HCT VFR BLD AUTO: 27.8 % (ref 40–54)
HGB BLD-MCNC: 8.4 G/DL (ref 14–18)
HGB BLD-MCNC: 8.4 G/DL (ref 14–18)
HGB UR QL STRIP: ABNORMAL
HYALINE CASTS #/AREA URNS LPF: 36 /LPF
IMM GRANULOCYTES # BLD AUTO: 0.01 K/UL (ref 0–0.04)
IMM GRANULOCYTES # BLD AUTO: 0.01 K/UL (ref 0–0.04)
IMM GRANULOCYTES NFR BLD AUTO: 0.2 % (ref 0–0.5)
IMM GRANULOCYTES NFR BLD AUTO: 0.2 % (ref 0–0.5)
KETONES UR QL STRIP: NEGATIVE
LEUKOCYTE ESTERASE UR QL STRIP: ABNORMAL
LYMPHOCYTES # BLD AUTO: 0.3 K/UL (ref 1–4.8)
LYMPHOCYTES # BLD AUTO: 0.3 K/UL (ref 1–4.8)
LYMPHOCYTES NFR BLD: 5.9 % (ref 18–48)
LYMPHOCYTES NFR BLD: 5.9 % (ref 18–48)
MCH RBC QN AUTO: 28.6 PG (ref 27–31)
MCH RBC QN AUTO: 28.6 PG (ref 27–31)
MCHC RBC AUTO-ENTMCNC: 30.2 G/DL (ref 32–36)
MCHC RBC AUTO-ENTMCNC: 30.2 G/DL (ref 32–36)
MCV RBC AUTO: 95 FL (ref 82–98)
MCV RBC AUTO: 95 FL (ref 82–98)
MICROSCOPIC COMMENT: ABNORMAL
MONOCYTES # BLD AUTO: 0.9 K/UL (ref 0.3–1)
MONOCYTES # BLD AUTO: 0.9 K/UL (ref 0.3–1)
MONOCYTES NFR BLD: 16.6 % (ref 4–15)
MONOCYTES NFR BLD: 16.6 % (ref 4–15)
NEUTROPHILS # BLD AUTO: 4 K/UL (ref 1.8–7.7)
NEUTROPHILS # BLD AUTO: 4 K/UL (ref 1.8–7.7)
NEUTROPHILS NFR BLD: 75.6 % (ref 38–73)
NEUTROPHILS NFR BLD: 75.6 % (ref 38–73)
NITRITE UR QL STRIP: NEGATIVE
NRBC BLD-RTO: 0 /100 WBC
NRBC BLD-RTO: 0 /100 WBC
PH UR STRIP: 6 [PH] (ref 5–8)
PLATELET # BLD AUTO: 47 K/UL (ref 150–450)
PLATELET # BLD AUTO: 47 K/UL (ref 150–450)
PMV BLD AUTO: ABNORMAL FL (ref 9.2–12.9)
PMV BLD AUTO: ABNORMAL FL (ref 9.2–12.9)
POTASSIUM SERPL-SCNC: 4.4 MMOL/L (ref 3.5–5.1)
POTASSIUM SERPL-SCNC: 4.4 MMOL/L (ref 3.5–5.1)
PROT SERPL-MCNC: 7.1 G/DL (ref 6–8.4)
PROT SERPL-MCNC: 7.1 G/DL (ref 6–8.4)
PROT UR QL STRIP: ABNORMAL
RBC # BLD AUTO: 2.94 M/UL (ref 4.6–6.2)
RBC # BLD AUTO: 2.94 M/UL (ref 4.6–6.2)
RBC #/AREA URNS HPF: 29 /HPF (ref 0–4)
SODIUM SERPL-SCNC: 132 MMOL/L (ref 136–145)
SODIUM SERPL-SCNC: 132 MMOL/L (ref 136–145)
SP GR UR STRIP: 1.01 (ref 1–1.03)
SQUAMOUS #/AREA URNS HPF: 5 /HPF
URN SPEC COLLECT METH UR: ABNORMAL
UROBILINOGEN UR STRIP-ACNC: NEGATIVE EU/DL
WBC # BLD AUTO: 5.24 K/UL (ref 3.9–12.7)
WBC # BLD AUTO: 5.24 K/UL (ref 3.9–12.7)
WBC #/AREA URNS HPF: 14 /HPF (ref 0–5)

## 2023-10-15 PROCEDURE — 94760 N-INVAS EAR/PLS OXIMETRY 1: CPT

## 2023-10-15 PROCEDURE — 94761 N-INVAS EAR/PLS OXIMETRY MLT: CPT

## 2023-10-15 PROCEDURE — 25000003 PHARM REV CODE 250: Performed by: NURSE PRACTITIONER

## 2023-10-15 PROCEDURE — 99900031 HC PATIENT EDUCATION (STAT)

## 2023-10-15 PROCEDURE — 87389 HIV-1 AG W/HIV-1&-2 AB AG IA: CPT | Performed by: STUDENT IN AN ORGANIZED HEALTH CARE EDUCATION/TRAINING PROGRAM

## 2023-10-15 PROCEDURE — 80053 COMPREHEN METABOLIC PANEL: CPT | Performed by: NURSE PRACTITIONER

## 2023-10-15 PROCEDURE — 25000003 PHARM REV CODE 250: Performed by: INTERNAL MEDICINE

## 2023-10-15 PROCEDURE — 81001 URINALYSIS AUTO W/SCOPE: CPT | Performed by: INTERNAL MEDICINE

## 2023-10-15 PROCEDURE — 99900035 HC TECH TIME PER 15 MIN (STAT)

## 2023-10-15 PROCEDURE — 21400001 HC TELEMETRY ROOM

## 2023-10-15 PROCEDURE — 87086 URINE CULTURE/COLONY COUNT: CPT | Performed by: INTERNAL MEDICINE

## 2023-10-15 PROCEDURE — 25000003 PHARM REV CODE 250: Performed by: STUDENT IN AN ORGANIZED HEALTH CARE EDUCATION/TRAINING PROGRAM

## 2023-10-15 PROCEDURE — 97116 GAIT TRAINING THERAPY: CPT | Mod: CQ

## 2023-10-15 PROCEDURE — 85025 COMPLETE CBC W/AUTO DIFF WBC: CPT | Performed by: NURSE PRACTITIONER

## 2023-10-15 PROCEDURE — 63600175 PHARM REV CODE 636 W HCPCS: Performed by: INTERNAL MEDICINE

## 2023-10-15 RX ORDER — DAPAGLIFLOZIN 10 MG/1
10 TABLET, FILM COATED ORAL DAILY
Status: DISCONTINUED | OUTPATIENT
Start: 2023-10-16 | End: 2023-10-19 | Stop reason: HOSPADM

## 2023-10-15 RX ORDER — MIDODRINE HYDROCHLORIDE 2.5 MG/1
5 TABLET ORAL
Status: DISCONTINUED | OUTPATIENT
Start: 2023-10-15 | End: 2023-10-16

## 2023-10-15 RX ORDER — FUROSEMIDE 10 MG/ML
80 INJECTION INTRAMUSCULAR; INTRAVENOUS ONCE
Status: COMPLETED | OUTPATIENT
Start: 2023-10-15 | End: 2023-10-15

## 2023-10-15 RX ORDER — SUCRALFATE 1 G/1
1 TABLET ORAL
Status: DISCONTINUED | OUTPATIENT
Start: 2023-10-15 | End: 2023-10-19 | Stop reason: HOSPADM

## 2023-10-15 RX ORDER — TORSEMIDE 20 MG/1
20 TABLET ORAL 2 TIMES DAILY
Status: DISCONTINUED | OUTPATIENT
Start: 2023-10-16 | End: 2023-10-19 | Stop reason: HOSPADM

## 2023-10-15 RX ADMIN — GABAPENTIN 300 MG: 300 CAPSULE ORAL at 08:10

## 2023-10-15 RX ADMIN — SUCRALFATE 1 G: 1 TABLET ORAL at 08:10

## 2023-10-15 RX ADMIN — TRAMADOL HYDROCHLORIDE 50 MG: 50 TABLET, COATED ORAL at 08:10

## 2023-10-15 RX ADMIN — SUCRALFATE 1 G: 1 TABLET ORAL at 04:10

## 2023-10-15 RX ADMIN — TRAMADOL HYDROCHLORIDE 50 MG: 50 TABLET, COATED ORAL at 09:10

## 2023-10-15 RX ADMIN — SUCRALFATE 1 G: 1 TABLET ORAL at 11:10

## 2023-10-15 RX ADMIN — LEVOTHYROXINE SODIUM 25 MCG: 0.03 TABLET ORAL at 05:10

## 2023-10-15 RX ADMIN — INSULIN ASPART 1 UNITS: 100 INJECTION, SOLUTION INTRAVENOUS; SUBCUTANEOUS at 08:10

## 2023-10-15 RX ADMIN — ATORVASTATIN CALCIUM 20 MG: 20 TABLET, FILM COATED ORAL at 08:10

## 2023-10-15 RX ADMIN — MIDODRINE HYDROCHLORIDE 5 MG: 2.5 TABLET ORAL at 04:10

## 2023-10-15 RX ADMIN — POLYETHYLENE GLYCOL 3350 17 G: 17 POWDER, FOR SOLUTION ORAL at 08:10

## 2023-10-15 RX ADMIN — FUROSEMIDE 80 MG: 10 INJECTION, SOLUTION INTRAVENOUS at 11:10

## 2023-10-15 NOTE — ASSESSMENT & PLAN NOTE
Pt/s Nephro status is not improving- so pt will be HD dependant for now- will take help from CM to get HD placement for patient once hemosplit is done by vascular surgery.     Consult vascular

## 2023-10-15 NOTE — PT/OT/SLP PROGRESS
Physical Therapy Treatment    Patient Name:  Ana Bullard   MRN:  [id number]    Recommendations:     Discharge Recommendations: home health PT  Discharge Equipment Recommendations: to be determined by next level of care  Barriers to discharge:  Medical status    Assessment:     Ana Bullard is a 63 y.o. male admitted with a medical diagnosis of Wide-complex tachycardia.  He presents with the following impairments/functional limitations: weakness, impaired endurance, gait instability, impaired cardiopulmonary response to activity .     Rehab Prognosis: Fair; patient would benefit from acute skilled PT services to address these deficits and reach maximum level of function.    Recent Surgery: * No surgery found *      Plan:     During this hospitalization, patient to be seen 6 x/week to address the identified rehab impairments via gait training, therapeutic activities, therapeutic exercises, neuromuscular re-education and progress toward the following goals:    Plan of Care Expires:  11/13/23    Subjective     Chief Complaint: General weakness with ambulation  Patient/Family Comments/goals: Pt agreeable to PT.   Pain/Comfort:  Pain Rating 1: 0/10      Objective:     Communicated with RN prior to session.  Patient found left sidelying with bed alarm, peripheral IV upon PT entry to room.     General Precautions: Standard, fall  Orthopedic Precautions: N/A  Braces: N/A  Respiratory Status: Room air     Functional Mobility:  Gait: 210 and 120 ft no AD CGA       AM-PAC 6 CLICK MOBILITY          Treatment & Education:  Pt was educated on the following: call light use, importance of OOB activity and functional mobility to negate the negative effects of prolonged bed rest during this hospitalization, safe transfers/ambulation and discharge planning recommendations/options.     Patient left sitting edge of bed with all lines intact, call button in reach, and RN notified..    GOALS:   Multidisciplinary Problems        Physical Therapy Goals          Problem: Physical Therapy    Goal Priority Disciplines Outcome Goal Variances Interventions   Physical Therapy Goal     PT, PT/OT Ongoing, Progressing     Description: Goals to be met by: 23     Patient will increase functional independence with mobility by performin. Supine to sit with Supervision  2. Sit to stand transfer with Supervision  3. Bed to chair transfer with Supervision using least restrictive assistive device  4. Gait  x 150 feet with Supervision using least restrictive assistive device                             Time Tracking:     PT Received On: 10/15/23  PT Start Time: 1014     PT Stop Time: 1030  PT Total Time (min): 16 min     Billable Minutes: Gait Training 16    Treatment Type: Treatment  PT/PTA: PTA     Number of PTA visits since last PT visit: 1     10/15/2023

## 2023-10-15 NOTE — ASSESSMENT & PLAN NOTE
Changed even Pepcid to sucralfate-   Hep C negative-   Will check HIV  Consult Hem/onc    Pt should avoid all heparin related products - please review it with HD if pt is being exposed to heparin products

## 2023-10-15 NOTE — SUBJECTIVE & OBJECTIVE
Interval History: Pt was sleeping comfortably - with no active problem- no chest pain, sob, fever, chills, n/v, urine or bowel problem.     Pending HD plan as per Nephro    Review of Systems   Constitutional:  Negative for activity change, appetite change, chills and fever.   HENT:  Negative for congestion, ear pain and nosebleeds.    Eyes:  Negative for itching and visual disturbance.   Respiratory:  Negative for apnea, cough, chest tightness, shortness of breath and wheezing.    Cardiovascular:  Negative for chest pain, palpitations and leg swelling.   Gastrointestinal:  Negative for abdominal distention, abdominal pain, constipation, diarrhea, nausea and vomiting.   Genitourinary:  Negative for dysuria and flank pain.   Musculoskeletal:  Negative for back pain.   Neurological:  Negative for dizziness and headaches.     Objective:     Vital Signs (Most Recent):  Temp: 97.9 °F (36.6 °C) (10/15/23 1113)  Pulse: 64 (10/15/23 1113)  Resp: 16 (10/15/23 1113)  BP: (!) 109/53 (10/15/23 1113)  SpO2: 97 % (10/15/23 1113) Vital Signs (24h Range):  Temp:  [97.5 °F (36.4 °C)-97.9 °F (36.6 °C)] 97.9 °F (36.6 °C)  Pulse:  [60-83] 64  Resp:  [16-18] 16  SpO2:  [92 %-99 %] 97 %  BP: ()/(42-71) 109/53     Weight: 119.5 kg (263 lb 7.2 oz)  Body mass index is 35.73 kg/m².    Intake/Output Summary (Last 24 hours) at 10/15/2023 1312  Last data filed at 10/15/2023 0701  Gross per 24 hour   Intake 240 ml   Output 2250 ml   Net -2010 ml         Physical Exam  Vitals reviewed.   Constitutional:       General: He is not in acute distress.     Appearance: Normal appearance. He is not ill-appearing, toxic-appearing or diaphoretic.   HENT:      Head: Normocephalic and atraumatic.      Mouth/Throat:      Mouth: Mucous membranes are moist.      Pharynx: Oropharynx is clear.   Eyes:      General: No scleral icterus.     Conjunctiva/sclera: Conjunctivae normal.   Neck:      Vascular: No carotid bruit.   Cardiovascular:      Rate and Rhythm:  "Normal rate and regular rhythm.      Pulses: Normal pulses.      Heart sounds: Normal heart sounds.   Pulmonary:      Effort: Pulmonary effort is normal.      Breath sounds: Normal breath sounds.   Abdominal:      General: Abdomen is flat. Bowel sounds are normal.      Palpations: Abdomen is soft.   Musculoskeletal:         General: Normal range of motion.   Skin:     General: Skin is warm.   Neurological:      General: No focal deficit present.      Mental Status: He is alert and oriented to person, place, and time. Mental status is at baseline.   Psychiatric:         Mood and Affect: Mood normal.         Behavior: Behavior normal.             Significant Labs: All pertinent labs within the past 24 hours have been reviewed.  BMP:   Recent Labs   Lab 10/15/23  0401   *  136*   *  132*   K 4.4  4.4     100   CO2 21*  21*   BUN 47*  47*   CREATININE 3.6*  3.6*   CALCIUM 8.7  8.7     CBC:   Recent Labs   Lab 10/14/23  0600 10/15/23  0401   WBC 5.82 5.24  5.24   HGB 8.8* 8.4*  8.4*   HCT 29.7* 27.8*  27.8*   PLT 57* 47*  47*     CMP:   Recent Labs   Lab 10/14/23  0600 10/15/23  0401   * 132*  132*   K 4.5 4.4  4.4   CL 99 100  100   CO2 25 21*  21*   * 136*  136*   BUN 53* 47*  47*   CREATININE 3.8* 3.6*  3.6*   CALCIUM 8.5* 8.7  8.7   PROT 7.5 7.1  7.1   ALBUMIN 3.6 3.4*  3.4*   BILITOT 1.2* 1.1*  1.1*   ALKPHOS 65 68  68   AST 26 24  24   ALT 29 26  26   ANIONGAP 7* 11  11     Magnesium: No results for input(s): "MG" in the last 48 hours.    Significant Imaging: I have reviewed all pertinent imaging results/findings within the past 24 hours.  "

## 2023-10-15 NOTE — PROGRESS NOTES
INPATIENT NEPHROLOGY Progress Note   [facility]    Patient Name: Ana Bullard  Date: 10/15/2023    Reason for consultation: AD/CKD    Chief Complaint:   Chief Complaint   Patient presents with    Tachycardia    Shortness of Breath       History of Present Illness:  62 y/o Male pt of  who presented to ED with h/o fatigue, back pain, decreased urination and SOB for 2 days. He endorses SOB has progressively worsened over this time and he is having dyspnea with minimal exertion, but no reported chest pain . He reports some feeling some palpitations, BLE edema as well. PMH includes  ischemic cardiomyopathy, 5 vessel bypass in 2012, s/p multiple MIs, chronic systolic heart failure, most recent ejection fraction 20%, complete heart block, PPM dependent, HIT, DVT/PE, diabetes mellitus, hyperlipidemia, hypertension, obstructive sleep apnea, and chronic kidney disease IV. In the ER he had wide complex tachycardia, amiodarone started. Pt was also started on leyla and lasix gtts, transferred to ICU. Consulted for renal management.    Interval History:  10/9- on BIPAP, overloaded  0/10- AF with RVR, SBP , on 3L NC, UOP 80cc, ashford placed- renal function worse- remains volume overloaded- I have discussed the risks and benefits of hemodialysis with the patient/family.  All of their questions were answered.  Risks include low blood pressure, stroke, heart attack, seizure, infection, nausea, delirium, and death.     10/11 tolerated first HD- got off 2L- 2nd treatment today- AICD adjusted yest    10/12 got off 3L yest- plan for 3rd treatment today- UOP 225cc- net -2.8L    10/13- got off 4L yest- plan for 4rth treatment today- UOP 60cc- net -6L  10/14  2L off w/HD yesterday.  Pressures low, but stable, can have albumin w/HD if needed.  No complaints.  No UOP recorded.  10/15  VSS, lab results reviewed.  250cc UOP recorded.  1.5L off w/HD yesterday.  Holding HD today, pt asking about going home.   Told him we have to see if HD needs to continue;  if so, will need out patient chair placement.  Voiced understanding.    Plan of Care:    Assessment:  Oliguric AD/CKD IV due to CRS- initiated RRT on 10/10 for clearance and UF  Wide complex tachycardia vs Sinus tachycardia- AICD to be interrogated  CHF exacerbation with hypoxia requiring NIPPV (EF 25%, grade III DD, pulm HTN)  Hyponatremia  SHPT  Anemia of CKD    Plan:    - continue RRT today for clearance and UF- doing daily treatments right now  - anticipate holding Sunday to assess for renal recovery  - rate control improved- AICD adjusted 10/10  - use albumin PRN for hypotension- push UF with each treatment  - cardiac diet, 1.5L fluid restriction  - hold farxiga- will eventually resume  - no nsaids or IV contrast  - continue ashford  - no acute BMM issues  - H/H noted- may start to improve with more UF- hold off GANGA    Remains oliguric---plan for HD Sat and then hold Sun.  Anticipate may need outpt HD but will have better sense Monday. Will need hemosplit as well.    Thank you for allowing us to participate in this patient's care. We will continue to follow.    Vital Signs:  Temp Readings from Last 3 Encounters:   10/15/23 97.5 °F (36.4 °C) (Oral)   06/18/23 97.8 °F (36.6 °C)   05/31/23 97.5 °F (36.4 °C)       Pulse Readings from Last 3 Encounters:   10/15/23 83   08/17/23 61   07/17/23 60       BP Readings from Last 3 Encounters:   10/15/23 123/65   07/17/23 116/71   06/18/23 138/73       Weight:  Wt Readings from Last 3 Encounters:   10/14/23 119.5 kg (263 lb 7.2 oz)   09/28/23 121.8 kg (268 lb 8 oz)   09/07/23 120.6 kg (265 lb 15.1 oz)       INS/OUTS:  I/O last 3 completed shifts:  In: 740 [P.O.:240; Other:500]  Out: 4750 [Urine:250; Other:4500]  No intake/output data recorded.    Medications:  Scheduled Meds:   atorvastatin  20 mg Oral QHS    gabapentin  300 mg Oral BID    levothyroxine  25 mcg Oral Before breakfast    polyethylene glycol  17 g Oral Daily     sucralfate  1 g Oral QID (AC & HS)     Continuous Infusions:      PRN Meds:.acetaminophen, albumin human 25%, albuterol, ALPRAZolam, dextrose 50%, dextrose 50%, glucagon (human recombinant), glucose, glucose, insulin aspart U-100, melatonin, ondansetron, sodium chloride 0.9%, traMADoL  No current facility-administered medications on file prior to encounter.     Current Outpatient Medications on File Prior to Encounter   Medication Sig Dispense Refill    albuterol (PROVENTIL) 5 mg/mL nebulizer solution Take 2.5 mg by nebulization every 6 (six) hours as needed.      aspirin 81 MG Chew Take 81 mg by mouth once daily.      atorvastatin (LIPITOR) 20 MG tablet Take 20 mg by mouth once daily.      BASAGLAR KWIKPEN U-100 INSULIN glargine 100 units/mL (3mL) SubQ pen Inject 25 Units into the skin once daily.  3    carvediloL (COREG) 3.125 MG tablet Take 3.125 mg by mouth 2 (two) times daily.      FARXIGA 10 mg tablet Take 10 mg by mouth once daily.      gabapentin (NEURONTIN) 600 MG tablet Take 600 mg by mouth 3 (three) times daily.      pantoprazole (PROTONIX) 40 MG tablet Take 1 tablet by mouth once daily.  0    torsemide (DEMADEX) 20 MG Tab Take 1 tablet (20 mg total) by mouth 2 (two) times a day. 120 tablet 0    traMADoL (ULTRAM) 50 mg tablet Take 1 tablet (50 mg total) by mouth every 6 (six) hours as needed for Pain. 120 tablet 2    vitamin B complex-folic acid 0.4 mg Tab Take 1 tablet by mouth once daily.      albuterol-ipratropium (DUO-NEB) 2.5 mg-0.5 mg/3 mL nebulizer solution Take 3 mLs by nebulization every 6 (six) hours as needed.      amiodarone (PACERONE) 200 MG Tab Take 1 tablet (200 mg total) by mouth 2 (two) times daily. 90 tablet 0    CORLANOR 5 mg Tab Take 5 mg by mouth nightly.      TRULICITY 0.75 mg/0.5 mL pen injector Inject 0.75 mg into the skin every 7 days.         Review of Systems:  Neg    Physical Exam:  /65 (BP Location: Right arm, Patient Position: Sitting)   Pulse 83   Temp 97.5 °F (36.4  °C) (Oral)   Resp 18   Ht 6' (1.829 m)   Wt 119.5 kg (263 lb 7.2 oz)   SpO2 95%   BMI 35.73 kg/m²     General Appearance:    NAD, AAO x 3, cooperative, appears stated age   Head:    Normocephalic, atraumatic   Eyes:    PER, EOMI, and conjunctiva/sclera clear bilaterally        Mouth:   Moist mucus membranes, no thrush or oral lesions, normal      dentition   Back:     No CVA tenderness   Lungs:     Rales   Heart:    Tachy    Abdomen:     Soft, non-tender, distended,   Extremities:   Edematous   MSK:   No joint or muscle swelling, tenderness or deformity   Skin:   Skin color, texture, turgor normal, no rashes or lesions   Neurologic/Psychiatric:   CNII-XII intact, normal strength and sensation       throughout, no asterixis; normal affect, memory, judgement     and insight     Results:  Lab Results   Component Value Date     (L) 10/15/2023     (L) 10/15/2023    K 4.4 10/15/2023    K 4.4 10/15/2023     10/15/2023     10/15/2023    CO2 21 (L) 10/15/2023    CO2 21 (L) 10/15/2023    BUN 47 (H) 10/15/2023    BUN 47 (H) 10/15/2023    CREATININE 3.6 (H) 10/15/2023    CREATININE 3.6 (H) 10/15/2023    CALCIUM 8.7 10/15/2023    CALCIUM 8.7 10/15/2023    ANIONGAP 11 10/15/2023    ANIONGAP 11 10/15/2023    ESTGFRAFRICA 38.9 (A) 06/12/2020    EGFRNONAA 33.7 (A) 06/12/2020       Lab Results   Component Value Date    CALCIUM 8.7 10/15/2023    CALCIUM 8.7 10/15/2023       Recent Labs   Lab 10/15/23  0401   WBC 5.24  5.24   RBC 2.94*  2.94*   HGB 8.4*  8.4*   HCT 27.8*  27.8*   PLT 47*  47*   MCV 95  95   MCH 28.6  28.6   MCHC 30.2*  30.2*         I have personally reviewed pertinent radiological imaging and reports.    I have spent > 35 minutes providing care for this patient for the above diagnoses. These services have included chart/data/imaging review, evaluation, exam, formulation of plan, , note preparation, and discussions with staff involved in this patient's care.    Bartolo  MD Nicolas MPH  [facility]  [address]  [place] [address]  [phone] (p)  [phone] (f)

## 2023-10-15 NOTE — PROGRESS NOTES
[facility] Medicine  Progress Note    Patient Name: Ana Bullard  MRN: [id number]  Patient Class: IP- Inpatient   Admission Date: 10/8/2023  Length of Stay: 7 days  Attending Physician: Judie Parikh MD  Primary Care Provider: Michelle Messina FNP        Subjective:     Principal Problem:Wide-complex tachycardia        HPI:  No notes on file    Overview/Hospital Course:  No notes on file    Interval History: Pt was sleeping comfortably - with no active problem- no chest pain, sob, fever, chills, n/v, urine or bowel problem.     Pending HD plan as per Nephro    Review of Systems   Constitutional:  Negative for activity change, appetite change, chills and fever.   HENT:  Negative for congestion, ear pain and nosebleeds.    Eyes:  Negative for itching and visual disturbance.   Respiratory:  Negative for apnea, cough, chest tightness, shortness of breath and wheezing.    Cardiovascular:  Negative for chest pain, palpitations and leg swelling.   Gastrointestinal:  Negative for abdominal distention, abdominal pain, constipation, diarrhea, nausea and vomiting.   Genitourinary:  Negative for dysuria and flank pain.   Musculoskeletal:  Negative for back pain.   Neurological:  Negative for dizziness and headaches.     Objective:     Vital Signs (Most Recent):  Temp: 97.9 °F (36.6 °C) (10/15/23 1113)  Pulse: 64 (10/15/23 1113)  Resp: 16 (10/15/23 1113)  BP: (!) 109/53 (10/15/23 1113)  SpO2: 97 % (10/15/23 1113) Vital Signs (24h Range):  Temp:  [97.5 °F (36.4 °C)-97.9 °F (36.6 °C)] 97.9 °F (36.6 °C)  Pulse:  [60-83] 64  Resp:  [16-18] 16  SpO2:  [92 %-99 %] 97 %  BP: ()/(42-71) 109/53     Weight: 119.5 kg (263 lb 7.2 oz)  Body mass index is 35.73 kg/m².    Intake/Output Summary (Last 24 hours) at 10/15/2023 1312  Last data filed at 10/15/2023 0701  Gross per 24 hour   Intake 240 ml   Output 2250 ml   Net -2010 ml         Physical Exam  Vitals reviewed.   Constitutional:       General: He is  "not in acute distress.     Appearance: Normal appearance. He is not ill-appearing, toxic-appearing or diaphoretic.   HENT:      Head: Normocephalic and atraumatic.      Mouth/Throat:      Mouth: Mucous membranes are moist.      Pharynx: Oropharynx is clear.   Eyes:      General: No scleral icterus.     Conjunctiva/sclera: Conjunctivae normal.   Neck:      Vascular: No carotid bruit.   Cardiovascular:      Rate and Rhythm: Normal rate and regular rhythm.      Pulses: Normal pulses.      Heart sounds: Normal heart sounds.   Pulmonary:      Effort: Pulmonary effort is normal.      Breath sounds: Normal breath sounds.   Abdominal:      General: Abdomen is flat. Bowel sounds are normal.      Palpations: Abdomen is soft.   Musculoskeletal:         General: Normal range of motion.   Skin:     General: Skin is warm.   Neurological:      General: No focal deficit present.      Mental Status: He is alert and oriented to person, place, and time. Mental status is at baseline.   Psychiatric:         Mood and Affect: Mood normal.         Behavior: Behavior normal.             Significant Labs: All pertinent labs within the past 24 hours have been reviewed.  BMP:   Recent Labs   Lab 10/15/23  0401   *  136*   *  132*   K 4.4  4.4     100   CO2 21*  21*   BUN 47*  47*   CREATININE 3.6*  3.6*   CALCIUM 8.7  8.7     CBC:   Recent Labs   Lab 10/14/23  0600 10/15/23  0401   WBC 5.82 5.24  5.24   HGB 8.8* 8.4*  8.4*   HCT 29.7* 27.8*  27.8*   PLT 57* 47*  47*     CMP:   Recent Labs   Lab 10/14/23  0600 10/15/23  0401   * 132*  132*   K 4.5 4.4  4.4   CL 99 100  100   CO2 25 21*  21*   * 136*  136*   BUN 53* 47*  47*   CREATININE 3.8* 3.6*  3.6*   CALCIUM 8.5* 8.7  8.7   PROT 7.5 7.1  7.1   ALBUMIN 3.6 3.4*  3.4*   BILITOT 1.2* 1.1*  1.1*   ALKPHOS 65 68  68   AST 26 24  24   ALT 29 26  26   ANIONGAP 7* 11  11     Magnesium: No results for input(s): "MG" in the last 48 " hours.    Significant Imaging: I have reviewed all pertinent imaging results/findings within the past 24 hours.      Assessment/Plan:      * Wide-complex tachycardia  S/p amio drip- AICD adjusted to rate of 60 - pacing well now  Keep K at 4 and Mag at 2      CKD (chronic kidney disease), stage V  Pt/s Nephro status is not improving- so pt will be HD dependant for now- will take help from CM to get HD placement for patient once hemosplit is done by vascular surgery.     Consult vascular      Heparin induced thrombocytopenia  If heparin products were not used this admission - plts should not drop again.   Will monitor daily CBC      Chronic combined systolic and diastolic heart failure    S/p lasix / bumex drips with help of dobutamine and currently getting RRT- should improve  C/w schedule dialysis as per nephro  Strict I/Os, daily weight    CAD (coronary artery disease)  C/w home meds  Holding asa for now d/t low plts  C/w statin      Obesity  Body mass index is 38 kg/m². Morbid obesity complicates all aspects of disease management from diagnostic modalities to treatment. Weight loss encouraged and health benefits explained to patient.         AICD (automatic cardioverter/defibrillator) present  Readjusted to rate of 60 and its pacing accordingly without problem.   Cardio following      Venous stasis dermatitis of both lower extremities  stockings    Type II diabetes mellitus with neurological manifestations  C/w SSI and accu checks      Chronic Thrombocytopenia  Changed even Pepcid to sucralfate-   Hep C negative-   Will check HIV  Consult Hem/onc    Pt should avoid all heparin related products - please review it with HD if pt is being exposed to heparin products      VTE Risk Mitigation (From admission, onward)         Ordered     IP VTE LOW RISK PATIENT  Once         10/08/23 1740     Place sequential compression device  Until discontinued         10/08/23 1740                Discharge Planning   BILL: 10/18/2023      Code Status: Full Code   Is the patient medically ready for discharge?:     Reason for patient still in hospital (select all that apply): Treatment  Discharge Plan A: Home with family                  Judie Parikh MD  Department of Hospital Medicine   [facility]

## 2023-10-15 NOTE — PLAN OF CARE
10/14/23 2102   Patient Assessment/Suction   Level of Consciousness (AVPU) alert   Respiratory Effort Normal;Unlabored   All Lung Fields Breath Sounds clear;diminished   PRE-TX-O2   Device (Oxygen Therapy) room air   SpO2 98 %   Pulse Oximetry Type Intermittent   $ Pulse Oximetry - Multiple Charge Pulse Oximetry - Multiple   Pulse 61   Resp 18   Aerosol Therapy   $ Aerosol Therapy Charges PRN treatment not required   Respiratory Treatment Status (SVN) PRN treatment not required   Preset CPAP/BiPAP Settings   Mode Of Delivery Standby;CPAP  (HOME UNIT)   Equipment Type Other (see comment)  (HOME UNIT)   Education   $ Education Bronchodilator;15 min   Respiratory Evaluation   $ Care Plan Tech Time 15 min

## 2023-10-16 ENCOUNTER — ANESTHESIA EVENT (OUTPATIENT)
Dept: SURGERY | Facility: HOSPITAL | Age: 63
DRG: 682 | End: 2023-10-16
Payer: MEDICARE

## 2023-10-16 ENCOUNTER — ANESTHESIA (OUTPATIENT)
Dept: SURGERY | Facility: HOSPITAL | Age: 63
DRG: 682 | End: 2023-10-16
Payer: MEDICARE

## 2023-10-16 LAB
ALBUMIN SERPL BCP-MCNC: 3.3 G/DL (ref 3.5–5.2)
ALP SERPL-CCNC: 67 U/L (ref 55–135)
ALT SERPL W/O P-5'-P-CCNC: 23 U/L (ref 10–44)
ANION GAP SERPL CALC-SCNC: 9 MMOL/L (ref 8–16)
AST SERPL-CCNC: 20 U/L (ref 10–40)
BASOPHILS # BLD AUTO: 0.03 K/UL (ref 0–0.2)
BASOPHILS NFR BLD: 0.5 % (ref 0–1.9)
BILIRUB SERPL-MCNC: 1.1 MG/DL (ref 0.1–1)
BUN SERPL-MCNC: 69 MG/DL (ref 8–23)
CALCIUM SERPL-MCNC: 8.6 MG/DL (ref 8.7–10.5)
CHLORIDE SERPL-SCNC: 99 MMOL/L (ref 95–110)
CO2 SERPL-SCNC: 23 MMOL/L (ref 23–29)
CREAT SERPL-MCNC: 4.6 MG/DL (ref 0.5–1.4)
DIFFERENTIAL METHOD: ABNORMAL
EOSINOPHIL # BLD AUTO: 0.1 K/UL (ref 0–0.5)
EOSINOPHIL NFR BLD: 1.4 % (ref 0–8)
ERYTHROCYTE [DISTWIDTH] IN BLOOD BY AUTOMATED COUNT: 19.7 % (ref 11.5–14.5)
EST. GFR  (NO RACE VARIABLE): 13.7 ML/MIN/1.73 M^2
FERRITIN SERPL-MCNC: 93.2 NG/ML (ref 20–300)
FOLATE SERPL-MCNC: 10.2 NG/ML (ref 4–24)
GLUCOSE SERPL-MCNC: 110 MG/DL (ref 70–110)
GLUCOSE SERPL-MCNC: 114 MG/DL (ref 70–110)
GLUCOSE SERPL-MCNC: 126 MG/DL (ref 70–110)
GLUCOSE SERPL-MCNC: 294 MG/DL (ref 70–110)
HCT VFR BLD AUTO: 29.6 % (ref 40–54)
HGB BLD-MCNC: 8.8 G/DL (ref 14–18)
IMM GRANULOCYTES # BLD AUTO: 0.02 K/UL (ref 0–0.04)
IMM GRANULOCYTES NFR BLD AUTO: 0.3 % (ref 0–0.5)
IRON SERPL-MCNC: 36 UG/DL (ref 45–160)
LYMPHOCYTES # BLD AUTO: 0.4 K/UL (ref 1–4.8)
LYMPHOCYTES NFR BLD: 6.4 % (ref 18–48)
MCH RBC QN AUTO: 28 PG (ref 27–31)
MCHC RBC AUTO-ENTMCNC: 29.7 G/DL (ref 32–36)
MCV RBC AUTO: 94 FL (ref 82–98)
MONOCYTES # BLD AUTO: 1 K/UL (ref 0.3–1)
MONOCYTES NFR BLD: 17.6 % (ref 4–15)
MPV, BLUE TOP: 10.9 FL (ref 9.2–12.9)
NEUTROPHILS # BLD AUTO: 4.2 K/UL (ref 1.8–7.7)
NEUTROPHILS NFR BLD: 73.8 % (ref 38–73)
NRBC BLD-RTO: 0 /100 WBC
PLATELET # BLD AUTO: 53 K/UL (ref 150–450)
PLATELET, BLUE TOP: 37 K/UL (ref 150–450)
PMV BLD AUTO: 12.3 FL (ref 9.2–12.9)
POTASSIUM SERPL-SCNC: 4.9 MMOL/L (ref 3.5–5.1)
PROT SERPL-MCNC: 7 G/DL (ref 6–8.4)
RBC # BLD AUTO: 3.14 M/UL (ref 4.6–6.2)
SATURATED IRON: 11 % (ref 20–50)
SODIUM SERPL-SCNC: 131 MMOL/L (ref 136–145)
TOTAL IRON BINDING CAPACITY: 314 UG/DL (ref 250–450)
TRANSFERRIN SERPL-MCNC: 224 MG/DL (ref 200–375)
VIT B12 SERPL-MCNC: 820 PG/ML (ref 210–950)
WBC # BLD AUTO: 5.74 K/UL (ref 3.9–12.7)

## 2023-10-16 PROCEDURE — 83540 ASSAY OF IRON: CPT | Performed by: INTERNAL MEDICINE

## 2023-10-16 PROCEDURE — 85025 COMPLETE CBC W/AUTO DIFF WBC: CPT | Performed by: NURSE PRACTITIONER

## 2023-10-16 PROCEDURE — C1769 GUIDE WIRE: HCPCS | Performed by: STUDENT IN AN ORGANIZED HEALTH CARE EDUCATION/TRAINING PROGRAM

## 2023-10-16 PROCEDURE — C1750 CATH, HEMODIALYSIS,LONG-TERM: HCPCS | Performed by: STUDENT IN AN ORGANIZED HEALTH CARE EDUCATION/TRAINING PROGRAM

## 2023-10-16 PROCEDURE — 99900035 HC TECH TIME PER 15 MIN (STAT)

## 2023-10-16 PROCEDURE — D9220A PRA ANESTHESIA: Mod: ANES,,, | Performed by: ANESTHESIOLOGY

## 2023-10-16 PROCEDURE — D9220A PRA ANESTHESIA: Mod: CRNA,,, | Performed by: NURSE ANESTHETIST, CERTIFIED REGISTERED

## 2023-10-16 PROCEDURE — 86665 EPSTEIN-BARR CAPSID VCA: CPT | Performed by: INTERNAL MEDICINE

## 2023-10-16 PROCEDURE — D9220A PRA ANESTHESIA: ICD-10-PCS | Mod: CRNA,,, | Performed by: NURSE ANESTHETIST, CERTIFIED REGISTERED

## 2023-10-16 PROCEDURE — 90935 HEMODIALYSIS ONE EVALUATION: CPT

## 2023-10-16 PROCEDURE — 94761 N-INVAS EAR/PLS OXIMETRY MLT: CPT

## 2023-10-16 PROCEDURE — 86038 ANTINUCLEAR ANTIBODIES: CPT | Performed by: INTERNAL MEDICINE

## 2023-10-16 PROCEDURE — 37000009 HC ANESTHESIA EA ADD 15 MINS: Performed by: STUDENT IN AN ORGANIZED HEALTH CARE EDUCATION/TRAINING PROGRAM

## 2023-10-16 PROCEDURE — 85049 AUTOMATED PLATELET COUNT: CPT | Performed by: INTERNAL MEDICINE

## 2023-10-16 PROCEDURE — 82728 ASSAY OF FERRITIN: CPT | Performed by: INTERNAL MEDICINE

## 2023-10-16 PROCEDURE — 82607 VITAMIN B-12: CPT | Performed by: INTERNAL MEDICINE

## 2023-10-16 PROCEDURE — 80053 COMPREHEN METABOLIC PANEL: CPT | Performed by: NURSE PRACTITIONER

## 2023-10-16 PROCEDURE — 25000003 PHARM REV CODE 250: Performed by: STUDENT IN AN ORGANIZED HEALTH CARE EDUCATION/TRAINING PROGRAM

## 2023-10-16 PROCEDURE — 25000003 PHARM REV CODE 250: Performed by: NURSE ANESTHETIST, CERTIFIED REGISTERED

## 2023-10-16 PROCEDURE — 25000003 PHARM REV CODE 250: Performed by: NURSE PRACTITIONER

## 2023-10-16 PROCEDURE — 36000706: Performed by: STUDENT IN AN ORGANIZED HEALTH CARE EDUCATION/TRAINING PROGRAM

## 2023-10-16 PROCEDURE — 86022 PLATELET ANTIBODIES: CPT | Performed by: INTERNAL MEDICINE

## 2023-10-16 PROCEDURE — 37000008 HC ANESTHESIA 1ST 15 MINUTES: Performed by: STUDENT IN AN ORGANIZED HEALTH CARE EDUCATION/TRAINING PROGRAM

## 2023-10-16 PROCEDURE — 36415 COLL VENOUS BLD VENIPUNCTURE: CPT | Performed by: INTERNAL MEDICINE

## 2023-10-16 PROCEDURE — D9220A PRA ANESTHESIA: ICD-10-PCS | Mod: ANES,,, | Performed by: ANESTHESIOLOGY

## 2023-10-16 PROCEDURE — 84466 ASSAY OF TRANSFERRIN: CPT | Performed by: INTERNAL MEDICINE

## 2023-10-16 PROCEDURE — 36415 COLL VENOUS BLD VENIPUNCTURE: CPT | Performed by: NURSE PRACTITIONER

## 2023-10-16 PROCEDURE — 82746 ASSAY OF FOLIC ACID SERUM: CPT | Performed by: INTERNAL MEDICINE

## 2023-10-16 PROCEDURE — 82962 GLUCOSE BLOOD TEST: CPT | Performed by: STUDENT IN AN ORGANIZED HEALTH CARE EDUCATION/TRAINING PROGRAM

## 2023-10-16 PROCEDURE — 21400001 HC TELEMETRY ROOM

## 2023-10-16 PROCEDURE — 80074 ACUTE HEPATITIS PANEL: CPT | Performed by: INTERNAL MEDICINE

## 2023-10-16 PROCEDURE — 71000039 HC RECOVERY, EACH ADD'L HOUR: Performed by: STUDENT IN AN ORGANIZED HEALTH CARE EDUCATION/TRAINING PROGRAM

## 2023-10-16 PROCEDURE — 99900031 HC PATIENT EDUCATION (STAT)

## 2023-10-16 PROCEDURE — 71000033 HC RECOVERY, INTIAL HOUR: Performed by: STUDENT IN AN ORGANIZED HEALTH CARE EDUCATION/TRAINING PROGRAM

## 2023-10-16 PROCEDURE — 63600175 PHARM REV CODE 636 W HCPCS: Performed by: NURSE ANESTHETIST, CERTIFIED REGISTERED

## 2023-10-16 PROCEDURE — 36000707: Performed by: STUDENT IN AN ORGANIZED HEALTH CARE EDUCATION/TRAINING PROGRAM

## 2023-10-16 DEVICE — CATHETER HEMOSPLIT 23CM 5733730: Type: IMPLANTABLE DEVICE | Site: CHEST | Status: FUNCTIONAL

## 2023-10-16 RX ORDER — LIDOCAINE HYDROCHLORIDE 10 MG/ML
INJECTION INFILTRATION; PERINEURAL
Status: DISCONTINUED | OUTPATIENT
Start: 2023-10-16 | End: 2023-10-16 | Stop reason: HOSPADM

## 2023-10-16 RX ORDER — FAMOTIDINE 10 MG/ML
INJECTION INTRAVENOUS
Status: DISCONTINUED | OUTPATIENT
Start: 2023-10-16 | End: 2023-10-16

## 2023-10-16 RX ORDER — LIDOCAINE HYDROCHLORIDE 20 MG/ML
INJECTION, SOLUTION EPIDURAL; INFILTRATION; INTRACAUDAL; PERINEURAL
Status: DISCONTINUED | OUTPATIENT
Start: 2023-10-16 | End: 2023-10-16

## 2023-10-16 RX ORDER — ONDANSETRON 2 MG/ML
INJECTION INTRAMUSCULAR; INTRAVENOUS
Status: DISCONTINUED | OUTPATIENT
Start: 2023-10-16 | End: 2023-10-16

## 2023-10-16 RX ORDER — DEXAMETHASONE SODIUM PHOSPHATE 4 MG/ML
INJECTION, SOLUTION INTRA-ARTICULAR; INTRALESIONAL; INTRAMUSCULAR; INTRAVENOUS; SOFT TISSUE
Status: DISCONTINUED | OUTPATIENT
Start: 2023-10-16 | End: 2023-10-16

## 2023-10-16 RX ORDER — ACETAMINOPHEN 10 MG/ML
INJECTION, SOLUTION INTRAVENOUS
Status: DISCONTINUED | OUTPATIENT
Start: 2023-10-16 | End: 2023-10-16

## 2023-10-16 RX ORDER — ONDANSETRON 2 MG/ML
4 INJECTION INTRAMUSCULAR; INTRAVENOUS DAILY PRN
Status: DISCONTINUED | OUTPATIENT
Start: 2023-10-16 | End: 2023-10-17

## 2023-10-16 RX ORDER — MIDODRINE HYDROCHLORIDE 2.5 MG/1
5 TABLET ORAL
Status: DISCONTINUED | OUTPATIENT
Start: 2023-10-16 | End: 2023-10-19 | Stop reason: HOSPADM

## 2023-10-16 RX ORDER — CEFAZOLIN SODIUM 1 G/3ML
INJECTION, POWDER, FOR SOLUTION INTRAMUSCULAR; INTRAVENOUS
Status: DISCONTINUED | OUTPATIENT
Start: 2023-10-16 | End: 2023-10-16

## 2023-10-16 RX ORDER — SUCCINYLCHOLINE CHLORIDE 20 MG/ML
INJECTION INTRAMUSCULAR; INTRAVENOUS
Status: DISCONTINUED | OUTPATIENT
Start: 2023-10-16 | End: 2023-10-16

## 2023-10-16 RX ORDER — ETOMIDATE 2 MG/ML
INJECTION INTRAVENOUS
Status: DISCONTINUED | OUTPATIENT
Start: 2023-10-16 | End: 2023-10-16

## 2023-10-16 RX ORDER — FENTANYL CITRATE 50 UG/ML
INJECTION, SOLUTION INTRAMUSCULAR; INTRAVENOUS
Status: DISCONTINUED | OUTPATIENT
Start: 2023-10-16 | End: 2023-10-16

## 2023-10-16 RX ADMIN — ETOMIDATE 20 MG: 2 INJECTION, SOLUTION INTRAVENOUS at 10:10

## 2023-10-16 RX ADMIN — DEXAMETHASONE SODIUM PHOSPHATE 8 MG: 4 INJECTION, SOLUTION INTRAMUSCULAR; INTRAVENOUS at 11:10

## 2023-10-16 RX ADMIN — ATORVASTATIN CALCIUM 20 MG: 20 TABLET, FILM COATED ORAL at 08:10

## 2023-10-16 RX ADMIN — LIDOCAINE HYDROCHLORIDE 100 MG: 20 INJECTION, SOLUTION INTRAVENOUS at 10:10

## 2023-10-16 RX ADMIN — MIDODRINE HYDROCHLORIDE 5 MG: 2.5 TABLET ORAL at 01:10

## 2023-10-16 RX ADMIN — SODIUM CHLORIDE: 0.9 INJECTION, SOLUTION INTRAVENOUS at 10:10

## 2023-10-16 RX ADMIN — TRAMADOL HYDROCHLORIDE 50 MG: 50 TABLET, COATED ORAL at 02:10

## 2023-10-16 RX ADMIN — INSULIN ASPART 1 UNITS: 100 INJECTION, SOLUTION INTRAVENOUS; SUBCUTANEOUS at 08:10

## 2023-10-16 RX ADMIN — TRAMADOL HYDROCHLORIDE 50 MG: 50 TABLET, COATED ORAL at 08:10

## 2023-10-16 RX ADMIN — ONDANSETRON 4 MG: 2 INJECTION INTRAMUSCULAR; INTRAVENOUS at 11:10

## 2023-10-16 RX ADMIN — CEFAZOLIN 2 G: 330 INJECTION, POWDER, FOR SOLUTION INTRAMUSCULAR; INTRAVENOUS at 10:10

## 2023-10-16 RX ADMIN — SUCRALFATE 1 G: 1 TABLET ORAL at 01:10

## 2023-10-16 RX ADMIN — FAMOTIDINE 20 MG: 10 INJECTION, SOLUTION INTRAVENOUS at 10:10

## 2023-10-16 RX ADMIN — ACETAMINOPHEN 1000 MG: 10 INJECTION, SOLUTION INTRAVENOUS at 11:10

## 2023-10-16 RX ADMIN — Medication 120 MG: at 10:10

## 2023-10-16 RX ADMIN — FENTANYL CITRATE 50 MCG: 50 INJECTION, SOLUTION INTRAMUSCULAR; INTRAVENOUS at 10:10

## 2023-10-16 RX ADMIN — SUCRALFATE 1 G: 1 TABLET ORAL at 08:10

## 2023-10-16 RX ADMIN — GABAPENTIN 300 MG: 300 CAPSULE ORAL at 08:10

## 2023-10-16 NOTE — ASSESSMENT & PLAN NOTE
Pt/s Nephro status is not improving- so pt will be HD dependant for now- will take help from CM to get HD placement for patient pending Hemosplit today    Consult vascular

## 2023-10-16 NOTE — PROGRESS NOTES
INPATIENT NEPHROLOGY Progress Note   [facility]    Patient Name: Ana Bullard  Date: 10/16/2023    Reason for consultation: AD/CKD    Chief Complaint:   Chief Complaint   Patient presents with    Tachycardia    Shortness of Breath       History of Present Illness:  62 y/o Male pt of Dr. Vences who presented to ED with h/o fatigue, back pain, decreased urination and SOB for 2 days. He endorses SOB has progressively worsened over this time and he is having dyspnea with minimal exertion, but no reported chest pain . He reports some feeling some palpitations, BLE edema as well. PMH includes  ischemic cardiomyopathy, 5 vessel bypass in 2012, s/p multiple MIs, chronic systolic heart failure, most recent ejection fraction 20%, complete heart block, PPM dependent, HIT, DVT/PE, diabetes mellitus, hyperlipidemia, hypertension, obstructive sleep apnea, and chronic kidney disease IV. In the ER he had wide complex tachycardia, amiodarone started. Pt was also started on leyla and lasix gtts, transferred to ICU. Consulted for renal management.    Interval History:  10/9- on BIPAP, overloaded  0/10- AF with RVR, SBP , on 3L NC, UOP 80cc, ashford placed- renal function worse- remains volume overloaded- I have discussed the risks and benefits of hemodialysis with the patient/family.  All of their questions were answered.  Risks include low blood pressure, stroke, heart attack, seizure, infection, nausea, delirium, and death.   10/11 tolerated first HD- got off 2L- 2nd treatment today- AICD adjusted yest  10/12 got off 3L yest- plan for 3rd treatment today- UOP 225cc- net -2.8L  10/13- got off 4L yest- plan for 4rth treatment today- UOP 60cc- net -6L  10/14  2L off w/HD yesterday.  Pressures low, but stable, can have albumin w/HD if needed.  No complaints.  No UOP recorded.  10/15  VSS, lab results reviewed.  250cc UOP recorded.  1.5L off w/HD yesterday.  Holding HD today, pt asking about going home.   Told him we have to see if HD needs to continue;  if so, will need out patient chair placement.  Voiced understanding.  10/16 VSS. No renal recovery. Seen on HD today, plan MWF. Awaiting placement and tunneled cath.    Plan of Care:    AD on CKD IV due to CRS- initiated RRT on 10/10 for clearance and UF  Wide complex tachycardia vs Sinus tachycardia- AICD adjusted  CHF exacerbation with hypoxia requiring NIPPV (EF 25%, grade III DD, pulm HTN)  Hyponatremia  Secondary HPT  Anemia of CKD    Plan:    - continue RRT today for clearance and UF, then MWF  - needs hemosplit and outpatient placement  - rate control improved- AICD adjusted 10/10  - use albumin PRN for hypotension- push UF with each treatment  - cardiac diet, 1.5L fluid restriction as tolerated  - resume farxiga and siuretics, added misdodrine  - no nsaids or IV contrast  - continue ashford  - no acute BMM issues  - H/H noted- may start to improve with more UF- hold off GANGA    Thank you for allowing us to participate in this patient's care. We will continue to follow.    Vital Signs:  Temp Readings from Last 3 Encounters:   10/16/23 98.5 °F (36.9 °C) (Oral)   06/18/23 97.8 °F (36.6 °C)   05/31/23 97.5 °F (36.4 °C)       Pulse Readings from Last 3 Encounters:   10/16/23 61   08/17/23 61   07/17/23 60       BP Readings from Last 3 Encounters:   10/16/23 115/72   07/17/23 116/71   06/18/23 138/73       Weight:  Wt Readings from Last 3 Encounters:   10/14/23 119.5 kg (263 lb 7.2 oz)   09/28/23 121.8 kg (268 lb 8 oz)   09/07/23 120.6 kg (265 lb 15.1 oz)       INS/OUTS:  I/O last 3 completed shifts:  In: 1120 [P.O.:1080; I.V.:40]  Out: 185 [Urine:170; Other:15]  No intake/output data recorded.    Medications:  Scheduled Meds:   atorvastatin  20 mg Oral QHS    dapagliflozin propanediol  10 mg Oral Daily    gabapentin  300 mg Oral BID    levothyroxine  25 mcg Oral Before breakfast    midodrine  5 mg Oral TID AC    polyethylene glycol  17 g Oral Daily    sucralfate  1  g Oral QID (AC & HS)    torsemide  20 mg Oral BID loop     Continuous Infusions:      PRN Meds:.acetaminophen, albumin human 25%, albuterol, ALPRAZolam, dextrose 50%, dextrose 50%, glucagon (human recombinant), glucose, glucose, insulin aspart U-100, melatonin, ondansetron, sodium chloride 0.9%, traMADoL  No current facility-administered medications on file prior to encounter.     Current Outpatient Medications on File Prior to Encounter   Medication Sig Dispense Refill    albuterol (PROVENTIL) 5 mg/mL nebulizer solution Take 2.5 mg by nebulization every 6 (six) hours as needed.      aspirin 81 MG Chew Take 81 mg by mouth once daily.      atorvastatin (LIPITOR) 20 MG tablet Take 20 mg by mouth once daily.      BASAGLAR KWIKPEN U-100 INSULIN glargine 100 units/mL (3mL) SubQ pen Inject 25 Units into the skin once daily.  3    carvediloL (COREG) 3.125 MG tablet Take 3.125 mg by mouth 2 (two) times daily.      FARXIGA 10 mg tablet Take 10 mg by mouth once daily.      gabapentin (NEURONTIN) 600 MG tablet Take 600 mg by mouth 3 (three) times daily.      pantoprazole (PROTONIX) 40 MG tablet Take 1 tablet by mouth once daily.  0    torsemide (DEMADEX) 20 MG Tab Take 1 tablet (20 mg total) by mouth 2 (two) times a day. 120 tablet 0    traMADoL (ULTRAM) 50 mg tablet Take 1 tablet (50 mg total) by mouth every 6 (six) hours as needed for Pain. 120 tablet 2    vitamin B complex-folic acid 0.4 mg Tab Take 1 tablet by mouth once daily.      albuterol-ipratropium (DUO-NEB) 2.5 mg-0.5 mg/3 mL nebulizer solution Take 3 mLs by nebulization every 6 (six) hours as needed.      amiodarone (PACERONE) 200 MG Tab Take 1 tablet (200 mg total) by mouth 2 (two) times daily. 90 tablet 0    CORLANOR 5 mg Tab Take 5 mg by mouth nightly.      TRULICITY 0.75 mg/0.5 mL pen injector Inject 0.75 mg into the skin every 7 days.         Review of Systems:    Physical Exam:  /72 (BP Location: Right arm, Patient Position: Lying)   Pulse 61   Temp  98.5 °F (36.9 °C) (Oral)   Resp 15   Ht 6' (1.829 m)   Wt 119.5 kg (263 lb 7.2 oz)   SpO2 (!) 94%   BMI 35.73 kg/m²     General Appearance:    NAD, AAO x 3, cooperative, appears stated age   Head:    Normocephalic, atraumatic   Eyes:    PER, EOMI, and conjunctiva/sclera clear bilaterally        Mouth:   Moist mucus membranes, no thrush or oral lesions, normal      dentition   Back:     No CVA tenderness   Lungs:     Rales   Heart:    Tachy    Abdomen:     Soft, non-tender, distended,   Extremities:   Edematous   MSK:   No joint or muscle swelling, tenderness or deformity   Skin:   Skin color, texture, turgor normal, no rashes or lesions   Neurologic/Psychiatric:   CNII-XII intact, normal strength and sensation       throughout, no asterixis; normal affect, memory, judgement     and insight     Results:  Recent Labs   Lab 10/14/23  0600 10/15/23  0401 10/16/23  0411   * 132*  132* 131*   K 4.5 4.4  4.4 4.9   CL 99 100  100 99   CO2 25 21*  21* 23   BUN 53* 47*  47* 69*   CREATININE 3.8* 3.6*  3.6* 4.6*   * 136*  136* 114*       Recent Labs   Lab 10/10/23  0430 10/11/23  0350 10/12/23  0423 10/14/23  0600 10/15/23  0401 10/16/23  0411   CALCIUM 8.9 8.4* 8.7 8.5* 8.7  8.7 8.6*   ALBUMIN 3.4* 3.4* 3.5 3.6 3.4*  3.4* 3.3*   MG 2.6 2.4 2.2  --   --   --        Recent Labs   Lab 09/02/22  0439 11/23/22  0951 10/09/23  0230   Hemoglobin A1C 7.7 H 6.8 H 6.2       Recent Labs   Lab 10/14/23  0600 10/15/23  0401 10/16/23  0411   WBC 5.82 5.24  5.24 5.74   HGB 8.8* 8.4*  8.4* 8.8*   HCT 29.7* 27.8*  27.8* 29.6*   PLT 57* 47*  47* 53*   MCV 94 95  95 94   MCHC 29.6* 30.2*  30.2* 29.7*   MONO 13.7  0.8 16.6*  16.6*  0.9  0.9 17.6*  1.0   EOSINOPHIL 1.2 1.3  1.3 1.4       Recent Labs   Lab 10/14/23  0600 10/15/23  0401 10/16/23  0411   BILITOT 1.2* 1.1*  1.1* 1.1*   PROT 7.5 7.1  7.1 7.0   ALBUMIN 3.6 3.4*  3.4* 3.3*   ALKPHOS 65 68  68 67   ALT 29 26  26 23   AST 26 24  24 20        Recent Labs   Lab 06/12/23  2241 10/09/23  0241 10/15/23  2206   Color, UA Yellow Yellow Yellow   Appearance, UA Cloudy A Hazy A Hazy A   pH, UA 5.0 5.0 6.0   Specific [place], UA 1.020 1.015 1.015   Protein, UA 1+ A 1+ A 2+ A   Glucose, UA 1+ A 2+ A 2+ A   Ketones, UA Negative Negative Negative   Urobilinogen, UA 4.0-6.0 A Negative Negative   Bilirubin (UA) Negative Negative Negative   Occult Blood UA 3+ A 3+ A 3+ A   Nitrite, UA Negative Negative Negative   RBC, UA >100 H 58 H 29 H   WBC, UA 28 H 4 14 H   Bacteria Negative Negative Negative   Hyaline Casts, UA 32 A 44 A 36 A       Recent Labs   Lab 09/01/22  0333   POC PH 7.350   POC PCO2 54.1 H   POC HCO3 29.9 H   POC PO2 18 L   POC SATURATED O2 24 L   POC BE 4   Sample VENOUS       Microbiology Results (last 7 days)       Procedure Component Value Units Date/Time    Urine culture [[id number]] Collected: 10/15/23 2206    Order Status: Completed Specimen: Urine Updated: 10/16/23 0702     Urine Culture, Routine No growth to date    Narrative:      Specimen Source->Urine    Blood culture [[id number]] Collected: 10/09/23 0226    Order Status: Completed Specimen: Blood from Line, Jugular, Internal Right Updated: 10/14/23 0432     Blood Culture, Routine No growth after 5 days.    Blood culture #2 **CANNOT BE ORDERED STAT** [[id number]] Collected: 10/08/23 1512    Order Status: Completed Specimen: Blood Updated: 10/13/23 1832     Blood Culture, Routine No growth after 5 days.    Blood culture #1 **CANNOT BE ORDERED STAT** [[id number]] Collected: 10/08/23 1504    Order Status: Completed Specimen: Blood from Antecubital, Right Updated: 10/13/23 1832     Blood Culture, Routine No growth after 5 days.          I have spent >  minutes providing care for this patient for the above diagnoses. These services have included chart/data/imaging review, evaluation, exam, formulation of plan, , note preparation, and discussions with staff involved in this patient's  care.    Maicol Leach MD  [facility] [address] [address]  [phone] (p)  [phone] (f)

## 2023-10-16 NOTE — SUBJECTIVE & OBJECTIVE
"Medications Prior to Admission   Medication Sig Dispense Refill Last Dose    albuterol (PROVENTIL) 5 mg/mL nebulizer solution Take 2.5 mg by nebulization every 6 (six) hours as needed.   Past Week    aspirin 81 MG Chew Take 81 mg by mouth once daily.   10/8/2023 at 12:00    atorvastatin (LIPITOR) 20 MG tablet Take 20 mg by mouth once daily.   10/7/2023 at 21:00    BASAGLAR KWIKPEN U-100 INSULIN glargine 100 units/mL (3mL) SubQ pen Inject 25 Units into the skin once daily.  3 10/7/2023 at 18:00    carvediloL (COREG) 3.125 MG tablet Take 3.125 mg by mouth 2 (two) times daily.   10/8/2023 at 12:00    FARXIGA 10 mg tablet Take 10 mg by mouth once daily.   10/8/2023 at 12:00    gabapentin (NEURONTIN) 600 MG tablet Take 600 mg by mouth 3 (three) times daily.   10/8/2023 at 12:00    pantoprazole (PROTONIX) 40 MG tablet Take 1 tablet by mouth once daily.  0 10/8/2023 at 12:00    torsemide (DEMADEX) 20 MG Tab Take 1 tablet (20 mg total) by mouth 2 (two) times a day. 120 tablet 0 10/8/2023 at 12:00    traMADoL (ULTRAM) 50 mg tablet Take 1 tablet (50 mg total) by mouth every 6 (six) hours as needed for Pain. 120 tablet 2 10/8/2023 at 12:00    vitamin B complex-folic acid 0.4 mg Tab Take 1 tablet by mouth once daily.   10/8/2023 at 12:00    albuterol-ipratropium (DUO-NEB) 2.5 mg-0.5 mg/3 mL nebulizer solution Take 3 mLs by nebulization every 6 (six) hours as needed.   Unknown    amiodarone (PACERONE) 200 MG Tab Take 1 tablet (200 mg total) by mouth 2 (two) times daily. 90 tablet 0     CORLANOR 5 mg Tab Take 5 mg by mouth nightly.       TRULICITY 0.75 mg/0.5 mL pen injector Inject 0.75 mg into the skin every 7 days.          Review of patient's allergies indicates:   Allergen Reactions    Vasopressin Anaphylaxis and Other (See Comments)     Increased pressure in his head; felt like his eyeballs and veins were going to pop out of his head.     Vasopressin analogues Other (See Comments) and Anaphylaxis     "felt like eyes going to " "pop out of my head"  Other reaction(s): Other (See Comments)  "felt like eyes going to pop out of my head"  Increased pressure in his head; felt like his eyeballs and veins were going to pop out of his head.     Heparin Other (See Comments)     Other reaction(s): "depleted my blood platets"    Decreased platelet count    Heparin analogues Other (See Comments)     Depleted WBC count    Morphine Hallucinations, Other (See Comments) and Anxiety     Other reaction(s): Hallucinations  Paranoid, hallucinations         Past Medical History:   Diagnosis Date    AICD (automatic cardioverter/defibrillator) present     Anemia, chronic disease 07/27/2021    Anemia, unspecified 07/27/2021    Anxiety and depression 2012    CAD (coronary artery disease) 2012    Cardiomegaly 2016    CHF (congestive heart failure) 2012    Cholecystitis 2017    Complete heart block 2012    Diabetic foot ulcer     DVT (deep venous thrombosis) 2012    And pulmonary embolus    GERD (gastroesophageal reflux disease) 2010    Heparin induced thrombocytopenia     Hyperlipemia 2011    IDDM (insulin dependent diabetes mellitus) 1983    With neuropathy    Ischemic cardiomyopathy 2012    MI (myocardial infarction) 2012    Morbid obesity     MRSA (methicillin resistant Staphylococcus aureus) infection 01/19/2019    Noncompliance with therapeutic plan 2019    Normochromic normocytic anemia 07/27/2021    Osteomyelitis of right foot 01/2019    Osteomyelitis of right foot 09/01/2022    Klebsiella from bone, GBS in blood    Pulmonary edema 2019    Respiratory failure 2019    Sepsis 01/2019    Due to cellulitis right leg    Sleep apnea 2013    Thrombocytopathy 2012    Venous stasis dermatitis of both lower extremities      Past Surgical History:   Procedure Laterality Date    CARDIAC PACEMAKER PLACEMENT  12/2012    CARDIAC PACEMAKER PLACEMENT  10/04/2018    battery replacement    CORONARY ARTERY BYPASS GRAFT  06/2012    ESOPHAGOGASTRODUODENOSCOPY  01/31/2017    " "SAMARA FILTER PLACEMENT  2012    vena cava    LUMBAR SYMPATHETIC NERVE BLOCK Right 2019    Procedure: BLOCK, NERVE, SYMPATHETIC, LUMBAR;  Surgeon: Tashi Good MD;  Location: [facility]ending sale to [facility] OR;  Service: Pain Management;  Laterality: Right;  RIGHT SYMPATHETIC NERVE BLOCK     Family History       Problem Relation (Age of Onset)    Arthritis Mother    Cancer Father    Diabetes Mother    Heart disease Father    Stroke Father          Tobacco Use    Smoking status: Former     Current packs/day: 0.00     Average packs/day: 2.0 packs/day for 38.0 years (76.0 ttl pk-yrs)     Types: Cigarettes     Start date:      Quit date:      Years since quittin.7    Smokeless tobacco: Never   Substance and Sexual Activity    Alcohol use: No    Drug use: Never    Sexual activity: Never     Review of Systems  Objective:     Vital Signs (Most Recent):  Temp: 98.5 °F (36.9 °C) (10/16/23 0743)  Pulse: 61 (10/16/23 0816)  Resp: 15 (10/16/23 0816)  BP: 115/72 (10/16/23 0743)  SpO2: (!) 94 % (10/16/23 0816) Vital Signs (24h Range):  Temp:  [97.5 °F (36.4 °C)-98.6 °F (37 °C)] 98.5 °F (36.9 °C)  Pulse:  [60-86] 61  Resp:  [15-18] 15  SpO2:  [94 %-98 %] 94 %  BP: ()/(53-77) 115/72     Weight: 119.5 kg (263 lb 7.2 oz)  Body mass index is 35.73 kg/m².      Physical Exam     Significant Labs:  BMP:   Recent Labs   Lab 10/16/23  0411   *   *   K 4.9   CL 99   CO2 23   BUN 69*   CREATININE 4.6*   CALCIUM 8.6*     Coagulation: No results for input(s): "LABPROT", "INR", "APTT" in the last 48 hours.    Significant Diagnostics:  CXR: No results found in the last 24 hours.  "

## 2023-10-16 NOTE — PT/OT/SLP PROGRESS
Physical Therapy      Patient Name:  Ana Bullard   MRN:  [id number]    Patient not seen today secondary to Off the floor for procedure/surgery (cath placement followed by dialysis). Will follow-up 10/17/23.

## 2023-10-16 NOTE — HPI
Patient is a 63M with who was admitted with CHF exacerbation and renal failure. He has a PMH of ischemic cardiomyopathy, 5 vessel bypass in 2012 (sequential SVG to PDA and PLB, SVG split graft to OM1 and OM2, and SVG to LAD, s/p multiple MIs, chronic systolic heart failure, most recent ejection fraction 20%, complete heart block, PPM dependent, HIT, DVT/PE, status post CRT D placement, diabetes mellitus, hyperlipidemia, hypertension, obstructive sleep apnea, and chronic kidney disease. Patient was started on HD and now requires permacath placement.

## 2023-10-16 NOTE — ANESTHESIA PREPROCEDURE EVALUATION
10/16/2023  Ana Bullard is a 63 y.o., male.      Pre-op Assessment    I have reviewed the Patient Summary Reports.     I have reviewed the Nursing Notes. I have reviewed the NPO Status.   I have reviewed the Medications.     Review of Systems  Anesthesia Hx:  Denies Family Hx of Anesthesia complications.   Denies Personal Hx of Anesthesia complications.   Social:  Non-Smoker, No Alcohol Use    Hematology/Oncology:     Oncology Normal    -- Anemia: Hematology Comments: History of DVT and PE.  Patient now has Onyx filter.  Chronic thrombocytopenia.    EENT/Dental:EENT/Dental Normal   Cardiovascular:   Pacemaker (Ochoa AICD. Pacemaker dependent due to complete heart block.) Past MI CAD   CABG/stent Dysrhythmias (  Patient admitted 10/08/2023 with wide complex tachycardia)  CHF ( patient admitted with acute on chronic CHF, ischemic cardiomyopathy) PVD 2020 echo shows 25% EF, grade 3 LV diastolic function.  Mild MR, TR and pulmonary hypertension   Pulmonary:   Sleep Apnea, CPAP    Education provided regarding risk of obstructive sleep apnea     Renal/:   Chronic Renal Disease    Hepatic/GI:   GERD    Musculoskeletal:  Musculoskeletal Normal    Neurological:  Neurology Normal    Endocrine:   Diabetes    Psych:   anxiety depression Claustrophobia         Physical Exam  General: Cooperative, Alert and Oriented    Airway:  Mallampati: III / II  Mouth Opening: Normal  TM Distance: > 6 cm  Tongue: Normal  Neck ROM: Normal ROM    Dental:  Intact    Chest/Lungs:  Clear to auscultation, Normal Respiratory Rate    Heart:  Rate: Normal  Rhythm: Regular Rhythm  Sounds: Normal        Anesthesia Plan  Type of Anesthesia, risks & benefits discussed:    Anesthesia Type: Gen ETT  Intra-op Monitoring Plan: Standard ASA Monitors  Post Op Pain Control Plan: multimodal analgesia  Induction:  IV  Airway Plan: Video,  Post-Induction  Informed Consent: Informed consent signed with the Patient and all parties understand the risks and agree with anesthesia plan.  All questions answered.   ASA Score: 3  Anesthesia Plan Notes: GETA, etomidate  Sleep apnea precautions: No Versed, minimize opioids, extubate awake and sitting up, sugammadex if muscle relaxant used  Multimodal analgesia:  IV acetaminophen, Decadron 8 mg  Antiemetics:  Zofran, Pepcid        Ready For Surgery From Anesthesia Perspective.     .

## 2023-10-16 NOTE — ASSESSMENT & PLAN NOTE
Plts are stable though today- pending Hem/Onc consult  Changed even Pepcid to sucralfate-   Hep C negative-   Will check HIV    Pt should avoid all heparin related products - please review it with HD if pt is being exposed to heparin products

## 2023-10-16 NOTE — PROGRESS NOTES
[facility]  Adult Nutrition   Progress Note (Follow-Up)    SUMMARY     Recommendations  Recommendation/Intervention:   1) Change supplement to Nepro to see if patient likes it better.   2) Menu  to obtain daily meal preferences.   3) RD will continue to monitor.    Goals: 1. Intake to be >/= 75% EEN / EPN. 2. Lab values trend to target range.  Nutrition Goal Status: progressing towards goal    Dietitian Rounds Brief  Patient reports good appetite, eating >75% of meals. He doesn't like Suplena and willing to try Nepro mixed berry mixed with ice but does like Brent, orange flavor only. No other food preferences at this time. He did not eat lunch today d/t being NPO earlier and now going to dialysis.     Diet order:   Current Diet Order: 2000 DM, Renal, 1500 ml FR   Oral Nutrition Supplement: Suplena with dinner, Brent BID    Evaluation of Received Nutrient/Fluid Intake  Energy Calories Required: meeting needs  Protein Required: meeting needs  Fluid Required: meeting needs  Tolerance: tolerating     % Intake of Estimated Energy Needs: 75 - 100 %  % Meal Intake: 75 - 100 %      Intake/Output Summary (Last 24 hours) at 10/16/2023 1659  Last data filed at 10/16/2023 1121  Gross per 24 hour   Intake 750 ml   Output 85 ml   Net 665 ml        Anthropometrics  Temp: 97.7 °F (36.5 °C)  Height Method: Stated  Height: 6' (182.9 cm)  Height (inches): 72 in  Weight Method: Bed Scale  Weight: 119.5 kg (263 lb 7.2 oz)  Weight (lb): 263.45 lb  Ideal Body Weight (IBW), Male: 178 lb  % Ideal Body Weight, Male (lb): 154.07 %  BMI (Calculated): 35.7  BMI Grade: 35 - 39.9 - obesity - grade II       Estimated/Assessed Needs  Weight Used For Calorie Calculations: 127.1 kg (280 lb 3.3 oz)  Energy Calorie Requirements (kcal): 2142 / day (17 kcal/kg)     Protein Requirements: 122-162gm (1.5-2gm/kg IBW)  Weight Used For Protein Calculations: 81 kg (178 lb 9.2 oz) (IBW)  Fluid Requirements (mL): 1L + UOP  Estimated Fluid  Requirement Method: RDA Method  RDA Method (mL): 2142       Reason for Assessment  Reason For Assessment: RD follow-up  Relevant Medical History: AICD , Anemia, chronic disease, Anemia, unspecified, Anxiety and depression,CAD, Cardiomegaly, CHF ), Cholecystitis , Complete heart block ,Diabetic foot ulcer, DVT  GERD , Hyperlipemia DM 2 (insulin dependent), Ischemic cardiomyopathy , MI , obesity, MRSA infection (01/19/2019), Noncompliance with therapeutic plan (2019), Normochromic normocytic anemia (07/27/2021), Osteomyelitis of right foot (01/2019), Osteomyelitis of right foot (09/01/2022), Pulmonary edema (2019), Respiratory failure (2019), Sepsis (01/2019), Sleep apnea (2013), Thrombocytopathy (2012), and Venous stasis dermatitis of both lower extremities.. The patient presented to [facility] on 10/8/2023 with a primary complaint of Tachycardia and Shortness of Breath  Interdisciplinary Rounds: did not attend    Nutrition/Diet History  Spiritual, Cultural Beliefs, Religion Practices, Values that Affect Care: no  Food Allergies: NKFA  Factors Affecting Nutritional Intake: None identified at this time    Nutrition Risk Screen  Nutrition Risk Screen: no indicators present       Altered Skin Integrity 10/08/23 2000 Right anterior;lower Leg Ulceration Partial thickness tissue loss. Shallow open ulcer with a red or pink wound bed, without slough. Intact or Open/Ruptured Serum-filled blister.-Wound Image: Images linked       Altered Skin Integrity 10/08/23 2000 Right lateral Foot Ulceration -Wound Image: Images linked       Altered Skin Integrity 10/08/23 2000 Left Buttocks Contusion Purple or maroon localized area of discolored intact skin or non-intact skin or a blood-filled blister.-Wound Image: Images linked       Altered Skin Integrity 10/08/23 2000 Left lower;dorsal Arm Contusion Purple or maroon localized area of discolored intact skin or non-intact skin or a blood-filled blister.-Wound Image:  Images linked  MST Score: 0  Have you recently lost weight without trying?: No  Weight loss score: 0  Have you been eating poorly because of a decreased appetite?: No  Appetite score: 0       Weight History:  Wt Readings from Last 10 Encounters:   10/14/23 119.5 kg (263 lb 7.2 oz)   09/28/23 121.8 kg (268 lb 8 oz)   09/07/23 120.6 kg (265 lb 15.1 oz)   08/17/23 124.6 kg (274 lb 11.1 oz)   08/03/23 127.9 kg (282 lb)   07/20/23 127.9 kg (282 lb)   07/17/23 127.9 kg (282 lb)   07/06/23 127.9 kg (282 lb)   06/18/23 134.1 kg (295 lb 10.2 oz)   06/01/23 126.6 kg (279 lb)        Lab/Procedures/Meds: Pertinent Labs/Meds Reviewed    Medications:Pertinent Medications Reviewed  Scheduled Meds:   atorvastatin  20 mg Oral QHS    dapagliflozin propanediol  10 mg Oral Daily    gabapentin  300 mg Oral BID    levothyroxine  25 mcg Oral Before breakfast    midodrine  5 mg Oral TID AC    polyethylene glycol  17 g Oral Daily    sucralfate  1 g Oral QID (AC & HS)    torsemide  20 mg Oral BID loop     Continuous Infusions:  PRN Meds:.acetaminophen, albumin human 25%, albuterol, ALPRAZolam, dextrose 50%, dextrose 50%, glucagon (human recombinant), glucose, glucose, insulin aspart U-100, melatonin, ondansetron, ondansetron, sodium chloride 0.9%, traMADoL    Labs: Pertinent Labs Reviewed  Clinical Chemistry:  Recent Labs   Lab 10/12/23  0423 10/14/23  0600 10/15/23  0401 10/16/23  0411   * 131* 132*  132* 131*   K 4.4 4.5 4.4  4.4 4.9    99 100  100 99   CO2 24 25 21*  21* 23   * 158* 136*  136* 114*   BUN 59* 53* 47*  47* 69*   CREATININE 3.8* 3.8* 3.6*  3.6* 4.6*   CALCIUM 8.7 8.5* 8.7  8.7 8.6*   PROT 7.0 7.5 7.1  7.1 7.0   ALBUMIN 3.5 3.6 3.4*  3.4* 3.3*   BILITOT 1.3* 1.2* 1.1*  1.1* 1.1*   ALKPHOS 69 65 68  68 67   AST 34 26 24  24 20   ALT 33 29 26  26 23   ANIONGAP 10 7* 11  11 9   MG 2.2  --   --   --      CBC:   Recent Labs   Lab 10/16/23  0411   WBC 5.74   RBC 3.14*   HGB 8.8*   HCT 29.6*   PLT  53*   MCV 94   MCH 28.0   MCHC 29.7*     Monitor and Evaluation  Food and Nutrient Intake: energy intake, food and beverage intake  Food and Nutrient Adminstration: diet order  Knowledge/Beliefs/Attitudes: food and nutrition knowledge/skill  Physical Activity and Function: nutrition-related ADLs and IADLs  Anthropometric Measurements: weight, weight change, body mass index  Biochemical Data, Medical Tests and Procedures: electrolyte and renal panel, gastrointestinal profile, glucose/endocrine profile, inflammatory profile, lipid profile  Nutrition-Focused Physical Findings: overall appearance     Nutrition Risk  Level of Risk/Frequency of Follow-up: moderate     Nutrition Follow-Up  RD Follow-up?: Yes      Juana Lazcano RD 10/16/2023 4:59 PM

## 2023-10-16 NOTE — CARE UPDATE
10/16/23 0816   Patient Assessment/Suction   Level of Consciousness (AVPU) alert   All Lung Fields Breath Sounds diminished;clear   PRE-TX-O2   Device (Oxygen Therapy) CPAP  (home cpap)   SpO2 (!) 94 %   Pulse Oximetry Type Intermittent   $ Pulse Oximetry - Multiple Charge Pulse Oximetry - Multiple   Pulse 61   Resp 15   Aerosol Therapy   $ Aerosol Therapy Charges PRN treatment not required   Preset CPAP/BiPAP Settings   $ CPAP/BiPAP Daily Charge   (home cpap)   Education   $ Education Bronchodilator;15 min   Respiratory Evaluation   $ Care Plan Tech Time 15 min

## 2023-10-16 NOTE — PLAN OF CARE
Spoke with patient's wife at number on face sheet concerning discharge plan.  Recommendation of home health explained and wife verbalizes agreement and understanding.  She states [facility] but was discharged due to insurance issues.  States should patient not be able to get home health, she is confident he can continue therapy on his own based on education and equipment provided previously by home health agency.  Referral placed in [facility] to [facility].     Outpatient dialysis discussed including the process and option of locations.  She states they would prefer to remain with Dr. Luz and would like his HD to be a [facility] [address] [place], preferably at a mid-day time.  Referral sent to [facility] via Epic.        10/16/23 1138   Post-Acute Status   Post-Acute Authorization Home Health;Dialysis   Home Health Status Referrals Sent   Diaylsis Status Referrals Sent   Patient choice form signed by patient/caregiver List with quality metrics by geographic area provided   Discharge Delays None known at this time   Discharge Plan   Discharge Plan A Home Health

## 2023-10-16 NOTE — PROGRESS NOTES
[facility]  Progress Note    Patient Name: Ana Bullard  MRN: [id number]  Patient Class: IP- Inpatient   Admission Date: 10/8/2023  Length of Stay: 8 days  Attending Physician: Judie Parikh MD  Primary Care Provider: Michelle Messina FNP        Subjective:     Principal Problem:Wide-complex tachycardia        HPI:  No notes on file    Overview/Hospital Course:  No notes on file    Interval History: Pt was alert awake in his bed, no chest pain, sob, fever, chills, n/v, states he makes very little urine- NPO for tunneled catheter for dialysis.     Review of Systems   Constitutional:  Negative for activity change, appetite change, chills and fever.   HENT:  Negative for congestion, ear pain and nosebleeds.    Eyes:  Negative for itching and visual disturbance.   Respiratory:  Negative for apnea, cough, chest tightness, shortness of breath and wheezing.    Cardiovascular:  Negative for chest pain, palpitations and leg swelling.   Gastrointestinal:  Negative for abdominal distention, abdominal pain, constipation, diarrhea, nausea and vomiting.   Genitourinary:  Negative for dysuria and flank pain.   Musculoskeletal:  Negative for back pain.   Neurological:  Negative for dizziness and headaches.     Objective:     Vital Signs (Most Recent):  Temp: 98.5 °F (36.9 °C) (10/16/23 0743)  Pulse: 61 (10/16/23 0816)  Resp: 15 (10/16/23 0816)  BP: 115/72 (10/16/23 0743)  SpO2: (!) 94 % (10/16/23 0816) Vital Signs (24h Range):  Temp:  [97.5 °F (36.4 °C)-98.6 °F (37 °C)] 98.5 °F (36.9 °C)  Pulse:  [60-86] 61  Resp:  [15-18] 15  SpO2:  [94 %-98 %] 94 %  BP: ()/(53-77) 115/72     Weight: 119.5 kg (263 lb 7.2 oz)  Body mass index is 35.73 kg/m².    Intake/Output Summary (Last 24 hours) at 10/16/2023 1108  Last data filed at 10/15/2023 2225  Gross per 24 hour   Intake 880 ml   Output 85 ml   Net 795 ml         Physical Exam  Vitals reviewed.   Constitutional:       General: He is not in acute  "distress.     Appearance: Normal appearance. He is not ill-appearing, toxic-appearing or diaphoretic.   HENT:      Head: Normocephalic and atraumatic.      Mouth/Throat:      Mouth: Mucous membranes are moist.      Pharynx: Oropharynx is clear.   Eyes:      General: No scleral icterus.     Conjunctiva/sclera: Conjunctivae normal.   Neck:      Vascular: No carotid bruit.   Cardiovascular:      Rate and Rhythm: Normal rate and regular rhythm.      Pulses: Normal pulses.      Heart sounds: Normal heart sounds.   Pulmonary:      Effort: Pulmonary effort is normal.      Breath sounds: Normal breath sounds.   Abdominal:      General: Abdomen is flat. Bowel sounds are normal.      Palpations: Abdomen is soft.   Musculoskeletal:         General: Normal range of motion.   Skin:     General: Skin is warm.   Neurological:      General: No focal deficit present.      Mental Status: He is alert and oriented to person, place, and time. Mental status is at baseline.   Psychiatric:         Mood and Affect: Mood normal.         Behavior: Behavior normal.             Significant Labs: All pertinent labs within the past 24 hours have been reviewed.  BMP:   Recent Labs   Lab 10/16/23  0411   *   *   K 4.9   CL 99   CO2 23   BUN 69*   CREATININE 4.6*   CALCIUM 8.6*     CBC:   Recent Labs   Lab 10/15/23  0401 10/16/23  0411   WBC 5.24  5.24 5.74   HGB 8.4*  8.4* 8.8*   HCT 27.8*  27.8* 29.6*   PLT 47*  47* 53*     CMP:   Recent Labs   Lab 10/15/23  0401 10/16/23  0411   *  132* 131*   K 4.4  4.4 4.9     100 99   CO2 21*  21* 23   *  136* 114*   BUN 47*  47* 69*   CREATININE 3.6*  3.6* 4.6*   CALCIUM 8.7  8.7 8.6*   PROT 7.1  7.1 7.0   ALBUMIN 3.4*  3.4* 3.3*   BILITOT 1.1*  1.1* 1.1*   ALKPHOS 68  68 67   AST 24  24 20   ALT 26  26 23   ANIONGAP 11  11 9     Magnesium: No results for input(s): "MG" in the last 48 hours.    Significant Imaging: I have reviewed all pertinent imaging " results/findings within the past 24 hours.      Assessment/Plan:      * Wide-complex tachycardia  S/p amio drip- AICD adjusted to rate of 60 - pacing well now  Keep K at 4 and Mag at 2      CKD (chronic kidney disease), stage V  Pt/s Nephro status is not improving- so pt will be HD dependant for now- will take help from CM to get HD placement for patient pending Hemosplit today    Consult vascular      Heparin induced thrombocytopenia  If heparin products were not used this admission - plts should not drop again.   Will monitor daily CBC      Chronic combined systolic and diastolic heart failure    S/p lasix / bumex drips with help of dobutamine and currently getting RRT- should improve  C/w schedule dialysis as per nephro  Strict I/Os, daily weight    CAD (coronary artery disease)  C/w home meds  Holding asa for now d/t low plts  C/w statin      Obesity  Body mass index is 38 kg/m². Morbid obesity complicates all aspects of disease management from diagnostic modalities to treatment. Weight loss encouraged and health benefits explained to patient.         AICD (automatic cardioverter/defibrillator) present  Readjusted to rate of 60 and its pacing accordingly without problem.   Cardio following      Venous stasis dermatitis of both lower extremities  stockings    Type II diabetes mellitus with neurological manifestations  C/w SSI and accu checks      Chronic Thrombocytopenia  Plts are stable though today- pending Hem/Onc consult  Changed even Pepcid to sucralfate-   Hep C negative-   Will check HIV    Pt should avoid all heparin related products - please review it with HD if pt is being exposed to heparin products      VTE Risk Mitigation (From admission, onward)         Ordered     IP VTE LOW RISK PATIENT  Once         10/08/23 1740     Place sequential compression device  Until discontinued         10/08/23 1740                Discharge Planning   BILL: 10/18/2023     Code Status: Full Code   Is the patient  medically ready for discharge?:     Reason for patient still in hospital (select all that apply): Treatment  Discharge Plan A: Home with family                  Judie Parikh MD  Department of Hospital Medicine   [facility]

## 2023-10-16 NOTE — PT/OT/SLP PROGRESS
Occupational Therapy      Patient Name:  Ana Bulladr   MRN:  [id number]    Patient not seen today secondary to Other (Comment) (pt declined due to awaiting procedure.). Will follow-up next service date.    10/16/2023

## 2023-10-16 NOTE — OP NOTE
OPERATIVE NOTE    DATE: 10/16/2023   TIME: 11:59am    PRE-OPERATIVE DIAGNOSIS: AD requiring HD    POST-OPERATIVE DIAGNOSIS:  Same    PROCEDURE:   Patient brought to the OR and placed in the supine position on the operating room table. General anesthesia was induced and the patient was intubated. The previously placed trialysis catheter was removed and pressure held until hemostasis achieved. The skin in the area below the previous catheter appeared superficially infected.     The neck and chest were then scrubbed, prepped and draped. Under ultrasound guidance the right IJ vein was accessed with a needle. A wire was placed and position confirmed with fluoroscopy. A small amount of lidocaine was injected into the chest wall. Using an 11 blade a small incision was made in the chest wall and around the wire in the neck. Using the tunneler, the catheter was advanced from the chest wall to the neck incision. Over the wire, the soft tissue and vein was sequentially dilated. The sheath was then placed. The catheter was fed into the sheath. Fluoroscopy showed adequate placement but there was some kinking of the catheter. Using a stiff wire, the catheter was adjusted to remove the kink. The ports pulled back and flushed easily. We used normal saline flush at the recommendation of the dialysis team because of the patient's history of HIT.    ANESTHESIA TYPE: General with endotracheal intubation    TISSUE REMOVED: No    COMPLICATIONS: No    BLOOD LOSS: 20cc    FINDINGS: As above

## 2023-10-16 NOTE — PROGRESS NOTES
[facility]    Critical Care Cardiology Progress Note    Subjective:  The pt is 64 y/o Male pt of  who presented to ED with h/o fatigue, back pain, decreased urination and SOB for 2 days. He endorses SOB has progressively worsened over this time and heis having dyspnea with minimal exertion, but no reported chest pain . He reports some feeling some palpitations, BLE edema as well. Pmh includes  ischemic cardiomyopathy, 5 vessel bypass in 2012 (sequential SVG to PDA and PLB, SVG split graft to OM1 and OM2, and SVG to LAD, viability stuudy in May 2022 was negative for hibernation or viability with large anterior scar, s/p multiple MIs, chronic systolic heart failure, most recent ejection fraction 20%, complete heart block, PPM dependent, HIT, DVT/PE, status post CRT D placement, diabetes mellitus, hyperlipidemia, hypertension, obstructive sleep apnea, and chronic kidney disease. Im ER pt had wide complex tachycardia, amiodarone started. Pt was also started on iv diuretic and pressors, transferred to ICU    AICD tracking -130's --dropped max track rate to 100.     10/12: The pt is sitting up in bed today eating breakfast. Denies CP and SOB has markedly improved. Swelling to LE has decreased. VSS , tele reviewed AV paced rate 60's. He was dialyzed yesterday with plans for another session today. H/H 8.6/28.0 plts 66 cr 3.8 CXR on 10/10 post IJ placement, lungs improved. Net output yesterday -2405 cc     10/13: Patient seen and examined this AM. He reports he is feeling much better. Denies shortness of breath or chest pain. LE edema has improved. He is tolerating dialysis well. Tele reviewed, SR with occasional AV pacing. Labs not resulted today. Blood pressures are soft, ranging from 90s-115 systolic.     10/14. Patient off floor this AM. On HD per nephro. SR on tele, intermittent AVpacing.     10/16: Pt seen and examined this AM. Reports he is feeling anxious for his procedure with vascular  "surgery today. Tolerating HD well. He denies chest pain or shortness of breath today. VSS. Labs reviewed. Cr 4.6. Tele reviewed.    Objective:  Vital Signs (Most Recent)  Temp: 98.5 °F (36.9 °C) (10/16/23 0743)  Pulse: 61 (10/16/23 0743)  Resp: 18 (10/16/23 0743)  BP: 115/72 (10/16/23 0743)  SpO2: 95 % (10/16/23 0743)    Vital Signs Range (Last 24H):  Temp:  [97.5 °F (36.4 °C)-98.6 °F (37 °C)]   Pulse:  [60-86]   Resp:  [16-18]   BP: ()/(53-77)   SpO2:  [95 %-98 %]     I & O (Last 24H):    Intake/Output Summary (Last 24 hours) at 10/16/2023 0900  Last data filed at 10/15/2023 2225  Gross per 24 hour   Intake 880 ml   Output 85 ml   Net 795 ml         Current Diet:     Current Diet Order   Procedures    Diet NPO        Allergies:  Review of patient's allergies indicates:   Allergen Reactions    Vasopressin Anaphylaxis and Other (See Comments)     Increased pressure in his head; felt like his eyeballs and veins were going to pop out of his head.     Vasopressin analogues Other (See Comments) and Anaphylaxis     "felt like eyes going to pop out of my head"  Other reaction(s): Other (See Comments)  "felt like eyes going to pop out of my head"  Increased pressure in his head; felt like his eyeballs and veins were going to pop out of his head.     Heparin Other (See Comments)     Other reaction(s): "depleted my blood platets"    Decreased platelet count    Heparin analogues Other (See Comments)     Depleted WBC count    Morphine Hallucinations, Other (See Comments) and Anxiety     Other reaction(s): Hallucinations  Paranoid, hallucinations         Meds:  Scheduled Meds:   atorvastatin  20 mg Oral QHS    dapagliflozin propanediol  10 mg Oral Daily    gabapentin  300 mg Oral BID    levothyroxine  25 mcg Oral Before breakfast    midodrine  5 mg Oral TID AC    polyethylene glycol  17 g Oral Daily    sucralfate  1 g Oral QID (AC & HS)    torsemide  20 mg Oral BID loop     Continuous Infusions:      PRN " Meds:acetaminophen, albumin human 25%, albuterol, ALPRAZolam, dextrose 50%, dextrose 50%, glucagon (human recombinant), glucose, glucose, insulin aspart U-100, melatonin, ondansetron, sodium chloride 0.9%, traMADoL    Oxygen/Ventilator Data (Last 24H):  (if applicable)            Hemodynamic Parameters (Last 24H):   (if applicable)        Lines/Drains:  (if applicable)  Trialysis (Dialysis) Catheter 10/10/23 1700 right internal jugular (Active)   $ Dialysis Supplies Trialysis Catheter (Supply) 10/11/23 0701   Line Necessity Review CRRT/HD 10/11/23 0701   Verification by X-ray Yes 10/11/23 0701   Site Assessment No drainage;No redness;No swelling;No warmth 10/11/23 0701   Line Securement Device Secured with sutures 10/11/23 0701   Dressing Type CHG impregnated dressing/sponge;Central line dressing 10/11/23 0701   Dressing Status Clean;Dry;Intact 10/11/23 0701   Dressing Intervention First dressing 10/11/23 0701   Date on Dressing 10/10/23 10/11/23 0701   Dressing Due to be Changed 10/17/23 10/11/23 0701   Number of days: 0            Peripheral IV - Single Lumen 10/08/23 1500 20 G Left Antecubital (Active)   Site Assessment Clean;Dry;Intact;No redness;No swelling 10/11/23 0701   Extremity Assessment Distal to IV No redness;No swelling 10/11/23 0701   Line Status Saline locked 10/11/23 0701   Dressing Status Clean;Dry;Intact 10/11/23 0701   Dressing Intervention Integrity maintained 10/11/23 0701   Dressing Change Due 10/12/23 10/08/23 1930   Site Change Due 10/12/23 10/08/23 1930   Number of days: 2            Peripheral IV - Single Lumen 10/08/23 1621 18 G Right Antecubital (Active)   Site Assessment Clean;Dry;Intact;No redness;No swelling 10/11/23 0701   Extremity Assessment Distal to IV No swelling;No redness;No warmth 10/11/23 0701   Line Status Infusing 10/11/23 0701   Dressing Status Clean;Dry;Intact 10/11/23 0701   Dressing Intervention Integrity maintained 10/11/23 0701   Dressing Change Due 10/12/23 10/08/23  "1950   Site Change Due 10/12/23 10/08/23 1950   Number of days: 2       Arterial Line 10/09/23 0139 Left Radial (Active)   $ Arterial Line Charges (Upon Insertion) Bedside Insertion Performed 10/09/23 0301   Site Assessment Clean;Dry;Intact;No redness;No swelling 10/11/23 0701   Line Status Pulsatile blood flow 10/11/23 0701   Art Line Waveform Appropriate 10/11/23 0701   Arterial Line Interventions Zeroed and calibrated;Flushed per protocol 10/11/23 0701   Color/Movement/Sensation Capillary refill less than 3 sec 10/11/23 0701   Dressing Type CHG impregnated dressing/sponge 10/11/23 0701   Dressing Status Clean;Dry;Intact 10/11/23 0701   Dressing Intervention Integrity maintained 10/11/23 0701   Dressing Change Due 10/16/23 10/11/23 0600   Number of days: 2            Urethral Catheter 10/08/23 1600 16 Fr. (Active)   Site Assessment Clean;Intact 10/11/23 0701   Collection Container Urimeter 10/11/23 0701   Securement Method secured to top of thigh w/ adhesive device 10/11/23 0701   Catheter Care Performed yes 10/11/23 0701   Reason for Continuing Urinary Catheterization Urinary retention;Critically ill in ICU and requiring hourly monitoring of intake/output 10/11/23 0701   CAUTI Prevention Bundle Intact seal between catheter & drainage tubing;Securement Device in place with 1" slack;Drainage bag/urimeter off the floor;Sheeting clip in use 10/11/23 0400   Output (mL) 50 mL 10/11/23 0713   Number of days: 2       Lab Results :  Recent Results (from the past 24 hour(s))   POCT glucose    Collection Time: 10/15/23 11:12 AM   Result Value Ref Range    POC Glucose 186 (H) 70 - 110   POCT glucose    Collection Time: 10/15/23  4:48 PM   Result Value Ref Range    POC Glucose 167 (H) 70 - 110   POCT glucose    Collection Time: 10/15/23  7:38 PM   Result Value Ref Range    POC Glucose 215 (H) 70 - 110   Urinalysis, Reflex to Urine Culture Urine, Clean Catch    Collection Time: 10/15/23 10:06 PM    Specimen: Urine   Result " Value Ref Range    Specimen UA Urine, Clean Catch     Color, UA Yellow Yellow, Straw, Lois    Appearance, UA Hazy (A) Clear    pH, UA 6.0 5.0 - 8.0    Specific Gravity, UA 1.015 1.005 - 1.030    Protein, UA 2+ (A) Negative    Glucose, UA 2+ (A) Negative    Ketones, UA Negative Negative    Bilirubin (UA) Negative Negative    Occult Blood UA 3+ (A) Negative    Nitrite, UA Negative Negative    Urobilinogen, UA Negative Negative EU/dL    Leukocytes, UA 1+ (A) Negative   Urinalysis Microscopic    Collection Time: 10/15/23 10:06 PM   Result Value Ref Range    RBC, UA 29 (H) 0 - 4 /hpf    WBC, UA 14 (H) 0 - 5 /hpf    Bacteria Negative None-Occ /hpf    Squam Epithel, UA 5 /hpf    Hyaline Casts, UA 36 (A) 0-1/lpf /lpf    Microscopic Comment SEE COMMENT    Urine culture    Collection Time: 10/15/23 10:06 PM    Specimen: Urine   Result Value Ref Range    Urine Culture, Routine No growth to date    CBC auto differential    Collection Time: 10/16/23  4:11 AM   Result Value Ref Range    WBC 5.74 3.90 - 12.70 K/uL    RBC 3.14 (L) 4.60 - 6.20 M/uL    Hemoglobin 8.8 (L) 14.0 - 18.0 g/dL    Hematocrit 29.6 (L) 40.0 - 54.0 %    MCV 94 82 - 98 fL    MCH 28.0 27.0 - 31.0 pg    MCHC 29.7 (L) 32.0 - 36.0 g/dL    RDW 19.7 (H) 11.5 - 14.5 %    Platelets 53 (L) 150 - 450 K/uL    MPV 12.3 9.2 - 12.9 fL    Immature Granulocytes 0.3 0.0 - 0.5 %    Gran # (ANC) 4.2 1.8 - 7.7 K/uL    Immature Grans (Abs) 0.02 0.00 - 0.04 K/uL    Lymph # 0.4 (L) 1.0 - 4.8 K/uL    Mono # 1.0 0.3 - 1.0 K/uL    Eos # 0.1 0.0 - 0.5 K/uL    Baso # 0.03 0.00 - 0.20 K/uL    nRBC 0 0 /100 WBC    Gran % 73.8 (H) 38.0 - 73.0 %    Lymph % 6.4 (L) 18.0 - 48.0 %    Mono % 17.6 (H) 4.0 - 15.0 %    Eosinophil % 1.4 0.0 - 8.0 %    Basophil % 0.5 0.0 - 1.9 %    Differential Method Automated    Comprehensive metabolic panel    Collection Time: 10/16/23  4:11 AM   Result Value Ref Range    Sodium 131 (L) 136 - 145 mmol/L    Potassium 4.9 3.5 - 5.1 mmol/L    Chloride 99 95 - 110  mmol/L    CO2 23 23 - 29 mmol/L    Glucose 114 (H) 70 - 110 mg/dL    BUN 69 (H) 8 - 23 mg/dL    Creatinine 4.6 (H) 0.5 - 1.4 mg/dL    Calcium 8.6 (L) 8.7 - 10.5 mg/dL    Total Protein 7.0 6.0 - 8.4 g/dL    Albumin 3.3 (L) 3.5 - 5.2 g/dL    Total Bilirubin 1.1 (H) 0.1 - 1.0 mg/dL    Alkaline Phosphatase 67 55 - 135 U/L    AST 20 10 - 40 U/L    ALT 23 10 - 44 U/L    eGFR 13.7 (A) >60 mL/min/1.73 m^2    Anion Gap 9 8 - 16 mmol/L   POCT glucose    Collection Time: 10/16/23  7:40 AM   Result Value Ref Range    POC Glucose 126 (H) 70 - 110       Diagnostic Results:  Imaging Results              X-Ray Chest AP Portable (Final result)  Result time 10/08/23 15:13:32      Final result by Tong Morales MD (10/08/23 15:13:32)                   Narrative:    CLINICAL HISTORY:  63 years (1960) Male Tachycardia; Shortness of Breath    TECHNIQUE:  Portable AP radiograph the chest. One view.    COMPARISON:  Radiograph from June 12, 2023    FINDINGS:  There is vascular congestion with increased interstitial markings findings indicating mild pulmonary edema.  There is blunting of both costophrenic angles consistent with trace pleural effusions and adjacent atelectasis. No pneumothorax is identified. The cardiac silhouette is moderately enlarged. The median sternotomy wires and the left sided pacemaker are unchanged.  Osseous structures appear unchanged. The visualized upper abdomen is unremarkable.    IMPRESSION:  Moderate to severe cardiomegaly and findings of mild interstitial pulmonary edema.                  .            Electronically signed by:  Tong Morales MD  10/08/2023 03:13 PM CDT Workstation: [id number]                                    12-lead EKG interpretation:   (if applicable)    Recent Cardiac Rhythm:  (if applicable)      Physical Exam:  Objective:  General Appearance:  Comfortable.    Vital signs: (most recent): Blood pressure 115/72, pulse 61, temperature 98.5 °F (36.9 °C), temperature source  Oral, resp. rate 18, height 6' (1.829 m), weight 119.5 kg (263 lb 7.2 oz), SpO2 95 %.  Vital signs are normal.  No fever.    Lungs:  Normal effort and normal respiratory rate.  Breath sounds clear to auscultation.  He is not in respiratory distress.    Heart: Normal rate.  Regular rhythm.  S1 normal and S2 normal.  Positive for murmur.  No gallop.   Abdomen: Bowel sounds are normal.     Extremities: There is venous stasis.  There is no local swelling.    Neurological: Patient is oriented to person, place and time.        Current Consults:  IP CONSULT TO CARDIOLOGY  IP CONSULT TO HOSPITAL MEDICINE  IP CONSULT TO INTENSIVIST  IP CONSULT TO ANESTHESIOLOGY  WOUND CARE CONSULT  IP CONSULT TO NEPHROLOGY  WOUND CARE CONSULT  IP CONSULT TO REGISTERED DIETITIAN/NUTRITIONIST  IP CONSULT TO REGISTERED DIETITIAN/NUTRITIONIST  IP CONSULT TO UROLOGY  IP CONSULT TO UROLOGY  IP CONSULT TO GENERAL SURGERY  IP CONSULT TO VASCULAR SURGERY  IP CONSULT TO SOCIAL WORK/CASE MANAGEMENT  IP CONSULT TO HEM/ONC    Assessment/Plan:  Assessment:   Acute on chronic combined systolic /diastolic HFrEF   ICM EF 20%  AD/CKD-cardiorenal  CAD-CABG--in office cr 3.2 9/1-currently on dialysis  Atrial arrhythmias  DM   DM foot ulcer   HTN  HLP  FARHAN  Chronic thrombocytopenia   Abbott AICD in situ       Plan:   Continue current management.  Nephrology following. On dialysis.   No new cardiac recommendations at this time.

## 2023-10-16 NOTE — TRANSFER OF CARE
Anesthesia Transfer of Care Note    Patient: Ana DUNCAN Sulligent    Procedure(s) Performed: Procedure(s) (LRB):  Insertion,catheter,tunneled (N/A)    Patient location: PACU    Anesthesia Type: general    Transport from OR: Transported from OR on room air with adequate spontaneous ventilation    Post pain: adequate analgesia    Post assessment: no apparent anesthetic complications and tolerated procedure well    Post vital signs: stable    Level of consciousness: awake and alert    Nausea/Vomiting: no nausea/vomiting    Complications: none    Transfer of care protocol was followed      Last vitals:   Visit Vitals  /72 (BP Location: Right arm, Patient Position: Lying)   Pulse 61   Temp 36.9 °C (98.5 °F) (Oral)   Resp 15   Ht 6' (1.829 m)   Wt 119.5 kg (263 lb 7.2 oz)   SpO2 (!) 94%   BMI 35.73 kg/m²

## 2023-10-16 NOTE — H&P
[facility]  Vascular Surgery  History and Physical     Patient Name: Ana Bullard  MRN: [id number]  Admission Date: 10/8/2023  Code Status: Full Code   Primary Care Physician: Michelle Messina FNP    Subjective:     Chief Complaint/Reason for Admission: SOB, fatigue    HPI:  Patient is a 63M with who was admitted with CHF exacerbation and renal failure. He has a PMH of ischemic cardiomyopathy, 5 vessel bypass in 2012 (sequential SVG to PDA and PLB, SVG split graft to OM1 and OM2, and SVG to LAD, s/p multiple MIs, chronic systolic heart failure, most recent ejection fraction 20%, complete heart block, PPM dependent, HIT, DVT/PE, status post CRT D placement, diabetes mellitus, hyperlipidemia, hypertension, obstructive sleep apnea, and chronic kidney disease. Patient was started on HD and now requires permacath placement.      Medications Prior to Admission   Medication Sig Dispense Refill Last Dose    albuterol (PROVENTIL) 5 mg/mL nebulizer solution Take 2.5 mg by nebulization every 6 (six) hours as needed.   Past Week    aspirin 81 MG Chew Take 81 mg by mouth once daily.   10/8/2023 at 12:00    atorvastatin (LIPITOR) 20 MG tablet Take 20 mg by mouth once daily.   10/7/2023 at 21:00    BASAGLAR KWIKPEN U-100 INSULIN glargine 100 units/mL (3mL) SubQ pen Inject 25 Units into the skin once daily.  3 10/7/2023 at 18:00    carvediloL (COREG) 3.125 MG tablet Take 3.125 mg by mouth 2 (two) times daily.   10/8/2023 at 12:00    FARXIGA 10 mg tablet Take 10 mg by mouth once daily.   10/8/2023 at 12:00    gabapentin (NEURONTIN) 600 MG tablet Take 600 mg by mouth 3 (three) times daily.   10/8/2023 at 12:00    pantoprazole (PROTONIX) 40 MG tablet Take 1 tablet by mouth once daily.  0 10/8/2023 at 12:00    torsemide (DEMADEX) 20 MG Tab Take 1 tablet (20 mg total) by mouth 2 (two) times a day. 120 tablet 0 10/8/2023 at 12:00    traMADoL (ULTRAM) 50 mg tablet Take 1 tablet (50 mg total) by mouth every 6  "(six) hours as needed for Pain. 120 tablet 2 10/8/2023 at 12:00    vitamin B complex-folic acid 0.4 mg Tab Take 1 tablet by mouth once daily.   10/8/2023 at 12:00    albuterol-ipratropium (DUO-NEB) 2.5 mg-0.5 mg/3 mL nebulizer solution Take 3 mLs by nebulization every 6 (six) hours as needed.   Unknown    amiodarone (PACERONE) 200 MG Tab Take 1 tablet (200 mg total) by mouth 2 (two) times daily. 90 tablet 0     CORLANOR 5 mg Tab Take 5 mg by mouth nightly.       TRULICITY 0.75 mg/0.5 mL pen injector Inject 0.75 mg into the skin every 7 days.          Review of patient's allergies indicates:   Allergen Reactions    Vasopressin Anaphylaxis and Other (See Comments)     Increased pressure in his head; felt like his eyeballs and veins were going to pop out of his head.     Vasopressin analogues Other (See Comments) and Anaphylaxis     "felt like eyes going to pop out of my head"  Other reaction(s): Other (See Comments)  "felt like eyes going to pop out of my head"  Increased pressure in his head; felt like his eyeballs and veins were going to pop out of his head.     Heparin Other (See Comments)     Other reaction(s): "depleted my blood platets"    Decreased platelet count    Heparin analogues Other (See Comments)     Depleted WBC count    Morphine Hallucinations, Other (See Comments) and Anxiety     Other reaction(s): Hallucinations  Paranoid, hallucinations         Past Medical History:   Diagnosis Date    AICD (automatic cardioverter/defibrillator) present     Anemia, chronic disease 07/27/2021    Anemia, unspecified 07/27/2021    Anxiety and depression 2012    CAD (coronary artery disease) 2012    Cardiomegaly 2016    CHF (congestive heart failure) 2012    Cholecystitis 2017    Complete heart block 2012    Diabetic foot ulcer     DVT (deep venous thrombosis) 2012    And pulmonary embolus    GERD (gastroesophageal reflux disease) 2010    Heparin induced thrombocytopenia     Hyperlipemia 2011 "    IDDM (insulin dependent diabetes mellitus) 1983    With neuropathy    Ischemic cardiomyopathy     MI (myocardial infarction)     Morbid obesity     MRSA (methicillin resistant Staphylococcus aureus) infection 2019    Noncompliance with therapeutic plan     Normochromic normocytic anemia 2021    Osteomyelitis of right foot 2019    Osteomyelitis of right foot 2022    Klebsiella from bone, GBS in blood    Pulmonary edema     Respiratory failure     Sepsis 2019    Due to cellulitis right leg    Sleep apnea 2013    Thrombocytopathy     Venous stasis dermatitis of both lower extremities      Past Surgical History:   Procedure Laterality Date    CARDIAC PACEMAKER PLACEMENT  2012    CARDIAC PACEMAKER PLACEMENT  10/04/2018    battery replacement    CORONARY ARTERY BYPASS GRAFT  2012    ESOPHAGOGASTRODUODENOSCOPY  2017    SAMARA FILTER PLACEMENT  2012    vena cava    LUMBAR SYMPATHETIC NERVE BLOCK Right 2019    Procedure: BLOCK, NERVE, SYMPATHETIC, LUMBAR;  Surgeon: Tashi Good MD;  [facility];  Service: Pain Management;  Laterality: Right;  RIGHT SYMPATHETIC NERVE BLOCK     Family History       Problem Relation (Age of Onset)    Arthritis Mother    Cancer Father    Diabetes Mother    Heart disease Father    Stroke Father          Tobacco Use    Smoking status: Former     Current packs/day: 0.00     Average packs/day: 2.0 packs/day for 38.0 years (76.0 ttl pk-yrs)     Types: Cigarettes     Start date:      Quit date:      Years since quittin.7    Smokeless tobacco: Never   Substance and Sexual Activity    Alcohol use: No    Drug use: Never    Sexual activity: Never     Review of Systems  Objective:     Vital Signs (Most Recent):  Temp: 98.5 °F (36.9 °C) (10/16/23 0743)  Pulse: 61 (10/16/23 0816)  Resp: 15 (10/16/23 0816)  BP: 115/72 (10/16/23 0743)  SpO2: (!) 94 % (10/16/23 0816) Vital Signs (24h  "Range):  Temp:  [97.5 °F (36.4 °C)-98.6 °F (37 °C)] 98.5 °F (36.9 °C)  Pulse:  [60-86] 61  Resp:  [15-18] 15  SpO2:  [94 %-98 %] 94 %  BP: ()/(53-77) 115/72     Weight: 119.5 kg (263 lb 7.2 oz)  Body mass index is 35.73 kg/m².      Physical Exam     Significant Labs:  BMP:   Recent Labs   Lab 10/16/23  0411   *   *   K 4.9   CL 99   CO2 23   BUN 69*   CREATININE 4.6*   CALCIUM 8.6*     Coagulation: No results for input(s): "LABPROT", "INR", "APTT" in the last 48 hours.    Significant Diagnostics:  CXR: No results found in the last 24 hours.    Assessment and Plan:     OR today for permacath placement.    Terri Gresham MD  Vascular Surgery  [facility]    "

## 2023-10-16 NOTE — PLAN OF CARE
10/15/23 3885   Patient Assessment/Suction   Level of Consciousness (AVPU) alert   Respiratory Effort Normal;Unlabored   All Lung Fields Breath Sounds diminished;clear   PRE-TX-O2   Device (Oxygen Therapy) CPAP   SpO2 98 %   Pulse Oximetry Type Intermittent   $ Pulse Oximetry - Multiple Charge Pulse Oximetry - Multiple   Pulse 60   Resp 18   Aerosol Therapy   $ Aerosol Therapy Charges PRN treatment not required   Respiratory Treatment Status (SVN) PRN treatment not required   Preset CPAP/BiPAP Settings   Mode Of Delivery CPAP  (home unit)   $ Is patient using? Yes   Equipment Type Other (see comment)  (home unit)   Education   $ Education Bronchodilator;15 min   Respiratory Evaluation   $ Care Plan Tech Time 15 min

## 2023-10-16 NOTE — ANESTHESIA POSTPROCEDURE EVALUATION
Anesthesia Post Evaluation    Patient: Ana Bullard    Procedure(s) Performed: Procedure(s) (LRB):  Insertion,catheter,tunneled (Right)    Final Anesthesia Type: general      Patient location during evaluation: PACU  Patient participation: Yes- Able to Participate  Level of consciousness: awake and alert  Post-procedure vital signs: reviewed and stable  Pain management: adequate  Airway patency: patent  FARHAN mitigation strategies: Extubation while patient is awake, Multimodal analgesia and Extubation and recovery carried out in lateral, semiupright, or other nonsupine position  PONV status at discharge: No PONV  Anesthetic complications: no      Cardiovascular status: stable  Respiratory status: unassisted and spontaneous ventilation  Hydration status: euvolemic  Follow-up not needed.          Vitals Value Taken Time   /62 10/16/23 1250   Temp 36.6 °C (97.8 °F) 10/16/23 1140   Pulse 60 10/16/23 1254   Resp 12 10/16/23 1254   SpO2 95 % 10/16/23 1254   Vitals shown include unvalidated device data.      No case tracking events are documented in the log.      Pain/Alyssa Score: Pain Rating Prior to Med Admin: 6 (10/15/2023  8:50 PM)  Alyssa Score: 10 (10/16/2023 12:15 PM)

## 2023-10-16 NOTE — PLAN OF CARE
Pacemaker interrogated. Ana Medina called. No issues identified or noted. Functioning appropriately.

## 2023-10-16 NOTE — CONSULTS
[facility]   Hematology/Oncology  Inpatient Consult Note          Patient Name: Ana Bullard  MRN: [id number]  Admission Date: 10/8/2023  Hospital Length of Stay: 8 days  Code Status: Full Code   Attending Provider: Judie Parikh MD  Referring Provider: Kati  Consulting Provider: Ray Braden MD  Primary Care Physician: Michelle Messina FNP  Principal Problem:Wide-complex tachycardia    Consults  Subjective:     Chief Complaint: Tachycardia and Shortness of Breath          History Present Illness:  63 y.o. male with diagnosis of chronic anemia and chronic thrombocytopenia who presented to the ED at [facility] with tachycardia and SOB. He has been admitted to the hospitalist service. He has multiple medical problems including DM II, CAD, CHF, AICD hx, and chronic venous stasis. 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 He came to hematology clinic for an initial visit back in 2021 for evaluation and labs and studies were ordered at that time; however, the patient never had the lab work or studies done and failed to ever return to clinic.     He is currently in dialysis getting HD. He is awake and alert. He denies any current palpitations, CP, SOB, HA's or N/V. No family currently at bedside. I discussed nursing and dialysis staff in person. He is not on heparin with there dialysis per HD nursing staff.         Past Medical/Surgical History:  Past Medical History:   Diagnosis Date    AICD (automatic cardioverter/defibrillator) present     Anemia, chronic disease 07/27/2021    Anemia, unspecified 07/27/2021    Anxiety and depression 2012    CAD (coronary artery disease) 2012    Cardiomegaly 2016    CHF (congestive heart failure) 2012    Cholecystitis 2017    Complete heart block 2012    Diabetic foot ulcer     DVT (deep venous thrombosis) 2012    And pulmonary embolus    GERD (gastroesophageal reflux disease) 2010     "Heparin induced thrombocytopenia     Hyperlipemia 2011    IDDM (insulin dependent diabetes mellitus) 1983    With neuropathy    Ischemic cardiomyopathy 2012    MI (myocardial infarction) 2012    Morbid obesity     MRSA (methicillin resistant Staphylococcus aureus) infection 01/19/2019    Noncompliance with therapeutic plan 2019    Normochromic normocytic anemia 07/27/2021    Osteomyelitis of right foot 01/2019    Osteomyelitis of right foot 09/01/2022    Klebsiella from bone, GBS in blood    Pulmonary edema 2019    Respiratory failure 2019    Sepsis 01/2019    Due to cellulitis right leg    Sleep apnea 2013    Thrombocytopathy 2012    Venous stasis dermatitis of both lower extremities      Past Surgical History:   Procedure Laterality Date    CARDIAC PACEMAKER PLACEMENT  12/2012    CARDIAC PACEMAKER PLACEMENT  10/04/2018    battery replacement    CORONARY ARTERY BYPASS GRAFT  06/2012    ESOPHAGOGASTRODUODENOSCOPY  01/31/2017    SAMARA FILTER PLACEMENT  2012    vena cava    LUMBAR SYMPATHETIC NERVE BLOCK Right 8/22/2019    Procedure: BLOCK, NERVE, SYMPATHETIC, LUMBAR;  Surgeon: Tashi Good MD;  Location: [facility];  Service: Pain Management;  Laterality: Right;  RIGHT SYMPATHETIC NERVE BLOCK         Allergies:  Review of patient's allergies indicates:   Allergen Reactions    Vasopressin Anaphylaxis and Other (See Comments)     Increased pressure in his head; felt like his eyeballs and veins were going to pop out of his head.     Vasopressin analogues Other (See Comments) and Anaphylaxis     "felt like eyes going to pop out of my head"  Other reaction(s): Other (See Comments)  "felt like eyes going to pop out of my head"  Increased pressure in his head; felt like his eyeballs and veins were going to pop out of his head.     Heparin Other (See Comments)     Other reaction(s): "depleted my blood platets"    Decreased platelet count    Heparin analogues Other (See Comments)     Depleted WBC count    Morphine " Hallucinations, Other (See Comments) and Anxiety     Other reaction(s): Hallucinations  Paranoid, hallucinations         Social/Family History:  Social History     Socioeconomic History    Marital status:    Tobacco Use    Smoking status: Former     Current packs/day: 0.00     Average packs/day: 2.0 packs/day for 38.0 years (76.0 ttl pk-yrs)     Types: Cigarettes     Start date:      Quit date:      Years since quittin.7    Smokeless tobacco: Never   Substance and Sexual Activity    Alcohol use: No    Drug use: Never    Sexual activity: Never     Family History   Problem Relation Age of Onset    Arthritis Mother     Diabetes Mother     Cancer Father     Heart disease Father     Stroke Father          ROS:    GEN: general malaise, weakness, fatigue  HEENT: normal with no HA's, sore throat, stiff neck, changes in vision  CV: normal with no CP, SOB, PND, BAPTISTE or orthopnea; no current palpitations  PULM: normal with no SOB, cough, hemoptysis, sputum or pleuritic pain  GI: normal with no abdominal pain, nausea, vomiting, constipation, diarrhea, melanotic stools, BRBPR, or hematemesis  : normal with no hematuria, dysuria  BREAST: normal with no mass, discharge, pain  SKIN: normal with no rash, erythema, bruising, or swelling        Medications:  Continuous Infusions:  Scheduled Meds:   atorvastatin  20 mg Oral QHS    dapagliflozin propanediol  10 mg Oral Daily    gabapentin  300 mg Oral BID    levothyroxine  25 mcg Oral Before breakfast    midodrine  5 mg Oral TID AC    polyethylene glycol  17 g Oral Daily    sucralfate  1 g Oral QID (AC & HS)    torsemide  20 mg Oral BID loop     PRN Meds:acetaminophen, albumin human 25%, albuterol, ALPRAZolam, dextrose 50%, dextrose 50%, glucagon (human recombinant), glucose, glucose, insulin aspart U-100, melatonin, ondansetron, sodium chloride 0.9%, traMADoL         Objective:       Physical Exam:    Vitals:  Blood pressure 115/72, pulse 61, temperature 98.5 °F  (36.9 °C), temperature source Oral, resp. rate 18, height 6' (1.829 m), weight 119.5 kg (263 lb 7.2 oz), SpO2 95 %.    GEN: no apparent distress, comfortable; AAOx3; overweight  HEAD: atraumatic and normocephalic  EYES: no pallor, no icterus, PERRLA  ENT: OMM, no pharyngeal erythema, external ears WNL; no nasal discharge; no thrush  NECK: no masses, thyroid normal, trachea midline, no LAD/LN's, supple; former Rght IJ removed  CV: RRR with no murmur; normal pulse; normal S1 and S2; no pedal edema; portacath on Rght CW  CHEST: Normal respiratory effort; CTAB; normal breath sounds; no wheeze or crackles  ABDOM: nontender and nondistended; soft; normal bowel sounds; no rebound/guarding  MUSC/Skeletal: ROM normal; no crepitus; joints normal; no deformities or arthropathy  EXTREM: no clubbing, cyanosis, inflammation or swelling; chronic venous stasis changes bilateral lower extremities  SKIN: no rashes, lesions, ulcers, petechiae or subcutaneous nodules; chronic age related skin changes, scarps and bruises  : no changes  NEURO: grossly intact; motor/sensory WNL; AAOx3; no tremors; generalized weakness  PSYCH: normal mood, affect and behavior  LYMPH: normal cervical, supraclavicular, axillary and groin LN's      Lab Review:   Lab Results   Component Value Date    WBC 5.74 10/16/2023    HGB 8.8 (L) 10/16/2023    HCT 29.6 (L) 10/16/2023    MCV 94 10/16/2023    PLT 53 (L) 10/16/2023     CMP  Sodium   Date Value Ref Range Status   10/16/2023 131 (L) 136 - 145 mmol/L Final   01/23/2019 136 134 - 144 mmol/L      Potassium   Date Value Ref Range Status   10/16/2023 4.9 3.5 - 5.1 mmol/L Final     Chloride   Date Value Ref Range Status   10/16/2023 99 95 - 110 mmol/L Final   01/23/2019 102 98 - 110 mmol/L      CO2   Date Value Ref Range Status   10/16/2023 23 23 - 29 mmol/L Final     Glucose   Date Value Ref Range Status   10/16/2023 114 (H) 70 - 110 mg/dL Final   01/23/2019 99 70 - 99 mg/dL      BUN   Date Value Ref Range Status    10/16/2023 69 (H) 8 - 23 mg/dL Final     Creatinine   Date Value Ref Range Status   10/16/2023 4.6 (H) 0.5 - 1.4 mg/dL Final   01/23/2019 0.98 0.60 - 1.40 mg/dL      Calcium   Date Value Ref Range Status   10/16/2023 8.6 (L) 8.7 - 10.5 mg/dL Final     Total Protein   Date Value Ref Range Status   10/16/2023 7.0 6.0 - 8.4 g/dL Final     Albumin   Date Value Ref Range Status   10/16/2023 3.3 (L) 3.5 - 5.2 g/dL Final   01/23/2019 3.0 (L) 3.1 - 4.7 g/dL      Total Bilirubin   Date Value Ref Range Status   10/16/2023 1.1 (H) 0.1 - 1.0 mg/dL Final     Comment:     For infants and newborns, interpretation of results should be based  on gestational age, weight and in agreement with clinical  observations.    Premature Infant recommended reference ranges:  Up to 24 hours.............<8.0 mg/dL  Up to 48 hours............<12.0 mg/dL  3-5 days..................<15.0 mg/dL  6-29 days.................<15.0 mg/dL       Alkaline Phosphatase   Date Value Ref Range Status   10/16/2023 67 55 - 135 U/L Final     AST   Date Value Ref Range Status   10/16/2023 20 10 - 40 U/L Final     ALT   Date Value Ref Range Status   10/16/2023 23 10 - 44 U/L Final     Anion Gap   Date Value Ref Range Status   10/16/2023 9 8 - 16 mmol/L Final     eGFR   Date Value Ref Range Status   10/16/2023 13.7 (A) >60 mL/min/1.73 m^2 Final           Diagnostic Results:  X-Ray Chest AP Portable [[id number]] Collected: 10/16/23 1207   Order Status: Completed Updated: 10/16/23 1240   Narrative:     Chest single view     CLINICAL DATA: Central venous catheter placement     FINDINGS: AP views compared to October 10. Cardiomegaly is stable. There has been previous median sternotomy. Implantable cardiac device remains in place. Dual lumen right IJ central catheter is noted with distal tip just above the atriocaval junction. No pneumothorax or other placement related complication is identified.     Faint bilateral interstitial prominence is at least in part summation  effect related to body habitus. Mild interstitial edema is not excluded.     IMPRESSION:   1. No pneumothorax status post central venous catheter placement.   2. Faint bilateral interstitial prominence may be related to technique and body habitus. Mild interstitial edema is not excluded       Assessment/Plan:     IMPRESSION:  (1) 63 y.o. male with diagnosis of chronic anemia and chronic thrombocytopenia who presented to the ED at [facility] with tachycardia and SOB. He has been admitted to the hospitalist service. He has multiple medical problems including DM II, CAD, CHF, AICD hx, and chronic venous stasis. 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 He came to hematology clinic for an initial visit back in 2021 for evaluation and labs and studies were ordered at that time; however, the patient never had the lab work or studies done and failed to ever return to clinic.     10/16/2023:  - hgb at 8.8 and plats 53,000  - chronic thrombocytopenia for several years  - suspected underlying autoimmune mediated process such as chronic ITP  - most likely has a chronic multifactorial anemia process with anemia of chronic disorders, anemia of chronic renal disease, +/ chronic GIB  - total bili is only minimally elevated, so I do not suspect any significant hemolytic process at this time  - He came to hematology clinic for an initial visit back in 2021 for evaluation and labs and studies were ordered at that time; however, the patient never had the lab work or studies done and failed to ever return to clinic.     (2) CAD, CHF, s/p prior AICD    (3) DM II    (4) History of heparin induced thrombocytopenia    (5) CKD - stage V - started HD this admit    (6) Chronic venous stasis dermatitis of the lower extremities    (7) Pulmonary HTN          Active Diagnoses:    Diagnosis Date Noted POA    PRINCIPAL PROBLEM:  Wide-complex tachycardia [R00.0] 06/12/2023 Yes     CKD (chronic kidney disease), stage V [N18.5] 10/15/2023 Yes    Pulmonary hypertension [I27.20] 09/02/2022 Yes    AICD (automatic cardioverter/defibrillator) present [Z95.810] 01/19/2020 Yes    Obesity [E66.9] 01/19/2020 Yes    Chronic combined systolic and diastolic heart failure [I50.42] 01/19/2020 Yes    Heparin induced thrombocytopenia [D75.829] 01/19/2020 Yes    Venous stasis dermatitis of both lower extremities [I87.2] 01/31/2019 Yes    CAD (coronary artery disease) [I25.10] 01/01/2012 Yes    Chronic Thrombocytopenia [D69.6] 01/01/2012 Yes    Type II diabetes mellitus with neurological manifestations [E11.49] 01/01/1983 Yes      Problems Resolved During this Admission:           PLAN:   Monitor labs and transfuse as needed  Check iron panel, B12/folate, LDH, SPEP and stool OBS x3  Order antiplatelet antibody screens  Avoid heparinoid products including heparin, lovenox and even heparin flushes  Avoid IV zosyn and IV pepcid as these meds could exacerbate the thrombocytopenia  Cardiac as per cardiology directives  Renal as per nephrology directives  Will follow with you           Thank you for your consult.      Ray Braden MD  Hematology/Oncology  [facility]

## 2023-10-16 NOTE — ANESTHESIA PROCEDURE NOTES
Intubation    Date/Time: 10/16/2023 10:42 AM    Performed by: Mala Atwood CRNA  Authorized by: Homero Salcido MD    Intubation:     Induction:  Intravenous    Intubated:  Postinduction    Mask Ventilation:  Easy with oral airway    Attempts:  1    Attempted By:  CRNA    Method of Intubation:  Direct    Blade:  Malave 4    Laryngeal View Grade: Grade I - full view of cords      Difficult Airway Encountered?: No      Complications:  None    Airway Device:  Oral endotracheal tube    Airway Device Size:  7.5    Style/Cuff Inflation:  Cuffed    Inflation Amount (mL):  6    Tube secured:  21    Secured at:  The lips    Placement Verified By:  Capnometry    Complicating Factors:  None    Findings Post-Intubation:  BS equal bilateral and atraumatic/condition of teeth unchanged

## 2023-10-16 NOTE — SUBJECTIVE & OBJECTIVE
Interval History: Pt was alert awake in his bed, no chest pain, sob, fever, chills, n/v, states he makes very little urine- NPO for tunneled catheter for dialysis.     Review of Systems   Constitutional:  Negative for activity change, appetite change, chills and fever.   HENT:  Negative for congestion, ear pain and nosebleeds.    Eyes:  Negative for itching and visual disturbance.   Respiratory:  Negative for apnea, cough, chest tightness, shortness of breath and wheezing.    Cardiovascular:  Negative for chest pain, palpitations and leg swelling.   Gastrointestinal:  Negative for abdominal distention, abdominal pain, constipation, diarrhea, nausea and vomiting.   Genitourinary:  Negative for dysuria and flank pain.   Musculoskeletal:  Negative for back pain.   Neurological:  Negative for dizziness and headaches.     Objective:     Vital Signs (Most Recent):  Temp: 98.5 °F (36.9 °C) (10/16/23 0743)  Pulse: 61 (10/16/23 0816)  Resp: 15 (10/16/23 0816)  BP: 115/72 (10/16/23 0743)  SpO2: (!) 94 % (10/16/23 0816) Vital Signs (24h Range):  Temp:  [97.5 °F (36.4 °C)-98.6 °F (37 °C)] 98.5 °F (36.9 °C)  Pulse:  [60-86] 61  Resp:  [15-18] 15  SpO2:  [94 %-98 %] 94 %  BP: ()/(53-77) 115/72     Weight: 119.5 kg (263 lb 7.2 oz)  Body mass index is 35.73 kg/m².    Intake/Output Summary (Last 24 hours) at 10/16/2023 1108  Last data filed at 10/15/2023 2225  Gross per 24 hour   Intake 880 ml   Output 85 ml   Net 795 ml         Physical Exam  Vitals reviewed.   Constitutional:       General: He is not in acute distress.     Appearance: Normal appearance. He is not ill-appearing, toxic-appearing or diaphoretic.   HENT:      Head: Normocephalic and atraumatic.      Mouth/Throat:      Mouth: Mucous membranes are moist.      Pharynx: Oropharynx is clear.   Eyes:      General: No scleral icterus.     Conjunctiva/sclera: Conjunctivae normal.   Neck:      Vascular: No carotid bruit.   Cardiovascular:      Rate and Rhythm: Normal  "rate and regular rhythm.      Pulses: Normal pulses.      Heart sounds: Normal heart sounds.   Pulmonary:      Effort: Pulmonary effort is normal.      Breath sounds: Normal breath sounds.   Abdominal:      General: Abdomen is flat. Bowel sounds are normal.      Palpations: Abdomen is soft.   Musculoskeletal:         General: Normal range of motion.   Skin:     General: Skin is warm.   Neurological:      General: No focal deficit present.      Mental Status: He is alert and oriented to person, place, and time. Mental status is at baseline.   Psychiatric:         Mood and Affect: Mood normal.         Behavior: Behavior normal.             Significant Labs: All pertinent labs within the past 24 hours have been reviewed.  BMP:   Recent Labs   Lab 10/16/23  0411   *   *   K 4.9   CL 99   CO2 23   BUN 69*   CREATININE 4.6*   CALCIUM 8.6*     CBC:   Recent Labs   Lab 10/15/23  0401 10/16/23  0411   WBC 5.24  5.24 5.74   HGB 8.4*  8.4* 8.8*   HCT 27.8*  27.8* 29.6*   PLT 47*  47* 53*     CMP:   Recent Labs   Lab 10/15/23  0401 10/16/23  0411   *  132* 131*   K 4.4  4.4 4.9     100 99   CO2 21*  21* 23   *  136* 114*   BUN 47*  47* 69*   CREATININE 3.6*  3.6* 4.6*   CALCIUM 8.7  8.7 8.6*   PROT 7.1  7.1 7.0   ALBUMIN 3.4*  3.4* 3.3*   BILITOT 1.1*  1.1* 1.1*   ALKPHOS 68  68 67   AST 24  24 20   ALT 26  26 23   ANIONGAP 11  11 9     Magnesium: No results for input(s): "MG" in the last 48 hours.    Significant Imaging: I have reviewed all pertinent imaging results/findings within the past 24 hours.  "

## 2023-10-17 LAB
ALBUMIN SERPL BCP-MCNC: 3.3 G/DL (ref 3.5–5.2)
ALP SERPL-CCNC: 68 U/L (ref 55–135)
ALT SERPL W/O P-5'-P-CCNC: 22 U/L (ref 10–44)
ANION GAP SERPL CALC-SCNC: 9 MMOL/L (ref 8–16)
AST SERPL-CCNC: 21 U/L (ref 10–40)
BASOPHILS # BLD AUTO: 0 K/UL (ref 0–0.2)
BASOPHILS NFR BLD: 0 % (ref 0–1.9)
BILIRUB SERPL-MCNC: 1 MG/DL (ref 0.1–1)
BUN SERPL-MCNC: 61 MG/DL (ref 8–23)
CALCIUM SERPL-MCNC: 8.4 MG/DL (ref 8.7–10.5)
CHLORIDE SERPL-SCNC: 100 MMOL/L (ref 95–110)
CO2 SERPL-SCNC: 23 MMOL/L (ref 23–29)
CREAT SERPL-MCNC: 4 MG/DL (ref 0.5–1.4)
DIFFERENTIAL METHOD: ABNORMAL
EOSINOPHIL # BLD AUTO: 0 K/UL (ref 0–0.5)
EOSINOPHIL NFR BLD: 0 % (ref 0–8)
ERYTHROCYTE [DISTWIDTH] IN BLOOD BY AUTOMATED COUNT: 19.1 % (ref 11.5–14.5)
EST. GFR  (NO RACE VARIABLE): 16.2 ML/MIN/1.73 M^2
GLUCOSE SERPL-MCNC: 167 MG/DL (ref 70–110)
GLUCOSE SERPL-MCNC: 170 MG/DL (ref 70–110)
GLUCOSE SERPL-MCNC: 193 MG/DL (ref 70–110)
GLUCOSE SERPL-MCNC: 199 MG/DL (ref 70–110)
GLUCOSE SERPL-MCNC: 233 MG/DL (ref 70–110)
HCT VFR BLD AUTO: 27.4 % (ref 40–54)
HGB BLD-MCNC: 8.3 G/DL (ref 14–18)
HIV 1+2 AB+HIV1 P24 AG SERPL QL IA: NON REACTIVE
IMM GRANULOCYTES # BLD AUTO: 0.03 K/UL (ref 0–0.04)
IMM GRANULOCYTES NFR BLD AUTO: 0.5 % (ref 0–0.5)
LYMPHOCYTES # BLD AUTO: 0.2 K/UL (ref 1–4.8)
LYMPHOCYTES NFR BLD: 3.2 % (ref 18–48)
MCH RBC QN AUTO: 28.4 PG (ref 27–31)
MCHC RBC AUTO-ENTMCNC: 30.3 G/DL (ref 32–36)
MCV RBC AUTO: 94 FL (ref 82–98)
MONOCYTES # BLD AUTO: 0.7 K/UL (ref 0.3–1)
MONOCYTES NFR BLD: 11.3 % (ref 4–15)
NEUTROPHILS # BLD AUTO: 5.3 K/UL (ref 1.8–7.7)
NEUTROPHILS NFR BLD: 85 % (ref 38–73)
NRBC BLD-RTO: 0 /100 WBC
PLATELET # BLD AUTO: 55 K/UL (ref 150–450)
PMV BLD AUTO: 12 FL (ref 9.2–12.9)
POTASSIUM SERPL-SCNC: 4.5 MMOL/L (ref 3.5–5.1)
PROT SERPL-MCNC: 7 G/DL (ref 6–8.4)
RBC # BLD AUTO: 2.92 M/UL (ref 4.6–6.2)
RETICS/RBC NFR AUTO: 2 % (ref 0.4–2)
SODIUM SERPL-SCNC: 132 MMOL/L (ref 136–145)
WBC # BLD AUTO: 6.27 K/UL (ref 3.9–12.7)

## 2023-10-17 PROCEDURE — 25000003 PHARM REV CODE 250: Performed by: STUDENT IN AN ORGANIZED HEALTH CARE EDUCATION/TRAINING PROGRAM

## 2023-10-17 PROCEDURE — 86704 HEP B CORE ANTIBODY TOTAL: CPT | Performed by: STUDENT IN AN ORGANIZED HEALTH CARE EDUCATION/TRAINING PROGRAM

## 2023-10-17 PROCEDURE — 25000003 PHARM REV CODE 250: Performed by: INTERNAL MEDICINE

## 2023-10-17 PROCEDURE — 99900035 HC TECH TIME PER 15 MIN (STAT)

## 2023-10-17 PROCEDURE — 85025 COMPLETE CBC W/AUTO DIFF WBC: CPT | Performed by: NURSE PRACTITIONER

## 2023-10-17 PROCEDURE — 97116 GAIT TRAINING THERAPY: CPT | Mod: CQ

## 2023-10-17 PROCEDURE — 86023 IMMUNOGLOBULIN ASSAY: CPT

## 2023-10-17 PROCEDURE — 86022 PLATELET ANTIBODIES: CPT

## 2023-10-17 PROCEDURE — 86706 HEP B SURFACE ANTIBODY: CPT | Performed by: STUDENT IN AN ORGANIZED HEALTH CARE EDUCATION/TRAINING PROGRAM

## 2023-10-17 PROCEDURE — 80053 COMPREHEN METABOLIC PANEL: CPT | Performed by: NURSE PRACTITIONER

## 2023-10-17 PROCEDURE — 21400001 HC TELEMETRY ROOM

## 2023-10-17 PROCEDURE — 30000890 HC MISC. SEND OUT TEST

## 2023-10-17 PROCEDURE — 94761 N-INVAS EAR/PLS OXIMETRY MLT: CPT

## 2023-10-17 PROCEDURE — 36415 COLL VENOUS BLD VENIPUNCTURE: CPT | Performed by: NURSE PRACTITIONER

## 2023-10-17 PROCEDURE — 97535 SELF CARE MNGMENT TRAINING: CPT

## 2023-10-17 PROCEDURE — 36415 COLL VENOUS BLD VENIPUNCTURE: CPT | Performed by: STUDENT IN AN ORGANIZED HEALTH CARE EDUCATION/TRAINING PROGRAM

## 2023-10-17 PROCEDURE — 85045 AUTOMATED RETICULOCYTE COUNT: CPT | Performed by: INTERNAL MEDICINE

## 2023-10-17 RX ADMIN — GABAPENTIN 300 MG: 300 CAPSULE ORAL at 08:10

## 2023-10-17 RX ADMIN — SUCRALFATE 1 G: 1 TABLET ORAL at 08:10

## 2023-10-17 RX ADMIN — INSULIN ASPART 2 UNITS: 100 INJECTION, SOLUTION INTRAVENOUS; SUBCUTANEOUS at 03:10

## 2023-10-17 RX ADMIN — ATORVASTATIN CALCIUM 20 MG: 20 TABLET, FILM COATED ORAL at 08:10

## 2023-10-17 RX ADMIN — TORSEMIDE 20 MG: 20 TABLET ORAL at 08:10

## 2023-10-17 RX ADMIN — MIDODRINE HYDROCHLORIDE 5 MG: 2.5 TABLET ORAL at 04:10

## 2023-10-17 RX ADMIN — TRAMADOL HYDROCHLORIDE 50 MG: 50 TABLET, COATED ORAL at 08:10

## 2023-10-17 RX ADMIN — LEVOTHYROXINE SODIUM 25 MCG: 0.03 TABLET ORAL at 05:10

## 2023-10-17 RX ADMIN — POLYETHYLENE GLYCOL 3350 17 G: 17 POWDER, FOR SOLUTION ORAL at 08:10

## 2023-10-17 RX ADMIN — SUCRALFATE 1 G: 1 TABLET ORAL at 12:10

## 2023-10-17 RX ADMIN — SUCRALFATE 1 G: 1 TABLET ORAL at 04:10

## 2023-10-17 RX ADMIN — MIDODRINE HYDROCHLORIDE 5 MG: 2.5 TABLET ORAL at 12:10

## 2023-10-17 RX ADMIN — MIDODRINE HYDROCHLORIDE 5 MG: 2.5 TABLET ORAL at 05:10

## 2023-10-17 RX ADMIN — TORSEMIDE 20 MG: 20 TABLET ORAL at 04:10

## 2023-10-17 RX ADMIN — SUCRALFATE 1 G: 1 TABLET ORAL at 05:10

## 2023-10-17 NOTE — PLAN OF CARE
10/17/23 0806   Patient Assessment/Suction   Level of Consciousness (AVPU) alert   Respiratory Effort Unlabored   All Lung Fields Breath Sounds   (fairly clear, diminished)   PRE-TX-O2   Device (Oxygen Therapy) room air   SpO2 99 %   Pulse Oximetry Type Intermittent   $ Pulse Oximetry - Multiple Charge Pulse Oximetry - Multiple   Pulse 60   Resp 18   Aerosol Therapy   $ Aerosol Therapy Charges PRN treatment not required

## 2023-10-17 NOTE — PROGRESS NOTES
[facility]  Progress Note    Patient Name: Ana Bullard  MRN: [id number]  Patient Class: IP- Inpatient   Admission Date: 10/8/2023  Length of Stay: 9 days  Attending Physician: Judie Parikh MD  Primary Care Provider: Michelle Messina FNP        Subjective:     Principal Problem:Wide-complex tachycardia        HPI:  No notes on file    Overview/Hospital Course:  No notes on file    Interval History: Pt was sitting in bed comfortably and calm and was playing game in his cell phone - was stating he does not have any chest pain, sob, fever, chills, and actually he thinks he is making more urine and no issue with bowel movement.     Review of Systems   Constitutional:  Negative for activity change, appetite change, chills and fever.   HENT:  Negative for congestion, ear pain and nosebleeds.    Eyes:  Negative for itching and visual disturbance.   Respiratory:  Negative for apnea, cough, chest tightness, shortness of breath and wheezing.    Cardiovascular:  Negative for chest pain, palpitations and leg swelling.   Gastrointestinal:  Negative for abdominal distention, abdominal pain, constipation, diarrhea, nausea and vomiting.   Genitourinary:  Negative for dysuria and flank pain.   Musculoskeletal:  Negative for back pain.   Neurological:  Negative for dizziness and headaches.     Objective:     Vital Signs (Most Recent):  Temp: 97.6 °F (36.4 °C) (10/17/23 1155)  Pulse: (!) 58 (notified nurse Felice) (10/17/23 1155)  Resp: 18 (10/17/23 1155)  BP: 123/61 (10/17/23 1155)  SpO2: 98 % (10/17/23 1155) Vital Signs (24h Range):  Temp:  [95.5 °F (35.3 °C)-98.1 °F (36.7 °C)] 97.6 °F (36.4 °C)  Pulse:  [58-85] 58  Resp:  [16-19] 18  SpO2:  [95 %-99 %] 98 %  BP: ()/(53-63) 123/61     Weight: 122 kg (269 lb)  Body mass index is 36.48 kg/m².    Intake/Output Summary (Last 24 hours) at 10/17/2023 1342  Last data filed at 10/17/2023 0900  Gross per 24 hour   Intake 1460 ml   Output 2650 ml  "  Net -1190 ml         Physical Exam  Vitals reviewed.   Constitutional:       General: He is not in acute distress.     Appearance: Normal appearance. He is not ill-appearing, toxic-appearing or diaphoretic.   HENT:      Head: Normocephalic and atraumatic.      Mouth/Throat:      Mouth: Mucous membranes are moist.      Pharynx: Oropharynx is clear.   Eyes:      General: No scleral icterus.     Conjunctiva/sclera: Conjunctivae normal.   Neck:      Vascular: No carotid bruit.      Comments: Has tunneled catheter  Cardiovascular:      Rate and Rhythm: Normal rate and regular rhythm.      Pulses: Normal pulses.      Heart sounds: Normal heart sounds.   Pulmonary:      Effort: Pulmonary effort is normal.      Breath sounds: Normal breath sounds.   Abdominal:      General: Abdomen is flat. Bowel sounds are normal.      Palpations: Abdomen is soft.   Musculoskeletal:         General: Normal range of motion.   Skin:     General: Skin is warm.   Neurological:      General: No focal deficit present.      Mental Status: He is alert and oriented to person, place, and time. Mental status is at baseline.   Psychiatric:         Mood and Affect: Mood normal.         Behavior: Behavior normal.             Significant Labs: All pertinent labs within the past 24 hours have been reviewed.  BMP:   Recent Labs   Lab 10/17/23  0409   *   *   K 4.5      CO2 23   BUN 61*   CREATININE 4.0*   CALCIUM 8.4*     CBC:   Recent Labs   Lab 10/16/23  0411 10/17/23  0409   WBC 5.74 6.27   HGB 8.8* 8.3*   HCT 29.6* 27.4*   PLT 53* 55*     CMP:   Recent Labs   Lab 10/16/23  0411 10/17/23  0409   * 132*   K 4.9 4.5   CL 99 100   CO2 23 23   * 170*   BUN 69* 61*   CREATININE 4.6* 4.0*   CALCIUM 8.6* 8.4*   PROT 7.0 7.0   ALBUMIN 3.3* 3.3*   BILITOT 1.1* 1.0   ALKPHOS 67 68   AST 20 21   ALT 23 22   ANIONGAP 9 9     Magnesium: No results for input(s): "MG" in the last 48 hours.    Significant Imaging: I have reviewed all " pertinent imaging results/findings within the past 24 hours.      Assessment/Plan:      * Wide-complex tachycardia  S/p amio drip- AICD adjusted to rate of 60 - pacing well now  Keep K at 4 and Mag at 2      CKD (chronic kidney disease), stage V  S/p hemosplit- pending placement for scheduled HDs-   Pending Hep Labs as per [facility]-   C/w HD session as per his schedules while he is here.      Heparin induced thrombocytopenia  If heparin products were not used this admission - plts should not drop again.   Will monitor daily CBC      Chronic combined systolic and diastolic heart failure    S/p lasix / bumex drips with help of dobutamine and currently getting RRT- should improve  C/w schedule dialysis as per nephro  Strict I/Os, daily weight    CAD (coronary artery disease)  C/w home meds  Holding asa for now d/t low plts  C/w statin      Obesity  Body mass index is 38 kg/m². Morbid obesity complicates all aspects of disease management from diagnostic modalities to treatment. Weight loss encouraged and health benefits explained to patient.         AICD (automatic cardioverter/defibrillator) present  Readjusted to rate of 60 and its pacing accordingly without problem.   Cardio following      Venous stasis dermatitis of both lower extremities  stockings    Type II diabetes mellitus with neurological manifestations  C/w SSI and accu checks      Chronic Thrombocytopenia  Plts are stable though today- Hem/Onc consult reviewed  Changed even Pepcid to sucralfate-   Hep C negative-     Pt should avoid all heparin related products - please review it with HD if pt is being exposed to heparin products      VTE Risk Mitigation (From admission, onward)         Ordered     IP VTE LOW RISK PATIENT  Once         10/08/23 1740     Place sequential compression device  Until discontinued         10/08/23 1740                Discharge Planning   BILL: 10/18/2023     Code Status: Full Code   Is the patient medically ready for  discharge?:     Reason for patient still in hospital (select all that apply): Treatment  Discharge Plan A: Home Health   Discharge Delays: (!) Dialysis Set-up              Judie Parikh MD  Department of Hospital Medicine   [facility]

## 2023-10-17 NOTE — NURSING
RN Navigator met with patient at bedside, regarding scheduling hospital follow up/tcc appointment upon discharge. Pt is AAOX4 and lying in bed w/hob elevated.  Pt agreeable to hospital follow up .RN Informed pt of scheduled appointment date: and time: 10/24 at 2:00 p.m. Patient reminded to bring portable home O2 if used and all medication bottles to Discharge Clinic follow up appointment. Discharge Clinic information handout, appointment letter and folder provided to patient. Pt states  will provide drive himself to appointment.    Barriers to attending PCC visit:  none voiced by pt.

## 2023-10-17 NOTE — PLAN OF CARE
Patient accepted for home health services with [facility] mgmt agency when patient discharges.     Outpatient dialysis pending with [facility]".  Per Lindsey with [facility] admissions, the Hep B panel sent in does not include the Hep B total core and surface antibodies, which are required.  Dr. Parikh notified and labs ordered.        10/17/23 5575   Post-Acute Status   Post-Acute Authorization Home Health;Dialysis   Home Health Status Pending medical clearance/testing   Diaylsis Status Pending medical clearance/testing   Discharge Delays (!) Dialysis Set-up

## 2023-10-17 NOTE — ASSESSMENT & PLAN NOTE
S/p hemosplit- pending placement for scheduled HDs-   Pending Hep Labs as per [facility][1]-   C/w HD session as per his schedules while he is here.

## 2023-10-17 NOTE — ASSESSMENT & PLAN NOTE
Plts are stable though today- Hem/Onc consult reviewed  Changed even Pepcid to sucralfate-   Hep C negative-     Pt should avoid all heparin related products - please review it with HD if pt is being exposed to heparin products

## 2023-10-17 NOTE — NURSING
Consent and Hep B status verified, removed 2000 uf net, no issues with catheter, fresh post op dressing is intact, patient stable throughout treatment. Educated patient on HD treatment . Report given to GEOVANY booker     10/16/23 3292   Handoff Report   Received From Maryellen   Given To Felice   Vital Signs   Temp 97.6 °F (36.4 °C)   Temp Source Oral   Heart Rate Source Monitor   Resp 18   SpO2 98 %   Pulse Oximetry Type Intermittent   Device (Oxygen Therapy) room air   BP (!) 119/56   BP Location Right arm   BP Method Automatic   Patient Position Lying   Assessments (Pre/Post)   Consent Obtained yes   Safety vein preservation armband present   Date Hepatitis Profile Obtained 10/10/23   Blood Liters Processed (BLP) 51.6   Transport Modality bed   Level of Consciousness (AVPU) alert   Dialyzer Clearance mildly streaked   Pain   Preferred Pain Scale number (Numeric Rating Pain Scale)   Comfort/Acceptable Pain Level 0   Pain Rating (0-10): Rest 0   Pain Rating (0-10): Activity 0   Pain Management Interventions quiet environment facilitated;pillow support provided;position adjusted   Pain/Comfort Interventions   Fever Reduction/Comfort Measures lightweight clothing;lightweight bedding   Pre-Hemodialysis Assessment   Additional Dialysis Information Needed Yes   Patient Status Departed   Treatment Consent Verified Yes   Treatment Status Completed        Hemodialysis Catheter 10/16/23 1114 right internal jugular   Placement Date/Time: 10/16/23 1114   Present Prior to Hospital Arrival?: No  Hand Hygiene: Performed  Barrier Precautions: Performed  Skin Antisepsis: ChloraPrep  Hemodialysis Catheter Type: Tunneled catheter  Location: right internal jugular  Cathete...   Line Necessity Review CRRT/HD   Site Assessment No redness;No swelling;No warmth   Line Securement Device Secured with sutures   Dressing Type Gauze;Transparent (Tegaderm)   Dressing Status Old drainage;Intact;New drainage   Dressing Intervention Integrity  maintained   Date on Dressing 10/16/23   Dressing Due to be Changed 10/17/23   Venous Patency/Care deaccessed;flushed w/o difficulty;normal saline locked   Arterial Patency/Care flushed w/o difficulty;normal saline locked;deaccessed   Post-Hemodialysis Assessment   Rinseback Volume (mL) 250 mL   Blood Volume Processed (Liters) 51.6 L   Dialyzer Clearance Lightly streaked   Duration of Treatment 180 minutes   Additional Fluid Intake (mL) 500 mL   Total UF (mL) 2500 mL   Net Fluid Removal 2000   Patient Response to Treatment josé miguel   Post-Treatment Weight 117.5 kg (259 lb 0.7 oz)   Treatment Weight Change -2   Post-Hemodialysis Comments stable   Edema   Edema generalized

## 2023-10-17 NOTE — PT/OT/SLP PROGRESS
Occupational Therapy   Treatment    Name: Ana Bullard  MRN: [id number]  Admitting Diagnosis:  Wide-complex tachycardia  1 Day Post-Op    Recommendations:     Discharge Recommendations: Low Intensity Therapy  Discharge Equipment Recommendations:  none  Barriers to discharge:  None    Assessment:     Ana Bullard is a 63 y.o. male with a medical diagnosis of Wide-complex tachycardia.  Pt agreeable to OT therapy session this AM. Performance deficits affecting function are weakness, impaired endurance, impaired cardiopulmonary response to activity.     Rehab Prognosis:  Good; patient would benefit from acute skilled OT services to address these deficits and reach maximum level of function.       Plan:     Patient to be seen 2 x/week to address the above listed problems via self-care/home management, therapeutic activities, therapeutic exercises  Plan of Care Expires: 11/13/23  Plan of Care Reviewed with: patient    Subjective     Chief Complaint: none stated  Patient/Family Comments/goals: none stated  Pain/Comfort:  Pain Rating 1: 0/10    Objective:     Communicated with: nursing prior to session.  Patient found HOB elevated with telemetry upon OT entry to room.    General Precautions: Standard, fall    Orthopedic Precautions:N/A  Braces: N/A  Respiratory Status: Room air     Occupational Performance:     Bed Mobility:    Patient completed Supine to Sit with supervision     Functional Mobility/Transfers:  Patient completed Sit <> Stand Transfer with supervision  with  no assistive device   Functional Mobility: pt amb in room with SBA with no AD, no LOB, no SOB    Activities of Daily Living:  Grooming: stand by assistance standing at sink to brush teeth and to wash face    Treatment & Education:  Pt educated on role of OT/POC, importance of OOB/EOB activity, use of call bell, and safety during ADLs, transfers, and functional mobility.    Patient left sitting edge of bed with all lines intact and call button in  reach    GOALS:   Multidisciplinary Problems       Occupational Therapy Goals          Problem: Occupational Therapy    Goal Priority Disciplines Outcome Interventions   Occupational Therapy Goal     OT, PT/OT Ongoing, Progressing    Description: Goals to be met by: 11/13/2023     Patient will increase functional independence with ADLs by performing:    UE Dressing with Modified [place].  LE Dressing with Modified [place].  Grooming while standing at sink with Modified [place].  Toileting from toilet with Modified [place] for hygiene and clothing management.   Bathing from  tub bench with Modified [place].                         Time Tracking:     OT Date of Treatment: 10/17/23  OT Start Time: 1058  OT Stop Time: 1113  OT Total Time (min): 15 min    Billable Minutes:Self Care/Home Management 15    OT/JEFF: OT          10/17/2023

## 2023-10-17 NOTE — PROGRESS NOTES
INPATIENT NEPHROLOGY Progress Note   [facility]    Patient Name: Ana Bullard  Date: 10/17/2023    Reason for consultation: AD/CKD    Chief Complaint:   Chief Complaint   Patient presents with    Tachycardia    Shortness of Breath     History of Present Illness:  62 y/o Male pt of Dr. Vences who presented to ED with h/o fatigue, back pain, decreased urination and SOB for 2 days. He endorses SOB has progressively worsened over this time and he is having dyspnea with minimal exertion, but no reported chest pain . He reports some feeling some palpitations, BLE edema as well. PMH includes  ischemic cardiomyopathy, 5 vessel bypass in 2012, s/p multiple MIs, chronic systolic heart failure, most recent ejection fraction 20%, complete heart block, PPM dependent, HIT, DVT/PE, diabetes mellitus, hyperlipidemia, hypertension, obstructive sleep apnea, and chronic kidney disease IV. In the ER he had wide complex tachycardia, amiodarone started. Pt was also started on leyla and lasix gtts, transferred to ICU. Consulted for renal management.    Interval History:  10/9- on BIPAP, overloaded  0/10- AF with RVR, SBP , on 3L NC, UOP 80cc, ashford placed- renal function worse- remains volume overloaded- I have discussed the risks and benefits of hemodialysis with the patient/family.  All of their questions were answered.  Risks include low blood pressure, stroke, heart attack, seizure, infection, nausea, delirium, and death.   10/11 tolerated first HD- got off 2L- 2nd treatment today- AICD adjusted yest  10/12 got off 3L yest- plan for 3rd treatment today- UOP 225cc- net -2.8L  10/13- got off 4L yest- plan for 4rth treatment today- UOP 60cc- net -6L  10/14  2L off w/HD yesterday.  Pressures low, but stable, can have albumin w/HD if needed.  No complaints.  No UOP recorded.  10/15  VSS, lab results reviewed.  250cc UOP recorded.  1.5L off w/HD yesterday.  Holding HD today, pt asking about going home.  Told  him we have to see if HD needs to continue;  if so, will need out patient chair placement.  Voiced understanding.  10/16 VSS. No renal recovery. Seen on HD today, plan MWF. Awaiting placement and tunneled cath.  10/17 VSS. S/p tunneled HD cath on 10/16 by Dr. Oleary. Abdominal US today. HD tomorrow.    Plan of Care:    AD on CKD IV due to CRS- initiated RRT on 10/10 for clearance and UF  Wide complex tachycardia vs Sinus tachycardia- AICD adjusted  CHF exacerbation with hypoxia requiring NIPPV (EF 25%, grade III DD, pulm HTN)  Hyponatremia  Secondary HPT  Anemia of CKD    Plan:    - continue RRT today for clearance and UF, then MWF  - had hemosplit placed on 10/16 by dr. Oleary, needs outpatient placement with [facility].  - rate control improved- AICD adjusted 10/10  - use albumin PRN for hypotension- push UF with each treatment  - cardiac diet, 1.5L fluid restriction as tolerated  - resume farxiga and siuretics, added misdodrine  - no nsaids or IV contrast  - continue ashford  - no acute BMM issues  - H/H noted- may start to improve with more UF- hold off GANGA    Thank you for allowing us to participate in this patient's care. We will continue to follow.    Vital Signs:  Temp Readings from Last 3 Encounters:   10/17/23 97.8 °F (36.6 °C) (Oral)   06/18/23 97.8 °F (36.6 °C)   05/31/23 97.5 °F (36.4 °C)       Pulse Readings from Last 3 Encounters:   10/17/23 60   08/17/23 61   07/17/23 60       BP Readings from Last 3 Encounters:   10/17/23 126/60   07/17/23 116/71   06/18/23 138/73       Weight:  Wt Readings from Last 3 Encounters:   10/17/23 122 kg (269 lb)   09/28/23 121.8 kg (268 lb 8 oz)   09/07/23 120.6 kg (265 lb 15.1 oz)       INS/OUTS:  I/O last 3 completed shifts:  In: 1730 [P.O.:840; I.V.:40; Other:500; IV Piggyback:350]  Out: 2585 [Urine:70; Other:2515]  I/O this shift:  In: 240 [P.O.:240]  Out: 100 [Urine:100]    Medications:  Scheduled Meds:   atorvastatin  20 mg Oral QHS    dapagliflozin propanediol  10 mg  Oral Daily    gabapentin  300 mg Oral BID    levothyroxine  25 mcg Oral Before breakfast    midodrine  5 mg Oral TID AC    polyethylene glycol  17 g Oral Daily    sucralfate  1 g Oral QID (AC & HS)    torsemide  20 mg Oral BID loop     Continuous Infusions:      PRN Meds:.acetaminophen, albumin human 25%, albuterol, ALPRAZolam, dextrose 50%, dextrose 50%, glucagon (human recombinant), glucose, glucose, insulin aspart U-100, melatonin, ondansetron, sodium chloride 0.9%, traMADoL  No current facility-administered medications on file prior to encounter.     Current Outpatient Medications on File Prior to Encounter   Medication Sig Dispense Refill    albuterol (PROVENTIL) 5 mg/mL nebulizer solution Take 2.5 mg by nebulization every 6 (six) hours as needed.      aspirin 81 MG Chew Take 81 mg by mouth once daily.      atorvastatin (LIPITOR) 20 MG tablet Take 20 mg by mouth once daily.      BASAGLAR KWIKPEN U-100 INSULIN glargine 100 units/mL (3mL) SubQ pen Inject 25 Units into the skin once daily.  3    carvediloL (COREG) 3.125 MG tablet Take 3.125 mg by mouth 2 (two) times daily.      FARXIGA 10 mg tablet Take 10 mg by mouth once daily.      gabapentin (NEURONTIN) 600 MG tablet Take 600 mg by mouth 3 (three) times daily.      pantoprazole (PROTONIX) 40 MG tablet Take 1 tablet by mouth once daily.  0    torsemide (DEMADEX) 20 MG Tab Take 1 tablet (20 mg total) by mouth 2 (two) times a day. 120 tablet 0    traMADoL (ULTRAM) 50 mg tablet Take 1 tablet (50 mg total) by mouth every 6 (six) hours as needed for Pain. 120 tablet 2    vitamin B complex-folic acid 0.4 mg Tab Take 1 tablet by mouth once daily.      albuterol-ipratropium (DUO-NEB) 2.5 mg-0.5 mg/3 mL nebulizer solution Take 3 mLs by nebulization every 6 (six) hours as needed.      amiodarone (PACERONE) 200 MG Tab Take 1 tablet (200 mg total) by mouth 2 (two) times daily. 90 tablet 0    CORLANOR 5 mg Tab Take 5 mg by mouth nightly.      TRULICITY 0.75 mg/0.5 mL pen  injector Inject 0.75 mg into the skin every 7 days.         Review of Systems:    Physical Exam:  /60 (BP Location: Right arm, Patient Position: Sitting)   Pulse 60   Temp 97.8 °F (36.6 °C) (Oral)   Resp 18   Ht 6' (1.829 m)   Wt 122 kg (269 lb)   SpO2 99%   BMI 36.48 kg/m²     General Appearance:    NAD, AAO x 3, cooperative, appears stated age   Head:    Normocephalic, atraumatic   Eyes:    PER, EOMI, and conjunctiva/sclera clear bilaterally        Mouth:   Moist mucus membranes, no thrush or oral lesions, normal      dentition   Back:     No CVA tenderness   Lungs:     Rales   Heart:    Tachy    Abdomen:     Soft, non-tender, distended,   Extremities:   Edematous   MSK:   No joint or muscle swelling, tenderness or deformity   Skin:   Skin color, texture, turgor normal, no rashes or lesions   Neurologic/Psychiatric:   CNII-XII intact, normal strength and sensation       throughout, no asterixis; normal affect, memory, judgement     and insight     Results:  Recent Labs   Lab 10/15/23  0401 10/16/23  0411 10/17/23  0409   *  132* 131* 132*   K 4.4  4.4 4.9 4.5     100 99 100   CO2 21*  21* 23 23   BUN 47*  47* 69* 61*   CREATININE 3.6*  3.6* 4.6* 4.0*   *  136* 114* 170*         Recent Labs   Lab 10/11/23  0350 10/12/23  0423 10/14/23  0600 10/15/23  0401 10/16/23  0411 10/17/23  0409   CALCIUM 8.4* 8.7   < > 8.7  8.7 8.6* 8.4*   ALBUMIN 3.4* 3.5   < > 3.4*  3.4* 3.3* 3.3*   MG 2.4 2.2  --   --   --   --     < > = values in this interval not displayed.         Recent Labs   Lab 09/02/22  0439 11/23/22  0951 10/09/23  0230   Hemoglobin A1C 7.7 H 6.8 H 6.2         Recent Labs   Lab 10/15/23  0401 10/16/23  0411 10/17/23  0409   WBC 5.24  5.24 5.74 6.27   HGB 8.4*  8.4* 8.8* 8.3*   HCT 27.8*  27.8* 29.6* 27.4*   PLT 47*  47* 53* 55*   MCV 95  95 94 94   MCHC 30.2*  30.2* 29.7* 30.3*   MONO 16.6*  16.6*  0.9  0.9 17.6*  1.0 11.3  0.7   EOSINOPHIL 1.3  1.3 1.4 0.0          Recent Labs   Lab 10/15/23  0401 10/16/23  0411 10/17/23  0409   BILITOT 1.1*  1.1* 1.1* 1.0   PROT 7.1  7.1 7.0 7.0   ALBUMIN 3.4*  3.4* 3.3* 3.3*   ALKPHOS 68  68 67 68   ALT 26  26 23 22   AST 24  24 20 21         Recent Labs   Lab 06/12/23  2241 10/09/23  0241 10/15/23  2206   Color, UA Yellow Yellow Yellow   Appearance, UA Cloudy A Hazy A Hazy A   pH, UA 5.0 5.0 6.0   Specific [place], UA 1.020 1.015 1.015   Protein, UA 1+ A 1+ A 2+ A   Glucose, UA 1+ A 2+ A 2+ A   Ketones, UA Negative Negative Negative   Urobilinogen, UA 4.0-6.0 A Negative Negative   Bilirubin (UA) Negative Negative Negative   Occult Blood UA 3+ A 3+ A 3+ A   Nitrite, UA Negative Negative Negative   RBC, UA >100 H 58 H 29 H   WBC, UA 28 H 4 14 H   Bacteria Negative Negative Negative   Hyaline Casts, UA 32 A 44 A 36 A         Recent Labs   Lab 09/01/22  0333   POC PH 7.350   POC PCO2 54.1 H   POC HCO3 29.9 H   POC PO2 18 L   POC SATURATED O2 24 L   POC BE 4   Sample VENOUS         Microbiology Results (last 7 days)       Procedure Component Value Units Date/Time    Urine culture [[id number]] Collected: 10/15/23 2206    Order Status: Completed Specimen: Urine Updated: 10/17/23 0656     Urine Culture, Routine No growth to date    Narrative:      Specimen Source->Urine    Blood culture [[id number]] Collected: 10/09/23 0226    Order Status: Completed Specimen: Blood from Line, Jugular, Internal Right Updated: 10/14/23 0432     Blood Culture, Routine No growth after 5 days.    Blood culture #2 **CANNOT BE ORDERED STAT** [[id number]] Collected: 10/08/23 1512    Order Status: Completed Specimen: Blood Updated: 10/13/23 1832     Blood Culture, Routine No growth after 5 days.    Blood culture #1 **CANNOT BE ORDERED STAT** [[id number]] Collected: 10/08/23 1504    Order Status: Completed Specimen: Blood from Antecubital, Right Updated: 10/13/23 1832     Blood Culture, Routine No growth after 5 days.          I have spent >  minutes providing  care for this patient for the above diagnoses. These services have included chart/data/imaging review, evaluation, exam, formulation of plan, , note preparation, and discussions with staff involved in this patient's care.    Maicol Leach MD  [facility]  [address] [place] [id number]  [phone] (p)  [phone] (f)

## 2023-10-17 NOTE — PROGRESS NOTES
[facility]   Hematology/Oncology  Inpatient Progress Note          Patient Name: Ana Bullard  MRN: [id number]  Admission Date: 10/8/2023  Hospital Length of Stay: 9 days  Code Status: Full Code   Attending Provider: Judie Parikh MD  Consulting Provider: Ray Braden MD  Primary Care Physician: Michelle Messina FNP  Principal Problem:Wide-complex tachycardia      Subjective:       Patient ID: Ana Bullard is a 63 y.o. male.    Chief Complaint: Tachycardia and Shortness of Breath        History Present Illness:    Patient laying in bed; he is awake and alert; he appears to be comfortable; pain under control; breathing stable; no CP, HA's or N/V; no HD today; discussed with floor nursing staff      Review of Systems:  GEN: general malaise, weakness, fatigue  HEENT: normal with no HA's, sore throat, stiff neck, changes in vision  CV: normal with no CP, SOB, PND, BAPTISTE or orthopnea; no current palpitations  PULM: normal with no SOB, cough, hemoptysis, sputum or pleuritic pain  GI: normal with no abdominal pain, nausea, vomiting, constipation, diarrhea, melanotic stools, BRBPR, or hematemesis  : normal with no hematuria, dysuria  BREAST: normal with no mass, discharge, pain  SKIN: normal with no rash, erythema, bruising, or swelling      Objective:     Vitals:  Blood pressure 126/60, pulse 60, temperature 97.8 °F (36.6 °C), temperature source Oral, resp. rate 18, height 6' (1.829 m), weight 122 kg (269 lb), SpO2 99 %.    Physical Exam:  GEN: no apparent distress, comfortable; AAOx3; overweight  HEAD: atraumatic and normocephalic  EYES: no pallor, no icterus, PERRLA  ENT: OMM, no pharyngeal erythema, external ears WNL; no nasal discharge; no thrush  NECK: no masses, thyroid normal, trachea midline, no LAD/LN's, supple; former Rght IJ removed  CV: RRR with no murmur; normal pulse; normal S1 and S2; no pedal edema; portacath on Rght CW  CHEST: Normal respiratory effort; CTAB; normal breath  sounds; no wheeze or crackles  ABDOM: nontender and nondistended; soft; normal bowel sounds; no rebound/guarding  MUSC/Skeletal: ROM normal; no crepitus; joints normal; no deformities or arthropathy  EXTREM: no clubbing, cyanosis, inflammation or swelling; chronic venous stasis changes bilateral lower extremities  SKIN: no rashes, lesions, ulcers, petechiae or subcutaneous nodules; chronic age related skin changes, scarps and bruises; dry skin  : no changes  NEURO: grossly intact; motor/sensory WNL; AAOx3; no tremors; generalized weakness  PSYCH: normal mood, affect and behavior  LYMPH: normal cervical, supraclavicular, axillary and groin LN's             Lab Review:        Lab Results   Component Value Date    WBC 6.27 10/17/2023    HGB 8.3 (L) 10/17/2023    HCT 27.4 (L) 10/17/2023    MCV 94 10/17/2023    PLT 55 (L) 10/17/2023       CMP  Sodium   Date Value Ref Range Status   10/17/2023 132 (L) 136 - 145 mmol/L Final   01/23/2019 136 134 - 144 mmol/L      Potassium   Date Value Ref Range Status   10/17/2023 4.5 3.5 - 5.1 mmol/L Final     Chloride   Date Value Ref Range Status   10/17/2023 100 95 - 110 mmol/L Final   01/23/2019 102 98 - 110 mmol/L      CO2   Date Value Ref Range Status   10/17/2023 23 23 - 29 mmol/L Final     Glucose   Date Value Ref Range Status   10/17/2023 170 (H) 70 - 110 mg/dL Final   01/23/2019 99 70 - 99 mg/dL      BUN   Date Value Ref Range Status   10/17/2023 61 (H) 8 - 23 mg/dL Final     Creatinine   Date Value Ref Range Status   10/17/2023 4.0 (H) 0.5 - 1.4 mg/dL Final   01/23/2019 0.98 0.60 - 1.40 mg/dL      Calcium   Date Value Ref Range Status   10/17/2023 8.4 (L) 8.7 - 10.5 mg/dL Final     Total Protein   Date Value Ref Range Status   10/17/2023 7.0 6.0 - 8.4 g/dL Final     Albumin   Date Value Ref Range Status   10/17/2023 3.3 (L) 3.5 - 5.2 g/dL Final   01/23/2019 3.0 (L) 3.1 - 4.7 g/dL      Total Bilirubin   Date Value Ref Range Status   10/17/2023 1.0 0.1 - 1.0 mg/dL Final      Comment:     For infants and newborns, interpretation of results should be based  on gestational age, weight and in agreement with clinical  observations.    Premature Infant recommended reference ranges:  Up to 24 hours.............<8.0 mg/dL  Up to 48 hours............<12.0 mg/dL  3-5 days..................<15.0 mg/dL  6-29 days.................<15.0 mg/dL       Alkaline Phosphatase   Date Value Ref Range Status   10/17/2023 68 55 - 135 U/L Final     AST   Date Value Ref Range Status   10/17/2023 21 10 - 40 U/L Final     ALT   Date Value Ref Range Status   10/17/2023 22 10 - 44 U/L Final     Anion Gap   Date Value Ref Range Status   10/17/2023 9 8 - 16 mmol/L Final     eGFR   Date Value Ref Range Status   10/17/2023 16.2 (A) >60 mL/min/1.73 m^2 Final     Lab Results   Component Value Date    IRON 36 (L) 10/16/2023    TRANSFERRIN 224 10/16/2023    TIBC 314 10/16/2023    FESATURATED 11 (L) 10/16/2023      Lab Results   Component Value Date    FERRITIN 93.2 10/16/2023           Radiology Diagnostic Studies:     X-Ray Chest AP Portable [[id number]] Collected: 10/16/23 1207   Order Status: Completed Updated: 10/16/23 1240   Narrative:     Chest single view     CLINICAL DATA: Central venous catheter placement     FINDINGS: AP views compared to October 10. Cardiomegaly is stable. There has been previous median sternotomy. Implantable cardiac device remains in place. Dual lumen right IJ central catheter is noted with distal tip just above the atriocaval junction. No pneumothorax or other placement related complication is identified.     Faint bilateral interstitial prominence is at least in part summation effect related to body habitus. Mild interstitial edema is not excluded.     IMPRESSION:   1. No pneumothorax status post central venous catheter placement.   2. Faint bilateral interstitial prominence may be related to technique and body habitus. Mild interstitial edema is not excluded            Assessment:      IMPRESSION:    (1) 63 y.o. male with diagnosis of chronic anemia and chronic thrombocytopenia who presented to the ED at [facility] with tachycardia and SOB. He has been admitted to the hospitalist service. He has multiple medical problems including DM II, CAD, CHF, AICD hx, and chronic venous stasis. 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 He came to hematology clinic for an initial visit back in 2021 for evaluation and labs and studies were ordered at that time; however, the patient never had the lab work or studies done and failed to ever return to clinic.      10/16/2023:  - hgb at 8.8 and plats 53,000  - chronic thrombocytopenia for several years  - suspected underlying autoimmune mediated process such as chronic ITP  - most likely has a chronic multifactorial anemia process with anemia of chronic disorders, anemia of chronic renal disease, +/ chronic GIB  - total bili is only minimally elevated, so I do not suspect any significant hemolytic process at this time  - He came to hematology clinic for an initial visit back in 2021 for evaluation and labs and studies were ordered at that time; however, the patient never had the lab work or studies done and failed to ever return to clinic.     10/17/2023:  -  hgb at 8.3 and plats 55,000  - serum iron and %iron sat low     (2) CAD, CHF, s/p prior AICD     (3) DM II     (4) History of heparin induced thrombocytopenia     (5) CKD - stage V - started HD this admit     (6) Chronic venous stasis dermatitis of the lower extremities     (7) Pulmonary HTN        1. Atrial tachycardia    2. Pacemaker-mediated tachycardia    3. Acute on chronic congestive heart failure, unspecified heart failure type    4. Ulcer of right foot with fat layer exposed    5. Wide-complex tachycardia    6. Cellulitis of right leg    7. CKD (chronic kidney disease), stage V    8. Normochromic normocytic anemia    9.  Chronic Thrombocytopenia           Plan:     PLAN:          Monitor labs and transfuse as needed  Recommend consideration for dose of IV iron  Ordered antiplatelet antibody screens  Avoid heparinoid products including heparin, lovenox and even heparin flushes  Avoid IV zosyn and IV pepcid as these meds could exacerbate the thrombocytopenia  Cardiac as per cardiology directives  Renal as per nephrology directives  Will follow with you               Ray Braden MD  Hematology/Oncology  [facility]

## 2023-10-17 NOTE — PLAN OF CARE
Problem: Occupational Therapy  Goal: Occupational Therapy Goal  Description: Goals to be met by: 11/13/2023     Patient will increase functional independence with ADLs by performing:    UE Dressing with Modified [place].  LE Dressing with Modified [place].  Grooming while standing at sink with Modified [place].  Toileting from toilet with Modified [place] for hygiene and clothing management.   Bathing from  tub bench with Modified [place].    Outcome: Ongoing, Progressing

## 2023-10-17 NOTE — PT/OT/SLP PROGRESS
"Physical Therapy Treatment    Patient Name:  Ana Bullard   MRN:  [id number]    Recommendations:     Discharge Recommendations: Low Intensity Therapy  Discharge Equipment Recommendations: to be determined by next level of care  Barriers to discharge:  balance deficits    Assessment:     Ana Bullard is a 63 y.o. male admitted with a medical diagnosis of Wide-complex tachycardia.  He presents with the following impairments/functional limitations: weakness, impaired endurance, gait instability, impaired cardiopulmonary response to activity.    Pt agreeable to visit. Pt supervision for bed mobility. Pt required stand by to contact guard assist for sit to stand transfer due to slight LOB that pt was able to correct. Pt ambulated 325' with no AD and contact guard to close stand by assist. Upon return to room, pt noted to have skin tear on base of left middle finger actively bleeding. RN informed.    Rehab Prognosis: Fair; patient would benefit from acute skilled PT services to address these deficits and reach maximum level of function.    Recent Surgery: Procedure(s) (LRB):  Insertion,catheter,tunneled (Right) 1 Day Post-Op    Plan:     During this hospitalization, patient to be seen 6 x/week to address the identified rehab impairments via gait training, therapeutic activities, therapeutic exercises, neuromuscular re-education and progress toward the following goals:    Plan of Care Expires:  11/13/23    Subjective     Chief Complaint: pt reports that he is "wobbly" sometimes  Patient/Family Comments/goals: to get better  Pain/Comfort:  Pain Rating 1: 0/10      Objective:     Communicated with RN prior to session.  Patient found HOB elevated with bed alarm, peripheral IV upon PT entry to room.     General Precautions: Standard, fall  Orthopedic Precautions: N/A  Braces: N/A  Respiratory Status: Room air     Functional Mobility:  Bed Mobility:     Supine to Sit: supervision  Transfers:     Sit to Stand:  stand by " assistance and contact guard assistance with no AD  Gait: x 325' no AD and CGA-close SBA      AM-PAC 6 CLICK MOBILITY          Treatment & Education:  Pt educated on importance of time OOB, importance of intermittent mobility, safe techniques for transfers/ambulation, discharge recommendations/options, and use of call light for assistance and fall prevention.      Patient left sitting edge of bed with all lines intact, call button in reach, and RN notified..    GOALS:   Multidisciplinary Problems       Physical Therapy Goals          Problem: Physical Therapy    Goal Priority Disciplines Outcome Goal Variances Interventions   Physical Therapy Goal     PT, PT/OT Ongoing, Progressing     Description: Goals to be met by: 23     Patient will increase functional independence with mobility by performin. Supine to sit with Supervision  2. Sit to stand transfer with Supervision  3. Bed to chair transfer with Supervision using least restrictive assistive device  4. Gait  x 150 feet with Supervision using least restrictive assistive device                             Time Tracking:     PT Received On: 10/17/23  PT Start Time: 927     PT Stop Time: 938  PT Total Time (min): 11 min     Billable Minutes: Gait Training 11    Treatment Type: Treatment  PT/PTA: PTA     Number of PTA visits since last PT visit: 2     10/17/2023

## 2023-10-17 NOTE — SUBJECTIVE & OBJECTIVE
Interval History: Pt was sitting in bed comfortably and calm and was playing game in his cell phone - was stating he does not have any chest pain, sob, fever, chills, and actually he thinks he is making more urine and no issue with bowel movement.     Review of Systems   Constitutional:  Negative for activity change, appetite change, chills and fever.   HENT:  Negative for congestion, ear pain and nosebleeds.    Eyes:  Negative for itching and visual disturbance.   Respiratory:  Negative for apnea, cough, chest tightness, shortness of breath and wheezing.    Cardiovascular:  Negative for chest pain, palpitations and leg swelling.   Gastrointestinal:  Negative for abdominal distention, abdominal pain, constipation, diarrhea, nausea and vomiting.   Genitourinary:  Negative for dysuria and flank pain.   Musculoskeletal:  Negative for back pain.   Neurological:  Negative for dizziness and headaches.     Objective:     Vital Signs (Most Recent):  Temp: 97.6 °F (36.4 °C) (10/17/23 1155)  Pulse: (!) 58 (notified nurse Felice) (10/17/23 1155)  Resp: 18 (10/17/23 1155)  BP: 123/61 (10/17/23 1155)  SpO2: 98 % (10/17/23 1155) Vital Signs (24h Range):  Temp:  [95.5 °F (35.3 °C)-98.1 °F (36.7 °C)] 97.6 °F (36.4 °C)  Pulse:  [58-85] 58  Resp:  [16-19] 18  SpO2:  [95 %-99 %] 98 %  BP: ()/(53-63) 123/61     Weight: 122 kg (269 lb)  Body mass index is 36.48 kg/m².    Intake/Output Summary (Last 24 hours) at 10/17/2023 1342  Last data filed at 10/17/2023 0900  Gross per 24 hour   Intake 1460 ml   Output 2650 ml   Net -1190 ml         Physical Exam  Vitals reviewed.   Constitutional:       General: He is not in acute distress.     Appearance: Normal appearance. He is not ill-appearing, toxic-appearing or diaphoretic.   HENT:      Head: Normocephalic and atraumatic.      Mouth/Throat:      Mouth: Mucous membranes are moist.      Pharynx: Oropharynx is clear.   Eyes:      General: No scleral icterus.     Conjunctiva/sclera:  "Conjunctivae normal.   Neck:      Vascular: No carotid bruit.      Comments: Has tunneled catheter  Cardiovascular:      Rate and Rhythm: Normal rate and regular rhythm.      Pulses: Normal pulses.      Heart sounds: Normal heart sounds.   Pulmonary:      Effort: Pulmonary effort is normal.      Breath sounds: Normal breath sounds.   Abdominal:      General: Abdomen is flat. Bowel sounds are normal.      Palpations: Abdomen is soft.   Musculoskeletal:         General: Normal range of motion.   Skin:     General: Skin is warm.   Neurological:      General: No focal deficit present.      Mental Status: He is alert and oriented to person, place, and time. Mental status is at baseline.   Psychiatric:         Mood and Affect: Mood normal.         Behavior: Behavior normal.             Significant Labs: All pertinent labs within the past 24 hours have been reviewed.  BMP:   Recent Labs   Lab 10/17/23  0409   *   *   K 4.5      CO2 23   BUN 61*   CREATININE 4.0*   CALCIUM 8.4*     CBC:   Recent Labs   Lab 10/16/23  0411 10/17/23  0409   WBC 5.74 6.27   HGB 8.8* 8.3*   HCT 29.6* 27.4*   PLT 53* 55*     CMP:   Recent Labs   Lab 10/16/23  0411 10/17/23  0409   * 132*   K 4.9 4.5   CL 99 100   CO2 23 23   * 170*   BUN 69* 61*   CREATININE 4.6* 4.0*   CALCIUM 8.6* 8.4*   PROT 7.0 7.0   ALBUMIN 3.3* 3.3*   BILITOT 1.1* 1.0   ALKPHOS 67 68   AST 20 21   ALT 23 22   ANIONGAP 9 9     Magnesium: No results for input(s): "MG" in the last 48 hours.    Significant Imaging: I have reviewed all pertinent imaging results/findings within the past 24 hours.  "

## 2023-10-18 LAB
ALBUMIN SERPL BCP-MCNC: 3.4 G/DL (ref 3.5–5.2)
ALP SERPL-CCNC: 67 U/L (ref 55–135)
ALT SERPL W/O P-5'-P-CCNC: 16 U/L (ref 10–44)
ANION GAP SERPL CALC-SCNC: 10 MMOL/L (ref 8–16)
AST SERPL-CCNC: 19 U/L (ref 10–40)
BACTERIA UR CULT: NO GROWTH
BASOPHILS # BLD AUTO: 0.01 K/UL (ref 0–0.2)
BASOPHILS NFR BLD: 0.1 % (ref 0–1.9)
BILIRUB SERPL-MCNC: 0.9 MG/DL (ref 0.1–1)
BUN SERPL-MCNC: 85 MG/DL (ref 8–23)
CALCIUM SERPL-MCNC: 8.4 MG/DL (ref 8.7–10.5)
CHLORIDE SERPL-SCNC: 98 MMOL/L (ref 95–110)
CO2 SERPL-SCNC: 23 MMOL/L (ref 23–29)
CREAT SERPL-MCNC: 4.8 MG/DL (ref 0.5–1.4)
DIFFERENTIAL METHOD: ABNORMAL
EBV NA IGG SER IA-ACNC: >600 U/ML (ref 0–17.9)
EBV VCA IGM SER IA-ACNC: <36 U/ML (ref 0–35.9)
EBV VCA IGM SER IA-ACNC: >600 U/ML (ref 0–17.9)
EOSINOPHIL # BLD AUTO: 0 K/UL (ref 0–0.5)
EOSINOPHIL NFR BLD: 0.6 % (ref 0–8)
ERYTHROCYTE [DISTWIDTH] IN BLOOD BY AUTOMATED COUNT: 19.2 % (ref 11.5–14.5)
EST. GFR  (NO RACE VARIABLE): 13 ML/MIN/1.73 M^2
GLUCOSE SERPL-MCNC: 127 MG/DL (ref 70–110)
GLUCOSE SERPL-MCNC: 138 MG/DL (ref 70–110)
GLUCOSE SERPL-MCNC: 147 MG/DL (ref 70–110)
GLUCOSE SERPL-MCNC: 168 MG/DL (ref 70–110)
HAV IGM SERPL QL IA: NEGATIVE
HBV CORE IGM SERPL QL IA: NEGATIVE
HBV SURFACE AG SERPL QL IA: NEGATIVE
HCT VFR BLD AUTO: 26.3 % (ref 40–54)
HCV AB S/CO SERPL IA: NON REACTIVE
HCV AB SERPL QL IA: NORMAL
HGB BLD-MCNC: 8.1 G/DL (ref 14–18)
IMM GRANULOCYTES # BLD AUTO: 0.02 K/UL (ref 0–0.04)
IMM GRANULOCYTES NFR BLD AUTO: 0.3 % (ref 0–0.5)
LYMPHOCYTES # BLD AUTO: 0.3 K/UL (ref 1–4.8)
LYMPHOCYTES NFR BLD: 4.6 % (ref 18–48)
MCH RBC QN AUTO: 28.5 PG (ref 27–31)
MCHC RBC AUTO-ENTMCNC: 30.8 G/DL (ref 32–36)
MCV RBC AUTO: 93 FL (ref 82–98)
MONOCYTES # BLD AUTO: 0.9 K/UL (ref 0.3–1)
MONOCYTES NFR BLD: 12.9 % (ref 4–15)
NEUTROPHILS # BLD AUTO: 5.6 K/UL (ref 1.8–7.7)
NEUTROPHILS NFR BLD: 81.5 % (ref 38–73)
NRBC BLD-RTO: 0 /100 WBC
PLATELET # BLD AUTO: 63 K/UL (ref 150–450)
PMV BLD AUTO: 11.7 FL (ref 9.2–12.9)
POTASSIUM SERPL-SCNC: 4.6 MMOL/L (ref 3.5–5.1)
PROT SERPL-MCNC: 7 G/DL (ref 6–8.4)
RBC # BLD AUTO: 2.84 M/UL (ref 4.6–6.2)
SERVICE CMNT-IMP: ABNORMAL
SODIUM SERPL-SCNC: 131 MMOL/L (ref 136–145)
WBC # BLD AUTO: 6.89 K/UL (ref 3.9–12.7)

## 2023-10-18 PROCEDURE — 97110 THERAPEUTIC EXERCISES: CPT | Mod: CQ

## 2023-10-18 PROCEDURE — 25000003 PHARM REV CODE 250: Performed by: STUDENT IN AN ORGANIZED HEALTH CARE EDUCATION/TRAINING PROGRAM

## 2023-10-18 PROCEDURE — 63600175 PHARM REV CODE 636 W HCPCS: Mod: JZ,EC,JG | Performed by: INTERNAL MEDICINE

## 2023-10-18 PROCEDURE — 90935 HEMODIALYSIS ONE EVALUATION: CPT

## 2023-10-18 PROCEDURE — 36415 COLL VENOUS BLD VENIPUNCTURE: CPT | Performed by: NURSE PRACTITIONER

## 2023-10-18 PROCEDURE — 80100016 HC MAINTENANCE HEMODIALYSIS

## 2023-10-18 PROCEDURE — 80053 COMPREHEN METABOLIC PANEL: CPT | Performed by: NURSE PRACTITIONER

## 2023-10-18 PROCEDURE — 21400001 HC TELEMETRY ROOM

## 2023-10-18 PROCEDURE — 99900035 HC TECH TIME PER 15 MIN (STAT)

## 2023-10-18 PROCEDURE — 94761 N-INVAS EAR/PLS OXIMETRY MLT: CPT

## 2023-10-18 PROCEDURE — 85025 COMPLETE CBC W/AUTO DIFF WBC: CPT | Performed by: NURSE PRACTITIONER

## 2023-10-18 RX ADMIN — TORSEMIDE 20 MG: 20 TABLET ORAL at 08:10

## 2023-10-18 RX ADMIN — SUCRALFATE 1 G: 1 TABLET ORAL at 08:10

## 2023-10-18 RX ADMIN — TORSEMIDE 20 MG: 20 TABLET ORAL at 04:10

## 2023-10-18 RX ADMIN — EPOETIN ALFA-EPBX 10000 UNITS: 10000 INJECTION, SOLUTION INTRAVENOUS; SUBCUTANEOUS at 03:10

## 2023-10-18 RX ADMIN — GABAPENTIN 300 MG: 300 CAPSULE ORAL at 08:10

## 2023-10-18 RX ADMIN — MIDODRINE HYDROCHLORIDE 5 MG: 2.5 TABLET ORAL at 06:10

## 2023-10-18 RX ADMIN — MIDODRINE HYDROCHLORIDE 5 MG: 2.5 TABLET ORAL at 04:10

## 2023-10-18 RX ADMIN — LEVOTHYROXINE SODIUM 25 MCG: 0.03 TABLET ORAL at 06:10

## 2023-10-18 RX ADMIN — SUCRALFATE 1 G: 1 TABLET ORAL at 11:10

## 2023-10-18 RX ADMIN — SUCRALFATE 1 G: 1 TABLET ORAL at 04:10

## 2023-10-18 RX ADMIN — MIDODRINE HYDROCHLORIDE 5 MG: 2.5 TABLET ORAL at 11:10

## 2023-10-18 RX ADMIN — ATORVASTATIN CALCIUM 20 MG: 20 TABLET, FILM COATED ORAL at 08:10

## 2023-10-18 RX ADMIN — DAPAGLIFLOZIN 10 MG: 10 TABLET, FILM COATED ORAL at 08:10

## 2023-10-18 RX ADMIN — TRAMADOL HYDROCHLORIDE 50 MG: 50 TABLET, COATED ORAL at 08:10

## 2023-10-18 RX ADMIN — SUCRALFATE 1 G: 1 TABLET ORAL at 06:10

## 2023-10-18 NOTE — HOSPITAL COURSE
63 year old male PMHx coronary artery disease, insulin-dependent type 2 diabetes, heparin induced thrombocytopenia, ischemic cardiomyopathy with AICD, MRSA staph infection, chronic thrombocytopenia, osteomyelitis of the right foot, sleep apnea, venous stasis dermatitis to lower extremities presented to the ED with SOB, worsening LE edema, 10 lbs weight gain in the last 1.5 weeks.    In the ED, he was noted to have wide complex tachycardia, volume overload on exam, soft blood presures and AD.  He remained tachy in the 130s.  He was started on Amiodarone gtt.  He ultimately required vasopressor initiation for hypotension.  Dobutamine gtt with IV Bumex started.   Troponin in the 20s with trend remaining flat.  BNP 1720.  CXR with interstitial edema.  Seen by Cardiology and initially felt to have Atrial tachycardia with wide complex secondary to AICD.  This was not felt to be Ventricular tachycardia.  His device was interrogated as per cardiology appear  sinus tachycardia at that time   .V Bumex changed to lasix gtt. Egan placed and very little UOP  and  Nephrology consulted.  Synthroid started for subtherapeutic hypothyroidism with TSH of 11. 10/10  and DC with low dose thyroxine .  Tachycardia has resolved with adjustments to his AICD by Dr. Bailey  . BP improved.  Lasix gtt, Amiodarone gtt stopped.  Poor UOP with worsening renal function and hyponatremia. and thus Nephrology has recommended to initiate RRT and  placed tunneled cath.   Later patient needed long term HD and outpatient HD set up and DC in stable condition .

## 2023-10-18 NOTE — PROGRESS NOTES
[facility]   Hematology/Oncology  Inpatient Progress Note          Patient Name: Ana Bullard  MRN: [id number]  Admission Date: 10/8/2023  Hospital Length of Stay: 10 days  Code Status: Full Code   Attending Provider: Judie Parikh MD  Consulting Provider: Ray Braden MD  Primary Care Physician: Michelle Messina FNP  Principal Problem:Wide-complex tachycardia      Subjective:       Patient ID: Ana Bullard is a 63 y.o. male.    Chief Complaint: Tachycardia and Shortness of Breath        History Present Illness:    Patient currently in dialysis, he is awake and alert; he reports no new issues; breathing ok, no CP, HA's or N/V; I spoke to his wife who was in his room; discussed with dialysis nurses      Review of Systems:  GEN: general malaise, weakness, fatigue  HEENT: normal with no HA's, sore throat, stiff neck, changes in vision  CV: normal with no CP, SOB, PND, BAPTISTE or orthopnea; no current palpitations  PULM: normal with no SOB, cough, hemoptysis, sputum or pleuritic pain  GI: normal with no abdominal pain, nausea, vomiting, constipation, diarrhea, melanotic stools, BRBPR, or hematemesis  : normal with no hematuria, dysuria  BREAST: normal with no mass, discharge, pain  SKIN: normal with no rash, erythema, bruising, or swelling      Objective:     Vitals:  Blood pressure (!) 111/59, pulse 60, temperature 97.8 °F (36.6 °C), temperature source Oral, resp. rate 18, height 6' (1.829 m), weight 122 kg (269 lb), SpO2 (!) 92 %.    Physical Exam:  GEN: no apparent distress, comfortable; AAOx3; overweight  HEAD: atraumatic and normocephalic  EYES: no pallor, no icterus, PERRLA  ENT: OMM, no pharyngeal erythema, external ears WNL; no nasal discharge; no thrush  NECK: no masses, thyroid normal, trachea midline, no LAD/LN's, supple; former Rght IJ removed  CV: RRR with no murmur; normal pulse; normal S1 and S2; no pedal edema; portacath on Rght CW  CHEST: Normal respiratory effort; CTAB;  normal breath sounds; no wheeze or crackles  ABDOM: nontender and nondistended; soft; normal bowel sounds; no rebound/guarding  MUSC/Skeletal: ROM normal; no crepitus; joints normal; no deformities or arthropathy  EXTREM: no clubbing, cyanosis, inflammation or swelling; chronic venous stasis changes bilateral lower extremities  SKIN: no rashes, lesions, ulcers, petechiae or subcutaneous nodules; chronic age related skin changes, scarps and bruises; dry skin  : no changes  NEURO: grossly intact; motor/sensory WNL; AAOx3; no tremors; generalized weakness  PSYCH: normal mood, affect and behavior  LYMPH: normal cervical, supraclavicular, axillary and groin LN's             Lab Review:        Lab Results   Component Value Date    WBC 6.89 10/18/2023    HGB 8.1 (L) 10/18/2023    HCT 26.3 (L) 10/18/2023    MCV 93 10/18/2023    PLT 63 (L) 10/18/2023       CMP  Sodium   Date Value Ref Range Status   10/18/2023 131 (L) 136 - 145 mmol/L Final   01/23/2019 136 134 - 144 mmol/L      Potassium   Date Value Ref Range Status   10/18/2023 4.6 3.5 - 5.1 mmol/L Final     Chloride   Date Value Ref Range Status   10/18/2023 98 95 - 110 mmol/L Final   01/23/2019 102 98 - 110 mmol/L      CO2   Date Value Ref Range Status   10/18/2023 23 23 - 29 mmol/L Final     Glucose   Date Value Ref Range Status   10/18/2023 127 (H) 70 - 110 mg/dL Final   01/23/2019 99 70 - 99 mg/dL      BUN   Date Value Ref Range Status   10/18/2023 85 (H) 8 - 23 mg/dL Final     Creatinine   Date Value Ref Range Status   10/18/2023 4.8 (H) 0.5 - 1.4 mg/dL Final   01/23/2019 0.98 0.60 - 1.40 mg/dL      Calcium   Date Value Ref Range Status   10/18/2023 8.4 (L) 8.7 - 10.5 mg/dL Final     Total Protein   Date Value Ref Range Status   10/18/2023 7.0 6.0 - 8.4 g/dL Final     Albumin   Date Value Ref Range Status   10/18/2023 3.4 (L) 3.5 - 5.2 g/dL Final   01/23/2019 3.0 (L) 3.1 - 4.7 g/dL      Total Bilirubin   Date Value Ref Range Status   10/18/2023 0.9 0.1 - 1.0 mg/dL  Final     Comment:     For infants and newborns, interpretation of results should be based  on gestational age, weight and in agreement with clinical  observations.    Premature Infant recommended reference ranges:  Up to 24 hours.............<8.0 mg/dL  Up to 48 hours............<12.0 mg/dL  3-5 days..................<15.0 mg/dL  6-29 days.................<15.0 mg/dL       Alkaline Phosphatase   Date Value Ref Range Status   10/18/2023 67 55 - 135 U/L Final     AST   Date Value Ref Range Status   10/18/2023 19 10 - 40 U/L Final     ALT   Date Value Ref Range Status   10/18/2023 16 10 - 44 U/L Final     Anion Gap   Date Value Ref Range Status   10/18/2023 10 8 - 16 mmol/L Final     eGFR   Date Value Ref Range Status   10/18/2023 13.0 (A) >60 mL/min/1.73 m^2 Final     Lab Results   Component Value Date    IRON 36 (L) 10/16/2023    TRANSFERRIN 224 10/16/2023    TIBC 314 10/16/2023    FESATURATED 11 (L) 10/16/2023      Lab Results   Component Value Date    FERRITIN 93.2 10/16/2023           Radiology Diagnostic Studies:     X-Ray Chest AP Portable [[id number]] Collected: 10/16/23 1207   Order Status: Completed Updated: 10/16/23 1240   Narrative:     Chest single view     CLINICAL DATA: Central venous catheter placement     FINDINGS: AP views compared to October 10. Cardiomegaly is stable. There has been previous median sternotomy. Implantable cardiac device remains in place. Dual lumen right IJ central catheter is noted with distal tip just above the atriocaval junction. No pneumothorax or other placement related complication is identified.     Faint bilateral interstitial prominence is at least in part summation effect related to body habitus. Mild interstitial edema is not excluded.     IMPRESSION:   1. No pneumothorax status post central venous catheter placement.   2. Faint bilateral interstitial prominence may be related to technique and body habitus. Mild interstitial edema is not excluded            Assessment:      IMPRESSION:    (1) 63 y.o. male with diagnosis of chronic anemia and chronic thrombocytopenia who presented to the ED at [facility] with tachycardia and SOB. He has been admitted to the hospitalist service. He has multiple medical problems including DM II, CAD, CHF, AICD hx, and chronic venous stasis. 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 He came to hematology clinic for an initial visit back in 2021 for evaluation and labs and studies were ordered at that time; however, the patient never had the lab work or studies done and failed to ever return to clinic.      10/16/2023:  - hgb at 8.8 and plats 53,000  - chronic thrombocytopenia for several years  - suspected underlying autoimmune mediated process such as chronic ITP  - most likely has a chronic multifactorial anemia process with anemia of chronic disorders, anemia of chronic renal disease, +/ chronic GIB  - total bili is only minimally elevated, so I do not suspect any significant hemolytic process at this time  - He came to hematology clinic for an initial visit back in 2021 for evaluation and labs and studies were ordered at that time; however, the patient never had the lab work or studies done and failed to ever return to clinic.     10/17/2023:  -  hgb at 8.3 and plats 55,000  - serum iron and %iron sat low    10/18/2023:  - hgb at 8.1 and plats 63,000  - getting HD today     (2) CAD, CHF, s/p prior AICD     (3) DM II     (4) History of heparin induced thrombocytopenia     (5) CKD - stage V - started HD this admit     (6) Chronic venous stasis dermatitis of the lower extremities     (7) Pulmonary HTN        1. Atrial tachycardia    2. Pacemaker-mediated tachycardia    3. Acute on chronic congestive heart failure, unspecified heart failure type    4. Ulcer of right foot with fat layer exposed    5. Wide-complex tachycardia    6. Cellulitis of right leg    7. CKD (chronic kidney  disease), stage V    8. Normochromic normocytic anemia    9. Chronic Thrombocytopenia    10. Anemia due to stage 4 chronic kidney disease           Plan:     PLAN:          Monitor labs and transfuse as needed  Recommended consideration for dose of IV iron; use of procrit/retacrit deferred to nephrology  Ordered antiplatelet antibody screens and additional studies  Avoid heparinoid products including heparin, lovenox and even heparin flushes  Avoid IV zosyn and IV pepcid as these meds could exacerbate the thrombocytopenia  Cardiac as per cardiology directives  Renal as per nephrology directives  Will follow with you               Ray Braden MD  Hematology/Oncology  [facility]

## 2023-10-18 NOTE — PT/OT/SLP PROGRESS
"Physical Therapy Treatment    Patient Name:  Ana Bullard   MRN:  [id number]    Recommendations:     Discharge Recommendations: Low Intensity Therapy  Discharge Equipment Recommendations: to be determined by next level of care  Barriers to discharge:  balance deficits    Assessment:     Ana Bullard is a 63 y.o. male admitted with a medical diagnosis of Wide-complex tachycardia.  He presents with the following impairments/functional limitations: weakness, impaired endurance, gait instability, impaired cardiopulmonary response to activity.    Pt with HOB elevated and reports that he ambulated back and forth in his room "until my legs were about to give out" and "I did my exercises with that band". Pt declined out of bed activity. Pt performed a few supine LE TE and educated on seated and standing LE TE to improve strength and balance.    Rehab Prognosis: Fair; patient would benefit from acute skilled PT services to address these deficits and reach maximum level of function.    Recent Surgery: Procedure(s) (LRB):  Insertion,catheter,tunneled (Right) 2 Days Post-Op    Plan:     During this hospitalization, patient to be seen 6 x/week to address the identified rehab impairments via gait training, therapeutic activities, therapeutic exercises, neuromuscular re-education and progress toward the following goals:    Plan of Care Expires:  11/13/23    Subjective     Chief Complaint: pt reports fatigue from ambulating in room  Patient/Family Comments/goals: to get stronger  Pain/Comfort:  Pain Rating 1: 0/10      Objective:     Communicated with RN prior to session.  Patient found HOB elevated with bed alarm, peripheral IV upon PT entry to room.     General Precautions: Standard, fall  Orthopedic Precautions: N/A  Braces: N/A  Respiratory Status: Room air     Functional Mobility:  Pt declined due to fatigue.      AM-PAC 6 CLICK MOBILITY          Treatment & Education:  Pt educated on importance of time OOB, " importance of intermittent mobility, safe techniques for transfers/ambulation, discharge recommendations/options, and use of call light for assistance and fall prevention.  Pt tolerated supine LE TE including SLR, AP, and heel slides with verbal cuing for proper form and pacing for optimal strengthening. Pt educated on seated and standing LE TE that he can perform at home.      Patient left HOB elevated with all lines intact, call button in reach, bed alarm on, and RN notified..    GOALS:   Multidisciplinary Problems       Physical Therapy Goals          Problem: Physical Therapy    Goal Priority Disciplines Outcome Goal Variances Interventions   Physical Therapy Goal     PT, PT/OT Ongoing, Progressing     Description: Goals to be met by: 23     Patient will increase functional independence with mobility by performin. Supine to sit with Supervision  2. Sit to stand transfer with Supervision  3. Bed to chair transfer with Supervision using least restrictive assistive device  4. Gait  x 150 feet with Supervision using least restrictive assistive device                             Time Tracking:     PT Received On: 10/18/23  PT Start Time: 938     PT Stop Time: 948  PT Total Time (min): 10 min     Billable Minutes: Therapeutic Exercise 10    Treatment Type: Treatment  PT/PTA: PTA     Number of PTA visits since last PT visit: 3     10/18/2023

## 2023-10-18 NOTE — PLAN OF CARE
Patient accepted for home health services with [facility] mgmt agency when patient discharges.     Outpatient dialysis pending with [facility].  Hep B labs pending In Process at this time.        10/17/23 1219   Post-Acute Status   Post-Acute Authorization Home Health;Dialysis   Home Health Status Pending medical clearance/testing   Diaylsis Status Pending medical clearance/testing   Discharge Delays (!) Dialysis Set-up

## 2023-10-18 NOTE — PROGRESS NOTES
[facility]    Critical Care Cardiology Progress Note    Subjective:  The pt is 62 y/o Male pt of  who presented to ED with h/o fatigue, back pain, decreased urination and SOB for 2 days. He endorses SOB has progressively worsened over this time and heis having dyspnea with minimal exertion, but no reported chest pain . He reports some feeling some palpitations, BLE edema as well. Pmh includes  ischemic cardiomyopathy, 5 vessel bypass in 2012 (sequential SVG to PDA and PLB, SVG split graft to OM1 and OM2, and SVG to LAD, viability stuudy in May 2022 was negative for hibernation or viability with large anterior scar, s/p multiple MIs, chronic systolic heart failure, most recent ejection fraction 20%, complete heart block, PPM dependent, HIT, DVT/PE, status post CRT D placement, diabetes mellitus, hyperlipidemia, hypertension, obstructive sleep apnea, and chronic kidney disease. Im ER pt had wide complex tachycardia, amiodarone started. Pt was also started on iv diuretic and pressors, transferred to ICU    AICD tracking -130's --dropped max track rate to 100.     10/12: The pt is sitting up in bed today eating breakfast. Denies CP and SOB has markedly improved. Swelling to LE has decreased. VSS , tele reviewed AV paced rate 60's. He was dialyzed yesterday with plans for another session today. H/H 8.6/28.0 plts 66 cr 3.8 CXR on 10/10 post IJ placement, lungs improved. Net output yesterday -2405 cc     10/13: Patient seen and examined this AM. He reports he is feeling much better. Denies shortness of breath or chest pain. LE edema has improved. He is tolerating dialysis well. Tele reviewed, SR with occasional AV pacing. Labs not resulted today. Blood pressures are soft, ranging from 90s-115 systolic.     10/14. Patient off floor this AM. On HD per nephro. SR on tele, intermittent AVpacing.     10/16: Pt seen and examined this AM. Reports he is feeling anxious for his procedure with vascular  "surgery today. Tolerating HD well. He denies chest pain or shortness of breath today. VSS. Labs reviewed. Cr 4.6. Tele reviewed.    10/18: Pt seen and examined today. He states he is feeling much better. He denies chest pain or shortness of breath. LE edema improved. Tolerating dialysis well. VSS. Labs reviewed. Cr 4.8. Tele reviewed.     Objective:  Vital Signs (Most Recent)  Temp: 97.8 °F (36.6 °C) (10/18/23 0335)  Pulse: 60 (10/18/23 0335)  Resp: 18 (10/18/23 0820)  BP: (!) 111/59 (10/18/23 0335)  SpO2: (!) 92 % (10/18/23 0335)    Vital Signs Range (Last 24H):  Temp:  [97.3 °F (36.3 °C)-97.9 °F (36.6 °C)]   Pulse:  [58-68]   Resp:  [18-22]   BP: (107-124)/(53-62)   SpO2:  [92 %-98 %]     I & O (Last 24H):    Intake/Output Summary (Last 24 hours) at 10/18/2023 0835  Last data filed at 10/18/2023 0505  Gross per 24 hour   Intake 720 ml   Output 450 ml   Net 270 ml         Current Diet:     Current Diet Order   Procedures    Diet diabetic [facility]; 2000 Calorie; Renal     Order Specific Question:   Indicate patient location for additional diet options:     Answer:   [facility]     Order Specific Question:   Total calories:     Answer:   2000 Calorie     Order Specific Question:   Additional Diet Options:     Answer:   Renal        Allergies:  Review of patient's allergies indicates:   Allergen Reactions    Vasopressin Anaphylaxis and Other (See Comments)     Increased pressure in his head; felt like his eyeballs and veins were going to pop out of his head.     Vasopressin analogues Other (See Comments) and Anaphylaxis     "felt like eyes going to pop out of my head"  Other reaction(s): Other (See Comments)  "felt like eyes going to pop out of my head"  Increased pressure in his head; felt like his eyeballs and veins were going to pop out of his head.     Heparin Other (See Comments)     Other reaction(s): "depleted my blood platets"    Decreased platelet count    Heparin analogues Other (See Comments)     Depleted WBC count    " Morphine Hallucinations, Other (See Comments) and Anxiety     Other reaction(s): Hallucinations  Paranoid, hallucinations         Meds:  Scheduled Meds:   atorvastatin  20 mg Oral QHS    dapagliflozin propanediol  10 mg Oral Daily    gabapentin  300 mg Oral BID    levothyroxine  25 mcg Oral Before breakfast    midodrine  5 mg Oral TID AC    polyethylene glycol  17 g Oral Daily    sucralfate  1 g Oral QID (AC & HS)    torsemide  20 mg Oral BID loop     Continuous Infusions:      PRN Meds:acetaminophen, albumin human 25%, albuterol, ALPRAZolam, dextrose 50%, dextrose 50%, glucagon (human recombinant), glucose, glucose, insulin aspart U-100, melatonin, ondansetron, sodium chloride 0.9%, traMADoL    Oxygen/Ventilator Data (Last 24H):  (if applicable)            Hemodynamic Parameters (Last 24H):   (if applicable)        Lines/Drains:  (if applicable)  Trialysis (Dialysis) Catheter 10/10/23 1700 right internal jugular (Active)   $ Dialysis Supplies Trialysis Catheter (Supply) 10/11/23 0701   Line Necessity Review CRRT/HD 10/11/23 0701   Verification by X-ray Yes 10/11/23 0701   Site Assessment No drainage;No redness;No swelling;No warmth 10/11/23 0701   Line Securement Device Secured with sutures 10/11/23 0701   Dressing Type CHG impregnated dressing/sponge;Central line dressing 10/11/23 0701   Dressing Status Clean;Dry;Intact 10/11/23 0701   Dressing Intervention First dressing 10/11/23 0701   Date on Dressing 10/10/23 10/11/23 0701   Dressing Due to be Changed 10/17/23 10/11/23 0701   Number of days: 0            Peripheral IV - Single Lumen 10/08/23 1500 20 G Left Antecubital (Active)   Site Assessment Clean;Dry;Intact;No redness;No swelling 10/11/23 0701   Extremity Assessment Distal to IV No redness;No swelling 10/11/23 0701   Line Status Saline locked 10/11/23 0701   Dressing Status Clean;Dry;Intact 10/11/23 0701   Dressing Intervention Integrity maintained 10/11/23 0701   Dressing Change Due 10/12/23 10/08/23 1930  "  Site Change Due 10/12/23 10/08/23 1930   Number of days: 2            Peripheral IV - Single Lumen 10/08/23 1621 18 G Right Antecubital (Active)   Site Assessment Clean;Dry;Intact;No redness;No swelling 10/11/23 0701   Extremity Assessment Distal to IV No swelling;No redness;No warmth 10/11/23 0701   Line Status Infusing 10/11/23 0701   Dressing Status Clean;Dry;Intact 10/11/23 0701   Dressing Intervention Integrity maintained 10/11/23 0701   Dressing Change Due 10/12/23 10/08/23 1950   Site Change Due 10/12/23 10/08/23 1950   Number of days: 2       Arterial Line 10/09/23 0139 Left Radial (Active)   $ Arterial Line Charges (Upon Insertion) Bedside Insertion Performed 10/09/23 0301   Site Assessment Clean;Dry;Intact;No redness;No swelling 10/11/23 0701   Line Status Pulsatile blood flow 10/11/23 0701   Art Line Waveform Appropriate 10/11/23 0701   Arterial Line Interventions Zeroed and calibrated;Flushed per protocol 10/11/23 0701   Color/Movement/Sensation Capillary refill less than 3 sec 10/11/23 0701   Dressing Type CHG impregnated dressing/sponge 10/11/23 0701   Dressing Status Clean;Dry;Intact 10/11/23 0701   Dressing Intervention Integrity maintained 10/11/23 0701   Dressing Change Due 10/16/23 10/11/23 0600   Number of days: 2            Urethral Catheter 10/08/23 1600 16 Fr. (Active)   Site Assessment Clean;Intact 10/11/23 0701   Collection Container Urimeter 10/11/23 0701   Securement Method secured to top of thigh w/ adhesive device 10/11/23 0701   Catheter Care Performed yes 10/11/23 0701   Reason for Continuing Urinary Catheterization Urinary retention;Critically ill in ICU and requiring hourly monitoring of intake/output 10/11/23 0701   CAUTI Prevention Bundle Intact seal between catheter & drainage tubing;Securement Device in place with 1" slack;Drainage bag/urimeter off the floor;Sheeting clip in use 10/11/23 0400   Output (mL) 50 mL 10/11/23 0713   Number of days: 2       Lab Results :  Recent " Results (from the past 24 hour(s))   POCT glucose    Collection Time: 10/17/23 12:03 PM   Result Value Ref Range    POC Glucose 193 (H) 70 - 110   POCT glucose    Collection Time: 10/17/23  3:27 PM   Result Value Ref Range    POC Glucose 233 (H) 70 - 110   POCT glucose    Collection Time: 10/17/23  8:08 PM   Result Value Ref Range    POC Glucose 199 (H) 70 - 110   CBC auto differential    Collection Time: 10/18/23  3:33 AM   Result Value Ref Range    WBC 6.89 3.90 - 12.70 K/uL    RBC 2.84 (L) 4.60 - 6.20 M/uL    Hemoglobin 8.1 (L) 14.0 - 18.0 g/dL    Hematocrit 26.3 (L) 40.0 - 54.0 %    MCV 93 82 - 98 fL    MCH 28.5 27.0 - 31.0 pg    MCHC 30.8 (L) 32.0 - 36.0 g/dL    RDW 19.2 (H) 11.5 - 14.5 %    Platelets 63 (L) 150 - 450 K/uL    MPV 11.7 9.2 - 12.9 fL    Immature Granulocytes 0.3 0.0 - 0.5 %    Gran # (ANC) 5.6 1.8 - 7.7 K/uL    Immature Grans (Abs) 0.02 0.00 - 0.04 K/uL    Lymph # 0.3 (L) 1.0 - 4.8 K/uL    Mono # 0.9 0.3 - 1.0 K/uL    Eos # 0.0 0.0 - 0.5 K/uL    Baso # 0.01 0.00 - 0.20 K/uL    nRBC 0 0 /100 WBC    Gran % 81.5 (H) 38.0 - 73.0 %    Lymph % 4.6 (L) 18.0 - 48.0 %    Mono % 12.9 4.0 - 15.0 %    Eosinophil % 0.6 0.0 - 8.0 %    Basophil % 0.1 0.0 - 1.9 %    Differential Method Automated    Comprehensive metabolic panel    Collection Time: 10/18/23  3:33 AM   Result Value Ref Range    Sodium 131 (L) 136 - 145 mmol/L    Potassium 4.6 3.5 - 5.1 mmol/L    Chloride 98 95 - 110 mmol/L    CO2 23 23 - 29 mmol/L    Glucose 127 (H) 70 - 110 mg/dL    BUN 85 (H) 8 - 23 mg/dL    Creatinine 4.8 (H) 0.5 - 1.4 mg/dL    Calcium 8.4 (L) 8.7 - 10.5 mg/dL    Total Protein 7.0 6.0 - 8.4 g/dL    Albumin 3.4 (L) 3.5 - 5.2 g/dL    Total Bilirubin 0.9 0.1 - 1.0 mg/dL    Alkaline Phosphatase 67 55 - 135 U/L    AST 19 10 - 40 U/L    ALT 16 10 - 44 U/L    eGFR 13.0 (A) >60 mL/min/1.73 m^2    Anion Gap 10 8 - 16 mmol/L   POCT glucose    Collection Time: 10/18/23  7:48 AM   Result Value Ref Range    POC Glucose 138 (H) 70 - 110        Diagnostic Results:  Imaging Results              X-Ray Chest AP Portable (Final result)  Result time 10/08/23 15:13:32      Final result by Tong Morales MD (10/08/23 15:13:32)                   Narrative:    CLINICAL HISTORY:  63 years (1960) Male Tachycardia; Shortness of Breath    TECHNIQUE:  Portable AP radiograph the chest. One view.    COMPARISON:  Radiograph from June 12, 2023    FINDINGS:  There is vascular congestion with increased interstitial markings findings indicating mild pulmonary edema.  There is blunting of both costophrenic angles consistent with trace pleural effusions and adjacent atelectasis. No pneumothorax is identified. The cardiac silhouette is moderately enlarged. The median sternotomy wires and the left sided pacemaker are unchanged.  Osseous structures appear unchanged. The visualized upper abdomen is unremarkable.    IMPRESSION:  Moderate to severe cardiomegaly and findings of mild interstitial pulmonary edema.                  .            Electronically signed by:  Tong Morales MD  10/08/2023 03:13 PM CDT Workstation: [id number]                                    12-lead EKG interpretation:   (if applicable)    Recent Cardiac Rhythm:  (if applicable)      Physical Exam:  Objective:  General Appearance:  Comfortable.    Vital signs: (most recent): Blood pressure (!) 111/59, pulse 60, temperature 97.8 °F (36.6 °C), temperature source Oral, resp. rate 18, height 6' (1.829 m), weight 122 kg (269 lb), SpO2 (!) 92 %.  Vital signs are normal.  No fever.    Lungs:  Normal effort and normal respiratory rate.  Breath sounds clear to auscultation.  He is not in respiratory distress.    Heart: Normal rate.  Regular rhythm.  S1 normal and S2 normal.  Positive for murmur.  No gallop.   Abdomen: Bowel sounds are normal.     Extremities: There is venous stasis.  There is no local swelling.    Neurological: Patient is oriented to person, place and time.        Current  Consults:  IP CONSULT TO CARDIOLOGY  IP CONSULT TO HOSPITAL MEDICINE  IP CONSULT TO INTENSIVIST  IP CONSULT TO ANESTHESIOLOGY  WOUND CARE CONSULT  IP CONSULT TO NEPHROLOGY  WOUND CARE CONSULT  IP CONSULT TO REGISTERED DIETITIAN/NUTRITIONIST  IP CONSULT TO REGISTERED DIETITIAN/NUTRITIONIST  IP CONSULT TO UROLOGY  IP CONSULT TO UROLOGY  IP CONSULT TO GENERAL SURGERY  IP CONSULT TO VASCULAR SURGERY  IP CONSULT TO SOCIAL WORK/CASE MANAGEMENT  IP CONSULT TO HEM/ONC    Assessment/Plan:  Assessment:   Acute on chronic combined systolic /diastolic HFrEF   ICM EF 20%  AD/CKD-cardiorenal  CAD-CABG--in office cr 3.2 9/1-currently on dialysis  Atrial arrhythmias  DM   DM foot ulcer   HTN  HLP  FARHAN  Chronic thrombocytopenia   Abbott AICD in situ       Plan:   Continue current management.  Nephrology following. On dialysis.   No new cardiac recommendations at this time.   Awaiting placement for HD.

## 2023-10-18 NOTE — SUBJECTIVE & OBJECTIVE
Interval History: Pt sitting in bed was asking about going home- but was explained that we are waiting for some lab workup then will send him home- otherwise no chest pain, sob, fever, chills, n/v, urine or bowel problem.     Pt should be getting HD today    Review of Systems   Constitutional:  Negative for activity change, appetite change, chills and fever.   HENT:  Negative for congestion, ear pain and nosebleeds.    Eyes:  Negative for itching and visual disturbance.   Respiratory:  Negative for apnea, cough, chest tightness, shortness of breath and wheezing.    Cardiovascular:  Negative for chest pain, palpitations and leg swelling.   Gastrointestinal:  Negative for abdominal distention, abdominal pain, constipation, diarrhea, nausea and vomiting.   Genitourinary:  Negative for dysuria and flank pain.   Musculoskeletal:  Negative for back pain.   Neurological:  Negative for dizziness and headaches.     Objective:     Vital Signs (Most Recent):  Temp: 97.8 °F (36.6 °C) (10/18/23 0335)  Pulse: 60 (10/18/23 0335)  Resp: 18 (10/18/23 0820)  BP: (!) 111/59 (10/18/23 0335)  SpO2: (!) 92 % (10/18/23 0335) Vital Signs (24h Range):  Temp:  [97.3 °F (36.3 °C)-97.9 °F (36.6 °C)] 97.8 °F (36.6 °C)  Pulse:  [58-68] 60  Resp:  [18-22] 18  SpO2:  [92 %-98 %] 92 %  BP: (107-124)/(53-62) 111/59     Weight: 122 kg (269 lb)  Body mass index is 36.48 kg/m².    Intake/Output Summary (Last 24 hours) at 10/18/2023 1132  Last data filed at 10/18/2023 0505  Gross per 24 hour   Intake 480 ml   Output 350 ml   Net 130 ml         Physical Exam  Vitals reviewed.   Constitutional:       General: He is not in acute distress.     Appearance: Normal appearance. He is not ill-appearing, toxic-appearing or diaphoretic.   HENT:      Head: Normocephalic and atraumatic.      Mouth/Throat:      Mouth: Mucous membranes are moist.      Pharynx: Oropharynx is clear.   Eyes:      General: No scleral icterus.     Conjunctiva/sclera: Conjunctivae normal.  "  Neck:      Vascular: No carotid bruit.      Comments: Tunneled cath  Cardiovascular:      Rate and Rhythm: Normal rate and regular rhythm.      Pulses: Normal pulses.      Heart sounds: Normal heart sounds.   Pulmonary:      Effort: Pulmonary effort is normal.      Breath sounds: Normal breath sounds.   Abdominal:      General: Abdomen is flat. Bowel sounds are normal.      Palpations: Abdomen is soft.   Musculoskeletal:         General: Normal range of motion.   Skin:     General: Skin is warm.   Neurological:      General: No focal deficit present.      Mental Status: He is alert and oriented to person, place, and time. Mental status is at baseline.   Psychiatric:         Mood and Affect: Mood normal.         Behavior: Behavior normal.             Significant Labs: All pertinent labs within the past 24 hours have been reviewed.  BMP:   Recent Labs   Lab 10/18/23  0333   *   *   K 4.6   CL 98   CO2 23   BUN 85*   CREATININE 4.8*   CALCIUM 8.4*     CBC:   Recent Labs   Lab 10/17/23  0409 10/18/23  0333   WBC 6.27 6.89   HGB 8.3* 8.1*   HCT 27.4* 26.3*   PLT 55* 63*     CMP:   Recent Labs   Lab 10/17/23  0409 10/18/23  0333   * 131*   K 4.5 4.6    98   CO2 23 23   * 127*   BUN 61* 85*   CREATININE 4.0* 4.8*   CALCIUM 8.4* 8.4*   PROT 7.0 7.0   ALBUMIN 3.3* 3.4*   BILITOT 1.0 0.9   ALKPHOS 68 67   AST 21 19   ALT 22 16   ANIONGAP 9 10     Magnesium: No results for input(s): "MG" in the last 48 hours.    Significant Imaging: I have reviewed all pertinent imaging results/findings within the past 24 hours.  "

## 2023-10-18 NOTE — PROGRESS NOTES
[facility] Medicine  Progress Note    Patient Name: Ana Bullard  MRN: [id number]  Patient Class: IP- Inpatient   Admission Date: 10/8/2023  Length of Stay: 10 days  Attending Physician: Judie Parikh MD  Primary Care Provider: Michelle Messina FNP        Subjective:     Principal Problem:Wide-complex tachycardia        HPI:  No notes on file    Overview/Hospital Course:  No notes on file    Interval History: Pt sitting in bed was asking about going home- but was explained that we are waiting for some lab workup then will send him home- otherwise no chest pain, sob, fever, chills, n/v, urine or bowel problem.     Pt should be getting HD today    Review of Systems   Constitutional:  Negative for activity change, appetite change, chills and fever.   HENT:  Negative for congestion, ear pain and nosebleeds.    Eyes:  Negative for itching and visual disturbance.   Respiratory:  Negative for apnea, cough, chest tightness, shortness of breath and wheezing.    Cardiovascular:  Negative for chest pain, palpitations and leg swelling.   Gastrointestinal:  Negative for abdominal distention, abdominal pain, constipation, diarrhea, nausea and vomiting.   Genitourinary:  Negative for dysuria and flank pain.   Musculoskeletal:  Negative for back pain.   Neurological:  Negative for dizziness and headaches.     Objective:     Vital Signs (Most Recent):  Temp: 97.8 °F (36.6 °C) (10/18/23 0335)  Pulse: 60 (10/18/23 0335)  Resp: 18 (10/18/23 0820)  BP: (!) 111/59 (10/18/23 0335)  SpO2: (!) 92 % (10/18/23 0335) Vital Signs (24h Range):  Temp:  [97.3 °F (36.3 °C)-97.9 °F (36.6 °C)] 97.8 °F (36.6 °C)  Pulse:  [58-68] 60  Resp:  [18-22] 18  SpO2:  [92 %-98 %] 92 %  BP: (107-124)/(53-62) 111/59     Weight: 122 kg (269 lb)  Body mass index is 36.48 kg/m².    Intake/Output Summary (Last 24 hours) at 10/18/2023 1132  Last data filed at 10/18/2023 0505  Gross per 24 hour   Intake 480 ml   Output 350 ml   Net 130 ml  "        Physical Exam  Vitals reviewed.   Constitutional:       General: He is not in acute distress.     Appearance: Normal appearance. He is not ill-appearing, toxic-appearing or diaphoretic.   HENT:      Head: Normocephalic and atraumatic.      Mouth/Throat:      Mouth: Mucous membranes are moist.      Pharynx: Oropharynx is clear.   Eyes:      General: No scleral icterus.     Conjunctiva/sclera: Conjunctivae normal.   Neck:      Vascular: No carotid bruit.      Comments: Tunneled cath  Cardiovascular:      Rate and Rhythm: Normal rate and regular rhythm.      Pulses: Normal pulses.      Heart sounds: Normal heart sounds.   Pulmonary:      Effort: Pulmonary effort is normal.      Breath sounds: Normal breath sounds.   Abdominal:      General: Abdomen is flat. Bowel sounds are normal.      Palpations: Abdomen is soft.   Musculoskeletal:         General: Normal range of motion.   Skin:     General: Skin is warm.   Neurological:      General: No focal deficit present.      Mental Status: He is alert and oriented to person, place, and time. Mental status is at baseline.   Psychiatric:         Mood and Affect: Mood normal.         Behavior: Behavior normal.             Significant Labs: All pertinent labs within the past 24 hours have been reviewed.  BMP:   Recent Labs   Lab 10/18/23  0333   *   *   K 4.6   CL 98   CO2 23   BUN 85*   CREATININE 4.8*   CALCIUM 8.4*     CBC:   Recent Labs   Lab 10/17/23  0409 10/18/23  0333   WBC 6.27 6.89   HGB 8.3* 8.1*   HCT 27.4* 26.3*   PLT 55* 63*     CMP:   Recent Labs   Lab 10/17/23  0409 10/18/23  0333   * 131*   K 4.5 4.6    98   CO2 23 23   * 127*   BUN 61* 85*   CREATININE 4.0* 4.8*   CALCIUM 8.4* 8.4*   PROT 7.0 7.0   ALBUMIN 3.3* 3.4*   BILITOT 1.0 0.9   ALKPHOS 68 67   AST 21 19   ALT 22 16   ANIONGAP 9 10     Magnesium: No results for input(s): "MG" in the last 48 hours.    Significant Imaging: I have reviewed all pertinent imaging " results/findings within the past 24 hours.      Assessment/Plan:      * Wide-complex tachycardia  S/p amio drip- AICD adjusted to rate of 60 - pacing well now  Keep K at 4 and Mag at 2      CKD (chronic kidney disease), stage V  S/p hemosplit-  Pending Hep Labs as per [facility]-   C/w HD session as per his schedules while he is here.      Heparin induced thrombocytopenia  If heparin products were not used this admission - plts should not drop again.   Will monitor daily CBC      Chronic combined systolic and diastolic heart failure    S/p lasix / bumex drips with help of dobutamine and currently getting RRT- should improve  C/w schedule dialysis as per nephro  Strict I/Os, daily weight    CAD (coronary artery disease)  C/w home meds  Holding asa for now d/t low plts  C/w statin      Obesity  Body mass index is 38 kg/m². Morbid obesity complicates all aspects of disease management from diagnostic modalities to treatment. Weight loss encouraged and health benefits explained to patient.         AICD (automatic cardioverter/defibrillator) present  Readjusted to rate of 60 and its pacing accordingly without problem.   Cardio following      Venous stasis dermatitis of both lower extremities  stockings    Type II diabetes mellitus with neurological manifestations  C/w SSI and accu checks      Chronic Thrombocytopenia  Plts are stable though today- Hem/Onc consult reviewed  Changed even Pepcid to sucralfate-   Hep C negative-     Pt should avoid all heparin related products - please review it with HD if pt is being exposed to heparin products      VTE Risk Mitigation (From admission, onward)         Ordered     IP VTE LOW RISK PATIENT  Once         10/08/23 1740     Place sequential compression device  Until discontinued         10/08/23 1740                Discharge Planning   BILL: 10/19/2023     Code Status: Full Code   Is the patient medically ready for discharge?:     Reason for patient still in hospital (select all  that apply): Treatment  Discharge Plan A: Home Health   Discharge Delays: (!) Dialysis Set-up              Judie Parikh MD  Department of Hospital Medicine   [facility]

## 2023-10-18 NOTE — ASSESSMENT & PLAN NOTE
S/p hemosplit-  Pending Hep Labs as per [facility]-   C/w HD session as per his schedules while he is here.

## 2023-10-18 NOTE — PROGRESS NOTES
10/18/23 1605   Post-Hemodialysis Assessment   Rinseback Volume (mL) 250 mL   Blood Volume Processed (Liters) 58 L   Dialyzer Clearance Lightly streaked   Duration of Treatment 180 minutes   Additional Fluid Intake (mL) 0 mL   Total UF (mL) 3500 mL   Net Fluid Removal 3000   Patient Response to Treatment tolerated well   Post-Treatment Weight 119 kg (262 lb 5.6 oz)   Treatment Weight Change -3   Post-Hemodialysis Comments no problems

## 2023-10-18 NOTE — PROGRESS NOTES
INPATIENT NEPHROLOGY Progress Note   [facility]    Patient Name: Ana Bullard  Date: 10/18/2023    Reason for consultation: AD/CKD    Chief Complaint:   Chief Complaint   Patient presents with    Tachycardia    Shortness of Breath     History of Present Illness:  64 y/o Male pt of Dr. Vences who presented to ED with h/o fatigue, back pain, decreased urination and SOB for 2 days. He endorses SOB has progressively worsened over this time and he is having dyspnea with minimal exertion, but no reported chest pain . He reports some feeling some palpitations, BLE edema as well. PMH includes  ischemic cardiomyopathy, 5 vessel bypass in 2012, s/p multiple MIs, chronic systolic heart failure, most recent ejection fraction 20%, complete heart block, PPM dependent, HIT, DVT/PE, diabetes mellitus, hyperlipidemia, hypertension, obstructive sleep apnea, and chronic kidney disease IV. In the ER he had wide complex tachycardia, amiodarone started. Pt was also started on leyla and lasix gtts, transferred to ICU. Consulted for renal management.    Interval History:  10/9- on BIPAP, overloaded  0/10- AF with RVR, SBP , on 3L NC, UOP 80cc, ashford placed- renal function worse- remains volume overloaded- I have discussed the risks and benefits of hemodialysis with the patient/family.  All of their questions were answered.  Risks include low blood pressure, stroke, heart attack, seizure, infection, nausea, delirium, and death.   10/11 tolerated first HD- got off 2L- 2nd treatment today- AICD adjusted yest  10/12 got off 3L yest- plan for 3rd treatment today- UOP 225cc- net -2.8L  10/13- got off 4L yest- plan for 4rth treatment today- UOP 60cc- net -6L  10/14  2L off w/HD yesterday.  Pressures low, but stable, can have albumin w/HD if needed.  No complaints.  No UOP recorded.  10/15  VSS, lab results reviewed.  250cc UOP recorded.  1.5L off w/HD yesterday.  Holding HD today, pt asking about going home.  Told  him we have to see if HD needs to continue;  if so, will need out patient chair placement.  Voiced understanding.  10/16 VSS. No renal recovery. Seen on HD today, plan MWF. Awaiting placement and tunneled cath.  10/17 VSS. S/p tunneled HD cath on 10/16 by Dr. Oleary. Abdominal US today. HD tomorrow.  10/18 VSS. Seen on HD today, doing well. Ok to dc if ready.    Plan of Care:    AD on CKD IV due to CRS- initiated RRT on 10/10 for clearance and UF  Wide complex tachycardia vs Sinus tachycardia- AICD adjusted  CHF exacerbation with hypoxia requiring NIPPV (EF 25%, grade III DD, pulm HTN)  Hyponatremia  Secondary HPT  Anemia of CKD    Plan:    - continue RRT for clearance and UF, then MWF  - had hemosplit placed on 10/16 by dr. Oleary  - needs outpatient placement with [facility] unless he goes to LTAC/rehab/SNF  - rate control improved- AICD adjusted 10/10  - use albumin PRN for hypotension- push UF with each treatment  - cardiac diet, 1.5L fluid restriction as tolerated  - resume farxiga and siuretics, added misdodrine  - no nsaids or IV contrast  - continue ashford  - no acute BMM issues  - H/H drop noted, will resume GANGA    Thank you for allowing us to participate in this patient's care. We will continue to follow.    Vital Signs:  Temp Readings from Last 3 Encounters:   10/18/23 97.8 °F (36.6 °C) (Oral)   06/18/23 97.8 °F (36.6 °C)   05/31/23 97.5 °F (36.4 °C)       Pulse Readings from Last 3 Encounters:   10/18/23 60   08/17/23 61   07/17/23 60       BP Readings from Last 3 Encounters:   10/18/23 (!) 111/59   07/17/23 116/71   06/18/23 138/73       Weight:  Wt Readings from Last 3 Encounters:   10/17/23 122 kg (269 lb)   09/28/23 121.8 kg (268 lb 8 oz)   09/07/23 120.6 kg (265 lb 15.1 oz)       INS/OUTS:  I/O last 3 completed shifts:  In: 1440 [P.O.:1440]  Out: 500 [Urine:500]  No intake/output data recorded.    Medications:  Scheduled Meds:   atorvastatin  20 mg Oral QHS    dapagliflozin propanediol  10 mg Oral Daily     gabapentin  300 mg Oral BID    levothyroxine  25 mcg Oral Before breakfast    midodrine  5 mg Oral TID AC    polyethylene glycol  17 g Oral Daily    sucralfate  1 g Oral QID (AC & HS)    torsemide  20 mg Oral BID loop     Continuous Infusions:      PRN Meds:.acetaminophen, albumin human 25%, albuterol, ALPRAZolam, dextrose 50%, dextrose 50%, glucagon (human recombinant), glucose, glucose, insulin aspart U-100, melatonin, ondansetron, sodium chloride 0.9%, traMADoL  No current facility-administered medications on file prior to encounter.     Current Outpatient Medications on File Prior to Encounter   Medication Sig Dispense Refill    albuterol (PROVENTIL) 5 mg/mL nebulizer solution Take 2.5 mg by nebulization every 6 (six) hours as needed.      aspirin 81 MG Chew Take 81 mg by mouth once daily.      atorvastatin (LIPITOR) 20 MG tablet Take 20 mg by mouth once daily.      BASAGLAR KWIKPEN U-100 INSULIN glargine 100 units/mL (3mL) SubQ pen Inject 25 Units into the skin once daily.  3    carvediloL (COREG) 3.125 MG tablet Take 3.125 mg by mouth 2 (two) times daily.      FARXIGA 10 mg tablet Take 10 mg by mouth once daily.      gabapentin (NEURONTIN) 600 MG tablet Take 600 mg by mouth 3 (three) times daily.      pantoprazole (PROTONIX) 40 MG tablet Take 1 tablet by mouth once daily.  0    torsemide (DEMADEX) 20 MG Tab Take 1 tablet (20 mg total) by mouth 2 (two) times a day. 120 tablet 0    traMADoL (ULTRAM) 50 mg tablet Take 1 tablet (50 mg total) by mouth every 6 (six) hours as needed for Pain. 120 tablet 2    vitamin B complex-folic acid 0.4 mg Tab Take 1 tablet by mouth once daily.      albuterol-ipratropium (DUO-NEB) 2.5 mg-0.5 mg/3 mL nebulizer solution Take 3 mLs by nebulization every 6 (six) hours as needed.      amiodarone (PACERONE) 200 MG Tab Take 1 tablet (200 mg total) by mouth 2 (two) times daily. 90 tablet 0    CORLANOR 5 mg Tab Take 5 mg by mouth nightly.      TRULICITY 0.75 mg/0.5 mL pen injector  Inject 0.75 mg into the skin every 7 days.         Review of Systems:    Physical Exam:  BP (!) 111/59 (BP Location: Right arm, Patient Position: Lying)   Pulse 60   Temp 97.8 °F (36.6 °C) (Oral)   Resp 18   Ht 6' (1.829 m)   Wt 122 kg (269 lb)   SpO2 (!) 92%   BMI 36.48 kg/m²     General Appearance:    NAD, AAO x 3, cooperative, appears stated age   Head:    Normocephalic, atraumatic   Eyes:    PER, EOMI, and conjunctiva/sclera clear bilaterally        Mouth:   Moist mucus membranes, no thrush or oral lesions, normal      dentition   Back:     No CVA tenderness   Lungs:     Rales   Heart:    Tachy    Abdomen:     Soft, non-tender, distended,   Extremities:   Edematous   MSK:   No joint or muscle swelling, tenderness or deformity   Skin:   Skin color, texture, turgor normal, no rashes or lesions   Neurologic/Psychiatric:   CNII-XII intact, normal strength and sensation       throughout, no asterixis; normal affect, memory, judgement     and insight     Results:  Recent Labs   Lab 10/16/23  0411 10/17/23  0409 10/18/23  0333   * 132* 131*   K 4.9 4.5 4.6   CL 99 100 98   CO2 23 23 23   BUN 69* 61* 85*   CREATININE 4.6* 4.0* 4.8*   * 170* 127*         Recent Labs   Lab 10/12/23  0423 10/14/23  0600 10/16/23  0411 10/17/23  0409 10/18/23  0333   CALCIUM 8.7   < > 8.6* 8.4* 8.4*   ALBUMIN 3.5   < > 3.3* 3.3* 3.4*   MG 2.2  --   --   --   --     < > = values in this interval not displayed.         Recent Labs   Lab 09/02/22  0439 11/23/22  0951 10/09/23  0230   Hemoglobin A1C 7.7 H 6.8 H 6.2         Recent Labs   Lab 10/16/23  0411 10/17/23  0409 10/18/23  0333   WBC 5.74 6.27 6.89   HGB 8.8* 8.3* 8.1*   HCT 29.6* 27.4* 26.3*   PLT 53* 55* 63*   MCV 94 94 93   MCHC 29.7* 30.3* 30.8*   MONO 17.6*  1.0 11.3  0.7 12.9  0.9   EOSINOPHIL 1.4 0.0 0.6         Recent Labs   Lab 10/16/23  0411 10/17/23  0409 10/18/23  0333   BILITOT 1.1* 1.0 0.9   PROT 7.0 7.0 7.0   ALBUMIN 3.3* 3.3* 3.4*   ALKPHOS 67 68 67    ALT 23 22 16   AST 20 21 19         Recent Labs   Lab 06/12/23  2241 10/09/23  0241 10/15/23  2206   Color, UA Yellow Yellow Yellow   Appearance, UA Cloudy A Hazy A Hazy A   pH, UA 5.0 5.0 6.0   Specific [place], UA 1.020 1.015 1.015   Protein, UA 1+ A 1+ A 2+ A   Glucose, UA 1+ A 2+ A 2+ A   Ketones, UA Negative Negative Negative   Urobilinogen, UA 4.0-6.0 A Negative Negative   Bilirubin (UA) Negative Negative Negative   Occult Blood UA 3+ A 3+ A 3+ A   Nitrite, UA Negative Negative Negative   RBC, UA >100 H 58 H 29 H   WBC, UA 28 H 4 14 H   Bacteria Negative Negative Negative   Hyaline Casts, UA 32 A 44 A 36 A         Recent Labs   Lab 09/01/22  0333   POC PH 7.350   POC PCO2 54.1 H   POC HCO3 29.9 H   POC PO2 18 L   POC SATURATED O2 24 L   POC BE 4   Sample VENOUS         Microbiology Results (last 7 days)       Procedure Component Value Units Date/Time    Urine culture [[id number]] Collected: 10/15/23 2206    Order Status: Completed Specimen: Urine Updated: 10/18/23 0706     Urine Culture, Routine No growth    Narrative:      Specimen Source->Urine    Blood culture [[id number]] Collected: 10/09/23 0226    Order Status: Completed Specimen: Blood from Line, Jugular, Internal Right Updated: 10/14/23 0432     Blood Culture, Routine No growth after 5 days.    Blood culture #2 **CANNOT BE ORDERED STAT** [[id number]] Collected: 10/08/23 1512    Order Status: Completed Specimen: Blood Updated: 10/13/23 1832     Blood Culture, Routine No growth after 5 days.    Blood culture #1 **CANNOT BE ORDERED STAT** [[id number]] Collected: 10/08/23 1504    Order Status: Completed Specimen: Blood from Antecubital, Right Updated: 10/13/23 1832     Blood Culture, Routine No growth after 5 days.          I have spent >  minutes providing care for this patient for the above diagnoses. These services have included chart/data/imaging review, evaluation, exam, formulation of plan, , note preparation, and discussions with staff  involved in this patient's care.    Maicol Leach MD  [facility] [address] [address]  [phone] (p)  [phone] (f)

## 2023-10-19 VITALS
OXYGEN SATURATION: 93 % | HEIGHT: 72 IN | RESPIRATION RATE: 20 BRPM | DIASTOLIC BLOOD PRESSURE: 57 MMHG | WEIGHT: 269 LBS | SYSTOLIC BLOOD PRESSURE: 104 MMHG | TEMPERATURE: 98 F | BODY MASS INDEX: 36.44 KG/M2 | HEART RATE: 72 BPM

## 2023-10-19 LAB
ALBUMIN SERPL BCP-MCNC: 3.4 G/DL (ref 3.5–5.2)
ALP SERPL-CCNC: 71 U/L (ref 55–135)
ALT SERPL W/O P-5'-P-CCNC: 18 U/L (ref 10–44)
ANA TITR SER IF: NEGATIVE {TITER}
ANION GAP SERPL CALC-SCNC: 7 MMOL/L (ref 8–16)
AST SERPL-CCNC: 29 U/L (ref 10–40)
BASOPHILS # BLD AUTO: 0.02 K/UL (ref 0–0.2)
BASOPHILS NFR BLD: 0.4 % (ref 0–1.9)
BILIRUB SERPL-MCNC: 1 MG/DL (ref 0.1–1)
BUN SERPL-MCNC: 72 MG/DL (ref 8–23)
CALCIUM SERPL-MCNC: 8.6 MG/DL (ref 8.7–10.5)
CHLORIDE SERPL-SCNC: 99 MMOL/L (ref 95–110)
CO2 SERPL-SCNC: 25 MMOL/L (ref 23–29)
CREAT SERPL-MCNC: 4.3 MG/DL (ref 0.5–1.4)
DIFFERENTIAL METHOD: ABNORMAL
EOSINOPHIL # BLD AUTO: 0.1 K/UL (ref 0–0.5)
EOSINOPHIL NFR BLD: 1.1 % (ref 0–8)
ERYTHROCYTE [DISTWIDTH] IN BLOOD BY AUTOMATED COUNT: 19 % (ref 11.5–14.5)
EST. GFR  (NO RACE VARIABLE): 14.9 ML/MIN/1.73 M^2
GLUCOSE SERPL-MCNC: 119 MG/DL (ref 70–110)
GLUCOSE SERPL-MCNC: 140 MG/DL (ref 70–110)
GLUCOSE SERPL-MCNC: 167 MG/DL (ref 70–110)
HBV CORE AB SERPL QL IA: NEGATIVE
HBV SURFACE AB SER QL: NON REACTIVE
HCT VFR BLD AUTO: 27.3 % (ref 40–54)
HGB BLD-MCNC: 8.4 G/DL (ref 14–18)
IMM GRANULOCYTES # BLD AUTO: 0.02 K/UL (ref 0–0.04)
IMM GRANULOCYTES NFR BLD AUTO: 0.4 % (ref 0–0.5)
LYMPHOCYTES # BLD AUTO: 0.4 K/UL (ref 1–4.8)
LYMPHOCYTES NFR BLD: 6.6 % (ref 18–48)
MCH RBC QN AUTO: 28.5 PG (ref 27–31)
MCHC RBC AUTO-ENTMCNC: 30.8 G/DL (ref 32–36)
MCV RBC AUTO: 93 FL (ref 82–98)
MONOCYTES # BLD AUTO: 1 K/UL (ref 0.3–1)
MONOCYTES NFR BLD: 18.8 % (ref 4–15)
NEUTROPHILS # BLD AUTO: 4 K/UL (ref 1.8–7.7)
NEUTROPHILS NFR BLD: 72.7 % (ref 38–73)
NRBC BLD-RTO: 0 /100 WBC
PLATELET # BLD AUTO: 58 K/UL (ref 150–450)
PLT AB: GP IA/IIA: NEGATIVE
PLT AB: GP IB/IX: NEGATIVE
PLT AB: GP IIB/IIIA: NEGATIVE
PLT AB: GP IV: NEGATIVE
PLT AB: HLA CLASS 1: NEGATIVE
PMV BLD AUTO: 10.4 FL (ref 9.2–12.9)
POTASSIUM SERPL-SCNC: 4.6 MMOL/L (ref 3.5–5.1)
PROT SERPL-MCNC: 7.1 G/DL (ref 6–8.4)
RBC # BLD AUTO: 2.95 M/UL (ref 4.6–6.2)
SODIUM SERPL-SCNC: 131 MMOL/L (ref 136–145)
WBC # BLD AUTO: 5.49 K/UL (ref 3.9–12.7)

## 2023-10-19 PROCEDURE — 25000003 PHARM REV CODE 250: Performed by: STUDENT IN AN ORGANIZED HEALTH CARE EDUCATION/TRAINING PROGRAM

## 2023-10-19 PROCEDURE — 80053 COMPREHEN METABOLIC PANEL: CPT | Performed by: NURSE PRACTITIONER

## 2023-10-19 PROCEDURE — 85025 COMPLETE CBC W/AUTO DIFF WBC: CPT | Performed by: NURSE PRACTITIONER

## 2023-10-19 PROCEDURE — 36415 COLL VENOUS BLD VENIPUNCTURE: CPT | Performed by: NURSE PRACTITIONER

## 2023-10-19 RX ORDER — MIDODRINE HYDROCHLORIDE 5 MG/1
5 TABLET ORAL
Qty: 90 TABLET | Refills: 0 | Status: SHIPPED | OUTPATIENT
Start: 2023-10-19 | End: 2024-10-18

## 2023-10-19 RX ORDER — LEVOTHYROXINE SODIUM 25 UG/1
25 TABLET ORAL
Qty: 90 TABLET | Refills: 0 | Status: SHIPPED | OUTPATIENT
Start: 2023-10-20 | End: 2024-01-18

## 2023-10-19 RX ORDER — NAPROXEN SODIUM 220 MG/1
81 TABLET, FILM COATED ORAL DAILY
Refills: 0
Start: 2023-10-19

## 2023-10-19 RX ADMIN — MIDODRINE HYDROCHLORIDE 5 MG: 2.5 TABLET ORAL at 05:10

## 2023-10-19 RX ADMIN — TORSEMIDE 20 MG: 20 TABLET ORAL at 08:10

## 2023-10-19 RX ADMIN — SUCRALFATE 1 G: 1 TABLET ORAL at 11:10

## 2023-10-19 RX ADMIN — GABAPENTIN 300 MG: 300 CAPSULE ORAL at 08:10

## 2023-10-19 RX ADMIN — POLYETHYLENE GLYCOL 3350 17 G: 17 POWDER, FOR SOLUTION ORAL at 08:10

## 2023-10-19 RX ADMIN — SUCRALFATE 1 G: 1 TABLET ORAL at 05:10

## 2023-10-19 RX ADMIN — DAPAGLIFLOZIN 10 MG: 10 TABLET, FILM COATED ORAL at 09:10

## 2023-10-19 RX ADMIN — MIDODRINE HYDROCHLORIDE 5 MG: 2.5 TABLET ORAL at 11:10

## 2023-10-19 RX ADMIN — LEVOTHYROXINE SODIUM 25 MCG: 0.03 TABLET ORAL at 05:10

## 2023-10-19 NOTE — PT/OT/SLP PROGRESS
Physical Therapy      Patient Name:  Ana Bullard   MRN:  [id number]    Patient not seen today secondary to Other (Comment) (Pt declined to particpate.). Will follow-up 10/20/23.

## 2023-10-19 NOTE — PT/OT/SLP PROGRESS
Occupational Therapy      Patient Name:  Ana Bullard   MRN:  [id number]    Patient not seen today secondary to Patient unwilling to participate. Will follow-up next service date.    10/19/2023

## 2023-10-19 NOTE — PLAN OF CARE
Dialysis setup complete.  Patient HD will be MWF at 2:45pm at [facility] on [address] per clinic rep Delmy, Hep B lab results sent directly to clinic via Plated.  HD schedule added to AVS, patient/family updated.        10/19/23 6203   Post-Acute Status   Diaylsis Status Set-up Complete/Auth obtained   Discharge Delays None known at this time

## 2023-10-19 NOTE — PROGRESS NOTES
Patient known to me outpatient for treatment of right plantar foot wound.    Patient was scheduled for follow up apt with me today in podiatry clinic.   Patient will need to follow back up for continued wound care,with myself, once discharged.  Recommend offloading of right foot callus area for now.

## 2023-10-19 NOTE — PROGRESS NOTES
[facility]    Critical Care Cardiology Progress Note    Subjective:  The pt is 64 y/o Male pt of  who presented to ED with h/o fatigue, back pain, decreased urination and SOB for 2 days. He endorses SOB has progressively worsened over this time and heis having dyspnea with minimal exertion, but no reported chest pain . He reports some feeling some palpitations, BLE edema as well. Pmh includes  ischemic cardiomyopathy, 5 vessel bypass in 2012 (sequential SVG to PDA and PLB, SVG split graft to OM1 and OM2, and SVG to LAD, viability stuudy in May 2022 was negative for hibernation or viability with large anterior scar, s/p multiple MIs, chronic systolic heart failure, most recent ejection fraction 20%, complete heart block, PPM dependent, HIT, DVT/PE, status post CRT D placement, diabetes mellitus, hyperlipidemia, hypertension, obstructive sleep apnea, and chronic kidney disease. Im ER pt had wide complex tachycardia, amiodarone started. Pt was also started on iv diuretic and pressors, transferred to ICU    AICD tracking -130's --dropped max track rate to 100.     10/12: The pt is sitting up in bed today eating breakfast. Denies CP and SOB has markedly improved. Swelling to LE has decreased. VSS , tele reviewed AV paced rate 60's. He was dialyzed yesterday with plans for another session today. H/H 8.6/28.0 plts 66 cr 3.8 CXR on 10/10 post IJ placement, lungs improved. Net output yesterday -2405 cc     10/13: Patient seen and examined this AM. He reports he is feeling much better. Denies shortness of breath or chest pain. LE edema has improved. He is tolerating dialysis well. Tele reviewed, SR with occasional AV pacing. Labs not resulted today. Blood pressures are soft, ranging from 90s-115 systolic.     10/14. Patient off floor this AM. On HD per nephro. SR on tele, intermittent AVpacing.     10/16: Pt seen and examined this AM. Reports he is feeling anxious for his procedure with vascular  "surgery today. Tolerating HD well. He denies chest pain or shortness of breath today. VSS. Labs reviewed. Cr 4.6. Tele reviewed.    10/18: Pt seen and examined today. He states he is feeling much better. He denies chest pain or shortness of breath. LE edema improved. Tolerating dialysis well. VSS. Labs reviewed. Cr 4.8. Tele reviewed.     10/19/2023: No acute events overnight. No CP, palpitations, syncope, presyncope. He is hoping to go home today. VSS. Tele reviewed.     Objective:  Vital Signs (Most Recent)  Temp: 97.8 °F (36.6 °C) (10/19/23 0700)  Pulse: 62 (10/19/23 0700)  Resp: 18 (10/19/23 0700)  BP: 113/69 (10/19/23 0700)  SpO2: (!) 94 % (10/19/23 0700)    Vital Signs Range (Last 24H):  Temp:  [97.8 °F (36.6 °C)-98.5 °F (36.9 °C)]   Pulse:  [58-68]   Resp:  [16-18]   BP: ()/(51-74)   SpO2:  [94 %-97 %]     I & O (Last 24H):    Intake/Output Summary (Last 24 hours) at 10/19/2023 0944  Last data filed at 10/19/2023 0839  Gross per 24 hour   Intake 720 ml   Output 4000 ml   Net -3280 ml         Current Diet:     Current Diet Order   Procedures    Diet diabetic [facility]; 2000 Calorie; Renal     Order Specific Question:   Indicate patient location for additional diet options:     Answer:   [facility]     Order Specific Question:   Total calories:     Answer:   2000 Calorie     Order Specific Question:   Additional Diet Options:     Answer:   Renal        Allergies:  Review of patient's allergies indicates:   Allergen Reactions    Vasopressin Anaphylaxis and Other (See Comments)     Increased pressure in his head; felt like his eyeballs and veins were going to pop out of his head.     Vasopressin analogues Other (See Comments) and Anaphylaxis     "felt like eyes going to pop out of my head"  Other reaction(s): Other (See Comments)  "felt like eyes going to pop out of my head"  Increased pressure in his head; felt like his eyeballs and veins were going to pop out of his head.     Heparin Other (See Comments)     Other " "reaction(s): "depleted my blood platets"    Decreased platelet count    Heparin analogues Other (See Comments)     Depleted WBC count    Morphine Hallucinations, Other (See Comments) and Anxiety     Other reaction(s): Hallucinations  Paranoid, hallucinations         Meds:  Scheduled Meds:   atorvastatin  20 mg Oral QHS    dapagliflozin propanediol  10 mg Oral Daily    epoetin patience-epbx  10,000 Units Subcutaneous Every Mon, Wed, Fri    gabapentin  300 mg Oral BID    levothyroxine  25 mcg Oral Before breakfast    midodrine  5 mg Oral TID AC    polyethylene glycol  17 g Oral Daily    sucralfate  1 g Oral QID (AC & HS)    torsemide  20 mg Oral BID loop     Continuous Infusions:      PRN Meds:acetaminophen, albumin human 25%, albuterol, ALPRAZolam, dextrose 50%, dextrose 50%, glucagon (human recombinant), glucose, glucose, insulin aspart U-100, melatonin, ondansetron, sodium chloride 0.9%, traMADoL    Oxygen/Ventilator Data (Last 24H):  (if applicable)            Hemodynamic Parameters (Last 24H):   (if applicable)        Lines/Drains:  (if applicable)  Trialysis (Dialysis) Catheter 10/10/23 1700 right internal jugular (Active)   $ Dialysis Supplies Trialysis Catheter (Supply) 10/11/23 0701   Line Necessity Review CRRT/HD 10/11/23 0701   Verification by X-ray Yes 10/11/23 0701   Site Assessment No drainage;No redness;No swelling;No warmth 10/11/23 0701   Line Securement Device Secured with sutures 10/11/23 0701   Dressing Type CHG impregnated dressing/sponge;Central line dressing 10/11/23 0701   Dressing Status Clean;Dry;Intact 10/11/23 0701   Dressing Intervention First dressing 10/11/23 0701   Date on Dressing 10/10/23 10/11/23 0701   Dressing Due to be Changed 10/17/23 10/11/23 0701   Number of days: 0            Peripheral IV - Single Lumen 10/08/23 1500 20 G Left Antecubital (Active)   Site Assessment Clean;Dry;Intact;No redness;No swelling 10/11/23 0701   Extremity Assessment Distal to IV No redness;No swelling " 10/11/23 0701   Line Status Saline locked 10/11/23 0701   Dressing Status Clean;Dry;Intact 10/11/23 0701   Dressing Intervention Integrity maintained 10/11/23 0701   Dressing Change Due 10/12/23 10/08/23 1930   Site Change Due 10/12/23 10/08/23 1930   Number of days: 2            Peripheral IV - Single Lumen 10/08/23 1621 18 G Right Antecubital (Active)   Site Assessment Clean;Dry;Intact;No redness;No swelling 10/11/23 0701   Extremity Assessment Distal to IV No swelling;No redness;No warmth 10/11/23 0701   Line Status Infusing 10/11/23 0701   Dressing Status Clean;Dry;Intact 10/11/23 0701   Dressing Intervention Integrity maintained 10/11/23 0701   Dressing Change Due 10/12/23 10/08/23 1950   Site Change Due 10/12/23 10/08/23 1950   Number of days: 2       Arterial Line 10/09/23 0139 Left Radial (Active)   $ Arterial Line Charges (Upon Insertion) Bedside Insertion Performed 10/09/23 0301   Site Assessment Clean;Dry;Intact;No redness;No swelling 10/11/23 0701   Line Status Pulsatile blood flow 10/11/23 0701   Art Line Waveform Appropriate 10/11/23 0701   Arterial Line Interventions Zeroed and calibrated;Flushed per protocol 10/11/23 0701   Color/Movement/Sensation Capillary refill less than 3 sec 10/11/23 0701   Dressing Type CHG impregnated dressing/sponge 10/11/23 0701   Dressing Status Clean;Dry;Intact 10/11/23 0701   Dressing Intervention Integrity maintained 10/11/23 0701   Dressing Change Due 10/16/23 10/11/23 0600   Number of days: 2            Urethral Catheter 10/08/23 1600 16 Fr. (Active)   Site Assessment Clean;Intact 10/11/23 0701   Collection Container Urimeter 10/11/23 0701   Securement Method secured to top of thigh w/ adhesive device 10/11/23 0701   Catheter Care Performed yes 10/11/23 0701   Reason for Continuing Urinary Catheterization Urinary retention;Critically ill in ICU and requiring hourly monitoring of intake/output 10/11/23 0701   CAUTI Prevention Bundle Intact seal between catheter &  "drainage tubing;Securement Device in place with 1" slack;Drainage bag/urimeter off the floor;Sheeting clip in use 10/11/23 0400   Output (mL) 50 mL 10/11/23 0713   Number of days: 2       Lab Results :  Recent Results (from the past 24 hour(s))   POCT glucose    Collection Time: 10/18/23 11:40 AM   Result Value Ref Range    POC Glucose 147 (H) 70 - 110   POCT glucose    Collection Time: 10/18/23  8:16 PM   Result Value Ref Range    POC Glucose 168 (H) 70 - 110   Comprehensive metabolic panel    Collection Time: 10/19/23  4:48 AM   Result Value Ref Range    Sodium 131 (L) 136 - 145 mmol/L    Potassium 4.6 3.5 - 5.1 mmol/L    Chloride 99 95 - 110 mmol/L    CO2 25 23 - 29 mmol/L    Glucose 119 (H) 70 - 110 mg/dL    BUN 72 (H) 8 - 23 mg/dL    Creatinine 4.3 (H) 0.5 - 1.4 mg/dL    Calcium 8.6 (L) 8.7 - 10.5 mg/dL    Total Protein 7.1 6.0 - 8.4 g/dL    Albumin 3.4 (L) 3.5 - 5.2 g/dL    Total Bilirubin 1.0 0.1 - 1.0 mg/dL    Alkaline Phosphatase 71 55 - 135 U/L    AST 29 10 - 40 U/L    ALT 18 10 - 44 U/L    eGFR 14.9 (A) >60 mL/min/1.73 m^2    Anion Gap 7 (L) 8 - 16 mmol/L   CBC auto differential    Collection Time: 10/19/23  4:49 AM   Result Value Ref Range    WBC 5.49 3.90 - 12.70 K/uL    RBC 2.95 (L) 4.60 - 6.20 M/uL    Hemoglobin 8.4 (L) 14.0 - 18.0 g/dL    Hematocrit 27.3 (L) 40.0 - 54.0 %    MCV 93 82 - 98 fL    MCH 28.5 27.0 - 31.0 pg    MCHC 30.8 (L) 32.0 - 36.0 g/dL    RDW 19.0 (H) 11.5 - 14.5 %    Platelets 58 (L) 150 - 450 K/uL    MPV 10.4 9.2 - 12.9 fL    Immature Granulocytes 0.4 0.0 - 0.5 %    Gran # (ANC) 4.0 1.8 - 7.7 K/uL    Immature Grans (Abs) 0.02 0.00 - 0.04 K/uL    Lymph # 0.4 (L) 1.0 - 4.8 K/uL    Mono # 1.0 0.3 - 1.0 K/uL    Eos # 0.1 0.0 - 0.5 K/uL    Baso # 0.02 0.00 - 0.20 K/uL    nRBC 0 0 /100 WBC    Gran % 72.7 38.0 - 73.0 %    Lymph % 6.6 (L) 18.0 - 48.0 %    Mono % 18.8 (H) 4.0 - 15.0 %    Eosinophil % 1.1 0.0 - 8.0 %    Basophil % 0.4 0.0 - 1.9 %    Differential Method Automated    POCT " glucose    Collection Time: 10/19/23  7:10 AM   Result Value Ref Range    POC Glucose 140 (H) 70 - 110       Diagnostic Results:  Imaging Results              X-Ray Chest AP Portable (Final result)  Result time 10/08/23 15:13:32      Final result by Tong Morales MD (10/08/23 15:13:32)                   Narrative:    CLINICAL HISTORY:  63 years (1960) Male Tachycardia; Shortness of Breath    TECHNIQUE:  Portable AP radiograph the chest. One view.    COMPARISON:  Radiograph from June 12, 2023    FINDINGS:  There is vascular congestion with increased interstitial markings findings indicating mild pulmonary edema.  There is blunting of both costophrenic angles consistent with trace pleural effusions and adjacent atelectasis. No pneumothorax is identified. The cardiac silhouette is moderately enlarged. The median sternotomy wires and the left sided pacemaker are unchanged.  Osseous structures appear unchanged. The visualized upper abdomen is unremarkable.    IMPRESSION:  Moderate to severe cardiomegaly and findings of mild interstitial pulmonary edema.                  .            Electronically signed by:  Tong Morales MD  10/08/2023 03:13 PM CDT Workstation: [id number]                                    12-lead EKG interpretation:   (if applicable)    Recent Cardiac Rhythm:  (if applicable)      Physical Exam:  Objective:  General Appearance:  Comfortable.    Vital signs: (most recent): Blood pressure 113/69, pulse 62, temperature 97.8 °F (36.6 °C), temperature source Oral, resp. rate 18, height 6' (1.829 m), weight 122 kg (269 lb), SpO2 (!) 94 %.  Vital signs are normal.  No fever.    Lungs:  Normal effort and normal respiratory rate.  Breath sounds clear to auscultation.  He is not in respiratory distress.    Heart: Normal rate.  Regular rhythm.  S1 normal and S2 normal.  Positive for murmur.  No gallop.   Abdomen: Bowel sounds are normal.     Extremities: There is venous stasis.  There is no  local swelling.    Neurological: Patient is oriented to person, place and time.        Current Consults:  IP CONSULT TO CARDIOLOGY  IP CONSULT TO HOSPITAL MEDICINE  IP CONSULT TO INTENSIVIST  IP CONSULT TO ANESTHESIOLOGY  WOUND CARE CONSULT  IP CONSULT TO NEPHROLOGY  WOUND CARE CONSULT  IP CONSULT TO REGISTERED DIETITIAN/NUTRITIONIST  IP CONSULT TO REGISTERED DIETITIAN/NUTRITIONIST  IP CONSULT TO UROLOGY  IP CONSULT TO UROLOGY  IP CONSULT TO GENERAL SURGERY  IP CONSULT TO VASCULAR SURGERY  IP CONSULT TO SOCIAL WORK/CASE MANAGEMENT  IP CONSULT TO HEM/ONC    Assessment/Plan:  Assessment:   Acute on chronic combined systolic /diastolic HFrEF   ICM EF 20%  AD/CKD-cardiorenal  CAD-CABG--in office cr 3.2 9/1-currently on dialysis  Atrial arrhythmias  DM   DM foot ulcer   HTN  HLP  FARHAN  Chronic thrombocytopenia   [place]ott AICD in situ       Plan:   Continue current management.  Nephrology following. On dialysis.   No new cardiac recommendations at this time. Hold ASA for thrombocytopenia.   Awaiting placement for HD.     Dinesh Vences MD

## 2023-10-19 NOTE — PROGRESS NOTES
INPATIENT NEPHROLOGY Progress Note   [facility]    Patient Name: Ana Bullard  Date: 10/19/2023    Reason for consultation: AD/CKD    Chief Complaint:   Chief Complaint   Patient presents with    Tachycardia    Shortness of Breath     History of Present Illness:  64 y/o Male pt of Dr. Vences who presented to ED with h/o fatigue, back pain, decreased urination and SOB for 2 days. He endorses SOB has progressively worsened over this time and he is having dyspnea with minimal exertion, but no reported chest pain . He reports some feeling some palpitations, BLE edema as well. PMH includes  ischemic cardiomyopathy, 5 vessel bypass in 2012, s/p multiple MIs, chronic systolic heart failure, most recent ejection fraction 20%, complete heart block, PPM dependent, HIT, DVT/PE, diabetes mellitus, hyperlipidemia, hypertension, obstructive sleep apnea, and chronic kidney disease IV. In the ER he had wide complex tachycardia, amiodarone started. Pt was also started on leyla and lasix gtts, transferred to ICU. Consulted for renal management.    Interval History:  10/9- on BIPAP, overloaded  0/10- AF with RVR, SBP , on 3L NC, UOP 80cc, ashford placed- renal function worse- remains volume overloaded- I have discussed the risks and benefits of hemodialysis with the patient/family.  All of their questions were answered.  Risks include low blood pressure, stroke, heart attack, seizure, infection, nausea, delirium, and death.   10/11 tolerated first HD- got off 2L- 2nd treatment today- AICD adjusted yest  10/12 got off 3L yest- plan for 3rd treatment today- UOP 225cc- net -2.8L  10/13- got off 4L yest- plan for 4rth treatment today- UOP 60cc- net -6L  10/14  2L off w/HD yesterday.  Pressures low, but stable, can have albumin w/HD if needed.  No complaints.  No UOP recorded.  10/15  VSS, lab results reviewed.  250cc UOP recorded.  1.5L off w/HD yesterday.  Holding HD today, pt asking about going home.  Told  him we have to see if HD needs to continue;  if so, will need out patient chair placement.  Voiced understanding.  10/16 VSS. No renal recovery. Seen on HD today, plan MWF. Awaiting placement and tunneled cath.  10/17 VSS. S/p tunneled HD cath on 10/16 by Dr. Oleary. Abdominal US today. HD tomorrow.  10/18 VSS. Seen on HD today, doing well. Ok to dc if ready.  10/19 VSS. HD tomorrow if still here.    Plan of Care:    AD on CKD IV due to CRS- initiated RRT on 10/10 for clearance and UF  Wide complex tachycardia vs Sinus tachycardia- AICD adjusted  CHF exacerbation with hypoxia requiring NIPPV (EF 25%, grade III DD, pulm HTN)  Hyponatremia  Secondary HPT  Anemia of CKD    Plan:    - continue RRT for clearance and UF, then MWF  - had hemosplit placed on 10/16 by Dr. Oleary  - needs outpatient placement with [facility] unless he goes to LTAC/rehab/SNF  - rate control improved- AICD adjusted 10/10  - use albumin PRN for hypotension- push UF with each treatment  - cardiac diet, 1.5L fluid restriction as tolerated  - resume farxiga and siuretics, added misdodrine  - no nsaids or IV contrast  - continue ashford  - no acute BMM issues  - H/H drop noted, will resume GANGA    Thank you for allowing us to participate in this patient's care. We will continue to follow.    Vital Signs:  Temp Readings from Last 3 Encounters:   10/19/23 98.5 °F (36.9 °C) (Oral)   06/18/23 97.8 °F (36.6 °C)   05/31/23 97.5 °F (36.4 °C)       Pulse Readings from Last 3 Encounters:   10/19/23 61   08/17/23 61   07/17/23 60       BP Readings from Last 3 Encounters:   10/19/23 103/64   07/17/23 116/71   06/18/23 138/73       Weight:  Wt Readings from Last 3 Encounters:   10/17/23 122 kg (269 lb)   09/28/23 121.8 kg (268 lb 8 oz)   09/07/23 120.6 kg (265 lb 15.1 oz)       INS/OUTS:  I/O last 3 completed shifts:  In: 480 [P.O.:480]  Out: 4100 [Urine:600; Other:3500]  I/O this shift:  In: 240 [P.O.:240]  Out: 150 [Urine:150]    Medications:  Scheduled Meds:    atorvastatin  20 mg Oral QHS    dapagliflozin propanediol  10 mg Oral Daily    epoetin patience-epbx  10,000 Units Subcutaneous Every Mon, Wed, Fri    gabapentin  300 mg Oral BID    levothyroxine  25 mcg Oral Before breakfast    midodrine  5 mg Oral TID AC    polyethylene glycol  17 g Oral Daily    sucralfate  1 g Oral QID (AC & HS)    torsemide  20 mg Oral BID loop     Continuous Infusions:      PRN Meds:.acetaminophen, albumin human 25%, albuterol, ALPRAZolam, dextrose 50%, dextrose 50%, glucagon (human recombinant), glucose, glucose, insulin aspart U-100, melatonin, ondansetron, sodium chloride 0.9%, traMADoL  No current facility-administered medications on file prior to encounter.     Current Outpatient Medications on File Prior to Encounter   Medication Sig Dispense Refill    albuterol (PROVENTIL) 5 mg/mL nebulizer solution Take 2.5 mg by nebulization every 6 (six) hours as needed.      aspirin 81 MG Chew Take 81 mg by mouth once daily.      atorvastatin (LIPITOR) 20 MG tablet Take 20 mg by mouth once daily.      BASAGLAR KWIKPEN U-100 INSULIN glargine 100 units/mL (3mL) SubQ pen Inject 25 Units into the skin once daily.  3    carvediloL (COREG) 3.125 MG tablet Take 3.125 mg by mouth 2 (two) times daily.      FARXIGA 10 mg tablet Take 10 mg by mouth once daily.      gabapentin (NEURONTIN) 600 MG tablet Take 600 mg by mouth 3 (three) times daily.      pantoprazole (PROTONIX) 40 MG tablet Take 1 tablet by mouth once daily.  0    torsemide (DEMADEX) 20 MG Tab Take 1 tablet (20 mg total) by mouth 2 (two) times a day. 120 tablet 0    traMADoL (ULTRAM) 50 mg tablet Take 1 tablet (50 mg total) by mouth every 6 (six) hours as needed for Pain. 120 tablet 2    vitamin B complex-folic acid 0.4 mg Tab Take 1 tablet by mouth once daily.      albuterol-ipratropium (DUO-NEB) 2.5 mg-0.5 mg/3 mL nebulizer solution Take 3 mLs by nebulization every 6 (six) hours as needed.      amiodarone (PACERONE) 200 MG Tab Take 1 tablet (200 mg  total) by mouth 2 (two) times daily. 90 tablet 0    CORLANOR 5 mg Tab Take 5 mg by mouth nightly.      TRULICITY 0.75 mg/0.5 mL pen injector Inject 0.75 mg into the skin every 7 days.         Review of Systems:    Physical Exam:  /64 (BP Location: Right arm, Patient Position: Lying)   Pulse 61   Temp 98.5 °F (36.9 °C) (Oral)   Resp 20   Ht 6' (1.829 m)   Wt 122 kg (269 lb)   SpO2 (!) 92%   BMI 36.48 kg/m²     General Appearance:    NAD, AAO x 3, cooperative, appears stated age   Head:    Normocephalic, atraumatic   Eyes:    PER, EOMI, and conjunctiva/sclera clear bilaterally        Mouth:   Moist mucus membranes, no thrush or oral lesions, normal      dentition   Back:     No CVA tenderness   Lungs:     Rales   Heart:    Tachy    Abdomen:     Soft, non-tender, distended,   Extremities:   Edematous   MSK:   No joint or muscle swelling, tenderness or deformity   Skin:   Skin color, texture, turgor normal, no rashes or lesions   Neurologic/Psychiatric:   CNII-XII intact, normal strength and sensation       throughout, no asterixis; normal affect, memory, judgement     and insight     Results:  Recent Labs   Lab 10/17/23  0409 10/18/23  0333 10/19/23  0448   * 131* 131*   K 4.5 4.6 4.6    98 99   CO2 23 23 25   BUN 61* 85* 72*   CREATININE 4.0* 4.8* 4.3*   * 127* 119*         Recent Labs   Lab 10/17/23  0409 10/18/23  0333 10/19/23  0448   CALCIUM 8.4* 8.4* 8.6*   ALBUMIN 3.3* 3.4* 3.4*         Recent Labs   Lab 09/02/22  0439 11/23/22  0951 10/09/23  0230   Hemoglobin A1C 7.7 H 6.8 H 6.2         Recent Labs   Lab 10/17/23  0409 10/18/23  0333 10/19/23  0449   WBC 6.27 6.89 5.49   HGB 8.3* 8.1* 8.4*   HCT 27.4* 26.3* 27.3*   PLT 55* 63* 58*   MCV 94 93 93   MCHC 30.3* 30.8* 30.8*   MONO 11.3  0.7 12.9  0.9 18.8*  1.0   EOSINOPHIL 0.0 0.6 1.1         Recent Labs   Lab 10/17/23  0409 10/18/23  0333 10/19/23  0448   BILITOT 1.0 0.9 1.0   PROT 7.0 7.0 7.1   ALBUMIN 3.3* 3.4* 3.4*   ALKPHOS  68 67 71   ALT 22 16 18   AST 21 19 29         Recent Labs   Lab 06/12/23  2241 10/09/23  0241 10/15/23  2206   Color, UA Yellow Yellow Yellow   Appearance, UA Cloudy A Hazy A Hazy A   pH, UA 5.0 5.0 6.0   Specific [place], UA 1.020 1.015 1.015   Protein, UA 1+ A 1+ A 2+ A   Glucose, UA 1+ A 2+ A 2+ A   Ketones, UA Negative Negative Negative   Urobilinogen, UA 4.0-6.0 A Negative Negative   Bilirubin (UA) Negative Negative Negative   Occult Blood UA 3+ A 3+ A 3+ A   Nitrite, UA Negative Negative Negative   RBC, UA >100 H 58 H 29 H   WBC, UA 28 H 4 14 H   Bacteria Negative Negative Negative   Hyaline Casts, UA 32 A 44 A 36 A         Recent Labs   Lab 09/01/22  0333   POC PH 7.350   POC PCO2 54.1 H   POC HCO3 29.9 H   POC PO2 18 L   POC SATURATED O2 24 L   POC BE 4   Sample VENOUS         Microbiology Results (last 7 days)       Procedure Component Value Units Date/Time    Urine culture [[id number]] Collected: 10/15/23 2206    Order Status: Completed Specimen: Urine Updated: 10/18/23 0706     Urine Culture, Routine No growth    Narrative:      Specimen Source->Urine    Blood culture [[id number]] Collected: 10/09/23 0226    Order Status: Completed Specimen: Blood from Line, Jugular, Internal Right Updated: 10/14/23 0432     Blood Culture, Routine No growth after 5 days.    Blood culture #2 **CANNOT BE ORDERED STAT** [[id number]] Collected: 10/08/23 1512    Order Status: Completed Specimen: Blood Updated: 10/13/23 1832     Blood Culture, Routine No growth after 5 days.    Blood culture #1 **CANNOT BE ORDERED STAT** [[id number]] Collected: 10/08/23 1504    Order Status: Completed Specimen: Blood from Antecubital, Right Updated: 10/13/23 1832     Blood Culture, Routine No growth after 5 days.          I have spent >  minutes providing care for this patient for the above diagnoses. These services have included chart/data/imaging review, evaluation, exam, formulation of plan, , note preparation, and discussions  with staff involved in this patient's care.    Maicol Leach MD  [facility] [address] [address]  [phone] (p)  [phone] (f)

## 2023-10-19 NOTE — PROGRESS NOTES
[facility]   Hematology/Oncology  Inpatient Progress Note          Patient Name: Ana Bullard  MRN: [id number]  Admission Date: 10/8/2023  Hospital Length of Stay: 11 days  Code Status: Full Code   Attending Provider: Haseeb Romero MD  Consulting Provider: Ray Braden MD  Primary Care Physician: Michelle Messina FNP  Principal Problem:Wide-complex tachycardia      Subjective:       Patient ID: Ana Bullard is a 63 y.o. male.    Chief Complaint: Tachycardia and Shortness of Breath        History Present Illness:    Patient laying in bed; wife is at bedside; no CP, SOB, HA's or N/V; he is being discharged to home with planned outpatient HD; discussed with floor nurse      Review of Systems:  GEN: general malaise, weakness, fatigue  HEENT: normal with no HA's, sore throat, stiff neck, changes in vision  CV: normal with no CP, SOB, PND, BAPTISTE or orthopnea; no current palpitations  PULM: normal with no SOB, cough, hemoptysis, sputum or pleuritic pain  GI: normal with no abdominal pain, nausea, vomiting, constipation, diarrhea, melanotic stools, BRBPR, or hematemesis  : normal with no hematuria, dysuria  BREAST: normal with no mass, discharge, pain  SKIN: normal with no rash, erythema, bruising, or swelling      Objective:     Vitals:  Blood pressure 113/69, pulse 62, temperature 97.8 °F (36.6 °C), temperature source Oral, resp. rate 18, height 6' (1.829 m), weight 122 kg (269 lb), SpO2 (!) 94 %.    Physical Exam:  GEN: no apparent distress, comfortable; AAOx3; overweight  HEAD: atraumatic and normocephalic  EYES: no pallor, no icterus, PERRLA  ENT: OMM, no pharyngeal erythema, external ears WNL; no nasal discharge; no thrush  NECK: no masses, thyroid normal, trachea midline, no LAD/LN's, supple; former Rght IJ removed  CV: RRR with no murmur; normal pulse; normal S1 and S2; no pedal edema; portacath on Rght CW  CHEST: Normal respiratory effort; CTAB; normal breath sounds; no wheeze or  crackles  ABDOM: nontender and nondistended; soft; normal bowel sounds; no rebound/guarding  MUSC/Skeletal: ROM normal; no crepitus; joints normal; no deformities or arthropathy  EXTREM: no clubbing, cyanosis, inflammation or swelling; chronic venous stasis changes bilateral lower extremities  SKIN: no rashes, lesions, ulcers, petechiae or subcutaneous nodules; chronic age related skin changes, scarps and bruises; dry skin  : no changes  NEURO: grossly intact; motor/sensory WNL; AAOx3; no tremors; generalized weakness  PSYCH: normal mood, affect and behavior  LYMPH: normal cervical, supraclavicular, axillary and groin LN's             Lab Review:        Lab Results   Component Value Date    WBC 5.49 10/19/2023    HGB 8.4 (L) 10/19/2023    HCT 27.3 (L) 10/19/2023    MCV 93 10/19/2023    PLT 58 (L) 10/19/2023       CMP  Sodium   Date Value Ref Range Status   10/19/2023 131 (L) 136 - 145 mmol/L Final   01/23/2019 136 134 - 144 mmol/L      Potassium   Date Value Ref Range Status   10/19/2023 4.6 3.5 - 5.1 mmol/L Final     Chloride   Date Value Ref Range Status   10/19/2023 99 95 - 110 mmol/L Final   01/23/2019 102 98 - 110 mmol/L      CO2   Date Value Ref Range Status   10/19/2023 25 23 - 29 mmol/L Final     Glucose   Date Value Ref Range Status   10/19/2023 119 (H) 70 - 110 mg/dL Final   01/23/2019 99 70 - 99 mg/dL      BUN   Date Value Ref Range Status   10/19/2023 72 (H) 8 - 23 mg/dL Final     Creatinine   Date Value Ref Range Status   10/19/2023 4.3 (H) 0.5 - 1.4 mg/dL Final   01/23/2019 0.98 0.60 - 1.40 mg/dL      Calcium   Date Value Ref Range Status   10/19/2023 8.6 (L) 8.7 - 10.5 mg/dL Final     Total Protein   Date Value Ref Range Status   10/19/2023 7.1 6.0 - 8.4 g/dL Final     Albumin   Date Value Ref Range Status   10/19/2023 3.4 (L) 3.5 - 5.2 g/dL Final   01/23/2019 3.0 (L) 3.1 - 4.7 g/dL      Total Bilirubin   Date Value Ref Range Status   10/19/2023 1.0 0.1 - 1.0 mg/dL Final     Comment:     For infants  and newborns, interpretation of results should be based  on gestational age, weight and in agreement with clinical  observations.    Premature Infant recommended reference ranges:  Up to 24 hours.............<8.0 mg/dL  Up to 48 hours............<12.0 mg/dL  3-5 days..................<15.0 mg/dL  6-29 days.................<15.0 mg/dL       Alkaline Phosphatase   Date Value Ref Range Status   10/19/2023 71 55 - 135 U/L Final     AST   Date Value Ref Range Status   10/19/2023 29 10 - 40 U/L Final     ALT   Date Value Ref Range Status   10/19/2023 18 10 - 44 U/L Final     Anion Gap   Date Value Ref Range Status   10/19/2023 7 (L) 8 - 16 mmol/L Final     eGFR   Date Value Ref Range Status   10/19/2023 14.9 (A) >60 mL/min/1.73 m^2 Final     Lab Results   Component Value Date    IRON 36 (L) 10/16/2023    TRANSFERRIN 224 10/16/2023    TIBC 314 10/16/2023    FESATURATED 11 (L) 10/16/2023      Lab Results   Component Value Date    FERRITIN 93.2 10/16/2023           Radiology Diagnostic Studies:     X-Ray Chest AP Portable [[id number]] Collected: 10/16/23 1207   Order Status: Completed Updated: 10/16/23 1240   Narrative:     Chest single view     CLINICAL DATA: Central venous catheter placement     FINDINGS: AP views compared to October 10. Cardiomegaly is stable. There has been previous median sternotomy. Implantable cardiac device remains in place. Dual lumen right IJ central catheter is noted with distal tip just above the atriocaval junction. No pneumothorax or other placement related complication is identified.     Faint bilateral interstitial prominence is at least in part summation effect related to body habitus. Mild interstitial edema is not excluded.     IMPRESSION:   1. No pneumothorax status post central venous catheter placement.   2. Faint bilateral interstitial prominence may be related to technique and body habitus. Mild interstitial edema is not excluded            Assessment:     IMPRESSION:    (1) 63 y.o.  male with diagnosis of chronic anemia and chronic thrombocytopenia who presented to the ED at [facility] with tachycardia and SOB. He has been admitted to the hospitalist service. He has multiple medical problems including DM II, CAD, CHF, AICD hx, and chronic venous stasis. 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 He came to hematology clinic for an initial visit back in 2021 for evaluation and labs and studies were ordered at that time; however, the patient never had the lab work or studies done and failed to ever return to clinic.      10/16/2023:  - hgb at 8.8 and plats 53,000  - chronic thrombocytopenia for several years  - suspected underlying autoimmune mediated process such as chronic ITP  - most likely has a chronic multifactorial anemia process with anemia of chronic disorders, anemia of chronic renal disease, +/ chronic GIB  - total bili is only minimally elevated, so I do not suspect any significant hemolytic process at this time  - He came to hematology clinic for an initial visit back in 2021 for evaluation and labs and studies were ordered at that time; however, the patient never had the lab work or studies done and failed to ever return to clinic.     10/17/2023:  -  hgb at 8.3 and plats 55,000  - serum iron and %iron sat low    10/18/2023:  - hgb at 8.1 and plats 63,000  - getting HD today    10/19/2023:  - hgb 8.4 and plats 58,000 today - relatively stable     (2) CAD, CHF, s/p prior AICD     (3) DM II     (4) History of heparin induced thrombocytopenia     (5) CKD - stage V - started HD this admit     (6) Chronic venous stasis dermatitis of the lower extremities     (7) Pulmonary HTN        1. Atrial tachycardia    2. Pacemaker-mediated tachycardia    3. Acute on chronic congestive heart failure, unspecified heart failure type    4. Ulcer of right foot with fat layer exposed    5. Wide-complex tachycardia    6. Cellulitis  of right leg    7. CKD (chronic kidney disease), stage V    8. Normochromic normocytic anemia    9. Chronic Thrombocytopenia    10. Anemia due to stage 4 chronic kidney disease           Plan:     PLAN:          Monitor labs and transfuse as needed  Recommended consideration for dose of IV iron; use of procrit/retacrit deferred to nephrology  Ordered antiplatelet antibody screens and additional studies  Avoid heparinoid products including heparin, lovenox and even heparin flushes  Avoid IV zosyn and IV pepcid as these meds could exacerbate the thrombocytopenia  Cardiac as per cardiology directives  Renal as per nephrology directives  Expected D/c to home - will follow-up in clinic in 4 weeks               Ray Braden MD  Hematology/Oncology  [facility]

## 2023-10-19 NOTE — PLAN OF CARE
1400: Spoke to [facility] who states they have received all required documentation and are awaiting approval from Medical Director and clinic.     1220: Hep B lab results noted and sent to [facility] via Epic.     0900:  Patient accepted for home health services with [facility], case mgmt agency when patient discharges.     [facility].  Hep B labs pending In Process at this time.  Will continue to follow.        10/17/23 1219   Post-Acute Status   Post-Acute Authorization Home Health;Dialysis   Home Health Status Pending medical clearance/testing   Diaylsis Status Pending medical clearance/testing   Discharge Delays (!) Dialysis Set-up

## 2023-10-19 NOTE — PLAN OF CARE
See previous note concerning HD placement.    Home health arranged with [facility], start of care 10/20/23 per Dontrell.     Patient has follow up appt with [facility].     Discharge orders and chart reviewed with no further post-acute discharge needs identified at this time.  At this time, patient is cleared for discharge from Case Management standpoint.        10/19/23 1559   Final Note   Assessment Type Final Discharge Note   Anticipated Discharge Disposition Home-Health   Hospital Resources/Appts/Education Provided Appointments scheduled and added to AVS   Post-Acute Status   Post-Acute Authorization Home Health;Dialysis   Home Health Status Set-up Complete/Auth obtained   Diaylsis Status Set-up Complete/Auth obtained   Discharge Delays None known at this time

## 2023-10-20 ENCOUNTER — PATIENT MESSAGE (OUTPATIENT)
Dept: ADMINISTRATIVE | Facility: CLINIC | Age: 63
End: 2023-10-20
Payer: MEDICARE

## 2023-10-20 ENCOUNTER — PATIENT OUTREACH (OUTPATIENT)
Dept: ADMINISTRATIVE | Facility: CLINIC | Age: 63
End: 2023-10-20
Payer: MEDICARE

## 2023-10-20 LAB
MISCELLANEOUS TEST NAME: NORMAL
REFERENCE LAB: NORMAL
SPECIMEN TYPE: NORMAL
TEST RESULT: NORMAL

## 2023-10-20 NOTE — PROGRESS NOTES
C3 nurse attempted to contact Ana Bullard  for a TCC post hospital discharge follow up call. No answer. Left voicemail with callback information. The patient has a scheduled HOSFU appointment with [facility]  on 10/24/2023 @ 1400.

## 2023-10-20 NOTE — DISCHARGE SUMMARY
[facility]  Discharge Summary      Patient Name: Ana Bullard  MRN: [id number]  GALE: [id number]  Patient Class: IP- Inpatient  Admission Date: 10/8/2023  Hospital Length of Stay: 11 days  Discharge Date and Time:  10/20/2023 5:43 PM  Attending Physician: No att. providers found   Discharging Provider: Haseeb Romero MD  Primary Care Provider: Michelle Messina FNP    Primary Care Team: Networked reference to record PCT     HPI:   No notes on file    Procedure(s) (LRB):  Insertion,catheter,tunneled (Right)      Hospital Course:   63 year old male PMHx coronary artery disease, insulin-dependent type 2 diabetes, heparin induced thrombocytopenia, ischemic cardiomyopathy with AICD, MRSA staph infection, chronic thrombocytopenia, osteomyelitis of the right foot, sleep apnea, venous stasis dermatitis to lower extremities presented to the ED with SOB, worsening LE edema, 10 lbs weight gain in the last 1.5 weeks.    In the ED, he was noted to have wide complex tachycardia, volume overload on exam, soft blood presures and AD.  He remained tachy in the 130s.  He was started on Amiodarone gtt.  He ultimately required vasopressor initiation for hypotension.  Dobutamine gtt with IV Bumex started.   Troponin in the 20s with trend remaining flat.  BNP 1720.  CXR with interstitial edema.  Seen by Cardiology and initially felt to have Atrial tachycardia with wide complex secondary to AICD.  This was not felt to be Ventricular tachycardia.  His device was interrogated as per cardiology appear  sinus tachycardia at that time   .V Bumex changed to lasix gtt. Egan placed and very little UOP  and  Nephrology consulted.  Synthroid started for subtherapeutic hypothyroidism with TSH of 11. 10/10  and DC with low dose thyroxine .  Tachycardia has resolved with adjustments to his AICD by Dr. Bailey  . BP improved.  Lasix gtt, Amiodarone gtt stopped.  Poor UOP with worsening renal function and  hyponatremia. and thus Nephrology has recommended to initiate RRT and  placed tunneled cath.   Later patient needed long term HD and outpatient HD set up and DC in stable condition .       Goals of Care Treatment Preferences:  Code Status: Full Code      Consults:   Consults (From admission, onward)        Status Ordering Provider     Inpatient consult to General Surgery  Once        Provider:  Max Wan MD    Completed MARYJO RAMOS     Inpatient consult to Urology  Once        Provider:  Radha Rowley MD    Completed WILBERT PERSON N.     Inpatient consult to Urology  Once        Provider:  Radha Rowley MD    Completed ASHLY PERSONSBEE N.     Inpatient consult to Registered Dietitian/Nutritionist  Once        Provider:  (Not yet assigned)    Completed PERSONASHLY WILLOUGHBYSBEE N.     Inpatient consult to Registered Dietitian/Nutritionist  Once        Provider:  (Not yet assigned)    Completed ASHLY PERSONSBESHEBA N.     Inpatient consult to Nephrology  Once        Provider:  Flo Drake MD    Completed PERSONASHLY WILLOUGHBYSBESHEBA N.     Inpatient consult to Intensivist  Once        Provider:  Irene Palumbo MD    Completed DINESH ZAMAN     Inpatient consult to Cardiology  Once        Provider:  Dinesh Zaman MD    Completed RAMSEY ALEJANDRO          No new Assessment & Plan notes have been filed under this hospital service since the last note was generated.  Service: Hospital Medicine    Final Active Diagnoses:    Diagnosis Date Noted POA    PRINCIPAL PROBLEM:  Wide-complex tachycardia [R00.0] 06/12/2023 Yes    CKD (chronic kidney disease), stage V [N18.5] 10/15/2023 Yes    Pulmonary hypertension [I27.20] 09/02/2022 Yes    AICD (automatic cardioverter/defibrillator) present [Z95.810] 01/19/2020 Yes    Obesity [E66.9] 01/19/2020 Yes    Chronic combined systolic and diastolic heart failure [I50.42] 01/19/2020 Yes    Heparin induced thrombocytopenia [D75.829] 01/19/2020 Yes    Venous stasis dermatitis of  both lower extremities [I87.2] 01/31/2019 Yes    CAD (coronary artery disease) [I25.10] 01/01/2012 Yes    Chronic Thrombocytopenia [D69.6] 01/01/2012 Yes    Type II diabetes mellitus with neurological manifestations [E11.49] 01/01/1983 Yes      Problems Resolved During this Admission:       Discharged Condition: good    [place] [facility] [place]:   Follow-up Information     [facility] Follow up on 10/20/2023.    Why: Dialysis will be Monday, Wednesday, Friday at 2:45pm  First session will be tomorrow, 10/20/23, please arrive 30 minutes early to complete paperwork.  Contact information:  [facility] [id number] [phone]           Michelle Messina FNP Follow up in 1 month(s).    Specialties: Emergency Medicine, Family Medicine  Contact information:  [address] [place] [address] [place] [id number] [phone]             Maicol Leach MD Follow up in 1 week(s).    Specialty: Nephrology  Contact information:  [address] [place] [address] [facility]  [facility] [address]  [phone]                       Patient Instructions:      Ambulatory referral/consult to Home Health   Standing Status: Future   Referral Priority: Routine Referral Type: Home Health   Referral Reason: Specialty Services Required   Requested Specialty: Home Health Services   Number of Visits Requested: 1     Diet renal     Activity as tolerated       Significant Diagnostic Studies: Labs:   CMP   Recent Labs   Lab 10/19/23  0448   *   K 4.6   CL 99   CO2 25   *   BUN 72*   CREATININE 4.3*   CALCIUM 8.6*   PROT 7.1   ALBUMIN 3.4*   BILITOT 1.0   ALKPHOS 71   AST 29   ALT 18   ANIONGAP 7*    and CBC   Recent Labs   Lab 10/19/23  0449   WBC 5.49   HGB 8.4*   HCT 27.3*   PLT 58*       Pending Diagnostic Studies:     Procedure Component Value Units Date/Time    Lactic acid, plasma [[id number]] Collected: 10/09/23 1038    Order Status: Sent Lab Status: In process  Updated: 10/09/23 1039    Specimen: Blood     Urinalysis, Reflex to Urine Culture Urine, Catheterized [[id number]] Collected: 10/08/23 1611    Order Status: Sent Lab Status: In process Updated: 10/08/23 1612    Specimen: Urine          Medications:  Reconciled Home Medications:      Medication List      START taking these medications    levothyroxine 25 MCG tablet  Commonly known as: SYNTHROID  Take 1 tablet (25 mcg total) by mouth before breakfast.     midodrine 5 MG Tab  Commonly known as: PROAMATINE  Take 1 tablet (5 mg total) by mouth 3 (three) times daily before meals.        CHANGE how you take these medications    gabapentin 600 MG tablet  Commonly known as: NEURONTIN  Take 600 mg by mouth 3 (three) times daily.  What changed: Another medication with the same name was removed. Continue taking this medication, and follow the directions you see here.        CONTINUE taking these medications    albuterol 5 mg/mL nebulizer solution  Commonly known as: PROVENTIL  Take 2.5 mg by nebulization every 6 (six) hours as needed.     albuterol-ipratropium 2.5 mg-0.5 mg/3 mL nebulizer solution  Commonly known as: DUO-NEB  Take 3 mLs by nebulization every 6 (six) hours as needed.     aspirin 81 MG Chew  Take 1 tablet (81 mg total) by mouth once daily.     atorvastatin 20 MG tablet  Commonly known as: LIPITOR  Take 20 mg by mouth once daily.     BASAGLAR KWIKPEN U-100 INSULIN glargine 100 units/mL SubQ pen  Generic drug: insulin  Inject 25 Units into the skin once daily.     carvediloL 3.125 MG tablet  Commonly known as: COREG  Take 3.125 mg by mouth 2 (two) times daily.     FARXIGA 10 mg tablet  Generic drug: dapagliflozin propanediol  Take 10 mg by mouth once daily.     pantoprazole 40 MG tablet  Commonly known as: PROTONIX  Take 1 tablet by mouth once daily.     torsemide 20 MG Tab  Commonly known as: DEMADEX  Take 1 tablet (20 mg total) by mouth 2 (two) times a day.     traMADoL 50 mg tablet  Commonly known as:  ULTRAM  Take 1 tablet (50 mg total) by mouth every 6 (six) hours as needed for Pain.     vitamin B complex-folic acid 0.4 mg Tab  Take 1 tablet by mouth once daily.        STOP taking these medications    amiodarone 200 MG Tab  Commonly known as: PACERONE     CORLANOR 5 mg Tab  Generic drug: ivabradine     TRULICITY 0.75 mg/0.5 mL pen injector  Generic drug: dulaglutide            Indwelling Lines/Drains at time of discharge:   Lines/Drains/Airways     Central Venous Catheter Line  Duration                Hemodialysis Catheter 10/16/23 1114 right internal jugular 4 days              General: Patient resting comfortably in no acute distress. Appears as stated age. Calm  Eyes: EOM intact. No conjunctivae injection. No scleral icterus.  ENT: Hearing grossly intact. No discharge from ears. No nasal discharge.   CVS: RRR. No LE edema BL.  Lungs: CTA BL, no wheezing or crackles. Good breath sounds. No accessory muscle use. No acute respiratory distress  Neuro: non focal , Follows commands. Responds appropriately  Time spent on the discharge of patient: 44 minutes         Haseeb Romero MD  Department of Hospital Medicine  [facility]

## 2023-10-23 NOTE — PROGRESS NOTES
CALL BACK IN TCM    C3 nurse spoke with Ana Bullard  for a TCC post hospital discharge follow up call. The patient has a scheduled HOSFU appointment with [facility] on 10/26/2023 @ 0099.

## 2023-10-25 ENCOUNTER — OFFICE VISIT (OUTPATIENT)
Dept: PAIN MEDICINE | Facility: CLINIC | Age: 63
End: 2023-10-25
Payer: MEDICARE

## 2023-10-25 VITALS
WEIGHT: 269 LBS | HEIGHT: 73 IN | SYSTOLIC BLOOD PRESSURE: 113 MMHG | DIASTOLIC BLOOD PRESSURE: 66 MMHG | BODY MASS INDEX: 35.65 KG/M2 | HEART RATE: 60 BPM

## 2023-10-25 DIAGNOSIS — M79.671 BILATERAL FOOT PAIN: ICD-10-CM

## 2023-10-25 DIAGNOSIS — M79.672 BILATERAL FOOT PAIN: ICD-10-CM

## 2023-10-25 DIAGNOSIS — G89.4 CHRONIC PAIN DISORDER: ICD-10-CM

## 2023-10-25 DIAGNOSIS — E11.42 DIABETIC POLYNEUROPATHY ASSOCIATED WITH TYPE 2 DIABETES MELLITUS: Primary | ICD-10-CM

## 2023-10-25 PROCEDURE — 3074F PR MOST RECENT SYSTOLIC BLOOD PRESSURE < 130 MM HG: ICD-10-PCS | Mod: CPTII,S$GLB,, | Performed by: PHYSICIAN ASSISTANT

## 2023-10-25 PROCEDURE — 3008F PR BODY MASS INDEX (BMI) DOCUMENTED: ICD-10-PCS | Mod: CPTII,S$GLB,, | Performed by: PHYSICIAN ASSISTANT

## 2023-10-25 PROCEDURE — 1159F MED LIST DOCD IN RCRD: CPT | Mod: CPTII,S$GLB,, | Performed by: PHYSICIAN ASSISTANT

## 2023-10-25 PROCEDURE — 3066F PR DOCUMENTATION OF TREATMENT FOR NEPHROPATHY: ICD-10-PCS | Mod: CPTII,S$GLB,, | Performed by: PHYSICIAN ASSISTANT

## 2023-10-25 PROCEDURE — 99214 PR OFFICE/OUTPT VISIT, EST, LEVL IV, 30-39 MIN: ICD-10-PCS | Mod: S$GLB,,, | Performed by: PHYSICIAN ASSISTANT

## 2023-10-25 PROCEDURE — 1111F PR DISCHARGE MEDS RECONCILED W/ CURRENT OUTPATIENT MED LIST: ICD-10-PCS | Mod: CPTII,S$GLB,, | Performed by: PHYSICIAN ASSISTANT

## 2023-10-25 PROCEDURE — 99999 PR PBB SHADOW E&M-EST. PATIENT-LVL III: CPT | Mod: PBBFAC,,, | Performed by: PHYSICIAN ASSISTANT

## 2023-10-25 PROCEDURE — 3044F PR MOST RECENT HEMOGLOBIN A1C LEVEL <7.0%: ICD-10-PCS | Mod: CPTII,S$GLB,, | Performed by: PHYSICIAN ASSISTANT

## 2023-10-25 PROCEDURE — 99999 PR PBB SHADOW E&M-EST. PATIENT-LVL III: ICD-10-PCS | Mod: PBBFAC,,, | Performed by: PHYSICIAN ASSISTANT

## 2023-10-25 PROCEDURE — 3044F HG A1C LEVEL LT 7.0%: CPT | Mod: CPTII,S$GLB,, | Performed by: PHYSICIAN ASSISTANT

## 2023-10-25 PROCEDURE — 1159F PR MEDICATION LIST DOCUMENTED IN MEDICAL RECORD: ICD-10-PCS | Mod: CPTII,S$GLB,, | Performed by: PHYSICIAN ASSISTANT

## 2023-10-25 PROCEDURE — 3066F NEPHROPATHY DOC TX: CPT | Mod: CPTII,S$GLB,, | Performed by: PHYSICIAN ASSISTANT

## 2023-10-25 PROCEDURE — 1111F DSCHRG MED/CURRENT MED MERGE: CPT | Mod: CPTII,S$GLB,, | Performed by: PHYSICIAN ASSISTANT

## 2023-10-25 PROCEDURE — 3078F DIAST BP <80 MM HG: CPT | Mod: CPTII,S$GLB,, | Performed by: PHYSICIAN ASSISTANT

## 2023-10-25 PROCEDURE — 99214 OFFICE O/P EST MOD 30 MIN: CPT | Mod: S$GLB,,, | Performed by: PHYSICIAN ASSISTANT

## 2023-10-25 PROCEDURE — 3074F SYST BP LT 130 MM HG: CPT | Mod: CPTII,S$GLB,, | Performed by: PHYSICIAN ASSISTANT

## 2023-10-25 PROCEDURE — 3078F PR MOST RECENT DIASTOLIC BLOOD PRESSURE < 80 MM HG: ICD-10-PCS | Mod: CPTII,S$GLB,, | Performed by: PHYSICIAN ASSISTANT

## 2023-10-25 PROCEDURE — 3008F BODY MASS INDEX DOCD: CPT | Mod: CPTII,S$GLB,, | Performed by: PHYSICIAN ASSISTANT

## 2023-10-25 RX ORDER — TRAMADOL HYDROCHLORIDE 50 MG/1
50 TABLET ORAL EVERY 8 HOURS PRN
Qty: 90 TABLET | Refills: 2 | Status: SHIPPED | OUTPATIENT
Start: 2023-10-25 | End: 2024-01-23 | Stop reason: SDUPTHER

## 2023-10-25 NOTE — PROGRESS NOTES
Referring Physician: Michelle Messina FNP    PCP: Michelle Messina FNP      CC:  Bilateral foot pain    Interval history:  Mr. Bullard is a 63 y.o. male with bilateral foot pain due to peripheral neuropathy diabetic neuropathy. Hospitalized for atrial tachycardia since last clinic visit and is currently on temporary dialysis. Reports he is doing better.  Foot pain remains tolerable. Stabbing pains resolved. He continues to take Gabapentin 600 mg t.i.d. with mild benefits. Nortriptyline 25 mg made him to drowsy so he discontinued it.  He continues to take tramadol 50 mg q.8 hours as needed with mild-to-moderate benefit.  Denies any side effects with the medication.  Able perform his ADLs with the medication.  He underwent a right-sided lumbar sympathetic nerve block which provided moderate benefit that was shortlived. Currently on antibiotics.  He developed bilateral knee pain. Right intra articular knee injection with benefit. He denies any worsening weakness.  No bowel bladder changes. Pain today is rated 4/10.    Prior HPI:   Ana Bullard is a 63 y.o. male referred to us for bilateral foot pain. Patient with long history of diabetes.  He states neuropathy worsen after his CABG.  Bilateral foot pain has worsened over past 7 years.  He presents to us constant burning, sharp pain in his bilateral feet.  He also has some similar issues in his bilateral hands, but foot pain is worse.  Pain worsens prolonged sitting and standing.  He currently takes gabapentin 600 mg t.i.d. with mild benefits.  Increasing doses causes sedation.  He also takes tramadol q.8 hours as needed with mild-to-moderate benefit.  He denies any worsening weakness.  No bowel bladder changes.    Interventional history:  Right LSB on 09/2019 with 50% relief for 2 weeks.    ROS:  CONSTITUTIONAL: No fevers, chills, night sweats, wt. loss, appetite changes  SKIN: no rashes or itching  ENT: No headaches, head trauma, vision changes, or eye  pain  LYMPH NODES: None noticed   CV: No chest pain, palpitations.   RESP: No shortness of breath, dyspnea on exertion, wheezing, or hemoptysis. + cough  GI: No nausea, emesis, diarrhea, constipation, melena, hematochezia, pain.    : No dysuria, hematuria, urgency, or frequency   HEME: No easy bruising, bleeding problems  PSYCHIATRIC: No depression, anxiety, psychosis, hallucinations.  NEURO: No seizures, memory loss, dizziness or difficulty sleeping  MSK:  Positive HPI      Past Medical History:   Diagnosis Date    AICD (automatic cardioverter/defibrillator) present     Anemia, chronic disease 07/27/2021    Anemia, unspecified 07/27/2021    Anxiety and depression 2012    CAD (coronary artery disease) 2012    Cardiomegaly 2016    CHF (congestive heart failure) 2012    Cholecystitis 2017    Complete heart block 2012    Diabetic foot ulcer     DVT (deep venous thrombosis) 2012    And pulmonary embolus    GERD (gastroesophageal reflux disease) 2010    Heparin induced thrombocytopenia     Hyperlipemia 2011    IDDM (insulin dependent diabetes mellitus) 1983    With neuropathy    Ischemic cardiomyopathy 2012    MI (myocardial infarction) 2012    Morbid obesity     MRSA (methicillin resistant Staphylococcus aureus) infection 01/19/2019    Noncompliance with therapeutic plan 2019    Normochromic normocytic anemia 07/27/2021    Osteomyelitis of right foot 01/2019    Osteomyelitis of right foot 09/01/2022    Klebsiella from bone, GBS in blood    Pulmonary edema 2019    Respiratory failure 2019    Sepsis 01/2019    Due to cellulitis right leg    Sleep apnea 2013    Thrombocytopathy 2012    Venous stasis dermatitis of both lower extremities      Past Surgical History:   Procedure Laterality Date    CARDIAC PACEMAKER PLACEMENT  12/2012    CARDIAC PACEMAKER PLACEMENT  10/04/2018    battery replacement    CORONARY ARTERY BYPASS GRAFT  06/2012    ESOPHAGOGASTRODUODENOSCOPY  01/31/2017    SAMARA FILTER PLACEMENT  2012    vena  "cava    INSERTION, CATHETER, TUNNELED Right 10/16/2023    Procedure: Insertion,catheter,tunneled;  Surgeon: Terri Gresham MD;  Location: [facility] OR;  Service: Cardiovascular;  Laterality: Right;    LUMBAR SYMPATHETIC NERVE BLOCK Right 2019    Procedure: BLOCK, NERVE, SYMPATHETIC, LUMBAR;  Surgeon: Tashi Good MD;  Location: [facility] OR;  Service: Pain Management;  Laterality: Right;  RIGHT SYMPATHETIC NERVE BLOCK     Family History   Problem Relation Age of Onset    Arthritis Mother     Diabetes Mother     Cancer Father     Heart disease Father     Stroke Father      Social History     Socioeconomic History    Marital status:    Tobacco Use    Smoking status: Former     Current packs/day: 0.00     Average packs/day: 2.0 packs/day for 38.0 years (76.0 ttl pk-yrs)     Types: Cigarettes     Start date:      Quit date:      Years since quittin.8    Smokeless tobacco: Never   Substance and Sexual Activity    Alcohol use: No    Drug use: Never    Sexual activity: Never         Medications/Allergies: See med card    Vitals:    10/25/23 0911   BP: 113/66   Pulse: 60   Weight: 122 kg (269 lb)   Height: 6' 1" (1.854 m)   PainSc:   4   PainLoc: Back         GENERAL: A and O x3, the patient appears well groomed and is in no acute distress.  Skin: No rashes or obvious lesions  HEENT: normocephalic, atraumatic  CARDIOVASCULAR:  RRR  LUNGS: non labored breathing  ABDOMEN: soft, nontender   UPPER EXTREMITIES: Normal alignment, normal range of motion, no atrophy, no skin changes,  hair growth and nail growth normal and equal bilaterally. No swelling, no tenderness.    LOWER EXTREMITIES:  Normal alignment, no atrophy, no skin changes,  hair growth and nail growth normal and equal bilaterally. No swelling. Tenderness to right lateral patella.   LUMBAR SPINE  Lumbar spine: ROM is limited with flexion extension and oblique extension with moderate increased pain.    Samuel's test causes no increased pain on either " side.    Supine straight leg raise is negative bilaterally.    Internal and external rotation of the hip causes no increased pain on either side.  Myofascial exam: No tenderness to palpation across lumbar paraspinous muscles.        MENTAL STATUS: normal orientation, speech, language, and fund of knowledge for social situation.  Emotional state appropriate.     CRANIAL NERVES:  II:         PERRL bilaterally,   III,IV,VI: EOMI.    V:         Facial sensation equal bilaterally  VII:       Facial motor function normal.  VIII:      Hearing equal to finger rub bilaterally  IX/X:    Gag normal, palate symmetric  XI:        Shoulder shrug equal, head turn equal  XII:       Tongue midline without fasciculations        MOTOR: Tone and bulk: normal bilateral upper and lower Strength: normal   Delt      Bi         Tri        WE      WF                        R          5          5          5          5          5          5            L          5          5          5          5          5          5               IP         ADD     ABD     Quad   TA        Gas      HAM  R          5          5          5          5          5          5          5  L          5          5          5          5          5          5          5     SENSATION:  Decreased globally of bilateral feet  REFLEXES: normal, symmetric, nonbrisk.  Toes down, no clonus. No hoffmans.  GAIT: normal rise, base, steps, and arm swing.       Imaging:  None    Assessment:  Ana Bullard is a 63 y.o. male with bilateral foot pain  1. Diabetic polyneuropathy associated with type 2 diabetes mellitus    2. Bilateral foot pain    3. Chronic pain disorder            Plan:  1. I have stressed the importance of physical activity and exercise to improve overall health  2.  Discussed disease progression of peripheral neuropathy.  Continue gabapentin as prescribed by PCP.  Consider nortriptyline 10 mg in the future  3.  He does request continue tramadol with us.   I believe this is very reasonable.   reviewed.  He takes tramadol 50 mg q.8 hours as needed.  Previous UDS consistent.   4.  May consider repeat lumbar sympathetic block if pain is exacerbated.  Briefly discussed spinal cord stimulation as well.  5. Follow-up in 3 month  Medication management provided by  Dr. Good

## 2023-10-30 ENCOUNTER — TELEPHONE (OUTPATIENT)
Dept: PHARMACY | Facility: HOSPITAL | Age: 63
End: 2023-10-30

## 2023-10-30 NOTE — TELEPHONE ENCOUNTER
"[facility]  Transition of Care Assessment    Admit Date  10/8/23   [facility] Number    [id number]   Discharge Date    10/20/23   Preferred Pharmacies      [facility] [place] [place] - [address]  [address]  [place] [address]  Phone: [phone] Fax: [phone]     Drug Card Received?      Yes []     No [x]      Allergies   Review of patient's allergies indicates:   Allergen Reactions    Vasopressin Anaphylaxis and Other (See Comments)     Increased pressure in his head; felt like his eyeballs and veins were going to pop out of his head.     Vasopressin analogues Other (See Comments) and Anaphylaxis     "felt like eyes going to pop out of my head"  Other reaction(s): Other (See Comments)  "felt like eyes going to pop out of my head"  Increased pressure in his head; felt like his eyeballs and veins were going to pop out of his head.     Heparin Other (See Comments)     Other reaction(s): "depleted my blood platets"    Decreased platelet count    Heparin analogues Other (See Comments)     Depleted WBC count    Morphine Hallucinations, Other (See Comments) and Anxiety     Other reaction(s): Hallucinations  Paranoid, hallucinations           Telephone Medication Assessment Questions     How are you doing with your medications?    Doing good. Taking all meds and feels like they are helping.      Have you experienced any side effects and/or noticed any difference after taking your medications?    No, feels like they are working.     Do you know the purpose of each of your medications and how to take them?    Yes     Do you need assistance taking your medications?    No     Are you able to afford your medications?    Yes     Have you picked up your medications from the pharmacy?    Yes      Telephone Physician Follow-Up Questions     Have you seen your doctor?    No, opted out of a follow up appt, but seems to be doing well.      Notes from your doctor visit (if applicable):     "     Have you had any medication changes?         Have you picked up the new prescription from the pharmacy and started taking it?         My new medications are (if applicable):         Are you experiencing any new side effects?         Do you have a follow-up appointment to see your doctor?         Do you have transportation to your appointment?    Yes     If needed, do you have someone to accompany you to your appointment?    Yes     Is there anything you feel you need help with at this time?    No      Additional Notes     Patient says he is doing good. Feels like he can breathe better and everything.        The patient was previously educated on how to take their medication; including dosing, frequency, and side-effects.

## 2023-11-09 ENCOUNTER — OFFICE VISIT (OUTPATIENT)
Dept: PODIATRY | Facility: CLINIC | Age: 63
End: 2023-11-09
Payer: MEDICARE

## 2023-11-09 VITALS — BODY MASS INDEX: 35.64 KG/M2 | OXYGEN SATURATION: 98 % | WEIGHT: 268.94 LBS | HEART RATE: 64 BPM | HEIGHT: 73 IN

## 2023-11-09 DIAGNOSIS — I73.9 PERIPHERAL VASCULAR DISEASE: ICD-10-CM

## 2023-11-09 DIAGNOSIS — L97.512 SKIN ULCER OF RIGHT FOOT WITH FAT LAYER EXPOSED: Primary | ICD-10-CM

## 2023-11-09 DIAGNOSIS — Z91.89 AT HIGH RISK FOR INADEQUATE NUTRITIONAL INTAKE: ICD-10-CM

## 2023-11-09 DIAGNOSIS — R20.2 PARESTHESIA OF BOTH LOWER EXTREMITIES: ICD-10-CM

## 2023-11-09 DIAGNOSIS — Z09 HOSPITAL DISCHARGE FOLLOW-UP: ICD-10-CM

## 2023-11-09 DIAGNOSIS — E11.49 TYPE II DIABETES MELLITUS WITH NEUROLOGICAL MANIFESTATIONS: ICD-10-CM

## 2023-11-09 DIAGNOSIS — L60.2 ONYCHOGRYPHOSIS: ICD-10-CM

## 2023-11-09 DIAGNOSIS — L97.512 ULCER OF RIGHT FOOT WITH FAT LAYER EXPOSED: ICD-10-CM

## 2023-11-09 DIAGNOSIS — I87.2 VENOUS STASIS DERMATITIS OF BOTH LOWER EXTREMITIES: ICD-10-CM

## 2023-11-09 DIAGNOSIS — I73.9 PVD (PERIPHERAL VASCULAR DISEASE): ICD-10-CM

## 2023-11-09 DIAGNOSIS — E11.621 TYPE 2 DIABETES MELLITUS WITH FOOT ULCER, UNSPECIFIED WHETHER LONG TERM INSULIN USE: ICD-10-CM

## 2023-11-09 DIAGNOSIS — L60.2 OG (ONYCHOGRYPHOSIS): ICD-10-CM

## 2023-11-09 DIAGNOSIS — E11.40 TYPE 2 DIABETES MELLITUS WITH DIABETIC NEUROPATHY, UNSPECIFIED WHETHER LONG TERM INSULIN USE: ICD-10-CM

## 2023-11-09 DIAGNOSIS — R26.2 DIFFICULTY IN WALKING, NOT ELSEWHERE CLASSIFIED: ICD-10-CM

## 2023-11-09 DIAGNOSIS — E66.9 OBESITY, UNSPECIFIED CLASSIFICATION, UNSPECIFIED OBESITY TYPE, UNSPECIFIED WHETHER SERIOUS COMORBIDITY PRESENT: ICD-10-CM

## 2023-11-09 DIAGNOSIS — L97.509 TYPE 2 DIABETES MELLITUS WITH FOOT ULCER, UNSPECIFIED WHETHER LONG TERM INSULIN USE: ICD-10-CM

## 2023-11-09 PROCEDURE — 3008F PR BODY MASS INDEX (BMI) DOCUMENTED: ICD-10-PCS | Mod: CPTII,S$GLB,, | Performed by: PODIATRIST

## 2023-11-09 PROCEDURE — 3044F PR MOST RECENT HEMOGLOBIN A1C LEVEL <7.0%: ICD-10-PCS | Mod: CPTII,S$GLB,, | Performed by: PODIATRIST

## 2023-11-09 PROCEDURE — 3066F PR DOCUMENTATION OF TREATMENT FOR NEPHROPATHY: ICD-10-PCS | Mod: CPTII,S$GLB,, | Performed by: PODIATRIST

## 2023-11-09 PROCEDURE — 1111F PR DISCHARGE MEDS RECONCILED W/ CURRENT OUTPATIENT MED LIST: ICD-10-PCS | Mod: CPTII,S$GLB,, | Performed by: PODIATRIST

## 2023-11-09 PROCEDURE — 99999 PR PBB SHADOW E&M-EST. PATIENT-LVL IV: ICD-10-PCS | Mod: PBBFAC,,, | Performed by: PODIATRIST

## 2023-11-09 PROCEDURE — 3008F BODY MASS INDEX DOCD: CPT | Mod: CPTII,S$GLB,, | Performed by: PODIATRIST

## 2023-11-09 PROCEDURE — 11042 DBRDMT SUBQ TIS 1ST 20SQCM/<: CPT | Mod: S$GLB,,, | Performed by: PODIATRIST

## 2023-11-09 PROCEDURE — 11042 WOUND DEBRIDEMENT: ICD-10-PCS | Mod: S$GLB,,, | Performed by: PODIATRIST

## 2023-11-09 PROCEDURE — 1159F MED LIST DOCD IN RCRD: CPT | Mod: CPTII,S$GLB,, | Performed by: PODIATRIST

## 2023-11-09 PROCEDURE — 1111F DSCHRG MED/CURRENT MED MERGE: CPT | Mod: CPTII,S$GLB,, | Performed by: PODIATRIST

## 2023-11-09 PROCEDURE — 1159F PR MEDICATION LIST DOCUMENTED IN MEDICAL RECORD: ICD-10-PCS | Mod: CPTII,S$GLB,, | Performed by: PODIATRIST

## 2023-11-09 PROCEDURE — 99214 OFFICE O/P EST MOD 30 MIN: CPT | Mod: 25,S$GLB,, | Performed by: PODIATRIST

## 2023-11-09 PROCEDURE — 1160F RVW MEDS BY RX/DR IN RCRD: CPT | Mod: CPTII,S$GLB,, | Performed by: PODIATRIST

## 2023-11-09 PROCEDURE — 99214 PR OFFICE/OUTPT VISIT, EST, LEVL IV, 30-39 MIN: ICD-10-PCS | Mod: 25,S$GLB,, | Performed by: PODIATRIST

## 2023-11-09 PROCEDURE — 1160F PR REVIEW ALL MEDS BY PRESCRIBER/CLIN PHARMACIST DOCUMENTED: ICD-10-PCS | Mod: CPTII,S$GLB,, | Performed by: PODIATRIST

## 2023-11-09 PROCEDURE — 99999 PR PBB SHADOW E&M-EST. PATIENT-LVL IV: CPT | Mod: PBBFAC,,, | Performed by: PODIATRIST

## 2023-11-09 PROCEDURE — 3044F HG A1C LEVEL LT 7.0%: CPT | Mod: CPTII,S$GLB,, | Performed by: PODIATRIST

## 2023-11-09 PROCEDURE — 3066F NEPHROPATHY DOC TX: CPT | Mod: CPTII,S$GLB,, | Performed by: PODIATRIST

## 2023-11-09 NOTE — PROGRESS NOTES
"  [address] [facility] [address] [place] [address]  Phone: [phone]   [phone]    Patient's PCP:Michelle Messina FNP  Referring Provider: No ref. provider found    Subjective:      Chief Complaint:: Follow-up, Foot Ulcer (Ulcer of right foot with fat layer exposed), Diabetes, Diabetic Foot Ulcer, Difficulty Walking, Diabetes Mellitus, Pressure Ulcer, Wound Check, Wound Care, Peripheral Neuropathy, Numbness, Non-healing Wound, Poor Circulation, Hospital Follow Up, and Venous Stasis    Follow-up  Associated symptoms include numbness. Pertinent negatives include no abdominal pain, arthralgias, chest pain, chills, coughing, fatigue, fever, headaches, joint swelling, myalgias, nausea, rash or weakness.   Foot Ulcer  Associated symptoms include numbness. Pertinent negatives include no abdominal pain, arthralgias, chest pain, chills, coughing, fatigue, fever, headaches, joint swelling, myalgias, nausea, rash or weakness.     Ana Bullard is a 63 y.o. male who presents today with follow up for ulcer of right foot. Patient states that home health still comes every week to do vitals. Wife is still changing dressing for thr patient.     Additionally, patient presents to clinic today for evaluation and treatment of diabetic feet.     Additionally, patient was recently admitted to the hospital.  Reviewed all notes from patients medical chart from recent hospital admission, including imaging, procedures, studies, progress notes,  cultures, antibiotic regimens, photos,  etc.       Systemic Doctor: YRN Townsend  Date Last Seen: 06/07/2022  Blood Sugar: 160 yesterday  Hemoglobin A1c: 6.8 (11/23/22)    Vitals:    11/09/23 1025   Pulse: 64   SpO2: 98%   Weight: 122 kg (268 lb 15.4 oz)   Height: 6' 1" (1.854 m)   PainSc: 0-No pain      Shoe Size: 12    Past Surgical History:   Procedure Laterality Date    CARDIAC PACEMAKER PLACEMENT  12/2012    CARDIAC PACEMAKER PLACEMENT  10/04/2018    battery replacement    " CORONARY ARTERY BYPASS GRAFT  06/2012    ESOPHAGOGASTRODUODENOSCOPY  01/31/2017    SAMARA FILTER PLACEMENT  2012    vena cava    INSERTION, CATHETER, TUNNELED Right 10/16/2023    Procedure: Insertion,catheter,tunneled;  Surgeon: Terri Gresham MD;  Location: [facility] OR;  Service: Cardiovascular;  Laterality: Right;    LUMBAR SYMPATHETIC NERVE BLOCK Right 8/22/2019    Procedure: BLOCK, NERVE, SYMPATHETIC, LUMBAR;  Surgeon: Tashi Good MD;  Location: [facility] OR;  Service: Pain Management;  Laterality: Right;  RIGHT SYMPATHETIC NERVE BLOCK     Past Medical History:   Diagnosis Date    AICD (automatic cardioverter/defibrillator) present     Anemia, chronic disease 07/27/2021    Anemia, unspecified 07/27/2021    Anxiety and depression 2012    CAD (coronary artery disease) 2012    Cardiomegaly 2016    CHF (congestive heart failure) 2012    Cholecystitis 2017    Complete heart block 2012    Diabetic foot ulcer     DVT (deep venous thrombosis) 2012    And pulmonary embolus    GERD (gastroesophageal reflux disease) 2010    Heparin induced thrombocytopenia     Hyperlipemia 2011    IDDM (insulin dependent diabetes mellitus) 1983    With neuropathy    Ischemic cardiomyopathy 2012    MI (myocardial infarction) 2012    Morbid obesity     MRSA (methicillin resistant Staphylococcus aureus) infection 01/19/2019    Noncompliance with therapeutic plan 2019    Normochromic normocytic anemia 07/27/2021    Osteomyelitis of right foot 01/2019    Osteomyelitis of right foot 09/01/2022    Klebsiella from bone, GBS in blood    Pulmonary edema 2019    Respiratory failure 2019    Sepsis 01/2019    Due to cellulitis right leg    Sleep apnea 2013    Thrombocytopathy 2012    Venous stasis dermatitis of both lower extremities      Family History   Problem Relation Age of Onset    Arthritis Mother     Diabetes Mother     Cancer Father     Heart disease Father     Stroke Father         Social History:   Marital Status:   Alcohol History:   "reports no history of alcohol use.  Tobacco History:  reports that he quit smoking about 11 years ago. His smoking use included cigarettes. He started smoking about 49 years ago. He has a 76.0 pack-year smoking history. He has never used smokeless tobacco.  Drug History:  reports no history of drug use.    Review of patient's allergies indicates:   Allergen Reactions    Vasopressin Anaphylaxis and Other (See Comments)     Increased pressure in his head; felt like his eyeballs and veins were going to pop out of his head.     Vasopressin analogues Other (See Comments) and Anaphylaxis     "felt like eyes going to pop out of my head"  Other reaction(s): Other (See Comments)  "felt like eyes going to pop out of my head"  Increased pressure in his head; felt like his eyeballs and veins were going to pop out of his head.     Heparin Other (See Comments)     Other reaction(s): "depleted my blood platets"    Decreased platelet count    Heparin analogues Other (See Comments)     Depleted WBC count    Morphine Hallucinations, Other (See Comments) and Anxiety     Other reaction(s): Hallucinations  Paranoid, hallucinations         Current Outpatient Medications   Medication Sig Dispense Refill    albuterol (PROVENTIL) 5 mg/mL nebulizer solution Take 2.5 mg by nebulization every 6 (six) hours as needed.      albuterol-ipratropium (DUO-NEB) 2.5 mg-0.5 mg/3 mL nebulizer solution Take 3 mLs by nebulization every 6 (six) hours as needed.      aspirin 81 MG Chew Take 1 tablet (81 mg total) by mouth once daily.  0    atorvastatin (LIPITOR) 20 MG tablet Take 20 mg by mouth once daily.      BASAGLAR KWIKPEN U-100 INSULIN glargine 100 units/mL (3mL) SubQ pen Inject 25 Units into the skin once daily.  3    carvediloL (COREG) 3.125 MG tablet Take 3.125 mg by mouth 2 (two) times daily.      FARXIGA 10 mg tablet Take 10 mg by mouth once daily.      gabapentin (NEURONTIN) 600 MG tablet Take 600 mg by mouth 3 (three) times daily.      " levothyroxine (SYNTHROID) 25 MCG tablet Take 1 tablet (25 mcg total) by mouth before breakfast. 90 tablet 0    midodrine (PROAMATINE) 5 MG Tab Take 1 tablet (5 mg total) by mouth 3 (three) times daily before meals. 90 tablet 0    pantoprazole (PROTONIX) 40 MG tablet Take 1 tablet by mouth once daily.  0    traMADoL (ULTRAM) 50 mg tablet Take 1 tablet (50 mg total) by mouth every 8 (eight) hours as needed for Pain. 90 tablet 2    vitamin B complex-folic acid 0.4 mg Tab Take 1 tablet by mouth once daily.      torsemide (DEMADEX) 20 MG Tab Take 1 tablet (20 mg total) by mouth 2 (two) times a day. 120 tablet 0     No current facility-administered medications for this visit.       Review of Systems   Constitutional:  Negative for chills, fatigue, fever and unexpected weight change.   HENT:  Negative for hearing loss and trouble swallowing.    Eyes:  Negative for photophobia and visual disturbance.   Respiratory:  Negative for cough, shortness of breath and wheezing.    Cardiovascular:  Positive for leg swelling. Negative for chest pain and palpitations.   Gastrointestinal:  Negative for abdominal pain and nausea.   Genitourinary:  Negative for dysuria and frequency.   Musculoskeletal:  Negative for arthralgias, back pain, gait problem, joint swelling and myalgias.   Skin:  Positive for color change and wound. Negative for rash.   Neurological:  Positive for numbness. Negative for tremors, seizures, weakness and headaches.   Hematological:  Does not bruise/bleed easily.         Objective:        Physical Exam:   Foot Exam    Right Foot/Ankle     Inspection and Palpation  Skin Exam: ulcer;         Physical Exam  Musculoskeletal:        Legs:         Feet:    Feet:      Right foot:      Skin integrity: Ulcer present.       Ortho/SPM Exam        Physical examination: General: Pt. is well-developed, well-nourished, appears stated age, in no acute distress, alert and oriented x 3.     Vascular: Dorsalis pedis and posterior  tibial pulses are 1/4  Bilaterally. Toes are warm to touch. Feet are warm proximally.     There is decreased digital hair. Skin is atrophic, slightly hyperpigmented, and mildly edematous. Capillary refill less than 5 seconds all toes/distal feet     Neurologic: Mabel-Latrell 5.07 monofilament is decreased bilateral feet. Sharp/dull sensation absent Bilateral feet.     Vibratory sensation absent bilateral     Musculoskeletal: adequate joint range of motion without pain, limitation, nor crepitation Bilateral feet and ankle joints. Muscle strength is 5/5 in all groups bilaterally.     Lymphatics: no lymphangitic streaking bilaterally.     Dermatologic: Elongated, thickened, dystrophic, discolored nails x 10. Xerosis Bilaterally  Imaging:            Assessment:       1. Skin ulcer of right foot with fat layer exposed    2. Type II diabetes mellitus with neurological manifestations    3. Type 2 diabetes mellitus with foot ulcer, unspecified whether long term insulin use    4. Ulcer of right foot with fat layer exposed    5. OG (onychogryphosis)    6. Venous stasis dermatitis of both lower extremities    7. Onychogryphosis    8. Type 2 diabetes mellitus with diabetic neuropathy, unspecified whether long term insulin use    9. Paresthesia of both lower extremities    10. PVD (peripheral vascular disease)    11. Obesity, unspecified classification, unspecified obesity type, unspecified whether serious comorbidity present    12. Peripheral vascular disease    13. At high risk for inadequate nutritional intake    14. Difficulty in walking, not elsewhere classified    15. Hospital discharge follow-up      Plan:   Skin ulcer of right foot with fat layer exposed  -     Ambulatory referral/consult to Home Health; Future; Expected date: 11/10/2023    Type II diabetes mellitus with neurological manifestations    Type 2 diabetes mellitus with foot ulcer, unspecified whether long term insulin use    Ulcer of right foot with fat  "layer exposed    OG (onychogryphosis)    Venous stasis dermatitis of both lower extremities    Onychogryphosis    Type 2 diabetes mellitus with diabetic neuropathy, unspecified whether long term insulin use    Paresthesia of both lower extremities    PVD (peripheral vascular disease)    Obesity, unspecified classification, unspecified obesity type, unspecified whether serious comorbidity present    Peripheral vascular disease    At high risk for inadequate nutritional intake    Difficulty in walking, not elsewhere classified    Hospital discharge follow-up    Other orders  -     Wound Debridement      No follow-ups on file.    Wound Debridement    Date/Time: 11/9/2023 10:20 AM    Performed by: Leah Avila DPM  Authorized by: Leah Avila DPM    Time out: Immediately prior to procedure a "time out" was called to verify the correct patient, procedure, equipment, support staff and site/side marked as required.    Consent Done?:  Yes (Written)    Preparation: Patient was prepped and draped in usual sterile fashion, Patient was prepped and draped with aseptic technique and Patient was prepped and draped with clean technique      Wound Details:    Location:  Right foot    Location:  Right 5th Metatarsal Head    Type of Debridement:  Excisional       Length (cm):  0.4       Area (sq cm):  0.2       Width (cm):  0.5       Percent Debrided (%):  100       Depth (cm):  0.1       Total Area Debrided (sq cm):  0.2    Depth of debridement:  Subcutaneous tissue    Tissue debrided:  Subcutaneous    Devitalized tissue debrided:  Biofilm, Callus and Slough    Instruments:  Blade, Curette and Forceps  Bleeding:  Moderate  Hemostasis Achieved: Yes  Method Used:  Pressure and Silver Nitrate  Patient tolerance:  Patient tolerated the procedure well with no immediate complications  1st Wound Pain Assessment: 0            Counseling:     I provided patient education verbally regarding:   Patient diagnosis, treatment options, as " well as alternatives, risks, and benefits.     This note was created using Dragon voice recognition software that occasionally misinterpreted phrases or words.         Follow-up: Patient is to return to the clinic in 2 week(s) for follow-up but should call the office immediately if any signs of infection, such as fever, chills, sweats, increased redness or pain.  Recommendations:  Check feet daily.  dressing changes, home health Iodosorb followed by border foam followed by bulky wrap for protection  Use surgery shoe for walking or standing.     I provided patient education verbally regarding: proper ulcer care and the possible need for serial debridements, topical medications, specific dressings and biological engineered skin substitutes if indicated.           Counseling/Education:  I provided patient education verbally regarding:   The aspects of diabetes and how it pertains to the feet. I explained the importance of proper diabetic foot care and how it is essential for the health of their feet.     I discussed the importance of knowing their Hemoglobin A1c and that the level needs to be as close to 6 as possible. I discussed the increase complications of high blood sugar including stroke, blindness, heart attack, kidney failure and loss of limb secondary to neuropathy and PVD.      With neuropathy, beware of any breaks in the skin or redness. These areas are not recognized early due to the numbness.     I discussed Diabetes, lower back issues, metabolic disorders, systemic causes, chemotherapy, vitamin deficiency, heavy metal exposure, as some of the causes. I also explained that as much as 40% of the time we can not find a cause. I discussed different treatments available to control the symptoms but which may not cure the problem.         Counseled patient on the aspects of diabetes and how it pertains to the feet.  I explained the importance of proper diabetic foot care and how it is essential for the health of  their feet.        Shoe inspection. Patient instructed on proper foot hygeine. We discussed wearing proper shoe gear, daily foot inspections, never walking without protective shoe gear, never putting sharp instruments to feet, routine podiatric nail visits every 2-3 months.       Patient should call the office immediately if any signs of infection, such as fever, chills, sweats, increased redness or pain.     Patient was instructed to call the clinic or go to the emergency department if their symptoms do not improve, worsens, or if new symptoms develop.  Patient was advised that if any increased swelling, pain, or numbness arise to go immediately to the ED. Patient knows to call any time if an emergency arises. Shared decision making occurred and patient verbalized understanding in agreement with this plan.      I counseled the patient on their conditions, their implications and medical management.     >50% of this > 60 minute visit was spent face to face educating/counseling the patient     I spent a total of 60 minutes on the day of the visit.This includes face to face time and non-face to face time preparing to see the patient (eg, review of tests), obtaining and/or reviewing separately obtained history, documenting clinical information in the electronic or other health record, independently interpreting results and communicating results to the patient/family/caregiver, or care coordinator.

## 2023-11-20 ENCOUNTER — LAB VISIT (OUTPATIENT)
Dept: LAB | Facility: HOSPITAL | Age: 63
End: 2023-11-20
Attending: INTERNAL MEDICINE
Payer: MEDICARE

## 2023-11-20 ENCOUNTER — OFFICE VISIT (OUTPATIENT)
Dept: HEMATOLOGY/ONCOLOGY | Facility: CLINIC | Age: 63
End: 2023-11-20
Payer: MEDICARE

## 2023-11-20 VITALS
DIASTOLIC BLOOD PRESSURE: 66 MMHG | BODY MASS INDEX: 34.08 KG/M2 | HEIGHT: 73 IN | TEMPERATURE: 98 F | SYSTOLIC BLOOD PRESSURE: 104 MMHG | HEART RATE: 60 BPM | RESPIRATION RATE: 16 BRPM | WEIGHT: 257.13 LBS

## 2023-11-20 DIAGNOSIS — D75.829 HEPARIN INDUCED THROMBOCYTOPENIA: ICD-10-CM

## 2023-11-20 DIAGNOSIS — D63.8 ANEMIA, CHRONIC DISEASE: ICD-10-CM

## 2023-11-20 DIAGNOSIS — D64.9 ANEMIA, UNSPECIFIED TYPE: ICD-10-CM

## 2023-11-20 DIAGNOSIS — D64.9 NORMOCHROMIC NORMOCYTIC ANEMIA: ICD-10-CM

## 2023-11-20 DIAGNOSIS — D64.9 NORMOCHROMIC NORMOCYTIC ANEMIA: Primary | ICD-10-CM

## 2023-11-20 DIAGNOSIS — D69.6 THROMBOCYTOPENIA: ICD-10-CM

## 2023-11-20 LAB
ALBUMIN SERPL BCP-MCNC: 3.6 G/DL (ref 3.5–5.2)
ALP SERPL-CCNC: 75 U/L (ref 55–135)
ALT SERPL W/O P-5'-P-CCNC: 13 U/L (ref 10–44)
ANION GAP SERPL CALC-SCNC: 8 MMOL/L (ref 8–16)
AST SERPL-CCNC: 25 U/L (ref 10–40)
BASOPHILS # BLD AUTO: 0.02 K/UL (ref 0–0.2)
BASOPHILS NFR BLD: 0.5 % (ref 0–1.9)
BILIRUB SERPL-MCNC: 1.2 MG/DL (ref 0.1–1)
BUN SERPL-MCNC: 23 MG/DL (ref 8–23)
CALCIUM SERPL-MCNC: 8.5 MG/DL (ref 8.7–10.5)
CHLORIDE SERPL-SCNC: 96 MMOL/L (ref 95–110)
CO2 SERPL-SCNC: 35 MMOL/L (ref 23–29)
CREAT SERPL-MCNC: 3.3 MG/DL (ref 0.5–1.4)
DIFFERENTIAL METHOD: ABNORMAL
EOSINOPHIL # BLD AUTO: 0.1 K/UL (ref 0–0.5)
EOSINOPHIL NFR BLD: 2.3 % (ref 0–8)
ERYTHROCYTE [DISTWIDTH] IN BLOOD BY AUTOMATED COUNT: 19.9 % (ref 11.5–14.5)
EST. GFR  (NO RACE VARIABLE): 20.4 ML/MIN/1.73 M^2
FERRITIN SERPL-MCNC: 268.5 NG/ML (ref 20–300)
GLUCOSE SERPL-MCNC: 135 MG/DL (ref 70–110)
HCT VFR BLD AUTO: 27.8 % (ref 40–54)
HGB BLD-MCNC: 8.3 G/DL (ref 14–18)
IMM GRANULOCYTES # BLD AUTO: 0.01 K/UL (ref 0–0.04)
IMM GRANULOCYTES NFR BLD AUTO: 0.2 % (ref 0–0.5)
IRON SERPL-MCNC: 96 UG/DL (ref 45–160)
LYMPHOCYTES # BLD AUTO: 0.4 K/UL (ref 1–4.8)
LYMPHOCYTES NFR BLD: 7.9 % (ref 18–48)
MCH RBC QN AUTO: 30 PG (ref 27–31)
MCHC RBC AUTO-ENTMCNC: 29.9 G/DL (ref 32–36)
MCV RBC AUTO: 100 FL (ref 82–98)
MONOCYTES # BLD AUTO: 0.5 K/UL (ref 0.3–1)
MONOCYTES NFR BLD: 11.7 % (ref 4–15)
NEUTROPHILS # BLD AUTO: 3.4 K/UL (ref 1.8–7.7)
NEUTROPHILS NFR BLD: 77.4 % (ref 38–73)
NRBC BLD-RTO: 0 /100 WBC
PLATELET # BLD AUTO: 66 K/UL (ref 150–450)
PMV BLD AUTO: 10.2 FL (ref 9.2–12.9)
POTASSIUM SERPL-SCNC: 3.1 MMOL/L (ref 3.5–5.1)
PROT SERPL-MCNC: 7.4 G/DL (ref 6–8.4)
RBC # BLD AUTO: 2.77 M/UL (ref 4.6–6.2)
SATURATED IRON: 33 % (ref 20–50)
SODIUM SERPL-SCNC: 139 MMOL/L (ref 136–145)
TOTAL IRON BINDING CAPACITY: 288 UG/DL (ref 250–450)
TRANSFERRIN SERPL-MCNC: 206 MG/DL (ref 200–375)
WBC # BLD AUTO: 4.43 K/UL (ref 3.9–12.7)

## 2023-11-20 PROCEDURE — 1160F PR REVIEW ALL MEDS BY PRESCRIBER/CLIN PHARMACIST DOCUMENTED: ICD-10-PCS | Mod: CPTII,S$GLB,, | Performed by: INTERNAL MEDICINE

## 2023-11-20 PROCEDURE — 3044F PR MOST RECENT HEMOGLOBIN A1C LEVEL <7.0%: ICD-10-PCS | Mod: CPTII,S$GLB,, | Performed by: INTERNAL MEDICINE

## 2023-11-20 PROCEDURE — 82728 ASSAY OF FERRITIN: CPT | Performed by: INTERNAL MEDICINE

## 2023-11-20 PROCEDURE — 80053 COMPREHEN METABOLIC PANEL: CPT | Performed by: INTERNAL MEDICINE

## 2023-11-20 PROCEDURE — 1160F RVW MEDS BY RX/DR IN RCRD: CPT | Mod: CPTII,S$GLB,, | Performed by: INTERNAL MEDICINE

## 2023-11-20 PROCEDURE — 1159F MED LIST DOCD IN RCRD: CPT | Mod: CPTII,S$GLB,, | Performed by: INTERNAL MEDICINE

## 2023-11-20 PROCEDURE — 99214 OFFICE O/P EST MOD 30 MIN: CPT | Mod: S$GLB,,, | Performed by: INTERNAL MEDICINE

## 2023-11-20 PROCEDURE — 3044F HG A1C LEVEL LT 7.0%: CPT | Mod: CPTII,S$GLB,, | Performed by: INTERNAL MEDICINE

## 2023-11-20 PROCEDURE — 85025 COMPLETE CBC W/AUTO DIFF WBC: CPT | Performed by: INTERNAL MEDICINE

## 2023-11-20 PROCEDURE — 3074F SYST BP LT 130 MM HG: CPT | Mod: CPTII,S$GLB,, | Performed by: INTERNAL MEDICINE

## 2023-11-20 PROCEDURE — 84466 ASSAY OF TRANSFERRIN: CPT | Performed by: INTERNAL MEDICINE

## 2023-11-20 PROCEDURE — 99214 PR OFFICE/OUTPT VISIT, EST, LEVL IV, 30-39 MIN: ICD-10-PCS | Mod: S$GLB,,, | Performed by: INTERNAL MEDICINE

## 2023-11-20 PROCEDURE — 3074F PR MOST RECENT SYSTOLIC BLOOD PRESSURE < 130 MM HG: ICD-10-PCS | Mod: CPTII,S$GLB,, | Performed by: INTERNAL MEDICINE

## 2023-11-20 PROCEDURE — 83540 ASSAY OF IRON: CPT | Performed by: INTERNAL MEDICINE

## 2023-11-20 PROCEDURE — 1159F PR MEDICATION LIST DOCUMENTED IN MEDICAL RECORD: ICD-10-PCS | Mod: CPTII,S$GLB,, | Performed by: INTERNAL MEDICINE

## 2023-11-20 PROCEDURE — 36415 COLL VENOUS BLD VENIPUNCTURE: CPT | Performed by: INTERNAL MEDICINE

## 2023-11-20 PROCEDURE — 3066F PR DOCUMENTATION OF TREATMENT FOR NEPHROPATHY: ICD-10-PCS | Mod: CPTII,S$GLB,, | Performed by: INTERNAL MEDICINE

## 2023-11-20 PROCEDURE — 3008F PR BODY MASS INDEX (BMI) DOCUMENTED: ICD-10-PCS | Mod: CPTII,S$GLB,, | Performed by: INTERNAL MEDICINE

## 2023-11-20 PROCEDURE — 3078F PR MOST RECENT DIASTOLIC BLOOD PRESSURE < 80 MM HG: ICD-10-PCS | Mod: CPTII,S$GLB,, | Performed by: INTERNAL MEDICINE

## 2023-11-20 PROCEDURE — 3066F NEPHROPATHY DOC TX: CPT | Mod: CPTII,S$GLB,, | Performed by: INTERNAL MEDICINE

## 2023-11-20 PROCEDURE — 3008F BODY MASS INDEX DOCD: CPT | Mod: CPTII,S$GLB,, | Performed by: INTERNAL MEDICINE

## 2023-11-20 PROCEDURE — 3078F DIAST BP <80 MM HG: CPT | Mod: CPTII,S$GLB,, | Performed by: INTERNAL MEDICINE

## 2023-11-20 RX ORDER — IRON,CARBONYL/ASCORBIC ACID 100-250 MG
1 TABLET ORAL DAILY
Qty: 30 EACH | Refills: 6 | Status: SHIPPED | OUTPATIENT
Start: 2023-11-20 | End: 2023-11-21

## 2023-11-20 NOTE — PROGRESS NOTES
[facility] Hematology/Oncology  Post-Hospitalization Note -  1st Follow-up Visit    Subjective:      Patient ID:   NAME: Ana Bullard : 1960     63 y.o. male    Referring Doc: Michelle Messina NP  Other Physicians: Haylee/Nicolas, Micheal/Ru, CARLOS Avila, Dru; Max Wan; Amrik; Barry Oleary; Miriam    Chief Complaint: chronic tcp      HPI:  63 y.o. male with diagnosis of chronic thrombocytopenia  who was seen as a consult at [facility]. The patient returns for a hospital follow-up visit today to go over results of recently ordered tests, studies and/or labs. He is here by himself.    He has continued on HD Mon.Wed/Friday    He is feeling better overall; breathing ok; improved strength; no CP, HA's or N/V    No excessive bleeding but some overall bruising more than usual    He saw Siddharth NP last Tuesday    He sees Dr Leach on             ROS:   GEN: normal without any fever, night sweats or weight loss  HEENT: normal with no HA's, sore throat, stiff neck, changes in vision  CV: normal with no CP, SOB, PND, BAPTISTE or orthopnea  PULM: normal with no SOB, cough, hemoptysis, sputum or pleuritic pain  GI: normal with no abdominal pain, nausea, vomiting, constipation, diarrhea, melanotic stools, BRBPR, or hematemesis  : normal with no hematuria, dysuria  BREAST: normal with no mass, discharge, pain  SKIN: normal with no rash, erythema, bruising, or swelling     Past Medical/Surgical History:  Past Medical History:   Diagnosis Date    AICD (automatic cardioverter/defibrillator) present     Anemia, chronic disease 2021    Anemia, unspecified 2021    Anxiety and depression 2012    CAD (coronary artery disease) 2012    Cardiomegaly 2016    CHF (congestive heart failure) 2012    Cholecystitis 2017    Complete heart block 2012    Diabetic foot ulcer     DVT (deep venous thrombosis) 2012    And pulmonary embolus    GERD (gastroesophageal reflux disease) 2010    Heparin induced thrombocytopenia      "Hyperlipemia 2011    IDDM (insulin dependent diabetes mellitus) 1983    With neuropathy    Ischemic cardiomyopathy 2012    MI (myocardial infarction) 2012    Morbid obesity     MRSA (methicillin resistant Staphylococcus aureus) infection 01/19/2019    Noncompliance with therapeutic plan 2019    Normochromic normocytic anemia 07/27/2021    Osteomyelitis of right foot 01/2019    Osteomyelitis of right foot 09/01/2022    Klebsiella from bone, GBS in blood    Pulmonary edema 2019    Respiratory failure 2019    Sepsis 01/2019    Due to cellulitis right leg    Sleep apnea 2013    Thrombocytopathy 2012    Venous stasis dermatitis of both lower extremities      Past Surgical History:   Procedure Laterality Date    CARDIAC PACEMAKER PLACEMENT  12/2012    CARDIAC PACEMAKER PLACEMENT  10/04/2018    battery replacement    CORONARY ARTERY BYPASS GRAFT  06/2012    ESOPHAGOGASTRODUODENOSCOPY  01/31/2017    SAMARA FILTER PLACEMENT  2012    vena cava    INSERTION, CATHETER, TUNNELED Right 10/16/2023    Procedure: Insertion,catheter,tunneled;  Surgeon: Terri Gresham MD;  Location: [facility] OR;  Service: Cardiovascular;  Laterality: Right;    LUMBAR SYMPATHETIC NERVE BLOCK Right 8/22/2019    Procedure: BLOCK, NERVE, SYMPATHETIC, LUMBAR;  Surgeon: Tashi Good MD;  Location: [facility] OR;  Service: Pain Management;  Laterality: Right;  RIGHT SYMPATHETIC NERVE BLOCK         Allergies:  Review of patient's allergies indicates:   Allergen Reactions    Vasopressin Anaphylaxis and Other (See Comments)     Increased pressure in his head; felt like his eyeballs and veins were going to pop out of his head.     Vasopressin analogues Other (See Comments) and Anaphylaxis     "felt like eyes going to pop out of my head"  Other reaction(s): Other (See Comments)  "felt like eyes going to pop out of my head"  Increased pressure in his head; felt like his eyeballs and veins were going to pop out of his head.     Heparin Other (See Comments)     Other " "reaction(s): "depleted my blood platets"    Decreased platelet count    Heparin analogues Other (See Comments)     Depleted WBC count    Morphine Hallucinations, Other (See Comments) and Anxiety     Other reaction(s): Hallucinations  Paranoid, hallucinations         Social/Family History:  Social History     Socioeconomic History    Marital status:    Tobacco Use    Smoking status: Former     Current packs/day: 0.00     Average packs/day: 2.0 packs/day for 38.0 years (76.0 ttl pk-yrs)     Types: Cigarettes     Start date:      Quit date:      Years since quittin.8    Smokeless tobacco: Never   Substance and Sexual Activity    Alcohol use: No    Drug use: Never    Sexual activity: Never     Family History   Problem Relation Age of Onset    Arthritis Mother     Diabetes Mother     Cancer Father     Heart disease Father     Stroke Father          Medications:    Current Outpatient Medications:     albuterol (PROVENTIL) 5 mg/mL nebulizer solution, Take 2.5 mg by nebulization every 6 (six) hours as needed., Disp: , Rfl:     albuterol-ipratropium (DUO-NEB) 2.5 mg-0.5 mg/3 mL nebulizer solution, Take 3 mLs by nebulization every 6 (six) hours as needed., Disp: , Rfl:     aspirin 81 MG Chew, Take 1 tablet (81 mg total) by mouth once daily., Disp: , Rfl: 0    atorvastatin (LIPITOR) 20 MG tablet, Take 20 mg by mouth once daily., Disp: , Rfl:     BASAGLAR KWIKPEN U-100 INSULIN glargine 100 units/mL (3mL) SubQ pen, Inject 25 Units into the skin once daily., Disp: , Rfl: 3    carvediloL (COREG) 3.125 MG tablet, Take 3.125 mg by mouth 2 (two) times daily., Disp: , Rfl:     FARXIGA 10 mg tablet, Take 10 mg by mouth once daily., Disp: , Rfl:     gabapentin (NEURONTIN) 600 MG tablet, Take 600 mg by mouth 3 (three) times daily., Disp: , Rfl:     levothyroxine (SYNTHROID) 25 MCG tablet, Take 1 tablet (25 mcg total) by mouth before breakfast., Disp: 90 tablet, Rfl: 0    midodrine (PROAMATINE) 5 MG Tab, Take 1 tablet " "(5 mg total) by mouth 3 (three) times daily before meals., Disp: 90 tablet, Rfl: 0    pantoprazole (PROTONIX) 40 MG tablet, Take 1 tablet by mouth once daily., Disp: , Rfl: 0    traMADoL (ULTRAM) 50 mg tablet, Take 1 tablet (50 mg total) by mouth every 8 (eight) hours as needed for Pain., Disp: 90 tablet, Rfl: 2    vitamin B complex-folic acid 0.4 mg Tab, Take 1 tablet by mouth once daily., Disp: , Rfl:     torsemide (DEMADEX) 20 MG Tab, Take 1 tablet (20 mg total) by mouth 2 (two) times a day., Disp: 120 tablet, Rfl: 0      Pathology:   Cancer Staging   No matching staging information was found for the patient.        Objective:   Vitals:  Blood pressure 104/66, pulse 60, temperature 97.5 °F (36.4 °C), resp. rate 16, height 6' 1" (1.854 m), weight 116.6 kg (257 lb 1.6 oz).    Physical Examination:   GEN: no apparent distress, comfortable; AAOx3; overweight  HEAD: atraumatic and normocephalic  EYES: no pallor, no icterus, PERRLA  ENT: OMM, no pharyngeal erythema, external ears WNL; no nasal discharge; no thrush  NECK: no masses, thyroid normal, trachea midline, no LAD/LN's, supple; former Rght IJ removed  CV: RRR with no murmur; normal pulse; normal S1 and S2; no pedal edema; portacath on Rght CW  CHEST: Normal respiratory effort; CTAB; normal breath sounds; no wheeze or crackles  ABDOM: nontender and nondistended; soft; normal bowel sounds; no rebound/guarding  MUSC/Skeletal: ROM normal; no crepitus; joints normal; no deformities or arthropathy  EXTREM: no clubbing, cyanosis, inflammation or swelling; chronic venous stasis changes bilateral lower extremities  SKIN: no rashes, lesions, ulcers, petechiae or subcutaneous nodules; chronic age related skin changes, scarps and bruises; dry skin  : no changes  NEURO: grossly intact; motor/sensory WNL; AAOx3; no tremors; generalized weakness but better  PSYCH: normal mood, affect and behavior  LYMPH: normal cervical, supraclavicular, axillary and groin LN's         Labs: "   Lab Results   Component Value Date    WBC 5.49 10/19/2023    HGB 8.4 (L) 10/19/2023    HCT 27.3 (L) 10/19/2023    MCV 93 10/19/2023    PLT 58 (L) 10/19/2023    CMP  Sodium   Date Value Ref Range Status   10/19/2023 131 (L) 136 - 145 mmol/L Final   01/23/2019 136 134 - 144 mmol/L      Potassium   Date Value Ref Range Status   10/19/2023 4.6 3.5 - 5.1 mmol/L Final     Chloride   Date Value Ref Range Status   10/19/2023 99 95 - 110 mmol/L Final   01/23/2019 102 98 - 110 mmol/L      CO2   Date Value Ref Range Status   10/19/2023 25 23 - 29 mmol/L Final     Glucose   Date Value Ref Range Status   10/19/2023 119 (H) 70 - 110 mg/dL Final   01/23/2019 99 70 - 99 mg/dL      BUN   Date Value Ref Range Status   10/19/2023 72 (H) 8 - 23 mg/dL Final     Creatinine   Date Value Ref Range Status   10/19/2023 4.3 (H) 0.5 - 1.4 mg/dL Final   01/23/2019 0.98 0.60 - 1.40 mg/dL      Calcium   Date Value Ref Range Status   10/19/2023 8.6 (L) 8.7 - 10.5 mg/dL Final     Total Protein   Date Value Ref Range Status   10/19/2023 7.1 6.0 - 8.4 g/dL Final     Albumin   Date Value Ref Range Status   10/19/2023 3.4 (L) 3.5 - 5.2 g/dL Final   01/23/2019 3.0 (L) 3.1 - 4.7 g/dL      Total Bilirubin   Date Value Ref Range Status   10/19/2023 1.0 0.1 - 1.0 mg/dL Final     Comment:     For infants and newborns, interpretation of results should be based  on gestational age, weight and in agreement with clinical  observations.    Premature Infant recommended reference ranges:  Up to 24 hours.............<8.0 mg/dL  Up to 48 hours............<12.0 mg/dL  3-5 days..................<15.0 mg/dL  6-29 days.................<15.0 mg/dL       Alkaline Phosphatase   Date Value Ref Range Status   10/19/2023 71 55 - 135 U/L Final     AST   Date Value Ref Range Status   10/19/2023 29 10 - 40 U/L Final     ALT   Date Value Ref Range Status   10/19/2023 18 10 - 44 U/L Final     Anion Gap   Date Value Ref Range Status   10/19/2023 7 (L) 8 - 16 mmol/L Final     eGFR  if    Date Value Ref Range Status   06/12/2020 38.9 (A) >60 mL/min/1.73 m^2 Final     eGFR if non    Date Value Ref Range Status   06/12/2020 33.7 (A) >60 mL/min/1.73 m^2 Final     Comment:     Calculation used to obtain the estimated glomerular filtration  rate (eGFR) is the CKD-EPI equation.            Lab Results   Component Value Date    IRON 36 (L) 10/16/2023    TRANSFERRIN 224 10/16/2023    TIBC 314 10/16/2023    FESATURATED 11 (L) 10/16/2023      Lab Results   Component Value Date    FERRITIN 93.2 10/16/2023         Plt Ab: HLA Class 1 Negative Negative   Plt Ab: GP IIb/IIIa Negative Negative   Plt Ab: GP Ib/IX Negative Negative   Plt Ab: GP Ia/IIA Negative Negative   PLT AB: GP IV Negative Negative             Component Ref Range & Units 1 mo ago  (10/16/23) 1 mo ago  (10/10/23)   Hep A IgM Negative Negative Negative   Hepatitis B Surface Ag Negative Negative Negative   Hep B C IgM Negative Negative Negative   Hepatitis C Ab Non Reactive Non Reactive Non Reactive             Component 1 mo ago   OSMIN Negative        Lab Results   Component Value Date    [id number] 820 10/16/2023     Lab Results   Component Value Date    FOLATE 10.2 10/16/2023             I have reviewed all available lab results and radiology reports.    Radiology/Diagnostic Studies:    Chest single view     CLINICAL DATA: Central venous catheter placement     FINDINGS: AP views compared to October 10. Cardiomegaly is stable. There has been previous median sternotomy. Implantable cardiac device remains in place. Dual lumen right IJ central catheter is noted with distal tip just above the atriocaval junction. No pneumothorax or other placement related complication is identified.     Faint bilateral interstitial prominence is at least in part summation effect related to body habitus. Mild interstitial edema is not excluded.     IMPRESSION:   1. No pneumothorax status post central venous catheter placement.   2.  Faint bilateral interstitial prominence may be related to technique and body habitus. Mild interstitial edema is not excluded          All lab results and imaging results have been reviewed and discussed with the patient    Assessment:   (1) 63 y.o. male with diagnosis of chronic anemia and chronic thrombocytopenia who was seen as a consult at [facility] in Oct 2023 where he had presented to the ED at [facility] with tachycardia and SOB. He has been admitted to the hospitalist service. He has multiple medical problems including DM II, CAD, CHF, AICD hx, and chronic venous stasis. 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 He came to hematology clinic for an initial visit back in 2021 for evaluation and labs and studies were ordered at that time; however, the patient never had the lab work or studies done and failed to ever return to clinic.      10/16/2023:  - hgb at 8.8 and plats 53,000  - chronic thrombocytopenia for several years  - suspected underlying autoimmune mediated process such as chronic ITP  - most likely has a chronic multifactorial anemia process with anemia of chronic disorders, anemia of chronic renal disease, +/ chronic GIB  - total bili is only minimally elevated, so I do not suspect any significant hemolytic process at this time  - He came to hematology clinic for an initial visit back in 2021 for evaluation and labs and studies were ordered at that time; however, the patient never had the lab work or studies done and failed to ever return to clinic.      10/17/2023:  -  hgb at 8.3 and plats 55,000  - serum iron and %iron sat low     10/18/2023:  - hgb at 8.1 and plats 63,000  - getting HD today     10/19/2023:  - hgb 8.4 and plats 58,000 today - relatively stable    11/20/2023:  - due for up to date labs  - add oral iron po daily  - check labs monthly  - continue outpatient HD - f/u with Dr Leach on 12/4     (2) CAD, CHF, s/p  prior AICD     (3) DM II     (4) History of heparin induced thrombocytopenia     (5) CKD - stage V - started HD this admit     (6) Chronic venous stasis dermatitis of the lower extremities     (7) Pulmonary HTN             VISIT DIAGNOSES:      1. Normochromic normocytic anemia    2. Anemia, unspecified type    3. Anemia, chronic disease    4. Heparin induced thrombocytopenia    5. Chronic Thrombocytopenia            Plan:     PLAN:  Add ICAR-C po daily  - check labs monthly incl iron panel  Defer use of procrit or retacrit to nephrology's discretion  Continued HD as per neprhology  F/u with PCP, etc     RTC in  12 weeks   Fax note to Michelle Leach,               Normochromic normocytic anemia    Anemia, unspecified type    Anemia, chronic disease    Heparin induced thrombocytopenia    Chronic Thrombocytopenia      No follow-ups on file.        I have explained and the patient understands all of  the current recommendation(s). I have answered all of their questions to the best of my ability and to their complete satisfaction.             Thank you for allowing me to participate in this pleasant patient's care. Please call with any questions or concerns.      Electronically signed Ray Braden MD

## 2023-11-21 ENCOUNTER — TELEPHONE (OUTPATIENT)
Dept: HEMATOLOGY/ONCOLOGY | Facility: CLINIC | Age: 63
End: 2023-11-21

## 2023-11-21 NOTE — TELEPHONE ENCOUNTER
Patient called in and reported that he was notified by dialysis that he does not need iron pills as he is given iron when he receives dialysis. Reviewed with Dr Braden and per his verbal order, iron pills to be discontinued. Attempted to call patient with above, no answer. Voicemail left with above and instructions to call back with any questions.

## 2023-11-30 ENCOUNTER — OFFICE VISIT (OUTPATIENT)
Dept: PODIATRY | Facility: CLINIC | Age: 63
End: 2023-11-30
Payer: MEDICARE

## 2023-11-30 VITALS — BODY MASS INDEX: 34.59 KG/M2 | WEIGHT: 261 LBS | HEIGHT: 73 IN

## 2023-11-30 DIAGNOSIS — L60.2 ONYCHOGRYPHOSIS: ICD-10-CM

## 2023-11-30 DIAGNOSIS — Z91.89 AT HIGH RISK FOR INADEQUATE NUTRITIONAL INTAKE: ICD-10-CM

## 2023-11-30 DIAGNOSIS — E11.621 TYPE 2 DIABETES MELLITUS WITH FOOT ULCER, UNSPECIFIED WHETHER LONG TERM INSULIN USE: ICD-10-CM

## 2023-11-30 DIAGNOSIS — E11.40 TYPE 2 DIABETES MELLITUS WITH DIABETIC NEUROPATHY, UNSPECIFIED WHETHER LONG TERM INSULIN USE: ICD-10-CM

## 2023-11-30 DIAGNOSIS — L97.509 TYPE 2 DIABETES MELLITUS WITH FOOT ULCER, UNSPECIFIED WHETHER LONG TERM INSULIN USE: ICD-10-CM

## 2023-11-30 DIAGNOSIS — I73.9 PVD (PERIPHERAL VASCULAR DISEASE): ICD-10-CM

## 2023-11-30 DIAGNOSIS — M20.42 HAMMER TOES, BILATERAL: ICD-10-CM

## 2023-11-30 DIAGNOSIS — M20.41 HAMMER TOES, BILATERAL: ICD-10-CM

## 2023-11-30 DIAGNOSIS — R26.2 DIFFICULTY IN WALKING, NOT ELSEWHERE CLASSIFIED: ICD-10-CM

## 2023-11-30 DIAGNOSIS — E11.49 TYPE II DIABETES MELLITUS WITH NEUROLOGICAL MANIFESTATIONS: ICD-10-CM

## 2023-11-30 DIAGNOSIS — R20.2 PARESTHESIA OF BOTH LOWER EXTREMITIES: ICD-10-CM

## 2023-11-30 DIAGNOSIS — L97.512 ULCER OF RIGHT FOOT WITH FAT LAYER EXPOSED: ICD-10-CM

## 2023-11-30 DIAGNOSIS — L97.512 SKIN ULCER OF RIGHT FOOT WITH FAT LAYER EXPOSED: Primary | ICD-10-CM

## 2023-11-30 DIAGNOSIS — L60.2 OG (ONYCHOGRYPHOSIS): ICD-10-CM

## 2023-11-30 DIAGNOSIS — E66.9 OBESITY, UNSPECIFIED CLASSIFICATION, UNSPECIFIED OBESITY TYPE, UNSPECIFIED WHETHER SERIOUS COMORBIDITY PRESENT: ICD-10-CM

## 2023-11-30 DIAGNOSIS — I87.2 VENOUS STASIS DERMATITIS OF BOTH LOWER EXTREMITIES: ICD-10-CM

## 2023-11-30 DIAGNOSIS — I73.9 PERIPHERAL VASCULAR DISEASE: ICD-10-CM

## 2023-11-30 PROCEDURE — 99214 PR OFFICE/OUTPT VISIT, EST, LEVL IV, 30-39 MIN: ICD-10-PCS | Mod: S$GLB,,, | Performed by: PODIATRIST

## 2023-11-30 PROCEDURE — 99999 PR PBB SHADOW E&M-EST. PATIENT-LVL III: ICD-10-PCS | Mod: PBBFAC,,, | Performed by: PODIATRIST

## 2023-11-30 PROCEDURE — 3066F PR DOCUMENTATION OF TREATMENT FOR NEPHROPATHY: ICD-10-PCS | Mod: CPTII,S$GLB,, | Performed by: PODIATRIST

## 2023-11-30 PROCEDURE — 3044F HG A1C LEVEL LT 7.0%: CPT | Mod: CPTII,S$GLB,, | Performed by: PODIATRIST

## 2023-11-30 PROCEDURE — 1159F PR MEDICATION LIST DOCUMENTED IN MEDICAL RECORD: ICD-10-PCS | Mod: CPTII,S$GLB,, | Performed by: PODIATRIST

## 2023-11-30 PROCEDURE — 99214 OFFICE O/P EST MOD 30 MIN: CPT | Mod: S$GLB,,, | Performed by: PODIATRIST

## 2023-11-30 PROCEDURE — 3044F PR MOST RECENT HEMOGLOBIN A1C LEVEL <7.0%: ICD-10-PCS | Mod: CPTII,S$GLB,, | Performed by: PODIATRIST

## 2023-11-30 PROCEDURE — 3008F BODY MASS INDEX DOCD: CPT | Mod: CPTII,S$GLB,, | Performed by: PODIATRIST

## 2023-11-30 PROCEDURE — 99999 PR PBB SHADOW E&M-EST. PATIENT-LVL III: CPT | Mod: PBBFAC,,, | Performed by: PODIATRIST

## 2023-11-30 PROCEDURE — 3066F NEPHROPATHY DOC TX: CPT | Mod: CPTII,S$GLB,, | Performed by: PODIATRIST

## 2023-11-30 PROCEDURE — 3008F PR BODY MASS INDEX (BMI) DOCUMENTED: ICD-10-PCS | Mod: CPTII,S$GLB,, | Performed by: PODIATRIST

## 2023-11-30 PROCEDURE — 1160F RVW MEDS BY RX/DR IN RCRD: CPT | Mod: CPTII,S$GLB,, | Performed by: PODIATRIST

## 2023-11-30 PROCEDURE — 1159F MED LIST DOCD IN RCRD: CPT | Mod: CPTII,S$GLB,, | Performed by: PODIATRIST

## 2023-11-30 PROCEDURE — 1160F PR REVIEW ALL MEDS BY PRESCRIBER/CLIN PHARMACIST DOCUMENTED: ICD-10-PCS | Mod: CPTII,S$GLB,, | Performed by: PODIATRIST

## 2023-11-30 NOTE — PROGRESS NOTES
"  [address] [facility] [place] [address]  Phone: [phone]   [phone]    Patient's PCP:Michelle Messina FNP  Referring Provider: No ref. provider found    Subjective:      Chief Complaint:: Follow-up (Skin ulcer of right foot with fat layer exposed), Diabetes, Numbness, Peripheral Neuropathy, Wound Check, Wound Care, Pressure Ulcer, Non-healing Wound, Poor Circulation, Foot Ulcer, Difficulty Walking, Diabetic Foot Ulcer, Diabetes Mellitus, Venous Stasis, and Leg Swelling    Follow-up  Associated symptoms include numbness. Pertinent negatives include no abdominal pain, arthralgias, chest pain, chills, coughing, fatigue, fever, headaches, joint swelling, myalgias, nausea, rash or weakness.      Ana Bullard is a 63 y.o. male who presents today with follow up for ulcer of right foot. Patient states that home health still comes every week to do vitals. Wife is still changing dressing for thr patient.      Additionally, patient presents to clinic today for evaluation and treatment of diabetic feet.        Systemic Doctor: YRN Townsend  Date Last Seen: 06/07/2022  Blood Sugar: 130-150 range pt reported  Hemoglobin A1c: 6.2     Vitals:    11/30/23 1020   Weight: 118.4 kg (261 lb 0.4 oz)   Height: 6' 1" (1.854 m)   PainSc: 0-No pain      Shoe Size: 12    Past Surgical History:   Procedure Laterality Date    CARDIAC PACEMAKER PLACEMENT  12/2012    CARDIAC PACEMAKER PLACEMENT  10/04/2018    battery replacement    CORONARY ARTERY BYPASS GRAFT  06/2012    ESOPHAGOGASTRODUODENOSCOPY  01/31/2017    SAMARA FILTER PLACEMENT  2012    vena cava    INSERTION, CATHETER, TUNNELED Right 10/16/2023    Procedure: Insertion,catheter,tunneled;  Surgeon: Terri Gresham MD;  Location: [facility] OR;  Service: Cardiovascular;  Laterality: Right;    LUMBAR SYMPATHETIC NERVE BLOCK Right 8/22/2019    Procedure: BLOCK, NERVE, SYMPATHETIC, LUMBAR;  Surgeon: Tashi Good MD;  Location: [facility] OR;  Service: Pain " Management;  Laterality: Right;  RIGHT SYMPATHETIC NERVE BLOCK     Past Medical History:   Diagnosis Date    AICD (automatic cardioverter/defibrillator) present     Anemia, chronic disease 07/27/2021    Anemia, unspecified 07/27/2021    Anxiety and depression 2012    CAD (coronary artery disease) 2012    Cardiomegaly 2016    CHF (congestive heart failure) 2012    Cholecystitis 2017    Complete heart block 2012    Diabetic foot ulcer     DVT (deep venous thrombosis) 2012    And pulmonary embolus    GERD (gastroesophageal reflux disease) 2010    Heparin induced thrombocytopenia     Hyperlipemia 2011    IDDM (insulin dependent diabetes mellitus) 1983    With neuropathy    Ischemic cardiomyopathy 2012    MI (myocardial infarction) 2012    Morbid obesity     MRSA (methicillin resistant Staphylococcus aureus) infection 01/19/2019    Noncompliance with therapeutic plan 2019    Normochromic normocytic anemia 07/27/2021    Osteomyelitis of right foot 01/2019    Osteomyelitis of right foot 09/01/2022    Klebsiella from bone, GBS in blood    Pulmonary edema 2019    Respiratory failure 2019    Sepsis 01/2019    Due to cellulitis right leg    Sleep apnea 2013    Thrombocytopathy 2012    Venous stasis dermatitis of both lower extremities      Family History   Problem Relation Age of Onset    Arthritis Mother     Diabetes Mother     Cancer Father     Heart disease Father     Stroke Father         Social History:   Marital Status:   Alcohol History:  reports no history of alcohol use.  Tobacco History:  reports that he quit smoking about 11 years ago. His smoking use included cigarettes. He started smoking about 49 years ago. He has a 76.0 pack-year smoking history. He has never used smokeless tobacco.  Drug History:  reports no history of drug use.    Review of patient's allergies indicates:   Allergen Reactions    Vasopressin Anaphylaxis and Other (See Comments)     Increased pressure in his head; felt like his eyeballs  "and veins were going to pop out of his head.     Vasopressin analogues Other (See Comments) and Anaphylaxis     "felt like eyes going to pop out of my head"  Other reaction(s): Other (See Comments)  "felt like eyes going to pop out of my head"  Increased pressure in his head; felt like his eyeballs and veins were going to pop out of his head.     Heparin Other (See Comments)     Other reaction(s): "depleted my blood platets"    Decreased platelet count    Heparin analogues Other (See Comments)     Depleted WBC count    Morphine Hallucinations, Other (See Comments) and Anxiety     Other reaction(s): Hallucinations  Paranoid, hallucinations         Current Outpatient Medications   Medication Sig Dispense Refill    albuterol (PROVENTIL) 5 mg/mL nebulizer solution Take 2.5 mg by nebulization every 6 (six) hours as needed.      albuterol-ipratropium (DUO-NEB) 2.5 mg-0.5 mg/3 mL nebulizer solution Take 3 mLs by nebulization every 6 (six) hours as needed.      aspirin 81 MG Chew Take 1 tablet (81 mg total) by mouth once daily.  0    atorvastatin (LIPITOR) 20 MG tablet Take 20 mg by mouth once daily.      BASAGLAR KWIKPEN U-100 INSULIN glargine 100 units/mL (3mL) SubQ pen Inject 25 Units into the skin once daily.  3    carvediloL (COREG) 3.125 MG tablet Take 3.125 mg by mouth 2 (two) times daily.      FARXIGA 10 mg tablet Take 10 mg by mouth once daily.      gabapentin (NEURONTIN) 600 MG tablet Take 600 mg by mouth 3 (three) times daily.      levothyroxine (SYNTHROID) 25 MCG tablet Take 1 tablet (25 mcg total) by mouth before breakfast. 90 tablet 0    midodrine (PROAMATINE) 5 MG Tab Take 1 tablet (5 mg total) by mouth 3 (three) times daily before meals. 90 tablet 0    pantoprazole (PROTONIX) 40 MG tablet Take 1 tablet by mouth once daily.  0    torsemide (DEMADEX) 20 MG Tab Take 1 tablet (20 mg total) by mouth 2 (two) times a day. 120 tablet 0    traMADoL (ULTRAM) 50 mg tablet Take 1 tablet (50 mg total) by mouth every 8 " (eight) hours as needed for Pain. 90 tablet 2    vitamin B complex-folic acid 0.4 mg Tab Take 1 tablet by mouth once daily.       No current facility-administered medications for this visit.       Review of Systems   Constitutional:  Negative for chills, fatigue, fever and unexpected weight change.   HENT:  Negative for hearing loss and trouble swallowing.    Eyes:  Negative for photophobia and visual disturbance.   Respiratory:  Negative for cough, shortness of breath and wheezing.    Cardiovascular:  Positive for leg swelling. Negative for chest pain and palpitations.   Gastrointestinal:  Negative for abdominal pain and nausea.   Genitourinary:  Negative for dysuria and frequency.   Musculoskeletal:  Negative for arthralgias, back pain, gait problem, joint swelling and myalgias.   Skin:  Positive for color change and wound. Negative for rash.   Neurological:  Positive for numbness. Negative for tremors, seizures, weakness and headaches.   Hematological:  Does not bruise/bleed easily.         Objective:        Physical Exam:   Foot Exam    Right Foot/Ankle     Inspection and Palpation  Skin Exam: ulcer;         Physical Exam  Musculoskeletal:        Legs:         Feet:    Feet:      Right foot:      Skin integrity: Ulcer present.       Ortho/SPM Exam       Physical examination: General: Pt. is well-developed, well-nourished, appears stated age, in no acute distress, alert and oriented x 3.     Vascular: Dorsalis pedis and posterior tibial pulses are 1/4  Bilaterally. Toes are warm to touch. Feet are warm proximally.     There is decreased digital hair. Skin is atrophic, slightly hyperpigmented, and mildly edematous. Capillary refill less than 5 seconds all toes/distal feet     Neurologic: Hollister-Latrell 5.07 monofilament is decreased bilateral feet. Sharp/dull sensation absent Bilateral feet.     Vibratory sensation absent bilateral     Musculoskeletal: adequate joint range of motion without pain, limitation, nor  crepitation Bilateral feet and ankle joints. Muscle strength is 5/5 in all groups bilaterally.     Lymphatics: no lymphangitic streaking bilaterally.     Dermatologic: jim Younger  Imaging:            Assessment:       1. Skin ulcer of right foot with fat layer exposed    2. Type II diabetes mellitus with neurological manifestations    3. Ulcer of right foot with fat layer exposed    4. Type 2 diabetes mellitus with foot ulcer, unspecified whether long term insulin use    5. OG (onychogryphosis)    6. Type 2 diabetes mellitus with diabetic neuropathy, unspecified whether long term insulin use    7. Onychogryphosis    8. Venous stasis dermatitis of both lower extremities    9. PVD (peripheral vascular disease)    10. At high risk for inadequate nutritional intake    11. Paresthesia of both lower extremities    12. Obesity, unspecified classification, unspecified obesity type, unspecified whether serious comorbidity present    13. Difficulty in walking, not elsewhere classified    14. Peripheral vascular disease    15. Hammer toes, bilateral      Plan:   Skin ulcer of right foot with fat layer exposed  -     Ambulatory referral/consult to Home Health; Future; Expected date: 12/01/2023    Type II diabetes mellitus with neurological manifestations    Ulcer of right foot with fat layer exposed    Type 2 diabetes mellitus with foot ulcer, unspecified whether long term insulin use    OG (onychogryphosis)    Type 2 diabetes mellitus with diabetic neuropathy, unspecified whether long term insulin use    Onychogryphosis    Venous stasis dermatitis of both lower extremities    PVD (peripheral vascular disease)    At high risk for inadequate nutritional intake    Paresthesia of both lower extremities    Obesity, unspecified classification, unspecified obesity type, unspecified whether serious comorbidity present    Difficulty in walking, not elsewhere classified    Peripheral vascular disease    Hammer toes,  bilateral      No follow-ups on file.    Procedures          Counseling:     I provided patient education verbally regarding:   Patient diagnosis, treatment options, as well as alternatives, risks, and benefits.     This note was created using Dragon voice recognition software that occasionally misinterpreted phrases or words.          Follow-up: Patient is to return to the clinic in 2 week(s) for follow-up but should call the office immediately if any signs of infection, such as fever, chills, sweats, increased redness or pain.  Recommendations:  Check feet daily.  dressing changes, home health Iodosorb followed by border foam followed by bulky wrap for protection  Use surgery shoe for walking or standing.     I provided patient education verbally regarding: proper ulcer care and the possible need for serial debridements, topical medications, specific dressings and biological engineered skin substitutes if indicated.           Counseling/Education:  I provided patient education verbally regarding:   The aspects of diabetes and how it pertains to the feet. I explained the importance of proper diabetic foot care and how it is essential for the health of their feet.     I discussed the importance of knowing their Hemoglobin A1c and that the level needs to be as close to 6 as possible. I discussed the increase complications of high blood sugar including stroke, blindness, heart attack, kidney failure and loss of limb secondary to neuropathy and PVD.      With neuropathy, beware of any breaks in the skin or redness. These areas are not recognized early due to the numbness.     I discussed Diabetes, lower back issues, metabolic disorders, systemic causes, chemotherapy, vitamin deficiency, heavy metal exposure, as some of the causes. I also explained that as much as 40% of the time we can not find a cause. I discussed different treatments available to control the symptoms but which may not cure the problem.          Counseled patient on the aspects of diabetes and how it pertains to the feet.  I explained the importance of proper diabetic foot care and how it is essential for the health of their feet.        Shoe inspection. Patient instructed on proper foot hygeine. We discussed wearing proper shoe gear, daily foot inspections, never walking without protective shoe gear, never putting sharp instruments to feet, routine podiatric nail visits every 2-3 months.       Patient should call the office immediately if any signs of infection, such as fever, chills, sweats, increased redness or pain.     Patient was instructed to call the clinic or go to the emergency department if their symptoms do not improve, worsens, or if new symptoms develop.  Patient was advised that if any increased swelling, pain, or numbness arise to go immediately to the ED. Patient knows to call any time if an emergency arises. Shared decision making occurred and patient verbalized understanding in agreement with this plan.      I counseled the patient on their conditions, their implications and medical management.     >50% of this > 60 minute visit was spent face to face educating/counseling the patient     I spent a total of 60 minutes on the day of the visit.This includes face to face time and non-face to face time preparing to see the patient (eg, review of tests), obtaining and/or reviewing separately obtained history, documenting clinical information in the electronic or other health record, independently interpreting results and communicating results to the patient/family/caregiver, or care coordinator.

## 2023-12-21 ENCOUNTER — OFFICE VISIT (OUTPATIENT)
Dept: PODIATRY | Facility: CLINIC | Age: 63
End: 2023-12-21
Attending: FAMILY MEDICINE
Payer: MEDICARE

## 2023-12-21 VITALS — RESPIRATION RATE: 18 BRPM | HEART RATE: 64 BPM | WEIGHT: 261.44 LBS | BODY MASS INDEX: 34.65 KG/M2 | HEIGHT: 73 IN

## 2023-12-21 DIAGNOSIS — M20.42 HAMMER TOES, BILATERAL: ICD-10-CM

## 2023-12-21 DIAGNOSIS — I87.2 VENOUS STASIS DERMATITIS OF BOTH LOWER EXTREMITIES: ICD-10-CM

## 2023-12-21 DIAGNOSIS — L60.2 OG (ONYCHOGRYPHOSIS): ICD-10-CM

## 2023-12-21 DIAGNOSIS — E66.9 OBESITY, UNSPECIFIED CLASSIFICATION, UNSPECIFIED OBESITY TYPE, UNSPECIFIED WHETHER SERIOUS COMORBIDITY PRESENT: ICD-10-CM

## 2023-12-21 DIAGNOSIS — L60.2 ONYCHOGRYPHOSIS: ICD-10-CM

## 2023-12-21 DIAGNOSIS — I73.9 PERIPHERAL VASCULAR DISEASE: ICD-10-CM

## 2023-12-21 DIAGNOSIS — E11.49 TYPE II DIABETES MELLITUS WITH NEUROLOGICAL MANIFESTATIONS: ICD-10-CM

## 2023-12-21 DIAGNOSIS — E11.621 TYPE 2 DIABETES MELLITUS WITH FOOT ULCER, UNSPECIFIED WHETHER LONG TERM INSULIN USE: ICD-10-CM

## 2023-12-21 DIAGNOSIS — Z91.89 AT HIGH RISK FOR INADEQUATE NUTRITIONAL INTAKE: ICD-10-CM

## 2023-12-21 DIAGNOSIS — E11.42 DIABETIC POLYNEUROPATHY ASSOCIATED WITH TYPE 2 DIABETES MELLITUS: ICD-10-CM

## 2023-12-21 DIAGNOSIS — R20.2 PARESTHESIA OF BOTH LOWER EXTREMITIES: ICD-10-CM

## 2023-12-21 DIAGNOSIS — R26.2 DIFFICULTY IN WALKING, NOT ELSEWHERE CLASSIFIED: ICD-10-CM

## 2023-12-21 DIAGNOSIS — L97.512 ULCER OF RIGHT FOOT WITH FAT LAYER EXPOSED: ICD-10-CM

## 2023-12-21 DIAGNOSIS — L97.509 TYPE 2 DIABETES MELLITUS WITH FOOT ULCER, UNSPECIFIED WHETHER LONG TERM INSULIN USE: ICD-10-CM

## 2023-12-21 DIAGNOSIS — I73.9 PVD (PERIPHERAL VASCULAR DISEASE): ICD-10-CM

## 2023-12-21 DIAGNOSIS — E11.40 TYPE 2 DIABETES MELLITUS WITH DIABETIC NEUROPATHY, UNSPECIFIED WHETHER LONG TERM INSULIN USE: ICD-10-CM

## 2023-12-21 DIAGNOSIS — M20.41 HAMMER TOES, BILATERAL: ICD-10-CM

## 2023-12-21 DIAGNOSIS — L97.512 SKIN ULCER OF RIGHT FOOT WITH FAT LAYER EXPOSED: Primary | ICD-10-CM

## 2023-12-21 PROCEDURE — 3008F BODY MASS INDEX DOCD: CPT | Mod: CPTII,S$GLB,, | Performed by: PODIATRIST

## 2023-12-21 PROCEDURE — 3066F NEPHROPATHY DOC TX: CPT | Mod: CPTII,S$GLB,, | Performed by: PODIATRIST

## 2023-12-21 PROCEDURE — 99214 PR OFFICE/OUTPT VISIT, EST, LEVL IV, 30-39 MIN: ICD-10-PCS | Mod: S$GLB,,, | Performed by: PODIATRIST

## 2023-12-21 PROCEDURE — 1159F MED LIST DOCD IN RCRD: CPT | Mod: CPTII,S$GLB,, | Performed by: PODIATRIST

## 2023-12-21 PROCEDURE — 1159F PR MEDICATION LIST DOCUMENTED IN MEDICAL RECORD: ICD-10-PCS | Mod: CPTII,S$GLB,, | Performed by: PODIATRIST

## 2023-12-21 PROCEDURE — 1160F RVW MEDS BY RX/DR IN RCRD: CPT | Mod: CPTII,S$GLB,, | Performed by: PODIATRIST

## 2023-12-21 PROCEDURE — 1160F PR REVIEW ALL MEDS BY PRESCRIBER/CLIN PHARMACIST DOCUMENTED: ICD-10-PCS | Mod: CPTII,S$GLB,, | Performed by: PODIATRIST

## 2023-12-21 PROCEDURE — 99999 PR PBB SHADOW E&M-EST. PATIENT-LVL III: CPT | Mod: PBBFAC,,, | Performed by: PODIATRIST

## 2023-12-21 PROCEDURE — 99999 PR PBB SHADOW E&M-EST. PATIENT-LVL III: ICD-10-PCS | Mod: PBBFAC,,, | Performed by: PODIATRIST

## 2023-12-21 PROCEDURE — 3066F PR DOCUMENTATION OF TREATMENT FOR NEPHROPATHY: ICD-10-PCS | Mod: CPTII,S$GLB,, | Performed by: PODIATRIST

## 2023-12-21 PROCEDURE — 3008F PR BODY MASS INDEX (BMI) DOCUMENTED: ICD-10-PCS | Mod: CPTII,S$GLB,, | Performed by: PODIATRIST

## 2023-12-21 PROCEDURE — 99214 OFFICE O/P EST MOD 30 MIN: CPT | Mod: S$GLB,,, | Performed by: PODIATRIST

## 2023-12-21 PROCEDURE — 3044F HG A1C LEVEL LT 7.0%: CPT | Mod: CPTII,S$GLB,, | Performed by: PODIATRIST

## 2023-12-21 PROCEDURE — 3044F PR MOST RECENT HEMOGLOBIN A1C LEVEL <7.0%: ICD-10-PCS | Mod: CPTII,S$GLB,, | Performed by: PODIATRIST

## 2023-12-21 NOTE — PROGRESS NOTES
"  [address] [facility] [place] [address]  Phone: [phone]   [phone]    Patient's PCP:Michelle Messina FNP  Referring Provider: No ref. provider found    Subjective:      Chief Complaint:: Follow-up (Right foot ulcer), Difficulty Walking, Numbness, Peripheral Neuropathy, Non-healing Wound, Poor Circulation, Pressure Ulcer, Wound Check, Wound Care, Foot Ulcer, Diabetic Foot Ulcer, Diabetes, Diabetes Mellitus, and Leg Swelling    HPI  Ana Bullard is a 63 y.o. male who presents today with a male who presents today with follow up for ulcer of right foot. presents with double layer of bunion pads and bandage of fifth MPJ wound.    Additionally, patient presents to clinic today for evaluation and treatment of diabetic feet.         Systemic Doctor: YRN Townsend  Date Last Seen: 06/07/2022  Blood Sugar: 130  Hemoglobin A1c: 6.2     Vitals:    12/21/23 0948   Pulse: 64   Resp: 18   Weight: 118.6 kg (261 lb 7.5 oz)   Height: 6' 1" (1.854 m)   PainSc: 0-No pain      Shoe Size: 12    Past Surgical History:   Procedure Laterality Date    CARDIAC PACEMAKER PLACEMENT  12/2012    CARDIAC PACEMAKER PLACEMENT  10/04/2018    battery replacement    CORONARY ARTERY BYPASS GRAFT  06/2012    ESOPHAGOGASTRODUODENOSCOPY  01/31/2017    SAMARA FILTER PLACEMENT  2012    vena cava    INSERTION, CATHETER, TUNNELED Right 10/16/2023    Procedure: Insertion,catheter,tunneled;  Surgeon: Terri Gresham MD;  Location: [facility] OR;  Service: Cardiovascular;  Laterality: Right;    LUMBAR SYMPATHETIC NERVE BLOCK Right 8/22/2019    Procedure: BLOCK, NERVE, SYMPATHETIC, LUMBAR;  Surgeon: Tashi Good MD;  Location: [facility] OR;  Service: Pain Management;  Laterality: Right;  RIGHT SYMPATHETIC NERVE BLOCK     Past Medical History:   Diagnosis Date    AICD (automatic cardioverter/defibrillator) present     Anemia, chronic disease 07/27/2021    Anemia, unspecified 07/27/2021    Anxiety and depression 2012    CAD (coronary " "artery disease) 2012    Cardiomegaly 2016    CHF (congestive heart failure) 2012    Cholecystitis 2017    Complete heart block 2012    Diabetic foot ulcer     DVT (deep venous thrombosis) 2012    And pulmonary embolus    GERD (gastroesophageal reflux disease) 2010    Heparin induced thrombocytopenia     Hyperlipemia 2011    IDDM (insulin dependent diabetes mellitus) 1983    With neuropathy    Ischemic cardiomyopathy 2012    MI (myocardial infarction) 2012    Morbid obesity     MRSA (methicillin resistant Staphylococcus aureus) infection 01/19/2019    Noncompliance with therapeutic plan 2019    Normochromic normocytic anemia 07/27/2021    Osteomyelitis of right foot 01/2019    Osteomyelitis of right foot 09/01/2022    Klebsiella from bone, GBS in blood    Pulmonary edema 2019    Respiratory failure 2019    Sepsis 01/2019    Due to cellulitis right leg    Sleep apnea 2013    Thrombocytopathy 2012    Venous stasis dermatitis of both lower extremities      Family History   Problem Relation Age of Onset    Arthritis Mother     Diabetes Mother     Cancer Father     Heart disease Father     Stroke Father         Social History:   Marital Status:   Alcohol History:  reports no history of alcohol use.  Tobacco History:  reports that he quit smoking about 11 years ago. His smoking use included cigarettes. He started smoking about 50 years ago. He has a 76.0 pack-year smoking history. He has never used smokeless tobacco.  Drug History:  reports no history of drug use.    Review of patient's allergies indicates:   Allergen Reactions    Vasopressin Anaphylaxis and Other (See Comments)     Increased pressure in his head; felt like his eyeballs and veins were going to pop out of his head.     Vasopressin analogues Other (See Comments) and Anaphylaxis     "felt like eyes going to pop out of my head"  Other reaction(s): Other (See Comments)  "felt like eyes going to pop out of my head"  Increased pressure in his head; felt " "like his eyeballs and veins were going to pop out of his head.     Heparin Other (See Comments)     Other reaction(s): "depleted my blood platets"    Decreased platelet count    Heparin analogues Other (See Comments)     Depleted WBC count    Morphine Hallucinations, Other (See Comments) and Anxiety     Other reaction(s): Hallucinations  Paranoid, hallucinations         Current Outpatient Medications   Medication Sig Dispense Refill    albuterol (PROVENTIL) 5 mg/mL nebulizer solution Take 2.5 mg by nebulization every 6 (six) hours as needed.      albuterol-ipratropium (DUO-NEB) 2.5 mg-0.5 mg/3 mL nebulizer solution Take 3 mLs by nebulization every 6 (six) hours as needed.      aspirin 81 MG Chew Take 1 tablet (81 mg total) by mouth once daily.  0    atorvastatin (LIPITOR) 20 MG tablet Take 20 mg by mouth once daily.      BASAGLAR KWIKPEN U-100 INSULIN glargine 100 units/mL (3mL) SubQ pen Inject 25 Units into the skin once daily.  3    carvediloL (COREG) 3.125 MG tablet Take 3.125 mg by mouth 2 (two) times daily.      FARXIGA 10 mg tablet Take 10 mg by mouth once daily.      gabapentin (NEURONTIN) 600 MG tablet Take 600 mg by mouth 3 (three) times daily.      levothyroxine (SYNTHROID) 25 MCG tablet Take 1 tablet (25 mcg total) by mouth before breakfast. 90 tablet 0    midodrine (PROAMATINE) 5 MG Tab Take 1 tablet (5 mg total) by mouth 3 (three) times daily before meals. 90 tablet 0    pantoprazole (PROTONIX) 40 MG tablet Take 1 tablet by mouth once daily.  0    torsemide (DEMADEX) 20 MG Tab Take 1 tablet (20 mg total) by mouth 2 (two) times a day. 120 tablet 0    traMADoL (ULTRAM) 50 mg tablet Take 1 tablet (50 mg total) by mouth every 8 (eight) hours as needed for Pain. 90 tablet 2    vitamin B complex-folic acid 0.4 mg Tab Take 1 tablet by mouth once daily.       No current facility-administered medications for this visit.       Review of Systems   Skin:  Positive for color change and wound.   Neurological:  " Positive for numbness.         Objective:        Physical Exam:   Foot Exam    Right Foot/Ankle     Inspection and Palpation  Skin Exam: ulcer;         Physical Exam  Musculoskeletal:        Legs:         Feet:    Feet:      Right foot:      Skin integrity: Ulcer present.       Ortho/SPM Exam       Physical examination: General: Pt. is well-developed, well-nourished, appears stated age, in no acute distress, alert and oriented x 3.     Vascular: Dorsalis pedis and posterior tibial pulses are 1/4  Bilaterally. Toes are warm to touch. Feet are warm proximally.     There is decreased digital hair. Skin is atrophic, slightly hyperpigmented, and mildly edematous. Capillary refill less than 5 seconds all toes/distal feet     Neurologic: [place] 5.07 monofilament is decreased bilateral feet. Sharp/dull sensation absent Bilateral feet.     Vibratory sensation absent bilateral     Musculoskeletal: adequate joint range of motion without pain, limitation, nor crepitation Bilateral feet and ankle joints. Muscle strength is 5/5 in all groups bilaterally.     Lymphatics: no lymphangitic streaking bilaterally.     Dermatologic: jim Younger  Imaging:            Assessment:       1. Skin ulcer of right foot with fat layer exposed    2. Type II diabetes mellitus with neurological manifestations    3. Ulcer of right foot with fat layer exposed    4. Type 2 diabetes mellitus with foot ulcer, unspecified whether long term insulin use    5. OG (onychogryphosis)    6. Type 2 diabetes mellitus with diabetic neuropathy, unspecified whether long term insulin use    7. Venous stasis dermatitis of both lower extremities    8. PVD (peripheral vascular disease)    9. At high risk for inadequate nutritional intake    10. Paresthesia of both lower extremities    11. Onychogryphosis    12. Obesity, unspecified classification, unspecified obesity type, unspecified whether serious comorbidity present    13. Hammer toes, bilateral     14. Difficulty in walking, not elsewhere classified    15. Diabetic polyneuropathy associated with type 2 diabetes mellitus    16. Peripheral vascular disease      Plan:   Skin ulcer of right foot with fat layer exposed    Type II diabetes mellitus with neurological manifestations    Ulcer of right foot with fat layer exposed    Type 2 diabetes mellitus with foot ulcer, unspecified whether long term insulin use    OG (onychogryphosis)    Type 2 diabetes mellitus with diabetic neuropathy, unspecified whether long term insulin use    Venous stasis dermatitis of both lower extremities    PVD (peripheral vascular disease)    At high risk for inadequate nutritional intake    Paresthesia of both lower extremities    Onychogryphosis    Obesity, unspecified classification, unspecified obesity type, unspecified whether serious comorbidity present    Hammer toes, bilateral    Difficulty in walking, not elsewhere classified    Diabetic polyneuropathy associated with type 2 diabetes mellitus    Peripheral vascular disease      No follow-ups on file.    Procedures          Counseling:     I provided patient education verbally regarding:   Patient diagnosis, treatment options, as well as alternatives, risks, and benefits.     This note was created using Dragon voice recognition software that occasionally misinterpreted phrases or words.         Recommend continuing to wear corn pads over healed ulcer area to help relieve some pressure and friction when walking.       Counseling/Education:  I provided patient education verbally regarding:   The aspects of diabetes and how it pertains to the feet. I explained the importance of proper diabetic foot care and how it is essential for the health of their feet.     I discussed the importance of knowing their Hemoglobin A1c and that the level needs to be as close to 6 as possible. I discussed the increase complications of high blood sugar including stroke, blindness, heart attack,  kidney failure and loss of limb secondary to neuropathy and PVD.      With neuropathy, beware of any breaks in the skin or redness. These areas are not recognized early due to the numbness.     I discussed Diabetes, lower back issues, metabolic disorders, systemic causes, chemotherapy, vitamin deficiency, heavy metal exposure, as some of the causes. I also explained that as much as 40% of the time we can not find a cause. I discussed different treatments available to control the symptoms but which may not cure the problem.         Counseled patient on the aspects of diabetes and how it pertains to the feet.  I explained the importance of proper diabetic foot care and how it is essential for the health of their feet.        Shoe inspection. Patient instructed on proper foot hygeine. We discussed wearing proper shoe gear, daily foot inspections, never walking without protective shoe gear, never putting sharp instruments to feet, routine podiatric nail visits every 2-3 months.       Patient should call the office immediately if any signs of infection, such as fever, chills, sweats, increased redness or pain.     Patient was instructed to call the clinic or go to the emergency department if their symptoms do not improve, worsens, or if new symptoms develop.  Patient was advised that if any increased swelling, pain, or numbness arise to go immediately to the ED. Patient knows to call any time if an emergency arises. Shared decision making occurred and patient verbalized understanding in agreement with this plan.      I counseled the patient on their conditions, their implications and medical management.     >50% of this > 60 minute visit was spent face to face educating/counseling the patient     I spent a total of 60 minutes on the day of the visit.This includes face to face time and non-face to face time preparing to see the patient (eg, review of tests), obtaining and/or reviewing separately obtained history,  documenting clinical information in the electronic or other health record, independently interpreting results and communicating results to the patient/family/caregiver, or care coordinator.

## 2023-12-23 NOTE — PROGRESS NOTES
[facility] Medicine  Progress Note    Patient Name: Ana Bullard  MRN: [id number]  Patient Class: IP- Inpatient   Admission Date: 6/12/2023  Length of Stay: 3 days  Attending Physician: Haseeb Romero MD  Primary Care Provider: YRN Pollard        Subjective:     Principal Problem:Wide-complex tachycardia        HPI:  63-year-old  male with multiple medical comorbidities including but not limited to chronic combined CHF, type 2 DM, CAD, thrombocytopenia, chronic diabetic foot ulcer and CKD stage 3b who was discharged from outside hospital earlier today after being managed for UTI presented here with generalized weakness and tachycardia.    He was managed for complicated UTI at outside hospital for 2 days.  He was discharged on oral antibiotics and resumption of home health.  Today his home health nurse found him to be tachycardic and directed him to the ER.  Initial rhythm on presentation was wide complex tachycardia.  Wife reports that he was not given his amiodarone when he was hospitalized.    In the ED here, after consultation with Cardiology patient underwent successful synchronized cardioversion with restoration of normal sinus rhythm.    Patient denies chest pain but admits to shortness of breath and increased abdominal distention, lower extremity edema and weight gain over the last several days.  Admits to being compliant with diuretics.  He is currently being followed by Podiatry for chronic right foot ulcer and receiving local wound care.    Rest of the 10 point review of systems is negative except as mentioned above.      Overview/Hospital Course:  Assumed care of the patient  Patient is sitting on the bed no shortness of breath, lung exam with very limited entry, the whole abdomen is filled  with fluid from CHF , renal function slightly worse on Lasix      Interval History:   As per subjective     Review of Systems  Objective:     Vital Signs (Most  Recent):  Temp: 97 °F (36.1 °C) (06/15/23 0701)  Pulse: 60 (06/15/23 1445)  Resp: 18 (06/15/23 1445)  BP: (!) 144/72 (06/15/23 1300)  SpO2: 98 % (06/15/23 1445) Vital Signs (24h Range):  Temp:  [96.9 °F (36.1 °C)-97.4 °F (36.3 °C)] 97 °F (36.1 °C)  Pulse:  [60-67] 60  Resp:  [18-22] 18  SpO2:  [72 %-100 %] 98 %  BP: (104-152)/(53-82) 144/72     Weight: 132.2 kg (291 lb 7.2 oz)  Body mass index is 38.45 kg/m².    Intake/Output Summary (Last 24 hours) at 6/15/2023 1734  Last data filed at 6/15/2023 1251  Gross per 24 hour   Intake 796.08 ml   Output 1025 ml   Net -228.92 ml         Physical Exam  Constitutional:       General: He is not in acute distress.     Appearance: He is well-developed.   HENT:      Head: Normocephalic.   Eyes:      Pupils: Pupils are equal, round, and reactive to light.   Cardiovascular:      Rate and Rhythm: Normal rate and regular rhythm.   Pulmonary:      Effort: Pulmonary effort is normal. No respiratory distress.   Abdominal:      General: There is distension.      Tenderness: There is no abdominal tenderness.   Musculoskeletal:         General: Normal range of motion.      Cervical back: Neck supple.   Skin:     Findings: No rash.   Neurological:      Mental Status: He is alert and oriented to person, place, and time.   Psychiatric:         Mood and Affect: Mood normal.           Significant Labs: All pertinent labs within the past 24 hours have been reviewed.  CBC:   Recent Labs   Lab 06/14/23  0413 06/15/23  0431   WBC 5.39 4.79   HGB 9.9* 9.4*   HCT 34.4* 31.8*   PLT 83* 68*     CMP:   Recent Labs   Lab 06/14/23  0413 06/15/23  0431   * 134*   K 4.8 4.5   CL 97 96   CO2 25 25   * 137*   BUN 77* 79*   CREATININE 3.4* 3.5*   CALCIUM 8.4* 8.3*   ANIONGAP 11 13     Cardiac Markers: No results for input(s): CKMB, MYOGLOBIN, BNP, TROPISTAT in the last 48 hours.    Significant Imaging: I have reviewed all pertinent imaging results/findings within the past 24  hours.      Assessment/Plan:        Wide-complex tachycardia with ICD and severe heart failure   Wide complex tachycardia with heart rate in the 130-140 beats per minute   Patient has ICD in place however not cardioverted by it  Status post synchronized cardioversion while in the ED   Off  amiodarone drip  Dobutamine and high dose IV lasix   EF around 22 % and discussed about poor prognosis   monitoring of renal function   Strict in/outs, low-sodium diet and daily weights        Acute renal failure superimposed on stage 3b chronic kidney disease  Likely cardiorenal syndrome  Monitor renal function with daily labs especially while on diuretics   Nephrology follow up   Dose medications per GFR        UTI (urinary tract infection)  Continue empiric ceftriaxone for additional 2   days    Diabetic foot ulcer s/p debridement right foot with fat layer exposed  Continue daily wound care     Chronic combined systolic and diastolic heart failure  As above        Type II diabetes mellitus with neurological manifestations  Basal insulin with detemir at an adjusted dose   Low-dose insulin sliding scale  Gabapentin at a lower dose due to AD     Chronic Thrombocytopenia  Also patient has a history of heparin induced thrombocytopenia   Monitor platelets daily labs       VTE Risk Mitigation (From admission, onward)         Ordered     IP VTE HIGH RISK PATIENT  Once         06/13/23 0106     Place sequential compression device  Until discontinued         06/13/23 0106     Reason for No Pharmacological VTE Prophylaxis  Once        Question:  Reasons:  Answer:  Thrombocytopenia    06/13/23 0106                Discharge Planning   BILL: 6/19/2023     Code Status: Full Code   Is the patient medically ready for discharge?:     Reason for patient still in hospital (select all that apply): Treatment  Discharge Plan A: Home Health                  Haseeb Romero MD  Department of Hospital Medicine   [facility]   VITAL SIGNS: I have reviewed nursing notes and confirm.  CONSTITUTIONAL: Well-appearing, non-toxic, in NAD  SKIN: Warm dry, normal skin turgor  HEAD: NCAT  EYES: No conjunctival injection, scleral anicteric  ENT: Moist mucous membranes, normal pharynx with no erythema or exudates  NECK: Supple; full ROM. Nontender. No cervical LAD  CARD: RRR, no murmurs, rubs or gallops  RESP: Clear to ausculation bilaterally.  No rales, rhonchi, or wheezing.  ABD: Soft, non-distended, non-tender, no rebound or guarding. No CVA tenderness  EXT: Full ROM, no bony tenderness, no pedal edema, no calf tenderness  NEURO: Normal motor, normal sensory. CN II-XII grossly intact. No focal deficits noted  PSYCH: Confused

## 2024-01-02 NOTE — PATIENT INSTRUCTIONS
Simple Skin Ulcer  A skin ulcer is a sore on the skin. Skin ulcers often form when blood circulation is impaired. Being bed- or wheelchair-bound can cause pressure that may lead to skin ulcers. Ulcers are generally round areas of red, swollen, thickened skin around a crater-like depression. They are often very slow to heal. If a skin ulcer isn't properly treated, it may become infected. If the infection spreads, it can cause serious health issues.    Symptoms of a skin ulcer include:  Reddish area on the skin  Skin color and texture changes  Swelling  Wound that isn't healing  Crater in the skin  Pain  Drainage or pus    Causes  There are many causes of skin ulcers. Some of these include:  Decreased blood flow to a part of the skin, vascular insufficiency  Trauma  Lack of movement of a part of the body for long periods of time  Infection  Poor hygiene  Varicose veins  Vitamin deficiency    Pressure ulcers  Pressure ulcers are a type of ulcer most commonly seen in people who are confined to bed or a wheelchair. They are caused by prolonged pressure to a spot on the skin. Pressure ulcers usually occur on the back, buttocks, or backs or sides of the legs, arms, or feet (especially the heels).    Home care  You may be prescribed antibiotics to prevent infection. If this is the case, be sure to take all of the medicine, even if your symptoms get better. You may also be given medicines to help relieve pain. Follow the healthcare providers instructions when using these medicines.    General care  Care for the skin ulcer as instructed. Always wash your hands with soap and warm water before and after caring for your wound.  Cover the ulcer with a clean, dry bandage. Remove and change the bandage as instructed. If the bandage becomes wet or dirty, change it as soon as possible.  Follow the doctors instructions about washing. You can shower, but do not soak the healing ulcer until the doctor says its OK.  Do not scratch,  rub, or pick at the healing skin.  Check the area every day for signs of infection, such as increasing pain, redness, warmth, red streaking, swelling, or pus draining from the ulcer.  When resting, raise the area where the ulcer is above the level of the heart.  Avoid smoking or drinking alcohol, as these can delay wound healing.  If you are able, try to walk regularly. This can help with circulation.  Avoid prolonged standing or sitting in one position.  The following tips can help prevent future skin ulcers:  Know your risks for skin ulcers.  Keep the skin clean and dry.  Reposition frequently.  Use protective devices such as pillows, foam wedges, and heel protectors for the knees, ankles, and heels.  Avoid immobilization.    Follow-up care  Follow up with your healthcare provider, or as advised.    When to seek medical advice  Call your healthcare provider right away if any of these occur:  Fever of 100.4°F (38°C) or higher, or as advised by your healthcare provider  Signs of infection. These include increasing pain, warmth, redness, or pus draining from the skin ulcer.  Bleeding from the skin ulcer  Pain in or around the ulcer that doesn't get better even with medicines  Increased swelling  Changes in skin color    Date Last Reviewed: 9/1/2016  © 6783-4331 [employer]. [address] [place] [address], [place] [address]. All rights reserved. This information is not intended as a substitute for professional medical care. Always follow your healthcare professional's instructions.       Peripheral Neuropathy  Peripheral neuropathy is a condition that affects the nerves of the arms or legs. It causes a change in physical feeling. Sometimes it causes weakness in the muscles. You may feel tingling, numbness or shooting pains. Symptoms may be more common at night. Skin may be extra sensitive to light touch or temperature changes.  Neuropathy may be a complication of a chronic disease such as diabetes. A ruptured  disk with pressure on the spinal nerve may also lead to the problem. Certain vitamin deficiencies may lead to it. It may also be caused by exposure to certain drugs or chemicals.    Home care  Tell the healthcare provider about all medicines you take. This includes prescription and over-the-counter medicines, vitamins, and herbs. Ask if any of the medicines may be causing your problems. Do not make any changes to prescription medicines without talking to your healthcare provider first.  You may be prescribed medicines to help relieve the tingling feeling or for pain. Take all medicines as directed.  A numb hand or foot may be more prone to injury. To help protect it:  Always use oven mitts.  Test water with an unaffected hand or foot.  Use caution when trimming nails. File sharp areas.  Wear shoes that fit well to avoid pressure points, blisters, and ulcers.  Inspect your hands and feet carefully (including the soles of your feet and between your toes) at least once a week. If you see red areas, sores, or other problems, tell your healthcare provider.    Follow-up care  Follow up with your doctor or as advised by our staff. You may need further testing or evaluation.    When to seek medical advice  Call your healthcare provider right away if any of the following occur:  Redness, swelling, cracking, or ulcer on any numb area, especially the feet  New symptoms of numbness or muscle weakness numbness  Loss of bowel or bladder control  Slurred speech, confusion, or trouble speaking, walking, or seeing    Date Last Reviewed: 9/26/2015 © 2000-2017 [employer]. [address] [place] [address], [place] [address]. All rights reserved. This information is not intended as a substitute for professional medical care. Always follow your healthcare professional's instructions.       Your Diabetes Foot Care Program    Every day you depend on your feet to keep you moving. But when you have diabetes, your feet need special  care. Even a small foot problem can become very serious. So dont take your feet for granted. By working with your diabetes healthcare team, you can learn how to protect your feet and keep them healthy.  Evaluating your feet  An evaluation helps your healthcare provider check the condition of your feet. The evaluation includes a review of your diabetes history and overall health. It may also include a foot exam, X-rays, or other tests. These can help show problems beneath the skin that you cant see or feel.  Medical history  You will be asked about your overall health and any history of foot problems. Youll also discuss your diabetes history, such as whether your blood sugar level has changed over time. It also includes questions about sensations of pain, tingling, pins and needles, or numbness. Your healthcare provider will also want to know if you have high blood pressure and heart disease, or if you smoke. Be sure to mention any medicines (including over-the-counter), supplements, or herbal remedies you take.  Foot exam  A foot exam checks the condition of different parts of your foot. First, your skin and nails are examined for any signs of infection. Blood flow is checked by feeling for the pulses in each foot. You may also have tests to study the nerves in the foot. These include using a small filament (wire) to see how sensitive your feet are. In certain cases, you will be asked to walk a short distance to check for bone, joint, and muscle problems.  Diagnostic tests  If needed, your healthcare provider will suggest certain tests to learn more about your feet. These include:  Doppler tests to measure blood flow in the feet and lower leg.  X-rays, which can show bone or joint problems.  Other imaging tests, such as an MRI (magnetic resonance imaging), bone scan, and CT (computed tomography) scan. These can help show bone infections.  Other tests, such as vascular tests, which study the blood flow in your  feet and legs. You may also have nerve studies to learn how sensitive your feet are.  Creating a foot care program  Based on the evaluation, your healthcare provider will create a foot care program for you. Your program may be as simple as starting a daily self-care routine and changing the types of shoes your wear. It may also involve treating minor foot problems, such as a corn or blister. In some cases, surgery will be needed to treat an infection or mechanical problems, such as hammer toes.  Preventing problems  When you have diabetes, its easier to prevent problems than to treat them later on. So see your healthcare team for regular checkups and foot care. Your healthcare team can also help you learn more about caring for your feet at home. For example, you may be told to avoid walking barefoot. Or you may be told that special footwear is needed to protect your feet.  Have regular checkups  Foot problems can develop quickly. So be sure to follow your healthcare teams schedule for regular checkups. During office visits, take off your shoes and socks as soon as you get in the exam room. Ask your healthcare provider to examine your feet for problems. This will make it easier to find and treat small skin irritations before they get worse. Regular checkups can also help keep track of the blood flow and feeling in your feet. If you have neuropathy (lack of feeling in your feet), you will need to have checkups more often.  Learn about self-care  The more you know about diabetes and your feet, the easier it will be to prevent problems. Members of your healthcare team can teach you how to inspect your feet and teach you to look for warning signs. They can also give you other foot care tips. During office visits, be sure to ask any questions you have.  Date Last Reviewed: 7/1/2016  © 8541-7681 [employer]. [address] [place] [address], [place] [address]. All rights reserved. This information is not intended as  a substitute for professional medical care. Always follow your healthcare professional's instructions.

## 2024-01-11 ENCOUNTER — OFFICE VISIT (OUTPATIENT)
Dept: PODIATRY | Facility: CLINIC | Age: 64
End: 2024-01-11
Payer: MEDICARE

## 2024-01-11 VITALS — HEIGHT: 73 IN | WEIGHT: 261 LBS | BODY MASS INDEX: 34.59 KG/M2

## 2024-01-11 DIAGNOSIS — I73.9 PERIPHERAL VASCULAR DISEASE: ICD-10-CM

## 2024-01-11 DIAGNOSIS — R20.2 PARESTHESIA OF BOTH LOWER EXTREMITIES: ICD-10-CM

## 2024-01-11 DIAGNOSIS — L97.512 SKIN ULCER OF RIGHT FOOT WITH FAT LAYER EXPOSED: Primary | ICD-10-CM

## 2024-01-11 DIAGNOSIS — Z91.89 AT HIGH RISK FOR INADEQUATE NUTRITIONAL INTAKE: ICD-10-CM

## 2024-01-11 DIAGNOSIS — L97.512 ULCER OF RIGHT FOOT WITH FAT LAYER EXPOSED: ICD-10-CM

## 2024-01-11 DIAGNOSIS — L60.2 ONYCHOGRYPHOSIS: ICD-10-CM

## 2024-01-11 DIAGNOSIS — L60.2 OG (ONYCHOGRYPHOSIS): ICD-10-CM

## 2024-01-11 DIAGNOSIS — I73.9 PVD (PERIPHERAL VASCULAR DISEASE): ICD-10-CM

## 2024-01-11 DIAGNOSIS — E11.42 DIABETIC POLYNEUROPATHY ASSOCIATED WITH TYPE 2 DIABETES MELLITUS: ICD-10-CM

## 2024-01-11 DIAGNOSIS — I87.2 VENOUS STASIS DERMATITIS OF BOTH LOWER EXTREMITIES: ICD-10-CM

## 2024-01-11 DIAGNOSIS — E11.40 TYPE 2 DIABETES MELLITUS WITH DIABETIC NEUROPATHY, UNSPECIFIED WHETHER LONG TERM INSULIN USE: ICD-10-CM

## 2024-01-11 DIAGNOSIS — L97.509 TYPE 2 DIABETES MELLITUS WITH FOOT ULCER, UNSPECIFIED WHETHER LONG TERM INSULIN USE: ICD-10-CM

## 2024-01-11 DIAGNOSIS — M20.42 HAMMER TOES, BILATERAL: ICD-10-CM

## 2024-01-11 DIAGNOSIS — E11.49 TYPE II DIABETES MELLITUS WITH NEUROLOGICAL MANIFESTATIONS: ICD-10-CM

## 2024-01-11 DIAGNOSIS — E11.621 TYPE 2 DIABETES MELLITUS WITH FOOT ULCER, UNSPECIFIED WHETHER LONG TERM INSULIN USE: ICD-10-CM

## 2024-01-11 DIAGNOSIS — M20.41 HAMMER TOES, BILATERAL: ICD-10-CM

## 2024-01-11 DIAGNOSIS — E66.9 OBESITY, UNSPECIFIED CLASSIFICATION, UNSPECIFIED OBESITY TYPE, UNSPECIFIED WHETHER SERIOUS COMORBIDITY PRESENT: ICD-10-CM

## 2024-01-11 DIAGNOSIS — R26.2 DIFFICULTY IN WALKING, NOT ELSEWHERE CLASSIFIED: ICD-10-CM

## 2024-01-11 PROCEDURE — 1159F MED LIST DOCD IN RCRD: CPT | Mod: CPTII,S$GLB,, | Performed by: PODIATRIST

## 2024-01-11 PROCEDURE — 1160F RVW MEDS BY RX/DR IN RCRD: CPT | Mod: CPTII,S$GLB,, | Performed by: PODIATRIST

## 2024-01-11 PROCEDURE — 99214 OFFICE O/P EST MOD 30 MIN: CPT | Mod: S$GLB,,, | Performed by: PODIATRIST

## 2024-01-11 PROCEDURE — 99999 PR PBB SHADOW E&M-EST. PATIENT-LVL III: CPT | Mod: PBBFAC,,, | Performed by: PODIATRIST

## 2024-01-11 PROCEDURE — 3008F BODY MASS INDEX DOCD: CPT | Mod: CPTII,S$GLB,, | Performed by: PODIATRIST

## 2024-01-11 NOTE — PROGRESS NOTES
"  [address] [facility] [place]. [place] [address]  Phone: [phone]   [phone]    Patient's PCP:Michelle Messina FNP  Referring Provider: No ref. provider found    Subjective:      Chief Complaint:: Follow-up (Follow up on wound on lateral side of right foot), Diabetes Mellitus, Diabetes, Diabetic Foot Ulcer, Wound Check, Wound Care, Numbness, Peripheral Neuropathy, Poor Circulation, Non-healing Wound, Foot Ulcer, Pressure Ulcer, and Leg Swelling    Follow-up  Associated symptoms include numbness. Pertinent negatives include no abdominal pain, arthralgias, chest pain, chills, coughing, fatigue, fever, headaches, joint swelling, myalgias, nausea, neck pain, rash or weakness.     Ana Bullard is a 63 y.o. male who presents today with a follow up on wound to the lateral side of the right foot. Pt presents in normal; shoes with no dressing or pads.     Systemic Doctor: YRN Townsend  Date Last Seen: about 2 months ago pt reported  Blood Sugar: 117  Hemoglobin A1c: 6.2        Vitals:    01/11/24 1034   Weight: 118.4 kg (261 lb)   Height: 6' 1" (1.854 m)   PainSc:   3      Shoe Size: 12    Past Surgical History:   Procedure Laterality Date    CARDIAC PACEMAKER PLACEMENT  12/2012    CARDIAC PACEMAKER PLACEMENT  10/04/2018    battery replacement    CORONARY ARTERY BYPASS GRAFT  06/2012    ESOPHAGOGASTRODUODENOSCOPY  01/31/2017    SAMARA FILTER PLACEMENT  2012    vena cava    INSERTION, CATHETER, TUNNELED Right 10/16/2023    Procedure: Insertion,catheter,tunneled;  Surgeon: Terri Gresham MD;  Location: [facility] OR;  Service: Cardiovascular;  Laterality: Right;    LUMBAR SYMPATHETIC NERVE BLOCK Right 8/22/2019    Procedure: BLOCK, NERVE, SYMPATHETIC, LUMBAR;  Surgeon: Tashi Good MD;  Location: [facility] OR;  Service: Pain Management;  Laterality: Right;  RIGHT SYMPATHETIC NERVE BLOCK     Past Medical History:   Diagnosis Date    AICD (automatic cardioverter/defibrillator) present     Anemia, chronic " "disease 07/27/2021    Anemia, unspecified 07/27/2021    Anxiety and depression 2012    CAD (coronary artery disease) 2012    Cardiomegaly 2016    CHF (congestive heart failure) 2012    Cholecystitis 2017    Complete heart block 2012    Diabetic foot ulcer     DVT (deep venous thrombosis) 2012    And pulmonary embolus    GERD (gastroesophageal reflux disease) 2010    Heparin induced thrombocytopenia     Hyperlipemia 2011    IDDM (insulin dependent diabetes mellitus) 1983    With neuropathy    Ischemic cardiomyopathy 2012    MI (myocardial infarction) 2012    Morbid obesity     MRSA (methicillin resistant Staphylococcus aureus) infection 01/19/2019    Noncompliance with therapeutic plan 2019    Normochromic normocytic anemia 07/27/2021    Osteomyelitis of right foot 01/2019    Osteomyelitis of right foot 09/01/2022    Klebsiella from bone, GBS in blood    Pulmonary edema 2019    Respiratory failure 2019    Sepsis 01/2019    Due to cellulitis right leg    Sleep apnea 2013    Thrombocytopathy 2012    Venous stasis dermatitis of both lower extremities      Family History   Problem Relation Age of Onset    Arthritis Mother     Diabetes Mother     Cancer Father     Heart disease Father     Stroke Father         Social History:   Marital Status:   Alcohol History:  reports no history of alcohol use.  Tobacco History:  reports that he quit smoking about 12 years ago. His smoking use included cigarettes. He started smoking about 50 years ago. He has a 76.0 pack-year smoking history. He has never used smokeless tobacco.  Drug History:  reports no history of drug use.    Review of patient's allergies indicates:   Allergen Reactions    Vasopressin Anaphylaxis and Other (See Comments)     Increased pressure in his head; felt like his eyeballs and veins were going to pop out of his head.     Vasopressin analogues Other (See Comments) and Anaphylaxis     "felt like eyes going to pop out of my head"  Other reaction(s): Other " "(See Comments)  "felt like eyes going to pop out of my head"  Increased pressure in his head; felt like his eyeballs and veins were going to pop out of his head.     Heparin Other (See Comments)     Other reaction(s): "depleted my blood platets"    Decreased platelet count    Heparin analogues Other (See Comments)     Depleted WBC count    Morphine Hallucinations, Other (See Comments) and Anxiety     Other reaction(s): Hallucinations  Paranoid, hallucinations         Current Outpatient Medications   Medication Sig Dispense Refill    albuterol (PROVENTIL) 5 mg/mL nebulizer solution Take 2.5 mg by nebulization every 6 (six) hours as needed.      albuterol-ipratropium (DUO-NEB) 2.5 mg-0.5 mg/3 mL nebulizer solution Take 3 mLs by nebulization every 6 (six) hours as needed.      aspirin 81 MG Chew Take 1 tablet (81 mg total) by mouth once daily.  0    atorvastatin (LIPITOR) 20 MG tablet Take 20 mg by mouth once daily.      BASAGLAR KWIKPEN U-100 INSULIN glargine 100 units/mL (3mL) SubQ pen Inject 25 Units into the skin once daily.  3    carvediloL (COREG) 3.125 MG tablet Take 3.125 mg by mouth 2 (two) times daily.      FARXIGA 10 mg tablet Take 10 mg by mouth once daily.      gabapentin (NEURONTIN) 600 MG tablet Take 600 mg by mouth 3 (three) times daily.      levothyroxine (SYNTHROID) 25 MCG tablet Take 1 tablet (25 mcg total) by mouth before breakfast. 90 tablet 0    midodrine (PROAMATINE) 5 MG Tab Take 1 tablet (5 mg total) by mouth 3 (three) times daily before meals. 90 tablet 0    pantoprazole (PROTONIX) 40 MG tablet Take 1 tablet by mouth once daily.  0    torsemide (DEMADEX) 20 MG Tab Take 1 tablet (20 mg total) by mouth 2 (two) times a day. 120 tablet 0    traMADoL (ULTRAM) 50 mg tablet Take 1 tablet (50 mg total) by mouth every 8 (eight) hours as needed for Pain. 90 tablet 2    vitamin B complex-folic acid 0.4 mg Tab Take 1 tablet by mouth once daily.       No current facility-administered medications for this " visit.       Review of Systems   Constitutional:  Negative for chills, fatigue, fever and unexpected weight change.   HENT:  Negative for hearing loss and trouble swallowing.    Eyes:  Negative for photophobia and visual disturbance.   Respiratory:  Negative for cough, shortness of breath and wheezing.    Cardiovascular:  Negative for chest pain, palpitations and leg swelling.   Gastrointestinal:  Negative for abdominal pain and nausea.   Genitourinary:  Negative for dysuria and frequency.   Musculoskeletal:  Negative for arthralgias, back pain, gait problem, joint swelling, myalgias and neck pain.   Skin:  Positive for color change and wound. Negative for rash.   Neurological:  Positive for numbness. Negative for tremors, seizures, weakness and headaches.   Hematological:  Does not bruise/bleed easily.         Objective:        Physical Exam:   Foot Exam  Physical Exam  Ortho/SPM Exam   Physical examination: General: Pt. is well-developed, well-nourished, appears stated age, in no acute distress, alert and oriented x 3.     Vascular: Dorsalis pedis and posterior tibial pulses are 1/4  Bilaterally. Toes are warm to touch. Feet are warm proximally.     There is decreased digital hair. Skin is atrophic, slightly hyperpigmented, and mildly edematous. Capillary refill less than 5 seconds all toes/distal feet     Neurologic: Schoolcraft-Latrell 5.07 monofilament is decreased bilateral feet. Sharp/dull sensation absent Bilateral feet.     Vibratory sensation absent bilateral     Musculoskeletal: adequate joint range of motion without pain, limitation, nor crepitation Bilateral feet and ankle joints. Muscle strength is 5/5 in all groups bilaterally.     Lymphatics: no lymphangitic streaking bilaterally.     Dermatologic: Elongated, thickened  Imaging:            Assessment:       1. Skin ulcer of right foot with fat layer exposed    2. Type II diabetes mellitus with neurological manifestations    3. Ulcer of right foot with  fat layer exposed    4. Type 2 diabetes mellitus with foot ulcer, unspecified whether long term insulin use    5. OG (onychogryphosis)    6. Type 2 diabetes mellitus with diabetic neuropathy, unspecified whether long term insulin use    7. Venous stasis dermatitis of both lower extremities    8. PVD (peripheral vascular disease)    9. At high risk for inadequate nutritional intake    10. Paresthesia of both lower extremities    11. Onychogryphosis    12. Obesity, unspecified classification, unspecified obesity type, unspecified whether serious comorbidity present    13. Hammer toes, bilateral    14. Difficulty in walking, not elsewhere classified    15. Diabetic polyneuropathy associated with type 2 diabetes mellitus    16. Peripheral vascular disease      Plan:   Skin ulcer of right foot with fat layer exposed    Type II diabetes mellitus with neurological manifestations    Ulcer of right foot with fat layer exposed    Type 2 diabetes mellitus with foot ulcer, unspecified whether long term insulin use    OG (onychogryphosis)    Type 2 diabetes mellitus with diabetic neuropathy, unspecified whether long term insulin use    Venous stasis dermatitis of both lower extremities    PVD (peripheral vascular disease)    At high risk for inadequate nutritional intake    Paresthesia of both lower extremities    Onychogryphosis    Obesity, unspecified classification, unspecified obesity type, unspecified whether serious comorbidity present    Hammer toes, bilateral    Difficulty in walking, not elsewhere classified    Diabetic polyneuropathy associated with type 2 diabetes mellitus    Peripheral vascular disease      No follow-ups on file.    Procedures          Counseling:     I provided patient education verbally regarding:   Patient diagnosis, treatment options, as well as alternatives, risks, and benefits.     This note was created using Dragon voice recognition software that occasionally misinterpreted phrases or words.                Recommend continuing to wear corn pads over healed ulcer area to help relieve some pressure and friction when walking.        Counseling/Education:  I provided patient education verbally regarding:   The aspects of diabetes and how it pertains to the feet. I explained the importance of proper diabetic foot care and how it is essential for the health of their feet.     I discussed the importance of knowing their Hemoglobin A1c and that the level needs to be as close to 6 as possible. I discussed the increase complications of high blood sugar including stroke, blindness, heart attack, kidney failure and loss of limb secondary to neuropathy and PVD.      With neuropathy, beware of any breaks in the skin or redness. These areas are not recognized early due to the numbness.     I discussed Diabetes, lower back issues, metabolic disorders, systemic causes, chemotherapy, vitamin deficiency, heavy metal exposure, as some of the causes. I also explained that as much as 40% of the time we can not find a cause. I discussed different treatments available to control the symptoms but which may not cure the problem.         Counseled patient on the aspects of diabetes and how it pertains to the feet.  I explained the importance of proper diabetic foot care and how it is essential for the health of their feet.        Shoe inspection. Patient instructed on proper foot hygeine. We discussed wearing proper shoe gear, daily foot inspections, never walking without protective shoe gear, never putting sharp instruments to feet, routine podiatric nail visits every 2-3 months.       Patient should call the office immediately if any signs of infection, such as fever, chills, sweats, increased redness or pain.     Patient was instructed to call the clinic or go to the emergency department if their symptoms do not improve, worsens, or if new symptoms develop.  Patient was advised that if any increased swelling, pain, or numbness  arise to go immediately to the ED. Patient knows to call any time if an emergency arises. Shared decision making occurred and patient verbalized understanding in agreement with this plan.      I counseled the patient on their conditions, their implications and medical management.     >50% of this > 60 minute visit was spent face to face educating/counseling the patient     I spent a total of 60 minutes on the day of the visit.This includes face to face time and non-face to face time preparing to see the patient (eg, review of tests), obtaining and/or reviewing separately obtained history, documenting clinical information in the electronic or other health record, independently interpreting results and communicating results to the patient/family/caregiver, or care coordinator

## 2024-01-23 ENCOUNTER — OFFICE VISIT (OUTPATIENT)
Dept: PAIN MEDICINE | Facility: CLINIC | Age: 64
End: 2024-01-23
Payer: MEDICARE

## 2024-01-23 VITALS
HEIGHT: 73 IN | BODY MASS INDEX: 34.59 KG/M2 | SYSTOLIC BLOOD PRESSURE: 122 MMHG | HEART RATE: 63 BPM | DIASTOLIC BLOOD PRESSURE: 70 MMHG | WEIGHT: 261 LBS

## 2024-01-23 DIAGNOSIS — G89.4 CHRONIC PAIN DISORDER: ICD-10-CM

## 2024-01-23 DIAGNOSIS — M79.671 BILATERAL FOOT PAIN: ICD-10-CM

## 2024-01-23 DIAGNOSIS — M79.672 BILATERAL FOOT PAIN: ICD-10-CM

## 2024-01-23 DIAGNOSIS — E11.42 DIABETIC POLYNEUROPATHY ASSOCIATED WITH TYPE 2 DIABETES MELLITUS: Primary | ICD-10-CM

## 2024-01-23 PROCEDURE — 3074F SYST BP LT 130 MM HG: CPT | Mod: CPTII,S$GLB,, | Performed by: PHYSICIAN ASSISTANT

## 2024-01-23 PROCEDURE — 3078F DIAST BP <80 MM HG: CPT | Mod: CPTII,S$GLB,, | Performed by: PHYSICIAN ASSISTANT

## 2024-01-23 PROCEDURE — 1159F MED LIST DOCD IN RCRD: CPT | Mod: CPTII,S$GLB,, | Performed by: PHYSICIAN ASSISTANT

## 2024-01-23 PROCEDURE — 99999 PR PBB SHADOW E&M-EST. PATIENT-LVL III: CPT | Mod: PBBFAC,,, | Performed by: PHYSICIAN ASSISTANT

## 2024-01-23 PROCEDURE — 99214 OFFICE O/P EST MOD 30 MIN: CPT | Mod: S$GLB,,, | Performed by: PHYSICIAN ASSISTANT

## 2024-01-23 PROCEDURE — 3008F BODY MASS INDEX DOCD: CPT | Mod: CPTII,S$GLB,, | Performed by: PHYSICIAN ASSISTANT

## 2024-01-23 RX ORDER — TRAMADOL HYDROCHLORIDE 50 MG/1
50 TABLET ORAL EVERY 8 HOURS PRN
Qty: 90 TABLET | Refills: 2 | Status: SHIPPED | OUTPATIENT
Start: 2024-01-23 | End: 2024-03-24

## 2024-01-23 NOTE — PROGRESS NOTES
Referring Physician: No ref. provider found    PCP: Michelle Messina FNP      CC:  Bilateral foot pain    Interval history:  Mr. Bullard is a 63 y.o. male with bilateral foot pain due to peripheral neuropathy diabetic neuropathy. Hospitalized for atrial tachycardia and is currently on temporary dialysis. Foot pain remains tolerable. Stabbing pains resolved. He continues to take Gabapentin 600 mg t.i.d. with mild benefits. Nortriptyline 25 mg made him to drowsy so he discontinued it.  He continues to take tramadol 50 mg q.8 hours as needed with mild-to-moderate benefit.  Denies any side effects with the medication.  Able perform his ADLs with the medication.  He underwent a right-sided lumbar sympathetic nerve block which provided moderate benefit that was shortlived. Currently on antibiotics.  He developed bilateral knee pain. Right intra articular knee injection with benefit. He denies any worsening weakness.  No bowel bladder changes. Pain today is rated 2/10.    Prior HPI:   Ana Bullard is a 63 y.o. male referred to us for bilateral foot pain. Patient with long history of diabetes.  He states neuropathy worsen after his CABG.  Bilateral foot pain has worsened over past 7 years.  He presents to us constant burning, sharp pain in his bilateral feet.  He also has some similar issues in his bilateral hands, but foot pain is worse.  Pain worsens prolonged sitting and standing.  He currently takes gabapentin 600 mg t.i.d. with mild benefits.  Increasing doses causes sedation.  He also takes tramadol q.8 hours as needed with mild-to-moderate benefit.  He denies any worsening weakness.  No bowel bladder changes.    Interventional history:  Right LSB on 09/2019 with 50% relief for 2 weeks.    ROS:  CONSTITUTIONAL: No fevers, chills, night sweats, wt. loss, appetite changes  SKIN: no rashes or itching  ENT: No headaches, head trauma, vision changes, or eye pain  LYMPH NODES: None noticed   CV: No chest pain,  palpitations.   RESP: No shortness of breath, dyspnea on exertion, wheezing, or hemoptysis. + cough  GI: No nausea, emesis, diarrhea, constipation, melena, hematochezia, pain.    : No dysuria, hematuria, urgency, or frequency   HEME: No easy bruising, bleeding problems  PSYCHIATRIC: No depression, anxiety, psychosis, hallucinations.  NEURO: No seizures, memory loss, dizziness or difficulty sleeping  MSK:  Positive HPI      Past Medical History:   Diagnosis Date    AICD (automatic cardioverter/defibrillator) present     Anemia, chronic disease 07/27/2021    Anemia, unspecified 07/27/2021    Anxiety and depression 2012    CAD (coronary artery disease) 2012    Cardiomegaly 2016    CHF (congestive heart failure) 2012    Cholecystitis 2017    Complete heart block 2012    Diabetic foot ulcer     DVT (deep venous thrombosis) 2012    And pulmonary embolus    GERD (gastroesophageal reflux disease) 2010    Heparin induced thrombocytopenia     Hyperlipemia 2011    IDDM (insulin dependent diabetes mellitus) 1983    With neuropathy    Ischemic cardiomyopathy 2012    MI (myocardial infarction) 2012    Morbid obesity     MRSA (methicillin resistant Staphylococcus aureus) infection 01/19/2019    Noncompliance with therapeutic plan 2019    Normochromic normocytic anemia 07/27/2021    Osteomyelitis of right foot 01/2019    Osteomyelitis of right foot 09/01/2022    Klebsiella from bone, GBS in blood    Pulmonary edema 2019    Respiratory failure 2019    Sepsis 01/2019    Due to cellulitis right leg    Sleep apnea 2013    Thrombocytopathy 2012    Venous stasis dermatitis of both lower extremities      Past Surgical History:   Procedure Laterality Date    CARDIAC PACEMAKER PLACEMENT  12/2012    CARDIAC PACEMAKER PLACEMENT  10/04/2018    battery replacement    CORONARY ARTERY BYPASS GRAFT  06/2012    ESOPHAGOGASTRODUODENOSCOPY  01/31/2017    SAMARA FILTER PLACEMENT  2012    vena cava    INSERTION, CATHETER, TUNNELED Right  "10/16/2023    Procedure: Insertion,catheter,tunneled;  Surgeon: Terri Gresham MD;  Location: [facility] OR;  Service: Cardiovascular;  Laterality: Right;    LUMBAR SYMPATHETIC NERVE BLOCK Right 2019    Procedure: BLOCK, NERVE, SYMPATHETIC, LUMBAR;  Surgeon: Tashi Good MD;  Location: [facility] OR;  Service: Pain Management;  Laterality: Right;  RIGHT SYMPATHETIC NERVE BLOCK     Family History   Problem Relation Age of Onset    Arthritis Mother     Diabetes Mother     Cancer Father     Heart disease Father     Stroke Father      Social History     Socioeconomic History    Marital status:    Tobacco Use    Smoking status: Former     Current packs/day: 0.00     Average packs/day: 2.0 packs/day for 38.0 years (76.0 ttl pk-yrs)     Types: Cigarettes     Start date:      Quit date:      Years since quittin.0    Smokeless tobacco: Never   Substance and Sexual Activity    Alcohol use: No    Drug use: Never    Sexual activity: Never         Medications/Allergies: See med card    Vitals:    24 0848   BP: 122/70   Pulse: 63   Weight: 118.4 kg (261 lb)   Height: 6' 1" (1.854 m)   PainSc:   2   PainLoc: Foot         GENERAL: A and O x3, the patient appears well groomed and is in no acute distress.  Skin: No rashes or obvious lesions  HEENT: normocephalic, atraumatic  CARDIOVASCULAR:  RRR  LUNGS: non labored breathing  ABDOMEN: soft, nontender   UPPER EXTREMITIES: Normal alignment, normal range of motion, no atrophy, no skin changes,  hair growth and nail growth normal and equal bilaterally. No swelling, no tenderness.    LOWER EXTREMITIES:  Normal alignment, no atrophy, no skin changes,  hair growth and nail growth normal and equal bilaterally. No swelling. Tenderness to right lateral patella.   LUMBAR SPINE  Lumbar spine: ROM is limited with flexion extension and oblique extension with moderate increased pain.    Samuel's test causes no increased pain on either side.    Supine straight leg raise is " negative bilaterally.    Internal and external rotation of the hip causes no increased pain on either side.  Myofascial exam: No tenderness to palpation across lumbar paraspinous muscles.        MENTAL STATUS: normal orientation, speech, language, and fund of knowledge for social situation.  Emotional state appropriate.     CRANIAL NERVES:  II:         PERRL bilaterally,   III,IV,VI: EOMI.    V:         Facial sensation equal bilaterally  VII:       Facial motor function normal.  VIII:      Hearing equal to finger rub bilaterally  IX/X:    Gag normal, palate symmetric  XI:        Shoulder shrug equal, head turn equal  XII:       Tongue midline without fasciculations        MOTOR: Tone and bulk: normal bilateral upper and lower Strength: normal   Delt      Bi         Tri        WE      WF                        R          5          5          5          5          5          5            L          5          5          5          5          5          5               IP         ADD     ABD     Quad   TA        Gas      HAM  R          5          5          5          5          5          5          5  L          5          5          5          5          5          5          5     SENSATION:  Decreased globally of bilateral feet  REFLEXES: normal, symmetric, nonbrisk.  Toes down, no clonus. No hoffmans.  GAIT: normal rise, base, steps, and arm swing.       Imaging:  None    Assessment:  Ana Bullard is a 63 y.o. male with bilateral foot pain  1. Diabetic polyneuropathy associated with type 2 diabetes mellitus    2. Bilateral foot pain    3. Chronic pain disorder        Plan:  1. I have stressed the importance of physical activity and exercise to improve overall health  2.  Discussed disease progression of peripheral neuropathy.  Continue gabapentin as prescribed by PCP.  Consider nortriptyline 10 mg in the future  3.  He does request continue tramadol with us.  I believe this is very reasonable.    reviewed.  He takes tramadol 50 mg q.8 hours as needed.  Previous UDS consistent.   4.  May consider repeat lumbar sympathetic block if pain is exacerbated.  Briefly discussed spinal cord stimulation as well.  5. Follow-up in 3 month  Medication management provided by  Dr. Good

## 2024-02-06 ENCOUNTER — TELEPHONE (OUTPATIENT)
Dept: TRANSPLANT | Facility: CLINIC | Age: 64
End: 2024-02-06
Payer: MEDICARE

## 2024-02-07 NOTE — HOSPITAL COURSE
63-year-male with multiple medical comorbidities including but not limited to chronic combined CHF, type 2 DM, CAD, thrombocytopenia, chronic diabetic foot ulcer and CKD stage 3b,being managed for UTI  was admitted with generalized weakness and tachycardia.     He was managed for complicated UTI at outside hospital for 2 days.  He was discharged on oral antibiotics   Initial rhythm on presentation was wide complex tachycardia.  Wife reports that he was not given his amiodarone when he was hospitalized.     In the ED here, after consultation with Cardiology patient underwent successful synchronized cardioversion with restoration of normal sinus rhythm.   He is  being compliant with diuretics but whole body anasarca noted.. CT abdomen was one with small volume ascites and appear interstitial fluid .    He is currently being followed by Podiatry for chronic right foot ulcer and receiving local wound care and podiatry consulted and bedside debridement was done and doing well.    Started high dose IV lasix and dobutamine drip and developed AD and stopped but renal function not worsen. Later amio Gtt changed to PO and DC with BID instead of daily as per cardiology recommendation and lisinopril was held because of renal failure   Patient prognosis is very poor and d/w nephrology and given torsemide 20 mg bid and DC lasix .  He is to follow up with nephrology in less than 1 week and repeat renal panel ordered to follow up in 3 days .             
None

## 2024-02-08 ENCOUNTER — OFFICE VISIT (OUTPATIENT)
Dept: PODIATRY | Facility: CLINIC | Age: 64
End: 2024-02-08
Payer: MEDICARE

## 2024-02-08 VITALS
HEIGHT: 73 IN | BODY MASS INDEX: 34.65 KG/M2 | WEIGHT: 261.44 LBS | RESPIRATION RATE: 18 BRPM | HEART RATE: 57 BPM | OXYGEN SATURATION: 98 %

## 2024-02-08 DIAGNOSIS — L03.119 CELLULITIS AND ABSCESS OF FOOT: ICD-10-CM

## 2024-02-08 DIAGNOSIS — I73.9 PVD (PERIPHERAL VASCULAR DISEASE): ICD-10-CM

## 2024-02-08 DIAGNOSIS — E11.49 TYPE II DIABETES MELLITUS WITH NEUROLOGICAL MANIFESTATIONS: ICD-10-CM

## 2024-02-08 DIAGNOSIS — L97.514 ULCER OF RIGHT FOOT WITH NECROSIS OF BONE: ICD-10-CM

## 2024-02-08 DIAGNOSIS — R26.2 DIFFICULTY IN WALKING, NOT ELSEWHERE CLASSIFIED: ICD-10-CM

## 2024-02-08 DIAGNOSIS — I87.2 VENOUS STASIS DERMATITIS OF BOTH LOWER EXTREMITIES: ICD-10-CM

## 2024-02-08 DIAGNOSIS — L97.912 NON-PRESSURE CHRONIC ULCER OF RIGHT LOWER LEG WITH FAT LAYER EXPOSED: ICD-10-CM

## 2024-02-08 DIAGNOSIS — E11.42 DIABETIC POLYNEUROPATHY ASSOCIATED WITH TYPE 2 DIABETES MELLITUS: ICD-10-CM

## 2024-02-08 DIAGNOSIS — L02.619 CELLULITIS AND ABSCESS OF FOOT: ICD-10-CM

## 2024-02-08 DIAGNOSIS — Z91.89 AT HIGH RISK FOR INADEQUATE NUTRITIONAL INTAKE: ICD-10-CM

## 2024-02-08 DIAGNOSIS — L97.922 NON-PRESSURE CHRONIC ULCER OF LEFT LOWER LEG WITH FAT LAYER EXPOSED: ICD-10-CM

## 2024-02-08 DIAGNOSIS — L97.513 ULCER OF RIGHT FOOT WITH NECROSIS OF MUSCLE: ICD-10-CM

## 2024-02-08 DIAGNOSIS — M86.60 CHRONIC OSTEOMYELITIS: ICD-10-CM

## 2024-02-08 DIAGNOSIS — E11.621 TYPE 2 DIABETES MELLITUS WITH FOOT ULCER, UNSPECIFIED WHETHER LONG TERM INSULIN USE: ICD-10-CM

## 2024-02-08 DIAGNOSIS — E66.9 OBESITY, UNSPECIFIED CLASSIFICATION, UNSPECIFIED OBESITY TYPE, UNSPECIFIED WHETHER SERIOUS COMORBIDITY PRESENT: ICD-10-CM

## 2024-02-08 DIAGNOSIS — L08.9 DIABETIC FOOT INFECTION: ICD-10-CM

## 2024-02-08 DIAGNOSIS — L97.511 SKIN ULCER OF RIGHT FOOT, LIMITED TO BREAKDOWN OF SKIN: ICD-10-CM

## 2024-02-08 DIAGNOSIS — L97.512 ULCER OF RIGHT FOOT WITH FAT LAYER EXPOSED: ICD-10-CM

## 2024-02-08 DIAGNOSIS — L60.2 OG (ONYCHOGRYPHOSIS): ICD-10-CM

## 2024-02-08 DIAGNOSIS — M20.41 HAMMER TOES, BILATERAL: ICD-10-CM

## 2024-02-08 DIAGNOSIS — R20.2 PARESTHESIA OF BOTH LOWER EXTREMITIES: ICD-10-CM

## 2024-02-08 DIAGNOSIS — M20.42 HAMMER TOES, BILATERAL: ICD-10-CM

## 2024-02-08 DIAGNOSIS — I73.9 PERIPHERAL VASCULAR DISEASE: ICD-10-CM

## 2024-02-08 DIAGNOSIS — E11.40 TYPE 2 DIABETES MELLITUS WITH DIABETIC NEUROPATHY, UNSPECIFIED WHETHER LONG TERM INSULIN USE: ICD-10-CM

## 2024-02-08 DIAGNOSIS — L97.321 ANKLE ULCER, LEFT, LIMITED TO BREAKDOWN OF SKIN: ICD-10-CM

## 2024-02-08 DIAGNOSIS — L60.2 ONYCHOGRYPHOSIS: ICD-10-CM

## 2024-02-08 DIAGNOSIS — L97.512 SKIN ULCER OF RIGHT FOOT WITH FAT LAYER EXPOSED: ICD-10-CM

## 2024-02-08 DIAGNOSIS — L97.509 TYPE 2 DIABETES MELLITUS WITH FOOT ULCER, UNSPECIFIED WHETHER LONG TERM INSULIN USE: ICD-10-CM

## 2024-02-08 DIAGNOSIS — M86.8X7 OTHER OSTEOMYELITIS OF RIGHT FOOT: ICD-10-CM

## 2024-02-08 DIAGNOSIS — L97.511 ULCER OF RIGHT FOOT, LIMITED TO BREAKDOWN OF SKIN: Primary | ICD-10-CM

## 2024-02-08 DIAGNOSIS — E11.628 DIABETIC FOOT INFECTION: ICD-10-CM

## 2024-02-08 DIAGNOSIS — Z09 HOSPITAL DISCHARGE FOLLOW-UP: ICD-10-CM

## 2024-02-08 PROCEDURE — 99214 OFFICE O/P EST MOD 30 MIN: CPT | Mod: S$GLB,,, | Performed by: PODIATRIST

## 2024-02-08 PROCEDURE — 1159F MED LIST DOCD IN RCRD: CPT | Mod: CPTII,S$GLB,, | Performed by: PODIATRIST

## 2024-02-08 PROCEDURE — 3008F BODY MASS INDEX DOCD: CPT | Mod: CPTII,S$GLB,, | Performed by: PODIATRIST

## 2024-02-08 PROCEDURE — 99999 PR PBB SHADOW E&M-EST. PATIENT-LVL IV: CPT | Mod: PBBFAC,,, | Performed by: PODIATRIST

## 2024-02-08 PROCEDURE — 1160F RVW MEDS BY RX/DR IN RCRD: CPT | Mod: CPTII,S$GLB,, | Performed by: PODIATRIST

## 2024-02-08 NOTE — PROGRESS NOTES
"  [address] [facility] [address] [place] [address]  Phone: [phone]   [phone]    Patient's PCP:Michelle Messina FNP  Referring Provider: No ref. provider found    Subjective:      Chief Complaint:: Diabetes Mellitus (Skin ulcer of right foot with fat layer exposed), Diabetes, Difficulty Walking, Peripheral Neuropathy, Numbness, Wound Check, Wound Care, Pressure Ulcer, Non-healing Wound, Foot Ulcer, and Diabetic Foot Ulcer    HPI  Ana Bullard is a 63 y.o. male who presents today with a follow up of Skin ulcer of right foot with fat layer exposed on right foot.Patient states the symptoms have improved since last visit . Probable cause of complaint friction or pressure.  The symptoms are aggravated by none. The problem has improved. Treatment to date have included wound care  which provided complete resolution.     Systemic Doctor: YRN Townsend  Date Last Seen: 11/14/2023  Blood Sugar: 118  Hemoglobin A1c: 6.2        Vitals:    02/08/24 0842   Pulse: (!) 57   Resp: 18   SpO2: 98%   Weight: 118.6 kg (261 lb 7.5 oz)   Height: 6' 1" (1.854 m)   PainSc: 0-No pain      Shoe Size: 12    Past Surgical History:   Procedure Laterality Date    CARDIAC PACEMAKER PLACEMENT  12/2012    CARDIAC PACEMAKER PLACEMENT  10/04/2018    battery replacement    CORONARY ARTERY BYPASS GRAFT  06/2012    ESOPHAGOGASTRODUODENOSCOPY  01/31/2017    SAMARA FILTER PLACEMENT  2012    vena cava    INSERTION, CATHETER, TUNNELED Right 10/16/2023    Procedure: Insertion,catheter,tunneled;  Surgeon: Terri Gresham MD;  Location: [facility] OR;  Service: Cardiovascular;  Laterality: Right;    LUMBAR SYMPATHETIC NERVE BLOCK Right 8/22/2019    Procedure: BLOCK, NERVE, SYMPATHETIC, LUMBAR;  Surgeon: Tashi Good MD;  Location: [facility] OR;  Service: Pain Management;  Laterality: Right;  RIGHT SYMPATHETIC NERVE BLOCK     Past Medical History:   Diagnosis Date    AICD (automatic cardioverter/defibrillator) present     Anemia, chronic disease " "07/27/2021    Anemia, unspecified 07/27/2021    Anxiety and depression 2012    CAD (coronary artery disease) 2012    Cardiomegaly 2016    CHF (congestive heart failure) 2012    Cholecystitis 2017    Complete heart block 2012    Diabetic foot ulcer     DVT (deep venous thrombosis) 2012    And pulmonary embolus    GERD (gastroesophageal reflux disease) 2010    Heparin induced thrombocytopenia     Hyperlipemia 2011    IDDM (insulin dependent diabetes mellitus) 1983    With neuropathy    Ischemic cardiomyopathy 2012    MI (myocardial infarction) 2012    Morbid obesity     MRSA (methicillin resistant Staphylococcus aureus) infection 01/19/2019    Noncompliance with therapeutic plan 2019    Normochromic normocytic anemia 07/27/2021    Osteomyelitis of right foot 01/2019    Osteomyelitis of right foot 09/01/2022    Klebsiella from bone, GBS in blood    Pulmonary edema 2019    Respiratory failure 2019    Sepsis 01/2019    Due to cellulitis right leg    Sleep apnea 2013    Thrombocytopathy 2012    Venous stasis dermatitis of both lower extremities      Family History   Problem Relation Age of Onset    Arthritis Mother     Diabetes Mother     Cancer Father     Heart disease Father     Stroke Father         Social History:   Marital Status:   Alcohol History:  reports no history of alcohol use.  Tobacco History:  reports that he quit smoking about 12 years ago. His smoking use included cigarettes. He started smoking about 50 years ago. He has a 76.0 pack-year smoking history. He has never used smokeless tobacco.  Drug History:  reports no history of drug use.    Review of patient's allergies indicates:   Allergen Reactions    Vasopressin Anaphylaxis and Other (See Comments)     Increased pressure in his head; felt like his eyeballs and veins were going to pop out of his head.     Vasopressin analogues Other (See Comments) and Anaphylaxis     "felt like eyes going to pop out of my head"  Other reaction(s): Other (See " "Comments)  "felt like eyes going to pop out of my head"  Increased pressure in his head; felt like his eyeballs and veins were going to pop out of his head.     Heparin Other (See Comments)     Other reaction(s): "depleted my blood platets"    Decreased platelet count    Heparin analogues Other (See Comments)     Depleted WBC count    Morphine Hallucinations, Other (See Comments) and Anxiety     Other reaction(s): Hallucinations  Paranoid, hallucinations         Current Outpatient Medications   Medication Sig Dispense Refill    albuterol (PROVENTIL) 5 mg/mL nebulizer solution Take 2.5 mg by nebulization every 6 (six) hours as needed.      albuterol-ipratropium (DUO-NEB) 2.5 mg-0.5 mg/3 mL nebulizer solution Take 3 mLs by nebulization every 6 (six) hours as needed.      aspirin 81 MG Chew Take 1 tablet (81 mg total) by mouth once daily.  0    atorvastatin (LIPITOR) 20 MG tablet Take 20 mg by mouth once daily.      carvediloL (COREG) 3.125 MG tablet Take 3.125 mg by mouth 2 (two) times daily.      FARXIGA 10 mg tablet Take 10 mg by mouth once daily.      gabapentin (NEURONTIN) 600 MG tablet Take 600 mg by mouth 3 (three) times daily.      midodrine (PROAMATINE) 5 MG Tab Take 1 tablet (5 mg total) by mouth 3 (three) times daily before meals. 90 tablet 0    pantoprazole (PROTONIX) 40 MG tablet Take 1 tablet by mouth once daily.  0    torsemide (DEMADEX) 20 MG Tab Take 1 tablet (20 mg total) by mouth 2 (two) times a day. 120 tablet 0    traMADoL (ULTRAM) 50 mg tablet Take 1 tablet (50 mg total) by mouth every 8 (eight) hours as needed for Pain. 90 tablet 2    vitamin B complex-folic acid 0.4 mg Tab Take 1 tablet by mouth once daily.      BASAGLAR KWIKPEN U-100 INSULIN glargine 100 units/mL (3mL) SubQ pen Inject 25 Units into the skin once daily.  3    levothyroxine (SYNTHROID) 25 MCG tablet Take 1 tablet (25 mcg total) by mouth before breakfast. 90 tablet 0     No current facility-administered medications for this visit. "       Review of Systems   Constitutional:  Negative for chills, fatigue, fever and unexpected weight change.   HENT:  Negative for hearing loss and trouble swallowing.    Eyes:  Negative for photophobia and visual disturbance.   Respiratory:  Negative for cough, shortness of breath and wheezing.    Cardiovascular:  Negative for chest pain, palpitations and leg swelling.   Gastrointestinal:  Negative for abdominal pain and nausea.   Genitourinary:  Negative for dysuria and frequency.   Musculoskeletal:  Negative for arthralgias, back pain, gait problem, joint swelling, myalgias and neck pain.   Skin:  Positive for color change. Negative for rash and wound.   Neurological:  Positive for numbness. Negative for tremors, seizures, weakness and headaches.   Hematological:  Does not bruise/bleed easily.         Objective:        Physical Exam:   Foot Exam  Physical Exam  Ortho/SPM Exam   Patient is healed.  No openings.  No signs of infection.    Physical examination: General: Pt. is well-developed, well-nourished, appears stated age, in no acute distress, alert and oriented x 3.     Vascular: Dorsalis pedis and posterior tibial pulses are 1/4  Bilaterally. Toes are warm to touch. Feet are warm proximally.     There is decreased digital hair. Skin is atrophic, slightly hyperpigmented, and mildly edematous. Capillary refill less than 5 seconds all toes/distal feet     Neurologic: [place] 5.07 monofilament is decreased bilateral feet. Sharp/dull sensation absent Bilateral feet.     Vibratory sensation absent bilateral     Musculoskeletal: adequate joint range of motion without pain, limitation, nor crepitation Bilateral feet and ankle joints. Muscle strength is 5/5 in all groups bilaterally.     Lymphatics: no lymphangitic streaking bilaterally.     Dermatologic: Elongated, thickened  Imaging:            Assessment:       1. Ulcer of right foot, limited to breakdown of skin - Right Foot    2. Non-pressure chronic  ulcer of left lower leg with fat layer exposed    3. Other osteomyelitis of right foot    4. Ulcer of right foot with necrosis of bone    5. Peripheral vascular disease    6. Hammer toes, bilateral    7. Paresthesia of both lower extremities    8. Type 2 diabetes mellitus with foot ulcer, unspecified whether long term insulin use    9. Venous stasis dermatitis of both lower extremities    10. Skin ulcer of right foot with fat layer exposed    11. Ulcer of right foot with necrosis of muscle    12. Chronic osteomyelitis    13. Diabetic foot infection    14. Hospital discharge follow-up    15. Difficulty in walking, not elsewhere classified    16. Onychogryphosis    17. PVD (peripheral vascular disease)    18. OG (onychogryphosis)    19. Type II diabetes mellitus with neurological manifestations    20. Skin ulcer of right foot, limited to breakdown of skin    21. Ankle ulcer, left, limited to breakdown of skin    22. Cellulitis and abscess of foot    23. Non-pressure chronic ulcer of right lower leg with fat layer exposed    24. Diabetic polyneuropathy associated with type 2 diabetes mellitus    25. Obesity, unspecified classification, unspecified obesity type, unspecified whether serious comorbidity present    26. At high risk for inadequate nutritional intake    27. Type 2 diabetes mellitus with diabetic neuropathy, unspecified whether long term insulin use    28. Ulcer of right foot with fat layer exposed      Plan:   Ulcer of right foot, limited to breakdown of skin - Right Foot    Non-pressure chronic ulcer of left lower leg with fat layer exposed    Other osteomyelitis of right foot    Ulcer of right foot with necrosis of bone    Peripheral vascular disease    Hammer toes, bilateral    Paresthesia of both lower extremities    Type 2 diabetes mellitus with foot ulcer, unspecified whether long term insulin use    Venous stasis dermatitis of both lower extremities    Skin ulcer of right foot with fat layer  exposed    Ulcer of right foot with necrosis of muscle    Chronic osteomyelitis    Diabetic foot infection    Hospital discharge follow-up    Difficulty in walking, not elsewhere classified    Onychogryphosis    PVD (peripheral vascular disease)    OG (onychogryphosis)    Type II diabetes mellitus with neurological manifestations    Skin ulcer of right foot, limited to breakdown of skin    Ankle ulcer, left, limited to breakdown of skin    Cellulitis and abscess of foot    Non-pressure chronic ulcer of right lower leg with fat layer exposed    Diabetic polyneuropathy associated with type 2 diabetes mellitus    Obesity, unspecified classification, unspecified obesity type, unspecified whether serious comorbidity present    At high risk for inadequate nutritional intake    Type 2 diabetes mellitus with diabetic neuropathy, unspecified whether long term insulin use    Ulcer of right foot with fat layer exposed      No follow-ups on file.    Procedures          Counseling:     I provided patient education verbally regarding:   Patient diagnosis, treatment options, as well as alternatives, risks, and benefits.     This note was created using Dragon voice recognition software that occasionally misinterpreted phrases or words.             Recommend continuing to wear corn pads over healed ulcer area to help relieve some pressure and friction when walking.        Counseling/Education:  I provided patient education verbally regarding:   The aspects of diabetes and how it pertains to the feet. I explained the importance of proper diabetic foot care and how it is essential for the health of their feet.     I discussed the importance of knowing their Hemoglobin A1c and that the level needs to be as close to 6 as possible. I discussed the increase complications of high blood sugar including stroke, blindness, heart attack, kidney failure and loss of limb secondary to neuropathy and PVD.      With neuropathy, beware of any breaks  in the skin or redness. These areas are not recognized early due to the numbness.     I discussed Diabetes, lower back issues, metabolic disorders, systemic causes, chemotherapy, vitamin deficiency, heavy metal exposure, as some of the causes. I also explained that as much as 40% of the time we can not find a cause. I discussed different treatments available to control the symptoms but which may not cure the problem.         Counseled patient on the aspects of diabetes and how it pertains to the feet.  I explained the importance of proper diabetic foot care and how it is essential for the health of their feet.        Shoe inspection. Patient instructed on proper foot hygeine. We discussed wearing proper shoe gear, daily foot inspections, never walking without protective shoe gear, never putting sharp instruments to feet, routine podiatric nail visits every 2-3 months.       Patient should call the office immediately if any signs of infection, such as fever, chills, sweats, increased redness or pain.     Patient was instructed to call the clinic or go to the emergency department if their symptoms do not improve, worsens, or if new symptoms develop.  Patient was advised that if any increased swelling, pain, or numbness arise to go immediately to the ED. Patient knows to call any time if an emergency arises. Shared decision making occurred and patient verbalized understanding in agreement with this plan.      I counseled the patient on their conditions, their implications and medical management.     >50% of this > 60 minute visit was spent face to face educating/counseling the patient     I spent a total of 60 minutes on the day of the visit.This includes face to face time and non-face to face time preparing to see the patient (eg, review of tests), obtaining and/or reviewing separately obtained history, documenting clinical information in the electronic or other health record, independently interpreting results and  communicating results to the patient/family/caregiver, or care coordinator

## 2024-02-08 NOTE — PATIENT INSTRUCTIONS
Dr. Avila's Wound Healing Tips    How Does Good Nutrition Help with Wound Healing?  Eating well during wound healing can help your body heal faster and fight infection.  To heal, you need more calories and more nutrients like protein, fluids, vitamin A, vitamin C, and Zinc.  Wounds heal faster when you get enough of the right foods.    Prioritize protein! Protein provides the building blocks for muscle and skin repair; and you need more protein for wound healing.  It also helps to boot immunity!    In ADDITION to below, I recommend supplementing with 2 protein drinks each day while healing.  I recommend finding a protein drink with around 30g or protein per bottle.  A good example is Premier Protein drinks, which can be purchased at [employer], [employer], or online.  They are a great, affordable option with low carbohydrates (low sugars!) and high protein.    Good sources include:  Lean animal meat such as chicken, turkey, fish  Beans, peas, lentils or tofu  Nuts, peanut butter, or seeds  Cheese, yogurt, cottage cheese or eggs  Milk or a milk alternative like fortified soy    Vitamins and Minerals  Vitamin A, Vitamin C, and Zinc help your body to repair tissue damage, fight infections and keep your skin healthy.    Collagen! Collagen plays a KEY role in ALL stages of wound healing  I recommend supplementing with Collagen DAILY.  There are several collagen supplements on the market, in both powder and capsule form.  A brand I regularly recommend is: Collagen Peptides by [employer] - 2 scoops per day in coffee/tea/water/etc. (sold at [employer], [employer] several drug stores, etc.)  Collagen has several other health benefits related to skin, joint, bone, muscle and even heart health!    Diabetes and Wound Healing  Good blood sugar control is very important during wound healing.  This helps you heal faster and reduces risk of infection.  Chronically high blood sugars can lead to poor blood circulation, thus increasing wound  healing time.  Please ask your dietician for tips on managing blood sugars.    What Else Can I Do To Help My Wound Heal?  If you use tobacco, quit.  Nicotine can reduce blood and oxygen flow to your tissues which can increase healing time.    Cast Bag (Can be purchased on [employer])  Re-use same cast bag for each shower or bath.  Recommend hanging bag to dry in between each shower or bath.  Follow directions on cast bag for proper application and use.    (Keep dressing clean, dry, and intact between dressing changes!) Simple Skin Ulcer  A skin ulcer is a sore on the skin. Skin ulcers often form when blood circulation is impaired. Being bed- or wheelchair-bound can cause pressure that may lead to skin ulcers. Ulcers are generally round areas of red, swollen, thickened skin around a crater-like depression. They are often very slow to heal. If a skin ulcer isn't properly treated, it may become infected. If the infection spreads, it can cause serious health issues.    Symptoms of a skin ulcer include:  Reddish area on the skin  Skin color and texture changes  Swelling  Wound that isn't healing  Crater in the skin  Pain  Drainage or pus    Causes  There are many causes of skin ulcers. Some of these include:  Decreased blood flow to a part of the skin, vascular insufficiency  Trauma  Lack of movement of a part of the body for long periods of time  Infection  Poor hygiene  Varicose veins  Vitamin deficiency    Pressure ulcers  Pressure ulcers are a type of ulcer most commonly seen in people who are confined to bed or a wheelchair. They are caused by prolonged pressure to a spot on the skin. Pressure ulcers usually occur on the back, buttocks, or backs or sides of the legs, arms, or feet (especially the heels).    Home care  You may be prescribed antibiotics to prevent infection. If this is the case, be sure to take all of the medicine, even if your symptoms get better. You may also be given medicines to help  relieve pain. Follow the healthcare providers instructions when using these medicines.    General care  Care for the skin ulcer as instructed. Always wash your hands with soap and warm water before and after caring for your wound.  Cover the ulcer with a clean, dry bandage. Remove and change the bandage as instructed. If the bandage becomes wet or dirty, change it as soon as possible.  Follow the doctors instructions about washing. You can shower, but do not soak the healing ulcer until the doctor says its OK.  Do not scratch, rub, or pick at the healing skin.  Check the area every day for signs of infection, such as increasing pain, redness, warmth, red streaking, swelling, or pus draining from the ulcer.  When resting, raise the area where the ulcer is above the level of the heart.  Avoid smoking or drinking alcohol, as these can delay wound healing.  If you are able, try to walk regularly. This can help with circulation.  Avoid prolonged standing or sitting in one position.  The following tips can help prevent future skin ulcers:  Know your risks for skin ulcers.  Keep the skin clean and dry.  Reposition frequently.  Use protective devices such as pillows, foam wedges, and heel protectors for the knees, ankles, and heels.  Avoid immobilization.    Follow-up care  Follow up with your healthcare provider, or as advised.    When to seek medical advice  Call your healthcare provider right away if any of these occur:  Fever of 100.4°F (38°C) or higher, or as advised by your healthcare provider  Signs of infection. These include increasing pain, warmth, redness, or pus draining from the skin ulcer.  Bleeding from the skin ulcer  Pain in or around the ulcer that doesn't get better even with medicines  Increased swelling  Changes in skin color    Date Last Reviewed: 9/1/2016  © 6243-5489 [employer]. [address] [place] [address], [place] [address]. All rights reserved. This information is not intended as a  substitute for professional medical care. Always follow your healthcare professional's instructions.       Your Diabetes Foot Care Program    Every day you depend on your feet to keep you moving. But when you have diabetes, your feet need special care. Even a small foot problem can become very serious. So dont take your feet for granted. By working with your diabetes healthcare team, you can learn how to protect your feet and keep them healthy.  Evaluating your feet  An evaluation helps your healthcare provider check the condition of your feet. The evaluation includes a review of your diabetes history and overall health. It may also include a foot exam, X-rays, or other tests. These can help show problems beneath the skin that you cant see or feel.  Medical history  You will be asked about your overall health and any history of foot problems. Youll also discuss your diabetes history, such as whether your blood sugar level has changed over time. It also includes questions about sensations of pain, tingling, pins and needles, or numbness. Your healthcare provider will also want to know if you have high blood pressure and heart disease, or if you smoke. Be sure to mention any medicines (including over-the-counter), supplements, or herbal remedies you take.  Foot exam  A foot exam checks the condition of different parts of your foot. First, your skin and nails are examined for any signs of infection. Blood flow is checked by feeling for the pulses in each foot. You may also have tests to study the nerves in the foot. These include using a small filament (wire) to see how sensitive your feet are. In certain cases, you will be asked to walk a short distance to check for bone, joint, and muscle problems.  Diagnostic tests  If needed, your healthcare provider will suggest certain tests to learn more about your feet. These include:  Doppler tests to measure blood flow in the feet and lower leg.  X-rays, which can show  bone or joint problems.  Other imaging tests, such as an MRI (magnetic resonance imaging), bone scan, and CT (computed tomography) scan. These can help show bone infections.  Other tests, such as vascular tests, which study the blood flow in your feet and legs. You may also have nerve studies to learn how sensitive your feet are.  Creating a foot care program  Based on the evaluation, your healthcare provider will create a foot care program for you. Your program may be as simple as starting a daily self-care routine and changing the types of shoes your wear. It may also involve treating minor foot problems, such as a corn or blister. In some cases, surgery will be needed to treat an infection or mechanical problems, such as hammer toes.  Preventing problems  When you have diabetes, its easier to prevent problems than to treat them later on. So see your healthcare team for regular checkups and foot care. Your healthcare team can also help you learn more about caring for your feet at home. For example, you may be told to avoid walking barefoot. Or you may be told that special footwear is needed to protect your feet.  Have regular checkups  Foot problems can develop quickly. So be sure to follow your healthcare teams schedule for regular checkups. During office visits, take off your shoes and socks as soon as you get in the exam room. Ask your healthcare provider to examine your feet for problems. This will make it easier to find and treat small skin irritations before they get worse. Regular checkups can also help keep track of the blood flow and feeling in your feet. If you have neuropathy (lack of feeling in your feet), you will need to have checkups more often.  Learn about self-care  The more you know about diabetes and your feet, the easier it will be to prevent problems. Members of your healthcare team can teach you how to inspect your feet and teach you to look for warning signs. They can also give you other  foot care tips. During office visits, be sure to ask any questions you have.  Date Last Reviewed: 7/1/2016  © 2798-7683 [employer]. [address] [place] [address], [place] [address]. All rights reserved. This information is not intended as a substitute for professional medical care. Always follow your healthcare professional's instructions.

## 2024-02-12 ENCOUNTER — DOCUMENTATION ONLY (OUTPATIENT)
Dept: TRANSPLANT | Facility: CLINIC | Age: 64
End: 2024-02-12
Payer: MEDICARE

## 2024-02-20 ENCOUNTER — EXTERNAL HOME HEALTH (OUTPATIENT)
Dept: HOME HEALTH SERVICES | Facility: HOSPITAL | Age: 64
End: 2024-02-20
Payer: MEDICARE

## 2024-02-20 PROBLEM — N18.5 ANEMIA, CHRONIC RENAL FAILURE, STAGE 5: Status: ACTIVE | Noted: 2024-02-20

## 2024-02-20 PROBLEM — D64.89 ANEMIA DUE TO MULTIPLE MECHANISMS: Status: ACTIVE | Noted: 2024-02-20

## 2024-02-20 PROBLEM — D63.1 ANEMIA, CHRONIC RENAL FAILURE, STAGE 5: Status: ACTIVE | Noted: 2024-02-20

## 2024-02-29 ENCOUNTER — TELEPHONE (OUTPATIENT)
Dept: TRANSPLANT | Facility: CLINIC | Age: 64
End: 2024-02-29
Payer: MEDICARE

## 2024-03-01 ENCOUNTER — DOCUMENTATION ONLY (OUTPATIENT)
Dept: TRANSPLANT | Facility: CLINIC | Age: 64
End: 2024-03-01
Payer: MEDICARE

## 2024-03-13 ENCOUNTER — TELEPHONE (OUTPATIENT)
Dept: TRANSPLANT | Facility: CLINIC | Age: 64
End: 2024-03-13
Payer: MEDICARE

## 2024-03-22 DIAGNOSIS — G89.4 CHRONIC PAIN DISORDER: ICD-10-CM

## 2024-03-24 RX ORDER — TRAMADOL HYDROCHLORIDE 50 MG/1
50 TABLET ORAL EVERY 8 HOURS PRN
Qty: 90 TABLET | Refills: 2 | Status: SHIPPED | OUTPATIENT
Start: 2024-03-24 | End: 2024-05-17 | Stop reason: SDUPTHER

## 2024-04-16 ENCOUNTER — TELEPHONE (OUTPATIENT)
Dept: TRANSPLANT | Facility: CLINIC | Age: 64
End: 2024-04-16

## 2024-04-24 ENCOUNTER — TELEPHONE (OUTPATIENT)
Dept: PAIN MEDICINE | Facility: CLINIC | Age: 64
End: 2024-04-24
Payer: MEDICARE

## 2024-04-24 NOTE — TELEPHONE ENCOUNTER
----- Message from Jetnita Abhijeet sent at 4/24/2024  8:44 AM CDT -----  Type:  RX Refill Request    Who Called:  pt  Refill or New Rx:  REFILL  RX Name and Strength:  traMADoL (ULTRAM) 50 mg tablet  How is the patient currently taking it? (ex. 1XDay):  As directed  Is this a 30 day or 90 day RX:  90  Preferred Pharmacy with phone number:    [facility] - [place], [place] - [address] [address]  Phone: [phone] Fax: [phone]      Local or Mail Order: local   Ordering Provider:  Dr. JANES Head Call Back Number:  [phone]  Additional Information:  Pt says pharmacy doesn't see a refill on fill. Pt is requesting a refill Please call back to advise. Thanks.

## 2024-05-07 ENCOUNTER — HOSPITAL ENCOUNTER (OUTPATIENT)
Facility: HOSPITAL | Age: 64
Discharge: HOME | End: 2024-05-07
Attending: SURGERY | Admitting: SURGERY
Payer: MEDICARE

## 2024-05-07 VITALS
OXYGEN SATURATION: 91 % | SYSTOLIC BLOOD PRESSURE: 126 MMHG | HEART RATE: 73 BPM | RESPIRATION RATE: 14 BRPM | DIASTOLIC BLOOD PRESSURE: 69 MMHG

## 2024-05-07 DIAGNOSIS — D69.6 THROMBOCYTOPENIA: Primary | ICD-10-CM

## 2024-05-07 DIAGNOSIS — N18.6 END STAGE RENAL DISEASE: ICD-10-CM

## 2024-05-07 LAB
ANION GAP SERPL CALC-SCNC: 9 MMOL/L (ref 8–16)
BASOPHILS # BLD AUTO: 0.01 K/UL (ref 0–0.2)
BASOPHILS NFR BLD: 0.3 % (ref 0–1.9)
BUN SERPL-MCNC: 34 MG/DL (ref 8–23)
CALCIUM SERPL-MCNC: 8.9 MG/DL (ref 8.7–10.5)
CHLORIDE SERPL-SCNC: 96 MMOL/L (ref 95–110)
CO2 SERPL-SCNC: 33 MMOL/L (ref 23–29)
CREAT SERPL-MCNC: 3.3 MG/DL (ref 0.5–1.4)
DIFFERENTIAL METHOD BLD: ABNORMAL
EOSINOPHIL # BLD AUTO: 0 K/UL (ref 0–0.5)
EOSINOPHIL NFR BLD: 0.9 % (ref 0–8)
ERYTHROCYTE [DISTWIDTH] IN BLOOD BY AUTOMATED COUNT: 16.7 % (ref 11.5–14.5)
EST. GFR  (NO RACE VARIABLE): 20.3 ML/MIN/1.73 M^2
GLUCOSE SERPL-MCNC: 130 MG/DL (ref 70–110)
HCT VFR BLD AUTO: 28.9 % (ref 40–54)
HGB BLD-MCNC: 8.9 G/DL (ref 14–18)
IMM GRANULOCYTES # BLD AUTO: 0.01 K/UL (ref 0–0.04)
IMM GRANULOCYTES NFR BLD AUTO: 0.3 % (ref 0–0.5)
LYMPHOCYTES # BLD AUTO: 0.3 K/UL (ref 1–4.8)
LYMPHOCYTES NFR BLD: 8.6 % (ref 18–48)
MCH RBC QN AUTO: 31.7 PG (ref 27–31)
MCHC RBC AUTO-ENTMCNC: 30.8 G/DL (ref 32–36)
MCV RBC AUTO: 103 FL (ref 82–98)
MONOCYTES # BLD AUTO: 0.4 K/UL (ref 0.3–1)
MONOCYTES NFR BLD: 12.8 % (ref 4–15)
NEUTROPHILS # BLD AUTO: 2.6 K/UL (ref 1.8–7.7)
NEUTROPHILS NFR BLD: 77.1 % (ref 38–73)
NRBC BLD-RTO: 0 /100 WBC
PLATELET # BLD AUTO: 66 K/UL (ref 150–450)
PMV BLD AUTO: 10.8 FL (ref 9.2–12.9)
POTASSIUM SERPL-SCNC: 3.5 MMOL/L (ref 3.5–5.1)
RBC # BLD AUTO: 2.81 M/UL (ref 4.6–6.2)
SODIUM SERPL-SCNC: 138 MMOL/L (ref 136–145)
WBC # BLD AUTO: 3.36 K/UL (ref 3.9–12.7)

## 2024-05-07 PROCEDURE — 63600175 PHARM REV CODE 636 W HCPCS: Performed by: SURGERY

## 2024-05-07 PROCEDURE — C1894 INTRO/SHEATH, NON-LASER: HCPCS | Performed by: SURGERY

## 2024-05-07 PROCEDURE — 36901 INTRO CATH DIALYSIS CIRCUIT: CPT | Mod: RT | Performed by: SURGERY

## 2024-05-07 PROCEDURE — 80048 BASIC METABOLIC PNL TOTAL CA: CPT | Performed by: SURGERY

## 2024-05-07 PROCEDURE — 85025 COMPLETE CBC W/AUTO DIFF WBC: CPT | Performed by: SURGERY

## 2024-05-07 PROCEDURE — C1769 GUIDE WIRE: HCPCS | Performed by: SURGERY

## 2024-05-07 PROCEDURE — 27201423 OPTIME MED/SURG SUP & DEVICES STERILE SUPPLY: Performed by: SURGERY

## 2024-05-07 PROCEDURE — 36909 DIALYSIS CIRCUIT EMBOLJ: CPT | Mod: RT | Performed by: SURGERY

## 2024-05-07 PROCEDURE — 25000003 PHARM REV CODE 250: Performed by: SURGERY

## 2024-05-07 PROCEDURE — 25500020 PHARM REV CODE 255: Performed by: SURGERY

## 2024-05-07 PROCEDURE — C1887 CATHETER, GUIDING: HCPCS | Performed by: SURGERY

## 2024-05-07 PROCEDURE — 99153 MOD SED SAME PHYS/QHP EA: CPT | Performed by: SURGERY

## 2024-05-07 PROCEDURE — 99152 MOD SED SAME PHYS/QHP 5/>YRS: CPT | Performed by: SURGERY

## 2024-05-07 PROCEDURE — 27800903 OPTIME MED/SURG SUP & DEVICES OTHER IMPLANTS: Performed by: SURGERY

## 2024-05-07 RX ORDER — LIDOCAINE HYDROCHLORIDE 10 MG/ML
INJECTION INFILTRATION; PERINEURAL
Status: DISCONTINUED | OUTPATIENT
Start: 2024-05-07 | End: 2024-05-07 | Stop reason: HOSPADM

## 2024-05-07 RX ORDER — MIDAZOLAM HYDROCHLORIDE 1 MG/ML
INJECTION INTRAMUSCULAR; INTRAVENOUS
Status: DISCONTINUED | OUTPATIENT
Start: 2024-05-07 | End: 2024-05-07 | Stop reason: HOSPADM

## 2024-05-07 RX ORDER — CEFAZOLIN SODIUM 2 G/50ML
2 SOLUTION INTRAVENOUS ONCE
Status: DISCONTINUED | OUTPATIENT
Start: 2024-05-07 | End: 2024-05-07 | Stop reason: HOSPADM

## 2024-05-07 RX ORDER — IODIXANOL 320 MG/ML
INJECTION, SOLUTION INTRAVASCULAR
Status: DISCONTINUED | OUTPATIENT
Start: 2024-05-07 | End: 2024-05-07 | Stop reason: HOSPADM

## 2024-05-07 NOTE — Clinical Note
The catheter was removed from the right AV fistula. chest wall non-tender, breathing is unlabored without accessory muscle use, normal breath sounds

## 2024-05-07 NOTE — DISCHARGE INSTRUCTIONS
Follow up with Dialysis  No driving for 24 hours  Continue medications  Monitor site for bleeding and signs of infection

## 2024-05-07 NOTE — Clinical Note
The site was marked. The right radial was prepped. The site was prepped with ChloraPrep. The site was clipped. The patient was draped.

## 2024-05-07 NOTE — Clinical Note
MD aware patient's last PO intake approx. 0530 this morning. MD okay to proceed with procedure and sedation.

## 2024-05-07 NOTE — DISCHARGE SUMMARY
[facility]  Discharge Note  Short Stay    Procedure(s) (LRB):  Fistulogram (Bilateral)  Coil Embolization, Single Vessel (Right)      OUTCOME: Patient tolerated treatment/procedure well without complication and is now ready for discharge.    DISPOSITION: Home or Self Care    FINAL DIAGNOSIS:  <principal problem not specified>    FOLLOWUP: In clinic    DISCHARGE INSTRUCTIONS:    Discharge Procedure Orders   Remove dressing in 24 hours     Activity as tolerated        TIME SPENT ON DISCHARGE: 10 minutes

## 2024-05-07 NOTE — OP NOTE
[facility]  Vascular Surgery  Operative Note    SUMMARY     Date of Procedure: 5/7/2024     Procedure:   Right arm fistulogram, coil embolization branch of vein    Surgeons and Role:     * Khoobehi, Ali, MD - Primary    Assisting Surgeon: None    Pre-Operative Diagnosis: End stage renal disease [N18.6]    Post-Operative Diagnosis: Post-Op Diagnosis Codes:     * End stage renal disease [N18.6]    Anesthesia: RN IV Sedation    Operative Findings (including complications, if any): no stenosis, large side branch    Description of Technical Procedures:   Indications, risks, benefits of right arm fistulogram with possible angioplasty were discussed with the patient. Risks discussed included possible bleeding, access failure, infection, cardiac arrest, need for future interventions, and death. Patient was agreeable for the procedure and signed consent.    Under my direct supervision, moderate sedation was administered during the course of the procedure with Versed (1 mg). An independent observer was present throughout the procedure, there was continuous monitoring of cardiac telemetry, hemodynamics and pulse oximetry. I was personally present and spent 30 minutes face to face time during sedation administration.    6F sheath was placed in an antegrade fashion. Fistulogram was performed which showed no stenosis, however there is a large dorsal forearm side-branch which is shunting necessary flow from the fistula.  I access this with a rim catheter and coil embolized the vessel using a 5 x 8 embold coil.  Completion fistulogram shows good placement of the coil.    Sheath was removed and puncture site repaired with 4-0 Vicryl suture. Patient was brought out of the procedure room in stable condition.    Significant Surgical Tasks Conducted by the Assistant(s), if Applicable: n/a    Estimated Blood Loss (EBL): * No values recorded between 5/7/2024  9:55 AM and 5/7/2024 10:40 AM *           Implants:   Implant  Name Type Inv. Item Serial No.  Lot No. LRB No. Used Action   COIL EMBOLD FIBERED 5MM 8CM - [id number]  COIL EMBOLD FIBERED 5MM 8CM  [email or web address] [id number] Wasted       Specimens:   Specimen (24h ago, onward)      None                    Condition: Good    Disposition: PACU - hemodynamically stable.    Attestation: I performed the procedure.

## 2024-05-17 ENCOUNTER — OFFICE VISIT (OUTPATIENT)
Dept: PAIN MEDICINE | Facility: CLINIC | Age: 64
End: 2024-05-17
Payer: MEDICARE

## 2024-05-17 VITALS
BODY MASS INDEX: 34.65 KG/M2 | HEIGHT: 73 IN | SYSTOLIC BLOOD PRESSURE: 107 MMHG | DIASTOLIC BLOOD PRESSURE: 65 MMHG | WEIGHT: 261.44 LBS | HEART RATE: 66 BPM

## 2024-05-17 DIAGNOSIS — M79.671 BILATERAL FOOT PAIN: ICD-10-CM

## 2024-05-17 DIAGNOSIS — G89.4 CHRONIC PAIN DISORDER: ICD-10-CM

## 2024-05-17 DIAGNOSIS — M79.672 BILATERAL FOOT PAIN: ICD-10-CM

## 2024-05-17 DIAGNOSIS — E11.42 DIABETIC POLYNEUROPATHY ASSOCIATED WITH TYPE 2 DIABETES MELLITUS: Primary | ICD-10-CM

## 2024-05-17 PROCEDURE — 99999 PR PBB SHADOW E&M-EST. PATIENT-LVL III: CPT | Mod: PBBFAC,,, | Performed by: PHYSICIAN ASSISTANT

## 2024-05-17 PROCEDURE — 3074F SYST BP LT 130 MM HG: CPT | Mod: CPTII,S$GLB,, | Performed by: PHYSICIAN ASSISTANT

## 2024-05-17 PROCEDURE — 3008F BODY MASS INDEX DOCD: CPT | Mod: CPTII,S$GLB,, | Performed by: PHYSICIAN ASSISTANT

## 2024-05-17 PROCEDURE — 99214 OFFICE O/P EST MOD 30 MIN: CPT | Mod: S$GLB,,, | Performed by: PHYSICIAN ASSISTANT

## 2024-05-17 PROCEDURE — 3078F DIAST BP <80 MM HG: CPT | Mod: CPTII,S$GLB,, | Performed by: PHYSICIAN ASSISTANT

## 2024-05-17 PROCEDURE — 1159F MED LIST DOCD IN RCRD: CPT | Mod: CPTII,S$GLB,, | Performed by: PHYSICIAN ASSISTANT

## 2024-05-17 RX ORDER — TRAMADOL HYDROCHLORIDE 50 MG/1
50 TABLET ORAL EVERY 8 HOURS PRN
Qty: 90 TABLET | Refills: 2 | Status: SHIPPED | OUTPATIENT
Start: 2024-05-23 | End: 2024-08-21

## 2024-05-17 NOTE — PROGRESS NOTES
Referring Physician: No ref. provider found    PCP: Michelle Messina FNP      CC:  Bilateral foot pain    Interval history:  Mr. Bullard is a 64 y.o. male with bilateral foot pain due to peripheral neuropathy diabetic neuropathy. Hospitalized for atrial tachycardia and is currently on temporary dialysis. Foot pain remains tolerable. Stabbing pains resolved. He continues to take Gabapentin 600 mg t.i.d. with mild benefits. Nortriptyline 25 mg made him to drowsy so he discontinued it.  He continues to take tramadol 50 mg q.8 hours as needed with mild-to-moderate benefit.  Denies any side effects with the medication.  Able perform his ADLs with the medication.  He underwent a right-sided lumbar sympathetic nerve block which provided moderate benefit that was shortlived. Currently on antibiotics.  He developed bilateral knee pain. Right intra articular knee injection with benefit. He denies any worsening weakness.  No bowel bladder changes. Pain today is rated 4/10.    Prior HPI:   Ana Bullard is a 64 y.o. male referred to us for bilateral foot pain. Patient with long history of diabetes.  He states neuropathy worsen after his CABG.  Bilateral foot pain has worsened over past 7 years.  He presents to us constant burning, sharp pain in his bilateral feet.  He also has some similar issues in his bilateral hands, but foot pain is worse.  Pain worsens prolonged sitting and standing.  He currently takes gabapentin 600 mg t.i.d. with mild benefits.  Increasing doses causes sedation.  He also takes tramadol q.8 hours as needed with mild-to-moderate benefit.  He denies any worsening weakness.  No bowel bladder changes.    Interventional history:  Right LSB on 09/2019 with 50% relief for 2 weeks.    ROS:  CONSTITUTIONAL: No fevers, chills, night sweats, wt. loss, appetite changes  SKIN: no rashes or itching  ENT: No headaches, head trauma, vision changes, or eye pain  LYMPH NODES: None noticed   CV: No chest pain,  palpitations.   RESP: No shortness of breath, dyspnea on exertion, wheezing, or hemoptysis. + cough  GI: No nausea, emesis, diarrhea, constipation, melena, hematochezia, pain.    : No dysuria, hematuria, urgency, or frequency   HEME: No easy bruising, bleeding problems  PSYCHIATRIC: No depression, anxiety, psychosis, hallucinations.  NEURO: No seizures, memory loss, dizziness or difficulty sleeping  MSK:  Positive HPI      Past Medical History:   Diagnosis Date    AICD (automatic cardioverter/defibrillator) present     Anemia due to multiple mechanisms 02/20/2024    Anemia, chronic disease 07/27/2021    Anemia, chronic renal failure, stage 5 02/20/2024    Anemia, unspecified 07/27/2021    Anxiety and depression 2012    CAD (coronary artery disease) 2012    Cardiomegaly 2016    CHF (congestive heart failure) 2012    Cholecystitis 2017    Complete heart block 2012    Diabetic foot ulcer     DVT (deep venous thrombosis) 2012    And pulmonary embolus    GERD (gastroesophageal reflux disease) 2010    Heparin induced thrombocytopenia     Hyperlipemia 2011    IDDM (insulin dependent diabetes mellitus) 1983    With neuropathy    Ischemic cardiomyopathy 2012    MI (myocardial infarction) 2012    Morbid obesity     MRSA (methicillin resistant Staphylococcus aureus) infection 01/19/2019    Noncompliance with therapeutic plan 2019    Normochromic normocytic anemia 07/27/2021    Osteomyelitis of right foot 01/2019    Osteomyelitis of right foot 09/01/2022    Klebsiella from bone, GBS in blood    Pulmonary edema 2019    Respiratory failure 2019    Sepsis 01/2019    Due to cellulitis right leg    Sleep apnea 2013    Thrombocytopathy 2012    Venous stasis dermatitis of both lower extremities      Past Surgical History:   Procedure Laterality Date    CARDIAC PACEMAKER PLACEMENT  12/2012    CARDIAC PACEMAKER PLACEMENT  10/04/2018    battery replacement    COIL EMBOLIZATION, SINGLE VESSEL Right 5/7/2024    Procedure: Coil  Embolization, Single Vessel;  Surgeon: Khoobehi, Ali, MD;  Location: [facility] CATH/EP LAB;  Service: Vascular;  Laterality: Right;    CORONARY ARTERY BYPASS GRAFT  2012    ESOPHAGOGASTRODUODENOSCOPY  2017    FISTULOGRAM Bilateral 2024    Procedure: Fistulogram;  Surgeon: Khoobehi, Ali, MD;  Location: [facility] CATH/EP LAB;  Service: Vascular;  Laterality: Bilateral;    SAMARA FILTER PLACEMENT  2012    vena cava    INSERTION, CATHETER, TUNNELED Right 10/16/2023    Procedure: Insertion,catheter,tunneled;  Surgeon: Terri Gresham MD;  Location: [facility] OR;  Service: Cardiovascular;  Laterality: Right;    LUMBAR SYMPATHETIC NERVE BLOCK Right 2019    Procedure: BLOCK, NERVE, SYMPATHETIC, LUMBAR;  Surgeon: Tashi Good MD;  Location: [facility] OR;  Service: Pain Management;  Laterality: Right;  RIGHT SYMPATHETIC NERVE BLOCK     Family History   Problem Relation Name Age of Onset    Arthritis Mother      Diabetes Mother      Cancer Father      Heart disease Father      Stroke Father       Social History     Socioeconomic History    Marital status:    Tobacco Use    Smoking status: Former     Current packs/day: 0.00     Average packs/day: 2.0 packs/day for 38.0 years (76.0 ttl pk-yrs)     Types: Cigarettes     Start date:      Quit date:      Years since quittin.3    Smokeless tobacco: Never   Substance and Sexual Activity    Alcohol use: No    Drug use: Never    Sexual activity: Never     Social Determinants of Health     Financial Resource Strain: Unknown (2019)    Received from [employer] [facility] Subsidiaries and Affili[facility], [facility]    Overall Financial Resource Strain (CARDIA)     Difficulty of Paying Living Expenses: Patient declined   Food Insecurity: Unknown (2019)    Received from [employer] [facility] and Its Subsidiaries and Affiliates,  "[facility]    Hunger Vital Sign     Worried About Running Out of Food in the Last Year: Patient declined     Ran Out of Food in the Last Year: Patient declined   Transportation Needs: Unknown (1/9/2019)    [facility], [facility] and [facility] and Affiliates    PRAPARE - Transportation     Lack of Transportation (Medical): Patient declined     Lack of Transportation (Non-Medical): Patient declined         Medications/Allergies: See med card    Vitals:    05/17/24 0812   BP: 107/65   Pulse: 66   Weight: 118.6 kg (261 lb 7.5 oz)   Height: 6' 1" (1.854 m)   PainSc:   4   PainLoc: Foot         GENERAL: A and O x3, the patient appears well groomed and is in no acute distress.  Skin: No rashes or obvious lesions  HEENT: normocephalic, atraumatic  CARDIOVASCULAR:  RRR  LUNGS: non labored breathing  ABDOMEN: soft, nontender   UPPER EXTREMITIES: Normal alignment, normal range of motion, no atrophy, no skin changes,  hair growth and nail growth normal and equal bilaterally. No swelling, no tenderness.    LOWER EXTREMITIES:  Normal alignment, no atrophy, no skin changes,  hair growth and nail growth normal and equal bilaterally. No swelling. Tenderness to right lateral patella.   LUMBAR SPINE  Lumbar spine: ROM is limited with flexion extension and oblique extension with moderate increased pain.    Samuel's test causes no increased pain on either side.    Supine straight leg raise is negative bilaterally.    Internal and external rotation of the hip causes no increased pain on either side.  Myofascial exam: No tenderness to palpation across lumbar paraspinous muscles.        MENTAL STATUS: normal orientation, speech, language, and fund of knowledge for social situation.  Emotional state appropriate.     CRANIAL " NERVES:  II:         PERRL bilaterally,   III,IV,VI: EOMI.    V:         Facial sensation equal bilaterally  VII:       Facial motor function normal.  VIII:      Hearing equal to finger rub bilaterally  IX/X:    Gag normal, palate symmetric  XI:        Shoulder shrug equal, head turn equal  XII:       Tongue midline without fasciculations        MOTOR: Tone and bulk: normal bilateral upper and lower Strength: normal   Delt      Bi         Tri        WE      WF                        R          5          5          5          5          5          5            L          5          5          5          5          5          5               IP         ADD     ABD     Quad   TA        Gas      HAM  R          5          5          5          5          5          5          5  L          5          5          5          5          5          5          5     SENSATION:  Decreased globally of bilateral feet  REFLEXES: normal, symmetric, nonbrisk.  Toes down, no clonus. No hoffmans.  GAIT: normal rise, base, steps, and arm swing.       Imaging:  None    Assessment:  Ana Bullard is a 64 y.o. male with bilateral foot pain  1. Diabetic polyneuropathy associated with type 2 diabetes mellitus    2. Bilateral foot pain    3. Chronic pain disorder        Plan:  1. I have stressed the importance of physical activity and exercise to improve overall health  2.  Discussed disease progression of peripheral neuropathy.  Continue gabapentin as prescribed by PCP.  Consider nortriptyline 10 mg in the future  3.  He does request continue tramadol with us.  I believe this is very reasonable.   reviewed.  He takes tramadol 50 mg q.8 hours as needed.  Previous UDS consistent.   4.  May consider repeat lumbar sympathetic block if pain is exacerbated.  Briefly discussed spinal cord stimulation as well.  5. Consider Qutenza   6.  Follow-up in 3 month  Medication management provided by  Dr. Good

## 2024-07-05 ENCOUNTER — HOSPITAL ENCOUNTER (OUTPATIENT)
Dept: PREADMISSION TESTING | Facility: HOSPITAL | Age: 64
Discharge: HOME | End: 2024-07-05

## 2024-07-09 ENCOUNTER — HOSPITAL ENCOUNTER (OUTPATIENT)
Facility: HOSPITAL | Age: 64
Discharge: HOME | End: 2024-07-09
Attending: SURGERY | Admitting: SURGERY
Payer: MEDICARE

## 2024-07-09 VITALS
HEART RATE: 64 BPM | OXYGEN SATURATION: 97 % | SYSTOLIC BLOOD PRESSURE: 119 MMHG | RESPIRATION RATE: 12 BRPM | DIASTOLIC BLOOD PRESSURE: 66 MMHG

## 2024-07-09 DIAGNOSIS — Z01.818 PRE-OP TESTING: ICD-10-CM

## 2024-07-09 DIAGNOSIS — N18.6 END STAGE RENAL DISEASE: ICD-10-CM

## 2024-07-09 DIAGNOSIS — Z01.818 PRE-OP EXAM: ICD-10-CM

## 2024-07-09 DIAGNOSIS — N18.5 CKD (CHRONIC KIDNEY DISEASE), STAGE V: Primary | ICD-10-CM

## 2024-07-09 LAB
ANION GAP SERPL CALC-SCNC: 7 MMOL/L (ref 8–16)
BUN SERPL-MCNC: 28 MG/DL (ref 8–23)
CALCIUM SERPL-MCNC: 9 MG/DL (ref 8.7–10.5)
CHLORIDE SERPL-SCNC: 98 MMOL/L (ref 95–110)
CO2 SERPL-SCNC: 32 MMOL/L (ref 23–29)
CREAT SERPL-MCNC: 3.3 MG/DL (ref 0.5–1.4)
ERYTHROCYTE [DISTWIDTH] IN BLOOD BY AUTOMATED COUNT: 18 % (ref 11.5–14.5)
EST. GFR  (NO RACE VARIABLE): 20.3 ML/MIN/1.73 M^2
GLUCOSE SERPL-MCNC: 121 MG/DL (ref 70–110)
HCT VFR BLD AUTO: 31.2 % (ref 40–54)
HGB BLD-MCNC: 9.5 G/DL (ref 14–18)
MCH RBC QN AUTO: 31.3 PG (ref 27–31)
MCHC RBC AUTO-ENTMCNC: 30.4 G/DL (ref 32–36)
MCV RBC AUTO: 103 FL (ref 82–98)
PLATELET # BLD AUTO: 75 K/UL (ref 150–450)
PMV BLD AUTO: 9.8 FL (ref 9.2–12.9)
POTASSIUM SERPL-SCNC: 4.3 MMOL/L (ref 3.5–5.1)
RBC # BLD AUTO: 3.04 M/UL (ref 4.6–6.2)
SODIUM SERPL-SCNC: 137 MMOL/L (ref 136–145)
WBC # BLD AUTO: 3.78 K/UL (ref 3.9–12.7)

## 2024-07-09 PROCEDURE — 99152 MOD SED SAME PHYS/QHP 5/>YRS: CPT | Performed by: SURGERY

## 2024-07-09 PROCEDURE — C1769 GUIDE WIRE: HCPCS | Performed by: SURGERY

## 2024-07-09 PROCEDURE — 36589 REMOVAL TUNNELED CV CATH: CPT | Performed by: SURGERY

## 2024-07-09 PROCEDURE — 25000003 PHARM REV CODE 250: Performed by: SURGERY

## 2024-07-09 PROCEDURE — 25500020 PHARM REV CODE 255: Performed by: SURGERY

## 2024-07-09 PROCEDURE — 80048 BASIC METABOLIC PNL TOTAL CA: CPT | Performed by: ANESTHESIOLOGY

## 2024-07-09 PROCEDURE — 63600175 PHARM REV CODE 636 W HCPCS: Performed by: SURGERY

## 2024-07-09 PROCEDURE — 85027 COMPLETE CBC AUTOMATED: CPT | Performed by: ANESTHESIOLOGY

## 2024-07-09 RX ORDER — CEFAZOLIN SODIUM 2 G/50ML
2 SOLUTION INTRAVENOUS ONCE
Status: DISCONTINUED | OUTPATIENT
Start: 2024-07-09 | End: 2024-07-09 | Stop reason: HOSPADM

## 2024-07-09 RX ORDER — FENTANYL CITRATE 50 UG/ML
INJECTION, SOLUTION INTRAMUSCULAR; INTRAVENOUS
Status: DISCONTINUED | OUTPATIENT
Start: 2024-07-09 | End: 2024-07-09 | Stop reason: HOSPADM

## 2024-07-09 RX ORDER — IODIXANOL 320 MG/ML
INJECTION, SOLUTION INTRAVASCULAR
Status: DISCONTINUED | OUTPATIENT
Start: 2024-07-09 | End: 2024-07-09 | Stop reason: HOSPADM

## 2024-07-09 RX ORDER — LIDOCAINE HYDROCHLORIDE 10 MG/ML
INJECTION INFILTRATION; PERINEURAL
Status: DISCONTINUED | OUTPATIENT
Start: 2024-07-09 | End: 2024-07-09 | Stop reason: HOSPADM

## 2024-07-09 RX ORDER — CEFAZOLIN SODIUM 1 G/3ML
INJECTION, POWDER, FOR SOLUTION INTRAMUSCULAR; INTRAVENOUS
Status: DISCONTINUED | OUTPATIENT
Start: 2024-07-09 | End: 2024-07-09 | Stop reason: HOSPADM

## 2024-07-09 RX ORDER — MIDAZOLAM HYDROCHLORIDE 1 MG/ML
INJECTION INTRAMUSCULAR; INTRAVENOUS
Status: DISCONTINUED | OUTPATIENT
Start: 2024-07-09 | End: 2024-07-09 | Stop reason: HOSPADM

## 2024-07-09 RX ORDER — SODIUM CHLORIDE 9 MG/ML
INJECTION, SOLUTION INTRAVENOUS CONTINUOUS
Status: DISCONTINUED | OUTPATIENT
Start: 2024-07-09 | End: 2024-07-09 | Stop reason: HOSPADM

## 2024-07-09 NOTE — OP NOTE
[facility]  Vascular Surgery  Operative Note    SUMMARY     Date of Procedure: 7/9/2024     Procedure: Procedure(s) (LRB):  REMOVAL TUNNELED CATHETER (N/A)  VENOGRAM (N/A)     Surgeons and Role:     * Khoobehi, Ali, MD - Primary    Assisting Surgeon: None    Pre-Operative Diagnosis: End stage renal disease [N18.6]    Post-Operative Diagnosis: Post-Op Diagnosis Codes:     * End stage renal disease [N18.6]    Anesthesia: RN IV Sedation    Operative Findings (including complications, if any): no stenosis    Description of Technical Procedures:   I discussed with the patient indications, risks, and benefits of PermCath removal.  Risks discussed included bleeding, infection, DVT, myocardial infarction, cardiac arrhythmia, death.  Patient was agreeable and gave consent.    Under my direct supervision, moderate sedation was administered during the course of the procedure with Versed (1 mg) and Fentanyl (50 mcgs). An independent observer was present throughout the procedure, there was continuous monitoring of cardiac telemetry, hemodynamics and pulse oximetry. I was personally present and spent 30 minutes face to face time during sedation administration.    Angeline-catheter soft tissue was infiltrated with 10 cc of 1% lidocaine. Catheter cuff was bluntly dissected out with a curved hemostat.     Catheter was pulled back over a wire. SVCgram was performed which shows no significant stenosis. Catheter was then removed completely.    Dressing was applied after achieving adequate hemostasis. Patient tolerated the procedure well.    Significant Surgical Tasks Conducted by the Assistant(s), if Applicable: n/a    Estimated Blood Loss (EBL): * No values recorded between 7/9/2024  9:05 AM and 7/9/2024  9:24 AM *           Implants: * No implants in log *    Specimens:   Specimen (24h ago, onward)      None                    Condition: Good    Disposition: PACU - hemodynamically stable.    Attestation: I performed the  procedure.

## 2024-07-09 NOTE — DISCHARGE SUMMARY
[facility]  Discharge Note  Short Stay    Procedure(s) (LRB):  REMOVAL TUNNELED CATHETER (N/A)  VENOGRAM (N/A)      OUTCOME: Patient tolerated treatment/procedure well without complication and is now ready for discharge.    DISPOSITION: Home or Self Care    FINAL DIAGNOSIS:  <principal problem not specified>    FOLLOWUP: In clinic    DISCHARGE INSTRUCTIONS:    Discharge Procedure Orders   Remove dressing in 24 hours     Activity as tolerated        TIME SPENT ON DISCHARGE: 5 minutes

## 2024-07-09 NOTE — Clinical Note
A venogram was performed of the right subclavian artery. The vessel was injected via single simultaneous hand injection through bilateral acess sheaths The volume of the injection was 5 mL. The venogram syringe was removed and the venogram is complete.

## 2024-07-09 NOTE — Clinical Note
ID band present and verified. Stent on string removal in 3 to 5 days.  Follow-up with advanced practitioner in 3 months for x-ray and ultrasound follow-up of stone disease.

## 2024-08-13 ENCOUNTER — OFFICE VISIT (OUTPATIENT)
Dept: PAIN MEDICINE | Facility: CLINIC | Age: 64
End: 2024-08-13
Payer: MEDICARE

## 2024-08-13 VITALS
WEIGHT: 255.06 LBS | HEIGHT: 73 IN | DIASTOLIC BLOOD PRESSURE: 68 MMHG | BODY MASS INDEX: 33.8 KG/M2 | HEART RATE: 68 BPM | SYSTOLIC BLOOD PRESSURE: 114 MMHG

## 2024-08-13 DIAGNOSIS — M79.672 BILATERAL FOOT PAIN: ICD-10-CM

## 2024-08-13 DIAGNOSIS — G89.4 CHRONIC PAIN DISORDER: ICD-10-CM

## 2024-08-13 DIAGNOSIS — M79.671 BILATERAL FOOT PAIN: ICD-10-CM

## 2024-08-13 DIAGNOSIS — Z79.891 CHRONIC USE OF OPIATE FOR THERAPEUTIC PURPOSE: Primary | ICD-10-CM

## 2024-08-13 DIAGNOSIS — E11.42 DIABETIC POLYNEUROPATHY ASSOCIATED WITH TYPE 2 DIABETES MELLITUS: ICD-10-CM

## 2024-08-13 PROCEDURE — 80326 AMPHETAMINES 5 OR MORE: CPT | Performed by: PHYSICIAN ASSISTANT

## 2024-08-13 PROCEDURE — 1159F MED LIST DOCD IN RCRD: CPT | Mod: CPTII,S$GLB,, | Performed by: PHYSICIAN ASSISTANT

## 2024-08-13 PROCEDURE — 99999 PR PBB SHADOW E&M-EST. PATIENT-LVL III: CPT | Mod: PBBFAC,,, | Performed by: PHYSICIAN ASSISTANT

## 2024-08-13 PROCEDURE — 3074F SYST BP LT 130 MM HG: CPT | Mod: CPTII,S$GLB,, | Performed by: PHYSICIAN ASSISTANT

## 2024-08-13 PROCEDURE — 3078F DIAST BP <80 MM HG: CPT | Mod: CPTII,S$GLB,, | Performed by: PHYSICIAN ASSISTANT

## 2024-08-13 PROCEDURE — 99214 OFFICE O/P EST MOD 30 MIN: CPT | Mod: S$GLB,,, | Performed by: PHYSICIAN ASSISTANT

## 2024-08-13 PROCEDURE — 3008F BODY MASS INDEX DOCD: CPT | Mod: CPTII,S$GLB,, | Performed by: PHYSICIAN ASSISTANT

## 2024-08-13 RX ORDER — TRAMADOL HYDROCHLORIDE 50 MG/1
50 TABLET ORAL EVERY 8 HOURS PRN
Qty: 90 TABLET | Refills: 2 | Status: SHIPPED | OUTPATIENT
Start: 2024-08-20 | End: 2024-11-18

## 2024-08-13 NOTE — PROGRESS NOTES
Referring Physician: No ref. provider found    PCP: Michelle Messina, YRN      CC:  Bilateral foot pain    Interval history:  Mr. Bullard is a 64 y.o. male with bilateral foot pain due to peripheral neuropathy diabetic neuropathy. Hospitalized for atrial tachycardia and is currently on dialysis. Foot pain remains tolerable. Stabbing pains resolved. He continues to take Gabapentin 600 mg t.i.d. with mild benefits. Nortriptyline 25 mg made him to drowsy so he discontinued it.  He continues to take tramadol 50 mg q.8 hours as needed with mild-to-moderate benefit.  Denies any side effects with the medication.  Able perform his ADLs with the medication.  He underwent a right-sided lumbar sympathetic nerve block which provided moderate benefit that was shortlived. Currently on antibiotics.  He developed bilateral knee pain. Right intra articular knee injection with benefit. He denies any worsening weakness.  No bowel bladder changes. Pain today is rated 2/10.    Prior HPI:   Ana Bullard is a 64 y.o. male referred to us for bilateral foot pain. Patient with long history of diabetes.  He states neuropathy worsen after his CABG.  Bilateral foot pain has worsened over past 7 years.  He presents to us constant burning, sharp pain in his bilateral feet.  He also has some similar issues in his bilateral hands, but foot pain is worse.  Pain worsens prolonged sitting and standing.  He currently takes gabapentin 600 mg t.i.d. with mild benefits.  Increasing doses causes sedation.  He also takes tramadol q.8 hours as needed with mild-to-moderate benefit.  He denies any worsening weakness.  No bowel bladder changes.    Interventional history:  Right LSB on 09/2019 with 50% relief for 2 weeks.    ROS:  CONSTITUTIONAL: No fevers, chills, night sweats, wt. loss, appetite changes  SKIN: no rashes or itching  ENT: No headaches, head trauma, vision changes, or eye pain  LYMPH NODES: None noticed   CV: No chest pain, palpitations.    RESP: No shortness of breath, dyspnea on exertion, wheezing, or hemoptysis. + cough  GI: No nausea, emesis, diarrhea, constipation, melena, hematochezia, pain.    : No dysuria, hematuria, urgency, or frequency   HEME: No easy bruising, bleeding problems  PSYCHIATRIC: No depression, anxiety, psychosis, hallucinations.  NEURO: No seizures, memory loss, dizziness or difficulty sleeping  MSK:  Positive HPI      Past Medical History:   Diagnosis Date    AICD (automatic cardioverter/defibrillator) present     Anemia due to multiple mechanisms 02/20/2024    Anemia, chronic disease 07/27/2021    Anemia, chronic renal failure, stage 5 02/20/2024    Anemia, unspecified 07/27/2021    Anxiety and depression 2012    CAD (coronary artery disease) 2012    Cardiomegaly 2016    CHF (congestive heart failure) 2012    Cholecystitis 2017    Complete heart block 2012    Diabetic foot ulcer     DVT (deep venous thrombosis) 2012    And pulmonary embolus    GERD (gastroesophageal reflux disease) 2010    Heparin induced thrombocytopenia     Hyperlipemia 2011    IDDM (insulin dependent diabetes mellitus) 1983    With neuropathy    Ischemic cardiomyopathy 2012    MI (myocardial infarction) 2012    Morbid obesity     MRSA (methicillin resistant Staphylococcus aureus) infection 01/19/2019    Noncompliance with therapeutic plan 2019    Normochromic normocytic anemia 07/27/2021    Osteomyelitis of right foot 01/2019    Osteomyelitis of right foot 09/01/2022    Klebsiella from bone, GBS in blood    Pulmonary edema 2019    Respiratory failure 2019    Sepsis 01/2019    Due to cellulitis right leg    Sleep apnea 2013    Thrombocytopathy 2012    Thyroid disease     Venous stasis dermatitis of both lower extremities      Past Surgical History:   Procedure Laterality Date    CARDIAC PACEMAKER PLACEMENT  12/2012    CARDIAC PACEMAKER PLACEMENT  10/04/2018    battery replacement    COIL EMBOLIZATION, SINGLE VESSEL Right 5/7/2024    Procedure: Coil  Embolization, Single Vessel;  Surgeon: Khoobehi, Ali, MD;  Location: [facility] CATH/EP LAB;  Service: Vascular;  Laterality: Right;    CORONARY ARTERY BYPASS GRAFT  2012    ESOPHAGOGASTRODUODENOSCOPY  2017    FISTULOGRAM Bilateral 2024    Procedure: Fistulogram;  Surgeon: Khoobehi, Ali, MD;  Location: [facility] CATH/EP LAB;  Service: Vascular;  Laterality: Bilateral;    SAMARA FILTER PLACEMENT      vena cava    INSERTION OF TUNNELED CENTRAL VENOUS HEMODIALYSIS CATHETER N/A 2024    Procedure: REMOVAL TUNNELED CATHETER;  Surgeon: Khoobehi, Ali, MD;  Location: [facility] CATH/EP LAB;  Service: Peripheral Vascular;  Laterality: N/A;    INSERTION, CATHETER, TUNNELED Right 10/16/2023    Procedure: Insertion,catheter,tunneled;  Surgeon: Terri Gresham MD;  Location: [facility] OR;  Service: Cardiovascular;  Laterality: Right;    LUMBAR SYMPATHETIC NERVE BLOCK Right 2019    Procedure: BLOCK, NERVE, SYMPATHETIC, LUMBAR;  Surgeon: Tashi Good MD;  Location: [facility] OR;  Service: Pain Management;  Laterality: Right;  RIGHT SYMPATHETIC NERVE BLOCK    PHLEBOGRAPHY N/A 2024    Procedure: VENOGRAM;  Surgeon: Khoobehi, Ali, MD;  Location: [facility] CATH/EP LAB;  Service: Peripheral Vascular;  Laterality: N/A;     Family History   Problem Relation Name Age of Onset    Arthritis Mother      Diabetes Mother      Cancer Father      Heart disease Father      Stroke Father       Social History     Socioeconomic History    Marital status:    Tobacco Use    Smoking status: Former     Current packs/day: 0.00     Average packs/day: 2.0 packs/day for 38.0 years (76.0 ttl pk-yrs)     Types: Cigarettes     Start date:      Quit date:      Years since quittin.6    Smokeless tobacco: Never   Substance and Sexual Activity    Alcohol use: No    Drug use: Never    Sexual activity: Never     Social Determinants of Health     Financial Resource Strain: Unknown (2019)    [facility] from [facility] and Its Subsidiaries and Affiliates, [facility]    Overall Financial Resource Strain (CARDIA)     Difficulty of Paying Living Expenses: Patient declined   Food Insecurity: Unknown (1/9/2019)    [facility], [facility]    Hunger Vital Sign     Worried About Running Out of Food in the Last Year: Patient declined     Ran Out of Food in the Last Year: Patient declined   Transportation Needs: Unknown (1/9/2019)    [facility], [facility]    PRAPARE - Transportation     Lack of Transportation (Medical): Patient declined     Lack of Transportation (Non-Medical): Patient declined         Medications/Allergies: See med card    Vitals:    08/13/24 1206   BP: 114/68   Pulse: 68   Weight: 115.7 kg (255 lb 1.2 oz)   Height: 6' 1" (1.854 m)   PainSc:   2   PainLoc: Foot         GENERAL: A and O x3, the patient appears well groomed and is in no acute distress.  Skin: No rashes or obvious lesions  HEENT: normocephalic, atraumatic  CARDIOVASCULAR:  RRR  LUNGS: non labored breathing  ABDOMEN: soft, nontender   UPPER EXTREMITIES: Normal alignment, normal range of motion, no atrophy, no skin changes,  hair growth and nail growth normal and equal bilaterally. No swelling, no tenderness.    LOWER EXTREMITIES:  Normal alignment, no atrophy, no skin changes,  hair growth and nail growth normal and equal bilaterally. No swelling. Tenderness to right lateral patella.   LUMBAR SPINE  Lumbar spine: ROM is limited with flexion extension and oblique extension with moderate increased pain.    Samuel's test causes no increased pain on " either side.    Supine straight leg raise is negative bilaterally.    Internal and external rotation of the hip causes no increased pain on either side.  Myofascial exam: No tenderness to palpation across lumbar paraspinous muscles.        MENTAL STATUS: normal orientation, speech, language, and fund of knowledge for social situation.  Emotional state appropriate.     CRANIAL NERVES:  II:         PERRL bilaterally,   III,IV,VI: EOMI.    V:         Facial sensation equal bilaterally  VII:       Facial motor function normal.  VIII:      Hearing equal to finger rub bilaterally  IX/X:    Gag normal, palate symmetric  XI:        Shoulder shrug equal, head turn equal  XII:       Tongue midline without fasciculations        MOTOR: Tone and bulk: normal bilateral upper and lower Strength: normal   Delt      Bi         Tri        WE      WF                        R          5          5          5          5          5          5            L          5          5          5          5          5          5               IP         ADD     ABD     Quad   TA        Gas      HAM  R          5          5          5          5          5          5          5  L          5          5          5          5          5          5          5     SENSATION:  Decreased globally of bilateral feet  REFLEXES: normal, symmetric, nonbrisk.  Toes down, no clonus. No hoffmans.  GAIT: normal rise, base, steps, and arm swing.       Imaging:  None    Assessment:  Ana Bullard is a 64 y.o. male with bilateral foot pain  1. Chronic use of opiate for therapeutic purpose    2. Chronic pain disorder    3. Diabetic polyneuropathy associated with type 2 diabetes mellitus    4. Bilateral foot pain        Plan:  1. I have stressed the importance of physical activity and exercise to improve overall health  2.  Discussed disease progression of peripheral neuropathy.  Continue gabapentin as prescribed by PCP.  Consider nortriptyline 10 mg in the  future  3.  He does request continue tramadol with us.  I believe this is very reasonable.   reviewed.  He takes tramadol 50 mg q.8 hours as needed.  Previous UDS consistent.   4.  May consider repeat lumbar sympathetic block if pain is exacerbated.  Briefly discussed spinal cord stimulation as well.  5. Consider Qutenza if bilateral foot pain worsens.   6.  Follow-up in 3 month  Medication management provided by  Dr. Good

## 2024-08-17 LAB
1OH-MIDAZOLAM UR QL SCN: NOT DETECTED
6MAM UR QL: NOT DETECTED
7AMINOCLONAZEPAM UR QL: NOT DETECTED
A-OH ALPRAZ UR QL: NOT DETECTED
ALPRAZ UR QL: NOT DETECTED
AMPHET UR QL SCN: NOT DETECTED
ANNOTATION COMMENT IMP: NORMAL
BARBITURATES UR QL: NEGATIVE
BUPRENORPHINE UR QL: NOT DETECTED
BZE UR QL: NEGATIVE
CARBOXYTHC UR QL: NEGATIVE
CARISOPRODOL UR QL: NEGATIVE
CLONAZEPAM UR QL: NOT DETECTED
CODEINE UR QL: NOT DETECTED
CREAT UR-MCNC: 79.7 MG/DL (ref 20–400)
DIAZEPAM UR QL: NOT DETECTED
ETHYL GLUCURONIDE UR QL: NEGATIVE
FENTANYL UR QL: NOT DETECTED
GABAPENTIN UR QL CFM: PRESENT
HYDROCODONE UR QL: NOT DETECTED
HYDROMORPHONE UR QL: NOT DETECTED
LORAZEPAM UR QL: NOT DETECTED
MDA UR QL: NOT DETECTED
MDEA UR QL: NOT DETECTED
MDMA UR QL: NOT DETECTED
ME-PHENIDATE UR QL: NOT DETECTED
METHADONE UR QL: NEGATIVE
METHAMPHET UR QL: NOT DETECTED
MIDAZOLAM UR QL SCN: NOT DETECTED
MORPHINE UR QL: NOT DETECTED
NALOXONE UR QL CFM: NOT DETECTED
NORBUPRENORPHINE UR QL CFM: NOT DETECTED
NORDIAZEPAM UR QL: NOT DETECTED
NORFENTANYL UR QL: NOT DETECTED
NORHYDROCODONE UR QL CFM: NOT DETECTED
NORMEPERIDINE UR QL CFM: NOT DETECTED
NOROXYCODONE UR QL CFM: NOT DETECTED
NOROXYMORPHONE UR QL SCN: NOT DETECTED
OXAZEPAM UR QL: NOT DETECTED
OXYCODONE UR QL: NOT DETECTED
OXYMORPHONE UR QL: NOT DETECTED
PATHOLOGY STUDY: NORMAL
PCP UR QL: NEGATIVE
PHENTERMINE UR QL: NOT DETECTED
PREGABALIN UR QL CFM: NOT DETECTED
SERVICE CMNT-IMP: NORMAL
TAPENTADOL UR QL SCN: NOT DETECTED
TAPENTADOL UR QL SCN: NOT DETECTED
TEMAZEPAM UR QL: NOT DETECTED
TRAMADOL UR QL: NORMAL
ZOLPIDEM PHENYL-4-CARB UR QL SCN: NOT DETECTED
ZOLPIDEM UR QL: NOT DETECTED

## 2024-10-08 ENCOUNTER — HOSPITAL ENCOUNTER (INPATIENT)
Facility: HOSPITAL | Age: 64
LOS: 6 days | Discharge: HOME | DRG: 871 | End: 2024-10-14
Attending: EMERGENCY MEDICINE | Admitting: INTERNAL MEDICINE
Payer: MEDICARE

## 2024-10-08 DIAGNOSIS — R00.0 TACHYCARDIA: ICD-10-CM

## 2024-10-08 DIAGNOSIS — R78.81 BACTEREMIA: Primary | ICD-10-CM

## 2024-10-08 DIAGNOSIS — B95.2 BACTEREMIA DUE TO ENTEROCOCCUS: ICD-10-CM

## 2024-10-08 DIAGNOSIS — N18.6 ESRD (END STAGE RENAL DISEASE): ICD-10-CM

## 2024-10-08 DIAGNOSIS — Z91.89 AT RISK FOR SIDE EFFECT OF MEDICATION: ICD-10-CM

## 2024-10-08 DIAGNOSIS — A41.9 SEPSIS: ICD-10-CM

## 2024-10-08 DIAGNOSIS — R07.9 CHEST PAIN: ICD-10-CM

## 2024-10-08 DIAGNOSIS — R78.81 BACTEREMIA DUE TO ENTEROCOCCUS: ICD-10-CM

## 2024-10-08 LAB
ALBUMIN SERPL BCP-MCNC: 4 G/DL (ref 3.5–5.2)
ALP SERPL-CCNC: 71 U/L (ref 55–135)
ALT SERPL W/O P-5'-P-CCNC: 8 U/L (ref 10–44)
ANION GAP SERPL CALC-SCNC: 11 MMOL/L (ref 8–16)
AST SERPL-CCNC: 17 U/L (ref 10–40)
BASOPHILS # BLD AUTO: 0.01 K/UL (ref 0–0.2)
BASOPHILS NFR BLD: 0.1 % (ref 0–1.9)
BILIRUB SERPL-MCNC: 2.2 MG/DL (ref 0.1–1)
BUN SERPL-MCNC: 33 MG/DL (ref 8–23)
CALCIUM SERPL-MCNC: 9.1 MG/DL (ref 8.7–10.5)
CHLORIDE SERPL-SCNC: 99 MMOL/L (ref 95–110)
CO2 SERPL-SCNC: 26 MMOL/L (ref 23–29)
CREAT SERPL-MCNC: 3.2 MG/DL (ref 0.5–1.4)
DIFFERENTIAL METHOD BLD: ABNORMAL
EOSINOPHIL # BLD AUTO: 0 K/UL (ref 0–0.5)
EOSINOPHIL NFR BLD: 0 % (ref 0–8)
ERYTHROCYTE [DISTWIDTH] IN BLOOD BY AUTOMATED COUNT: 17.2 % (ref 11.5–14.5)
EST. GFR  (NO RACE VARIABLE): 21.1 ML/MIN/1.73 M^2
GLUCOSE SERPL-MCNC: 156 MG/DL (ref 70–110)
HCT VFR BLD AUTO: 34.2 % (ref 40–54)
HGB BLD-MCNC: 10.8 G/DL (ref 14–18)
IMM GRANULOCYTES # BLD AUTO: 0.06 K/UL (ref 0–0.04)
IMM GRANULOCYTES NFR BLD AUTO: 0.6 % (ref 0–0.5)
INFLUENZA A, MOLECULAR: NEGATIVE
INFLUENZA B, MOLECULAR: NEGATIVE
LDH SERPL L TO P-CCNC: 1.65 MMOL/L (ref 0.5–2.2)
LYMPHOCYTES # BLD AUTO: 0.2 K/UL (ref 1–4.8)
LYMPHOCYTES NFR BLD: 2.4 % (ref 18–48)
MAGNESIUM SERPL-MCNC: 1.4 MG/DL (ref 1.6–2.6)
MCH RBC QN AUTO: 30.7 PG (ref 27–31)
MCHC RBC AUTO-ENTMCNC: 31.6 G/DL (ref 32–36)
MCV RBC AUTO: 97 FL (ref 82–98)
MONOCYTES # BLD AUTO: 0.7 K/UL (ref 0.3–1)
MONOCYTES NFR BLD: 7.8 % (ref 4–15)
NEUTROPHILS # BLD AUTO: 8.4 K/UL (ref 1.8–7.7)
NEUTROPHILS NFR BLD: 89.1 % (ref 38–73)
NRBC BLD-RTO: 0 /100 WBC
PHOSPHATE SERPL-MCNC: 2 MG/DL (ref 2.7–4.5)
PLATELET # BLD AUTO: 76 K/UL (ref 150–450)
PMV BLD AUTO: 11.6 FL (ref 9.2–12.9)
POTASSIUM SERPL-SCNC: 5.2 MMOL/L (ref 3.5–5.1)
PROT SERPL-MCNC: 8 G/DL (ref 6–8.4)
RBC # BLD AUTO: 3.52 M/UL (ref 4.6–6.2)
SAMPLE: NORMAL
SARS-COV-2 RDRP RESP QL NAA+PROBE: NEGATIVE
SODIUM SERPL-SCNC: 136 MMOL/L (ref 136–145)
SPECIMEN SOURCE: NORMAL
WBC # BLD AUTO: 9.44 K/UL (ref 3.9–12.7)

## 2024-10-08 PROCEDURE — 87154 CUL TYP ID BLD PTHGN 6+ TRGT: CPT | Performed by: EMERGENCY MEDICINE

## 2024-10-08 PROCEDURE — 87077 CULTURE AEROBIC IDENTIFY: CPT | Performed by: EMERGENCY MEDICINE

## 2024-10-08 PROCEDURE — 12000002 HC ACUTE/MED SURGE SEMI-PRIVATE ROOM

## 2024-10-08 PROCEDURE — 96365 THER/PROPH/DIAG IV INF INIT: CPT

## 2024-10-08 PROCEDURE — 25000003 PHARM REV CODE 250: Performed by: EMERGENCY MEDICINE

## 2024-10-08 PROCEDURE — 93005 ELECTROCARDIOGRAM TRACING: CPT | Performed by: INTERNAL MEDICINE

## 2024-10-08 PROCEDURE — 63600175 PHARM REV CODE 636 W HCPCS: Performed by: EMERGENCY MEDICINE

## 2024-10-08 PROCEDURE — 96367 TX/PROPH/DG ADDL SEQ IV INF: CPT

## 2024-10-08 PROCEDURE — 87040 BLOOD CULTURE FOR BACTERIA: CPT | Performed by: EMERGENCY MEDICINE

## 2024-10-08 PROCEDURE — 25000003 PHARM REV CODE 250: Performed by: NURSE PRACTITIONER

## 2024-10-08 PROCEDURE — 83735 ASSAY OF MAGNESIUM: CPT | Performed by: EMERGENCY MEDICINE

## 2024-10-08 PROCEDURE — 87186 SC STD MICRODIL/AGAR DIL: CPT | Performed by: EMERGENCY MEDICINE

## 2024-10-08 PROCEDURE — U0002 COVID-19 LAB TEST NON-CDC: HCPCS | Performed by: NURSE PRACTITIONER

## 2024-10-08 PROCEDURE — 84100 ASSAY OF PHOSPHORUS: CPT | Performed by: EMERGENCY MEDICINE

## 2024-10-08 PROCEDURE — 85025 COMPLETE CBC W/AUTO DIFF WBC: CPT | Performed by: EMERGENCY MEDICINE

## 2024-10-08 PROCEDURE — 36415 COLL VENOUS BLD VENIPUNCTURE: CPT | Performed by: EMERGENCY MEDICINE

## 2024-10-08 PROCEDURE — 99285 EMERGENCY DEPT VISIT HI MDM: CPT | Mod: 25

## 2024-10-08 PROCEDURE — 93010 ELECTROCARDIOGRAM REPORT: CPT | Mod: ,,, | Performed by: INTERNAL MEDICINE

## 2024-10-08 PROCEDURE — 80053 COMPREHEN METABOLIC PANEL: CPT | Performed by: EMERGENCY MEDICINE

## 2024-10-08 PROCEDURE — 96368 THER/DIAG CONCURRENT INF: CPT

## 2024-10-08 PROCEDURE — 87502 INFLUENZA DNA AMP PROBE: CPT | Performed by: NURSE PRACTITIONER

## 2024-10-08 PROCEDURE — 96366 THER/PROPH/DIAG IV INF ADDON: CPT

## 2024-10-08 RX ORDER — SODIUM CHLORIDE 0.9 % (FLUSH) 0.9 %
2 SYRINGE (ML) INJECTION EVERY 12 HOURS PRN
Status: DISCONTINUED | OUTPATIENT
Start: 2024-10-08 | End: 2024-10-14 | Stop reason: HOSPADM

## 2024-10-08 RX ORDER — VANCOMYCIN HCL IN 5 % DEXTROSE 1G/250ML
1000 PLASTIC BAG, INJECTION (ML) INTRAVENOUS ONCE
Status: DISCONTINUED | OUTPATIENT
Start: 2024-10-08 | End: 2024-10-09

## 2024-10-08 RX ORDER — NALOXONE HCL 0.4 MG/ML
0.02 VIAL (ML) INJECTION
Status: DISCONTINUED | OUTPATIENT
Start: 2024-10-08 | End: 2024-10-14 | Stop reason: HOSPADM

## 2024-10-08 RX ORDER — ACETAMINOPHEN 325 MG/1
650 TABLET ORAL EVERY 4 HOURS PRN
Status: DISCONTINUED | OUTPATIENT
Start: 2024-10-08 | End: 2024-10-14 | Stop reason: HOSPADM

## 2024-10-08 RX ORDER — AMOXICILLIN 250 MG
1 CAPSULE ORAL DAILY PRN
Status: DISCONTINUED | OUTPATIENT
Start: 2024-10-08 | End: 2024-10-14 | Stop reason: HOSPADM

## 2024-10-08 RX ORDER — ACETAMINOPHEN 325 MG/1
650 TABLET ORAL EVERY 8 HOURS PRN
Status: DISCONTINUED | OUTPATIENT
Start: 2024-10-08 | End: 2024-10-14 | Stop reason: HOSPADM

## 2024-10-08 RX ORDER — POLYETHYLENE GLYCOL 3350 17 G/17G
17 POWDER, FOR SOLUTION ORAL DAILY PRN
Status: ACTIVE | OUTPATIENT
Start: 2024-10-08 | End: 2024-10-11

## 2024-10-08 RX ORDER — TRAMADOL HYDROCHLORIDE 50 MG/1
50 TABLET ORAL
Status: COMPLETED | OUTPATIENT
Start: 2024-10-08 | End: 2024-10-08

## 2024-10-08 RX ORDER — GLUCAGON 1 MG
1 KIT INJECTION
Status: DISCONTINUED | OUTPATIENT
Start: 2024-10-08 | End: 2024-10-14 | Stop reason: HOSPADM

## 2024-10-08 RX ORDER — SODIUM,POTASSIUM PHOSPHATES 280-250MG
2 POWDER IN PACKET (EA) ORAL
Status: DISCONTINUED | OUTPATIENT
Start: 2024-10-08 | End: 2024-10-14 | Stop reason: HOSPADM

## 2024-10-08 RX ORDER — IBUPROFEN 200 MG
16 TABLET ORAL
Status: DISCONTINUED | OUTPATIENT
Start: 2024-10-08 | End: 2024-10-14 | Stop reason: HOSPADM

## 2024-10-08 RX ORDER — ALUMINUM HYDROXIDE, MAGNESIUM HYDROXIDE, AND SIMETHICONE 1200; 120; 1200 MG/30ML; MG/30ML; MG/30ML
30 SUSPENSION ORAL 4 TIMES DAILY PRN
Status: DISCONTINUED | OUTPATIENT
Start: 2024-10-08 | End: 2024-10-14 | Stop reason: HOSPADM

## 2024-10-08 RX ORDER — IBUPROFEN 200 MG
24 TABLET ORAL
Status: DISCONTINUED | OUTPATIENT
Start: 2024-10-08 | End: 2024-10-14 | Stop reason: HOSPADM

## 2024-10-08 RX ORDER — OXYCODONE HYDROCHLORIDE 5 MG/1
5 TABLET ORAL EVERY 6 HOURS PRN
Status: DISCONTINUED | OUTPATIENT
Start: 2024-10-08 | End: 2024-10-09

## 2024-10-08 RX ORDER — ONDANSETRON HYDROCHLORIDE 2 MG/ML
4 INJECTION, SOLUTION INTRAVENOUS EVERY 8 HOURS PRN
Status: DISCONTINUED | OUTPATIENT
Start: 2024-10-08 | End: 2024-10-14 | Stop reason: HOSPADM

## 2024-10-08 RX ORDER — ONDANSETRON 4 MG/1
8 TABLET, ORALLY DISINTEGRATING ORAL EVERY 8 HOURS PRN
Status: DISCONTINUED | OUTPATIENT
Start: 2024-10-08 | End: 2024-10-14 | Stop reason: HOSPADM

## 2024-10-08 RX ORDER — LANOLIN ALCOHOL/MO/W.PET/CERES
800 CREAM (GRAM) TOPICAL
Status: DISCONTINUED | OUTPATIENT
Start: 2024-10-08 | End: 2024-10-14 | Stop reason: HOSPADM

## 2024-10-08 RX ORDER — MAGNESIUM SULFATE HEPTAHYDRATE 40 MG/ML
2 INJECTION, SOLUTION INTRAVENOUS ONCE
Status: COMPLETED | OUTPATIENT
Start: 2024-10-08 | End: 2024-10-08

## 2024-10-08 RX ORDER — TALC
6 POWDER (GRAM) TOPICAL NIGHTLY PRN
Status: DISCONTINUED | OUTPATIENT
Start: 2024-10-08 | End: 2024-10-14 | Stop reason: HOSPADM

## 2024-10-08 RX ORDER — MUPIROCIN 20 MG/G
OINTMENT TOPICAL 2 TIMES DAILY
Status: DISPENSED | OUTPATIENT
Start: 2024-10-08 | End: 2024-10-13

## 2024-10-08 RX ORDER — MORPHINE SULFATE 2 MG/ML
2 INJECTION, SOLUTION INTRAMUSCULAR; INTRAVENOUS EVERY 4 HOURS PRN
Status: DISCONTINUED | OUTPATIENT
Start: 2024-10-08 | End: 2024-10-14 | Stop reason: HOSPADM

## 2024-10-08 RX ORDER — ACETAMINOPHEN 325 MG/1
650 TABLET ORAL
Status: COMPLETED | OUTPATIENT
Start: 2024-10-08 | End: 2024-10-08

## 2024-10-08 RX ORDER — VANCOMYCIN HCL IN 5 % DEXTROSE 1G/250ML
1000 PLASTIC BAG, INJECTION (ML) INTRAVENOUS ONCE
Status: COMPLETED | OUTPATIENT
Start: 2024-10-08 | End: 2024-10-08

## 2024-10-08 RX ADMIN — TRAMADOL HYDROCHLORIDE 50 MG: 50 TABLET, COATED ORAL at 08:10

## 2024-10-08 RX ADMIN — MAGNESIUM SULFATE HEPTAHYDRATE 2 G: 40 INJECTION, SOLUTION INTRAVENOUS at 08:10

## 2024-10-08 RX ADMIN — PIPERACILLIN SODIUM AND TAZOBACTAM SODIUM 4.5 G: 4; .5 INJECTION, POWDER, LYOPHILIZED, FOR SOLUTION INTRAVENOUS at 06:10

## 2024-10-08 RX ADMIN — ACETAMINOPHEN 650 MG: 325 TABLET ORAL at 05:10

## 2024-10-08 RX ADMIN — VANCOMYCIN HYDROCHLORIDE 1000 MG: 1 INJECTION, POWDER, LYOPHILIZED, FOR SOLUTION INTRAVENOUS at 08:10

## 2024-10-08 NOTE — ED PROVIDER NOTES
"Encounter Date: 10/8/2024       History     Chief Complaint   Patient presents with    Altered Mental Status     Per family, patient complained of "feeling bad" yesterday. Temp of 103 at home. Today more lethargic unable to answer questions regarding name and . Patient's family answering all questions.      64-year-old male with an extensive medical history including AICD, CAD CHF cholecystitis DVT PE diabetes cardiomyopathy morbid obesity osteomyelitis sepsis presents to the ER with fever that began earlier today.  Wife also states rhinorrhea.  Triage temperature 103° F.  No medication was given at home for the fever.  Dialysis .  He went yesterday.  Patient reports fevers chills and general malaise.  No chest pain no shortness of breath no abdominal pain no vomiting or diarrhea.  No aggravating or alleviating factors.  Wife is at the bedside.  Wife reports history of sepsis x3.  Wife reports a history of diabetic ulcer but states his feet have been in good shape lately.    The history is provided by the patient and a relative. The history is limited by the condition of the patient.     Review of patient's allergies indicates:   Allergen Reactions    Vasopressin Anaphylaxis and Other (See Comments)     Increased pressure in his head; felt like his eyeballs and veins were going to pop out of his head.     Vasopressin analogues Other (See Comments) and Anaphylaxis     "felt like eyes going to pop out of my head"  Other reaction(s): Other (See Comments)  "felt like eyes going to pop out of my head"  Increased pressure in his head; felt like his eyeballs and veins were going to pop out of his head.     Heparin Other (See Comments)     Other reaction(s): "depleted my blood platets"    Decreased platelet count    Heparin analogues Other (See Comments)     Depleted WBC count    Morphine Hallucinations, Other (See Comments) and Anxiety     Other reaction(s): Hallucinations  Paranoid, " hallucinations       Past Medical History:   Diagnosis Date    AICD (automatic cardioverter/defibrillator) present     Anemia due to multiple mechanisms 02/20/2024    Anemia, chronic disease 07/27/2021    Anemia, chronic renal failure, stage 5 02/20/2024    Anemia, unspecified 07/27/2021    Anxiety and depression 2012    CAD (coronary artery disease) 2012    Cardiomegaly 2016    CHF (congestive heart failure) 2012    Cholecystitis 2017    Complete heart block 2012    Diabetic foot ulcer     DVT (deep venous thrombosis) 2012    And pulmonary embolus    GERD (gastroesophageal reflux disease) 2010    Heparin induced thrombocytopenia     Hyperlipemia 2011    IDDM (insulin dependent diabetes mellitus) 1983    With neuropathy    Ischemic cardiomyopathy 2012    MI (myocardial infarction) 2012    Morbid obesity     MRSA (methicillin resistant Staphylococcus aureus) infection 01/19/2019    Noncompliance with therapeutic plan 2019    Normochromic normocytic anemia 07/27/2021    Osteomyelitis of right foot 01/2019    Osteomyelitis of right foot 09/01/2022    Klebsiella from bone, GBS in blood    Pulmonary edema 2019    Respiratory failure 2019    Sepsis 01/2019    Due to cellulitis right leg    Sleep apnea 2013    Thrombocytopathy 2012    Thyroid disease     Venous stasis dermatitis of both lower extremities      Past Surgical History:   Procedure Laterality Date    CARDIAC PACEMAKER PLACEMENT  12/2012    CARDIAC PACEMAKER PLACEMENT  10/04/2018    battery replacement    COIL EMBOLIZATION, SINGLE VESSEL Right 5/7/2024    Procedure: Coil Embolization, Single Vessel;  Surgeon: Khoobehi, Ali, MD;  Location: [facility] CATH/EP LAB;  Service: Vascular;  Laterality: Right;    CORONARY ARTERY BYPASS GRAFT  06/2012    ESOPHAGOGASTRODUODENOSCOPY  01/31/2017    FISTULOGRAM Bilateral 5/7/2024    Procedure: Fistulogram;  Surgeon: Khoobehi, Ali, MD;  Location: [facility] CATH/EP LAB;  Service: Vascular;  Laterality: Bilateral;    SAMARA FILTER  PLACEMENT  2012    vena cava    INSERTION OF TUNNELED CENTRAL VENOUS HEMODIALYSIS CATHETER N/A 2024    Procedure: REMOVAL TUNNELED CATHETER;  Surgeon: Khoobehi, Ali, MD;  Location: [facility] CATH/EP LAB;  Service: Peripheral Vascular;  Laterality: N/A;    INSERTION, CATHETER, TUNNELED Right 10/16/2023    Procedure: Insertion,catheter,tunneled;  Surgeon: Terri Gresham MD;  Location: [facility] OR;  Service: Cardiovascular;  Laterality: Right;    LUMBAR SYMPATHETIC NERVE BLOCK Right 2019    Procedure: BLOCK, NERVE, SYMPATHETIC, LUMBAR;  Surgeon: Tashi Good MD;  Location: [facility] OR;  Service: Pain Management;  Laterality: Right;  RIGHT SYMPATHETIC NERVE BLOCK    PHLEBOGRAPHY N/A 2024    Procedure: VENOGRAM;  Surgeon: Khoobehi, Ali, MD;  Location: [facility] CATH/EP LAB;  Service: Peripheral Vascular;  Laterality: N/A;     Family History   Problem Relation Name Age of Onset    Arthritis Mother      Diabetes Mother      Cancer Father      Heart disease Father      Stroke Father       Social History     Tobacco Use    Smoking status: Former     Current packs/day: 0.00     Average packs/day: 2.0 packs/day for 38.0 years (76.0 ttl pk-yrs)     Types: Cigarettes     Start date:      Quit date:      Years since quittin.7    Smokeless tobacco: Never   Substance Use Topics    Alcohol use: No    Drug use: Never     Review of Systems   Constitutional:  Positive for activity change, chills, fatigue and fever.   HENT:  Positive for rhinorrhea.        Physical Exam     Initial Vitals   BP Pulse Resp Temp SpO2   10/08/24 1652 10/08/24 1655 10/08/24 1652 10/08/24 1652 10/08/24 1652   (!) 111/57 94 17 (!) 103.1 °F (39.5 °C) (!) 90 %      MAP       --                Physical Exam    Nursing note and vitals reviewed.  Constitutional: He appears well-developed and well-nourished. He is not diaphoretic.  Non-toxic appearance. He does not have a sickly appearance. He does not appear ill. No distress.   Warm to touch   HENT:   Head:  Normocephalic and atraumatic.   Eyes: EOM are normal.   Neck: Neck supple.   Normal range of motion.  Cardiovascular:  Normal rate, regular rhythm and normal heart sounds.     Exam reveals no gallop and no friction rub.       No murmur heard.  Pulmonary/Chest: Breath sounds normal. No respiratory distress. He has no wheezes. He has no rhonchi. He has no rales.   Abdominal: Abdomen is soft. He exhibits no distension. There is no abdominal tenderness. There is no rebound.   Musculoskeletal:         General: Normal range of motion.      Cervical back: Normal range of motion and neck supple. No rigidity. Normal range of motion.     Neurological: He is alert and oriented to person, place, and time.   Skin: Skin is warm and dry. No rash noted.   Venous stasis changes both legs no obvious cellulitis no obvious diabetic ulcer   Psychiatric: He has a normal mood and affect. His behavior is normal. Judgment and thought content normal.         ED Course   Critical Care    Date/Time: 10/8/2024 7:56 PM    Performed by: Rich Pereyra MD  Authorized by: Rich Pereyra MD  Direct patient critical care time: 65 minutes  Additional history critical care time: 10 minutes  Ordering / reviewing critical care time: 10 minutes  Documentation critical care time: 7 minutes  Consulting other physicians critical care time: 5 minutes  Total critical care time (exclusive of procedural time) : 97 minutes  Critical care was necessary to treat or prevent imminent or life-threatening deterioration of the following conditions: sepsis.  Critical care was time spent personally by me on the following activities: development of treatment plan with patient or surrogate, evaluation of patient's response to treatment, examination of patient, obtaining history from patient or surrogate, ordering and performing treatments and interventions, ordering and review of laboratory studies, ordering and review of radiographic studies, pulse oximetry,  re-evaluation of patient's condition and review of old charts.        Labs Reviewed   CULTURE, BLOOD   CULTURE, BLOOD   CBC W/ AUTO DIFFERENTIAL   COMPREHENSIVE METABOLIC PANEL   LACTIC ACID, PLASMA   MAGNESIUM   PHOSPHORUS   URINALYSIS, REFLEX TO URINE CULTURE   SARS-COV-2 RNA AMPLIFICATION, QUAL   INFLUENZA A AND B ANTIGEN   POCT LACTATE          Imaging Results              X-Ray Chest 1 View (In process)                      Medications   acetaminophen tablet 650 mg (has no administration in time range)   piperacillin-tazobactam 4.5 g in dextrose 5 % 100 mL IVPB (ready to mix) (has no administration in time range)   vancomycin (VANCOCIN) 2,000 mg in D5W 500 mL IVPB (has no administration in time range)     Medical Decision Making  64-year-old male with a very lengthy medical history including AICD CAD CHF heart block diabetes sepsis Monday Wednesday Friday dialysis presents with fever and rhinorrhea.  Febrile in the ER to 103°.  Patient has defervesced some.  Flu and COVID are negative.  ER workup is unremarkable he is covered broad-spectrum antibiotics for possible sepsis.  Hospital Medicine will admit.  No distress in the ER at this time.  Fluid bolus contraindicated secondary to end-stage renal disease.    Amount and/or Complexity of Data Reviewed  Independent Historian: spouse  External Data Reviewed: ECG and notes.  Labs:  Decision-making details documented in ED Course.  Radiology:  Decision-making details documented in ED Course.  ECG/medicine tests: ordered and independent interpretation performed. Decision-making details documented in ED Course.    Risk  Prescription drug management.               ED Course as of 10/08/24 1956   Tue Oct 08, 2024   1718 BP(!): 111/57 [EF]   1718 Temp(!): 103.1 °F (39.5 °C) [EF]   1718 Pulse: 94 [EF]   1718 Resp: 17 [EF]   1718 SpO2(!): 90 %  This patient does have evidence of infective focus  My overall impression is sepsis.  Source: Unknown  Antibiotics given-  Antibiotics (72h ago, onward)    Start     Stop Route Frequency Ordered    10/08/24 1815  piperacillin-tazobactam 4.5 g in dextrose 5 % 100 mL IVPB   (ready to mix)         -- IV Once 10/08/24 1714    10/08/24 1715  piperacillin-tazobactam 4.5 g in dextrose 5 % 100 mL IVPB   (ready to mix)  (ED Adult Sepsis Treatment)         10/09/24 1714 IV Every 8 hours (non-standard times) 10/08/24 1711    10/08/24 1715  vancomycin (VANCOCIN) 2,000 mg in D5W 500 mL IVPB  (ED   Adult Sepsis Treatment)         -- IV Once 10/08/24 1711      Latest lactate reviewed-  @[id number](lactate,poclac)@  Organ dysfunction indicated by none    Fluid challenge Contraindicated- Fluid bolus is contraindicated in this patient due to End Stage Renal Disease     Post- resuscitation assessment No - Post resuscitation assessment not needed       Will Not start Pressors- Levophed for MAP of 65  Source control achieved by: iv abx     [EF]   1751 POC Lactate: 1.65 [EF]   1820 X-Ray Chest 1 View [EF]   1827 Ventricular paced rhythm 96 beats per minute no ST elevation independently interpreted.  EKG is unchanged from prior. [EF]      ED Course User Index  [EF] Rich Pereyra MD                           Clinical Impression:  Final diagnoses:  [R00.0] Tachycardia                 Rich Pereyra MD  10/08/24 1958

## 2024-10-08 NOTE — FIRST PROVIDER EVALUATION
" Emergency Department TeleTriage Encounter Note      CHIEF COMPLAINT    Chief Complaint   Patient presents with    Altered Mental Status     Per family, patient complained of "feeling bad" yesterday. Temp of 103 at home. Today more lethargic unable to answer questions regarding name and . Patient's family answering all questions.        VITAL SIGNS   Initial Vitals   BP Pulse Resp Temp SpO2   10/08/24 1652 10/08/24 1655 10/08/24 1652 10/08/24 1652 10/08/24 1652   (!) 111/57 94 17 (!) 103.1 °F (39.5 °C) (!) 90 %      MAP       --                   ALLERGIES    Review of patient's allergies indicates:   Allergen Reactions    Vasopressin Anaphylaxis and Other (See Comments)     Increased pressure in his head; felt like his eyeballs and veins were going to pop out of his head.     Vasopressin analogues Other (See Comments) and Anaphylaxis     "felt like eyes going to pop out of my head"  Other reaction(s): Other (See Comments)  "felt like eyes going to pop out of my head"  Increased pressure in his head; felt like his eyeballs and veins were going to pop out of his head.     Heparin Other (See Comments)     Other reaction(s): "depleted my blood platets"    Decreased platelet count    Heparin analogues Other (See Comments)     Depleted WBC count    Morphine Hallucinations, Other (See Comments) and Anxiety     Other reaction(s): Hallucinations  Paranoid, hallucinations         PROVIDER TRIAGE NOTE  Verbal consent for the teletriage evaluation was given by the patient at the start of the evaluation.  All efforts will be made to maintain patient's privacy during the evaluation.      This is a teletriage evaluation of a 64 y.o. male presenting to the ED per wife with c/o body aches that started this AM.  Full dialysis yesterday. Limited physical exam via telehealth: The patient is awake, alert, answering questions appropriately and is not in respiratory distress.  As the Teletriage provider, I performed an initial " assessment and ordered appropriate labs and imaging studies, if any, to facilitate the patient's care once placed in the ED. Once a room is available, care and a full evaluation will be completed by an alternate ED provider.  Any additional orders and the final disposition will be determined by that provider.  All imaging and labs will not be followed-up by the Teletriage Team, including myself.        ORDERS  Labs Reviewed   CULTURE, BLOOD   CULTURE, BLOOD   CBC W/ AUTO DIFFERENTIAL   COMPREHENSIVE METABOLIC PANEL   LACTIC ACID, PLASMA   MAGNESIUM   PHOSPHORUS   URINALYSIS, REFLEX TO URINE CULTURE   SARS-COV-2 RNA AMPLIFICATION, QUAL   INFLUENZA A AND B ANTIGEN   POCT LACTATE       ED Orders (720h ago, onward)      Start Ordered     Status Ordering Provider    10/08/24 2059 10/08/24 1658  Lactic Acid Plasma #2  In 4 hours         Ordered VERÓNICA SYED    10/08/24 1715 10/08/24 1703  acetaminophen tablet 650 mg  ED 1 Time         Ordered PRIMITIVO MCPHERSON    10/08/24 1704 10/08/24 1703  Oxygen Continuous  Continuous         Ordered PRIMITIVO MCPHERSON    10/08/24 1703 10/08/24 1703  COVID-19 Rapid Screening  STAT         Ordered PRIMITIVO MCPHERSON    10/08/24 1703 10/08/24 1703  Influenza antigen Nasopharyngeal Swab  Once         Ordered PRIMITIVO MCPHERSON    10/08/24 1659 10/08/24 1658  Blood culture x two cultures. Draw prior to antibiotics  Every 15 min      Comments: Aerobic and anaerobic     Order ID Start Status Ordering Provider   [id number] 10/08/24 1659 Pending Collection VERÓNICA SYED   [id number] 10/08/24 1714 Pending Collection VERÓNICA SYED       Ordered VERÓNICA SYED    10/08/24 1659 10/08/24 1658  CBC auto differential  STAT         Ordered VERÓNICA SYED    10/08/24 1659 10/08/24 1658  Comprehensive metabolic panel  STAT         Ordered VERÓNICA SYED    10/08/24 1659 10/08/24 1658  POCT Lactate #1  Once         Ordered VERÓNICA SYED    10/08/24 1659 10/08/24 1658  Magnesium  STAT          Ordered PEYROUXVERÓNICA    10/08/24 1659 10/08/24 1658  Phosphorus  STAT         Ordered PEYROUXVERÓNICA    10/08/24 1659 10/08/24 1658  X-Ray Chest 1 View  1 time imaging         Ordered VERÓNICA SYED    10/08/24 1659 10/08/24 1658  Bed rest  Until discontinued         Ordered MELINDA SYEDELLE    10/08/24 1659 10/08/24 1658  Cardiac Monitoring - Adult  Continuous        Comments: Notify Physician If:    Ordered MELINDA SYEDELLE    10/08/24 1659 10/08/24 1658  Pulse Oximetry Continuous  Continuous         Ordered PEYROUXMELINDAVERÓNICA    10/08/24 1659 10/08/24 1658  Strict intake and output  Until discontinued         Ordered MELINDA SYEDELLE    10/08/24 1659 10/08/24 1658  Urinalysis, Reflex to Urine Culture Urine, Clean Catch  STAT         Ordered VERÓNICA SYED    10/08/24 1659 10/08/24 1658  Saline lock IV  Once         Ordered PECONSTANTINMELINDA CRUZELLE    Unscheduled 10/08/24 1658  Vital signs  Per comments      Comments: Every 15 minutes until SBP greater than 90 or MAP greater than 65, then every 30 minutes times one hours then every one hour    Ordered MELINDA SYEDELLE              Virtual Visit Note: The provider triage portion of this emergency department evaluation and documentation was performed via [employer], a HIPAA-compliant telemedicine application, in concert with a tele-presenter in the room. A face to face patient evaluation with one of my colleagues will occur once the patient is placed in an emergency department room.      DISCLAIMER: This note was prepared with [employer] voice recognition transcription software. Garbled syntax, mangled pronouns, and other bizarre constructions may be attributed to that software system.

## 2024-10-09 PROBLEM — B95.2 BACTEREMIA DUE TO ENTEROCOCCUS: Status: ACTIVE | Noted: 2020-01-20

## 2024-10-09 PROBLEM — H05.232 PERIORBITAL HEMATOMA OF LEFT EYE: Status: ACTIVE | Noted: 2024-10-09

## 2024-10-09 PROBLEM — N18.6 ESRD (END STAGE RENAL DISEASE): Status: ACTIVE | Noted: 2024-10-09

## 2024-10-09 LAB
ACINETOBACTER CALCOACETICUS/BAUMANNII COMPLEX: NOT DETECTED
ALBUMIN SERPL BCP-MCNC: 3.6 G/DL (ref 3.5–5.2)
ALP SERPL-CCNC: 58 U/L (ref 55–135)
ALT SERPL W/O P-5'-P-CCNC: 7 U/L (ref 10–44)
ANION GAP SERPL CALC-SCNC: 7 MMOL/L (ref 8–16)
AST SERPL-CCNC: 14 U/L (ref 10–40)
BACTERIA #/AREA URNS HPF: NORMAL /HPF
BACTEROIDES FRAGILIS: NOT DETECTED
BASOPHILS # BLD AUTO: 0 K/UL (ref 0–0.2)
BASOPHILS NFR BLD: 0 % (ref 0–1.9)
BILIRUB SERPL-MCNC: 2.4 MG/DL (ref 0.1–1)
BILIRUB UR QL STRIP: NEGATIVE
BUN SERPL-MCNC: 43 MG/DL (ref 8–23)
CALCIUM SERPL-MCNC: 8.9 MG/DL (ref 8.7–10.5)
CANDIDA ALBICANS: NOT DETECTED
CANDIDA AURIS: NOT DETECTED
CANDIDA GLABRATA: NOT DETECTED
CANDIDA KRUSEI: NOT DETECTED
CANDIDA PARAPSILOSIS: NOT DETECTED
CANDIDA TROPICALIS: NOT DETECTED
CHLORIDE SERPL-SCNC: 97 MMOL/L (ref 95–110)
CHOLEST SERPL-MCNC: 63 MG/DL (ref 120–199)
CHOLEST/HDLC SERPL: 3.7 {RATIO} (ref 2–5)
CLARITY UR: CLEAR
CO2 SERPL-SCNC: 30 MMOL/L (ref 23–29)
COLOR UR: YELLOW
CREAT SERPL-MCNC: 4 MG/DL (ref 0.5–1.4)
CRP SERPL-MCNC: 11.1 MG/DL
CRYPTOCOCCUS NEOFORMANS/GATTII: NOT DETECTED
CTX-M GENE (ESBL PRODUCER): ABNORMAL
DIFFERENTIAL METHOD BLD: ABNORMAL
ENTEROBACTER CLOACAE COMPLEX: NOT DETECTED
ENTEROBACTERALES: NOT DETECTED
ENTEROCOCCUS FAECALIS: DETECTED
ENTEROCOCCUS FAECIUM: NOT DETECTED
EOSINOPHIL # BLD AUTO: 0 K/UL (ref 0–0.5)
EOSINOPHIL NFR BLD: 0 % (ref 0–8)
ERYTHROCYTE [DISTWIDTH] IN BLOOD BY AUTOMATED COUNT: 17.5 % (ref 11.5–14.5)
ERYTHROCYTE [SEDIMENTATION RATE] IN BLOOD BY WESTERGREN METHOD: 44 MM/HR (ref 0–10)
ESCHERICHIA COLI: NOT DETECTED
EST. GFR  (NO RACE VARIABLE): 16.1 ML/MIN/1.73 M^2
ESTIMATED AVG GLUCOSE: 134 MG/DL (ref 68–131)
GLUCOSE SERPL-MCNC: 153 MG/DL (ref 70–110)
GLUCOSE UR QL STRIP: ABNORMAL
HAEMOPHILUS INFLUENZAE: NOT DETECTED
HBA1C MFR BLD: 6.3 % (ref 4.5–6.2)
HCT VFR BLD AUTO: 32 % (ref 40–54)
HDLC SERPL-MCNC: 17 MG/DL (ref 40–75)
HDLC SERPL: 27 % (ref 20–50)
HGB BLD-MCNC: 9.9 G/DL (ref 14–18)
HGB UR QL STRIP: ABNORMAL
HYALINE CASTS #/AREA URNS LPF: 0 /LPF
IMM GRANULOCYTES # BLD AUTO: 0.06 K/UL (ref 0–0.04)
IMM GRANULOCYTES NFR BLD AUTO: 0.9 % (ref 0–0.5)
IMP GENE (CARBAPENEM RESISTANT): ABNORMAL
KETONES UR QL STRIP: NEGATIVE
KLEBSIELLA AEROGENES: NOT DETECTED
KLEBSIELLA OXYTOCA: NOT DETECTED
KLEBSIELLA PNEUMONIAE GROUP: NOT DETECTED
KPC RESISTANCE GENE (CARBAPENEM): ABNORMAL
LACTATE SERPL-SCNC: 1.7 MMOL/L (ref 0.5–1.9)
LDLC SERPL CALC-MCNC: 25.2 MG/DL (ref 63–159)
LEUKOCYTE ESTERASE UR QL STRIP: NEGATIVE
LISTERIA MONOCYTOGENES: NOT DETECTED
LYMPHOCYTES # BLD AUTO: 0.1 K/UL (ref 1–4.8)
LYMPHOCYTES NFR BLD: 2.1 % (ref 18–48)
MAGNESIUM SERPL-MCNC: 1.8 MG/DL (ref 1.6–2.6)
MCH RBC QN AUTO: 30.7 PG (ref 27–31)
MCHC RBC AUTO-ENTMCNC: 30.9 G/DL (ref 32–36)
MCR-1: ABNORMAL
MCV RBC AUTO: 99 FL (ref 82–98)
MEC A/C AND MREJ (MRSA): ABNORMAL
MEC A/C: ABNORMAL
MICROSCOPIC COMMENT: NORMAL
MONOCYTES # BLD AUTO: 0.5 K/UL (ref 0.3–1)
MONOCYTES NFR BLD: 7.7 % (ref 4–15)
NDM GENE (CARBAPENEM RESISTANT): ABNORMAL
NEISSERIA MENINGITIDIS: NOT DETECTED
NEUTROPHILS # BLD AUTO: 5.9 K/UL (ref 1.8–7.7)
NEUTROPHILS NFR BLD: 89.3 % (ref 38–73)
NITRITE UR QL STRIP: NEGATIVE
NONHDLC SERPL-MCNC: 46 MG/DL
NRBC BLD-RTO: 0 /100 WBC
OXA-48-LIKE (CARBAPENEM RESISTANT): ABNORMAL
PH UR STRIP: 7 [PH] (ref 5–8)
PLATELET # BLD AUTO: 56 K/UL (ref 150–450)
PLATELET BLD QL SMEAR: ABNORMAL
PMV BLD AUTO: 11.1 FL (ref 9.2–12.9)
POCT GLUCOSE: 145 MG/DL (ref 70–110)
POCT GLUCOSE: 157 MG/DL (ref 70–110)
POTASSIUM SERPL-SCNC: 5.3 MMOL/L (ref 3.5–5.1)
PROT SERPL-MCNC: 7.2 G/DL (ref 6–8.4)
PROT UR QL STRIP: ABNORMAL
PROTEUS SPECIES: NOT DETECTED
PSEUDOMONAS AERUGINOSA: NOT DETECTED
RBC # BLD AUTO: 3.23 M/UL (ref 4.6–6.2)
RBC #/AREA URNS HPF: 1 /HPF (ref 0–4)
SALMONELLA SP: NOT DETECTED
SERRATIA MARCESCENS: NOT DETECTED
SODIUM SERPL-SCNC: 134 MMOL/L (ref 136–145)
SP GR UR STRIP: 1.01 (ref 1–1.03)
SQUAMOUS #/AREA URNS HPF: 4 /HPF
STAPHYLOCOCCUS AUREUS: NOT DETECTED
STAPHYLOCOCCUS EPIDERMIDIS: NOT DETECTED
STAPHYLOCOCCUS LUGDUNESIS: NOT DETECTED
STAPHYLOCOCCUS SPECIES: NOT DETECTED
STENOTROPHOMONAS MALTOPHILIA: NOT DETECTED
STREPTOCOCCUS AGALACTIAE: NOT DETECTED
STREPTOCOCCUS PNEUMONIAE: NOT DETECTED
STREPTOCOCCUS PYOGENES: NOT DETECTED
STREPTOCOCCUS SPECIES: NOT DETECTED
TRIGL SERPL-MCNC: 104 MG/DL (ref 30–150)
URN SPEC COLLECT METH UR: ABNORMAL
UROBILINOGEN UR STRIP-ACNC: NEGATIVE EU/DL
VAN A/B (VRE GENE): NOT DETECTED
VIM GENE (CARBAPENEM RESISTANT): ABNORMAL
WBC # BLD AUTO: 6.63 K/UL (ref 3.9–12.7)
WBC #/AREA URNS HPF: 2 /HPF (ref 0–5)
YEAST URNS QL MICRO: NORMAL

## 2024-10-09 PROCEDURE — 99900035 HC TECH TIME PER 15 MIN (STAT)

## 2024-10-09 PROCEDURE — 80061 LIPID PANEL: CPT | Performed by: INTERNAL MEDICINE

## 2024-10-09 PROCEDURE — 80053 COMPREHEN METABOLIC PANEL: CPT | Performed by: INTERNAL MEDICINE

## 2024-10-09 PROCEDURE — 25000003 PHARM REV CODE 250

## 2024-10-09 PROCEDURE — 87340 HEPATITIS B SURFACE AG IA: CPT

## 2024-10-09 PROCEDURE — 90935 HEMODIALYSIS ONE EVALUATION: CPT

## 2024-10-09 PROCEDURE — 81001 URINALYSIS AUTO W/SCOPE: CPT | Performed by: EMERGENCY MEDICINE

## 2024-10-09 PROCEDURE — 25000003 PHARM REV CODE 250: Performed by: INTERNAL MEDICINE

## 2024-10-09 PROCEDURE — 83735 ASSAY OF MAGNESIUM: CPT | Performed by: INTERNAL MEDICINE

## 2024-10-09 PROCEDURE — 63600175 PHARM REV CODE 636 W HCPCS: Performed by: INTERNAL MEDICINE

## 2024-10-09 PROCEDURE — 63600175 PHARM REV CODE 636 W HCPCS: Mod: JW | Performed by: NURSE PRACTITIONER

## 2024-10-09 PROCEDURE — 5A1D70Z PERFORMANCE OF URINARY FILTRATION, INTERMITTENT, LESS THAN 6 HOURS PER DAY: ICD-10-PCS | Performed by: INTERNAL MEDICINE

## 2024-10-09 PROCEDURE — 99223 1ST HOSP IP/OBS HIGH 75: CPT | Mod: ,,, | Performed by: INTERNAL MEDICINE

## 2024-10-09 PROCEDURE — 12000002 HC ACUTE/MED SURGE SEMI-PRIVATE ROOM

## 2024-10-09 PROCEDURE — 94761 N-INVAS EAR/PLS OXIMETRY MLT: CPT

## 2024-10-09 PROCEDURE — 83605 ASSAY OF LACTIC ACID: CPT | Performed by: EMERGENCY MEDICINE

## 2024-10-09 PROCEDURE — 36415 COLL VENOUS BLD VENIPUNCTURE: CPT | Performed by: EMERGENCY MEDICINE

## 2024-10-09 PROCEDURE — 63600175 PHARM REV CODE 636 W HCPCS: Performed by: EMERGENCY MEDICINE

## 2024-10-09 PROCEDURE — 25000003 PHARM REV CODE 250: Performed by: EMERGENCY MEDICINE

## 2024-10-09 PROCEDURE — 27000221 HC OXYGEN, UP TO 24 HOURS

## 2024-10-09 PROCEDURE — 99900031 HC PATIENT EDUCATION (STAT)

## 2024-10-09 PROCEDURE — 94660 CPAP INITIATION&MGMT: CPT

## 2024-10-09 PROCEDURE — 85025 COMPLETE CBC W/AUTO DIFF WBC: CPT | Performed by: INTERNAL MEDICINE

## 2024-10-09 PROCEDURE — 86704 HEP B CORE ANTIBODY TOTAL: CPT

## 2024-10-09 PROCEDURE — 85651 RBC SED RATE NONAUTOMATED: CPT | Performed by: INTERNAL MEDICINE

## 2024-10-09 PROCEDURE — 86706 HEP B SURFACE ANTIBODY: CPT

## 2024-10-09 PROCEDURE — 86140 C-REACTIVE PROTEIN: CPT | Performed by: INTERNAL MEDICINE

## 2024-10-09 PROCEDURE — 5A09357 ASSISTANCE WITH RESPIRATORY VENTILATION, LESS THAN 24 CONSECUTIVE HOURS, CONTINUOUS POSITIVE AIRWAY PRESSURE: ICD-10-PCS | Performed by: INTERNAL MEDICINE

## 2024-10-09 PROCEDURE — 83036 HEMOGLOBIN GLYCOSYLATED A1C: CPT | Performed by: INTERNAL MEDICINE

## 2024-10-09 PROCEDURE — 94799 UNLISTED PULMONARY SVC/PX: CPT

## 2024-10-09 RX ORDER — LEVOTHYROXINE SODIUM 25 UG/1
50 TABLET ORAL
Status: DISCONTINUED | OUTPATIENT
Start: 2024-10-10 | End: 2024-10-14 | Stop reason: HOSPADM

## 2024-10-09 RX ORDER — INSULIN ASPART 100 [IU]/ML
0-5 INJECTION, SOLUTION INTRAVENOUS; SUBCUTANEOUS
Status: DISCONTINUED | OUTPATIENT
Start: 2024-10-09 | End: 2024-10-14 | Stop reason: HOSPADM

## 2024-10-09 RX ORDER — TRAMADOL HYDROCHLORIDE 50 MG/1
50 TABLET ORAL EVERY 6 HOURS PRN
Status: DISCONTINUED | OUTPATIENT
Start: 2024-10-09 | End: 2024-10-14 | Stop reason: HOSPADM

## 2024-10-09 RX ORDER — ATORVASTATIN CALCIUM 20 MG/1
20 TABLET, FILM COATED ORAL DAILY
Status: DISCONTINUED | OUTPATIENT
Start: 2024-10-09 | End: 2024-10-14 | Stop reason: HOSPADM

## 2024-10-09 RX ORDER — MIDODRINE HYDROCHLORIDE 2.5 MG/1
5 TABLET ORAL
Status: DISCONTINUED | OUTPATIENT
Start: 2024-10-09 | End: 2024-10-14 | Stop reason: HOSPADM

## 2024-10-09 RX ORDER — NAPROXEN SODIUM 220 MG/1
81 TABLET, FILM COATED ORAL DAILY
Status: DISCONTINUED | OUTPATIENT
Start: 2024-10-09 | End: 2024-10-14 | Stop reason: HOSPADM

## 2024-10-09 RX ORDER — DOXYCYCLINE 100 MG/1
100 CAPSULE ORAL 2 TIMES DAILY
Status: ON HOLD | COMMUNITY
End: 2024-10-14 | Stop reason: HOSPADM

## 2024-10-09 RX ORDER — IPRATROPIUM BROMIDE AND ALBUTEROL SULFATE 2.5; .5 MG/3ML; MG/3ML
3 SOLUTION RESPIRATORY (INHALATION) EVERY 6 HOURS PRN
Status: DISCONTINUED | OUTPATIENT
Start: 2024-10-09 | End: 2024-10-14 | Stop reason: HOSPADM

## 2024-10-09 RX ORDER — LEVOTHYROXINE SODIUM 50 UG/1
50 TABLET ORAL DAILY
COMMUNITY

## 2024-10-09 RX ORDER — ALBUTEROL SULFATE 90 UG/1
1 INHALANT RESPIRATORY (INHALATION) EVERY 4 HOURS PRN
Status: DISCONTINUED | OUTPATIENT
Start: 2024-10-09 | End: 2024-10-09

## 2024-10-09 RX ADMIN — CEFTRIAXONE SODIUM 2 G: 2 INJECTION, POWDER, FOR SOLUTION INTRAMUSCULAR; INTRAVENOUS at 11:10

## 2024-10-09 RX ADMIN — MIDODRINE HYDROCHLORIDE 5 MG: 2.5 TABLET ORAL at 12:10

## 2024-10-09 RX ADMIN — ACETAMINOPHEN 650 MG: 325 TABLET ORAL at 05:10

## 2024-10-09 RX ADMIN — ATORVASTATIN CALCIUM 20 MG: 20 TABLET, FILM COATED ORAL at 09:10

## 2024-10-09 RX ADMIN — EPOETIN ALFA-EPBX 6000 UNITS: 10000 INJECTION, SOLUTION INTRAVENOUS; SUBCUTANEOUS at 04:10

## 2024-10-09 RX ADMIN — CEFTRIAXONE SODIUM 2 G: 2 INJECTION, POWDER, FOR SOLUTION INTRAMUSCULAR; INTRAVENOUS at 12:10

## 2024-10-09 RX ADMIN — TRAMADOL HYDROCHLORIDE 50 MG: 50 TABLET, COATED ORAL at 09:10

## 2024-10-09 RX ADMIN — AMPICILLIN SODIUM 2 G: 2 INJECTION, POWDER, FOR SOLUTION INTRAMUSCULAR; INTRAVENOUS at 08:10

## 2024-10-09 RX ADMIN — PIPERACILLIN SODIUM AND TAZOBACTAM SODIUM 4.5 G: 4; .5 INJECTION, POWDER, LYOPHILIZED, FOR SOLUTION INTRAVENOUS at 09:10

## 2024-10-09 RX ADMIN — ASPIRIN 81 MG 81 MG: 81 TABLET ORAL at 09:10

## 2024-10-09 RX ADMIN — MUPIROCIN 1 G: 20 OINTMENT TOPICAL at 09:10

## 2024-10-09 RX ADMIN — PIPERACILLIN SODIUM AND TAZOBACTAM SODIUM 4.5 G: 4; .5 INJECTION, POWDER, LYOPHILIZED, FOR SOLUTION INTRAVENOUS at 02:10

## 2024-10-09 RX ADMIN — TRAMADOL HYDROCHLORIDE 50 MG: 50 TABLET, COATED ORAL at 08:10

## 2024-10-09 RX ADMIN — MUPIROCIN 1 G: 20 OINTMENT TOPICAL at 08:10

## 2024-10-09 NOTE — ASSESSMENT & PLAN NOTE
Source unclear, patient not requiring any pressor support but did get IV fluid resuscitated  Infectious Disease consult is placed, transitioned to ceftriaxone ampicillin      Patient with no wounds or sores, only for an implant is a pacemaker and ICD, no additional hardware  No jaw pain but poor dentition    CRP noted to be elevated, TTE negative for vegetations

## 2024-10-09 NOTE — CARE UPDATE
10/09/24 0950   Tobacco Cessation Intervention   Do you use any type of tobacco product? No  (former smoker)   Respiratory Evaluation   $ Care Plan Tech Time 15 min   $ Respiratory Evaluation Complete   Evaluation For New Orders   Cardiac Diagnosis N/A   Current Surgeries N/A   Home Oxygen   Has Home Oxygen? No   Home Aerosol, MDI, DPI, and Other Treatments/Therapies   Home Respiratory Therapy Per Patient/Review of Chart No   Oxygen Care Plan   Oxygen Care Plan Per Protocol   Bronchodilator Care Plan   Aerosol Meds w/ frequency   (Duoneb Q6 PRN)   Rationale No Rationale found   Atelectasis Care Plan   Rationale No Rational Found   Airway Clearance Care Plan   Rationale No rationale found

## 2024-10-09 NOTE — PLAN OF CARE
Problem: Adult Inpatient Plan of Care  Goal: Plan of Care Review  Outcome: Progressing     Problem: Sepsis/Septic Shock  Goal: Optimal Coping  Outcome: Progressing  Goal: Absence of Bleeding  Outcome: Progressing  Goal: Optimal Nutrition Intake  Outcome: Progressing

## 2024-10-09 NOTE — PROGRESS NOTES
1000 ml net uf removed   10/09/24 1810   Required for all Hemodialysis Patients   Hepatitis Status negative   Handoff Report   Received From Isabel   Given To Paola   Treatment Type   Treatment Type Maintenance   Vital Signs   Temp 98.5 °F (36.9 °C)   Temp Source Oral   Pulse 70   Resp 18   SpO2 99 %   Flow (L/min) (Oxygen Therapy) 2   Device (Oxygen Therapy) nasal cannula   /63   BP Location Left arm   BP Method Automatic   Patient Position Lying   Assessments (Pre/Post)   Consent Obtained yes   Safety vein preservation armband present   Date Hepatitis Profile Obtained 09/23/24   Blood Liters Processed (BLP) 59.3   Transport Modality bed   Level of Consciousness (AVPU) alert   Dialyzer Clearance mildly streaked   Pain   Preferred Pain Scale number (Numeric Rating Pain Scale)   Pain Rating (0-10): Rest 5        Hemodialysis AV Fistula Right forearm   No placement date or time found.   Present Prior to Hospital Arrival?: Yes  Location: Right forearm   Site Assessment Clean;Dry;Intact   Patency Present;Thrill;Bruit   Status Deaccessed   Flows Good   Dressing Status Clean;Dry;Intact   Site Condition No complications   Dressing Gauze   Post-Hemodialysis Assessment   Rinseback Volume (mL) 250 mL   Blood Volume Processed (Liters) 59.3 L   Dialyzer Clearance Lightly streaked   Duration of Treatment 180 minutes   Additional Fluid Intake (mL) 500 mL   Total UF (mL) 1500 mL   Net Fluid Removal 1000   Patient Response to Treatment tolerated well   Post-Treatment Weight 119 kg (262 lb 5.6 oz)   Treatment Weight Change -1   Arterial bleeding stop time (min) 5 min   Venous bleeding stop time (min) 5 min   Post-Hemodialysis Comments tx completed   Edema   Edema generalized     Educated on hd tx

## 2024-10-09 NOTE — NURSING
A CBG of 157 was  assessed at 1252 today. The results have not crossed over to the EMR. No coverage  is needed at this time.

## 2024-10-09 NOTE — ASSESSMENT & PLAN NOTE
Patient with recent removal of left basal cell carcinoma lesion, likely adjacent left eye, patient states that he had a black eye after this procedure, no trauma to the area

## 2024-10-09 NOTE — PROGRESS NOTES
Pharmacist Renal Dose Adjustment Note    Ana Bullard is a 64 y.o. male being treated with the medication Ampicillin.    Patient Data:    Vital Signs (Most Recent):  Temp: 98 °F (36.7 °C) (10/09/24 1440)  Pulse: 95 (10/09/24 1730)  Resp: 18 (10/09/24 1440)  BP: 113/63 (10/09/24 1730)  SpO2: 97 % (10/09/24 1440) Vital Signs (72h Range):  Temp:  [97.4 °F (36.3 °C)-103.3 °F (39.6 °C)]   Pulse:  [72-95]   Resp:  [16-23]   BP: ()/(53-74)   SpO2:  [90 %-99 %]      Recent Labs   Lab 10/08/24  1746 10/09/24  0655   CREATININE 3.2* 4.0*     Serum creatinine: 4 mg/dL (H) 10/09/24 0655  Estimated creatinine clearance: 26 mL/min (A)    Ampicillin 2g every 4 hours will be changed to Ampicillin 2g every 12 hours per renal dosing protocol. Dialysis patient MWF.    Pharmacist's Name: Tashi Byrne  Pharmacist's Extension: [phone]

## 2024-10-09 NOTE — CONSULTS
Consult Note  Nephrology    Consult Requested By: Lynn Barkley MD    Reason for Consult:   ESRD, dependent on dialysis    SUBJECTIVE:     History of Present Illness:   63 y/o male patient known to our practice, has out patient dialysis 3x wk on MWF schedule.  He was brought to ER yesterday by his wife d/t lethargy, not feeling well, fatigue, fever.  Blood cx positive for Enterococcus faecalis.  Getting IVAB.  Nephrology is consulted for dialysis orders.    10/9  pt seen and examined.  Ill-appearing, Tmax 103.3.  BP stable.  Dialysis orders are in, may not tolerate much in the was of UF today, but pt is not a big fluid rancho.  I believe pt still makes urine, will order UA.    Assessment/plan:    ESRD  SHPT  Hypomagnesemia  Mild hyperkalemia  Anemia of chronic dz  Hypotension  DM 2    --HD per pt's schedule, MWF.  Dose all medications for dialysis  --not on binders at home, and PO4 is actually low right now - likely d/t poor po intake  --Mg+ improved  --HD will improve K+  --ordered epo w/HD, keep Hgb 10-11  --continue midodrine, judicious UF  --Blood glucose control per primary team.      Past Medical History:   Diagnosis Date    AICD (automatic cardioverter/defibrillator) present     Anemia due to multiple mechanisms 02/20/2024    Anemia, chronic disease 07/27/2021    Anemia, chronic renal failure, stage 5 02/20/2024    Anemia, unspecified 07/27/2021    Anxiety and depression 2012    CAD (coronary artery disease) 2012    Cardiomegaly 2016    CHF (congestive heart failure) 2012    Cholecystitis 2017    Complete heart block 2012    Diabetic foot ulcer     DVT (deep venous thrombosis) 2012    And pulmonary embolus    GERD (gastroesophageal reflux disease) 2010    Heparin induced thrombocytopenia     Hyperlipemia 2011    IDDM (insulin dependent diabetes mellitus) 1983    With neuropathy    Ischemic cardiomyopathy 2012    MI (myocardial infarction) 2012    Morbid obesity     MRSA (methicillin resistant  Staphylococcus aureus) infection 01/19/2019    Noncompliance with therapeutic plan 2019    Normochromic normocytic anemia 07/27/2021    Osteomyelitis of right foot 01/2019    Osteomyelitis of right foot 09/01/2022    Klebsiella from bone, GBS in blood    Pulmonary edema 2019    Respiratory failure 2019    Sepsis 01/2019    Due to cellulitis right leg    Sleep apnea 2013    Thrombocytopathy 2012    Thyroid disease     Venous stasis dermatitis of both lower extremities      Past Surgical History:   Procedure Laterality Date    CARDIAC PACEMAKER PLACEMENT  12/2012    CARDIAC PACEMAKER PLACEMENT  10/04/2018    battery replacement    COIL EMBOLIZATION, SINGLE VESSEL Right 5/7/2024    Procedure: Coil Embolization, Single Vessel;  Surgeon: Khoobehi, Ali, MD;  Location: [facility] CATH/EP LAB;  Service: Vascular;  Laterality: Right;    CORONARY ARTERY BYPASS GRAFT  06/2012    ESOPHAGOGASTRODUODENOSCOPY  01/31/2017    FISTULOGRAM Bilateral 5/7/2024    Procedure: Fistulogram;  Surgeon: Khoobehi, Ali, MD;  Location: [facility] CATH/EP LAB;  Service: Vascular;  Laterality: Bilateral;    SAMARA FILTER PLACEMENT  2012    vena cava    INSERTION OF TUNNELED CENTRAL VENOUS HEMODIALYSIS CATHETER N/A 7/9/2024    Procedure: REMOVAL TUNNELED CATHETER;  Surgeon: Khoobehi, Ali, MD;  Location: [facility] CATH/EP LAB;  Service: Peripheral Vascular;  Laterality: N/A;    INSERTION, CATHETER, TUNNELED Right 10/16/2023    Procedure: Insertion,catheter,tunneled;  Surgeon: Terri Gresham MD;  Location: [facility] OR;  Service: Cardiovascular;  Laterality: Right;    LUMBAR SYMPATHETIC NERVE BLOCK Right 8/22/2019    Procedure: BLOCK, NERVE, SYMPATHETIC, LUMBAR;  Surgeon: Tashi Good MD;  Location: [facility] OR;  Service: Pain Management;  Laterality: Right;  RIGHT SYMPATHETIC NERVE BLOCK    PHLEBOGRAPHY N/A 7/9/2024    Procedure: VENOGRAM;  Surgeon: Khoobehi, Ali, MD;  Location: [facility] CATH/EP LAB;  Service: Peripheral Vascular;  Laterality: N/A;     Family History  "  Problem Relation Name Age of Onset    Arthritis Mother      Diabetes Mother      Cancer Father      Heart disease Father      Stroke Father       Social History     Tobacco Use    Smoking status: Former     Current packs/day: 0.00     Average packs/day: 2.0 packs/day for 38.0 years (76.0 ttl pk-yrs)     Types: Cigarettes     Start date:      Quit date:      Years since quittin.7    Smokeless tobacco: Never   Substance Use Topics    Alcohol use: No    Drug use: Never       Review of patient's allergies indicates:   Allergen Reactions    Vasopressin Anaphylaxis and Other (See Comments)     Increased pressure in his head; felt like his eyeballs and veins were going to pop out of his head.     Vasopressin analogues Other (See Comments) and Anaphylaxis     "felt like eyes going to pop out of my head"  Other reaction(s): Other (See Comments)  "felt like eyes going to pop out of my head"  Increased pressure in his head; felt like his eyeballs and veins were going to pop out of his head.     Heparin Other (See Comments)     Other reaction(s): "depleted my blood platets"    Decreased platelet count    Heparin analogues Other (See Comments)     Depleted WBC count    Morphine Hallucinations, Other (See Comments) and Anxiety     Other reaction(s): Hallucinations  Paranoid, hallucinations          Review of Systems:  As above    OBJECTIVE:     Vital Signs Range (Last 24H):  Temp:  [97.4 °F (36.3 °C)-103.3 °F (39.6 °C)]   Pulse:  [72-94]   Resp:  [16-23]   BP: ()/(53-74)   SpO2:  [90 %-99 %]     Physical Exam:  General- ill-appearing  HEENT- WNL  Neck- supple  CV- Regular rate and rhythm  Resp- Lungs CTA Bilaterally, No increased WOB  GI- Non tender/non-distended, BS normoactive x4 quads  Extrem- No cyanosis, clubbing, edema.  Derm- skin w/d, pale  Neuro-  No flap.     Body mass index is 34.9 kg/m².    Laboratory:  CBC:   Recent Labs   Lab 10/09/24  0655   WBC 6.63   RBC 3.23*   HGB 9.9*   HCT 32.0*   PLT 56* "   MCV 99*   MCH 30.7   MCHC 30.9*     CMP:   Recent Labs   Lab 10/09/24  0655   *   CALCIUM 8.9   ALBUMIN 3.6   PROT 7.2   *   K 5.3*   CO2 30*   CL 97   BUN 43*   CREATININE 4.0*   ALKPHOS 58   ALT 7*   AST 14   BILITOT 2.4*       Diagnostic Results:  Labs: Reviewed        ASSESSMENT/PLAN:     There are no hospital problems to display for this patient.        Thank you for allowing us to participate in the care of your patient. We will follow the patient and provide recommendations as needed.      Time spent seeing patient( greater than 1/2 spent in direct contact) :     Carmencita Ballesteros NP

## 2024-10-09 NOTE — H&P
"   [facility]         HISTORY & PHYSICAL   HOSPITALIST ADMISSION NOTE    Admitting MD: Wenceslao Knox MD             Patient Name:Ana Bullard  Patient :1960   Patient Age/Gender: 64 y.o. male  MRN:[id number]  CSN:[id number]  Status: IP- Inpatient  Admission Date:10/8/2024  Date of Service:10/8/2024               .HISTORY OF PRESENT ILLNESS   Chief Complaint:   Chief Complaint   Patient presents with    Altered Mental Status     Per family, patient complained of "feeling bad" yesterday. Temp of 103 at home. Today more lethargic unable to answer questions regarding name and . Patient's family answering all questions.         Principal Problem:   <principal problem not specified>    Patient Information Source(s):      patient and ER records.    HPI:  Ana Bullard is a 64 y.o. male who has a history of  extensive cardiac issues including CABG stents, now status post AICD, CHF, DVT, diabetes, anemia, hyperlipidemia, FARHAN, hypothyroidism,  and presents with malaise.    Patient was brought in by his wife.  Patient was fairly lethargic and unable to answer questions initially on exam on re-evaluation patient states that he was not not feeling well over the past few days and feeling fatigue and tired and also noticed that he had a fever.  He had no known other reasons for fever, no sore throat no sick contacts no shortness a breath, no coughing.  No burning with urinating no diarrhea no vomiting.  Patient is febrile here in the ER as well.  Patient given fluids IV antibiotics and showed signs of improvement.  Lactic acid was negative.  No clear focal points no signs or symptoms as of yet.  Blood cultures pending.  Patient will continue on IV antibiotics.  Especially now to evaluate whether the patient may have infected hardware,.    No notes on file                 .REVIEW OF SYSTEMS.   Review of Systems    All other ROS other than above negative           . MEDICAL " HISTORY.   PCP: Michelle Messina, JEFFERSONP  Specialists:    Past Medical History    Past Medical History:   Diagnosis Date    AICD (automatic cardioverter/defibrillator) present     Anemia due to multiple mechanisms 02/20/2024    Anemia, chronic disease 07/27/2021    Anemia, chronic renal failure, stage 5 02/20/2024    Anemia, unspecified 07/27/2021    Anxiety and depression 2012    CAD (coronary artery disease) 2012    Cardiomegaly 2016    CHF (congestive heart failure) 2012    Cholecystitis 2017    Complete heart block 2012    Diabetic foot ulcer     DVT (deep venous thrombosis) 2012    And pulmonary embolus    GERD (gastroesophageal reflux disease) 2010    Heparin induced thrombocytopenia     Hyperlipemia 2011    IDDM (insulin dependent diabetes mellitus) 1983    With neuropathy    Ischemic cardiomyopathy 2012    MI (myocardial infarction) 2012    Morbid obesity     MRSA (methicillin resistant Staphylococcus aureus) infection 01/19/2019    Noncompliance with therapeutic plan 2019    Normochromic normocytic anemia 07/27/2021    Osteomyelitis of right foot 01/2019    Osteomyelitis of right foot 09/01/2022    Klebsiella from bone, GBS in blood    Pulmonary edema 2019    Respiratory failure 2019    Sepsis 01/2019    Due to cellulitis right leg    Sleep apnea 2013    Thrombocytopathy 2012    Thyroid disease     Venous stasis dermatitis of both lower extremities        Past Surgical History     has a past surgical history that includes Esophagogastroduodenoscopy (01/31/2017); paul filter placement (2012); Coronary artery bypass graft (06/2012); Cardiac pacemaker placement (12/2012); Cardiac pacemaker placement (10/04/2018); Lumbar sympathetic nerve block (Right, 8/22/2019); insertion, catheter, tunneled (Right, 10/16/2023); Fistulogram (Bilateral, 5/7/2024); coil embolization, single vessel (Right, 5/7/2024); Insertion of tunneled central venous hemodialysis catheter (N/A, 7/9/2024);  and Phlebography (N/A, 7/9/2024).  Past Surgical History:   Procedure Laterality Date    CARDIAC PACEMAKER PLACEMENT  12/2012    CARDIAC PACEMAKER PLACEMENT  10/04/2018    battery replacement    COIL EMBOLIZATION, SINGLE VESSEL Right 5/7/2024    Procedure: Coil Embolization, Single Vessel;  Surgeon: Khoobehi, Ali, MD;  Location: [facility] CATH/EP LAB;  Service: Vascular;  Laterality: Right;    CORONARY ARTERY BYPASS GRAFT  06/2012    ESOPHAGOGASTRODUODENOSCOPY  01/31/2017    FISTULOGRAM Bilateral 5/7/2024    Procedure: Fistulogram;  Surgeon: Khoobehi, Ali, MD;  Location: [facility] CATH/EP LAB;  Service: Vascular;  Laterality: Bilateral;    SAMARA FILTER PLACEMENT  2012    vena cava    INSERTION OF TUNNELED CENTRAL VENOUS HEMODIALYSIS CATHETER N/A 7/9/2024    Procedure: REMOVAL TUNNELED CATHETER;  Surgeon: Khoobehi, Ali, MD;  Location: [facility] CATH/EP LAB;  Service: Peripheral Vascular;  Laterality: N/A;    INSERTION, CATHETER, TUNNELED Right 10/16/2023    Procedure: Insertion,catheter,tunneled;  Surgeon: Terri Gresham MD;  Location: [facility] OR;  Service: Cardiovascular;  Laterality: Right;    LUMBAR SYMPATHETIC NERVE BLOCK Right 8/22/2019    Procedure: BLOCK, NERVE, SYMPATHETIC, LUMBAR;  Surgeon: Tashi Good MD;  Location: [facility] OR;  Service: Pain Management;  Laterality: Right;  RIGHT SYMPATHETIC NERVE BLOCK    PHLEBOGRAPHY N/A 7/9/2024    Procedure: VENOGRAM;  Surgeon: Khoobehi, Ali, MD;  Location: [facility] CATH/EP LAB;  Service: Peripheral Vascular;  Laterality: N/A;         Social  History     reports that he quit smoking about 12 years ago. His smoking use included cigarettes. He started smoking about 50 years ago. He has a 76 pack-year smoking history. He has never used smokeless tobacco. He reports that he does not drink alcohol and does not use drugs.  Tobacco Use    Smoking status: Former     Current packs/day: 0.00     Average packs/day: 2.0 packs/day for 38.0 years (76.0 ttl pk-yrs)     Types: Cigarettes      "Start date:      Quit date:      Years since quittin.7    Smokeless tobacco: Never   Substance and Sexual Activity    Alcohol use: No    Drug use: Never    Sexual activity: Never         Family History    family history includes Arthritis in his mother; Cancer in his father; Diabetes in his mother; Heart disease in his father; Stroke in his father.     Family History       Problem Relation (Age of Onset)    Arthritis Mother    Cancer Father    Diabetes Mother    Heart disease Father    Stroke Father                       . ALLERGIES.       Review of patient's allergies indicates:   Allergen Reactions    Vasopressin Anaphylaxis and Other (See Comments)     Increased pressure in his head; felt like his eyeballs and veins were going to pop out of his head.     Vasopressin analogues Other (See Comments) and Anaphylaxis     "felt like eyes going to pop out of my head"  Other reaction(s): Other (See Comments)  "felt like eyes going to pop out of my head"  Increased pressure in his head; felt like his eyeballs and veins were going to pop out of his head.     Heparin Other (See Comments)     Other reaction(s): "depleted my blood platets"    Decreased platelet count    Heparin analogues Other (See Comments)     Depleted WBC count    Morphine Hallucinations, Other (See Comments) and Anxiety     Other reaction(s): Hallucinations  Paranoid, hallucinations                . MEDICATIONS.   Home Medications:       No current facility-administered medications on file prior to encounter.     Current Outpatient Medications on File Prior to Encounter   Medication Sig    albuterol (PROVENTIL) 5 mg/mL nebulizer solution Take 2.5 mg by nebulization every 6 (six) hours as needed.    albuterol-ipratropium (DUO-NEB) 2.5 mg-0.5 mg/3 mL nebulizer solution Take 3 mLs by nebulization every 6 (six) hours as needed.    aspirin 81 MG Chew Take 1 tablet (81 mg total) by mouth once daily.    atorvastatin (LIPITOR) 20 MG " tablet Take 20 mg by mouth once daily.    carvediloL (COREG) 3.125 MG tablet Take 3.125 mg by mouth 2 (two) times daily.    FARXIGA 10 mg tablet Take 10 mg by mouth once daily.    gabapentin (NEURONTIN) 600 MG tablet Take 600 mg by mouth 3 (three) times daily.    levothyroxine (SYNTHROID) 25 MCG tablet Take 1 tablet (25 mcg total) by mouth before breakfast.    midodrine (PROAMATINE) 5 MG Tab Take 1 tablet (5 mg total) by mouth 3 (three) times daily before meals.    pantoprazole (PROTONIX) 40 MG tablet Take 1 tablet by mouth once daily.    torsemide (DEMADEX) 20 MG Tab Take 1 tablet (20 mg total) by mouth 2 (two) times a day.    traMADoL (ULTRAM) 50 mg tablet Take 1 tablet (50 mg total) by mouth every 8 (eight) hours as needed for Pain.    vitamin B complex-folic acid 0.4 mg Tab Take 1 tablet by mouth once daily.                  . PHYSICAL EXAM.         Vital Signs (24h Range): Vital Signs (Most Recent):   Temp:  [99.7 °F (37.6 °C)-103.3 °F (39.6 °C)] 99.7 °F (37.6 °C)  Pulse:  [77-94] 77  Resp:  [16-23] 20  SpO2:  [90 %-96 %] 95 %  BP: ()/(53-60) 103/55 Temp: 99.7 °F (37.6 °C) (10/08/24 1900)  Pulse: 77 (10/08/24 2118)  Resp: 20 (10/08/24 2118)  BP: (!) 103/55 (10/08/24 2100)  SpO2: 95 % (10/08/24 2118)      Vitals:    10/08/24 2008 10/08/24 2010 10/08/24 2100 10/08/24 2118   BP:   (!) 103/55    BP Location:       Pulse:  81 77 77   Resp: 18 19 (!) 21 20   Temp:       SpO2:  95% 96% 95%   Weight:       Height:               Weight: 113.4 kg (250 lb)  Body mass index is 32.98 kg/m².  Wt Readings from Last 3 Encounters:   10/08/24 113.4 kg (250 lb)   08/13/24 115.7 kg (255 lb 1.2 oz)   07/08/24 115.7 kg (255 lb)         --    Physical Exam  Vitals reviewed.   Constitutional:       General: He is not in acute distress.     Appearance: He is obese. He is not ill-appearing.   HENT:      Head: Normocephalic and atraumatic.   Eyes:      Pupils: Pupils are equal, round, and reactive to light.  "  Cardiovascular:      Rate and Rhythm: Normal rate.      Pulses: Normal pulses.      Heart sounds: Murmur heard.      Comments: Dialysis fistula right wrist  Pulmonary:      Effort: Pulmonary effort is normal. No respiratory distress.      Breath sounds: No wheezing, rhonchi or rales.   Abdominal:      General: There is no distension.      Palpations: Abdomen is soft.      Tenderness: There is no abdominal tenderness.   Musculoskeletal:         General: No swelling or tenderness.   Neurological:      General: No focal deficit present.      Mental Status: He is alert and oriented to person, place, and time.      Cranial Nerves: No cranial nerve deficit.      Motor: No weakness.                     DIAGNOSTIC DATA   Summary    Laboratory Studies:    CBC BMP   Recent Labs   Lab 10/08/24  1746   WBC 9.44   HGB 10.8*   HCT 34.2*   PLT 76*    Recent Labs   Lab 10/08/24  1746      K 5.2*   CL 99   CO2 26   BUN 33*   CREATININE 3.2*   CALCIUM 9.1   PHOS 2.0*           .    Other Studies:          ------  Diagnostic Data Details          CBC  9.44 10.8 76    34.2     Coag                 BMP  136 99 33 156   5.2 26 3.2     CaMgPhos  9.1   1.4   2.0      Last labs from current encounter as of 10/08/2024-9:26 PM             Laboratory Studies:    Blood Gas:  No results for input(s): "PH", "PCO2", "PO2", "HCO3", "POCSATURATED", "BE" in the last 72 hours.      Recent Labs   Lab 10/08/24  1746   WBC 9.44   HGB 10.8*   HCT 34.2*   PLT 76*   MONO 7.8  0.7   EOSINOPHIL 0.0     No results for input(s): "APTT", "INR", "PTT" in the last 168 hours.     Recent Labs   Lab 10/08/24  1746      K 5.2*   CL 99   CO2 26   BUN 33*   CREATININE 3.2*   CALCIUM 9.1   PROT 8.0   BILITOT 2.2*   ALKPHOS 71   ALT 8*   AST 17     Recent Labs   Lab 10/08/24  1746   MG 1.4*        No results for input(s): "TROPONINIHS", "CKTOTAL", "CPK", "TROPONINI", "TROPONINT", "TROPTSTAT", "CPKMB", "MB" in the last 168 hours.          Lab Results " "  Component Value Date    COLORU Yellow 10/15/2023    COLORU Yellow 10/09/2023    COLORU Yellow 06/12/2023    SPECGRAV 1.015 10/15/2023    SPECGRAV 1.015 10/09/2023    SPECGRAV 1.020 06/12/2023    PHUR 6.0 10/15/2023    PHUR 5.0 10/09/2023    PHUR 5.0 06/12/2023    NITRITE Negative 10/15/2023    NITRITE Negative 10/09/2023    NITRITE Negative 06/12/2023    KETONESU Negative 10/15/2023    KETONESU Negative 10/09/2023    KETONESU Negative 06/12/2023    UROBILINOGEN Negative 10/15/2023    UROBILINOGEN Negative 10/09/2023    UROBILINOGEN 4.0-6.0 (A) 06/12/2023      No results for input(s): "TSH", "T3FREE", "FREET4" in the last 168 hours.      Lab Results   Component Value Date    HGBA1C 6.2 10/09/2023    HGBA1C 6.8 (H) 11/23/2022    HGBA1C 7.7 (H) 09/02/2022     The ASCVD Risk score (Melissa DK, et al., 2019) failed to calculate for the following reasons:    Cannot find a previous HDL lab    Cannot find a previous total cholesterol lab           ECG:        RADIOLOGY STUDIES:   X-Ray Chest 1 View  Narrative: EXAMINATION:  XR CHEST 1 VIEW    CLINICAL HISTORY:  Sepsis;    TECHNIQUE:  Single frontal view of the chest was performed.    COMPARISON:  Chest x-ray of July 9, 2024    FINDINGS:  An AICD is noted in place on the left.  The patient has had a prior sternotomy.  There is hypoventilation.  There is also cardiomegaly and aortic ectasia.  The patient has had a prior CABG with sternal wires and marker sutures in place.  Intrapulmonary mass or infiltrate is not presently seen.  Impression: Prior CABG.  Cardiomegaly.  AICD in position.  Hypoventilation.    Electronically signed by: Barry Bhagat MD  Date:    10/08/2024  Time:    18:11          CARDIAC DIAGNOSTIC INFORMATION REVIEW:  Most Recent Echocardiogram:  Results for orders placed during the hospital encounter of 01/18/20    Echo Color Flow Doppler? Yes    Interpretation Summary  · Eccentric left ventricular hypertrophy.  · Severely decreased left ventricular " systolic function. The estimated ejection fraction is 25%.  · Grade III (severe) left ventricular diastolic dysfunction consistent with restrictive physiology.  · Moderate left atrial enlargement.  · Mild mitral regurgitation.  · Mild tricuspid regurgitation.  · Intermediate central venous pressure (8 mmHg).  · The estimated PA systolic pressure is 53 mmHg.  · Pulmonary hypertension present.      Last Cardiac Cath  Results for orders placed during the hospital encounter of 07/09/24    Cardiac catheterization    Narrative  Procedure performed in the Invasive Lab  - See Procedure Log link below for nursing documentation  - See OpNote on Surgeries Tab for physician findings  - See Imaging Tab for radiologist dictation        Last Stress Test   No results found for this or any previous visit.          -------                     . ASSESSMENT & PLAN.   Patient Summary:       Patient Problem List:  There are no hospital problems to display for this patient.        Current Medications:       10/8/2024   Medications   vancomycin in dextrose 5 % 1 gram/250 mL IVPB 1,000 mg (has no administration in time range)     And   vancomycin in dextrose 5 % 1 gram/250 mL IVPB 1,000 mg (1,000 mg Intravenous New Bag 10/8/24 2043)   piperacillin-tazobactam 4.5 g in dextrose 5 % 100 mL IVPB (ready to mix) (has no administration in time range)   magnesium sulfate 2g in water 50mL IVPB (premix) (2 g Intravenous New Bag 10/8/24 2009)   acetaminophen tablet 650 mg (650 mg Oral Given 10/8/24 1756)   piperacillin-tazobactam 4.5 g in dextrose 5 % 100 mL IVPB (ready to mix) (0 g Intravenous Stopped 10/8/24 1843)   traMADoL tablet 50 mg (50 mg Oral Given 10/8/24 2008)               Assessment & Plan:    No notes have been filed under this hospital service.  Service: Hospital Medicine         Fever of unknown origin Patient has fever of unknown origin in the past he has had infection such as osteomyelitis, but at this point in time no clear source.   "We will continue workup and place ID consult for the morning.    CAD Patient has extensive coronary history not noting any significant acute pathology however we will need to monitor closely    ESRD Patient will need dialysis while he is here.  Need to consult Nephrology in the morning.             EDITABLEPROBLIST     SUBJOBJ    VTE PPX:    VTE Risk Mitigation (From admission, onward)      None          Fluids:    Nutrition:   Code Status:   Code Status: Prior   Admit Status: IP- Inpatient    Admitting MD:  Wenceslao Knox MD -  Department of Hospital Medicine  [facility]  ED MD:   Consultants:   Treatment Teams This Admission:  PCP: Michelle Messina FNP    PATHWAYS ORDERED      [] COPD       [] DKA      [] CHF      [] CVA Ischemic      [] GI Bleed      [] NSTEMI      [] NONE                 Electronically Signed By: Wenceslao Knox 10/8/2024 9:27 PM  October 8, 2024     Portions of this chart may have been created with Dragon Voice Recognition Software . Occasional wrong-word or "sound-alike" substitutions may have occurred due to the inherent limitations of voice recognition software. Please read the chart carefully and recognize, using context, where these substitutions have occurred.               "

## 2024-10-09 NOTE — CONSULTS
"[facility]   Department of Infectious Disease  Consult Note        PATIENT NAME: Ana Bullard  MRN: [id number]  TODAY'S DATE: 10/09/2024  ADMIT DATE: 10/8/2024  LOS: 1 days    CHIEF COMPLAINT: Altered Mental Status (Per family, patient complained of "feeling bad" yesterday. Temp of 103 at home. Today more lethargic unable to answer questions regarding name and . Patient's family answering all questions. )      PRINCIPLE PROBLEM: <principal problem not specified>    REASON FOR CONSULT:     ASSESSMENT and PLAN   1. Enterococcus faecalis bacteremia in a patient with AICD.  Source unclear.  Check TTE, CRP, ESR.  Repeat Blood cultures x2 sets tomorrow.  DC vancomycin and Zosyn and start ampicillin +ceftriaxone.      2. Bilateral leg edema with right leg ulcers.  They are not infected.  Symptomatic care     3. Left periorbital hematoma.  Management as Per hospitalist.  May need CT head to evaluate for left orbit or basal skull fracture.      4. Complete heart block status post AICD placement.  Keep this in mind in this patient with bacteremia.       RECOMMENDATIONS:     Thank you for this consult. Please send [employer] secure chat with any questions.    Antibiotics (From admission, onward)      Start     Stop Route Frequency Ordered    10/09/24 0200  piperacillin-tazobactam 4.5 g in dextrose 5 % 100 mL IVPB (ready to mix)  (ED Adult Sepsis Treatment)         10/09/24 1759 IV Every 8 hours (non-standard times) 10/08/24 1724    10/08/24 2330  mupirocin 2 % ointment         10/13/24 2059 Nasl 2 times daily 10/08/24 2226    10/08/24 1830  vancomycin in dextrose 5 % 1 gram/250 mL IVPB 1,000 mg        Placed in "And" Linked Group    -- IV Once 10/08/24 1722          Antifungals (From admission, onward)      None           Antivirals (From admission, onward)      None            HPI      Ana Bullard is a 64 y.o. male multiple medical problems including diabetes mellitus, hypertension, hyperlipidemia, " "complete heart block status post AICD placement, chronic thrombocytopenia..  Also with prior history of MRSA infection in 2019.  Admitted 10/08/2024 with lethargy.  In the ER /57, pulse 94, respiratory rate 17, temperature 103.1° with oxygen saturation of 90%.  Chest x-ray with no acute infiltrate.  He was admitted for sepsis and placed on broad-spectrum antibiotics. Blood culture Enterococcus faecalis in 2/2 bottles.  ID asked to assist with his management.    Antibiotic history:    Vancomycin: 10/08/2024-  Zosyn: 10/08/2024-    Microbiology:   Blood culture 10/08/2024: E faecalis 2/2    Social History  Marital Status:   Alcohol History:  reports no history of alcohol use.  Tobacco History:  reports that he quit smoking about 12 years ago. His smoking use included cigarettes. He started smoking about 50 years ago. He has a 76 pack-year smoking history. He has never used smokeless tobacco.  Drug History:  reports no history of drug use.      Review of patient's allergies indicates:   Allergen Reactions    Vasopressin Anaphylaxis and Other (See Comments)     Increased pressure in his head; felt like his eyeballs and veins were going to pop out of his head.     Vasopressin analogues Other (See Comments) and Anaphylaxis     "felt like eyes going to pop out of my head"  Other reaction(s): Other (See Comments)  "felt like eyes going to pop out of my head"  Increased pressure in his head; felt like his eyeballs and veins were going to pop out of his head.     Heparin Other (See Comments)     Other reaction(s): "depleted my blood platets"    Decreased platelet count    Heparin analogues Other (See Comments)     Depleted WBC count    Morphine Hallucinations, Other (See Comments) and Anxiety     Other reaction(s): Hallucinations  Paranoid, hallucinations       Past Medical History:   Diagnosis Date    AICD (automatic cardioverter/defibrillator) present     Anemia due to multiple mechanisms 02/20/2024    Anemia, " chronic disease 07/27/2021    Anemia, chronic renal failure, stage 5 02/20/2024    Anemia, unspecified 07/27/2021    Anxiety and depression 2012    CAD (coronary artery disease) 2012    Cardiomegaly 2016    CHF (congestive heart failure) 2012    Cholecystitis 2017    Complete heart block 2012    Diabetic foot ulcer     DVT (deep venous thrombosis) 2012    And pulmonary embolus    GERD (gastroesophageal reflux disease) 2010    Heparin induced thrombocytopenia     Hyperlipemia 2011    IDDM (insulin dependent diabetes mellitus) 1983    With neuropathy    Ischemic cardiomyopathy 2012    MI (myocardial infarction) 2012    Morbid obesity     MRSA (methicillin resistant Staphylococcus aureus) infection 01/19/2019    Noncompliance with therapeutic plan 2019    Normochromic normocytic anemia 07/27/2021    Osteomyelitis of right foot 01/2019    Osteomyelitis of right foot 09/01/2022    Klebsiella from bone, GBS in blood    Pulmonary edema 2019    Respiratory failure 2019    Sepsis 01/2019    Due to cellulitis right leg    Sleep apnea 2013    Thrombocytopathy 2012    Thyroid disease     Venous stasis dermatitis of both lower extremities      Past Surgical History:   Procedure Laterality Date    CARDIAC PACEMAKER PLACEMENT  12/2012    CARDIAC PACEMAKER PLACEMENT  10/04/2018    battery replacement    COIL EMBOLIZATION, SINGLE VESSEL Right 5/7/2024    Procedure: Coil Embolization, Single Vessel;  Surgeon: Khoobehi, Ali, MD;  Location: [facility] CATH/EP LAB;  Service: Vascular;  Laterality: Right;    CORONARY ARTERY BYPASS GRAFT  06/2012    ESOPHAGOGASTRODUODENOSCOPY  01/31/2017    FISTULOGRAM Bilateral 5/7/2024    Procedure: Fistulogram;  Surgeon: Khoobehi, Ali, MD;  Location: [facility] CATH/EP LAB;  Service: Vascular;  Laterality: Bilateral;    SAMARA FILTER PLACEMENT  2012    vena cava    INSERTION OF TUNNELED CENTRAL VENOUS HEMODIALYSIS CATHETER N/A 7/9/2024    Procedure: REMOVAL TUNNELED CATHETER;  Surgeon: Khoobehi, Ali, MD;   Location: [facility] CATH/EP LAB;  Service: Peripheral Vascular;  Laterality: N/A;    INSERTION, CATHETER, TUNNELED Right 10/16/2023    Procedure: Insertion,catheter,tunneled;  Surgeon: Terri Gresham MD;  Location: [facility] OR;  Service: Cardiovascular;  Laterality: Right;    LUMBAR SYMPATHETIC NERVE BLOCK Right 8/22/2019    Procedure: BLOCK, NERVE, SYMPATHETIC, LUMBAR;  Surgeon: Tashi Good MD;  Location: [facility] OR;  Service: Pain Management;  Laterality: Right;  RIGHT SYMPATHETIC NERVE BLOCK    PHLEBOGRAPHY N/A 7/9/2024    Procedure: VENOGRAM;  Surgeon: Khoobehi, Ali, MD;  Location: [facility] CATH/EP LAB;  Service: Peripheral Vascular;  Laterality: N/A;     Family History   Problem Relation Name Age of Onset    Arthritis Mother      Diabetes Mother      Cancer Father      Heart disease Father      Stroke Father         SUBJECTIVE     Review of systems: 10 system review unremarkable. As in HPI     OBJECTIVE   Temp:  [97.4 °F (36.3 °C)-103.3 °F (39.6 °C)] 98.8 °F (37.1 °C)  Pulse:  [72-94] 78  Resp:  [16-23] 16  SpO2:  [90 %-99 %] 95 %  BP: ()/(53-74) 100/64  Temp:  [97.4 °F (36.3 °C)-103.3 °F (39.6 °C)]   Temp: 98.8 °F (37.1 °C) (10/09/24 0743)  Pulse: 78 (10/09/24 0746)  Resp: 16 (10/09/24 0929)  BP: 100/64 (10/09/24 0743)  SpO2: 95 % (10/09/24 0746)    Intake/Output Summary (Last 24 hours) at 10/9/2024 1127  Last data filed at 10/9/2024 0618  Gross per 24 hour   Intake 240 ml   Output 0 ml   Net 240 ml       Physical Exam  General:  Middle-aged man lying quietly in bed in no acute distress   HEENT:  Bruise around left eye.  Sutured area left temple.  CVS: S1 and 2 heard, no murmurs appreciated   Respiratory: Clear to auscultation   Abdomen: Full, soft, nontender, palpable organomegaly   Skin:  No rash appreciated.  No stigmata of endocarditis   Lower extremities:  Mild edema.  Dry skin.  Clean shallow ulcers right leg.  Musculoskeletal: No joint or bony abnormalities appreciated  CNS: No gross focal deficits  Psych: Good  "mood, normal affect.     VAD:  PIV  ISOLATION:  None    Wounds:  Shallow ulcers right leg that are not infected    Significant Labs: All pertinent labs within the past 24 hours have been reviewed.    CBC LAST 7  Recent Labs   Lab 10/08/24  1746 10/09/24  0655   WBC 9.44 6.63   RBC 3.52* 3.23*   HGB 10.8* 9.9*   HCT 34.2* 32.0*   MCV 97 99*   MCH 30.7 30.7   MCHC 31.6* 30.9*   RDW 17.2* 17.5*   PLT 76* 56*   MPV 11.6 11.1   GRAN 89.1*  8.4* 89.3*  5.9   LYMPH 2.4*  0.2* 2.1*  0.1*   MONO 7.8  0.7 7.7  0.5   BASO 0.01 0.00   NRBC 0 0       CHEMISTRY LAST 7  Recent Labs   Lab 10/08/24  1746 10/09/24  0655    134*   K 5.2* 5.3*   CL 99 97   CO2 26 30*   ANIONGAP 11 7*   BUN 33* 43*   CREATININE 3.2* 4.0*   * 153*   CALCIUM 9.1 8.9   MG 1.4* 1.8   ALBUMIN 4.0 3.6   PROT 8.0 7.2   ALKPHOS 71 58   ALT 8* 7*   AST 17 14   BILITOT 2.2* 2.4*       Estimated Creatinine Clearance: 26 mL/min (A) (based on SCr of 4 mg/dL (H)).    INFLAMMATORY/PROCAL  LAST 7  No results for input(s): "PROCAL", "ESR", "CRP" in the last 168 hours.  No results found for: "ESR"  CRP   Date Value Ref Range Status   11/23/2022 3.07 (H) <0.76 mg/dL Final   11/14/2022 2.93 (H) <0.76 mg/dL Final   11/07/2022 2.33 (H) <0.76 mg/dL Final   10/31/2022 3.51 (H) <0.76 mg/dL Final   09/01/2022 3.61 (H) <0.76 mg/dL Final       PRIOR  MICROBIOLOGY:  Reviewed    No results found for the last 90 days.    LAST 7 DAYS MICROBIOLOGY   Microbiology Results (last 7 days)       Procedure Component Value Units Date/Time    Rapid Organism ID by PCR (from Blood culture) [[id number]]  (Abnormal) Collected: 10/08/24 1706    Order Status: Completed Updated: 10/09/24 0435     Enterococcus faecalis Detected     Enterococcus faecium Not Detected     Listeria monocytogenes Not Detected     Staphylococcus spp. Not Detected     Staphylococcus aureus Not Detected     Staphylococcus epidermidis Not Detected     Staphylococcus lugdunensis Not Detected     Streptococcus " species Not Detected     Streptococcus agalactiae Not Detected     Streptococcus pneumoniae Not Detected     Streptococcus pyogenes Not Detected     Acinetobacter calcoaceticus/baumannii complex Not Detected     Bacteroides fragilis Not Detected     Enterobacterales Not Detected     Enterobacter cloacae complex Not Detected     Escherichia coli Not Detected     Klebsiella aerogenes Not Detected     Klebsiella oxytoca Not Detected     Klebsiella pneumoniae group Not Detected     Proteus Not Detected     Salmonella sp Not Detected     Serratia marcescens Not Detected     Haemophilus influenzae Not Detected     Neisseria meningtidis Not Detected     Pseudomonas aeruginosa Not Detected     Stenotrophomonas maltophilia Not Detected     Candida albicans Not Detected     Candida auris Not Detected     Candida glabrata Not Detected     Candida krusei Not Detected     Candida parapsilosis Not Detected     Candida tropicalis Not Detected     Cryptococcus neoformans/gattii Not Detected     CTX-M (ESBL ) Test not applicable     IMP (Carbapenem resistant) Test not applicable     KPC resistance gene (Carbapenem resistant) Test not applicable     mcr-1  Test not applicable     mec A/C  Test not applicable     mec A/C and MREJ (MRSA) gene Test not applicable     NDM (Carbapenem resistant) Test not applicable     OXA-48-like (Carbapenem resistant) Test not applicable     van A/B (VRE gene) Not Detected     VIM (Carbapenem resistant) Test not applicable    Narrative:      Aerobic and anaerobic    Blood culture x two cultures. Draw prior to antibiotics [[id number]] Collected: 10/08/24 3468    Order Status: Completed Specimen: Blood from Peripheral, Hand, Left Updated: 10/09/24 0345     Blood Culture, Routine Gram stain aer bottle: Gram positive cocci      Positive results previously called on order [id number]      Gram stain mayte bottle: Gram positive cocci      Positive results previously called on order [id number]     Narrative:      Aerobic and anaerobic  Collection has been rescheduled by SARA at 10/08/2024 17:07 Reason:   Dialysis patient  Collection has been rescheduled by SARA at 10/08/2024 17:07 Reason:   Dialysis patient    Blood culture x two cultures. Draw prior to antibiotics [[id number]] Collected: 10/08/24 1706    Order Status: Completed Specimen: Blood from Peripheral, Forearm, Left Updated: 10/09/24 0345     Blood Culture, Routine Gram stain mayte bottle: Gram positive cocci      Results called to and read back by: Jonnie Ortega RN on 3[address] wing      Gram stain aer bottle: Gram positive cocci      Positive results previously called    Narrative:      Aerobic and anaerobic            CURRENT/PREVIOUS VISIT EKG  Results for orders placed or performed during the hospital encounter of 10/08/24   EKG 12-lead    Collection Time: 10/08/24  6:20 PM   Result Value Ref Range    QRS Duration 158 ms    OHS QTC Calculation 525 ms    Narrative    Test Reason : R00.0,    Vent. Rate : 096 BPM     Atrial Rate : 102 BPM     P-R Int : 000 ms          QRS Dur : 158 ms      QT Int : 416 ms       P-R-T Axes : 000 216 037 degrees     QTc Int : 525 ms    Ventricular-paced rhythm  Biventricular pacemaker detected  Abnormal ECG  When compared with ECG of 08-OCT-2023 23:25,  Vent. rate has decreased BY  31 BPM    Referred By: [email or web address]   SELF           Confirmed By:        Significant Imaging: I have reviewed all relevant and available imaging results/findings within the past 24 hours.    I spent a total of 70 minutes on the day of the visit.This includes face to face time and non-face to face time preparing to see the patient (eg, review of tests), obtaining and/or reviewing separately obtained history, documenting clinical information in the electronic or other health record, independently interpreting results and communicating results to the patient/family/caregiver, or care coordinator.    Samuel Hu MD  Date of Service: 10/09/2024       This note was created using [employer]) voice recognition software that occasionally misinterpreted phrases or words.

## 2024-10-09 NOTE — SUBJECTIVE & OBJECTIVE
Interval History:  Patient was seen and examined this morning.  He states that he is feeling generally much better than yesterday and the day prior.  He denies any subjective fever or rigors.  Denies any chest pain or dyspnea.    Review of Systems   Constitutional:  Positive for activity change and fatigue. Negative for appetite change, chills and fever.   HENT:  Negative for congestion.    Respiratory:  Negative for cough, chest tightness, shortness of breath and wheezing.    Cardiovascular:  Negative for chest pain, palpitations and leg swelling.   Gastrointestinal:  Negative for abdominal distention, abdominal pain and diarrhea.   Genitourinary:  Negative for dysuria.   Neurological:  Negative for headaches.   Psychiatric/Behavioral:  Negative for agitation and confusion.      Objective:     Vital Signs (Most Recent):  Temp: 98 °F (36.7 °C) (10/09/24 1440)  Pulse: 93 (10/09/24 1600)  Resp: 18 (10/09/24 1440)  BP: 107/61 (10/09/24 1600)  SpO2: 97 % (10/09/24 1440) Vital Signs (24h Range):  Temp:  [97.4 °F (36.3 °C)-103.3 °F (39.6 °C)] 98 °F (36.7 °C)  Pulse:  [72-94] 93  Resp:  [16-23] 18  SpO2:  [90 %-99 %] 97 %  BP: ()/(53-74) 107/61     Weight: 120 kg (264 lb 8.8 oz)  Body mass index is 34.9 kg/m².    Intake/Output Summary (Last 24 hours) at 10/9/2024 1639  Last data filed at 10/9/2024 1554  Gross per 24 hour   Intake 960 ml   Output 1400 ml   Net -440 ml         Physical Exam  Constitutional:       General: He is not in acute distress.     Appearance: He is not toxic-appearing or diaphoretic.   HENT:      Head: Normocephalic and atraumatic.      Nose: No congestion or rhinorrhea.   Eyes:      Extraocular Movements: Extraocular movements intact.   Cardiovascular:      Rate and Rhythm: Normal rate.   Abdominal:      Palpations: Abdomen is soft.      Tenderness: There is no abdominal tenderness. There is no guarding or rebound.   Musculoskeletal:      Right lower leg: No edema.      Left lower leg: No  edema.   Skin:     General: Skin is warm and dry.      Comments: Stasis dermatitis   Neurological:      General: No focal deficit present.      Mental Status: He is alert and oriented to person, place, and time.   Psychiatric:         Mood and Affect: Mood normal.         Behavior: Behavior normal.             Significant Labs: All pertinent labs within the past 24 hours have been reviewed.  BMP:   Recent Labs   Lab 10/09/24  0655   *   *   K 5.3*   CL 97   CO2 30*   BUN 43*   CREATININE 4.0*   CALCIUM 8.9   MG 1.8     CBC:   Recent Labs   Lab 10/08/24  1746 10/09/24  0655   WBC 9.44 6.63   HGB 10.8* 9.9*   HCT 34.2* 32.0*   PLT 76* 56*       Significant Imaging: I have reviewed all pertinent imaging results/findings within the past 24 hours.  I have reviewed and interpreted all pertinent imaging results/findings within the past 24 hours.

## 2024-10-09 NOTE — CARE UPDATE
10/09/24 0746   Patient Assessment/Suction   Level of Consciousness (AVPU) alert   Respiratory Effort Normal;Unlabored   Rhythm/Pattern, Respiratory unlabored;pattern regular;depth regular;no shortness of breath reported   PRE-TX-O2   Device (Oxygen Therapy) nasal cannula  (found on nc)   $ Is the patient on Low Flow Oxygen? Yes   Flow (L/min) (Oxygen Therapy) 2   SpO2 95 %   Pulse 78   Resp 18   Education   $ Education 15 min;Oxygen

## 2024-10-09 NOTE — PROGRESS NOTES
[facility] Medicine  Progress Note    Patient Name: Ana Bullard  MRN: [id number]  Patient Class: IP- Inpatient   Admission Date: 10/8/2024  Length of Stay: 1 days  Attending Physician: Lynn Barkley MD  Primary Care Provider: Michelle Messina FNP        Subjective:     Principal Problem:Bacteremia due to Enterococcus        HPI:  Ana Bullard is a 64 y.o. male who has a history of  extensive cardiac issues including CABG stents, now status post AICD, CHF, DVT, diabetes, anemia, hyperlipidemia, FARHAN, hypothyroidism,  and presents with malaise.     Patient was brought in by his wife.  Patient was fairly lethargic and unable to answer questions initially on exam on re-evaluation patient states that he was not not feeling well over the past few days and feeling fatigue and tired and also noticed that he had a fever.  He had no known other reasons for fever, no sore throat no sick contacts no shortness a breath, no coughing.  No burning with urinating no diarrhea no vomiting.  Patient is febrile here in the ER as well.  Patient given fluids IV antibiotics and showed signs of improvement.  Lactic acid was negative.  No clear focal points no signs or symptoms as of yet.  Blood cultures pending.  Patient will continue on IV antibiotics.  Especially now to evaluate whether the patient may have infected hardware    Overview/Hospital Course:  No notes on file    Interval History:  Patient is still feeling generally unwell, however, he is feeling better than previously.  I discussed with him that he has a bloodstream infection.  He we will be dialyzed this afternoon with Nephrology.  He denies any chest pain or dyspnea but does have some generalized weakness.  He has a pacemaker and an ICD but no other hardware in his body.  He has headache without neck stiffness.     Review of Systems   Constitutional:  Positive for activity change, appetite change and fatigue. Negative for chills and  fever.   HENT:  Negative for congestion.    Respiratory:  Negative for cough, chest tightness, shortness of breath and wheezing.    Cardiovascular:  Negative for chest pain, palpitations and leg swelling.   Gastrointestinal:  Negative for abdominal distention, abdominal pain and diarrhea.   Genitourinary:  Negative for dysuria.   Neurological:  Positive for headaches.   Psychiatric/Behavioral:  Negative for agitation and confusion.       Objective:      Vital Signs (Most Recent):  Temp: 98 °F (36.7 °C) (10/09/24 1440)  Pulse: 93 (10/09/24 1600)  Resp: 18 (10/09/24 1440)  BP: 107/61 (10/09/24 1600)  SpO2: 97 % (10/09/24 1440) Vital Signs (24h Range):  Temp:  [97.4 °F (36.3 °C)-103.3 °F (39.6 °C)] 98 °F (36.7 °C)  Pulse:  [72-94] 93  Resp:  [16-23] 18  SpO2:  [90 %-99 %] 97 %  BP: ()/(53-74) 107/61      Weight: 120 kg (264 lb 8.8 oz)  Body mass index is 34.9 kg/m².     Intake/Output Summary (Last 24 hours) at 10/9/2024 1639  Last data filed at 10/9/2024 1554      Gross per 24 hour   Intake 960 ml   Output 1400 ml   Net -440 ml      Physical Exam         Significant Labs: All pertinent labs within the past 24 hours have been reviewed.  BMP:       Recent Labs   Lab 10/09/24  0655   *   *   K 5.3*   CL 97   CO2 30*   BUN 43*   CREATININE 4.0*   CALCIUM 8.9   MG 1.8      CBC:        Recent Labs   Lab 10/08/24  1746 10/09/24  0655   WBC 9.44 6.63   HGB 10.8* 9.9*   HCT 34.2* 32.0*   PLT 76* 56*         Significant Imaging: I have reviewed all pertinent imaging results/findings within the past 24 hours.  I have reviewed and interpreted all pertinent imaging results/findings within the past 24 hours.    Assessment/Plan:      * Bacteremia due to Enterococcus  Source unclear, patient not requiring any pressor support but did get IV fluid resuscitated  Infectious Disease consult is placed, transitioned to ceftriaxone ampicillin      Patient with no wounds or sores, only for an implant is a pacemaker and ICD,  no additional hardware  No jaw pain but poor dentition    We will order TTE, CRP noted to be elevated    Periorbital hematoma of left eye  Patient with recent removal of left basal cell carcinoma lesion, likely adjacent left eye, patient states that he had a black eye after this procedure, no trauma to the area    ESRD (end stage renal disease)  Nephrology on board, managing hemodialysis    CAD (coronary artery disease)  Continue aspirin and statin    Type II diabetes mellitus with neurological manifestations  Well controlled on home oral meds, A1c 6.3    So far has not her correction but can continue low-dose sliding scale insulin        VTE Risk Mitigation (From admission, onward)           Ordered     IP VTE HIGH RISK PATIENT  Once         10/08/24 2224     Place sequential compression device  Until discontinued         10/08/24 2224     Reason for No Pharmacological VTE Prophylaxis  Once        Question:  Reasons:  Answer:  Physician Provided (leave comment)  Comment:  poor response    10/08/24 2224                    Discharge Planning   BILL: 10/11/2024     Code Status: Full Code   Is the patient medically ready for discharge?:     Reason for patient still in hospital (select all that apply): Treatment                     Lynn Barkley MD  Department of Hospital Medicine   [facility]

## 2024-10-09 NOTE — HPI
Ana Bullard is a 64 y.o. male who has a history of  extensive cardiac issues including CABG stents, now status post AICD, CHF, DVT, diabetes, anemia, hyperlipidemia, FARHAN, hypothyroidism,  and presents with malaise.     Patient was brought in by his wife.  Patient was fairly lethargic and unable to answer questions initially on exam on re-evaluation patient states that he was not not feeling well over the past few days and feeling fatigue and tired and also noticed that he had a fever.  He had no known other reasons for fever, no sore throat no sick contacts no shortness a breath, no coughing.  No burning with urinating no diarrhea no vomiting.  Patient is febrile here in the ER as well.  Patient given fluids IV antibiotics and showed signs of improvement.  Lactic acid was negative.  No clear focal points no signs or symptoms as of yet.  Blood cultures pending.  Patient will continue on IV antibiotics.  Especially now to evaluate whether the patient may have infected hardware

## 2024-10-09 NOTE — ASSESSMENT & PLAN NOTE
Well controlled on home oral meds, A1c 6.3    So far has not her correction but can continue low-dose sliding scale insulin

## 2024-10-09 NOTE — PROGRESS NOTES
[facility] Medicine  Progress Note    Patient Name: Ana Bullard  MRN: [id number]  Patient Class: IP- Inpatient   Admission Date: 10/8/2024  Length of Stay: 1 days  Attending Physician: Lynn Barkley MD  Primary Care Provider: Michelle Messina FNP        Subjective:     Principal Problem:Bacteremia due to Enterococcus        HPI:  Ana Bullard is a 64 y.o. male who has a history of  extensive cardiac issues including CABG stents, now status post AICD, CHF, DVT, diabetes, anemia, hyperlipidemia, FARHAN, hypothyroidism,  and presents with malaise.     Patient was brought in by his wife.  Patient was fairly lethargic and unable to answer questions initially on exam on re-evaluation patient states that he was not not feeling well over the past few days and feeling fatigue and tired and also noticed that he had a fever.  He had no known other reasons for fever, no sore throat no sick contacts no shortness a breath, no coughing.  No burning with urinating no diarrhea no vomiting.  Patient is febrile here in the ER as well.  Patient given fluids IV antibiotics and showed signs of improvement.  Lactic acid was negative.  No clear focal points no signs or symptoms as of yet.  Blood cultures pending.  Patient will continue on IV antibiotics.  Especially now to evaluate whether the patient may have infected hardware    Overview/Hospital Course:  No notes on file    No new subjective & objective note has been filed under this hospital service since the last note was generated.      Assessment/Plan:      * Bacteremia due to Enterococcus  Source unclear, patient not requiring any pressor support but did get IV fluid resuscitated  Continue broad-spectrum antibiotics with vancomycin and Zosyn, Infectious Disease consult is placed      Patient with no wounds or sores, only for an implant is a pacemaker and ICD, no additional hardware  No jaw pain but poor dentition      Periorbital hematoma of  left eye  Patient with recent removal of left basal cell carcinoma lesion, likely adjacent left eye, patient states that he had a black eye after this procedure, no trauma to the area    ESRD (end stage renal disease)  Nephrology on board, managing hemodialysis    CAD (coronary artery disease)  Continue aspirin and statin    Type II diabetes mellitus with neurological manifestations  Well controlled on home oral meds, A1c 6.3    So far has not her correction but can continue low-dose sliding scale insulin        VTE Risk Mitigation (From admission, onward)           Ordered     IP VTE HIGH RISK PATIENT  Once         10/08/24 2224     Place sequential compression device  Until discontinued         10/08/24 2224     Reason for No Pharmacological VTE Prophylaxis  Once        Question:  Reasons:  Answer:  Physician Provided (leave comment)  Comment:  poor response    10/08/24 2224                    Discharge Planning   BILL: 10/11/2024     Code Status: Full Code   Is the patient medically ready for discharge?:     Reason for patient still in hospital (select all that apply): Treatment                     Lynn Barkley MD  Department of Hospital Medicine   [facility]

## 2024-10-10 ENCOUNTER — CLINICAL SUPPORT (OUTPATIENT)
Dept: CARDIOLOGY | Facility: HOSPITAL | Age: 64
End: 2024-10-10
Payer: MEDICARE

## 2024-10-10 VITALS — WEIGHT: 264 LBS | BODY MASS INDEX: 35.76 KG/M2 | HEIGHT: 72 IN

## 2024-10-10 LAB
ALBUMIN SERPL BCP-MCNC: 3.7 G/DL (ref 3.5–5.2)
ALP SERPL-CCNC: 58 U/L (ref 55–135)
ALT SERPL W/O P-5'-P-CCNC: 7 U/L (ref 10–44)
ANION GAP SERPL CALC-SCNC: 6 MMOL/L (ref 8–16)
AORTIC ROOT ANNULUS: 3.5 CM
AORTIC VALVE CUSP SEPERATION: 2 CM
AST SERPL-CCNC: 18 U/L (ref 10–40)
AV INDEX (PROSTH): 0.53
AV MEAN GRADIENT: 3 MMHG
AV PEAK GRADIENT: 5.8 MMHG
AV REGURGITATION PRESSURE HALF TIME: 316 MS
AV VALVE AREA BY VELOCITY RATIO: 2.2 CM²
AV VALVE AREA: 2 CM²
AV VELOCITY RATIO: 0.58
BASOPHILS # BLD AUTO: 0.01 K/UL (ref 0–0.2)
BASOPHILS NFR BLD: 0.3 % (ref 0–1.9)
BILIRUB SERPL-MCNC: 1.6 MG/DL (ref 0.1–1)
BSA FOR ECHO PROCEDURE: 2.47 M2
BUN SERPL-MCNC: 33 MG/DL (ref 8–23)
CALCIUM SERPL-MCNC: 8.9 MG/DL (ref 8.7–10.5)
CHLORIDE SERPL-SCNC: 102 MMOL/L (ref 95–110)
CO2 SERPL-SCNC: 28 MMOL/L (ref 23–29)
CREAT SERPL-MCNC: 3.2 MG/DL (ref 0.5–1.4)
CV ECHO LV RWT: 0.3 CM
DIFFERENTIAL METHOD BLD: ABNORMAL
DOP CALC AO PEAK VEL: 1.2 M/S
DOP CALC AO VTI: 22.6 CM
DOP CALC LVOT AREA: 3.8 CM2
DOP CALC LVOT DIAMETER: 2.2 CM
DOP CALC LVOT PEAK VEL: 0.7 M/S
DOP CALC LVOT STROKE VOLUME: 45.2 CM3
DOP CALC MV VTI: 28.4 CM
DOP CALCLVOT PEAK VEL VTI: 11.9 CM
E WAVE DECELERATION TIME: 130 MSEC
E/A RATIO: 2.34
E/E' RATIO: 47.67 M/S
ECHO LV POSTERIOR WALL: 0.9 CM (ref 0.6–1.1)
EOSINOPHIL # BLD AUTO: 0 K/UL (ref 0–0.5)
EOSINOPHIL NFR BLD: 1.2 % (ref 0–8)
ERYTHROCYTE [DISTWIDTH] IN BLOOD BY AUTOMATED COUNT: 17.3 % (ref 11.5–14.5)
EST. GFR  (NO RACE VARIABLE): 21.1 ML/MIN/1.73 M^2
FRACTIONAL SHORTENING: 14.8 % (ref 28–44)
GLUCOSE SERPL-MCNC: 138 MG/DL (ref 70–110)
HCT VFR BLD AUTO: 31.8 % (ref 40–54)
HGB BLD-MCNC: 9.7 G/DL (ref 14–18)
IMM GRANULOCYTES # BLD AUTO: 0.01 K/UL (ref 0–0.04)
IMM GRANULOCYTES NFR BLD AUTO: 0.3 % (ref 0–0.5)
INTERVENTRICULAR SEPTUM: 1.1 CM (ref 0.6–1.1)
LEFT INTERNAL DIMENSION IN SYSTOLE: 5.2 CM (ref 2.1–4)
LEFT VENTRICLE DIASTOLIC VOLUME INDEX: 77.89 ML/M2
LEFT VENTRICLE DIASTOLIC VOLUME: 186.93 ML
LEFT VENTRICLE MASS INDEX: 105.8 G/M2
LEFT VENTRICLE SYSTOLIC VOLUME INDEX: 54 ML/M2
LEFT VENTRICLE SYSTOLIC VOLUME: 129.51 ML
LEFT VENTRICULAR INTERNAL DIMENSION IN DIASTOLE: 6.1 CM (ref 3.5–6)
LEFT VENTRICULAR MASS: 253.9 G
LV LATERAL E/E' RATIO: 35.75 M/S
LV SEPTAL E/E' RATIO: 71.5 M/S
LVED V (TEICH): 186.93 ML
LVES V (TEICH): 129.51 ML
LVOT MG: 1 MMHG
LVOT MV: 0.45 CM/S
LYMPHOCYTES # BLD AUTO: 0.2 K/UL (ref 1–4.8)
LYMPHOCYTES NFR BLD: 6.4 % (ref 18–48)
MAGNESIUM SERPL-MCNC: 1.8 MG/DL (ref 1.6–2.6)
MCH RBC QN AUTO: 30.3 PG (ref 27–31)
MCHC RBC AUTO-ENTMCNC: 30.5 G/DL (ref 32–36)
MCV RBC AUTO: 99 FL (ref 82–98)
MONOCYTES # BLD AUTO: 0.6 K/UL (ref 0.3–1)
MONOCYTES NFR BLD: 17.1 % (ref 4–15)
MV MEAN GRADIENT: 5 MMHG
MV PEAK A VEL: 0.61 M/S
MV PEAK E VEL: 1.43 M/S
MV PEAK GRADIENT: 9 MMHG
MV STENOSIS PRESSURE HALF TIME: 59 MS
MV VALVE AREA BY CONTINUITY EQUATION: 1.59 CM2
MV VALVE AREA P 1/2 METHOD: 3.73 CM2
NEUTROPHILS # BLD AUTO: 2.6 K/UL (ref 1.8–7.7)
NEUTROPHILS NFR BLD: 74.7 % (ref 38–73)
NRBC BLD-RTO: 1 /100 WBC
PISA AR MAX VEL: 2.34 M/S
PISA MRMAX VEL: 3.07 M/S
PISA TR MAX VEL: 3.51 M/S
PLATELET # BLD AUTO: 51 K/UL (ref 150–450)
PLATELET BLD QL SMEAR: ABNORMAL
PMV BLD AUTO: 11.5 FL (ref 9.2–12.9)
POCT GLUCOSE: 145 MG/DL (ref 70–110)
POCT GLUCOSE: 148 MG/DL (ref 70–110)
POCT GLUCOSE: 174 MG/DL (ref 70–110)
POCT GLUCOSE: 183 MG/DL (ref 70–110)
POTASSIUM SERPL-SCNC: 4.6 MMOL/L (ref 3.5–5.1)
PROT SERPL-MCNC: 7.5 G/DL (ref 6–8.4)
PV MV: 0.91 M/S
PV PEAK GRADIENT: 8 MMHG
PV PEAK VELOCITY: 1.41 M/S
RA PRESSURE ESTIMATED: 8 MMHG
RBC # BLD AUTO: 3.2 M/UL (ref 4.6–6.2)
RV TB RVSP: 12 MMHG
RV TISSUE DOPPLER FREE WALL SYSTOLIC VELOCITY 1 (APICAL 4 CHAMBER VIEW): 7.42 CM/S
SODIUM SERPL-SCNC: 136 MMOL/L (ref 136–145)
TDI LATERAL: 0.04 M/S
TDI SEPTAL: 0.02 M/S
TDI: 0.03 M/S
TR MAX PG: 49 MMHG
TR MEAN GRADIENT: 34 MMHG
TRICUSPID ANNULAR PLANE SYSTOLIC EXCURSION: 0.92 CM
TV REST PULMONARY ARTERY PRESSURE: 57 MMHG
WBC # BLD AUTO: 3.46 K/UL (ref 3.9–12.7)
Z-SCORE OF LEFT VENTRICULAR DIMENSION IN END DIASTOLE: -5.84
Z-SCORE OF LEFT VENTRICULAR DIMENSION IN END SYSTOLE: -1.82

## 2024-10-10 PROCEDURE — 99900031 HC PATIENT EDUCATION (STAT)

## 2024-10-10 PROCEDURE — 94761 N-INVAS EAR/PLS OXIMETRY MLT: CPT

## 2024-10-10 PROCEDURE — 63600175 PHARM REV CODE 636 W HCPCS: Performed by: INTERNAL MEDICINE

## 2024-10-10 PROCEDURE — 83735 ASSAY OF MAGNESIUM: CPT | Performed by: INTERNAL MEDICINE

## 2024-10-10 PROCEDURE — 80053 COMPREHEN METABOLIC PANEL: CPT | Performed by: INTERNAL MEDICINE

## 2024-10-10 PROCEDURE — 25000003 PHARM REV CODE 250: Performed by: INTERNAL MEDICINE

## 2024-10-10 PROCEDURE — 36415 COLL VENOUS BLD VENIPUNCTURE: CPT | Performed by: INTERNAL MEDICINE

## 2024-10-10 PROCEDURE — 25500020 PHARM REV CODE 255

## 2024-10-10 PROCEDURE — 93306 TTE W/DOPPLER COMPLETE: CPT | Mod: 26,,, | Performed by: INTERNAL MEDICINE

## 2024-10-10 PROCEDURE — 99900035 HC TECH TIME PER 15 MIN (STAT)

## 2024-10-10 PROCEDURE — 99233 SBSQ HOSP IP/OBS HIGH 50: CPT | Mod: ,,, | Performed by: INTERNAL MEDICINE

## 2024-10-10 PROCEDURE — 12000002 HC ACUTE/MED SURGE SEMI-PRIVATE ROOM

## 2024-10-10 PROCEDURE — 27100171 HC OXYGEN HIGH FLOW UP TO 24 HOURS

## 2024-10-10 PROCEDURE — 93306 TTE W/DOPPLER COMPLETE: CPT

## 2024-10-10 PROCEDURE — 25000003 PHARM REV CODE 250

## 2024-10-10 PROCEDURE — 85025 COMPLETE CBC W/AUTO DIFF WBC: CPT | Performed by: INTERNAL MEDICINE

## 2024-10-10 PROCEDURE — 94660 CPAP INITIATION&MGMT: CPT

## 2024-10-10 PROCEDURE — 96374 THER/PROPH/DIAG INJ IV PUSH: CPT

## 2024-10-10 PROCEDURE — 27000221 HC OXYGEN, UP TO 24 HOURS

## 2024-10-10 RX ORDER — TORSEMIDE 20 MG/1
20 TABLET ORAL 2 TIMES DAILY
Status: DISCONTINUED | OUTPATIENT
Start: 2024-10-10 | End: 2024-10-14 | Stop reason: HOSPADM

## 2024-10-10 RX ORDER — CARVEDILOL 3.12 MG/1
3.12 TABLET ORAL 2 TIMES DAILY
Status: DISCONTINUED | OUTPATIENT
Start: 2024-10-10 | End: 2024-10-14 | Stop reason: HOSPADM

## 2024-10-10 RX ADMIN — PERFLUTREN 1 ML: 6.52 INJECTION, SUSPENSION INTRAVENOUS at 01:10

## 2024-10-10 RX ADMIN — ASPIRIN 81 MG 81 MG: 81 TABLET ORAL at 08:10

## 2024-10-10 RX ADMIN — AMPICILLIN SODIUM 2 G: 2 INJECTION, POWDER, FOR SOLUTION INTRAMUSCULAR; INTRAVENOUS at 08:10

## 2024-10-10 RX ADMIN — CEFTRIAXONE SODIUM 2 G: 2 INJECTION, POWDER, FOR SOLUTION INTRAMUSCULAR; INTRAVENOUS at 11:10

## 2024-10-10 RX ADMIN — MUPIROCIN 1 G: 20 OINTMENT TOPICAL at 08:10

## 2024-10-10 RX ADMIN — LEVOTHYROXINE SODIUM 50 MCG: 0.03 TABLET ORAL at 08:10

## 2024-10-10 RX ADMIN — ATORVASTATIN CALCIUM 20 MG: 20 TABLET, FILM COATED ORAL at 08:10

## 2024-10-10 RX ADMIN — CARVEDILOL 3.12 MG: 3.12 TABLET, FILM COATED ORAL at 08:10

## 2024-10-10 RX ADMIN — MIDODRINE HYDROCHLORIDE 5 MG: 2.5 TABLET ORAL at 04:10

## 2024-10-10 RX ADMIN — MIDODRINE HYDROCHLORIDE 5 MG: 2.5 TABLET ORAL at 05:10

## 2024-10-10 RX ADMIN — MIDODRINE HYDROCHLORIDE 5 MG: 2.5 TABLET ORAL at 12:10

## 2024-10-10 RX ADMIN — TORSEMIDE 20 MG: 20 TABLET ORAL at 08:10

## 2024-10-10 RX ADMIN — CEFTRIAXONE SODIUM 2 G: 2 INJECTION, POWDER, FOR SOLUTION INTRAMUSCULAR; INTRAVENOUS at 12:10

## 2024-10-10 NOTE — RESPIRATORY THERAPY
10/09/24 1997   Patient Assessment/Suction   Level of Consciousness (AVPU) alert   Respiratory Effort Normal;Unlabored   PRE-TX-O2   Device (Oxygen Therapy) BIPAP   Oxygen Concentration (%) 28   SpO2 95 %   Pulse Oximetry Type Intermittent   $ Pulse Oximetry - Multiple Charge Pulse Oximetry - Multiple   Pulse 88   Resp 16   Aerosol Therapy   $ Aerosol Therapy Charges PRN treatment not required   Respiratory Treatment Status (SVN) PRN treatment not required   Ready to Wean/Extubation Screen   FIO2<=50 (chart decimal) 0.28   Preset CPAP/BiPAP Settings   Mode Of Delivery BiPAP S/T   $ CPAP/BiPAP Daily Charge BiPAP/CPAP Daily   $ Initial CPAP/BiPAP Setup? Yes   $ Is patient using? Yes   Size of Mask Large   Sized Appropriately? Yes   Equipment Type V60   Airway Device Type large full face mask   Humidifier not applicable   Ipap 12   EPAP (cm H2O) 6   Pressure Support (cm H2O) 6   Set Rate (Breaths/Min) 16   ITime (sec) 1   Rise Time (sec) 3   Patient CPAP/BiPAP Settings   RR Total (Breaths/Min) 22   Tidal Volume (mL) 435   VE Minute Ventilation (L/min) 9.5 L/min   Peak Inspiratory Pressure (cm H2O) 12   TiTOT (%) 33   Total Leak (L/Min) 35   Patient Trigger - ST Mode Only (%) 20   CPAP/BiPAP Alarms   High Pressure (cm H2O) 40   Low Pressure (cm H2O) 5   Minute Ventilation (L/Min) 3   High RR (breaths/min) 50   Low RR (breaths/min) 8   Apnea (Sec) 20   Education   $ Education BiPAP;Bronchodilator;Oxygen;15 min

## 2024-10-10 NOTE — CONSULTS
Consult Note  Nephrology    Consult Requested By: Lynn Barkley MD    Reason for Consult:   ESRD, dependent on dialysis    SUBJECTIVE:     History of Present Illness:   63 y/o male patient known to our practice, has out patient dialysis 3x wk on MWF schedule.  He was brought to ER yesterday by his wife d/t lethargy, not feeling well, fatigue, fever.  Blood cx positive for Enterococcus faecalis.  Getting IVAB.  Nephrology is consulted for dialysis orders.    10/9  pt seen and examined.  Ill-appearing, Tmax 103.3.  BP stable.  Dialysis orders are in, may not tolerate much in the was of UF today, but pt is not a big fluid rancho.  I believe pt still makes urine, will order UA.  10/10  AFVSS, at a procedure.      Assessment/plan:    ESRD  SHPT  Hypomagnesemia  Mild hyperkalemia  Anemia of chronic dz  Hypotension  DM 2    --HD per pt's schedule, MWF.  Dose all medications for dialysis  --not on binders at home, and PO4 is actually low right now - likely d/t poor po intake  --Mg+ improved  --HD will improve K+  --ordered epo w/HD, keep Hgb 10-11  --continue midodrine, judicious UF  --Blood glucose control per primary team.      Past Medical History:   Diagnosis Date    AICD (automatic cardioverter/defibrillator) present     Anemia due to multiple mechanisms 02/20/2024    Anemia, chronic disease 07/27/2021    Anemia, chronic renal failure, stage 5 02/20/2024    Anemia, unspecified 07/27/2021    Anxiety and depression 2012    CAD (coronary artery disease) 2012    Cardiomegaly 2016    CHF (congestive heart failure) 2012    Cholecystitis 2017    Complete heart block 2012    Diabetic foot ulcer     DVT (deep venous thrombosis) 2012    And pulmonary embolus    GERD (gastroesophageal reflux disease) 2010    Heparin induced thrombocytopenia     Hyperlipemia 2011    IDDM (insulin dependent diabetes mellitus) 1983    With neuropathy    Ischemic cardiomyopathy 2012    MI (myocardial infarction) 2012    Morbid obesity     MRSA  (methicillin resistant Staphylococcus aureus) infection 01/19/2019    Noncompliance with therapeutic plan 2019    Normochromic normocytic anemia 07/27/2021    Osteomyelitis of right foot 01/2019    Osteomyelitis of right foot 09/01/2022    Klebsiella from bone, GBS in blood    Pulmonary edema 2019    Respiratory failure 2019    Sepsis 01/2019    Due to cellulitis right leg    Sleep apnea 2013    Thrombocytopathy 2012    Thyroid disease     Venous stasis dermatitis of both lower extremities      Past Surgical History:   Procedure Laterality Date    CARDIAC PACEMAKER PLACEMENT  12/2012    CARDIAC PACEMAKER PLACEMENT  10/04/2018    battery replacement    COIL EMBOLIZATION, SINGLE VESSEL Right 5/7/2024    Procedure: Coil Embolization, Single Vessel;  Surgeon: Khoobehi, Ali, MD;  Location: [facility] CATH/EP LAB;  Service: Vascular;  Laterality: Right;    CORONARY ARTERY BYPASS GRAFT  06/2012    ESOPHAGOGASTRODUODENOSCOPY  01/31/2017    FISTULOGRAM Bilateral 5/7/2024    Procedure: Fistulogram;  Surgeon: Khoobehi, Ali, MD;  Location: [facility] CATH/EP LAB;  Service: Vascular;  Laterality: Bilateral;    SAMARA FILTER PLACEMENT  2012    vena cava    INSERTION OF TUNNELED CENTRAL VENOUS HEMODIALYSIS CATHETER N/A 7/9/2024    Procedure: REMOVAL TUNNELED CATHETER;  Surgeon: Khoobehi, Ali, MD;  Location: [facility] CATH/EP LAB;  Service: Peripheral Vascular;  Laterality: N/A;    INSERTION, CATHETER, TUNNELED Right 10/16/2023    Procedure: Insertion,catheter,tunneled;  Surgeon: Terri Gresham MD;  Location: [facility] OR;  Service: Cardiovascular;  Laterality: Right;    LUMBAR SYMPATHETIC NERVE BLOCK Right 8/22/2019    Procedure: BLOCK, NERVE, SYMPATHETIC, LUMBAR;  Surgeon: Tashi Good MD;  Location: [facility] OR;  Service: Pain Management;  Laterality: Right;  RIGHT SYMPATHETIC NERVE BLOCK    PHLEBOGRAPHY N/A 7/9/2024    Procedure: VENOGRAM;  Surgeon: Khoobehi, Ali, MD;  Location: [facility] CATH/EP LAB;  Service: Peripheral Vascular;  Laterality: N/A;  "    Family History   Problem Relation Name Age of Onset    Arthritis Mother      Diabetes Mother      Cancer Father      Heart disease Father      Stroke Father       Social History     Tobacco Use    Smoking status: Former     Current packs/day: 0.00     Average packs/day: 2.0 packs/day for 38.0 years (76.0 ttl pk-yrs)     Types: Cigarettes     Start date:      Quit date:      Years since quittin.7    Smokeless tobacco: Never   Substance Use Topics    Alcohol use: No    Drug use: Never       Review of patient's allergies indicates:   Allergen Reactions    Vasopressin Anaphylaxis and Other (See Comments)     Increased pressure in his head; felt like his eyeballs and veins were going to pop out of his head.     Vasopressin analogues Other (See Comments) and Anaphylaxis     "felt like eyes going to pop out of my head"  Other reaction(s): Other (See Comments)  "felt like eyes going to pop out of my head"  Increased pressure in his head; felt like his eyeballs and veins were going to pop out of his head.     Heparin Other (See Comments)     Other reaction(s): "depleted my blood platets"    Decreased platelet count    Heparin analogues Other (See Comments)     Depleted WBC count    Morphine Hallucinations, Other (See Comments) and Anxiety     Other reaction(s): Hallucinations  Paranoid, hallucinations          Review of Systems:  As above    OBJECTIVE:     Vital Signs Range (Last 24H):  Temp:  [97.6 °F (36.4 °C)-98.5 °F (36.9 °C)]   Pulse:  []   Resp:  [16-18]   BP: ()/(54-69)   SpO2:  [95 %-99 %]     Physical Exam:  General- ill-appearing  HEENT- WNL  Neck- supple  CV- Regular rate and rhythm  Resp- Lungs CTA Bilaterally, No increased WOB  GI- Non tender/non-distended, BS normoactive x4 quads  Extrem- No cyanosis, clubbing, edema.  Derm- skin w/d, pale  Neuro-  No flap.     Body mass index is 34.9 kg/m².    Laboratory:  CBC:   Recent Labs   Lab 10/10/24  0614   WBC 3.46*   RBC 3.20*   HGB 9.7* "   HCT 31.8*   PLT 51*   MCV 99*   MCH 30.3   MCHC 30.5*     CMP:   Recent Labs   Lab 10/10/24  0614   *   CALCIUM 8.9   ALBUMIN 3.7   PROT 7.5      K 4.6   CO2 28      BUN 33*   CREATININE 3.2*   ALKPHOS 58   ALT 7*   AST 18   BILITOT 1.6*       Diagnostic Results:  Labs: Reviewed        ASSESSMENT/PLAN:     Active Hospital Problems    Diagnosis  POA    *Bacteremia due to Enterococcus [R78.81, B95.2]  Yes    ESRD (end stage renal disease) [N18.6]  Unknown    Periorbital hematoma of left eye [H05.232]  Unknown    CAD (coronary artery disease) [I25.10]  Yes    Type II diabetes mellitus with neurological manifestations [E11.49]  Yes     With neuropathy        Resolved Hospital Problems   No resolved problems to display.         Thank you for allowing us to participate in the care of your patient. We will follow the patient and provide recommendations as needed.      Time spent seeing patient( greater than 1/2 spent in direct contact) :     Flo Drake MD

## 2024-10-10 NOTE — CONSULTS
Consult Note  Nephrology    Consult Requested By: Lynn Barkley MD    Reason for Consult:   ESRD, dependent on dialysis    SUBJECTIVE:     History of Present Illness:   65 y/o male patient known to our practice, has out patient dialysis 3x wk on MWF schedule.  He was brought to ER yesterday by his wife d/t lethargy, not feeling well, fatigue, fever.  Blood cx positive for Enterococcus faecalis.  Getting IVAB.  Nephrology is consulted for dialysis orders.    10/9  pt seen and examined.  Ill-appearing, Tmax 103.3.  BP stable.  Dialysis orders are in, may not tolerate much in the was of UF today, but pt is not a big fluid rancho.  I believe pt still makes urine, will order UA.  10/10  no fevers, VSS.  K+ in goal range.  1L off w/dialysis yesterday.  UA looks ok.  Has 2 bananas and a coke on his bedside table--pt is educated regularly on his diet, and did not eat/drink these.  Changed diet to renal and messaged nursing to remove items from his room.  He feels much better today.    Assessment/plan:    ESRD  bacteremia  SHPT  Hypomagnesemia  Mild hyperkalemia  Anemia of chronic dz  Hypotension  DM 2    --HD per pt's schedule, MWF.  Dose all medications for dialysis  --ID following, ECHO today  --not on binders at home, and PO4 is actually low right now - likely d/t poor po intake  --Mg+ improved  --HD will improve K+.  Renal diet  --ordered epo w/HD, keep Hgb 10-11  --continue midodrine, judicious UF  --Blood glucose control per primary team.      Past Medical History:   Diagnosis Date    AICD (automatic cardioverter/defibrillator) present     Anemia due to multiple mechanisms 02/20/2024    Anemia, chronic disease 07/27/2021    Anemia, chronic renal failure, stage 5 02/20/2024    Anemia, unspecified 07/27/2021    Anxiety and depression 2012    CAD (coronary artery disease) 2012    Cardiomegaly 2016    CHF (congestive heart failure) 2012    Cholecystitis 2017    Complete heart block 2012    Diabetic foot ulcer      DVT (deep venous thrombosis) 2012    And pulmonary embolus    GERD (gastroesophageal reflux disease) 2010    Heparin induced thrombocytopenia     Hyperlipemia 2011    IDDM (insulin dependent diabetes mellitus) 1983    With neuropathy    Ischemic cardiomyopathy 2012    MI (myocardial infarction) 2012    Morbid obesity     MRSA (methicillin resistant Staphylococcus aureus) infection 01/19/2019    Noncompliance with therapeutic plan 2019    Normochromic normocytic anemia 07/27/2021    Osteomyelitis of right foot 01/2019    Osteomyelitis of right foot 09/01/2022    Klebsiella from bone, GBS in blood    Pulmonary edema 2019    Respiratory failure 2019    Sepsis 01/2019    Due to cellulitis right leg    Sleep apnea 2013    Thrombocytopathy 2012    Thyroid disease     Venous stasis dermatitis of both lower extremities      Past Surgical History:   Procedure Laterality Date    CARDIAC PACEMAKER PLACEMENT  12/2012    CARDIAC PACEMAKER PLACEMENT  10/04/2018    battery replacement    COIL EMBOLIZATION, SINGLE VESSEL Right 5/7/2024    Procedure: Coil Embolization, Single Vessel;  Surgeon: Khoobehi, Ali, MD;  Location: [facility] CATH/EP LAB;  Service: Vascular;  Laterality: Right;    CORONARY ARTERY BYPASS GRAFT  06/2012    ESOPHAGOGASTRODUODENOSCOPY  01/31/2017    FISTULOGRAM Bilateral 5/7/2024    Procedure: Fistulogram;  Surgeon: Khoobehi, Ali, MD;  Location: [facility] CATH/EP LAB;  Service: Vascular;  Laterality: Bilateral;    SAMARA FILTER PLACEMENT  2012    vena cava    INSERTION OF TUNNELED CENTRAL VENOUS HEMODIALYSIS CATHETER N/A 7/9/2024    Procedure: REMOVAL TUNNELED CATHETER;  Surgeon: Khoobehi, Ali, MD;  Location: [facility] CATH/EP LAB;  Service: Peripheral Vascular;  Laterality: N/A;    INSERTION, CATHETER, TUNNELED Right 10/16/2023    Procedure: Insertion,catheter,tunneled;  Surgeon: Terri Gresham MD;  Location: [facility] OR;  Service: Cardiovascular;  Laterality: Right;    LUMBAR SYMPATHETIC NERVE BLOCK Right 8/22/2019     "Procedure: BLOCK, NERVE, SYMPATHETIC, LUMBAR;  Surgeon: Tashi Good MD;  Location: [facility] OR;  Service: Pain Management;  Laterality: Right;  RIGHT SYMPATHETIC NERVE BLOCK    PHLEBOGRAPHY N/A 2024    Procedure: VENOGRAM;  Surgeon: Khoobehi, Ali, MD;  Location: [facility] CATH/EP LAB;  Service: Peripheral Vascular;  Laterality: N/A;     Family History   Problem Relation Name Age of Onset    Arthritis Mother      Diabetes Mother      Cancer Father      Heart disease Father      Stroke Father       Social History     Tobacco Use    Smoking status: Former     Current packs/day: 0.00     Average packs/day: 2.0 packs/day for 38.0 years (76.0 ttl pk-yrs)     Types: Cigarettes     Start date:      Quit date:      Years since quittin.7    Smokeless tobacco: Never   Substance Use Topics    Alcohol use: No    Drug use: Never       Review of patient's allergies indicates:   Allergen Reactions    Vasopressin Anaphylaxis and Other (See Comments)     Increased pressure in his head; felt like his eyeballs and veins were going to pop out of his head.     Vasopressin analogues Other (See Comments) and Anaphylaxis     "felt like eyes going to pop out of my head"  Other reaction(s): Other (See Comments)  "felt like eyes going to pop out of my head"  Increased pressure in his head; felt like his eyeballs and veins were going to pop out of his head.     Heparin Other (See Comments)     Other reaction(s): "depleted my blood platets"    Decreased platelet count    Heparin analogues Other (See Comments)     Depleted WBC count    Morphine Hallucinations, Other (See Comments) and Anxiety     Other reaction(s): Hallucinations  Paranoid, hallucinations          Review of Systems:  As above    OBJECTIVE:     Vital Signs Range (Last 24H):  Temp:  [97.6 °F (36.4 °C)-98.5 °F (36.9 °C)]   Pulse:  []   Resp:  [16-18]   BP: ()/(54-69)   SpO2:  [95 %-99 %]     Physical Exam:  General- looks better, NAD noted  HEENT- WNL  Neck- " supple  CV- Regular rate and rhythm  Resp- Lungs CTA Bilaterally, No increased WOB  GI- Non tender/non-distended, BS normoactive x4 quads  Extrem- No cyanosis, clubbing, edema.  Derm- skin w/d, pale  Neuro-  No flap.     Body mass index is 34.9 kg/m².    Laboratory:  CBC:   Recent Labs   Lab 10/09/24  0655   WBC 6.63   RBC 3.23*   HGB 9.9*   HCT 32.0*   PLT 56*   MCV 99*   MCH 30.7   MCHC 30.9*     CMP:   Recent Labs   Lab 10/10/24  0614   *   CALCIUM 8.9   ALBUMIN 3.7   PROT 7.5      K 4.6   CO2 28      BUN 33*   CREATININE 3.2*   ALKPHOS 58   ALT 7*   AST 18   BILITOT 1.6*       Diagnostic Results:  Labs: Reviewed        ASSESSMENT/PLAN:     Active Hospital Problems    Diagnosis  POA    *Bacteremia due to Enterococcus [R78.81, B95.2]  Yes    ESRD (end stage renal disease) [N18.6]  Unknown    Periorbital hematoma of left eye [H05.232]  Unknown    CAD (coronary artery disease) [I25.10]  Yes    Type II diabetes mellitus with neurological manifestations [E11.49]  Yes     With neuropathy        Resolved Hospital Problems   No resolved problems to display.         Thank you for allowing us to participate in the care of your patient. We will follow the patient and provide recommendations as needed.      Time spent seeing patient( greater than 1/2 spent in direct contact) :     Caremncita Ballesteros NP

## 2024-10-10 NOTE — CARE UPDATE
10/10/24 0754   Patient Assessment/Suction   Level of Consciousness (AVPU) alert   Respiratory Effort Normal;Unlabored   Expansion/Accessory Muscles/Retractions no use of accessory muscles;no retractions;expansion symmetric   All Lung Fields Breath Sounds clear   Rhythm/Pattern, Respiratory unlabored;pattern regular;depth regular;chest wiggle adequate;no shortness of breath reported   Cough Frequency no cough   Skin Integrity   $ Wound Care Tech Time 15 min   Area Observed Bridge of nose   Skin Appearance without discoloration   PRE-TX-O2   Device (Oxygen Therapy) nasal cannula   $ Is the patient on Low Flow Oxygen? Yes   $ Is the patient on High Flow Oxygen? Yes   Flow (L/min) (Oxygen Therapy) 2   SpO2 96 %   Pulse Oximetry Type Intermittent   $ Pulse Oximetry - Multiple Charge Pulse Oximetry - Multiple   Pulse 80   Resp 18   Aerosol Therapy   $ Aerosol Therapy Charges PRN treatment not required   Preset CPAP/BiPAP Settings   Mode Of Delivery BiPAP S/T;Standby   $ CPAP/BiPAP Daily Charge BiPAP/CPAP Daily   Equipment Type V60   Education   $ Education Bronchodilator;BiPAP;15 min

## 2024-10-10 NOTE — PLAN OF CARE
[facility]  Initial Discharge Assessment         Met with patient at bedside for initial cm assessment. Patient lives at home with his wife and is independent with ADLs. No current DME. Denies Coumadin use. Patient is a chronic dialysis patient at [facility] [address] [facility] on MWF. Plan for discharge is home, no immediate discharge needs identified. Patient's wife will pick him up via private vehicle at discharge. Patient uses [facility] for medications and PCP is Michelle Messina.       Primary Care Provider: Michelle Messina FNP    Admission Diagnosis: Sepsis [A41.9]    Admission Date: 10/8/2024  Expected Discharge Date: 10/11/2024    Transition of Care Barriers: (P) None    Payor: [employer] MANAGED MEDICARE / Plan: [employer] MEDICARE PLAN PPO / Product Type: Medicare Advantage /     Extended Emergency Contact Information  Primary Emergency Contact: Katelynn Bullard  Home Phone: [phone]  Mobile Phone: [phone]  Relation: Spouse    Discharge Plan A: (P) Home  Discharge Plan B: (P) Home      [facility] - [place], [place] - [address]  [address]  [place] [address]  Phone: [phone] Fax: [phone]      Initial Assessment (most recent)       Adult Discharge Assessment - 10/10/24 1516          Discharge Assessment    Assessment Type Discharge Planning Assessment (P)      Confirmed/corrected address, phone number and insurance Yes (P)      Confirmed Demographics Correct on Facesheet (P)      Source of Information patient (P)      When was your last doctors appointment? -- (P)    6 months ago    Does patient/caregiver understand observation status Yes (P)      Communicated BILL with patient/caregiver Yes (P)      Reason For Admission bacteremia (P)      People in Home spouse (P)      Do you expect to return to your current living situation? Yes (P)      Prior to hospitilization cognitive status: Alert/Oriented (P)      Current cognitive status: Alert/Oriented (P)      Walking or  Climbing Stairs Difficulty no (P)      Dressing/Bathing Difficulty no (P)      Home Accessibility wheelchair accessible (P)      Home Layout Able to live on 1st floor (P)      Equipment Currently Used at Home none (P)      Readmission within 30 days? No (P)      Patient currently being followed by outpatient case management? No (P)      Do you currently have service(s) that help you manage your care at home? No (P)      Do you take prescription medications? No (P)      Do you have prescription coverage? Yes (P)      Coverage [employer] Medicare (P)      Do you have any problems affording any of your prescribed medications? No (P)      Is the patient taking medications as prescribed? yes (P)      Who is going to help you get home at discharge? pt's wife will pick him up (P)      How do you get to doctors appointments? car, drives self;family or friend will provide (P)      Are you on dialysis? Yes (P)      Dialysis Name and Scheduled days [facility] on [address] MWF (P)      Do you take coumadin? No (P)      Discharge Plan A Home (P)      Discharge Plan B Home (P)      DME Needed Upon Discharge  none (P)      Discharge Plan discussed with: Patient (P)      Transition of Care Barriers None (P)         Physical Activity    On average, how many days per week do you engage in moderate to strenuous exercise (like a brisk walk)? 0 days (P)      On average, how many minutes do you engage in exercise at this level? 0 min (P)         Financial Resource Strain    How hard is it for you to pay for the very basics like food, housing, medical care, and heating? Not hard at all (P)         Housing Stability    In the last 12 months, was there a time when you were not able to pay the mortgage or rent on time? No (P)      At any time in the past 12 months, were you homeless or living in a shelter (including now)? No (P)         Transportation Needs    Has the lack of transportation kept you from medical appointments, meetings, work or from  getting things needed for daily living? No (P)         Food Insecurity    Within the past 12 months, you worried that your food would run out before you got the money to buy more. Never true (P)      Within the past 12 months, the food you bought just didn't last and you didn't have money to get more. Never true (P)         Stress    Do you feel stress - tense, restless, nervous, or anxious, or unable to sleep at night because your mind is troubled all the time - these days? Not at all (P)         Social Isolation    How often do you feel lonely or isolated from those around you?  Never (P)         Alcohol Use    Q1: How often do you have a drink containing alcohol? Never (P)      Q2: How many drinks containing alcohol do you have on a typical day when you are drinking? Patient does not drink (P)      Q3: How often do you have six or more drinks on one occasion? Never (P)         Utilities    In the past 12 months has the electric, gas, oil, or water company threatened to shut off services in your home? No (P)         Health Literacy    How often do you need to have someone help you when you read instructions, pamphlets, or other written material from your doctor or pharmacy? Never (P)

## 2024-10-10 NOTE — PROGRESS NOTES
"[facility]   Department of Infectious Disease  Progress Note        PATIENT NAME: Ana Bullard  MRN: [id number]  TODAY'S DATE: 10/10/2024  ADMIT DATE: 10/8/2024  LOS: 2 days    CHIEF COMPLAINT: Altered Mental Status (Per family, patient complained of "feeling bad" yesterday. Temp of 103 at home. Today more lethargic unable to answer questions regarding name and . Patient's family answering all questions. )      PRINCIPLE PROBLEM: Bacteremia due to Enterococcus    INTERVAL HISTORY      10/10/2024: States improving.  No acute issues overnight.  TTE done today.  Report pending      Antibiotics (From admission, onward)      Start     Stop Route Frequency Ordered    10/09/24 2100  ampicillin 2 g in sodium chloride 0.9 % 100 mL IVPB (ready to mix)         -- IV Every 12 hours (non-standard times) 10/09/24 1808    10/09/24 1230  cefTRIAXone (ROCEPHIN) 2 g in dextrose 5 % 100 mL IVPB (ready to mix)         -- IV Every 12 hours (non-standard times) 10/09/24 1137    10/08/24 2330  mupirocin 2 % ointment         10/13/24 2059 Nasl 2 times daily 10/08/24 2226          Antifungals (From admission, onward)      None           Antivirals (From admission, onward)      None            ASSESSMENT and PLAN      1. Enterococcus faecalis bacteremia in a patient with AICD.  Source unclear. Repeat Blood cultures x2 sets today.  Continue ampicillin +ceftriaxone.       2. Bilateral leg edema with right leg ulcers.  They are not infected.  Symptomatic care      3. Left periorbital hematoma.  Probably to a left temporal skin cancer excision.  Expectant management.  Improving.       4. Complete heart block status post AICD placement.  AICD site does not look clinically infected.  Keep this in mind in this patient with bacteremia.     RECOMMENDATIONS:   Repeat blood cultures today  Continue current antibiotics  Follow TTE report.  May need GABRIELLA.    Please send Epic secure chat with any questions.      ANÍBAL    Mahinestelita " DAKOTA Bullard is a 64 y.o. male multiple medical problems including diabetes mellitus, hypertension, hyperlipidemia, complete heart block status post AICD placement, chronic thrombocytopenia..  Also with prior history of MRSA infection in 2019.  Admitted 10/08/2024 with lethargy.  In the ER /57, pulse 94, respiratory rate 17, temperature 103.1° with oxygen saturation of 90%.  Chest x-ray with no acute infiltrate.  He was admitted for sepsis and placed on broad-spectrum antibiotics. Blood culture Enterococcus faecalis in 2/2 bottles.  ID asked to assist with his management.     Antibiotic history:    Vancomycin: 10/08/2024-  Zosyn: 10/08/2024-     Microbiology:   Blood culture 10/08/2024: E faecalis 2/2    Review of Systems  Negative except as stated above in Interval History     OBJECTIVE   Temp:  [97.6 °F (36.4 °C)-98.5 °F (36.9 °C)] 97.9 °F (36.6 °C)  Pulse:  [] 90  Resp:  [16-18] 18  SpO2:  [95 %-99 %] 97 %  BP: (102-122)/(54-74) 116/74  Temp:  [97.6 °F (36.4 °C)-98.5 °F (36.9 °C)]   Temp: 97.9 °F (36.6 °C) (10/10/24 1211)  Pulse: 90 (10/10/24 1211)  Resp: 18 (10/10/24 1211)  BP: 116/74 (10/10/24 1211)  SpO2: 97 % (10/10/24 1211)    Intake/Output Summary (Last 24 hours) at 10/10/2024 1219  Last data filed at 10/9/2024 1810  Gross per 24 hour   Intake 980 ml   Output 2900 ml   Net -1920 ml     Physical Exam  General:  Middle-aged man lying quietly in bed in no acute distress   HEENT:  Bruise around left eye.  Sutured area left temple.  CVS: S1 and 2 heard, no murmurs appreciated   Respiratory: Clear to auscultation   Abdomen: Full, soft, nontender, palpable organomegaly   Skin:  No rash appreciated.  No stigmata of endocarditis   Lower extremities:  Mild edema.  Dry skin.  Clean shallow ulcers right leg.  Musculoskeletal: No joint or bony abnormalities appreciated  CNS: No gross focal deficits  Psych: Good mood, normal affect.      VAD:  PIV  ISOLATION:  None     Wounds:  Shallow ulcers right leg that are  "not infected    Significant Labs: All pertinent labs within the past 24 hours have been reviewed.    CBC LAST 7 DAYS  Recent Labs   Lab 10/08/24  1746 10/09/24  0655 10/10/24  0614   WBC 9.44 6.63 3.46*   RBC 3.52* 3.23* 3.20*   HGB 10.8* 9.9* 9.7*   HCT 34.2* 32.0* 31.8*   MCV 97 99* 99*   MCH 30.7 30.7 30.3   MCHC 31.6* 30.9* 30.5*   RDW 17.2* 17.5* 17.3*   PLT 76* 56* 51*   MPV 11.6 11.1 11.5   GRAN 89.1*  8.4* 89.3*  5.9 74.7*  2.6   LYMPH 2.4*  0.2* 2.1*  0.1* 6.4*  0.2*   MONO 7.8  0.7 7.7  0.5 17.1*  0.6   BASO 0.01 0.00 0.01   NRBC 0 0 1*       CHEMISTRY LAST 7 DAYS  Recent Labs   Lab 10/08/24  1746 10/09/24  0655 10/10/24  0614    134* 136   K 5.2* 5.3* 4.6   CL 99 97 102   CO2 26 30* 28   ANIONGAP 11 7* 6*   BUN 33* 43* 33*   CREATININE 3.2* 4.0* 3.2*   * 153* 138*   CALCIUM 9.1 8.9 8.9   MG 1.4* 1.8 1.8   ALBUMIN 4.0 3.6 3.7   PROT 8.0 7.2 7.5   ALKPHOS 71 58 58   ALT 8* 7* 7*   AST 17 14 18   BILITOT 2.2* 2.4* 1.6*       Estimated Creatinine Clearance: 32.5 mL/min (A) (based on SCr of 3.2 mg/dL (H)).    INFLAMMATORY/PROCAL  LAST 7 DAYS  Recent Labs   Lab 10/09/24  1412   CRP 11.10*     No results found for: "ESR"  CRP   Date Value Ref Range Status   10/09/2024 11.10 (H) <1.00 mg/dL Final     Comment:     CRP-Normal Application expected values:   <1.0        mg/dL   Normal Range  1.0 - 5.0  mg/dL   Indicates mild inflammation  5.0 - 10.0 mg/dL   Indicates severe inflammation  >10.0        mg/dL   Represents serious processes and   frequently         indicates the presence of a bacterial   infection.      11/23/2022 3.07 (H) <0.76 mg/dL Final   11/14/2022 2.93 (H) <0.76 mg/dL Final   11/07/2022 2.33 (H) <0.76 mg/dL Final   10/31/2022 3.51 (H) <0.76 mg/dL Final   09/01/2022 3.61 (H) <0.76 mg/dL Final       PRIOR  MICROBIOLOGY:    Susceptibility data from last 90 days.  Collected Specimen Info Organism   10/08/24 Blood from Peripheral, Hand, Left Enterococcus faecalis   10/08/24 Blood " from Peripheral, Forearm, Left Enterococcus faecalis       LAST 7 DAYS MICROBIOLOGY   Microbiology Results (last 7 days)       Procedure Component Value Units Date/Time    Blood culture x two cultures. Draw prior to antibiotics [[id number]]  (Abnormal) Collected: 10/08/24 1733    Order Status: Completed Specimen: Blood from Peripheral, Hand, Left Updated: 10/10/24 0905     Blood Culture, Routine Gram stain aer bottle: Gram positive cocci      Positive results previously called on order [id number]      Gram stain mayte bottle: Gram positive cocci      Positive results previously called on order [id number]      ENTEROCOCCUS FAECALIS  For susceptibility see order #[id number]      Narrative:      Aerobic and anaerobic  Collection has been rescheduled by SARA at 10/08/2024 17:07 Reason:   Dialysis patient  Collection has been rescheduled by SARA at 10/08/2024 17:07 Reason:   Dialysis patient    Blood culture x two cultures. Draw prior to antibiotics [[id number]]  (Abnormal) Collected: 10/08/24 1706    Order Status: Completed Specimen: Blood from Peripheral, Forearm, Left Updated: 10/10/24 0904     Blood Culture, Routine Gram stain mayte bottle: Gram positive cocci      Results called to and read back by: Jonnie Ortega RN on [address] wing      Gram stain aer bottle: Gram positive cocci      Positive results previously called      ENTEROCOCCUS FAECALIS  susceptibility pending      Narrative:      Aerobic and anaerobic    Rapid Organism ID by PCR (from Blood culture) [[id number]]  (Abnormal) Collected: 10/08/24 1706    Order Status: Completed Updated: 10/09/24 0435     Enterococcus faecalis Detected     Enterococcus faecium Not Detected     Listeria monocytogenes Not Detected     Staphylococcus spp. Not Detected     Staphylococcus aureus Not Detected     Staphylococcus epidermidis Not Detected     Staphylococcus lugdunensis Not Detected     Streptococcus species Not Detected     Streptococcus agalactiae Not Detected     Streptococcus  pneumoniae Not Detected     Streptococcus pyogenes Not Detected     Acinetobacter calcoaceticus/baumannii complex Not Detected     Bacteroides fragilis Not Detected     Enterobacterales Not Detected     Enterobacter cloacae complex Not Detected     Escherichia coli Not Detected     Klebsiella aerogenes Not Detected     Klebsiella oxytoca Not Detected     Klebsiella pneumoniae group Not Detected     Proteus Not Detected     Salmonella sp Not Detected     Serratia marcescens Not Detected     Haemophilus influenzae Not Detected     Neisseria meningtidis Not Detected     Pseudomonas aeruginosa Not Detected     Stenotrophomonas maltophilia Not Detected     Candida albicans Not Detected     Candida auris Not Detected     Candida glabrata Not Detected     Candida krusei Not Detected     Candida parapsilosis Not Detected     Candida tropicalis Not Detected     Cryptococcus neoformans/gattii Not Detected     CTX-M (ESBL ) Test not applicable     IMP (Carbapenem resistant) Test not applicable     KPC resistance gene (Carbapenem resistant) Test not applicable     mcr-1  Test not applicable     mec A/C  Test not applicable     mec A/C and MREJ (MRSA) gene Test not applicable     NDM (Carbapenem resistant) Test not applicable     OXA-48-like (Carbapenem resistant) Test not applicable     van A/B (VRE gene) Not Detected     VIM (Carbapenem resistant) Test not applicable    Narrative:      Aerobic and anaerobic          CURRENT/PREVIOUS VISIT EKG  Results for orders placed or performed during the hospital encounter of 10/08/24   EKG 12-lead    Collection Time: 10/08/24  6:20 PM   Result Value Ref Range    QRS Duration 158 ms    OHS QTC Calculation 525 ms    Narrative    Test Reason : R00.0,    Vent. Rate : 096 BPM     Atrial Rate : 102 BPM     P-R Int : 000 ms          QRS Dur : 158 ms      QT Int : 416 ms       P-R-T Axes : 000 216 037 degrees     QTc Int : 525 ms    Ventricular-paced rhythm  Biventricular pacemaker  detected  Abnormal ECG  When compared with ECG of 08-OCT-2023 23:25,  Vent. rate has decreased BY  31 BPM    Referred By: [email or web address]   SELF           Confirmed By:      Significant Imaging: I have reviewed all relevant and available imaging results/findings within the past 24 hours.    I spent a total of 50 minutes on the day of the visit.This includes face to face time and non-face to face time preparing to see the patient (eg, review of tests), obtaining and/or reviewing separately obtained history, documenting clinical information in the electronic or other health record, independently interpreting results and communicating results to the patient/family/caregiver, or care coordinator.    Samuel Hu MD  Date of Service: 10/10/2024      This note was created using M Modal voice recognition software that occasionally misinterpreted phrases or words.

## 2024-10-10 NOTE — ASSESSMENT & PLAN NOTE
Given ESRD patient was not on ACE or Arb, reinitiate home carvedilol    Patient has AICD in place

## 2024-10-11 LAB
ALBUMIN SERPL BCP-MCNC: 3.6 G/DL (ref 3.5–5.2)
ALP SERPL-CCNC: 57 U/L (ref 55–135)
ALT SERPL W/O P-5'-P-CCNC: 8 U/L (ref 10–44)
ANION GAP SERPL CALC-SCNC: 9 MMOL/L (ref 8–16)
AST SERPL-CCNC: 15 U/L (ref 10–40)
BACTERIA BLD CULT: ABNORMAL
BASOPHILS # BLD AUTO: 0.01 K/UL (ref 0–0.2)
BASOPHILS NFR BLD: 0.3 % (ref 0–1.9)
BILIRUB SERPL-MCNC: 1.1 MG/DL (ref 0.1–1)
BUN SERPL-MCNC: 45 MG/DL (ref 8–23)
CALCIUM SERPL-MCNC: 8.4 MG/DL (ref 8.7–10.5)
CHLORIDE SERPL-SCNC: 102 MMOL/L (ref 95–110)
CO2 SERPL-SCNC: 25 MMOL/L (ref 23–29)
CREAT SERPL-MCNC: 3.6 MG/DL (ref 0.5–1.4)
DIFFERENTIAL METHOD BLD: ABNORMAL
EOSINOPHIL # BLD AUTO: 0 K/UL (ref 0–0.5)
EOSINOPHIL NFR BLD: 1.1 % (ref 0–8)
ERYTHROCYTE [DISTWIDTH] IN BLOOD BY AUTOMATED COUNT: 17.3 % (ref 11.5–14.5)
EST. GFR  (NO RACE VARIABLE): 18.3 ML/MIN/1.73 M^2
GLUCOSE SERPL-MCNC: 136 MG/DL (ref 70–110)
HCT VFR BLD AUTO: 30.1 % (ref 40–54)
HGB BLD-MCNC: 9.4 G/DL (ref 14–18)
IMM GRANULOCYTES # BLD AUTO: 0.01 K/UL (ref 0–0.04)
IMM GRANULOCYTES NFR BLD AUTO: 0.3 % (ref 0–0.5)
LYMPHOCYTES # BLD AUTO: 0.2 K/UL (ref 1–4.8)
LYMPHOCYTES NFR BLD: 6.5 % (ref 18–48)
MAGNESIUM SERPL-MCNC: 1.8 MG/DL (ref 1.6–2.6)
MCH RBC QN AUTO: 31 PG (ref 27–31)
MCHC RBC AUTO-ENTMCNC: 31.2 G/DL (ref 32–36)
MCV RBC AUTO: 99 FL (ref 82–98)
MONOCYTES # BLD AUTO: 0.7 K/UL (ref 0.3–1)
MONOCYTES NFR BLD: 19.2 % (ref 4–15)
NEUTROPHILS # BLD AUTO: 2.7 K/UL (ref 1.8–7.7)
NEUTROPHILS NFR BLD: 72.6 % (ref 38–73)
NRBC BLD-RTO: 1 /100 WBC
PLATELET # BLD AUTO: 50 K/UL (ref 150–450)
PMV BLD AUTO: 12.1 FL (ref 9.2–12.9)
POCT GLUCOSE: 145 MG/DL (ref 70–110)
POCT GLUCOSE: 179 MG/DL (ref 70–110)
POCT GLUCOSE: 182 MG/DL (ref 70–110)
POTASSIUM SERPL-SCNC: 4.5 MMOL/L (ref 3.5–5.1)
PROT SERPL-MCNC: 7.3 G/DL (ref 6–8.4)
RBC # BLD AUTO: 3.03 M/UL (ref 4.6–6.2)
SODIUM SERPL-SCNC: 136 MMOL/L (ref 136–145)
WBC # BLD AUTO: 3.7 K/UL (ref 3.9–12.7)

## 2024-10-11 PROCEDURE — 99900035 HC TECH TIME PER 15 MIN (STAT)

## 2024-10-11 PROCEDURE — 36415 COLL VENOUS BLD VENIPUNCTURE: CPT | Performed by: INTERNAL MEDICINE

## 2024-10-11 PROCEDURE — 80053 COMPREHEN METABOLIC PANEL: CPT | Performed by: INTERNAL MEDICINE

## 2024-10-11 PROCEDURE — 63600175 PHARM REV CODE 636 W HCPCS: Performed by: INTERNAL MEDICINE

## 2024-10-11 PROCEDURE — 85025 COMPLETE CBC W/AUTO DIFF WBC: CPT | Performed by: INTERNAL MEDICINE

## 2024-10-11 PROCEDURE — 83735 ASSAY OF MAGNESIUM: CPT | Performed by: INTERNAL MEDICINE

## 2024-10-11 PROCEDURE — 12000002 HC ACUTE/MED SURGE SEMI-PRIVATE ROOM

## 2024-10-11 PROCEDURE — 63600175 PHARM REV CODE 636 W HCPCS: Performed by: NURSE PRACTITIONER

## 2024-10-11 PROCEDURE — 99233 SBSQ HOSP IP/OBS HIGH 50: CPT | Mod: ,,, | Performed by: INTERNAL MEDICINE

## 2024-10-11 PROCEDURE — 25000003 PHARM REV CODE 250: Performed by: INTERNAL MEDICINE

## 2024-10-11 PROCEDURE — 94660 CPAP INITIATION&MGMT: CPT

## 2024-10-11 PROCEDURE — 94761 N-INVAS EAR/PLS OXIMETRY MLT: CPT

## 2024-10-11 PROCEDURE — 87040 BLOOD CULTURE FOR BACTERIA: CPT | Mod: 59 | Performed by: INTERNAL MEDICINE

## 2024-10-11 PROCEDURE — 25000003 PHARM REV CODE 250

## 2024-10-11 RX ADMIN — CARVEDILOL 3.12 MG: 3.12 TABLET, FILM COATED ORAL at 09:10

## 2024-10-11 RX ADMIN — AMPICILLIN SODIUM 2 G: 2 INJECTION, POWDER, FOR SOLUTION INTRAMUSCULAR; INTRAVENOUS at 09:10

## 2024-10-11 RX ADMIN — AMPICILLIN SODIUM 2 G: 2 INJECTION, POWDER, FOR SOLUTION INTRAMUSCULAR; INTRAVENOUS at 01:10

## 2024-10-11 RX ADMIN — CARVEDILOL 3.12 MG: 3.12 TABLET, FILM COATED ORAL at 08:10

## 2024-10-11 RX ADMIN — MIDODRINE HYDROCHLORIDE 5 MG: 2.5 TABLET ORAL at 05:10

## 2024-10-11 RX ADMIN — TRAMADOL HYDROCHLORIDE 50 MG: 50 TABLET, COATED ORAL at 09:10

## 2024-10-11 RX ADMIN — EPOETIN ALFA-EPBX 6000 UNITS: 10000 INJECTION, SOLUTION INTRAVENOUS; SUBCUTANEOUS at 11:10

## 2024-10-11 RX ADMIN — TRAMADOL HYDROCHLORIDE 50 MG: 50 TABLET, COATED ORAL at 08:10

## 2024-10-11 RX ADMIN — ASPIRIN 81 MG 81 MG: 81 TABLET ORAL at 08:10

## 2024-10-11 RX ADMIN — ATORVASTATIN CALCIUM 20 MG: 20 TABLET, FILM COATED ORAL at 08:10

## 2024-10-11 RX ADMIN — CEFTRIAXONE SODIUM 2 G: 2 INJECTION, POWDER, FOR SOLUTION INTRAMUSCULAR; INTRAVENOUS at 01:10

## 2024-10-11 RX ADMIN — TORSEMIDE 20 MG: 20 TABLET ORAL at 01:10

## 2024-10-11 RX ADMIN — CEFTRIAXONE SODIUM 2 G: 2 INJECTION, POWDER, FOR SOLUTION INTRAMUSCULAR; INTRAVENOUS at 11:10

## 2024-10-11 RX ADMIN — MIDODRINE HYDROCHLORIDE 5 MG: 2.5 TABLET ORAL at 06:10

## 2024-10-11 RX ADMIN — MIDODRINE HYDROCHLORIDE 5 MG: 2.5 TABLET ORAL at 01:10

## 2024-10-11 RX ADMIN — TORSEMIDE 20 MG: 20 TABLET ORAL at 09:10

## 2024-10-11 RX ADMIN — MUPIROCIN 1 G: 20 OINTMENT TOPICAL at 09:10

## 2024-10-11 RX ADMIN — LEVOTHYROXINE SODIUM 50 MCG: 0.03 TABLET ORAL at 09:10

## 2024-10-11 NOTE — CONSULTS
Consult Note  Nephrology    Consult Requested By: Raymon Nichols MD    Reason for Consult:   ESRD, dependent on dialysis    SUBJECTIVE:     History of Present Illness:   65 y/o male patient known to our practice, has out patient dialysis 3x wk on MWF schedule.  He was brought to ER yesterday by his wife d/t lethargy, not feeling well, fatigue, fever.  Blood cx positive for Enterococcus faecalis.  Getting IVAB.  Nephrology is consulted for dialysis orders.    10/9  pt seen and examined.  Ill-appearing, Tmax 103.3.  BP stable.  Dialysis orders are in, may not tolerate much in the was of UF today, but pt is not a big fluid rancho.  I believe pt still makes urine, will order UA.  10/10  AFVSS, at a procedure.    10/11 Patient seen on hemodialysis for uremic clearance and ultrafiltration.              Feels a lot better.  No cp or sob.      Assessment/plan:    ESRD  SHPT  Hypomagnesemia  Mild hyperkalemia  Anemia of chronic dz  Hypotension  DM 2    --HD per pt's schedule, MWF.  Dose all medications for dialysis  --not on binders at home, and PO4 is actually low right now - likely d/t poor po intake  --Mg+ improved  --HD will improve K+  --ordered epo w/HD, keep Hgb 10-11  --continue midodrine, judicious UF  --Blood glucose control per primary team.  --erythropoietin stimulating agent to 100u/kg      Past Medical History:   Diagnosis Date    AICD (automatic cardioverter/defibrillator) present     Anemia due to multiple mechanisms 02/20/2024    Anemia, chronic disease 07/27/2021    Anemia, chronic renal failure, stage 5 02/20/2024    Anemia, unspecified 07/27/2021    Anxiety and depression 2012    CAD (coronary artery disease) 2012    Cardiomegaly 2016    CHF (congestive heart failure) 2012    Cholecystitis 2017    Complete heart block 2012    Diabetic foot ulcer     DVT (deep venous thrombosis) 2012    And pulmonary embolus    GERD (gastroesophageal reflux disease) 2010    Heparin induced thrombocytopenia      Hyperlipemia 2011    IDDM (insulin dependent diabetes mellitus) 1983    With neuropathy    Ischemic cardiomyopathy 2012    MI (myocardial infarction) 2012    Morbid obesity     MRSA (methicillin resistant Staphylococcus aureus) infection 01/19/2019    Noncompliance with therapeutic plan 2019    Normochromic normocytic anemia 07/27/2021    Osteomyelitis of right foot 01/2019    Osteomyelitis of right foot 09/01/2022    Klebsiella from bone, GBS in blood    Pulmonary edema 2019    Respiratory failure 2019    Sepsis 01/2019    Due to cellulitis right leg    Sleep apnea 2013    Thrombocytopathy 2012    Thyroid disease     Venous stasis dermatitis of both lower extremities      Past Surgical History:   Procedure Laterality Date    CARDIAC PACEMAKER PLACEMENT  12/2012    CARDIAC PACEMAKER PLACEMENT  10/04/2018    battery replacement    COIL EMBOLIZATION, SINGLE VESSEL Right 5/7/2024    Procedure: Coil Embolization, Single Vessel;  Surgeon: Khoobehi, Ali, MD;  Location: [facility] CATH/EP LAB;  Service: Vascular;  Laterality: Right;    CORONARY ARTERY BYPASS GRAFT  06/2012    ESOPHAGOGASTRODUODENOSCOPY  01/31/2017    FISTULOGRAM Bilateral 5/7/2024    Procedure: Fistulogram;  Surgeon: Khoobehi, Ali, MD;  Location: [facility] CATH/EP LAB;  Service: Vascular;  Laterality: Bilateral;    SAMARA FILTER PLACEMENT  2012    vena cava    INSERTION OF TUNNELED CENTRAL VENOUS HEMODIALYSIS CATHETER N/A 7/9/2024    Procedure: REMOVAL TUNNELED CATHETER;  Surgeon: Khoobehi, Ali, MD;  Location: [facility] CATH/EP LAB;  Service: Peripheral Vascular;  Laterality: N/A;    INSERTION, CATHETER, TUNNELED Right 10/16/2023    Procedure: Insertion,catheter,tunneled;  Surgeon: Terri Gresham MD;  Location: [facility] OR;  Service: Cardiovascular;  Laterality: Right;    LUMBAR SYMPATHETIC NERVE BLOCK Right 8/22/2019    Procedure: BLOCK, NERVE, SYMPATHETIC, LUMBAR;  Surgeon: Tashi Good MD;  Location: [facility] OR;  Service: Pain Management;  Laterality: Right;  RIGHT  "SYMPATHETIC NERVE BLOCK    PHLEBOGRAPHY N/A 2024    Procedure: VENOGRAM;  Surgeon: Khoobehi, Ali, MD;  Location: [facility] CATH/EP LAB;  Service: Peripheral Vascular;  Laterality: N/A;     Family History   Problem Relation Name Age of Onset    Arthritis Mother      Diabetes Mother      Cancer Father      Heart disease Father      Stroke Father       Social History     Tobacco Use    Smoking status: Former     Current packs/day: 0.00     Average packs/day: 2.0 packs/day for 38.0 years (76.0 ttl pk-yrs)     Types: Cigarettes     Start date:      Quit date:      Years since quittin.7    Smokeless tobacco: Never   Substance Use Topics    Alcohol use: No    Drug use: Never       Review of patient's allergies indicates:   Allergen Reactions    Vasopressin Anaphylaxis and Other (See Comments)     Increased pressure in his head; felt like his eyeballs and veins were going to pop out of his head.     Vasopressin analogues Other (See Comments) and Anaphylaxis     "felt like eyes going to pop out of my head"  Other reaction(s): Other (See Comments)  "felt like eyes going to pop out of my head"  Increased pressure in his head; felt like his eyeballs and veins were going to pop out of his head.     Heparin Other (See Comments)     Other reaction(s): "depleted my blood platets"    Decreased platelet count    Heparin analogues Other (See Comments)     Depleted WBC count    Morphine Hallucinations, Other (See Comments) and Anxiety     Other reaction(s): Hallucinations  Paranoid, hallucinations          Review of Systems:  As above    OBJECTIVE:     Vital Signs Range (Last 24H):  Temp:  [97.5 °F (36.4 °C)-98.3 °F (36.8 °C)]   Pulse:  [78-90]   Resp:  [16-18]   BP: ()/(55-78)   SpO2:  [93 %-99 %]     Physical Exam:  General- ill-appearing  HEENT- WNL  Neck- supple  CV- Regular rate and rhythm  Resp- Lungs CTA Bilaterally, No increased WOB  GI- Non tender/non-distended, BS normoactive x4 quads  Extrem- No cyanosis, " clubbing, edema.  Derm- skin w/d, pale  Neuro-  No flap.     Body mass index is 34.9 kg/m².    Laboratory:  CBC:   Recent Labs   Lab 10/11/24  0530   WBC 3.70*   RBC 3.03*   HGB 9.4*   HCT 30.1*   PLT 50*   MCV 99*   MCH 31.0   MCHC 31.2*     CMP:   Recent Labs   Lab 10/11/24  0530   *   CALCIUM 8.4*   ALBUMIN 3.6   PROT 7.3      K 4.5   CO2 25      BUN 45*   CREATININE 3.6*   ALKPHOS 57   ALT 8*   AST 15   BILITOT 1.1*       Diagnostic Results:  Labs: Reviewed        ASSESSMENT/PLAN:     Active Hospital Problems    Diagnosis  POA    *Bacteremia due to Enterococcus [R78.81, B95.2]  Yes    ESRD (end stage renal disease) [N18.6]  Unknown    Periorbital hematoma of left eye [H05.232]  Unknown    Chronic combined systolic and diastolic heart failure [I50.42]  Yes    CAD (coronary artery disease) [I25.10]  Yes    Type II diabetes mellitus with neurological manifestations [E11.49]  Yes     With neuropathy        Resolved Hospital Problems   No resolved problems to display.         Thank you for allowing us to participate in the care of your patient. We will follow the patient and provide recommendations as needed.      Time spent seeing patient( greater than 1/2 spent in direct contact) :     Flo Drake MD

## 2024-10-11 NOTE — PROGRESS NOTES
"[facility]   Department of Infectious Disease  Progress Note        PATIENT NAME: Ana Bullard  MRN: [id number]  TODAY'S DATE: 10/11/2024  ADMIT DATE: 10/8/2024  LOS: 3 days    CHIEF COMPLAINT: Altered Mental Status (Per family, patient complained of "feeling bad" yesterday. Temp of 103 at home. Today more lethargic unable to answer questions regarding name and . Patient's family answering all questions. )      PRINCIPLE PROBLEM: Bacteremia due to Enterococcus    INTERVAL HISTORY      10/10/2024: States improving.  No acute issues overnight.  TTE done today.  Report pending      10/11/2024:  Seen during dialysis.  Continues to improve.  TTE 10/10/2024 was negative for vegetation.    Antibiotics (From admission, onward)      Start     Stop Route Frequency Ordered    10/09/24 2100  ampicillin 2 g in sodium chloride 0.9 % 100 mL IVPB (ready to mix)         -- IV Every 12 hours (non-standard times) 10/09/24 1808    10/09/24 1230  cefTRIAXone (ROCEPHIN) 2 g in dextrose 5 % 100 mL IVPB (ready to mix)         -- IV Every 12 hours (non-standard times) 10/09/24 1137    10/08/24 2330  mupirocin 2 % ointment         10/13/24 2059 Nasl 2 times daily 10/08/24 2226          Antifungals (From admission, onward)      None           Antivirals (From admission, onward)      None            ASSESSMENT and PLAN      1. Enterococcus faecalis bacteremia in a patient with AICD.  Source unclear. Repeat Blood cultures x2 sets today.  Continue ampicillin +ceftriaxone. TTE negative. Repeat blood cultures. Need GABRIELLA.       2. Bilateral leg edema with right leg ulcers.  They are not infected.  Symptomatic care      3. Left periorbital hematoma.  Probably to a left temporal skin cancer excision.  Expectant management.  Improving.       4. Complete heart block status post AICD placement.  AICD site does not look clinically infected.  Keep this in mind in this patient with bacteremia.     RECOMMENDATIONS:   Repeat blood " cultures today  Continue current antibiotics  Need GABRIELLA to evaluate for endocarditis and lead vegetation.    Please send Epic secure chat with any questions.      SUBJECTIVE    Ana Bullard is a 64 y.o. male multiple medical problems including diabetes mellitus, hypertension, hyperlipidemia, complete heart block status post AICD placement, chronic thrombocytopenia..  Also with prior history of MRSA infection in 2019.  Admitted 10/08/2024 with lethargy.  In the ER /57, pulse 94, respiratory rate 17, temperature 103.1° with oxygen saturation of 90%.  Chest x-ray with no acute infiltrate.  He was admitted for sepsis and placed on broad-spectrum antibiotics. Blood culture Enterococcus faecalis in 2/2 bottles.  ID asked to assist with his management.     Antibiotic history:    Vancomycin: 10/08/2024-  Zosyn: 10/08/2024-     Microbiology:   Blood culture 10/08/2024: E faecalis 2/2    Review of Systems  Negative except as stated above in Interval History     OBJECTIVE   Temp:  [97.5 °F (36.4 °C)-98.3 °F (36.8 °C)] 98.1 °F (36.7 °C)  Pulse:  [78-90] 83  Resp:  [16-18] 16  SpO2:  [93 %-99 %] 97 %  BP: ()/(55-78) 99/56  Temp:  [97.5 °F (36.4 °C)-98.3 °F (36.8 °C)]   Temp: 98.1 °F (36.7 °C) (10/11/24 0915)  Pulse: 83 (10/11/24 1030)  Resp: 16 (10/11/24 0915)  BP: (!) 99/56 (10/11/24 1030)  SpO2: 97 % (10/11/24 0915)    Intake/Output Summary (Last 24 hours) at 10/11/2024 1108  Last data filed at 10/10/2024 1733  Gross per 24 hour   Intake 720 ml   Output --   Net 720 ml     Physical Exam  General:  Middle-aged man lying quietly in bed in no acute distress   HEENT:  Bruise around left eye.  Sutured area left temple.  CVS: S1 and 2 heard, no murmurs appreciated   Respiratory: Clear to auscultation   Abdomen: Full, soft, nontender, palpable organomegaly   Skin:  No rash appreciated.  No stigmata of endocarditis   Lower extremities:  Mild edema.  Dry skin.  Clean shallow ulcers right leg.  Musculoskeletal: No joint  "or bony abnormalities appreciated  CNS: No gross focal deficits  Psych: Good mood, normal affect.      VAD:  PIV  ISOLATION:  None     Wounds:  Shallow ulcers right leg that are not infected    Significant Labs: All pertinent labs within the past 24 hours have been reviewed.    CBC LAST 7 DAYS  Recent Labs   Lab 10/08/24  1746 10/09/24  0655 10/10/24  0614 10/11/24  0530   WBC 9.44 6.63 3.46* 3.70*   RBC 3.52* 3.23* 3.20* 3.03*   HGB 10.8* 9.9* 9.7* 9.4*   HCT 34.2* 32.0* 31.8* 30.1*   MCV 97 99* 99* 99*   MCH 30.7 30.7 30.3 31.0   MCHC 31.6* 30.9* 30.5* 31.2*   RDW 17.2* 17.5* 17.3* 17.3*   PLT 76* 56* 51* 50*   MPV 11.6 11.1 11.5 12.1   GRAN 89.1*  8.4* 89.3*  5.9 74.7*  2.6 72.6  2.7   LYMPH 2.4*  0.2* 2.1*  0.1* 6.4*  0.2* 6.5*  0.2*   MONO 7.8  0.7 7.7  0.5 17.1*  0.6 19.2*  0.7   BASO 0.01 0.00 0.01 0.01   NRBC 0 0 1* 1*       CHEMISTRY LAST 7 DAYS  Recent Labs   Lab 10/08/24  1746 10/09/24  0655 10/10/24  0614 10/11/24  0530    134* 136 136   K 5.2* 5.3* 4.6 4.5   CL 99 97 102 102   CO2 26 30* 28 25   ANIONGAP 11 7* 6* 9   BUN 33* 43* 33* 45*   CREATININE 3.2* 4.0* 3.2* 3.6*   * 153* 138* 136*   CALCIUM 9.1 8.9 8.9 8.4*   MG 1.4* 1.8 1.8 1.8   ALBUMIN 4.0 3.6 3.7 3.6   PROT 8.0 7.2 7.5 7.3   ALKPHOS 71 58 58 57   ALT 8* 7* 7* 8*   AST 17 14 18 15   BILITOT 2.2* 2.4* 1.6* 1.1*       Estimated Creatinine Clearance: 28.9 mL/min (A) (based on SCr of 3.6 mg/dL (H)).    INFLAMMATORY/PROCAL  LAST 7 DAYS  Recent Labs   Lab 10/09/24  1412   CRP 11.10*     No results found for: "ESR"  CRP   Date Value Ref Range Status   10/09/2024 11.10 (H) <1.00 mg/dL Final     Comment:     CRP-Normal Application expected values:   <1.0        mg/dL   Normal Range  1.0 - 5.0  mg/dL   Indicates mild inflammation  5.0 - 10.0 mg/dL   Indicates severe inflammation  >10.0        mg/dL   Represents serious processes and   frequently         indicates the presence of a bacterial   infection.      11/23/2022 3.07 (H) " <0.76 mg/dL Final   11/14/2022 2.93 (H) <0.76 mg/dL Final   11/07/2022 2.33 (H) <0.76 mg/dL Final   10/31/2022 3.51 (H) <0.76 mg/dL Final   09/01/2022 3.61 (H) <0.76 mg/dL Final       PRIOR  MICROBIOLOGY:    Susceptibility data from last 90 days.  Collected Specimen Info Organism Ampicillin GENTAMICIN SYNERGY SCREEN Vancomycin   10/08/24 Blood from Peripheral, Hand, Left Enterococcus faecalis      10/08/24 Blood from Peripheral, Forearm, Left Enterococcus faecalis  S  S  S       LAST 7 DAYS MICROBIOLOGY   Microbiology Results (last 7 days)       Procedure Component Value Units Date/Time    Blood culture [[id number]]     Order Status: Sent Specimen: Blood     Blood culture [[id number]]     Order Status: Sent Specimen: Blood     Blood culture x two cultures. Draw prior to antibiotics [[id number]]  (Abnormal) Collected: 10/08/24 1733    Order Status: Completed Specimen: Blood from Peripheral, Hand, Left Updated: 10/11/24 0751     Blood Culture, Routine Gram stain aer bottle: Gram positive cocci      Positive results previously called on order [id number]      Gram stain mayte bottle: Gram positive cocci      Positive results previously called on order [id number]      ENTEROCOCCUS FAECALIS  For susceptibility see order #[id number]      Narrative:      Aerobic and anaerobic  Collection has been rescheduled by SARA at 10/08/2024 17:07 Reason:   Dialysis patient  Collection has been rescheduled by ZTASHA at 10/08/2024 17:07 Reason:   Dialysis patient    Blood culture x two cultures. Draw prior to antibiotics [[id number]]  (Abnormal)  (Susceptibility) Collected: 10/08/24 1706    Order Status: Completed Specimen: Blood from Peripheral, Forearm, Left Updated: 10/11/24 0750     Blood Culture, Routine Gram stain mayte bottle: Gram positive cocci      Results called to and read back by: Jonnie Ortega RN on 3100 wing      Gram stain aer bottle: Gram positive cocci      Positive results previously called      ENTEROCOCCUS FAECALIS     Narrative:      Aerobic and anaerobic    Rapid Organism ID by PCR (from Blood culture) [[id number]]  (Abnormal) Collected: 10/08/24 1706    Order Status: Completed Updated: 10/09/24 3705     Enterococcus faecalis Detected     Enterococcus faecium Not Detected     Listeria monocytogenes Not Detected     Staphylococcus spp. Not Detected     Staphylococcus aureus Not Detected     Staphylococcus epidermidis Not Detected     Staphylococcus lugdunensis Not Detected     Streptococcus species Not Detected     Streptococcus agalactiae Not Detected     Streptococcus pneumoniae Not Detected     Streptococcus pyogenes Not Detected     Acinetobacter calcoaceticus/baumannii complex Not Detected     Bacteroides fragilis Not Detected     Enterobacterales Not Detected     Enterobacter cloacae complex Not Detected     Escherichia coli Not Detected     Klebsiella aerogenes Not Detected     Klebsiella oxytoca Not Detected     Klebsiella pneumoniae group Not Detected     Proteus Not Detected     Salmonella sp Not Detected     Serratia marcescens Not Detected     Haemophilus influenzae Not Detected     Neisseria meningtidis Not Detected     Pseudomonas aeruginosa Not Detected     Stenotrophomonas maltophilia Not Detected     Candida albicans Not Detected     Candida auris Not Detected     Candida glabrata Not Detected     Candida krusei Not Detected     Candida parapsilosis Not Detected     Candida tropicalis Not Detected     Cryptococcus neoformans/gattii Not Detected     CTX-M (ESBL ) Test not applicable     IMP (Carbapenem resistant) Test not applicable     KPC resistance gene (Carbapenem resistant) Test not applicable     mcr-1  Test not applicable     mec A/C  Test not applicable     mec A/C and MREJ (MRSA) gene Test not applicable     NDM (Carbapenem resistant) Test not applicable     OXA-48-like (Carbapenem resistant) Test not applicable     van A/B (VRE gene) Not Detected     VIM (Carbapenem resistant) Test not applicable     Narrative:      Aerobic and anaerobic          CURRENT/PREVIOUS VISIT EKG  Results for orders placed or performed during the hospital encounter of 10/08/24   EKG 12-lead    Collection Time: 10/08/24  6:20 PM   Result Value Ref Range    QRS Duration 158 ms    OHS QTC Calculation 525 ms    Narrative    Test Reason : R00.0,    Vent. Rate : 096 BPM     Atrial Rate : 102 BPM     P-R Int : 000 ms          QRS Dur : 158 ms      QT Int : 416 ms       P-R-T Axes : 000 216 037 degrees     QTc Int : 525 ms    Ventricular-paced rhythm  Biventricular pacemaker detected  Abnormal ECG  When compared with ECG of 08-OCT-2023 23:25,  Vent. rate has decreased BY  31 BPM    Referred By: [email or web address]   SELF           Confirmed By:      Significant Imaging: I have reviewed all relevant and available imaging results/findings within the past 24 hours.    I spent a total of 50 minutes on the day of the visit.This includes face to face time and non-face to face time preparing to see the patient (eg, review of tests), obtaining and/or reviewing separately obtained history, documenting clinical information in the electronic or other health record, independently interpreting results and communicating results to the patient/family/caregiver, or care coordinator.    Samuel Hu MD  Date of Service: 10/11/2024      This note was created using M Modal voice recognition software that occasionally misinterpreted phrases or words.

## 2024-10-11 NOTE — NURSING
Consent and hep b status verified, removed 1000 uf net, no issues with access, post thrill and bruit noted, patient stable throughout treatment, educated patient on infection control, report given to Josephine, floor nurse     10/11/24 122   Handoff Report   Received From Maryellen   Given To Josephine   Vital Signs   Temp 98 °F (36.7 °C)   Temp Source Oral   Pulse 82   Heart Rate Source Monitor   Resp 16   SpO2 98 %   Pulse Oximetry Type Intermittent   Probe Placed On (Pulse Ox) Left:;finger   Device (Oxygen Therapy) room air   /61   BP Location Left arm   BP Method Automatic   Patient Position Lying   Assessments (Pre/Post)   Consent Obtained yes   Safety vein preservation armband present   Date Hepatitis Profile Obtained 09/23/24   Blood Liters Processed (BLP) 62   Transport Modality bed   Level of Consciousness (AVPU) alert   Dialyzer Clearance mildly streaked   Pain   Preferred Pain Scale number (Numeric Rating Pain Scale)   Comfort/Acceptable Pain Level 0   Pain Rating (0-10): Rest 0   Pain Rating (0-10): Activity 0   Pain Management Interventions quiet environment facilitated;position adjusted;pillow support provided   Pain/Comfort Interventions   Fever Reduction/Comfort Measures lightweight clothing;lightweight bedding   Pre-Hemodialysis Assessment   Additional Dialysis Information Needed Yes   Patient Status Departed   Treatment Consent Verified Yes   Treatment Status Completed        Hemodialysis AV Fistula Right forearm   No placement date or time found.   Present Prior to Hospital Arrival?: Yes  Location: Right forearm   Site Assessment Clean;Dry;Intact   Patency Present;Thrill;Bruit   Status Deaccessed   Dressing Intervention First dressing   Dressing Status Clean;Dry;Intact   Site Condition No complications   Dressing Gauze   Post-Hemodialysis Assessment   Rinseback Volume (mL) 250 mL   Blood Volume Processed (Liters) 62 L   Dialyzer Clearance Lightly streaked   Duration of Treatment 180 minutes    Additional Fluid Intake (mL) 500 mL   Total UF (mL) 1500 mL   Net Fluid Removal 1000   Patient Response to Treatment josé miguel   Post-Treatment Weight 118.7 kg (261 lb 11 oz)   Treatment Weight Change -1   Arterial bleeding stop time (min) 5 min   Venous bleeding stop time (min) 5 min   Post-Hemodialysis Comments stable   Edema   Edema generalized;leg, right

## 2024-10-11 NOTE — SUBJECTIVE & OBJECTIVE
Interval History: Patient seen and examined on AM rounds. NAEON. Feels better. Reports prior episodes of bacteremia with unclear source      Objective:     Vital Signs (Most Recent):  Temp: 98.2 °F (36.8 °C) (10/11/24 1717)  Pulse: 80 (10/11/24 1717)  Resp: 18 (10/11/24 1717)  BP: 111/69 (10/11/24 1717)  SpO2: (!) 94 % (10/11/24 1717) Vital Signs (24h Range):  Temp:  [97.5 °F (36.4 °C)-98.3 °F (36.8 °C)] 98.2 °F (36.8 °C)  Pulse:  [78-92] 80  Resp:  [16-18] 18  SpO2:  [92 %-99 %] 94 %  BP: ()/(55-78) 111/69     Weight: 120 kg (264 lb 8.8 oz)  Body mass index is 34.9 kg/m².    Intake/Output Summary (Last 24 hours) at 10/11/2024 1848  Last data filed at 10/11/2024 1742  Gross per 24 hour   Intake 1220 ml   Output 1500 ml   Net -280 ml         Physical Exam  Constitutional:       General: He is not in acute distress.  HENT:      Head: Normocephalic and atraumatic.   Eyes:      Extraocular Movements: Extraocular movements intact.   Cardiovascular:      Rate and Rhythm: Normal rate and regular rhythm.   Pulmonary:      Effort: Pulmonary effort is normal. No respiratory distress.   Skin:     General: Skin is warm and dry.      Comments: Stasis dermatitis   Neurological:      General: No focal deficit present.      Mental Status: He is alert and oriented to person, place, and time.   Psychiatric:         Mood and Affect: Mood normal.         Behavior: Behavior normal.             Significant Labs: All pertinent labs within the past 24 hours have been reviewed.    Significant Imaging: I have reviewed all pertinent imaging results/findings within the past 24 hours.

## 2024-10-11 NOTE — HOSPITAL COURSE
ESRD patient admitted with malaise found to have E. Faecalis bacteremia of unclear source. Given IV abx. Nephro followed for hemodialysis. ID team consulted. Repeat blood cultures taken and negative. Planned for GABRIELLA to look closer at heart valves and cardiac device leads but this was declined. There was concern the bacteremia source may be related to the cardiac device leads. Again, GABRIELLA was declined by patient and he will follow up with his own cardiologist for further discussion/consideration per patient request. Planned for daptomycin dosing with his dialysis sessions thru Nov. 28th. Recommended weekly lab draw for CK while on daptomycin on Mondays with HD if possible or can come to lab. To f/u PCP, ID, cardiology.

## 2024-10-11 NOTE — PROGRESS NOTES
[facility]  Progress Note    Patient Name: Ana Bullard  MRN: [id number]  Patient Class: IP- Inpatient   Admission Date: 10/8/2024  Length of Stay: 3 days  Attending Physician: Raymon Nichols MD  Primary Care Provider: Michelle Messina FNP        Subjective:     Principal Problem:Bacteremia due to Enterococcus        HPI:  Ana Bullard is a 64 y.o. male who has a history of  extensive cardiac issues including CABG stents, now status post AICD, CHF, DVT, diabetes, anemia, hyperlipidemia, FARHAN, hypothyroidism,  and presents with malaise.     Patient was brought in by his wife.  Patient was fairly lethargic and unable to answer questions initially on exam on re-evaluation patient states that he was not not feeling well over the past few days and feeling fatigue and tired and also noticed that he had a fever.  He had no known other reasons for fever, no sore throat no sick contacts no shortness a breath, no coughing.  No burning with urinating no diarrhea no vomiting.  Patient is febrile here in the ER as well.  Patient given fluids IV antibiotics and showed signs of improvement.  Lactic acid was negative.  No clear focal points no signs or symptoms as of yet.  Blood cultures pending.  Patient will continue on IV antibiotics.  Especially now to evaluate whether the patient may have infected hardware    Overview/Hospital Course:  ESRD patient admitted with malaise found to have E. Faecalis bacteremia of unclear source. Given IV abx. Nephro followed for hemodialysis. ID team consulted. Repeat blood cultures taken. Planned for GABRIELLA to look closer at heart valves and cardiac device leads.    Interval History: Patient seen and examined on AM rounds. NAEON. Feels better. Reports prior episodes of bacteremia with unclear source      Objective:     Vital Signs (Most Recent):  Temp: 98.2 °F (36.8 °C) (10/11/24 1717)  Pulse: 80 (10/11/24 1717)  Resp: 18 (10/11/24 1717)  BP: 111/69  (10/11/24 1717)  SpO2: (!) 94 % (10/11/24 1717) Vital Signs (24h Range):  Temp:  [97.5 °F (36.4 °C)-98.3 °F (36.8 °C)] 98.2 °F (36.8 °C)  Pulse:  [78-92] 80  Resp:  [16-18] 18  SpO2:  [92 %-99 %] 94 %  BP: ()/(55-78) 111/69     Weight: 120 kg (264 lb 8.8 oz)  Body mass index is 34.9 kg/m².    Intake/Output Summary (Last 24 hours) at 10/11/2024 1848  Last data filed at 10/11/2024 1742  Gross per 24 hour   Intake 1220 ml   Output 1500 ml   Net -280 ml         Physical Exam  Constitutional:       General: He is not in acute distress.  HENT:      Head: Normocephalic and atraumatic.   Eyes:      Extraocular Movements: Extraocular movements intact.   Cardiovascular:      Rate and Rhythm: Normal rate and regular rhythm.   Pulmonary:      Effort: Pulmonary effort is normal. No respiratory distress.   Skin:     General: Skin is warm and dry.      Comments: Stasis dermatitis   Neurological:      General: No focal deficit present.      Mental Status: He is alert and oriented to person, place, and time.   Psychiatric:         Mood and Affect: Mood normal.         Behavior: Behavior normal.             Significant Labs: All pertinent labs within the past 24 hours have been reviewed.    Significant Imaging: I have reviewed all pertinent imaging results/findings within the past 24 hours.    Assessment/Plan:      * Bacteremia due to Enterococcus  Source unclear  ID following  - cont abx  - consulted cardiology for GABRIELLA recommended per ID    Periorbital hematoma of left eye  Patient with recent removal of left basal cell carcinoma lesion, likely adjacent left eye, patient states that he had a black eye after this procedure, no trauma to the area    ESRD (end stage renal disease)  Nephrology on board, managing hemodialysis    Chronic combined systolic and diastolic heart failure  Patient has AICD in place  Fluid management per HD per nephro    CAD (coronary artery disease)  Continue aspirin and statin  Coreg held for low normal  BP, consider resuming    Type II diabetes mellitus with neurological manifestations  Patient's FSGs are controlled on current medication regimen.  Last A1c reviewed-   Lab Results   Component Value Date    HGBA1C 6.3 (H) 10/09/2024     Most recent fingerstick glucose reviewed-   Recent Labs   Lab 10/10/24  1923 10/11/24  0608 10/11/24  1719   POCTGLUCOSE 174* 145* 182*     Current correctional scale  Low  Maintain anti-hyperglycemic dose as follows-   Antihyperglycemics (From admission, onward)      Start     Stop Route Frequency Ordered    10/09/24 0957  insulin aspart U-100 pen 0-5 Units         -- SubQ Before meals & nightly PRN 10/09/24 0857          Hold Oral hypoglycemics while patient is in the hospital.      VTE Risk Mitigation (From admission, onward)           Ordered     IP VTE HIGH RISK PATIENT  Once         10/08/24 2224     Place sequential compression device  Until discontinued         10/08/24 2224     Reason for No Pharmacological VTE Prophylaxis  Once        Question:  Reasons:  Answer:  Physician Provided (leave comment)  Comment:  poor response    10/08/24 2224                    Discharge Planning   BILL: 10/14/2024     Code Status: Full Code   Is the patient medically ready for discharge?:     Reason for patient still in hospital (select all that apply): Patient trending condition, Laboratory test, Imaging, and Consult recommendations  Discharge Plan A: Home                  Raymon Nichols MD  Department of Hospital Medicine   [facility]

## 2024-10-11 NOTE — ASSESSMENT & PLAN NOTE
Patient's FSGs are controlled on current medication regimen.  Last A1c reviewed-   Lab Results   Component Value Date    HGBA1C 6.3 (H) 10/09/2024     Most recent fingerstick glucose reviewed-   Recent Labs   Lab 10/10/24  1923 10/11/24  0608 10/11/24  1719   POCTGLUCOSE 174* 145* 182*     Current correctional scale  Low  Maintain anti-hyperglycemic dose as follows-   Antihyperglycemics (From admission, onward)      Start     Stop Route Frequency Ordered    10/09/24 0957  insulin aspart U-100 pen 0-5 Units         -- SubQ Before meals & nightly PRN 10/09/24 0857          Hold Oral hypoglycemics while patient is in the hospital.

## 2024-10-11 NOTE — CARE UPDATE
10/11/24 1322   Patient Assessment/Suction   Level of Consciousness (AVPU) alert   Respiratory Effort Normal;Unlabored   All Lung Fields Breath Sounds clear   Rhythm/Pattern, Respiratory unlabored   PRE-TX-O2   Device (Oxygen Therapy) room air   SpO2 (!) 92 %   Pulse Oximetry Type Intermittent   $ Pulse Oximetry - Multiple Charge Pulse Oximetry - Multiple   Pulse 92   Resp 16   Preset CPAP/BiPAP Settings   Mode Of Delivery Standby   $ CPAP/BiPAP Daily Charge BiPAP/CPAP Daily   $ Initial CPAP/BiPAP Setup? No   $ Is patient using? Yes

## 2024-10-11 NOTE — RESPIRATORY THERAPY
10/10/24 2020   Patient Assessment/Suction   Level of Consciousness (AVPU) alert   Respiratory Effort Normal;Unlabored   Expansion/Accessory Muscles/Retractions no retractions;no use of accessory muscles   All Lung Fields Breath Sounds clear   Rhythm/Pattern, Respiratory pattern regular;unlabored   Cough Frequency no cough   Skin Integrity   $ Wound Care Tech Time 15 min   Area Observed Left;Right;Behind ear;Cheek;Nares   Skin Appearance without discoloration   PRE-TX-O2   Device (Oxygen Therapy) nasal cannula   Flow (L/min) (Oxygen Therapy) 1   SpO2 (!) 94 %   Pulse Oximetry Type Intermittent   $ Pulse Oximetry - Multiple Charge Pulse Oximetry - Multiple   Pulse 85   Resp 16   Aerosol Therapy   $ Aerosol Therapy Charges PRN treatment not required   Respiratory Treatment Status (SVN) PRN treatment not required   Preset CPAP/BiPAP Settings   Mode Of Delivery Standby;BiPAP S/T   Education   $ Education BiPAP;Bronchodilator;Oxygen;15 min

## 2024-10-12 LAB
ALBUMIN SERPL BCP-MCNC: 3.5 G/DL (ref 3.5–5.2)
ALP SERPL-CCNC: 56 U/L (ref 55–135)
ALT SERPL W/O P-5'-P-CCNC: 7 U/L (ref 10–44)
ANION GAP SERPL CALC-SCNC: 8 MMOL/L (ref 8–16)
AST SERPL-CCNC: 13 U/L (ref 10–40)
BASOPHILS # BLD AUTO: 0.02 K/UL (ref 0–0.2)
BASOPHILS NFR BLD: 0.7 % (ref 0–1.9)
BILIRUB SERPL-MCNC: 0.9 MG/DL (ref 0.1–1)
BUN SERPL-MCNC: 32 MG/DL (ref 8–23)
CALCIUM SERPL-MCNC: 8.6 MG/DL (ref 8.7–10.5)
CHLORIDE SERPL-SCNC: 104 MMOL/L (ref 95–110)
CO2 SERPL-SCNC: 24 MMOL/L (ref 23–29)
CREAT SERPL-MCNC: 2.9 MG/DL (ref 0.5–1.4)
DIFFERENTIAL METHOD BLD: ABNORMAL
EOSINOPHIL # BLD AUTO: 0 K/UL (ref 0–0.5)
EOSINOPHIL NFR BLD: 1.3 % (ref 0–8)
ERYTHROCYTE [DISTWIDTH] IN BLOOD BY AUTOMATED COUNT: 17.2 % (ref 11.5–14.5)
EST. GFR  (NO RACE VARIABLE): 23.7 ML/MIN/1.73 M^2
GLUCOSE SERPL-MCNC: 140 MG/DL (ref 70–110)
HBV CORE AB SERPL QL IA: NEGATIVE
HBV SURFACE AB SER QL: NON REACTIVE
HBV SURFACE AG SERPL QL IA: NEGATIVE
HCT VFR BLD AUTO: 28.7 % (ref 40–54)
HGB BLD-MCNC: 8.9 G/DL (ref 14–18)
IMM GRANULOCYTES # BLD AUTO: 0.01 K/UL (ref 0–0.04)
IMM GRANULOCYTES NFR BLD AUTO: 0.3 % (ref 0–0.5)
LYMPHOCYTES # BLD AUTO: 0.3 K/UL (ref 1–4.8)
LYMPHOCYTES NFR BLD: 9.8 % (ref 18–48)
MAGNESIUM SERPL-MCNC: 1.8 MG/DL (ref 1.6–2.6)
MCH RBC QN AUTO: 30.6 PG (ref 27–31)
MCHC RBC AUTO-ENTMCNC: 31 G/DL (ref 32–36)
MCV RBC AUTO: 99 FL (ref 82–98)
MONOCYTES # BLD AUTO: 0.5 K/UL (ref 0.3–1)
MONOCYTES NFR BLD: 15.7 % (ref 4–15)
NEUTROPHILS # BLD AUTO: 2.2 K/UL (ref 1.8–7.7)
NEUTROPHILS NFR BLD: 72.2 % (ref 38–73)
NRBC BLD-RTO: 1 /100 WBC
PLATELET # BLD AUTO: 60 K/UL (ref 150–450)
PMV BLD AUTO: 12 FL (ref 9.2–12.9)
POCT GLUCOSE: 134 MG/DL (ref 70–110)
POCT GLUCOSE: 150 MG/DL (ref 70–110)
POCT GLUCOSE: 155 MG/DL (ref 70–110)
POCT GLUCOSE: 157 MG/DL (ref 70–110)
POTASSIUM SERPL-SCNC: 4.5 MMOL/L (ref 3.5–5.1)
PROT SERPL-MCNC: 7.4 G/DL (ref 6–8.4)
RBC # BLD AUTO: 2.91 M/UL (ref 4.6–6.2)
SODIUM SERPL-SCNC: 136 MMOL/L (ref 136–145)
WBC # BLD AUTO: 3.05 K/UL (ref 3.9–12.7)

## 2024-10-12 PROCEDURE — 94660 CPAP INITIATION&MGMT: CPT

## 2024-10-12 PROCEDURE — 99900031 HC PATIENT EDUCATION (STAT)

## 2024-10-12 PROCEDURE — 12000002 HC ACUTE/MED SURGE SEMI-PRIVATE ROOM

## 2024-10-12 PROCEDURE — 99900035 HC TECH TIME PER 15 MIN (STAT)

## 2024-10-12 PROCEDURE — 25000003 PHARM REV CODE 250

## 2024-10-12 PROCEDURE — 99233 SBSQ HOSP IP/OBS HIGH 50: CPT | Mod: ,,, | Performed by: INTERNAL MEDICINE

## 2024-10-12 PROCEDURE — 25000003 PHARM REV CODE 250: Performed by: INTERNAL MEDICINE

## 2024-10-12 PROCEDURE — 94761 N-INVAS EAR/PLS OXIMETRY MLT: CPT

## 2024-10-12 PROCEDURE — 36415 COLL VENOUS BLD VENIPUNCTURE: CPT | Performed by: INTERNAL MEDICINE

## 2024-10-12 PROCEDURE — 85025 COMPLETE CBC W/AUTO DIFF WBC: CPT | Performed by: INTERNAL MEDICINE

## 2024-10-12 PROCEDURE — 63600175 PHARM REV CODE 636 W HCPCS: Performed by: INTERNAL MEDICINE

## 2024-10-12 PROCEDURE — 80053 COMPREHEN METABOLIC PANEL: CPT | Performed by: INTERNAL MEDICINE

## 2024-10-12 PROCEDURE — 83735 ASSAY OF MAGNESIUM: CPT | Performed by: INTERNAL MEDICINE

## 2024-10-12 RX ADMIN — ASPIRIN 81 MG 81 MG: 81 TABLET ORAL at 09:10

## 2024-10-12 RX ADMIN — MIDODRINE HYDROCHLORIDE 5 MG: 2.5 TABLET ORAL at 04:10

## 2024-10-12 RX ADMIN — TORSEMIDE 20 MG: 20 TABLET ORAL at 09:10

## 2024-10-12 RX ADMIN — CEFTRIAXONE SODIUM 2 G: 2 INJECTION, POWDER, FOR SOLUTION INTRAMUSCULAR; INTRAVENOUS at 11:10

## 2024-10-12 RX ADMIN — CARVEDILOL 3.12 MG: 3.12 TABLET, FILM COATED ORAL at 09:10

## 2024-10-12 RX ADMIN — AMPICILLIN SODIUM 2 G: 2 INJECTION, POWDER, FOR SOLUTION INTRAMUSCULAR; INTRAVENOUS at 10:10

## 2024-10-12 RX ADMIN — CEFTRIAXONE SODIUM 2 G: 2 INJECTION, POWDER, FOR SOLUTION INTRAMUSCULAR; INTRAVENOUS at 12:10

## 2024-10-12 RX ADMIN — MIDODRINE HYDROCHLORIDE 5 MG: 2.5 TABLET ORAL at 11:10

## 2024-10-12 RX ADMIN — MUPIROCIN 1 G: 20 OINTMENT TOPICAL at 09:10

## 2024-10-12 RX ADMIN — AMPICILLIN SODIUM 2 G: 2 INJECTION, POWDER, FOR SOLUTION INTRAMUSCULAR; INTRAVENOUS at 09:10

## 2024-10-12 RX ADMIN — TRAMADOL HYDROCHLORIDE 50 MG: 50 TABLET, COATED ORAL at 09:10

## 2024-10-12 RX ADMIN — MUPIROCIN: 20 OINTMENT TOPICAL at 09:10

## 2024-10-12 RX ADMIN — MIDODRINE HYDROCHLORIDE 5 MG: 2.5 TABLET ORAL at 06:10

## 2024-10-12 RX ADMIN — LEVOTHYROXINE SODIUM 50 MCG: 0.03 TABLET ORAL at 09:10

## 2024-10-12 RX ADMIN — ATORVASTATIN CALCIUM 20 MG: 20 TABLET, FILM COATED ORAL at 09:10

## 2024-10-12 NOTE — SUBJECTIVE & OBJECTIVE
Interval History: Patient seen and examined on AM kia CHAUHAN. Pending scheduling of GABRIELLA        Objective:     Vital Signs (Most Recent):  Temp: 98.3 °F (36.8 °C) (10/12/24 1315)  Pulse: 98 (10/12/24 1315)  Resp: 18 (10/12/24 1315)  BP: 111/72 (10/12/24 1315)  SpO2: (!) 92 % (10/12/24 1315) Vital Signs (24h Range):  Temp:  [98 °F (36.7 °C)-98.5 °F (36.9 °C)] 98.3 °F (36.8 °C)  Pulse:  [62-98] 98  Resp:  [16-18] 18  SpO2:  [92 %-100 %] 92 %  BP: (106-118)/(64-74) 111/72     Weight: 120 kg (264 lb 8.8 oz)  Body mass index is 34.9 kg/m².    Intake/Output Summary (Last 24 hours) at 10/12/2024 1458  Last data filed at 10/12/2024 1417  Gross per 24 hour   Intake 1440 ml   Output --   Net 1440 ml         Physical Exam  Constitutional:       General: He is not in acute distress.  HENT:      Head: Normocephalic and atraumatic.   Eyes:      Extraocular Movements: Extraocular movements intact.   Cardiovascular:      Rate and Rhythm: Normal rate and regular rhythm.   Pulmonary:      Effort: Pulmonary effort is normal. No respiratory distress.   Skin:     General: Skin is warm and dry.      Comments: Stasis dermatitis   Neurological:      General: No focal deficit present.      Mental Status: He is alert and oriented to person, place, and time.   Psychiatric:         Mood and Affect: Mood normal.         Behavior: Behavior normal.             Significant Labs: All pertinent labs within the past 24 hours have been reviewed.    Significant Imaging: I have reviewed all pertinent imaging results/findings within the past 24 hours.

## 2024-10-12 NOTE — ASSESSMENT & PLAN NOTE
Source unclear  ID following  - cont abx  - consulted cardiology for GABRIELLA recommended per ID; they are concerned with his thrombocytopenia and are deferring until 10/14 to determine appropriateness  - reached out to ID to try to help determine backup plan if we are unable to get the GABRIELLA

## 2024-10-12 NOTE — RESPIRATORY THERAPY
10/12/24 0500   PRE-TX-O2   Device (Oxygen Therapy) BIPAP   SpO2 98 %   Pulse Oximetry Type Intermittent   Pulse 71   Resp 18   Temp 98.2 °F (36.8 °C)   /64   Education   $ Education BiPAP;Bronchodilator;15 min

## 2024-10-12 NOTE — CARE UPDATE
10/12/24 0746   Patient Assessment/Suction   Level of Consciousness (AVPU) alert   Respiratory Effort Normal;Unlabored   All Lung Fields Breath Sounds clear   Rhythm/Pattern, Respiratory unlabored;pattern regular;depth regular   PRE-TX-O2   Device (Oxygen Therapy) room air   SpO2 95 %   Pulse Oximetry Type Intermittent   $ Pulse Oximetry - Multiple Charge Pulse Oximetry - Multiple   Pulse 64   Resp 16   Aerosol Therapy   $ Aerosol Therapy Charges PRN treatment not required   Preset CPAP/BiPAP Settings   Mode Of Delivery BiPAP;Standby   $ CPAP/BiPAP Daily Charge BiPAP/CPAP Daily

## 2024-10-12 NOTE — PROGRESS NOTES
Progress Note  Nephrology    Consult Requested By: Raymon Nichols MD    Reason for Consult:   ESRD, dependent on dialysis    SUBJECTIVE:     History of Present Illness:   65 y/o male patient known to our practice, has out patient dialysis 3x wk on MWF schedule.  He was brought to ER yesterday by his wife d/t lethargy, not feeling well, fatigue, fever.  Blood cx positive for Enterococcus faecalis.  Getting IVAB.  Nephrology is consulted for dialysis orders.    10/9  pt seen and examined.  Ill-appearing, Tmax 103.3.  BP stable.  Dialysis orders are in, may not tolerate much in the was of UF today, but pt is not a big fluid rancho.  I believe pt still makes urine, will order UA.  10/10  AFVSS, at a procedure.    10/11 Patient seen on hemodialysis for uremic clearance and ultrafiltration.              Feels a lot better.  No cp or sob.    10/12 cards consulted for GABRIELLA     Assessment/plan:    Bacteremia- Enterococcus Faec- source TBD  ESRD on HD MWF  Chronic hypotension  SHPT  Anemia of chronic dz    --dose meds for CrCl < 10/HD- GABRIELLA pending  --HD per pt's schedule, [facility].    --continue midodrine, judicious UF- continue torsemide- can resume farxiga at discharge  --not on binders at home, and PO4 is actually low right now - likely d/t poor po intake  --continue epo w/HD, keep Hgb 10-11     ampicillin IV (PEDS and ADULTS)  2 g Intravenous Q12H    aspirin  81 mg Oral Daily    atorvastatin  20 mg Oral Daily    carvediloL  3.125 mg Oral BID    cefTRIAXone (Rocephin) IV (PEDS and ADULTS)  2 g Intravenous Q12H    [START ON 10/14/2024] epoetin patience-epbx  100 Units/kg Intravenous Every Mon, Wed, Fri    levothyroxine  50 mcg Oral Before breakfast    midodrine  5 mg Oral TID AC    mupirocin   Nasal BID    torsemide  20 mg Oral BID        Review of Systems:  As above    OBJECTIVE:     Vital Signs Range (Last 24H):  Temp:  [98 °F (36.7 °C)-98.5 °F (36.9 °C)]   Pulse:  [62-92]   Resp:  [16-18]   BP: ()/(55-74)   SpO2:  [92 %-100  %]     Physical Exam:  General- ill-appearing  HEENT- WNL  Neck- supple  CV- Regular rate and rhythm  Resp- Lungs CTA Bilaterally, No increased WOB  GI- Non tender/non-distended, BS normoactive x4 quads  Extrem- No cyanosis, clubbing, edema.  Derm- skin w/d, pale  Neuro-  No flap.     Body mass index is 34.9 kg/m².    Laboratory:  CBC:   Recent Labs   Lab 10/12/24  0549   WBC 3.05*   RBC 2.91*   HGB 8.9*   HCT 28.7*   PLT 60*   MCV 99*   MCH 30.6   MCHC 31.0*     CMP:   Recent Labs   Lab 10/12/24  0549   *   CALCIUM 8.6*   ALBUMIN 3.5   PROT 7.4      K 4.5   CO2 24      BUN 32*   CREATININE 2.9*   ALKPHOS 56   ALT 7*   AST 13   BILITOT 0.9       Diagnostic Results:  Labs: Reviewed        ASSESSMENT/PLAN:     Active Hospital Problems    Diagnosis  POA    *Bacteremia due to Enterococcus [R78.81, B95.2]  Yes    ESRD (end stage renal disease) [N18.6]  Yes    Periorbital hematoma of left eye [H05.232]  Yes    Chronic combined systolic and diastolic heart failure [I50.42]  Yes    CAD (coronary artery disease) [I25.10]  Yes    Type II diabetes mellitus with neurological manifestations [E11.49]  Yes     With neuropathy        Resolved Hospital Problems   No resolved problems to display.         Thank you for allowing us to participate in the care of your patient. We will follow the patient and provide recommendations as needed.    Patient care time was spent personally by me on the following activities: > 35 minutes  Obtaining a history.  Examination of patient.  Providing medical care at the patients bedside.  Developing a treatment plan with patient or surrogate and bedside caregivers.  Ordering and reviewing laboratory studies, radiographic studies, pulse oximetry.  Ordering and performing treatments and interventions.  Evaluation of patient's response to treatment.  Discussions with consultants while on the unit and immediately available to the patient.  Re-evaluation of the patient's  condition.  Documentation in the medical record.      Bartolo Valenzuela MD

## 2024-10-12 NOTE — PROGRESS NOTES
"Critical access hospital   Department of Infectious Disease  Progress Note        PATIENT NAME: Ana Bullard  MRN: [id number]  TODAY'S DATE: 10/12/2024  ADMIT DATE: 10/8/2024  LOS: 4 days    CHIEF COMPLAINT: Altered Mental Status (Per family, patient complained of "feeling bad" yesterday. Temp of 103 at home. Today more lethargic unable to answer questions regarding name and . Patient's family answering all questions. )      PRINCIPLE PROBLEM: Bacteremia due to Enterococcus    INTERVAL HISTORY      10/10/2024: States improving.  No acute issues overnight.  TTE done today.  Report pending      10/11/2024:  Seen during dialysis.  Continues to improve.  TTE 10/10/2024 was negative for vegetation.    10/12/2024:  Seen and evaluated at bedside.  No acute issues overnight.  One seen by cardiologist for GABRIELLA.  Prefers to be evaluated by his own cardiologist who we will not be available for another 1 week.  She has started to me on the 2 occasions of medicine the he was informed that is AICD can not be removed.  He previously had Streptococcus pneumoniae bacteremia on 2020 that resolved promptly with treatment.  Then had a Streptococcus bacteremia 2020 had also resolved with treatment.  Current infection this E faecalis bacteremia and not same as previous infections.    Antibiotics (From admission, onward)      Start     Stop Route Frequency Ordered    10/09/24 2100  ampicillin 2 g in sodium chloride 0.9 % 100 mL IVPB (ready to mix)         -- IV Every 12 hours (non-standard times) 10/09/24 1808    10/09/24 1230  cefTRIAXone (ROCEPHIN) 2 g in dextrose 5 % 100 mL IVPB (ready to mix)         -- IV Every 12 hours (non-standard times) 10/09/24 1137    10/08/24 2330  mupirocin 2 % ointment         10/13/24 2059 Nasl 2 times daily 10/08/24 2226          Antifungals (From admission, onward)      None           Antivirals (From admission, onward)      None            ASSESSMENT and PLAN      1. Enterococcus " faecalis bacteremia in a patient with AICD.  Source unclear. Repeat Blood cultures x2 sets today.  Continue ampicillin +ceftriaxone. TTE negative.  Ideally, he will need GABRIELLA to evaluate for valvular at lead endocarditis for both prognostication and to guide length of therapy.  May ultimately treat for at least 4 weeks complicated E faecalis bacteremia if no GABRIELLA done.  He is also adamant about not going to LTAC and is not keen on doing IV antibiotics at home or having a power PICC placement.  Ampicillin plus synergistic ceftriaxone offers best course of therapy.  Options for treatment in other off preference include  A. Ampicillin +ceftriaxone which will require an IV access  B. Ampicillin +gentamicin Q dialysis which will also require IV access for daily ampicillin  C.  Vancomycin +gentamicin both Q dialysis     2. Bilateral leg edema with right leg ulcers.  They are not infected.  Symptomatic care      3. Left periorbital hematoma.  Probably to a left temporal skin cancer excision.  Expectant management.  Improving.       4. Complete heart block status post AICD placement.  AICD site does not look clinically infected.  Keep this in mind in this patient with bacteremia.     RECOMMENDATIONS:   Follow repeat blood cultures  Continue current antibiotics  Await his decision on GABRIELLA  Antibiotics can be tailored to his decision on location of care and preference as outlined above     Thank you for involving ID in the care of this patient.  We will continue to follow with you while in the hospital. Please send Epic secure chat with any questions.    Long discussion with patient and his wife at bedside.  Answered their questions      SUBJECTIVE    Ana Bullard is a 64 y.o. male multiple medical problems including diabetes mellitus, hypertension, hyperlipidemia, complete heart block status post AICD placement, chronic thrombocytopenia..  Also with prior history of MRSA infection in 2019.  Admitted 10/08/2024 with  lethargy.  In the ER /57, pulse 94, respiratory rate 17, temperature 103.1° with oxygen saturation of 90%.  Chest x-ray with no acute infiltrate.  He was admitted for sepsis and placed on broad-spectrum antibiotics. Blood culture Enterococcus faecalis in 2/2 bottles.  ID asked to assist with his management.     Antibiotic history:    Vancomycin: 10/08/2024-  Zosyn: 10/08/2024-     Microbiology:   Blood culture 10/08/2024: E faecalis 2/2    Review of Systems  Negative except as stated above in Interval History     OBJECTIVE   Temp:  [98 °F (36.7 °C)-98.5 °F (36.9 °C)] 98.5 °F (36.9 °C)  Pulse:  [62-92] 64  Resp:  [16-18] 16  SpO2:  [92 %-100 %] 95 %  BP: (106-122)/(55-74) 106/68  Temp:  [98 °F (36.7 °C)-98.5 °F (36.9 °C)]   Temp: 98.5 °F (36.9 °C) (10/12/24 0739)  Pulse: 64 (10/12/24 0746)  Resp: 16 (10/12/24 0746)  BP: 106/68 (10/12/24 0739)  SpO2: 95 % (10/12/24 0746)    Intake/Output Summary (Last 24 hours) at 10/12/2024 1108  Last data filed at 10/12/2024 0935  Gross per 24 hour   Intake 1460 ml   Output 1500 ml   Net -40 ml     Physical Exam  General:  Middle-aged man lying quietly in bed in no acute distress   HEENT:  Bruise around left eye.  Sutured area left temple.  CVS: S1 and 2 heard, no murmurs appreciated   Respiratory: Clear to auscultation   Abdomen: Full, soft, nontender, palpable organomegaly   Skin:  No rash appreciated.  No stigmata of endocarditis   Lower extremities:  Mild edema.  Dry skin.  Clean shallow ulcers right leg.  Musculoskeletal: No joint or bony abnormalities appreciated  CNS: No gross focal deficits  Psych: Good mood, normal affect.      VAD:  PIV  ISOLATION:  None     Wounds:  Shallow ulcers right leg that are not infected    Significant Labs: All pertinent labs within the past 24 hours have been reviewed.    CBC LAST 7 DAYS  Recent Labs   Lab 10/08/24  1746 10/09/24  0655 10/10/24  0614 10/11/24  0530 10/12/24  0549   WBC 9.44 6.63 3.46* 3.70* 3.05*   RBC 3.52* 3.23* 3.20*  "3.03* 2.91*   HGB 10.8* 9.9* 9.7* 9.4* 8.9*   HCT 34.2* 32.0* 31.8* 30.1* 28.7*   MCV 97 99* 99* 99* 99*   MCH 30.7 30.7 30.3 31.0 30.6   MCHC 31.6* 30.9* 30.5* 31.2* 31.0*   RDW 17.2* 17.5* 17.3* 17.3* 17.2*   PLT 76* 56* 51* 50* 60*   MPV 11.6 11.1 11.5 12.1 12.0   GRAN 89.1*  8.4* 89.3*  5.9 74.7*  2.6 72.6  2.7 72.2  2.2   LYMPH 2.4*  0.2* 2.1*  0.1* 6.4*  0.2* 6.5*  0.2* 9.8*  0.3*   MONO 7.8  0.7 7.7  0.5 17.1*  0.6 19.2*  0.7 15.7*  0.5   BASO 0.01 0.00 0.01 0.01 0.02   NRBC 0 0 1* 1* 1*       CHEMISTRY LAST 7 DAYS  Recent Labs   Lab 10/08/24  1746 10/09/24  0655 10/10/24  0614 10/11/24  0530 10/12/24  0549    134* 136 136 136   K 5.2* 5.3* 4.6 4.5 4.5   CL 99 97 102 102 104   CO2 26 30* 28 25 24   ANIONGAP 11 7* 6* 9 8   BUN 33* 43* 33* 45* 32*   CREATININE 3.2* 4.0* 3.2* 3.6* 2.9*   * 153* 138* 136* 140*   CALCIUM 9.1 8.9 8.9 8.4* 8.6*   MG 1.4* 1.8 1.8 1.8 1.8   ALBUMIN 4.0 3.6 3.7 3.6 3.5   PROT 8.0 7.2 7.5 7.3 7.4   ALKPHOS 71 58 58 57 56   ALT 8* 7* 7* 8* 7*   AST 17 14 18 15 13   BILITOT 2.2* 2.4* 1.6* 1.1* 0.9       Estimated Creatinine Clearance: 35.8 mL/min (A) (based on SCr of 2.9 mg/dL (H)).    INFLAMMATORY/PROCAL  LAST 7 DAYS  Recent Labs   Lab 10/09/24  1412   CRP 11.10*     No results found for: "ESR"  CRP   Date Value Ref Range Status   10/09/2024 11.10 (H) <1.00 mg/dL Final     Comment:     CRP-Normal Application expected values:   <1.0        mg/dL   Normal Range  1.0 - 5.0  mg/dL   Indicates mild inflammation  5.0 - 10.0 mg/dL   Indicates severe inflammation  >10.0        mg/dL   Represents serious processes and   frequently         indicates the presence of a bacterial   infection.      11/23/2022 3.07 (H) <0.76 mg/dL Final   11/14/2022 2.93 (H) <0.76 mg/dL Final   11/07/2022 2.33 (H) <0.76 mg/dL Final   10/31/2022 3.51 (H) <0.76 mg/dL Final   09/01/2022 3.61 (H) <0.76 mg/dL Final       PRIOR  MICROBIOLOGY:    Susceptibility data from last 90 days.  Collected " Specimen Info Organism Ampicillin GENTAMICIN SYNERGY SCREEN Vancomycin   10/08/24 Blood from Peripheral, Hand, Left Enterococcus faecalis      10/08/24 Blood from Peripheral, Forearm, Left Enterococcus faecalis  S  S  S       LAST 7 DAYS MICROBIOLOGY   Microbiology Results (last 7 days)       Procedure Component Value Units Date/Time    Blood culture [[id number]] Collected: 10/11/24 1639    Order Status: Completed Specimen: Blood Updated: 10/11/24 2317     Blood Culture, Routine No Growth to date    Narrative:      Collection has been rescheduled by HRA at 10/11/2024 12:15 Reason:   Patient unavailable/janett  Collection has been rescheduled by HRA at 10/11/2024 12:15 Reason:   Patient unavailable/janett    Blood culture [[id number]] Collected: 10/11/24 1639    Order Status: Completed Specimen: Blood Updated: 10/11/24 2317     Blood Culture, Routine No Growth to date    Narrative:      Collection has been rescheduled by HRA at 10/11/2024 12:15 Reason:   Patient unavailable/janett  Collection has been rescheduled by HRA at 10/11/2024 12:15 Reason:   Patient unavailable/janett    Blood culture x two cultures. Draw prior to antibiotics [[id number]]  (Abnormal) Collected: 10/08/24 1733    Order Status: Completed Specimen: Blood from Peripheral, Hand, Left Updated: 10/11/24 0751     Blood Culture, Routine Gram stain aer bottle: Gram positive cocci      Positive results previously called on order [id number]      Gram stain mayte bottle: Gram positive cocci      Positive results previously called on order [id number]      ENTEROCOCCUS FAECALIS  For susceptibility see order #[id number]      Narrative:      Aerobic and anaerobic  Collection has been rescheduled by Z at 10/08/2024 17:07 Reason:   Dialysis patient  Collection has been rescheduled by [email or web address] at 10/08/2024 17:07 Reason:   Dialysis patient    Blood culture x two cultures. Draw prior to antibiotics [[id number]]  (Abnormal)  (Susceptibility) Collected: 10/08/24 1706    Order  Status: Completed Specimen: Blood from Peripheral, Forearm, Left Updated: 10/11/24 0750     Blood Culture, Routine Gram stain mayte bottle: Gram positive cocci      Results called to and read back by: Jonnie Ortega RN on 3100 wing      Gram stain aer bottle: Gram positive cocci      Positive results previously called      ENTEROCOCCUS FAECALIS    Narrative:      Aerobic and anaerobic    Rapid Organism ID by PCR (from Blood culture) [[id number]]  (Abnormal) Collected: 10/08/24 1706    Order Status: Completed Updated: 10/09/24 0435     Enterococcus faecalis Detected     Enterococcus faecium Not Detected     Listeria monocytogenes Not Detected     Staphylococcus spp. Not Detected     Staphylococcus aureus Not Detected     Staphylococcus epidermidis Not Detected     Staphylococcus lugdunensis Not Detected     Streptococcus species Not Detected     Streptococcus agalactiae Not Detected     Streptococcus pneumoniae Not Detected     Streptococcus pyogenes Not Detected     Acinetobacter calcoaceticus/baumannii complex Not Detected     Bacteroides fragilis Not Detected     Enterobacterales Not Detected     Enterobacter cloacae complex Not Detected     Escherichia coli Not Detected     Klebsiella aerogenes Not Detected     Klebsiella oxytoca Not Detected     Klebsiella pneumoniae group Not Detected     Proteus Not Detected     Salmonella sp Not Detected     Serratia marcescens Not Detected     Haemophilus influenzae Not Detected     Neisseria meningtidis Not Detected     Pseudomonas aeruginosa Not Detected     Stenotrophomonas maltophilia Not Detected     Candida albicans Not Detected     Candida auris Not Detected     Candida glabrata Not Detected     Candida krusei Not Detected     Candida parapsilosis Not Detected     Candida tropicalis Not Detected     Cryptococcus neoformans/gattii Not Detected     CTX-M (ESBL ) Test not applicable     IMP (Carbapenem resistant) Test not applicable     KPC resistance gene  (Carbapenem resistant) Test not applicable     mcr-1  Test not applicable     mec A/C  Test not applicable     mec A/C and MREJ (MRSA) gene Test not applicable     NDM (Carbapenem resistant) Test not applicable     OXA-48-like (Carbapenem resistant) Test not applicable     van A/B (VRE gene) Not Detected     VIM (Carbapenem resistant) Test not applicable    Narrative:      Aerobic and anaerobic          CURRENT/PREVIOUS VISIT EKG  Results for orders placed or performed during the hospital encounter of 10/08/24   EKG 12-lead    Collection Time: 10/08/24  6:20 PM   Result Value Ref Range    QRS Duration 158 ms    [facility] QTC Calculation 525 ms    Narrative    Test Reason : R00.0,    Vent. Rate : 096 BPM     Atrial Rate : 102 BPM     P-R Int : 000 ms          QRS Dur : 158 ms      QT Int : 416 ms       P-R-T Axes : 000 216 037 degrees     QTc Int : 525 ms    Ventricular-paced rhythm  Biventricular pacemaker detected  Abnormal ECG  When compared with ECG of 08-OCT-2023 23:25,  Vent. rate has decreased BY  31 BPM    Referred By: [email or web address]   SELF           Confirmed By:      Significant Imaging: I have reviewed all relevant and available imaging results/findings within the past 24 hours.    I spent a total of 60 minutes on the day of the visit.This includes face to face time and non-face to face time preparing to see the patient (eg, review of tests), obtaining and/or reviewing separately obtained history, documenting clinical information in the electronic or other health record, independently interpreting results and communicating results to the patient/family/caregiver, or care coordinator.    Samuel Hu MD  Date of Service: 10/12/2024      This note was created using [employer] voice recognition software that occasionally misinterpreted phrases or words.

## 2024-10-12 NOTE — PLAN OF CARE
Patient continue to improve. Patient is a standby assist. Patient in bed with HOB up resting with call bell in reach

## 2024-10-12 NOTE — ASSESSMENT & PLAN NOTE
Patient's FSGs are controlled on current medication regimen.  Last A1c reviewed-   Lab Results   Component Value Date    HGBA1C 6.3 (H) 10/09/2024     Most recent fingerstick glucose reviewed-   Recent Labs   Lab 10/11/24  1719 10/11/24  1902 10/12/24  0631 10/12/24  1319   POCTGLUCOSE 182* 179* 157* 150*       Current correctional scale  Low  Maintain anti-hyperglycemic dose as follows-   Antihyperglycemics (From admission, onward)    Start     Stop Route Frequency Ordered    10/09/24 0957  insulin aspart U-100 pen 0-5 Units         -- SubQ Before meals & nightly PRN 10/09/24 0857        Hold Oral hypoglycemics while patient is in the hospital.

## 2024-10-12 NOTE — PROGRESS NOTES
[facility] Medicine  Progress Note    Patient Name: Ana Bullard  MRN: [id number]  Patient Class: IP- Inpatient   Admission Date: 10/8/2024  Length of Stay: 4 days  Attending Physician: Raymon Nichols MD  Primary Care Provider: Michelle Messina FNP        Subjective:     Principal Problem:Bacteremia due to Enterococcus        HPI:  Ana Bullard is a 64 y.o. male who has a history of  extensive cardiac issues including CABG stents, now status post AICD, CHF, DVT, diabetes, anemia, hyperlipidemia, FARHAN, hypothyroidism,  and presents with malaise.     Patient was brought in by his wife.  Patient was fairly lethargic and unable to answer questions initially on exam on re-evaluation patient states that he was not not feeling well over the past few days and feeling fatigue and tired and also noticed that he had a fever.  He had no known other reasons for fever, no sore throat no sick contacts no shortness a breath, no coughing.  No burning with urinating no diarrhea no vomiting.  Patient is febrile here in the ER as well.  Patient given fluids IV antibiotics and showed signs of improvement.  Lactic acid was negative.  No clear focal points no signs or symptoms as of yet.  Blood cultures pending.  Patient will continue on IV antibiotics.  Especially now to evaluate whether the patient may have infected hardware    Overview/Hospital Course:  ESRD patient admitted with malaise found to have E. Faecalis bacteremia of unclear source. Given IV abx. Nephro followed for hemodialysis. ID team consulted. Repeat blood cultures taken. Planned for GABRIELLA to look closer at heart valves and cardiac device leads.    Interval History: Patient seen and examined on AM kia CHAUHAN. Pending scheduling of GABRIELLA        Objective:     Vital Signs (Most Recent):  Temp: 98.3 °F (36.8 °C) (10/12/24 1315)  Pulse: 98 (10/12/24 1315)  Resp: 18 (10/12/24 1315)  BP: 111/72 (10/12/24 1315)  SpO2: (!) 92 % (10/12/24 1315)  Vital Signs (24h Range):  Temp:  [98 °F (36.7 °C)-98.5 °F (36.9 °C)] 98.3 °F (36.8 °C)  Pulse:  [62-98] 98  Resp:  [16-18] 18  SpO2:  [92 %-100 %] 92 %  BP: (106-118)/(64-74) 111/72     Weight: 120 kg (264 lb 8.8 oz)  Body mass index is 34.9 kg/m².    Intake/Output Summary (Last 24 hours) at 10/12/2024 1458  Last data filed at 10/12/2024 1417  Gross per 24 hour   Intake 1440 ml   Output --   Net 1440 ml         Physical Exam  Constitutional:       General: He is not in acute distress.  HENT:      Head: Normocephalic and atraumatic.   Eyes:      Extraocular Movements: Extraocular movements intact.   Cardiovascular:      Rate and Rhythm: Normal rate and regular rhythm.   Pulmonary:      Effort: Pulmonary effort is normal. No respiratory distress.   Skin:     General: Skin is warm and dry.      Comments: Stasis dermatitis   Neurological:      General: No focal deficit present.      Mental Status: He is alert and oriented to person, place, and time.   Psychiatric:         Mood and Affect: Mood normal.         Behavior: Behavior normal.             Significant Labs: All pertinent labs within the past 24 hours have been reviewed.    Significant Imaging: I have reviewed all pertinent imaging results/findings within the past 24 hours.    Assessment/Plan:      * Bacteremia due to Enterococcus  Source unclear  ID following  - f/u repeat blood cx  - cont abx  - consulted cardiology for GABRIELLA recommended per ID; they are concerned with his thrombocytopenia and are deferring until 10/14 to determine appropriateness  - reached out to ID to try to help determine backup plan if we are unable to get the GABRIELLA    Periorbital hematoma of left eye  Patient with recent removal of left basal cell carcinoma lesion, likely adjacent left eye, patient states that he had a black eye after this procedure, no trauma to the area    ESRD (end stage renal disease)  Nephrology on board, managing hemodialysis    Chronic combined systolic and diastolic  heart failure  Patient has AICD in place  Fluid management per HD per nephro    CAD (coronary artery disease)  Continue aspirin and statin  Coreg held for low normal BP, consider resuming    Type II diabetes mellitus with neurological manifestations  Patient's FSGs are controlled on current medication regimen.  Last A1c reviewed-   Lab Results   Component Value Date    HGBA1C 6.3 (H) 10/09/2024     Most recent fingerstick glucose reviewed-   Recent Labs   Lab 10/11/24  1719 10/11/24  1902 10/12/24  0631 10/12/24  1319   POCTGLUCOSE 182* 179* 157* 150*       Current correctional scale  Low  Maintain anti-hyperglycemic dose as follows-   Antihyperglycemics (From admission, onward)      Start     Stop Route Frequency Ordered    10/09/24 0957  insulin aspart U-100 pen 0-5 Units         -- SubQ Before meals & nightly PRN 10/09/24 0857          Hold Oral hypoglycemics while patient is in the hospital.      VTE Risk Mitigation (From admission, onward)           Ordered     IP VTE HIGH RISK PATIENT  Once         10/08/24 2224     Place sequential compression device  Until discontinued         10/08/24 2224     Reason for No Pharmacological VTE Prophylaxis  Once        Question:  Reasons:  Answer:  Physician Provided (leave comment)  Comment:  poor response    10/08/24 2224                    Discharge Planning   BILL: 10/15/2024     Code Status: Full Code   Is the patient medically ready for discharge?:     Reason for patient still in hospital (select all that apply): Laboratory test, Treatment, and Consult recommendations  Discharge Plan A: Home                  Raymon Nichols MD  Department of Hospital Medicine   [facility]

## 2024-10-13 LAB
ALBUMIN SERPL BCP-MCNC: 3.5 G/DL (ref 3.5–5.2)
ALP SERPL-CCNC: 58 U/L (ref 55–135)
ALT SERPL W/O P-5'-P-CCNC: 8 U/L (ref 10–44)
ANION GAP SERPL CALC-SCNC: 9 MMOL/L (ref 8–16)
AST SERPL-CCNC: 14 U/L (ref 10–40)
BASOPHILS # BLD AUTO: 0.02 K/UL (ref 0–0.2)
BASOPHILS NFR BLD: 0.5 % (ref 0–1.9)
BILIRUB SERPL-MCNC: 0.7 MG/DL (ref 0.1–1)
BUN SERPL-MCNC: 42 MG/DL (ref 8–23)
CALCIUM SERPL-MCNC: 8.8 MG/DL (ref 8.7–10.5)
CHLORIDE SERPL-SCNC: 102 MMOL/L (ref 95–110)
CO2 SERPL-SCNC: 24 MMOL/L (ref 23–29)
CREAT SERPL-MCNC: 3.6 MG/DL (ref 0.5–1.4)
DIFFERENTIAL METHOD BLD: ABNORMAL
EOSINOPHIL # BLD AUTO: 0.1 K/UL (ref 0–0.5)
EOSINOPHIL NFR BLD: 1.9 % (ref 0–8)
ERYTHROCYTE [DISTWIDTH] IN BLOOD BY AUTOMATED COUNT: 17.4 % (ref 11.5–14.5)
EST. GFR  (NO RACE VARIABLE): 18.3 ML/MIN/1.73 M^2
GLUCOSE SERPL-MCNC: 151 MG/DL (ref 70–110)
HCT VFR BLD AUTO: 30.1 % (ref 40–54)
HGB BLD-MCNC: 9.5 G/DL (ref 14–18)
IMM GRANULOCYTES # BLD AUTO: 0.04 K/UL (ref 0–0.04)
IMM GRANULOCYTES NFR BLD AUTO: 1 % (ref 0–0.5)
LYMPHOCYTES # BLD AUTO: 0.4 K/UL (ref 1–4.8)
LYMPHOCYTES NFR BLD: 8.7 % (ref 18–48)
MAGNESIUM SERPL-MCNC: 1.8 MG/DL (ref 1.6–2.6)
MCH RBC QN AUTO: 31.1 PG (ref 27–31)
MCHC RBC AUTO-ENTMCNC: 31.6 G/DL (ref 32–36)
MCV RBC AUTO: 99 FL (ref 82–98)
MONOCYTES # BLD AUTO: 0.5 K/UL (ref 0.3–1)
MONOCYTES NFR BLD: 12.6 % (ref 4–15)
NEUTROPHILS # BLD AUTO: 3.1 K/UL (ref 1.8–7.7)
NEUTROPHILS NFR BLD: 75.3 % (ref 38–73)
NRBC BLD-RTO: 1 /100 WBC
PLATELET # BLD AUTO: 58 K/UL (ref 150–450)
PMV BLD AUTO: 10.7 FL (ref 9.2–12.9)
POCT GLUCOSE: 105 MG/DL (ref 70–110)
POCT GLUCOSE: 141 MG/DL (ref 70–110)
POCT GLUCOSE: 152 MG/DL (ref 70–110)
POCT GLUCOSE: 195 MG/DL (ref 70–110)
POTASSIUM SERPL-SCNC: 4.1 MMOL/L (ref 3.5–5.1)
PROT SERPL-MCNC: 7.4 G/DL (ref 6–8.4)
RBC # BLD AUTO: 3.05 M/UL (ref 4.6–6.2)
SODIUM SERPL-SCNC: 135 MMOL/L (ref 136–145)
WBC # BLD AUTO: 4.13 K/UL (ref 3.9–12.7)

## 2024-10-13 PROCEDURE — 63600175 PHARM REV CODE 636 W HCPCS: Performed by: INTERNAL MEDICINE

## 2024-10-13 PROCEDURE — 25000003 PHARM REV CODE 250: Performed by: INTERNAL MEDICINE

## 2024-10-13 PROCEDURE — 94660 CPAP INITIATION&MGMT: CPT

## 2024-10-13 PROCEDURE — 94761 N-INVAS EAR/PLS OXIMETRY MLT: CPT

## 2024-10-13 PROCEDURE — 36415 COLL VENOUS BLD VENIPUNCTURE: CPT | Performed by: INTERNAL MEDICINE

## 2024-10-13 PROCEDURE — 85025 COMPLETE CBC W/AUTO DIFF WBC: CPT | Performed by: INTERNAL MEDICINE

## 2024-10-13 PROCEDURE — 27000221 HC OXYGEN, UP TO 24 HOURS

## 2024-10-13 PROCEDURE — 25000003 PHARM REV CODE 250

## 2024-10-13 PROCEDURE — 12000002 HC ACUTE/MED SURGE SEMI-PRIVATE ROOM

## 2024-10-13 PROCEDURE — 83735 ASSAY OF MAGNESIUM: CPT | Performed by: INTERNAL MEDICINE

## 2024-10-13 PROCEDURE — 80053 COMPREHEN METABOLIC PANEL: CPT | Performed by: INTERNAL MEDICINE

## 2024-10-13 PROCEDURE — 99900035 HC TECH TIME PER 15 MIN (STAT)

## 2024-10-13 RX ADMIN — TRAMADOL HYDROCHLORIDE 50 MG: 50 TABLET, COATED ORAL at 10:10

## 2024-10-13 RX ADMIN — CEFTRIAXONE SODIUM 2 G: 2 INJECTION, POWDER, FOR SOLUTION INTRAMUSCULAR; INTRAVENOUS at 11:10

## 2024-10-13 RX ADMIN — MUPIROCIN 1 G: 20 OINTMENT TOPICAL at 08:10

## 2024-10-13 RX ADMIN — MIDODRINE HYDROCHLORIDE 5 MG: 2.5 TABLET ORAL at 05:10

## 2024-10-13 RX ADMIN — ASPIRIN 81 MG 81 MG: 81 TABLET ORAL at 08:10

## 2024-10-13 RX ADMIN — CARVEDILOL 3.12 MG: 3.12 TABLET, FILM COATED ORAL at 09:10

## 2024-10-13 RX ADMIN — AMPICILLIN SODIUM 2 G: 2 INJECTION, POWDER, FOR SOLUTION INTRAMUSCULAR; INTRAVENOUS at 08:10

## 2024-10-13 RX ADMIN — CARVEDILOL 3.12 MG: 3.12 TABLET, FILM COATED ORAL at 08:10

## 2024-10-13 RX ADMIN — MIDODRINE HYDROCHLORIDE 5 MG: 2.5 TABLET ORAL at 11:10

## 2024-10-13 RX ADMIN — TORSEMIDE 20 MG: 20 TABLET ORAL at 09:10

## 2024-10-13 RX ADMIN — LEVOTHYROXINE SODIUM 50 MCG: 0.03 TABLET ORAL at 09:10

## 2024-10-13 RX ADMIN — ATORVASTATIN CALCIUM 20 MG: 20 TABLET, FILM COATED ORAL at 08:10

## 2024-10-13 RX ADMIN — AMPICILLIN SODIUM 2 G: 2 INJECTION, POWDER, FOR SOLUTION INTRAMUSCULAR; INTRAVENOUS at 09:10

## 2024-10-13 RX ADMIN — TORSEMIDE 20 MG: 20 TABLET ORAL at 08:10

## 2024-10-13 NOTE — PROGRESS NOTES
[facility] Medicine  Progress Note    Patient Name: Ana Bullard  MRN: [id number]  Patient Class: IP- Inpatient   Admission Date: 10/8/2024  Length of Stay: 5 days  Attending Physician: Raymon Nichols MD  Primary Care Provider: Michelle Messina FNP        Subjective:     Principal Problem:Bacteremia due to Enterococcus        HPI:  Ana Bullard is a 64 y.o. male who has a history of  extensive cardiac issues including CABG stents, now status post AICD, CHF, DVT, diabetes, anemia, hyperlipidemia, FARHAN, hypothyroidism,  and presents with malaise.     Patient was brought in by his wife.  Patient was fairly lethargic and unable to answer questions initially on exam on re-evaluation patient states that he was not not feeling well over the past few days and feeling fatigue and tired and also noticed that he had a fever.  He had no known other reasons for fever, no sore throat no sick contacts no shortness a breath, no coughing.  No burning with urinating no diarrhea no vomiting.  Patient is febrile here in the ER as well.  Patient given fluids IV antibiotics and showed signs of improvement.  Lactic acid was negative.  No clear focal points no signs or symptoms as of yet.  Blood cultures pending.  Patient will continue on IV antibiotics.  Especially now to evaluate whether the patient may have infected hardware    Overview/Hospital Course:  ESRD patient admitted with malaise found to have E. Faecalis bacteremia of unclear source. Given IV abx. Nephro followed for hemodialysis. ID team consulted. Repeat blood cultures taken. Planned for GABRIELLA to look closer at heart valves and cardiac device leads but this was declined.    Interval History: Patient seen in afternoon. NAEON. He does not want to do the GABRIELLA. We plan to set up IV abx with his dialysis sessions. Will arrange and confirm tomorrow and plan for discharge at that time. He will follow up with his own cardiologist to determine if a GABRIELLA  will be done at later time.      Objective:     Vital Signs (Most Recent):  Temp: 97.8 °F (36.6 °C) (10/13/24 1125)  Pulse: 89 (10/13/24 1125)  Resp: 18 (10/13/24 1125)  BP: 120/77 (10/13/24 1125)  SpO2: 100 % (10/13/24 1125) Vital Signs (24h Range):  Temp:  [97 °F (36.1 °C)-97.8 °F (36.6 °C)] 97.8 °F (36.6 °C)  Pulse:  [64-89] 89  Resp:  [17-18] 18  SpO2:  [93 %-100 %] 100 %  BP: ()/(58-77) 120/77     Weight: 120 kg (264 lb 8.8 oz)  Body mass index is 34.9 kg/m².    Intake/Output Summary (Last 24 hours) at 10/13/2024 1600  Last data filed at 10/13/2024 0845  Gross per 24 hour   Intake 960 ml   Output --   Net 960 ml         Physical Exam  Constitutional:       General: He is not in acute distress.  HENT:      Head: Normocephalic and atraumatic.   Eyes:      Extraocular Movements: Extraocular movements intact.   Cardiovascular:      Rate and Rhythm: Normal rate and regular rhythm.   Pulmonary:      Effort: Pulmonary effort is normal. No respiratory distress.   Skin:     General: Skin is warm and dry.      Comments: Stasis dermatitis   Neurological:      General: No focal deficit present.      Mental Status: He is alert and oriented to person, place, and time.   Psychiatric:         Mood and Affect: Mood normal.         Behavior: Behavior normal.             Significant Labs: All pertinent labs within the past 24 hours have been reviewed.    Significant Imaging: I have reviewed all pertinent imaging results/findings within the past 24 hours.    Assessment/Plan:      * Bacteremia due to Enterococcus  Source unclear but concern for association with ICD leads for which removal is not an option for him  ID following  - cont abx  - f/u repeat blood cx  - dc cardiology consult and GABRIELLA  - plan for IV abx with dialysis sessions and he will follow up with his own cardiologist to determine if GABRIELLA is warranted at later time    Periorbital hematoma of left eye  Patient with recent removal of left basal cell carcinoma  lesion, likely adjacent left eye, patient states that he had a black eye after this procedure, no trauma to the area    ESRD (end stage renal disease)  Nephrology on board, managing hemodialysis    Chronic combined systolic and diastolic heart failure  Patient has AICD in place  Fluid management per HD per nephro    CAD (coronary artery disease)  Continue aspirin and statin  Coreg held for low normal BP, consider resuming    Type II diabetes mellitus with neurological manifestations  Patient's FSGs are controlled on current medication regimen.  Last A1c reviewed-   Lab Results   Component Value Date    HGBA1C 6.3 (H) 10/09/2024     Most recent fingerstick glucose reviewed-   Recent Labs   Lab 10/12/24  1634 10/12/24  2003 10/13/24  0613 10/13/24  1115   POCTGLUCOSE 134* 155* 105 152*       Current correctional scale  Low  Maintain anti-hyperglycemic dose as follows-   Antihyperglycemics (From admission, onward)      Start     Stop Route Frequency Ordered    10/09/24 0957  insulin aspart U-100 pen 0-5 Units         -- SubQ Before meals & nightly PRN 10/09/24 0857          Hold Oral hypoglycemics while patient is in the hospital.      VTE Risk Mitigation (From admission, onward)           Ordered     IP VTE HIGH RISK PATIENT  Once         10/08/24 2224     Place sequential compression device  Until discontinued         10/08/24 2224     Reason for No Pharmacological VTE Prophylaxis  Once        Question:  Reasons:  Answer:  Physician Provided (leave comment)  Comment:  poor response    10/08/24 2224                    Discharge Planning   BILL: 10/14     Code Status: Full Code   Is the patient medically ready for discharge?:     Reason for patient still in hospital (select all that apply): Patient trending condition and Other (specify) IV abx setup with HD  Discharge Plan A: Home                  Raymon Nichols MD  Department of Hospital Medicine   [facility]

## 2024-10-13 NOTE — SUBJECTIVE & OBJECTIVE
Interval History: Patient seen in afternoon. TIEN. He does not want to do the GABRIELLA. We plan to set up IV abx with his dialysis sessions. Will arrange and confirm tomorrow and plan for discharge at that time. He will follow up with his own cardiologist to determine if a GABRIELLA will be done at later time.      Objective:     Vital Signs (Most Recent):  Temp: 97.8 °F (36.6 °C) (10/13/24 1125)  Pulse: 89 (10/13/24 1125)  Resp: 18 (10/13/24 1125)  BP: 120/77 (10/13/24 1125)  SpO2: 100 % (10/13/24 1125) Vital Signs (24h Range):  Temp:  [97 °F (36.1 °C)-97.8 °F (36.6 °C)] 97.8 °F (36.6 °C)  Pulse:  [64-89] 89  Resp:  [17-18] 18  SpO2:  [93 %-100 %] 100 %  BP: ()/(58-77) 120/77     Weight: 120 kg (264 lb 8.8 oz)  Body mass index is 34.9 kg/m².    Intake/Output Summary (Last 24 hours) at 10/13/2024 1600  Last data filed at 10/13/2024 0845  Gross per 24 hour   Intake 960 ml   Output --   Net 960 ml         Physical Exam  Constitutional:       General: He is not in acute distress.  HENT:      Head: Normocephalic and atraumatic.   Eyes:      Extraocular Movements: Extraocular movements intact.   Cardiovascular:      Rate and Rhythm: Normal rate and regular rhythm.   Pulmonary:      Effort: Pulmonary effort is normal. No respiratory distress.   Skin:     General: Skin is warm and dry.      Comments: Stasis dermatitis   Neurological:      General: No focal deficit present.      Mental Status: He is alert and oriented to person, place, and time.   Psychiatric:         Mood and Affect: Mood normal.         Behavior: Behavior normal.             Significant Labs: All pertinent labs within the past 24 hours have been reviewed.    Significant Imaging: I have reviewed all pertinent imaging results/findings within the past 24 hours.

## 2024-10-13 NOTE — ASSESSMENT & PLAN NOTE
Source unclear but concern for association with ICD leads for which removal is not an option for him  ID following  - cont abx  - f/u repeat blood cx  - dc cardiology consult and GABRIELLA  - plan for IV abx with dialysis sessions and he will follow up with his own cardiologist to determine if GABRIELLA is warranted at later time

## 2024-10-13 NOTE — PROGRESS NOTES
Progress Note  Nephrology    Consult Requested By: Raymon Nichols MD    Reason for Consult:   ESRD, dependent on dialysis    SUBJECTIVE:     History of Present Illness:   65 y/o male patient known to our practice, has out patient dialysis 3x wk on MWF schedule.  He was brought to ER yesterday by his wife d/t lethargy, not feeling well, fatigue, fever.  Blood cx positive for Enterococcus faecalis.  Getting IVAB.  Nephrology is consulted for dialysis orders.    10/9  pt seen and examined.  Ill-appearing, Tmax 103.3.  BP stable.  Dialysis orders are in, may not tolerate much in the was of UF today, but pt is not a big fluid rancho.  I believe pt still makes urine, will order UA.  10/10  AFVSS, at a procedure.    10/11 Patient seen on hemodialysis for uremic clearance and ultrafiltration.              Feels a lot better.  No cp or sob.    10/12 cards consulted for GABRIELLA   10/13 patient not sure if he wants GABRIELLA- also antbx course TBD as patient not intereted in LTAC or PICC/IV antbx at home- 10/11 blood cx thus far neg    Assessment/plan:    Bacteremia- Enterococcus Faec- source TBD but patient with ACID  ESRD on HD MWF  Chronic hypotension  SHPT  Anemia of chronic dz    --dose meds for CrCl < 10/HD- GABRIELLA advised by ID  --HD per pt's schedule, [facility].    --continue midodrine, judicious UF- continue torsemide- can resume farxiga at discharge  --not on binders at home, and PO4 was low at admission - likely d/t poor po intake  --continue epo w/HD, keep Hgb 10-11- pancytopenia noted- hold heparin with HD     ampicillin IV (PEDS and ADULTS)  2 g Intravenous Q12H    aspirin  81 mg Oral Daily    atorvastatin  20 mg Oral Daily    carvediloL  3.125 mg Oral BID    cefTRIAXone (Rocephin) IV (PEDS and ADULTS)  2 g Intravenous Q12H    [START ON 10/14/2024] epoetin patience-epbx  100 Units/kg Intravenous Every Mon, Wed, Fri    levothyroxine  50 mcg Oral Before breakfast    midodrine  5 mg Oral TID AC    mupirocin   Nasal BID    torsemide  20  mg Oral BID        Review of Systems:  As above    OBJECTIVE:     Vital Signs Range (Last 24H):  Temp:  [97 °F (36.1 °C)-98.3 °F (36.8 °C)]   Pulse:  [64-98]   Resp:  [17-18]   BP: ()/(58-72)   SpO2:  [92 %-98 %]     Physical Exam:  General- ill-appearing  HEENT- WNL  Neck- supple  CV- Regular rate and rhythm  Resp- Lungs CTA Bilaterally, No increased WOB  GI- Non tender/non-distended, BS normoactive x4 quads  Extrem- No cyanosis, clubbing, edema.  Derm- skin w/d, pale  Neuro-  No flap.     Body mass index is 34.9 kg/m².    Laboratory:  CBC:   Recent Labs   Lab 10/12/24  0549   WBC 3.05*   RBC 2.91*   HGB 8.9*   HCT 28.7*   PLT 60*   MCV 99*   MCH 30.6   MCHC 31.0*     CMP:   Recent Labs   Lab 10/12/24  0549   *   CALCIUM 8.6*   ALBUMIN 3.5   PROT 7.4      K 4.5   CO2 24      BUN 32*   CREATININE 2.9*   ALKPHOS 56   ALT 7*   AST 13   BILITOT 0.9       Diagnostic Results:  Labs: Reviewed        ASSESSMENT/PLAN:     Active Hospital Problems    Diagnosis  POA    *Bacteremia due to Enterococcus [R78.81, B95.2]  Yes    ESRD (end stage renal disease) [N18.6]  Yes    Periorbital hematoma of left eye [H05.232]  Yes    Chronic combined systolic and diastolic heart failure [I50.42]  Yes    CAD (coronary artery disease) [I25.10]  Yes    Type II diabetes mellitus with neurological manifestations [E11.49]  Yes     With neuropathy        Resolved Hospital Problems   No resolved problems to display.         Thank you for allowing us to participate in the care of your patient. We will follow the patient and provide recommendations as needed.    Patient care time was spent personally by me on the following activities: > 35 minutes  Obtaining a history.  Examination of patient.  Providing medical care at the patients bedside.  Developing a treatment plan with patient or surrogate and bedside caregivers.  Ordering and reviewing laboratory studies, radiographic studies, pulse oximetry.  Ordering and performing  treatments and interventions.  Evaluation of patient's response to treatment.  Discussions with consultants while on the unit and immediately available to the patient.  Re-evaluation of the patient's condition.  Documentation in the medical record.      Bartolo Valenzuela MD

## 2024-10-13 NOTE — ASSESSMENT & PLAN NOTE
Patient's FSGs are controlled on current medication regimen.  Last A1c reviewed-   Lab Results   Component Value Date    HGBA1C 6.3 (H) 10/09/2024     Most recent fingerstick glucose reviewed-   Recent Labs   Lab 10/12/24  1634 10/12/24  2003 10/13/24  0613 10/13/24  1115   POCTGLUCOSE 134* 155* 105 152*       Current correctional scale  Low  Maintain anti-hyperglycemic dose as follows-   Antihyperglycemics (From admission, onward)    Start     Stop Route Frequency Ordered    10/09/24 0957  insulin aspart U-100 pen 0-5 Units         -- SubQ Before meals & nightly PRN 10/09/24 0857        Hold Oral hypoglycemics while patient is in the hospital.

## 2024-10-13 NOTE — CARE UPDATE
10/12/24 2130   Patient Assessment/Suction   Level of Consciousness (AVPU) alert   Respiratory Effort Normal;Unlabored   Expansion/Accessory Muscles/Retractions no use of accessory muscles   All Lung Fields Breath Sounds clear;equal bilaterally   Rhythm/Pattern, Respiratory no shortness of breath reported;unlabored;pattern regular   Cough Frequency no cough   Skin Integrity   $ Wound Care Tech Time 15 min   Area Observed Bridge of nose   Skin Appearance without discoloration   PRE-TX-O2   Device (Oxygen Therapy) room air   SpO2 96 %   Pulse Oximetry Type Intermittent   $ Pulse Oximetry - Multiple Charge Pulse Oximetry - Multiple   Pulse 80   Resp 18   Aerosol Therapy   $ Aerosol Therapy Charges PRN treatment not required   Respiratory Treatment Status (SVN) PRN treatment not required   Preset CPAP/BiPAP Settings   Mode Of Delivery Standby;BiPAP S/T   $ CPAP/BiPAP Daily Charge BiPAP/CPAP Daily   $ Initial CPAP/BiPAP Setup? No   $ Is patient using? Yes  (pt will use  bipap at hours of sleeping time)   Size of Mask Large   Sized Appropriately? Yes   Equipment Type V60   Airway Device Type large full face mask   Humidifier not applicable

## 2024-10-13 NOTE — PLAN OF CARE
Sw met Pt and spouse at bedside to discuss D/C plans. Pt stated he will be receiving dialysis and IV abx from [facility].    Pt is refusing HH.

## 2024-10-13 NOTE — CONSULTS
Discussed with , --[facility] cardiology consulted due to no physician available to perform GABRIELLA this week.

## 2024-10-14 VITALS
SYSTOLIC BLOOD PRESSURE: 113 MMHG | BODY MASS INDEX: 34.85 KG/M2 | DIASTOLIC BLOOD PRESSURE: 58 MMHG | RESPIRATION RATE: 16 BRPM | HEART RATE: 84 BPM | HEIGHT: 73 IN | TEMPERATURE: 98 F | OXYGEN SATURATION: 96 % | WEIGHT: 263 LBS

## 2024-10-14 LAB
ALBUMIN SERPL BCP-MCNC: 3.7 G/DL (ref 3.5–5.2)
ALP SERPL-CCNC: 64 U/L (ref 55–135)
ALT SERPL W/O P-5'-P-CCNC: 10 U/L (ref 10–44)
ANION GAP SERPL CALC-SCNC: 10 MMOL/L (ref 8–16)
AST SERPL-CCNC: 15 U/L (ref 10–40)
BACTERIA BLD CULT: ABNORMAL
BASOPHILS # BLD AUTO: 0.03 K/UL (ref 0–0.2)
BASOPHILS NFR BLD: 0.6 % (ref 0–1.9)
BILIRUB SERPL-MCNC: 0.7 MG/DL (ref 0.1–1)
BUN SERPL-MCNC: 47 MG/DL (ref 8–23)
CALCIUM SERPL-MCNC: 8.7 MG/DL (ref 8.7–10.5)
CHLORIDE SERPL-SCNC: 102 MMOL/L (ref 95–110)
CO2 SERPL-SCNC: 24 MMOL/L (ref 23–29)
CREAT SERPL-MCNC: 3.7 MG/DL (ref 0.5–1.4)
DIFFERENTIAL METHOD BLD: ABNORMAL
EOSINOPHIL # BLD AUTO: 0.1 K/UL (ref 0–0.5)
EOSINOPHIL NFR BLD: 1.4 % (ref 0–8)
ERYTHROCYTE [DISTWIDTH] IN BLOOD BY AUTOMATED COUNT: 17.5 % (ref 11.5–14.5)
EST. GFR  (NO RACE VARIABLE): 17.7 ML/MIN/1.73 M^2
GLUCOSE SERPL-MCNC: 127 MG/DL (ref 70–110)
HCT VFR BLD AUTO: 31.8 % (ref 40–54)
HGB BLD-MCNC: 10 G/DL (ref 14–18)
IMM GRANULOCYTES # BLD AUTO: 0.06 K/UL (ref 0–0.04)
IMM GRANULOCYTES NFR BLD AUTO: 1.2 % (ref 0–0.5)
LYMPHOCYTES # BLD AUTO: 0.5 K/UL (ref 1–4.8)
LYMPHOCYTES NFR BLD: 9.4 % (ref 18–48)
MAGNESIUM SERPL-MCNC: 1.9 MG/DL (ref 1.6–2.6)
MCH RBC QN AUTO: 31.3 PG (ref 27–31)
MCHC RBC AUTO-ENTMCNC: 31.4 G/DL (ref 32–36)
MCV RBC AUTO: 99 FL (ref 82–98)
MONOCYTES # BLD AUTO: 0.6 K/UL (ref 0.3–1)
MONOCYTES NFR BLD: 13 % (ref 4–15)
NEUTROPHILS # BLD AUTO: 3.7 K/UL (ref 1.8–7.7)
NEUTROPHILS NFR BLD: 74.4 % (ref 38–73)
NRBC BLD-RTO: 1 /100 WBC
PLATELET # BLD AUTO: 65 K/UL (ref 150–450)
PMV BLD AUTO: 11.8 FL (ref 9.2–12.9)
POCT GLUCOSE: 131 MG/DL (ref 70–110)
POTASSIUM SERPL-SCNC: 4.4 MMOL/L (ref 3.5–5.1)
PROT SERPL-MCNC: 7.6 G/DL (ref 6–8.4)
RBC # BLD AUTO: 3.2 M/UL (ref 4.6–6.2)
SODIUM SERPL-SCNC: 136 MMOL/L (ref 136–145)
WBC # BLD AUTO: 4.91 K/UL (ref 3.9–12.7)

## 2024-10-14 PROCEDURE — 99900031 HC PATIENT EDUCATION (STAT)

## 2024-10-14 PROCEDURE — 25000003 PHARM REV CODE 250: Performed by: INTERNAL MEDICINE

## 2024-10-14 PROCEDURE — 90935 HEMODIALYSIS ONE EVALUATION: CPT

## 2024-10-14 PROCEDURE — 25000003 PHARM REV CODE 250

## 2024-10-14 PROCEDURE — 94761 N-INVAS EAR/PLS OXIMETRY MLT: CPT

## 2024-10-14 PROCEDURE — 36415 COLL VENOUS BLD VENIPUNCTURE: CPT | Performed by: INTERNAL MEDICINE

## 2024-10-14 PROCEDURE — 94660 CPAP INITIATION&MGMT: CPT

## 2024-10-14 PROCEDURE — 99900035 HC TECH TIME PER 15 MIN (STAT)

## 2024-10-14 PROCEDURE — 27100171 HC OXYGEN HIGH FLOW UP TO 24 HOURS

## 2024-10-14 PROCEDURE — 83735 ASSAY OF MAGNESIUM: CPT | Performed by: INTERNAL MEDICINE

## 2024-10-14 PROCEDURE — 25000003 PHARM REV CODE 250: Performed by: STUDENT IN AN ORGANIZED HEALTH CARE EDUCATION/TRAINING PROGRAM

## 2024-10-14 PROCEDURE — 63600175 PHARM REV CODE 636 W HCPCS: Performed by: INTERNAL MEDICINE

## 2024-10-14 PROCEDURE — 85025 COMPLETE CBC W/AUTO DIFF WBC: CPT | Performed by: INTERNAL MEDICINE

## 2024-10-14 PROCEDURE — 63600175 PHARM REV CODE 636 W HCPCS: Performed by: STUDENT IN AN ORGANIZED HEALTH CARE EDUCATION/TRAINING PROGRAM

## 2024-10-14 PROCEDURE — 63600175 PHARM REV CODE 636 W HCPCS: Mod: JZ | Performed by: INTERNAL MEDICINE

## 2024-10-14 PROCEDURE — 80053 COMPREHEN METABOLIC PANEL: CPT | Performed by: INTERNAL MEDICINE

## 2024-10-14 RX ORDER — SODIUM CHLORIDE 9 MG/ML
INJECTION, SOLUTION INTRAVENOUS
Status: CANCELLED | OUTPATIENT
Start: 2024-10-14

## 2024-10-14 RX ORDER — SODIUM CHLORIDE 9 MG/ML
INJECTION, SOLUTION INTRAVENOUS ONCE
Status: CANCELLED | OUTPATIENT
Start: 2024-10-14 | End: 2024-10-14

## 2024-10-14 RX ADMIN — MIDODRINE HYDROCHLORIDE 5 MG: 2.5 TABLET ORAL at 06:10

## 2024-10-14 RX ADMIN — ASPIRIN 81 MG 81 MG: 81 TABLET ORAL at 02:10

## 2024-10-14 RX ADMIN — CEFTRIAXONE SODIUM 2 G: 2 INJECTION, POWDER, FOR SOLUTION INTRAMUSCULAR; INTRAVENOUS at 12:10

## 2024-10-14 RX ADMIN — CARVEDILOL 3.12 MG: 3.12 TABLET, FILM COATED ORAL at 02:10

## 2024-10-14 RX ADMIN — TORSEMIDE 20 MG: 20 TABLET ORAL at 02:10

## 2024-10-14 RX ADMIN — ATORVASTATIN CALCIUM 20 MG: 20 TABLET, FILM COATED ORAL at 02:10

## 2024-10-14 RX ADMIN — EPOETIN ALFA-EPBX 12000 UNITS: 4000 INJECTION, SOLUTION INTRAVENOUS; SUBCUTANEOUS at 12:10

## 2024-10-14 RX ADMIN — DAPTOMYCIN 765 MG: 500 INJECTION, POWDER, LYOPHILIZED, FOR SOLUTION INTRAVENOUS at 03:10

## 2024-10-14 NOTE — PROGRESS NOTES
10/14/24 1258   Handoff Report   Received From Tyrone GRIFFITHS RN   Given To Linda PRETTY RN   Treatment Type   Treatment Type Maintenance   Vital Signs   Temp 97.9 °F (36.6 °C)   Temp Source Oral   Pulse 84   Heart Rate Source Monitor   Resp 16   SpO2 96 %   Device (Oxygen Therapy) room air   BP (!) 113/58   BP Method Automatic   Patient Position Lying   Assessments (Pre/Post)   Blood Liters Processed (BLP) 64.1   Transport Modality bed   Level of Consciousness (AVPU) alert   Dialyzer Clearance moderately streaked   Pain   Pain Rating (0-10): Rest 0        Hemodialysis AV Fistula Right forearm   No placement date or time found.   Present Prior to Hospital Arrival?: Yes  Location: Right forearm   Needle Size 16ga   Site Assessment Clean;Dry;Intact;No redness;No swelling   Patency Present;Thrill;Bruit   Status Deaccessed   Flows Good   Dressing Intervention First dressing   Dressing Status Clean;Intact;Dry   Site Condition No complications   Dressing Gauze   Post-Hemodialysis Assessment   Rinseback Volume (mL) 250 mL   Blood Volume Processed (Liters) 64.1 L   Dialyzer Clearance Moderately streaked   Duration of Treatment 180 minutes   Additional Fluid Intake (mL) 500 mL   Total UF (mL) 1500 mL   Net Fluid Removal 1000   Patient Response to Treatment pt josé miguel tx well   Post-Treatment Weight 117.8 kg (259 lb 11.2 oz)   Treatment Weight Change -1   Arterial bleeding stop time (min) 6 min   Venous bleeding stop time (min) 6 min   Post-Hemodialysis Comments pt stable   Edema   Edema   (none)     Pt josé miguel tx well, 1000 ml net uf removed, pt stated that he only ever takes off 1 liter and that he urinated 900 ml this morning

## 2024-10-14 NOTE — CARE UPDATE
10/14/24 0811   Patient Assessment/Suction   Level of Consciousness (AVPU) alert   Respiratory Effort Unlabored   Expansion/Accessory Muscles/Retractions no use of accessory muscles   All Lung Fields Breath Sounds clear   Rhythm/Pattern, Respiratory depth regular;pattern regular   Cough Frequency no cough   Skin Integrity   $ Wound Care Tech Time 15 min   Area Observed Bridge of nose   Skin Appearance without discoloration   PRE-TX-O2   Device (Oxygen Therapy) room air   $ Is the patient on High Flow Oxygen? Yes  (bipap on sb)   SpO2 (!) 94 %   Pulse Oximetry Type Intermittent   $ Pulse Oximetry - Multiple Charge Pulse Oximetry - Multiple   Pulse 64   Resp 15   Aerosol Therapy   $ Aerosol Therapy Charges PRN treatment not required   Preset CPAP/BiPAP Settings   $ CPAP/BiPAP Daily Charge BiPAP/CPAP Daily   Education   $ Education Bronchodilator;15 min   Respiratory Evaluation   $ Care Plan Tech Time 15 min

## 2024-10-14 NOTE — PLAN OF CARE
Problem: Adult Inpatient Plan of Care  Goal: Absence of Hospital-Acquired Illness or Injury  Outcome: Progressing     Problem: Pneumonia  Goal: Effective Oxygenation and Ventilation  Outcome: Progressing   Patient had an uneventful shift, patient slept with BIPAP on throughout the night, no complaints of pain of distress.

## 2024-10-14 NOTE — PROGRESS NOTES
AVS virtually reviewed with patient and spouse in its entirety with emphasis on diet, medications, follow-up appointments and reasons to return to the ED or contact the [facility] On Call Nurse Care Line. Patient also encouraged to utilize their patient portal. Ease and convenience of use reiterated. Education complete and patient voiced understanding. All questions answered. Discharge teaching complete.

## 2024-10-14 NOTE — PROGRESS NOTES
OUTPATIENT PARENTERAL ANTIMICROBIAL THERAPY  (OPAT)    Brief History Enterococcal bacteremia   Diagnosis Enterococcus bacteremia, echo negative       Cultures Results Repeat blood cultures negative since October 11   Antimicrobials at Discharge IV daptomycin 8 milligram/kg per day, ideal body weight a OR rounded body weight on Monday and Wednesday AFTER after dialysis and 10 milligram/kg on Friday after dialysis   Duration of IV therapy Start Date:  OCTOBER 14 2024    End Date:  November 28, 2024   Labs Needed After discharge Weekly CBC, CMP, and CPK    And Fax results to my office at [phone]   PO Antibiotics after IV? Yes    PO Antibiotics:  Oral amoxicillin 500 mg daily starting November 29, 2024    Duration:  Indefinite   Follow-up Appointment Follow with the Samuel Hu in couple of weeks

## 2024-10-14 NOTE — DISCHARGE SUMMARY
[facility] Medicine  Discharge Summary      Patient Name: Ana Bullard  MRN: [id number]  GALE: [id number]  Patient Class: IP- Inpatient  Admission Date: 10/8/2024  Hospital Length of Stay: 6 days  Discharge Date and Time: 10/14/2024  4:53 PM  Attending Physician: Raymon Nichols MD   Discharging Provider: Raymon Nichols MD  Primary Care Provider: Michelle Messina FNP    Primary Care Team: Networked reference to record PCT     HPI:   Ana Bullard is a 64 y.o. male who has a history of  extensive cardiac issues including CABG stents, now status post AICD, CHF, DVT, diabetes, anemia, hyperlipidemia, FARHAN, hypothyroidism,  and presents with malaise.     Patient was brought in by his wife.  Patient was fairly lethargic and unable to answer questions initially on exam on re-evaluation patient states that he was not not feeling well over the past few days and feeling fatigue and tired and also noticed that he had a fever.  He had no known other reasons for fever, no sore throat no sick contacts no shortness a breath, no coughing.  No burning with urinating no diarrhea no vomiting.  Patient is febrile here in the ER as well.  Patient given fluids IV antibiotics and showed signs of improvement.  Lactic acid was negative.  No clear focal points no signs or symptoms as of yet.  Blood cultures pending.  Patient will continue on IV antibiotics.  Especially now to evaluate whether the patient may have infected hardware    * No surgery found *      Hospital Course:   ESRD patient admitted with malaise found to have E. Faecalis bacteremia of unclear source. Given IV abx. Nephro followed for hemodialysis. ID team consulted. Repeat blood cultures taken and negative. Planned for GABRIELLA to look closer at heart valves and cardiac device leads but this was declined. There was concern the bacteremia source may be related to the cardiac device leads. Again, GABRIELLA was declined by patient and he will follow up  with his own cardiologist for further discussion/consideration per patient request. Planned for daptomycin dosing with his dialysis sessions thru Nov. 28th. Recommended weekly lab draw for CK while on daptomycin on Mondays with HD if possible or can come to lab. To f/u PCP, ID, cardiology.     Goals of Care Treatment Preferences:  Code Status: Full Code      SDOH Screening:  The patient was screened for utility difficulties, food insecurity, transport difficulties, housing insecurity, and interpersonal safety and there were no concerns identified this admission.     Consults:   Consults (From admission, onward)          Status Ordering Provider     Inpatient consult to   Once        Provider:  (Not yet assigned)    Completed CESAR MARSHALL     Inpatient consult to Cardiology  Once        Provider:  Haim Bailey MD    Completed CESAR MARSHALL     Inpatient consult to Nephrology  Once        Provider:  Bartolo Valenzuela MD    Completed KENDRICK MAYORGA     Inpatient consult to Infectious Diseases  Once        Provider:  Samuel Hu MD    Completed KENDRICK MAYORGA            No new Assessment & Plan notes have been filed under this hospital service since the last note was generated.  Service: Hospital Medicine    Final Active Diagnoses:    Diagnosis Date Noted POA    PRINCIPAL PROBLEM:  Bacteremia due to Enterococcus [R78.81, B95.2] 01/20/2020 Yes    ESRD (end stage renal disease) [N18.6] 10/09/2024 Yes    Periorbital hematoma of left eye [H05.232] 10/09/2024 Yes    Chronic combined systolic and diastolic heart failure [I50.42] 01/19/2020 Yes    CAD (coronary artery disease) [I25.10] 01/01/2012 Yes    Type II diabetes mellitus with neurological manifestations [E11.49] 01/01/1983 Yes      Problems Resolved During this Admission:       Discharged Condition: good    Disposition: Home or Self Care    Follow Up:   Follow-up Information       Michelle Messina FNP. Schedule an appointment as  soon as possible for a visit in 1 week(s).    Specialties: Emergency Medicine, Family Medicine  Why: Call office to schedule appt within 1 wk  Contact information:  [address]  [place] [address]  [phone]               Haim Bailey MD. Go on 11/5/2024.    Specialties: Cardiology, Interventional Cardiology  Why: @ 11:45 am  Contact information:  [address]  [facility] [address]  [phone]               Samuel Hu MD. Call.    Specialty: Infectious Diseases  Why: office will call patient to schedule hospital follow up appt  Contact information:  [address]  [facility] [address]  [phone]               [facility] [address] Follow up.    Why: resume regular schedule  Contact information:  [address]  [facility] [id number] [phone]                         Patient Instructions:      CK   Standing Status: Standing Number of Occurrences: 6 Standing Exp. Date: 01/12/26   Order Comments: Every Monday while on daptomycin.     Order Specific Question Answer Comments   Send normal result to authorizing provider's In Basket if patient is active on MyChart: Yes      Diet renal     Diet Cardiac     Notify your health care provider if you experience any of the following:  temperature >100.4     Notify your health care provider if you experience any of the following:  persistent nausea and vomiting or diarrhea     Notify your health care provider if you experience any of the following:  severe uncontrolled pain     Notify your health care provider if you experience any of the following:  redness, tenderness, or signs of infection (pain, swelling, redness, odor or green/yellow discharge around incision site)     Notify your health care provider if you experience any of the following:  difficulty breathing or increased cough     Notify your health care provider if you experience any of the following:  severe persistent headache     Notify your health care  provider if you experience any of the following:  worsening rash     Notify your health care provider if you experience any of the following:  persistent dizziness, light-headedness, or visual disturbances     Notify your health care provider if you experience any of the following:  increased confusion or weakness     Activity as tolerated       Significant Diagnostic Studies:     Lab Results   Component Value Date    WBC 4.91 10/14/2024    HGB 10.0 (L) 10/14/2024    HCT 31.8 (L) 10/14/2024    MCV 99 (H) 10/14/2024    PLT 65 (L) 10/14/2024       BMP  Lab Results   Component Value Date     10/14/2024    K 4.4 10/14/2024     10/14/2024    CO2 24 10/14/2024    BUN 47 (H) 10/14/2024    CREATININE 3.7 (H) 10/14/2024    CALCIUM 8.7 10/14/2024    ANIONGAP 10 10/14/2024    EGFRNORACEVR 17.7 (A) 10/14/2024       Echo    Result Date: 10/10/2024    Left Ventricle: The left ventricle is mildly dilated. Normal wall thickness. Severe global hypokinesis present. There is severely reduced systolic function with a visually estimated ejection fraction of 25 - 30%. There is diastolic dysfunction.   Right Ventricle: Normal right ventricular cavity size. Wall thickness is normal. Right ventricle wall motion has mild global hypokinesis. Systolic function is moderately reduced. Pacemaker lead present in the ventricle.   Right Atrium: Multiple leads present in the right atrium.   Pulmonary Artery: There is moderate pulmonary hypertension. The estimated pulmonary artery systolic pressure is 57 mmHg.   IVC/SVC: Intermediate venous pressure at 8 mmHg.     X-Ray Chest 1 View    Result Date: 10/8/2024  EXAMINATION: XR CHEST 1 VIEW CLINICAL HISTORY: Sepsis; TECHNIQUE: Single frontal view of the chest was performed. COMPARISON: Chest x-ray of July 9, 2024 FINDINGS: An AICD is noted in place on the left.  The patient has had a prior sternotomy.  There is hypoventilation.  There is also cardiomegaly and aortic ectasia.  The patient has  had a prior CABG with sternal wires and marker sutures in place.  Intrapulmonary mass or infiltrate is not presently seen.     Prior CABG.  Cardiomegaly.  AICD in position.  Hypoventilation. Electronically signed by: Barry Bhagat MD Date:    10/08/2024 Time:    18:11  - pulls last radiology orders      Microbiology Results (last 7 days)       Procedure Component Value Units Date/Time    Blood culture x two cultures. Draw prior to antibiotics [[id number]]  (Abnormal)  (Susceptibility) Collected: 10/08/24 1706    Order Status: Completed Specimen: Blood from Peripheral, Forearm, Left Updated: 10/14/24 0930     Blood Culture, Routine Gram stain mayte bottle: Gram positive cocci      Results called to and read back by: Jonnie Ortega RN on [address]      Gram stain aer bottle: Gram positive cocci      Positive results previously called      ENTEROCOCCUS FAECALIS    Narrative:      Aerobic and anaerobic    Blood culture [[id number]] Collected: 10/11/24 1639    Order Status: Completed Specimen: Blood Updated: 10/13/24 1832     Blood Culture, Routine No Growth to date      No Growth to date      No Growth to date    Narrative:      Collection has been rescheduled by HRA at 10/11/2024 12:15 Reason:   Patient unavailable/janett  Collection has been rescheduled by HRA at 10/11/2024 12:15 Reason:   Patient unavailable/janett    Blood culture [[id number]] Collected: 10/11/24 1639    Order Status: Completed Specimen: Blood Updated: 10/13/24 1832     Blood Culture, Routine No Growth to date      No Growth to date      No Growth to date    Narrative:      Collection has been rescheduled by HRA at 10/11/2024 12:15 Reason:   Patient unavailable/janett  Collection has been rescheduled by HRA at 10/11/2024 12:15 Reason:   Patient unavailable/janett    Blood culture x two cultures. Draw prior to antibiotics [[id number]]  (Abnormal) Collected: 10/08/24 1733    Order Status: Completed Specimen: Blood from Peripheral, Hand, Left Updated: 10/11/24 0751      Blood Culture, Routine Gram stain aer bottle: Gram positive cocci      Positive results previously called on order [id number]      Gram stain mayte bottle: Gram positive cocci      Positive results previously called on order [id number]      ENTEROCOCCUS FAECALIS  For susceptibility see order #[id number]      Narrative:      Aerobic and anaerobic  Collection has been rescheduled by [email or web address] at 10/08/2024 17:07 Reason:   Dialysis patient  Collection has been rescheduled by [email or web address] at 10/08/2024 17:07 Reason:   Dialysis patient    Rapid Organism ID by PCR (from Blood culture) [[id number]]  (Abnormal) Collected: 10/08/24 1706    Order Status: Completed Updated: 10/09/24 0435     Enterococcus faecalis Detected     Enterococcus faecium Not Detected     Listeria monocytogenes Not Detected     Staphylococcus spp. Not Detected     Staphylococcus aureus Not Detected     Staphylococcus epidermidis Not Detected     Staphylococcus lugdunensis Not Detected     Streptococcus species Not Detected     Streptococcus agalactiae Not Detected     Streptococcus pneumoniae Not Detected     Streptococcus pyogenes Not Detected     Acinetobacter calcoaceticus/baumannii complex Not Detected     Bacteroides fragilis Not Detected     Enterobacterales Not Detected     Enterobacter cloacae complex Not Detected     Escherichia coli Not Detected     Klebsiella aerogenes Not Detected     Klebsiella oxytoca Not Detected     Klebsiella pneumoniae group Not Detected     Proteus Not Detected     Salmonella sp Not Detected     Serratia marcescens Not Detected     Haemophilus influenzae Not Detected     Neisseria meningtidis Not Detected     Pseudomonas aeruginosa Not Detected     Stenotrophomonas maltophilia Not Detected     Candida albicans Not Detected     Candida auris Not Detected     Candida glabrata Not Detected     Candida krusei Not Detected     Candida parapsilosis Not Detected     Candida tropicalis Not Detected     Cryptococcus neoformans/gattii Not  Detected     CTX-M (ESBL ) Test not applicable     IMP (Carbapenem resistant) Test not applicable     KPC resistance gene (Carbapenem resistant) Test not applicable     mcr-1  Test not applicable     mec A/C  Test not applicable     mec A/C and MREJ (MRSA) gene Test not applicable     NDM (Carbapenem resistant) Test not applicable     OXA-48-like (Carbapenem resistant) Test not applicable     van A/B (VRE gene) Not Detected     VIM (Carbapenem resistant) Test not applicable    Narrative:      Aerobic and anaerobic              Pending Diagnostic Studies:       None           Medications:  Reconciled Home Medications:      Medication List        START taking these medications      0.9% NaCl SolP 50 mL with DAPTOmycin 500 mg SolR  To be given with dialysis per IDEAL BODY WEIGHT: 8mg/kg on Mondays and Wednesdays and 10mg/kg on Fridays to continue through November 28th 2024 unless changed at follow up with infectious disease.            CONTINUE taking these medications      albuterol 5 mg/mL nebulizer solution  Commonly known as: PROVENTIL  Take 2.5 mg by nebulization every 6 (six) hours as needed.     albuterol-ipratropium 2.5 mg-0.5 mg/3 mL nebulizer solution  Commonly known as: DUO-NEB  Take 3 mLs by nebulization every 6 (six) hours as needed.     aspirin 81 MG Chew  Take 1 tablet (81 mg total) by mouth once daily.     atorvastatin 20 MG tablet  Commonly known as: LIPITOR  Take 20 mg by mouth once daily.     carvediloL 3.125 MG tablet  Commonly known as: COREG  Take 3.125 mg by mouth 2 (two) times daily.     FARXIGA 10 mg tablet  Generic drug: dapagliflozin propanediol  Take 10 mg by mouth once daily.     gabapentin 600 MG tablet  Commonly known as: NEURONTIN  Take 600 mg by mouth 3 (three) times daily.     levothyroxine 50 MCG tablet  Commonly known as: SYNTHROID  Take 50 mcg by mouth once daily.     midodrine 5 MG Tab  Commonly known as: PROAMATINE  Take 1 tablet (5 mg total) by mouth 3 (three) times daily  before meals.     pantoprazole 40 MG tablet  Commonly known as: PROTONIX  Take 1 tablet by mouth once daily.     torsemide 20 MG Tab  Commonly known as: DEMADEX  Take 1 tablet (20 mg total) by mouth 2 (two) times a day.     traMADoL 50 mg tablet  Commonly known as: ULTRAM  Take 1 tablet (50 mg total) by mouth every 8 (eight) hours as needed for Pain.     vitamin B complex-folic acid 0.4 mg Tab  Take 1 tablet by mouth once daily.            STOP taking these medications      doxycycline 100 MG Cap  Commonly known as: VIBRAMYCIN              Indwelling Lines/Drains at time of discharge:   Lines/Drains/Airways       Drain  Duration                  Hemodialysis AV Fistula Right forearm -- days                    Time spent on the discharge of patient: 35 minutes         Raymon Nichols MD  Department of Hospital Medicine  [facility]

## 2024-10-14 NOTE — PLAN OF CARE
Notified Daniel on [address] that patient will be discharged today . Sent orders for IV  ABX via careport . Referral sent to [facility] for IV infusion .        10/14/24 1952   Post-Acute Status   Post-Acute Authorization IV Infusion;Dialysis   Diaylsis Status Set-up Complete/Auth obtained   IV Infusion Status Referral(s) sent

## 2024-10-14 NOTE — PROGRESS NOTES
Repeat blood cultures are negative since October 11, 2024   WBC 4.9   Patient has a pacemaker   Case was discussed with the micro lab   The Enterococcus is sensitive to daptomycin    Here we can simplify  the  outpatient regimen to IV daptomycin 8 milligram/kg per day, ideal body weight  or  rounded body weight on Monday and Wednesday after dialysis and 10 milligram/kg per day, ideal body weight or rounded body weight on Friday after hemodialysis     He ideally needs a colonoscopy and a transesophageal echocardiogram because of the pacemaker device     The estimated stop date for the infusion of IV antibiotics is November 28, 2024     Following the completion of IV antibiotics, he should be on oral amoxicillin as a suppressive regimen     Case was discussed with the primary attending at the bedside

## 2024-10-14 NOTE — PLAN OF CARE
Attempted to schedule PCP follow up , no answer . Added to AVS with instructions for patient to contact office to schedule appt within 1wk        10/14/24 1977   Post-Acute Status   Hospital Resources/Appts/Education Provided Appointment suggestion unavailable

## 2024-10-14 NOTE — PLAN OF CARE
Pt clear for DC from case management standpoint. Discharging to Home with Dialysis MWF on [address] with [facility] .       10/14/24 1425   Final Note   Assessment Type Final Discharge Note   Anticipated Discharge Disposition Home  (IV abx with [facility] , Dialysis [address])

## 2024-10-14 NOTE — PROGRESS NOTES
Progress Note  Nephrology    Consult Requested By: Raymon Nichols MD    Reason for Consult:   ESRD, dependent on dialysis    SUBJECTIVE:     History of Present Illness:   65 y/o male patient known to our practice, has out patient dialysis 3x wk on MWF schedule.  He was brought to ER yesterday by his wife d/t lethargy, not feeling well, fatigue, fever.  Blood cx positive for Enterococcus faecalis.  Getting IVAB.  Nephrology is consulted for dialysis orders.    10/9  pt seen and examined.  Ill-appearing, Tmax 103.3.  BP stable.  Dialysis orders are in, may not tolerate much in the was of UF today, but pt is not a big fluid rancho.  I believe pt still makes urine, will order UA.  10/10  AFVSS, at a procedure.    10/11 Patient seen on hemodialysis for uremic clearance and ultrafiltration.              Feels a lot better.  No cp or sob.    10/12 cards consulted for GABRIELLA   10/13 patient not sure if he wants GABRIELLA- also antbx course TBD as patient not intereted in LTAC or PICC/IV antbx at home- 10/11 blood cx thus far neg  10/14 VSS. No new complains. HD today.    Assessment/plan:    Bacteremia- Enterococcus Faec- source TBD but patient with ACID  ESRD on HD MWF  Chronic hypotension  Secondary HPT  Anemia of chronic dz    --dose meds for CrCl < 10/HD- GABRIELLA advised by ID  --HD per pt's schedule, [facility].    --continue midodrine, judicious UF- continue torsemide- can resume farxiga at discharge  --not on binders at home, and PO4 was low at admission - likely d/t poor po intake  --continue epo w/HD, keep Hgb 10-11- pancytopenia noted- hold heparin with HD     aspirin  81 mg Oral Daily    atorvastatin  20 mg Oral Daily    carvediloL  3.125 mg Oral BID    epoetin patience-epbx  100 Units/kg Intravenous Every Mon, Wed, Fri    levothyroxine  50 mcg Oral Before breakfast    midodrine  5 mg Oral TID AC    torsemide  20 mg Oral BID        Review of Systems:    OBJECTIVE:     Vital Signs Range (Last 24H):  Temp:  [97.6 °F (36.4 °C)-98.5 °F  (36.9 °C)]   Pulse:  [63-89]   Resp:  [15-18]   BP: (102-123)/(61-77)   SpO2:  [93 %-100 %]     Physical Exam:  General- ill-appearing  HEENT- WNL  Neck- supple  CV- Regular rate and rhythm  Resp- Lungs CTA Bilaterally, No increased WOB  GI- Non tender/non-distended, BS normoactive x4 quads  Extrem- No cyanosis, clubbing, edema.  Derm- skin w/d, pale  Neuro-  No flap.     Body mass index is 35.67 kg/m².    Laboratory:  CBC:   Recent Labs   Lab 10/14/24  0642   WBC 4.91   RBC 3.20*   HGB 10.0*   HCT 31.8*   PLT 65*   MCV 99*   MCH 31.3*   MCHC 31.4*     CMP:   Recent Labs   Lab 10/14/24  0642   *   CALCIUM 8.7   ALBUMIN 3.7   PROT 7.6      K 4.4   CO2 24      BUN 47*   CREATININE 3.7*   ALKPHOS 64   ALT 10   AST 15   BILITOT 0.7     ASSESSMENT/PLAN:     Active Hospital Problems    Diagnosis  POA    *Bacteremia due to Enterococcus [R78.81, B95.2]  Yes    ESRD (end stage renal disease) [N18.6]  Yes    Periorbital hematoma of left eye [H05.232]  Yes    Chronic combined systolic and diastolic heart failure [I50.42]  Yes    CAD (coronary artery disease) [I25.10]  Yes    Type II diabetes mellitus with neurological manifestations [E11.49]  Yes     With neuropathy        Resolved Hospital Problems   No resolved problems to display.     Thank you for allowing us to participate in the care of your patient. We will follow the patient and provide recommendations as needed.    Patient care time was spent personally by me on the following activities: >  minutes  Obtaining a history.  Examination of patient.  Providing medical care at the patients bedside.  Developing a treatment plan with patient or surrogate and bedside caregivers.  Ordering and reviewing laboratory studies, radiographic studies, pulse oximetry.  Ordering and performing treatments and interventions.  Evaluation of patient's response to treatment.  Discussions with consultants while on the unit and immediately available to the  patient.  Re-evaluation of the patient's condition.  Documentation in the medical record.    Maicol Leach MD

## 2024-10-14 NOTE — PLAN OF CARE
[facility] unable to accept due to insurance . Referral sent to [facility] - patient accepted .   Patient ready for discharge .        10/14/24 8149   Post-Acute Status   Post-Acute Authorization IV Infusion   IV Infusion Status Referral(s) sent

## 2024-10-14 NOTE — PLAN OF CARE
Problem: Adult Inpatient Plan of Care  Goal: Plan of Care Review  Outcome: Adequate for Care Transition  Goal: Patient-Specific Goal (Individualized)  Outcome: Adequate for Care Transition  Goal: Absence of Hospital-Acquired Illness or Injury  Outcome: Adequate for Care Transition  Goal: Optimal Comfort and Wellbeing  Outcome: Adequate for Care Transition  Goal: Readiness for Transition of Care  Outcome: Adequate for Care Transition     Problem: Diabetes Comorbidity  Goal: Blood Glucose Level Within Targeted Range  Outcome: Adequate for Care Transition     Problem: Sepsis/Septic Shock  Goal: Optimal Coping  Outcome: Adequate for Care Transition  Goal: Absence of Bleeding  Outcome: Adequate for Care Transition  Goal: Blood Glucose Level Within Targeted Range  Outcome: Adequate for Care Transition  Goal: Absence of Infection Signs and Symptoms  Outcome: Adequate for Care Transition  Goal: Optimal Nutrition Intake  Outcome: Adequate for Care Transition     Problem: Pneumonia  Goal: Fluid Balance  Outcome: Adequate for Care Transition  Goal: Resolution of Infection Signs and Symptoms  Outcome: Adequate for Care Transition  Goal: Effective Oxygenation and Ventilation  Outcome: Adequate for Care Transition     Problem: Wound  Goal: Optimal Coping  Outcome: Adequate for Care Transition  Goal: Optimal Functional Ability  Outcome: Adequate for Care Transition  Goal: Absence of Infection Signs and Symptoms  Outcome: Adequate for Care Transition  Goal: Improved Oral Intake  Outcome: Adequate for Care Transition  Goal: Optimal Pain Control and Function  Outcome: Adequate for Care Transition  Goal: Skin Health and Integrity  Outcome: Adequate for Care Transition  Goal: Optimal Wound Healing  Outcome: Adequate for Care Transition     Problem: Hemodialysis  Goal: Safe, Effective Therapy Delivery  Outcome: Adequate for Care Transition  Goal: Effective Tissue Perfusion  Outcome: Adequate for Care Transition  Goal: Absence of  Infection Signs and Symptoms  Outcome: Adequate for Care Transition

## 2024-10-16 LAB
BACTERIA BLD CULT: NORMAL
BACTERIA BLD CULT: NORMAL

## 2024-10-21 LAB
OHS QRS DURATION: 158 MS
OHS QTC CALCULATION: 525 MS

## 2024-10-22 ENCOUNTER — TELEPHONE (OUTPATIENT)
Dept: INFECTIOUS DISEASES | Facility: CLINIC | Age: 64
End: 2024-10-22
Payer: MEDICARE

## 2024-10-22 DIAGNOSIS — B95.2 BACTEREMIA DUE TO ENTEROCOCCUS: Primary | ICD-10-CM

## 2024-10-22 DIAGNOSIS — R78.81 BACTEREMIA DUE TO ENTEROCOCCUS: Primary | ICD-10-CM

## 2024-10-22 NOTE — TELEPHONE ENCOUNTER
[facility] orders [facility]  [facility]    ----- Message from Vanesa Terrazas NP sent at 10/22/2024  4:42 PM CDT -----  Regarding: lab orders  I think that hospital  is going to place but not sure      OUTPATIENT PARENTERAL ANTIBIOTIC THERAPY PLAN:       Case Management: Please send referral to Home Health and/or Home Infusion Agencies  [facility] was arranged prior to DC from [facility]/ [facility] HD MWF     1) Diagnosis:  · Enterococcus faecalis bacteremia with presence of PPM     2) Discharge Antibiotics:  · IV antibiotics:   Vancomycin IV 1 gram after Hemodialyis on Mon/Wed/Fri for 6 weeks         Gentamicin 60milligrams  after Hemodialyis on Mon/Wed/Fri for 6 weeks    He will need oral amoxicillin 500mg daily after IV antibiotics completed for suppression      3) Therapy Duration:   ·  Estimated end date of IV antibiotics: December 3rd    4) Intravenous Access:  · PICC/midline placed on: Not Applicable    5) Outpatient Weekly Labs for duration of antibiotic therapy:  · Order the following labs to be drawn weekly for 7 weeks  CBC with Diff, BMP, CRP and ESR  · Order the following labs to be drawn on weekly prior to dialysis for 6 weeks:  Gentamicin trough and vancomycin trough   · Draw Blood Cultures x 2 sets (1 set = aerobic and anaerobic bottle) from 2 separate sites ss soon as possible - today if possible    6) Fax Lab Results to Infectious Diseases Provider if not done at [facility] facility:   · [facility] Fax Number: [phone]     7) Outpatient Infectious Diseases Follow-up: Appointment scheduled on 11/5/2024 @ 1040  · Follow-up appointment will be arranged by the ID clinic and will be found in the patient's appointments tab.

## 2024-10-22 NOTE — PROGRESS NOTES
[facility] - Infectious Diseases   Department of Infectious Disease    PATIENT NAME: Ana Bullard  MRN: [id number]  TODAY'S DATE: 10/22/2024  ADMIT DATE: (Not on file)  LENGTH OF STAY: 0 DAYS  PROJECTED DISCHARGE DATE: 10/22/2024      OUTPATIENT PARENTERAL ANTIBIOTIC THERAPY PLAN:       Case Management: Please send referral to Home Health and/or Home Infusion Agencies  [facility] was arranged prior to DC from [facility]       1) Diagnosis:  Enterococcus faecalis bacteremia with presence of PPM     2) Discharge Antibiotics:  IV antibiotics:   Vancomycin IV 1 gram after Hemodialyis on Mon/Wed/Fri for 6 weeks         Gentamicin IV 60milligrams  after Hemodialyis on Mon/Wed/Fri for 6 weeks    He will need oral amoxicillin 500mg daily after IV antibiotics completed for suppression      3) Therapy Duration:    Estimated end date of IV antibiotics: December 3rd    4) Intravenous Access:  PICC/midline placed on: Not Applicable    5) Outpatient Weekly Labs for duration of antibiotic therapy:  Order the following labs to be drawn weekly for 7 weeks  CBC with Diff, BMP, CRP and ESR  Order the following labs to be drawn on weekly (on Mondays) prior to dialysis for 6 weeks:  Gentamicin trough and vancomycin trough   Draw Blood Cultures x 2 sets (1 set = aerobic and anaerobic bottle) from 2 separate sites ss soon as possible - today if possible    6) Fax Lab Results to Infectious Diseases Provider if not done at [facility] facility:   [facility] Fax Number: [phone]     7) Outpatient Infectious Diseases Follow-up: Appointment scheduled on 11/5/2024 @ 1040  Follow-up appointment will be arranged by the ID clinic and will be found in the patient's appointments tab.          Vanesa Terrazas NP 10/22/2024  4:24 PM  [facility]    This note was created using M Mod[employer]  voice recognition software that occasionally misinterpreted phrases or words.

## 2024-10-24 ENCOUNTER — TELEPHONE (OUTPATIENT)
Dept: CASE MANAGEMENT | Facility: HOSPITAL | Age: 64
End: 2024-10-24

## 2024-10-24 NOTE — TELEPHONE ENCOUNTER
CARMEN consulted outpatient due to issues with Daptomycin prescribed after last admission.  [facility] unable to complete order due to cost of medication and Pt not answering calls re: cost.  CARMEN reached out to Dr. Hu, and received additional orders for Vanc and Gentamicin.  Orders were faxed to Pt's HD unit ([facility] [phone]) and [facility].  Per secure chat with Dr. Rodriguez and NP, cultures to be ordered and completed at HD unit.  Spoke to Marek with [facility] today and she has spoken to Pt, new IV ABX approved and will be delivered to HD unit for start on tomorrow, 10/25/2024.  No further needs addressed at this time.

## 2024-10-30 ENCOUNTER — OFFICE VISIT (OUTPATIENT)
Dept: PAIN MEDICINE | Facility: CLINIC | Age: 64
End: 2024-10-30
Payer: MEDICARE

## 2024-10-30 VITALS
HEIGHT: 73 IN | SYSTOLIC BLOOD PRESSURE: 112 MMHG | HEART RATE: 72 BPM | DIASTOLIC BLOOD PRESSURE: 64 MMHG | BODY MASS INDEX: 34.85 KG/M2 | WEIGHT: 263 LBS

## 2024-10-30 DIAGNOSIS — G89.4 CHRONIC PAIN DISORDER: Primary | ICD-10-CM

## 2024-10-30 DIAGNOSIS — E11.42 DIABETIC POLYNEUROPATHY ASSOCIATED WITH TYPE 2 DIABETES MELLITUS: ICD-10-CM

## 2024-10-30 DIAGNOSIS — G89.4 CHRONIC PAIN DISORDER: ICD-10-CM

## 2024-10-30 PROCEDURE — 99214 OFFICE O/P EST MOD 30 MIN: CPT | Mod: S$GLB,,, | Performed by: PHYSICIAN ASSISTANT

## 2024-10-30 PROCEDURE — 3078F DIAST BP <80 MM HG: CPT | Mod: CPTII,S$GLB,, | Performed by: PHYSICIAN ASSISTANT

## 2024-10-30 PROCEDURE — 3066F NEPHROPATHY DOC TX: CPT | Mod: CPTII,S$GLB,, | Performed by: PHYSICIAN ASSISTANT

## 2024-10-30 PROCEDURE — 3008F BODY MASS INDEX DOCD: CPT | Mod: CPTII,S$GLB,, | Performed by: PHYSICIAN ASSISTANT

## 2024-10-30 PROCEDURE — 1159F MED LIST DOCD IN RCRD: CPT | Mod: CPTII,S$GLB,, | Performed by: PHYSICIAN ASSISTANT

## 2024-10-30 PROCEDURE — 1111F DSCHRG MED/CURRENT MED MERGE: CPT | Mod: CPTII,S$GLB,, | Performed by: PHYSICIAN ASSISTANT

## 2024-10-30 PROCEDURE — 3074F SYST BP LT 130 MM HG: CPT | Mod: CPTII,S$GLB,, | Performed by: PHYSICIAN ASSISTANT

## 2024-10-30 PROCEDURE — 99999 PR PBB SHADOW E&M-EST. PATIENT-LVL III: CPT | Mod: PBBFAC,,, | Performed by: PHYSICIAN ASSISTANT

## 2024-10-30 PROCEDURE — 3044F HG A1C LEVEL LT 7.0%: CPT | Mod: CPTII,S$GLB,, | Performed by: PHYSICIAN ASSISTANT

## 2024-10-30 RX ORDER — TRAMADOL HYDROCHLORIDE 50 MG/1
50 TABLET ORAL EVERY 8 HOURS PRN
Qty: 90 TABLET | Refills: 1 | Status: SHIPPED | OUTPATIENT
Start: 2024-11-23 | End: 2025-01-22

## 2024-10-31 ENCOUNTER — TELEPHONE (OUTPATIENT)
Dept: INFECTIOUS DISEASES | Facility: CLINIC | Age: 64
End: 2024-10-31
Payer: MEDICARE

## 2024-10-31 NOTE — TELEPHONE ENCOUNTER
----- Message from Med Assistant Jeni sent at 10/31/2024  4:23 PM CDT -----  Susan vasquez from [facility] called an left a voicemail . She wanted to make us aware that he received his 1st dose of vancomycin on Monday . They also did a random vanc lab result was 4 on 10/28/2024. She wanted to let you know that he had not received his vancomycin until after the lab was drawn . They plan to do a dose every Monday with labs . She states she will call with the next random vanc lab an be sure he has had a dose before. I f you need her you can reach her at [phone]. Susan

## 2024-11-01 NOTE — TELEPHONE ENCOUNTER
Patient rescheduled appointment with you to 11/14/24  JUAN BLANCO  11/05/24    Yes, he sees you on 11/05/24 Tuesday .  JUAN BLANCO  11/01/24

## 2024-11-06 ENCOUNTER — TELEPHONE (OUTPATIENT)
Dept: TRANSPLANT | Facility: CLINIC | Age: 64
End: 2024-11-06
Payer: MEDICARE

## 2024-11-14 ENCOUNTER — OFFICE VISIT (OUTPATIENT)
Dept: INFECTIOUS DISEASES | Facility: CLINIC | Age: 64
End: 2024-11-14
Payer: MEDICARE

## 2024-11-14 VITALS
SYSTOLIC BLOOD PRESSURE: 118 MMHG | BODY MASS INDEX: 33.13 KG/M2 | DIASTOLIC BLOOD PRESSURE: 70 MMHG | WEIGHT: 250 LBS | OXYGEN SATURATION: 95 % | HEIGHT: 73 IN | TEMPERATURE: 98 F | HEART RATE: 86 BPM

## 2024-11-14 DIAGNOSIS — B95.2 BACTEREMIA DUE TO ENTEROCOCCUS: Primary | ICD-10-CM

## 2024-11-14 DIAGNOSIS — R78.81 BACTEREMIA DUE TO ENTEROCOCCUS: Primary | ICD-10-CM

## 2024-11-14 DIAGNOSIS — E11.49 TYPE II DIABETES MELLITUS WITH NEUROLOGICAL MANIFESTATIONS: ICD-10-CM

## 2024-11-14 PROCEDURE — 99999 PR PBB SHADOW E&M-EST. PATIENT-LVL III: CPT | Mod: PBBFAC,TXP,, | Performed by: INTERNAL MEDICINE

## 2024-11-14 NOTE — PROGRESS NOTES
"Subjective:      Reason for consult:  Hospital Follow up    HPI: Ana Bullard is a 64 y.o. male with multiple medical problems including diabetes mellitus, hypertension, hyperlipidemia, complete heart block status post AICD placement, chronic thrombocytopenia..  Also with prior history of MRSA infection in 2019.  Admitted 10/08/2024 with lethargy.  In the ER /57, pulse 94, respiratory rate 17, temperature 103.1° with oxygen saturation of 90%.  Chest x-ray with no acute infiltrate.  He was admitted for sepsis and placed on broad-spectrum antibiotics. Blood culture Enterococcus faecalis in 2/2 bottles.  Repeat blood cultures were negative.  TTE negative.  He was unable to undergo GABRIELLA in the hospital to evaluate for lead vegetation.  Decision was made to discharge on daptomycin.    11/14/2024:  Post discharge he was not on antibiotic at dialysis center.  Was finally able to arrange daptomycin and gentamicin which she started on 10/28/2024 with plan to treat for 6 weeks through 12/09/2024.  He is tolerating antibiotics okay..       Review of patient's allergies indicates:   Allergen Reactions    Vasopressin Anaphylaxis and Other (See Comments)     Increased pressure in his head; felt like his eyeballs and veins were going to pop out of his head.     Vasopressin analogues Other (See Comments) and Anaphylaxis     "felt like eyes going to pop out of my head"  Other reaction(s): Other (See Comments)  "felt like eyes going to pop out of my head"  Increased pressure in his head; felt like his eyeballs and veins were going to pop out of his head.     Heparin Other (See Comments)     Other reaction(s): "depleted my blood platets"    Decreased platelet count    Heparin analogues Other (See Comments)     Depleted WBC count    Morphine Hallucinations, Other (See Comments) and Anxiety     Other reaction(s): Hallucinations  Paranoid, hallucinations       Past Medical History:   Diagnosis Date    AICD (automatic " cardioverter/defibrillator) present     Anemia due to multiple mechanisms 02/20/2024    Anemia, chronic disease 07/27/2021    Anemia, chronic renal failure, stage 5 02/20/2024    Anemia, unspecified 07/27/2021    Anxiety and depression 2012    CAD (coronary artery disease) 2012    Cardiomegaly 2016    CHF (congestive heart failure) 2012    Cholecystitis 2017    Complete heart block 2012    Diabetic foot ulcer     DVT (deep venous thrombosis) 2012    And pulmonary embolus    GERD (gastroesophageal reflux disease) 2010    Heparin induced thrombocytopenia     Hyperlipemia 2011    IDDM (insulin dependent diabetes mellitus) 1983    With neuropathy    Ischemic cardiomyopathy 2012    MI (myocardial infarction) 2012    Morbid obesity     MRSA (methicillin resistant Staphylococcus aureus) infection 01/19/2019    Noncompliance with therapeutic plan 2019    Normochromic normocytic anemia 07/27/2021    Osteomyelitis of right foot 01/2019    Osteomyelitis of right foot 09/01/2022    Klebsiella from bone, GBS in blood    Pulmonary edema 2019    Respiratory failure 2019    Sepsis 01/2019    Due to cellulitis right leg    Sleep apnea 2013    Thrombocytopathy 2012    Thyroid disease     Venous stasis dermatitis of both lower extremities      Past Surgical History:   Procedure Laterality Date    CARDIAC PACEMAKER PLACEMENT  12/2012    CARDIAC PACEMAKER PLACEMENT  10/04/2018    battery replacement    COIL EMBOLIZATION, SINGLE VESSEL Right 5/7/2024    Procedure: Coil Embolization, Single Vessel;  Surgeon: Khoobehi, Ali, MD;  Location: [facility] CATH/EP LAB;  Service: Vascular;  Laterality: Right;    CORONARY ARTERY BYPASS GRAFT  06/2012    ESOPHAGOGASTRODUODENOSCOPY  01/31/2017    FISTULOGRAM Bilateral 5/7/2024    Procedure: Fistulogram;  Surgeon: Khoobehi, Ali, MD;  Location: [facility] CATH/EP LAB;  Service: Vascular;  Laterality: Bilateral;    SAMARA FILTER PLACEMENT  2012    vena cava    INSERTION OF TUNNELED CENTRAL VENOUS HEMODIALYSIS  "CATHETER N/A 2024    Procedure: REMOVAL TUNNELED CATHETER;  Surgeon: Khoobehi, Ali, MD;  Location: [facility] CATH/EP LAB;  Service: Peripheral Vascular;  Laterality: N/A;    INSERTION, CATHETER, TUNNELED Right 10/16/2023    Procedure: Insertion,catheter,tunneled;  Surgeon: Terri Gresham MD;  Location: [facility] OR;  Service: Cardiovascular;  Laterality: Right;    LUMBAR SYMPATHETIC NERVE BLOCK Right 2019    Procedure: BLOCK, NERVE, SYMPATHETIC, LUMBAR;  Surgeon: Tashi Good MD;  Location: [facility] OR;  Service: Pain Management;  Laterality: Right;  RIGHT SYMPATHETIC NERVE BLOCK    PHLEBOGRAPHY N/A 2024    Procedure: VENOGRAM;  Surgeon: Khoobehi, Ali, MD;  Location: [facility] CATH/EP LAB;  Service: Peripheral Vascular;  Laterality: N/A;      Social History     Tobacco Use    Smoking status: Former     Current packs/day: 0.00     Average packs/day: 2.0 packs/day for 38.0 years (76.0 ttl pk-yrs)     Types: Cigarettes     Start date:      Quit date:      Years since quittin.8    Smokeless tobacco: Never   Substance Use Topics    Alcohol use: No     Family History   Problem Relation Name Age of Onset    Arthritis Mother      Diabetes Mother      Cancer Father      Heart disease Father      Stroke Father         Pertinent medications noted:     Review of Systems  ten system review unremarkable.  As in HPI    Outdoor activities:  Lives with his family  Travel:  No recent travel  Implants:  Pacemaker  Antibiotic History:  As in HPI      Objective:      Blood pressure 118/70, pulse 86, temperature 98.1 °F (36.7 °C), temperature source Temporal, height 6' 1" (1.854 m), weight 113.4 kg (250 lb), SpO2 95%. Body mass index is 32.98 kg/m².  Physical Exam      General:  Middle-aged man in no acute distress.  He looks well   CVS: S1 and 2 heard, no murmurs appreciated   Respiratory: Clear to auscultation   Abdomen: Protuberant, soft, nontender, palpable organomegaly   Skin: No rash appreciated   CNS: No focal deficits "   Chest:  Left chest pacemaker without any induration or swelling  Psychiatric: Normal mood, speech,  demeanor     Wound:  None    VAD:  None      General Labs reviewed:  Lab Results   Component Value Date    WBC 4.91 10/14/2024    RBC 3.20 (L) 10/14/2024    HGB 10.0 (L) 10/14/2024    HCT 31.8 (L) 10/14/2024    MCV 99 (H) 10/14/2024    MCH 31.3 (H) 10/14/2024    MCHC 31.4 (L) 10/14/2024    RDW 17.5 (H) 10/14/2024    PLT 65 (L) 10/14/2024    MPV 11.8 10/14/2024    GRAN 3.7 10/14/2024    GRAN 74.4 (H) 10/14/2024    LYMPH 0.5 (L) 10/14/2024    LYMPH 9.4 (L) 10/14/2024    MONO 0.6 10/14/2024    MONO 13.0 10/14/2024    EOS 0.1 10/14/2024    BASO 0.03 10/14/2024    EOSINOPHIL 1.4 10/14/2024    BASOPHIL 0.6 10/14/2024     CMP  Sodium   Date Value Ref Range Status   10/14/2024 136 136 - 145 mmol/L Final   01/23/2019 136 134 - 144 mmol/L      Potassium   Date Value Ref Range Status   10/14/2024 4.4 3.5 - 5.1 mmol/L Final     Chloride   Date Value Ref Range Status   10/14/2024 102 95 - 110 mmol/L Final   01/23/2019 102 98 - 110 mmol/L      CO2   Date Value Ref Range Status   10/14/2024 24 23 - 29 mmol/L Final     Glucose   Date Value Ref Range Status   10/14/2024 127 (H) 70 - 110 mg/dL Final   01/23/2019 99 70 - 99 mg/dL      BUN   Date Value Ref Range Status   10/14/2024 47 (H) 8 - 23 mg/dL Final     Creatinine   Date Value Ref Range Status   10/14/2024 3.7 (H) 0.5 - 1.4 mg/dL Final   01/23/2019 0.98 0.60 - 1.40 mg/dL      Calcium   Date Value Ref Range Status   10/14/2024 8.7 8.7 - 10.5 mg/dL Final     Total Protein   Date Value Ref Range Status   10/14/2024 7.6 6.0 - 8.4 g/dL Final     Albumin   Date Value Ref Range Status   10/14/2024 3.7 3.5 - 5.2 g/dL Final   01/23/2019 3.0 (L) 3.1 - 4.7 g/dL      Total Bilirubin   Date Value Ref Range Status   10/14/2024 0.7 0.1 - 1.0 mg/dL Final     Comment:     For infants and newborns, interpretation of results should be based  on gestational age, weight and in agreement with  clinical  observations.    Premature Infant recommended reference ranges:  Up to 24 hours.............<8.0 mg/dL  Up to 48 hours............<12.0 mg/dL  3-5 days..................<15.0 mg/dL  6-29 days.................<15.0 mg/dL       Alkaline Phosphatase   Date Value Ref Range Status   10/14/2024 64 55 - 135 U/L Final     AST   Date Value Ref Range Status   10/14/2024 15 10 - 40 U/L Final     ALT   Date Value Ref Range Status   10/14/2024 10 10 - 44 U/L Final     Anion Gap   Date Value Ref Range Status   10/14/2024 10 8 - 16 mmol/L Final     eGFR   Date Value Ref Range Status   10/14/2024 17.7 (A) >60 mL/min/1.73 m^2 Final           Micro:  Microbiology Results (last 7 days)       ** No results found for the last 168 hours. **          Imaging Reviewed:          Assessment:       Enterococcus faecalis bacteremia in a patient with AICD.  Source unclear. Repeat Blood cultures x2 sets today.  Continue ampicillin +ceftriaxone. TTE negative.  Continue daptomycin and gentamicin through 12/09/2024.  Informs me that he has cardiology appointment on 12/03/2024 to evaluate and arrange GABRIELLA.  I do not think that we will  at this time.  Defer to cardiologist    Bilateral leg edema with right leg ulcers.  Healed     3. Left periorbital hematoma.  Resolved     4. Complete heart block status post AICD placement.        Plan:     I will plan to see again in 4 weeks for follow up.      There are no diagnoses linked to this encounter.    This note was created using Dragon voice recognition software that occasionally misinterpreted phrases or words.

## 2024-11-17 ENCOUNTER — DOCUMENTATION ONLY (OUTPATIENT)
Dept: TRANSPLANT | Facility: CLINIC | Age: 64
End: 2024-11-17
Payer: MEDICARE

## 2024-11-21 DIAGNOSIS — R78.81 BACTEREMIA DUE TO ENTEROCOCCUS: Primary | ICD-10-CM

## 2024-11-21 DIAGNOSIS — B95.2 BACTEREMIA DUE TO ENTEROCOCCUS: Primary | ICD-10-CM

## 2024-11-21 NOTE — TELEPHONE ENCOUNTER
Vancomycin level is low at 9.9.  Currently on vancomycin 1 g with dialysis.  We will increase dose to 1.25 g Q dialysis.  Monitor vancomycin troughs.

## 2024-12-04 ENCOUNTER — TELEPHONE (OUTPATIENT)
Dept: INFECTIOUS DISEASES | Facility: CLINIC | Age: 64
End: 2024-12-04
Payer: MEDICARE

## 2024-12-04 NOTE — TELEPHONE ENCOUNTER
Current dose 1250 mg Q Mon, Wed , Fri with Dialysis   Per Pilar ( [facility] )  Per Tali ( [facility] ) will increase Vanc to 1.5 Q Mon, Wed, Fri With dialysis  [facility]ed [facility]  12/04/24    ===View-only below this line===  ----- Message -----  From: Samuel Hu MD  Sent: 12/4/2024   1:22 PM CST  To: Monika Tran MA  Subject: Vancomycin dose                                  What this is current vancomycin dose? .  Was it increased to 1.25 g on 11/26/2024 ?                ----- Message from Samuel Hu MD sent at 12/4/2024  1:21 PM CST -----  Regarding: Vancomycin dose  What this is current vancomycin dose? .  Was it increased to 1.25 g on 11/26/2024 ?

## 2024-12-04 NOTE — TELEPHONE ENCOUNTER
I spoke with Tali ( [facility] ) to advise to Discontinue  IV Vanc as planned at [facility]OC on 12/04/24; per Dr Hu's orders   JUAN BLANCO  12/04/24    ----- Message from Samuel Hu MD sent at 12/4/2024  3:56 PM CST -----  Dc at  planned end of care  ----- Message -----  From: Monika Tran MA  Sent: 12/4/2024   3:42 PM CST  To: MD Lynne Hong has patient's end of care for Vanc (IV) for today  12/04/24  You wanted Vanc increased to 1.5  with dialysis   Do you want to extend his Vanc or stop it ?  Patient has an appointment with you on 12/12/24 ( next Thursday )   Please advise   [facility]  12/04/24

## 2024-12-12 ENCOUNTER — OFFICE VISIT (OUTPATIENT)
Dept: INFECTIOUS DISEASES | Facility: CLINIC | Age: 64
End: 2024-12-12
Payer: MEDICARE

## 2024-12-12 VITALS
DIASTOLIC BLOOD PRESSURE: 62 MMHG | HEIGHT: 73 IN | OXYGEN SATURATION: 93 % | TEMPERATURE: 98 F | BODY MASS INDEX: 33 KG/M2 | WEIGHT: 249 LBS | SYSTOLIC BLOOD PRESSURE: 116 MMHG

## 2024-12-12 DIAGNOSIS — R78.81 BACTEREMIA DUE TO ENTEROCOCCUS: Primary | ICD-10-CM

## 2024-12-12 DIAGNOSIS — E11.49 TYPE II DIABETES MELLITUS WITH NEUROLOGICAL MANIFESTATIONS: ICD-10-CM

## 2024-12-12 DIAGNOSIS — B95.2 BACTEREMIA DUE TO ENTEROCOCCUS: Primary | ICD-10-CM

## 2024-12-12 PROCEDURE — 99999 PR PBB SHADOW E&M-EST. PATIENT-LVL IV: CPT | Mod: PBBFAC,,, | Performed by: INTERNAL MEDICINE

## 2024-12-12 NOTE — PROGRESS NOTES
"Subjective:      Reason for consult:  Hospital Follow up    HPI: Ana Bullard is a 64 y.o. male with multiple medical problems including diabetes mellitus, hypertension, hyperlipidemia, complete heart block status post AICD placement, chronic thrombocytopenia..  Also with prior history of MRSA infection in 2019.  Admitted 10/08/2024 with lethargy.  In the ER /57, pulse 94, respiratory rate 17, temperature 103.1° with oxygen saturation of 90%.  Chest x-ray with no acute infiltrate.  He was admitted for sepsis and placed on broad-spectrum antibiotics. Blood culture Enterococcus faecalis in 2/2 bottles.  Repeat blood cultures were negative.  TTE negative.  He was unable to undergo GABRIELLA in the hospital to evaluate for lead vegetation.  Decision was made to discharge on daptomycin.    11/14/2024:  Post discharge he was not on antibiotic at dialysis center.  Was finally able to arrange daptomycin and gentamicin which she started on 10/28/2024 with plan to treat for 6 weeks through 12/09/2024.  He is tolerating antibiotics okay..    12/12/24:  Here for follow up.  Six week course of daptomycin and daptomycin 12/09/2024.  Doing okay overall.  He was seen by cardiologist since last visit with me.  Informs me was no further intervention plan.       Review of patient's allergies indicates:   Allergen Reactions    Vasopressin Anaphylaxis and Other (See Comments)     Increased pressure in his head; felt like his eyeballs and veins were going to pop out of his head.     Vasopressin analogues Other (See Comments) and Anaphylaxis     "felt like eyes going to pop out of my head"  Other reaction(s): Other (See Comments)  "felt like eyes going to pop out of my head"  Increased pressure in his head; felt like his eyeballs and veins were going to pop out of his head.     Heparin Other (See Comments)     Other reaction(s): "depleted my blood platets"    Decreased platelet count    Heparin analogues Other (See Comments)     " Depleted WBC count    Morphine Hallucinations, Other (See Comments) and Anxiety     Other reaction(s): Hallucinations  Paranoid, hallucinations       Past Medical History:   Diagnosis Date    AICD (automatic cardioverter/defibrillator) present     Anemia due to multiple mechanisms 02/20/2024    Anemia, chronic disease 07/27/2021    Anemia, chronic renal failure, stage 5 02/20/2024    Anemia, unspecified 07/27/2021    Anxiety and depression 2012    CAD (coronary artery disease) 2012    Cardiomegaly 2016    CHF (congestive heart failure) 2012    Cholecystitis 2017    Complete heart block 2012    Diabetic foot ulcer     DVT (deep venous thrombosis) 2012    And pulmonary embolus    GERD (gastroesophageal reflux disease) 2010    Heparin induced thrombocytopenia     Hyperlipemia 2011    IDDM (insulin dependent diabetes mellitus) 1983    With neuropathy    Ischemic cardiomyopathy 2012    MI (myocardial infarction) 2012    Morbid obesity     MRSA (methicillin resistant Staphylococcus aureus) infection 01/19/2019    Noncompliance with therapeutic plan 2019    Normochromic normocytic anemia 07/27/2021    Osteomyelitis of right foot 01/2019    Osteomyelitis of right foot 09/01/2022    Klebsiella from bone, GBS in blood    Pulmonary edema 2019    Respiratory failure 2019    Sepsis 01/2019    Due to cellulitis right leg    Sleep apnea 2013    Thrombocytopathy 2012    Thyroid disease     Venous stasis dermatitis of both lower extremities      Past Surgical History:   Procedure Laterality Date    CARDIAC PACEMAKER PLACEMENT  12/2012    CARDIAC PACEMAKER PLACEMENT  10/04/2018    battery replacement    COIL EMBOLIZATION, SINGLE VESSEL Right 5/7/2024    Procedure: Coil Embolization, Single Vessel;  Surgeon: Khoobehi, Ali, MD;  Location: [facility] CATH/EP LAB;  Service: Vascular;  Laterality: Right;    CORONARY ARTERY BYPASS GRAFT  06/2012    ESOPHAGOGASTRODUODENOSCOPY  01/31/2017    FISTULOGRAM Bilateral 5/7/2024    Procedure:  "Fistulogram;  Surgeon: Khoobehi, Ali, MD;  Location: [facility] CATH/EP LAB;  Service: Vascular;  Laterality: Bilateral;    SAMARA FILTER PLACEMENT  2012    vena cava    INSERTION OF TUNNELED CENTRAL VENOUS HEMODIALYSIS CATHETER N/A 2024    Procedure: REMOVAL TUNNELED CATHETER;  Surgeon: Khoobehi, Ali, MD;  Location: [facility] CATH/EP LAB;  Service: Peripheral Vascular;  Laterality: N/A;    INSERTION, CATHETER, TUNNELED Right 10/16/2023    Procedure: Insertion,catheter,tunneled;  Surgeon: Terri Gresham MD;  Location: [facility] OR;  Service: Cardiovascular;  Laterality: Right;    LUMBAR SYMPATHETIC NERVE BLOCK Right 2019    Procedure: BLOCK, NERVE, SYMPATHETIC, LUMBAR;  Surgeon: Tashi Good MD;  [facility];  Service: Pain Management;  Laterality: Right;  RIGHT SYMPATHETIC NERVE BLOCK    PHLEBOGRAPHY N/A 2024    Procedure: VENOGRAM;  Surgeon: Khoobehi, Ali, MD;  Location: [facility] CATH/EP LAB;  Service: Peripheral Vascular;  Laterality: N/A;      Social History     Tobacco Use    Smoking status: Former     Current packs/day: 0.00     Average packs/day: 2.0 packs/day for 38.0 years (76.0 ttl pk-yrs)     Types: Cigarettes     Start date:      Quit date:      Years since quittin.9    Smokeless tobacco: Never   Substance Use Topics    Alcohol use: No     Family History   Problem Relation Name Age of Onset    Arthritis Mother      Diabetes Mother      Cancer Father      Heart disease Father      Stroke Father         Pertinent medications noted:     Review of Systems  ten system review unremarkable.  As in HPI    Outdoor activities:  Lives with his family  Travel:  No recent travel  Implants:  Pacemaker  Antibiotic History:  As in HPI      Objective:      Blood pressure 116/62, pulse (P) 64, temperature 97.7 °F (36.5 °C), temperature source Temporal, height 6' 1" (1.854 m), weight 112.9 kg (249 lb), SpO2 (!) 93%. Body mass index is 32.85 kg/m².  Physical Exam      General:  Middle-aged man in no acute " distress.  He looks well   CVS: S1 and 2 heard, no murmurs appreciated   Respiratory: Clear to auscultation   Abdomen: Protuberant, soft, nontender, palpable organomegaly   Skin: No rash appreciated   CNS: No focal deficits   Chest:  Left chest pacemaker without any induration or swelling  Psychiatric: Normal mood, speech,  demeanor     Wound:  None    VAD:  None      General Labs reviewed:  Lab Results   Component Value Date    WBC 4.91 10/14/2024    RBC 3.20 (L) 10/14/2024    HGB 10.0 (L) 10/14/2024    HCT 31.8 (L) 10/14/2024    MCV 99 (H) 10/14/2024    MCH 31.3 (H) 10/14/2024    MCHC 31.4 (L) 10/14/2024    RDW 17.5 (H) 10/14/2024    PLT 65 (L) 10/14/2024    MPV 11.8 10/14/2024    GRAN 3.7 10/14/2024    GRAN 74.4 (H) 10/14/2024    LYMPH 0.5 (L) 10/14/2024    LYMPH 9.4 (L) 10/14/2024    MONO 0.6 10/14/2024    MONO 13.0 10/14/2024    EOS 0.1 10/14/2024    BASO 0.03 10/14/2024    EOSINOPHIL 1.4 10/14/2024    BASOPHIL 0.6 10/14/2024     CMP  Sodium   Date Value Ref Range Status   10/14/2024 136 136 - 145 mmol/L Final   01/23/2019 136 134 - 144 mmol/L      Potassium   Date Value Ref Range Status   10/14/2024 4.4 3.5 - 5.1 mmol/L Final     Chloride   Date Value Ref Range Status   10/14/2024 102 95 - 110 mmol/L Final   01/23/2019 102 98 - 110 mmol/L      CO2   Date Value Ref Range Status   10/14/2024 24 23 - 29 mmol/L Final     Glucose   Date Value Ref Range Status   10/14/2024 127 (H) 70 - 110 mg/dL Final   01/23/2019 99 70 - 99 mg/dL      BUN   Date Value Ref Range Status   10/14/2024 47 (H) 8 - 23 mg/dL Final     Creatinine   Date Value Ref Range Status   10/14/2024 3.7 (H) 0.5 - 1.4 mg/dL Final   01/23/2019 0.98 0.60 - 1.40 mg/dL      Calcium   Date Value Ref Range Status   10/14/2024 8.7 8.7 - 10.5 mg/dL Final     Total Protein   Date Value Ref Range Status   10/14/2024 7.6 6.0 - 8.4 g/dL Final     Albumin   Date Value Ref Range Status   10/14/2024 3.7 3.5 - 5.2 g/dL Final   01/23/2019 3.0 (L) 3.1 - 4.7 g/dL       Total Bilirubin   Date Value Ref Range Status   10/14/2024 0.7 0.1 - 1.0 mg/dL Final     Comment:     For infants and newborns, interpretation of results should be based  on gestational age, weight and in agreement with clinical  observations.    Premature Infant recommended reference ranges:  Up to 24 hours.............<8.0 mg/dL  Up to 48 hours............<12.0 mg/dL  3-5 days..................<15.0 mg/dL  6-29 days.................<15.0 mg/dL       Alkaline Phosphatase   Date Value Ref Range Status   10/14/2024 64 55 - 135 U/L Final     AST   Date Value Ref Range Status   10/14/2024 15 10 - 40 U/L Final     ALT   Date Value Ref Range Status   10/14/2024 10 10 - 44 U/L Final     Anion Gap   Date Value Ref Range Status   10/14/2024 10 8 - 16 mmol/L Final     eGFR   Date Value Ref Range Status   10/14/2024 17.7 (A) >60 mL/min/1.73 m^2 Final           Micro:  Microbiology Results (last 7 days)       ** No results found for the last 168 hours. **          Imaging Reviewed:          Assessment:       Enterococcus faecalis bacteremia in a patient with AICD.  Source unclear.  Completed daptomycin and gentamicin through 12/09/2024 for empiric coverage of possible endocarditis.    ESRD.  On hemodialysis.     3. Complete heart block status post AICD placement.     I will plan to see again in 6 weeks.  Anticipate DC from ID Clinic if remains stable.  Advised to get it off his PCP, it nephrologist or myself if he develops any new fever.       Plan:     As above      There are no diagnoses linked to this encounter.    This note was created using Dragon voice recognition software that occasionally misinterpreted phrases or words.

## 2024-12-12 NOTE — PATIENT INSTRUCTIONS
I will see you again in 6 week  Let your PCP, nephrologist or myself know if you have a new fever  Anticipate discharge you from ID clinic at next visit if everything remain stable

## 2024-12-19 ENCOUNTER — HOSPITAL ENCOUNTER (OUTPATIENT)
Facility: HOSPITAL | Age: 64
Discharge: HOME | End: 2024-12-19
Attending: SURGERY | Admitting: SURGERY
Payer: MEDICARE

## 2024-12-19 VITALS
HEART RATE: 65 BPM | SYSTOLIC BLOOD PRESSURE: 115 MMHG | RESPIRATION RATE: 16 BRPM | OXYGEN SATURATION: 96 % | DIASTOLIC BLOOD PRESSURE: 59 MMHG

## 2024-12-19 DIAGNOSIS — N18.6 ESRD (END STAGE RENAL DISEASE): Primary | ICD-10-CM

## 2024-12-19 DIAGNOSIS — N18.6 END STAGE RENAL DISEASE: ICD-10-CM

## 2024-12-19 LAB
ANION GAP SERPL CALC-SCNC: 7 MMOL/L (ref 8–16)
BUN SERPL-MCNC: 28 MG/DL (ref 8–23)
CALCIUM SERPL-MCNC: 9.2 MG/DL (ref 8.7–10.5)
CHLORIDE SERPL-SCNC: 101 MMOL/L (ref 95–110)
CO2 SERPL-SCNC: 29 MMOL/L (ref 23–29)
CREAT SERPL-MCNC: 3.4 MG/DL (ref 0.5–1.4)
ERYTHROCYTE [DISTWIDTH] IN BLOOD BY AUTOMATED COUNT: 16.7 % (ref 11.5–14.5)
EST. GFR  (NO RACE VARIABLE): 19.6 ML/MIN/1.73 M^2
GLUCOSE SERPL-MCNC: 148 MG/DL (ref 70–110)
HCT VFR BLD AUTO: 33.3 % (ref 40–54)
HGB BLD-MCNC: 10.4 G/DL (ref 14–18)
MCH RBC QN AUTO: 31.5 PG (ref 27–31)
MCHC RBC AUTO-ENTMCNC: 31.2 G/DL (ref 32–36)
MCV RBC AUTO: 101 FL (ref 82–98)
PLATELET # BLD AUTO: 75 K/UL (ref 150–450)
PMV BLD AUTO: 10.2 FL (ref 9.2–12.9)
POTASSIUM SERPL-SCNC: 4.5 MMOL/L (ref 3.5–5.1)
RBC # BLD AUTO: 3.3 M/UL (ref 4.6–6.2)
SODIUM SERPL-SCNC: 137 MMOL/L (ref 136–145)
WBC # BLD AUTO: 4 K/UL (ref 3.9–12.7)

## 2024-12-19 PROCEDURE — 80048 BASIC METABOLIC PNL TOTAL CA: CPT | Performed by: SURGERY

## 2024-12-19 PROCEDURE — 63600175 PHARM REV CODE 636 W HCPCS: Performed by: SURGERY

## 2024-12-19 PROCEDURE — C1725 CATH, TRANSLUMIN NON-LASER: HCPCS | Performed by: SURGERY

## 2024-12-19 PROCEDURE — 85027 COMPLETE CBC AUTOMATED: CPT | Performed by: SURGERY

## 2024-12-19 PROCEDURE — 99153 MOD SED SAME PHYS/QHP EA: CPT | Performed by: SURGERY

## 2024-12-19 PROCEDURE — 99152 MOD SED SAME PHYS/QHP 5/>YRS: CPT | Performed by: SURGERY

## 2024-12-19 PROCEDURE — C1769 GUIDE WIRE: HCPCS | Performed by: SURGERY

## 2024-12-19 PROCEDURE — 25500020 PHARM REV CODE 255: Performed by: SURGERY

## 2024-12-19 PROCEDURE — 36902 INTRO CATH DIALYSIS CIRCUIT: CPT | Performed by: SURGERY

## 2024-12-19 PROCEDURE — C1894 INTRO/SHEATH, NON-LASER: HCPCS | Performed by: SURGERY

## 2024-12-19 RX ORDER — FENTANYL CITRATE 50 UG/ML
INJECTION, SOLUTION INTRAMUSCULAR; INTRAVENOUS
Status: DISCONTINUED | OUTPATIENT
Start: 2024-12-19 | End: 2024-12-19 | Stop reason: HOSPADM

## 2024-12-19 RX ORDER — IODIXANOL 320 MG/ML
INJECTION, SOLUTION INTRAVASCULAR
Status: DISCONTINUED | OUTPATIENT
Start: 2024-12-19 | End: 2024-12-19 | Stop reason: HOSPADM

## 2024-12-19 RX ORDER — MIDAZOLAM HYDROCHLORIDE 1 MG/ML
INJECTION INTRAMUSCULAR; INTRAVENOUS
Status: DISCONTINUED | OUTPATIENT
Start: 2024-12-19 | End: 2024-12-19 | Stop reason: HOSPADM

## 2024-12-19 RX ORDER — LIDOCAINE HYDROCHLORIDE 10 MG/ML
INJECTION, SOLUTION INFILTRATION; PERINEURAL
Status: DISCONTINUED | OUTPATIENT
Start: 2024-12-19 | End: 2024-12-19 | Stop reason: HOSPADM

## 2024-12-19 NOTE — DISCHARGE SUMMARY
[facility]  Discharge Note  Short Stay    Procedure(s) (LRB):  Fistulogram (Bilateral)  PTA, AV FISTULA (N/A)      OUTCOME: Condition has improved and patient is now ready for discharge.    DISPOSITION: Home or Self Care    FINAL DIAGNOSIS:  <principal problem not specified>    FOLLOWUP: In clinic    DISCHARGE INSTRUCTIONS:    Discharge Procedure Orders   Remove dressing in 24 hours     Activity as tolerated        TIME SPENT ON DISCHARGE: 5 minutes

## 2024-12-19 NOTE — Clinical Note
"Pt complaining of anxiety with being "strapped down" with connecting pt to monitors. Pt requesting anti anxiety drugs due to starting to feel like he is going to have a "panic attack". Dr. Khoobehi notified ok to give 1 mg Versed and 25 mg Fentanyl. "

## 2024-12-19 NOTE — OP NOTE
[facility]  Vascular Surgery  Operative Note    SUMMARY     Date of Procedure: 12/19/2024     Procedure:   Right arm fistulogram, angioplasty    Surgeons and Role:     * Khoobehi, Ali, MD - Primary    Assisting Surgeon: None    Pre-Operative Diagnosis: End stage renal disease [N18.6]    Post-Operative Diagnosis: Post-Op Diagnosis Codes:     * End stage renal disease [N18.6]    Anesthesia: RN IV Sedation    Operative Findings (including complications, if any): severe stenosis avf    Description of Technical Procedures:   Indications, risks, benefits of right arm fistulogram with possible angioplasty were discussed with the patient. Risks discussed included possible bleeding, access failure, infection, cardiac arrest, need for future interventions, and death. Patient was agreeable for the procedure and signed consent.    Under my direct supervision, moderate sedation was administered during the course of the procedure with Versed (1 mg) and Fentanyl (50 mcgs). An independent observer was present throughout the procedure, there was continuous monitoring of cardiac telemetry, hemodynamics and pulse oximetry. I was personally present and spent 30 minutes face to face time during sedation administration.    6F sheath was placed in a retrograde fashion. Fistulogram was performed which demonstrated severe stenosis distally at the anastomoses of approximately 90%. Angioplasty was performed with a 5 millimeter balloon.     There is a second area of stenosis of 60% at the distal avf. This was treated with a 7 mm balloon.    Completion fistulogram demonstrated resolution of the stenosis.     Sheath was removed and puncture site repaired with 4-0 Vicryl suture. Patient was brought out of the procedure room in stable condition.    Significant Surgical Tasks Conducted by the Assistant(s), if Applicable: n/a    Estimated Blood Loss (EBL): * No values recorded between 12/19/2024 11:32 AM and 12/19/2024 12:00 PM *            Implants: * No implants in log *    Specimens:   Specimen (24h ago, onward)      None                    Condition: Good    Disposition: PACU - hemodynamically stable.    Attestation: I performed the procedure.

## 2025-02-24 ENCOUNTER — OFFICE VISIT (OUTPATIENT)
Dept: PAIN MEDICINE | Facility: CLINIC | Age: 65
End: 2025-02-24
Payer: MEDICARE

## 2025-02-24 VITALS
HEIGHT: 73 IN | BODY MASS INDEX: 32.98 KG/M2 | HEART RATE: 76 BPM | WEIGHT: 248.88 LBS | DIASTOLIC BLOOD PRESSURE: 66 MMHG | SYSTOLIC BLOOD PRESSURE: 120 MMHG

## 2025-02-24 DIAGNOSIS — G89.4 CHRONIC PAIN DISORDER: ICD-10-CM

## 2025-02-24 DIAGNOSIS — M79.672 BILATERAL FOOT PAIN: ICD-10-CM

## 2025-02-24 DIAGNOSIS — Z79.891 CHRONIC USE OF OPIATE FOR THERAPEUTIC PURPOSE: ICD-10-CM

## 2025-02-24 DIAGNOSIS — E11.42 DIABETIC POLYNEUROPATHY ASSOCIATED WITH TYPE 2 DIABETES MELLITUS: Primary | ICD-10-CM

## 2025-02-24 DIAGNOSIS — M79.671 BILATERAL FOOT PAIN: ICD-10-CM

## 2025-02-24 PROCEDURE — 99214 OFFICE O/P EST MOD 30 MIN: CPT | Mod: S$GLB,,, | Performed by: PHYSICIAN ASSISTANT

## 2025-02-24 PROCEDURE — 99999 PR PBB SHADOW E&M-EST. PATIENT-LVL III: CPT | Mod: PBBFAC,,, | Performed by: PHYSICIAN ASSISTANT

## 2025-02-24 RX ORDER — TRAMADOL HYDROCHLORIDE 50 MG/1
50 TABLET ORAL EVERY 8 HOURS PRN
Qty: 90 TABLET | Refills: 2 | Status: SHIPPED | OUTPATIENT
Start: 2025-02-24 | End: 2025-04-25

## 2025-02-24 NOTE — PROGRESS NOTES
Referring Physician: No ref. provider found    PCP: Michelle Messina, YRN      CC:  Bilateral foot pain    Interval history:  Mr. Bullard is a 64 y.o. male with bilateral foot pain due to peripheral neuropathy diabetic neuropathy. Hospitalized for atrial tachycardia and is currently on dialysis.  Foot pain remains tolerable. Stabbing pains resolved. He continues to take Gabapentin 600 mg t.i.d. with mild benefits. Nortriptyline 25 mg made him to drowsy so he discontinued it.  He continues to take tramadol 50 mg q.8 hours as needed with mild-to-moderate benefit.  Denies any side effects with the medication.  Able perform his ADLs with the medication.  He underwent a right-sided lumbar sympathetic nerve block which provided moderate benefit that was shortlived. Currently on antibiotics.  He developed bilateral knee pain. Right intra articular knee injection with benefit. He denies any worsening weakness.  No bowel bladder changes. Pain today is rated 7/10.    Prior HPI:   Ana Bullard is a 64 y.o. male referred to us for bilateral foot pain. Patient with long history of diabetes.  He states neuropathy worsen after his CABG.  Bilateral foot pain has worsened over past 7 years.  He presents to us constant burning, sharp pain in his bilateral feet.  He also has some similar issues in his bilateral hands, but foot pain is worse.  Pain worsens prolonged sitting and standing.  He currently takes gabapentin 600 mg t.i.d. with mild benefits.  Increasing doses causes sedation.  He also takes tramadol q.8 hours as needed with mild-to-moderate benefit.  He denies any worsening weakness.  No bowel bladder changes.    Interventional history:  Right LSB on 09/2019 with 50% relief for 2 weeks.    ROS:  CONSTITUTIONAL: No fevers, chills, night sweats, wt. loss, appetite changes  SKIN: no rashes or itching  ENT: No headaches, head trauma, vision changes, or eye pain  LYMPH NODES: None noticed   CV: No chest pain, palpitations.    RESP: No shortness of breath, dyspnea on exertion, wheezing, or hemoptysis. + cough  GI: No nausea, emesis, diarrhea, constipation, melena, hematochezia, pain.    : No dysuria, hematuria, urgency, or frequency   HEME: No easy bruising, bleeding problems  PSYCHIATRIC: No depression, anxiety, psychosis, hallucinations.  NEURO: No seizures, memory loss, dizziness or difficulty sleeping  MSK:  Positive HPI      Past Medical History:   Diagnosis Date    AICD (automatic cardioverter/defibrillator) present     Anemia due to multiple mechanisms 02/20/2024    Anemia, chronic disease 07/27/2021    Anemia, chronic renal failure, stage 5 02/20/2024    Anemia, unspecified 07/27/2021    Anxiety and depression 2012    CAD (coronary artery disease) 2012    Cardiomegaly 2016    CHF (congestive heart failure) 2012    Cholecystitis 2017    Complete heart block 2012    Diabetic foot ulcer     DVT (deep venous thrombosis) 2012    And pulmonary embolus    GERD (gastroesophageal reflux disease) 2010    Heparin induced thrombocytopenia     Hyperlipemia 2011    IDDM (insulin dependent diabetes mellitus) 1983    With neuropathy    Ischemic cardiomyopathy 2012    MI (myocardial infarction) 2012    Morbid obesity     MRSA (methicillin resistant Staphylococcus aureus) infection 01/19/2019    Noncompliance with therapeutic plan 2019    Normochromic normocytic anemia 07/27/2021    Osteomyelitis of right foot 01/2019    Osteomyelitis of right foot 09/01/2022    Klebsiella from bone, GBS in blood    Pulmonary edema 2019    Respiratory failure 2019    Sepsis 01/2019    Due to cellulitis right leg    Sleep apnea 2013    Thrombocytopathy 2012    Thyroid disease     Venous stasis dermatitis of both lower extremities      Past Surgical History:   Procedure Laterality Date    CARDIAC PACEMAKER PLACEMENT  12/2012    CARDIAC PACEMAKER PLACEMENT  10/04/2018    battery replacement    COIL EMBOLIZATION, SINGLE VESSEL Right 5/7/2024    Procedure: Coil  Embolization, Single Vessel;  Surgeon: Khoobehi, Ali, MD;  Location: [facility] CATH/EP LAB;  Service: Vascular;  Laterality: Right;    CORONARY ARTERY BYPASS GRAFT  06/2012    ESOPHAGOGASTRODUODENOSCOPY  01/31/2017    FISTULOGRAM Bilateral 5/7/2024    Procedure: Fistulogram;  Surgeon: Khoobehi, Ali, MD;  Location: [facility] CATH/EP LAB;  Service: Vascular;  Laterality: Bilateral;    FISTULOGRAM Bilateral 12/19/2024    Procedure: Fistulogram;  Surgeon: Khoobehi, Ali, MD;  Location: [facility] CATH/EP LAB;  Service: Vascular;  Laterality: Bilateral;    SAMARA FILTER PLACEMENT  2012    vena cava    INSERTION OF TUNNELED CENTRAL VENOUS HEMODIALYSIS CATHETER N/A 7/9/2024    Procedure: REMOVAL TUNNELED CATHETER;  Surgeon: Khoobehi, Ali, MD;  Location: [facility] CATH/EP LAB;  Service: Peripheral Vascular;  Laterality: N/A;    INSERTION, CATHETER, TUNNELED Right 10/16/2023    Procedure: Insertion,catheter,tunneled;  Surgeon: Terri Gresham MD;  Location: [facility] OR;  Service: Cardiovascular;  Laterality: Right;    LUMBAR SYMPATHETIC NERVE BLOCK Right 8/22/2019    Procedure: BLOCK, NERVE, SYMPATHETIC, LUMBAR;  Surgeon: Tashi Good MD;  Location: [facility] OR;  Service: Pain Management;  Laterality: Right;  RIGHT SYMPATHETIC NERVE BLOCK    PERCUTANEOUS TRANSLUMINAL ANGIOPLASTY OF ARTERIOVENOUS FISTULA N/A 12/19/2024    Procedure: PTA, AV FISTULA;  Surgeon: Khoobehi, Ali, MD;  Location: [facility] CATH/EP LAB;  Service: Vascular;  Laterality: N/A;    PHLEBOGRAPHY N/A 7/9/2024    Procedure: VENOGRAM;  Surgeon: Khoobehi, Ali, MD;  Location: [facility] CATH/EP LAB;  Service: Peripheral Vascular;  Laterality: N/A;     Family History   Problem Relation Name Age of Onset    Arthritis Mother      Diabetes Mother      Cancer Father      Heart disease Father      Stroke Father       Social History     Socioeconomic History    Marital status:    Tobacco Use    Smoking status: Former     Current packs/day: 0.00     Average packs/day: 2.0 packs/day for 38.0 years  "(76.0 ttl pk-yrs)     Types: Cigarettes     Start date:      Quit date:      Years since quittin.1    Smokeless tobacco: Never   Substance and Sexual Activity    Alcohol use: No    Drug use: Never    Sexual activity: Never     Social Drivers of Health     Financial Resource Strain: Low Risk  (10/10/2024)    Overall Financial Resource Strain (CARDIA)     Difficulty of Paying Living Expenses: Not hard at all   Food Insecurity: No Food Insecurity (10/10/2024)    Hunger Vital Sign     Worried About Running Out of Food in the Last Year: Never true     Ran Out of Food in the Last Year: Never true   Transportation Needs: No Transportation Needs (10/10/2024)    TRANSPORTATION NEEDS     Transportation : No   Physical Activity: Inactive (10/10/2024)    Exercise Vital Sign     Days of Exercise per Week: 0 days     Minutes of Exercise per Session: 0 min   Stress: No Stress Concern Present (10/10/2024)    [employer] - Occupational Stress Questionnaire     Feeling of Stress : Not at all   Housing Stability: Unknown (10/10/2024)    Housing Stability Vital Sign     Unable to Pay for Housing in the Last Year: No     Homeless in the Last Year: No         Medications/Allergies: See med card    Vitals:    25 1046   BP: 120/66   Pulse: 76   Weight: 112.9 kg (248 lb 14.4 oz)   Height: 6' 1" (1.854 m)   PainSc:   7   PainLoc: Foot         GENERAL: A and O x3, the patient appears well groomed and is in no acute distress.  Skin: No rashes or obvious lesions  HEENT: normocephalic, atraumatic  CARDIOVASCULAR:  RRR  LUNGS: non labored breathing  ABDOMEN: soft, nontender   UPPER EXTREMITIES: Normal alignment, normal range of motion, no atrophy, no skin changes,  hair growth and nail growth normal and equal bilaterally. No swelling, no tenderness.    LOWER EXTREMITIES:  Normal alignment, no atrophy, no skin changes,  hair growth and nail growth normal and equal bilaterally. No swelling. Tenderness " to right lateral patella.   LUMBAR SPINE  Lumbar spine: ROM is limited with flexion extension and oblique extension with moderate increased pain.    Samuel's test causes no increased pain on either side.    Supine straight leg raise is negative bilaterally.    Internal and external rotation of the hip causes no increased pain on either side.  Myofascial exam: No tenderness to palpation across lumbar paraspinous muscles.        MENTAL STATUS: normal orientation, speech, language, and fund of knowledge for social situation.  Emotional state appropriate.     CRANIAL NERVES:  II:         PERRL bilaterally,   III,IV,VI: EOMI.    V:         Facial sensation equal bilaterally  VII:       Facial motor function normal.  VIII:      Hearing equal to finger rub bilaterally  IX/X:    Gag normal, palate symmetric  XI:        Shoulder shrug equal, head turn equal  XII:       Tongue midline without fasciculations        MOTOR: Tone and bulk: normal bilateral upper and lower Strength: normal   Delt      Bi         Tri        WE      WF                        R          5          5          5          5          5          5            L          5          5          5          5          5          5               IP         ADD     ABD     Quad   TA        Gas      HAM  R          5          5          5          5          5          5          5  L          5          5          5          5          5          5          5     SENSATION:  Decreased globally of bilateral feet  REFLEXES: normal, symmetric, nonbrisk.  Toes down, no clonus. No hoffmans.  GAIT: normal rise, base, steps, and arm swing.       Imaging:  None    Assessment:  Ana Bullard is a 64 y.o. male with bilateral foot pain  1. Diabetic polyneuropathy associated with type 2 diabetes mellitus    2. Chronic use of opiate for therapeutic purpose    3. Bilateral foot pain        Plan:  1. I have stressed the importance of physical activity and exercise to  improve overall health  2.  Discussed disease progression of peripheral neuropathy.  Continue gabapentin as prescribed by PCP.  Consider nortriptyline 10 mg in the future  3.  He does request continue tramadol with us.  I believe this is very reasonable.   reviewed.  He takes tramadol 50 mg q.8 hours as needed.  Previous UDS consistent.   4.  May consider repeat lumbar sympathetic block if pain is exacerbated.  Briefly discussed spinal cord stimulation as well.  5. Consider Qutenza if bilateral foot pain worsens.   6.  Follow-up in 3 month  Medication management provided by  Dr. Good

## 2025-05-15 ENCOUNTER — HOSPITAL ENCOUNTER (EMERGENCY)
Facility: HOSPITAL | Age: 65
Discharge: HOME | End: 2025-05-15
Attending: EMERGENCY MEDICINE
Payer: MEDICARE

## 2025-05-15 VITALS
HEART RATE: 95 BPM | HEIGHT: 73 IN | SYSTOLIC BLOOD PRESSURE: 130 MMHG | WEIGHT: 245 LBS | TEMPERATURE: 97 F | OXYGEN SATURATION: 98 % | DIASTOLIC BLOOD PRESSURE: 65 MMHG | RESPIRATION RATE: 16 BRPM | BODY MASS INDEX: 32.47 KG/M2

## 2025-05-15 DIAGNOSIS — K61.1 PERIRECTAL ABSCESS: Primary | ICD-10-CM

## 2025-05-15 LAB
ABSOLUTE EOSINOPHIL (SMH): 0.05 K/UL
ABSOLUTE MONOCYTE (SMH): 0.56 K/UL (ref 0.3–1)
ABSOLUTE NEUTROPHIL COUNT (SMH): 4.5 K/UL (ref 1.8–7.7)
ALBUMIN SERPL-MCNC: 3.6 G/DL (ref 3.5–5.2)
ALP SERPL-CCNC: 105 UNIT/L (ref 40–150)
ALT SERPL-CCNC: 8 UNIT/L (ref 10–44)
ANION GAP (SMH): 14 MMOL/L (ref 8–16)
AST SERPL-CCNC: 22 UNIT/L (ref 11–45)
BASOPHILS # BLD AUTO: 0.01 K/UL
BASOPHILS NFR BLD AUTO: 0.2 %
BILIRUB SERPL-MCNC: 2.6 MG/DL (ref 0.1–1)
BUN SERPL-MCNC: 30 MG/DL (ref 8–23)
CALCIUM SERPL-MCNC: 9.4 MG/DL (ref 8.7–10.5)
CHLORIDE SERPL-SCNC: 100 MMOL/L (ref 95–110)
CO2 SERPL-SCNC: 27 MMOL/L (ref 23–29)
CREAT SERPL-MCNC: 2.9 MG/DL (ref 0.5–1.4)
ERYTHROCYTE [DISTWIDTH] IN BLOOD BY AUTOMATED COUNT: 16.8 % (ref 11.5–14.5)
GFR SERPLBLD CREATININE-BSD FMLA CKD-EPI: 24 ML/MIN/1.73/M2
GLUCOSE SERPL-MCNC: 113 MG/DL (ref 70–110)
HCT VFR BLD AUTO: 36.2 % (ref 40–54)
HGB BLD-MCNC: 11.3 GM/DL (ref 14–18)
IMM GRANULOCYTES # BLD AUTO: 0.01 K/UL (ref 0–0.04)
IMM GRANULOCYTES NFR BLD AUTO: 0.2 % (ref 0–0.5)
LYMPHOCYTES # BLD AUTO: 0.31 K/UL (ref 1–4.8)
MCH RBC QN AUTO: 30.7 PG (ref 27–31)
MCHC RBC AUTO-ENTMCNC: 31.2 G/DL (ref 32–36)
MCV RBC AUTO: 98 FL (ref 82–98)
NUCLEATED RBC (/100WBC) (SMH): 0 /100 WBC
PLATELET # BLD AUTO: 84 K/UL (ref 150–450)
PMV BLD AUTO: 10.8 FL (ref 9.2–12.9)
POTASSIUM SERPL-SCNC: 4.1 MMOL/L (ref 3.5–5.1)
PROT SERPL-MCNC: 8.7 GM/DL (ref 6–8.4)
RBC # BLD AUTO: 3.68 M/UL (ref 4.6–6.2)
RELATIVE EOSINOPHIL (SMH): 0.9 % (ref 0–8)
RELATIVE LYMPHOCYTE (SMH): 5.7 % (ref 18–48)
RELATIVE MONOCYTE (SMH): 10.3 % (ref 4–15)
RELATIVE NEUTROPHIL (SMH): 82.7 % (ref 38–73)
SODIUM SERPL-SCNC: 141 MMOL/L (ref 136–145)
WBC # BLD AUTO: 5.46 K/UL (ref 3.9–12.7)

## 2025-05-15 PROCEDURE — 85025 COMPLETE CBC W/AUTO DIFF WBC: CPT | Performed by: EMERGENCY MEDICINE

## 2025-05-15 PROCEDURE — 82040 ASSAY OF SERUM ALBUMIN: CPT | Performed by: EMERGENCY MEDICINE

## 2025-05-15 PROCEDURE — 25000003 PHARM REV CODE 250: Performed by: EMERGENCY MEDICINE

## 2025-05-15 PROCEDURE — 36415 COLL VENOUS BLD VENIPUNCTURE: CPT | Performed by: EMERGENCY MEDICINE

## 2025-05-15 PROCEDURE — 99285 EMERGENCY DEPT VISIT HI MDM: CPT | Mod: 25

## 2025-05-15 PROCEDURE — 25500020 PHARM REV CODE 255

## 2025-05-15 PROCEDURE — 96375 TX/PRO/DX INJ NEW DRUG ADDON: CPT

## 2025-05-15 PROCEDURE — 46040 I&D ISCHIORCT&/PERIRCT ABSC: CPT

## 2025-05-15 PROCEDURE — 63600175 PHARM REV CODE 636 W HCPCS: Performed by: EMERGENCY MEDICINE

## 2025-05-15 PROCEDURE — 96374 THER/PROPH/DIAG INJ IV PUSH: CPT

## 2025-05-15 RX ORDER — ONDANSETRON HYDROCHLORIDE 2 MG/ML
4 INJECTION, SOLUTION INTRAVENOUS
Status: COMPLETED | OUTPATIENT
Start: 2025-05-15 | End: 2025-05-15

## 2025-05-15 RX ORDER — DOXYCYCLINE 100 MG/1
100 CAPSULE ORAL 2 TIMES DAILY
Qty: 14 CAPSULE | Refills: 0 | Status: SHIPPED | OUTPATIENT
Start: 2025-05-15 | End: 2025-05-22

## 2025-05-15 RX ORDER — HYDROCODONE BITARTRATE AND ACETAMINOPHEN 5; 325 MG/1; MG/1
1 TABLET ORAL
Refills: 0 | Status: COMPLETED | OUTPATIENT
Start: 2025-05-15 | End: 2025-05-15

## 2025-05-15 RX ORDER — MORPHINE SULFATE 4 MG/ML
4 INJECTION, SOLUTION INTRAMUSCULAR; INTRAVENOUS ONCE
Refills: 0 | Status: COMPLETED | OUTPATIENT
Start: 2025-05-15 | End: 2025-05-15

## 2025-05-15 RX ORDER — LIDOCAINE HYDROCHLORIDE AND EPINEPHRINE 10; 10 UG/ML; MG/ML
10 INJECTION, SOLUTION INFILTRATION; PERINEURAL ONCE
Status: COMPLETED | OUTPATIENT
Start: 2025-05-15 | End: 2025-05-15

## 2025-05-15 RX ORDER — HYDROCODONE BITARTRATE AND ACETAMINOPHEN 5; 325 MG/1; MG/1
1 TABLET ORAL EVERY 6 HOURS PRN
Qty: 12 TABLET | Refills: 0 | Status: SHIPPED | OUTPATIENT
Start: 2025-05-15

## 2025-05-15 RX ADMIN — ONDANSETRON 4 MG: 2 INJECTION INTRAMUSCULAR; INTRAVENOUS at 08:05

## 2025-05-15 RX ADMIN — IOHEXOL 100 ML: 350 INJECTION, SOLUTION INTRAVENOUS at 06:05

## 2025-05-15 RX ADMIN — LIDOCAINE HYDROCHLORIDE AND EPINEPHRINE 10 ML: 10; 10 INJECTION, SOLUTION INFILTRATION; PERINEURAL at 09:05

## 2025-05-15 RX ADMIN — HYDROCODONE BITARTRATE AND ACETAMINOPHEN 1 TABLET: 5; 325 TABLET ORAL at 05:05

## 2025-05-15 RX ADMIN — MORPHINE SULFATE 4 MG: 4 INJECTION INTRAVENOUS at 08:05

## 2025-05-15 NOTE — ED PROVIDER NOTES
"Encounter Date: 5/15/2025       History     Chief Complaint   Patient presents with    Hemorrhoids     X1 week. Can not have BM or urinate.     This is a 65-year-old male with past medical history of coronary artery disease, congestive heart failure, end-stage renal disease on hemodialysis, chronic anemia, noncompliance, thyroid problem, who presents emergency department with a hemorrhoid.  He says he has had it for 1 week.  He says he can not urinate and can not have a bowel movement because it is too painful.  He has not had any fevers or chills.  He does not eat a high-fiber diet because he is on dialysis.  He does not drink enough water because he is on dialysis.  He says that he has had this maybe 1 time in the past.  He says he went to urgent care and they gave him some Proctofoam and he has been using it but it is not working.  He has not been doing any Sitz baths.      Review of patient's allergies indicates:   Allergen Reactions    Vasopressin Anaphylaxis and Other (See Comments)     Increased pressure in his head; felt like his eyeballs and veins were going to pop out of his head.     Vasopressin analogues Other (See Comments) and Anaphylaxis     "felt like eyes going to pop out of my head"  Other reaction(s): Other (See Comments)  "felt like eyes going to pop out of my head"  Increased pressure in his head; felt like his eyeballs and veins were going to pop out of his head.     Heparin Other (See Comments)     Other reaction(s): "depleted my blood platets"    Decreased platelet count    Heparin analogues Other (See Comments)     Depleted WBC count    Morphine Hallucinations, Other (See Comments) and Anxiety     Other reaction(s): Hallucinations  Paranoid, hallucinations       Past Medical History:   Diagnosis Date    AICD (automatic cardioverter/defibrillator) present     Anemia due to multiple mechanisms 02/20/2024    Anemia, chronic disease 07/27/2021    Anemia, chronic renal failure, stage 5 02/20/2024 "    Anemia, unspecified 07/27/2021    Anxiety and depression 2012    CAD (coronary artery disease) 2012    Cardiomegaly 2016    CHF (congestive heart failure) 2012    Cholecystitis 2017    Complete heart block 2012    Diabetic foot ulcer     DVT (deep venous thrombosis) 2012    And pulmonary embolus    GERD (gastroesophageal reflux disease) 2010    Heparin induced thrombocytopenia     Hyperlipemia 2011    IDDM (insulin dependent diabetes mellitus) 1983    With neuropathy    Ischemic cardiomyopathy 2012    MI (myocardial infarction) 2012    Morbid obesity     MRSA (methicillin resistant Staphylococcus aureus) infection 01/19/2019    Noncompliance with therapeutic plan 2019    Normochromic normocytic anemia 07/27/2021    Osteomyelitis of right foot 01/2019    Osteomyelitis of right foot 09/01/2022    Klebsiella from bone, GBS in blood    Pulmonary edema 2019    Respiratory failure 2019    Sepsis 01/2019    Due to cellulitis right leg    Sleep apnea 2013    Thrombocytopathy 2012    Thyroid disease     Venous stasis dermatitis of both lower extremities      Past Surgical History:   Procedure Laterality Date    CARDIAC PACEMAKER PLACEMENT  12/2012    CARDIAC PACEMAKER PLACEMENT  10/04/2018    battery replacement    COIL EMBOLIZATION, SINGLE VESSEL Right 5/7/2024    Procedure: Coil Embolization, Single Vessel;  Surgeon: Khoobehi, Ali, MD;  Location: [facility] CATH/EP LAB;  Service: Vascular;  Laterality: Right;    CORONARY ARTERY BYPASS GRAFT  06/2012    ESOPHAGOGASTRODUODENOSCOPY  01/31/2017    FISTULOGRAM Bilateral 5/7/2024    Procedure: Fistulogram;  Surgeon: Khoobehi, Ali, MD;  Location: [facility] CATH/EP LAB;  Service: Vascular;  Laterality: Bilateral;    FISTULOGRAM Bilateral 12/19/2024    Procedure: Fistulogram;  Surgeon: Khoobehi, Ali, MD;  Location: [facility] CATH/EP LAB;  Service: Vascular;  Laterality: Bilateral;    SAMARA FILTER PLACEMENT  2012    vena cava    INSERTION OF TUNNELED CENTRAL VENOUS HEMODIALYSIS CATHETER N/A  7/9/2024    Procedure: REMOVAL TUNNELED CATHETER;  Surgeon: Khoobehi, Ali, MD;  Location: [facility] CATH/EP LAB;  Service: Peripheral Vascular;  Laterality: N/A;    INSERTION, CATHETER, TUNNELED Right 10/16/2023    Procedure: Insertion,catheter,tunneled;  Surgeon: Terri Gresham MD;  Location: [facility] OR;  Service: Cardiovascular;  Laterality: Right;    LUMBAR SYMPATHETIC NERVE BLOCK Right 8/22/2019    Procedure: BLOCK, NERVE, SYMPATHETIC, LUMBAR;  Surgeon: Tashi Good MD;  Location: [facility] OR;  Service: Pain Management;  Laterality: Right;  RIGHT SYMPATHETIC NERVE BLOCK    PERCUTANEOUS TRANSLUMINAL ANGIOPLASTY OF ARTERIOVENOUS FISTULA N/A 12/19/2024    Procedure: PTA, AV FISTULA;  Surgeon: Khoobehi, Ali, MD;  Location: [facility] CATH/EP LAB;  Service: Vascular;  Laterality: N/A;    PHLEBOGRAPHY N/A 7/9/2024    Procedure: VENOGRAM;  Surgeon: Khoobehi, Ali, MD;  Location: [facility] CATH/EP LAB;  Service: Peripheral Vascular;  Laterality: N/A;     Family History   Problem Relation Name Age of Onset    Arthritis Mother      Diabetes Mother      Cancer Father      Heart disease Father      Stroke Father       Social History[1]  Review of Systems   Constitutional:  Negative for chills and fever.   HENT:  Negative for sore throat.    Respiratory:  Negative for shortness of breath.    Cardiovascular:  Negative for chest pain.   Gastrointestinal:  Positive for rectal pain. Negative for abdominal pain, nausea and vomiting.   Genitourinary:  Negative for dysuria.   Musculoskeletal:  Negative for back pain.   Skin:  Negative for rash.   Neurological:  Negative for weakness and headaches.   Hematological:  Does not bruise/bleed easily.   All other systems reviewed and are negative.      Physical Exam     Initial Vitals [05/15/25 1547]   BP Pulse Resp Temp SpO2   137/63 92 20 97.4 °F (36.3 °C) 95 %      MAP       --         Physical Exam    Nursing note and vitals reviewed.  Constitutional: He appears well-developed and well-nourished. He is not  "diaphoretic. No distress.   HENT:   Head: Normocephalic and atraumatic. Mouth/Throat: Oropharynx is clear and moist. No oropharyngeal exudate.   Eyes: Conjunctivae and EOM are normal. Pupils are equal, round, and reactive to light.   Neck: Neck supple. No tracheal deviation present.   Cardiovascular:  Normal rate, regular rhythm, normal heart sounds and intact distal pulses.           No murmur heard.  Pulmonary/Chest: Breath sounds normal. No stridor. No respiratory distress. He has no wheezes. He has no rhonchi. He has no rales.   Abdominal: Abdomen is soft. Bowel sounds are normal. He exhibits no distension. There is no abdominal tenderness.   Genitourinary:    Genitourinary Comments:  exam chaperoned by Lazaro ARMENTA: Patient does not have any obvious external hemorrhoid.  Patient has palpable tender fluctuant mass emanating from the left perirectal region between 12 and 6:00 o clock.  Tenderness on digital rectal exam abscess seems to protrude interiorly in the rectum.     Musculoskeletal:         General: No tenderness or edema. Normal range of motion.      Cervical back: Neck supple.     Neurological: He is alert and oriented to person, place, and time. He has normal strength and normal reflexes. No cranial nerve deficit or sensory deficit.   Skin: Skin is warm and dry. Capillary refill takes less than 2 seconds. No rash noted. No erythema. No pallor.   Psychiatric: He has a normal mood and affect. His behavior is normal. Thought content normal.         ED Course   I & D - Incision and Drainage    Date/Time: 5/15/2025 3:30 PM  Location procedure was performed: [facility] EMERGENCY DEPARTMENT    Performed by: Yfn Lewis DO  Authorized by: Yfn Lewis DO  Consent Done: Yes  Consent: Verbal consent obtained  Risks and benefits: risks, benefits and alternatives were discussed  Consent given by: patient  Patient identity confirmed: name  Time out: Immediately prior to procedure a "time out" was called to verify " the correct patient, procedure, equipment, support staff and site/side marked as required.  Type: abscess  Body area: anogenital  Location details: perirectal  Anesthesia: local infiltration    Anesthesia:  Local Anesthetic: lidocaine 1% with epinephrine  Anesthetic total: 6 mL    Patient sedated: no  Scalpel size: 11  Incision type: single straight  Incision depth: dermal  Complexity: simple  Drainage: purulent  Drainage amount: moderate  Wound treatment: wound packed  Packing material: 1/4 in gauze  Complications: No  Specimens: No  Implants: No  Patient tolerance: Patient tolerated the procedure well with no immediate complications    Incision depth: dermal        Labs Reviewed   COMPREHENSIVE METABOLIC PANEL - Abnormal       Result Value    Sodium 141      Potassium 4.1      Chloride 100      CO2 27      Glucose 113 (*)     BUN 30 (*)     Creatinine 2.9 (*)     Calcium 9.4      Protein Total 8.7 (*)     Albumin 3.6      Bilirubin Total 2.6 (*)           AST 22      ALT 8 (*)     Anion Gap 14      eGFR 24 (*)    CBC WITH DIFFERENTIAL - Abnormal    WBC 5.46      RBC 3.68 (*)     Hgb 11.3 (*)     Hct 36.2 (*)     MCV 98      MCH 30.7      MCHC 31.2 (*)     RDW 16.8 (*)     Platelet Count 84 (*)     MPV 10.8      Nucleated RBC 0      Neut % 82.7 (*)     Lymph % 5.7 (*)     Mono % 10.3      Eos % 0.9      Basophil % 0.2      Imm Grans % 0.2      Neut # 4.5      Lymph # 0.31 (*)     Mono # 0.56      Eos # 0.05      Baso # 0.01      Imm Grans # 0.01     CBC W/ AUTO DIFFERENTIAL    Narrative:     The following orders were created for panel order Complete Blood Count (CBC).  Procedure                               Abnormality         Status                     ---------                               -----------         ------                     CBC with Differential[[id number]]       Abnormal            Final result                 Please view results for these tests on the individual orders.          Imaging  Results              CT Abdomen Pelvis With IV Contrast NO Oral Contrast (Edited Result - FINAL)  Result time 05/15/25 20:38:07      Addendum (preliminary) 1 of 1 by Anton Frazier DO (05/15/25 20:38:07)      There is a left-sided perianal abscess measuring 3.4 x 3.4 x 1.6 cm (series 2, image 202).                   Final result by Anton Frazier DO (05/15/25 20:14:49)                   Impression:      1. No acute abnormality of the abdomen or pelvis.  2. Morphologic changes of hepatic cirrhosis and findings of portal hypertension including moderate volume abdominopelvic ascites, splenomegaly, and upper abdominal varices.  3. Cholelithiasis without evidence of acute cholecystitis.  4. Skin thickening of the lower pannus, correlate for evidence of cellulitis.  5. Colonic diverticulosis.  6. Interstitial septal thickening in the lung bases, nonspecific but can be seen with interstitial pulmonary edema.      Electronically signed by: Anton Frazier  Date:    05/15/2025  Time:    20:14               Narrative:    EXAMINATION:  CT ABDOMEN PELVIS WITH IV CONTRAST    CLINICAL HISTORY:  Abdominal pain, acute, nonlocalized;PLEASE EVALUATE FOR JESSICA-RECTAL / RECTAL ABSCESS;    TECHNIQUE:  Axial CT images with sagittal and coronal reformats were obtained of the abdomen and pelvis from the hemidiaphragms through the symphysis pubis after the administration of 100mL Omnipaque 350.    COMPARISON:  CT of the abdomen and pelvis from 01/15/2019.    FINDINGS:  Lung Bases: There is interlobular septal thickening in the lung bases, suspicious for interstitial pulmonary edema.    Heart: There is cardiomegaly.  Cardiac pacer leads noted, partially imaged.  No pericardial effusion.    Liver: Liver demonstrates a nodular contour, compatible with cirrhosis.  There are findings of portal hypertension including splenomegaly, moderate volume abdominal ascites, and upper abdominal varices.  There are no focal hepatic lesions.  The portal  vasculature is patent.    Biliary tract: No intrahepatic or extrahepatic biliary ductal dilatation.    Gallbladder: There are multiple gallstones.  There is pericholecystic edema, likely reactive and related to ascites.    Pancreas: Normal. No pancreatic ductal dilatation.    Spleen: The spleen is enlarged, measuring up to 16.5 cm.    Adrenals: Unremarkable.    Kidneys and urinary collecting systems: Normal.  No hydronephrosis or urolithiasis.    Lymph nodes: None enlarged.    Stomach and bowel: The stomach is normal.  There is a small hiatal hernia containing ascitic fluid.  Loops of small and large bowel are normal in caliber without evidence for inflammation or obstruction.  There is colonic diverticulosis without evidence of acute diverticulitis.  The appendix is not definitely seen however there is no evidence of right lower quadrant inflammation.    Peritoneum and mesentery: No ascites or free intraperitoneal air.  No abdominal fluid collection.    Vasculature: There is an IVC filter in place.  There is severe atherosclerosis.  There is no aneurysm.    Urinary bladder: No wall thickening.    Reproductive organs: Prostate is unremarkable.    Body wall: There is skin thickening of the lower pannus, correlate for evidence of cellulitis.    Musculoskeletal: No aggressive osseous lesion.                                       Medications   HYDROcodone-acetaminophen 5-325 mg per tablet 1 tablet (1 tablet Oral Given 5/15/25 1730)   iohexoL (OMNIPAQUE 350) 350 mg iodine/mL injection (100 mLs Intravenous Given 5/15/25 1842)   ondansetron injection 4 mg (4 mg Intravenous Given 5/15/25 2038)   morphine injection 4 mg (4 mg Intravenous Given 5/15/25 2037)   LIDOcaine-EPINEPHrine 1%-1:100,000 injection 10 mL (10 mLs Intradermal Given by Provider 5/15/25 2145)     Medical Decision Making  Differential includes but not limited to cellulitis, abscess, hemorrhoid, perirectal abscess, cancer, internal hemorrhoid, GI bleeding,  anal fissure,    Emergent evaluation of a 65-year-old male presents emergency department with rectal pain.  Patient found to have perirectal abscess on exam.  CT scan demonstrates perirectal abscess without any deeper rectal involvement.  White count is negative, patient afebrile.  I do not believe he is septic.  Patient had I and D performed at bedside.  Patient tolerated procedure well without difficulty.  Significant purulent drainage expressed.  Patient felt better after the procedure.  Will discharge him home with doxycycline.  Patient will go to dialysis tomorrow given he had CT scan with IV contrast.  Patient stable for discharge.  He will follow up for recheck with PCP in 2-3 days.    A dictation software program was used for this note.  Please expect some simple typographical  errors in this note.    I had a detailed discussion with the patient and/or guardian regarding: The historical points, exam findings, and diagnostic results supporting the discharge diagnosis, lab results, pertinent radiology results, and the need for outpatient follow-up, for definitive care with a family practitioner and to return to the emergency department if symptoms worsen or persist or if there are any questions or concerns that arise at home. All questions have been answered in detail. Strict return to Emergency Department precautions have been provided       Amount and/or Complexity of Data Reviewed  External Data Reviewed: labs and notes.  Labs: ordered. Decision-making details documented in ED Course.  Radiology: ordered and independent interpretation performed. Decision-making details documented in ED Course.    Risk  OTC drugs.  Prescription drug management.  Decision regarding hospitalization.               ED Course as of 05/15/25 2149   Thu May 15, 2025   2005 Still awaiting CT scan to be read. [JR]      ED Course User Index  [JR] Yfn Lewis DO                           Clinical Impression:  Final  diagnoses:  [K61.1] Perirectal abscess (Primary)          ED Disposition Condition    Discharge Stable          ED Prescriptions       Medication Sig Dispense Start Date End Date Auth. Provider    HYDROcodone-acetaminophen (NORCO) 5-325 mg per tablet Take 1 tablet by mouth every 6 (six) hours as needed for Pain. 12 tablet 5/15/2025 -- Yfn Lewis DO    doxycycline (VIBRAMYCIN) 100 MG Cap Take 1 capsule (100 mg total) by mouth 2 (two) times daily. for 7 days 14 capsule 5/15/2025 2025 Yfn Lewis DO          Follow-up Information       Follow up With Specialties Details Why Contact Info Additional Information    Michelle Messina, FNP Emergency Medicine, Family Medicine In 3 days  [address]  [place] [address] [phone]       [facility] - ED Emergency Medicine  If symptoms worsen [address] [place] [address] [address] [facility] [id number] 1st floor                 [1]   Social History  Tobacco Use    Smoking status: Former     Current packs/day: 0.00     Average packs/day: 2.0 packs/day for 38.0 years (76.0 ttl pk-yrs)     Types: Cigarettes     Start date:      Quit date:      Years since quittin.3    Smokeless tobacco: Never   Substance Use Topics    Alcohol use: No    Drug use: Never        Yfn Lewis DO  05/15/25 3780

## 2025-05-16 NOTE — DISCHARGE INSTRUCTIONS
RETURN TO EMERGENCY DEPARTMENT WITHOUT FAIL, IF YOUR SYMPTOMS WORSEN, IF YOU GET NEW OR DIFFERENT SYMPTOMS, IF YOU ARE UNABLE TO FOLLOW UP AS DIRECTED, OR IF YOU HAVE ANY CONCERNS OR WORRIES.    Take antibiotics as directed.  Take pain medication as needed.  Continue taking MiraLax.  Follow up with your doctor.

## 2025-05-21 DIAGNOSIS — G89.4 CHRONIC PAIN DISORDER: ICD-10-CM

## 2025-05-21 RX ORDER — TRAMADOL HYDROCHLORIDE 50 MG/1
50 TABLET, FILM COATED ORAL EVERY 8 HOURS PRN
Qty: 90 TABLET | Refills: 2 | Status: SHIPPED | OUTPATIENT
Start: 2025-05-29 | End: 2025-07-28

## 2025-05-29 ENCOUNTER — OFFICE VISIT (OUTPATIENT)
Dept: PAIN MEDICINE | Facility: CLINIC | Age: 65
End: 2025-05-29
Payer: MEDICARE

## 2025-05-29 VITALS
HEART RATE: 72 BPM | DIASTOLIC BLOOD PRESSURE: 67 MMHG | HEIGHT: 73 IN | SYSTOLIC BLOOD PRESSURE: 111 MMHG | BODY MASS INDEX: 32.46 KG/M2 | WEIGHT: 244.94 LBS

## 2025-05-29 DIAGNOSIS — G89.4 CHRONIC PAIN DISORDER: ICD-10-CM

## 2025-05-29 DIAGNOSIS — M79.672 BILATERAL FOOT PAIN: ICD-10-CM

## 2025-05-29 DIAGNOSIS — M79.671 BILATERAL FOOT PAIN: ICD-10-CM

## 2025-05-29 DIAGNOSIS — Z79.891 CHRONIC USE OF OPIATE FOR THERAPEUTIC PURPOSE: ICD-10-CM

## 2025-05-29 DIAGNOSIS — E11.42 DIABETIC POLYNEUROPATHY ASSOCIATED WITH TYPE 2 DIABETES MELLITUS: Primary | ICD-10-CM

## 2025-05-29 PROCEDURE — 99999 PR PBB SHADOW E&M-EST. PATIENT-LVL III: CPT | Mod: PBBFAC,,, | Performed by: PHYSICIAN ASSISTANT

## 2025-05-29 PROCEDURE — 99213 OFFICE O/P EST LOW 20 MIN: CPT | Mod: PBBFAC,PN | Performed by: PHYSICIAN ASSISTANT

## 2025-05-29 PROCEDURE — 99214 OFFICE O/P EST MOD 30 MIN: CPT | Mod: S$PBB,,, | Performed by: PHYSICIAN ASSISTANT

## 2025-05-29 RX ORDER — TRAMADOL HYDROCHLORIDE 50 MG/1
50 TABLET, FILM COATED ORAL EVERY 8 HOURS PRN
Qty: 90 TABLET | Refills: 2 | Status: SHIPPED | OUTPATIENT
Start: 2025-05-29 | End: 2025-07-28

## 2025-05-29 NOTE — PROGRESS NOTES
Referring Physician: No ref. provider found    PCP: Michelle Messina FNP      CC:  Bilateral foot pain    Interval history:  Mr. Bullard is a 65 y.o. male with bilateral foot pain due to peripheral neuropathy diabetic neuropathy. Currently on dialysis.  Foot pain remains tolerable. He continues to take Gabapentin 600 mg t.i.d. with mild benefits. Nortriptyline 25 mg made him to drowsy so he discontinued it.  He continues to take tramadol 50 mg q.8 hours as needed with mild-to-moderate benefit.  Denies any side effects with the medication.  Able perform his ADLs with the medication.  He underwent a right-sided lumbar sympathetic nerve block which provided moderate benefit that was shortlived. Currently on antibiotics.  He developed bilateral knee pain. Right intra articular knee injection with benefit. He denies any worsening weakness.  No bowel bladder changes. Pain today is rated 5/10.    Prior HPI:   Ana Bullard is a 65 y.o. male referred to us for bilateral foot pain. Patient with long history of diabetes.  He states neuropathy worsen after his CABG.  Bilateral foot pain has worsened over past 7 years.  He presents to us constant burning, sharp pain in his bilateral feet.  He also has some similar issues in his bilateral hands, but foot pain is worse.  Pain worsens prolonged sitting and standing.  He currently takes gabapentin 600 mg t.i.d. with mild benefits.  Increasing doses causes sedation.  He also takes tramadol q.8 hours as needed with mild-to-moderate benefit.  He denies any worsening weakness.  No bowel bladder changes.    Interventional history:  Right LSB on 09/2019 with 50% relief for 2 weeks.    ROS:  CONSTITUTIONAL: No fevers, chills, night sweats, wt. loss, appetite changes  SKIN: no rashes or itching  ENT: No headaches, head trauma, vision changes, or eye pain  LYMPH NODES: None noticed   CV: No chest pain, palpitations.   RESP: No shortness of breath, dyspnea on exertion, wheezing, or  hemoptysis. + cough  GI: No nausea, emesis, diarrhea, constipation, melena, hematochezia, pain.    : No dysuria, hematuria, urgency, or frequency   HEME: No easy bruising, bleeding problems  PSYCHIATRIC: No depression, anxiety, psychosis, hallucinations.  NEURO: No seizures, memory loss, dizziness or difficulty sleeping  MSK:  Positive HPI      Past Medical History:   Diagnosis Date    AICD (automatic cardioverter/defibrillator) present     Anemia due to multiple mechanisms 02/20/2024    Anemia, chronic disease 07/27/2021    Anemia, chronic renal failure, stage 5 02/20/2024    Anemia, unspecified 07/27/2021    Anxiety and depression 2012    CAD (coronary artery disease) 2012    Cardiomegaly 2016    CHF (congestive heart failure) 2012    Cholecystitis 2017    Complete heart block 2012    Diabetic foot ulcer     DVT (deep venous thrombosis) 2012    And pulmonary embolus    GERD (gastroesophageal reflux disease) 2010    Heparin induced thrombocytopenia     Hyperlipemia 2011    IDDM (insulin dependent diabetes mellitus) 1983    With neuropathy    Ischemic cardiomyopathy 2012    MI (myocardial infarction) 2012    Morbid obesity     MRSA (methicillin resistant Staphylococcus aureus) infection 01/19/2019    Noncompliance with therapeutic plan 2019    Normochromic normocytic anemia 07/27/2021    Osteomyelitis of right foot 01/2019    Osteomyelitis of right foot 09/01/2022    Klebsiella from bone, GBS in blood    Pulmonary edema 2019    Respiratory failure 2019    Sepsis 01/2019    Due to cellulitis right leg    Sleep apnea 2013    Thrombocytopathy 2012    Thyroid disease     Venous stasis dermatitis of both lower extremities      Past Surgical History:   Procedure Laterality Date    CARDIAC PACEMAKER PLACEMENT  12/2012    CARDIAC PACEMAKER PLACEMENT  10/04/2018    battery replacement    COIL EMBOLIZATION, SINGLE VESSEL Right 5/7/2024    Procedure: Coil Embolization, Single Vessel;  Surgeon: Khoobehi, Ali, MD;  Location:  [facility] CATH/EP LAB;  Service: Vascular;  Laterality: Right;    CORONARY ARTERY BYPASS GRAFT  06/2012    ESOPHAGOGASTRODUODENOSCOPY  01/31/2017    FISTULOGRAM Bilateral 5/7/2024    Procedure: Fistulogram;  Surgeon: Khoobehi, Ali, MD;  Location: [facility] CATH/EP LAB;  Service: Vascular;  Laterality: Bilateral;    FISTULOGRAM Bilateral 12/19/2024    Procedure: Fistulogram;  Surgeon: Khoobehi, Ali, MD;  Location: [facility] CATH/EP LAB;  Service: Vascular;  Laterality: Bilateral;    SAMARA FILTER PLACEMENT  2012    vena cava    INSERTION OF TUNNELED CENTRAL VENOUS HEMODIALYSIS CATHETER N/A 7/9/2024    Procedure: REMOVAL TUNNELED CATHETER;  Surgeon: Khoobehi, Ali, MD;  Location: [facility] CATH/EP LAB;  Service: Peripheral Vascular;  Laterality: N/A;    INSERTION, CATHETER, TUNNELED Right 10/16/2023    Procedure: Insertion,catheter,tunneled;  Surgeon: Terri Gresham MD;  Location: [facility] OR;  Service: Cardiovascular;  Laterality: Right;    LUMBAR SYMPATHETIC NERVE BLOCK Right 8/22/2019    Procedure: BLOCK, NERVE, SYMPATHETIC, LUMBAR;  Surgeon: Tashi Good MD;  Location: [facility] OR;  Service: Pain Management;  Laterality: Right;  RIGHT SYMPATHETIC NERVE BLOCK    PERCUTANEOUS TRANSLUMINAL ANGIOPLASTY OF ARTERIOVENOUS FISTULA N/A 12/19/2024    Procedure: PTA, AV FISTULA;  Surgeon: Khoobehi, Ali, MD;  Location: [facility] CATH/EP LAB;  Service: Vascular;  Laterality: N/A;    PHLEBOGRAPHY N/A 7/9/2024    Procedure: VENOGRAM;  Surgeon: Khoobehi, Ali, MD;  Location: [facility] CATH/EP LAB;  Service: Peripheral Vascular;  Laterality: N/A;     Family History   Problem Relation Name Age of Onset    Arthritis Mother      Diabetes Mother      Cancer Father      Heart disease Father      Stroke Father       Social History     Socioeconomic History    Marital status:    Tobacco Use    Smoking status: Former     Current packs/day: 0.00     Average packs/day: 2.0 packs/day for 38.0 years (76.0 ttl pk-yrs)     Types: Cigarettes     Start date: 1974     Quit  "date:      Years since quittin.4    Smokeless tobacco: Never   Substance and Sexual Activity    Alcohol use: No    Drug use: Never    Sexual activity: Never     Social Drivers of Health     Financial Resource Strain: Low Risk  (10/10/2024)    Overall Financial Resource Strain (CARDIA)     Difficulty of Paying Living Expenses: Not hard at all   Food Insecurity: No Food Insecurity (10/10/2024)    Hunger Vital Sign     Worried About Running Out of Food in the Last Year: Never true     Ran Out of Food in the Last Year: Never true   Transportation Needs: No Transportation Needs (10/10/2024)    TRANSPORTATION NEEDS     Transportation : No   Physical Activity: Inactive (10/10/2024)    Exercise Vital Sign     Days of Exercise per Week: 0 days     Minutes of Exercise per Session: 0 min   Stress: No Stress Concern Present (10/10/2024)    [employer] - Occupational Stress Questionnaire     Feeling of Stress : Not at all   Housing Stability: Unknown (10/10/2024)    Housing Stability Vital Sign     Unable to Pay for Housing in the Last Year: No     Homeless in the Last Year: No         Medications/Allergies: See med card    Vitals:    25 0828   BP: 111/67   Pulse: 72   Weight: 111.1 kg (244 lb 14.9 oz)   Height: 6' 1" (1.854 m)   PainSc:   5   PainLoc: Foot         GENERAL: A and O x3, the patient appears well groomed and is in no acute distress.  Skin: No rashes or obvious lesions  HEENT: normocephalic, atraumatic  CARDIOVASCULAR:  RRR  LUNGS: non labored breathing  ABDOMEN: soft, nontender   UPPER EXTREMITIES: Normal alignment, normal range of motion, no atrophy, no skin changes,  hair growth and nail growth normal and equal bilaterally. No swelling, no tenderness.    LOWER EXTREMITIES:  Normal alignment, no atrophy, no skin changes,  hair growth and nail growth normal and equal bilaterally. No swelling. Tenderness to right lateral patella.   LUMBAR SPINE  Lumbar spine: ROM is " limited with flexion extension and oblique extension with moderate increased pain.    Samuel's test causes no increased pain on either side.    Supine straight leg raise is negative bilaterally.    Internal and external rotation of the hip causes no increased pain on either side.  Myofascial exam: No tenderness to palpation across lumbar paraspinous muscles.        MENTAL STATUS: normal orientation, speech, language, and fund of knowledge for social situation.  Emotional state appropriate.     CRANIAL NERVES:  II:         PERRL bilaterally,   III,IV,VI: EOMI.    V:         Facial sensation equal bilaterally  VII:       Facial motor function normal.  VIII:      Hearing equal to finger rub bilaterally  IX/X:    Gag normal, palate symmetric  XI:        Shoulder shrug equal, head turn equal  XII:       Tongue midline without fasciculations        MOTOR: Tone and bulk: normal bilateral upper and lower Strength: normal   Delt      Bi         Tri        WE      WF                        R          5          5          5          5          5          5            L          5          5          5          5          5          5               IP         ADD     ABD     Quad   TA        Gas      HAM  R          5          5          5          5          5          5          5  L          5          5          5          5          5          5          5     SENSATION:  Decreased globally of bilateral feet  REFLEXES: normal, symmetric, nonbrisk.  Toes down, no clonus. No hoffmans.  GAIT: normal rise, base, steps, and arm swing.       Imaging:  None    Assessment:  Ana Bullard is a 65 y.o. male with bilateral foot pain  1. Diabetic polyneuropathy associated with type 2 diabetes mellitus    2. Bilateral foot pain    3. Chronic use of opiate for therapeutic purpose    4. Chronic pain disorder          Plan:  1. I have stressed the importance of physical activity and exercise to improve overall health  2.   Discussed disease progression of peripheral neuropathy.  Continue gabapentin as prescribed by PCP.  Consider nortriptyline 10 mg in the future  3.  He does request continue tramadol with us.  I believe this is very reasonable.   reviewed.  He takes tramadol 50 mg q.8 hours as needed.  Previous UDS consistent.   4.  May consider repeat lumbar sympathetic block if pain is exacerbated.  Briefly discussed spinal cord stimulation as well.  5. Consider Qutenza if bilateral foot pain worsens.   6.  Follow-up in 3 month  Medication management provided by  Dr. Good

## 2025-08-26 ENCOUNTER — OFFICE VISIT (OUTPATIENT)
Dept: PAIN MEDICINE | Facility: CLINIC | Age: 65
End: 2025-08-26
Payer: COMMERCIAL

## 2025-08-26 VITALS
HEIGHT: 73 IN | DIASTOLIC BLOOD PRESSURE: 73 MMHG | HEART RATE: 83 BPM | WEIGHT: 244.94 LBS | SYSTOLIC BLOOD PRESSURE: 126 MMHG | BODY MASS INDEX: 32.46 KG/M2

## 2025-08-26 DIAGNOSIS — K61.1 PERIRECTAL ABSCESS: ICD-10-CM

## 2025-08-26 DIAGNOSIS — G89.4 CHRONIC PAIN DISORDER: ICD-10-CM

## 2025-08-26 DIAGNOSIS — M79.671 BILATERAL FOOT PAIN: ICD-10-CM

## 2025-08-26 DIAGNOSIS — G89.4 CHRONIC PAIN DISORDER: Primary | ICD-10-CM

## 2025-08-26 DIAGNOSIS — Z79.891 CHRONIC USE OF OPIATE FOR THERAPEUTIC PURPOSE: ICD-10-CM

## 2025-08-26 DIAGNOSIS — E11.42 DIABETIC POLYNEUROPATHY ASSOCIATED WITH TYPE 2 DIABETES MELLITUS: ICD-10-CM

## 2025-08-26 DIAGNOSIS — M79.672 BILATERAL FOOT PAIN: ICD-10-CM

## 2025-08-26 PROCEDURE — 99214 OFFICE O/P EST MOD 30 MIN: CPT | Mod: S$GLB,,, | Performed by: PHYSICIAN ASSISTANT

## 2025-08-26 PROCEDURE — 99999 PR PBB SHADOW E&M-EST. PATIENT-LVL III: CPT | Mod: PBBFAC,,, | Performed by: PHYSICIAN ASSISTANT

## 2025-08-26 RX ORDER — TRAMADOL HYDROCHLORIDE 50 MG/1
50 TABLET, FILM COATED ORAL EVERY 8 HOURS PRN
Qty: 90 TABLET | Refills: 2 | Status: ON HOLD | OUTPATIENT
Start: 2025-08-27 | End: 2025-11-25

## 2025-08-29 PROBLEM — Z13.6 SCREENING FOR CARDIOVASCULAR CONDITION: Status: ACTIVE | Noted: 2025-08-29

## 2025-08-29 PROBLEM — S91.301A WOUND OF RIGHT FOOT: Status: ACTIVE | Noted: 2025-08-29

## 2025-08-30 PROBLEM — L02.611 ABSCESS OF RIGHT FOOT INCLUDING TOES: Status: ACTIVE | Noted: 2025-08-30

## 2025-09-02 ENCOUNTER — ANESTHESIA (OUTPATIENT)
Dept: SURGERY | Facility: HOSPITAL | Age: 65
End: 2025-09-02
Payer: COMMERCIAL

## 2025-09-02 ENCOUNTER — ANESTHESIA EVENT (OUTPATIENT)
Dept: SURGERY | Facility: HOSPITAL | Age: 65
End: 2025-09-02
Payer: COMMERCIAL

## 2025-09-02 PROCEDURE — 63600175 PHARM REV CODE 636 W HCPCS: Performed by: NURSE ANESTHETIST, CERTIFIED REGISTERED

## 2025-09-02 PROCEDURE — 25000003 PHARM REV CODE 250: Performed by: NURSE ANESTHETIST, CERTIFIED REGISTERED

## 2025-09-02 RX ORDER — FAMOTIDINE 10 MG/ML
INJECTION, SOLUTION INTRAVENOUS
Status: DISCONTINUED | OUTPATIENT
Start: 2025-09-02 | End: 2025-09-02

## 2025-09-02 RX ORDER — LIDOCAINE HYDROCHLORIDE 20 MG/ML
INJECTION INTRAVENOUS
Status: DISCONTINUED | OUTPATIENT
Start: 2025-09-02 | End: 2025-09-02

## 2025-09-02 RX ORDER — PROPOFOL 10 MG/ML
INJECTION, EMULSION INTRAVENOUS
Status: DISCONTINUED | OUTPATIENT
Start: 2025-09-02 | End: 2025-09-02

## 2025-09-02 RX ORDER — ACETAMINOPHEN 10 MG/ML
INJECTION, SOLUTION INTRAVENOUS
Status: DISCONTINUED | OUTPATIENT
Start: 2025-09-02 | End: 2025-09-02

## 2025-09-02 RX ORDER — ONDANSETRON HYDROCHLORIDE 2 MG/ML
INJECTION, SOLUTION INTRAVENOUS
Status: DISCONTINUED | OUTPATIENT
Start: 2025-09-02 | End: 2025-09-02

## 2025-09-02 RX ADMIN — ONDANSETRON 4 MG: 2 INJECTION INTRAMUSCULAR; INTRAVENOUS at 12:09

## 2025-09-02 RX ADMIN — PROPOFOL 150 MG: 10 INJECTION, EMULSION INTRAVENOUS at 12:09

## 2025-09-02 RX ADMIN — SODIUM CHLORIDE: 0.9 INJECTION, SOLUTION INTRAVENOUS at 12:09

## 2025-09-02 RX ADMIN — ACETAMINOPHEN 1000 MG: 10 INJECTION, SOLUTION INTRAVENOUS at 12:09

## 2025-09-02 RX ADMIN — FAMOTIDINE 20 MG: 10 INJECTION, SOLUTION INTRAVENOUS at 12:09

## 2025-09-02 RX ADMIN — LIDOCAINE HYDROCHLORIDE 75 MG: 20 INJECTION, SOLUTION INTRAVENOUS at 12:09

## (undated) DEVICE — BLLN MUSTANG 7 X 40 X 75

## (undated) DEVICE — TUBING MINIBORE EXTENSION

## (undated) DEVICE — GLOVE SURG ULTRA TOUCH 7.5

## (undated) DEVICE — CONTRAST VISIPAQUE 50ML

## (undated) DEVICE — BLADE SCALPEL #11 371111

## (undated) DEVICE — SYS LABEL CORRECT MED

## (undated) DEVICE — SYRINGE 10ML 302995

## (undated) DEVICE — GUIDEWIRE J 3MM .035IN 150

## (undated) DEVICE — GUIDEWIRE URO ANGLED 3CM TIP .035X150CM

## (undated) DEVICE — CATH ANGIO DIAG RIM 5X65

## (undated) DEVICE — SPONGE GAUZE 10S 4X4  442214

## (undated) DEVICE — CHLORAPREP 10.5 ML APPLICATOR

## (undated) DEVICE — SOLUTION IRRI NS BOTTLE 1000ML R5200-01

## (undated) DEVICE — TRAY MINOR SLIDELL MEMORIAL HOSPITAL

## (undated) DEVICE — SYR 10CC LUER LOCK

## (undated) DEVICE — BLLN MUSTANG 5 X 40 X 75

## (undated) DEVICE — GUIDEWIRE ANGLED .035 150CM

## (undated) DEVICE — KIT MICROINTRODUCE MINI 5X10CM

## (undated) DEVICE — DRAPE C-ARM (FITS NEW C-ARM) 07-CA108

## (undated) DEVICE — SYRINGE HYPODERMC LL 22G 1.5 ECLIPSE 30

## (undated) DEVICE — SYR DISP LL 5CC

## (undated) DEVICE — SPONGE GAUZE 2S 4X4 STERILE NON21424

## (undated) DEVICE — SYRINGE 5ML 309646

## (undated) DEVICE — WIRE SUPER STIFF AMPLATZ .035X145

## (undated) DEVICE — DETACHABLE COIL SYSTEM
Type: IMPLANTABLE DEVICE | Site: ARTERIAL | Status: NON-FUNCTIONAL
Brand: EMBOLD™ FIBERED

## (undated) DEVICE — SOLUTION DURAPREP 8630

## (undated) DEVICE — CATH GLIDE ANGLED 5FR 65CM

## (undated) DEVICE — NEEDLE SAFETY ECLIPSE 25G 1-1/2IN 305767

## (undated) DEVICE — CONTRAST VISIPAQUE 150ML

## (undated) DEVICE — BAG DECANTER 10-102

## (undated) DEVICE — DRESSING TEGADERM 4X4 3/4 TD1004

## (undated) DEVICE — INTRODUCER VASC 6FX6VM*

## (undated) DEVICE — SUTURE SILK 2-0 PS 18 1588H

## (undated) DEVICE — NDL SPINAL 22GX5

## (undated) DEVICE — TOWEL OR BLUE      MDT2168284

## (undated) DEVICE — GLOVE BIOGEL PI ULTRA TOUCH GRAY SZ7.5

## (undated) DEVICE — DRAPE T THYROID W/ARMBOARD COVER

## (undated) DEVICE — NDL SAFETY 25G X 1.5 ECLIPSE

## (undated) DEVICE — SUTURE MONOCRYL 4-0 PS-2 27 MCP426H

## (undated) DEVICE — NDL HYPODERMIC BLUNT 18G 1.5IN

## (undated) DEVICE — GUIDE WIRE WHOLEY EXCHANGE 300

## (undated) DEVICE — Device